# Patient Record
Sex: MALE | Race: WHITE | NOT HISPANIC OR LATINO | Employment: OTHER | URBAN - METROPOLITAN AREA
[De-identification: names, ages, dates, MRNs, and addresses within clinical notes are randomized per-mention and may not be internally consistent; named-entity substitution may affect disease eponyms.]

---

## 2017-11-01 ENCOUNTER — HOSPITAL ENCOUNTER (OUTPATIENT)
Dept: ULTRASOUND IMAGING | Facility: HOSPITAL | Age: 42
Discharge: HOME | End: 2017-11-01
Attending: INTERNAL MEDICINE

## 2017-11-01 DIAGNOSIS — N13.30 HYDRONEPHROSIS: ICD-10-CM

## 2017-11-01 DIAGNOSIS — N18.30 CKD (CHRONIC KIDNEY DISEASE), STAGE III (H): ICD-10-CM

## 2019-01-13 ENCOUNTER — SURGERY - HEALTHEAST (OUTPATIENT)
Dept: CARDIOLOGY | Facility: CLINIC | Age: 44
End: 2019-01-13

## 2019-01-14 ENCOUNTER — AMBULATORY - HEALTHEAST (OUTPATIENT)
Dept: CARDIOLOGY | Facility: CLINIC | Age: 44
End: 2019-01-14

## 2019-01-14 DIAGNOSIS — E78.2 MIXED HYPERLIPIDEMIA: ICD-10-CM

## 2019-01-21 ENCOUNTER — AMBULATORY - HEALTHEAST (OUTPATIENT)
Dept: CARDIOLOGY | Facility: CLINIC | Age: 44
End: 2019-01-21

## 2019-01-21 ENCOUNTER — COMMUNICATION - HEALTHEAST (OUTPATIENT)
Dept: CARDIOLOGY | Facility: CLINIC | Age: 44
End: 2019-01-21

## 2019-01-21 DIAGNOSIS — I21.3 STEMI (ST ELEVATION MYOCARDIAL INFARCTION) (H): ICD-10-CM

## 2019-01-21 LAB
CREAT SERPL-MCNC: 2.77 MG/DL (ref 0.7–1.3)
GFR SERPL CREATININE-BSD FRML MDRD: 25 ML/MIN/1.73M2

## 2019-01-21 ASSESSMENT — MIFFLIN-ST. JEOR: SCORE: 1968.55

## 2019-01-22 ENCOUNTER — AMBULATORY - HEALTHEAST (OUTPATIENT)
Dept: CARDIAC REHAB | Facility: HOSPITAL | Age: 44
End: 2019-01-22

## 2019-01-22 DIAGNOSIS — Z95.5 STENTED CORONARY ARTERY: ICD-10-CM

## 2019-01-22 DIAGNOSIS — I21.02 STEMI INVOLVING LEFT ANTERIOR DESCENDING CORONARY ARTERY (H): ICD-10-CM

## 2019-01-22 LAB — GLUCOSE BLDC GLUCOMTR-MCNC: 153 MG/DL

## 2019-01-24 ENCOUNTER — AMBULATORY - HEALTHEAST (OUTPATIENT)
Dept: CARDIAC REHAB | Facility: HOSPITAL | Age: 44
End: 2019-01-24

## 2019-01-24 DIAGNOSIS — Z95.5 STENTED CORONARY ARTERY: ICD-10-CM

## 2019-01-28 ENCOUNTER — AMBULATORY - HEALTHEAST (OUTPATIENT)
Dept: CARDIOLOGY | Facility: CLINIC | Age: 44
End: 2019-01-28

## 2019-01-28 DIAGNOSIS — I21.3 STEMI (ST ELEVATION MYOCARDIAL INFARCTION) (H): ICD-10-CM

## 2019-01-28 LAB
CREAT SERPL-MCNC: 2.29 MG/DL (ref 0.7–1.3)
GFR SERPL CREATININE-BSD FRML MDRD: 31 ML/MIN/1.73M2

## 2019-01-29 ENCOUNTER — AMBULATORY - HEALTHEAST (OUTPATIENT)
Dept: CARDIAC REHAB | Facility: HOSPITAL | Age: 44
End: 2019-01-29

## 2019-01-29 ENCOUNTER — OFFICE VISIT - HEALTHEAST (OUTPATIENT)
Dept: CARDIOLOGY | Facility: CLINIC | Age: 44
End: 2019-01-29

## 2019-01-29 DIAGNOSIS — I10 BENIGN ESSENTIAL HYPERTENSION: ICD-10-CM

## 2019-01-29 DIAGNOSIS — Z95.5 STENTED CORONARY ARTERY: ICD-10-CM

## 2019-01-29 DIAGNOSIS — I25.83 CORONARY ARTERY DISEASE DUE TO LIPID RICH PLAQUE: ICD-10-CM

## 2019-01-29 DIAGNOSIS — E11.59 TYPE 2 DIABETES MELLITUS WITH OTHER CIRCULATORY COMPLICATION, UNSPECIFIED WHETHER LONG TERM INSULIN USE (H): ICD-10-CM

## 2019-01-29 DIAGNOSIS — I21.02 STEMI INVOLVING LEFT ANTERIOR DESCENDING CORONARY ARTERY (H): ICD-10-CM

## 2019-01-29 DIAGNOSIS — E78.5 DYSLIPIDEMIA: ICD-10-CM

## 2019-01-29 DIAGNOSIS — I25.10 CORONARY ARTERY DISEASE DUE TO LIPID RICH PLAQUE: ICD-10-CM

## 2019-01-29 DIAGNOSIS — N18.9 CHRONIC KIDNEY DISEASE, UNSPECIFIED CKD STAGE: ICD-10-CM

## 2019-01-29 ASSESSMENT — MIFFLIN-ST. JEOR: SCORE: 1982.35

## 2019-01-31 ENCOUNTER — AMBULATORY - HEALTHEAST (OUTPATIENT)
Dept: CARDIAC REHAB | Facility: HOSPITAL | Age: 44
End: 2019-01-31

## 2019-01-31 DIAGNOSIS — Z95.5 STENTED CORONARY ARTERY: ICD-10-CM

## 2019-02-04 ENCOUNTER — AMBULATORY - HEALTHEAST (OUTPATIENT)
Dept: CARDIOLOGY | Facility: CLINIC | Age: 44
End: 2019-02-04

## 2019-02-04 DIAGNOSIS — Z00.6 EXAMINATION OF PARTICIPANT OR CONTROL IN CLINICAL RESEARCH: ICD-10-CM

## 2019-02-04 DIAGNOSIS — I21.3 STEMI (ST ELEVATION MYOCARDIAL INFARCTION) (H): ICD-10-CM

## 2019-02-04 ASSESSMENT — MIFFLIN-ST. JEOR: SCORE: 1972.76

## 2019-02-05 ENCOUNTER — AMBULATORY - HEALTHEAST (OUTPATIENT)
Dept: CARDIAC REHAB | Facility: HOSPITAL | Age: 44
End: 2019-02-05

## 2019-02-05 DIAGNOSIS — Z95.5 STENTED CORONARY ARTERY: ICD-10-CM

## 2019-02-05 DIAGNOSIS — I21.02 STEMI INVOLVING LEFT ANTERIOR DESCENDING CORONARY ARTERY (H): ICD-10-CM

## 2019-02-07 ENCOUNTER — AMBULATORY - HEALTHEAST (OUTPATIENT)
Dept: CARDIAC REHAB | Facility: HOSPITAL | Age: 44
End: 2019-02-07

## 2019-02-07 DIAGNOSIS — Z95.5 STENTED CORONARY ARTERY: ICD-10-CM

## 2019-02-07 DIAGNOSIS — I21.02 STEMI INVOLVING LEFT ANTERIOR DESCENDING CORONARY ARTERY (H): ICD-10-CM

## 2019-02-08 ENCOUNTER — AMBULATORY - HEALTHEAST (OUTPATIENT)
Dept: CARDIOLOGY | Facility: CLINIC | Age: 44
End: 2019-02-08

## 2019-02-11 ENCOUNTER — AMBULATORY - HEALTHEAST (OUTPATIENT)
Dept: CARDIOLOGY | Facility: CLINIC | Age: 44
End: 2019-02-11

## 2019-02-11 ENCOUNTER — OFFICE VISIT - HEALTHEAST (OUTPATIENT)
Dept: CARDIOLOGY | Facility: CLINIC | Age: 44
End: 2019-02-11

## 2019-02-11 DIAGNOSIS — I21.02 STEMI INVOLVING LEFT ANTERIOR DESCENDING CORONARY ARTERY (H): ICD-10-CM

## 2019-02-11 DIAGNOSIS — I10 BENIGN ESSENTIAL HYPERTENSION: ICD-10-CM

## 2019-02-11 DIAGNOSIS — E11.59 TYPE 2 DIABETES MELLITUS WITH OTHER CIRCULATORY COMPLICATION, UNSPECIFIED WHETHER LONG TERM INSULIN USE (H): ICD-10-CM

## 2019-02-11 DIAGNOSIS — E78.5 DYSLIPIDEMIA: ICD-10-CM

## 2019-02-11 DIAGNOSIS — I25.83 CORONARY ARTERY DISEASE DUE TO LIPID RICH PLAQUE: ICD-10-CM

## 2019-02-11 DIAGNOSIS — I25.10 CORONARY ARTERY DISEASE DUE TO LIPID RICH PLAQUE: ICD-10-CM

## 2019-02-11 ASSESSMENT — MIFFLIN-ST. JEOR: SCORE: 1990.91

## 2019-02-12 ENCOUNTER — AMBULATORY - HEALTHEAST (OUTPATIENT)
Dept: CARDIAC REHAB | Facility: HOSPITAL | Age: 44
End: 2019-02-12

## 2019-02-12 DIAGNOSIS — Z95.5 STENTED CORONARY ARTERY: ICD-10-CM

## 2019-02-14 ENCOUNTER — AMBULATORY - HEALTHEAST (OUTPATIENT)
Dept: CARDIAC REHAB | Facility: HOSPITAL | Age: 44
End: 2019-02-14

## 2019-02-14 DIAGNOSIS — I21.02 STEMI INVOLVING LEFT ANTERIOR DESCENDING CORONARY ARTERY (H): ICD-10-CM

## 2019-02-14 DIAGNOSIS — Z95.5 STENTED CORONARY ARTERY: ICD-10-CM

## 2019-02-15 ENCOUNTER — AMBULATORY - HEALTHEAST (OUTPATIENT)
Dept: CARDIOLOGY | Facility: CLINIC | Age: 44
End: 2019-02-15

## 2019-02-19 ENCOUNTER — AMBULATORY - HEALTHEAST (OUTPATIENT)
Dept: CARDIAC REHAB | Facility: HOSPITAL | Age: 44
End: 2019-02-19

## 2019-02-19 DIAGNOSIS — Z95.5 STENTED CORONARY ARTERY: ICD-10-CM

## 2019-02-20 ENCOUNTER — COMMUNICATION - HEALTHEAST (OUTPATIENT)
Dept: CARDIOLOGY | Facility: CLINIC | Age: 44
End: 2019-02-20

## 2019-02-21 ENCOUNTER — AMBULATORY - HEALTHEAST (OUTPATIENT)
Dept: CARDIAC REHAB | Facility: HOSPITAL | Age: 44
End: 2019-02-21

## 2019-02-21 DIAGNOSIS — Z95.5 STENTED CORONARY ARTERY: ICD-10-CM

## 2019-02-26 ENCOUNTER — AMBULATORY - HEALTHEAST (OUTPATIENT)
Dept: CARDIAC REHAB | Facility: HOSPITAL | Age: 44
End: 2019-02-26

## 2019-02-26 DIAGNOSIS — Z95.5 STENTED CORONARY ARTERY: ICD-10-CM

## 2019-02-26 DIAGNOSIS — I21.02 STEMI INVOLVING LEFT ANTERIOR DESCENDING CORONARY ARTERY (H): ICD-10-CM

## 2019-02-26 LAB
GLUCOSE BLDC GLUCOMTR-MCNC: 152 MG/DL (ref 70–139)
GLUCOSE BLDC GLUCOMTR-MCNC: 165 MG/DL (ref 70–139)

## 2019-02-28 ENCOUNTER — AMBULATORY - HEALTHEAST (OUTPATIENT)
Dept: CARDIAC REHAB | Facility: HOSPITAL | Age: 44
End: 2019-02-28

## 2019-02-28 DIAGNOSIS — Z95.5 STENTED CORONARY ARTERY: ICD-10-CM

## 2019-03-04 ENCOUNTER — OFFICE VISIT - HEALTHEAST (OUTPATIENT)
Dept: CARDIOLOGY | Facility: CLINIC | Age: 44
End: 2019-03-04

## 2019-03-04 DIAGNOSIS — I25.83 CORONARY ARTERY DISEASE DUE TO LIPID RICH PLAQUE: ICD-10-CM

## 2019-03-04 DIAGNOSIS — I25.10 CORONARY ARTERY DISEASE DUE TO LIPID RICH PLAQUE: ICD-10-CM

## 2019-03-04 LAB
CHOLEST SERPL-MCNC: 114 MG/DL
FASTING STATUS PATIENT QL REPORTED: NO
HDLC SERPL-MCNC: 29 MG/DL
LDLC SERPL CALC-MCNC: 36 MG/DL
LDLC SERPL CALC-MCNC: 46 MG/DL
TRIGL SERPL-MCNC: 243 MG/DL

## 2019-03-04 ASSESSMENT — MIFFLIN-ST. JEOR: SCORE: 1994.24

## 2019-03-05 ENCOUNTER — AMBULATORY - HEALTHEAST (OUTPATIENT)
Dept: CARDIAC REHAB | Facility: HOSPITAL | Age: 44
End: 2019-03-05

## 2019-03-05 DIAGNOSIS — Z95.5 STENTED CORONARY ARTERY: ICD-10-CM

## 2019-03-05 DIAGNOSIS — I21.02 STEMI INVOLVING LEFT ANTERIOR DESCENDING CORONARY ARTERY (H): ICD-10-CM

## 2019-03-06 ENCOUNTER — HOSPITAL ENCOUNTER (OUTPATIENT)
Dept: CARDIOLOGY | Facility: HOSPITAL | Age: 44
Discharge: HOME | End: 2019-03-06
Attending: INTERNAL MEDICINE

## 2019-03-06 ENCOUNTER — HOSPITAL ENCOUNTER (OUTPATIENT)
Dept: NUCLEAR MEDICINE | Facility: HOSPITAL | Age: 44
Discharge: HOME | End: 2019-03-06
Attending: INTERNAL MEDICINE

## 2019-03-06 DIAGNOSIS — I25.10 CORONARY ARTERY DISEASE DUE TO LIPID RICH PLAQUE: ICD-10-CM

## 2019-03-06 DIAGNOSIS — I25.83 CORONARY ARTERY DISEASE DUE TO LIPID RICH PLAQUE: ICD-10-CM

## 2019-03-06 LAB
CV STRESS CURRENT BP HE: NORMAL
CV STRESS CURRENT HR HE: 102
CV STRESS CURRENT HR HE: 104
CV STRESS CURRENT HR HE: 106
CV STRESS CURRENT HR HE: 106
CV STRESS CURRENT HR HE: 108
CV STRESS CURRENT HR HE: 110
CV STRESS CURRENT HR HE: 114
CV STRESS CURRENT HR HE: 118
CV STRESS CURRENT HR HE: 122
CV STRESS CURRENT HR HE: 127
CV STRESS CURRENT HR HE: 128
CV STRESS CURRENT HR HE: 133
CV STRESS CURRENT HR HE: 144
CV STRESS CURRENT HR HE: 146
CV STRESS CURRENT HR HE: 147
CV STRESS CURRENT HR HE: 150
CV STRESS CURRENT HR HE: 150
CV STRESS CURRENT HR HE: 154
CV STRESS CURRENT HR HE: 154
CV STRESS CURRENT HR HE: 158
CV STRESS CURRENT HR HE: 71
CV STRESS CURRENT HR HE: 75
CV STRESS CURRENT HR HE: 87
CV STRESS CURRENT HR HE: 93
CV STRESS CURRENT HR HE: 93
CV STRESS CURRENT HR HE: 96
CV STRESS CURRENT HR HE: 98
CV STRESS CURRENT HR HE: 99
CV STRESS DEVIATION TIME HE: NORMAL
CV STRESS ECHO PERCENT HR HE: NORMAL
CV STRESS EXERCISE STAGE HE: NORMAL
CV STRESS EXERCISE STAGE REACHED HE: NORMAL
CV STRESS FINAL RESTING BP HE: NORMAL
CV STRESS FINAL RESTING HR HE: 98
CV STRESS MAX HR HE: 159
CV STRESS MAX TREADMILL GRADE HE: 16
CV STRESS MAX TREADMILL SPEED HE: 4.2
CV STRESS PEAK DIA BP HE: NORMAL
CV STRESS PEAK SYS BP HE: NORMAL
CV STRESS PHASE HE: NORMAL
CV STRESS PROTOCOL HE: NORMAL
CV STRESS RESTING PT POSITION HE: NORMAL
CV STRESS RESTING PT POSITION HE: NORMAL
CV STRESS ST DEVIATION AMOUNT HE: NORMAL
CV STRESS ST DEVIATION ELEVATION HE: NORMAL
CV STRESS ST EVELATION AMOUNT HE: NORMAL
CV STRESS TEST TYPE HE: NORMAL
CV STRESS TOTAL STAGE TIME MIN 1 HE: NORMAL
NUC STRESS EJECTION FRACTION: 61 %
STRESS ECHO BASELINE BP: NORMAL
STRESS ECHO BASELINE HR: 70
STRESS ECHO CALCULATED PERCENT HR: 90 %
STRESS ECHO LAST STRESS BP: NORMAL
STRESS ECHO LAST STRESS HR: 158
STRESS ECHO POST ESTIMATED WORKLOAD: 12.1
STRESS ECHO POST EXERCISE DUR MIN: 11
STRESS ECHO POST EXERCISE DUR SEC: 24
STRESS ECHO TARGET HR: 150

## 2019-03-08 ENCOUNTER — COMMUNICATION - HEALTHEAST (OUTPATIENT)
Dept: CARDIOLOGY | Facility: CLINIC | Age: 44
End: 2019-03-08

## 2019-03-08 DIAGNOSIS — I25.10 CAD (CORONARY ARTERY DISEASE): ICD-10-CM

## 2019-03-08 DIAGNOSIS — I25.83 CORONARY ARTERY DISEASE DUE TO LIPID RICH PLAQUE: ICD-10-CM

## 2019-03-08 DIAGNOSIS — I25.10 CORONARY ARTERY DISEASE DUE TO LIPID RICH PLAQUE: ICD-10-CM

## 2019-03-08 DIAGNOSIS — R94.39 ABNORMAL CARDIOVASCULAR STRESS TEST: ICD-10-CM

## 2019-03-12 ENCOUNTER — AMBULATORY - HEALTHEAST (OUTPATIENT)
Dept: CARDIAC REHAB | Facility: HOSPITAL | Age: 44
End: 2019-03-12

## 2019-03-12 DIAGNOSIS — I21.02 STEMI INVOLVING LEFT ANTERIOR DESCENDING CORONARY ARTERY (H): ICD-10-CM

## 2019-03-12 DIAGNOSIS — Z95.5 STENTED CORONARY ARTERY: ICD-10-CM

## 2019-03-14 ENCOUNTER — AMBULATORY - HEALTHEAST (OUTPATIENT)
Dept: CARDIAC REHAB | Facility: HOSPITAL | Age: 44
End: 2019-03-14

## 2019-03-14 DIAGNOSIS — Z95.5 STENTED CORONARY ARTERY: ICD-10-CM

## 2019-03-14 DIAGNOSIS — I21.02 STEMI INVOLVING LEFT ANTERIOR DESCENDING CORONARY ARTERY (H): ICD-10-CM

## 2019-03-19 ENCOUNTER — AMBULATORY - HEALTHEAST (OUTPATIENT)
Dept: CARDIAC REHAB | Facility: HOSPITAL | Age: 44
End: 2019-03-19

## 2019-03-19 DIAGNOSIS — I21.02 STEMI INVOLVING LEFT ANTERIOR DESCENDING CORONARY ARTERY (H): ICD-10-CM

## 2019-03-19 DIAGNOSIS — Z95.5 STENTED CORONARY ARTERY: ICD-10-CM

## 2019-03-20 ENCOUNTER — AMBULATORY - HEALTHEAST (OUTPATIENT)
Dept: CARDIAC REHAB | Facility: HOSPITAL | Age: 44
End: 2019-03-20

## 2019-03-20 DIAGNOSIS — I21.02 STEMI INVOLVING LEFT ANTERIOR DESCENDING CORONARY ARTERY (H): ICD-10-CM

## 2019-03-20 DIAGNOSIS — Z95.5 STENTED CORONARY ARTERY: ICD-10-CM

## 2019-03-21 ENCOUNTER — AMBULATORY - HEALTHEAST (OUTPATIENT)
Dept: CARDIAC REHAB | Facility: HOSPITAL | Age: 44
End: 2019-03-21

## 2019-03-21 DIAGNOSIS — Z95.5 STENTED CORONARY ARTERY: ICD-10-CM

## 2019-03-21 DIAGNOSIS — I21.02 STEMI INVOLVING LEFT ANTERIOR DESCENDING CORONARY ARTERY (H): ICD-10-CM

## 2019-03-26 ENCOUNTER — AMBULATORY - HEALTHEAST (OUTPATIENT)
Dept: CARDIAC REHAB | Facility: HOSPITAL | Age: 44
End: 2019-03-26

## 2019-03-26 DIAGNOSIS — I21.02 STEMI INVOLVING LEFT ANTERIOR DESCENDING CORONARY ARTERY (H): ICD-10-CM

## 2019-03-26 DIAGNOSIS — Z95.5 STENTED CORONARY ARTERY: ICD-10-CM

## 2019-03-28 ENCOUNTER — AMBULATORY - HEALTHEAST (OUTPATIENT)
Dept: CARDIAC REHAB | Facility: HOSPITAL | Age: 44
End: 2019-03-28

## 2019-03-28 DIAGNOSIS — Z95.5 STENTED CORONARY ARTERY: ICD-10-CM

## 2019-04-01 ENCOUNTER — COMMUNICATION - HEALTHEAST (OUTPATIENT)
Dept: CARDIOLOGY | Facility: CLINIC | Age: 44
End: 2019-04-01

## 2019-04-01 ENCOUNTER — AMBULATORY - HEALTHEAST (OUTPATIENT)
Dept: CARDIOLOGY | Facility: CLINIC | Age: 44
End: 2019-04-01

## 2019-04-02 ENCOUNTER — HOSPITAL ENCOUNTER (OUTPATIENT)
Dept: RADIOLOGY | Facility: HOSPITAL | Age: 44
Discharge: HOME | End: 2019-04-02
Attending: INTERNAL MEDICINE

## 2019-04-02 ENCOUNTER — HOSPITAL ENCOUNTER (OUTPATIENT)
Dept: MRI IMAGING | Facility: HOSPITAL | Age: 44
Discharge: HOME | End: 2019-04-02
Attending: INTERNAL MEDICINE

## 2019-04-02 DIAGNOSIS — I25.83 CORONARY ARTERY DISEASE DUE TO LIPID RICH PLAQUE: ICD-10-CM

## 2019-04-02 DIAGNOSIS — I25.10 CORONARY ARTERY DISEASE DUE TO LIPID RICH PLAQUE: ICD-10-CM

## 2019-04-02 DIAGNOSIS — R94.39 ABNORMAL CARDIOVASCULAR STRESS TEST: ICD-10-CM

## 2019-04-02 LAB
ATRIAL RATE - MUSE: 73 BPM
CREAT BLD-MCNC: 2.6 MG/DL (ref 0.7–1.3)
DIASTOLIC BLOOD PRESSURE - MUSE: NORMAL MMHG
GFR SERPL CREATININE-BSD FRML MDRD: 27 ML/MIN/1.73M2
INTERPRETATION ECG - MUSE: NORMAL
P AXIS - MUSE: 46 DEGREES
PR INTERVAL - MUSE: 182 MS
QRS DURATION - MUSE: 112 MS
QT - MUSE: 402 MS
QTC - MUSE: 442 MS
R AXIS - MUSE: 56 DEGREES
SYSTOLIC BLOOD PRESSURE - MUSE: NORMAL MMHG
T AXIS - MUSE: 24 DEGREES
VENTRICULAR RATE- MUSE: 73 BPM

## 2019-04-03 ENCOUNTER — COMMUNICATION - HEALTHEAST (OUTPATIENT)
Dept: CARDIOLOGY | Facility: CLINIC | Age: 44
End: 2019-04-03

## 2019-04-03 DIAGNOSIS — I25.83 CORONARY ARTERY DISEASE DUE TO LIPID RICH PLAQUE: ICD-10-CM

## 2019-04-03 DIAGNOSIS — I21.3 STEMI (ST ELEVATION MYOCARDIAL INFARCTION) (H): ICD-10-CM

## 2019-04-03 DIAGNOSIS — I10 HTN (HYPERTENSION): ICD-10-CM

## 2019-04-03 DIAGNOSIS — I25.10 CORONARY ARTERY DISEASE DUE TO LIPID RICH PLAQUE: ICD-10-CM

## 2019-04-10 ENCOUNTER — AMBULATORY - HEALTHEAST (OUTPATIENT)
Dept: CARDIOLOGY | Facility: CLINIC | Age: 44
End: 2019-04-10

## 2019-04-10 DIAGNOSIS — I25.10 CAD (CORONARY ARTERY DISEASE): ICD-10-CM

## 2019-04-29 ENCOUNTER — COMMUNICATION - HEALTHEAST (OUTPATIENT)
Dept: CARDIOLOGY | Facility: CLINIC | Age: 44
End: 2019-04-29

## 2019-05-01 ENCOUNTER — AMBULATORY - HEALTHEAST (OUTPATIENT)
Dept: CARDIOLOGY | Facility: CLINIC | Age: 44
End: 2019-05-01

## 2019-05-01 ENCOUNTER — SURGERY - HEALTHEAST (OUTPATIENT)
Dept: CARDIOLOGY | Facility: CLINIC | Age: 44
End: 2019-05-01

## 2019-05-02 ENCOUNTER — SURGERY - HEALTHEAST (OUTPATIENT)
Dept: CARDIOLOGY | Facility: CLINIC | Age: 44
End: 2019-05-02

## 2019-05-02 ASSESSMENT — MIFFLIN-ST. JEOR: SCORE: 1963.1

## 2019-05-17 ENCOUNTER — OFFICE VISIT - HEALTHEAST (OUTPATIENT)
Dept: CARDIOLOGY | Facility: CLINIC | Age: 44
End: 2019-05-17

## 2019-05-17 DIAGNOSIS — I21.02 STEMI INVOLVING LEFT ANTERIOR DESCENDING CORONARY ARTERY (H): ICD-10-CM

## 2019-05-17 DIAGNOSIS — I25.83 CORONARY ARTERY DISEASE DUE TO LIPID RICH PLAQUE: ICD-10-CM

## 2019-05-17 DIAGNOSIS — I25.10 CORONARY ARTERY DISEASE DUE TO LIPID RICH PLAQUE: ICD-10-CM

## 2019-05-17 ASSESSMENT — MIFFLIN-ST. JEOR: SCORE: 1960.12

## 2019-06-14 ENCOUNTER — SURGERY - HEALTHEAST (OUTPATIENT)
Dept: CARDIOLOGY | Facility: CLINIC | Age: 44
End: 2019-06-14

## 2019-06-14 DIAGNOSIS — I21.02 STEMI INVOLVING LEFT ANTERIOR DESCENDING CORONARY ARTERY (H): ICD-10-CM

## 2019-06-17 ENCOUNTER — HOSPITAL ENCOUNTER (OUTPATIENT)
Dept: RADIOLOGY | Facility: HOSPITAL | Age: 44
Discharge: HOME | End: 2019-06-17

## 2019-06-17 DIAGNOSIS — I21.02 STEMI INVOLVING LEFT ANTERIOR DESCENDING CORONARY ARTERY (H): ICD-10-CM

## 2019-06-19 ENCOUNTER — HOSPITAL ENCOUNTER (OUTPATIENT)
Dept: SURGERY | Facility: CLINIC | Age: 44
Discharge: HOME | End: 2019-06-19

## 2019-06-19 ENCOUNTER — ANESTHESIA - HEALTHEAST (OUTPATIENT)
Dept: SURGERY | Facility: CLINIC | Age: 44
End: 2019-06-19

## 2019-06-19 DIAGNOSIS — I21.02 STEMI INVOLVING LEFT ANTERIOR DESCENDING CORONARY ARTERY (H): ICD-10-CM

## 2019-06-19 LAB
ABO/RH(D): NORMAL
ALBUMIN UR-MCNC: ABNORMAL MG/DL
ANION GAP SERPL CALCULATED.3IONS-SCNC: 7 MMOL/L (ref 5–18)
ANTIBODY SCREEN: NEGATIVE
APPEARANCE UR: CLEAR
ATRIAL RATE - MUSE: 74 BPM
BACTERIA #/AREA URNS HPF: ABNORMAL HPF
BILIRUB UR QL STRIP: NEGATIVE
BUN SERPL-MCNC: 37 MG/DL (ref 8–22)
CALCIUM SERPL-MCNC: 10 MG/DL (ref 8.5–10.5)
CHLORIDE BLD-SCNC: 107 MMOL/L (ref 98–107)
CO2 SERPL-SCNC: 27 MMOL/L (ref 22–31)
COLOR UR AUTO: ABNORMAL
CREAT SERPL-MCNC: 2 MG/DL (ref 0.7–1.3)
DIASTOLIC BLOOD PRESSURE - MUSE: NORMAL MMHG
ERYTHROCYTE [DISTWIDTH] IN BLOOD BY AUTOMATED COUNT: 13.3 % (ref 11–14.5)
GFR SERPL CREATININE-BSD FRML MDRD: 37 ML/MIN/1.73M2
GLUCOSE BLD-MCNC: 65 MG/DL (ref 70–125)
GLUCOSE UR STRIP-MCNC: ABNORMAL MG/DL
HCT VFR BLD AUTO: 49 % (ref 40–54)
HGB BLD-MCNC: 16.5 G/DL (ref 14–18)
HGB UR QL STRIP: ABNORMAL
INR PPP: 1.01 (ref 0.9–1.1)
INTERPRETATION ECG - MUSE: NORMAL
KETONES UR STRIP-MCNC: NEGATIVE MG/DL
LEUKOCYTE ESTERASE UR QL STRIP: NEGATIVE
MAGNESIUM SERPL-MCNC: 1.9 MG/DL (ref 1.8–2.6)
MCH RBC QN AUTO: 29.6 PG (ref 27–34)
MCHC RBC AUTO-ENTMCNC: 33.7 G/DL (ref 32–36)
MCV RBC AUTO: 88 FL (ref 80–100)
MUCOUS THREADS #/AREA URNS LPF: ABNORMAL LPF
NITRATE UR QL: NEGATIVE
P AXIS - MUSE: 44 DEGREES
PH UR STRIP: 6 [PH] (ref 4.5–8)
PLATELET # BLD AUTO: 199 THOU/UL (ref 140–440)
PMV BLD AUTO: 9.6 FL (ref 8.5–12.5)
POTASSIUM BLD-SCNC: 3.9 MMOL/L (ref 3.5–5)
PR INTERVAL - MUSE: 188 MS
PREALB SERPL-MCNC: 36 MG/DL (ref 19–38)
QRS DURATION - MUSE: 112 MS
QT - MUSE: 392 MS
QTC - MUSE: 435 MS
R AXIS - MUSE: 30 DEGREES
RBC # BLD AUTO: 5.57 MILL/UL (ref 4.4–6.2)
RBC #/AREA URNS AUTO: ABNORMAL HPF
SODIUM SERPL-SCNC: 141 MMOL/L (ref 136–145)
SP GR UR STRIP: 1.01 (ref 1–1.03)
SQUAMOUS #/AREA URNS AUTO: ABNORMAL LPF
SYSTOLIC BLOOD PRESSURE - MUSE: NORMAL MMHG
T AXIS - MUSE: 35 DEGREES
UROBILINOGEN UR STRIP-ACNC: ABNORMAL
VENTRICULAR RATE- MUSE: 74 BPM
WBC #/AREA URNS AUTO: ABNORMAL HPF
WBC: 8.5 THOU/UL (ref 4–11)

## 2019-06-19 ASSESSMENT — MIFFLIN-ST. JEOR: SCORE: 1978.86

## 2019-06-20 ENCOUNTER — SURGERY - HEALTHEAST (OUTPATIENT)
Dept: SURGERY | Facility: CLINIC | Age: 44
End: 2019-06-20

## 2019-06-20 LAB
BACTERIA SPEC CULT: NO GROWTH
BACTERIA SPEC CULT: NORMAL

## 2019-06-20 ASSESSMENT — MIFFLIN-ST. JEOR: SCORE: 1974.32

## 2019-06-21 ENCOUNTER — AMBULATORY - HEALTHEAST (OUTPATIENT)
Dept: CARDIOLOGY | Facility: CLINIC | Age: 44
End: 2019-06-21

## 2019-06-21 ASSESSMENT — MIFFLIN-ST. JEOR: SCORE: 2010.16

## 2019-06-22 ASSESSMENT — MIFFLIN-ST. JEOR: SCORE: 1999.72

## 2019-06-23 ASSESSMENT — MIFFLIN-ST. JEOR: SCORE: 1998.82

## 2019-06-24 ASSESSMENT — MIFFLIN-ST. JEOR: SCORE: 1985.78

## 2019-06-25 ASSESSMENT — MIFFLIN-ST. JEOR: SCORE: 1996.22

## 2019-06-26 ASSESSMENT — MIFFLIN-ST. JEOR: SCORE: 1939.52

## 2019-06-27 ENCOUNTER — AMBULATORY - HEALTHEAST (OUTPATIENT)
Dept: CARDIOLOGY | Facility: CLINIC | Age: 44
End: 2019-06-27

## 2019-06-27 DIAGNOSIS — Z95.1 S/P CABG (CORONARY ARTERY BYPASS GRAFT): ICD-10-CM

## 2019-07-02 ENCOUNTER — AMBULATORY - HEALTHEAST (OUTPATIENT)
Dept: CARDIAC REHAB | Facility: HOSPITAL | Age: 44
End: 2019-07-02

## 2019-07-02 DIAGNOSIS — Z95.1 S/P CABG (CORONARY ARTERY BYPASS GRAFT): ICD-10-CM

## 2019-07-02 LAB — GLUCOSE BLDC GLUCOMTR-MCNC: 151 MG/DL (ref 70–139)

## 2019-07-03 ENCOUNTER — AMBULATORY - HEALTHEAST (OUTPATIENT)
Dept: CARDIOLOGY | Facility: CLINIC | Age: 44
End: 2019-07-03

## 2019-07-05 ENCOUNTER — AMBULATORY - HEALTHEAST (OUTPATIENT)
Dept: CARDIAC REHAB | Facility: HOSPITAL | Age: 44
End: 2019-07-05

## 2019-07-05 DIAGNOSIS — Z95.1 S/P CABG (CORONARY ARTERY BYPASS GRAFT): ICD-10-CM

## 2019-07-05 DIAGNOSIS — Z95.5 STENTED CORONARY ARTERY: ICD-10-CM

## 2019-07-05 DIAGNOSIS — I21.02 STEMI INVOLVING LEFT ANTERIOR DESCENDING CORONARY ARTERY (H): ICD-10-CM

## 2019-07-08 ENCOUNTER — AMBULATORY - HEALTHEAST (OUTPATIENT)
Dept: CARDIAC REHAB | Facility: HOSPITAL | Age: 44
End: 2019-07-08

## 2019-07-08 DIAGNOSIS — Z95.1 S/P CABG (CORONARY ARTERY BYPASS GRAFT): ICD-10-CM

## 2019-07-10 ENCOUNTER — OFFICE VISIT - HEALTHEAST (OUTPATIENT)
Dept: CARDIOLOGY | Facility: CLINIC | Age: 44
End: 2019-07-10

## 2019-07-10 DIAGNOSIS — Z95.1 S/P CABG (CORONARY ARTERY BYPASS GRAFT): ICD-10-CM

## 2019-07-10 ASSESSMENT — MIFFLIN-ST. JEOR: SCORE: 1932.26

## 2019-07-12 ENCOUNTER — AMBULATORY - HEALTHEAST (OUTPATIENT)
Dept: CARDIAC REHAB | Facility: HOSPITAL | Age: 44
End: 2019-07-12

## 2019-07-12 DIAGNOSIS — Z95.1 S/P CABG (CORONARY ARTERY BYPASS GRAFT): ICD-10-CM

## 2019-07-17 ENCOUNTER — AMBULATORY - HEALTHEAST (OUTPATIENT)
Dept: CARDIOLOGY | Facility: CLINIC | Age: 44
End: 2019-07-17

## 2019-07-17 DIAGNOSIS — I25.10 CAD (CORONARY ARTERY DISEASE): ICD-10-CM

## 2019-07-17 ASSESSMENT — MIFFLIN-ST. JEOR: SCORE: 1936.8

## 2019-07-19 ENCOUNTER — AMBULATORY - HEALTHEAST (OUTPATIENT)
Dept: CARDIAC REHAB | Facility: HOSPITAL | Age: 44
End: 2019-07-19

## 2019-07-19 DIAGNOSIS — Z95.1 S/P CABG (CORONARY ARTERY BYPASS GRAFT): ICD-10-CM

## 2019-07-22 ENCOUNTER — AMBULATORY - HEALTHEAST (OUTPATIENT)
Dept: CARDIAC REHAB | Facility: HOSPITAL | Age: 44
End: 2019-07-22

## 2019-07-22 DIAGNOSIS — Z95.1 S/P CABG (CORONARY ARTERY BYPASS GRAFT): ICD-10-CM

## 2019-07-26 ENCOUNTER — AMBULATORY - HEALTHEAST (OUTPATIENT)
Dept: CARDIAC REHAB | Facility: HOSPITAL | Age: 44
End: 2019-07-26

## 2019-07-26 DIAGNOSIS — Z95.1 S/P CABG (CORONARY ARTERY BYPASS GRAFT): ICD-10-CM

## 2019-07-29 ENCOUNTER — AMBULATORY - HEALTHEAST (OUTPATIENT)
Dept: CARDIAC REHAB | Facility: HOSPITAL | Age: 44
End: 2019-07-29

## 2019-07-29 DIAGNOSIS — Z95.1 S/P CABG (CORONARY ARTERY BYPASS GRAFT): ICD-10-CM

## 2019-07-30 ENCOUNTER — COMMUNICATION - HEALTHEAST (OUTPATIENT)
Dept: CARDIOLOGY | Facility: CLINIC | Age: 44
End: 2019-07-30

## 2019-07-30 DIAGNOSIS — Z95.1 S/P CABG (CORONARY ARTERY BYPASS GRAFT): ICD-10-CM

## 2019-08-05 ENCOUNTER — AMBULATORY - HEALTHEAST (OUTPATIENT)
Dept: CARDIAC REHAB | Facility: HOSPITAL | Age: 44
End: 2019-08-05

## 2019-08-05 DIAGNOSIS — Z95.1 S/P CABG (CORONARY ARTERY BYPASS GRAFT): ICD-10-CM

## 2019-08-09 ENCOUNTER — AMBULATORY - HEALTHEAST (OUTPATIENT)
Dept: CARDIAC REHAB | Facility: HOSPITAL | Age: 44
End: 2019-08-09

## 2019-08-09 DIAGNOSIS — Z95.1 S/P CABG (CORONARY ARTERY BYPASS GRAFT): ICD-10-CM

## 2019-08-12 ENCOUNTER — AMBULATORY - HEALTHEAST (OUTPATIENT)
Dept: CARDIAC REHAB | Facility: HOSPITAL | Age: 44
End: 2019-08-12

## 2019-08-12 DIAGNOSIS — Z95.1 S/P CABG (CORONARY ARTERY BYPASS GRAFT): ICD-10-CM

## 2019-08-16 ENCOUNTER — AMBULATORY - HEALTHEAST (OUTPATIENT)
Dept: CARDIAC REHAB | Facility: HOSPITAL | Age: 44
End: 2019-08-16

## 2019-08-16 DIAGNOSIS — Z95.1 S/P CABG (CORONARY ARTERY BYPASS GRAFT): ICD-10-CM

## 2019-08-21 ENCOUNTER — OFFICE VISIT - HEALTHEAST (OUTPATIENT)
Dept: CARDIOLOGY | Facility: CLINIC | Age: 44
End: 2019-08-21

## 2019-08-21 DIAGNOSIS — Z95.1 S/P CABG (CORONARY ARTERY BYPASS GRAFT): ICD-10-CM

## 2019-08-21 DIAGNOSIS — N18.30 STAGE 3 CHRONIC KIDNEY DISEASE (H): ICD-10-CM

## 2019-08-21 DIAGNOSIS — I10 ESSENTIAL HYPERTENSION: ICD-10-CM

## 2019-08-21 DIAGNOSIS — I25.2 HISTORY OF ST ELEVATION MYOCARDIAL INFARCTION (STEMI): ICD-10-CM

## 2019-08-21 DIAGNOSIS — E78.5 DYSLIPIDEMIA: ICD-10-CM

## 2019-08-21 DIAGNOSIS — I25.10 CORONARY ARTERY DISEASE DUE TO LIPID RICH PLAQUE: ICD-10-CM

## 2019-08-21 DIAGNOSIS — I25.5 ISCHEMIC CARDIOMYOPATHY: ICD-10-CM

## 2019-08-21 DIAGNOSIS — I25.83 CORONARY ARTERY DISEASE DUE TO LIPID RICH PLAQUE: ICD-10-CM

## 2019-08-21 LAB
ANION GAP SERPL CALCULATED.3IONS-SCNC: 9 MMOL/L (ref 5–18)
BNP SERPL-MCNC: 72 PG/ML (ref 0–35)
BUN SERPL-MCNC: 32 MG/DL (ref 8–22)
CALCIUM SERPL-MCNC: 10 MG/DL (ref 8.5–10.5)
CHLORIDE BLD-SCNC: 103 MMOL/L (ref 98–107)
CO2 SERPL-SCNC: 26 MMOL/L (ref 22–31)
CREAT SERPL-MCNC: 2.18 MG/DL (ref 0.7–1.3)
GFR SERPL CREATININE-BSD FRML MDRD: 33 ML/MIN/1.73M2
GLUCOSE BLD-MCNC: 186 MG/DL (ref 70–125)
POTASSIUM BLD-SCNC: 5 MMOL/L (ref 3.5–5)
SODIUM SERPL-SCNC: 138 MMOL/L (ref 136–145)

## 2019-08-21 ASSESSMENT — MIFFLIN-ST. JEOR: SCORE: 1966.93

## 2019-08-23 ENCOUNTER — AMBULATORY - HEALTHEAST (OUTPATIENT)
Dept: CARDIAC REHAB | Facility: HOSPITAL | Age: 44
End: 2019-08-23

## 2019-08-23 DIAGNOSIS — Z95.1 S/P CABG (CORONARY ARTERY BYPASS GRAFT): ICD-10-CM

## 2019-08-26 ENCOUNTER — AMBULATORY - HEALTHEAST (OUTPATIENT)
Dept: CARDIOLOGY | Facility: CLINIC | Age: 44
End: 2019-08-26

## 2019-08-26 ENCOUNTER — AMBULATORY - HEALTHEAST (OUTPATIENT)
Dept: CARDIAC REHAB | Facility: HOSPITAL | Age: 44
End: 2019-08-26

## 2019-08-26 ENCOUNTER — COMMUNICATION - HEALTHEAST (OUTPATIENT)
Dept: CARDIOLOGY | Facility: CLINIC | Age: 44
End: 2019-08-26

## 2019-08-26 DIAGNOSIS — Z95.1 S/P CABG (CORONARY ARTERY BYPASS GRAFT): ICD-10-CM

## 2019-08-26 DIAGNOSIS — I10 ESSENTIAL HYPERTENSION: ICD-10-CM

## 2019-09-21 ENCOUNTER — COMMUNICATION - HEALTHEAST (OUTPATIENT)
Dept: CARDIOLOGY | Facility: CLINIC | Age: 44
End: 2019-09-21

## 2019-09-21 DIAGNOSIS — Z95.1 S/P CABG (CORONARY ARTERY BYPASS GRAFT): ICD-10-CM

## 2019-10-15 ENCOUNTER — COMMUNICATION - HEALTHEAST (OUTPATIENT)
Dept: CARDIOLOGY | Facility: CLINIC | Age: 44
End: 2019-10-15

## 2019-10-18 ENCOUNTER — COMMUNICATION - HEALTHEAST (OUTPATIENT)
Dept: CARDIOLOGY | Facility: CLINIC | Age: 44
End: 2019-10-18

## 2019-10-21 ENCOUNTER — COMMUNICATION - HEALTHEAST (OUTPATIENT)
Dept: CARDIOLOGY | Facility: CLINIC | Age: 44
End: 2019-10-21

## 2019-10-22 ENCOUNTER — COMMUNICATION - HEALTHEAST (OUTPATIENT)
Dept: CARDIOLOGY | Facility: CLINIC | Age: 44
End: 2019-10-22

## 2019-10-24 ENCOUNTER — COMMUNICATION - HEALTHEAST (OUTPATIENT)
Dept: CARDIOLOGY | Facility: CLINIC | Age: 44
End: 2019-10-24

## 2019-10-24 DIAGNOSIS — Z95.1 S/P CABG (CORONARY ARTERY BYPASS GRAFT): ICD-10-CM

## 2019-12-17 ENCOUNTER — COMMUNICATION - HEALTHEAST (OUTPATIENT)
Dept: CARDIOLOGY | Facility: CLINIC | Age: 44
End: 2019-12-17

## 2019-12-17 DIAGNOSIS — I10 HTN (HYPERTENSION): ICD-10-CM

## 2019-12-17 DIAGNOSIS — I10 ESSENTIAL HYPERTENSION: ICD-10-CM

## 2020-01-21 ENCOUNTER — COMMUNICATION - HEALTHEAST (OUTPATIENT)
Dept: CARDIOLOGY | Facility: CLINIC | Age: 45
End: 2020-01-21

## 2020-01-21 ENCOUNTER — AMBULATORY - HEALTHEAST (OUTPATIENT)
Dept: CARDIOLOGY | Facility: CLINIC | Age: 45
End: 2020-01-21

## 2020-01-21 DIAGNOSIS — I10 ESSENTIAL HYPERTENSION: ICD-10-CM

## 2020-01-21 DIAGNOSIS — I25.10 CAD (CORONARY ARTERY DISEASE): ICD-10-CM

## 2020-01-21 DIAGNOSIS — Z95.1 S/P CABG (CORONARY ARTERY BYPASS GRAFT): ICD-10-CM

## 2020-02-09 ENCOUNTER — COMMUNICATION - HEALTHEAST (OUTPATIENT)
Dept: CARDIOLOGY | Facility: CLINIC | Age: 45
End: 2020-02-09

## 2020-02-09 DIAGNOSIS — Z95.1 S/P CABG (CORONARY ARTERY BYPASS GRAFT): ICD-10-CM

## 2020-02-20 ENCOUNTER — COMMUNICATION - HEALTHEAST (OUTPATIENT)
Dept: CARDIOLOGY | Facility: CLINIC | Age: 45
End: 2020-02-20

## 2020-02-20 DIAGNOSIS — I21.02 STEMI INVOLVING LEFT ANTERIOR DESCENDING CORONARY ARTERY (H): ICD-10-CM

## 2020-02-21 ENCOUNTER — AMBULATORY - HEALTHEAST (OUTPATIENT)
Dept: CARDIOLOGY | Facility: CLINIC | Age: 45
End: 2020-02-21

## 2020-03-02 ENCOUNTER — OFFICE VISIT - HEALTHEAST (OUTPATIENT)
Dept: CARDIOLOGY | Facility: CLINIC | Age: 45
End: 2020-03-02

## 2020-03-02 DIAGNOSIS — Z95.1 S/P CABG (CORONARY ARTERY BYPASS GRAFT): ICD-10-CM

## 2020-03-02 DIAGNOSIS — I25.10 CORONARY ARTERY DISEASE DUE TO LIPID RICH PLAQUE: ICD-10-CM

## 2020-03-02 DIAGNOSIS — I10 ESSENTIAL HYPERTENSION: ICD-10-CM

## 2020-03-02 DIAGNOSIS — I25.83 CORONARY ARTERY DISEASE DUE TO LIPID RICH PLAQUE: ICD-10-CM

## 2020-03-02 DIAGNOSIS — E78.5 DYSLIPIDEMIA: ICD-10-CM

## 2020-03-02 DIAGNOSIS — R06.09 DOE (DYSPNEA ON EXERTION): ICD-10-CM

## 2020-03-02 DIAGNOSIS — I25.2 HISTORY OF ST ELEVATION MYOCARDIAL INFARCTION (STEMI): ICD-10-CM

## 2020-03-02 DIAGNOSIS — R07.89 BURNING IN THE CHEST: ICD-10-CM

## 2020-03-02 LAB
CHOLEST SERPL-MCNC: 175 MG/DL
FASTING STATUS PATIENT QL REPORTED: YES
HDLC SERPL-MCNC: 32 MG/DL
LDLC SERPL CALC-MCNC: ABNORMAL MG/DL
TRIGL SERPL-MCNC: 649 MG/DL

## 2020-03-02 ASSESSMENT — MIFFLIN-ST. JEOR: SCORE: 2014.47

## 2020-03-13 ENCOUNTER — HOSPITAL ENCOUNTER (OUTPATIENT)
Dept: CARDIOLOGY | Facility: HOSPITAL | Age: 45
Discharge: HOME | End: 2020-03-13
Attending: INTERNAL MEDICINE

## 2020-03-13 DIAGNOSIS — R06.09 OTHER FORMS OF DYSPNEA: ICD-10-CM

## 2020-03-13 DIAGNOSIS — I25.83 CORONARY ARTERY DISEASE DUE TO LIPID RICH PLAQUE: ICD-10-CM

## 2020-03-13 DIAGNOSIS — R07.89 BURNING IN THE CHEST: ICD-10-CM

## 2020-03-13 DIAGNOSIS — Z95.1 S/P CABG (CORONARY ARTERY BYPASS GRAFT): ICD-10-CM

## 2020-03-13 DIAGNOSIS — R06.09 DOE (DYSPNEA ON EXERTION): ICD-10-CM

## 2020-03-13 DIAGNOSIS — I25.10 CORONARY ARTERY DISEASE DUE TO LIPID RICH PLAQUE: ICD-10-CM

## 2020-03-17 ENCOUNTER — HOSPITAL ENCOUNTER (OUTPATIENT)
Dept: CARDIOLOGY | Facility: HOSPITAL | Age: 45
Discharge: HOME | End: 2020-03-17
Attending: INTERNAL MEDICINE

## 2020-03-17 LAB
CV STRESS CURRENT BP HE: NORMAL
CV STRESS CURRENT HR HE: 101
CV STRESS CURRENT HR HE: 102
CV STRESS CURRENT HR HE: 104
CV STRESS CURRENT HR HE: 104
CV STRESS CURRENT HR HE: 110
CV STRESS CURRENT HR HE: 112
CV STRESS CURRENT HR HE: 116
CV STRESS CURRENT HR HE: 116
CV STRESS CURRENT HR HE: 117
CV STRESS CURRENT HR HE: 120
CV STRESS CURRENT HR HE: 75
CV STRESS CURRENT HR HE: 79
CV STRESS CURRENT HR HE: 79
CV STRESS CURRENT HR HE: 82
CV STRESS CURRENT HR HE: 84
CV STRESS CURRENT HR HE: 86
CV STRESS CURRENT HR HE: 86
CV STRESS CURRENT HR HE: 87
CV STRESS CURRENT HR HE: 88
CV STRESS CURRENT HR HE: 90
CV STRESS CURRENT HR HE: 90
CV STRESS CURRENT HR HE: 93
CV STRESS CURRENT HR HE: 95
CV STRESS CURRENT HR HE: 97
CV STRESS CURRENT HR HE: NORMAL
CV STRESS DEVIATION TIME HE: NORMAL
CV STRESS ECHO PERCENT HR HE: NORMAL
CV STRESS EXERCISE STAGE HE: NORMAL
CV STRESS FINAL RESTING BP HE: NORMAL
CV STRESS MAX HR HE: 121
CV STRESS MAX TREADMILL GRADE HE: 16
CV STRESS MAX TREADMILL SPEED HE: 4.2
CV STRESS PEAK DIA BP HE: NORMAL
CV STRESS PEAK SYS BP HE: NORMAL
CV STRESS PHASE HE: NORMAL
CV STRESS PROTOCOL HE: NORMAL
CV STRESS RESTING PT POSITION HE: NORMAL
CV STRESS ST DEVIATION AMOUNT HE: NORMAL
CV STRESS ST DEVIATION ELEVATION HE: NORMAL
CV STRESS ST EVELATION AMOUNT HE: NORMAL
CV STRESS TEST TYPE HE: NORMAL
CV STRESS TOTAL STAGE TIME MIN 1 HE: NORMAL
ECHO EJECTION FRACTION ESTIMATED: 55 %
RATE PRESSURE PRODUCT: NORMAL
STRESS ECHO BASELINE DIASTOLIC HE: 86
STRESS ECHO BASELINE HR: 80
STRESS ECHO BASELINE SYSTOLIC BP: 150
STRESS ECHO CALCULATED PERCENT HR: 69 %
STRESS ECHO LAST STRESS DIASTOLIC BP: 80
STRESS ECHO LAST STRESS HR: 120
STRESS ECHO LAST STRESS SYSTOLIC BP: 172
STRESS ECHO POST ESTIMATED WORKLOAD: 10.5
STRESS ECHO POST EXERCISE DUR MIN: 9
STRESS ECHO POST EXERCISE DUR SEC: 0
STRESS ECHO TARGET HR: 176

## 2020-03-18 ENCOUNTER — AMBULATORY - HEALTHEAST (OUTPATIENT)
Dept: CARDIOLOGY | Facility: CLINIC | Age: 45
End: 2020-03-18

## 2020-03-18 ENCOUNTER — COMMUNICATION - HEALTHEAST (OUTPATIENT)
Dept: CARDIOLOGY | Facility: CLINIC | Age: 45
End: 2020-03-18

## 2020-03-18 DIAGNOSIS — E11.59 TYPE 2 DIABETES MELLITUS WITH OTHER CIRCULATORY COMPLICATION, UNSPECIFIED WHETHER LONG TERM INSULIN USE (H): ICD-10-CM

## 2020-03-18 DIAGNOSIS — E78.5 DYSLIPIDEMIA: ICD-10-CM

## 2020-03-18 DIAGNOSIS — I25.83 CORONARY ARTERY DISEASE DUE TO LIPID RICH PLAQUE: ICD-10-CM

## 2020-03-18 DIAGNOSIS — I25.10 CORONARY ARTERY DISEASE DUE TO LIPID RICH PLAQUE: ICD-10-CM

## 2020-03-18 DIAGNOSIS — R07.89 BURNING IN THE CHEST: ICD-10-CM

## 2020-03-18 DIAGNOSIS — N18.30 STAGE 3 CHRONIC KIDNEY DISEASE (H): ICD-10-CM

## 2020-03-18 DIAGNOSIS — Z95.1 S/P CABG (CORONARY ARTERY BYPASS GRAFT): ICD-10-CM

## 2020-03-29 ENCOUNTER — COMMUNICATION - HEALTHEAST (OUTPATIENT)
Dept: CARDIOLOGY | Facility: CLINIC | Age: 45
End: 2020-03-29

## 2020-03-29 DIAGNOSIS — I10 ESSENTIAL HYPERTENSION: ICD-10-CM

## 2020-03-29 DIAGNOSIS — Z95.1 S/P CABG (CORONARY ARTERY BYPASS GRAFT): ICD-10-CM

## 2020-03-30 ENCOUNTER — COMMUNICATION - HEALTHEAST (OUTPATIENT)
Dept: CARDIOLOGY | Facility: CLINIC | Age: 45
End: 2020-03-30

## 2020-04-21 ENCOUNTER — COMMUNICATION - HEALTHEAST (OUTPATIENT)
Dept: CARDIOLOGY | Facility: CLINIC | Age: 45
End: 2020-04-21

## 2020-04-29 ENCOUNTER — AMBULATORY - HEALTHEAST (OUTPATIENT)
Dept: CARDIOLOGY | Facility: CLINIC | Age: 45
End: 2020-04-29

## 2020-04-29 ENCOUNTER — SURGERY - HEALTHEAST (OUTPATIENT)
Dept: CARDIOLOGY | Facility: CLINIC | Age: 45
End: 2020-04-29

## 2020-04-29 DIAGNOSIS — I25.10 CORONARY ARTERY DISEASE DUE TO LIPID RICH PLAQUE: ICD-10-CM

## 2020-04-29 DIAGNOSIS — I25.83 CORONARY ARTERY DISEASE DUE TO LIPID RICH PLAQUE: ICD-10-CM

## 2020-04-29 RX ORDER — INSULIN GLARGINE AND LIXISENATIDE 100; 33 U/ML; UG/ML
40 INJECTION, SOLUTION SUBCUTANEOUS DAILY
Status: ON HOLD | COMMUNITY
Start: 2020-04-29 | End: 2021-07-27

## 2020-04-30 ENCOUNTER — SURGERY - HEALTHEAST (OUTPATIENT)
Dept: CARDIOLOGY | Facility: CLINIC | Age: 45
End: 2020-04-30

## 2020-04-30 ASSESSMENT — MIFFLIN-ST. JEOR
SCORE: 2068.9
SCORE: 2068.9

## 2020-05-04 ENCOUNTER — COMMUNICATION - HEALTHEAST (OUTPATIENT)
Dept: CARDIOLOGY | Facility: CLINIC | Age: 45
End: 2020-05-04

## 2020-05-04 ENCOUNTER — AMBULATORY - HEALTHEAST (OUTPATIENT)
Dept: CARDIOLOGY | Facility: CLINIC | Age: 45
End: 2020-05-04

## 2020-05-04 DIAGNOSIS — Z95.1 S/P CABG (CORONARY ARTERY BYPASS GRAFT): ICD-10-CM

## 2020-05-04 DIAGNOSIS — R06.09 DYSPNEA ON EXERTION: ICD-10-CM

## 2020-05-06 ENCOUNTER — COMMUNICATION - HEALTHEAST (OUTPATIENT)
Dept: CARDIOLOGY | Facility: CLINIC | Age: 45
End: 2020-05-06

## 2020-05-06 DIAGNOSIS — I25.10 CAD (CORONARY ARTERY DISEASE): ICD-10-CM

## 2020-05-06 DIAGNOSIS — N18.30 STAGE 3 CHRONIC KIDNEY DISEASE (H): ICD-10-CM

## 2020-05-06 DIAGNOSIS — R06.09 DYSPNEA ON EXERTION: ICD-10-CM

## 2020-05-07 ENCOUNTER — COMMUNICATION - HEALTHEAST (OUTPATIENT)
Dept: CARDIOLOGY | Facility: CLINIC | Age: 45
End: 2020-05-07

## 2020-05-07 ENCOUNTER — HOSPITAL ENCOUNTER (OUTPATIENT)
Dept: CT IMAGING | Facility: CLINIC | Age: 45
Discharge: HOME | End: 2020-05-07
Attending: INTERNAL MEDICINE

## 2020-05-07 ENCOUNTER — AMBULATORY - HEALTHEAST (OUTPATIENT)
Dept: LAB | Facility: CLINIC | Age: 45
End: 2020-05-07

## 2020-05-07 DIAGNOSIS — R06.09 DYSPNEA ON EXERTION: ICD-10-CM

## 2020-05-07 DIAGNOSIS — R06.09 OTHER FORMS OF DYSPNEA: ICD-10-CM

## 2020-05-07 DIAGNOSIS — N18.30 STAGE 3 CHRONIC KIDNEY DISEASE (H): ICD-10-CM

## 2020-05-07 DIAGNOSIS — I25.10 CAD (CORONARY ARTERY DISEASE): ICD-10-CM

## 2020-05-07 DIAGNOSIS — I25.83 CORONARY ARTERY DISEASE DUE TO LIPID RICH PLAQUE: ICD-10-CM

## 2020-05-07 DIAGNOSIS — I25.10 CORONARY ARTERY DISEASE DUE TO LIPID RICH PLAQUE: ICD-10-CM

## 2020-05-07 LAB
ALBUMIN SERPL-MCNC: 3.2 G/DL (ref 3.5–5)
ALP SERPL-CCNC: 92 U/L (ref 45–120)
ALT SERPL W P-5'-P-CCNC: 46 U/L (ref 0–45)
ANION GAP SERPL CALCULATED.3IONS-SCNC: 8 MMOL/L (ref 5–18)
AST SERPL W P-5'-P-CCNC: ABNORMAL U/L
BILIRUB SERPL-MCNC: 0.5 MG/DL (ref 0–1)
BNP SERPL-MCNC: 68 PG/ML (ref 0–35)
BUN SERPL-MCNC: 31 MG/DL (ref 8–22)
CALCIUM SERPL-MCNC: 9.5 MG/DL (ref 8.5–10.5)
CHLORIDE BLD-SCNC: 106 MMOL/L (ref 98–107)
CO2 SERPL-SCNC: 25 MMOL/L (ref 22–31)
CREAT SERPL-MCNC: 2.88 MG/DL (ref 0.7–1.3)
D DIMER PPP FEU-MCNC: 0.34 FEU UG/ML
GFR SERPL CREATININE-BSD FRML MDRD: 24 ML/MIN/1.73M2
GLUCOSE BLD-MCNC: 208 MG/DL (ref 70–125)
POTASSIUM BLD-SCNC: ABNORMAL MMOL/L
PROT SERPL-MCNC: 6.8 G/DL (ref 6–8)
SODIUM SERPL-SCNC: 139 MMOL/L (ref 136–145)
TROPONIN I SERPL-MCNC: <0.01 NG/ML (ref 0–0.29)

## 2020-05-08 RX ORDER — NITROGLYCERIN 0.4 MG/1
TABLET SUBLINGUAL
Qty: 25 TABLET | Refills: 1 | Status: SHIPPED | OUTPATIENT
Start: 2020-05-08 | End: 2021-08-09

## 2020-05-28 ENCOUNTER — COMMUNICATION - HEALTHEAST (OUTPATIENT)
Dept: CARDIOLOGY | Facility: CLINIC | Age: 45
End: 2020-05-28

## 2020-06-01 ENCOUNTER — COMMUNICATION - HEALTHEAST (OUTPATIENT)
Dept: CARDIOLOGY | Facility: CLINIC | Age: 45
End: 2020-06-01

## 2020-06-05 ENCOUNTER — COMMUNICATION - HEALTHEAST (OUTPATIENT)
Dept: CARDIOLOGY | Facility: CLINIC | Age: 45
End: 2020-06-05

## 2020-06-15 ENCOUNTER — COMMUNICATION - HEALTHEAST (OUTPATIENT)
Dept: CARDIOLOGY | Facility: CLINIC | Age: 45
End: 2020-06-15

## 2020-06-15 DIAGNOSIS — Z95.1 S/P CABG (CORONARY ARTERY BYPASS GRAFT): ICD-10-CM

## 2020-06-16 ENCOUNTER — COMMUNICATION - HEALTHEAST (OUTPATIENT)
Dept: CARDIOLOGY | Facility: CLINIC | Age: 45
End: 2020-06-16

## 2020-06-16 RX ORDER — LOSARTAN POTASSIUM 50 MG/1
TABLET ORAL
Qty: 90 TABLET | Refills: 2 | Status: ON HOLD | OUTPATIENT
Start: 2020-06-16 | End: 2021-07-27

## 2020-10-06 ENCOUNTER — COMMUNICATION - HEALTHEAST (OUTPATIENT)
Dept: SCHEDULING | Facility: CLINIC | Age: 45
End: 2020-10-06

## 2020-10-31 ENCOUNTER — COMMUNICATION - HEALTHEAST (OUTPATIENT)
Dept: CARDIOLOGY | Facility: CLINIC | Age: 45
End: 2020-10-31

## 2020-10-31 DIAGNOSIS — I21.02 STEMI INVOLVING LEFT ANTERIOR DESCENDING CORONARY ARTERY (H): ICD-10-CM

## 2020-11-02 RX ORDER — ATORVASTATIN CALCIUM 80 MG/1
TABLET, FILM COATED ORAL
Qty: 90 TABLET | Refills: 1 | Status: SHIPPED | OUTPATIENT
Start: 2020-11-02 | End: 2022-09-26

## 2021-05-27 NOTE — PROGRESS NOTES
ITP ASSESSMENT   Assessment Day: 90 Day    Session Number: 20  Precautions: Standard    Diagnosis: MI;Stent    Risk Stratification: High    Referring Provider: Trinidad Hansen PA-C  EXERCISE  Exercise Assessment: Reassessment  Pt is exercising 2 times each week in cardiac rehab at MET level of 3.8-6.8.                         Exercise Plan  Goals Next 30 days  ADL'S: Pt will increase walking at home to 30 minutes daily.     Leisure: Pt will starting biking at home 2x/week.      Work: Pt would like to continue to increase workloads here at cardiac rehab.       Education Goals: All goals in this section met    Education Goals Met: Patient can state cardiac s/s and appropriate emergency response.;Has system for taking medication.;Medication review.                          Goals Met  60 day ADL'S goals met: Goal met- pt is working FT without fatigue    60 day Leisure goals met: Goal met- pt is attending CR 2x/week    60 day Work goals met: Goal met- pt increased home exercise.     60 Day Progression: Pt is making progress. Set new goals for the next 30 days.       30 day ADL'S goals met: Goal met    30 day Leisure goals met: Goal met    30 day Work goals met: Goal met    30 Day Progression: Continues to make progress- goals updated and reviewed      Initial ADL's goals met: Pt is back to grocery shopping and meal prepping without fatigue.    Initial Leisure goals met: Pt is back to driving the kids around    Intial Work goals met: Pt is back to FT work and lifting objects at work without fatigue.     Initial Progression: All goals met. Pt is making progress. Set new goals for the next 30 days.       Exercise Prescription  Exercise Mode: Treadmill;Bike;Nustep;Arm Erg.    Frequency: 2x/week    Duration: 40 minutes    Intensity / THR: 20-30 beats above resting heart rate (-131)    RPE 11-14  Progression / Met level: 3.8-6.8    Resistive Training?: Yes      Current Exercise (mins/week): 280      Interventions  Home  "Exercise:  Mode: TM/Walking/Biking    Frequency: Daily    Duration: 30 min      Education Material : Educational videos;Provide written material;Individual education and counseling;Offer educational classes      Education Completed  Exercise Education Completed: Cardiac Anatomy;Signs and Symptoms;Medication review;RPE;Emergency Plan;Home Exercise;Warm up/cool down;FITT Principles;BP/HR Reponse to exercise;Benefits of Exercise;End point of exercise;Stretching;Strength training              Exercise Follow-up/Discharge  Follow up/Discharge: Encouraged pt to continue walking at home and add biking to home exercise routine.    NUTRITION  Nutrition Assessment: Reassessment      Nutrition Risk Factors:  Nutrition Risk Factors: Diabetes;Dyslipidemia;Overweight  HbA1c: 7.2  Monitors blood sugar at home: Yes  Frequency: 4x/day  Cholesterol: 114  LDL: 36  HDL: 29  Triglycerides: 243      Nutrition Plan  Interventions  Diet Consult: Completed    Other Nutrition Intervention: Diet Class;Therapist/Pt Discussion;Educational Videos;Provide with Written Material    Education Completed  Nutrition Education Completed: Low Saturated fat diet;Low sodium diet      Goals  Nutrition Goals (Next 30 days): Patient demonstrated understanding of cardiac nutrition, no goals identified for the next 30 days      Goals Met  Nutrition Goals Met: Patient follows a low sodium diet;Patient able to demonstrate carbohydrate counting;Patient states following a low saturated fat diet;Patient knows appropriate portion size      Height, Weight, and  BMI  Weight: 231 lb (104.8 kg)  Height: 5' 11\" (1.803 m)  BMI: 32.23      Nutrition Follow-up  Follow-up/Discharge: Encouraged pt to continue being aware of sodium and fats in his diet.          Other Risk Factors  Other Risk Factor Assessment: Reassessment      HTN Risk Factor: Hypertension      Pre Exercise BP: 116/72  Post Exercise BP: 108/62      Hypertension Plan  Goals  HTN Goals: Follow low sodium " diet;Take medication as prescribed;Exercises regularly      Goals Met  HTN Goals Met: Follow low sodium diet;Exercises regularly;Take medication as prescribed      HTN Interventions  HTN Interventions: Diet consult;Therapist/patient discussion;Provide written material;Offer educational videos;Offer educational classes      HTN Education Completed  HTN Education Completed: Medication review;Risk factor overview      Tobacco Risk Factor: NA    Risk Factor Follow-up   Follow-up/Discharge: Continue to offer education as needed.      PSYCHOSOCIAL  Psychosocial Assessment: Reassessment    Psychosocial Risk Factor: Stress      Psychosocial Plan  Interventions  Interventions: Offer Spiritual Care consult;Offer educational videos and classes;Provide written material;Individual education and counseling      Education Completed  Education Completed: Relaxation/Coping Techniques;S/S of depression;Effects of stress on body      Goals  Goals (Next 30 days): Patient demonstrates understanding of stress, no goals identified for the next 30 days      Goals Met  Goals Met: Identified Support system;Oriented to stress management classes      Psychosocial Follow-up  Follow-up/Discharge: Pt reports no additional stress.              Patient involved in Goal setting?: Yes      Signature: _____________________________________________________________    Date: __________________    Time: __________________See Doc Flowsheet

## 2021-05-27 NOTE — TELEPHONE ENCOUNTER
Spoke with patient agreeable to proceed. Will place order and call to schedule tomorrow. Pt verbalized understanding. SHERI,RN

## 2021-05-27 NOTE — TELEPHONE ENCOUNTER
----- Message from Dominik Armstrong RN sent at 4/2/2019 10:09 AM CDT -----  Regarding: MRI stress  Siva Koch arrived today for his MRI stress, GRF-27, below the cutoff for contrast administration.  Dr. Gar informed and he cancelled the test for today.  Siva is expecting a call from your office with plan for follow up.    Thanks,    Dominik Armstrong RN

## 2021-05-27 NOTE — PROGRESS NOTES
ITP ASSESSMENT   Assessment Day: 120 Day/DISCHARGE    Session Number: 20  Precautions: standard    Diagnosis: MI;Stent    Risk Stratification: High    Referring Provider: Trinidad Hansen PA-C  EXERCISE  Exercise Assessment: Discharge    Pt completed 20 outpatient rehab sessions and opted to discharge. No formal discharge/home program completed due to abrupt phone call to cancel.                                               Goals Met    60 day ADL'S goals met: Goal met- pt is working FT without fatigue    60 day Leisure goals met: Goal met- pt is attending CR 2x/week    60 day Work goals met: Goal met- pt increased home exercise.     60 Day Progression: Pt is making progress. Set new goals for the next 30 days.       30 day ADL'S goals met: Goal met    30 day Leisure goals met: Goal met    30 day Work goals met: Goal met    30 Day Progression: Continues to make progress- goals updated and reviewed      Initial ADL's goals met: Pt is back to grocery shopping and meal prepping without fatigue.    Initial Leisure goals met: Pt is back to driving the kids around    Intial Work goals met: Pt is back to FT work and lifting objects at work without fatigue.     Initial Progression: All goals met. Pt is making progress. Set new goals for the next 30 days.       Exercise Prescription  Exercise Mode: Treadmill;Bike;Nustep;Arm Erg.    Frequency: 2x/week    Duration: 40 minutes    Intensity / THR: 20-30 beats above resting heart rate (-131)    RPE 11-14  Progression / Met level: 3.8-6.8    Resistive Training?: Yes      Current Exercise (mins/week): 280      Interventions  Home Exercise:  Mode: TM/Walking/Biking    Frequency: Daily    Duration: 30 min      Education Material : Educational videos;Provide written material;Individual education and counseling;Offer educational classes      Education Completed  Exercise Education Completed: Cardiac Anatomy;Signs and Symptoms;Medication review;RPE;Emergency Plan;Home Exercise;Warm  "up/cool down;FITT Principles;BP/HR Reponse to exercise;Benefits of Exercise;End point of exercise;Stretching;Strength training              Exercise Follow-up/Discharge  Follow up/Discharge: Encouraged pt to continue walking at home and add biking to home exercise routine.    NUTRITION  Nutrition Assessment: Discharge      Nutrition Risk Factors:  Nutrition Risk Factors: Diabetes;Dyslipidemia;Overweight  HbA1c: 7.2  Monitors blood sugar at home: Yes  Frequency: 4x/day  Cholesterol: 114  LDL: 36  HDL: 29  Triglycerides: 243      Nutrition Plan  Interventions  Diet Consult: Completed    Other Nutrition Intervention: Diet Class;Therapist/Pt Discussion;Educational Videos;Provide with Written Material      Education Completed  Nutrition Education Completed: Low Saturated fat diet;Low sodium diet      Goals  Nutrition Goals (Next 30 days): Patient demonstrated understanding of cardiac nutrition, no goals identified for the next 30 days      Goals Met  Nutrition Goals Met: Patient follows a low sodium diet;Patient able to demonstrate carbohydrate counting;Patient states following a low saturated fat diet;Patient knows appropriate portion size      Height, Weight, and  BMI  Weight: 231 lb (104.8 kg)  Height: 5' 11\" (1.803 m)  BMI: 32.23      Nutrition Follow-up  Follow-up/Discharge: Encouraged pt to continue being aware of sodium and fats in his diet.          Other Risk Factors  Other Risk Factor Assessment: Discharge      HTN Risk Factor: Hypertension      Pre Exercise BP: 116/72  Post Exercise BP: 102/72      Hypertension Plan  Goals  HTN Goals: Follow low sodium diet;Take medication as prescribed;Exercises regularly      Goals Met  HTN Goals Met: Follow low sodium diet;Exercises regularly;Take medication as prescribed      HTN Interventions  HTN Interventions: Diet consult;Therapist/patient discussion;Provide written material;Offer educational videos;Offer educational classes      HTN Education Completed  HTN Education " Completed: Medication review;Risk factor overview      Tobacco Risk Factor: NA        Risk Factor Follow-up   Follow-up/Discharge: Continue to offer education as needed.      PSYCHOSOCIAL  Psychosocial Assessment: Discharge         Psychosocial Risk Factor: Stress      Psychosocial Plan  Interventions  Interventions: Offer Spiritual Care consult;Offer educational videos and classes;Provide written material;Individual education and counseling      Education Completed  Education Completed: Relaxation/Coping Techniques;S/S of depression;Effects of stress on body      Goals  Goals (Next 30 days): Patient demonstrates understanding of stress, no goals identified for the next 30 days      Goals Met  Goals Met: Identified Support system;Oriented to stress management classes      Psychosocial Follow-up  Follow-up/Discharge: Pt reports no additional stress.              Patient involved in Goal setting?: Yes      Signature: _____________________________________________________________    Date: __________________    Time: __________________See Doc Flowsheet

## 2021-05-27 NOTE — PROGRESS NOTES
Arrived for MRI stress test. GFR 27 today.  Dr. Gar notified, test cancelled for today. Message sent to Dr. Che's office. Patient verbalized understanding of why test cancelled.

## 2021-05-27 NOTE — TELEPHONE ENCOUNTER
----- Message -----  From: Cielo Che MD  Sent: 4/10/2019   1:09 PM  To: Travis Dobson RN    I left a voicemail for Siva. The discussion at cath conference was MRI stress vs. Bringing him back for an angiogram to flow wire the LAD and RCA and see if they are ischemic. I left some of this info in the voicemail. If he is agreeable, let's get him set up for coronary angiogram with me to flow wire the LAD and RCA. No rush.     EG

## 2021-05-27 NOTE — PATIENT INSTRUCTIONS - HE
It was nice to see you again for the AEGIS2 study.  Please call [phone] with any questions.    Next study visit:  Wednesday July 17th at 8:30am

## 2021-05-27 NOTE — TELEPHONE ENCOUNTER
Called and LM to inform patient that EMG out of clinic this week, and will return 4/8. Will review and make recommendations. CAll if questions, new issues or concerns [phone]. SHERI,RN

## 2021-05-28 NOTE — PROGRESS NOTES
Siva Ramey  [address] [facility] [address]  [phone] (home)     Procedure cardiologist:  Dr. Che  PCP:  Trinidad Hansen PA-C  H&P completed by:  Trinidad Hansen on 4/24/19, in [employer]   Admit date 05/2/19  Arrival time:  0700  Anticoagulation: None  CPAP: No  Previous PCI: yes. 1/2019  Bypass Grafts: No  Renal Issues: Yes. Renal Protocol for GFR 27  Diabetic?: Yes, on lantus and oral medications  Device?: No      Angiogram Teaching    Reason for Visit:  Telephone call to discuss pre-procedure education in preparation for: Coronary Angiogram with possible Percutaneous Coronary Intervention    Procedure Prep:  Primary Cardiologist note dated: Dr. Che 3/4/19  EKG results obtained, dated: Morning of procedure  Hemogram results obtained: Morning of procedure  Basic Metabolic Panel results obtained: Morning of procedure  Lipid Profile results obtained: 03/4/19    Patient Education  Explained indications/risks for diagnostic evaluation, including one or more of the following:  left heart catheterization, right heart catheterization, coronary angiogram, left ventriculogram, percutaneous arteritomy closure, less than 0.1% of acute myocardial infarction and CVA or death or any of the following to the degree that it could threaten life:  allergic reaction, arrhythmia, renal failure, hemorrhage, thrombosis and infection  Explained indications/risks for therapeutic interventions, including one or more of the following: PTCA, artherectomy, stent, 2% or less risk of MI, less than 1% risk of CVA, emergency heart surgery, death, less than 4 % risk of vascular injury requiring surgical repair or blood transfusion, 20-30% risk of restonosis with a bare metal stent, less than 10% risk of restonosis with a drug-eluting stent, 0.5-1% chance of stent thrombosis and may need clopidogrel (Plavix) form greater than 1 year.  These risks are in addition to baseline risks associated with a Diagnostic  Evaluation.  Patient states understanding of procedure and risks and agrees to proceed.    Pre-procedure instructions  Patient instructed to be NPO after midnight.  Patient instructed to shower the evening before or the morning of the procedure.  Patient instructed to arrange for transportation home following procedure from a responsible family member of friend. No driving for at least 24 hours.  Patient instructed to have a responsible adult with them for 24 hours post-procedure.  Post-procedure follow up process.  Conscious sedation discussed.    Pre-procedure medication instructions  Patient instructed on antiplatelet medication.  Continue medications as scheduled, with a small amount of water on the day of the procedure unless indicated.  Patient instructed to take 325 mg of Aspirin am of procedure: Yes  Other medication: Morning of procedure please HOLD all vitamins, minerals, and supplements. Please HOLD Jardiace/Empagliflozin (diabetic medication), Losartan/Cozaar (blood pressure medication), Metformin/Glucophage (diabetic medication) and Amaryl/Glimepiride (diabetic medication) morning of procedure as you will be nothing to eat or drink after midnight.   Evening before procedure, on Wednesday evening please adjust your Lantus to TAKE   your usual insulin dose (we have that you are on 40 units, please only take 20 units).    ** If you take Metformin, Glucophage, or Glucovance, you must hold it the morning of your procedure and possibly for 48 hours after procedure. You will be instructed after procedure. **    Please TAKE (4) baby aspirin or (1) Full size 325 mg aspirin morning of procedure.    *PATIENTS RECORDS AVAILABLE IN [facility] UNLESS OTHERWISE INDICATED*      Patient Active Problem List   Diagnosis     Type 2 diabetes mellitus with other circulatory complication, unspecified whether long term insulin use (H)     Dyslipidemia     STEMI involving left anterior descending coronary artery (H)     Class 2  severe obesity due to excess calories with serious comorbidity in adult, unspecified BMI (H)     Benign essential hypertension     Coronary artery disease due to lipid rich plaque     Chronic kidney disease     HTN (hypertension)     STEMI (ST elevation myocardial infarction) (H)       Current Outpatient Medications   Medication Sig Dispense Refill     aspirin 81 MG EC tablet Take 1 tablet (81 mg total) by mouth daily.  0     atorvastatin (LIPITOR) 80 MG tablet Take 1 tablet (80 mg total) by mouth at bedtime. 90 tablet 3     cholecalciferol, vitamin D3, 5,000 unit Tab Take 5,000 Units by mouth daily.       EMPAGLIFLOZIN ORAL Take 10 mg by mouth daily. Jardiace             glimepiride (AMARYL) 4 MG tablet Take 4 mg by mouth 2 (two) times a day before meals.       insulin glargine-lixisenatide 100 unit-33 mcg/mL InPn Inject 40 Units under the skin daily.              losartan (COZAAR) 100 MG tablet Take 100 mg by mouth daily.       metFORMIN (GLUCOPHAGE) 500 MG tablet Take 500 mg by mouth 2 (two) times a day with meals.              metoprolol tartrate (LOPRESSOR) 50 MG tablet Take 1 tablet (50 mg total) by mouth 2 (two) times a day. 180 tablet 3     nitroglycerin (NITROSTAT) 0.4 MG SL tablet Place 1 tablet (0.4 mg total) under the tongue every 5 (five) minutes as needed for chest pain. 25 tablet 1     ticagrelor (BRILINTA) 90 mg Tab Take 1 tablet (90 mg total) by mouth 2 (two) times a day. 180 tablet 3     No current facility-administered medications for this visit.        No Known Allergies     Plan: Education for procedure completed. Wife will be  and available should patient go home same day after procedure. Discussion with diabetes to discuss with his PCP any further recommendations in regards to his lantus and oral diabetic medications. Also discussion, to have a last meal just prior to MN with a good source of protein and carbohydrates to sustain patient. Pt verbalized understanding. Pt ready for  procedure.      BETSY Dobson

## 2021-05-28 NOTE — TELEPHONE ENCOUNTER
----- Message from Giulia Hernandez sent at 4/19/2019 10:06 AM CDT -----  Regarding: EMG Ordering  CORS, poss PCI, FFR with Dr Che - 5/2, 7am admit  H&P - will be done at [facility]    Thanks

## 2021-05-28 NOTE — TELEPHONE ENCOUNTER
Called and spoke with patient. Informed pt that instructions were sent via my chart pre-angiogram. Pt verbalized understanding. Education and discussion completed for angiogram. Reviewed OV with PCP on 4/24/19. Pt verbalized and confirmed that his Lantus insulin was increased to 40 units. Will reflect in Epic. Encouraged patient to reach out to his PCP for any further instructions regarding his diabetic medications and insulin from our protocol. Pt verbalized understanding. Orders will be placed. Documentation completed. SHERI,RN

## 2021-05-28 NOTE — PROGRESS NOTES
Cardiothoracic Surgery Consult    Date of Service: 5/17/2019    REFERRING CARDIOLOGIST: Dr. Cielo Che.    REASON FOR CONSULTATION: Siva Ramey is a 43 y.o. man with ischemic cardiomyopathy and severe multivessel coronary artery disease and a history of STEMI treated with PCI of the diagonal branch of the LAD.     HISTORY OF PRESENT ILLNESS: Siva Ramey is a 43 y.o. man with coronary artery disease risk factors including type 2 diabetes mellitus, hypertension, and dyslipidemia. He presented in January 2019 with a STEMI and underwent emergent PCI and IBAN placement in the diagonal branch of the LAD at that time. He was noted to have severe disease of the LAD and moderate disease of the right coronary artery at that time. He has done well with only rare angina since that time. He has no orthopnea or paroxysmal nocturnal dyspnea, and he denies lower extremity edema.    PAST MEDICAL HISTORY:   Past Medical History:   Diagnosis Date     Diabetes mellitus, type II (H) 12/2001     DM II (diabetes mellitus, type II), controlled (H)      Dyslipidemia 12/2001     HTN (hypertension) 2004     Hyperlipidemia      Hypertension      Obesity (BMI 30-39.9)        PAST SURGICAL HISTORY:   Past Surgical History:   Procedure Laterality Date     CV CORONARY ANGIOGRAM N/A 1/13/2019    Procedure: Coronary Angiogram;  Surgeon: Cielo Che MD;  Location: [facility];  Service: Cardiology     CV CORONARY ANGIOGRAM N/A 5/2/2019    Procedure: Coronary Angiogram;  Surgeon: Cielo Che MD;  Location: [facility];  Service: Cardiology     CV LEFT HEART CATHETERIZATION WITH LEFT VENTRICULOGRAM N/A 1/13/2019    Procedure: Left Heart Catheterization with Left Ventriculogram;  Surgeon: Cielo Che MD;  Location: [facility];  Service: Cardiology     HERNIA REPAIR         ALLERGIES:   No Known Allergies       CURRENT MEDICATIONS:  Current Outpatient Medications on File Prior to Visit    Medication Sig Dispense Refill     aspirin 81 MG EC tablet Take 1 tablet (81 mg total) by mouth daily.  0     atorvastatin (LIPITOR) 80 MG tablet Take 1 tablet (80 mg total) by mouth at bedtime. 90 tablet 3     cholecalciferol, vitamin D3, 5,000 unit Tab Take 5,000 Units by mouth daily.       EMPAGLIFLOZIN ORAL Take 10 mg by mouth daily. Jardiace             glimepiride (AMARYL) 4 MG tablet Take 4 mg by mouth 2 (two) times a day before meals.       insulin glargine-lixisenatide 100 unit-33 mcg/mL InPn Inject 40 Units under the skin daily.              losartan (COZAAR) 100 MG tablet Take 100 mg by mouth daily.       metFORMIN (GLUCOPHAGE) 500 MG tablet Take 500 mg by mouth 2 (two) times a day with meals.              metoprolol tartrate (LOPRESSOR) 50 MG tablet Take 1 tablet (50 mg total) by mouth 2 (two) times a day. 180 tablet 3     nitroglycerin (NITROSTAT) 0.4 MG SL tablet Place 1 tablet (0.4 mg total) under the tongue every 5 (five) minutes as needed for chest pain. 25 tablet 1     ticagrelor (BRILINTA) 90 mg Tab Take 1 tablet (90 mg total) by mouth 2 (two) times a day. 180 tablet 3     No current facility-administered medications on file prior to visit.        FAMILY HISTORY:   Family History   Problem Relation Age of Onset     Heart disease Father 60     CABG Father 50        triple bypass     SOCIAL HISTORY:   Social History     Socioeconomic History     Marital status:      Spouse name: Not on file     Number of children: Not on file     Years of education: Not on file     Highest education level: Not on file   Occupational History     Not on file   Social Needs     Financial resource strain: Not on file     Food insecurity:     Worry: Not on file     Inability: Not on file     Transportation needs:     Medical: Not on file     Non-medical: Not on file   Tobacco Use     Smoking status: Never Smoker     Smokeless tobacco: Never Used   Substance and Sexual Activity     Alcohol use: No     Drug use:  No     Sexual activity: Not on file   Lifestyle     Physical activity:     Days per week: Not on file     Minutes per session: Not on file     Stress: Not on file   Relationships     Social connections:     Talks on phone: Not on file     Gets together: Not on file     Attends Mandaen service: Not on file     Active member of club or organization: Not on file     Attends meetings of clubs or organizations: Not on file     Relationship status: Not on file     Intimate partner violence:     Fear of current or ex partner: Not on file     Emotionally abused: Not on file     Physically abused: Not on file     Forced sexual activity: Not on file   Other Topics Concern     Not on file   Social History Narrative     Not on file         REVIEW OF SYSTEMS:  A complete 10 point review of systems was obtained and is negative other than the above stated complaints    PHYSICAL EXAMINATION:   Vitals: There were no vitals taken for this visit.  GENERAL:  Well developed and well nourished   HEENT: Normocephalic, conjunctiva anicteric and sclera clear   NECK: negative findings: no asymmetry, masses, or scars  CARDIOVASCULAR: Regular rate and rhythm  RESPIRATIONS: no tachypnea, retractions or cyanosis  ABDOMEN: Soft, nondistendedno masses palpable and soft, non-tender   EXTREMITIES:negative; no deformity or swelling   NEUROLOGIC: intact and symmetric with no focal deficits.   PSYCHIATRIC: alert and oriented x3, pleasant     LABORATORY STUDIES:   Lab Results   Component Value Date    WBC 9.0 05/02/2019    HGB 15.7 05/02/2019    HCT 45.3 05/02/2019    MCV 86 05/02/2019     05/02/2019      Lab Results   Component Value Date    CREATININE 2.24 (H) 05/02/2019    BUN 37 (H) 05/02/2019     05/02/2019    K 4.1 05/02/2019     (H) 05/02/2019    CO2 22 05/02/2019     Lab Results   Component Value Date    HGBA1C 10.0 (H) 01/14/2019       EKG:  Normal sinus rhythm   Normal ECG   When compared with ECG of 02-APR-2019 09:42,    No significant change was found     CARDIAC CATHETERIZATION: This study was personally reviewed by me    Diabetic with anterior-lateral STEMI in January 2019 with residual RCA and LAD disease. Unable to undergo MR stress test due to renal insufficiency.    Progressive disease in the proximal RCA and stable distal RCA disease. FFR across both lesions 0.82.    Diagonal stent widley patent. Diffuse moderate-severe mid LAD disease stable with a flow limiting FFR of 0.66.    TRANSTHORACIC ECHOCARDIOGRAM: This study was personally reviewed by me    No previous study for comparison.    Left ventricle ejection fraction is mildly decreased. The estimated left ventricular ejection fraction is 45%.    There is mild hypokinesis of the apical lateral wall, apical anterior wall and apex.    Normal right ventricular size and systolic function.         IMPRESSION AND PLAN: Siva Ramey is a 43 y.o. man with ischemic cardiomyopathy and severe multivessel coronary artery disease and a history of STEMI treated with PCI of the diagonal branch of the LAD. Echocardiography demonstrates mild-moderately diminished left ventricular function with an LVEF of 45%. I recommend coronary artery bypass grafting. I discussed the technical details of the procedure with the patient, as well as the expected postoperative course and recovery. The risks include but are not limited to bleeding, infection, stroke, heart or graft failure, dysrhythmia, respiratory failure, kidney or liver injury, bowel or limb ischemia, and death. The patient's calculated STS risk for mortality is <1%, and the calculated risk of major morbidity or mortality is 5%. The patient understands these risks and wishes to undergo the operation.      Thank you very much for this referral.       Jose Ford 5/17/2019 12:58 PM

## 2021-05-29 NOTE — PRE-PROCEDURE INSTRUCTIONS
PRE-OP OPEN HEART EDUCATION    DIAGNOSIS: CABG  PROCEDURE: 6/20 CABG with Dr. Ford.  EF 45%    NSTEMI Jan 2019, with stent to diagonal  Baseline creatinine 1.9-2.2    UA + bacteria, few mucus, trace blood,  neg Leukocytes, neg nitrites- will wait for culture before treatment.    PLANNED DISCHARGE DISPOSITION: home     Lab Results   Component Value Date    WBC 8.5 06/19/2019    HGB 16.5 06/19/2019    HCT 49.0 06/19/2019    MCV 88 06/19/2019     06/19/2019     Lab Results   Component Value Date    CREATININE 2.00 (H) 06/19/2019    BUN 37 (H) 06/19/2019     06/19/2019    K 3.9 06/19/2019     06/19/2019    CO2 27 06/19/2019     EKG:Normal sinus rhythm   Cannot rule out Anterior infarct , age undetermined   Abnormal ECG   When compared with ECG of 02-MAY-2019 07:28,   No significant change was found }.  Vitals:    06/19/19 0848   BP: (!) 163/96   Pulse:    Resp:    Temp:    SpO2:          Saw pt and wife and father in [facility] pre-op and discussed the following:  Procedure purpose and course, including anesthesia induction, invasive lines and use, heart-lung bypass,  intubation, sternal closing, pacing wires-use and indication- and chest tubes- use and indications    ICU course: Wake up in ICU, possibly intubated, planned extubation within 4 hours post-op, monitoring of vital signs, titration of gtts pressors and insulin. Discussed  measurement of I&Os, olea catheter, CT. Restraints- use. Discussed VS monitor and that the nurse will be with the patient as a 1:1 immediately post-operatively.  The hospital stay, uncomplicated, is an expected 5-6 days.     Importance of pain control- use of narcotics to maintain a tolerable level of pain. Instructed pt on use of pain scale of 0-10 and instructed to not let pain become a 10 and the importance of notifying RN when pain level is increasing. Discussed that there will be an increase in sternal pain with deep breaths and activity.    Pulmonary toilet:  Discussed the importance of the use of IS, deep breathing, and strong coughing in the prevention of PNA and to aid in decreasing respiratory complications, such as atelectasis.      Activity: dangle at bedside night of surgery, up in chair POD#1 and subsequent days TID for meals. Discussed increasing activity with cardiac rehab.  Discussed purpose of activity including increased lung compliance, decreased post-op complications such as atelectasis, PNA, DVT.    Also discussed post-operative home course and instructions including: No lifting >10lb for 6 wks, no driving 3-4wks, no overhead reaching, post-op appointments including f/u w/primary, surgeon, and cardiologist, and cardiac rehab 2-3 days/week x 4-6 weeks. Questions and concerns answered.                    6/19/2019 9:45 AM

## 2021-05-29 NOTE — ANESTHESIA CARE TRANSFER NOTE
Last vitals:   Vitals:    06/20/19 1549   BP: 134/78   Pulse: 77   Resp:    Temp:    SpO2: 99%     Patient's level of consciousness is unresponsive  Spontaneous respirations: no: on vent  Maintains airway independently: no: on vent  Dentition unchanged: yes  Oropharynx: oropharynx clear of all foreign objects and endotracheal tube in place    QCDR Measures:  ASA# 20 - Surgical Safety Checklist: WHO surgical safety checklist completed prior to induction    PQRS# 430 - Adult PONV Prevention: NA - Not adult patient, not GA or 3 or more risk factors NOT present  ASA# 8 - Peds PONV Prevention: NA - Not pediatric patient, not GA or 2 or more risk factors NOT present  PQRS# 424 - Luci-op Temp Management: 4559F - At least one body temp DOCUMENTED => 35.5C or 95.9F within required timeframe  PQRS# 426 - PACU Transfer Protocol: - Transfer of care checklist used  ASA# 14 - Acute Post-op Pain: ASA14B - Patient did NOT experience pain >= 7 out of 10 to icu with all monitors on. 100% O2 via ambu/ETT. Placed on vent per icu parameters

## 2021-05-29 NOTE — ANESTHESIA POSTPROCEDURE EVALUATION
Patient: Siva Ramey  CORONARY ARTERY BYPASS, ENDOSCOPIC VEIN HARVEST, INTERNAL MAMMARY ARTERY ANESTHESIA TRANSESOPHAGEAL ECHOCARDIOGRAM  Anesthesia type: general    Patient location: PACU  Last vitals:   Vitals Value Taken Time   /78 6/20/2019  3:49 PM   Temp 37.4  C (99.3  F) 6/20/2019  8:00 PM   Pulse 94 6/20/2019  8:53 PM   Resp 18 6/20/2019  8:00 PM   SpO2 94 % 6/20/2019  8:53 PM   Vitals shown include unvalidated device data.  Post vital signs: stable  Level of consciousness: awake and responds to simple questions  Post-anesthesia pain: pain controlled  Post-anesthesia nausea and vomiting: no  Pulmonary: unassisted, return to baseline  Cardiovascular: stable and blood pressure at baseline  Hydration: adequate  Anesthetic events: no    QCDR Measures:  ASA# 11 - Luci-op Cardiac Arrest: ASA11B - Patient did NOT experience unanticipated cardiac arrest  ASA# 12 - Luci-op Mortality Rate: ASA12B - Patient did NOT die  ASA# 13 - PACU Re-Intubation Rate: ASA13X - Exclusion: organ donor or direct ICU transfer  ASA# 10 - Composite Anes Safety: ASA10A - No serious adverse event    Additional Notes:  Patient extubated and off of vasopressors.  Doing very well this evening.

## 2021-05-29 NOTE — ANESTHESIA PROCEDURE NOTES
Central line    Start time: 6/20/2019 11:10 AM  End time: 6/20/2019 11:15 AM  Patient location: OR Post-induction  Indications: central pressure monitoring and vascular access  Performing Anesthesiologist: Larry Shah MD  Pre-procedure Checklist  Completed: patient identified, site marked, risks, benefits, and alternatives discussed, timeout performed, consent obtained, hand hygiene performed, all elements of maximal sterile barriers used including cap, mask, gown, sterile gloves, and large sheet and skin prep agent completely dried prior to procedure    Procedure Details:  Lidocaine 1% local anesthesia used for skin prep  Preparation: 2% chlorhexidine  Location details: right internal jugular  Catheter type: Introducer with Chicken-No  Introducer type: Cordis  Lumens:double lumenNumber of attempts: 1  Ultrasound evaluation of access site: yes  Vessel patent by US exam  Concurrent real time visualization of needle entryTransduced for venous waveform  manometry confirmation of venous access    Post-procedure:   line sutured and Antimicrobial disks with CHG applied  Assessment: blood return through all ports  Complications: none

## 2021-05-29 NOTE — PROGRESS NOTES
RT Heart Teaching Worksheet       Date of Surgery 06/20/19          Date Taught 06/19/19  Time of Surgery 1000          Surgeon Logan    IS 3500 ml Achieved        IS 2900 ml Predicted        Height 71 in.      History : Smoker ?:No                         History: COPD ?:no                                       Asthma ? : no               MICKEY ? : no                 Items to discuss with Patient : (done or not done)     Heart Pillow/ Coughing: done  Flutter Valve: done  Questions Answered:done    Charting that needs to be done: (done or not done)    IS charted in Flowsheet done  IS achieved placed in patient binder done  Education Charted in Ed Activity done  Charges/productivity done      Comments/ Note :none

## 2021-05-29 NOTE — ANESTHESIA PREPROCEDURE EVALUATION
Anesthesia Evaluation      Patient summary reviewed   No history of anesthetic complications     Airway   Mallampati: II  Neck ROM: full   Pulmonary - normal exam                          Cardiovascular - normal exam  Exercise tolerance: > or = 4 METS  (+) hypertension, past MI, CAD, CABG/stent, angina at rest and with exertion, cardiomyopathy, hypercholesterolemia,     ECG reviewed        Neuro/Psych - negative ROS     Endo/Other    (+) diabetes mellitus type 2 using insulin, obesity,      GI/Hepatic/Renal    (+)   chronic renal disease CRI,           Dental      Comment: Multiple chipped teeth.                       Anesthesia Plan  Planned anesthetic: general endotracheal  MILDRED  Consider ketamine and methadone IV on induction.  ASA 4   Induction: intravenous   Anesthetic plan and risks discussed with: patient, spouse and parent/guardian  Anesthesia plan special considerations: CVP line, arterial catheterization, pulmonary artery catheterization, dexmedetomidine  Post-op plan: extended intubation/vent support

## 2021-05-29 NOTE — ANESTHESIA PROCEDURE NOTES
MILDRED    Patient location during procedure: OR  Start time: 6/20/2019 11:15 AM  Staffing:  Performing  Anesthesiologist: Larry Shah MD  MILDRED:  Type/Reason: Monitoring MILDRED  Technique: blind insertion  Difficulty: easy  Anesthesia Monitoring: see additional note

## 2021-05-29 NOTE — PROGRESS NOTES
"Spiritual Care Note    Spiritual Assessment:   referred to patient on his heart testing day; patient's wife and father are present for support. Patient spoke openly about the events that brought him to this place where he is now preparing for open heart surgery. Patient shares there is heart history in his family and he did have a heart attack a short time ago. Patient admits feeling nervous and his biggest worry is, \"I'm afraid of dying.\" While patient is able to validate the chances of this happening are very low it seems to be foremost in his mind. Wife shares she is doing ok today but feels tomorrow may be a different story. Patient's father shares his own story of open heart surgery and seems confident things will go well. Patient spoke briefly of his [employer] history, shares he is a believer but is currently not a member of a [employer]. Patient seems ready for surgery and appears to have the good support he needs. No concerns noted.     Care Provided: Patient denies spiritual/emotional needs at this time.  offered support, encouragement and information on  availability.     Plan of Care: Spiritual care will continue to follow as part of patient's care team.     BILL Mullen, BCC    "

## 2021-05-29 NOTE — ANESTHESIA PROCEDURE NOTES
Arterial Line  Reason for Procedure: hemodynamic monitoring  Patient location during procedure: OR pre-induction  Start time: 6/20/2019 11:00 AM  End time: 6/20/2019 11:05 AM  Staffing:  Performing  Anesthesiologist: Larry Shah MD  Sterile Precautions:  sterile barriers used during insertion: cap, mask, sterile gloves, large sheet, and hand hygiene used.  Arterial Line:   Immediately prior to procedure a time out was called to verify the correct patient, procedure, equipment, support staff and site/side marked as required  Laterality: right  Location: radial  Prepped with: ChloroPrep    Needle gauge: 20 G  Number of Attempts: 2    Flushed with: saline  1% lidocaine local anesthesia used for skin prep.   See MAR for additional medications given.  Ultrasound evaluation of access site: yes  Vessel patent by US exam    Concurrent real time visualization of needle entry

## 2021-05-30 NOTE — PROGRESS NOTES
Progress Note     Assessment/Plan  S/P CABG X 2 POD # 3  Awake and alert, out of bed to chair with no apparent distress  AVSS, normal sinus  per monitor  Denies incisional chest pain and or shortness of breath, but complains of nasal congestion  Postop respiratory insufficiency resolveding, and treating with supplemental oxygen 3 L nasal cannula, room air saturation 98%  Poor inspiratory effort, I S 1000 cc  Insulin-dependent diabetes mellitus, remains on insulin drip 2 units/h  Lantus 20 units daily  Chronic kidney disease, baseline creatinine 2-2.7  Creatinine 2.48 from 2.78 yesterday, nephrology consulted to evaluate and manage  Incisions are clean dry intact, lung sounds diminished in the bases  Chest tube drainage  40/230 cc cc, sereous  Chest tubes and pacer wires removed, patient tolerated well  Abdomen is obese soft and nontender, bowel sounds present in all 4 quadrants  Urine output marginal to adequate, no BM yet but positive bowel sounds  Weight 108.8kg from 108.9 kg today, preop weight 106 3 kg  Nephrology will manage diuresis  Metoprolol 25 mg twice a day with hold parameters  Continue pulmonary toilet and mobilize and ambulate 3-4 times daily  Otherwise doing well     Subjective  Denies incisional chest pain and or shortness of breath  Objective  Awake and alert, out of bed to chair with no apparent distress  Vital signs in last 24 hours  Temp:  [98.2  F (36.8  C)-99.2  F (37.3  C)] 98.2  F (36.8  C)  Heart Rate:  [] 107  Resp:  [18-28] 18  BP: (134-157)/(72-92) 138/80  FiO2 (%):  [50 %] 50 %  Weight:   (!) 239 lb 12.8 oz (108.8 kg)  Preop weight 106.3 kg  Intake/Output last 3 shifts  I/O last 3 completed shifts:  In: 1970 [P.O.:1970]  Out: 2280 [Urine:2050; Chest Tube:230]  Intake/Output this shift:  I/O this shift:  In: 400 [P.O.:400]  Out: -      Review of Systems   A 12 point comprehensive review of systems was negative except as noted.     Physical Exam  /80 (Patient Position:  "Sitting)   Pulse (!) 107   Temp 98.2  F (36.8  C) (Oral)   Resp 18   Ht 5' 11\" (1.803 m)   Wt (!) 239 lb 12.8 oz (108.8 kg)   SpO2 (!) 75% Comment: desats with when eating breakfast  BMI 33.45 kg/m    HRR, incisions are clean dry intact, lungs sound diminished in the left base  Abdomen obese soft and nontender  Mild bilateral lower extremity edema     Pertinent Labs   Lab Results: personally reviewed.         Lab Results   Component Value Date      06/23/2019     K 5.0 06/23/2019     K 5.0 06/23/2019      06/23/2019     CO2 24 06/23/2019     BUN 53 (H) 06/23/2019     CREATININE 2.48 (H) 06/23/2019     CALCIUM 9.3 06/23/2019            Lab Results   Component Value Date     WBC 10.9 06/21/2019     HGB 11.7 (L) 06/21/2019     HCT 35.1 (L) 06/21/2019     MCV 90 06/21/2019      (L) 06/22/2019         Pertinent Radiology   Radiology Results: Personally reviewed image/s, Personally reviewed impression/s and Left basilar atelectasis otherwise clear lungs, with distended bowel loops  EKG Results: personally reviewed.  and Sinus rhythm per monitor     Romie Nash    "

## 2021-05-30 NOTE — PATIENT INSTRUCTIONS - HE
We will see you between  14th of January and 28th in 2020.  We will call you between October 1st and 21st.    [phone]  Joe Monahan RN  Clinical Trials Nurse

## 2021-05-30 NOTE — PROGRESS NOTES
ITP ASSESSMENT   Assessment Day: Initial    Session Number: 1  Precautions: Sternal    Diagnosis: CAB    Risk Stratification: High    Referring Provider: Logan RAMOS  Exercise Assessment: Initial       6 Minute Walk Test   Pre   Pre Exercise HR: 65                    Pre Exercise BP: 102/72      Peak  Peak HR: 77                   Peak BP: 106/73    Peak feet: 1210    Peak O2 SAT: 100    Peak RPE: 12    Peak MPH: 2.29      Symptoms:  Peak Symptoms: none      5 mins. Post  5 Min Post HR: 61    5 Min Post BP: 104/80                           Exercise Plan  Goals Next 30 days  ADL'S: Pt will resume loading and unloading     Leisure: Pt will start a walking program     Work: Pt to resume coaching youth football as tolerated        Education Goals: All goals in this section met    Education Goals Met: Patient can state cardiac s/s and appropriate emergency response.;Has system for taking medication.;Medication review.      Exercise Prescription  Exercise Mode: Treadmill;Bike;Nustep    Frequency: 2 x week    Duration: 30-40 min    Intensity / THR: 20-30 beats above resting heart rate    RPE 11-14  Progression / Met level: 2.5-3.5    Resistive Training?: No      Current Exercise (mins/week): 5      Interventions  Home Exercise:  Mode: Walking program    Frequency: 5-6 x week    Duration: 5-10 min, 2-3 x day      Education Material : Educational videos;Provide written material;Individual education and counseling;Offer educational classes      Education Completed  Exercise Education Completed: Cardiac Anatomy;Signs and Symptoms;Medication review;RPE;Emergency Plan;Home Exercise;Warm up/cool down;FITT Principles;BP/HR Reponse to exercise;Benefits of Exercise;End point of exercise              Exercise Follow-up/Discharge  Follow up/Discharge: Discussed home walking progran after CABG   NUTRITION  Nutrition Assessment: Initial      Nutrition Risk Factors:  Nutrition Risk Factors:  "Diabetes;Dyslipidemia;Overweight  HbA1c: 7.8  Monitors blood sugar at home: Yes  Frequency: 2-3 x day  Cholesterol: 114  LDL: 36  HDL: 29  Triglycerides: 243      Nutrition Plan  Interventions  Other Nutrition Intervention: Diet Class;Therapist/Pt Discussion;Educational Videos;Provide with Written Material      Education Completed  Nutrition Education Completed: Risk factor overview      Goals  Nutrition Goals (Next 30 days): Patient will follow a low sodium diet;Patient will follow a low saturated fat diet;Patient knows appropriate portion size;Patient will lose weight      Goals Met  Nutrition Goals Met: Provided Rate your Plate Survey;Completed Nutritional Risk Screen;Reviewed Dietitian schedule      Height, Weight, and  BMI  Weight: 224 lb (101.6 kg)  Height: 5' 11\" (1.803 m)  BMI: 31.26      Nutrition Follow-up  Follow-up/Discharge: Enc pt to follow up with the dietitian re: heart healthy diet and weight loss         Other Risk Factors  Other Risk Factor Assessment: Initial      HTN Risk Factor: Hypertension      Pre Exercise BP: 102/72  Post Exercise BP: 104/80      Hypertension Plan  Goals  HTN Goals: Follow low sodium diet;Take medication as prescribed;Exercises regularly      Goals Met  HTN Goals Met: Take medication as prescribed      HTN Interventions  HTN Interventions: Diet consult;Therapist/patient discussion;Provide written material;Offer educational videos;Offer educational classes      HTN Education Completed  HTN Education Completed: Medication review;Risk factor overview      Tobacco Risk Factor: NA        Risk Factor Follow-up   Follow-up/Discharge: Enc educational classes for risk factor management     PSYCHOSOCIAL  Psychosocial Assessment: Initial       [facility] Q of L Summary Score: 26      PHQ-9 Total Score: 0      Psychosocial Risk Factor: Stress      Psychosocial Plan  Interventions    Interventions: Offer Spiritual Care consult;Offer educational videos and classes;Provide written " material;Individual education and counseling       Education Completed  Education Completed: Relaxation/Coping Techniques;S/S of depression;Effects of stress on body      Goals  Goals (Next 30 days): Identify stressors;Improvement in [employer] COOP score;Practicing stress management skills      Goals Met  Goals Met: Identified Support system;Oriented to stress management classes      Psychosocial Follow-up  Follow-up/Discharge: Reviewed stress as a risk factor, and importance of stress management       Patient involved in Goal setting?: Yes      Signature: _____________________________________________________________    Date: __________________    Time: __________________See Doc Flowsheet

## 2021-05-30 NOTE — PROGRESS NOTES
Cardiac Rehab  Phase II Assessment    Assessment Date: 7-2-19    Diagnosis: Multivessel CAD  Hx of STEMI  Procedure: CABG X 2  Date of Onset: 6-20-19  ICD/Pacemaker: No   Post-op Complications: RLQ pain- workup was negative  ECG History: NSR EF%:61% on 3-4-19  Past Medical History:   Patient Active Problem List   Diagnosis     Type 2 diabetes mellitus with other circulatory complication, unspecified whether long term insulin use (H)     Dyslipidemia     STEMI involving left anterior descending coronary artery (H)     Class 2 severe obesity due to excess calories with serious comorbidity in adult, unspecified BMI (H)     Benign essential hypertension     Coronary artery disease due to lipid rich plaque     Chronic kidney disease     HTN (hypertension)     STEMI (ST elevation myocardial infarction) (H)     S/P CABG (coronary artery bypass graft)     Balanitis xerotica obliterans     Other anterior urethral stricture, male, anterior     Past Medical History:   Diagnosis Date     Angina pectoris (H)     rare since PCI     Chronic kidney disease     CKD stage 3     Coronary artery disease      Diabetes mellitus, type II (H) 12/2001     Diabetic nephropathy (H)      DM II (diabetes mellitus, type II), controlled (H)      Dyslipidemia 12/2001     HTN (hypertension) 2004     Hyperlipidemia      Hypertension      Ischemic cardiomyopathy      Myocardial infarction (H)     STEMI -Diagonal branch of the LAD     Obesity (BMI 30-39.9)      Proteinuria      Vitamin D deficiency      Past Surgical History:   Procedure Laterality Date     BACK SURGERY  2009    L5 disc cut     CV CORONARY ANGIOGRAM N/A 1/13/2019    Procedure: Coronary Angiogram;  Surgeon: Cielo Che MD;  Location: [facility] Cath Lab;  Service: Cardiology     CV CORONARY ANGIOGRAM N/A 5/2/2019    Procedure: Coronary Angiogram;  Surgeon: Cielo Che MD;  Location: [facility] Cath Lab;  Service: Cardiology     CV LEFT HEART CATHETERIZATION WITH LEFT  "VENTRICULOGRAM N/A 1/13/2019    Procedure: Left Heart Catheterization with Left Ventriculogram;  Surgeon: Cielo Che MD;  Location: [facility];  Service: Cardiology     Excise varicocele       HERNIA REPAIR         Physical Assessment  Precautions/ Physical Limitations: Sternal  Oxygen: No  O2 Sats: % Lung Sounds: Diminished left base- Reviewed deep breathing Edema: none  Incisions: Healing well, no redness, no drainage  Sleeping Pattern: poor  Appetite: fair   Nutrition Risk Screen: no risk at this time    Pain  Location: none  Intensity: (0-10 scale) 0      Psychosocial/ Emotional Health  1. In the past 12 months, have you been in a relationship where you have been abused physically, emotionally, sexually or financially? No  notified: No  2. Who do you turn to for emotional support?: Wife  3. Do you have cultural or spiritual needs? No  4. Have there been any major life changes in the past 12 months? Yes - \"Heart surgery\"    Referral Information  Primary Physician: Trinidad Hansen PA-C  Cardiologist: Yane  Surgeon: Logan Chua exercise/Equipment: TM      1. Living Accommodations: Home Steps: Yes      Support people at home: Wife and kids   2. Marital Status:   3. Family is able to assist with cares      Latter day/Community involvement: none  4. Recreation/Hobbies: Coaching youth football        See Doc Flowsheet  "

## 2021-05-30 NOTE — PROGRESS NOTES
"Siva Ramey  1975  [id number]    Reason for visit: Siva Ramey is 44 y.o. year old male seen in clinic for routine follow-up after cardiac surgery. Hospital course was on path but notable for difficult placement of olea cathin OR which was eventually placed by Urology with plan to stay in for 7 days. He also complained of RLQ discomfort with inspiration and was worked up with US and CT which were negative for pathology. Olea was discontinued prior to discharge without voiding issues.     Mr. Ramey returns for follow up ambulating independently. With discussion of his questions and concerns he reports issue of insomnia and bloody noses.   He also reported his RLQ discomfort was relieved with his olea removal the day of discharge.      Procedure: CABG x 2   DOS 6/20/19  Day of discharge: 6/26/19  Discharge to Home    Readmissions: none     Follow up with Trinidad Hansen PA-C on 7/3/19  Follow up with Cardiology scheduled for 8/21/19 with Dr. Che  Cardiac Rehab start date 7/5/19    Assessment  Exam  VS /90 (Patient Site: Left Arm, Patient Position: Sitting, Cuff Size: Adult Large)   Pulse 72   Resp 16   Ht 5' 11\" (1.803 m)   Wt (!) 226 lb (102.5 kg)   BMI 31.52 kg/m      Gen: Alert, oriented, pleasant, no acute distress  CV: RRR without murmur or rub, no appreciable edema  Lungs: CTAB, non-labored breathing on room air  GI: Abdomen soft, non-tender, non-distended  Sternum is stable no movement with cough noted.  Incisions: Chest and leg incisions well healing; C/D/I without erythema, purulence, swelling.    Appetite: good  Bowels: regular  Weight: at baseline    Merlin Ramey is a 44 y.o. year old male status post CABG x 2 who returns to clinic for post-op visit.     Repots bloody noses after he sneezes or blowing his nose, this can happen up to 3 x a day. Nose bleeds are not uncontrollable nor significant spontaneous bleeding.     Discussed the risk benefit of continuing his Plavix. " "He is not overly concerned and wants to remain on the Plavix for now and try some saline nasal spray for gentle cleansing and man synephrine nasal spray as directed. I did caution him against over use.     He is complaining of insomnia. No trouble falling asleep. Has been taking Melatonin 2 hours prior to bedtime and then wakes up 1-2 hours after he falls asleep. Of note he works 3rd shift.     Reviewed with patient:  Vital signs /90 (Patient Site: Left Arm, Patient Position: Sitting, Cuff Size: Adult Large)   Pulse 72   Resp 16   Ht 5' 11\" (1.803 m)   Wt (!) 226 lb (102.5 kg)   BMI 31.52 kg/m      Medications : No changes     1. Surgically doing well. Incisions are healing well with no signs of infection. Sternum is stable.  2. Vital signs are stable.   3. Follow-up with cardiology as scheduled with Dr. Che.  4. Continue cardiac rehab until completed.  5. Continue sternal precautions 9/20/19  6. May start driving 7/20/19  7. Return to work: After September 20/19    8. Could try Dristan type decongestion nasal spray for a limited time to deal with congestion, not to be prolonged use,  as directed on package.     Also saline nasal spray for gentle cleansing.    9. For insomnia - follow up with Trinidad Hansen PA-C  If the following is not helpful  - try taking Melatonin and tylenol when you wake up not before you go to sleep.     10. Call if you would like to discuss further the plavix and are thinking of stopping.     No need for further follow-up with CV surgery unless concerns.        Feel free to call our office with questions. [phone]          "

## 2021-05-30 NOTE — PATIENT INSTRUCTIONS - HE
"Vital signs /90 (Patient Site: Left Arm, Patient Position: Sitting, Cuff Size: Adult Large)   Pulse 72   Resp 16   Ht 5' 11\" (1.803 m)   Wt (!) 226 lb (102.5 kg)   BMI 31.52 kg/m      Medications : No changes     1. Surgically doing well. Incisions are healing well with no signs of infection. Sternum is stable.  2. Vital signs are stable.   3. Follow-up with cardiology as scheduled with Dr. Che.  4. Continue cardiac rehab until completed.  5. Continue sternal precautions 9/20/19  6. May start driving 7/20/19  7. Return to work: After September 20/19    8. Could try Dristan nasal spray for a limited time to deal with congestion, not to be prolonged use as directed on package.   Also saline nasal spray for gentle cleansing.    9. For insomnia - follow up with Trinidad Hansen PA-C  If the following is not helpful  - try taking Melatonin and tylenol when you wake up not before you go to sleep.   10. Call if you would like to discuss further the plavix and are thinking of stopping.     No need for further follow-up with CV surgery unless concerns.        Feel free to call our office with questions. [phone]            "

## 2021-05-30 NOTE — PROGRESS NOTES
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                          Goals Met  Initial ADL's goals met: Tolerates loading and unloading     Initial Leisure goals met: Walks 15' daily    Intial Work goals met: Has not started coaching Yet    Initial Progression: Making progress;  WIll continue with updated goals      Exercise Prescription  Exercise Mode: Treadmill;Bike;Nustep;Arm Erg.    Frequency: 2 x week    Duration: 40'    Intensity / THR: 20-30 beats above resting heart rate    RPE 11-14  Progression / Met level: 3-5    Resistive Training?: No      Current Exercise (mins/week): 160      Interventions  Home Exercise:  Mode: Walk    Frequency: 5-7 days per week    Duration: 30'      Education Material : Educational videos;Provide written material;Individual education and counseling;Offer educational classes      Education Completed  Exercise Education Completed: Cardiac Anatomy;Signs and Symptoms;Medication review;RPE;Emergency Plan;Home Exercise;Warm up/cool down;FITT Principles;BP/HR Reponse to exercise;Benefits of Exercise;End point of exercise              Exercise Follow-up/Discharge  Follow up/Discharge: Encouraged pt to increase home walking program           NUTRITION  Nutrition Assessment: Reassessment      Nutrition Risk Factors:  Nutrition Risk Factors: Diabetes;Dyslipidemia;Overweight  HbA1c:  "7.8  Monitors blood sugar at home: Yes  Frequency: 2-3 x day  Cholesterol: 114  LDL: 36  HDL: 29  Triglycerides: 243      Nutrition Plan  Interventions  Other Nutrition Intervention: Diet Class;Therapist/Pt Discussion;Provide with Written Material;Educational Videos      Education Completed  Nutrition Education Completed: Risk factor overview      Goals  Nutrition Goals (Next 30 days): Patient will follow a low sodium diet;Patient will follow a low saturated fat diet;Patient knows appropriate portion size;Patient will lose weight      Goals Met  Nutrition Goals Met: Provided Rate your Plate Survey;Completed Nutritional Risk Screen;Reviewed Dietitian schedule      Height, Weight, and  BMI  Weight: 230 lb (104.3 kg)  Height: 5' 11\" (1.803 m)  BMI: 32.09      Nutrition Follow-up  Follow-up/Discharge: Reminded pt to complete and return diet survey       Other Risk Factors  Other Risk Factor Assessment: Reassessment      HTN Risk Factor: Hypertension      Pre Exercise BP: 140/80  Post Exercise BP: 140/78      Hypertension Plan  Goals  HTN Goals: Follow low sodium diet;Take medication as prescribed;Exercises regularly      Goals Met  HTN Goals Met: Take medication as prescribed      HTN Interventions  HTN Interventions: Diet consult;Therapist/patient discussion;Provide written material;Offer educational videos;Offer educational classes      HTN Education Completed  HTN Education Completed: Medication review;Risk factor overview      Tobacco Risk Factor: NA        Risk Factor Follow-up   Follow-up/Discharge: Will provide tisk factor education as needed         PSYCHOSOCIAL  Psychosocial Assessment: Reassessment       [facility] Q of L Summary Score: 26      PHQ-9 Total Score: 0      Psychosocial Risk Factor: Stress      Psychosocial Plan  Interventions  Interventions: Offer Spiritual Care consult;Offer educational videos and classes;Provide written material;Individual education and counseling      Education " Completed  Education Completed: Relaxation/Coping Techniques;S/S of depression;Effects of stress on body      Goals  Goals (Next 30 days): Identify stressors;Improvement in [employer] CO[employer] score;Practicing stress management skills      Goals Met  Goals Met: Identified Support system;Oriented to stress management classes      Psychosocial Follow-up  Follow-up/Discharge: Reports he has a good support system           Patient involved in Goal setting?: Yes      Signature: _____________________________________________________________    Date: __________________    Time: _______________

## 2021-05-31 NOTE — PROGRESS NOTES
Thank you for asking the [facility] team to see Siva Ramey.      Assessment/Plan:   CAD - s/p cabg in June and PCI in January. Continue clopidogrel x 1 year. Aspirin and atorvastatin indefinitely. Diet and exercise reviewed.    Hypertension - elevated today. Check BMP to see if we can titrate his losartan back to 100mg daily.    Dyslipidemia - on atorvastatin. LDL 46 in Marchy    Ischemic cardiomyopathy - euvolemic but notes some orthopnea at night but no dyspnea during the day. He denies sleep apnea. Will check BNP. Need to work on BP control as above. Repeat limited echo in 3-6 months.    Rub sound when laying on his left side - I hear no cardiac rubs today with him in multiple different positions.    F/U 6 months     Current History:   Siva Ramey is a 44 y.o. diabetic with hypertension, hyperlipidemia and CAD who suffered an anterior-lateral STEMI January 2019. He was found to have a thrombotic occlusion of a large diagonal that was treated with a Synergy IBAN. There was residual diffuse moderate-severe mid LAD disease and distal RCA disease. EF was 45% by echo. Siva underwent 2 v CABG in June 2019 (LIMA-LAD, SVG-RCA).    Siva returns for f/u today. He is regaining his strength post cabg and denies any chest pain. He will feel short of breath laying on his right side or his back but not on his left. When he is on his left side he will hear a rubbing noise in his head. Siva is participating in rehab without trouble.     Past Medical History:     Past Medical History:   Diagnosis Date     Angina pectoris (H)     rare since PCI     Chronic kidney disease     CKD stage 3     Coronary artery disease      Diabetes mellitus, type II (H) 12/2001     Diabetic nephropathy (H)      DM II (diabetes mellitus, type II), controlled (H)      Dyslipidemia 12/2001     HTN (hypertension) 2004     Hyperlipidemia      Hypertension      Ischemic cardiomyopathy      Myocardial infarction (H)     STEMI -Diagonal  branch of the LAD     Obesity (BMI 30-39.9)      Proteinuria      Vitamin D deficiency        Past Surgical History:     Past Surgical History:   Procedure Laterality Date     BACK SURGERY  2009    L5 disc cut     CV CORONARY ANGIOGRAM N/A 1/13/2019    Procedure: Coronary Angiogram;  Surgeon: Cielo Che MD;  Location: [facility];  Service: Cardiology     CV CORONARY ANGIOGRAM N/A 5/2/2019    Procedure: Coronary Angiogram;  Surgeon: Cielo Che MD;  Location: [facility];  Service: Cardiology     CV LEFT HEART CATHETERIZATION WITH LEFT VENTRICULOGRAM N/A 1/13/2019    Procedure: Left Heart Catheterization with Left Ventriculogram;  Surgeon: Cielo Che MD;  Location: [facility];  Service: Cardiology     Excise varicocele       HERNIA REPAIR         Family History:     Family History   Problem Relation Age of Onset     Heart disease Father 60     CABG Father 50        triple bypass       Social History:    reports that he has never smoked. He has never used smokeless tobacco. He reports that he drinks alcohol. He reports that he does not use drugs.    Meds:     Current Outpatient Medications   Medication Sig     acetaminophen (TYLENOL) 500 MG tablet Take 1,000 mg by mouth every 6 (six) hours as needed for pain.     aspirin 81 MG EC tablet Take 1 tablet (81 mg total) by mouth daily.     atorvastatin (LIPITOR) 80 MG tablet Take 1 tablet (80 mg total) by mouth at bedtime. (Patient taking differently: Take 80 mg by mouth daily.       )     cholecalciferol, vitamin D3, 5,000 unit Tab Take 5,000 Units by mouth daily.     clopidogrel (PLAVIX) 75 mg tablet Take 1 tablet (75 mg total) by mouth daily.     furosemide (LASIX) 20 MG tablet Take 1 tablet (20 mg total) by mouth daily.     glimepiride (AMARYL) 4 MG tablet Take 4 mg by mouth 2 (two) times a day before meals.     insulin glargine-lixisenatide 100 unit-33 mcg/mL InPn Inject 20 Units under the skin daily. (Patient taking  "differently: Inject 40 Units under the skin daily.       )     losartan (COZAAR) 50 MG tablet Take 1 tablet (50 mg total) by mouth daily.     metFORMIN (GLUCOPHAGE) 500 MG tablet Take 500 mg by mouth 2 (two) times a day with meals.            metoprolol tartrate 75 mg Tab Take 150 mg by mouth 2 (two) times a day.     nitroglycerin (NITROSTAT) 0.4 MG SL tablet Place 1 tablet (0.4 mg total) under the tongue every 5 (five) minutes as needed for chest pain.     docusate sodium (COLACE) 100 MG capsule Take 1 capsule (100 mg total) by mouth 2 (two) times a day as needed for constipation.     furosemide (LASIX) 20 MG tablet TAKE 1 TABLET BY MOUTH DAILY     HYDROcodone-acetaminophen (NORCO) 7.5-325 mg per tablet Take 0.5-1 tablets by mouth every 6 (six) hours as needed.       Allergies:   Patient has no known allergies.    Review of Systems:   Review of Systems:   General: WNL  Eyes: WNL  Ears/Nose/Throat: WNL  Lungs: WNL  Heart: WNL  Stomach: WNL  Bladder: WNL  Muscle/Joints: WNL  Skin: WNL  Nervous System: WNL  Mental Health: WNL     Blood: WNL       Objective:      Physical Exam  @LASTENCWT:3@  5' 10.98\" (1.803 m)  @BMI:3@  BP (!) 148/100 (Patient Site: Left Arm, Patient Position: Sitting, Cuff Size: Adult Regular)   Pulse 68   Resp 16   Ht 5' 10.98\" (1.803 m)   Wt (!) 233 lb 11.2 oz (106 kg)   BMI 32.61 kg/m      General Appearance:   Alert, cooperative and in no acute distress.   HEENT:  No scleral icterus; the mucous membranes were pink and moist.   Neck: JVP flat. No thyromegaly. No HJR   Chest: The spine was straight. The chest was symmetric.   Lungs:   Respirations unlabored; the lungs are clear to auscultation.   Cardiovascular:   S1 and S2 normal and without murmur. No clicks or rubs. No carotid bruits noted. Right DP, PT, and radial pulses 2+. Left DP, PT, and radial pulses 2+.   Abdomen:  No organomegaly, masses, bruits, or tenderness. Bowels sounds are present   Extremities: No cyanosis, clubbing, or " edema.   Skin: No xanthelasma.   Neurologic: Mood and affect are appropriate.         Lab Review   Lab Results   Component Value Date     06/25/2019     06/24/2019     06/23/2019    K 4.4 06/26/2019    K 4.7 06/25/2019    K 4.7 06/25/2019     06/25/2019     06/24/2019     06/23/2019    CO2 26 06/25/2019    CO2 25 06/24/2019    CO2 24 06/23/2019    BUN 47 (H) 06/25/2019    BUN 52 (H) 06/24/2019    BUN 53 (H) 06/23/2019    CREATININE 1.81 (H) 06/26/2019    CREATININE 2.01 (H) 06/25/2019    CREATININE 2.33 (H) 06/24/2019    CALCIUM 9.7 06/25/2019    CALCIUM 9.4 06/24/2019    CALCIUM 9.3 06/23/2019     Lab Results   Component Value Date    WBC 8.2 06/26/2019    WBC 7.6 06/25/2019    WBC 10.9 06/21/2019    HGB 12.4 (L) 06/26/2019    HGB 12.8 (L) 06/25/2019    HGB 11.7 (L) 06/21/2019    HCT 36.9 (L) 06/26/2019    HCT 37.7 (L) 06/25/2019    HCT 35.1 (L) 06/21/2019    MCV 88 06/26/2019    MCV 87 06/25/2019    MCV 90 06/21/2019     06/26/2019     06/25/2019     06/24/2019     Lab Results   Component Value Date    CHOL 114 03/04/2019    CHOL 170 01/14/2019    CHOL 206 (H) 09/01/2015    TRIG 243 (H) 03/04/2019    TRIG 748 (H) 01/14/2019    TRIG 809 (H) 09/01/2015    HDL 29 (L) 03/04/2019    HDL 25 (L) 01/14/2019    HDL 34 (L) 09/01/2015    LDLDIRECT 46 03/04/2019    LDLDIRECT 56 01/15/2019    LDLDIRECT 82 09/01/2015     No results found for: EVELIO Che M.D.

## 2021-05-31 NOTE — PROGRESS NOTES
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                          Goals Met  30 day ADL'S goals met: Will evaluate goals when pt resumes    30 day Leisure goals met: Goal met    30 day Work goals met: Goal met    30 Day Progression: Continues to make progress- goals updated and reviewed      Initial ADL's goals met: Tolerates loading and unloading     Initial Leisure goals met: Walks 15' daily    Intial Work goals met: Has not started coaching Yet    Initial Progression: Making progress;  WIll continue with updated goals      Exercise Prescription  Exercise Mode: Treadmill;Bike;Nustep;Arm Erg.    Frequency:  x week    Duration: 40'    Intensity / THR: 20-30 beats above resting heart rate    RPE 11-14  Progression / Met level: 3.6-5.8    Resistive Training?: No      Current Exercise (mins/week): 160      Interventions  Home Exercise:  Mode: Walk    Frequency: 5-7 days per week    Duration: 30-45'      Education Material : Educational videos;Provide written material;Individual education and counseling;Offer educational classes      Education Completed  Exercise Education Completed: Cardiac Anatomy;Signs and Symptoms;Medication review;RPE;Emergency Plan;Home Exercise;Warm up/cool down;FITT Principles;BP/HR Reponse to exercise;Benefits of Exercise;End point of exercise              Exercise  "Follow-up/Discharge  Follow up/Discharge: Will review home excersise  when pt resumes   NUTRITION  Nutrition Assessment: Reassessment      Nutrition Risk Factors:  Nutrition Risk Factors: Diabetes;Dyslipidemia;Overweight  Monitors blood sugar at home: Yes  Frequency: 2-3 x day      Nutrition Plan  Interventions  Other Nutrition Intervention: Diet Class;Therapist/Pt Discussion;Educational Videos;Provide with Written Material      Education Completed  Nutrition Education Completed: Risk factor overview      Goals  Nutrition Goals (Next 30 days): Patient will follow a low sodium diet;Patient will follow a low saturated fat diet;Patient knows appropriate portion size;Patient will lose weight      Goals Met  Nutrition Goals Met: Provided Rate your Plate Survey;Completed Nutritional Risk Screen;Reviewed Dietitian schedule      Height, Weight, and  BMI  Weight: 233 lb (105.7 kg)  Height: 5' 11\" (1.803 m)  BMI: 32.51      Nutrition Follow-up  Follow-up/Discharge: Pt met with the Dieticianin MArch;  Will check with pt if he wants a follow up         Other Risk Factors  Other Risk Factor Assessment: Reassessment      HTN Risk Factor: Hypertension      Pre Exercise BP: 138/84  Post Exercise BP: 132/78      Hypertension Plan  Goals  HTN Goals: Follow low sodium diet;Take medication as prescribed;Exercises regularly      Goals Met  HTN Goals Met: Take medication as prescribed      HTN Interventions  HTN Interventions: Diet consult;Therapist/patient discussion;Provide written material;Offer educational videos;Offer educational classes      HTN Education Completed  HTN Education Completed: Medication review;Risk factor overview      Tobacco Risk Factor: NA        Risk Factor Follow-up   Follow-up/Discharge: Continue to provide risk factor education as needed     PSYCHOSOCIAL  Psychosocial Assessment: Reassessment         Psychosocial Risk Factor: Stress      Psychosocial Plan  Interventions  Interventions: Offer Spiritual Care " consult;Offer educational videos and classes;Provide written material;Individual education and counseling      Education Completed  Education Completed: Relaxation/Coping Techniques;S/S of depression;Effects of stress on body      Goals  Goals (Next 30 days): Identify stressors;Improvement in [employer] COOP score;Practicing stress management skills      Goals Met  Goals Met: Identified Support system;Oriented to stress management classes      Psychosocial Follow-up  Follow-up/Discharge: Will review stress/ Relaxation techniques when pt resumes             Patient involved in Goal setting?: No      Signature: _____________________________________________________________    Date: __________________    Time: __________________See Doc Flowsheet

## 2021-05-31 NOTE — PATIENT INSTRUCTIONS - HE
- Check labs today, will call with recommendations for your blood pressure medications    - See me in 6 months

## 2021-06-01 NOTE — PROGRESS NOTES
ITP ASSESSMENT   Assessment Day: 90 Day    Session Number: 14  Precautions: Sternal    Diagnosis: CAB    Risk Stratification: High    Referring Provider: Trinidad Hansen PA-C   ITP: Dr. Mendiola  EXERCISE  Exercise Assessment: Discharge    Pt called requesting to be discharged as he is back to work and doing most activities. Pt currently tolerates 40-45 minutes of exercise at 5.6 mets without any complaints.                          Exercise Plan  Education Goals: All goals in this section met    Education Goals Met: Patient can state cardiac s/s and appropriate emergency response.;Has system for taking medication.;Medication review.                        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    Frequency:  x week    Duration: 40'    Intensity / THR: 20-30 beats above resting heart rate    RPE 11-14  Progression / Met level: 3.6-5.8    Resistive Training?: No    Current Exercise (mins/week): 160    Interventions  Home Exercise:  Mode: Walk    Frequency: 5-7x/week    Duration: 30-45 Minutes    Education Material : Educational videos;Provide written material;Individual education and counseling;Offer educational classes    Education Completed  Exercise Education Completed: Cardiac  "Anatomy;Signs and Symptoms;Medication review;RPE;Emergency Plan;Home Exercise;Warm up/cool down;FITT Principles;BP/HR Reponse to exercise;Benefits of Exercise;End point of exercise            Exercise Follow-up/Discharge  Follow up/Discharge: Will review home excersise  when pt resumes   NUTRITION  Nutrition Assessment: Discharge    Nutrition Risk Factors:  Nutrition Risk Factors: Diabetes;Dyslipidemia;Overweight  HbA1c: 7.8 (6/22/19)  Monitors blood sugar at home: Yes  Frequency: 2-3x/day  Cholesterol: 114 (3/4/19)  LDL: 36  HDL: 29  Triglycerides: 243    Nutrition Plan  Interventions  Other Nutrition Intervention: Diet Class;Therapist/Pt Discussion;Provide with Written Material;Educational Videos    Education Completed  Nutrition Education Completed: Risk factor overview    Goals  Nutrition Goals (Next 30 days): Patient will follow a low sodium diet;Patient will follow a low saturated fat diet;Patient knows appropriate portion size;Patient will lose weight    Goals Met  Nutrition Goals Met: Provided Rate your Plate Survey;Completed Nutritional Risk Screen;Reviewed Dietitian schedule    Height, Weight, and  BMI  Weight: 234 lb (106.1 kg)  Height: 5' 11\" (1.803 m)  BMI: 32.65    Nutrition Follow-up  Follow-up/Discharge: Pt met with the Dieticianin MArch;  Will check with pt if he wants a follow up         Other Risk Factors  Other Risk Factor Assessment: Discharge    HTN Risk Factor: Hypertension    Pre Exercise BP: 134/80  Post Exercise BP: 124/78    Hypertension Plan  Goals  HTN Goals: Follow low sodium diet;Take medication as prescribed;Exercises regularly    Goals Met  HTN Goals Met: Take medication as prescribed    HTN Interventions  HTN Interventions: Diet consult;Therapist/patient discussion;Provide written material;Offer educational videos;Offer educational classes    HTN Education Completed  HTN Education Completed: Medication review;Risk factor overview    Tobacco Risk Factor: NA    Risk Factor Follow-up   " Follow-up/Discharge: Continue to provide risk factor education as needed   PSYCHOSOCIAL  Psychosocial Assessment: Discharge    Psychosocial Risk Factor: Stress    Psychosocial Plan  Interventions  Interventions: Offer Spiritual Care consult;Offer educational videos and classes;Provide written material;Individual education and counseling    Education Completed  Education Completed: Relaxation/Coping Techniques;S/S of depression;Effects of stress on body    Goals  Goals (Next 30 days): Identify stressors;Improvement in [employer] COOP score;Practicing stress management skills    Goals Met  Goals Met: Identified Support system;Oriented to stress management classes    Psychosocial Follow-up  Follow-up/Discharge: Will review stress/ Relaxation techniques when pt resumes             Patient involved in Goal setting?: No      Signature: _____________________________________________________________    Date: __________________    Time: __________________

## 2021-06-02 ENCOUNTER — MEDICAL CORRESPONDENCE (OUTPATIENT)
Dept: HEALTH INFORMATION MANAGEMENT | Facility: CLINIC | Age: 46
End: 2021-06-02

## 2021-06-02 ENCOUNTER — TRANSFERRED RECORDS (OUTPATIENT)
Dept: HEALTH INFORMATION MANAGEMENT | Facility: CLINIC | Age: 46
End: 2021-06-02

## 2021-06-02 VITALS — BODY MASS INDEX: 31.42 KG/M2 | WEIGHT: 232 LBS

## 2021-06-02 VITALS — BODY MASS INDEX: 32.76 KG/M2 | HEIGHT: 71 IN | WEIGHT: 234 LBS

## 2021-06-02 VITALS — BODY MASS INDEX: 32.9 KG/M2 | WEIGHT: 235 LBS | HEIGHT: 71 IN

## 2021-06-02 VITALS — WEIGHT: 231 LBS | BODY MASS INDEX: 31.29 KG/M2

## 2021-06-02 VITALS — BODY MASS INDEX: 33.19 KG/M2 | WEIGHT: 238 LBS

## 2021-06-02 VITALS — WEIGHT: 234 LBS | BODY MASS INDEX: 32.65 KG/M2

## 2021-06-02 VITALS — BODY MASS INDEX: 33.46 KG/M2 | WEIGHT: 239 LBS | HEIGHT: 71 IN

## 2021-06-02 VITALS — BODY MASS INDEX: 32.79 KG/M2 | WEIGHT: 235 LBS

## 2021-06-02 VITALS — WEIGHT: 235 LBS | BODY MASS INDEX: 32.78 KG/M2

## 2021-06-02 VITALS — WEIGHT: 236 LBS | HEIGHT: 72 IN | BODY MASS INDEX: 31.97 KG/M2

## 2021-06-02 VITALS — BODY MASS INDEX: 31.15 KG/M2 | WEIGHT: 230 LBS

## 2021-06-02 VITALS — WEIGHT: 235 LBS | BODY MASS INDEX: 32.79 KG/M2

## 2021-06-02 VITALS — WEIGHT: 237.1 LBS | BODY MASS INDEX: 33.19 KG/M2 | HEIGHT: 71 IN

## 2021-06-02 VITALS — BODY MASS INDEX: 32.38 KG/M2 | WEIGHT: 232 LBS

## 2021-06-02 VITALS — WEIGHT: 233 LBS | BODY MASS INDEX: 32.52 KG/M2

## 2021-06-02 VITALS — WEIGHT: 235.2 LBS | BODY MASS INDEX: 32.8 KG/M2

## 2021-06-02 VITALS — WEIGHT: 235 LBS | BODY MASS INDEX: 31.83 KG/M2

## 2021-06-02 VITALS — BODY MASS INDEX: 33.01 KG/M2 | WEIGHT: 236.7 LBS

## 2021-06-02 VITALS — BODY MASS INDEX: 32.93 KG/M2 | WEIGHT: 236 LBS

## 2021-06-02 VITALS — WEIGHT: 233 LBS | BODY MASS INDEX: 31.56 KG/M2

## 2021-06-02 VITALS — BODY MASS INDEX: 33.07 KG/M2 | WEIGHT: 237 LBS

## 2021-06-02 VITALS — BODY MASS INDEX: 32.65 KG/M2 | WEIGHT: 234 LBS

## 2021-06-02 VITALS — WEIGHT: 237 LBS | BODY MASS INDEX: 33.05 KG/M2

## 2021-06-02 NOTE — TELEPHONE ENCOUNTER
Called and lvm again to touch base on the research study follow up. Visit window through Oct 21st. Left message last week too.  Will confirm meds and study assessments.   Joe Monahan RN  Clinical Trials Nurse

## 2021-06-02 NOTE — TELEPHONE ENCOUNTER
LVM to call regarding research study.  Also sent email to follow up.  Will obtain updated status and changes in health via medical records.  Appears no new CECILIO.  Joe Monahan RN  Clinical Trials Nurse

## 2021-06-03 VITALS — BODY MASS INDEX: 33.19 KG/M2 | WEIGHT: 238 LBS

## 2021-06-03 VITALS — WEIGHT: 232.8 LBS | BODY MASS INDEX: 32.59 KG/M2 | HEIGHT: 71 IN

## 2021-06-03 VITALS — BODY MASS INDEX: 31.86 KG/M2 | WEIGHT: 227.6 LBS | HEIGHT: 71 IN

## 2021-06-03 VITALS — WEIGHT: 234 LBS | BODY MASS INDEX: 32.65 KG/M2

## 2021-06-03 VITALS — BODY MASS INDEX: 31.24 KG/M2 | WEIGHT: 224 LBS

## 2021-06-03 VITALS — WEIGHT: 226 LBS | BODY MASS INDEX: 31.52 KG/M2

## 2021-06-03 VITALS — BODY MASS INDEX: 32.22 KG/M2 | WEIGHT: 231 LBS

## 2021-06-03 VITALS — WEIGHT: 233 LBS | BODY MASS INDEX: 32.5 KG/M2

## 2021-06-03 VITALS — BODY MASS INDEX: 32.72 KG/M2 | HEIGHT: 71 IN | WEIGHT: 233.7 LBS

## 2021-06-03 VITALS — BODY MASS INDEX: 31.94 KG/M2 | WEIGHT: 229 LBS

## 2021-06-03 VITALS — WEIGHT: 230 LBS | BODY MASS INDEX: 32.08 KG/M2

## 2021-06-03 VITALS — BODY MASS INDEX: 32.96 KG/M2 | WEIGHT: 235.4 LBS | HEIGHT: 71 IN

## 2021-06-03 VITALS — WEIGHT: 226 LBS | BODY MASS INDEX: 31.64 KG/M2 | HEIGHT: 71 IN

## 2021-06-03 VITALS — BODY MASS INDEX: 32.51 KG/M2 | WEIGHT: 232.2 LBS | HEIGHT: 71 IN

## 2021-06-03 VITALS — WEIGHT: 237 LBS | BODY MASS INDEX: 33.07 KG/M2

## 2021-06-03 VITALS — BODY MASS INDEX: 31.26 KG/M2 | WEIGHT: 224.1 LBS

## 2021-06-03 VITALS — BODY MASS INDEX: 31.78 KG/M2 | HEIGHT: 71 IN | WEIGHT: 227 LBS

## 2021-06-03 VITALS — WEIGHT: 231 LBS | BODY MASS INDEX: 32.22 KG/M2

## 2021-06-03 VITALS — BODY MASS INDEX: 32.36 KG/M2 | WEIGHT: 232 LBS

## 2021-06-03 VITALS — BODY MASS INDEX: 31.38 KG/M2 | WEIGHT: 225 LBS

## 2021-06-04 VITALS — WEIGHT: 245.5 LBS | BODY MASS INDEX: 34.26 KG/M2

## 2021-06-04 VITALS — WEIGHT: 253.5 LBS | BODY MASS INDEX: 34.34 KG/M2 | HEIGHT: 72 IN

## 2021-06-04 VITALS
WEIGHT: 245 LBS | BODY MASS INDEX: 34.3 KG/M2 | RESPIRATION RATE: 16 BRPM | HEIGHT: 71 IN | DIASTOLIC BLOOD PRESSURE: 86 MMHG | HEART RATE: 64 BPM | SYSTOLIC BLOOD PRESSURE: 124 MMHG

## 2021-06-04 NOTE — TELEPHONE ENCOUNTER
RN cannot approve Refill Request    RN can NOT refill this medication medication last prescribed by cardiology.      Last office visit: Visit date not found Last Physical: Visit date not found Last MTM visit: Visit date not found Last visit same specialty: 8/21/2019 Cielo Che MD.  Next visit within 3 mo: Visit date not found  Next physical within 3 mo: Visit date not found      Tessie Joyce, [facility] Triage/Med Refill 12/31/2019    Requested Prescriptions   Pending Prescriptions Disp Refills     metoprolol tartrate (LOPRESSOR) 50 MG tablet [Pharmacy Med Name: METOPROLOL TARTRATE 50MG TABLETS] 180 tablet 0     Sig: TAKE 3 TABLETS BY MOUTH TWICE DAILY       There is no refill protocol information for this order

## 2021-06-05 NOTE — TELEPHONE ENCOUNTER
RN cannot approve Refill Request    RN can NOT refill this medication med is not covered by policy/route to provider. Last office visit: 8/21/2019 Cielo Che MD Last Physical: Visit date not found Last MTM visit: Visit date not found Last visit same specialty: 8/21/2019 Cielo Che MD.  Next visit within 3 mo: Visit date not found  Next physical within 3 mo: Visit date not found      Betty Cooley, [facility] Triage/Med Refill 1/21/2020    Requested Prescriptions   Pending Prescriptions Disp Refills     furosemide (LASIX) 20 MG tablet [Pharmacy Med Name: FUROSEMIDE 20MG TABLETS] 90 tablet 0     Sig: TAKE 1 TABLET BY MOUTH DAILY       There is no refill protocol information for this order        amLODIPine (NORVASC) 5 MG tablet [Pharmacy Med Name: AMLODIPINE BESYLATE 5MG TABLETS] 90 tablet 0     Sig: TAKE 1 TABLET(5 MG) BY MOUTH DAILY       There is no refill protocol information for this order

## 2021-06-06 NOTE — PATIENT INSTRUCTIONS - HE
- Exercise to get your heart rate up for 30 minutes daily    - Stress test, stay on the treadmill as long as you can    - Check cholesterol    - Stay on the same medications    - See Dr. Bray in 3 months. Please schedule that appointment.    - Sleep study to look for sleep apnea    - See me in 1 year, but will call with results and recommendations

## 2021-06-06 NOTE — PROGRESS NOTES
Patient came today with blood pressure of 174/110. Spoke with Dr Mendoza and patient is to return for stress echo and take is blood pressure medication prior to test.

## 2021-06-06 NOTE — TELEPHONE ENCOUNTER
Not primary care patient- forwarding to heart care     Ayse Gilmore RN BS [facility] Triage/Med Refill 2/13/2020 7:19 AM

## 2021-06-06 NOTE — PROGRESS NOTES
Copied from cholesterol lab results:    ===View-only below this line===  ----- Message -----  From: Cielo Che MD  Sent: 3/3/2020   9:11 AM CDT  To: Travis Dobson RN    His triglycerides were too high for the LDL to be measured on this test. I suspect the triglycerides relate to his blood sugars and will improve when they are better.     Can you have him come back at some point in the next week or two for a direct LDL?    Thanks, EG    Order palced and faxed to PCP office where pt would like to complete. Requested results be faxed to [phone]. BETSY WADE

## 2021-06-06 NOTE — TELEPHONE ENCOUNTER
----- Message from Cielo Che MD sent at 3/17/2020  3:42 PM CDT -----  Hi, I tried to call Siva but I got his voicemail.    He has been having dyspnea on exertion. He saw a pulmonologist for possible diaphragmatic paralysis and the thought was that he should give it a year. His sniff test didn't look bad, however.     This stress test suggests that there are two areas of his heart muscle that do not strengthen with exercise.  I recommend that we do an angiogram with possible intervention to see if his bypass grafts look okay. I recommend that we do this in a month so as to avoid exposing him to the coronavirus here in the hospital. If he gets worse in the mean time he should let us know.    I tried to get in touch with his pulmonologist but didn't have any luck. I recommend that he use the incentive spirometer at least 3 times per day to help exercise his diaphragm.    Thanks, EG

## 2021-06-07 NOTE — TELEPHONE ENCOUNTER
----- Message from Sarah Briggs sent at 4/20/2020  3:38 PM CDT -----  Regarding: CANDACE ORDERING  [facility] POSS PCI W/CANDACE ONLY  630AM ADMIT ON 4/30  H&P-TO BE DONE IN [facility] PRIOR W/TIFFANIE OR [facility]    Thanks!  Sarah

## 2021-06-07 NOTE — TELEPHONE ENCOUNTER
===View-only below this line===  ----- Message -----  From: Cielo Che MD  Sent: 4/27/2020   7:40 AM CDT  To: Travis Dobson RN    Let's keep Siva on the schedule for this week.     EG

## 2021-06-07 NOTE — TELEPHONE ENCOUNTER
"Spoke with patient and arranged telephone teach via phone tomorrow at 0845.     Pt brings for the question and concern. Pt is still working and should he be? He raises concerns as his job he is NOT able to work from home. He is a /fixies machinery equipment. He has to come into contact with people and unable to completely follow social distancing. He is concerned not so much about his heart history but the fact \"that I have essentially one working lung right now\" and requests writer reach out to [facility] [place]. [place]. Che recommendations? He has angio on 4/30 and will be off work for a short duration post-angiogram. SHERI,Rn  "

## 2021-06-07 NOTE — TELEPHONE ENCOUNTER
To Whom it May Concern,    Siva Ron has cardiovascular and renal disease which puts him at very high risk of complications and poor outcomes should get become infected with the coronavirus. If he cannot work with appropriate precautions, I.e. at least foot spacing between people and all people wearing masks, then he should be excused from work during the current pandemic.    Thank you,     Cielo Che M.D.  [facility]

## 2021-06-07 NOTE — TELEPHONE ENCOUNTER
PC to patient. Review of current medications. Changes made as reported by patient.Education completed. Pt aware of no visitors. Will be dropped off by his wife, and bring his mobile device and  with him. Opportunity to ask questions and have them answered. Pt ready for procedure. Per protocol due to reduced GFR pt is renal protocol. Orders placed. Documentation completed. SHERI,Rn

## 2021-06-07 NOTE — PROGRESS NOTES
Siva MONAHAN Tanvir  [address] [facility] [address]  [phone] (home) [phone] (work)    Procedure cardiologist:  Dr. Che  PCP:  Trinidad Hansen PA-C  H&P completed by:  Will be updated by CNP morning of procedure.  Admit date 04/30/20  Arrival time: 0630  Anticoagulation: None  CPAP: Yes  Previous PCI: 01/13/20, 5/2/19  Bypass Grafts: Yes. 6/2019.  Renal Issues: Yes. GFR 31-35. Renal Protocol.  Diabetic?: Yes; oral medications and long acting insulin.  Device?: No.    Angiogram Teaching    Reason for Visit:  Telephone call to discuss pre-procedure education in preparation for: Coronary Angiogram with Possible Percutaneous Coronary Intervention.    Procedure Prep:  Primary Cardiologist note dated: 3/2/20  EKG results obtained, dated: Morning of procedure  Hemogram results obtained: Morning of procedure.  Basic Metabolic Panel results obtained: Morning of procedure.  Lipid Profile results obtained: 3/2/20    Pre-procedure instructions  Patient instructed to be NPO after midnight.  Patient instructed to shower the evening before or the morning of the procedure.  Patient instructed to arrange for transportation home following procedure from a responsible family member of friend. No driving for at least 24 hours.  Patient instructed to have a responsible adult with them for 24 hours post-procedure.  Post-procedure follow up process.  Conscious sedation discussed.    Pre-procedure medication instructions  Patient instructed on antiplatelet medication.  Continue medications as scheduled, with a small amount of water on the day of the procedure unless indicated.  Patient instructed to take 325 mg of Aspirin am of procedure: Yes  Other medication: Morning of procedure please HOLD all vitamins, minerals, and supplements. Please TAKE (4) baby aspirin, Metoprolol, Amlodipine and Plavix  morning of procedure. Please hold oral diabetic medications, Glimepiride, and Metformin. Hold Lasix and Losartan.  Afternoon/evening day before procedure take 1/2 usual dose of long acting insulin Soliqua= 20 units on 4/29/20.    *PATIENTS RECORDS AVAILABLE IN [facility] UNLESS OTHERWISE INDICATED*      Patient Active Problem List   Diagnosis     Type 2 diabetes mellitus with other circulatory complication, unspecified whether long term insulin use (H)     Dyslipidemia     Class 2 severe obesity due to excess calories with serious comorbidity in adult, unspecified BMI (H)     Coronary artery disease due to lipid rich plaque     Chronic kidney disease     HTN (hypertension)     S/P CABG (coronary artery bypass graft)     Balanitis xerotica obliterans     Other stricture of anterior urethra in male     History of ST elevation myocardial infarction (STEMI)     Stage 3 chronic kidney disease (H)     Burning in the chest       Current Outpatient Medications   Medication Sig Dispense Refill     amLODIPine (NORVASC) 5 MG tablet TAKE 1 TABLET(5 MG) BY MOUTH DAILY 90 tablet 1     aspirin 81 MG EC tablet Take 1 tablet (81 mg total) by mouth daily.  0     atorvastatin (LIPITOR) 80 MG tablet TAKE 1 TABLET(80 MG) BY MOUTH AT BEDTIME 90 tablet 2     cholecalciferol, vitamin D3, 5,000 unit Tab Take 5,000 Units by mouth daily.       clopidogrel (PLAVIX) 75 mg tablet Take 1 tablet (75 mg total) by mouth daily. 30 tablet 11     furosemide (LASIX) 20 MG tablet TAKE 1 TABLET BY MOUTH DAILY (Patient taking differently: Take 20 mg by mouth 2 (two) times a day at 9am and 6pm. ) 90 tablet 1     glimepiride (AMARYL) 4 MG tablet Take 4 mg by mouth 2 (two) times a day before meals.       insulin glargine-lixisenatide (SOLIQUA 100/33) 100 unit-33 mcg/mL InPn Inject 40 Units under the skin daily. Takes at 1500 daily       losartan (COZAAR) 50 MG tablet TAKE 1 TABLET BY MOUTH DAILY 90 tablet 2     metFORMIN (GLUCOPHAGE) 500 MG tablet Take 500 mg by mouth 2 (two) times a day with meals.              metoprolol tartrate (LOPRESSOR) 50 MG tablet Take 1 tablet (50 mg  total) by mouth 2 (two) times a day. Take 2 tablets in the morning and 1 in the evening 90 tablet 11     nitroglycerin (NITROSTAT) 0.4 MG SL tablet Place 1 tablet (0.4 mg total) under the tongue every 5 (five) minutes as needed for chest pain. 25 tablet 1     No current facility-administered medications for this visit.        No Known Allergies     Plan: Patient education completed. Pt aware of COVID19 restrictions and no visitors at the hospital. Pt wife will be dropping him off in the morning, and his  post-procedure. Wife will also be home with the patient post-procedure for aftercare. Pt with opportunity to ask questions and have them answered. Pt ready for procedure.       BETSY Dobson

## 2021-06-07 NOTE — TELEPHONE ENCOUNTER
CAlled and spoke with the patient. Informed of results and recommendations. Pt agrees with angio in a month or so. Order/Case request placed. Pt doesn't currently have IS any more will troubleshoot. Encouraged to return call if any new symptoms or concerns prior to upcoming angiogram. Pt verbalized understanding. CMM,RN    Called and LM for patient that he can  IS from writer at information desk if interested. CAll back if would like. Left writers' direct phone number to return call if interested. CMM,RN

## 2021-06-08 NOTE — TELEPHONE ENCOUNTER
Called Siva to see how he is doing and go over test results. He missed his clinic visit last week and a [employer] message was sent asking for communication. Th week prior to that I called both him and his wife without an answer.     I left voicemail again today.

## 2021-06-08 NOTE — TELEPHONE ENCOUNTER
===View-only below this line===  ----- Message -----  From: Cielo Che MD  Sent: 5/6/2020   3:50 PM CDT  To: Travis Dobson RN    I just talked to him on the phone. The chest pressure was happening before his recent angiogram but seems to have worsened recently. It seems primarily related to deep breathing. There was more swelling than usual of his ankles yesterday. Can we get him in for his CT tomorrow and get a blood draw at the same time to check a COMP, BNP, D-dimer, and troponin?    Thanks, EG

## 2021-06-08 NOTE — TELEPHONE ENCOUNTER
Called and left message for patient to return call. Requested a best time of day for EMG to call/discuss/follow-up. If writer doesn't answer phone to please leave a message with best time of day to call. SHERI,Rn

## 2021-06-08 NOTE — TELEPHONE ENCOUNTER
Called CT scheduling. Arranged for arrival 0945 am tomorrow at [facility] for CT scan. Labs to be drawn at outpatient lab at 0900, arrival without appt.(called lab and verified, no appointment necessary/can't be made).BETSY WADE    PC to patient. Informed of labs at outpatient lab, and CT scan at 0945 arrival. Requested to wear a mask to the hospital. Pt verbalized understanding. Will arrive for lab draws and then go to CT. Discussed with patient parking options at [facility] and also option for a volunteer to bring him to Radiology for his CT and back to minimize his chest presssure/help breathing. CMM,RN

## 2021-06-08 NOTE — TELEPHONE ENCOUNTER
"PC to patient and discussion.  Old symptoms are persisting. Reports that he has ongoing neuropathy sensations of his left arm, elbow, and hand that have been on/off for the past 2 months. No new chest pain. Post procedure site is healing. Denies fevers, chills, diarrhea or feeling as if he is acutely ill. He reports that he doesn't feel right, in terms of his breathing. He is still doing his IS 2-3 times a day, getting it up to around 2800, but for short duration, this is unchanged from pre-procedure. He says that his inability to take a real deep breath is the same as pre-procedure.    What is different is that he is having chest breathing/pressure with a yawn or minimal effort. He states that he doesn't even try to exert himself. His breathing \"the pressure is just there\". He started to work last night/yesterday and he didn't feel right with his breathing, so he left work, and returned home. Calling today as he is concerned. Pt has no other new symptoms or changes. Informed patient will forward to EMG, and return call will be made with any recommendations. Will ask if CT of chest needs to be moved to sooner or alternate recommendation from provider. Pt verbalized understanding.    Dr. Che schedule CT of chest sooner? Or new recommendation? CMM,RN  "

## 2021-06-08 NOTE — TELEPHONE ENCOUNTER
----- Message from Cielo Che MD sent at 5/28/2020 11:03 AM CDT -----  Siva didn't answer our calls or text for his virtual visit today. I sent the following message via [employer] but was hoping you could mail one to him as well.    Thanks, EG      Kevin Paniagua,     I have not had any success getting in contact with you over the past few weeks regarding your test results and our options going forward, as well as to see how you are doing. I was hoping we could do that today during your scheduled video visit but we haven't been able to connect again.     Is there a time or phone number that works better for you?  Even if you have decided to pursue care elsewhere please let us know and we will stop bothering you.      I want to talk about a referral to a specialist at [place] for a second opinion.     I have been using the number [phone].    Thank you,     Cielo Che

## 2021-06-08 NOTE — TELEPHONE ENCOUNTER
----- Message from Cielo Che MD sent at 6/16/2020  3:27 PM CDT -----  Regarding: RE: on Plavix  Yes, he can stop clopidogrel.    EG  ----- Message -----  From: Tamiko Gross RN  Sent: 6/16/2020   7:36 AM CDT  To: Cielo Che MD  Subject: on Plavix                                        I got a refill for Plavix for this pt and just want to confirm that he is off this med. It is not on his med list.

## 2021-06-16 PROBLEM — E78.5 DYSLIPIDEMIA: Status: ACTIVE | Noted: 2019-01-13

## 2021-06-16 PROBLEM — I25.83 CORONARY ARTERY DISEASE DUE TO LIPID RICH PLAQUE: Status: ACTIVE | Noted: 2019-01-14

## 2021-06-16 PROBLEM — N18.30 STAGE 3 CHRONIC KIDNEY DISEASE (H): Status: ACTIVE | Noted: 2020-03-19

## 2021-06-16 PROBLEM — R07.9 CHEST PAIN: Status: ACTIVE | Noted: 2020-10-04

## 2021-06-16 PROBLEM — E11.59 TYPE 2 DIABETES MELLITUS WITH OTHER CIRCULATORY COMPLICATION, UNSPECIFIED WHETHER LONG TERM INSULIN USE (H): Status: ACTIVE | Noted: 2019-01-13

## 2021-06-16 PROBLEM — R07.89 BURNING IN THE CHEST: Status: ACTIVE | Noted: 2020-03-19

## 2021-06-16 PROBLEM — I25.2 HISTORY OF ST ELEVATION MYOCARDIAL INFARCTION (STEMI): Status: ACTIVE | Noted: 2019-08-21

## 2021-06-16 PROBLEM — Z95.1 S/P CABG (CORONARY ARTERY BYPASS GRAFT): Status: ACTIVE | Noted: 2019-06-20

## 2021-06-16 PROBLEM — N18.9 CHRONIC KIDNEY DISEASE: Status: ACTIVE | Noted: 2019-01-29

## 2021-06-16 PROBLEM — I10 HTN (HYPERTENSION): Status: ACTIVE | Noted: 2019-04-19

## 2021-06-16 PROBLEM — I25.10 CORONARY ARTERY DISEASE DUE TO LIPID RICH PLAQUE: Status: ACTIVE | Noted: 2019-01-14

## 2021-06-16 NOTE — TELEPHONE ENCOUNTER
Telephone Encounter by Joe Monahan RN at 4/1/2019  2:18 PM     Author: Joe Monahan RN Service: -- Author Type: Registered Nurse    Filed: 4/1/2019  2:34 PM Encounter Date: 4/1/2019 Status: Signed    : Joe Monahan RN (Registered Nurse)           A Phase 3, Multicenter, Double-blind, Randomized, Placebo-controlled, Parallel-group Study to Investigate the Efficacy and Safety of [id number] in Subjects with Acute Coronary Syndrome -the AEGIS-II Study     Spoke with subject today for study telephone visit 7.       Reviewed con medications with subject.    Adverse/serious adverse events, endpoint major adverse cardiac events, and health status reviewed with subject.  Subject reported hypoglycemia, myalgias, and one event of heart burn with left arm tingling to Dr. Che.      Plan: Will see subject in clinic in 30 days (+/- 10)  for study follow-up visit 8.       Joe Monahan RN  Clinical Trials Nurse  [phone]

## 2021-06-16 NOTE — TELEPHONE ENCOUNTER
Telephone Encounter by Joe Monahan RN at 10/22/2019  4:26 PM     Author: Joe Monahan RN Service: -- Author Type: Registered Nurse    Filed: 10/22/2019  4:39 PM Encounter Date: 10/22/2019 Status: Signed    : Joe Monahan RN (Registered Nurse)           A Phase 3, Multicenter, Double-blind, Randomized, Placebo-controlled, Parallel-group Study to Investigate the Efficacy and Safety of [id number] in Subjects with Acute Coronary Syndrome -the AEGIS-II Study     Spoke with subject today for study telephone visit 10.       He had missed a few of my messages and this visit is one day out of window, however the medical record and vital status was reviewed during the visit window.    Adverse/serious adverse events, endpoint major adverse cardiac events, and health status reviewed with subject.  Subject reports no changes and verbalized understanding of the study.  Updated study information on modifications in blood pressure medications.      Plan: Will see subject in clinic in 95 days (+14)  for study end of study visit 11.  Planned for 8am Tue Jan 21st.     Joe Monahan RN  Clinical Trials Nurse  [phone]

## 2021-06-18 NOTE — LETTER
Letter by China Flores CNP at      Author: China Flores CNP Service: -- Author Type: --    Filed:  Encounter Date: 1/29/2019 Status: (Other)       January 29, 2019     Patient: Siva Ramey   YOB: 1975   Date of Visit: 1/29/2019       To Whom It May Concern:    It is my medical opinion that Siva Ramey may return to work on February 11, 2019.    If you have any questions or concerns, please don't hesitate to call.    Sincerely,        Electronically signed by China Flores CNP

## 2021-06-18 NOTE — LETTER
Letter by Elayne Jose PA-C at      Author: Elayne Jose PA-C Service: -- Author Type: --    Filed:  Encounter Date: 1/21/2019 Status: (Other)       January 21, 2019     Patient: Siva Ramey   YOB: 1975   Date of Visit: 1/21/2019       To Whom It May Concern:    It is my medical opinion that Siva Ramey should remain out of work until at least after he sees us in follow up on January 29, 2019.    If you have any questions or concerns, please don't hesitate to call.    Sincerely,        Electronically signed by Elayne Jose PA-C

## 2021-06-19 ENCOUNTER — HEALTH MAINTENANCE LETTER (OUTPATIENT)
Age: 46
End: 2021-06-19

## 2021-06-19 NOTE — LETTER
"Letter by Travis Dobson RN at      Author: Travis Dobson RN Service: -- Author Type: --    Filed:  Encounter Date: 8/26/2019 Status: Signed       Siva Ramey  44 year old male  1975     [address] [facility] [address]  [phone] (M)  [phone] (H)     September 23, 2019     Dear Mr. Ramey,     Good day sir. I have tried to reach out to you via phone, left messages without success or return phone  call. If you could please call the clinic to update any phone numbers that would be great.    Below are the results from your recent visit:    Resulted Orders   Basic Metabolic Panel   Result Value Ref Range    Sodium 138 136 - 145 mmol/L    Potassium 5.0 3.5 - 5.0 mmol/L    Chloride 103 98 - 107 mmol/L    CO2 26 22 - 31 mmol/L    Anion Gap, Calculation 9 5 - 18 mmol/L    Glucose 186 (H) 70 - 125 mg/dL    Calcium 10.0 8.5 - 10.5 mg/dL    BUN 32 (H) 8 - 22 mg/dL    Creatinine 2.18 (H) 0.70 - 1.30 mg/dL    GFR MDRD Af Amer 40 (L) >60 mL/min/1.73m2    GFR MDRD Non Af Amer 33 (L) >60 mL/min/1.73m2    Narrative    Fasting Glucose reference range is 70-99 mg/dL per  American Diabetes Association (ADA) guidelines.   BNP(B-type Natriuretic Peptide)   Result Value Ref Range    BNP 72 (H) 0 - 35 pg/mL      Dr. Che has reviewed your labs, and has the following recommendation, \" Labs are stable with  moderate renal dysfunction. I recommend starting amlodipine 5mg daily to get his blood pressure  down. Please ask him to check his BP daily at home and let us know in 2 weeks how the numbers  are looking. \"      Please call me directly if you would like to move forward with the medication and recommendations.  Lydia at [phone].    Sincerely,  Electronically signed by Travis Dobson RN         "

## 2021-06-19 NOTE — LETTER
"Letter by Travis Dobson RN at      Author: Travis Dobson RN Service: -- Author Type: --    Filed:  Encounter Date: 8/26/2019 Status: (Other)         Siva Ramey  [address] [facility] [address]     August 26, 2019     Dear Mr. Ramey,      [facility]. My name is Lydia, and I am Dr. Che's nurse in the clinic. I have left messages and we haven't connected so I am reaching out via [employer].    Below are the results from your recent visit:    Resulted Orders   Basic Metabolic Panel   Result Value Ref Range    Sodium 138 136 - 145 mmol/L    Potassium 5.0 3.5 - 5.0 mmol/L    Chloride 103 98 - 107 mmol/L    CO2 26 22 - 31 mmol/L    Anion Gap, Calculation 9 5 - 18 mmol/L    Glucose 186 (H) 70 - 125 mg/dL    Calcium 10.0 8.5 - 10.5 mg/dL    BUN 32 (H) 8 - 22 mg/dL    Creatinine 2.18 (H) 0.70 - 1.30 mg/dL    GFR MDRD Af Amer 40 (L) >60 mL/min/1.73m2    GFR MDRD Non Af Amer 33 (L) >60 mL/min/1.73m2    Narrative    Fasting Glucose reference range is 70-99 mg/dL per  American Diabetes Association (ADA) guidelines.   BNP(B-type Natriuretic Peptide)   Result Value Ref Range    BNP 72 (H) 0 - 35 pg/mL     Dr. Che has reviewed your lab. \"Labs are stable with moderate renal dysfunction. I recommend starting amlodipine 5 mg daily to get his blood pressure down. Please ask him to check his BP daily at home and let us know in 2 weeks how the numbers are looking.\"    I have sent in the prescription to your pharmacy we have on file. If you have any questions, or would like to discuss,  please don't hesitate to call me at [phone], or contact using [employer].    Sincerely,    Electronically signed by Travis Dobson RN       "

## 2021-06-20 NOTE — LETTER
Letter by Travis Dobson RN at      Author: Travis Dobson RN Service: -- Author Type: --    Filed:  Encounter Date: 4/21/2020 Status: (Other)         April 30, 2020     Patient: Siva Ramey   YOB: 1975   Date of Visit: 4/21/2020       To Whom It May Concern:    Siva Ramey has cardiovascular and renal disease which puts him at very high risk of complications and poor outcomes should he become infected with the coronavirus. If he cannot work with appropriate precautions, I.e. at least 6 foot spacing between people and all people wearing masks, then he should be excused from work during the current pandemic.    If you have any questions or concerns, please don't hesitate that [facility] at [phone].    Thank you,     Cielo Che M.D.  [facility]          Electronically signed by Travis Dobson RN/on behalf of Dr. Cielo Che, Cardiologist

## 2021-06-20 NOTE — LETTER
Letter by Cielo Che MD at      Author: Cielo Che MD Service: -- Author Type: --    Filed:  Encounter Date: 5/28/2020 Status: (Other)         May 28, 2020          Kevin Paniagua,     I have not had any success getting in contact with you over the past few weeks regarding your test results and our options going forward, as well as to see how you are doing. I was hoping we could do that today during your scheduled video visit but we haven't been able to connect again.     Is there a time or phone number that works better for you?  Even if you have decided to pursue care elsewhere please let us know and we will stop bothering you.      I want to talk about a referral to a specialist at [place] for a second opinion.     I have been using the number [phone].    Thank you,     Cielo Che MD  [facility]

## 2021-06-23 NOTE — PROGRESS NOTES
Cardiac Rehab  Phase II Assessment    Assessment Date: 1-22-19    Diagnosis: STEMI  involving LAD Date of Onset: 1-13-19  Procedure: PCI - Thrombotic lesion and IBAN to LAD  Date of Onset: 1-13-19  ICD/Pacemaker: No   Post-cath Complications: uneventful  ECG History: SR EF%:45%  Past Medical History:   Patient Active Problem List   Diagnosis     Chest pain     Type 2 diabetes mellitus with other circulatory complication, unspecified whether long term insulin use (H)     Dyslipidemia     Uncontrolled hypertension     STEMI involving left anterior descending coronary artery (H)     Hyperlipidemia LDL goal <70     Class 2 severe obesity due to excess calories with serious comorbidity in adult, unspecified BMI (H)     Acute ST elevation myocardial infarction (STEMI) involving left anterior descending (LAD) coronary artery (H)     Benign essential hypertension     Coronary artery disease due to lipid rich plaque     Past Medical History:   Diagnosis Date     Diabetes mellitus, type II (H) 12/2001     DM II (diabetes mellitus, type II), controlled (H)      Dyslipidemia 12/2001     HTN (hypertension) 2004     Hyperlipidemia      Hypertension      Obesity (BMI 30-39.9)      Past Surgical History:   Procedure Laterality Date     CV CORONARY ANGIOGRAM N/A 1/13/2019    Procedure: Coronary Angiogram;  Surgeon: Cielo Che MD;  Location: [facility] Cath Lab;  Service: Cardiology     CV LEFT HEART CATHETERIZATION WITH LEFT VENTRICULOGRAM N/A 1/13/2019    Procedure: Left Heart Catheterization with Left Ventriculogram;  Surgeon: Cielo Che MD;  Location: [facility] Cath Lab;  Service: Cardiology     HERNIA REPAIR         Physical Assessment  Precautions/ Physical Limitations: S/P MI  Oxygen: No  O2 Sats: 98% Lung Sounds: Clear Edema: none  Incisions: na  Sleeping Pattern: good   Appetite: good   Nutrition Risk Screen: no risk at this time    Pain  Location: none  Intensity: (0-10 scale) 0      Psychosocial/ Emotional  Health  1. In the past 12 months, have you been in a relationship where you have been abused physically, emotionally, sexually or financially? No  notified: NA  2. Who do you turn to for emotional support?: Wife  3. Do you have cultural or spiritual needs? No  4. Have there been any major life changes in the past 12 months? No    Referral Information  Primary Physician: Trinidad Hansen PA-C  Cardiologist: Yane  Surgeon: maranda    Home exercise/Equipment: Belongs to a gym    Patient's long-term goal(s): none    1. Living Accommodations: Home Steps: Yes      Support people at home: Family   2. Marital Status:   3. Family is able to assist with cares      Ep[employer]alian/Community involvement: none  4. Recreation/Hobbies:  Football and Baseball for the kids        See Doc Flowsheet

## 2021-06-23 NOTE — PATIENT INSTRUCTIONS - HE
It was nice to see you again for the AEGIS2 research study infusion today.  We will see you back in 1 week for your next visit.  If you have any questions or changes to your health, please contact us at [phone].  Thanks!    Joe Monahan RN

## 2021-06-23 NOTE — PATIENT INSTRUCTIONS - HE
It was nice to see you again for the AEGIS2 research study today.  We will call you in 4 weeks for your next study telephone check-in.  If you have any questions or changes to your health, please contact us at [phone].  Thanks!    Visit 8 day 90 in clinic on April 10th Wednesday at 830AM [facility].    Joe Monahan RN

## 2021-06-23 NOTE — PATIENT INSTRUCTIONS - HE
Siva Ramey,    It was a pleasure to see you today at the [facility].     My recommendations after this visit include:  - See Dr. Che in 6 weeks.      China Vicente CNP      Medication     o Take all your medications as prescribed  o Do not stop any medications without talking with a healthcare provider    Exercise      o Physical activity is important for overall health  o Set a goal of 150 minutes of exercise each week  o For example, 30 minutes of exercise 5 days each week.    o These 30 minutes can be broken into shorter periods of 15 minutes twice daily or 10 minutes three times daily  o Start any exercise program slowly and work towards the goal of 150 minutes each week  o For example, you may start with 10 minutes and plan to add a few minutes each week as you get stronger   o Examples of exercise include walking, swimming, or biking  o Remember to stretch and stay hydrated with exercise    Diet     o A heart healthy diet includes:  o A variety of fruits and vegetables  o Whole grains  o Low-fat dairy (fat-free, 1% fat, and low-fat)  o Lean meats and poultry without skin   o Fish (eat fish 2 times each week)  o Nuts  o Limit saturated fat to about 13 grams each day (based on a 2000 calorie diet)  o Limit red meat  o Limit sugars (sweets and sugary beverages)  o Limit your portion sizes  o Do not add salt to your food when cooking or at the table  o Limit alcohol intake (no more than 1 drink each day for women or 2 drinks each day for men)    Weight Loss     o Work on losing weight with diet and exercise  o You BMI (body mass index) should be between 18.5-24.9  o This is a calculation of your weight and height  o Please ask your healthcare provider for your BMI    Manage Other Chronic Health Conditions     o Control cholesterol  o Eat a diet low in saturated fat  o Exercise   o Take a statin medication as prescribed  o Manage blood pressure  o Eat a diet low in  sodium  o Exercise  o Reduce stress  o Lose weight   o Take blood pressure medications as prescribed  o Control blood sugars if diabetic  o Monitor sugars and carbohydrates in your diet  o Lose weight   o Take diabetes medications as prescribed  o Follow-up with your primary care provider to make sure your blood sugars are well controlled    Stress Reduction     o Find time each day to relax  o Reading, listening to music, yoga, meditation, exercise, spending time with friends and family, volunteering   o Get 6-8 hours of sleep each night    Smoking Cessation     o Smoking causes numerous health problems including coronary artery disease  o It is never too late to quit  o Set realistic goals for quitting  o Decrease the number of cigarettes used each week  o Use nicotine gum or patches to help you quit    Information from the American [employer] Association.  Please visit their website at www.heart.org

## 2021-06-23 NOTE — PATIENT INSTRUCTIONS - HE
It was nice to see you again for the AEGIS2 research study infusion today.  We will see you back in 1 week for your next visit.  If you have any questions or changes to your health, please contact us at [phone].  Thanks!    Joe Monahan RN

## 2021-06-23 NOTE — PROGRESS NOTES
Siva Ramey has participated in 2 sessions of Phase II Cardiac Rehab.    Progress Report:   Cardiac Rehab Treatment Progress Report 1/22/2019 1/24/2019   Weight 237 lbs 238 lbs   Pre Exercise  HR 75 77   Pre Exercise /83 128/80   Pre Blood Sugar (mg/dl) 153 119   Treadmill Peak HR - 95   Heart Rate 69 80   Post Exercise /82 124/80   Post Blood Sugar (mg/dl) na-short session 106   ECG SR SR with rare PVC's    Total Exercise Minutes 6 40         Current Status:  Currently exercising without complaints or symptoms. Pt reports night sweats and dizziness in the middle of the night. Blood sugar was at 90. Pt had milk and symptoms went after 15 minutes. No symptoms with exercise today.     If Physician recommends change in treatment plan, please place orders.        __________________________________________________      _____________  Signature                                                                                                  DateSee Doc Flowsheet

## 2021-06-23 NOTE — PROGRESS NOTES
"ITP ASSESSMENT   Assessment Day: Initial    Session Number: 1  Precautions: S/P MI    Diagnosis: MI;Stent    Risk Stratification: High    Referring Provider: Owen Boss MD  EXERCISE  Exercise Assessment: Initial       6 Minute Walk Test   Pre   Pre Exercise HR: 75                    Pre Exercise BP: 130/83      Peak  Peak HR: 88                   Peak BP: 131/83    Peak feet: 1400    Peak O2 SAT: 98    Peak RPE: 12    Peak MPH: 2.65      Symptoms:  Peak Symptoms: none      5 mins. Post  5 Min Post HR: 69    5 Min Post BP: 126/82                           Exercise Plan  Goals Next 30 days  ADL'S: Resume grocery shopping and meal prep    Leisure: Resume \"driving the kids around\"    Work: RTW- FT ( )- \"Some heavy lifting\"    Education Goals: All goals in this section met    Education Goals Met: Patient can state cardiac s/s and appropriate emergency response.;Has system for taking medication.;Medication review.      Exercise Prescription  Exercise Mode: Bike;Treadmill;Nustep;Arm Erg.    Frequency: 2 x a week    Duration: 30-40 min    Intensity / THR: 20-30 beats above resting heart rate    RPE 11-14  Progression / Met level: 3.5-4.5    Resistive Training?: Yes      Current Exercise (mins/week): 15      Interventions  Home Exercise:  Mode: Walking program    Frequency: 4-5 x week    Duration: 30-40 min      Education Material : Educational videos;Provide written material;Individual education and counseling;Offer educational classes      Education Completed  Exercise Education Completed: Cardiac Anatomy;Signs and Symptoms;Medication review;RPE;Emergency Plan;Home Exercise;Warm up/cool down;FITT Principles;BP/HR Reponse to exercise;Benefits of Exercise;End point of exercise              Exercise Follow-up/Discharge  Follow up/Discharge: Discussed home walking program   NUTRITION  Nutrition Assessment: Initial      Nutrition Risk Factors:  Nutrition Risk Factors: Diabetes;Dyslipidemia;Overweight  HbA1c: " "10.0  Monitors blood sugar at home: Yes  Frequency: 4 x day  Cholesterol: 170  LDL: 56  HDL: 25  Triglycerides: 748      Nutrition Plan  Interventions  Other Nutrition Intervention: Diet Class;Therapist/Pt Discussion;Educational Videos;Provide with Written Material    Education Completed  Nutrition Education Completed: Risk factor overview      Goals  Nutrition Goals (Next 30 days): Patient will follow a low sodium diet;Patient able to demonstrate carbohydrate counting;Patient will follow a low saturated fat diet;Patient knows appropriate portion size;Patient will lose weight      Goals Met  Nutrition Goals Met: Completed Nutritional Risk Screen;Provided Rate your Plate Survey;Reviewed Dietitian schedule      Height, Weight, and  BMI  Weight: 237 lb (107.5 kg)  Height: 5' 11\" (1.803 m)  BMI: 33.07      Nutrition Follow-up  Follow-up/Discharge: Enc pt to return diet survey and consult with the dietitian          Other Risk Factors  Other Risk Factor Assessment: Initial      HTN Risk Factor: Hypertension      Pre Exercise BP: 130/83  Post Exercise BP: 126/82      Hypertension Plan  Goals  HTN Goals: Follow low sodium diet;Take medication as prescribed;Exercises regularly      Goals Met  HTN Goals Met: Take medication as prescribed;Follow low sodium diet      HTN Interventions  HTN Interventions: Diet consult;Therapist/patient discussion;Provide written material;Offer educational videos;Offer educational classes      HTN Education Completed  HTN Education Completed: Medication review;Risk factor overview      Tobacco Risk Factor: NA          Risk Factor Follow-up   Follow-up/Discharge: Offer support and education for risk factor management     PSYCHOSOCIAL  Psychosocial Assessment: Initial       [facility] Q of L Summary Score: 17      MARYLIN-D Score: 0      Psychosocial Risk Factor: Stress      Psychosocial Plan  Interventions    Interventions: Offer Spiritual Care consult;Offer educational videos and classes;Provide " written material;Individual education and counseling      Education Completed  Education Completed: Relaxation/Coping Techniques;S/S of depression;Effects of stress on body      Goals  Goals (Next 30 days): Identify stressors;Practicing stress management skills      Goals Met  Goals Met: Identified Support system;Oriented to stress management classes      Psychosocial Follow-up  Follow-up/Discharge: Reviewed stress as a risk factor and enc stress management class       Patient involved in Goal setting?: Yes      Signature: _____________________________________________________________    Date: __________________    Time: __________________See Doc Flowsheet

## 2021-06-23 NOTE — PROGRESS NOTES
Assessment/Plan:     No changes to his medications today. He will continue to follow-up with research per protocol for A Phase 3, Multicenter, Double-blind, Randomized, Placebo-controlled, Parallel-group Study to Investigate the Efficacy and Safety of [id number] in Subjects with Acute Coronary Syndrome -the AEGIS-II Study.  His blood pressure and heart rate is mildly elevated today but has been stable in the cardiac rehab.  He has been tolerating the cardiac rehab without issues.  He denies any bleeding complications being on DAPT.  He reports his blood sugars been running in 70s and feeling little lightheaded and has been following closely with a primary care provider for diabetes medication adjustment.  He had some issues with diarrhea and constipation from recent diet change.  He was highly encouraged to participate in the nutrition class and meet with a dietitian. Patient was recommended to call PCP/primary cardiologist if blood pressure and heart rate persistently run high in the cardiac rehab.  He has been following low-sodium and heart healthy diet.  Subjective:     Siva Ramey is a 43 years old with with a significant PMH of CAD with s/p STEMI  And PCI/STENT to 1st diagonal, hypertension, dyslipidemia, obesity, chronic kidney disease, and diabetes who is seen at [facility] today for AEGIS-II Study Visit 6.  He has no health concerns today and denies fatigue, lightheadedness, shortness of breath, dyspnea on exertion, orthopnea, PND, palpitations, chest pain, abdominal fullness/bloating and lower extremity edema.      Patient Active Problem List   Diagnosis     Type 2 diabetes mellitus with other circulatory complication, unspecified whether long term insulin use (H)     Dyslipidemia     STEMI involving left anterior descending coronary artery (H)     Class 2 severe obesity due to excess calories with serious comorbidity in adult, unspecified BMI (H)     Benign essential hypertension     Coronary  artery disease due to lipid rich plaque     Chronic kidney disease       Past Medical History:   Diagnosis Date     Diabetes mellitus, type II (H) 12/2001     DM II (diabetes mellitus, type II), controlled (H)      Dyslipidemia 12/2001     HTN (hypertension) 2004     Hyperlipidemia      Hypertension      Obesity (BMI 30-39.9)        Past Surgical History:   Procedure Laterality Date     CV CORONARY ANGIOGRAM N/A 1/13/2019    Procedure: Coronary Angiogram;  Surgeon: Cielo Che MD;  Location: [facility];  Service: Cardiology     CV LEFT HEART CATHETERIZATION WITH LEFT VENTRICULOGRAM N/A 1/13/2019    Procedure: Left Heart Catheterization with Left Ventriculogram;  Surgeon: Cielo Che MD;  Location: [facility];  Service: Cardiology     HERNIA REPAIR         Family History   Problem Relation Age of Onset     Heart disease Father 60     CABG Father 50        triple bypass       Social History     Socioeconomic History     Marital status:      Spouse name: Not on file     Number of children: Not on file     Years of education: Not on file     Highest education level: Not on file   Social Needs     Financial resource strain: Not on file     Food insecurity - worry: Not on file     Food insecurity - inability: Not on file     Transportation needs - medical: Not on file     Transportation needs - non-medical: Not on file   Occupational History     Not on file   Tobacco Use     Smoking status: Never Smoker     Smokeless tobacco: Never Used   Substance and Sexual Activity     Alcohol use: No     Drug use: No     Sexual activity: Not on file   Other Topics Concern     Not on file   Social History Narrative     Not on file       Current Outpatient Medications   Medication Sig Dispense Refill     aspirin 81 MG EC tablet Take 1 tablet (81 mg total) by mouth daily.  0     atorvastatin (LIPITOR) 80 MG tablet Take 1 tablet (80 mg total) by mouth at bedtime. 90 tablet 3     cholecalciferol,  vitamin D3, 5,000 unit Tab Take 5,000 Units by mouth daily.       EMPAGLIFLOZIN ORAL Take 10 mg by mouth daily. Jardiace             glimepiride (AMARYL) 4 MG tablet Take 4 mg by mouth 2 (two) times a day before meals.       insulin glargine-lixisenatide 100 unit-33 mcg/mL InPn Inject 20 Units under the skin daily.              losartan (COZAAR) 100 MG tablet Take 100 mg by mouth daily.       metFORMIN (GLUCOPHAGE) 500 MG tablet Take 500 mg by mouth 2 (two) times a day with meals.              metoprolol tartrate (LOPRESSOR) 50 MG tablet Take 1 tablet (50 mg total) by mouth 2 (two) times a day. 180 tablet 3     ticagrelor (BRILINTA) 90 mg Tab Take 1 tablet (90 mg total) by mouth 2 (two) times a day. 180 tablet 3     No current facility-administered medications for this visit.        No Known Allergies    Objective:     Vitals:    02/11/19 0843   BP: 132/80   Pulse: 96   Resp: 16     Wt Readings from Last 3 Encounters:   02/11/19 (!) 239 lb (108.4 kg)   02/07/19 (!) 237 lb (107.5 kg)   02/05/19 (!) 232 lb (105.2 kg)       ROS:  Negative unless noted in HPI    General Appearance:   A & O X3, cooperative and in no acute distress.   HEENT:   No scleral icterus; the mucous membranes were pink and moist. Cervical lymph nodes nontender and nonpalpable.   Neck: JVP normal. No HJR. Thyroid symmetry with no mass or tenderness noted   Chest: The spine was straight. The chest was symmetric.   Lungs:   Respirations unlabored; the lungs are clear to auscultation.   Cardiovascular:   Regular rhythm. S1 and S2 without murmur, clicks or rubs. Radial, carotid and posterior tibial pulses are intact and symmetrical.  No carotid bruits noted   Abdomen:  Large but soft, nontender, nondistended, bowel sounds present   Extremities: No cyanosis, clubbing, or edema.    Skin: No bruising or wounds   Neurologic: Mood and affect are appropriate. No paresthesia noted           [facility]   [facility]

## 2021-06-24 NOTE — TELEPHONE ENCOUNTER
"Reviewed cath report, OV with CY 2/11, CR last 2 visits.  Situation: Pt calling in to report left hand N/T while driving on Saturday. Then, had another episode Saturday evening where he awoke, had the N/T in left hand and had a twinge in his chest.  Left hand N/T mildly still present. No chest twinges/pain.    Background: STEMI 1/13/19 with PCI, radial approach right wrist. Diabetic. AEGIS-II study. Since angio Metoprolol increased, and Brilinta new, otherwise no other new medications.     Assessment: Pre-STEMI symptoms included months of dizziness, and blood sugars 240-250. Pre-episode symptoms were also jaw pain and \"heartburn-like burning in his chest\".    After angio/stent placed dizziness resolved for the past month, now this week notices dizziness when he bends over and goes to stand up, and feeling \"foggy at times during CR\". Verbalized concerns that some dizziness has returned. Glucoses are improved and depicts his average for the month as 122. No symptoms while exercising except for dizziness/foggy feeling as described above. Weight is trending downward from 239# to 233# yesterday. Denies shortness of breath, chest pain or pressure.    CR date Pre  Post  2/12  110/70 81 108/78 88  2/14  130/80 76 110/80 84  2/19  124/80 89 120/80 90   Pt has CR tomorrow.    Recommendation: Discussion of proper hydration. Provided reassurance that able to tolerate CR without chest pain or increase in N/T. Reassurance that pre and post CR VS are WNL.To call back if any symptoms worsen. Discussion to proceed to ER if jaw pain, heartburn-like symptoms return, and/or coupled with shortness of breath, chest pain/pressure, and left hand N/T worsens. EMG out this week will forward for input. Has OV 3/7 with EMG. Writer will monitor CR tomorrow and discuss with patient afterwards. CMM,RN      "

## 2021-06-24 NOTE — TELEPHONE ENCOUNTER
Called and spoke with patient. Reviewed recommendations. Pt verbalized understanding and will monitor his symptoms. Writer will check on CR report tomorrow. Encouraged to call back if concerns. Message sent to schedulers to call and offer a sooner appt than 3/7 if available with EMG. SHERI,RN

## 2021-06-24 NOTE — TELEPHONE ENCOUNTER
Patient called and discussed wether to continue Cardiac rehab or not until completion of MRI stress test. Awaiting insurance verification to schedule stress MRI. Dr. Che recommendations? Please advise. Thank you BETSY WADE

## 2021-06-24 NOTE — PATIENT INSTRUCTIONS - HE
- Check cholesterol today    - Talk with your doctor about decreasing your insulin    - Keep up the good work with diet and exercise    - Stress test to check the blood flow to the rest of your heart muscle as you do have some other moderately blocked arteries     - See me in 3 months    - Consider CoEnzyme Q10 100mg twice a day to help with muscle aches related to your cholesterol medication

## 2021-06-24 NOTE — PROGRESS NOTES
Thank you for asking the [facility] team to see Siva Ramey     Assessment/Plan:   CAD - aspirin, high dose statin indefinitely. Stress test to evaluate the LAD and RCA disease. I have a high suspicion for LAD disease. We did discuss the possibility of CABG given the diffuse nature of the LAD disease.    Myalgias - co-enzyme Q10 100mg two times a day    Hypoglycemia with exercise - I asked him to call his PCP and see if his insulin shouldn't be decreased    HTN - controlled    Severely elevated triglycerides - sugars improved. Will re-check today.     F/U 3 months     Current History:   Siva Ramey is a 43 y.o. diabetic with hypertension, hyperlipidemia and CAD who suffered an anterior-lateral STEMI January 2019. He was found to have a thrombotic occlusion of a large diagonal that was treated with a Synergy IBAN. There was residual diffuse moderate-severe mid LAD disease and distal RCA disease. EF was 45% by echo. Since his discharge he has participated in rehab and cleaned up his diet. His A1c is now down to 7% and his weight is down 10 lbs. He is having trouble with hypoglycemia after exercising.    There was one episode about a week or two ago where he woke up at night with heavy heart burn and left arm tingling that lasted about 20 minutes. This has not recurred and he denies any difficulties at cardiac rehab.     Past Medical History:     Past Medical History:   Diagnosis Date     Diabetes mellitus, type II (H) 12/2001     DM II (diabetes mellitus, type II), controlled (H)      Dyslipidemia 12/2001     HTN (hypertension) 2004     Hyperlipidemia      Hypertension      Obesity (BMI 30-39.9)        Past Surgical History:     Past Surgical History:   Procedure Laterality Date     CV CORONARY ANGIOGRAM N/A 1/13/2019    Procedure: Coronary Angiogram;  Surgeon: Cielo Che MD;  Location: [facility];  Service: Cardiology     CV LEFT HEART CATHETERIZATION WITH LEFT VENTRICULOGRAM N/A  1/13/2019    Procedure: Left Heart Catheterization with Left Ventriculogram;  Surgeon: Cielo Che MD;  Location: [facility];  Service: Cardiology     HERNIA REPAIR         Family History:     Family History   Problem Relation Age of Onset     Heart disease Father 60     CABG Father 50        triple bypass       Social History:    reports that  has never smoked. he has never used smokeless tobacco. He reports that he does not drink alcohol or use drugs.    Meds:     Current Outpatient Medications   Medication Sig Note     aspirin 81 MG EC tablet Take 1 tablet (81 mg total) by mouth daily.      atorvastatin (LIPITOR) 80 MG tablet Take 1 tablet (80 mg total) by mouth at bedtime.      cholecalciferol, vitamin D3, 5,000 unit Tab Take 5,000 Units by mouth daily.      EMPAGLIFLOZIN ORAL Take 10 mg by mouth daily. Jardiace       1/13/2019: Lasat filled 11/29/18 for 30 days- however patient states continues to take and has at home     glimepiride (AMARYL) 4 MG tablet Take 4 mg by mouth 2 (two) times a day before meals.      insulin glargine-lixisenatide 100 unit-33 mcg/mL InPn Inject 20 Units under the skin daily.             losartan (COZAAR) 100 MG tablet Take 100 mg by mouth daily.      metFORMIN (GLUCOPHAGE) 500 MG tablet Take 500 mg by mouth 2 (two) times a day with meals.        1/13/2019: Changed to 1 tablet bid     metoprolol tartrate (LOPRESSOR) 50 MG tablet Take 1 tablet (50 mg total) by mouth 2 (two) times a day.      ticagrelor (BRILINTA) 90 mg Tab Take 1 tablet (90 mg total) by mouth 2 (two) times a day.      nitroglycerin (NITROSTAT) 0.4 MG SL tablet Place 1 tablet (0.4 mg total) under the tongue every 5 (five) minutes as needed for chest pain.        Allergies:   Patient has no known allergies.    Review of Systems:   Review of Systems:   General: WNL  Eyes: WNL  Ears/Nose/Throat: WNL  Lungs: WNL  Heart: WNL  Stomach: WNL  Bladder: WNL  Muscle/Joints: WNL  Skin: WNL  Nervous System: Dizziness,  "Loss of Balance  Mental Health: WNL     Blood: WNL       Objective:      Physical Exam  @LASTENCWT:3@  6' 0.05\" (1.83 m)  @BMI:3@  /84 (Patient Site: Left Arm, Patient Position: Sitting, Cuff Size: Adult Large)   Pulse 72   Resp 16   Ht 6' 0.05\" (1.83 m)   Wt (!) 236 lb (107 kg)   BMI 31.97 kg/m      General Appearance:   Alert, cooperative and in no acute distress.   HEENT:  No scleral icterus; the mucous membranes were pink and moist.   Neck: JVP flat. No thyromegaly. No HJR   Chest: The spine was straight. The chest was symmetric.   Lungs:   Respirations unlabored; the lungs are clear to auscultation.   Cardiovascular:   S1 and S2 normal and without murmur. No clicks or rubs. No carotid bruits noted. Right DP, PT, and radial pulses 2+. Left DP, PT, and radial pulses 2+.   Abdomen:  No organomegaly, masses, bruits, or tenderness. Bowels sounds are present   Extremities: No cyanosis, clubbing, or edema.   Skin: No xanthelasma.   Neurologic: Mood and affect are appropriate.         Lab Review   Lab Results   Component Value Date     (L) 01/14/2019     (L) 01/13/2019     05/08/2017    K 4.1 01/14/2019    K 4.1 01/13/2019    K 4.2 05/08/2017     01/14/2019     01/13/2019     (H) 05/08/2017    CO2 20 (L) 01/14/2019    CO2 16 (L) 01/13/2019    CO2 24 05/08/2017    BUN 29 (H) 01/14/2019    BUN 34 (H) 01/13/2019    BUN 19 05/08/2017    CREATININE 2.29 (H) 01/28/2019    CREATININE 2.77 (H) 01/21/2019    CREATININE 1.97 (H) 01/14/2019    CALCIUM 9.2 01/14/2019    CALCIUM 8.8 01/13/2019    CALCIUM 8.5 05/08/2017     Lab Results   Component Value Date    WBC 8.7 01/13/2019    WBC 8.7 05/08/2017    WBC 9.7 05/07/2017    HGB 15.1 01/14/2019    HGB 15.8 01/13/2019    HGB 14.8 05/08/2017    HCT 46.7 01/13/2019    HCT 42.0 05/08/2017    HCT 47.1 05/07/2017    MCV 85 01/13/2019    MCV 87 05/08/2017    MCV 87 05/07/2017     01/13/2019     05/08/2017     05/07/2017 "     Lab Results   Component Value Date    CHOL 170 01/14/2019    CHOL 206 (H) 09/01/2015    CHOL 507 (H) 07/06/2015    TRIG 748 (H) 01/14/2019    TRIG 809 (H) 09/01/2015    TRIG 2,934 (H) 07/06/2015    HDL 25 (L) 01/14/2019    HDL 34 (L) 09/01/2015    HDL  07/06/2015      Comment:      Invalid, Triglyceride >1100           LDLDIRECT 56 01/15/2019    LDLDIRECT 82 09/01/2015    LDLDIRECT  07/06/2015      Comment:      Invalid, Triglyceride >1100            No results found for: BNP      Cielo Che M.D.

## 2021-06-24 NOTE — TELEPHONE ENCOUNTER
----- Message -----  From: Jayy Flores MD  Sent: 2/20/2019   2:28 PM  To: Travis Dobson RN    Continue to monitor symptoms for now. No med changes. F/u with Dr. Che next available

## 2021-06-24 NOTE — TELEPHONE ENCOUNTER
----- Message from Cielo Che MD sent at 3/8/2019 12:24 PM CST -----  Hi, we discussed Siva at cath conference. The thought was either MRI stress vs. Coronary angiogram with FFR of the LAD and RCA. I talked with Siva on the phone today. Can you help get him set up for cardiac stress MRI?  Thanks, EG

## 2021-06-24 NOTE — TELEPHONE ENCOUNTER
----- Message from Fouzia Rosado sent at 2/19/2019  4:19 PM CST -----  Contact: PATIENT  General phone call:  LEFT ARM NUMBNESS.  WHEN PATIENT BENDS OVER AND THEN STANDS HE IS DIZZY.  Saturday HAD LIGHT PRESSURE IN CHEST.  NUMBNESS AND TINGLING IN ARM AND FINGERS WOKE HIM ON Sunday MORNING.  ARE THESE THINGS HE SHOULD BE CONCERNED WITH  AND CAN THEY WAIT FOR HIS APPOINTMENT WITH DR MARIA? PLEASE CALL  Caller: PATIENT  Primary cardiologist: DR MARIA  Detailed reason for call: SEE ABOVE  New or active symptoms? SEE ABOVE  Best phone number: [phone]  Best time to contact: ANY TIME  Ok to leave a detailed message? YES  Device? NO    Additional Info:

## 2021-06-24 NOTE — PROGRESS NOTES
ITP ASSESSMENT   Assessment Day: 60 Day    Session Number: 13  Precautions: Standard    Diagnosis: MI;Stent    Risk Stratification: High    Referring Provider: Adriel Boss   EXERCISE  Exercise Assessment: Reassessment  Pt tolerating 40-45 min of exercise in Cardiac Rehab without sx's. Met levels are 4-6.                         Exercise Plan  Goals Next 30 days  ADL'S: Pt will tolerate FT work without sx's or fatigue.    Leisure: Attend Cardiac Rehab 2 x week     Work: Increase home walking program to 30 min -4-5 x week      Education Goals: All goals in this section met    Education Goals Met: Patient can state cardiac s/s and appropriate emergency response.;Has system for taking medication.;Medication review.                          Goals Met  30 day ADL'S goals met: Goal met    30 day Leisure goals met: Goal met    30 day Work goals met: Goal met    30 Day Progression: Continues to make progress- goals updated and reviewed      Initial ADL's goals met: Pt is back to grocery shopping and meal prepping without fatigue.    Initial Leisure goals met: Pt is back to driving the kids around    Intial Work goals met: Pt is back to FT work and lifting objects at work without fatigue.     Initial Progression: All goals met. Pt is making progress. Set new goals for the next 30 days.       Exercise Prescription  Exercise Mode: Treadmill;Bike;Nustep;Arm Erg.    Frequency: 2 x week    Duration: 40 min    Intensity / THR: 20-30 beats above resting heart rate (or -131)    RPE 11-14  Progression / Met level: 4.5-6.5    Resistive Training?: Yes      Current Exercise (mins/week): 80      Interventions  Home Exercise:  Mode: TM    Frequency: 4-5 x week    Duration: 40-60 min      Education Material : Educational videos;Provide written material;Individual education and counseling;Offer educational classes      Education Completed  Exercise Education Completed: Cardiac Anatomy;Signs and Symptoms;Medication  "review;RPE;Emergency Plan;Home Exercise;Warm up/cool down;FITT Principles;BP/HR Reponse to exercise;Benefits of Exercise;End point of exercise;Stretching;Strength training              Exercise Follow-up/Discharge  Follow up/Discharge: Reviewed home walking program   NUTRITION  Nutrition Assessment: Reassessment      Nutrition Risk Factors:  Nutrition Risk Factors: Diabetes;Dyslipidemia;Overweight  HbA1c: 7.2  Monitors blood sugar at home: Yes  Frequency: 4 x day  Cholesterol: 114  LDL: 36  HDL: 29  Triglycerides: 243      Nutrition Plan  Interventions  Other Nutrition Intervention: Diet Class;Therapist/Pt Discussion;Educational Videos;Provide with Written Material    Education Completed  Nutrition Education Completed: Risk factor overview      Goals  Nutrition Goals (Next 30 days): Patient will follow a low sodium diet;Patient able to demonstrate carbohydrate counting;Patient will follow a low saturated fat diet;Patient knows appropriate portion size;Patient will lose weight    Goals Met  Nutrition Goals Met: Completed Nutritional Risk Screen;Provided Rate your Plate Survey;Reviewed Dietitian schedule    Height, Weight, and  BMI  Weight: 231 lb (104.8 kg)  Height: 5' 11\" (1.803 m)  BMI: 32.23    Nutrition Follow-up  Follow-up/Discharge: Encourage pt to continue with heart healthy diet and watching carbohydrate intake.          Other Risk Factors  Other Risk Factor Assessment: Reassessment      HTN Risk Factor: Hypertension      Pre Exercise BP: 112/72  Post Exercise BP: 130/70      Hypertension Plan  Goals  HTN Goals: Follow low sodium diet;Take medication as prescribed;Exercises regularly      Goals Met  HTN Goals Met: Follow low sodium diet;Exercises regularly;Take medication as prescribed      HTN Interventions  HTN Interventions: Diet consult;Therapist/patient discussion;Provide written material;Offer educational videos;Offer educational classes      HTN Education Completed  HTN Education Completed: Medication " "review;Risk factor overview      Tobacco Risk Factor: NA    Risk Factor Follow-up   Follow-up/Discharge: Continue to offer support and education for risk factor management   PSYCHOSOCIAL  Psychosocial Assessment: Reassessment      Psychosocial Risk Factor: Stress      Psychosocial Plan  Interventions  Interventions: Offer Spiritual Care consult;Offer educational videos and classes;Provide written material;Individual education and counseling      Education Completed  Education Completed: Relaxation/Coping Techniques;S/S of depression;Effects of stress on body      Goals  Goals (Next 30 days): Patient demonstrates understanding of stress, no goals identified for the next 30 days      Goals Met  Goals Met: Identified Support system;Oriented to stress management classes      Psychosocial Follow-up  Follow-up/Discharge: Pt reports no difficulty dealing with stress. Pt reports he has \"some stress\" due to upcoming Stress Test and possible CABG. Offered support to pt.             Patient involved in Goal setting?: Yes      Signature: _____________________________________________________________    Date: __________________    Time: __________________See Doc Flowsheet  "

## 2021-06-24 NOTE — TELEPHONE ENCOUNTER
Dr. Flores in absence of EMG, please review discussion with patient. Any recommendations? Sooner OV with EMG than 3/7 if possible? CMM,RN

## 2021-06-24 NOTE — TELEPHONE ENCOUNTER
----- Message -----  From: Cielo Che MD  Sent: 3/12/2019   3:08 PM  To: Travis Dobson RN    Continue rehab please. EG

## 2021-06-24 NOTE — TELEPHONE ENCOUNTER
Seen by JONE for drug-study only. Will forward to covering cardiologist for recommendations. SHERI,RN

## 2021-06-24 NOTE — PROGRESS NOTES
ITP ASSESSMENT   Assessment Day: 30 Day    Session Number: 8  Precautions: S/P MI    Diagnosis: MI;Stent    Risk Stratification: High    Referring Provider: Mark Boss MD  EXERCISE  Exercise Assessment: Reassessment  Pt is exercising 40 minutes, 2 times each week in cardiac rehab at MET level of 3.7-6.                         Exercise Plan  Goals Next 30 days  ADL'S: Continue to increase exercise to decrease medicaitons for diabetes.     Leisure: Start strength training program here and at the gym.     Work: Start overhead lifing at work.        Education Goals: All goals in this section met    Education Goals Met: Patient can state cardiac s/s and appropriate emergency response.;Has system for taking medication.;Medication review.                          Goals Met  Initial ADL's goals met: Pt is back to grocery shopping and meal prepping without fatigue.    Initial Leisure goals met: Pt is back to driving the kids around    Intial Work goals met: Pt is back to FT work and lifting objects at work without fatigue.     Initial Progression: All goals met. Pt is making progress. Set new goals for the next 30 days.       Exercise Prescription  Exercise Mode: Treadmill;Bike;Nustep;Arm Erg.    Frequency: 2x/week    Duration: 40 minutes    Intensity / THR: 20-30 beats above resting heart rate (or -131)    RPE 11-14  Progression / Met level: 3.7-6    Resistive Training?: Yes      Current Exercise (mins/week): 180      Interventions  Home Exercise:  Mode: Walking/Strength Training    Frequency: 5-6 days/week    Duration: 45-60 minutes      Education Material : Educational videos;Provide written material;Individual education and counseling;Offer educational classes      Education Completed  Exercise Education Completed: Cardiac Anatomy;Signs and Symptoms;Medication review;RPE;Emergency Plan;Home Exercise;Warm up/cool down;FITT Principles;BP/HR Reponse to exercise;Benefits of Exercise;End point of exercise             "  Exercise Follow-up/Discharge  Follow up/Discharge: Encouraged pt to continue with walking at home and start light with strength training program.            NUTRITION  Nutrition Assessment: Reassessment      Nutrition Risk Factors:  Nutrition Risk Factors: Diabetes;Dyslipidemia;Overweight  HbA1c: 10  Monitors blood sugar at home: Yes  Frequency: 4x/day  Cholesterol: 170  LDL: 56  HDL: 25  Triglycerides: 748      Nutrition Plan  Interventions  No Data Recorded  Other Nutrition Intervention: Diet Class;Therapist/Pt Discussion;Educational Videos;Provide with Written Material    Education Completed  Nutrition Education Completed: Risk factor overview      Goals  Nutrition Goals (Next 30 days): Patient will follow a low sodium diet;Patient able to demonstrate carbohydrate counting;Patient will follow a low saturated fat diet;Patient knows appropriate portion size;Patient will lose weight      Goals Met  Nutrition Goals Met: Completed Nutritional Risk Screen;Provided Rate your Plate Survey;Reviewed Dietitian schedule      Height, Weight, and  BMI  Weight: 235 lb (106.6 kg)  Height: 5' 11\" (1.803 m)  BMI: 32.79      Nutrition Follow-up  Follow-up/Discharge: Encourage pt to continue with heart healthy diet and watching carbohydrate intake.        Other Risk Factors  Other Risk Factor Assessment: Reassessment      HTN Risk Factor: Hypertension      Pre Exercise BP: 130/80  Post Exercise BP: 110/80      Hypertension Plan  Goals  HTN Goals: Follow low sodium diet;Take medication as prescribed;Exercises regularly      Goals Met  HTN Goals Met: Follow low sodium diet;Exercises regularly;Take medication as prescribed      HTN Interventions  HTN Interventions: Diet consult;Therapist/patient discussion;Provide written material;Offer educational videos;Offer educational classes      HTN Education Completed  HTN Education Completed: Medication review;Risk factor overview      Tobacco Risk Factor: NA    Risk Factor Follow-up   " Follow-up/Discharge: Continue to offer education on risk factor management.          PSYCHOSOCIAL  Psychosocial Assessment: Reassessment       [facility] AMEENA Q of L Summary Score: 17      MARYLIN-D Score: 0      Psychosocial Risk Factor: Stress      Psychosocial Plan  Interventions  Interventions: Offer Spiritual Care consult;Offer educational videos and classes;Provide written material;Individual education and counseling      Education Completed  Education Completed: Relaxation/Coping Techniques;S/S of depression;Effects of stress on body      Goals  Goals (Next 30 days): Patient demonstrates understanding of stress, no goals identified for the next 30 days      Goals Met  Goals Met: Identified Support system;Oriented to stress management classes      Psychosocial Follow-up  Follow-up/Discharge: Pt is managing stress well. Continue to offer education when needed.            Patient involved in Goal setting?: Yes      Signature: _____________________________________________________________    Date: __________________    Time: __________________See Doc Flowsheet

## 2021-06-27 NOTE — PROGRESS NOTES
"Progress Notes by Joe Monahan, RN at 1/21/2019  9:00 AM     Author: Joe Monahan, RN Service: -- Author Type: Registered Nurse    Filed: 1/21/2019  1:35 PM Encounter Date: 1/21/2019 Status: Signed    : Joe Monahan RN (Registered Nurse)           A Phase 3, Multicenter, Double-blind, Randomized, Placebo-controlled, Parallel-group Study to Investigate the Efficacy and Safety of [id number] in Subjects with Acute Coronary Syndrome -the AEGIS-II Study     Subject seen in clinic today for study Visit 3.      Medications, labs and health status reviewed with subject.  Discussed study labs and local labs with Taras Truong CNP today.  Subject is eligible to move forward with infusion today.    Vitals obtained per protocol.  Vitals:    01/21/19 0902 01/21/19 1221   BP: 104/86 129/87   Patient Site: Right Arm Left Arm   Patient Position: Sitting Sitting   Cuff Size: Adult Large Adult Large   Pulse: 72 76   Resp: 16    Weight: (!) 234 lb (106.1 kg)    Height: 5' 11\" (1.803 m)        Pre-infusion central/local labs drawn per protocol at 09:25AM.  Serum creatinine confirmed by Taras Truong prior to infusion.    PICC team nurse placed 20g IV line in right forearm per standard of care.    Study drug kit number [id number]  Prepared at [facility].  Confirmed with unblinded pharmacy staff Steffi Apple.    Study drug infusion started at 10:46AM  with rate of 150ml/hr.      Infusion ended at 12:14PM with study drug volume of 170ml and a 20mL saline flush with no   interruptions to the infusion.    Subject assessed for injection site reaction, hypersensitivity, and adverse events/CECILIO/MACE. No events or changes to report.  Bandage and coban placed over IV site but no bleeding noted.    Plan: Will see subject in clinic next week for study treatment visit 4.      Administrations This Visit     Study Drug [id number] 6 G/Placebo injection 6 g (AEGIS II)     Admin Date  01/21/2019 Action  Given Dose  6 g Route  Intravenous " Administered By  Joe Monahan, RN                Current Outpatient Medications:   ?  aspirin 81 MG EC tablet, Take 1 tablet (81 mg total) by mouth daily., Disp: , Rfl: 0  ?  atorvastatin (LIPITOR) 80 MG tablet, Take 1 tablet (80 mg total) by mouth at bedtime., Disp: 30 tablet, Rfl: 0  ?  cholecalciferol, vitamin D3, 5,000 unit Tab, Take 5,000 Units by mouth daily., Disp: , Rfl:   ?  EMPAGLIFLOZIN ORAL, Take 10 mg by mouth daily., Disp: , Rfl:   ?  insulin glargine-lixisenatide 100 unit-33 mcg/mL InPn, Inject 60 Units under the skin daily., Disp: , Rfl:   ?  losartan (COZAAR) 100 MG tablet, Take 100 mg by mouth daily., Disp: , Rfl:   ?  metoprolol tartrate (LOPRESSOR) 50 MG tablet, Take 1 tablet (50 mg total) by mouth 2 (two) times a day., Disp: 60 tablet, Rfl: 0  ?  ticagrelor (BRILINTA) 90 mg Tab, Take 1 tablet (90 mg total) by mouth 2 (two) times a day., Disp: 180 tablet, Rfl: 0  ?  glimepiride (AMARYL) 4 MG tablet, Take 4 mg by mouth 2 (two) times a day before meals., Disp: , Rfl:   ?  metFORMIN (GLUCOPHAGE) 500 MG tablet, Take 500 mg by mouth 2 (two) times a day with meals.    , Disp: , Rfl:     Current Facility-Administered Medications:   ?  sodium chloride 0.9%, 25 mL/hr, Intravenous, Continuous, Joe Monahan, RN    Joe Monahan RN  Clinical Trials Nurse  [phone]

## 2021-06-27 NOTE — PROGRESS NOTES
Progress Notes by Joe Monahan, RN at 4/10/2019  8:30 AM     Author: Joe Monahan, RN Service: -- Author Type: Registered Nurse    Filed: 4/10/2019  9:39 AM Encounter Date: 4/10/2019 Status: Signed    : Joe Monahan RN (Registered Nurse)           A Phase 3, Multicenter, Double-blind, Randomized, Placebo-controlled, Parallel-group Study to Investigate the Efficacy and Safety of [id number] in Subjects with Acute Coronary Syndrome -the AEGIS-II Study     Subject seen in clinic today for study Visit 8.       Body weight 235.0 lbs    Reviewed con medications with subject.    EQ-5D-3L questionnaire completed by subject.    Lifestyle adherence, adverse/serious adverse events, endpoint major adverse cardiac events, and health status reviewed with subject.  Subject reports  no changes and verbalized understanding of the study.  Discussed previous adverse events reports and no current concerns.  Is planned to follow up with Dr. Che regarding NM stress test.  From initial cardiac event in January, PCI treated one lesion and still 60% Mid LAD so continuing follow up.    Plan: Will see subject in clinic in 90 days (+/- 10)  for study follow-up visit 9.        Joe Monahan RN  Clinical Trials Nurse  [phone]      Current Outpatient Medications:   ?  aspirin 81 MG EC tablet, Take 1 tablet (81 mg total) by mouth daily., Disp: , Rfl: 0  ?  atorvastatin (LIPITOR) 80 MG tablet, Take 1 tablet (80 mg total) by mouth at bedtime., Disp: 90 tablet, Rfl: 3  ?  cholecalciferol, vitamin D3, 5,000 unit Tab, Take 5,000 Units by mouth daily., Disp: , Rfl:   ?  EMPAGLIFLOZIN ORAL, Take 10 mg by mouth daily. Jardiace   , Disp: , Rfl:   ?  glimepiride (AMARYL) 4 MG tablet, Take 4 mg by mouth 2 (two) times a day before meals., Disp: , Rfl:   ?  insulin glargine-lixisenatide 100 unit-33 mcg/mL InPn, Inject 20 Units under the skin daily.    , Disp: , Rfl:   ?  losartan (COZAAR) 100 MG tablet, Take 100 mg by mouth daily.,  Disp: , Rfl:   ?  metFORMIN (GLUCOPHAGE) 500 MG tablet, Take 500 mg by mouth 2 (two) times a day with meals.    , Disp: , Rfl:   ?  metoprolol tartrate (LOPRESSOR) 50 MG tablet, Take 1 tablet (50 mg total) by mouth 2 (two) times a day., Disp: 180 tablet, Rfl: 3  ?  nitroglycerin (NITROSTAT) 0.4 MG SL tablet, Place 1 tablet (0.4 mg total) under the tongue every 5 (five) minutes as needed for chest pain., Disp: 25 tablet, Rfl: 1  ?  ticagrelor (BRILINTA) 90 mg Tab, Take 1 tablet (90 mg total) by mouth 2 (two) times a day., Disp: 180 tablet, Rfl: 3

## 2021-06-27 NOTE — PROGRESS NOTES
Progress Notes by China Vicente CNP at 1/29/2019  8:30 AM     Author: China Vicente CNP Service: -- Author Type: Nurse Practitioner    Filed: 1/29/2019 11:16 AM Encounter Date: 1/29/2019 Status: Signed    : China Vicente CNP (Nurse Practitioner)                 Click to link to [facility]       [facility] HEART CARE NOTE      Assessment/Recommendations   1.  Coronary artery disease: He was hospitalized January 13 - January 15 with chest pain and jaw pain.  He was diagnosed with STEMI.  He received drug-eluting stent to first diagonal.  Dual antiplatelet therapy is being used with aspirin indefinitely and ticagrelor for 1 year.  We discussed the importance of antiplatelet therapy and talking with his cardiologist prior to stopping these medications for any reason.  Puncture site is soft with no hematoma.  He is participating in [employer] research study.    Risk factor modification and lifestyle management topics were discussed including managing comorbidities, weight loss, heart healthy diet and exercise.  He is participating in cardiac rehab.    2.  Ischemic cardiomyopathy: Echocardiogram on January 14, 2019 showed mild decrease of ejection fraction to 45%.  He has no symptoms of heart failure.    3.  Dyslipidemia: Siva Ramey is on high intensity statin therapy with atorvastatin 80 mg daily.  We discussed a diet low in saturated fat, weight loss, and exercise along with medication for better control of cholesterol.     4.  Hypertension: His blood pressure is well controlled today.     5.  Diabetes:  We discussed the importance of tightly controlled blood sugars to minimize risk of worsening coronary artery disease.  Primary care provider to manage diabetes.  He states that his blood sugars have been better controlled since hospital discharge.  He is watching his diet more closely.    6.  Chronic kidney disease: Renal function being monitored closely with research study.   Last creatinine 2.29 which is overall stable.  He sees his nephrologist every 6 months.    Siva will follow up with Dr. Che in 6 weeks.  He is physically active at his job and is requesting another 2 weeks off.  I am in agreement with this plan since he has just started cardiac rehab and has occasional lightheadedness.  Approved to return to work on February 11.     History of Present Illness    Siva Ramey [facility] for post coronary intervention follow up.  He has a history of hypertension, dyslipidemia, obesity, diabetes, and chronic kidney disease.  He was hospitalized January 13 - January 15 with chest pain and jaw pain.  He was diagnosed with STEMI.  He received drug-eluting stent to first diagonal.  Dual antiplatelet therapy is being used with aspirin indefinitely and ticagrelor for 1 year.  Echocardiogram on January 14, 2019 showed an ejection fraction of 45%.    He denies any chest pain or jaw pain since PCI.  He has participated in 1 session of cardiac rehab.  He became mildly lightheaded but this resolved quickly.  Blood pressure has been stable.  He denies fatigue, shortness of breath, dyspnea on exertion, chest pain and lower extremity edema.      Results for orders placed during the hospital encounter of 01/13/19   Cardiac Catheterization [CATH01] 01/13/2019    Narrative   Acute anterior-lateral STEMI in an obese diabetic with uncontrolled   hypertension and hyperlipidemia.    Acute thrombotic lesion in the proximal first diagonal, which feeds a   large territory of myocardium. Lesion stented with a Synergy IBAN with good   angiographic results.    Moderate-severe mid LAD disease and distal RCA disease    LV EDP normal and LV EF normal with mild anterior-lateral hypokinesis.    Estimated blood loss was <20 ml.           Physical Examination Review of Systems   Vitals:    01/29/19 0832   BP: 110/80   Pulse: 80   Resp: 20     Body mass index is 33.08 kg/m .  Wt Readings  from Last 3 Encounters:   01/29/19 (!) 237 lb 1.6 oz (107.5 kg)   01/28/19 (!) 236 lb 11.2 oz (107.4 kg)   01/24/19 (!) 238 lb (108 kg)       General Appearance:     Alert, cooperative and in no acute distress.   ENT/Mouth: membranes moist, no oral lesions or bleeding gums.      EYES:  no scleral icterus, normal conjunctivae   Neck: no carotid bruits or thyromegaly   Chest/Lungs:   lungs are clear to auscultation, no rales or wheezing, respirations unlabored   Cardiovascular:   Regular. Normal first and second heart sounds with no murmurs, rubs, or gallops; the carotid, radial and posterior tibial pulses are intact, no edema bilateral lower extremities    Abdomen:  Soft, nontender, nondistended, bowel sounds present   Extremities: no cyanosis or clubbing   Skin:  Neurologic: warm, dry.  mood and affect are appropriate, alert and oriented x3     Puncture Site:  Radial site soft with no hematoma.  Radial pulses and Pedal pulses intact and symmetrical.  CMS intact.      General: Night Sweats  Eyes: WNL  Ears/Nose/Throat: WNL  Lungs: WNL  Heart: WNL  Stomach: Diarrhea  Bladder: WNL  Muscle/Joints: WNL  Skin: WNL  Nervous System: WNL  Mental Health: WNL     Blood: WNL     Medical History  Surgical History Family History Social History   Past Medical History:   Diagnosis Date   ? Diabetes mellitus, type II (H) 12/2001   ? DM II (diabetes mellitus, type II), controlled (H)    ? Dyslipidemia 12/2001   ? HTN (hypertension) 2004   ? Hyperlipidemia    ? Hypertension    ? Obesity (BMI 30-39.9)     Past Surgical History:   Procedure Laterality Date   ? CV CORONARY ANGIOGRAM N/A 1/13/2019    Procedure: Coronary Angiogram;  Surgeon: Cielo Che MD;  Location: [facility] Cath Lab;  Service: Cardiology   ? CV LEFT HEART CATHETERIZATION WITH LEFT VENTRICULOGRAM N/A 1/13/2019    Procedure: Left Heart Catheterization with Left Ventriculogram;  Surgeon: Cielo Che MD;  Location: [facility] Cath Lab;  Service: Cardiology   ?  HERNIA REPAIR      Family History   Problem Relation Age of Onset   ? Heart disease Father 60   ? CABG Father 50        triple bypass    Social History     Socioeconomic History   ? Marital status:      Spouse name: Not on file   ? Number of children: Not on file   ? Years of education: Not on file   ? Highest education level: Not on file   Social Needs   ? Financial resource strain: Not on file   ? Food insecurity - worry: Not on file   ? Food insecurity - inability: Not on file   ? Transportation needs - medical: Not on file   ? Transportation needs - non-medical: Not on file   Occupational History   ? Not on file   Tobacco Use   ? Smoking status: Never Smoker   ? Smokeless tobacco: Never Used   Substance and Sexual Activity   ? Alcohol use: No   ? Drug use: No   ? Sexual activity: Not on file   Other Topics Concern   ? Not on file   Social History Narrative   ? Not on file          Medications  Allergies   Current Outpatient Medications   Medication Sig Dispense Refill   ? aspirin 81 MG EC tablet Take 1 tablet (81 mg total) by mouth daily.  0   ? atorvastatin (LIPITOR) 80 MG tablet Take 1 tablet (80 mg total) by mouth at bedtime. 90 tablet 3   ? cholecalciferol, vitamin D3, 5,000 unit Tab Take 5,000 Units by mouth daily.     ? EMPAGLIFLOZIN ORAL Take 10 mg by mouth daily. Jardiace           ? glimepiride (AMARYL) 4 MG tablet Take 4 mg by mouth 2 (two) times a day before meals.     ? insulin glargine-lixisenatide 100 unit-33 mcg/mL InPn Inject 40 Units under the skin daily.            ? losartan (COZAAR) 100 MG tablet Take 100 mg by mouth daily.     ? metFORMIN (GLUCOPHAGE) 500 MG tablet Take 500 mg by mouth 2 (two) times a day with meals.            ? metoprolol tartrate (LOPRESSOR) 50 MG tablet Take 1 tablet (50 mg total) by mouth 2 (two) times a day. 180 tablet 3   ? ticagrelor (BRILINTA) 90 mg Tab Take 1 tablet (90 mg total) by mouth 2 (two) times a day. 180 tablet 3     Current Facility-Administered  Medications   Medication Dose Route Frequency Provider Last Rate Last Dose   ? sodium chloride 0.9%  25 mL/hr Intravenous Continuous Joe Monahan, RN   Stopped at 01/21/19 1214   ? sodium chloride 0.9%  25 mL/hr Intravenous Continuous Joe Monahan, RN   Stopped at 01/28/19 1203      No Known Allergies      Lab Results    Chemistry CBC BNP   Lab Results   Component Value Date    CREATININE 2.29 (H) 01/28/2019    BUN 29 (H) 01/14/2019     (L) 01/14/2019    K 4.1 01/14/2019     01/14/2019    CO2 20 (L) 01/14/2019     Creatinine (mg/dL)   Date Value   01/28/2019 2.29 (H)   01/21/2019 2.77 (H)   01/14/2019 1.97 (H)   01/13/2019 2.12 (H)    Lab Results   Component Value Date    WBC 8.7 01/13/2019    HGB 15.1 01/14/2019    HCT 46.7 01/13/2019    MCV 85 01/13/2019     01/13/2019    No results found for: BNP  No results found for: BNP           China Vicente, [facility]

## 2021-06-27 NOTE — PROGRESS NOTES
"Progress Notes by Joe Monahan RN at 7/17/2019  8:30 AM     Author: Joe Monahan RN Service: -- Author Type: Registered Nurse    Filed: 7/17/2019  4:26 PM Encounter Date: 7/17/2019 Status: Signed    : Adiran Crow MD (Physician)    Related Notes: Original Note by Joe Monahan RN (Registered Nurse) filed at 7/17/2019  4:09 PM           A Phase 3, Multicenter, Double-blind, Randomized, Placebo-controlled, Parallel-group Study to Investigate the Efficacy and Safety of [id number] in Subjects with Acute Coronary Syndrome -the AEGIS-II Study     Subject seen in clinic today for study Visit 9.       Vitals:    07/17/19 0815   BP: 132/88   Patient Site: Left Arm   Patient Position: Sitting   Cuff Size: Adult Large   Pulse: 72   Resp: 16   Weight: (!) 227 lb (103 kg)   Height: 5' 11\" (1.803 m)     Lifestyle adherence, adverse/serious adverse events, endpoint major adverse cardiac events, and health status reviewed with subject.  Adverse events below. Subject reports no other changes and verbalized understanding of the study.    CABG and [place] updated as resolved.    Current Outpatient Medications:   ?  acetaminophen (TYLENOL) 500 MG tablet, Take 1,000 mg by mouth every 6 (six) hours as needed for pain., Disp:  ?  aspirin 81 MG EC tablet, Take 1 tablet (81 mg total) by mouth daily., Disp: , Rfl: 0  ?  atorvastatin (LIPITOR) 80 MG tablet, Take 1 tablet (80 mg total) by mouth at bedtime.  Disp: 90 tablet, Rfl: 3  ?  cholecalciferol, vitamin D3, 5,000 unit Tab, Take 5,000 Units by mouth daily., Disp: , Rfl:   ?  clopidogrel (PLAVIX) 75 mg tablet, Take 1 tablet (75 mg total) by mouth daily., Disp: 30 tablet, Rfl: 11  ?  furosemide (LASIX) 20 MG tablet, Take 1 tablet (20 mg total) by mouth daily., Disp: 30 tablet, Rfl: 0  ?  glimepiride (AMARYL) 4 MG tablet, Take 4 mg by mouth 2 (two) times a day before meals., Disp: , Rfl:   ?  insulin glargine-lixisenatide 100 unit-33 mcg/mL InPn, Inject 20 Units under the " skin daily., Disp: 3 mL, Rfl: 10  ?  losartan (COZAAR) 50 MG tablet, Take 1 tablet (50 mg total) by mouth daily., Disp: 30 tablet, Rfl: 2  ?  metFORMIN (GLUCOPHAGE) 500 MG tablet, Take 500 mg by mouth 2 (two) times a day with meals.    , Disp: , Rfl:   ?  metoprolol tartrate 75 mg Tab, Take 150 mg by mouth 2 (two) times a day., Disp: 120 tablet, Rfl: 2  ?  nitroglycerin (NITROSTAT) 0.4 MG SL tablet, Place 1 tablet (0.4 mg total) under the tongue every 5 (five) minutes as needed for chest pain., Disp: 25 tablet, Rfl: 1  ?  docusate sodium (COLACE) 100 MG capsule, Take 1 capsule (100 mg total) by mouth 2 (two) times a day as needed for constipation., Disp: - no longer taking per subject.  ?  HYDROcodone-acetaminophen (NORCO) 7.5-325 mg per tablet, Take 0.5-1 tablets by mouth every 6 (six) hours as needed., Disp: 15 tablet, Rfl: - no longer taking per subject.     Plan: Will contact subject in clinic in 90 days (+/- 10)  for study telephone visit 10.        Joe Monahan RN  Clinical Trials Nurse  [phone]      Dr. Crow: Please assign causality of AE's below:  Relationship: Is the cause of the adverse event related to the Investigational Product and cannot be reasonably explained by other factors?   Adverse Event: Adverse event   Intermittent epistaxis Adverse event   insomnia   Description Reported some nose bleeds recently.  Discussed plavix vs brillenta.  He is following with Iram Dumont and his PCP. 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  Noted taking some OTC melatonin.  Will continue to monitor with PCP.   Start Date: 17Jul2019 17Jul2019   End Date: ongoing ongoing   Date site aware: 17Jul2019 17Jul2019   Severity (mild/moderate/severe): mild mild   Serious?  Yes  []     No  [x]  Yes  []     No  [x]     Relationship to Investigational Medicinal Product?    Yes  []     No  [x]      Yes  []     No  [x]       Action taken with study treatment:   [] Dose not changed      [] Dose  skipped  []Drug Interrupted  []Drug Withdrawal  [x]Not Applicable      []Unknown [] Dose not changed      [] Dose skipped  []Drug Interrupted  []Drug Withdrawal  [x]Not Applicable      []Unknown         Outcome:  []Not Recovered/Not Resolved  []Recovered/Resolved  []Recovered/Resolved with Sequelae      [x]Recovering/Resolving      []Unknown      []Fatal []Not Recovered/Not Resolved  []Recovered/Resolved  []Recovered/Resolved with Sequelae      [x]Recovering/Resolving      []Unknown      []Fatal   Medication/therapies taken:  Yes  [x]              No  []    Yes  [x]                No  []       Joe Monahan RN

## 2021-06-27 NOTE — PROGRESS NOTES
"Progress Notes by Leonor Cuevas RN at 2/4/2019  9:00 AM     Author: Leonor Cuevas RN Service: -- Author Type: Registered Nurse    Filed: 2/4/2019 11:37 AM Encounter Date: 2/4/2019 Status: Signed    : Leonor Cuevas RN (Registered Nurse)           A Phase 3, Multicenter, Double-blind, Randomized, Placebo-controlled, Parallel-group Study to Investigate the Efficacy and Safety of [id number] in Subjects with Acute Coronary Syndrome -the AEGIS-II Study     Subject seen in clinic today for study Visit 5.       Medications, labs and health status reviewed with subject.  Eligible to move forward with infusion today.    Vitals:    02/04/19 0900 02/04/19 0928 02/04/19 1125   BP: 130/74 142/88 (!) 131/91   Patient Site: Left Arm Right Arm Left Arm   Patient Position: Sitting Sitting Sitting   Cuff Size: Adult Large Adult Regular Adult Regular   Pulse: 80 73 76   Resp: 16     Weight: (!) 235 lb (106.6 kg)     Height: 5' 10.98\" (1.803 m)         PICC team nurse placed 20g IV line in right forearm per standard of care.    Study drug kit number [id number]  Prepared at 0900.  Confirmed with unblinded pharmacy staff Steffi Apple.    Study drug infusion started at 09:46 with rate of 150 ml/hr.      Infusion ended at 11:05 with study drug volume of 170 and a 20mL saline flush with no interruptions to the infusion.    Post-infusion PK  central labs drawn per protocol at 11:14  PK on opposite arm from infusion. (up to 30 minutes after end of infusion)    Subject assessed for injection site reaction, hypersensitivity, and adverse events/CECILIO/MACE.   No events or changes to report.    Plan: Will see subject in clinic next week for study treatment visit 6.             Current Outpatient Medications:   ?  aspirin 81 MG EC tablet, Take 1 tablet (81 mg total) by mouth daily., Disp: , Rfl: 0  ?  atorvastatin (LIPITOR) 80 MG tablet, Take 1 tablet (80 mg total) by mouth at bedtime., Disp: 90 tablet, Rfl: 3  ?  cholecalciferol, vitamin D3, " 5,000 unit Tab, Take 5,000 Units by mouth daily., Disp: , Rfl:   ?  EMPAGLIFLOZIN ORAL, Take 10 mg by mouth daily. Jardiace   , Disp: , Rfl:   ?  glimepiride (AMARYL) 4 MG tablet, Take 4 mg by mouth 2 (two) times a day before meals., Disp: , Rfl:   ?  insulin glargine-lixisenatide 100 unit-33 mcg/mL InPn, Inject 40 Units under the skin daily.    , Disp: , Rfl:   ?  losartan (COZAAR) 100 MG tablet, Take 100 mg by mouth daily., Disp: , Rfl:   ?  metFORMIN (GLUCOPHAGE) 500 MG tablet, Take 500 mg by mouth 2 (two) times a day with meals.    , Disp: , Rfl:   ?  metoprolol tartrate (LOPRESSOR) 50 MG tablet, Take 1 tablet (50 mg total) by mouth 2 (two) times a day., Disp: 180 tablet, Rfl: 3  ?  ticagrelor (BRILINTA) 90 mg Tab, Take 1 tablet (90 mg total) by mouth 2 (two) times a day., Disp: 180 tablet, Rfl: 3    Current Facility-Administered Medications:   ?  sodium chloride 0.9%, 25 mL/hr, Intravenous, Continuous, Joe Monahan, RN, Stopped at 01/21/19 1214  ?  sodium chloride 0.9%, 25 mL/hr, Intravenous, Continuous, Joe Monahan RN, Stopped at 01/28/19 1203  ?  sodium chloride 0.9%, 25 mL/hr, Intravenous, Continuous, Adrian Crow MD  ?  Study Drug [id number] 6 G/Placebo injection 6 g (AEGIS II), 6 g, Intravenous, Once, Adrian Crow MD Mary C Idso, RN  Clinical Trials Nurse  [phone]

## 2021-06-27 NOTE — PROGRESS NOTES
Progress Notes by Taras Truong NP at 2/4/2019  9:00 AM     Author: Taras Truong NP Service: -- Author Type: Nurse Practitioner    Filed: 2/4/2019 11:37 AM Encounter Date: 2/4/2019 Status: Signed    : Taras Truong NP (Nurse Practitioner)           A Phase 3, Multicenter, Double-blind, Randomized, Placebo-controlled, Parallel-group Study to Investigate the Efficacy and Safety of [id number] in Subjects with Acute Coronary Syndrome -the AEGIS-II Study     Subject seen today in clinic for Insusion #4    For this subject receiving infusion #1 of [id number] <48 hours after IV contrast administration, a local serum creatinine value is being drawn and in this note is reviewed as follows:    Lab Results   Component Value Date    CREATININE 2.29 (H) 01/28/2019     Lab Results   Component Value Date     (H) 01/14/2019       (If value is increased between 0.3 and 0.5 mg/dL from baseline, consult protocol.  If value is increased >0.5 mg/dL hold infusion.)    Labs reviewed at 0938 on 2/4/19.  Values are at baseline and subject deemed able to proceed with infusion.    Taras Truong NP

## 2021-06-27 NOTE — PROGRESS NOTES
Progress Notes by Joe Monahan, RN at 2/11/2019  9:00 AM     Author: Joe Monahan, RN Service: -- Author Type: Registered Nurse    Filed: 2/11/2019  9:47 AM Encounter Date: 2/11/2019 Status: Signed    : Joe Monahan RN (Registered Nurse)           A Phase 3, Multicenter, Double-blind, Randomized, Placebo-controlled, Parallel-group Study to Investigate the Efficacy and Safety of [id number] in Subjects with Acute Coronary Syndrome -the AEGIS-II Study     Subject seen in clinic today for study Visit 6.       Medications, labs and health status reviewed with subject.    Directed physical exam completed by Taras Truong  today.    See provider note for vitals.    Central labs drawn per protocol at 09:11AM     EQ-5D-3L questionnaire completed by subject today.    Adverse/serious adverse events, endpoint major adverse cardiac events, and health status reviewed with subject.  Subject reports no changes and verbalized understanding of the study.    Plan: Will contact subject in 31 days (+/-10)  for study follow-up telephone Visit 7.         Current Outpatient Medications:   ?  aspirin 81 MG EC tablet, Take 1 tablet (81 mg total) by mouth daily., Disp: , Rfl: 0  ?  atorvastatin (LIPITOR) 80 MG tablet, Take 1 tablet (80 mg total) by mouth at bedtime., Disp: 90 tablet, Rfl: 3  ?  cholecalciferol, vitamin D3, 5,000 unit Tab, Take 5,000 Units by mouth daily., Disp: , Rfl:   ?  EMPAGLIFLOZIN ORAL, Take 10 mg by mouth daily. [place]   , Disp: , Rfl:   ?  glimepiride (AMARYL) 4 MG tablet, Take 4 mg by mouth 2 (two) times a day before meals., Disp: , Rfl:   ?  insulin glargine-lixisenatide 100 unit-33 mcg/mL InPn, Inject 20 Units under the skin daily.    , Disp: , Rfl:   ?  losartan (COZAAR) 100 MG tablet, Take 100 mg by mouth daily., Disp: , Rfl:   ?  metFORMIN (GLUCOPHAGE) 500 MG tablet, Take 500 mg by mouth 2 (two) times a day with meals.    , Disp: , Rfl:   ?  metoprolol tartrate (LOPRESSOR) 50 MG tablet, Take 1  tablet (50 mg total) by mouth 2 (two) times a day., Disp: 180 tablet, Rfl: 3  ?  ticagrelor (BRILINTA) 90 mg Tab, Take 1 tablet (90 mg total) by mouth 2 (two) times a day., Disp: 180 tablet, Rfl: 3    Joe Monahan RN  Clinical Trials Nurse  [phone]

## 2021-06-27 NOTE — PROGRESS NOTES
Progress Notes by Taras Truong NP at 1/28/2019  9:00 AM     Author: Taras Truong NP Service: -- Author Type: Nurse Practitioner    Filed: 1/28/2019  2:20 PM Encounter Date: 1/28/2019 Status: Signed    : Taras Truong NP (Nurse Practitioner)           A Phase 3, Multicenter, Double-blind, Randomized, Placebo-controlled, Parallel-group Study to Investigate the Efficacy and Safety of [id number] in Subjects with Acute Coronary Syndrome -the AEGIS-II Study     Subject seen today in clinic for Infusion #3    Local serum creatinine redrawn to ensure safety of # 3 infusion. As compared to local cr 1.97 baseline, today's cr is 2.29. His baseline Cr in Jan 8th 2019 was 2.51 prior to MI. Most recent Cr (Jan 21 2019) was 2.77- improved. -this result falls within range of subject's variability previously observed).    Lab Results   Component Value Date    CREATININE 2.29 (H) 01/28/2019     Lab Results   Component Value Date     (H) 01/14/2019       (If value is increased between 0.3 and 0.5 mg/dL from baseline, consult protocol.  If value is increased >0.5 mg/dL hold infusion.)    Labs reviewed at 0951 on 1/28/19  Value within acceptable  limits and subject deemed able to proceed with infusion.    Taras Truong NP

## 2021-06-27 NOTE — PROGRESS NOTES
Progress Notes by Taras Truong NP at 1/21/2019  9:00 AM     Author: Traas Truong NP Service: -- Author Type: Nurse Practitioner    Filed: 1/21/2019  1:35 PM Encounter Date: 1/21/2019 Status: Signed    : Taras Truong NP (Nurse Practitioner)           A Phase 3, Multicenter, Double-blind, Randomized, Placebo-controlled, Parallel-group Study to Investigate the Efficacy and Safety of [id number] in Subjects with Acute Coronary Syndrome -the AEGIS-II Study     Subject seen today in clinic for Insusion #2.    For this subject receiving infusion #1 of [id number] <48 hours after IV contrast administration, a local serum creatinine value is being drawn and in this note is reviewed as follows:    Lab Results   Component Value Date    CREATININE 2.77 (H) 01/21/2019     Lab Results   Component Value Date     (H) 01/14/2019       (If value is increased between 0.3 and 0.5 mg/dL from baseline, consult protocol.  If value is increased >0.5 mg/dL hold infusion.)    Labs reviewed at 1038 on 1/21/18 .  Creatinine Value within normals limits and subject deemed able to proceed with infusion (compared to his baseline creatinine level of 3.24 in August 2018 and recent cr level of 2.51 on 1/8/19).    Taras Truong NP

## 2021-06-27 NOTE — PROGRESS NOTES
Progress Notes by Joe Monahan, RN at 1/14/2019  2:14 PM     Author: Joe Monahan RN Service: -- Author Type: Registered Nurse    Filed: 1/16/2019  3:56 PM Encounter Date: 1/14/2019 Status: Signed    : Joe Monahan RN (Registered Nurse)         A Phase 3, Multicenter, Double-blind, Randomized, Placebo-controlled, Parallel-group Study to Investigate the Efficacy and Safety of [id number] in Subjects with Acute Coronary Syndrome -the AEGIS-II Study       Subject seen in clinic today for study Consent and screening visit 1    I spoke with Dr. Crow and unit cardiologist Dr. Mendiola regarding Siva and participation in the AEGIS2 - both agreed he would be a good candidate.      The informed consent discussion with Siva continued with a review of the following:    double-blind nature of study    1:1 randomization ratio of CSL-112 vs. Placebo    study duration and visit schedules    infusion compliance    risks/benefits    alternatives to participation    prohibited medications/procedures    Confidentiality    Future biomedical research    All questions have been answered to his satisfaction.  The patient agrees to comply with study expectations.   ICF version 1.0 Informed Consent version  Approved date 25Jun2018 signed at 14:01PM.   A signed copy was given to subject and one placed in the medical record.     No study procedures were done prior to signing of the consent form.     Inclusion/exclusion criteria reviewed with subject and sub-investigator.    Yuridia hawkins study sub-investigator review the case, study labs (creatinine, bilirubin, liver enzymes, gfr, ect) before conducting the protocol required physical exam.     Vitals:  Blood pressure: 151/81  Pulse: 81  Weight 247.0 lbs  Height: 5'11''     Ethnicity:    or  [] Yes   [x]  No  Not  or  [x] Yes   []  No  Unknown [] Yes   [x]  No  Not Reported [] Yes   [x]  No    Race (Check all that apply):  White [x] Yes    []  No   or  [] Yes   [x]  No   [] Yes   [x]  No  Black or  [] Yes   [x]  No   or Other  [] Yes   [x]  No  Not Reported [] Yes   [x]  No  Unknown [] Yes   [x]  No     Targeted Medical History  Known coronary artery disease [] Yes   [x]  No   Prior MI excluding Index MI [] Yes   [x]  No   Prior coronary revascularization performed [] Yes   [x]  No   History of congestive heart failure [] Yes   [x]  No   Peripheral artery disease [] Yes   [x]  No   Moderate to severe valvular heart disease [] Yes   [x]  No   Atrial fibrillation [] Yes   [x]  No   Cerebrovascular disease. If yes, type of? [] Yes   [x]  No   Hypertension [x] Yes   []  No   Gilbert's syndrome [] Yes   [x]  No   Chronic kidney disease [x] Yes   []  No   Hypercholesterolemia or use of medication for treatment of hypercholesterolemia prior to index MI [x] Yes   []  No   Diabetes mellitus [x] Yes   []  No   Smoking/Tobacco usage [] Current  [] Former  [x] Never   eCigarette Usage [] Current  [] Former  [x] Never       Past Medical History:   Diagnosis Date   ? Diabetes mellitus, type II (H) 12/2001   ? Dyslipidemia 12/2001   ? HTN (hypertension) 2004     Past Surgical History:   Procedure Laterality Date   ? CV CORONARY ANGIOGRAM N/A 1/13/2019    Procedure: Coronary Angiogram;  Surgeon: Cielo Che MD;  Location: [facility] Cath Lab;  Service: Cardiology   ? CV LEFT HEART CATHETERIZATION WITH LEFT VENTRICULOGRAM N/A 1/13/2019    Procedure: Left Heart Catheterization with Left Ventriculogram;  Surgeon: Cielo Che MD;  Location: [facility] Cath Lab;  Service: Cardiology   ? HERNIA REPAIR       Date of symptoms: 11Jan2019  Date first medical contact 13Jan2019     Is subject randomized for Investigational Product: [x] Yes   []  No  Date of randomization: 14Jan2019  Time of randomization: 14:25     Joe Monahan RN  Clinical Trials Nurse  [phone]

## 2021-06-27 NOTE — PROGRESS NOTES
Progress Notes by Joe Monahan RN at 4/1/2019  2:35 PM     Author: Joe Monahan RN Service: -- Author Type: Registered Nurse    Filed: 4/2/2019  3:48 PM Encounter Date: 4/1/2019 Status: Signed    : Adrian Crow MD (Physician)    Related Notes: Original Note by Joe Monahan RN (Registered Nurse) filed at 4/2/2019  2:50 PM           A Phase 3, Multicenter, Double-blind, Randomized, Placebo-controlled, Parallel-group Study to Investigate the Efficacy and Safety of [id number] in Subjects with Acute Coronary Syndrome -the AEGIS-II Study       Current Outpatient Medications:   ?  aspirin 81 MG EC tablet, Take 1 tablet (81 mg total) by mouth daily., Disp: , Rfl: 0  ?  atorvastatin (LIPITOR) 80 MG tablet, Take 1 tablet (80 mg total) by mouth at bedtime., Disp: 90 tablet, Rfl: 3  ?  cholecalciferol, vitamin D3, 5,000 unit Tab, Take 5,000 Units by mouth daily., Disp: , Rfl:   ?  EMPAGLIFLOZIN ORAL, Take 10 mg by mouth daily. Jardiace   , Disp: , Rfl:   ?  glimepiride (AMARYL) 4 MG tablet, Take 4 mg by mouth 2 (two) times a day before meals., Disp: , Rfl:   ?  insulin glargine-lixisenatide 100 unit-33 mcg/mL InPn, Inject 20 Units under the skin daily.    , Disp: , Rfl:   ?  losartan (COZAAR) 100 MG tablet, Take 100 mg by mouth daily., Disp: , Rfl:   ?  metFORMIN (GLUCOPHAGE) 500 MG tablet, Take 500 mg by mouth 2 (two) times a day with meals.    , Disp: , Rfl:   ?  metoprolol tartrate (LOPRESSOR) 50 MG tablet, Take 1 tablet (50 mg total) by mouth 2 (two) times a day., Disp: 180 tablet, Rfl: 3  ?  nitroglycerin (NITROSTAT) 0.4 MG SL tablet, Place 1 tablet (0.4 mg total) under the tongue every 5 (five) minutes as needed for chest pain., Disp: 25 tablet, Rfl: 1  ?  ticagrelor (BRILINTA) 90 mg Tab, Take 1 tablet (90 mg total) by mouth 2 (two) times a day., Disp: 180 tablet, Rfl: 3     Last dose of study drug infusion on 04Feb2019    Dr. Crow: Please assign causality of AE's below:    Adverse Event: Adverse  event   Hypoglycemia after activity Adverse event   Myalgia Adverse event   Heart burn with left arm tingling   Description: Subject reported feeling hypoglycemia after working out, Dr. Che deferred to PCP regarding insulin dosing. Reported general soreness to Dr. Che on 04mar2019. Recently started high dose statin post PCI Subject woke up at night with heavy heart burn and left arm tingling that lasted about 20 minutes. This has not recurred and he denies any difficulties at cardiac rehab.   Date of Awareness: 01Apr2019 01Apr2019 01Apr2019   AE Start Date: 04mar2019 04mar2019 25feb2019   AE End Date: 01april2019 01april2019 25feb2019   Severity (mild/moderate/severe): mild moderate moderate   Serious?  Yes  []     No  [x]  Yes  []     No  [x]  Yes  []     No  [x]     Relationship to Investigational Medicinal Product?    Yes  []     No  [x]      Yes  []     No  [x]      Yes  []     No  [x]     Action taken with study treatment:     [x]Not Applicable      []Unknown   [x]Not Applicable      []Unknown [x]Not Applicable      []Unknown         Outcome:  []Not Recovered/Not Resolved  [x]Recovered/Resolved  []Recovered/Resolved with Sequelae      []Recovering/Resolving      []Unknown      []Fatal []Not Recovered/Not Resolved  [x]Recovered/Resolved  []Recovered/Resolved with Sequelae      []Recovering/Resolving      []Unknown      []Fatal []Not Recovered/Not Resolved  [x]Recovered/Resolved  []Recovered/Resolved with Sequelae      []Recovering/Resolving      []Unknown      []Fatal   Medication/therapies taken:  Yes  []              No  [x]    Yes  [x]                No  []  CoQ10  Yes  [x]              No  []  Stress test done 06mar2019     Joe Monahan RN

## 2021-06-27 NOTE — PROGRESS NOTES
Progress Notes by Joe Monahan RN at 1/28/2019  9:00 AM     Author: Joe Monahan RN Service: -- Author Type: Registered Nurse    Filed: 1/28/2019  2:20 PM Encounter Date: 1/28/2019 Status: Signed    : Joe Monahan RN (Registered Nurse)           A Phase 3, Multicenter, Double-blind, Randomized, Placebo-controlled, Parallel-group Study to Investigate the Efficacy and Safety of [id number] in Subjects with Acute Coronary Syndrome -the AEGIS-II Study     Subject seen in clinic today for study Visit 4.      Medications, labs and health status reviewed with subject.  Discussed local creatinine draw today with sub-investigator Taras Truong and per instruction from study sponsor last week.   Eligible to move forward with infusion today.    Vitals obtained per protocol.  Vitals:    01/28/19 0846 01/28/19 1216   BP: 137/81 137/87   Patient Site: Left Arm Left Arm   Patient Position: Sitting Sitting   Cuff Size: Adult Regular Adult Regular   Pulse: 78 71   Weight: (!) 236 lb 11.2 oz (107.4 kg)        PICC team nurse placed 20g IV line in right forearm per standard of care.    Pre-infusion central/local labs drawn per protocol at 08:55.      Study drug kit number [id number]  Prepared at 09:50.  Confirmed with unblinded pharmacy staff Steffi Kam.    Study drug infusion started at 10:36 with rate of  150ml/hr.      Infusion ended at 12:03 with study drug volume of 170 and a 20mL saline flush with no interruptions to the infusion.    Subject assessed for injection site reaction, hypersensitivity, and adverse events/CECILIO/MACE.  IV site wrapped with bandage and coban, no longer bleeding.   No adverse events or changes to report.    Plan: Will see subject in clinic next week for study treatment visit 5.    Administrations This Visit     sodium chloride 0.9%     Admin Date  01/28/2019 Action  New Bag Dose  25 mL/hr Rate  25 mL/hr Route  Intravenous Administered By  Joe Monahan, RN          Study Drug [id number]  6 G/Placebo injection 6 g (AEGIS II)     Admin Date  01/28/2019 Action  Given Dose  6 g Route  Intravenous Administered By  Joe Monahan, RN                Current Outpatient Medications:   ?  aspirin 81 MG EC tablet, Take 1 tablet (81 mg total) by mouth daily., Disp: , Rfl: 0  ?  atorvastatin (LIPITOR) 80 MG tablet, Take 1 tablet (80 mg total) by mouth at bedtime., Disp: 30 tablet, Rfl: 0  ?  cholecalciferol, vitamin D3, 5,000 unit Tab, Take 5,000 Units by mouth daily., Disp: , Rfl:   ?  EMPAGLIFLOZIN ORAL, Take 10 mg by mouth daily., Disp: , Rfl:   ?  glimepiride (AMARYL) 4 MG tablet, Take 4 mg by mouth 2 (two) times a day before meals., Disp: , Rfl:   ?  insulin glargine-lixisenatide 100 unit-33 mcg/mL InPn, Inject 40 Units under the skin daily.    , Disp: , Rfl:   ?  losartan (COZAAR) 100 MG tablet, Take 100 mg by mouth daily., Disp: , Rfl:   ?  metFORMIN (GLUCOPHAGE) 500 MG tablet, Take 500 mg by mouth 2 (two) times a day with meals.    , Disp: , Rfl:   ?  metoprolol tartrate (LOPRESSOR) 50 MG tablet, Take 1 tablet (50 mg total) by mouth 2 (two) times a day., Disp: 60 tablet, Rfl: 0  ?  ticagrelor (BRILINTA) 90 mg Tab, Take 1 tablet (90 mg total) by mouth 2 (two) times a day., Disp: 180 tablet, Rfl: 0    Current Facility-Administered Medications:   ?  sodium chloride 0.9%, 25 mL/hr, Intravenous, Continuous, Joe Monahan, RN, Stopped at 01/21/19 1214  ?  sodium chloride 0.9%, 25 mL/hr, Intravenous, Continuous, Joe Monahan, RN, Stopped at 01/28/19 1203    Joe Monahan RN  Clinical Trials Nurse  [phone]

## 2021-06-27 NOTE — PROGRESS NOTES
Progress Notes by Joe Monahan, RN at 6/21/2019  1:26 PM     Author: Joe Monahan, RN Service: -- Author Type: Registered Nurse    Filed: 7/17/2019  4:11 PM Encounter Date: 6/21/2019 Status: Addendum    : Joe Monahan, RN (Registered Nurse)    Related Notes: Original Note by Adrian Crow MD (Physician) filed at 6/24/2019  1:57 PM           A Phase 3, Multicenter, Double-blind, Randomized, Placebo-controlled, Parallel-group Study to Investigate the Efficacy and Safety of [id number] in Subjects with Acute Coronary Syndrome -the AEGIS-II Study     Received notification of 's procedure and visited him upstairs in the ICU.  Spoke with him and he appears to be doing well given the procedures.  I reminded him to contact us if he needs anything during his stay and that I'll document the event, but nothing further needed from him.  He received the 4 study infusions of either JNP886 or albumen for the study back in January/February.  We are just following him for documentation of events until next year per study protocol.    Dr. Crow: Please assign causality of AE's below:  Relationship: Is the cause of the adverse event related to the Investigational Product and cannot be reasonably explained by other factors?   Adverse Event: Adverse event   Balanitis xerotica obliterans with Urethral strictures Serious adverse event   Multivessel Coronary Artery Disease   Description: Subject was to have pre-op catheter placed, but several difficulties encountered. Urology consulted and found Balanitis xerotica obliterans contributing to 7 urethral strictures. Treated with dilation and a catheter in place for 1 week. Subject had a STEMI on Janaury 13th and had thrombotic oclusion of a large diagonal that was treated with a IBAN.  He was found to have mid LAD and distal RCA disease at the time and to be followed by Dr. Che.  He had a follow up nuclear stress test on March 6th and an angiogram on May 2nd.    Logan performed a successful CABG yesterday and subject is recovering on 5000 today.   Start Date: 6/20/2019 6/20/2019   End Date:  26jun2019 26jun2019   Date site aware: 6/21/2019 6/21/2019   Severity (mild/moderate/severe): moderate severe   Serious?  Yes  []     No  [x]  Yes  [x]     No  []     Relationship to Investigational Medicinal Product?    Yes  []     No  [x]      Yes  []     No  [x]       Action taken with study treatment:  (Study only has 4 infusions, all of which were completed  4 months ago) [] Dose not changed      [] Dose skipped  []Drug Interrupted  []Drug Withdrawal  [x]Not Applicable      []Unknown [] Dose not changed      [] Dose skipped  []Drug Interrupted  []Drug Withdrawal  [x]Not Applicable      []Unknown         Outcome:  []Not Recovered/Not Resolved  []Recovered/Resolved  []Recovered/Resolved with Sequelae      [x]Recovering/Resolving      []Unknown      []Fatal []Not Recovered/Not Resolved  []Recovered/Resolved  []Recovered/Resolved with Sequelae      [x]Recovering/Resolving      []Unknown      []Fatal   Medication/therapies taken:  Yes  [x]              No  []  Cystoscopy, urethral dialator, catheter  Yes  [x]                No  []  Coronary artery bypass surgery - LIMA to LAD and Saphenous to PDA     Joe Monahan RN

## 2021-06-28 NOTE — PROGRESS NOTES
Progress Notes by Joe Monahan RN at 1/21/2020  8:10 AM     Author: Joe Monahan RN Service: -- Author Type: Registered Nurse    Filed: 1/21/2020  9:04 AM Encounter Date: 1/21/2020 Status: Signed    : Joe Monahan RN (Registered Nurse)           A Phase 3, Multicenter, Double-blind, Randomized, Placebo-controlled, Parallel-group Study to Investigate the Efficacy and Safety of [id number] in Subjects with Acute Coronary Syndrome -the AEGIS-II Study     Subject seen in clinic today for study Visit 11 end of study.        Body Weight 245.5lbs    Lifestyle adherence  Tobacco free/ abstinence from smoking: [x] Yes   [] No  Following cardiac diet:[x] Yes   [] No  Routine physical activity and/or cardiac rehab:[x] Yes   [] No    Adverse events (if related to IP or discontinuation) serious adverse events, endpoint major adverse cardiac events, and health status reviewed with subject.  Subject reports no changes and verbalized understanding of the study.     Subject reports his AE of recurrent bloody nose resolved by [id number] but insomnia continues.    Concomitant medications (if related to IP, discontinuation or CECILIO's) - reviewed.    Plan: Instructed subject to continue standard of care and follow-up with PCP/cardiology.       Joe Monahan RN  Clinical Trials Nurse  [phone]

## 2021-06-28 NOTE — PROGRESS NOTES
Progress Notes by Cileo Che MD at 3/2/2020  8:10 AM     Author: Cielo Che MD Service: -- Author Type: Physician    Filed: 3/2/2020  8:51 AM Encounter Date: 3/2/2020 Status: Signed    : Cielo Che MD (Physician)         Thank you, Dr. Hansen, for asking the [facility] team to see Mr. Siva Ramey to evaluate heart disease.      Assessment/Recommendations   Assessment/Plan:    Burning dyspnea with exertion - stress echo to rule out ischemia and see what his exercise tolerance is    CAD - aspirin and statin indefinitely    HTN - controlled on metoprolol, losartan and amlodipine. Avoiding nephrotoxic agents. There is some mild edema related to the amlodipine and compression socks were recommended.    Daytime sleepiness - he is trying to get a sleep evaluation scheduled    ICM - euvolemic. EF 45%. BP controlled. Sticking to a low salt diet.    Renal insufficiency - seeing a nephrologist    DM - per PCP    F/U 1 year, will call with stress test results         History of Present Illness/Subjective    Mr. Siva Ramey is a 44 y.o. male with diabetic with hypertension, hyperlipidemia and CAD who suffered an anterior-lateral STEMI January 2019. He was found to have a thrombotic occlusion of a large diagonal that was treated with a Synergy IBAN. There was residual diffuse moderate-severe mid LAD disease and distal RCA disease. EF was 45% by echo. Siva underwent 2 v CABG in June 2019 (LIMA-LAD, SVG-RCA).     Siva Ramey returns for f/u today. 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 The dyspnea feels like a burning in his chest. He denies chest pain otherwise, and there are no palpitations, dizziness, pnd/orthopnea. He has had some mild edema since starting amlodipine. He saw a pulmonologist at [facility], Dr. Ferreira, within the past few weeks. Per his note spirometry showed severe restriction. A CT chest did show some compressive  volume loss in the left lung base. A sniff test showed a mildly elevated left hemidiaphragm but normal, bilateral diaphragmatic excursion.          Physical Examination Review of Systems   Vitals:    03/02/20 0810   BP: 124/86   Pulse: 64   Resp: 16     Body mass index is 34.41 kg/m .  Wt Readings from Last 3 Encounters:   03/02/20 (!) 245 lb (111.1 kg)   01/21/20 (!) 245 lb 8 oz (111.4 kg)   08/26/19 (!) 234 lb (106.1 kg)     [unfilled]  General Appearance:   no distress, normal body habitus   ENT/Mouth: membranes moist, no oral lesions or bleeding gums.      EYES:  no scleral icterus, normal conjunctivae   Neck: no carotid bruits or thyromegaly   Chest/Lungs:   lungs are clear to auscultation outside of some crackles at the left base with inspiration, no rales or wheezing, stable sternal scar, equal chest wall expansion    Cardiovascular:   Regular. Normal first and second heart sounds with no murmurs, rubs, or gallops. The right radial, dorsalis pedis and posterior tibial pulses are intact.  The left radial, dorsalis pedis and posterior tibial pulses are intact. Jugular venous pressure flat, trace sock line edema bilaterally    Abdomen:  no organomegaly, masses, bruits, or tenderness; bowel sounds are present   Extremities: no cyanosis or clubbing   Skin: no xanthelasma, warm.    Neurologic: normal  bilateral, no tremors     Psychiatric: alert and oriented x3, calm     General: Weight Gain  Eyes: WNL  Ears/Nose/Throat: WNL  Lungs: Shortness of Breath  Heart: WNL  Stomach: WNL  Bladder: WNL  Muscle/Joints: WNL  Skin: WNL  Nervous System: WNL  Mental Health: WNL     Blood: WNL     Medical History  Surgical History Family History Social History   Past Medical History:   Diagnosis Date   ? Angina pectoris (H)     rare since PCI   ? Chronic kidney disease     CKD stage 3   ? Coronary artery disease    ? Diabetes mellitus, type II (H) 12/2001   ? Diabetic nephropathy (H)    ? DM II (diabetes mellitus, type II),  controlled (H)    ? Dyslipidemia 12/2001   ? HTN (hypertension) 2004   ? Hyperlipidemia    ? Hypertension    ? Ischemic cardiomyopathy    ? Myocardial infarction (H)     STEMI -Diagonal branch of the LAD   ? Obesity (BMI 30-39.9)    ? Proteinuria    ? Vitamin D deficiency     Past Surgical History:   Procedure Laterality Date   ? BACK SURGERY  2009    L5 disc cut   ? CV CORONARY ANGIOGRAM N/A 1/13/2019    Procedure: Coronary Angiogram;  Surgeon: Cielo Che MD;  Location: [facility] Cath Lab;  Service: Cardiology   ? CV CORONARY ANGIOGRAM N/A 5/2/2019    Procedure: Coronary Angiogram;  Surgeon: Cielo Che MD;  Location: [facility] Cath Lab;  Service: Cardiology   ? CV LEFT HEART CATHETERIZATION WITH LEFT VENTRICULOGRAM N/A 1/13/2019    Procedure: Left Heart Catheterization with Left Ventriculogram;  Surgeon: Cielo Che MD;  Location: [facility] Cath Lab;  Service: Cardiology   ? Excise varicocele     ? HERNIA REPAIR      Family History   Problem Relation Age of Onset   ? Heart disease Father 60   ? CABG Father 50        triple bypass    Social History     Socioeconomic History   ? Marital status:      Spouse name: Not on file   ? Number of children: Not on file   ? Years of education: Not on file   ? Highest education level: Not on file   Occupational History   ? Not on file   Social Needs   ? Financial resource strain: Not on file   ? Food insecurity:     Worry: Not on file     Inability: Not on file   ? Transportation needs:     Medical: Not on file     Non-medical: Not on file   Tobacco Use   ? Smoking status: Never Smoker   ? Smokeless tobacco: Never Used   Substance and Sexual Activity   ? Alcohol use: Yes     Comment: 1-2   ? Drug use: No   ? Sexual activity: Not on file   Lifestyle   ? Physical activity:     Days per week: Not on file     Minutes per session: Not on file   ? Stress: Not on file   Relationships   ? Social connections:     Talks on phone: Not on file     Gets  together: Not on file     Attends Anabaptism service: Not on file     Active member of club or organization: Not on file     Attends meetings of clubs or organizations: Not on file     Relationship status: Not on file   ? Intimate partner violence:     Fear of current or ex partner: Not on file     Emotionally abused: Not on file     Physically abused: Not on file     Forced sexual activity: Not on file   Other Topics Concern   ? Not on file   Social History Narrative   ? Not on file          Medications  Allergies   Current Outpatient Medications   Medication Sig Dispense Refill   ? amLODIPine (NORVASC) 5 MG tablet TAKE 1 TABLET(5 MG) BY MOUTH DAILY 90 tablet 0   ? aspirin 81 MG EC tablet Take 1 tablet (81 mg total) by mouth daily.  0   ? atorvastatin (LIPITOR) 80 MG tablet TAKE 1 TABLET(80 MG) BY MOUTH AT BEDTIME 90 tablet 2   ? cholecalciferol, vitamin D3, 5,000 unit Tab Take 5,000 Units by mouth daily.     ? clopidogrel (PLAVIX) 75 mg tablet Take 1 tablet (75 mg total) by mouth daily. 30 tablet 11   ? furosemide (LASIX) 20 MG tablet Take 1 tablet (20 mg total) by mouth daily. 30 tablet 0   ? furosemide (LASIX) 20 MG tablet TAKE 1 TABLET BY MOUTH DAILY 90 tablet 0   ? glimepiride (AMARYL) 4 MG tablet Take 4 mg by mouth 2 (two) times a day before meals.     ? insulin glargine-lixisenatide 100 unit-33 mcg/mL InPn Inject 20 Units under the skin daily. (Patient taking differently: Inject 40 Units under the skin daily.       ) 3 mL 10   ? losartan (COZAAR) 50 MG tablet TAKE 1 TABLET BY MOUTH DAILY 90 tablet 2   ? metFORMIN (GLUCOPHAGE) 500 MG tablet Take 500 mg by mouth 2 (two) times a day with meals.            ? nitroglycerin (NITROSTAT) 0.4 MG SL tablet Place 1 tablet (0.4 mg total) under the tongue every 5 (five) minutes as needed for chest pain. 25 tablet 1     No current facility-administered medications for this visit.     No Known Allergies      Lab Results    Chemistry/lipid CBC Cardiac Enzymes/BNP/TSH/INR    Lab Results   Component Value Date    CHOL 114 03/04/2019    HDL 29 (L) 03/04/2019    LDLCALC 36 03/04/2019    TRIG 243 (H) 03/04/2019    CREATININE 2.18 (H) 08/21/2019    BUN 32 (H) 08/21/2019    K 5.0 08/21/2019     08/21/2019     08/21/2019    CO2 26 08/21/2019    Lab Results   Component Value Date    WBC 8.2 06/26/2019    HGB 12.4 (L) 06/26/2019    HCT 36.9 (L) 06/26/2019    MCV 88 06/26/2019     06/26/2019    Lab Results   Component Value Date    TROPONINI 1.71 (HH) 01/14/2019    BNP 72 (H) 08/21/2019    TSH 2.65 01/14/2019    INR 1.24 (H) 06/21/2019

## 2021-07-14 PROBLEM — I21.02 STEMI INVOLVING LEFT ANTERIOR DESCENDING CORONARY ARTERY (H): Status: RESOLVED | Noted: 2019-01-13 | Resolved: 2019-08-21

## 2021-07-14 PROBLEM — I21.3 STEMI (ST ELEVATION MYOCARDIAL INFARCTION) (H): Status: RESOLVED | Noted: 2019-04-19 | Resolved: 2019-08-21

## 2021-07-19 ENCOUNTER — HOSPITAL ENCOUNTER (INPATIENT)
Facility: HOSPITAL | Age: 46
LOS: 5 days | Discharge: HOME | DRG: 287 | End: 2021-07-27
Attending: EMERGENCY MEDICINE | Admitting: INTERNAL MEDICINE
Payer: COMMERCIAL

## 2021-07-19 ENCOUNTER — HOSPITAL ENCOUNTER (EMERGENCY)
Dept: RADIOLOGY | Facility: HOSPITAL | Age: 46
DRG: 287 | End: 2021-07-19
Attending: EMERGENCY MEDICINE
Payer: COMMERCIAL

## 2021-07-19 DIAGNOSIS — Z95.1 S/P CABG (CORONARY ARTERY BYPASS GRAFT): Primary | ICD-10-CM

## 2021-07-19 DIAGNOSIS — R07.9 CHEST PAIN WITH HIGH RISK FOR CARDIAC ETIOLOGY: ICD-10-CM

## 2021-07-19 DIAGNOSIS — I25.83 CORONARY ARTERY DISEASE DUE TO LIPID RICH PLAQUE: ICD-10-CM

## 2021-07-19 DIAGNOSIS — N18.30 STAGE 3 CHRONIC KIDNEY DISEASE, UNSPECIFIED WHETHER STAGE 3A OR 3B CKD (H): ICD-10-CM

## 2021-07-19 DIAGNOSIS — I16.0 HYPERTENSIVE URGENCY: ICD-10-CM

## 2021-07-19 DIAGNOSIS — I25.10 CORONARY ARTERY DISEASE DUE TO LIPID RICH PLAQUE: ICD-10-CM

## 2021-07-19 DIAGNOSIS — E11.59 TYPE 2 DIABETES MELLITUS WITH OTHER CIRCULATORY COMPLICATION, UNSPECIFIED WHETHER LONG TERM INSULIN USE (H): ICD-10-CM

## 2021-07-19 LAB
ANION GAP SERPL CALCULATED.3IONS-SCNC: 10 MMOL/L (ref 5–18)
BASOPHILS # BLD AUTO: 0 10E3/UL (ref 0–0.2)
BASOPHILS NFR BLD AUTO: 1 %
BNP SERPL-MCNC: 25 PG/ML (ref 0–36)
BUN SERPL-MCNC: 48 MG/DL (ref 8–22)
CALCIUM SERPL-MCNC: 8.6 MG/DL (ref 8.5–10.5)
CHLORIDE BLD-SCNC: 109 MMOL/L (ref 98–107)
CO2 SERPL-SCNC: 22 MMOL/L (ref 22–31)
CREAT SERPL-MCNC: 4.34 MG/DL (ref 0.7–1.3)
EOSINOPHIL # BLD AUTO: 0.3 10E3/UL (ref 0–0.7)
EOSINOPHIL NFR BLD AUTO: 5 %
ERYTHROCYTE [DISTWIDTH] IN BLOOD BY AUTOMATED COUNT: 12.2 % (ref 10–15)
GFR SERPL CREATININE-BSD FRML MDRD: 15 ML/MIN/1.73M2
GLUCOSE BLD-MCNC: 125 MG/DL (ref 70–125)
GLUCOSE BLDC GLUCOMTR-MCNC: 162 MG/DL (ref 70–125)
GLUCOSE BLDC GLUCOMTR-MCNC: 63 MG/DL (ref 70–125)
GLUCOSE BLDC GLUCOMTR-MCNC: 88 MG/DL (ref 70–125)
HCT VFR BLD AUTO: 37.3 % (ref 40–53)
HGB BLD-MCNC: 12.9 G/DL (ref 13.3–17.7)
HOLD SPECIMEN: NORMAL
HOLD SPECIMEN: NORMAL
IMM GRANULOCYTES # BLD: 0 10E3/UL
IMM GRANULOCYTES NFR BLD: 0 %
LYMPHOCYTES # BLD AUTO: 1.9 10E3/UL (ref 0.8–5.3)
LYMPHOCYTES NFR BLD AUTO: 29 %
MCH RBC QN AUTO: 31.1 PG (ref 26.5–33)
MCHC RBC AUTO-ENTMCNC: 34.6 G/DL (ref 31.5–36.5)
MCV RBC AUTO: 90 FL (ref 78–100)
MONOCYTES # BLD AUTO: 0.5 10E3/UL (ref 0–1.3)
MONOCYTES NFR BLD AUTO: 7 %
NEUTROPHILS # BLD AUTO: 4 10E3/UL (ref 1.6–8.3)
NEUTROPHILS NFR BLD AUTO: 58 %
NRBC # BLD AUTO: 0 10E3/UL
NRBC BLD AUTO-RTO: 0 /100
PLATELET # BLD AUTO: 146 10E3/UL (ref 150–450)
POTASSIUM BLD-SCNC: 4.5 MMOL/L (ref 3.5–5)
RBC # BLD AUTO: 4.15 10E6/UL (ref 4.4–5.9)
SARS-COV-2 RNA RESP QL NAA+PROBE: NEGATIVE
SODIUM SERPL-SCNC: 141 MMOL/L (ref 136–145)
TROPONIN I SERPL-MCNC: 0.01 NG/ML (ref 0–0.29)
TROPONIN I SERPL-MCNC: 0.01 NG/ML (ref 0–0.29)
WBC # BLD AUTO: 6.7 10E3/UL (ref 4–11)

## 2021-07-19 PROCEDURE — 36415 COLL VENOUS BLD VENIPUNCTURE: CPT | Performed by: STUDENT IN AN ORGANIZED HEALTH CARE EDUCATION/TRAINING PROGRAM

## 2021-07-19 PROCEDURE — 250N000013 HC RX MED GY IP 250 OP 250 PS 637: Performed by: INTERNAL MEDICINE

## 2021-07-19 PROCEDURE — 250N000012 HC RX MED GY IP 250 OP 636 PS 637: Performed by: INTERNAL MEDICINE

## 2021-07-19 PROCEDURE — 80048 BASIC METABOLIC PNL TOTAL CA: CPT | Performed by: EMERGENCY MEDICINE

## 2021-07-19 PROCEDURE — G0378 HOSPITAL OBSERVATION PER HR: HCPCS

## 2021-07-19 PROCEDURE — 83880 ASSAY OF NATRIURETIC PEPTIDE: CPT | Performed by: EMERGENCY MEDICINE

## 2021-07-19 PROCEDURE — 99220 PR INITIAL OBSERVATION CARE,LEVEL III: CPT | Performed by: INTERNAL MEDICINE

## 2021-07-19 PROCEDURE — 87635 SARS-COV-2 COVID-19 AMP PRB: CPT | Performed by: EMERGENCY MEDICINE

## 2021-07-19 PROCEDURE — 250N000011 HC RX IP 250 OP 636: Performed by: INTERNAL MEDICINE

## 2021-07-19 PROCEDURE — 250N000012 HC RX MED GY IP 250 OP 636 PS 637

## 2021-07-19 PROCEDURE — 84484 ASSAY OF TROPONIN QUANT: CPT | Performed by: EMERGENCY MEDICINE

## 2021-07-19 PROCEDURE — 85025 COMPLETE CBC W/AUTO DIFF WBC: CPT | Performed by: EMERGENCY MEDICINE

## 2021-07-19 PROCEDURE — 99285 EMERGENCY DEPT VISIT HI MDM: CPT | Mod: 25

## 2021-07-19 PROCEDURE — 93005 ELECTROCARDIOGRAM TRACING: CPT | Performed by: EMERGENCY MEDICINE

## 2021-07-19 PROCEDURE — 71045 X-RAY EXAM CHEST 1 VIEW: CPT

## 2021-07-19 PROCEDURE — 36592 COLLECT BLOOD FROM PICC: CPT | Performed by: STUDENT IN AN ORGANIZED HEALTH CARE EDUCATION/TRAINING PROGRAM

## 2021-07-19 PROCEDURE — 96372 THER/PROPH/DIAG INJ SC/IM: CPT | Performed by: INTERNAL MEDICINE

## 2021-07-19 PROCEDURE — 250N000013 HC RX MED GY IP 250 OP 250 PS 637: Performed by: EMERGENCY MEDICINE

## 2021-07-19 PROCEDURE — C9803 HOPD COVID-19 SPEC COLLECT: HCPCS

## 2021-07-19 PROCEDURE — 36415 COLL VENOUS BLD VENIPUNCTURE: CPT | Performed by: INTERNAL MEDICINE

## 2021-07-19 PROCEDURE — 84484 ASSAY OF TROPONIN QUANT: CPT | Performed by: INTERNAL MEDICINE

## 2021-07-19 RX ORDER — CARVEDILOL 12.5 MG/1
25 TABLET ORAL 2 TIMES DAILY
Status: DISCONTINUED | OUTPATIENT
Start: 2021-07-19 | End: 2021-07-24

## 2021-07-19 RX ORDER — NITROGLYCERIN 0.4 MG/1
0.4 TABLET SUBLINGUAL EVERY 5 MIN PRN
Status: DISCONTINUED | OUTPATIENT
Start: 2021-07-19 | End: 2021-07-19

## 2021-07-19 RX ORDER — PROCHLORPERAZINE 25 MG
25 SUPPOSITORY, RECTAL RECTAL EVERY 12 HOURS PRN
Status: DISCONTINUED | OUTPATIENT
Start: 2021-07-19 | End: 2021-07-27 | Stop reason: HOSPADM

## 2021-07-19 RX ORDER — ACETAMINOPHEN 650 MG/1
650 SUPPOSITORY RECTAL EVERY 6 HOURS PRN
Status: DISCONTINUED | OUTPATIENT
Start: 2021-07-19 | End: 2021-07-27 | Stop reason: HOSPADM

## 2021-07-19 RX ORDER — CLONIDINE HYDROCHLORIDE 0.1 MG/1
0.1 TABLET ORAL 3 TIMES DAILY PRN
Status: DISCONTINUED | OUTPATIENT
Start: 2021-07-19 | End: 2021-07-27 | Stop reason: HOSPADM

## 2021-07-19 RX ORDER — ALBUTEROL SULFATE 90 UG/1
2 AEROSOL, METERED RESPIRATORY (INHALATION) EVERY 4 HOURS PRN
Status: DISCONTINUED | OUTPATIENT
Start: 2021-07-19 | End: 2021-07-27 | Stop reason: HOSPADM

## 2021-07-19 RX ORDER — DEXTROSE MONOHYDRATE 25 G/50ML
25-50 INJECTION, SOLUTION INTRAVENOUS
Status: DISCONTINUED | OUTPATIENT
Start: 2021-07-19 | End: 2021-07-25

## 2021-07-19 RX ORDER — DOXAZOSIN 1 MG/1
2 TABLET ORAL AT BEDTIME
Status: DISCONTINUED | OUTPATIENT
Start: 2021-07-19 | End: 2021-07-27 | Stop reason: HOSPADM

## 2021-07-19 RX ORDER — ACETAMINOPHEN 325 MG/1
650 TABLET ORAL EVERY 6 HOURS PRN
Status: DISCONTINUED | OUTPATIENT
Start: 2021-07-19 | End: 2021-07-27 | Stop reason: HOSPADM

## 2021-07-19 RX ORDER — ASPIRIN 81 MG/1
324 TABLET, CHEWABLE ORAL ONCE
Status: COMPLETED | OUTPATIENT
Start: 2021-07-19 | End: 2021-07-19

## 2021-07-19 RX ORDER — ATORVASTATIN CALCIUM 40 MG/1
80 TABLET, FILM COATED ORAL DAILY
Status: DISCONTINUED | OUTPATIENT
Start: 2021-07-20 | End: 2021-07-27 | Stop reason: HOSPADM

## 2021-07-19 RX ORDER — LOSARTAN POTASSIUM 50 MG/1
50 TABLET ORAL DAILY
Status: DISCONTINUED | OUTPATIENT
Start: 2021-07-20 | End: 2021-07-20

## 2021-07-19 RX ORDER — NITROGLYCERIN 0.4 MG/1
0.4 TABLET SUBLINGUAL EVERY 5 MIN PRN
Status: DISCONTINUED | OUTPATIENT
Start: 2021-07-19 | End: 2021-07-27 | Stop reason: HOSPADM

## 2021-07-19 RX ORDER — ASPIRIN 81 MG/1
81 TABLET ORAL DAILY
Status: DISCONTINUED | OUTPATIENT
Start: 2021-07-20 | End: 2021-07-27 | Stop reason: HOSPADM

## 2021-07-19 RX ORDER — POLYETHYLENE GLYCOL 3350 17 G/17G
17 POWDER, FOR SOLUTION ORAL DAILY PRN
Status: DISCONTINUED | OUTPATIENT
Start: 2021-07-19 | End: 2021-07-27 | Stop reason: HOSPADM

## 2021-07-19 RX ORDER — HYDRALAZINE HYDROCHLORIDE 20 MG/ML
10 INJECTION INTRAMUSCULAR; INTRAVENOUS EVERY 4 HOURS PRN
Status: DISCONTINUED | OUTPATIENT
Start: 2021-07-19 | End: 2021-07-27 | Stop reason: HOSPADM

## 2021-07-19 RX ORDER — LIDOCAINE 40 MG/G
CREAM TOPICAL
Status: DISCONTINUED | OUTPATIENT
Start: 2021-07-19 | End: 2021-07-27 | Stop reason: HOSPADM

## 2021-07-19 RX ORDER — ONDANSETRON 2 MG/ML
4 INJECTION INTRAMUSCULAR; INTRAVENOUS EVERY 6 HOURS PRN
Status: DISCONTINUED | OUTPATIENT
Start: 2021-07-19 | End: 2021-07-27 | Stop reason: HOSPADM

## 2021-07-19 RX ORDER — NICOTINE POLACRILEX 4 MG
15-30 LOZENGE BUCCAL
Status: DISCONTINUED | OUTPATIENT
Start: 2021-07-19 | End: 2021-07-25

## 2021-07-19 RX ORDER — HEPARIN SODIUM 5000 [USP'U]/.5ML
5000 INJECTION, SOLUTION INTRAVENOUS; SUBCUTANEOUS EVERY 12 HOURS
Status: DISCONTINUED | OUTPATIENT
Start: 2021-07-19 | End: 2021-07-27 | Stop reason: HOSPADM

## 2021-07-19 RX ORDER — ONDANSETRON 4 MG/1
4 TABLET, ORALLY DISINTEGRATING ORAL EVERY 6 HOURS PRN
Status: DISCONTINUED | OUTPATIENT
Start: 2021-07-19 | End: 2021-07-27 | Stop reason: HOSPADM

## 2021-07-19 RX ORDER — GLIMEPIRIDE 1 MG/1
4 TABLET ORAL
Status: DISCONTINUED | OUTPATIENT
Start: 2021-07-19 | End: 2021-07-20

## 2021-07-19 RX ORDER — PROCHLORPERAZINE MALEATE 10 MG
10 TABLET ORAL EVERY 6 HOURS PRN
Status: DISCONTINUED | OUTPATIENT
Start: 2021-07-19 | End: 2021-07-27 | Stop reason: HOSPADM

## 2021-07-19 RX ORDER — ALBUTEROL SULFATE 90 UG/1
2 AEROSOL, METERED RESPIRATORY (INHALATION) EVERY 4 HOURS PRN
COMMUNITY
Start: 2021-03-18 | End: 2024-08-05

## 2021-07-19 RX ADMIN — CARVEDILOL 25 MG: 12.5 TABLET, FILM COATED ORAL at 21:39

## 2021-07-19 RX ADMIN — GLIMEPIRIDE 4 MG: 1 TABLET ORAL at 19:13

## 2021-07-19 RX ADMIN — CLONIDINE HYDROCHLORIDE 0.1 MG: 0.1 TABLET ORAL at 21:39

## 2021-07-19 RX ADMIN — ASPIRIN 324 MG: 81 TABLET, CHEWABLE ORAL at 12:09

## 2021-07-19 RX ADMIN — DOXAZOSIN 2 MG: 1 TABLET ORAL at 21:39

## 2021-07-19 RX ADMIN — NITROGLYCERIN 0.4 MG: 0.4 TABLET SUBLINGUAL at 12:22

## 2021-07-19 RX ADMIN — NITROGLYCERIN 0.4 MG: 0.4 TABLET SUBLINGUAL at 12:10

## 2021-07-19 RX ADMIN — INSULIN GLARGINE 40 UNITS: 100 INJECTION, SOLUTION SUBCUTANEOUS at 21:40

## 2021-07-19 RX ADMIN — HEPARIN SODIUM 5000 UNITS: 10000 INJECTION, SOLUTION INTRAVENOUS; SUBCUTANEOUS at 21:40

## 2021-07-19 ASSESSMENT — ENCOUNTER SYMPTOMS
FATIGUE: 1
ARTHRALGIAS: 0
DIAPHORESIS: 1
DIFFICULTY URINATING: 0
FEVER: 0
NECK STIFFNESS: 0
COLOR CHANGE: 0
ABDOMINAL PAIN: 0
HEADACHES: 0
SHORTNESS OF BREATH: 1
CONFUSION: 0
EYE REDNESS: 0

## 2021-07-19 NOTE — ED TRIAGE NOTES
Patient presents to ED for evaluation of intermittent chest pain for past 1.5 weeks. Patient MD told to come to ED today. Pain 5/10. Patient also notes in renal failure, not on dialysis yet but plans to be.

## 2021-07-19 NOTE — H&P
[facility]    History and Physical - Hospitalist Service       Date of Admission:  7/19/2021    Assessment & Plan      Siva Ramey is a 46 year old male admitted on 7/19/2021.     Chest pain, likely due to hypertensive urgency.  SBP above 200 on admission.  No signs of volume overload, given his CKD 4-5.  Normal BNP.  Normal troponin and no new ischemic changes on the EKG.  Optimize BP, SBP goal 140-160 tonight, then to the goal 130/80 given DM, CAD.  Serial troponin.  As needed NTG.  On ASA.  Echo in a.m.  Cardiology consulted, given patient's complex cardiac history.    CKD stage IV-5.  AV fistula placed in left forearm, with good bruit and thrill.  Normal CO2, potassium.  No volume overload.  Possibly early uremia-reports increased fatigue recently.  Normal TSH.  Holding PTA low-dose Lasix for now/normal BNP.  Patient follows with Dr. Rios.  Nephrology consulted.    Hypertensive urgency.  In patient with CAD, IDDM, CKD 5.  No pulmonary edema on CXR.  Continue PTA Coreg, losartan.  As needed IV hydralazine to keep -160.     IDDM.  A1c 8.2 on 7/15/2021.  On combination of Lantus and Lixisenatide once a day 40 units injection, prandial 10 unit NovoLog. Soliqua is not formulary here, patient does not have his own supply.  Will use 40 units of Lantus, first dose now, as he missed his a.m. dose, prandial and SSI NovoLog.  Diabetes educator consulted.    HLD, on high-dose Lipitor 80 mg, continue.     Diet: Consistent Carb 75 grams CHO per Meal Diet    DVT Prophylaxis: Heparin SQ  Burt Catheter: Not present  Central Lines: None  Code Status:  Full    Risk Factors Present on Admission              # Platelet Defect: home medication list includes an antiplatelet medication      Disposition Plan   Expected discharge: in 1-2 days, recommended to prior living arrangement once adequate pain management/ tolerating PO medications and renal function improved.     The patient's care was  discussed with the Bedside Nurse and Patient.    Stephany Tabares MD  [facility]  Securely message with the [employer] Web Console (learn more here)  Text page via HQ plus Paging/Directory  ______________________________________________________________________    Chief Complaint   Chest pain    History is obtained from the patient, electronic health record and emergency department physician    History of Present Illness   Siva Ramey is a 46 year old male with PMH of CAD, s/p 2V CABG in 2019, last angiogram in April 2020, CKD 5, IDDM, HTN, HLD, presented to ED for evaluation of exertional dyspnea and exertional chest pain.  Over the last 1 week he had intermittent gradually worsened exertional central chest pain, located on the right side of the chest, with associated dyspnea and leg edema.  Regular work duties at work because of the symptoms.  Patient did not try NTG to elevate symptoms.  He states that pain is somewhat similar, but not as intensive as his prior MI chest pain.  He was not able to finish his work shift/working from 11 PM to 7 AM/due to dyspnea and chest pain.  Patient did not take his morning insulin, but took his antihypertensive medications.  BP on arrival elevated, 212/103, gradually decreased to 150s over 70s.  When seen him in ED, he was chest pain-free./P1 dose of NTG in ED.  Troponin negative.  Initial in ED EKG without acute ischemic changes.  Patient elicits 10 pound weight gain in the last 6 months.  Will BNP.  No venous congestion on CXR.  He denies recent febrile illness, abdominal pain, nausea, vomiting, diaphoresis, dysuria, hematuria, arthralgia, myalgia, skin rash.    Review of Systems    The 10 point Review of Systems is negative other than noted in the HPI or here.     Past Medical History    I have reviewed this patient's medical history and updated it with pertinent information if needed.   Past Medical History:   Diagnosis Date     Angina pectoris (H)      rare since PCI     Chronic kidney disease     CKD stage 3     Coronary artery disease      Diabetes mellitus, type II (H) 12/2001     Diabetic nephropathy (H)      DM II (diabetes mellitus, type II), controlled (H)      MARQUEZ (dyspnea on exertion)      Dyslipidemia 12/2001     HTN (hypertension) 2004     Hyperlipidemia      Hypertension      Ischemic cardiomyopathy      Myocardial infarction (H)     STEMI -Diagonal branch of the LAD     Obesity (BMI 30-39.9)      Proteinuria      Vitamin D deficiency      Past Surgical History   I have reviewed this patient's surgical history and updated it with pertinent information if needed.  Past Surgical History:   Procedure Laterality Date     BACK SURGERY  2009    L5 disc cut     BYPASS GRAFT ARTERY CORONARY  06/20/2019    2 vessels     CV CORONARY ANGIOGRAM N/A 1/13/2019    Procedure: Coronary Angiogram;  Surgeon: Cielo Che MD;  Location: [facility];  Service: Cardiology     CV CORONARY ANGIOGRAM N/A 5/2/2019    Procedure: Coronary Angiogram;  Surgeon: Cielo Che MD;  Location: [facility];  Service: Cardiology     CV CORONARY ANGIOGRAM N/A 4/30/2020    Procedure: Coronary Angiogram;  Surgeon: Cielo Che MD;  Location: [facility];  Service: Cardiology     CV LEFT HEART CATHETERIZATION WITH LEFT VENTRICULOGRAM N/A 1/13/2019    Procedure: Left Heart Catheterization with Left Ventriculogram;  Surgeon: Cielo Che MD;  Location: [facility];  Service: Cardiology     CV LEFT HEART CATHETERIZATION WITHOUT LEFT VENTRICULOGRAM Left 4/30/2020    Procedure: Left Heart Catheterization Without Left Ventriculogram;  Surgeon: Cielo Che MD;  Location: [facility];  Service: Cardiology     CV RIGHT HEART CATHETERIZATION N/A 4/30/2020    Procedure: Right Heart Catheterization;  Surgeon: Cielo Che MD;  Location: [facility];  Service: Cardiology     HERNIA REPAIR       OTHER SURGICAL HISTORY       Excise varicocele     Social History   I have reviewed this patient's social history and updated it with pertinent information if needed.  Social History     Tobacco Use     Smoking status: Never Smoker     Smokeless tobacco: Never Used   Substance Use Topics     Alcohol use: Yes     Comment: Alcoholic Drinks/day: 1-2     Drug use: No       Family History   I have reviewed this patient's family history and updated it with pertinent information if needed.  Family History   Problem Relation Age of Onset     Heart Disease Father 60.00     CABG Father 50.00        triple bypass     Prior to Admission Medications   Prior to Admission Medications   Prescriptions Last Dose Informant Patient Reported? Taking?   albuterol (PROAIR HFA/PROVENTIL HFA/VENTOLIN HFA) 108 (90 Base) MCG/ACT inhaler Past Month at Unknown time  Yes Yes   Sig: Inhale 2 puffs into the lungs every 4 hours as needed   aspirin 81 MG EC tablet 7/19/2021 at am  No Yes   Sig: [ASPIRIN 81 MG EC TABLET] Take 1 tablet (81 mg total) by mouth daily.   atorvastatin (LIPITOR) 80 MG tablet 7/19/2021 at am  No Yes   Sig: [ATORVASTATIN (LIPITOR) 80 MG TABLET] TAKE 1 TABLET(80 MG) BY MOUTH AT BEDTIME   Patient taking differently: Take 80 mg by mouth At Bedtime    carvediloL (COREG) 25 MG tablet 7/19/2021 at am  Yes Yes   Sig: [CARVEDILOL (COREG) 25 MG TABLET] Take 25 mg by mouth 2 (two) times a day.   cholecalciferol, vitamin D3, 5,000 unit Tab 7/19/2021 at am  Yes Yes   Sig: [CHOLECALCIFEROL, VITAMIN D3, 5,000 UNIT TAB] Take 5,000 Units by mouth daily.   doxazosin (CARDURA) 4 MG tablet 7/18/2021 at pm  Yes Yes   Sig: [DOXAZOSIN (CARDURA) 4 MG TABLET] Take 2 mg by mouth at bedtime.   furosemide (LASIX) 20 MG tablet 7/19/2021 at am  Yes Yes   Sig: [FUROSEMIDE (LASIX) 20 MG TABLET] Take 20 mg by mouth 2 (two) times a day.   glimepiride (AMARYL) 4 MG tablet 7/19/2021 at am  Yes Yes   Sig: [GLIMEPIRIDE (AMARYL) 4 MG TABLET] Take 4 mg by mouth 2 (two) times a day before  meals.   insulin aspart U-100 (NOVOLOG) 100 unit/mL (3 mL) injection pen 7/18/2021 at pm  Yes Yes   Sig: [INSULIN ASPART U-100 (NOVOLOG) 100 UNIT/ML (3 ML) INJECTION PEN] Inject 3 Units under the skin 3 (three) times a day before meals. Plus sliding scale   insulin glargine-lixisenatide (SOLIQUA 100/33) 100 unit-33 mcg/mL InPn 7/18/2021 at am  Yes Yes   Sig: [INSULIN GLARGINE-LIXISENATIDE (SOLIQUA 100/33) 100 UNIT-33 MCG/ML INPN] Inject 40 Units under the skin daily.    losartan (COZAAR) 50 MG tablet 7/19/2021 at am  No Yes   Sig: [LOSARTAN (COZAAR) 50 MG TABLET] TAKE 1 TABLET BY MOUTH DAILY   Patient taking differently: Take 50 mg by mouth daily    nitroglycerin (NITROSTAT) 0.4 MG SL tablet   No Yes   Sig: [NITROGLYCERIN (NITROSTAT) 0.4 MG SL TABLET] PLACE 1 TABLET UNDER THE TONGUE EVERY 5 MINUTES AS NEEDED FOR CHEST PAIN   Patient taking differently: Place 0.4 mg under the tongue every 5 minutes as needed for chest pain       Facility-Administered Medications: None     Allergies   No Known Allergies    Physical Exam   Vital Signs: Temp: 98.6  F (37  C) Temp src: Oral BP: (!) 159/102 Pulse: 75   Resp: 18 SpO2: 98 % O2 Device: None (Room air)    Weight: 253 lbs 6.4 oz    General: Alert and oriented x 3. Not in obvious distress.  HEENT: NC, AT. Neck- supple, No JVP elevation, lymphadenopathy or thyromegaly. Trachea-central.  Chest: No trauma scar.  Clear to auscultation bilaterally.  Heart: S1S2 regular. No M/R/G.  Abdomen: Soft. NT, ND. No organomegaly. Bowel sounds- active.  Back: No spine tenderness. No CVA tenderness.  Extremities: 1 Plus pitting leg edema.  Peripheral pulses 2+ bilaterally.  Neuro: Cranial nerves 1-12 grossly normal. No focal neurological deficit    Data   Data reviewed today: I reviewed all medications, new labs and imaging results over the last 24 hours. I personally reviewed    Recent Labs   Lab 07/19/21  1740 07/19/21  1715 07/19/21  1155 07/19/21  1154   WBC  --   --  6.7  --    HGB  --    --  12.9*  --    MCV  --   --  90  --    PLT  --   --  146*  --    NA  --   --   --  141   POTASSIUM  --   --   --  4.5   CHLORIDE  --   --   --  109*   CO2  --   --   --  22   BUN  --   --   --  48*   CR  --   --   --  4.34*   ANIONGAP  --   --   --  10   BETHANY  --   --   --  8.6   GLC 88 63*  --  125     Recent Results (from the past 24 hour(s))   XR Chest Port 1 View    Narrative    EXAM: XR CHEST PORT 1 VIEW  LOCATION: [facility]   DATE/TIME: 7/19/2021 12:05 PM    INDICATION: Chest pain  COMPARISON: PA and lateral views of the chest 10/04/2020      Impression    IMPRESSION:     Shallow lung inflation. Minimal paradiaphragmatic atelectasis along the left hemidiaphragm, unchanged from 10/04/2020. No new lung opacities. Normal vascularity.    Symmetric lung inflation. Mediastinal borders are well-defined. Sternal wires are intact and aligned.    No pleural fluid or pneumothorax.

## 2021-07-19 NOTE — PHARMACY-ADMISSION MEDICATION HISTORY
Pharmacy Note - Admission Medication History    Pertinent Provider Information:      ______________________________________________________________________    Prior To Admission (PTA) med list completed and updated in EMR.       Prior to Admission Medications   Prescriptions Last Dose Informant Patient Reported? Taking?   albuterol (PROAIR HFA/PROVENTIL HFA/VENTOLIN HFA) 108 (90 Base) MCG/ACT inhaler Past Month at Unknown time  Yes Yes   Sig: Inhale 2 puffs into the lungs every 4 hours as needed   aspirin 81 MG EC tablet 7/19/2021 at am  No Yes   Sig: [ASPIRIN 81 MG EC TABLET] Take 1 tablet (81 mg total) by mouth daily.   atorvastatin (LIPITOR) 80 MG tablet 7/19/2021 at am  No Yes   Sig: [ATORVASTATIN (LIPITOR) 80 MG TABLET] TAKE 1 TABLET(80 MG) BY MOUTH AT BEDTIME   Patient taking differently: Take 80 mg by mouth At Bedtime    carvediloL (COREG) 25 MG tablet 7/19/2021 at am  Yes Yes   Sig: [CARVEDILOL (COREG) 25 MG TABLET] Take 25 mg by mouth 2 (two) times a day.   cholecalciferol, vitamin D3, 5,000 unit Tab 7/19/2021 at am  Yes Yes   Sig: [CHOLECALCIFEROL, VITAMIN D3, 5,000 UNIT TAB] Take 5,000 Units by mouth daily.   doxazosin (CARDURA) 4 MG tablet 7/18/2021 at pm  Yes Yes   Sig: [DOXAZOSIN (CARDURA) 4 MG TABLET] Take 2 mg by mouth at bedtime.   furosemide (LASIX) 20 MG tablet 7/19/2021 at am  Yes Yes   Sig: [FUROSEMIDE (LASIX) 20 MG TABLET] Take 20 mg by mouth 2 (two) times a day.   glimepiride (AMARYL) 4 MG tablet 7/19/2021 at am  Yes Yes   Sig: [GLIMEPIRIDE (AMARYL) 4 MG TABLET] Take 4 mg by mouth 2 (two) times a day before meals.   insulin aspart U-100 (NOVOLOG) 100 unit/mL (3 mL) injection pen 7/18/2021 at pm  Yes Yes   Sig: [INSULIN ASPART U-100 (NOVOLOG) 100 UNIT/ML (3 ML) INJECTION PEN] Inject 3 Units under the skin 3 (three) times a day before meals. Plus sliding scale   insulin glargine-lixisenatide (SOLIQUA 100/33) 100 unit-33 mcg/mL InPn 7/18/2021 at am  Yes Yes   Sig: [INSULIN GLARGINE-LIXISENATIDE  (SOLIQUA 100/33) 100 UNIT-33 MCG/ML INPN] Inject 40 Units under the skin daily.    losartan (COZAAR) 50 MG tablet 7/19/2021 at am  No Yes   Sig: [LOSARTAN (COZAAR) 50 MG TABLET] TAKE 1 TABLET BY MOUTH DAILY   Patient taking differently: Take 50 mg by mouth daily    nitroglycerin (NITROSTAT) 0.4 MG SL tablet   No Yes   Sig: [NITROGLYCERIN (NITROSTAT) 0.4 MG SL TABLET] PLACE 1 TABLET UNDER THE TONGUE EVERY 5 MINUTES AS NEEDED FOR CHEST PAIN   Patient taking differently: Place 0.4 mg under the tongue every 5 minutes as needed for chest pain       Facility-Administered Medications: None       Information source(s): Patient and CareEverywhere/SureScripts  Method of interview communication: in-person    Summary of Changes to PTA Med List  New: Albuterol Inhaler  Discontinued:   Changed:     Patient was asked about OTC/herbal products specifically.  PTA med list reflects this.    In the past week, patient estimated taking medication this percent of the time:  greater than 90%.    Allergies were reviewed, assessed, and updated with the patient.      Patient does not anticipate needing any multi-use medications during admission.    The information provided in this note is only as accurate as the sources available at the time of the update(s).    Thank you for the opportunity to participate in the care of this patient.    SANDRA HALL [facility]  7/19/2021 1:32 PM

## 2021-07-19 NOTE — PLAN OF CARE
"PRIMARY DIAGNOSIS: \"GENERIC\" NURSING  OUTPATIENT/OBSERVATION GOALS TO BE MET BEFORE DISCHARGE:  ADLs back to baseline: Yes    Activity and level of assistance: Ambulating independently.    Pain status: Pain free.    Return to near baseline physical activity: Yes     Discharge Planner Nurse   Safe discharge environment identified: Yes  Barriers to discharge: Yes       Entered by: Julianna Humphries 07/19/2021 6:43 PM     New admit. Came in to ER with ongoing chest pain, currently denies chest pain or shortness of breath.   BP elevated 159/102  Glucose 63, after juice was 88, ordering dinner.  Cardiology and nephrology consults pending.   Tele:NSR  Recent fistula placed in left arm for new dialysis needs. Reports ongoing numbness in left hand since surgery-states MD is aware.  Mild edema to LE.    Please review provider order for any additional goals.   Nurse to notify provider when observation goals have been met and patient is ready for discharge.  "

## 2021-07-19 NOTE — ED PROVIDER NOTES
EMERGENCY DEPARTMENT SIGN OUT NOTE        ED COURSE AND MEDICAL DECISION MAKING  Patient was signed out to me by Dr. Mindy Moore at 3:15 PM      In brief, Siva Ramey is a 46 year old male who initially presented with worsening chest pain.     At time of sign out, disposition was pending admission with plans that patient becomes a boarder at 5 pm since no beds currently available, had been discussed with hospitalist prior to signout.    4:02 PM patient taken upstairs as a bed became available after nurse to nurse signout.     FINAL IMPRESSION    1. Chest pain with high risk for cardiac etiology         Joe Auguste MD  07/19/21 [id number]

## 2021-07-19 NOTE — ED PROVIDER NOTES
EMERGENCY DEPARTMENT ENCOUNTER     NAME: Siva Ramey   AGE: 46 year old male   YOB: 1975   MRN: [id number]   EVALUATION DATE & TIME: 7/19/2021 11:21 AM   PCP: Trinidad Hansen     Chief Complaint   Patient presents with     Chest Pain   :    FINAL IMPRESSION       1. Chest pain with high risk for cardiac etiology           ED COURSE & MEDICAL DECISION MAKING      Pertinent Labs & Imaging studies reviewed. (See chart for details)   46 year old male  presents to the Emergency Department for evaluation of increasing episodes of chest pain, substernal.  Patient has a significant cardiac history including coronary artery disease, two-vessel CABG, last angiogram in April 2020.  Unfortunately he also has renal failure and just had access obtained to start dialysis but has not yet. Initial Vitals Reviewed. Initial exam notable for obese male who was hypertensive but fortunately asymptomatic by the time I saw him.  We did give nitroglycerin which resolved his pain, and clearly this is concerning for ACS.  His initial EKG does not show significant abnormalities and troponin is negative, but given his history, risk factors, and episodic pain improving with nitroglycerin I think he needs to be readmitted.  There will be somewhat of a conundrum as something like a CT will be harder as well an angiogram due to his renal function, so I think it definitely needs to be worked out as an inpatient how to do the duration of his cardiac work-up from here.  Case discussed with hospitalist who accepted the patient for admission.        11:20 AM Medical student met with the patient to obtain history and physical exam.  11:45 AM Case discussed with me.  12:51 PM Paging admitting physician.   1:13 PM I discussed case with Dr. Tabares, hospitalist. Patient accepted for admission.    At the conclusion of the encounter I discussed the results of all of the tests and the disposition. The questions were answered. The patient or  family acknowledged understanding and was agreeable with the care plan.         MEDICATIONS GIVEN IN THE EMERGENCY:   Medications   nitroGLYcerin (NITROSTAT) sublingual tablet 0.4 mg (0.4 mg Sublingual Given 7/19/21 1222)   aspirin (ASA) chewable tablet 324 mg (324 mg Oral Given 7/19/21 1209)      NEW PRESCRIPTIONS STARTED AT TODAY'S ER VISIT   New Prescriptions    No medications on file     ================================================================   HISTORY OF PRESENT ILLNESS       Patient information was obtained from: Patient   Use of Intrepreter: N/A  Siva Ramey is a 46 year old male with history of STEMI, s/p coronary angiogram, s/p CABG in 2019, diabetes type II, CKD, hypertension, dyspnea on exertion, who presents with CP.     Patient presents with 1 week of intermittent, gradually worsening centralized chest pain. He has a history of intermittent chest pain at baseline secondary to previous STEMI s/p CABG in 2019 but notes that his current pain is occurring more frequently. He endorses pain at a current 3-4/10 in severity with associated fatigue, exertional shortness of breath, diaphoresis, and leg swelling. Additionally, he has increased discomfort with deep breaths. He denies any palliating factors. Patient reports a history of hypertension with notably high blood pressure readings at home recently. He denies any other concerns at this time. She notes to be in renal failure but is not on dialysis yet.    ================================================================    REVIEW OF SYSTEMS       Review of Systems   Constitutional: Positive for diaphoresis and fatigue. Negative for fever.   HENT: Negative for congestion.    Eyes: Negative for redness.   Respiratory: Positive for shortness of breath.    Cardiovascular: Positive for chest pain and leg swelling.   Gastrointestinal: Negative for abdominal pain.   Genitourinary: Negative for difficulty urinating.   Musculoskeletal: Negative for  arthralgias and neck stiffness.   Skin: Negative for color change.   Neurological: Negative for headaches.   Psychiatric/Behavioral: Negative for confusion.   All other systems reviewed and are negative.        PAST HISTORY     PAST MEDICAL HISTORY:   Past Medical History:   Diagnosis Date     Angina pectoris (H)     rare since PCI     Chronic kidney disease     CKD stage 3     Coronary artery disease      Diabetes mellitus, type II (H) 12/2001     Diabetic nephropathy (H)      DM II (diabetes mellitus, type II), controlled (H)      MARQUEZ (dyspnea on exertion)      Dyslipidemia 12/2001     HTN (hypertension) 2004     Hyperlipidemia      Hypertension      Ischemic cardiomyopathy      Myocardial infarction (H)     STEMI -Diagonal branch of the LAD     Obesity (BMI 30-39.9)      Proteinuria      Vitamin D deficiency       PAST SURGICAL HISTORY:   Past Surgical History:   Procedure Laterality Date     BACK SURGERY  2009    L5 disc cut     BYPASS GRAFT ARTERY CORONARY  06/20/2019    2 vessels     CV CORONARY ANGIOGRAM N/A 1/13/2019    Procedure: Coronary Angiogram;  Surgeon: Cielo Che MD;  Location: [facility] Cath Lab;  Service: Cardiology     CV CORONARY ANGIOGRAM N/A 5/2/2019    Procedure: Coronary Angiogram;  Surgeon: Cielo Che MD;  Location: [facility] Cath Lab;  Service: Cardiology     CV CORONARY ANGIOGRAM N/A 4/30/2020    Procedure: Coronary Angiogram;  Surgeon: Cielo Che MD;  Location: [facility] Cath Lab;  Service: Cardiology     CV LEFT HEART CATHETERIZATION WITH LEFT VENTRICULOGRAM N/A 1/13/2019    Procedure: Left Heart Catheterization with Left Ventriculogram;  Surgeon: Cielo Che MD;  Location: [facility] Cath Lab;  Service: Cardiology     CV LEFT HEART CATHETERIZATION WITHOUT LEFT VENTRICULOGRAM Left 4/30/2020    Procedure: Left Heart Catheterization Without Left Ventriculogram;  Surgeon: Cielo Che MD;  Location: [facility] Cath Lab;  Service: Cardiology     CV RIGHT  HEART CATHETERIZATION N/A 4/30/2020    Procedure: Right Heart Catheterization;  Surgeon: Cielo Che MD;  Location: [facility] Cath Lab;  Service: Cardiology     HERNIA REPAIR       OTHER SURGICAL HISTORY      Excise varicocele      CURRENT MEDICATIONS:   aspirin 81 MG EC tablet  atorvastatin (LIPITOR) 80 MG tablet  carvediloL (COREG) 25 MG tablet  cholecalciferol, vitamin D3, 5,000 unit Tab  doxazosin (CARDURA) 4 MG tablet  furosemide (LASIX) 20 MG tablet  glimepiride (AMARYL) 4 MG tablet  insulin aspart U-100 (NOVOLOG) 100 unit/mL (3 mL) injection pen  insulin glargine-lixisenatide (SOLIQUA 100/33) 100 unit-33 mcg/mL InPn  losartan (COZAAR) 50 MG tablet  nitroglycerin (NITROSTAT) 0.4 MG SL tablet      ALLERGIES:   No Known Allergies   FAMILY HISTORY:   Family History   Problem Relation Age of Onset     Heart Disease Father 60.00     CABG Father 50.00        triple bypass      SOCIAL HISTORY:   Social History     Socioeconomic History     Marital status:      Spouse name: Not on file     Number of children: Not on file     Years of education: Not on file     Highest education level: Not on file   Occupational History     Not on file   Tobacco Use     Smoking status: Never Smoker     Smokeless tobacco: Never Used   Substance and Sexual Activity     Alcohol use: Yes     Comment: Alcoholic Drinks/day: 1-2     Drug use: No     Sexual activity: Yes     Partners: Female   Other Topics Concern     Not on file   Social History Narrative     Not on file     Social Determinants of Health     Financial Resource Strain:      Difficulty of Paying Living Expenses:    Food Insecurity:      Worried About Running Out of Food in the Last Year:      Ran Out of Food in the Last Year:    Transportation Needs:      Lack of Transportation (Medical):      Lack of Transportation (Non-Medical):    Physical Activity:      Days of Exercise per Week:      Minutes of Exercise per Session:    Stress:      Feeling of Stress :     Social Connections:      Frequency of Communication with Friends and Family:      Frequency of Social Gatherings with Friends and Family:      Attends Rastafari Services:      Active Member of Clubs or Organizations:      Attends Club or Organization Meetings:      Marital Status:    Intimate Partner Violence:      Fear of Current or Ex-Partner:      Emotionally Abused:      Physically Abused:      Sexually Abused:         VITALS  Patient Vitals for the past 24 hrs:   BP Temp Temp src Pulse Resp SpO2 Weight   07/19/21 1220 (!) 155/86 -- -- 76 20 96 % --   07/19/21 1210 (!) 156/78 -- -- 78 22 97 % --   07/19/21 1200 (!) 186/98 -- -- 79 24 96 % --   07/19/21 1134 (!) 208/102 98.2  F (36.8  C) Oral 79 18 98 % --   07/19/21 1125 (!) (P) 212/103 -- -- (P) 84 (P) 16 (P) 97 % (P) 110.7 kg (244 lb)        ================================================================    PHYSICAL EXAM     VITAL SIGNS: BP (!) 155/86   Pulse 76   Temp 98.2  F (36.8  C) (Oral)   Resp 20   Wt (P) 110.7 kg (244 lb)   SpO2 96%   BMI (P) 34.03 kg/m     Constitutional:  Awake, no acute distress, obese  HENT:  Atraumatic, oropharynx without exudate or erythema, membranes moist  Lymph:  No adenopathy  Eyes: EOM intact, PERRL, no injection  Neck: Supple  Respiratory:  Clear to auscultation bilaterally, no wheezes or crackles   Cardiovascular:  Regular rate and rhythm, single S1 and S2   GI:  Soft, nontender, nondistended, no rebound or guarding   Musculoskeletal:  Moves all extremities, no lower extremity edema, no deformities    Skin:  Warm, dry  Neurologic:  Alert and oriented x3, no focal deficits noted       ================================================================  LAB       All pertinent labs reviewed and interpreted.   Labs Ordered and Resulted from Time of ED Arrival Up to the Time of Departure from the ED   BASIC METABOLIC PANEL - Abnormal; Notable for the following components:       Result Value    Chloride 109 (*)     Urea  Nitrogen 48 (*)     Creatinine 4.34 (*)     GFR Estimate 15 (*)     All other components within normal limits   CBC WITH PLATELETS AND DIFFERENTIAL - Abnormal; Notable for the following components:    RBC Count 4.15 (*)     Hemoglobin 12.9 (*)     Hematocrit 37.3 (*)     Platelet Count 146 (*)     All other components within normal limits   TROPONIN I - Normal   B-TYPE NATRIURETIC PEPTIDE ([facility] ONLY) - Normal   EXTRA BLUE TOP TUBE   EXTRA RED TOP TUBE   SARS-COV2 (COVID-19) VIRUS RT-PCR   PERIPHERAL IV CATHETER   CARDIAC CONTINUOUS MONITORING   CALL   COVID-19 VIRUS (CORONAVIRUS) BY PCR    Narrative:     The following orders were created for panel order Asymptomatic COVID-19 Virus (Coronavirus) by PCR Nasopharyngeal.  Procedure                               Abnormality         Status                     ---------                               -----------         ------                     SARS-COV2 (COVID-19) Vir...[[id number]]                      In process                   Please view results for these tests on the individual orders.   CBC WITH PLATELETS & DIFFERENTIAL    Narrative:     The following orders were created for panel order CBC with platelets differential.  Procedure                               Abnormality         Status                     ---------                               -----------         ------                     CBC with platelets and d...[[id number]]  Abnormal            Final result                 Please view results for these tests on the individual orders.   EXTRA TUBE    Narrative:     The following orders were created for panel order Lancing Draw.  Procedure                               Abnormality         Status                     ---------                               -----------         ------                     Extra Blue Top Tube[[id number]]                              In process                 Extra Red Top Tube[[id number]]                               In process                    Please view results for these tests on the individual orders.        ===============================================================  RADIOLOGY       Reviewed all pertinent imaging. Please see official radiology report.   XR Chest Port 1 View   Final Result   IMPRESSION:       Shallow lung inflation. Minimal paradiaphragmatic atelectasis along the left hemidiaphragm, unchanged from 10/04/2020. No new lung opacities. Normal vascularity.      Symmetric lung inflation. Mediastinal borders are well-defined. Sternal wires are intact and aligned.      No pleural fluid or pneumothorax.            ================================================================  EKG     EKG reviewed interpreted by me shows sinus rhythm with rate of 80, normal axis,  with no acute ST or T wave changes since October 2020    I have independently reviewed and interpreted the EKG(s) documented above.     ================================================================  PROCEDURES         I, Frances Almanzar, am serving as a scribe to document services personally performed by Dr. Vu based on my observation and the provider's statements to me. I, Mindy Vu MD attest that Frances Almanzar is acting in a scribe capacity, has observed my performance of the services and has documented them in accordance with my direction.     Mindy Vu M.D.   Emergency Medicine   [facility] EMERGENCY DEPARTMENT  [address] [place] [id number]  [phone]  Dept: [phone]        Mindy Vu MD  07/19/21 1320

## 2021-07-20 ENCOUNTER — APPOINTMENT (OUTPATIENT)
Dept: CARDIOLOGY | Facility: HOSPITAL | Age: 46
DRG: 287 | End: 2021-07-20
Attending: INTERNAL MEDICINE
Payer: COMMERCIAL

## 2021-07-20 LAB
ALBUMIN SERPL-MCNC: 3 G/DL (ref 3.5–5)
ALP SERPL-CCNC: 63 U/L (ref 45–120)
ALT SERPL W P-5'-P-CCNC: 34 U/L (ref 0–45)
ANION GAP SERPL CALCULATED.3IONS-SCNC: 10 MMOL/L (ref 5–18)
AST SERPL W P-5'-P-CCNC: 25 U/L (ref 0–40)
ATRIAL RATE - MUSE: 80 BPM
BASOPHILS # BLD AUTO: 0 10E3/UL (ref 0–0.2)
BASOPHILS NFR BLD AUTO: 1 %
BILIRUB SERPL-MCNC: 0.5 MG/DL (ref 0–1)
BUN SERPL-MCNC: 46 MG/DL (ref 8–22)
CALCIUM SERPL-MCNC: 8.5 MG/DL (ref 8.5–10.5)
CHLORIDE BLD-SCNC: 110 MMOL/L (ref 98–107)
CO2 SERPL-SCNC: 21 MMOL/L (ref 22–31)
CREAT SERPL-MCNC: 4.09 MG/DL (ref 0.7–1.3)
DIASTOLIC BLOOD PRESSURE - MUSE: 113 MMHG
EOSINOPHIL # BLD AUTO: 0.2 10E3/UL (ref 0–0.7)
EOSINOPHIL NFR BLD AUTO: 4 %
ERYTHROCYTE [DISTWIDTH] IN BLOOD BY AUTOMATED COUNT: 12.2 % (ref 10–15)
GFR SERPL CREATININE-BSD FRML MDRD: 16 ML/MIN/1.73M2
GLUCOSE BLD-MCNC: 61 MG/DL (ref 70–125)
GLUCOSE BLDC GLUCOMTR-MCNC: 114 MG/DL (ref 70–125)
GLUCOSE BLDC GLUCOMTR-MCNC: 119 MG/DL (ref 70–125)
GLUCOSE BLDC GLUCOMTR-MCNC: 146 MG/DL (ref 70–125)
GLUCOSE BLDC GLUCOMTR-MCNC: 154 MG/DL (ref 70–125)
GLUCOSE BLDC GLUCOMTR-MCNC: 191 MG/DL (ref 70–125)
GLUCOSE BLDC GLUCOMTR-MCNC: 65 MG/DL (ref 70–125)
HCT VFR BLD AUTO: 37.1 % (ref 40–53)
HGB BLD-MCNC: 12.9 G/DL (ref 13.3–17.7)
IMM GRANULOCYTES # BLD: 0 10E3/UL
IMM GRANULOCYTES NFR BLD: 0 %
INTERPRETATION ECG - MUSE: NORMAL
LVEF ECHO: NORMAL
LYMPHOCYTES # BLD AUTO: 2.5 10E3/UL (ref 0.8–5.3)
LYMPHOCYTES NFR BLD AUTO: 36 %
MCH RBC QN AUTO: 31 PG (ref 26.5–33)
MCHC RBC AUTO-ENTMCNC: 34.8 G/DL (ref 31.5–36.5)
MCV RBC AUTO: 89 FL (ref 78–100)
MONOCYTES # BLD AUTO: 0.4 10E3/UL (ref 0–1.3)
MONOCYTES NFR BLD AUTO: 6 %
NEUTROPHILS # BLD AUTO: 3.6 10E3/UL (ref 1.6–8.3)
NEUTROPHILS NFR BLD AUTO: 53 %
NRBC # BLD AUTO: 0 10E3/UL
NRBC BLD AUTO-RTO: 0 /100
P AXIS - MUSE: 59 DEGREES
PLATELET # BLD AUTO: 149 10E3/UL (ref 150–450)
POTASSIUM BLD-SCNC: 3.8 MMOL/L (ref 3.5–5)
PR INTERVAL - MUSE: 186 MS
PROT SERPL-MCNC: 5.4 G/DL (ref 6–8)
PTH-INTACT SERPL-MCNC: 190 PG/ML (ref 10–86)
QRS DURATION - MUSE: 108 MS
QT - MUSE: 392 MS
QTC - MUSE: 452 MS
R AXIS - MUSE: 85 DEGREES
RBC # BLD AUTO: 4.16 10E6/UL (ref 4.4–5.9)
SODIUM SERPL-SCNC: 141 MMOL/L (ref 136–145)
SYSTOLIC BLOOD PRESSURE - MUSE: 208 MMHG
T AXIS - MUSE: 84 DEGREES
TROPONIN I SERPL-MCNC: <0.01 NG/ML (ref 0–0.29)
VENTRICULAR RATE- MUSE: 80 BPM
WBC # BLD AUTO: 6.8 10E3/UL (ref 4–11)

## 2021-07-20 PROCEDURE — 36415 COLL VENOUS BLD VENIPUNCTURE: CPT | Performed by: INTERNAL MEDICINE

## 2021-07-20 PROCEDURE — 999N000208 ECHOCARDIOGRAM COMPLETE

## 2021-07-20 PROCEDURE — G0378 HOSPITAL OBSERVATION PER HR: HCPCS

## 2021-07-20 PROCEDURE — 82306 VITAMIN D 25 HYDROXY: CPT | Performed by: INTERNAL MEDICINE

## 2021-07-20 PROCEDURE — 85025 COMPLETE CBC W/AUTO DIFF WBC: CPT | Performed by: INTERNAL MEDICINE

## 2021-07-20 PROCEDURE — 250N000013 HC RX MED GY IP 250 OP 250 PS 637: Performed by: INTERNAL MEDICINE

## 2021-07-20 PROCEDURE — 83970 ASSAY OF PARATHORMONE: CPT | Performed by: INTERNAL MEDICINE

## 2021-07-20 PROCEDURE — 99233 SBSQ HOSP IP/OBS HIGH 50: CPT | Performed by: INTERNAL MEDICINE

## 2021-07-20 PROCEDURE — 99223 1ST HOSP IP/OBS HIGH 75: CPT | Performed by: INTERNAL MEDICINE

## 2021-07-20 PROCEDURE — 255N000002 HC RX 255 OP 636: Performed by: INTERNAL MEDICINE

## 2021-07-20 PROCEDURE — 96372 THER/PROPH/DIAG INJ SC/IM: CPT | Performed by: INTERNAL MEDICINE

## 2021-07-20 PROCEDURE — 93306 TTE W/DOPPLER COMPLETE: CPT | Mod: 26 | Performed by: INTERNAL MEDICINE

## 2021-07-20 PROCEDURE — 250N000012 HC RX MED GY IP 250 OP 636 PS 637

## 2021-07-20 PROCEDURE — 82040 ASSAY OF SERUM ALBUMIN: CPT | Performed by: INTERNAL MEDICINE

## 2021-07-20 PROCEDURE — 250N000011 HC RX IP 250 OP 636: Performed by: INTERNAL MEDICINE

## 2021-07-20 PROCEDURE — 84484 ASSAY OF TROPONIN QUANT: CPT | Performed by: INTERNAL MEDICINE

## 2021-07-20 PROCEDURE — 250N000012 HC RX MED GY IP 250 OP 636 PS 637: Performed by: INTERNAL MEDICINE

## 2021-07-20 RX ORDER — LOSARTAN POTASSIUM 50 MG/1
100 TABLET ORAL DAILY
Status: DISCONTINUED | OUTPATIENT
Start: 2021-07-21 | End: 2021-07-25

## 2021-07-20 RX ORDER — ISOSORBIDE MONONITRATE 30 MG/1
30 TABLET, EXTENDED RELEASE ORAL DAILY
Status: DISCONTINUED | OUTPATIENT
Start: 2021-07-20 | End: 2021-07-23

## 2021-07-20 RX ADMIN — ISOSORBIDE MONONITRATE 30 MG: 30 TABLET, EXTENDED RELEASE ORAL at 08:47

## 2021-07-20 RX ADMIN — INSULIN ASPART 1 UNITS: 100 INJECTION, SOLUTION INTRAVENOUS; SUBCUTANEOUS at 17:40

## 2021-07-20 RX ADMIN — CARVEDILOL 25 MG: 12.5 TABLET, FILM COATED ORAL at 20:30

## 2021-07-20 RX ADMIN — HEPARIN SODIUM 5000 UNITS: 10000 INJECTION, SOLUTION INTRAVENOUS; SUBCUTANEOUS at 20:30

## 2021-07-20 RX ADMIN — HEPARIN SODIUM 5000 UNITS: 10000 INJECTION, SOLUTION INTRAVENOUS; SUBCUTANEOUS at 08:49

## 2021-07-20 RX ADMIN — ATORVASTATIN CALCIUM 80 MG: 40 TABLET, FILM COATED ORAL at 08:47

## 2021-07-20 RX ADMIN — INSULIN GLARGINE 40 UNITS: 100 INJECTION, SOLUTION SUBCUTANEOUS at 20:31

## 2021-07-20 RX ADMIN — DOXAZOSIN 2 MG: 1 TABLET ORAL at 20:30

## 2021-07-20 RX ADMIN — LOSARTAN POTASSIUM 50 MG: 50 TABLET, FILM COATED ORAL at 08:47

## 2021-07-20 RX ADMIN — INSULIN ASPART 1 UNITS: 100 INJECTION, SOLUTION INTRAVENOUS; SUBCUTANEOUS at 12:17

## 2021-07-20 RX ADMIN — INSULIN ASPART 3 UNITS: 100 INJECTION, SOLUTION INTRAVENOUS; SUBCUTANEOUS at 08:48

## 2021-07-20 RX ADMIN — CARVEDILOL 25 MG: 12.5 TABLET, FILM COATED ORAL at 08:47

## 2021-07-20 RX ADMIN — PERFLUTREN 2 ML: 6.52 INJECTION, SUSPENSION INTRAVENOUS at 14:45

## 2021-07-20 RX ADMIN — Medication 125 MCG: at 08:50

## 2021-07-20 RX ADMIN — GLIMEPIRIDE 4 MG: 1 TABLET ORAL at 08:48

## 2021-07-20 RX ADMIN — INSULIN ASPART 3 UNITS: 100 INJECTION, SOLUTION INTRAVENOUS; SUBCUTANEOUS at 17:40

## 2021-07-20 RX ADMIN — INSULIN ASPART 3 UNITS: 100 INJECTION, SOLUTION INTRAVENOUS; SUBCUTANEOUS at 12:17

## 2021-07-20 RX ADMIN — ASPIRIN 81 MG: 81 TABLET, COATED ORAL at 08:47

## 2021-07-20 NOTE — PLAN OF CARE
Pt. Denies pain, VSS  Night BP not high enough for PRN hydralazine  PRN clonidine given at bedtime  Up independently in room  Grouping cares to help promote rest  Continue to monitor

## 2021-07-20 NOTE — PLAN OF CARE
Problem: Adult Inpatient Plan of Care  Goal: Optimal Comfort and Wellbeing  Outcome: Improving     Problem: Hypertension Acute  Goal: Blood Pressure Within Desired Range  Outcome: Improving   Patient denied  chest pain. Reports SOB with minimal activity.    Improved blood pressures.     07/20/21 0803 07/20/21 1158   Vital Signs   Temp 98.1  F (36.7  C) 98.3  F (36.8  C)   Temp src Oral Oral   Resp 18 18   Pulse 72 73   Pulse Rate Source Monitor Monitor   BP (!) 159/88 118/66   BP Location Right arm Right arm

## 2021-07-20 NOTE — PROGRESS NOTES
[facility]    Medicine Progress Note - Hospitalist Service       Date of Admission:  7/19/2021    Assessment & Plan            Siva Ramey is a 46 year old male admitted on 7/19/2021.     Chest pain, likely due to hypertensive urgency.  SBP above 200 on admission.  No signs of volume overload, given his CKD 4-5.  Normal BNP.  Normal troponin and no new ischemic changes on the EKG.  Optimize BP, SBP goal 140-160 tonight, then to the goal 130/80 given DM, CAD.  Serial troponin.  As needed NTG.  On ASA.  Echo 7/20-EF 45 to 50%.  Cardiology recommended increasing dose of losartan, started to exacerbate nitrate, coronary angiogram    CKD stage IV-5.  AV fistula placed in left forearm, with good bruit and thrill.  Normal CO2, potassium.  No volume overload.  Possibly early uremia-reports increased fatigue recently.  Normal TSH.  Holding PTA low-dose Lasix for now/normal BNP.  Patient follows with Dr. Rios.  Patient was seen by Dr. Pollock, recommendations reviewed.  Likely will need initiation of hemodialysis, if decision made to pursue coronary angiogram.    Hypertensive urgency.  In patient with CAD, IDDM, CKD 5.  No pulmonary edema on CXR.  Continue PTA Coreg, losartan.  As needed IV hydralazine to keep -160.     IDDM.  A1c 8.2 on 7/15/2021.  On combination of Lantus and Lixisenatide once a day 40 units injection, prandial 10 unit NovoLog. Soliqua is not formulary here, patient does not have his own supply.  Will use 40 units of Lantus, first dose now, as he missed his a.m. dose, prandial and SSI NovoLog.  Diabetes educator consulted.  Recommendations reviewed.  Amaryl discontinued.    HLD, on high-dose Lipitor 80 mg, continue.       Diet: Consistent Carb 75 grams CHO per Meal Diet    DVT Prophylaxis: Heparin SQ  Burt Catheter: Not present  Central Lines: None  Code Status: Full Code      Disposition Plan   Expected discharge: 07/21/2021 recommended to prior living arrangement once  Cardiology/nephrology plans implemented-angiogram, initiation of dialysis..     The patient's care was discussed with the Bedside Nurse and Patient.    Stephany Tabares MD  Hospitalist Service  [facility]  Securely message with the Vocera Web Console (learn more here)  Text page via [facility] Paging/Directory      Risk Factors Present on Admission              # Platelet Defect: home medication list includes an antiplatelet medication      ______________________________________________________________________    Interval History   Uneventful night.  BP normalized.  No recurrence of chest pain.  No dyspnea at rest.  No abdominal pain, nausea, dysuria, fever, chills, skin rash.  Seen by cardiology, losartan dose increased, added isosorbide mononitrate, recommended angiography.    Data reviewed today: I reviewed all medications, new labs and imaging results over the last 24 hours. I personally reviewed.    Physical Exam   Vital Signs: Temp: 98.6  F (37  C) Temp src: Oral BP: 139/73 Pulse: 75   Resp: 22 SpO2: 94 % O2 Device: None (Room air)    Weight: 252 lbs 9.6 oz  General: Alert and oriented x 3. Not in obvious distress.  HEENT: NC, AT. Neck- supple, No JVP elevation, lymphadenopathy or thyromegaly. Trachea-central.  Chest: No trauma scar.  Clear to auscultation bilaterally.  Heart: S1S2 regular. No M/R/G.  Abdomen: Soft. NT, ND. No organomegaly. Bowel sounds- active.  Back: No spine tenderness. No CVA tenderness.  Extremities: 1 Plus pitting leg edema.  Peripheral pulses 2+ bilaterally.  Neuro: Cranial nerves 1-12 grossly normal. No focal neurological deficit.    Data   Recent Labs   Lab 07/20/21  1156 07/20/21  0803 07/20/21  0630 07/20/21  0431 07/19/21  1155 07/19/21  1154   WBC  --   --   --  6.8 6.7  --    HGB  --   --   --  12.9* 12.9*  --    MCV  --   --   --  89 90  --    PLT  --   --   --  149* 146*  --    NA  --   --   --  141  --  141   POTASSIUM  --   --   --  3.8  --  4.5   CHLORIDE   --   --   --  110*  --  109*   CO2  --   --   --  21*  --  22   BUN  --   --   --  46*  --  48*   CR  --   --   --  4.09*  --  4.34*   ANIONGAP  --   --   --  10  --  10   BETHANY  --   --   --  8.5  --  8.6   * 114 119 61*  --  125   ALBUMIN  --   --   --  3.0*  --   --    PROTTOTAL  --   --   --  5.4*  --   --    BILITOTAL  --   --   --  0.5  --   --    ALKPHOS  --   --   --  63  --   --    ALT  --   --   --  34  --   --    AST  --   --   --  25  --   --      Recent Results (from the past 24 hour(s))   Echocardiogram Complete   Result Value    LVEF  45-50% (mildly reduced)    Narrative    [id number]  [id number]  [id number]  [id number]^ERIN^ZOHREH^[place] [address]     Name: ISABEL ARBOLEDA  MRN: [id number]  : 1975  Study Date: 2021 01:29 PM  Age: 46 yrs  Gender: Male  Patient Location: [facility]  Reason For Study: Chest Pain  Ordering Physician: ZOHREH KHAN  Performed By: TACO     BSA: 2.3 m2  Height: 71 in  Weight: 252 lb  ______________________________________________________________________________  ______________________________________________________________________________  Interpretation Summary     The left ventricle is normal in size.  Left ventricular function is decreased. The ejection fraction is 45-50%  (mildly reduced).  Normal right ventricle size and systolic function.  Mild sclerodegenerative valve disease is present without hemodynamically  significant stenosis or regurgitation.     Compared to the prior study dated 2020, there are changes as noted.  ______________________________________________________________________________  I      WMSI = 2.00     % Normal = 0     X - Cannot   0 -                      (2) - Mildly 2 -          Segments  Size  Interpret    Hyperkinetic 1 - Normal  Hypokinetic  Hypokinetic  1-2     small                                                     7 -          3-5      moderate  3 - Akinetic 4 -           5 -         6 - Akinetic Dyskinetic   6-14    large               Dyskinetic   Aneurysmal  w/scar       w/scar       15-16   diffuse     Left Ventricle  The left ventricle is normal in size. Left ventricular function is decreased.  The ejection fraction is 45-50% (mildly reduced). There is normal left  ventricular wall thickness. Left ventricular diastolic function is normal.  There is mild global hypokinesia of the left ventricle.     Right Ventricle  Normal right ventricle size and systolic function.     Atria  Normal left atrial size. Right atrial size is normal. There is no color  Doppler evidence of an atrial shunt.     Mitral Valve  The mitral valve leaflets are mildly thickened. There is physiologic mitral  regurgitation. There is no mitral valve stenosis.     Tricuspid Valve  Tricuspid valve leaflets appear normal. There is no evidence of tricuspid  stenosis or clinically significant tricuspid regurgitation. Right ventricular  systolic pressure could not be approximated due to inadequate tricuspid  regurgitation.     Aortic Valve  The aortic valve is trileaflet with aortic valve sclerosis.     Pulmonic Valve  The pulmonic valve is not well seen, but is grossly normal. This degree of  valvular regurgitation is within normal limits. There is trace pulmonic  valvular regurgitation.     Vessels  The aorta root is normal. The ascending aorta is Mildly dilated. IVC diameter  <2.1 cm collapsing >50% with sniff suggests a normal RA pressure of 3 mmHg.     Pericardium  There is no pericardial effusion.     ______________________________________________________________________________  MMode/2D Measurements & Calculations  RVDd: 3.8 cm  IVSd: 1.3 cm  LVIDd: 5.2 cm  LVIDs: 3.9 cm  LVPWd: 1.2 cm  FS: 25.3 %  LV mass(C)d: 253.3 grams  LV mass(C)dI: 108.9 grams/m2     Ao root diam: 3.5 cm  LA dimension: 4.7 cm  asc Aorta Diam: 4.0 cm  LA/Ao: 1.3  LVOT diam: 2.4 cm  LVOT area: 4.4 cm2  LA Volume Indexed (AL/bp): 29.2  ml/m2     Time Measurements  MM HR: 72.0 BPM     Doppler Measurements & Calculations  MV E max holley: 45.6 cm/sec  MV A max holley: 61.7 cm/sec  MV E/A: 0.74     MV dec time: 0.19 sec  LV V1 max P.1 mmHg  LV V1 max: 72.0 cm/sec  LV V1 VTI: 15.5 cm  SV(LVOT): 68.1 ml  SI(LVOT): 29.3 ml/m2  PA acc time: 0.10 sec  E/E' av.3  Lateral E/e': 4.3  Medial E/e': 6.4     ______________________________________________________________________________  Report approved by: Brianda Vance 2021 03:01 PM           Medications       aspirin  81 mg Oral Daily     atorvastatin  80 mg Oral Daily     carvedilol  25 mg Oral BID     doxazosin  2 mg Oral At Bedtime     heparin ANTICOAGULANT  5,000 Units Subcutaneous Q12H     insulin aspart  1-7 Units Subcutaneous TID AC     insulin aspart  1-5 Units Subcutaneous At Bedtime     insulin aspart  3 Units Subcutaneous TID AC     insulin glargine  40 Units Subcutaneous At Bedtime     isosorbide mononitrate  30 mg Oral Daily     [START ON 2021] losartan  100 mg Oral Daily     sodium chloride (PF)  3 mL Intracatheter Q8H     vitamin D3  125 mcg Oral Daily

## 2021-07-20 NOTE — CONSULTS
RENAL CONSULTATION:     Date of Consultation:  7/20/2021    Requesting Physician: Dr. Tabares  Reason for Consult:  Manage CKD    Assessment/ Recommendations:  1. 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  Patient with progressively declining kidney function over the past 2 years with creatinine 1.81 mg/dL in June 2019 worsening to 4.34 mg/dL on 7/19/2021 admission.  He follows with Dr. Rios in clinic with dialysis fistula placed in June 2021 and left forearm that is not yet mature.  He has noted increasing fatigue over the past several weeks with poorly controlled hypertension chronically.  Denies anorexia, dysgeusia, orthopnea or other uremic symptoms at this time so no emergent need for dialysis.   -Likely would need dialysis with coronary angiogram if decided to proceed and he understands this.   -If no cardiac cause for increased dyspnea on exertion noted, I did discuss the option of dialysis initiation to see if that would help but no guarantee that that would affect his symptoms as he appears relatively euvolemic on exam.   -Agree with increasing losartan to 100 mg daily for better blood pressure control.   -Has been referred to [facility] transplant clinic for evaluation just recently.    2.  Hypertensive urgency: Poorly controlled chronically likely cause for advanced CKD.  Blood pressure appears markedly improved at present during inpatient stay following addition of isosorbide.   -  agree with increasing losartan to 100 mg daily    3.  Coronary artery disease with previous CABG x2 in June 2019.  Echocardiogram pending by cardiology with ongoing evaluation pending.  We did discuss high risk for requiring dialysis with possible angiogram although he is on the brink of dialysis as it is.     4. CKD-MBD: History of hyperparathyroidism but no labs since April 2020 so we will update his vitamin D and PTH levels.  Phosphorus was reasonably controlled in April at 5.2 will be rechecked.    5.  Status post left forearm AVF  placement at [facility] June 2021 (5 weeks ago). Still immature and would need tunneled CVC if dialysis required at present.       This document is created with the help of Dragon dictation system. All grammatical errors are unintentional.     Vikash Pollock MD  [facility]  [phone] (off)         History of present illness: Siva is a 46-year-old gentleman with a longstanding history of hypertension, coronary artery disease with CABG x2 in 2019, type 2 diabetes mellitus with diabetic nephropathy, hyperlipidemia and ischemic cardiomyopathy who has advancing chronic kidney disease and followed in our clinic by Dr. Aime Rios.  His creatinine has progressively worsened from 1.7 to around 4 mg/dL since 2019 for which Dr. Rios referred him for left forearm arteriovenous fistula placement in early June 2021 that is currently maturing.  He presented to the emergency department yesterday with complaint of increasing fatigue as well as chest pain and dyspnea on exertion.  He notes that the dyspnea on exertion and fatigue have been present for 2 to 4 weeks.  His chest pain is right-sided and typically occurs with exertion.  He denies nausea or vomiting.  Food still tastes good and his intake is maintained.  He denies significant lower extremity edema nor orthopnea.    Past Medical History:   Diagnosis Date     Angina pectoris (H)     rare since PCI     Chronic kidney disease     CKD stage 3     Coronary artery disease      Diabetes mellitus, type II (H) 12/2001     Diabetic nephropathy (H)      DM II (diabetes mellitus, type II), controlled (H)      MARQUEZ (dyspnea on exertion)      Dyslipidemia 12/2001     HTN (hypertension) 2004     Hyperlipidemia      Hypertension      Ischemic cardiomyopathy      Myocardial infarction (H)     STEMI -Diagonal branch of the LAD     Obesity (BMI 30-39.9)      Proteinuria      Vitamin D deficiency          Current Facility-Administered  Medications:      acetaminophen (TYLENOL) tablet 650 mg, 650 mg, Oral, Q6H PRN **OR** acetaminophen (TYLENOL) Suppository 650 mg, 650 mg, Rectal, Q6H PRN, Stephany Tabares MD     albuterol (PROAIR HFA/PROVENTIL HFA/VENTOLIN HFA) 108 (90 Base) MCG/ACT inhaler 2 puff, 2 puff, Inhalation, Q4H PRN, Stephany Tabares MD     aspirin EC tablet 81 mg, 81 mg, Oral, Daily, Stephany Tabares MD, 81 mg at 07/20/21 0847     atorvastatin (LIPITOR) tablet 80 mg, 80 mg, Oral, Daily, Stephany Tabares MD, 80 mg at 07/20/21 0847     carvedilol (COREG) tablet 25 mg, 25 mg, Oral, BID, Stephany Tabares MD, 25 mg at 07/20/21 0847     cloNIDine (CATAPRES) tablet 0.1 mg, 0.1 mg, Oral, TID PRN, Stephany Tabares MD, 0.1 mg at 07/19/21 2139     glucose gel 15-30 g, 15-30 g, Oral, Q15 Min PRN **OR** dextrose 50 % injection 25-50 mL, 25-50 mL, Intravenous, Q15 Min PRN **OR** glucagon injection 1 mg, 1 mg, Subcutaneous, Q15 Min PRN, Stephany Tabares MD     doxazosin (CARDURA) tablet 2 mg, 2 mg, Oral, At Bedtime, Setphany Tabares MD, 2 mg at 07/19/21 2139     glimepiride (AMARYL) tablet 4 mg, 4 mg, Oral, BID AC, Stephany Tabares MD, 4 mg at 07/20/21 0848     heparin ANTICOAGULANT injection 5,000 Units, 5,000 Units, Subcutaneous, Q12H, Stephany Tabares MD, 5,000 Units at 07/20/21 0849     hydrALAZINE (APRESOLINE) injection 10 mg, 10 mg, Intravenous, Q4H PRN, Stephany Tabares MD     insulin aspart (NovoLOG) injection (RAPID ACTING), 1-7 Units, Subcutaneous, TID LEE, Stephany Tabares MD, 1 Units at 07/20/21 1217     insulin aspart (NovoLOG) injection (RAPID ACTING), 1-5 Units, Subcutaneous, At Bedtime, Stephany Tabares MD     insulin aspart (NovoLOG) injection (RAPID ACTING), 3 Units, Subcutaneous, TID LEE, Stephany Tabares MD, 3 Units at 07/20/21 1217     insulin glargine (LANTUS PEN) injection 40 Units, 40 Units, Subcutaneous, At Bedtime, Stephany Tabares MD, 40 Units at 07/19/21 2140     isosorbide  mononitrate (IMDUR) 24 hr tablet 30 mg, 30 mg, Oral, Daily, Joe Santo MD, 30 mg at 07/20/21 0847     lidocaine (LMX4) cream, , Topical, Q1H PRN, Stephany Tabares MD     lidocaine 1 % 0.1-1 mL, 0.1-1 mL, Other, Q1H PRN, Stephany Tabares MD     losartan (COZAAR) tablet 50 mg, 50 mg, Oral, Daily, Stephany Tabares MD, 50 mg at 07/20/21 0847     melatonin tablet 1 mg, 1 mg, Oral, At Bedtime PRN, Stephany Tabares MD     nitroGLYcerin (NITROSTAT) sublingual tablet 0.4 mg, 0.4 mg, Sublingual, Q5 Min PRN, Mindy Vu MD, 0.4 mg at 07/19/21 1222     ondansetron (ZOFRAN-ODT) ODT tab 4 mg, 4 mg, Oral, Q6H PRN **OR** ondansetron (ZOFRAN) injection 4 mg, 4 mg, Intravenous, Q6H PRN, Stephany Tabares MD     polyethylene glycol (MIRALAX) Packet 17 g, 17 g, Oral, Daily PRN, Stephany Tabares MD     prochlorperazine (COMPAZINE) injection 10 mg, 10 mg, Intravenous, Q6H PRN **OR** prochlorperazine (COMPAZINE) tablet 10 mg, 10 mg, Oral, Q6H PRN **OR** prochlorperazine (COMPAZINE) suppository 25 mg, 25 mg, Rectal, Q12H PRN, Stephany Tabares MD     sodium chloride (PF) 0.9% PF flush 3 mL, 3 mL, Intracatheter, Q8H, Stephany Tabares MD, 3 mL at 07/20/21 1216     sodium chloride (PF) 0.9% PF flush 3 mL, 3 mL, Intracatheter, q1 min prn, Stephany Tabares MD     Vitamin D3 (CHOLECALCIFEROL) tablet 125 mcg, 125 mcg, Oral, Daily, Stephany Tabares MD, 125 mcg at 07/20/21 0850    No Known Allergies    Social History     Socioeconomic History     Marital status:      Spouse name: Not on file     Number of children: Not on file     Years of education: Not on file     Highest education level: Not on file   Occupational History     Not on file   Tobacco Use     Smoking status: Never Smoker     Smokeless tobacco: Never Used   Substance and Sexual Activity     Alcohol use: Yes     Comment: Alcoholic Drinks/day: 1-2     Drug use: No     Sexual activity: Yes     Partners: Female   Other Topics Concern      Not on file   Social History Narrative     Not on file     Social Determinants of Health     Financial Resource Strain:      Difficulty of Paying Living Expenses:    Food Insecurity:      Worried About Running Out of Food in the Last Year:      Ran Out of Food in the Last Year:    Transportation Needs:      Lack of Transportation (Medical):      Lack of Transportation (Non-Medical):    Physical Activity:      Days of Exercise per Week:      Minutes of Exercise per Session:    Stress:      Feeling of Stress :    Social Connections:      Frequency of Communication with Friends and Family:      Frequency of Social Gatherings with Friends and Family:      Attends [employer] Services:      Active Member of Clubs or Organizations:      Attends Club or Organization Meetings:      Marital Status:    Intimate Partner Violence:      Fear of Current or Ex-Partner:      Emotionally Abused:      Physically Abused:      Sexually Abused:        Family History   Problem Relation Age of Onset     Heart Disease Father 60.00     CABG Father 50.00        triple bypass       Review of Systems: Denies dizziness, headache, sudden change in vision or hearing.  Chest pain and shortness of breath as noted above.  Appetite is good without nausea or vomiting.  Denies dysuria or hematuria. The remainder of 10 point review of systems is negative except as noted in HPI above.     /66 (BP Location: Right arm)   Pulse 73   Temp 98.3  F (36.8  C) (Oral)   Resp 18   Wt 114.6 kg (252 lb 9.6 oz)   SpO2 94%   BMI 35.23 kg/m      Intake/Output Summary (Last 24 hours) at 7/20/2021 1325  Last data filed at 7/20/2021 0900  Gross per 24 hour   Intake 1320 ml   Output --   Net 1320 ml     Physical Exam:   GENERAL: calm and comfortable, alert  EYES: No scleral icterus, conjunctiva clear  ENT: Hearing normal, Oral mucosa moist  RESP: Clear to auscultation bilaterally with no respiratory distress, normal effort.  CV: RRR, 2/6 systolic murmur at base.   Trace leg edema.    GI: Active BS, Soft, NT/ND, no masses or HSM  : No CVA tenderness   Musculoskeletal: Normal muscle bulk/ tone; No gross joint abnormalities  SKIN: No rash, warm/ dry  PSYCH:  Appropriate mood and affect  Left forearm aVF with palpable thrill and bruit yet immature.  Lymph: No cervical adenopathy    LABS:  Sodium (mmol/L)   Date Value   07/20/2021 141   07/19/2021 141   10/05/2020 141   10/04/2020 141     Potassium (mmol/L)   Date Value   07/20/2021 3.8   07/19/2021 4.5   10/05/2020 4.0   10/04/2020 4.1     Chloride (mmol/L)   Date Value   07/20/2021 110 (H)   07/19/2021 109 (H)   10/05/2020 110 (H)   10/04/2020 105     Carbon Dioxide (CO2) (mmol/L)   Date Value   07/20/2021 21 (L)   07/19/2021 22   10/05/2020 21 (L)   10/04/2020 23     Urea Nitrogen (mg/dL)   Date Value   07/20/2021 46 (H)   07/19/2021 48 (H)   10/05/2020 34 (H)   10/04/2020 38 (H)     Creatinine (mg/dL)   Date Value   07/20/2021 4.09 (H)   07/19/2021 4.34 (H)   10/05/2020 3.02 (H)   10/04/2020 3.44 (H)     Phosphorus (mg/dL)   Date Value   06/24/2019 3.1     Hemoglobin A1C (%)   Date Value   06/22/2019 7.8 (H)   01/14/2019 10.0 (H)   05/07/2017 8.6 (H)     Hemoglobin (g/dL)   Date Value   07/20/2021 12.9 (L)   07/19/2021 12.9 (L)   10/05/2020 13.6 (L)   10/04/2020 14.7              All lab data was reviewed at 1:25 PM

## 2021-07-20 NOTE — CONSULTS
HEART CARE CONSULTATON NOTE            Reason for consult: 46-year-old male with a history of coronary artery disease with two-vessel bypass surgery in 2019 with most recent coronary angiogram April 2020, chronic kidney disease asked to see secondary to her exertional dyspnea and exertional chest discomfort.    Primary cardiologist Dr. Che     Assessment:   1.  Chest discomfort.  Patient admitted with symptoms of chest discomfort with hypertensive urgency.  Systolic blood pressure greater than 200 on admission.  No acute EKG changes.  Echocardiogram has been ordered and is pending.  Troponins x2 are negative.  Coronary angiogram from April 2020 reported no appreciable left main or circumflex disease.  LAD demonstrated severe mid vessel disease and patent LIMA to the distal LAD.  Stent in the large first diagonal branch was patent.  The right coronary artery was stated to be grossly unchanged with moderate to severe proximal and distal RCA.  The vein graft to the right coronary artery is occluded.  It appears that the plan was for medical management at that time although having difficulty finding the subsequent recommendations.    2.  Hypertensive urgency.  Patient on Coreg and losartan and IV hydralazine administered in a as needed basis.  Await further input from renal.  AV fistula has been placed in the left forearm.  Current medications include aspirin, carvedilol 25 mg twice daily, Cardura 2 mg at bedtime, losartan 50 mg daily, blood pressures improved but remain moderately elevated overnight.    3.  Risk modification.  Currently on 80 mg of atorvastatin.  The most recent direct LDL in the chart is March 2019 with the LDL was 46, March 2020 HDL 32, cholesterol 175 with significant elevation in triglycerides of 649.  Would recommend repeating lipids.     Plan:   1.  Check lipids and monitor on telemetry.  2.  Await echocardiogram.  3.  Await additional input and discussion with nephrology.  Question  stress testing versus repeat coronary angiography.  4.  Add isosorbide mononitrate.  5.  Consider increasing losartan to 100 mg daily.  Await additional discussion with nephrology.  6.  I asked interventional colleague to review the angiogram from 2020.    Note from Dr. Pollock  reviewed.  Echocardiogram results reviewed.  Outlined below with mild reduction in left ventricular function.  The prior echocardiogram from 2020 reports an ejection fraction of 53% suggesting some mild interval reduction in ejection fraction.  As noted patient would be high risk for coronary angiography but is trending towards need for dialysis and has a fistula and will plan further discussion and consideration of stress testing.  I am awaiting further review of the coronary angiogram by my interventional colleagues as well..  Name: ISABEL ARBOLEDA  MRN: [id number]  : 1975  Study Date: 2021 01:29 PM  Age: 46 yrs  Gender: Male  Patient Location: [facility]  Reason For Study: Chest Pain  Ordering Physician: ZOHREH KHAN  Performed By: TACO     BSA: 2.3 m2  Height: 71 in  Weight: 252 lb  ______________________________________________________________________________  ______________________________________________________________________________  Interpretation Summary     The left ventricle is normal in size.  Left ventricular function is decreased. The ejection fraction is 45-50%  (mildly reduced).  Normal right ventricle size and systolic function.  Mild sclerodegenerative valve disease is present without hemodynamically  significant stenosis or regurgitation.     Compared to the prior study dated 2020, there are changes as noted.   History:      HPI: 46-year-old male with a history of diabetes, hypertension, coronary artery disease with anterior lateral ST myocardial infarction 2019 with a thrombotic occlusion of a large diagonal branch that was treated with a Synergy stent.  He had residual diffuse  moderate to severe LAD and distal RCA disease with an EF at that time of 45%.  He underwent LIMA to the LAD and saphenous vein graft to the right coronary artery in 6/2019.  He did undergo CT scan to look at his pericardium in May 2020 at the recommendation of     He reports that over the last 1 week he has had increasing symptoms of exertional shortness of breath and chest discomfort.  He reported to the admitting physician right-sided chest discomfort associated with dyspnea and leg edema.  He reports that the symptom is similar but not as intense as his myocardial infarction pain.  He was not able to finish his work shift because of the symptoms.  He reports primarily because of increased shortness of breath and fatigue.  He tells me that he has not experienced nonexertional chest discomfort but the symptom can last a few hours post exertion.  Blood pressure upon arrival was 212/103.  Patient recently had access obtained to start dialysis which has not yet been initiated.  He was asymptomatic in the ER but he was given 1 sublingual nitroglycerin which reportedly resolved his discomfort.    Patient follows at [facility] and was seen July 15 for not feeling well.  He has had increasing shortness of breath, fatigue.  It was noted that he had not followed up with cardiology for some time.    Cardiac risk factors are negative for tobacco, negative for alcohol, positive for diabetes, positive for hypertension, positive for renal insufficiency.  He reports that his father had his first myocardial infarction in his 60s.    Past Medical History:   Diagnosis Date     Angina pectoris (H)     rare since PCI     Chronic kidney disease     CKD stage 3     Coronary artery disease      Diabetes mellitus, type II (H) 12/2001     Diabetic nephropathy (H)      DM II (diabetes mellitus, type II), controlled (H)      MARQUEZ (dyspnea on exertion)      Dyslipidemia 12/2001     HTN (hypertension) 2004      Hyperlipidemia      Hypertension      Ischemic cardiomyopathy      Myocardial infarction (H)     STEMI -Diagonal branch of the LAD     Obesity (BMI 30-39.9)      Proteinuria      Vitamin D deficiency       Past Surgical History:   Procedure Laterality Date     BACK SURGERY  2009    L5 disc cut     BYPASS GRAFT ARTERY CORONARY  06/20/2019    2 vessels     CV CORONARY ANGIOGRAM N/A 1/13/2019    Procedure: Coronary Angiogram;  Surgeon: Cielo Che MD;  Location: [facility];  Service: Cardiology     CV CORONARY ANGIOGRAM N/A 5/2/2019    Procedure: Coronary Angiogram;  Surgeon: Cielo Che MD;  Location: [facility];  Service: Cardiology     CV CORONARY ANGIOGRAM N/A 4/30/2020    Procedure: Coronary Angiogram;  Surgeon: Cielo Che MD;  Location: [facility];  Service: Cardiology     CV LEFT HEART CATHETERIZATION WITH LEFT VENTRICULOGRAM N/A 1/13/2019    Procedure: Left Heart Catheterization with Left Ventriculogram;  Surgeon: Cielo Che MD;  Location: [facility];  Service: Cardiology     CV LEFT HEART CATHETERIZATION WITHOUT LEFT VENTRICULOGRAM Left 4/30/2020    Procedure: Left Heart Catheterization Without Left Ventriculogram;  Surgeon: Cielo Che MD;  Location: [facility];  Service: Cardiology     CV RIGHT HEART CATHETERIZATION N/A 4/30/2020    Procedure: Right Heart Catheterization;  Surgeon: Cielo Che MD;  Location: [facility];  Service: Cardiology     HERNIA REPAIR       OTHER SURGICAL HISTORY      Excise varicocele      Social History     Socioeconomic History     Marital status:      Spouse name: Not on file     Number of children: Not on file     Years of education: Not on file     Highest education level: Not on file   Occupational History     Not on file   Tobacco Use     Smoking status: Never Smoker     Smokeless tobacco: Never Used   Substance and Sexual Activity     Alcohol use: Yes     Comment: Alcoholic  Drinks/day: 1-2     Drug use: No     Sexual activity: Yes     Partners: Female   Other Topics Concern     Not on file   Social History Narrative     Not on file     Social Determinants of Health     Financial Resource Strain:      Difficulty of Paying Living Expenses:    Food Insecurity:      Worried About Running Out of Food in the Last Year:      Ran Out of Food in the Last Year:    Transportation Needs:      Lack of Transportation (Medical):      Lack of Transportation (Non-Medical):    Physical Activity:      Days of Exercise per Week:      Minutes of Exercise per Session:    Stress:      Feeling of Stress :    Social Connections:      Frequency of Communication with Friends and Family:      Frequency of Social Gatherings with Friends and Family:      Attends [employer] Services:      Active Member of Clubs or Organizations:      Attends Club or Organization Meetings:      Marital Status:    Intimate Partner Violence:      Fear of Current or Ex-Partner:      Emotionally Abused:      Physically Abused:      Sexually Abused:      Family History   Problem Relation Age of Onset     Heart Disease Father 60.00     CABG Father 50.00        triple bypass     No Known Allergies  No current outpatient medications on file.         Review of Systems  All pertinent positives and negatives reviewed in History.  All other 10 point review of systems reviewed and negative.      Objective:    Physical Exam  VITALS: BP (!) 141/83 (BP Location: Right arm)   Pulse 69   Temp 98.5  F (36.9  C) (Oral)   Resp 16   Wt 114.6 kg (252 lb 9.6 oz)   SpO2 95%   BMI 35.23 kg/m    BMI: Body mass index is 35.23 kg/m .  Wt Readings from Last 3 Encounters:   07/20/21 114.6 kg (252 lb 9.6 oz)   04/30/20 115 kg (253 lb 8 oz)   03/02/20 111.1 kg (245 lb)       Intake/Output Summary (Last 24 hours) at 7/20/2021 0716  Last data filed at 7/20/2021 0609  Gross per 24 hour   Intake 960 ml   Output --   Net 960 ml     General Appearance:  Alert,  cooperative, no distress, appears stated age   HEENT:  Normocephalic, without obvious abnormality   Neck: Supple, symmetrical, trachea midline, no adenopathy, thyroid: not enlarged, symmetric, no carotid bruit or JVD   Back:   Symmetric, no curvature, ROM normal, no CVA tenderness   Lungs:   Clear to auscultation bilaterally, respirations unlabored   Chest Wall:  No tenderness or deformity   Heart:  Regular rate and rhythm, S1, S2 normal,no murmur, rub S4   Abdomen:   Soft, non-tender, bowel sounds active all four quadrants,  no masses, no organomegaly   Extremities: Extremities normal, atraumatic, no cyanosis or edema, good distal pulses in the   Skin: Skin color, texture, turgor normal, no rashes or lesions   Neurologic: Alert and oriented X 3, Moves all 4 extremities     Cardiographics  ECG: July 19 1131 normal sinus rhythm, nonspecific T wave changes.  No significant change when compared to October 2020.  Echocardiogram:    Echocardiogram Complete: Result Notes     Historical Provider   6/30/2021  6:08 PM CDT       Notes recorded by Sarah Peres RN on 5/4/2020 at 10:55 AM CDT  Patient notified of results and recommendations per Dr. Che. Patient verbalizes understanding and agrees with plan. Order placed for CT and Creatinine. Message sent to patient with number to central scheduling per his request.No further questions or concerns at this time. -ejb     -------     Notes recorded by Cielo Che MD on 5/1/2020 at 2:12 PM CDT  I spoke with Dr. Ford yesterday afternoon. Our next step is to get a non-contrast CT of the chest/heart to look for pericardial calcification or thickening. This is not emergent and can be done in the next few weeks. Can he get a creat check when he comes in for the CT?     Thanks, EG          Results   Echocardiogram Complete (Order [id number])  External Result Report    External Result Report   Contains abnormal data Echocardiogram Complete  Order:  [id number]  Status:  Final result   Visible to patient:  Yes (MyChart) Next appt:  08/05/2021 at 04:10 PM in Cardiology (Cielo Che MD)   1 Result Note  Details    Reading Physician Reading Date Result Priority   Naseem Merrill MD  [phone] 4/30/2020 Routine   Provider, Historical 4/30/2020       Narrative & Impression    Limited visualization due to body habitus and positioning.    Left ventricle ejection fraction is normal. The calculated left ventricular ejection fraction is 53%.    Normal right ventricular systolic function.    No hemodynamically significant valvular heart abnormalities.             Imaging  Chest x-ray:   Date Exam Time Exam Date Exam Time Accession # Performing Department Results    7/19/21 12:05 PM 7/19/21 12:16 PM [id number] [facility]    PACS Images     Show images for XR Chest Port 1 View   Study Result    Narrative & Impression   EXAM: XR CHEST PORT 1 VIEW  LOCATION: [facility]   DATE/TIME: 7/19/2021 12:05 PM     INDICATION: Chest pain  COMPARISON: PA and lateral views of the chest 10/04/2020                                                                      IMPRESSION:      Shallow lung inflation. Minimal paradiaphragmatic atelectasis along the left hemidiaphragm, unchanged from 10/04/2020. No new lung opacities. Normal vascularity.     Symmetric lung inflation. Mediastinal borders are well-defined. Sternal wires are intact and aligned.     No pleural fluid or pneumothorax.     EXAM: CT CHEST WO CONTRAST  LOCATION: [facility]  DATE/TIME: 5/7/2020 9:42 AM     INDICATION: Indication Not Listed - See Reason for Exam chest pressure, dyspnea. Chest CT to look for pericardial thickening/calcification. Coronary disease. Previous surgery.  COMPARISON: Chest x-ray 06/23/2019. Chest fluoroscopy 02/25/2020. CTA chest 05/07/2017. Lung bases from CT abdomen 06/26/2019.  TECHNIQUE: CT chest  without IV contrast. Multiplanar reformats were obtained. Dose reduction techniques were used.  CONTRAST: None.     FINDINGS:   LUNGS AND PLEURA: There are some chronic appearing mild to moderate atelectasis in the left lower lobe and lingula with slightly elevated left hemidiaphragm as seen previously. Lungs otherwise clear.     MEDIASTINUM/AXILLAE: Postop change. Normal appearing pericardium with no pericardial thickening or calcifications.     UPPER ABDOMEN: No significant finding.     MUSCULOSKELETAL: Unremarkable.     IMPRESSION:  1.  Mild-to-moderate chronic appearing atelectasis in the inferior lingula and left lower lobe. Significantly improved since 06/26/2019 CT. Similar to that seen on chest fluoroscopy study 02/25/2020.     2.  CT chest otherwise negative.     3.  Normal pericardium with no pericardial thickening or calcifications.   Indications    Dyslipidemia [E78.5 (ICD-10-CM)]   Coronary artery disease due to lipid rich plaque [I25.10, I25.83 (ICD-10-CM)]   Coronary atherosclerosis due to lipid rich plaque (CODE) [I25.83 (ICD-10-CM)]   S/P CABG (coronary artery bypass graft) [Z95.1 (ICD-10-CM)]   Stage 3 chronic kidney disease [N18.3 (ICD-10-CM)]   Burning in the chest [R07.89 (ICD-10-CM)]       Conclusion      Dyspnea and chest burning on exertion in a diabetic with renal insufficiency and abnormal stress test. CABG in June 2019 with LIMA to LAD and SVG to RCA. FFR of RCA prior to CABG was 0.82.    No appreciable left main or circumflex disease    LAD is unchanged with severe mid vessel disease and patent LIMA to the distal LAD. The stent in the large first diagonal is patent.    The RCA is grossly unchanged with moderate-severe proximal and distal RCA disease. The vein graft to the right coronary is occluded.    Resting filling pressures are /8 mmHg, LV EDP 21 mmHg, PCWP 15 mmHg, PA 34/18 mmHg, mean PA 24 mmHg, RV 37/3 mmHg, RV EDP 11 mmHg, mean RA 9 mmHg    With deep breath in the LV  pressures dropped to 141/13 mmHg and the RV pressures increased to 42/12 raising the question of constriction.    Less than 60 cc Visipaque contrast used    Estimated blood loss was <20 ml.             Lab Results    Chemistry/lipid CBC Cardiac Enzymes/BNP/TSH/INR   Recent Labs   Lab Test 03/02/20  0853 03/04/19  1211   CHOL 175 114   HDL 32* 29*   LDL  --  46  36   TRIG 649* 243*     Recent Labs   Lab Test 03/04/19  1211 01/15/19  0509   LDL 46  36 56     Recent Labs   Lab Test 07/20/21  0630 07/20/21  0431   NA  --  141   POTASSIUM  --  3.8   CHLORIDE  --  110*   CO2  --  21*    61*   BUN  --  46*   CR  --  4.09*   GFRESTIMATED  --  16*   BETHANY  --  8.5     Recent Labs   Lab Test 07/20/21  0431 07/19/21  1154 10/05/20  0542   CR 4.09* 4.34* 3.02*     Recent Labs   Lab Test 06/22/19  0554 01/14/19  0544 05/07/17  1945   A1C 7.8* 10.0* 8.6*          Recent Labs   Lab Test 07/20/21  0431   WBC 6.8   HGB 12.9*   HCT 37.1*   MCV 89   *     Recent Labs   Lab Test 07/20/21  0431 07/19/21  1155 10/05/20  0542   HGB 12.9* 12.9* 13.6*    Recent Labs   Lab Test 07/20/21  0023 07/19/21  1820 07/19/21  1154   TROPONINI <0.01 0.01 0.01     Recent Labs   Lab Test 07/19/21  1155 10/04/20  2140 05/07/20  0926   BNP 25 29 68*     Recent Labs   Lab Test 01/14/19  0544   TSH 2.65     Recent Labs   Lab Test 10/04/20  2140 06/21/19  0345 06/20/19  1558   INR 1.03 1.24* 1.30*              HEART CARE CONSULTATON NOTE

## 2021-07-20 NOTE — UTILIZATION REVIEW
Concurrent stay review; Secondary Review Determination     Under the authority of the Utilization Management Committee, the utilization review process indicated a secondary review on Siva Ramey.  The review outcome is based on review of the medical records, discussions with staff, and applying clinical experience noted on the date of the review.        (x) Observation Status Appropriate - Concurrent stay review    RATIONALE FOR DETERMINATION   46 yir old male with CAD s/p CABG x 2 vessel and CKD4 who presented with exertional chest pain and dyspnea.  At this time no need for IV heparin or nitroglycerin.  Cardiology and nephrology following and deciding angiogram or stress test.  On verge of needing to start HD but angiogram would likely push this sooner.  At this time stable while ongoing w/u planned.  If decision is angiogram and will need to start HD after or if decision is that HD is needed to help with dyspnea on exertion, would consider change to inpatient.    Patient is clinically improving and there is no clear indication to change patient's status to inpatient. The severity of illness, intensity of service provided, expected LOS and risk for adverse outcome make the care appropriate for observation.    The information on this document is developed by the utilization review team in order for the business office to ensure compliance.  This only denotes the appropriateness of proper admission status and does not reflect the quality of care rendered.         The definitions of Inpatient Status and Observation Status used in making the determination above are those provided in the CMS Coverage Manual, Chapter 1 and Chapter 6, section 70.4.      Sincerely,   Shea Taylor MD  Utilization Review  Physician Advisor  [facility]

## 2021-07-20 NOTE — PLAN OF CARE
"PRIMARY DIAGNOSIS: \"GENERIC\" NURSING  OUTPATIENT/OBSERVATION GOALS TO BE MET BEFORE DISCHARGE:  ADLs back to baseline: Yes    Activity and level of assistance: Ambulating independently.    Pain status: Pain free.    Return to near baseline physical activity: Yes     Discharge Planner Nurse   Safe discharge environment identified: No. Cardiology order Echo and suggesting possible Angiogram  Barriers to discharge: Echo and possible Angiogram       Entered by: Ludmila William 07/20/2021 3:00 PM     Please review provider order for any additional goals.   Nurse to notify provider when observation goals have been met and patient is ready for discharge.  "

## 2021-07-20 NOTE — PLAN OF CARE
Nutrition Note    Received nutrition consult.  RD cannot consult on observation patients or outpatients in beds d/t licensure issues and because of CMS conditions of participation.  RD will monitor for any status changes and will complete consult if patient changes to inpatient status.  Thank you.

## 2021-07-20 NOTE — CONSULTS
DIABETES CARE  Situation:  Consulted by Provider for Diabetes Education  46 year old male with type 2 Diabetes. Patient was admitted for Chest pain.      Background:  Related Co-morbidities include: CAD, CKD  PCP: Trinidad Hansen PA-C  Social: Has wife    Diabetes History:   Worked with endocrinology in the past.        Meds for BG Management PTA:  Amaryl 4 mg twice daily  Novolog 3 units at meals  40 units of Soliqua (Lantus/GLP) in am    Current Inpatient Meds for BG Management:  40 units of lantus daily  Novolog 3 units at meals  Amaryl 4 mg twice daily  Novolog correction scale 1 unit for 50 over 140.    Labs:  Hemoglobin A1C: 8.2              GFR: 16     Blood Glucose POC:   Results for ISABEL ARBOLEDA (MRN [id number]) as of 7/20/2021 12:25   Ref. Range 7/19/2021 21:18 7/20/2021 06:08 7/20/2021 06:30 7/20/2021 08:03 7/20/2021 11:56   GLUCOSE BY METER POCT Latest Ref Range: 70 - 125 mg/dL 162 (H) 65 (L) 119 114 154 (H)     Diet Order:  75 grams CHO      Weight: 114.6    BMI: 35.23    DM EDUCATION/COUNSELING:  Barriers to Learning and/or DM Self-Management: None  Previous DM Education: Yes  Current Education and/or visit with Patient and/or caregiver  Educated/reviewed diabetes basics: pathophysiology, hyperglycemia, long term complications, treatment.  Educated/reviewed hypoglycemia: sx, causes, treatment  Explained normal/goals of blood sugar control,A1C.  Uses Freestyle Dina  Specific medications were explained, including how they each acted on blood sugar, their timing and differences in dosing. Discussed Amaryl use and it may not be needed since using insulin especially with kidney concerns and some low.     Assessment:  Patient doing well with understanding and A1c is acceptable with present health concerns.      Recommendations:  1. Consider discontinue Amaryl since on meal insulin and kidney concerns..   2. May need to adjust dosages of insulin based on dina readings.   3. Will need follow up once  dialysis starts for further insulin adjustments.     Thank you,   Elida Crocker RN, CDE  Diabetes Educator

## 2021-07-21 ENCOUNTER — REFERRAL (OUTPATIENT)
Dept: TRANSPLANT | Facility: CLINIC | Age: 46
End: 2021-07-21

## 2021-07-21 DIAGNOSIS — E11.9 DIABETES MELLITUS, TYPE 2 (H): ICD-10-CM

## 2021-07-21 DIAGNOSIS — Z76.82 ORGAN TRANSPLANT CANDIDATE: ICD-10-CM

## 2021-07-21 DIAGNOSIS — Z01.818 ENCOUNTER FOR PRE-TRANSPLANT EVALUATION FOR KIDNEY AND PANCREAS TRANSPLANT: ICD-10-CM

## 2021-07-21 DIAGNOSIS — I10 ESSENTIAL HYPERTENSION: ICD-10-CM

## 2021-07-21 DIAGNOSIS — N18.5 CHRONIC KIDNEY DISEASE, STAGE V (H): ICD-10-CM

## 2021-07-21 DIAGNOSIS — J98.4 DISEASE OF LUNG: ICD-10-CM

## 2021-07-21 DIAGNOSIS — I25.10 CARDIOVASCULAR DISEASE: ICD-10-CM

## 2021-07-21 DIAGNOSIS — N18.9 CHRONIC KIDNEY DISEASE: Primary | ICD-10-CM

## 2021-07-21 DIAGNOSIS — E78.5 HYPERLIPIDEMIA: ICD-10-CM

## 2021-07-21 LAB
ALBUMIN SERPL-MCNC: 2.9 G/DL (ref 3.5–5)
ANION GAP SERPL CALCULATED.3IONS-SCNC: 7 MMOL/L (ref 5–18)
BUN SERPL-MCNC: 40 MG/DL (ref 8–22)
CALCIUM SERPL-MCNC: 8.8 MG/DL (ref 8.5–10.5)
CHLORIDE BLD-SCNC: 112 MMOL/L (ref 98–107)
CHOLEST SERPL-MCNC: 167 MG/DL
CO2 SERPL-SCNC: 21 MMOL/L (ref 22–31)
CREAT SERPL-MCNC: 3.93 MG/DL (ref 0.7–1.3)
DEPRECATED CALCIDIOL+CALCIFEROL SERPL-MC: 17 UG/L (ref 30–80)
FASTING STATUS PATIENT QL REPORTED: ABNORMAL
GFR SERPL CREATININE-BSD FRML MDRD: 17 ML/MIN/1.73M2
GLUCOSE BLD-MCNC: 65 MG/DL (ref 70–125)
GLUCOSE BLDC GLUCOMTR-MCNC: 103 MG/DL (ref 70–125)
GLUCOSE BLDC GLUCOMTR-MCNC: 122 MG/DL (ref 70–125)
GLUCOSE BLDC GLUCOMTR-MCNC: 152 MG/DL (ref 70–125)
GLUCOSE BLDC GLUCOMTR-MCNC: 176 MG/DL (ref 70–125)
GLUCOSE BLDC GLUCOMTR-MCNC: 47 MG/DL (ref 70–125)
GLUCOSE BLDC GLUCOMTR-MCNC: 78 MG/DL (ref 70–125)
GLUCOSE BLDC GLUCOMTR-MCNC: 94 MG/DL (ref 70–125)
HDLC SERPL-MCNC: 24 MG/DL
LDLC SERPL CALC-MCNC: 59 MG/DL
LDLC SERPL CALC-MCNC: ABNORMAL MG/DL
PHOSPHATE SERPL-MCNC: 4.2 MG/DL (ref 2.5–4.5)
POTASSIUM BLD-SCNC: 3.8 MMOL/L (ref 3.5–5)
SODIUM SERPL-SCNC: 140 MMOL/L (ref 136–145)
TRIGL SERPL-MCNC: 415 MG/DL

## 2021-07-21 PROCEDURE — G0378 HOSPITAL OBSERVATION PER HR: HCPCS

## 2021-07-21 PROCEDURE — 250N000011 HC RX IP 250 OP 636: Performed by: INTERNAL MEDICINE

## 2021-07-21 PROCEDURE — 99233 SBSQ HOSP IP/OBS HIGH 50: CPT | Performed by: INTERNAL MEDICINE

## 2021-07-21 PROCEDURE — 96372 THER/PROPH/DIAG INJ SC/IM: CPT | Performed by: INTERNAL MEDICINE

## 2021-07-21 PROCEDURE — 250N000012 HC RX MED GY IP 250 OP 636 PS 637: Performed by: HOSPITALIST

## 2021-07-21 PROCEDURE — 83721 ASSAY OF BLOOD LIPOPROTEIN: CPT | Performed by: INTERNAL MEDICINE

## 2021-07-21 PROCEDURE — 80061 LIPID PANEL: CPT | Performed by: INTERNAL MEDICINE

## 2021-07-21 PROCEDURE — 96372 THER/PROPH/DIAG INJ SC/IM: CPT | Performed by: HOSPITALIST

## 2021-07-21 PROCEDURE — 99207 PR CDG-CODE CATEGORY CHANGED: CPT | Performed by: INTERNAL MEDICINE

## 2021-07-21 PROCEDURE — 250N000013 HC RX MED GY IP 250 OP 250 PS 637: Performed by: INTERNAL MEDICINE

## 2021-07-21 PROCEDURE — 250N000012 HC RX MED GY IP 250 OP 636 PS 637

## 2021-07-21 PROCEDURE — 36415 COLL VENOUS BLD VENIPUNCTURE: CPT | Performed by: INTERNAL MEDICINE

## 2021-07-21 PROCEDURE — 80069 RENAL FUNCTION PANEL: CPT | Performed by: INTERNAL MEDICINE

## 2021-07-21 RX ADMIN — ISOSORBIDE MONONITRATE 30 MG: 30 TABLET, EXTENDED RELEASE ORAL at 08:31

## 2021-07-21 RX ADMIN — DOXAZOSIN 2 MG: 1 TABLET ORAL at 21:26

## 2021-07-21 RX ADMIN — HEPARIN SODIUM 5000 UNITS: 10000 INJECTION, SOLUTION INTRAVENOUS; SUBCUTANEOUS at 08:31

## 2021-07-21 RX ADMIN — Medication 125 MCG: at 08:32

## 2021-07-21 RX ADMIN — INSULIN ASPART 3 UNITS: 100 INJECTION, SOLUTION INTRAVENOUS; SUBCUTANEOUS at 11:42

## 2021-07-21 RX ADMIN — INSULIN ASPART 3 UNITS: 100 INJECTION, SOLUTION INTRAVENOUS; SUBCUTANEOUS at 08:31

## 2021-07-21 RX ADMIN — INSULIN ASPART 1 UNITS: 100 INJECTION, SOLUTION INTRAVENOUS; SUBCUTANEOUS at 11:42

## 2021-07-21 RX ADMIN — HEPARIN SODIUM 5000 UNITS: 10000 INJECTION, SOLUTION INTRAVENOUS; SUBCUTANEOUS at 21:26

## 2021-07-21 RX ADMIN — INSULIN ASPART 3 UNITS: 100 INJECTION, SOLUTION INTRAVENOUS; SUBCUTANEOUS at 17:11

## 2021-07-21 RX ADMIN — CARVEDILOL 25 MG: 12.5 TABLET, FILM COATED ORAL at 08:31

## 2021-07-21 RX ADMIN — ASPIRIN 81 MG: 81 TABLET, COATED ORAL at 08:31

## 2021-07-21 RX ADMIN — LOSARTAN POTASSIUM 100 MG: 50 TABLET, FILM COATED ORAL at 08:31

## 2021-07-21 RX ADMIN — ATORVASTATIN CALCIUM 80 MG: 40 TABLET, FILM COATED ORAL at 08:31

## 2021-07-21 RX ADMIN — CARVEDILOL 25 MG: 12.5 TABLET, FILM COATED ORAL at 21:25

## 2021-07-21 RX ADMIN — INSULIN GLARGINE 25 UNITS: 100 INJECTION, SOLUTION SUBCUTANEOUS at 21:29

## 2021-07-21 RX ADMIN — INSULIN ASPART 1 UNITS: 100 INJECTION, SOLUTION INTRAVENOUS; SUBCUTANEOUS at 17:12

## 2021-07-21 NOTE — PROGRESS NOTES
Cardiology Progress Note    Assessment:  1.  Chest discomfort.  Patient admitted with symptoms of chest discomfort with hypertensive urgency.  Systolic blood pressure greater than 200 on admission.  No acute EKG changes.  Troponins x2 are negative.  Coronary angiogram from April 2020 reported no appreciable left main or circumflex disease.  LAD demonstrated severe mid vessel disease and patent LIMA to the distal LAD.  Stent in the large first diagonal branch was patent.  The right coronary artery was stated to be grossly unchanged with moderate to severe proximal and distal RCA.  The vein graft to the right coronary artery is occluded.  I did review with my interventional colleague yesterday that the right coronary artery could potentially be stented.  As noted by nephrology this would likely bring on the need for more urgent dialysis.  Cardiac enzymes have been negative.  No acute EKG changes.    2.  Hypertensive urgency changes made in blood pressure regimen yesterday with improved blood pressure overnight.  Currently receiving carvedilol 25 mg p.o. twice daily, doxazosin 2 mg at bedtime isosorbide mononitrate that was initiated this admission, losartan increased to 100 mg daily this admission.    3.  Mild LV dysfunction.  Echocardiogram reports ejection fraction of 45 to 50%.  This compares to the report from April 30, 2020 at which time ejection fraction was said to be 53%.  Question related to hypertension, coronary artery disease, he is on losartan and carvedilol.    4.  Dyslipidemia.  Direct LDL is pending.  Triglycerides remain elevated at 414.  He is on 80 mg of atorvastatin.  May benefit from addition attempts at lowering triglycerides but strongly encourage dietary means as well.  Principal Problem:    Hypertensive urgency  Active Problems:    Type 2 diabetes mellitus with other circulatory complication, unspecified whether long term insulin use (H)    Class 2 severe obesity due to excess calories with  serious comorbidity in adult, unspecified BMI (H)    S/P CABG (coronary artery bypass graft)    Chest pain with high risk for cardiac etiology      Plan:  1.  We will discuss further with nephrology and interventional colleague.  Patient is potentially interested in proceeding to coronary angiography where that this will likely sultan more urgent need for dialysis.  Could consider stress testing although not certain that this would change the decisions.  2.  Increase activity and ambulate.  Cardiac rehabilitation  3.  Continue with the change in current combination of medications.    Addendum.  I called patient around 6 PM to discuss the issues further.  He did tell me about mild chest discomfort while ambulating in the hallway.  We discussed the issues in detail again including continued adjustment of medication and observing symptoms versus seeding to coronary angiography being aware that this will likely bring on dialysis earlier than planned.  He is well aware of the potential need for dialysis sooner than that had been planned and is interested in pursuing coronary angiography.  We will make him n.p.o. and discussed again in the morning.    Subjective:   Siva Ramey is seen in follow-up.  He reports that he is feeling better.  We talked about the current findings and improvement in blood pressure and the potential for repeat coronary angiography and the risk that this would induce more mediated to requirement for dialysis.    Objective:   Vital signs in last 24 hours:  VITALS: /66 (BP Location: Right arm)   Pulse 73   Temp 97.9  F (36.6  C) (Oral)   Resp 20   Wt 114.6 kg (252 lb 9.6 oz)   SpO2 94%   BMI 35.23 kg/m    BMI: Body mass index is 35.23 kg/m .  Wt Readings from Last 3 Encounters:   07/20/21 114.6 kg (252 lb 9.6 oz)   04/30/20 115 kg (253 lb 8 oz)   03/02/20 111.1 kg (245 lb)       Intake/Output Summary (Last 24 hours) at 7/21/2021 0558  Last data filed at 7/20/2021 2001  Gross per 24  hour   Intake 1660 ml   Output 0 ml   Net 1660 ml       Physical Exam: Resting comfortably, no acute distress   Neck: Supple, jugular venous pressure difficult to assess but not obviously increased.   Lungs: clear to auscultation   COR: RRR, No murmur, S4   Abd: nondistended, BS present   Extrem: No edema, good distal pulses  Neuro: Alert and oriented.    Cardiographics:    ECG: 1131 normal sinus rhythm, nonspecific T wave changes.  No significant change when compared to 2020    Order: [id number]  Status:  Final result   Visible to patient:  Yes (MyChart) Next appt:  2021 at 04:10 PM in Cardiology (Cielo Che MD)   1 Result Note  Details           Reading Physician Reading Date Result Priority   Naseem Merrill MD  [phone] 2020 Routine   Provider, Historical 2020          Narrative & Impression    Limited visualization due to body habitus and positioning.    Left ventricle ejection fraction is normal. The calculated left ventricular ejection fraction is 53%.    Normal right ventricular systolic function.    No hemodynamically significant valvular heart abnormalities.           Reading Physician Reading Date Result Priority   Nick Flores MD  [phone] 2021       Narrative & Impression  [id number]  [id number]  [id number]  [id number]^ERIN^Z[place]RE[place] [address]     Name: ISABEL ARBOLEDA  MRN: [id number]  : 1975  Study Date: 2021 01:29 PM  Age: 46 yrs  Gender: Male  Patient Location: [facility]  Reason For Study: Chest Pain  Ordering Physician: ZOHREH KHAN  Performed By: TACO     BSA: 2.3 m2  Height: 71 in  Weight: 252 lb  ______________________________________________________________________________  ______________________________________________________________________________  Interpretation Summary     The left ventricle is normal in size.  Left ventricular function is decreased. The ejection fraction is  45-50%  (mildly reduced).  Normal right ventricle size and systolic function.  Mild sclerodegenerative valve disease is present without hemodynamically  significant stenosis or regurgitation.     Compared to the prior study dated 4/30/2020, there are changes as noted.  ______________________________________________________________________________    Telemetry: Sinus rhythm, no significant ectopy.    Imaging:   EXAM: XR CHEST PORT 1 VIEW  LOCATION: [facility]   DATE/TIME: 7/19/2021 12:05 PM     INDICATION: Chest pain  COMPARISON: PA and lateral views of the chest 10/04/2020                                                                      IMPRESSION:      Shallow lung inflation. Minimal paradiaphragmatic atelectasis along the left hemidiaphragm, unchanged from 10/04/2020. No new lung opacities. Normal vascularity.     Symmetric lung inflation. Mediastinal borders are well-defined. Sternal wires are intact and aligned.     No pleural fluid or pneumothora    Siva MONAHAN Tanvir  Coronary Angiogram  Order# [id number]  Reading physician: Cielo Che MD Ordering physician: Cielo hCe MD Study date: 04/30/2020   Patient Information    Name MRN Description   Siva Ramey [id number] 44 year old male   Indications    Dyslipidemia [E78.5 (ICD-10-CM)]   Coronary artery disease due to lipid rich plaque [I25.10, I25.83 (ICD-10-CM)]   Coronary atherosclerosis due to lipid rich plaque (CODE) [I25.83 (ICD-10-CM)]   S/P CABG (coronary artery bypass graft) [Z95.1 (ICD-10-CM)]   Stage 3 chronic kidney disease [N18.3 (ICD-10-CM)]   Burning in the chest [R07.89 (ICD-10-CM)]       Conclusion      Dyspnea and chest burning on exertion in a diabetic with renal insufficiency and abnormal stress test. CABG in June 2019 with LIMA to LAD and SVG to RCA. FFR of RCA prior to CABG was 0.82.    No appreciable left main or circumflex disease    LAD is unchanged with severe mid vessel disease and patent LIMA to  the distal LAD. The stent in the large first diagonal is patent.    The RCA is grossly unchanged with moderate-severe proximal and distal RCA disease. The vein graft to the right coronary is occluded.    Resting filling pressures are /8 mmHg, LV EDP 21 mmHg, PCWP 15 mmHg, PA 34/18 mmHg, mean PA 24 mmHg, RV 37/3 mmHg, RV EDP 11 mmHg, mean RA 9 mmHg    With deep breath in the LV pressures dropped to 141/13 mmHg and the RV pressures increased to 42/12 raising the question of constriction.    Less than 60 cc Visipaque contrast used    Estimated blood loss was <20 ml          Lab Results    Chemistry/lipid CBC Cardiac Enzymes/BNP/TSH/INR   Recent Labs   Lab Test 03/02/20  0853 03/04/19  1211   CHOL 175 114   HDL 32* 29*   LDL  --  46  36   TRIG 649* 243*     Recent Labs   Lab Test 03/04/19  1211 01/15/19  0509   LDL 46  36 56     Recent Labs   Lab Test 07/21/21  0515 07/21/21  0440   NA  --  140   POTASSIUM  --  3.8   CHLORIDE  --  112*   CO2  --  21*    65*   BUN  --  40*   CR  --  3.93*   GFRESTIMATED  --  17*   BETHANY  --  8.8     Recent Labs   Lab Test 07/21/21  0440 07/20/21  0431 07/19/21  1154   CR 3.93* 4.09* 4.34*     Recent Labs   Lab Test 06/22/19  0554 01/14/19  0544 05/07/17  1945   A1C 7.8* 10.0* 8.6*          Recent Labs   Lab Test 07/20/21  0431   WBC 6.8   HGB 12.9*   HCT 37.1*   MCV 89   *     Recent Labs   Lab Test 07/20/21  0431 07/19/21  1155 10/05/20  0542   HGB 12.9* 12.9* 13.6*    Recent Labs   Lab Test 07/20/21  0023 07/19/21  1820 07/19/21  1154   TROPONINI <0.01 0.01 0.01     Recent Labs   Lab Test 07/19/21  1155 10/04/20  2140 05/07/20  0926   BNP 25 29 68*     Recent Labs   Lab Test 01/14/19  0544   TSH 2.65     Recent Labs   Lab Test 10/04/20  2140 06/21/19  0345 06/20/19  1558   INR 1.03 1.24* 1.30*

## 2021-07-21 NOTE — LETTER
8/3/21    Siva Ramey  [address] [address]    Dear Siva,    Thank you for your interest in the Transplant Center at [facility]. We look forward to being a part of your care team and assisting you through the transplant process.    As we discussed, your transplant coordinator is Kim Fay (Pancreas, Kidney).  You may call your coordinator at any time with questions or concerns.  Your first scheduled call will be on 7/27/21 between 1-3.  If this needs to change, call [phone].    Please complete the following.    1. Fill out and return the enclosed forms    Authorization for Care Everywhere Release of Information    2. Sign up for:    You[employer], access to your electronic medical record (see enclosed pamphlet)    [email or web address], a transplant education website    You can use these tools to learn more about your transplant, communicate with your care team, and track your medical details      Sincerely,      Solid Organ Transplant  [facility]    cc: Referring Physician Dialysis Unit PCP Care Team

## 2021-07-21 NOTE — PLAN OF CARE
PRIMARY DIAGNOSIS: CHEST PAIN  OUTPATIENT/OBSERVATION GOALS TO BE MET BEFORE DISCHARGE:  1. Ruled out acute coronary syndrome (negative or stable Troponin):  Yes  2. Pain Status: Pain free.  3. Appropriate provocative testing performed: Yes  - Stress Test Procedure: Pending  - Interpretation of cardiac rhythm per telemetry tech: NSR    4. Cleared by Consultants (if applicable):No  5. Return to near baseline physical activity: Yes  Discharge Planner Nurse   Safe discharge environment identified:  Plans for possible angiogram  Barriers to discharge:Possible   Angiogram/stress test       Entered by: Ludmila William 07/20/2021 7:34 PM     Please review provider order for any additional goals.   Nurse to notify provider when observation goals have been met and patient is ready for discharge.

## 2021-07-21 NOTE — PLAN OF CARE
PRIMARY DIAGNOSIS: CHEST PAIN  OUTPATIENT/OBSERVATION GOALS TO BE MET BEFORE DISCHARGE:  1. Ruled out acute coronary syndrome (negative or stable Troponin):  Yes  2. Pain Status: Pain free.  3. Appropriate provocative testing performed: Yes  - Stress Test Procedure: Regular  - Interpretation of cardiac rhythm per telemetry tech: NSR    4. Cleared by Consultants (if applicable):No  5. Return to near baseline physical activity: Yes  Discharge Planner Nurse   Safe discharge environment identified: Yes  Barriers to discharge: Yes       Entered by: Summer Marcus 07/21/2021 10:52 AM     Please review provider order for any additional goals.   Nurse to notify provider when observation goals have been met and patient is ready for discharge.

## 2021-07-21 NOTE — PROGRESS NOTES
[facility] ([facility]) progress note  CC: F/U   S: Since last visit, doing alright. No sob. No pain. No leg swelling. Discussed renal function and angio. Risk for HD. Patient is limited at work currently with sob.     A/P:   Principal Problem:    Hypertensive urgency  Active Problems:    Type 2 diabetes mellitus with other circulatory complication, unspecified whether long term insulin use (H)    Class 2 severe obesity due to excess calories with serious comorbidity in adult, unspecified BMI (H)    S/P CABG (coronary artery bypass graft)    Chest pain with high risk for cardiac etiology    1. 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  Patient with progressively declining kidney function over the past 2 years with creatinine 1.81 mg/dL in June 2019 worsening to 4.34 mg/dL on 7/19/2021 admission.  He follows with Dr. Rios in clinic with dialysis fistula placed in June 2021 and left forearm that is not yet mature.  He has noted increasing fatigue over the past several weeks with poorly controlled hypertension chronically.  Denies anorexia, dysgeusia, orthopnea or other uremic symptoms at this time so no emergent need for dialysis.              -Risk for need dialysis with coronary angiogram if decided to proceed and he understands this.              -If no cardiac cause for increased dyspnea on exertion noted, I did discuss the option of dialysis initiation to see if that would help but no guarantee that that would affect his symptoms as he appears relatively euvolemic on exam.              -losartan to 100 mg daily for better blood pressure control.              -Has been referred to [facility] transplant clinic for evaluation just recently.   -Would not hold on angio if indicated, patient symptoms limiting, as he is very close to needing dialysis.      2.  Hypertensive urgency: Poorly controlled chronically likely cause for advanced CKD.  Blood pressure appears markedly improved at present during inpatient stay following addition  of isosorbide.              -  agree with increasing losartan to 100 mg daily     3.  Coronary artery disease with previous CABG x2 in June 2019.  Echocardiogram pending by cardiology with ongoing evaluation pending.  We did discuss high risk for requiring dialysis with possible angiogram although he is on the brink of dialysis as it is.     4. CKD-MBD: History of hyperparathyroidism but no labs since April 2020 so we will update his vitamin D and PTH levels.  Phosphorus was reasonably controlled in April at 5.2 will be rechecked.     5.  Status post left forearm AVF placement at [facility] June 2021 (5 weeks ago). Still immature and would need tunneled CVC if dialysis required at present.        Zhang Rosario,   [facility]  [phone] (off)    No interval changes to past medical history, social history or family history to report.    BP (!) 140/67 (BP Location: Right arm)   Pulse 76   Temp 98.3  F (36.8  C) (Oral)   Resp 18   Wt 114.4 kg (252 lb 3.2 oz)   SpO2 95%   BMI 35.17 kg/m      I/O last 3 completed shifts:  In: 1180 [P.O.:1180]  Out: 0     Physical Exam:   GENERAL: calm and comfortable, alert  EYES: No scleral icterus, conjunctiva clear  ENT: Hearing normal, Oral mucosa moist  RESP: Clear to auscultation bilaterally with no respiratory distress, normal effort.  CV:   Trace leg edema.    GI: Active BS, Soft, NT/ND, n  Musculoskeletal: Normal muscle bulk/ tone; No gross joint abnormalities  SKIN: No rash, warm/ dry  PSYCH:  Appropriate mood and affect  Left forearm aVF  Lymph: No cervical adenopathy         Lab Results   Component Value Date     07/21/2021     07/20/2021     07/19/2021    Lab Results   Component Value Date    CHLORIDE 112 07/21/2021    CHLORIDE 110 07/20/2021    CHLORIDE 109 07/19/2021    Lab Results   Component Value Date    BUN 40 07/21/2021    BUN 46 07/20/2021    BUN 48 07/19/2021      Lab Results   Component  Value Date    POTASSIUM 3.8 07/21/2021    POTASSIUM 3.8 07/20/2021    POTASSIUM 4.5 07/19/2021    Lab Results   Component Value Date    CO2 21 07/21/2021    CO2 21 07/20/2021    CO2 22 07/19/2021    Lab Results   Component Value Date    CR 3.93 07/21/2021    CR 4.09 07/20/2021    CR 4.34 07/19/2021        No results found for: NTBNPI, NTBNP  Lab Results   Component Value Date    WBC 6.8 07/20/2021    WBC 6.7 07/19/2021    WBC 7.1 10/05/2020    HGB 12.9 (L) 07/20/2021    HGB 12.9 (L) 07/19/2021    HGB 13.6 (L) 10/05/2020    HCT 37.1 (L) 07/20/2021    HCT 37.3 (L) 07/19/2021    HCT 40.2 10/05/2020    MCV 89 07/20/2021    MCV 90 07/19/2021    MCV 89 10/05/2020     (L) 07/20/2021     (L) 07/19/2021     10/05/2020     Lab Results   Component Value Date    CR 3.93 (H) 07/21/2021    CR 4.09 (H) 07/20/2021    CR 4.34 (H) 07/19/2021     Lab Results   Component Value Date    DD 0.34 05/07/2020     Lab Results   Component Value Date     07/21/2021     07/20/2021     07/19/2021    POTASSIUM 3.8 07/21/2021    POTASSIUM 3.8 07/20/2021    POTASSIUM 4.5 07/19/2021    CHLORIDE 112 (H) 07/21/2021    CHLORIDE 110 (H) 07/20/2021    CHLORIDE 109 (H) 07/19/2021    CO2 21 (L) 07/21/2021    CO2 21 (L) 07/20/2021    CO2 22 07/19/2021    GLC 94 07/21/2021     07/21/2021    GLC 78 07/21/2021     No results found for: SED  Lab Results   Component Value Date    GLC 94 07/21/2021     07/21/2021    GLC 78 07/21/2021     Lab Results   Component Value Date    HGB 12.9 (L) 07/20/2021    HGB 12.9 (L) 07/19/2021    HGB 13.6 (L) 10/05/2020     Lab Results   Component Value Date    AST 25 07/20/2021    AST 23 10/05/2020    AST 26 10/04/2020    ALT 34 07/20/2021    ALT 29 10/05/2020    ALT 34 10/04/2020    ALKPHOS 63 07/20/2021    ALKPHOS 66 10/05/2020    ALKPHOS 79 10/04/2020    BILITOTAL 0.5 07/20/2021    BILITOTAL 0.4 10/05/2020    BILITOTAL 0.3 10/04/2020         Drug and lactation database from  the [place] National Library of Medicine:  http://toxnet.nlm.nih.gov/cgi-bin/sis/htmlgen?LACT        Labs personally reviewed today during this evaluation at 9:12 AM

## 2021-07-21 NOTE — LETTER
Siva Ramey  [address] [address]          July 23, 2021         MEDICAL RECORDS REQUEST  [facility] Kidney Transplant team is requesting records from Referring Providers Office for patients referred to the Kidney Transplant Program                    Facility:[facility],     Images Needed to Process Intake of Patient:    CXR Images (most recent)    CT Abdominal and /or Pelvis Imaging (all within last 24 months or most recent)    Heart Cath Images (most recent)  Records Needed to Process Intake of Patient:       Cardiac Catheterization Results (most recent on file)    Any Cystic Fibrosis Genotyping available    Chest CT Report (all within last 24 months or most recent)    Chest X-Ray Report (all within last 24 months or most recent)    Culture Results (last 2 years on file)    ECHO Results (most recent on file)    Hospital Discharge Summaries (last 2 years on file)    Lab Results CMP, Renal Panel, and CBC (most recent on file)    History and Physical (original on file)    Kidney Biopsy Reports - Any/At any time    Other Biopsy Reports - Any/At any time  1.       2728 Form  2.      CT Abdomen and/or Pelvis - Last one (Report and Push Films)  3.       Labs - CMP, Renal Panel, and CBC (Last ones/most current)  4.    Referring Provider, PCP, and/or Nephrologist Notes (Last 1-3 Visit Notes)  5.   Other Specialty Notes such as GI, Cardiology, etc. (Last 1-3 Visits Notes)  6.    Endocrine Visit Notes (Last 1-3 Visit Notes), if diabetic  7.  Dialysis Notes/Information (if on HD/PD) - Need Center and schedule  8. Hospital D/C Summary Reports (last 1-2) including operative report/notes if a re-transplant

## 2021-07-21 NOTE — SIGNIFICANT EVENT
[facility] cross cover  Asked to review insulin dosing as patient now NPO for procedure tomorrow.   Will decrease to 25u of glargine as has been low last two nights and will be NPO    Norberto Diehl MD  [facility] Medicine Service

## 2021-07-21 NOTE — Clinical Note
Please see smartset orders, pt confirmed STD of 9/27/21, pt is a pre-K/P, not on dialysis, please ensure surgeon is K/P surgeon.  Thanks, Kim

## 2021-07-21 NOTE — PROGRESS NOTES
[facility]  Hospitalist Progress Note    Admit Date:  7/19/2021  Date of Service (when I saw the patient): 07/21/2021   Provider:  Elayne Hummel, DO    Assessment & Plan   Siva Ramey is a 46 year old male who was admitted on 7/19/2021 with c/o CP.  He has a PMH of progressive CKD, HTN and DM.    Problem List:    1.  Chest pain   h/o CAD with h/o CABG 2019 with LIMA to LAD and SVG to RCA  Symptoms were resolved in the ED with nitroglycerin  Serial troponin were negative  Cardiology consult appreciated - pt states that plan is for a cardiac stress test in the am (unable to do today as pt had caffeine)   ECHO - 7/20/21 with reduced EF 45-50%    Last cardiac cath 4/2020 reviewed - Unchanged LAD with severe mid vessel disease  Stent in first diagonal patent  Vein graft to RCA occluded, patent LIMA graft to distal LAD  Cardiology reviewed last cath - felt that RCA could be amenable to stenting.    Continue on statin, coreg, losartan, asa, IMDUR    2.  Hypertensive urgency  Losartan dose increased to 100mg po daily (from 50mg po daily) with improvement in his BP    3.  Hyperlipidemia  Hypertriglyceridemia  TG's elevated at >400 so LDL cannot be calculated  Continues on 80mg po atorvastatin daily    4.  Mild LV dysfunction  ECHO, as above, with EF 45-50%  Last ECHO 4/30/20EF was 53%  Continues on ARB and b-blocker  Cardiology recommendations appreciated    5.  CKD - stage 4  Likely d/t underlying diabetic and hypertensive nephropathy  Nephrology co-management appreciated  Increased losartan 7/20/21 with improved BP control  Has recently been referred to [facility] transplant clinic  May need dialysis if angiogram pursued.  Diuretics as per nephrology (currently home lasix on hold)    6.  DM, insulin-dependent  On lantus 40 units at bedtime PTA  Home amaryl on hold, for now  Continue diabetic diet and sliding scale insulin prn    Diet: Consistent Carb 75 grams CHO per Meal Diet    DVT  Prophylaxis: Heparin SQ  Burt Catheter: Not present  Code Status: Full Code      Disposition Plan   Expected discharge: 07/21/2021 recommended to prior living arrangement once cardiology evaluation complete.  Entered: Elayne Hummel DO 07/21/2021, 7:26 AM     The patient's care was discussed with the Patient.    Interval History   Pt still feeling very fatigued with activity, but has been walking in the halls.  No further chest pain or chest tightness.  No HA,F/C,N/V (appetite good).  Planning on cardiac stress test 7/22/21.    -Data reviewed today: I reviewed all new labs and imaging results over the last 24 hours. I personally reviewed no images or EKG's today.    Physical Exam   Temp: 98.3  F (36.8  C) Temp src: Oral BP: (!) 140/67 Pulse: 76   Resp: 18 SpO2: 95 % O2 Device: None (Room air)    Vitals:    07/19/21 1612 07/20/21 0559 07/21/21 0631   Weight: 114.9 kg (253 lb 6.4 oz) 114.6 kg (252 lb 9.6 oz) 114.4 kg (252 lb 3.2 oz)     Vital Signs with Ranges  Temp:  [97.9  F (36.6  C)-98.8  F (37.1  C)] 98.3  F (36.8  C)  Pulse:  [72-78] 76  Resp:  [18-22] 18  BP: (118-159)/(60-88) 140/67  SpO2:  [94 %-96 %] 95 %  I/O last 3 completed shifts:  In: 1180 [P.O.:1180]  Out: 0     GEN:  Alert, oriented x 3, comfortable, no overt distress at rest, easily conversant.  HEENT:  Normocephalic/atraumatic, no scleral icterus, no nasal discharge, mouth and membranes moist  NECK:  No clear thyromegaly or clear JVD  CV:  Somewhat distant but regular rate and rhythm, no murmur to ausc.  S1 + S2 noted, no S3 or S4.  LUNGS:  Clear to auscultation ant/lat bilaterally.  diminished air exchange at the right lung base, ? Min crackles on the left lung base--no clear rhonchi or wheeze. No costal retractions bilaterally.  Symmetric chest rise on inhalation noted.  ABD:  Active bowel sounds, soft, non-tender/non-distended.  No rebound/guarding/rigidity.  No masses palpated.  No obvious HSM to exam.  EXT:  Trace pitting edema  bilaterally, no cyanosis bilaterally. No joint synovitis noted.  No calf-tenderness or asymmetry noted.  SKIN:  Dry to touch, no rashes or jaundice noted.  PSYCH:  Mood appropriate, Not tearful or depressed.  Maintains direct eye contact.  NEURO:  No tremors at rest, speech is clear  And appropriate. Ambulating in the halls without gait abnormality.    Data   Labs:  Recent Labs   Lab 21  1644 21  1125 21  0730 21  0440 21  0431 21  1154   NA  --   --   --  140 141 141   POTASSIUM  --   --   --  3.8 3.8 4.5   CHLORIDE  --   --   --  112* 110* 109*   CO2  --   --   --  *    ANIONGAP  --   --   --  7 10 10   * 152* 94 65* 61* 125   BUN  --   --   --  40* 46* 48*   CR  --   --   --  3.93* 4.09* 4.34*   GFRESTIMATED  --   --   --  17* 16* 15*   BETHANY  --   --   --  8.8 8.5 8.6     Recent Labs   Lab 21  0431 21  1155   WBC 6.8 6.7   HGB 12.9* 12.9*   HCT 37.1* 37.3*   MCV 89 90   * 146*     Recent Labs   Lab 21  0440 21  0431 21  1154   BUN 40* 46* 48*   CR 3.93* 4.09* 4.34*     No results for input(s): TROPONIN, TROPI, TROPR in the last 168 hours.    Invalid input(s): TROP, TROPONINIES  No results for input(s): COLOR, APPEARANCE, URINEGLC, URINEBILI, URINEKETONE, SG, UBLD, URINEPH, PROTEIN, UROBILINOGEN, NITRITE, LEUKEST, RBCU, WBCU in the last 168 hours.   Recent Imaging:   Recent Results (from the past 24 hour(s))   Echocardiogram Complete   Result Value    LVEF  45-50% (mildly reduced)    Narrative    [id number]  [id number]  [id number]  [id number]^ERIN^ZOHREH^O     [address] [address]     Name: ISABEL ARBOLEDA  MRN: [id number]  : 1975  Study Date: 2021 01:29 PM  Age: 46 yrs  Gender: Male  Patient Location: [facility]  Reason For Study: Chest Pain  Ordering Physician: ZOHREH KHAN  Performed By: TACO     BSA: 2.3 m2  Height: 71 in  Weight: 252  lb  ______________________________________________________________________________  ______________________________________________________________________________  Interpretation Summary     The left ventricle is normal in size.  Left ventricular function is decreased. The ejection fraction is 45-50%  (mildly reduced).  Normal right ventricle size and systolic function.  Mild sclerodegenerative valve disease is present without hemodynamically  significant stenosis or regurgitation.     Compared to the prior study dated 4/30/2020, there are changes as noted.  ______________________________________________________________________________  I      WMSI = 2.00     % Normal = 0     X - Cannot   0 -                      (2) - Mildly 2 -          Segments  Size  Interpret    Hyperkinetic 1 - Normal  Hypokinetic  Hypokinetic  1-2     small                                                     7 -          3-5      moderate  3 - Akinetic 4 -          5 -         6 - Akinetic Dyskinetic   6-14    large               Dyskinetic   Aneurysmal  w/scar       w/scar       15-16   diffuse     Left Ventricle  The left ventricle is normal in size. Left ventricular function is decreased.  The ejection fraction is 45-50% (mildly reduced). There is normal left  ventricular wall thickness. Left ventricular diastolic function is normal.  There is mild global hypokinesia of the left ventricle.     Right Ventricle  Normal right ventricle size and systolic function.     Atria  Normal left atrial size. Right atrial size is normal. There is no color  Doppler evidence of an atrial shunt.     Mitral Valve  The mitral valve leaflets are mildly thickened. There is physiologic mitral  regurgitation. There is no mitral valve stenosis.     Tricuspid Valve  Tricuspid valve leaflets appear normal. There is no evidence of tricuspid  stenosis or clinically significant tricuspid regurgitation. Right ventricular  systolic pressure could not be approximated due  to inadequate tricuspid  regurgitation.     Aortic Valve  The aortic valve is trileaflet with aortic valve sclerosis.     Pulmonic Valve  The pulmonic valve is not well seen, but is grossly normal. This degree of  valvular regurgitation is within normal limits. There is trace pulmonic  valvular regurgitation.     Vessels  The aorta root is normal. The ascending aorta is Mildly dilated. IVC diameter  <2.1 cm collapsing >50% with sniff suggests a normal RA pressure of 3 mmHg.     Pericardium  There is no pericardial effusion.     ______________________________________________________________________________  MMode/2D Measurements & Calculations  RVDd: 3.8 cm  IVSd: 1.3 cm  LVIDd: 5.2 cm  LVIDs: 3.9 cm  LVPWd: 1.2 cm  FS: 25.3 %  LV mass(C)d: 253.3 grams  LV mass(C)dI: 108.9 grams/m2     Ao root diam: 3.5 cm  LA dimension: 4.7 cm  asc Aorta Diam: 4.0 cm  LA/Ao: 1.3  LVOT diam: 2.4 cm  LVOT area: 4.4 cm2  LA Volume Indexed (AL/bp): 29.2 ml/m2     Time Measurements  MM HR: 72.0 BPM     Doppler Measurements & Calculations  MV E max holley: 45.6 cm/sec  MV A max holley: 61.7 cm/sec  MV E/A: 0.74     MV dec time: 0.19 sec  LV V1 max P.1 mmHg  LV V1 max: 72.0 cm/sec  LV V1 VTI: 15.5 cm  SV(LVOT): 68.1 ml  SI(LVOT): 29.3 ml/m2  PA acc time: 0.10 sec  E/E' av.3  Lateral E/e': 4.3  Medial E/e': 6.4     ______________________________________________________________________________  Report approved by: Brianda Vance 2021 03:01 PM             Medications       aspirin  81 mg Oral Daily     atorvastatin  80 mg Oral Daily     carvedilol  25 mg Oral BID     doxazosin  2 mg Oral At Bedtime     heparin ANTICOAGULANT  5,000 Units Subcutaneous Q12H     insulin aspart  1-7 Units Subcutaneous TID AC     insulin aspart  1-5 Units Subcutaneous At Bedtime     insulin aspart  3 Units Subcutaneous TID AC     insulin glargine  40 Units Subcutaneous At Bedtime     isosorbide mononitrate  30 mg Oral Daily     losartan  100 mg Oral  Daily     sodium chloride (PF)  3 mL Intracatheter Q8H     vitamin D3  125 mcg Oral Daily

## 2021-07-21 NOTE — PLAN OF CARE
"PRIMARY DIAGNOSIS: \"GENERIC\" NURSING  OUTPATIENT/OBSERVATION GOALS TO BE MET BEFORE DISCHARGE:  ADLs back to baseline: Yes    Activity and level of assistance: Ambulating independently.    Pain status: Improved with use of alternative comfort measures i.e.: distraction using television to distract from headache    Return to near baseline physical activity: No     Discharge Planner Nurse   Safe discharge environment identified: Yes  Barriers to discharge: Yes       Entered by: Giulia Grewal 07/21/2021 1:30 AM   Patient reported headache, declined pain medication.  Diabetic educator to see.  Decision regarding need for dialysis, stress test, angio pending.  Please review provider order for any additional goals.   Nurse to notify provider when observation goals have been met and patient is ready for discharge.  "

## 2021-07-21 NOTE — PLAN OF CARE
"PRIMARY DIAGNOSIS: \"GENERIC\" NURSING  OUTPATIENT/OBSERVATION GOALS TO BE MET BEFORE DISCHARGE:  ADLs back to baseline: Yes    Activity and level of assistance: Ambulating independently.    Pain status: patient continues to have headache, decline tylenol    Return to near baseline physical activity:  Yes     Discharge Planner Nurse   Safe discharge environment identified: Yes  Barriers to discharge: Yes Patient had hypoglycemic episode where blood sugar was 47, then when rechecked 78, and on the third check 103.  Patient ate 30 CHO to help correct blood sugar.  Symptom of hypoglycemia was sweating.       Entered by: Giulia Grewal 07/21/2021 6:20 AM     Please review provider order for any additional goals.   Nurse to notify provider when observation goals have been met and patient is ready for discharge.  "

## 2021-07-22 LAB
ANION GAP SERPL CALCULATED.3IONS-SCNC: 9 MMOL/L (ref 5–18)
APTT PPP: 103 SECONDS (ref 22–38)
APTT PPP: 43 SECONDS (ref 22–38)
ATRIAL RATE - MUSE: 70 BPM
BASOPHILS # BLD AUTO: 0 10E3/UL (ref 0–0.2)
BASOPHILS NFR BLD AUTO: 1 %
BUN SERPL-MCNC: 39 MG/DL (ref 8–22)
CALCIUM SERPL-MCNC: 8.5 MG/DL (ref 8.5–10.5)
CHLORIDE BLD-SCNC: 111 MMOL/L (ref 98–107)
CO2 SERPL-SCNC: 21 MMOL/L (ref 22–31)
CREAT SERPL-MCNC: 4.02 MG/DL (ref 0.7–1.3)
DIASTOLIC BLOOD PRESSURE - MUSE: NORMAL MMHG
EOSINOPHIL # BLD AUTO: 0.2 10E3/UL (ref 0–0.7)
EOSINOPHIL NFR BLD AUTO: 4 %
ERYTHROCYTE [DISTWIDTH] IN BLOOD BY AUTOMATED COUNT: 12.7 % (ref 10–15)
GFR SERPL CREATININE-BSD FRML MDRD: 17 ML/MIN/1.73M2
GLUCOSE BLD-MCNC: 65 MG/DL (ref 70–125)
GLUCOSE BLDC GLUCOMTR-MCNC: 114 MG/DL (ref 70–125)
GLUCOSE BLDC GLUCOMTR-MCNC: 70 MG/DL (ref 70–125)
GLUCOSE BLDC GLUCOMTR-MCNC: 70 MG/DL (ref 70–125)
GLUCOSE BLDC GLUCOMTR-MCNC: 76 MG/DL (ref 70–125)
GLUCOSE BLDC GLUCOMTR-MCNC: 76 MG/DL (ref 70–125)
GLUCOSE BLDC GLUCOMTR-MCNC: 90 MG/DL (ref 70–125)
GLUCOSE BLDC GLUCOMTR-MCNC: 94 MG/DL (ref 70–125)
HCT VFR BLD AUTO: 36.3 % (ref 40–53)
HGB BLD-MCNC: 11.7 G/DL (ref 13.3–17.7)
HOLD SPECIMEN: NORMAL
IMM GRANULOCYTES # BLD: 0 10E3/UL
IMM GRANULOCYTES NFR BLD: 0 %
INTERPRETATION ECG - MUSE: NORMAL
LYMPHOCYTES # BLD AUTO: 2 10E3/UL (ref 0.8–5.3)
LYMPHOCYTES NFR BLD AUTO: 32 %
MCH RBC QN AUTO: 30.9 PG (ref 26.5–33)
MCHC RBC AUTO-ENTMCNC: 32.2 G/DL (ref 31.5–36.5)
MCV RBC AUTO: 96 FL (ref 78–100)
MONOCYTES # BLD AUTO: 0.3 10E3/UL (ref 0–1.3)
MONOCYTES NFR BLD AUTO: 6 %
NEUTROPHILS # BLD AUTO: 3.6 10E3/UL (ref 1.6–8.3)
NEUTROPHILS NFR BLD AUTO: 57 %
NRBC # BLD AUTO: 0 10E3/UL
NRBC BLD AUTO-RTO: 0 /100
P AXIS - MUSE: 47 DEGREES
PLATELET # BLD AUTO: 135 10E3/UL (ref 150–450)
POTASSIUM BLD-SCNC: 3.8 MMOL/L (ref 3.5–5)
PR INTERVAL - MUSE: 188 MS
QRS DURATION - MUSE: 108 MS
QT - MUSE: 416 MS
QTC - MUSE: 449 MS
R AXIS - MUSE: 63 DEGREES
RBC # BLD AUTO: 3.79 10E6/UL (ref 4.4–5.9)
SODIUM SERPL-SCNC: 141 MMOL/L (ref 136–145)
SYSTOLIC BLOOD PRESSURE - MUSE: NORMAL MMHG
T AXIS - MUSE: 104 DEGREES
VENTRICULAR RATE- MUSE: 70 BPM
WBC # BLD AUTO: 6.1 10E3/UL (ref 4–11)

## 2021-07-22 PROCEDURE — 4A023N7 MEASUREMENT OF CARDIAC SAMPLING AND PRESSURE, LEFT HEART, PERCUTANEOUS APPROACH: ICD-10-PCS | Performed by: INTERNAL MEDICINE

## 2021-07-22 PROCEDURE — 93459 L HRT ART/GRFT ANGIO: CPT | Performed by: INTERNAL MEDICINE

## 2021-07-22 PROCEDURE — 99152 MOD SED SAME PHYS/QHP 5/>YRS: CPT | Performed by: INTERNAL MEDICINE

## 2021-07-22 PROCEDURE — 255N000002 HC RX 255 OP 636: Performed by: INTERNAL MEDICINE

## 2021-07-22 PROCEDURE — C1769 GUIDE WIRE: HCPCS | Performed by: INTERNAL MEDICINE

## 2021-07-22 PROCEDURE — 93571 IV DOP VEL&/PRESS C FLO 1ST: CPT | Mod: RC | Performed by: INTERNAL MEDICINE

## 2021-07-22 PROCEDURE — 272N000001 HC OR GENERAL SUPPLY STERILE: Performed by: INTERNAL MEDICINE

## 2021-07-22 PROCEDURE — C1887 CATHETER, GUIDING: HCPCS | Performed by: INTERNAL MEDICINE

## 2021-07-22 PROCEDURE — 250N000011 HC RX IP 250 OP 636: Performed by: INTERNAL MEDICINE

## 2021-07-22 PROCEDURE — 250N000013 HC RX MED GY IP 250 OP 250 PS 637: Performed by: NURSE PRACTITIONER

## 2021-07-22 PROCEDURE — 258N000003 HC RX IP 258 OP 636: Performed by: INTERNAL MEDICINE

## 2021-07-22 PROCEDURE — 99232 SBSQ HOSP IP/OBS MODERATE 35: CPT | Performed by: STUDENT IN AN ORGANIZED HEALTH CARE EDUCATION/TRAINING PROGRAM

## 2021-07-22 PROCEDURE — 258N000003 HC RX IP 258 OP 636: Performed by: STUDENT IN AN ORGANIZED HEALTH CARE EDUCATION/TRAINING PROGRAM

## 2021-07-22 PROCEDURE — 93571 IV DOP VEL&/PRESS C FLO 1ST: CPT | Mod: 26 | Performed by: INTERNAL MEDICINE

## 2021-07-22 PROCEDURE — G0378 HOSPITAL OBSERVATION PER HR: HCPCS

## 2021-07-22 PROCEDURE — 250N000013 HC RX MED GY IP 250 OP 250 PS 637: Performed by: INTERNAL MEDICINE

## 2021-07-22 PROCEDURE — C1894 INTRO/SHEATH, NON-LASER: HCPCS | Performed by: INTERNAL MEDICINE

## 2021-07-22 PROCEDURE — 99223 1ST HOSP IP/OBS HIGH 75: CPT | Performed by: INTERNAL MEDICINE

## 2021-07-22 PROCEDURE — 93458 L HRT ARTERY/VENTRICLE ANGIO: CPT | Performed by: INTERNAL MEDICINE

## 2021-07-22 PROCEDURE — 93010 ELECTROCARDIOGRAM REPORT: CPT | Performed by: INTERNAL MEDICINE

## 2021-07-22 PROCEDURE — B2111ZZ FLUOROSCOPY OF MULTIPLE CORONARY ARTERIES USING LOW OSMOLAR CONTRAST: ICD-10-PCS | Performed by: INTERNAL MEDICINE

## 2021-07-22 PROCEDURE — 93458 L HRT ARTERY/VENTRICLE ANGIO: CPT | Mod: 26 | Performed by: INTERNAL MEDICINE

## 2021-07-22 PROCEDURE — 80048 BASIC METABOLIC PNL TOTAL CA: CPT | Performed by: INTERNAL MEDICINE

## 2021-07-22 PROCEDURE — 99153 MOD SED SAME PHYS/QHP EA: CPT | Performed by: INTERNAL MEDICINE

## 2021-07-22 PROCEDURE — 85730 THROMBOPLASTIN TIME PARTIAL: CPT | Performed by: INTERNAL MEDICINE

## 2021-07-22 PROCEDURE — 85004 AUTOMATED DIFF WBC COUNT: CPT | Performed by: STUDENT IN AN ORGANIZED HEALTH CARE EDUCATION/TRAINING PROGRAM

## 2021-07-22 PROCEDURE — 36415 COLL VENOUS BLD VENIPUNCTURE: CPT | Performed by: INTERNAL MEDICINE

## 2021-07-22 PROCEDURE — 96372 THER/PROPH/DIAG INJ SC/IM: CPT | Performed by: INTERNAL MEDICINE

## 2021-07-22 PROCEDURE — 200N000001 HC R&B ICU

## 2021-07-22 PROCEDURE — 4A033BC MEASUREMENT OF ARTERIAL PRESSURE, CORONARY, PERCUTANEOUS APPROACH: ICD-10-PCS | Performed by: INTERNAL MEDICINE

## 2021-07-22 PROCEDURE — 250N000009 HC RX 250: Performed by: INTERNAL MEDICINE

## 2021-07-22 RX ORDER — ASPIRIN 81 MG/1
243 TABLET, CHEWABLE ORAL ONCE
Status: COMPLETED | OUTPATIENT
Start: 2021-07-22 | End: 2021-07-22

## 2021-07-22 RX ORDER — IODIXANOL 320 MG/ML
INJECTION, SOLUTION INTRAVASCULAR
Status: DISCONTINUED | OUTPATIENT
Start: 2021-07-22 | End: 2021-07-22 | Stop reason: HOSPADM

## 2021-07-22 RX ORDER — OXYCODONE HYDROCHLORIDE 5 MG/1
5 TABLET ORAL EVERY 4 HOURS PRN
Status: DISCONTINUED | OUTPATIENT
Start: 2021-07-22 | End: 2021-07-27 | Stop reason: HOSPADM

## 2021-07-22 RX ORDER — HYDRALAZINE HYDROCHLORIDE 20 MG/ML
10 INJECTION INTRAMUSCULAR; INTRAVENOUS
Status: COMPLETED | OUTPATIENT
Start: 2021-07-22 | End: 2021-07-22

## 2021-07-22 RX ORDER — NALOXONE HYDROCHLORIDE 0.4 MG/ML
0.4 INJECTION, SOLUTION INTRAMUSCULAR; INTRAVENOUS; SUBCUTANEOUS
Status: ACTIVE | OUTPATIENT
Start: 2021-07-22 | End: 2021-07-23

## 2021-07-22 RX ORDER — SODIUM CHLORIDE 9 MG/ML
INJECTION, SOLUTION INTRAVENOUS CONTINUOUS
Status: DISCONTINUED | OUTPATIENT
Start: 2021-07-22 | End: 2021-07-22 | Stop reason: HOSPADM

## 2021-07-22 RX ORDER — LIDOCAINE 40 MG/G
CREAM TOPICAL
Status: DISCONTINUED | OUTPATIENT
Start: 2021-07-22 | End: 2021-07-22 | Stop reason: HOSPADM

## 2021-07-22 RX ORDER — FLUMAZENIL 0.1 MG/ML
0.2 INJECTION, SOLUTION INTRAVENOUS
Status: ACTIVE | OUTPATIENT
Start: 2021-07-22 | End: 2021-07-23

## 2021-07-22 RX ORDER — FENTANYL CITRATE 50 UG/ML
25 INJECTION, SOLUTION INTRAMUSCULAR; INTRAVENOUS
Status: DISCONTINUED | OUTPATIENT
Start: 2021-07-22 | End: 2021-07-27 | Stop reason: HOSPADM

## 2021-07-22 RX ORDER — HEPARIN SODIUM 1000 [USP'U]/ML
INJECTION, SOLUTION INTRAVENOUS; SUBCUTANEOUS
Status: DISCONTINUED | OUTPATIENT
Start: 2021-07-22 | End: 2021-07-22 | Stop reason: HOSPADM

## 2021-07-22 RX ORDER — DIAZEPAM 5 MG
5 TABLET ORAL
Status: DISCONTINUED | OUTPATIENT
Start: 2021-07-22 | End: 2021-07-22

## 2021-07-22 RX ORDER — DEXTROSE MONOHYDRATE 25 G/50ML
25-50 INJECTION, SOLUTION INTRAVENOUS
Status: DISCONTINUED | OUTPATIENT
Start: 2021-07-22 | End: 2021-07-27 | Stop reason: HOSPADM

## 2021-07-22 RX ORDER — OXYCODONE HYDROCHLORIDE 5 MG/1
10 TABLET ORAL EVERY 4 HOURS PRN
Status: DISCONTINUED | OUTPATIENT
Start: 2021-07-22 | End: 2021-07-27 | Stop reason: HOSPADM

## 2021-07-22 RX ORDER — SODIUM CHLORIDE 9 MG/ML
INJECTION, SOLUTION INTRAVENOUS CONTINUOUS
Status: DISCONTINUED | OUTPATIENT
Start: 2021-07-22 | End: 2021-07-23

## 2021-07-22 RX ORDER — NALOXONE HYDROCHLORIDE 0.4 MG/ML
0.2 INJECTION, SOLUTION INTRAMUSCULAR; INTRAVENOUS; SUBCUTANEOUS
Status: ACTIVE | OUTPATIENT
Start: 2021-07-22 | End: 2021-07-23

## 2021-07-22 RX ORDER — ASPIRIN 325 MG
325 TABLET ORAL ONCE
Status: COMPLETED | OUTPATIENT
Start: 2021-07-22 | End: 2021-07-22

## 2021-07-22 RX ORDER — SODIUM CHLORIDE 9 MG/ML
75 INJECTION, SOLUTION INTRAVENOUS CONTINUOUS
Status: ACTIVE | OUTPATIENT
Start: 2021-07-22 | End: 2021-07-22

## 2021-07-22 RX ORDER — ATROPINE SULFATE 0.1 MG/ML
0.5 INJECTION INTRAVENOUS
Status: ACTIVE | OUTPATIENT
Start: 2021-07-22 | End: 2021-07-23

## 2021-07-22 RX ORDER — NICOTINE POLACRILEX 4 MG
15-30 LOZENGE BUCCAL
Status: DISCONTINUED | OUTPATIENT
Start: 2021-07-22 | End: 2021-07-27 | Stop reason: HOSPADM

## 2021-07-22 RX ORDER — FENTANYL CITRATE 50 UG/ML
INJECTION, SOLUTION INTRAMUSCULAR; INTRAVENOUS
Status: DISCONTINUED | OUTPATIENT
Start: 2021-07-22 | End: 2021-07-22 | Stop reason: HOSPADM

## 2021-07-22 RX ORDER — ACETAMINOPHEN 325 MG/1
650 TABLET ORAL EVERY 4 HOURS PRN
Status: DISCONTINUED | OUTPATIENT
Start: 2021-07-22 | End: 2021-07-25

## 2021-07-22 RX ORDER — DIAZEPAM 5 MG
10 TABLET ORAL
Status: COMPLETED | OUTPATIENT
Start: 2021-07-22 | End: 2021-07-22

## 2021-07-22 RX ADMIN — HYDRALAZINE HYDROCHLORIDE 10 MG: 20 INJECTION INTRAMUSCULAR; INTRAVENOUS at 18:21

## 2021-07-22 RX ADMIN — ACETAMINOPHEN 650 MG: 325 TABLET ORAL at 00:01

## 2021-07-22 RX ADMIN — ACETAMINOPHEN 650 MG: 325 TABLET ORAL at 12:30

## 2021-07-22 RX ADMIN — DEXTROSE AND SODIUM CHLORIDE: 5; 900 INJECTION, SOLUTION INTRAVENOUS at 06:25

## 2021-07-22 RX ADMIN — ASPIRIN 243 MG: 81 TABLET, CHEWABLE ORAL at 12:30

## 2021-07-22 RX ADMIN — ISOSORBIDE MONONITRATE 30 MG: 30 TABLET, EXTENDED RELEASE ORAL at 08:37

## 2021-07-22 RX ADMIN — HYDRALAZINE HYDROCHLORIDE 10 MG: 20 INJECTION INTRAMUSCULAR; INTRAVENOUS at 23:21

## 2021-07-22 RX ADMIN — HYDRALAZINE HYDROCHLORIDE 10 MG: 20 INJECTION INTRAMUSCULAR; INTRAVENOUS at 21:15

## 2021-07-22 RX ADMIN — SODIUM CHLORIDE: 9 INJECTION, SOLUTION INTRAVENOUS at 22:52

## 2021-07-22 RX ADMIN — CARVEDILOL 25 MG: 12.5 TABLET, FILM COATED ORAL at 22:17

## 2021-07-22 RX ADMIN — ACETAMINOPHEN 650 MG: 325 TABLET ORAL at 22:18

## 2021-07-22 RX ADMIN — SODIUM CHLORIDE: 9 INJECTION, SOLUTION INTRAVENOUS at 14:04

## 2021-07-22 RX ADMIN — ATORVASTATIN CALCIUM 80 MG: 40 TABLET, FILM COATED ORAL at 08:37

## 2021-07-22 RX ADMIN — SODIUM CHLORIDE 10 ML/HR: 9 INJECTION, SOLUTION INTRAVENOUS at 16:29

## 2021-07-22 RX ADMIN — DOXAZOSIN 2 MG: 1 TABLET ORAL at 22:55

## 2021-07-22 RX ADMIN — LOSARTAN POTASSIUM 100 MG: 50 TABLET, FILM COATED ORAL at 08:37

## 2021-07-22 RX ADMIN — Medication 125 MCG: at 08:38

## 2021-07-22 RX ADMIN — ASPIRIN 81 MG: 81 TABLET, COATED ORAL at 08:37

## 2021-07-22 RX ADMIN — CARVEDILOL 25 MG: 12.5 TABLET, FILM COATED ORAL at 08:37

## 2021-07-22 RX ADMIN — CLONIDINE HYDROCHLORIDE 0.1 MG: 0.1 TABLET ORAL at 22:56

## 2021-07-22 RX ADMIN — HEPARIN SODIUM 5000 UNITS: 10000 INJECTION, SOLUTION INTRAVENOUS; SUBCUTANEOUS at 08:37

## 2021-07-22 RX ADMIN — DIAZEPAM 10 MG: 5 TABLET ORAL at 14:02

## 2021-07-22 ASSESSMENT — MIFFLIN-ST. JEOR: SCORE: 2046.56

## 2021-07-22 NOTE — PROGRESS NOTES
Progress Note        Assessment/Plan  60-year-old man past medical history of CAD s/p CABG 2019, CKD, hypertension and type II DM admitted for chest pain.    Chest pain  CAD s/p CABG 2019 with LIMA to LAD and SVG to RCA  Serial troponins were negative, no new change in EKG  Echo LVEF 45-50%, 7/2021  Last cardiac cath 4/2020-unchanged LAD with severe mid vessel disease and occluded vein graft to RCA.  Cardiology reviewed last cath , feel RCA could be amenable to stenting  Continue heparin drip, aspirin , carvedilol and statin  Patient to undergo angiogram today    Hypertensive urgency  Blood pressure improving  Continue losartan dose increased 100 mg/day this admission, carvedilol and Imdur    Hyperlipidemia  Continue atorvastatin daily    CKD 4  Likely due to underlying diabetic and hypertensive nephropathy  Nephrology on board  May end up needing dialysis after angiogram  We will continue holding home Lasix    Type II DM  Decrease Lantus to 25 units(40 units nightly PTA)  Continue sliding scale insulin          DVT PPX -on heparin gtt    Barriers to discharge- Angiogram    Anticipated Discharge date  - TBD        Subjective  Denies any chest pain      Objective  Vital signs in last 24 hours  Temp:  [97.9  F (36.6  C)-98.9  F (37.2  C)] 98  F (36.7  C)  Pulse:  [67-78] 69  Resp:  [18-20] 18  BP: (129-160)/(70-86) 129/70  SpO2:  [93 %-96 %] 95 %    Input and Output in 24 hrs     Intake/Output Summary (Last 24 hours) at 7/22/2021 1256  Last data filed at 7/22/2021 0837  Gross per 24 hour   Intake 60 ml   Output 1100 ml   Net -1040 ml       Physical Exam:  GEN: Alert and oriented. Not in acute distress  HEENT: Atraumatic   CHEST: Clear to auscultation bilaterally  HEART: S1S2 regular. No murmurs, rubs or gallops  ABDOMEN: Soft. Non-tender, non-distended.   Extremities: No pedal oedema  CNS: No focal neurological deficit. No involuntary movements        Pertinent Labs   Lab Results: Personally Reviewed    Recent Results  (from the past 24 hour(s))   Glucose by meter    Collection Time: 07/21/21  4:44 PM   Result Value Ref Range    GLUCOSE BY METER POCT 176 (H) 70 - 125 mg/dL   Glucose by meter    Collection Time: 07/21/21  9:18 PM   Result Value Ref Range    GLUCOSE BY METER POCT 122 70 - 125 mg/dL   Glucose by meter    Collection Time: 07/22/21  4:05 AM   Result Value Ref Range    GLUCOSE BY METER POCT 70 70 - 125 mg/dL   Basic metabolic panel    Collection Time: 07/22/21  4:57 AM   Result Value Ref Range    Sodium 141 136 - 145 mmol/L    Potassium 3.8 3.5 - 5.0 mmol/L    Chloride 111 (H) 98 - 107 mmol/L    Carbon Dioxide (CO2) 21 (L) 22 - 31 mmol/L    Anion Gap 9 5 - 18 mmol/L    Urea Nitrogen 39 (H) 8 - 22 mg/dL    Creatinine 4.02 (H) 0.70 - 1.30 mg/dL    Calcium 8.5 8.5 - 10.5 mg/dL    Glucose 65 (L) 70 - 125 mg/dL    GFR Estimate 17 (L) >60 mL/min/1.73m2   Glucose by meter    Collection Time: 07/22/21  5:10 AM   Result Value Ref Range    GLUCOSE BY METER POCT 70 70 - 125 mg/dL   Extra Purple Top Tube    Collection Time: 07/22/21  5:18 AM   Result Value Ref Range    Hold Specimen JIC    Glucose by meter    Collection Time: 07/22/21  6:04 AM   Result Value Ref Range    GLUCOSE BY METER POCT 76 70 - 125 mg/dL   Glucose by meter    Collection Time: 07/22/21  8:30 AM   Result Value Ref Range    GLUCOSE BY METER POCT 90 70 - 125 mg/dL   Glucose by meter    Collection Time: 07/22/21 11:47 AM   Result Value Ref Range    GLUCOSE BY METER POCT 94 70 - 125 mg/dL       Pertinent Radiology   Radiology Results reviewed      EKG Results reviewed       Advanced Care Planning      Joe Marsh MD   [facility]

## 2021-07-22 NOTE — PROGRESS NOTES
[facility] ([facility]) progress note  CC: F/U   S: Doing alright this am. No sob. No pain. Does note chest pain when up and walking. Patient wants to proceed with angio. Discussed again risk for needing dialysis and if needed would need tunnel cath. NPO midnight again.     A/P:   Principal Problem:    Hypertensive urgency  Active Problems:    Type 2 diabetes mellitus with other circulatory complication, unspecified whether long term insulin use (H)    Class 2 severe obesity due to excess calories with serious comorbidity in adult, unspecified BMI (H)    Coronary artery disease due to lipid rich plaque    S/P CABG (coronary artery bypass graft)    Chest pain with high risk for cardiac etiology    1. 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  Patient with progressively declining kidney function over the past 2 years with creatinine 1.81 mg/dL in June 2019 worsening to 4.34 mg/dL on 7/19/2021 admission.  He follows with Dr. Rios in clinic with dialysis fistula placed in June 2021 and left forearm that is not yet mature.  He has noted increasing fatigue over the past several weeks with poorly controlled hypertension chronically.  Denies anorexia, dysgeusia, orthopnea or other uremic symptoms at this time so no emergent need for dialysis.              -Risk for need dialysis with coronary angiogram if decided to proceed and he understands this.              -losartan to 100 mg daily for better blood pressure control.              -Has been referred to [facility] transplant clinic for evaluation just recently.   -Plan for angio today. IVF ordered. Would continue 8 hours after procedure.      2.  Hypertensive urgency: improved. Poorly controlled chronically likely cause for advanced CKD.  Blood pressure appears markedly improved at present during inpatient stay following addition of isosorbide. losartan to 100 mg daily     3.  Coronary artery disease with previous CABG x2 in June 2019.  Echocardiogram pending by cardiology with ongoing  evaluation pending.  We did discuss high risk for requiring dialysis with possible angiogram although he is on the brink of dialysis as it is.     4. CKD-MBD: History of hyperparathyroidism but no labs since April 2020 so we will update his vitamin D and PTH levels.  Phosphorus was reasonably controlled in April at 5.2 will be rechecked.     5.  Status post left forearm AVF placement at [facility] June 2021 (5 weeks ago). Still immature and would need tunneled CVC if dialysis required at present.      Zhang Rosario DO  [facility]  [phone] (off)    No interval changes to past medical history, social history or family history to report.    /70 (BP Location: Right arm)   Pulse 69   Temp 98  F (36.7  C) (Oral)   Resp 18   Wt 113.1 kg (249 lb 6.4 oz)   SpO2 95%   BMI 34.78 kg/m      I/O last 3 completed shifts:  In: 480 [P.O.:480]  Out: 1100 [Urine:1100]    Physical Exam:   GENERAL: calm and comfortable, alert  EYES: No scleral icterus, conjunctiva clear  ENT: Hearing normal, Oral mucosa moist  RESP:  no respiratory distress, normal effort.  CV:   Trace leg edema.    GI, NT/ND,  Musculoskeletal: Normal muscle bulk/ tone; No gross joint abnormalities  SKIN: No rash, warm/ dry  PSYCH:  Appropriate mood and affect  Left forearm aVF  Lymph: No cervical adenopathy         Lab Results   Component Value Date     07/21/2021     07/20/2021     07/19/2021    Lab Results   Component Value Date    CHLORIDE 112 07/21/2021    CHLORIDE 110 07/20/2021    CHLORIDE 109 07/19/2021    Lab Results   Component Value Date    BUN 40 07/21/2021    BUN 46 07/20/2021    BUN 48 07/19/2021      Lab Results   Component Value Date    POTASSIUM 3.8 07/21/2021    POTASSIUM 3.8 07/20/2021    POTASSIUM 4.5 07/19/2021    Lab Results   Component Value Date    CO2 21 07/21/2021    CO2 21 07/20/2021    CO2 22 07/19/2021    Lab Results   Component Value Date    CR 3.93  07/21/2021    CR 4.09 07/20/2021    CR 4.34 07/19/2021        No results found for: NTBNPI, NTBNP  Lab Results   Component Value Date    WBC 6.8 07/20/2021    WBC 6.7 07/19/2021    WBC 7.1 10/05/2020    HGB 12.9 (L) 07/20/2021    HGB 12.9 (L) 07/19/2021    HGB 13.6 (L) 10/05/2020    HCT 37.1 (L) 07/20/2021    HCT 37.3 (L) 07/19/2021    HCT 40.2 10/05/2020    MCV 89 07/20/2021    MCV 90 07/19/2021    MCV 89 10/05/2020     (L) 07/20/2021     (L) 07/19/2021     10/05/2020     Lab Results   Component Value Date    CR 4.02 (H) 07/22/2021    CR 3.93 (H) 07/21/2021    CR 4.09 (H) 07/20/2021     Lab Results   Component Value Date    DD 0.34 05/07/2020     Lab Results   Component Value Date     07/22/2021     07/21/2021     07/20/2021    POTASSIUM 3.8 07/22/2021    POTASSIUM 3.8 07/21/2021    POTASSIUM 3.8 07/20/2021    CHLORIDE 111 (H) 07/22/2021    CHLORIDE 112 (H) 07/21/2021    CHLORIDE 110 (H) 07/20/2021    CO2 21 (L) 07/22/2021    CO2 21 (L) 07/21/2021    CO2 21 (L) 07/20/2021    GLC 94 07/22/2021    GLC 90 07/22/2021    GLC 76 07/22/2021     No results found for: SED  Lab Results   Component Value Date    GLC 94 07/22/2021    GLC 90 07/22/2021    GLC 76 07/22/2021     Lab Results   Component Value Date    HGB 12.9 (L) 07/20/2021    HGB 12.9 (L) 07/19/2021    HGB 13.6 (L) 10/05/2020     Lab Results   Component Value Date    AST 25 07/20/2021    AST 23 10/05/2020    AST 26 10/04/2020    ALT 34 07/20/2021    ALT 29 10/05/2020    ALT 34 10/04/2020    ALKPHOS 63 07/20/2021    ALKPHOS 66 10/05/2020    ALKPHOS 79 10/04/2020    BILITOTAL 0.5 07/20/2021    BILITOTAL 0.4 10/05/2020    BILITOTAL 0.3 10/04/2020         Drug and lactation database from the [place] [employer]:  http://toxnet.nlm.nih.gov/cgi-bin/sis/htmlgen?LACT        Labs personally reviewed today during this evaluation at 9:12 AM

## 2021-07-22 NOTE — PLAN OF CARE
PRIMARY DIAGNOSIS: CHEST PAIN  OUTPATIENT/OBSERVATION GOALS TO BE MET BEFORE DISCHARGE:  1. Ruled out acute coronary syndrome (negative or stable Troponin):  Yes  2. Pain Status: Pain free.  3. Appropriate provocative testing performed: Yes  - Stress Test Procedure: Regular  - Interpretation of cardiac rhythm per telemetry tech: NSR    4. Cleared by Consultants (if applicable):No  5. Return to near baseline physical activity: Yes  Discharge Planner Nurse   Safe discharge environment identified: Yes  Barriers to discharge: Yes       Entered by: Summer Marcus 07/22/2021 2:24 PM   Patient to go to cath lab today for Angiogram  Please review provider order for any additional goals.   Nurse to notify provider when observation goals have been met and patient is ready for discharge.

## 2021-07-22 NOTE — PROGRESS NOTES
Care Management Follow Up    Length of Stay (days): 0    Expected Discharge Date: 07/23/2021     Concerns to be Addressed:    Cardiology Clearance-angio today 7/22. Monitor Kidney function.    Patient plan of care discussed at interdisciplinary rounds: Yes    Anticipated Discharge Disposition:  Home     Anticipated Discharge Services:  None Anticipated.   Anticipated Discharge DME:  None Anticipated.     Patient/family educated on Medicare website which has current facility and service quality ratings:  Not at this time  Education Provided on the Discharge Plan:  Pending  Patient/Family in Agreement with the Plan:  Pending    Referrals Placed by CM/SW:  None At This time  Private pay costs discussed: Not applicable    Additional Information:  Chart Reviewed. Plan for angio today 7/22. Will potentially need dialysis post angio due to kidney function. From home with spouse and three sons; independent at baseline. Per previous note patient declined ACP documents. No needs anticipated. Spouse to transport. Care manager to follow.       Alcira Portillo RN

## 2021-07-22 NOTE — PLAN OF CARE
PRIMARY DIAGNOSIS: CHEST PAIN  OUTPATIENT/OBSERVATION GOALS TO BE MET BEFORE DISCHARGE:  1. Ruled out acute coronary syndrome (negative or stable Troponin):  Yes  2. Pain Status: Pain free.  3. Appropriate provocative testing performed: Yes  - Stress Test Procedure: Patient to have coronary Angiogram 7/22/21  - Interpretation of cardiac rhythm per telemetry tech: NSR    4. Cleared by Consultants (if applicable):No  5. Return to near baseline physical activity: Yes  Discharge Planner Nurse   Safe discharge environment identified: Coronary Angiogram 7/22/21  Barriers to discharge:    Coronary Angiogram 7/22/21       Entered by: Ludmila William 07/21/2021 8:23 PM     Please review provider order for any additional goals.   Nurse to notify provider when observation goals have been met and patient is ready for discharge.

## 2021-07-22 NOTE — PROGRESS NOTES
Cardiology Progress Note    Assessment:  1.  Chest discomfort.  Patient admitted with symptoms of chest discomfort with hypertensive urgency.  Systolic blood pressure greater than 200 on admission.  No acute EKG changes.  Troponins x2 are negative.  Coronary angiogram from April 2020 reported no appreciable left main or circumflex disease.  LAD demonstrated severe mid vessel disease and patent LIMA to the distal LAD.  Stent in the large first diagonal branch was patent.  The right coronary artery was stated to be grossly unchanged with moderate to severe proximal and distal RCA.  The vein graft to the right coronary artery is occluded.  I did review with my interventional colleague yesterday that the right coronary artery could potentially be stented.  As noted by nephrology this would likely bring on the need for more urgent dialysis.  Patient is of the opinion he wishes to proceed to coronary angiography and very much understands the concerns related to worsening of his kidney disease.  He does have a fistula in place for tentative plans for dialysis in the future.    2.  Mild LV dysfunction.  Echocardiogram reports ejection fraction of 45 to 50%  3.  Hypertensive urgency.  Blood pressure is improved.  Current medications include carvedilol 25 mg p.o. twice daily, doxazosin, isosorbide mononitrate, losartan recently increased to 100 mg daily.  4.  Dyslipidemia.  Direct LDL 59 with triglycerides elevated at 415.  Julia on atorvastatin.  May need to consider additional treatment for hypertriglyceridemia but would recommend ongoing aggressive treatment of his diabetes.  No recent hemoglobin A1c.  Will ask primary care to consider checking hemoglobin A1c.  HDL is low.  Principal Problem:    Hypertensive urgency  Active Problems:    Type 2 diabetes mellitus with other circulatory complication, unspecified whether long term insulin use (H)    Class 2 severe obesity due to excess calories with serious comorbidity in  adult, unspecified BMI (H)    S/P CABG (coronary artery bypass graft)    Chest pain with high risk for cardiac etiology      Plan:  1.  I have placed a call to my interventional colleague as well as to renal to discuss the issues above.  2.  Continue with current cardiovascular medications.    Subjective:   Siva Ramey is seen in follow-up.  Patient indicates that he did have some chest tightness while walking in the hallway that evening.  This was short in duration.  He is thought about coronary angiography and understands the concern as it relates to contributing to more immediate need for dialysis.  He is of the opinion that he wishes to proceed to coronary angiography and understands the issues and risks in detail.    Objective:   Vital signs in last 24 hours:  VITALS: BP (!) 160/77 (BP Location: Right arm)   Pulse 67   Temp 97.9  F (36.6  C) (Oral)   Resp 18   Wt 113.1 kg (249 lb 6.4 oz)   SpO2 96%   BMI 34.78 kg/m    BMI: Body mass index is 34.78 kg/m .  Wt Readings from Last 3 Encounters:   07/22/21 113.1 kg (249 lb 6.4 oz)   04/30/20 115 kg (253 lb 8 oz)   03/02/20 111.1 kg (245 lb)       Intake/Output Summary (Last 24 hours) at 7/22/2021 0554  Last data filed at 7/21/2021 2100  Gross per 24 hour   Intake 480 ml   Output 600 ml   Net -120 ml       Physical Exam: Comfortable, resting in bed.   Neck: No JVD   Lungs: clear to auscultation   COR: RRR, No murmur, S4   Abd: nondistended, BS present   Extrem: No edema, good distal pulses    Cardiographics:    ECG: July 19 1131 normal sinus rhythm, nonspecific T wave changes.  No significant change when compared to October 2020   ______________________________________________________________________________  Interpretation Summary     The left ventricle is normal in size.  Left ventricular function is decreased. The ejection fraction is 45-50%  (mildly reduced).  Normal right ventricle size and systolic function.  Mild sclerodegenerative valve disease is  present without hemodynamically  significant stenosis or regurgitation.     Compared to the prior study dated 4/30/2020, there are changes as noted.  ______________________________________________________________________________    Telemetry: Sinus rhythm without significant ectopy.    Imaging:   EXAM: XR CHEST PORT 1 VIEW  LOCATION: [facility]   DATE/TIME: 7/19/2021 12:05 PM     INDICATION: Chest pain  COMPARISON: PA and lateral views of the chest 10/04/2020                                                                      IMPRESSION:      Shallow lung inflation. Minimal paradiaphragmatic atelectasis along the left hemidiaphragm, unchanged from 10/04/2020. No new lung opacities. Normal vascularity.     Symmetric lung inflation. Mediastinal borders are well-defined. Sternal wires are intact and aligned.     No pleural fluid or pneumothora     Dyslipidemia [E78.5 (ICD-10-CM)]   Coronary artery disease due to lipid rich plaque [I25.10, I25.83 (ICD-10-CM)]   Coronary atherosclerosis due to lipid rich plaque (CODE) [I25.83 (ICD-10-CM)]   S/P CABG (coronary artery bypass graft) [Z95.1 (ICD-10-CM)]   Stage 3 chronic kidney disease [N18.3 (ICD-10-CM)]   Burning in the chest [R07.89 (ICD-10-CM)]         Conclusion       Dyspnea and chest burning on exertion in a diabetic with renal insufficiency and abnormal stress test. CABG in June 2019 with LIMA to LAD and SVG to RCA. FFR of RCA prior to CABG was 0.82.    No appreciable left main or circumflex disease    LAD is unchanged with severe mid vessel disease and patent LIMA to the distal LAD. The stent in the large first diagonal is patent.    The RCA is grossly unchanged with moderate-severe proximal and distal RCA disease. The vein graft to the right coronary is occluded.    Resting filling pressures are /8 mmHg, LV EDP 21 mmHg, PCWP 15 mmHg, PA 34/18 mmHg, mean PA 24 mmHg, RV 37/3 mmHg, RV EDP 11 mmHg, mean RA 9 mmHg    With deep breath in  the LV pressures dropped to 141/13 mmHg and the RV pressures increased to 42/12 raising the question of constriction.    Less than 60 cc Visipaque contrast used    Estimated blood loss was <20 ml           Lab Results    Chemistry/lipid CBC Cardiac Enzymes/BNP/TSH/INR   Recent Labs   Lab Test 07/21/21  0440   CHOL 167   HDL 24*   LDL 59   TRIG 415*     Recent Labs   Lab Test 07/21/21  0440 03/04/19  1211   LDL 59 46  36     Recent Labs   Lab Test 07/22/21  0510 07/22/21  0457   NA  --  141   POTASSIUM  --  3.8   CHLORIDE  --  111*   CO2  --  21*   GLC 70 65*   BUN  --  39*   CR  --  4.02*   GFRESTIMATED  --  17*   BETHANY  --  8.5     Recent Labs   Lab Test 07/22/21  0457 07/21/21  0440 07/20/21  0431   CR 4.02* 3.93* 4.09*     Recent Labs   Lab Test 06/22/19  0554 01/14/19  0544 05/07/17  1945   A1C 7.8* 10.0* 8.6*          Recent Labs   Lab Test 07/20/21  0431   WBC 6.8   HGB 12.9*   HCT 37.1*   MCV 89   *     Recent Labs   Lab Test 07/20/21  0431 07/19/21  1155 10/05/20  0542   HGB 12.9* 12.9* 13.6*    Recent Labs   Lab Test 07/20/21  0023 07/19/21  1820 07/19/21  1154   TROPONINI <0.01 0.01 0.01     Recent Labs   Lab Test 07/19/21  1155 10/04/20  2140 05/07/20  0926   BNP 25 29 68*     Recent Labs   Lab Test 01/14/19  0544   TSH 2.65     Recent Labs   Lab Test 10/04/20  2140 06/21/19  0345 06/20/19  1558   INR 1.03 1.24* 1.30*

## 2021-07-22 NOTE — PLAN OF CARE
Problem: Chest Pain  Goal: Resolution of Chest Pain Symptoms  Outcome: Improving   Patient has denied chest pain this shift. Tylenol given for mild HA 5/10, pain resolved prior to leaving unit for CSC.

## 2021-07-22 NOTE — PROGRESS NOTES
Cardiology follow-up.  Results reviewed from Dr. Crow.  Report indicates that there was not significant progression of coronary artery disease in fact the right coronary artery is now described as mild when previously felt to be moderate but no flow limitation.  No coronary intervention was pursued.  He was aware of the risk of contrast.  Will monitor renal function closely.  Will inform Dr. Stockton as well.  He received a 75 cc of contrast.  Siva Ramey  Cardiac Catheterization  Order# [id number]  Reading physician: Adrian Crow MD Ordering physician: Joe Santo MD Study date: 7/22/21   Patient Information    Name MRN Description   Siva Ramey [id number] 46 year old male   Physicians    Panel Physicians Referring Physician Case Authorizing Physician   Adrian Crow MD (Primary) Joe Santo MD Garr, Michael D, MD   Procedures    CV CORONARY ANGIOGRAM   Fractional Flow Ratio Wire   Left Heart Cath   Indications    Chest pain with high risk for cardiac etiology [R07.9 (ICD-10-CM)]   S/P CABG (coronary artery bypass graft) [Z95.1 (ICD-10-CM)]   Coronary artery disease due to lipid rich plaque [I25.10, I25.83 (ICD-10-CM)]   Pre Procedure Diagnosis    cad       Conclusion    46-year-old male with established coronary artery disease, having had prior bypass surgery with a known patent LIMA to the LAD, presenting with exertional shortness of breath.  He also has end-stage renal disease, recent AV fistula placed, but not initiated on hemodialysis thus far.  With his progressive dyspnea on exertion, or angiography was requested to rule out ischemia as a potential etiology.  Patient understood the risks of needing dialysis sooner and wanted to proceed with angiography.     Coronary angiography showed:     Left main with mild disease  LAD with mild proximal disease, having a large diagonal system with a mid LAD being occluded, and distal LAD filling via patent LIMA  Left circumflex with mild to moderate  disease  RCA with 30 to 40% stenosis seen in the proximal and distal portion.  The RCA was found to have more significant disease on his prior study, and was therefore assessed with fractional flow reserve.     After appropriate anticoagulation, FFR on the RCA with intravenous adenosine was performed.     This showed an FFR of 0.93 across the distal RCA lesion at peak hyperemia, and 0.96 across the proximal lesion at peak hyperemia, confirming nonobstructive disease     EDP 13mmHg       Recommendations    Recommendations Ongoing risk factor modification  Evaluate for non ischemic causes of shortness of breath

## 2021-07-22 NOTE — PLAN OF CARE
Problem: Adult Inpatient Plan of Care  Goal: Patient-Specific Goal (Individualized) No hypoglycemia  Outcome: No Change     Patient denied feeling of low blood sugar.  AM labs drawn, level was 65, staff used glucometer and result was 76.  Patient checked with his own glucometer and result was 60.  Patient in NPO prior to angiogram.  House officer updated regarding situation.  D5 to be ordered.  Currently patient has no symptoms of low blood sugar.    Vital signs stable.  Will continue to monitor.

## 2021-07-22 NOTE — UTILIZATION REVIEW
Admission Status; Secondary Review Determination   Under the authority of the Utilization Management Committee, the utilization review process indicated a secondary review on Siva Ramey. The review outcome is based on review of the medical records, discussions with staff, and applying clinical experience noted on the date of the review.   (x) Inpatient Status Appropriate - This patient's medical care is consistent with medical management for inpatient care and reasonable inpatient medical practice.     RATIONALE FOR DETERMINATION   46 yr old male with CAD s/p CABG x 2 vessel and CKD4 who presented with exertional chest pain and dyspnea.  Initially no need for heparin.  Cardiology and nephrology following and deciding angiogram or stress test.  On verge of needing to start HD but angiogram would likely push this sooner.  Did develop angina again during stay which prompted decision for angiogram.  Will need HD after anticipated by nephrology.  Ongoing stay now crossing 3rd night.    At the time of admission with the information available to the attending physician more than 2 nights Hospital complex care was anticipated, based on patient risk of adverse outcome if treated as outpatient and complex care required. Inpatient admission is appropriate based on the Medicare guidelines.   The information on this document is developed by the utilization review team in order for the business office to ensure compliance. This only denotes the appropriateness of proper admission status and does not reflect the quality of care rendered.   The definitions of Inpatient Status and Observation Status used in making the determination above are those provided in the CMS Coverage Manual, Chapter 1 and Chapter 6, section 70.4.   Sincerely,   Shea Taylor MD  Utilization Review  Physician Advisor  [facility]

## 2021-07-22 NOTE — PROGRESS NOTES
Cardiology.  Multiple discussions with the patient including involving his wife and interventional colleague and renal colleagues.  Patient is aware of the high risk of needing dialysis which she has already been prepped for and that he has a fistula that was placed within the past month or so.  He is aware that the symptoms that he is experiencing may not be entirely related to his coronary artery disease and may be a combination of his worsening renal insufficiency, suboptimal blood pressure control.  We discussed medical management and observing how he has done.  In the hospital he has been feeling better though did experience some chest discomfort with exertion.  He did reconfirm with me that he has predictable exertional chest discomfort and shortness of breath with a low level of exercise.  Cardiac enzymes have been negative.  He does have significant disease in the right coronary artery with prior right vein graft being occluded.  No acute EKG changes.  We did talk about stress testing which may likely not provide any additional information.  After extensive and multiple discussions he wishes to proceed angiography.  He is aware of the risks, alternatives including the high risk of requiring dialysis.Discussed with the patient the risks and benefits of left heat catheterization with coronary angiogram including possible PCI. The risks include but are not limited to death, myocardial infarction, stroke, kidney dysfunction, vessel trauma, hemorrhage, need for emergency corrective surgery, allergy, and dysrhythmia.

## 2021-07-22 NOTE — PRE-PROCEDURE
GENERAL PRE-PROCEDURE:   Procedure:  Coronary angiogram with possible PCI  Date/Time:  7/22/2021 1:44 PM    Written consent obtained?: Yes    Risks and benefits: Risks, benefits and alternatives were discussed    Consent given by:  Patient  Patient states understanding of procedure being performed: Yes    Patient's understanding of procedure matches consent: Yes    Procedure consent matches procedure scheduled: Yes    Expected level of sedation:  Moderate  Appropriately NPO:  Yes  ASA Class:  Class 3- Severe systemic disease, definite functional limitations (chest pain, CAD; s/p CABG, Hypertensive urgency, Class II obesity; BMI 34.78kg/m2, DM Type II, CKD Stage III)  Mallampati  :  Grade 2- soft palate, base of uvula, tonsillar pillars, and portion of posterior pharyngeal wall visible  Lungs:  Lungs clear with good breath sounds bilaterally  Heart:  Normal heart sounds and rate  History & Physical reviewed:  History and physical reviewed and no updates needed  Statement of review:  I have reviewed the lab findings, diagnostic data, medications, and the plan for sedation

## 2021-07-22 NOTE — DISCHARGE INSTRUCTIONS
Interventional Cardiology  Coronary Angiogram/Angioplasty/Stent/Atherectomy Discharge Instructions -   Femoral Approach    The instructions below are to help you understand how to take care of yourself. There is also information about when to call the doctor or emergency services    **Do not stop your aspirin or platelet inhibitor unless directed by your Cardiologist.  These medications help to prevent platelets in your blood from sticking together and forming a clot.  Examples of these medications are:  Ticagrelor (Brilinta), Clopidigrel (Plavix), Prasugrel (Effient)          For the first 72 hours after your procedure:    No driving for 24 hours    Do not lift more than 15 pounds.  If you usually lift 50 pounds or more daily, please contact your cardiologist    Avoid any hard work or tiring activities, this includes, yard work, jogging, biking, sexual activity    During the day get up and walk around every 2 hours    You may return to work after 72 hours if you are feeling well and your job does not involve heavy lifting          Groin Site / Wound Care / Bleeding      You may take off the dressing on your groin the day after your procedure    Keep the area dry and clean    It is ok to shower with regular soap.  Pat dry, do not rub    You do not need to use a bandage    No tub/pool or hot tub for 1 week    If your groin starts to bleed or begins to swell suddenly after leaving the hospital, lie flat and apply firm pressure above the site for 15 minutes.  If bleeding continues, call 9-1-1    Bruising around the groin area is normal.  It may take 2-3 weeks for this to go away.  It is normal for the bruised area to turn green and/or yellow as it is healing.  A small lump may also be present and may last 2-3 months.    Your leg may be sore or stiff for a few days.  You may take Tylenol or a pain medicine recommended by your doctor    If you have a fever over 100.4, that lasts more than one day - call your  cardiologist.      ? If you are on metformin, ActoPlus Met , Glucophage , Glucovance , Avandamet , Fortamet , Glumetza , Janumet , Riomet , PrandiMet, OR Metaglip , resume this medication only after your kidney function test is done and you are told to do so by your primary care provider.    ? No driving for 24 hours      Your Procedural Physician was: Dr. Adrian Crow  the phone number is: [phone]      [facility]:  [phone]  If you are calling after hours, please listen to the entire voicemail, a live  will answer at the end of the message

## 2021-07-23 VITALS — BODY MASS INDEX: 35.07 KG/M2 | WEIGHT: 250.5 LBS | HEIGHT: 71 IN

## 2021-07-23 LAB
ANION GAP SERPL CALCULATED.3IONS-SCNC: 6 MMOL/L (ref 5–18)
BUN SERPL-MCNC: 39 MG/DL (ref 8–22)
CALCIUM SERPL-MCNC: 8.4 MG/DL (ref 8.5–10.5)
CHLORIDE BLD-SCNC: 112 MMOL/L (ref 98–107)
CO2 SERPL-SCNC: 20 MMOL/L (ref 22–31)
CREAT SERPL-MCNC: 4 MG/DL (ref 0.7–1.3)
GFR SERPL CREATININE-BSD FRML MDRD: 17 ML/MIN/1.73M2
GLUCOSE BLD-MCNC: 144 MG/DL (ref 70–125)
GLUCOSE BLDC GLUCOMTR-MCNC: 137 MG/DL (ref 70–125)
GLUCOSE BLDC GLUCOMTR-MCNC: 152 MG/DL (ref 70–125)
GLUCOSE BLDC GLUCOMTR-MCNC: 162 MG/DL (ref 70–125)
GLUCOSE BLDC GLUCOMTR-MCNC: 165 MG/DL (ref 70–125)
GLUCOSE BLDC GLUCOMTR-MCNC: 193 MG/DL (ref 70–125)
GLUCOSE BLDC GLUCOMTR-MCNC: 212 MG/DL (ref 70–125)
POTASSIUM BLD-SCNC: 5 MMOL/L (ref 3.5–5)
SODIUM SERPL-SCNC: 138 MMOL/L (ref 136–145)

## 2021-07-23 PROCEDURE — 250N000011 HC RX IP 250 OP 636: Performed by: INTERNAL MEDICINE

## 2021-07-23 PROCEDURE — 250N000013 HC RX MED GY IP 250 OP 250 PS 637: Performed by: INTERNAL MEDICINE

## 2021-07-23 PROCEDURE — 80048 BASIC METABOLIC PNL TOTAL CA: CPT | Performed by: STUDENT IN AN ORGANIZED HEALTH CARE EDUCATION/TRAINING PROGRAM

## 2021-07-23 PROCEDURE — 250N000012 HC RX MED GY IP 250 OP 636 PS 637: Performed by: HOSPITALIST

## 2021-07-23 PROCEDURE — 99223 1ST HOSP IP/OBS HIGH 75: CPT | Performed by: INTERNAL MEDICINE

## 2021-07-23 PROCEDURE — 36415 COLL VENOUS BLD VENIPUNCTURE: CPT | Performed by: STUDENT IN AN ORGANIZED HEALTH CARE EDUCATION/TRAINING PROGRAM

## 2021-07-23 PROCEDURE — 250N000012 HC RX MED GY IP 250 OP 636 PS 637

## 2021-07-23 PROCEDURE — 210N000001 HC R&B IMCU HEART CARE

## 2021-07-23 PROCEDURE — 99232 SBSQ HOSP IP/OBS MODERATE 35: CPT | Performed by: STUDENT IN AN ORGANIZED HEALTH CARE EDUCATION/TRAINING PROGRAM

## 2021-07-23 RX ORDER — HYDRALAZINE HYDROCHLORIDE 25 MG/1
25 TABLET, FILM COATED ORAL 3 TIMES DAILY
Status: DISCONTINUED | OUTPATIENT
Start: 2021-07-23 | End: 2021-07-25

## 2021-07-23 RX ORDER — AMLODIPINE BESYLATE 5 MG/1
5 TABLET ORAL DAILY
Status: DISCONTINUED | OUTPATIENT
Start: 2021-07-23 | End: 2021-07-26

## 2021-07-23 RX ADMIN — HEPARIN SODIUM 5000 UNITS: 10000 INJECTION, SOLUTION INTRAVENOUS; SUBCUTANEOUS at 01:58

## 2021-07-23 RX ADMIN — INSULIN ASPART 3 UNITS: 100 INJECTION, SOLUTION INTRAVENOUS; SUBCUTANEOUS at 08:55

## 2021-07-23 RX ADMIN — ATORVASTATIN CALCIUM 80 MG: 40 TABLET, FILM COATED ORAL at 08:54

## 2021-07-23 RX ADMIN — HEPARIN SODIUM 5000 UNITS: 10000 INJECTION, SOLUTION INTRAVENOUS; SUBCUTANEOUS at 08:50

## 2021-07-23 RX ADMIN — AMLODIPINE BESYLATE 5 MG: 5 TABLET ORAL at 10:42

## 2021-07-23 RX ADMIN — INSULIN ASPART 3 UNITS: 100 INJECTION, SOLUTION INTRAVENOUS; SUBCUTANEOUS at 12:16

## 2021-07-23 RX ADMIN — CARVEDILOL 25 MG: 12.5 TABLET, FILM COATED ORAL at 21:44

## 2021-07-23 RX ADMIN — ONDANSETRON 4 MG: 2 INJECTION INTRAMUSCULAR; INTRAVENOUS at 00:24

## 2021-07-23 RX ADMIN — DOXAZOSIN 2 MG: 1 TABLET ORAL at 21:44

## 2021-07-23 RX ADMIN — INSULIN ASPART 2 UNITS: 100 INJECTION, SOLUTION INTRAVENOUS; SUBCUTANEOUS at 12:17

## 2021-07-23 RX ADMIN — ACETAMINOPHEN 650 MG: 325 TABLET ORAL at 07:01

## 2021-07-23 RX ADMIN — INSULIN ASPART 1 UNITS: 100 INJECTION, SOLUTION INTRAVENOUS; SUBCUTANEOUS at 17:16

## 2021-07-23 RX ADMIN — INSULIN GLARGINE 25 UNITS: 100 INJECTION, SOLUTION SUBCUTANEOUS at 22:36

## 2021-07-23 RX ADMIN — ASPIRIN 81 MG: 81 TABLET, COATED ORAL at 08:51

## 2021-07-23 RX ADMIN — HYDRALAZINE HYDROCHLORIDE 25 MG: 25 TABLET, FILM COATED ORAL at 08:55

## 2021-07-23 RX ADMIN — HYDRALAZINE HYDROCHLORIDE 25 MG: 25 TABLET, FILM COATED ORAL at 21:44

## 2021-07-23 RX ADMIN — HYDRALAZINE HYDROCHLORIDE 10 MG: 20 INJECTION INTRAMUSCULAR; INTRAVENOUS at 00:28

## 2021-07-23 RX ADMIN — OXYCODONE HYDROCHLORIDE 10 MG: 5 TABLET ORAL at 00:22

## 2021-07-23 RX ADMIN — HYDRALAZINE HYDROCHLORIDE 25 MG: 25 TABLET, FILM COATED ORAL at 13:28

## 2021-07-23 RX ADMIN — INSULIN ASPART 3 UNITS: 100 INJECTION, SOLUTION INTRAVENOUS; SUBCUTANEOUS at 17:17

## 2021-07-23 RX ADMIN — FENTANYL CITRATE 25 MCG: 50 INJECTION, SOLUTION INTRAMUSCULAR; INTRAVENOUS at 00:26

## 2021-07-23 RX ADMIN — HEPARIN SODIUM 5000 UNITS: 10000 INJECTION, SOLUTION INTRAVENOUS; SUBCUTANEOUS at 21:44

## 2021-07-23 RX ADMIN — INSULIN GLARGINE 25 UNITS: 100 INJECTION, SOLUTION SUBCUTANEOUS at 01:53

## 2021-07-23 RX ADMIN — ACETAMINOPHEN 650 MG: 325 TABLET ORAL at 18:46

## 2021-07-23 RX ADMIN — Medication 125 MCG: at 10:42

## 2021-07-23 RX ADMIN — CARVEDILOL 25 MG: 12.5 TABLET, FILM COATED ORAL at 08:51

## 2021-07-23 ASSESSMENT — MIFFLIN-ST. JEOR
SCORE: 2038.39
SCORE: 2038.39

## 2021-07-23 NOTE — PROGRESS NOTES
Cardiology Progress Note    Assessment:  1.Coronary artery disease.Prior Cabg as outlined.Admitted with increasing chest discomfort, shortness of breath and mild decrease in ejection fraction.  After extensive discussion patient elected to pursue coronary angiography.  Results outlined below with patent LIMA to the LAD, known RCA graft occlusion with findings suggest less prominent stenosis of the right coronary artery when compared to prior angiogram April 2020.without subsequent coronary intervention required.  Symptoms likely related to suboptimal blood pressure control and progressive renal dysfunction.  Troponins negative this admission.    2 Mild left ventricular dysfunction.  Question in part related to suboptimal blood pressure control.  Patient's blood pressure significantly elevated on admission with adjustment in antihypertensive medication since admission.  Pressures remain variable and high at times.  Losartan placed on hold post Tom angiogram and further discussion with renal.    3.  Renal insufficiency.  Creatinine stable this morning at 4.0 with potassium  borderline high at 5.0.  Patient was aware of the risks of proceeding to coronary angiography and will need to monitor renal function closely.  Net positive input of 1.3 L.  The total of 900 and and 1.15 L out.  4.  Hypertensive urgency.  Blood pressure improved although was running elevated post coronary angiography.  Will hold losartan pending discussion with renal recently.  Patient currently on carvedilol 25 mg p.o. twice daily, clonidine as needed, doxazosin, hydralazine 25 mg 3 times daily, isosorbide mononitrate 30 mg daily but does describe a headache.    5.  Risk modification.  80 mg of atorvastatin.  Direct LDL 59 however triglycerides are higher than ideal at 415 but lower than previous.  Ongoing diabetic management and weight loss suggested.  Consideration could be given to Vascepa or fenofibrate.    Principal Problem:     "Hypertensive urgency  Active Problems:    Type 2 diabetes mellitus with other circulatory complication, unspecified whether long term insulin use (H)    Class 2 severe obesity due to excess calories with serious comorbidity in adult, unspecified BMI (H)    Coronary artery disease due to lipid rich plaque    S/P CABG (coronary artery bypass graft)    Chest pain with high risk for cardiac etiology      Plan:  1.  Continue with medical management for coronary artery disease and mild LV dysfunction.  2.  Losartan placed on temporary hold pending additional discussion with renal.  3.  IV fluids now discontinued.  4.  Isosorbide mononitrate discontinued.  Patient described headache although he was not certain whether this was from the isosorbide mononitrate.  Will hold for now but may wish to consider reinitiation.  5.  Could consider increasing carvedilol further hydralazine further.  Will await further discussion with Dr. Rosario  6.  Increase activity and observe response.    Subjective:   Siva Ramey is seen in follow-up.  He is resting comfortably in bed.  Denies chest discomfort.  We discussed the findings of coronary angiography from yesterday in detail and the symptoms of exertional chest pain and shortness of breath potentially related to elevated evaded blood pressure the setting of known coronary artery disease.    Objective:   Vital signs in last 24 hours:  VITALS: /68 (BP Location: Right arm)   Pulse 78   Temp 98  F (36.7  C) (Oral)   Resp 20   Ht 1.803 m (5' 11\")   Wt 114.4 kg (252 lb 4.8 oz)   SpO2 94%   BMI 35.19 kg/m    BMI: Body mass index is 35.19 kg/m .  Wt Readings from Last 3 Encounters:   07/22/21 114.4 kg (252 lb 4.8 oz)   04/30/20 115 kg (253 lb 8 oz)   03/02/20 111.1 kg (245 lb)       Intake/Output Summary (Last 24 hours) at 7/23/2021 0554  Last data filed at 7/23/2021 0400  Gross per 24 hour   Intake 685 ml   Output 1250 ml   Net -565 ml       Physical Exam: Resting comfortably in " bed in no acute distress.   Neck: Supple, jugular venous pressure difficult to assess secondary to body habitus   Lungs: clear to auscultation   COR: RRR,S4, no significant murmur   Abd: nondistended, BS present, nontender.   Extrem: No edema, right groin site appears intact without significant bruising or bleeding.    Cardiographics:    ECG: July 19 1131 normal sinus rhythm, nonspecific T wave changes.  No significant change when compared to October 2020    Study Date: 07/20/2021 01:29 PM  Age: 46 yrs  Gender: Male  Patient Location: [facility]  Reason For Study: Chest Pain  Ordering Physician: ZOHREH KHAN  Performed By: TACO     BSA: 2.3 m2  Height: 71 in  Weight: 252 lb  ______________________________________________________________________________  ______________________________________________________________________________  Interpretation Summary     The left ventricle is normal in size.  Left ventricular function is decreased. The ejection fraction is 45-50%  (mildly reduced).  Normal right ventricle size and systolic function.  Mild sclerodegenerative valve disease is present without hemodynamically  significant stenosis or regurgitation.     Compared to the prior study dated 4/30/2020, there are changes as noted.    Procedures    CV CORONARY ANGIOGRAM   Fractional Flow Ratio Wire   Left Heart Cath   Indications    Chest pain with high risk for cardiac etiology [R07.9 (ICD-10-CM)]   S/P CABG (coronary artery bypass graft) [Z95.1 (ICD-10-CM)]   Coronary artery disease due to lipid rich plaque [I25.10, I25.83 (ICD-10-CM)]   Pre Procedure Diagnosis    cad       Conclusion    46-year-old male with established coronary artery disease, having had prior bypass surgery with a known patent LIMA to the LAD, presenting with exertional shortness of breath.  He also has end-stage renal disease, recent AV fistula placed, but not initiated on hemodialysis thus far.  With his progressive dyspnea on exertion, or angiography  was requested to rule out ischemia as a potential etiology.  Patient understood the risks of needing dialysis sooner and wanted to proceed with angiography.     Coronary angiography showed:     Left main with mild disease  LAD with mild proximal disease, having a large diagonal system with a mid LAD being occluded, and distal LAD filling via patent LIMA  Left circumflex with mild to moderate disease  RCA with 30 to 40% stenosis seen in the proximal and distal portion.  The RCA was found to have more significant disease on his prior study, and was therefore assessed with fractional flow reserve.     After appropriate anticoagulation, FFR on the RCA with intravenous adenosine was performed.     This showed an FFR of 0.93 across the distal RCA lesion at peak hyperemia, and 0.96 across the proximal lesion at peak hyperemia, confirming nonobstructive disease       ______________________________________________________________________________     ______________________________________________________________________________  Telemetry: Normal sinus rhythm.    Imaging:   Order  XR Chest Port 1 View [[id number]] (Order [id number])  Exam Information    Exam Date Exam Time Exam Date Exam Time Accession # Performing Department Results    7/19/21 12:05 PM 7/19/21 12:16 PM [id number] [facility]    PACS Images     Show images for XR Chest Port 1 View   Study Result    Narrative & Impression   EXAM: XR CHEST PORT 1 VIEW  LOCATION: [facility]   DATE/TIME: 7/19/2021 12:05 PM     INDICATION: Chest pain  COMPARISON: PA and lateral views of the chest 10/04/2020                                                                      IMPRESSION:      Shallow lung inflation. Minimal paradiaphragmatic atelectasis along the left hemidiaphragm, unchanged from 10/04/2020. No new lung opacities. Normal vascularity.     Symmetric lung inflation. Mediastinal borders are  well-defined. Sternal wires are intact and aligned.     No pleural fluid or pneumothorax.          Lab Results    Chemistry/lipid CBC Cardiac Enzymes/BNP/TSH/INR   Recent Labs   Lab Test 07/21/21  0440   CHOL 167   HDL 24*   LDL 59   TRIG 415*     Recent Labs   Lab Test 07/21/21  0440 03/04/19  1211   LDL 59 46  36     Recent Labs   Lab Test 07/23/21  0152 07/22/21  0457   NA  --  141   POTASSIUM  --  3.8   CHLORIDE  --  111*   CO2  --  21*   * 65*   BUN  --  39*   CR  --  4.02*   GFRESTIMATED  --  17*   BETHANY  --  8.5     Recent Labs   Lab Test 07/22/21  0457 07/21/21  0440 07/20/21  0431   CR 4.02* 3.93* 4.09*     Recent Labs   Lab Test 06/22/19  0554 01/14/19  0544 05/07/17  1945   A1C 7.8* 10.0* 8.6*          Recent Labs   Lab Test 07/22/21  1753   WBC 6.1   HGB 11.7*   HCT 36.3*   MCV 96   *     Recent Labs   Lab Test 07/22/21  1753 07/20/21  0431 07/19/21  1155   HGB 11.7* 12.9* 12.9*    Recent Labs   Lab Test 07/20/21  0023 07/19/21  1820 07/19/21  1154   TROPONINI <0.01 0.01 0.01     Recent Labs   Lab Test 07/19/21  1155 10/04/20  2140 05/07/20  0926   BNP 25 29 68*     Recent Labs   Lab Test 01/14/19  0544   TSH 2.65     Recent Labs   Lab Test 10/04/20  2140 06/21/19  0345 06/20/19  1558   INR 1.03 1.24* 1.30*

## 2021-07-23 NOTE — PROGRESS NOTES
Progress Note        Assessment/Plan  60-year-old man past medical history of CAD s/p CABG 2019, CKD, hypertension and type II DM admitted for chest pain.    Chest pain  CAD s/p CABG 2019 with LIMA to LAD and SVG to RCA  Serial troponins were negative, no new change in EKG  Echo LVEF 45-50%, 7/2021  Last cardiac cath 4/2020-unchanged LAD with severe mid vessel disease and occluded vein graft to RCA.  Cardiology reviewed last cath , feel RCA could be amenable to stenting  S/p Angiogram 7/22, findings of known RCA graft stenosis found to be mild as compared to previously known moderate stenosis with no flow limitation at this time and  No PCI was pursued.  Cardiology on board and following.Continue aspirin , carvedilol and statin      Hypertensive urgency  Blood pressure improving  Losartan held for now given kidney function  Started on Hydralazine, c/w  carvedilol, doxazosin and Imdur    Hyperlipidemia  Continue atorvastatin     CKD 4  Likely due to underlying diabetic and hypertensive nephropathy  Nephrology on board- appreciate recs  Serum creatinine stable and at baseline this morning.  Continue holding home Lasix    Type II DM  Decrease Lantus to 25 units(40 units nightly PTA)  Continue sliding scale  and Mealtime time Novolog        DVT- SCD for now    Barriers to discharge-blood pressure control, cards and nephrology recs    Anticipated Discharge date  - TBD        Subjective  Rt shoulder pain after procedure,  mainly positional and on moment . Declined any pain medications at this time. No other complaints this morning       Objective  Vital signs in last 24 hours  Temp:  [98  F (36.7  C)-98.5  F (36.9  C)] 98.2  F (36.8  C)  Pulse:  [62-90] 87  Resp:  [11-34] 20  BP: (101-184)/(61-99) 134/69  SpO2:  [90 %-99 %] 95 %    Input and Output in 24 hrs     Intake/Output Summary (Last 24 hours) at 7/22/2021 1256  Last data filed at 7/22/2021 0837  Gross per 24 hour   Intake 60 ml   Output 1100 ml   Net -1040 ml        Physical Exam:  GEN: Alert and oriented. Not in acute distress  HEENT: Atraumatic   CHEST: Clear to auscultation bilaterally  HEART: S1S2 regular. No murmurs, rubs or gallops  ABDOMEN: Soft. Non-tender, non-distended.   Extremities: No pedal oedema  CNS: No focal neurological deficit. No involuntary movements        Pertinent Labs   Lab Results: Personally Reviewed    Recent Results (from the past 24 hour(s))   ECG 12-LEAD WITH MUSE (LHE)    Collection Time: 07/22/21  1:11 PM   Result Value Ref Range    Systolic Blood Pressure  mmHg    Diastolic Blood Pressure  mmHg    Ventricular Rate 70 BPM    Atrial Rate 70 BPM    CA Interval 188 ms    QRS Duration 108 ms     ms    QTc 449 ms    P Axis 47 degrees    R AXIS 63 degrees    T Axis 104 degrees    Interpretation ECG       Sinus rhythm  Nonspecific T wave abnormality  Abnormal ECG  When compared with ECG of 19-JUL-2021 11:31,  No significant change was found  Confirmed by KLARISSA FRANCISCO MD LOC:JN ([id number]) on 7/22/2021 3:14:40 PM     Glucose by meter    Collection Time: 07/22/21  5:00 PM   Result Value Ref Range    GLUCOSE BY METER POCT 76 70 - 125 mg/dL   CBC with platelets and differential    Collection Time: 07/22/21  5:53 PM   Result Value Ref Range    WBC Count 6.1 4.0 - 11.0 10e3/uL    RBC Count 3.79 (L) 4.40 - 5.90 10e6/uL    Hemoglobin 11.7 (L) 13.3 - 17.7 g/dL    Hematocrit 36.3 (L) 40.0 - 53.0 %    MCV 96 78 - 100 fL    MCH 30.9 26.5 - 33.0 pg    MCHC 32.2 31.5 - 36.5 g/dL    RDW 12.7 10.0 - 15.0 %    Platelet Count 135 (L) 150 - 450 10e3/uL    % Neutrophils 57 %    % Lymphocytes 32 %    % Monocytes 6 %    % Eosinophils 4 %    % Basophils 1 %    % Immature Granulocytes 0 %    NRBCs per 100 WBC 0 <1 /100    Absolute Neutrophils 3.6 1.6 - 8.3 10e3/uL    Absolute Lymphocytes 2.0 0.8 - 5.3 10e3/uL    Absolute Monocytes 0.3 0.0 - 1.3 10e3/uL    Absolute Eosinophils 0.2 0.0 - 0.7 10e3/uL    Absolute Basophils 0.0 0.0 - 0.2 10e3/uL    Absolute Immature  Granulocytes 0.0 <=0.0 10e3/uL    Absolute NRBCs 0.0 10e3/uL   Partial thromboplastin time    Collection Time: 07/22/21  6:42 PM   Result Value Ref Range    aPTT 103 (H) 22 - 38 Seconds   Partial thromboplastin time    Collection Time: 07/22/21  9:34 PM   Result Value Ref Range    aPTT 43 (H) 22 - 38 Seconds   Glucose by meter    Collection Time: 07/22/21  9:47 PM   Result Value Ref Range    GLUCOSE BY METER POCT 114 70 - 125 mg/dL   Glucose by meter    Collection Time: 07/23/21  1:52 AM   Result Value Ref Range    GLUCOSE BY METER POCT 152 (H) 70 - 125 mg/dL   Basic metabolic panel    Collection Time: 07/23/21  5:08 AM   Result Value Ref Range    Sodium 138 136 - 145 mmol/L    Potassium 5.0 3.5 - 5.0 mmol/L    Chloride 112 (H) 98 - 107 mmol/L    Carbon Dioxide (CO2) 20 (L) 22 - 31 mmol/L    Anion Gap 6 5 - 18 mmol/L    Urea Nitrogen 39 (H) 8 - 22 mg/dL    Creatinine 4.00 (H) 0.70 - 1.30 mg/dL    Calcium 8.4 (L) 8.5 - 10.5 mg/dL    Glucose 144 (H) 70 - 125 mg/dL    GFR Estimate 17 (L) >60 mL/min/1.73m2   Glucose by meter    Collection Time: 07/23/21  7:32 AM   Result Value Ref Range    GLUCOSE BY METER POCT 137 (H) 70 - 125 mg/dL       Pertinent Radiology   Radiology Results reviewed      EKG Results reviewed       Advanced Care Planning      Joe Marsh MD   [facility]

## 2021-07-23 NOTE — PLAN OF CARE
Problem: Adult Inpatient Plan of Care  Goal: Plan of Care Review  Outcome: Improving   PTT within ordered limits for sheath removal. Hydralazine administered PRN for elevated blood pressure. IV and oral analgesics administered PRN, per Pt request for pain. Sheath removed without complication at 00:45. Pt to remain on bedrest until 04:45. NaCl 0.9 % 75 mL/hr continuous. Vital signs stable. Room available on Unit P3. Report called to receiving RN. Pt transferred off of ICU [place]. Belongings sent with Pt. Bakari Vitale RN

## 2021-07-23 NOTE — PROGRESS NOTES
[facility] ([facility]) progress note  CC: F/U   S: Doing ok. NO sob. NO pain. No other acute issues. Has not been up and walking yet.     A/P:   Principal Problem:    Hypertensive urgency  Active Problems:    Type 2 diabetes mellitus with other circulatory complication, unspecified whether long term insulin use (H)    Class 2 severe obesity due to excess calories with serious comorbidity in adult, unspecified BMI (H)    Coronary artery disease due to lipid rich plaque    S/P CABG (coronary artery bypass graft)    Chest pain with high risk for cardiac etiology    1. 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  Patient with progressively declining kidney function over the past 2 years with creatinine 1.81 mg/dL in June 2019 worsening to 4.34 mg/dL on 7/19/2021 admission.  He follows with Dr. Rios in clinic with dialysis fistula placed in June 2021 and left forearm that is not yet mature.  He has noted increasing fatigue over the past several weeks with poorly controlled hypertension chronically.  Denies anorexia, dysgeusia, orthopnea or other uremic symptoms at this time so no emergent need for dialysis.              -Risk for need dialysis with coronary angiogram if decided to proceed and he understands this.              -holding losartan - today.               -Has been referred to [facility] transplant clinic for evaluation just recently.   -BMP in am. Monitor for KINSEY.      2.  Hypertensive urgency: improved. Poorly controlled chronically likely cause for advanced CKD.  Blood pressure appears markedly improved at present during inpatient stay following addition of isosorbide. losartan to 100 mg daily - now holding. Start amlodipine 5 mg daily and hydralazine 25 mg TID.      3.  Coronary artery disease with previous CABG x2 in June 2019.  Echocardiogram pending by cardiology with ongoing evaluation pending.  We did discuss high risk for requiring dialysis with possible angiogram although he is on the brink of dialysis as it is.    "  4. CKD-MBD: History of hyperparathyroidism but no labs since April 2020 so we will update his vitamin D and PTH levels.  Phosphorus was reasonably controlled in April at 5.2 will be rechecked.     5.  Status post left forearm AVF placement at [facility] June 2021 (5 weeks ago). Still immature and would need tunneled CVC if dialysis required at present.      Zhang Rosario,   [facility]  [phone] (off)    No interval changes to past medical history, social history or family history to report.    BP (!) 175/87 (BP Location: Right arm)   Pulse 83   Temp 98.2  F (36.8  C) (Oral)   Resp 18   Ht 1.803 m (5' 11\")   Wt 113.6 kg (250 lb 8 oz)   SpO2 96%   BMI 34.94 kg/m      I/O last 3 completed shifts:  In: 900 [P.O.:300; I.V.:600]  Out: 1150 [Urine:1150]    Physical Exam:   GENERAL: calm and comfortable, alert  EYES: No scleral icterus, conjunctiva clear  ENT: Hearing normal, Oral mucosa moist  RESP:  no respiratory distress, normal effort.  CV:   Trace leg edema.    GI, NT/ND,  Musculoskeletal: Normal muscle bulk/ tone; No gross joint abnormalities  SKIN: No rash, warm/ dry  PSYCH:  Appropriate mood and affect  Left forearm aVF       Lab Results   Component Value Date     07/21/2021     07/20/2021     07/19/2021    Lab Results   Component Value Date    CHLORIDE 112 07/21/2021    CHLORIDE 110 07/20/2021    CHLORIDE 109 07/19/2021    Lab Results   Component Value Date    BUN 40 07/21/2021    BUN 46 07/20/2021    BUN 48 07/19/2021      Lab Results   Component Value Date    POTASSIUM 3.8 07/21/2021    POTASSIUM 3.8 07/20/2021    POTASSIUM 4.5 07/19/2021    Lab Results   Component Value Date    CO2 21 07/21/2021    CO2 21 07/20/2021    CO2 22 07/19/2021    Lab Results   Component Value Date    CR 3.93 07/21/2021    CR 4.09 07/20/2021    CR 4.34 07/19/2021        No results found for: NTBNPI, NTBNP  Lab Results   Component Value Date    WBC 6.1 " 07/22/2021    WBC 6.8 07/20/2021    WBC 6.7 07/19/2021    HGB 11.7 (L) 07/22/2021    HGB 12.9 (L) 07/20/2021    HGB 12.9 (L) 07/19/2021    HCT 36.3 (L) 07/22/2021    HCT 37.1 (L) 07/20/2021    HCT 37.3 (L) 07/19/2021    MCV 96 07/22/2021    MCV 89 07/20/2021    MCV 90 07/19/2021     (L) 07/22/2021     (L) 07/20/2021     (L) 07/19/2021     Lab Results   Component Value Date    CR 4.00 (H) 07/23/2021    CR 4.02 (H) 07/22/2021    CR 3.93 (H) 07/21/2021     Lab Results   Component Value Date    DD 0.34 05/07/2020     Lab Results   Component Value Date     07/23/2021     07/22/2021     07/21/2021    POTASSIUM 5.0 07/23/2021    POTASSIUM 3.8 07/22/2021    POTASSIUM 3.8 07/21/2021    CHLORIDE 112 (H) 07/23/2021    CHLORIDE 111 (H) 07/22/2021    CHLORIDE 112 (H) 07/21/2021    CO2 20 (L) 07/23/2021    CO2 21 (L) 07/22/2021    CO2 21 (L) 07/21/2021     (H) 07/23/2021     (H) 07/23/2021     (H) 07/23/2021     No results found for: SED  Lab Results   Component Value Date     (H) 07/23/2021     (H) 07/23/2021     (H) 07/23/2021     Lab Results   Component Value Date    HGB 11.7 (L) 07/22/2021    HGB 12.9 (L) 07/20/2021    HGB 12.9 (L) 07/19/2021     Lab Results   Component Value Date    AST 25 07/20/2021    AST 23 10/05/2020    AST 26 10/04/2020    ALT 34 07/20/2021    ALT 29 10/05/2020    ALT 34 10/04/2020    ALKPHOS 63 07/20/2021    ALKPHOS 66 10/05/2020    ALKPHOS 79 10/04/2020    BILITOTAL 0.5 07/20/2021    BILITOTAL 0.4 10/05/2020    BILITOTAL 0.3 10/04/2020         Drug and lactation database from the [place] [employer]:  http://toxnet.nlm.nih.gov/cgi-bin/sis/htmlgen?LACT        Labs personally reviewed today during this evaluation at 9:12 AM

## 2021-07-23 NOTE — PLAN OF CARE
Problem: Hypertension Acute  Goal: Blood Pressure Within Desired Range  Outcome: Improving     Problem: Adult Inpatient Plan of Care  Goal: Optimal Comfort and Wellbeing  Outcome: Improving   Pt reluctant to take any opiates for pain management at the moment because his sister overdosed and passed away while on opiates. Hypertension managed with scheduled hydralazine and amlodipine this AM. Pt BP now within normal limits. Heart rhythm normal sinus. Pt BG prior to meals was 137 mg/dL and 212 mg/dL. No other concerns noted.  Chacorta Valenzuela RN  7/23/2021  2:58 PM

## 2021-07-23 NOTE — PROGRESS NOTES
Called in to check on patient, IV fluids were stopped at 2029 per chart record. I requested that it be reinitiated at 75cc/hr and monitor for fluid overload and bladder scan requested, prn clonidine order for elevated blood pressure and to call if persistently elevated

## 2021-07-23 NOTE — PLAN OF CARE
Problem: Hypertension Acute  Goal: Blood Pressure Within Desired Range  Outcome: No Change     Problem: Chest Pain  Goal: Resolution of Chest Pain Symptoms  Outcome: Improving   Patient was kept comfortable during post-procedure stay.Blood pressure has been on 150s- 160s systolic. Denies pain. Femoral groin site remains dry & free from signs of bleeding. Dr. Santo was able to speak with patient during recovery. Aware of long bedrest due to presence of femoral sheath. Wife updated that patient will go up to ICU due to this. Verbalized no concerns. Patient in stable condition. Will be transferred to ICU shortly.

## 2021-07-23 NOTE — PROGRESS NOTES
Care Management Follow Up    Length of Stay (days): 1    Expected Discharge Date: 07/24/2021     Concerns to be Addressed:     Cardiology Clearance-blood pressure control. Nephrology Clearance-monitor labs.   Patient plan of care discussed at interdisciplinary rounds: Yes    Anticipated Discharge Disposition:  Home     Anticipated Discharge Services:  None Anticipated  Anticipated Discharge DME:  None Anticipated    Patient/family educated on Medicare website which has current facility and service quality ratings:  Not needed at this time  Education Provided on the Discharge Plan:  yes  Patient/Family in Agreement with the Plan:  yes    Referrals Placed by CM/SW:    Private pay costs discussed: Not applicable    Additional Information:  Chart Reviewed. Had angio on 7/22. Nephrology following; potential need for dialysis due to angio. No needs anticipated at discharge. From home with spouse and three sons; independent at baseline. Previously declined Advanced Care Planning documents. Spouse to transport at discharge. Care manager to follow.       Alcira Portillo RN

## 2021-07-23 NOTE — PLAN OF CARE
Problem: Hypertension Acute  Goal: Blood Pressure Within Desired Range  Outcome: Improving     Patient had BP of 150/76 when transferred to  at 0400.  Later BP was checked as he had elevated BP at midnight, but result was 133/68.   HR NSR    CMS of right leg intact.  Dressing in place on groin.  No hematoma or bruising seen at angio site.  Bedrest complete at 0445, but patient staying in bed as he is tired after receiving pain medication earlier.    Patient denied pain and denied numbness in right leg.  Will continue to monitor.

## 2021-07-23 NOTE — CONSULTS
NUTRITION EDUCATION      REASON FOR ASSESSMENT:  Consult for consistent carbohydrate nutrition education. Weight management and healthy eating.     NUTRITION HISTORY:  Information obtained from Pt     Pt stated that he has received education before and is familiar with carbohydrate choices.     INTERVENTIONS:    Nutrition Prescription:    Implementation:      *  Nutrition Education (Content):   A)  Provided handout: Food groups and serving sizes for diabetes, Carbohydrate counting and weight loss tips.    B)  Spoke to pt about carbohydrate choices, and provided some examples on how to count choices for meals. Recommend 4-5 choices per meal. Also spoke We also went over foods that contain carbohydrates and foods that don't. Also spoke with pt about healthy eating for weight managementss - less sugars, saturated fat and sodium and more vegetables, healthy fats and fiber. Pt verbalized understanding.

## 2021-07-24 LAB
ALBUMIN UR-MCNC: 600 MG/DL
ANION GAP SERPL CALCULATED.3IONS-SCNC: 7 MMOL/L (ref 5–18)
APPEARANCE UR: CLEAR
BACTERIA #/AREA URNS HPF: ABNORMAL /HPF
BASOPHILS # BLD AUTO: 0 10E3/UL (ref 0–0.2)
BASOPHILS NFR BLD AUTO: 1 %
BILIRUB UR QL STRIP: NEGATIVE
BUN SERPL-MCNC: 48 MG/DL (ref 8–22)
CALCIUM SERPL-MCNC: 8.4 MG/DL (ref 8.5–10.5)
CHLORIDE BLD-SCNC: 110 MMOL/L (ref 98–107)
CO2 SERPL-SCNC: 21 MMOL/L (ref 22–31)
COLOR UR AUTO: ABNORMAL
CREAT SERPL-MCNC: 4.82 MG/DL (ref 0.7–1.3)
EOSINOPHIL # BLD AUTO: 0.2 10E3/UL (ref 0–0.7)
EOSINOPHIL NFR BLD AUTO: 4 %
ERYTHROCYTE [DISTWIDTH] IN BLOOD BY AUTOMATED COUNT: 12.3 % (ref 10–15)
GFR SERPL CREATININE-BSD FRML MDRD: 13 ML/MIN/1.73M2
GLUCOSE BLD-MCNC: 95 MG/DL (ref 70–125)
GLUCOSE BLDC GLUCOMTR-MCNC: 106 MG/DL (ref 70–125)
GLUCOSE BLDC GLUCOMTR-MCNC: 111 MG/DL (ref 70–125)
GLUCOSE BLDC GLUCOMTR-MCNC: 125 MG/DL (ref 70–125)
GLUCOSE BLDC GLUCOMTR-MCNC: 134 MG/DL (ref 70–125)
GLUCOSE BLDC GLUCOMTR-MCNC: 211 MG/DL (ref 70–125)
GLUCOSE UR STRIP-MCNC: 200 MG/DL
GRANULAR CAST: 1 /LPF
HCT VFR BLD AUTO: 35.5 % (ref 40–53)
HGB BLD-MCNC: 11.9 G/DL (ref 13.3–17.7)
HGB UR QL STRIP: ABNORMAL
HOLD SPECIMEN: NORMAL
HOLD SPECIMEN: NORMAL
HYALINE CASTS: 1 /LPF
IMM GRANULOCYTES # BLD: 0 10E3/UL
IMM GRANULOCYTES NFR BLD: 0 %
KETONES UR STRIP-MCNC: NEGATIVE MG/DL
LEUKOCYTE ESTERASE UR QL STRIP: NEGATIVE
LYMPHOCYTES # BLD AUTO: 1.9 10E3/UL (ref 0.8–5.3)
LYMPHOCYTES NFR BLD AUTO: 34 %
MCH RBC QN AUTO: 31.3 PG (ref 26.5–33)
MCHC RBC AUTO-ENTMCNC: 33.5 G/DL (ref 31.5–36.5)
MCV RBC AUTO: 93 FL (ref 78–100)
MONOCYTES # BLD AUTO: 0.5 10E3/UL (ref 0–1.3)
MONOCYTES NFR BLD AUTO: 9 %
MUCOUS THREADS #/AREA URNS LPF: PRESENT /LPF
NEUTROPHILS # BLD AUTO: 3 10E3/UL (ref 1.6–8.3)
NEUTROPHILS NFR BLD AUTO: 52 %
NITRATE UR QL: NEGATIVE
NRBC # BLD AUTO: 0 10E3/UL
NRBC BLD AUTO-RTO: 0 /100
PH UR STRIP: 6 [PH] (ref 5–7)
PLATELET # BLD AUTO: 143 10E3/UL (ref 150–450)
POTASSIUM BLD-SCNC: 4.6 MMOL/L (ref 3.5–5)
RBC # BLD AUTO: 3.8 10E6/UL (ref 4.4–5.9)
RBC URINE: 1 /HPF
SODIUM SERPL-SCNC: 138 MMOL/L (ref 136–145)
SP GR UR STRIP: 1.02 (ref 1–1.03)
SQUAMOUS EPITHELIAL: <1 /HPF
UROBILINOGEN UR STRIP-MCNC: <2 MG/DL
WBC # BLD AUTO: 5.7 10E3/UL (ref 4–11)
WBC URINE: 10 /HPF

## 2021-07-24 PROCEDURE — 250N000012 HC RX MED GY IP 250 OP 636 PS 637: Performed by: HOSPITALIST

## 2021-07-24 PROCEDURE — 99232 SBSQ HOSP IP/OBS MODERATE 35: CPT | Performed by: INTERNAL MEDICINE

## 2021-07-24 PROCEDURE — 99232 SBSQ HOSP IP/OBS MODERATE 35: CPT | Performed by: STUDENT IN AN ORGANIZED HEALTH CARE EDUCATION/TRAINING PROGRAM

## 2021-07-24 PROCEDURE — 85025 COMPLETE CBC W/AUTO DIFF WBC: CPT | Performed by: STUDENT IN AN ORGANIZED HEALTH CARE EDUCATION/TRAINING PROGRAM

## 2021-07-24 PROCEDURE — 36415 COLL VENOUS BLD VENIPUNCTURE: CPT | Performed by: STUDENT IN AN ORGANIZED HEALTH CARE EDUCATION/TRAINING PROGRAM

## 2021-07-24 PROCEDURE — 80048 BASIC METABOLIC PNL TOTAL CA: CPT | Performed by: STUDENT IN AN ORGANIZED HEALTH CARE EDUCATION/TRAINING PROGRAM

## 2021-07-24 PROCEDURE — 250N000012 HC RX MED GY IP 250 OP 636 PS 637: Performed by: INTERNAL MEDICINE

## 2021-07-24 PROCEDURE — 210N000001 HC R&B IMCU HEART CARE

## 2021-07-24 PROCEDURE — 81001 URINALYSIS AUTO W/SCOPE: CPT | Performed by: STUDENT IN AN ORGANIZED HEALTH CARE EDUCATION/TRAINING PROGRAM

## 2021-07-24 PROCEDURE — 250N000011 HC RX IP 250 OP 636: Performed by: INTERNAL MEDICINE

## 2021-07-24 PROCEDURE — 250N000013 HC RX MED GY IP 250 OP 250 PS 637: Performed by: INTERNAL MEDICINE

## 2021-07-24 PROCEDURE — 250N000012 HC RX MED GY IP 250 OP 636 PS 637

## 2021-07-24 RX ORDER — CARVEDILOL 12.5 MG/1
37.5 TABLET ORAL 2 TIMES DAILY
Status: DISCONTINUED | OUTPATIENT
Start: 2021-07-24 | End: 2021-07-27 | Stop reason: HOSPADM

## 2021-07-24 RX ORDER — CARVEDILOL 12.5 MG/1
12.5 TABLET ORAL ONCE
Status: COMPLETED | OUTPATIENT
Start: 2021-07-24 | End: 2021-07-24

## 2021-07-24 RX ADMIN — AMLODIPINE BESYLATE 5 MG: 5 TABLET ORAL at 08:39

## 2021-07-24 RX ADMIN — ASPIRIN 81 MG: 81 TABLET, COATED ORAL at 08:39

## 2021-07-24 RX ADMIN — CARVEDILOL 25 MG: 12.5 TABLET, FILM COATED ORAL at 08:39

## 2021-07-24 RX ADMIN — HYDRALAZINE HYDROCHLORIDE 25 MG: 25 TABLET, FILM COATED ORAL at 22:15

## 2021-07-24 RX ADMIN — INSULIN ASPART 3 UNITS: 100 INJECTION, SOLUTION INTRAVENOUS; SUBCUTANEOUS at 13:20

## 2021-07-24 RX ADMIN — DOXAZOSIN 2 MG: 1 TABLET ORAL at 22:14

## 2021-07-24 RX ADMIN — Medication 125 MCG: at 08:42

## 2021-07-24 RX ADMIN — INSULIN GLARGINE 25 UNITS: 100 INJECTION, SOLUTION SUBCUTANEOUS at 22:16

## 2021-07-24 RX ADMIN — ACETAMINOPHEN 650 MG: 325 TABLET ORAL at 08:37

## 2021-07-24 RX ADMIN — CARVEDILOL 37.5 MG: 12.5 TABLET, FILM COATED ORAL at 22:14

## 2021-07-24 RX ADMIN — CARVEDILOL 12.5 MG: 12.5 TABLET, FILM COATED ORAL at 11:49

## 2021-07-24 RX ADMIN — HYDRALAZINE HYDROCHLORIDE 25 MG: 25 TABLET, FILM COATED ORAL at 14:51

## 2021-07-24 RX ADMIN — HYDRALAZINE HYDROCHLORIDE 25 MG: 25 TABLET, FILM COATED ORAL at 08:44

## 2021-07-24 RX ADMIN — ATORVASTATIN CALCIUM 80 MG: 40 TABLET, FILM COATED ORAL at 08:39

## 2021-07-24 RX ADMIN — INSULIN ASPART 3 UNITS: 100 INJECTION, SOLUTION INTRAVENOUS; SUBCUTANEOUS at 08:45

## 2021-07-24 RX ADMIN — HEPARIN SODIUM 5000 UNITS: 10000 INJECTION, SOLUTION INTRAVENOUS; SUBCUTANEOUS at 22:15

## 2021-07-24 RX ADMIN — HEPARIN SODIUM 5000 UNITS: 10000 INJECTION, SOLUTION INTRAVENOUS; SUBCUTANEOUS at 08:48

## 2021-07-24 RX ADMIN — INSULIN ASPART 3 UNITS: 100 INJECTION, SOLUTION INTRAVENOUS; SUBCUTANEOUS at 18:13

## 2021-07-24 RX ADMIN — INSULIN ASPART 1 UNITS: 100 INJECTION, SOLUTION INTRAVENOUS; SUBCUTANEOUS at 22:16

## 2021-07-24 RX ADMIN — POLYETHYLENE GLYCOL 3350 17 G: 17 POWDER, FOR SOLUTION ORAL at 08:47

## 2021-07-24 ASSESSMENT — MIFFLIN-ST. JEOR: SCORE: 2058.35

## 2021-07-24 NOTE — PLAN OF CARE
Problem: Chest Pain  Goal: Resolution of Chest Pain Symptoms  Outcome: Improving     Problem: Hypertension Acute  Goal: Blood Pressure Within Desired Range  Outcome: Improving   Patient denied chest pain, rr=777/63. Rhythm=NSR, lungs clear with 02 sat's 91% on room air. Will cont to monitor.

## 2021-07-24 NOTE — PROGRESS NOTES
[facility]  Cardiology      Progress Note: Daniel Santoyo MD    Primary Care: Trinidad Manuel    Assessment:         Coronary artery disease: Previous CABG, status post coronary angiography and intervention at that time.  Medical therapy recommended for blood pressure control and progressive LV dysfunction.  Blood pressure still not optimized at this time.  Medications held as outlined by Osmany Santo and Abraham and will increase carvedilol today.    Chronic kidney disease with further bump in creatinine today following his coronary angiogram.    Principal Problem:    Hypertensive urgency  Active Problems:    Type 2 diabetes mellitus with other circulatory complication, unspecified whether long term insulin use (H)    Class 2 severe obesity due to excess calories with serious comorbidity in adult, unspecified BMI (H)    Coronary artery disease due to lipid rich plaque    S/P CABG (coronary artery bypass graft)    Chest pain with high risk for cardiac etiology     LOS: 2 days       Recommendations:    Carvedilol dose increased to 37.5 mg twice daily.    Continue ambulation and if blood pressure and renal function stable okay for discharge from cardiac perspective tomorrow if nephrology agrees.    Subjective:  Siva Ramey (46 year old male) is new to me, chart is reviewed and the patient is examined.  The patient reports that he is feeling better today.  He reports no chest pain or pressure.  No reported problems on his current medication regimen.    No current outpatient medications on file.       Objective:   Vital signs in last 24 hours:  Temp:  [98.2  F (36.8  C)-100.2  F (37.9  C)] 99  F (37.2  C)  Pulse:  [79-95] 86  Resp:  [17-20] 20  BP: (101-157)/(61-80) 157/70  SpO2:  [90 %-95 %] 95 %  Weight:   [unfilled]   Weight change: 1.179 kg (2 lb 9.6 oz)      Physical Exam:  General: The patient is alert oriented to person place and situation.  The patient is in no acute distress at the time of my  evaluation.  Eyes: Pupils are equal, round, and reactive to light.  Conjunctiva and sclera are clear.  ENT: Oral mucosa is moist and without redness. No evident nasal discharge.  Pulmonary: Lungs are clear bilaterally with no rales, rhonchi, or wheezes.    Cardiovascular exam: Rhythm is regular. S1 and S2 are normal. No apparent marilee. No significant murmur is present. JVP is normal. Lower extremities demonstrate no significant edema. Distal pulses are intact bilaterally.  Abdomen is soft, nontender, with no organomegaly. Bowel sounds are present.  Musculoskeletal: Spine is straight. Extremities without deformity.  Neuro: Gait is not observed as the patient is at bedrest.     Skin is warm, dry, and otherwise intact.        Cardiographics:   Cardiac telemetry, personally reviewed.  No sustained atrial or ventricular tachyarrhythmia.    Radiology:      Lab Results:   Lab Results   Component Value Date     07/24/2021    CO2 21 07/24/2021    BUN 48 07/24/2021     Lab Results   Component Value Date    WBC 6.1 07/22/2021    HGB 11.7 07/22/2021    HCT 36.3 07/22/2021    MCV 96 07/22/2021     07/22/2021     Lab Results   Component Value Date    INR 1.03 10/04/2020       Outside record review:

## 2021-07-24 NOTE — PLAN OF CARE
Problem: Adult Inpatient Plan of Care  Goal: Plan of Care Review  Outcome: Improving  Flowsheets (Taken 7/23/2021 1454 by Chacorta Valenzuela RN)  Plan of Care Reviewed With: patient  Goal: Absence of Hospital-Acquired Illness or Injury  Intervention: Identify and Manage Fall Risk  Recent Flowsheet Documentation  Taken 7/24/2021 0800 by Lalitha Ye RN  Safety Promotion/Fall Prevention: patient and family education  Intervention: Prevent Skin Injury  Recent Flowsheet Documentation  Taken 7/24/2021 0800 by Lalitha Ye RN  Body Position: position changed independently   Pt's BP ranging from 120s-140s. Carvedilol increased per order. Pt has had good urine output. C/o headache off/on during the shift with partial relief with APAP and a shower.

## 2021-07-24 NOTE — PROGRESS NOTES
Progress Note        Assessment/Plan  60-year-old man past medical history of CAD s/p CABG 2019, CKD, hypertension and type II DM admitted for chest pain.    Chest pain  CAD s/p CABG 2019 with LIMA to LAD and SVG to RCA  Serial troponins were negative, no new change in EKG  Echo LVEF 45-50%, 7/2021  Last cardiac cath 4/2020-unchanged LAD with severe mid vessel disease and occluded vein graft to RCA.  Cardiology reviewed last cath , feel RCA could be amenable to stenting  S/p Angiogram 7/22, findings of known RCA graft stenosis found to be mild as compared to previously known moderate stenosis with no flow limitation at this time and  No PCI was pursued.  Cardiology on board and following.Continue aspirin , carvedilol and statin      Hypertensive urgency  Blood pressure improving  Losartan held for now given kidney function  Started on Hydralazine, increased carvedilol dose to 37.5 mg bid C/w, doxazosin and Imdur. Follow BP     Hyperlipidemia  Continue atorvastatin     CKD 4  Likely due to underlying diabetic and hypertensive nephropathy  Nephrology on board- appreciate recs  Serum creatinine slightly up to 4.8 this am from 4.0  Continue holding home Lasix     Type II DM  Decrease Lantus to 25 units(40 units nightly PTA)  Continue sliding scale  and  mealtime time Novolog          DVT- SCD for now    Dispo- 7/25 after BP control, Cards and Nephrology recs    Anticipated Discharge date  - TBD        Subjective  No complaints this am, had low grade fever last night, otherwise feels ok.       Objective  Vital signs in last 24 hours  Temp:  [98.2  F (36.8  C)-100.2  F (37.9  C)] 98.2  F (36.8  C)  Pulse:  [78-95] 78  Resp:  [17-20] 18  BP: (123-157)/(62-80) 123/62  SpO2:  [90 %-95 %] 92 %    Input and Output in 24 hrs     Intake/Output Summary (Last 24 hours) at 7/22/2021 1256  Last data filed at 7/22/2021 0837  Gross per 24 hour   Intake 60 ml   Output 1100 ml   Net -1040 ml       Physical Exam:  GEN: Alert and  oriented. Not in acute distress  HEENT: Atraumatic   CHEST: Clear to auscultation bilaterally  HEART: S1S2 regular. No murmurs, rubs or gallops  ABDOMEN: Soft. Non-tender, non-distended.   Extremities: No pedal oedema  CNS: No focal neurological deficit. No involuntary movements        Pertinent Labs   Lab Results: Personally Reviewed    Recent Results (from the past 24 hour(s))   Glucose by meter    Collection Time: 07/23/21  5:10 PM   Result Value Ref Range    GLUCOSE BY METER POCT 162 (H) 70 - 125 mg/dL   Glucose by meter    Collection Time: 07/23/21  9:31 PM   Result Value Ref Range    GLUCOSE BY METER POCT 165 (H) 70 - 125 mg/dL   Basic metabolic panel    Collection Time: 07/24/21  4:32 AM   Result Value Ref Range    Sodium 138 136 - 145 mmol/L    Potassium 4.6 3.5 - 5.0 mmol/L    Chloride 110 (H) 98 - 107 mmol/L    Carbon Dioxide (CO2) 21 (L) 22 - 31 mmol/L    Anion Gap 7 5 - 18 mmol/L    Urea Nitrogen 48 (H) 8 - 22 mg/dL    Creatinine 4.82 (H) 0.70 - 1.30 mg/dL    Calcium 8.4 (L) 8.5 - 10.5 mg/dL    Glucose 95 70 - 125 mg/dL    GFR Estimate 13 (L) >60 mL/min/1.73m2   Extra Blue Top Tube    Collection Time: 07/24/21  4:32 AM   Result Value Ref Range    Hold Specimen JIC    Extra Purple Top Tube    Collection Time: 07/24/21  4:32 AM   Result Value Ref Range    Hold Specimen JIC    CBC with platelets and differential    Collection Time: 07/24/21  4:32 AM   Result Value Ref Range    WBC Count 5.7 4.0 - 11.0 10e3/uL    RBC Count 3.80 (L) 4.40 - 5.90 10e6/uL    Hemoglobin 11.9 (L) 13.3 - 17.7 g/dL    Hematocrit 35.5 (L) 40.0 - 53.0 %    MCV 93 78 - 100 fL    MCH 31.3 26.5 - 33.0 pg    MCHC 33.5 31.5 - 36.5 g/dL    RDW 12.3 10.0 - 15.0 %    Platelet Count 143 (L) 150 - 450 10e3/uL    % Neutrophils 52 %    % Lymphocytes 34 %    % Monocytes 9 %    % Eosinophils 4 %    % Basophils 1 %    % Immature Granulocytes 0 %    NRBCs per 100 WBC 0 <1 /100    Absolute Neutrophils 3.0 1.6 - 8.3 10e3/uL    Absolute Lymphocytes 1.9  0.8 - 5.3 10e3/uL    Absolute Monocytes 0.5 0.0 - 1.3 10e3/uL    Absolute Eosinophils 0.2 0.0 - 0.7 10e3/uL    Absolute Basophils 0.0 0.0 - 0.2 10e3/uL    Absolute Immature Granulocytes 0.0 <=0.0 10e3/uL    Absolute NRBCs 0.0 10e3/uL   Glucose by meter    Collection Time: 07/24/21  8:06 AM   Result Value Ref Range    GLUCOSE BY METER POCT 106 70 - 125 mg/dL   Glucose by meter    Collection Time: 07/24/21  8:31 AM   Result Value Ref Range    GLUCOSE BY METER POCT 111 70 - 125 mg/dL   UA with Microscopic    Collection Time: 07/24/21 11:51 AM   Result Value Ref Range    Color Urine Light Yellow Colorless, Straw, Light Yellow, Yellow    Appearance Urine Clear Clear    Glucose Urine 200  (A) Negative mg/dL    Bilirubin Urine Negative Negative    Ketones Urine Negative Negative mg/dL    Specific Gravity Urine 1.018 1.001 - 1.030    Blood Urine 0.03 mg/dL (A) Negative    pH Urine 6.0 5.0 - 7.0    Protein Albumin Urine 600  (A) Negative mg/dL    Urobilinogen Urine <2.0 <2.0 mg/dL    Nitrite Urine Negative Negative    Leukocyte Esterase Urine Negative Negative    Bacteria Urine Few (A) None Seen /HPF    Mucus Urine Present (A) None Seen /LPF    RBC Urine 1 <=2 /HPF    WBC Urine 10 (H) <=5 /HPF    Squamous Epithelials Urine <1 <=1 /HPF    Hyaline Casts Urine 1 <=2 /LPF    Granular Casts Urine 1 (H) None Seen /LPF   Glucose by meter    Collection Time: 07/24/21 12:40 PM   Result Value Ref Range    GLUCOSE BY METER POCT 134 (H) 70 - 125 mg/dL       Pertinent Radiology   Radiology Results reviewed      EKG Results reviewed       Advanced Care Planning      Joe Marsh MD   [facility]

## 2021-07-24 NOTE — PLAN OF CARE
Problem: Adult Inpatient Plan of Care  Goal: Plan of Care Review  Outcome: Improving  Goal: Patient-Specific Goal (Individualized)  Outcome: Improving  Goal: Absence of Hospital-Acquired Illness or Injury  Outcome: Improving  Intervention: Identify and Manage Fall Risk  Recent Flowsheet Documentation  Taken 7/23/2021 2152 by Guillermo Newell RN  Safety Promotion/Fall Prevention: patient and family education  Taken 7/23/2021 1713 by Guillermo Newell RN  Safety Promotion/Fall Prevention: patient and family education  Intervention: Prevent Skin Injury  Recent Flowsheet Documentation  Taken 7/23/2021 2152 by Guillermo Newell RN  Body Position:   log-rolled   position changed independently  Taken 7/23/2021 1713 by Guillermo Newell RN  Body Position:   log-rolled   position changed independently  Goal: Readiness for Transition of Care  Outcome: Improving     Problem: Hypertension Acute  Goal: Blood Pressure Within Desired Range  Outcome: Improving     Problem: Chest Pain  Goal: Resolution of Chest Pain Symptoms  Outcome: Improving        Alert. Oriented.    Prn Acetaminophen 650 mg po given for headache with little improvement. Temp later today was 100.2, rechecked was 99.9. Ice pack given for headache, per patient it was helping. Patient able to sleep. Will continue to monitor.    BP improving, latest 130/79.    Post coronary angiogram teachings given to patient. Verbalized understanding. Right groin /site, no complications.    Independent. Falls education given. Demonstrated understanding.

## 2021-07-25 LAB
ALBUMIN SERPL-MCNC: 2.7 G/DL (ref 3.5–5)
ALBUMIN SERPL-MCNC: 2.9 G/DL (ref 3.5–5)
ANION GAP SERPL CALCULATED.3IONS-SCNC: 7 MMOL/L (ref 5–18)
ANION GAP SERPL CALCULATED.3IONS-SCNC: 8 MMOL/L (ref 5–18)
BUN SERPL-MCNC: 51 MG/DL (ref 8–22)
BUN SERPL-MCNC: 51 MG/DL (ref 8–22)
CALCIUM SERPL-MCNC: 8.8 MG/DL (ref 8.5–10.5)
CALCIUM SERPL-MCNC: 9.2 MG/DL (ref 8.5–10.5)
CHLORIDE BLD-SCNC: 108 MMOL/L (ref 98–107)
CHLORIDE BLD-SCNC: 111 MMOL/L (ref 98–107)
CO2 SERPL-SCNC: 19 MMOL/L (ref 22–31)
CO2 SERPL-SCNC: 20 MMOL/L (ref 22–31)
CREAT SERPL-MCNC: 4.63 MG/DL (ref 0.7–1.3)
CREAT SERPL-MCNC: 4.77 MG/DL (ref 0.7–1.3)
GFR SERPL CREATININE-BSD FRML MDRD: 14 ML/MIN/1.73M2
GFR SERPL CREATININE-BSD FRML MDRD: 14 ML/MIN/1.73M2
GLUCOSE BLD-MCNC: 117 MG/DL (ref 70–125)
GLUCOSE BLD-MCNC: 207 MG/DL (ref 70–125)
GLUCOSE BLDC GLUCOMTR-MCNC: 125 MG/DL (ref 70–125)
GLUCOSE BLDC GLUCOMTR-MCNC: 148 MG/DL (ref 70–125)
GLUCOSE BLDC GLUCOMTR-MCNC: 203 MG/DL (ref 70–125)
GLUCOSE BLDC GLUCOMTR-MCNC: 218 MG/DL (ref 70–125)
HOLD SPECIMEN: NORMAL
PHOSPHATE SERPL-MCNC: 4.2 MG/DL (ref 2.5–4.5)
PHOSPHATE SERPL-MCNC: 4.3 MG/DL (ref 2.5–4.5)
POTASSIUM BLD-SCNC: 4.4 MMOL/L (ref 3.5–5)
POTASSIUM BLD-SCNC: 5 MMOL/L (ref 3.5–5)
SODIUM SERPL-SCNC: 135 MMOL/L (ref 136–145)
SODIUM SERPL-SCNC: 138 MMOL/L (ref 136–145)

## 2021-07-25 PROCEDURE — 99231 SBSQ HOSP IP/OBS SF/LOW 25: CPT | Performed by: STUDENT IN AN ORGANIZED HEALTH CARE EDUCATION/TRAINING PROGRAM

## 2021-07-25 PROCEDURE — 99232 SBSQ HOSP IP/OBS MODERATE 35: CPT | Performed by: INTERNAL MEDICINE

## 2021-07-25 PROCEDURE — 36415 COLL VENOUS BLD VENIPUNCTURE: CPT | Performed by: INTERNAL MEDICINE

## 2021-07-25 PROCEDURE — 210N000001 HC R&B IMCU HEART CARE

## 2021-07-25 PROCEDURE — 80069 RENAL FUNCTION PANEL: CPT | Performed by: INTERNAL MEDICINE

## 2021-07-25 PROCEDURE — 250N000012 HC RX MED GY IP 250 OP 636 PS 637

## 2021-07-25 PROCEDURE — 250N000013 HC RX MED GY IP 250 OP 250 PS 637: Performed by: INTERNAL MEDICINE

## 2021-07-25 PROCEDURE — 250N000011 HC RX IP 250 OP 636: Performed by: INTERNAL MEDICINE

## 2021-07-25 PROCEDURE — 250N000012 HC RX MED GY IP 250 OP 636 PS 637: Performed by: HOSPITALIST

## 2021-07-25 PROCEDURE — 84100 ASSAY OF PHOSPHORUS: CPT | Performed by: INTERNAL MEDICINE

## 2021-07-25 RX ORDER — HYDRALAZINE HYDROCHLORIDE 25 MG/1
50 TABLET, FILM COATED ORAL 3 TIMES DAILY
Status: DISCONTINUED | OUTPATIENT
Start: 2021-07-25 | End: 2021-07-27

## 2021-07-25 RX ADMIN — INSULIN GLARGINE 25 UNITS: 100 INJECTION, SOLUTION SUBCUTANEOUS at 21:28

## 2021-07-25 RX ADMIN — INSULIN ASPART 1 UNITS: 100 INJECTION, SOLUTION INTRAVENOUS; SUBCUTANEOUS at 18:13

## 2021-07-25 RX ADMIN — POLYETHYLENE GLYCOL 3350 17 G: 17 POWDER, FOR SOLUTION ORAL at 10:22

## 2021-07-25 RX ADMIN — INSULIN ASPART 3 UNITS: 100 INJECTION, SOLUTION INTRAVENOUS; SUBCUTANEOUS at 18:12

## 2021-07-25 RX ADMIN — ATORVASTATIN CALCIUM 80 MG: 40 TABLET, FILM COATED ORAL at 09:59

## 2021-07-25 RX ADMIN — HYDRALAZINE HYDROCHLORIDE 10 MG: 20 INJECTION INTRAMUSCULAR; INTRAVENOUS at 00:09

## 2021-07-25 RX ADMIN — DOXAZOSIN 2 MG: 1 TABLET ORAL at 21:28

## 2021-07-25 RX ADMIN — ACETAMINOPHEN 650 MG: 325 TABLET ORAL at 01:17

## 2021-07-25 RX ADMIN — HYDRALAZINE HYDROCHLORIDE 50 MG: 25 TABLET, FILM COATED ORAL at 14:54

## 2021-07-25 RX ADMIN — INSULIN ASPART 1 UNITS: 100 INJECTION, SOLUTION INTRAVENOUS; SUBCUTANEOUS at 21:29

## 2021-07-25 RX ADMIN — CARVEDILOL 37.5 MG: 12.5 TABLET, FILM COATED ORAL at 21:27

## 2021-07-25 RX ADMIN — HEPARIN SODIUM 5000 UNITS: 10000 INJECTION, SOLUTION INTRAVENOUS; SUBCUTANEOUS at 10:00

## 2021-07-25 RX ADMIN — Medication 125 MCG: at 10:04

## 2021-07-25 RX ADMIN — HYDRALAZINE HYDROCHLORIDE 50 MG: 25 TABLET, FILM COATED ORAL at 21:28

## 2021-07-25 RX ADMIN — CARVEDILOL 37.5 MG: 12.5 TABLET, FILM COATED ORAL at 09:59

## 2021-07-25 RX ADMIN — HYDRALAZINE HYDROCHLORIDE 50 MG: 25 TABLET, FILM COATED ORAL at 09:58

## 2021-07-25 RX ADMIN — ASPIRIN 81 MG: 81 TABLET, COATED ORAL at 09:58

## 2021-07-25 RX ADMIN — INSULIN ASPART 3 UNITS: 100 INJECTION, SOLUTION INTRAVENOUS; SUBCUTANEOUS at 03:40

## 2021-07-25 RX ADMIN — HEPARIN SODIUM 5000 UNITS: 10000 INJECTION, SOLUTION INTRAVENOUS; SUBCUTANEOUS at 21:28

## 2021-07-25 RX ADMIN — AMLODIPINE BESYLATE 5 MG: 5 TABLET ORAL at 09:59

## 2021-07-25 RX ADMIN — INSULIN ASPART 2 UNITS: 100 INJECTION, SOLUTION INTRAVENOUS; SUBCUTANEOUS at 12:30

## 2021-07-25 RX ADMIN — INSULIN ASPART 3 UNITS: 100 INJECTION, SOLUTION INTRAVENOUS; SUBCUTANEOUS at 12:30

## 2021-07-25 ASSESSMENT — MIFFLIN-ST. JEOR: SCORE: 2058.35

## 2021-07-25 NOTE — PROGRESS NOTES
Progress Note        Assessment/Plan  60-year-old man past medical history of CAD s/p CABG 2019, CKD, hypertension and type II DM admitted for chest pain.    Chest pain  CAD s/p CABG 2019 with LIMA to LAD and SVG to RCA  Serial troponins were negative, no new change in EKG  Echo LVEF 45-50%, 7/2021  Last cardiac cath 4/2020-unchanged LAD with severe mid vessel disease and occluded vein graft to RCA.  Cardiology reviewed last cath , feel RCA could be amenable to stenting  S/p Angiogram 7/22, findings of known RCA graft stenosis found to be mild as compared to previously known moderate stenosis with no flow limitation at this time and  No PCI was pursued.  Cardiology on board and following.Continue aspirin , carvedilol and statin      Hypertensive urgency  Blood pressure improving  Losartan held for now given kidney function  Started on Hydralazine, increased this am to 50 mg tid , increased carvedilol dose to 37.5 mg bid C/w, doxazosin and Imdur. Follow BP     Hyperlipidemia  Continue atorvastatin     CKD 4  Likely due to underlying diabetic and hypertensive nephropathy  Nephrology on board- appreciate recs  Serum creatinine  4.7 this am from base line 4.0  Continue holding home Lasix     Type II DM  Decrease Lantus to 25 units(40 units nightly PTA)  Continue sliding scale  and  mealtime time Novolog    Constipation   MiraLAX and milk of magnesia    DVT- SCD for now    Dispo-  Per Cards and Nephrology recs    Anticipated Discharge date  - TBD        Subjective  BP still slightly elevated, cards adjusting meds.         Objective  Vital signs in last 24 hours  Temp:  [98.4  F (36.9  C)-98.9  F (37.2  C)] 98.7  F (37.1  C)  Pulse:  [76-79] 77  Resp:  [18-19] 19  BP: (140-172)/(72-86) 161/86  SpO2:  [92 %-96 %] 96 %    Input and Output in 24 hrs     Intake/Output Summary (Last 24 hours) at 7/22/2021 1256  Last data filed at 7/22/2021 0837  Gross per 24 hour   Intake 60 ml   Output 1100 ml   Net -1040 ml       Physical  Exam:  GEN: Alert and oriented. Not in acute distress  HEENT: Atraumatic   CHEST: Clear to auscultation bilaterally  HEART: S1S2 regular. No murmurs, rubs or gallops  ABDOMEN: Soft. Non-tender, non-distended.   Extremities: No pedal oedema  CNS: No focal neurological deficit. No involuntary movements        Pertinent Labs   Lab Results: Personally Reviewed    Recent Results (from the past 24 hour(s))   Glucose by meter    Collection Time: 07/24/21  5:18 PM   Result Value Ref Range    GLUCOSE BY METER POCT 125 70 - 125 mg/dL   Glucose by meter    Collection Time: 07/24/21  9:15 PM   Result Value Ref Range    GLUCOSE BY METER POCT 211 (H) 70 - 125 mg/dL   Renal panel    Collection Time: 07/25/21  6:02 AM   Result Value Ref Range    Sodium 138 136 - 145 mmol/L    Potassium 4.4 3.5 - 5.0 mmol/L    Chloride 111 (H) 98 - 107 mmol/L    Carbon Dioxide (CO2) 20 (L) 22 - 31 mmol/L    Anion Gap 7 5 - 18 mmol/L    Urea Nitrogen 51 (H) 8 - 22 mg/dL    Creatinine 4.77 (H) 0.70 - 1.30 mg/dL    Calcium 8.8 8.5 - 10.5 mg/dL    Glucose 117 70 - 125 mg/dL    Albumin 2.7 (L) 3.5 - 5.0 g/dL    Phosphorus 4.3 2.5 - 4.5 mg/dL    GFR Estimate 14 (L) >60 mL/min/1.73m2   Extra Purple Top Tube    Collection Time: 07/25/21  6:02 AM   Result Value Ref Range    Hold Specimen JIC    Glucose by meter    Collection Time: 07/25/21  8:05 AM   Result Value Ref Range    GLUCOSE BY METER POCT 125 70 - 125 mg/dL   Glucose by meter    Collection Time: 07/25/21 12:27 PM   Result Value Ref Range    GLUCOSE BY METER POCT 218 (H) 70 - 125 mg/dL       Pertinent Radiology   Radiology Results reviewed      EKG Results reviewed       Advanced Care Planning      Joe Marsh MD   [facility]

## 2021-07-25 NOTE — PLAN OF CARE
Problem: Hypertension Acute  Goal: Blood Pressure Within Desired Range  Outcome: No Change   BP= 163/79 this am. BP meds given as prescribed. Jp=270/88. Creatinine=4.77.  Pt c/o constipation, Miralax & prune ju given. BM X 3.   Continue to monitor BP.

## 2021-07-25 NOTE — PROGRESS NOTES
[facility]  Cardiology      Progress Note: Daniel Santoyo MD    Primary Care: Trinidad Maneul    Assessment:         ASCVD status post prior CABG: The patient had no evidence of infarct and underwent coronary angiography on July 22.  Results of that study demonstrated no significant new native vessel obstructive disease and patent LIMA to the LAD.  Medical management was recommended particularly in regards to blood pressure control.    CKD-4: As outlined by Dr. Rios.  The patient's creatinine appears to have plateaued.  Patient's weight remains stable and is reported urine output continues to be good.    Hypertensive urgency: The patient is on multiple antihypertensive medications with newly added amlodipine and recently increased carvedilol.  Despite these changes the patient continues to have elevated blood pressures.    Principal Problem:    Hypertensive urgency  Active Problems:    Type 2 diabetes mellitus with other circulatory complication, unspecified whether long term insulin use (H)    Class 2 severe obesity due to excess calories with serious comorbidity in adult, unspecified BMI (H)    Coronary artery disease due to lipid rich plaque    S/P CABG (coronary artery bypass graft)    Chest pain with high risk for cardiac etiology     LOS: 3 days       Recommendations:    Increase hydralazine dose to 50 mg 3 times daily.    Patient will continue to ambulate and report any symptoms.    Appreciate any additional recommendations that Dr. Rios and the nephrology service may have regarding management of his blood pressure.    Subjective:  Siva Ramey (46 year old male) reports that he slept well and awakened with a headache.  The was able to ambulate yesterday and felt mildly short of breath on 1 occasion, but experienced no chest pressure or pain.  Patient reports no palpitations lightheadedness, presyncope.    No current outpatient medications on file.       Objective:   Vital signs in  last 24 hours:  Temp:  [98.2  F (36.8  C)-98.9  F (37.2  C)] 98.4  F (36.9  C)  Pulse:  [76-79] 79  Resp:  [18-19] 19  BP: (123-172)/(62-85) 163/79  SpO2:  [92 %-94 %] 94 %  Weight:   [unfilled]   Weight change: 0 kg (0 lb)      Physical Exam:  General: The patient is alert oriented to person place and situation.  The patient is in no acute distress at the time of my evaluation.  Eyes: Pupils are equal, round, and reactive to light.  Conjunctiva and sclera are clear.  ENT: Oral mucosa is moist and without redness. No evident nasal discharge.  Pulmonary: Lungs are clear bilaterally with no rales, rhonchi, or wheezes.    Cardiovascular exam: Rhythm is regular. S1 and S2 are normal. No apparent marilee. No significant murmur is present. JVP is mildly elevated.  Lower extremities demonstrate no significant edema. Distal pulses are intact bilaterally.  Abdomen is soft, nontender, with no organomegaly. Bowel sounds are present.  Musculoskeletal: Spine is straight. Extremities without deformity.  Neuro: Gait is not observed as the patient is at bedrest.     Skin is warm, dry, and otherwise intact.        Cardiographics:   Cardiac telemetry, personally reviewed demonstrates normal sinus rhythm.  No significant atrial or ventricular arrhythmia.    Radiology:      Lab Results:   Lab Results   Component Value Date     07/25/2021    CO2 20 07/25/2021    BUN 51 07/25/2021     Lab Results   Component Value Date    WBC 5.7 07/24/2021    HGB 11.9 07/24/2021    HCT 35.5 07/24/2021    MCV 93 07/24/2021     07/24/2021     Lab Results   Component Value Date    INR 1.03 10/04/2020       Outside record review:

## 2021-07-25 NOTE — PROGRESS NOTES
[facility] ([facility]) progress note  CC: F/U   S: No acute events overnight.  He is up walking walking the halls okay. No fever, cp, sob, edema. Creatinine stable.     A/P:   Principal Problem:    Hypertensive urgency  Active Problems:    Type 2 diabetes mellitus with other circulatory complication, unspecified whether long term insulin use (H)    Class 2 severe obesity due to excess calories with serious comorbidity in adult, unspecified BMI (H)    Coronary artery disease due to lipid rich plaque    S/P CABG (coronary artery bypass graft)    Chest pain with high risk for cardiac etiology    1. CKD-5: Likely due to DN/HTN.  Patient with progressively declining kidney function over the past 2 years with creatinine 1.81 mg/dL in June 2019 worsening to 4.34 mg/dL on 7/19/2021 admission.  He follows with Dr. Rios in clinic with dialysis fistula placed in June 2021 and left forearm that is not yet mature.  He has noted increasing fatigue over the past several weeks with poorly controlled hypertension chronically.  Denies anorexia, dysgeusia, orthopnea or other uremic symptoms at this time so no emergent need for dialysis.              -holding losartan               -Has been referred to [facility] transplant clinic for evaluation just recently.   -No acute indication for HD   -Trend daily labs     2.  Hypertensive urgency: improved. Poorly controlled chronically likely cause for advanced CKD.     -Blood pressure above goal.    -Hydralazine increased by Dr. Santoyo today, good move. Trend.     3.  Coronary artery disease with previous CABG x2 in June 2019.  Echocardiogram pending by cardiology with ongoing evaluation pending.  We did discuss high risk for requiring dialysis with possible angiogram although he is on the brink of dialysis as it is.     4.  Status post left forearm AVF placement at [facility] June 2021 (5 weeks ago). Still immature and would need tunneled CVC if dialysis required at  "present.        Aime Rios MD      No interval changes to past medical history, social history or family history to report.    BP (!) 161/86 (BP Location: Right arm)   Pulse 77   Temp 98.7  F (37.1  C) (Oral)   Resp 19   Ht 1.803 m (5' 11\")   Wt 115.6 kg (254 lb 14.4 oz)   SpO2 96%   BMI 35.55 kg/m      I/O last 3 completed shifts:  In: 268 [P.O.:268]  Out: 2000 [Urine:2000]    Physical Exam:   GENERAL: NAD  EYES: No scleral icterus, conjunctiva clear  ENT: MMM  RESP:  no respiratory distress  CV: no leg edema.    GI: soft, NT  Musculoskeletal: Normal muscle mass   SKIN: No rash  PSYCH:  Appropriate mood and affect  Left forearm aVF with thrill       Lab Results   Component Value Date     07/21/2021     07/20/2021     07/19/2021    Lab Results   Component Value Date    CHLORIDE 112 07/21/2021    CHLORIDE 110 07/20/2021    CHLORIDE 109 07/19/2021    Lab Results   Component Value Date    BUN 40 07/21/2021    BUN 46 07/20/2021    BUN 48 07/19/2021      Lab Results   Component Value Date    POTASSIUM 3.8 07/21/2021    POTASSIUM 3.8 07/20/2021    POTASSIUM 4.5 07/19/2021    Lab Results   Component Value Date    CO2 21 07/21/2021    CO2 21 07/20/2021    CO2 22 07/19/2021    Lab Results   Component Value Date    CR 3.93 07/21/2021    CR 4.09 07/20/2021    CR 4.34 07/19/2021        No results found for: NTBNPI, NTBNP  Lab Results   Component Value Date    WBC 5.7 07/24/2021    WBC 6.1 07/22/2021    WBC 6.8 07/20/2021    HGB 11.9 (L) 07/24/2021    HGB 11.7 (L) 07/22/2021    HGB 12.9 (L) 07/20/2021    HCT 35.5 (L) 07/24/2021    HCT 36.3 (L) 07/22/2021    HCT 37.1 (L) 07/20/2021    MCV 93 07/24/2021    MCV 96 07/22/2021    MCV 89 07/20/2021     (L) 07/24/2021     (L) 07/22/2021     (L) 07/20/2021     Lab Results   Component Value Date    CR 4.77 (H) 07/25/2021    CR 4.82 (H) 07/24/2021    CR 4.00 (H) 07/23/2021     Lab Results   Component Value Date    DD 0.34 05/07/2020     Lab " Results   Component Value Date     07/25/2021     07/24/2021     07/23/2021    POTASSIUM 4.4 07/25/2021    POTASSIUM 4.6 07/24/2021    POTASSIUM 5.0 07/23/2021    CHLORIDE 111 (H) 07/25/2021    CHLORIDE 110 (H) 07/24/2021    CHLORIDE 112 (H) 07/23/2021    CO2 20 (L) 07/25/2021    CO2 21 (L) 07/24/2021    CO2 20 (L) 07/23/2021     (H) 07/25/2021     07/25/2021     07/25/2021     No results found for: SED  Lab Results   Component Value Date     (H) 07/25/2021     07/25/2021     07/25/2021     Lab Results   Component Value Date    HGB 11.9 (L) 07/24/2021    HGB 11.7 (L) 07/22/2021    HGB 12.9 (L) 07/20/2021     Lab Results   Component Value Date    AST 25 07/20/2021    AST 23 10/05/2020    AST 26 10/04/2020    ALT 34 07/20/2021    ALT 29 10/05/2020    ALT 34 10/04/2020    ALKPHOS 63 07/20/2021    ALKPHOS 66 10/05/2020    ALKPHOS 79 10/04/2020    BILITOTAL 0.5 07/20/2021    BILITOTAL 0.4 10/05/2020    BILITOTAL 0.3 10/04/2020         Drug and lactation database from the [place] National Library of Medicine:  http://toxnet.nlm.nih.gov/cgi-bin/sis/htmlgen?LACT        Labs personally reviewed today during this evaluation at 9:12 AM

## 2021-07-25 NOTE — PLAN OF CARE
Problem: Hypertension Acute  Goal: Blood Pressure Within Desired Range  Outcome: No Change   Patient's hs=094/79,170/85, hydralazine 10 mg's iv given at 0009 recheck at 3562=155/78. Patient awaiting med change for bp. Tylenol 650 mg's given at 0117 for 4/10 headache and asleep at 0217. Rhythm=NSR, lungs clear with 02 sat's 94% on room air. Lavender essential oil given, patient OK with it. Will cont to monitor.

## 2021-07-25 NOTE — PLAN OF CARE
Problem: Adult Inpatient Plan of Care  Goal: Patient-Specific Goal (Individualized)  Outcome: Improving  Goal: Absence of Hospital-Acquired Illness or Injury  Outcome: Improving  Intervention: Identify and Manage Fall Risk  Recent Flowsheet Documentation  Taken 7/24/2021 2212 by Guillermo Newell RN  Safety Promotion/Fall Prevention:    nonskid shoes/slippers when out of bed    patient and family education  Taken 7/24/2021 1809 by Guillermo Newell RN  Safety Promotion/Fall Prevention:    nonskid shoes/slippers when out of bed    patient and family education  Intervention: Prevent Skin Injury  Recent Flowsheet Documentation  Taken 7/24/2021 2212 by Guillermo Newell RN  Body Position: position changed independently  Taken 7/24/2021 1809 by Guillermo Newell RN  Body Position: position changed independently  Goal: Readiness for Transition of Care  Outcome: Improving     Problem: Hypertension Acute  Goal: Blood Pressure Within Desired Range  Outcome: Improving     Problem: Chest Pain  Goal: Resolution of Chest Pain Symptoms  Outcome: Improving      Alert. Oriented.    Denied pain.    Per patient he is feeling better today. Walked in the hallway.    's / 70's - 80's. Carvedilol increased by MD.    Independent. Falls education. Demonstrated understanding.

## 2021-07-26 LAB
ANION GAP SERPL CALCULATED.3IONS-SCNC: 6 MMOL/L (ref 5–18)
BUN SERPL-MCNC: 50 MG/DL (ref 8–22)
CALCIUM SERPL-MCNC: 9 MG/DL (ref 8.5–10.5)
CHLORIDE BLD-SCNC: 111 MMOL/L (ref 98–107)
CO2 SERPL-SCNC: 19 MMOL/L (ref 22–31)
CREAT SERPL-MCNC: 4.55 MG/DL (ref 0.7–1.3)
GFR SERPL CREATININE-BSD FRML MDRD: 14 ML/MIN/1.73M2
GLUCOSE BLD-MCNC: 106 MG/DL (ref 70–125)
GLUCOSE BLDC GLUCOMTR-MCNC: 103 MG/DL (ref 70–125)
GLUCOSE BLDC GLUCOMTR-MCNC: 152 MG/DL (ref 70–125)
GLUCOSE BLDC GLUCOMTR-MCNC: 182 MG/DL (ref 70–125)
GLUCOSE BLDC GLUCOMTR-MCNC: 187 MG/DL (ref 70–125)
GLUCOSE BLDC GLUCOMTR-MCNC: 202 MG/DL (ref 70–125)
HOLD SPECIMEN: NORMAL
POTASSIUM BLD-SCNC: 4.3 MMOL/L (ref 3.5–5)
SARS-COV-2 RNA RESP QL NAA+PROBE: NEGATIVE
SODIUM SERPL-SCNC: 136 MMOL/L (ref 136–145)

## 2021-07-26 PROCEDURE — 250N000012 HC RX MED GY IP 250 OP 636 PS 637

## 2021-07-26 PROCEDURE — 210N000001 HC R&B IMCU HEART CARE

## 2021-07-26 PROCEDURE — 250N000013 HC RX MED GY IP 250 OP 250 PS 637: Performed by: INTERNAL MEDICINE

## 2021-07-26 PROCEDURE — 80048 BASIC METABOLIC PNL TOTAL CA: CPT | Performed by: STUDENT IN AN ORGANIZED HEALTH CARE EDUCATION/TRAINING PROGRAM

## 2021-07-26 PROCEDURE — 87635 SARS-COV-2 COVID-19 AMP PRB: CPT | Performed by: INTERNAL MEDICINE

## 2021-07-26 PROCEDURE — 250N000012 HC RX MED GY IP 250 OP 636 PS 637: Performed by: HOSPITALIST

## 2021-07-26 PROCEDURE — 36415 COLL VENOUS BLD VENIPUNCTURE: CPT | Performed by: STUDENT IN AN ORGANIZED HEALTH CARE EDUCATION/TRAINING PROGRAM

## 2021-07-26 PROCEDURE — 99233 SBSQ HOSP IP/OBS HIGH 50: CPT | Performed by: INTERNAL MEDICINE

## 2021-07-26 PROCEDURE — 250N000011 HC RX IP 250 OP 636: Performed by: INTERNAL MEDICINE

## 2021-07-26 RX ORDER — SODIUM BICARBONATE 650 MG/1
650 TABLET ORAL 2 TIMES DAILY
Status: DISCONTINUED | OUTPATIENT
Start: 2021-07-26 | End: 2021-07-27 | Stop reason: HOSPADM

## 2021-07-26 RX ORDER — AMLODIPINE BESYLATE 5 MG/1
5 TABLET ORAL ONCE
Status: COMPLETED | OUTPATIENT
Start: 2021-07-26 | End: 2021-07-26

## 2021-07-26 RX ORDER — FUROSEMIDE 20 MG
40 TABLET ORAL DAILY
Status: DISCONTINUED | OUTPATIENT
Start: 2021-07-26 | End: 2021-07-27 | Stop reason: HOSPADM

## 2021-07-26 RX ORDER — AMLODIPINE BESYLATE 5 MG/1
10 TABLET ORAL DAILY
Status: DISCONTINUED | OUTPATIENT
Start: 2021-07-27 | End: 2021-07-27 | Stop reason: HOSPADM

## 2021-07-26 RX ADMIN — INSULIN ASPART 1 UNITS: 100 INJECTION, SOLUTION INTRAVENOUS; SUBCUTANEOUS at 12:38

## 2021-07-26 RX ADMIN — HYDRALAZINE HYDROCHLORIDE 50 MG: 25 TABLET, FILM COATED ORAL at 20:54

## 2021-07-26 RX ADMIN — INSULIN GLARGINE 25 UNITS: 100 INJECTION, SOLUTION SUBCUTANEOUS at 21:02

## 2021-07-26 RX ADMIN — HEPARIN SODIUM 5000 UNITS: 10000 INJECTION, SOLUTION INTRAVENOUS; SUBCUTANEOUS at 09:04

## 2021-07-26 RX ADMIN — Medication 125 MCG: at 09:08

## 2021-07-26 RX ADMIN — ATORVASTATIN CALCIUM 80 MG: 40 TABLET, FILM COATED ORAL at 09:03

## 2021-07-26 RX ADMIN — HYDRALAZINE HYDROCHLORIDE 50 MG: 25 TABLET, FILM COATED ORAL at 13:07

## 2021-07-26 RX ADMIN — ASPIRIN 81 MG: 81 TABLET, COATED ORAL at 09:03

## 2021-07-26 RX ADMIN — INSULIN ASPART 2 UNITS: 100 INJECTION, SOLUTION INTRAVENOUS; SUBCUTANEOUS at 18:06

## 2021-07-26 RX ADMIN — DOXAZOSIN 2 MG: 1 TABLET ORAL at 20:53

## 2021-07-26 RX ADMIN — SODIUM BICARBONATE 650 MG: 648 TABLET ORAL at 10:35

## 2021-07-26 RX ADMIN — INSULIN ASPART 3 UNITS: 100 INJECTION, SOLUTION INTRAVENOUS; SUBCUTANEOUS at 12:37

## 2021-07-26 RX ADMIN — INSULIN ASPART 3 UNITS: 100 INJECTION, SOLUTION INTRAVENOUS; SUBCUTANEOUS at 09:04

## 2021-07-26 RX ADMIN — AMLODIPINE BESYLATE 5 MG: 5 TABLET ORAL at 10:35

## 2021-07-26 RX ADMIN — HEPARIN SODIUM 5000 UNITS: 10000 INJECTION, SOLUTION INTRAVENOUS; SUBCUTANEOUS at 21:02

## 2021-07-26 RX ADMIN — SODIUM BICARBONATE 650 MG: 648 TABLET ORAL at 20:53

## 2021-07-26 RX ADMIN — CARVEDILOL 37.5 MG: 12.5 TABLET, FILM COATED ORAL at 20:54

## 2021-07-26 RX ADMIN — AMLODIPINE BESYLATE 5 MG: 5 TABLET ORAL at 09:04

## 2021-07-26 RX ADMIN — HYDRALAZINE HYDROCHLORIDE 50 MG: 25 TABLET, FILM COATED ORAL at 09:02

## 2021-07-26 RX ADMIN — CARVEDILOL 37.5 MG: 12.5 TABLET, FILM COATED ORAL at 09:03

## 2021-07-26 RX ADMIN — FUROSEMIDE 40 MG: 20 TABLET ORAL at 09:15

## 2021-07-26 ASSESSMENT — MIFFLIN-ST. JEOR: SCORE: 2052

## 2021-07-26 NOTE — PLAN OF CARE
Problem: Adult Inpatient Plan of Care  Goal: Plan of Care Review  Outcome: Improving  Flowsheets (Taken 7/26/2021 0239)  Plan of Care Reviewed With: patient  Progress: improving     Problem: Adult Inpatient Plan of Care  Goal: Patient-Specific Goal (Individualized)  Outcome: Improving     Problem: Hypertension Acute  Goal: Blood Pressure Within Desired Range  Outcome: Improving   Patient denies at this time. Patient's hg=779/79, bp med adjusted. Rhythm= NSR with 1st degree AVB. Lungs clear, 02 sat's 93% on room air. Plan is to discharge to home soon.

## 2021-07-26 NOTE — PROGRESS NOTES
"              [facility] ([facility]) progress note  CC: F/U   S: no new complaints, cont with some nausea but feels it is tolerable. No sob.  bp remains high.  He is hoping to delay dialysis until Sept if possible.  A/P:     1. CKD-5: Likely due to DN/HTN.  Patient with progressively declining kidney function over the past 2 years with creatinine 1.81 mg/dL in June 2019 worsening to 4.34 mg/dL on 7/19/2021 admission.  He follows with Dr. Rios in clinic with dialysis fistula placed in June 2021 and left forearm that is not yet mature.  He has noted increasing fatigue over the past several weeks with poorly controlled hypertension chronically.  has some mild nause, no other uremic symptoms,  no emergent need for dialysis.              -holding losartan               -Has been referred to [facility] transplant clinic for evaluation just recently.   -No acute indication for HD and attempting to delay until avf mature   -Trend daily labs     2.  Hypertensive urgency:  Poorly controlled chronically likely cause for advanced CKD.     -Blood pressure remains above goal.    -resume lasix 40 mg/d   -if still high, would next increase amlodipine     3.  Coronary artery disease with previous CABG x2 in June 2019.  Echocardiogram pending by cardiology with ongoing evaluation pending.  the high risk for requiring dialysis with angiogram has been discussed     4.  Status post left forearm AVF placement at [facility] June 2021 (5 weeks ago). Still immature and would need tunneled CVC if dialysis required now    5. Metabolic acidosis -due to advanced CKD  - will start sodium bicarb 650 mg bid    Sophie Gomez MD  [facility]  [phone]    No interval changes to past medical history, social history or family history to report.    BP (!) 166/88 (BP Location: Right arm)   Pulse 72   Temp 98.1  F (36.7  C) (Oral)   Resp 20   Ht 1.803 m (5' 11\")   Wt 115 kg (253 lb 8 oz)   SpO2 95%   BMI 35.36 kg/m  "     I/O last 3 completed shifts:  In: 240 [P.O.:240]  Out: 2275 [Urine:2275]    Physical Exam:   GENERAL: NAD  EYES: No scleral icterus, conjunctiva clear  ENT: MMM  RESP:  no respiratory distress  CV: no leg edema.    GI: soft, NT  Musculoskeletal: Normal muscle mass   SKIN: No rash  PSYCH:  Appropriate mood and affect  Left forearm aVF with thrill       Lab Results   Component Value Date     07/21/2021     07/20/2021     07/19/2021    Lab Results   Component Value Date    CHLORIDE 112 07/21/2021    CHLORIDE 110 07/20/2021    CHLORIDE 109 07/19/2021    Lab Results   Component Value Date    BUN 40 07/21/2021    BUN 46 07/20/2021    BUN 48 07/19/2021      Lab Results   Component Value Date    POTASSIUM 3.8 07/21/2021    POTASSIUM 3.8 07/20/2021    POTASSIUM 4.5 07/19/2021    Lab Results   Component Value Date    CO2 21 07/21/2021    CO2 21 07/20/2021    CO2 22 07/19/2021    Lab Results   Component Value Date    CR 3.93 07/21/2021    CR 4.09 07/20/2021    CR 4.34 07/19/2021        No results found for: NTBNPI, NTBNP  Lab Results   Component Value Date    WBC 5.7 07/24/2021    WBC 6.1 07/22/2021    WBC 6.8 07/20/2021    HGB 11.9 (L) 07/24/2021    HGB 11.7 (L) 07/22/2021    HGB 12.9 (L) 07/20/2021    HCT 35.5 (L) 07/24/2021    HCT 36.3 (L) 07/22/2021    HCT 37.1 (L) 07/20/2021    MCV 93 07/24/2021    MCV 96 07/22/2021    MCV 89 07/20/2021     (L) 07/24/2021     (L) 07/22/2021     (L) 07/20/2021     Lab Results   Component Value Date    CR 4.55 (H) 07/26/2021    CR 4.63 (H) 07/25/2021    CR 4.77 (H) 07/25/2021     Lab Results   Component Value Date    DD 0.34 05/07/2020     Lab Results   Component Value Date     07/26/2021     (L) 07/25/2021     07/25/2021    POTASSIUM 4.3 07/26/2021    POTASSIUM 5.0 07/25/2021    POTASSIUM 4.4 07/25/2021    CHLORIDE 111 (H) 07/26/2021    CHLORIDE 108 (H) 07/25/2021    CHLORIDE 111 (H) 07/25/2021    CO2 19 (L) 07/26/2021    CO2 19  (L) 07/25/2021    CO2 20 (L) 07/25/2021     07/26/2021     07/26/2021     (H) 07/25/2021     No results found for: SED  Lab Results   Component Value Date     07/26/2021     07/26/2021     (H) 07/25/2021     Lab Results   Component Value Date    HGB 11.9 (L) 07/24/2021    HGB 11.7 (L) 07/22/2021    HGB 12.9 (L) 07/20/2021     Lab Results   Component Value Date    AST 25 07/20/2021    AST 23 10/05/2020    AST 26 10/04/2020    ALT 34 07/20/2021    ALT 29 10/05/2020    ALT 34 10/04/2020    ALKPHOS 63 07/20/2021    ALKPHOS 66 10/05/2020    ALKPHOS 79 10/04/2020    BILITOTAL 0.5 07/20/2021    BILITOTAL 0.4 10/05/2020    BILITOTAL 0.3 10/04/2020         Drug and lactation database from the [place] National Library of Medicine:  http://toxnet.nlm.nih.gov/cgi-bin/sis/htmlgen?LACT        Labs personally reviewed today during this evaluation at 9:12 AM

## 2021-07-26 NOTE — PLAN OF CARE
Problem: Adult Inpatient Plan of Care  Goal: Absence of Hospital-Acquired Illness or Injury  Outcome: Improving  Intervention: Identify and Manage Fall Risk  Recent Flowsheet Documentation  Taken 7/26/2021 1523 by Elena Vincent RN  Safety Promotion/Fall Prevention: mobility aid in reach   Pt denies any pain or discomfort. Up independent in room. BP stable. Will continue to monitor.

## 2021-07-26 NOTE — PROGRESS NOTES
"  HEART CARE CONSULTATON NOTE        Assessment/Recommendations   Assessment:  1. Hypertension: still poorly controlled and will increase norvasc to 5 mg daily  2. Coronary artery disease: CABG in June 2019 with LIMA to LAD and SVG to RCA.  Angiogram on 7/22 demonstrated patent LIMA, circumflex nonobstructive, SVG to RCA occluded (old) and RCA found to have more significant disease on prior study and nonobstructive by FFR.    3. Stage V CKD   4. Diabetes mellitus type II    Plan:  1. Increase norvasc to 10 mg daily       History of Present Illness/Subjective    No shortness of breath or chest pain    Echocardiogram  Interpretation Summary     The left ventricle is normal in size.  Left ventricular function is decreased. The ejection fraction is 45-50%  (mildly reduced).  Normal right ventricle size and systolic function.  Mild sclerodegenerative valve disease is present without hemodynamically  significant stenosis or regurgitation.     Compared to the prior study dated 4/30/2020, there are changes as noted.      Coronary Angiogram 7/22/21  Coronary angiography showed:     Left main with mild disease  LAD with mild proximal disease, having a large diagonal system with a mid LAD being occluded, and distal LAD filling via patent LIMA  Left circumflex with mild to moderate disease  RCA with 30 to 40% stenosis seen in the proximal and distal portion.  The RCA was found to have more significant disease on his prior study, and was therefore assessed with fractional flow reserve.     After appropriate anticoagulation, FFR on the RCA with intravenous adenosine was performed.     This showed an FFR of 0.93 across the distal RCA lesion at peak hyperemia, and 0.96 across the proximal lesion at peak hyperemia, confirming nonobstructive disease     EDP 13mmHg     Physical Examination  Review of Systems   VITALS: /58 (BP Location: Right arm)   Pulse 72   Temp 98.1  F (36.7  C) (Oral)   Resp 20   Ht 1.803 m (5' 11\")   " Wt 115 kg (253 lb 8 oz)   SpO2 95%   BMI 35.36 kg/m    BMI: Body mass index is 35.36 kg/m .  Wt Readings from Last 3 Encounters:   07/26/21 115 kg (253 lb 8 oz)   07/23/21 113.6 kg (250 lb 8 oz)   04/30/20 115 kg (253 lb 8 oz)       Intake/Output Summary (Last 24 hours) at 7/26/2021 1114  Last data filed at 7/26/2021 0900  Gross per 24 hour   Intake 720 ml   Output 2275 ml   Net -1555 ml     General Appearance:   no distress, normal body habitus   ENT/Mouth: membranes moist, no oral lesions or bleeding gums.      EYES:  no scleral icterus, normal conjunctivae   Neck: no carotid bruits or thyromegaly   Chest/Lungs:   lungs are clear to auscultation, no rales or wheezing   Cardiovascular:   Regular. Normal first and second heart sounds with no murmurs, rubs, or gallops;  Jugular venous pressure normal, no edema bilaterally    Abdomen:  no organomegaly, masses, bruits, or tenderness; bowel sounds are present   Extremities: no cyanosis or clubbing   Skin: no xanthelasma, warm.    Neurologic: normal  bilateral, no tremors     Psychiatric: alert and oriented x3, calm     Review Of Systems  Skin: negative  Eyes: negative  Ears/Nose/Throat: negative  Respiratory: No shortness of breath, dyspnea on exertion, cough, or hemoptysis  Cardiovascular: negative  Gastrointestinal: negative  Genitourinary: negative  Musculoskeletal: negative  Neurologic: negative  Psychiatric: negative  Hematologic/Lymphatic/Immunologic: negative  Endocrine: negative          Lab Results    Chemistry/lipid CBC Cardiac Enzymes/BNP/TSH/INR   Recent Labs   Lab Test 07/21/21  0440   CHOL 167   HDL 24*   LDL 59   TRIG 415*     Recent Labs   Lab Test 07/21/21  0440 03/04/19  1211   LDL 59 46  36     Recent Labs   Lab Test 07/26/21  0754 07/26/21  0438   NA  --  136   POTASSIUM  --  4.3   CHLORIDE  --  111*   CO2  --  19*    106   BUN  --  50*   CR  --  4.55*   GFRESTIMATED  --  14*   BETHANY  --  9.0     Recent Labs   Lab Test 07/26/21 0435  07/25/21  1415 07/25/21  0602   CR 4.55* 4.63* 4.77*     Recent Labs   Lab Test 06/22/19  0554 01/14/19  0544 05/07/17  1945   A1C 7.8* 10.0* 8.6*          Recent Labs   Lab Test 07/24/21  0432   WBC 5.7   HGB 11.9*   HCT 35.5*   MCV 93   *     Recent Labs   Lab Test 07/24/21  0432 07/22/21  1753 07/20/21  0431   HGB 11.9* 11.7* 12.9*    Recent Labs   Lab Test 07/20/21  0023 07/19/21  1820 07/19/21  1154   TROPONINI <0.01 0.01 0.01     Recent Labs   Lab Test 07/19/21  1155 10/04/20  2140 05/07/20  0926   BNP 25 29 68*     Recent Labs   Lab Test 01/14/19  0544   TSH 2.65     Recent Labs   Lab Test 10/04/20  2140 06/21/19  0345 06/20/19  1558   INR 1.03 1.24* 1.30*        Medical History  Surgical History Family History Social History   Past Medical History:   Diagnosis Date     Angina pectoris (H)     rare since PCI     Chronic kidney disease     CKD stage 3     Coronary artery disease      Diabetes mellitus, type II (H) 12/2001    insulin     Diabetic nephropathy (H)      DM II (diabetes mellitus, type II), controlled (H)      MARQUEZ (dyspnea on exertion)      Dyslipidemia 12/2001     History of blood transfusion 2004     HTN (hypertension) 2004     Hyperlipidemia      Hypertension     takes medication     Ischemic cardiomyopathy      Myocardial infarction (H)     STEMI -Diagonal branch of the LAD     Obesity (BMI 30-39.9)      Proteinuria      Vitamin D deficiency      Past Surgical History:   Procedure Laterality Date     BACK SURGERY  2009    L5 disc cut     BYPASS GRAFT ARTERY CORONARY  06/20/2019    2 vessels     CV CORONARY ANGIOGRAM N/A 1/13/2019    Procedure: Coronary Angiogram;  Surgeon: Cielo Che MD;  Location: [facility] Cath Lab;  Service: Cardiology     CV CORONARY ANGIOGRAM N/A 5/2/2019    Procedure: Coronary Angiogram;  Surgeon: Cielo Che MD;  Location: [facility] Cath Lab;  Service: Cardiology     CV CORONARY ANGIOGRAM N/A 4/30/2020    Procedure: Coronary Angiogram;  Surgeon:  Cielo Che MD;  Location: [facility] Cath Lab;  Service: Cardiology     CV CORONARY ANGIOGRAM N/A 7/22/2021    Procedure: CV CORONARY ANGIOGRAM;  Surgeon: Adrian Crow MD;  Location: [facility] LAB CV     CV FRACTIONAL FLOW RATIO WIRE N/A 7/22/2021    Procedure: Fractional Flow Ratio Wire;  Surgeon: Adrian Crow MD;  Location: [facility] CATH LAB CV     CV LEFT HEART CATH N/A 7/22/2021    Procedure: Left Heart Cath;  Surgeon: Adrian Crow MD;  Location: [facility] LAB CV     CV LEFT HEART CATHETERIZATION WITH LEFT VENTRICULOGRAM N/A 1/13/2019    Procedure: Left Heart Catheterization with Left Ventriculogram;  Surgeon: Cielo Che MD;  Location: [facility] Cath Lab;  Service: Cardiology     CV LEFT HEART CATHETERIZATION WITHOUT LEFT VENTRICULOGRAM Left 4/30/2020    Procedure: Left Heart Catheterization Without Left Ventriculogram;  Surgeon: Cielo Che MD;  Location: [facility] Cath Lab;  Service: Cardiology     CV RIGHT HEART CATHETERIZATION N/A 4/30/2020    Procedure: Right Heart Catheterization;  Surgeon: Cielo Che MD;  Location: [facility] Cath Lab;  Service: Cardiology     HERNIA REPAIR       OTHER SURGICAL HISTORY      Excise varicocele     STENT, CORONARY, DALE  2019     Family History   Problem Relation Age of Onset     Heart Disease Father 60.00     CABG Father 50.00        triple bypass        Social History     Socioeconomic History     Marital status:      Spouse name: Not on file     Number of children: Not on file     Years of education: Not on file     Highest education level: Not on file   Occupational History     Not on file   Tobacco Use     Smoking status: Never Smoker     Smokeless tobacco: Never Used   Substance and Sexual Activity     Alcohol use: Yes     Comment: Alcoholic Drinks/day: 1-2     Drug use: No     Sexual activity: Yes     Partners: Female   Other Topics Concern     Parent/sibling w/ CABG, MI or angioplasty before 65F 55M? Not Asked    Social History Narrative     Not on file     Social Determinants of Health     Financial Resource Strain:      Difficulty of Paying Living Expenses:    Food Insecurity:      Worried About Running Out of Food in the Last Year:      Ran Out of Food in the Last Year:    Transportation Needs:      Lack of Transportation (Medical):      Lack of Transportation (Non-Medical):    Physical Activity:      Days of Exercise per Week:      Minutes of Exercise per Session:    Stress:      Feeling of Stress :    Social Connections:      Frequency of Communication with Friends and Family:      Frequency of Social Gatherings with Friends and Family:      Attends [employer] Services:      Active Member of Clubs or Organizations:      Attends Club or Organization Meetings:      Marital Status:    Intimate Partner Violence:      Fear of Current or Ex-Partner:      Emotionally Abused:      Physically Abused:      Sexually Abused:          Medications  Allergies   No current outpatient medications on file.      No Known Allergies      Archana Addison MD

## 2021-07-26 NOTE — PLAN OF CARE
Problem: Adult Inpatient Plan of Care  Goal: Plan of Care Review  Outcome: Improving  Goal: Absence of Hospital-Acquired Illness or Injury  Outcome: Improving  Intervention: Identify and Manage Fall Risk  Recent Flowsheet Documentation  Taken 7/25/2021 2126 by Guillermo Newell RN  Safety Promotion/Fall Prevention:    nonskid shoes/slippers when out of bed    patient and family education    safety round/check completed  Taken 7/25/2021 1700 by Guillermo Newell RN  Safety Promotion/Fall Prevention:    nonskid shoes/slippers when out of bed    patient and family education    safety round/check completed  Intervention: Prevent Skin Injury  Recent Flowsheet Documentation  Taken 7/25/2021 2126 by Guillermo Newell RN  Body Position: position changed independently  Taken 7/25/2021 1700 by Guillermo Newell RN  Body Position: position changed independently  Goal: Optimal Comfort and Wellbeing  Outcome: Improving  Goal: Readiness for Transition of Care  Outcome: Improving     Problem: Hypertension Acute  Goal: Blood Pressure Within Desired Range  Outcome: Improving     Problem: Chest Pain  Goal: Resolution of Chest Pain Symptoms  Outcome: Improving         Alert. Oriented.    Denied.    Last /73. MD increased Hydralazine today. Will continue to monitor.    Independent. Falls education, demonstrated understanding.

## 2021-07-26 NOTE — PLAN OF CARE
Patient denies chest pain or discomfort and presents as calm and comfortable. Patient educated on changes to medication. Will continue to monitor for pain. Patients blood pressure at 0800 was 166/88 with no hypertension symptoms.

## 2021-07-26 NOTE — PROGRESS NOTES
"[facility] Internal Medicine Progress Note      ASSESSMENT:    Principal Problem:    Hypertensive urgency  Active Problems:    Type 2 diabetes mellitus with other circulatory complication, unspecified whether long term insulin use (H)    Class 2 severe obesity due to excess calories with serious comorbidity in adult, unspecified BMI (H)    Coronary artery disease due to lipid rich plaque    S/P CABG (coronary artery bypass graft)    Chest pain with high risk for cardiac etiology      PLAN:   46-year-old male with history of CAD, CKD5, hypertension, DM 2 presents with chest pain      Chest pain: Patient with known CAD, prior CABG in 2019.  There was no evidence of ACS on this hospitalization however TTE showed newly reduced EF of 45 to 50%   Patient underwent coronary angiogram 7/22/2021 with findings of known RCA graft stenosis with no flow limitation.  No PCI was pursued  --Continue aspirin, statin, Coreg        Hypertensive urgency:  --Continue Lasix 40 mg daily  --Increase amlodipine to 10 mg daily  --Continue current dose hydralazine, doxazosin, Coreg      CKD 5:  --Appreciate nephrology following  --Start bicarb therapy  --Continue current dose Lasix  --Trend GFR  --Continue to hold losartan  --Plan outpatient follow-up with [facility] transplant clinic  --Plan outpatient follow-up with [facility] regarding CKD management      DM2: Continue current Lantus, increase mealtime insulin to 5 units 3 times daily,                DVT PPX: SCD        Darion Sifuentes D.O.              -------------------------------------------------------------------------------------------------------------  SUBJECTIVE: NAD. Denies any nausea, vomiting, abdominal pain, chest pain, SOB, new swelling, fevers, chills, confusion or headache.     Exam:  /69 (BP Location: Right arm)   Pulse 76   Temp 98.2  F (36.8  C) (Oral)   Resp 18   Ht 1.803 m (5' 11\")   Wt 115 kg (253 lb 8 oz)   SpO2 94%   BMI 35.36 kg/m    General: NAD  RESPIRATORY: " Breathing nonlabored  CARDIOVASCULAR: No le edema bilat.   NEUROLOGIC: Motor and sensory intact, speech clear  PSYCHIATRIC: Oriented X 3, without confusion or delirium, behavior appropriate, affect normal    Diagnostics Reviewed:      Recent Results (from the past 24 hour(s))   Glucose by meter    Collection Time: 07/25/21  5:27 PM   Result Value Ref Range    GLUCOSE BY METER POCT 148 (H) 70 - 125 mg/dL   Glucose by meter    Collection Time: 07/25/21  8:52 PM   Result Value Ref Range    GLUCOSE BY METER POCT 203 (H) 70 - 125 mg/dL   Basic metabolic panel    Collection Time: 07/26/21  4:38 AM   Result Value Ref Range    Sodium 136 136 - 145 mmol/L    Potassium 4.3 3.5 - 5.0 mmol/L    Chloride 111 (H) 98 - 107 mmol/L    Carbon Dioxide (CO2) 19 (L) 22 - 31 mmol/L    Anion Gap 6 5 - 18 mmol/L    Urea Nitrogen 50 (H) 8 - 22 mg/dL    Creatinine 4.55 (H) 0.70 - 1.30 mg/dL    Calcium 9.0 8.5 - 10.5 mg/dL    Glucose 106 70 - 125 mg/dL    GFR Estimate 14 (L) >60 mL/min/1.73m2   Extra Purple Top Tube    Collection Time: 07/26/21  4:38 AM   Result Value Ref Range    Hold Specimen JIC    Glucose by meter    Collection Time: 07/26/21  7:54 AM   Result Value Ref Range    GLUCOSE BY METER POCT 103 70 - 125 mg/dL   Glucose by meter    Collection Time: 07/26/21 11:59 AM   Result Value Ref Range    GLUCOSE BY METER POCT 152 (H) 70 - 125 mg/dL

## 2021-07-27 VITALS
BODY MASS INDEX: 35.17 KG/M2 | RESPIRATION RATE: 18 BRPM | HEART RATE: 77 BPM | TEMPERATURE: 98.3 F | HEIGHT: 71 IN | OXYGEN SATURATION: 95 % | SYSTOLIC BLOOD PRESSURE: 118 MMHG | DIASTOLIC BLOOD PRESSURE: 70 MMHG | WEIGHT: 251.2 LBS

## 2021-07-27 LAB
ANION GAP SERPL CALCULATED.3IONS-SCNC: 9 MMOL/L (ref 5–18)
BUN SERPL-MCNC: 50 MG/DL (ref 8–22)
CALCIUM SERPL-MCNC: 8.8 MG/DL (ref 8.5–10.5)
CHLORIDE BLD-SCNC: 109 MMOL/L (ref 98–107)
CO2 SERPL-SCNC: 20 MMOL/L (ref 22–31)
CREAT SERPL-MCNC: 4.95 MG/DL (ref 0.7–1.3)
GFR SERPL CREATININE-BSD FRML MDRD: 13 ML/MIN/1.73M2
GLUCOSE BLD-MCNC: 93 MG/DL (ref 70–125)
GLUCOSE BLDC GLUCOMTR-MCNC: 141 MG/DL (ref 70–125)
GLUCOSE BLDC GLUCOMTR-MCNC: 95 MG/DL (ref 70–125)
POTASSIUM BLD-SCNC: 4.4 MMOL/L (ref 3.5–5)
SODIUM SERPL-SCNC: 138 MMOL/L (ref 136–145)

## 2021-07-27 PROCEDURE — 80048 BASIC METABOLIC PNL TOTAL CA: CPT | Performed by: INTERNAL MEDICINE

## 2021-07-27 PROCEDURE — 250N000013 HC RX MED GY IP 250 OP 250 PS 637: Performed by: INTERNAL MEDICINE

## 2021-07-27 PROCEDURE — 99239 HOSP IP/OBS DSCHRG MGMT >30: CPT | Performed by: INTERNAL MEDICINE

## 2021-07-27 PROCEDURE — 36415 COLL VENOUS BLD VENIPUNCTURE: CPT | Performed by: INTERNAL MEDICINE

## 2021-07-27 PROCEDURE — 250N000011 HC RX IP 250 OP 636: Performed by: INTERNAL MEDICINE

## 2021-07-27 RX ORDER — SODIUM BICARBONATE 650 MG/1
650 TABLET ORAL 2 TIMES DAILY
Qty: 60 TABLET | Refills: 0 | Status: SHIPPED | OUTPATIENT
Start: 2021-07-27 | End: 2021-10-13

## 2021-07-27 RX ORDER — HYDRALAZINE HYDROCHLORIDE 25 MG/1
75 TABLET, FILM COATED ORAL 3 TIMES DAILY
Qty: 270 TABLET | Refills: 0 | Status: SHIPPED | OUTPATIENT
Start: 2021-07-27 | End: 2023-06-27

## 2021-07-27 RX ORDER — HYDRALAZINE HYDROCHLORIDE 25 MG/1
75 TABLET, FILM COATED ORAL 3 TIMES DAILY
Status: DISCONTINUED | OUTPATIENT
Start: 2021-07-27 | End: 2021-07-27 | Stop reason: HOSPADM

## 2021-07-27 RX ORDER — FUROSEMIDE 40 MG
40 TABLET ORAL DAILY
Qty: 30 TABLET | Refills: 0 | Status: SHIPPED | OUTPATIENT
Start: 2021-07-28 | End: 2021-09-01

## 2021-07-27 RX ORDER — CARVEDILOL 12.5 MG/1
37.5 TABLET ORAL 2 TIMES DAILY
Qty: 180 TABLET | Refills: 0 | Status: SHIPPED | OUTPATIENT
Start: 2021-07-27 | End: 2021-09-01

## 2021-07-27 RX ORDER — AMLODIPINE BESYLATE 10 MG/1
10 TABLET ORAL DAILY
Qty: 30 TABLET | Refills: 0 | Status: SHIPPED | OUTPATIENT
Start: 2021-07-28 | End: 2021-09-01

## 2021-07-27 RX ORDER — INSULIN GLARGINE AND LIXISENATIDE 100; 33 U/ML; UG/ML
25 INJECTION, SOLUTION SUBCUTANEOUS DAILY
Qty: 7.5 ML | Refills: 1 | Status: SHIPPED | OUTPATIENT
Start: 2021-07-27 | End: 2021-08-26

## 2021-07-27 RX ORDER — CLONIDINE HYDROCHLORIDE 0.1 MG/1
0.1 TABLET ORAL 3 TIMES DAILY PRN
Qty: 30 TABLET | Refills: 0 | Status: ON HOLD | OUTPATIENT
Start: 2021-07-27 | End: 2024-07-30

## 2021-07-27 RX ADMIN — SODIUM BICARBONATE 650 MG: 648 TABLET ORAL at 08:12

## 2021-07-27 RX ADMIN — INSULIN ASPART 1 UNITS: 100 INJECTION, SOLUTION INTRAVENOUS; SUBCUTANEOUS at 12:27

## 2021-07-27 RX ADMIN — AMLODIPINE BESYLATE 10 MG: 5 TABLET ORAL at 08:12

## 2021-07-27 RX ADMIN — ASPIRIN 81 MG: 81 TABLET, COATED ORAL at 08:12

## 2021-07-27 RX ADMIN — CARVEDILOL 37.5 MG: 12.5 TABLET, FILM COATED ORAL at 08:12

## 2021-07-27 RX ADMIN — Medication 125 MCG: at 08:13

## 2021-07-27 RX ADMIN — HEPARIN SODIUM 5000 UNITS: 10000 INJECTION, SOLUTION INTRAVENOUS; SUBCUTANEOUS at 08:12

## 2021-07-27 RX ADMIN — FUROSEMIDE 40 MG: 20 TABLET ORAL at 08:12

## 2021-07-27 RX ADMIN — ATORVASTATIN CALCIUM 80 MG: 40 TABLET, FILM COATED ORAL at 08:12

## 2021-07-27 RX ADMIN — HYDRALAZINE HYDROCHLORIDE 50 MG: 25 TABLET, FILM COATED ORAL at 08:12

## 2021-07-27 ASSESSMENT — MIFFLIN-ST. JEOR: SCORE: 2041.57

## 2021-07-27 NOTE — PLAN OF CARE
"  Problem: Adult Inpatient Plan of Care  Goal: Optimal Comfort and Wellbeing  Outcome: Improving     Problem: Hypertension Acute  Goal: Blood Pressure Within Desired Range  Outcome: Improving     Problem: Chest Pain  Goal: Resolution of Chest Pain Symptoms  Outcome: Improving     Patient is A&Ox 4. He has c/o numbness and tingling in his finger tips. Pupils are 3, brisk, and round bilaterally. He is IND for all cares and transfers. He is continent of bowel and bladder. LBM 7/25/21. VSS.  this shift. Patient denied all c/o pain this shift. He remains in NSR throughout the shift. LS are clear bilaterally. Pt. denies all shortness of breath, chest pain, and dizziness. BS active in A4Q. He has c/o chronic nausea \"but does not take anything for it.\" Skin intact except for left fistula and + 1 pitting edema in BLE. Positive pedal/radial pulses bilaterally. RFA PIV was flushed, capped, and saline locked. Up and walking Q 2 H while awake. Patient is lying in bed with call light in reach. Slept well this shift. Staff will continue to monitor.    "

## 2021-07-27 NOTE — DISCHARGE SUMMARY
[facility] MEDICINE  DISCHARGE SUMMARY      Primary Care Physician: Trinidad Hansen              Admission Date: 7/19/2021    Discharge Provider: Darion Sifuentes DO Discharge Date: 7/27/2021    Diet: see discharge orders below Code Status: Prior    Activity: as tolerated       Condition at Discharge: Stable      REASON FOR ADMISSION (See Admission Note for Details)      Hypertensive urgency, chest pain    PRINCIPAL DISCHARGE DIAGNOSIS    Principal Problem:    Hypertensive urgency  Active Problems:    Type 2 diabetes mellitus with other circulatory complication, unspecified whether long term insulin use (H)    Class 2 severe obesity due to excess calories with serious comorbidity in adult, unspecified BMI (H)    Coronary artery disease due to lipid rich plaque    S/P CABG (coronary artery bypass graft)    Chest pain with high risk for cardiac etiology        SIGNIFICANT FINDINGS (Imaging, labs):      See below    PENDING LABS      None    PROCEDURES ( this hospitalization only)       None    RECOMMENDATION FOR F/U VISIT      See below    DISPOSITION ( home, home care, TCU...)      Home    SUMMARY OF HOSPITAL COURSE:       46-year-old male with history of CAD, CKD5, hypertension, DM 2 presents with chest pain        Chest pain: Patient with known CAD, prior CABG in 2019.  There was no evidence of ACS on this hospitalization however TTE showed newly reduced EF of 45 to 50%   Patient underwent coronary angiogram 7/22/2021 with findings of known RCA graft stenosis with no flow limitation.  No PCI was pursued  --Continue aspirin, statin, Coreg           Hypertensive urgency:  --Increase Lasix to 40 mg daily  --Increase amlodipine to 10 mg daily  --Increase hydralazine to 75 mg 3 times daily   --Increase Coreg to 37.5 mg twice daily  -- continue home dose doxazosin   --Plan close outpatient cardiology and nephrology follow-up        CKD 5:  --Start bicarb  therapy  --Continue to hold losartan  --Plan outpatient follow-up with [facility] transplant clinic  --Plan outpatient follow-up with [facility] regarding CKD management        DM2: Given low glucoses will reduce home dose Soliqua to 25 units daily, change mealtime coverage to aspart 5 units 3 times daily AC and hold home dose Amaryl given renal insufficiency.        Discharge Medications with Med changes:         Review of your medicines      START taking      Dose / Directions   amLODIPine 10 MG tablet  Commonly known as: NORVASC  Used for: Hypertensive urgency      Dose: 10 mg  Start taking on: July 28, 2021  Take 1 tablet (10 mg) by mouth daily  Quantity: 30 tablet  Refills: 0     cloNIDine 0.1 MG tablet  Commonly known as: CATAPRES  Used for: Hypertensive urgency      Dose: 0.1 mg  Take 1 tablet (0.1 mg) by mouth 3 times daily as needed (SBP > 160)  Quantity: 30 tablet  Refills: 0     hydrALAZINE 25 MG tablet  Commonly known as: APRESOLINE  Used for: Hypertensive urgency      Dose: 75 mg  Take 3 tablets (75 mg) by mouth 3 times daily  Quantity: 270 tablet  Refills: 0     sodium bicarbonate 650 MG tablet  Used for: Stage 3 chronic kidney disease, unspecified whether stage 3a or 3b CKD      Dose: 650 mg  Take 1 tablet (650 mg) by mouth 2 times daily  Quantity: 60 tablet  Refills: 0        CONTINUE these medicines which may have CHANGED, or have new prescriptions. If we are uncertain of the size of tablets/capsules you have at home, strength may be listed as something that might have changed.      Dose / Directions   atorvastatin 80 MG tablet  Commonly known as: LIPITOR  This may have changed: See the new instructions.  Used for: STEMI involving left anterior descending coronary artery (H)      [ATORVASTATIN (LIPITOR) 80 MG TABLET] TAKE 1 TABLET(80 MG) BY MOUTH AT BEDTIME  Quantity: 90 tablet  Refills: 1     carvedilol 12.5 MG tablet  Commonly known as: COREG  This may have changed:     medication strength    how much to  take  Used for: Hypertensive urgency      Dose: 37.5 mg  Take 3 tablets (37.5 mg) by mouth 2 times daily  Quantity: 180 tablet  Refills: 0     furosemide 40 MG tablet  Commonly known as: LASIX  This may have changed:     medication strength    how much to take    when to take this  Used for: Hypertensive urgency      Dose: 40 mg  Start taking on: July 28, 2021  Take 1 tablet (40 mg) by mouth daily  Quantity: 30 tablet  Refills: 0     insulin aspart 100 UNIT/ML pen  Commonly known as: NovoLOG PEN  This may have changed: how much to take  Used for: Type 2 diabetes mellitus with other circulatory complication, unspecified whether long term insulin use (H)      Dose: 5 Units  Inject 5 Units Subcutaneous 3 times daily (before meals)  Quantity: 4.5 mL  Refills: 1     nitroGLYcerin 0.4 MG sublingual tablet  Commonly known as: NITROSTAT  This may have changed: See the new instructions.  Used for: Coronary artery disease due to lipid rich plaque      [NITROGLYCERIN (NITROSTAT) 0.4 MG SL TABLET] PLACE 1 TABLET UNDER THE TONGUE EVERY 5 MINUTES AS NEEDED FOR CHEST PAIN  Quantity: 25 tablet  Refills: 1     Soliqua pen  This may have changed: how much to take  Used for: Type 2 diabetes mellitus with other circulatory complication, unspecified whether long term insulin use (H)  Generic drug: insulin glargine-lixisenatide      Dose: 25 Units  Inject 25 Units Subcutaneous daily  Quantity: 7.5 mL  Refills: 1        CONTINUE these medicines which have NOT CHANGED      Dose / Directions   albuterol 108 (90 Base) MCG/ACT inhaler  Commonly known as: PROAIR HFA/PROVENTIL HFA/VENTOLIN HFA      Dose: 2 puff  Inhale 2 puffs into the lungs every 4 hours as needed  Refills: 0     aspirin 81 MG EC tablet  Commonly known as: ASA  Used for: STEMI involving left anterior descending coronary artery (H)      Dose: 81 mg  [ASPIRIN 81 MG EC TABLET] Take 1 tablet (81 mg total) by mouth daily.  Refills: 0     doxazosin 4 MG tablet  Commonly known as:  CARDURA      Dose: 2 mg  [DOXAZOSIN (CARDURA) 4 MG TABLET] Take 2 mg by mouth at bedtime.  Refills: 0     vitamin D3 125 MCG (5000 UT) tablet  Commonly known as: CHOLECALCIFEROL      Dose: 5,000 Units  [CHOLECALCIFEROL, VITAMIN D3, 5,000 UNIT TAB] Take 5,000 Units by mouth daily.  Refills: 0        STOP taking    glimepiride 4 MG tablet  Commonly known as: AMARYL        losartan 50 MG tablet  Commonly known as: COZAAR              Where to get your medicines      These medications were sent to [id number] - [place], [place] - [address] AT [facility] [address]  [address], [place] [address]    Phone: [phone]     amLODIPine 10 MG tablet    carvedilol 12.5 MG tablet    cloNIDine 0.1 MG tablet    furosemide 40 MG tablet    hydrALAZINE 25 MG tablet    insulin aspart 100 UNIT/ML pen    sodium bicarbonate 650 MG tablet    Soliqua pen                      Discharge Procedure Orders   Activity   Order Comments: Your activity upon discharge: activity as tolerated     Order Specific Question Answer Comments   Is discharge order? Yes      Monitor and record   Order Comments: Weigh yourself every morning     Discharge Follow Up - with Primary Care clinic within 3-5 days (RN to schedule prior to d/c for HE/[facility] primary care     Add follow up information to the AVS prior to printing     When to contact your care team   Order Comments: Contact your care team If symptoms get worse, If increased shortness of breath, If waking up at night with difficulty breathing, If unable to lie down for sleep due to symptoms, If weight gain of 2 pounds a day for 2 days in a row OR 5 pounds in 1 week., Increased swelling in your ankles or legs, and Dizziness or lightheadedness     Follow-up and recommended labs and tests    Order Comments: Follow-up with nephrology as directed     Reason for your hospital stay   Order Comments: Hypertension, cardiac and renal disease     Diet   Order Comments:  "Follow this diet upon discharge: Orders Placed This Encounter      Consistent Carbohydrate Renal Dialysis Diet   Consistent Carb 75 grams CHO per Meal Diet     Order Specific Question Answer Comments   Is discharge order? Yes          Subjective       NAD. Denies any nausea, vomiting, abdominal pain, chest pain, SOB, new swelling, fevers, chills, confusion or headache.     Examination      Vital Signs in last 24 hours:   Vital signs:  Temp: 98.3  F (36.8  C) Temp src: Oral BP: 118/70 Pulse: 77   Resp: 18 SpO2: 95 % O2 Device: None (Room air) Oxygen Delivery: 2 LPM Height: 180.3 cm (5' 11\") Weight: 113.9 kg (251 lb 3.2 oz)  Estimated body mass index is 35.04 kg/m  as calculated from the following:    Height as of 7/23/21: 1.803 m (5' 11\").    Weight as of this encounter: 113.9 kg (251 lb 3.2 oz).            General: NAD  RESPIRATORY: Respirations nonlabored  CARDIOVASCULAR: No le edema bilat.  NEUROLOGIC: No focal arm or leg weakness, speech is clear      Please see EMR for more detailed significant labs, imaging, consultant notes etc.  Total time spent on discharge: >30 minutes    Darion Sifuentes D.O.        CC:Trinidad Hansen        "

## 2021-07-27 NOTE — PROGRESS NOTES
[facility] ([facility]) progress note  CC: F/U   S: no new complaints, cont with some nausea but feels it is tolerable and unchanged. No sob.  HTN better today.  He is hoping to delay dialysis until Sept if possible.  A/P:     1. CKD-5: Likely due to DN/HTN.  Patient with progressively declining kidney function over the past 2 years with creatinine 1.81 mg/dL in June 2019 worsening to 4.34 mg/dL on 7/19/2021 admission.  He follows with Dr. Rios in clinic with dialysis fistula placed in June 2021 and left forearm that is not yet mature.  He has noted increasing fatigue over the past several weeks with poorly controlled hypertension chronically.  has some mild nausea, no other uremic symptoms,  no emergent need for dialysis.              -holding losartan               -Has been referred to [facility] transplant clinic for evaluation just recently.   -No acute indication for HD and attempting to delay until avf mature   -Trend daily labs   -ok for discharge from renal perspective, he has f/u scheduled with Dr Rios   -I reviewed uremic symptoms, dialysis indications with him today     2.  Hypertensive urgency:  Poorly controlled chronically, likely cause for advanced CKD.     -Blood pressure improved    -would discharge on current regimen, would NOT resume losartan with very marginal renal function       3.  Coronary artery disease with previous CABG x2 in June 2019.  Echocardiogram pending by cardiology with ongoing evaluation pending.  the high risk for requiring dialysis with angiogram has been discussed     4.  Status post left forearm AVF placement at [facility] June 2021 (5 weeks ago). Still immature and would need tunneled CVC if dialysis required now    5. Metabolic acidosis -due to advanced CKD  - cont sodium bicarb 650 mg bid    Sophie Gomez MD  [facility] [place]  [phone]    No interval changes to past medical history, social history or family history to report.    BP  "125/70 (BP Location: Right arm)   Pulse 79   Temp 98.7  F (37.1  C) (Oral)   Resp 18   Ht 1.803 m (5' 11\")   Wt 113.9 kg (251 lb 3.2 oz)   SpO2 96%   BMI 35.04 kg/m      I/O last 3 completed shifts:  In: 960 [P.O.:960]  Out: 1550 [Urine:1550]    Physical Exam:   GENERAL: NAD  EYES: No scleral icterus, conjunctiva clear  ENT: MMM  RESP:  no respiratory distress  CV: trace leg edema.    GI: soft, NT  Musculoskeletal: Normal muscle mass   SKIN: No rash  PSYCH:  Appropriate mood and affect  Left forearm aVF with thrill       Lab Results   Component Value Date     07/21/2021     07/20/2021     07/19/2021    Lab Results   Component Value Date    CHLORIDE 112 07/21/2021    CHLORIDE 110 07/20/2021    CHLORIDE 109 07/19/2021    Lab Results   Component Value Date    BUN 40 07/21/2021    BUN 46 07/20/2021    BUN 48 07/19/2021      Lab Results   Component Value Date    POTASSIUM 3.8 07/21/2021    POTASSIUM 3.8 07/20/2021    POTASSIUM 4.5 07/19/2021    Lab Results   Component Value Date    CO2 21 07/21/2021    CO2 21 07/20/2021    CO2 22 07/19/2021    Lab Results   Component Value Date    CR 3.93 07/21/2021    CR 4.09 07/20/2021    CR 4.34 07/19/2021        No results found for: NTBNPI, NTBNP  Lab Results   Component Value Date    WBC 5.7 07/24/2021    WBC 6.1 07/22/2021    WBC 6.8 07/20/2021    HGB 11.9 (L) 07/24/2021    HGB 11.7 (L) 07/22/2021    HGB 12.9 (L) 07/20/2021    HCT 35.5 (L) 07/24/2021    HCT 36.3 (L) 07/22/2021    HCT 37.1 (L) 07/20/2021    MCV 93 07/24/2021    MCV 96 07/22/2021    MCV 89 07/20/2021     (L) 07/24/2021     (L) 07/22/2021     (L) 07/20/2021     Lab Results   Component Value Date    CR 4.95 (H) 07/27/2021    CR 4.55 (H) 07/26/2021    CR 4.63 (H) 07/25/2021     Lab Results   Component Value Date    DD 0.34 05/07/2020     Lab Results   Component Value Date     07/27/2021     07/26/2021     (L) 07/25/2021    POTASSIUM 4.4 07/27/2021    " POTASSIUM 4.3 07/26/2021    POTASSIUM 5.0 07/25/2021    CHLORIDE 109 (H) 07/27/2021    CHLORIDE 111 (H) 07/26/2021    CHLORIDE 108 (H) 07/25/2021    CO2 20 (L) 07/27/2021    CO2 19 (L) 07/26/2021    CO2 19 (L) 07/25/2021    GLC 95 07/27/2021    GLC 93 07/27/2021     (H) 07/26/2021     No results found for: SED  Lab Results   Component Value Date    GLC 95 07/27/2021    GLC 93 07/27/2021     (H) 07/26/2021     Lab Results   Component Value Date    HGB 11.9 (L) 07/24/2021    HGB 11.7 (L) 07/22/2021    HGB 12.9 (L) 07/20/2021     Lab Results   Component Value Date    AST 25 07/20/2021    AST 23 10/05/2020    AST 26 10/04/2020    ALT 34 07/20/2021    ALT 29 10/05/2020    ALT 34 10/04/2020    ALKPHOS 63 07/20/2021    ALKPHOS 66 10/05/2020    ALKPHOS 79 10/04/2020    BILITOTAL 0.5 07/20/2021    BILITOTAL 0.4 10/05/2020    BILITOTAL 0.3 10/04/2020         Drug and lactation database from the [place] [employer]:  http://toxnet.nlm.nih.gov/cgi-bin/sis/htmlgen?LACT        Labs personally reviewed today during this evaluation at 9:12 AM

## 2021-07-27 NOTE — PLAN OF CARE
Problem: Adult Inpatient Plan of Care  Goal: Readiness for Transition of Care  Outcome: Adequate for Discharge     Patient discharged at 1300. Wife came and picked him up. All discharge paperwork discussed with patient and all questions answered. He will  his ordered medications from the pharmacy.

## 2021-08-03 NOTE — TELEPHONE ENCOUNTER
Called pt for referral, he was in the hospital during scheduled call. Left VM for him to return my call.

## 2021-08-05 ENCOUNTER — OFFICE VISIT (OUTPATIENT)
Dept: CARDIOLOGY | Facility: CLINIC | Age: 46
End: 2021-08-05
Payer: COMMERCIAL

## 2021-08-05 VITALS
SYSTOLIC BLOOD PRESSURE: 160 MMHG | BODY MASS INDEX: 36.12 KG/M2 | HEART RATE: 92 BPM | RESPIRATION RATE: 20 BRPM | DIASTOLIC BLOOD PRESSURE: 84 MMHG | OXYGEN SATURATION: 95 % | WEIGHT: 259 LBS

## 2021-08-05 DIAGNOSIS — I25.10 CORONARY ARTERY DISEASE DUE TO LIPID RICH PLAQUE: ICD-10-CM

## 2021-08-05 DIAGNOSIS — Z95.1 S/P CABG (CORONARY ARTERY BYPASS GRAFT): ICD-10-CM

## 2021-08-05 DIAGNOSIS — E78.5 DYSLIPIDEMIA: Primary | ICD-10-CM

## 2021-08-05 DIAGNOSIS — I15.0 RENOVASCULAR HYPERTENSION: ICD-10-CM

## 2021-08-05 DIAGNOSIS — I25.2 HISTORY OF ST ELEVATION MYOCARDIAL INFARCTION (STEMI): ICD-10-CM

## 2021-08-05 DIAGNOSIS — N18.30 STAGE 3 CHRONIC KIDNEY DISEASE, UNSPECIFIED WHETHER STAGE 3A OR 3B CKD (H): ICD-10-CM

## 2021-08-05 DIAGNOSIS — I25.83 CORONARY ARTERY DISEASE DUE TO LIPID RICH PLAQUE: ICD-10-CM

## 2021-08-05 PROBLEM — R07.9 CHEST PAIN WITH HIGH RISK FOR CARDIAC ETIOLOGY: Status: RESOLVED | Noted: 2021-07-19 | Resolved: 2021-08-05

## 2021-08-05 PROBLEM — R07.89 BURNING IN THE CHEST: Status: RESOLVED | Noted: 2020-03-19 | Resolved: 2021-08-05

## 2021-08-05 PROBLEM — N18.9 CHRONIC KIDNEY DISEASE: Status: RESOLVED | Noted: 2019-01-29 | Resolved: 2021-08-05

## 2021-08-05 PROBLEM — I16.0 HYPERTENSIVE URGENCY: Status: RESOLVED | Noted: 2021-07-19 | Resolved: 2021-08-05

## 2021-08-05 PROCEDURE — 99214 OFFICE O/P EST MOD 30 MIN: CPT | Performed by: INTERNAL MEDICINE

## 2021-08-05 NOTE — LETTER
8/5/2021    Trinidad Hansen PA-C, SIS  [facility] [address] [place] [address]    RE: Siva Ramey       Dear Colleague,    I had the pleasure of seeing Siva Ramey in the [facility].      HEART CARE ENCOUNTER CONSULTATON NOTE      [facility]  [phone]      Assessment/Recommendations   Assessment/Plan:  Dyspnea on exertion, swelling, increased BP and weight gain - he is fluid up on exam but not uncomfortable at rest. Will increase his furosemide to 80mg BID for three days and will get him in to see our CHF NP early next week.     Hypertension - I have reminded him to use the clonidine prn for SBP > 150 mmHg    CAD - stable, asymptomatic    F/U NP next week and me in 2 months       History of Present Illness/Subjective    HPI: Siva Ramey is a 46 year old male diabetic with hypertension, hyperlipidemia and CAD who suffered an anterior-lateral STEMI January 2019. He was found to have a thrombotic occlusion of a large diagonal that was treated with a Synergy IBAN. There was residual diffuse moderate-severe mid LAD disease and distal RCA disease. EF was 45% by echo. Siva underwent 2 v CABG in June 2019 (LIMA-LAD, SVG-RCA). Siva was admitted 7/2021 with chest pain and hypertensive urgency. Echo EF was stable at 45-50% and coronary angiogram was also stable with FFR - LAD disease. Losartan was stopped due to progressive renal insufficiency. Siva follows with a pulmonologist at [facility], Dr. Ferreira; spirometry showed severe restriction. A CT chest did show some compressive volume loss in the left lung base. A sniff test showed a mildly elevated left hemidiaphragm but normal, bilateral diaphragmatic excursion.     Siva comes for hospital discharge f/u. He has gained 9 lbs since his discharge and notes swollen legs. His BP is higher today as well but he has not used any prn clonidine. There is no chest pain but  he does note dyspnea with minimal exertion and some orthopnea.          Physical Examination  Review of Systems   Vitals: BP (!) 160/84 (BP Location: Right arm, Patient Position: Sitting, Cuff Size: Adult Regular)   Pulse 92   Resp 20   Wt 117.5 kg (259 lb)   SpO2 95%   BMI 36.12 kg/m    BMI= Body mass index is 36.12 kg/m .  Wt Readings from Last 3 Encounters:   08/05/21 117.5 kg (259 lb)   07/27/21 113.9 kg (251 lb 3.2 oz)   07/23/21 113.6 kg (250 lb 8 oz)       General Appearance:   no distress, normal body habitus   ENT/Mouth: membranes moist, no oral lesions or bleeding gums.      EYES:  no scleral icterus, normal conjunctivae   Neck: no carotid bruits or thyromegaly   Chest/Lungs:   lungs are clear to auscultation, no rales or wheezing, stable sternal scar, equal chest wall expansion    Cardiovascular:   Regular. Normal first and second heart sounds with no murmurs, rubs, or gallops; the carotid, radial and posterior tibial pulses are intact, Jugular venous pressure elevated, bilateral edema bilaterally to knees   Abdomen:  no organomegaly, masses, bruits, or tenderness; bowel sounds are present   Extremities: no cyanosis or clubbing   Skin: no xanthelasma, warm.    Neurologic: normal  bilateral, no tremors     Psychiatric: alert and oriented x3, calm        Please refer above for cardiac ROS details.        Medical History  Surgical History Family History Social History   Past Medical History:   Diagnosis Date     Angina pectoris (H)     rare since PCI     Chronic kidney disease     CKD stage 3     Coronary artery disease      Diabetes mellitus, type II (H) 12/2001    insulin     Diabetic nephropathy (H)      DM II (diabetes mellitus, type II), controlled (H)      MARQUEZ (dyspnea on exertion)      Dyslipidemia 12/2001     History of blood transfusion 2004     HTN (hypertension) 2004     Hyperlipidemia      Hypertension     takes medication     Ischemic cardiomyopathy      Myocardial infarction (H)      STEMI -Diagonal branch of the LAD     Obesity (BMI 30-39.9)      Proteinuria      Vitamin D deficiency      Past Surgical History:   Procedure Laterality Date     BACK SURGERY  2009    L5 disc cut     BYPASS GRAFT ARTERY CORONARY  06/20/2019    2 vessels     CV CORONARY ANGIOGRAM N/A 1/13/2019    Procedure: Coronary Angiogram;  Surgeon: Cielo Che MD;  Location: [facility];  Service: Cardiology     CV CORONARY ANGIOGRAM N/A 5/2/2019    Procedure: Coronary Angiogram;  Surgeon: Cielo Che MD;  Location: [facility];  Service: Cardiology     CV CORONARY ANGIOGRAM N/A 4/30/2020    Procedure: Coronary Angiogram;  Surgeon: Cielo Che MD;  Location: [facility];  Service: Cardiology     CV CORONARY ANGIOGRAM N/A 7/22/2021    Procedure: CV CORONARY ANGIOGRAM;  Surgeon: Adrian Crow MD;  Location: [facility] CATH LAB CV     CV FRACTIONAL FLOW RATIO WIRE N/A 7/22/2021    Procedure: Fractional Flow Ratio Wire;  Surgeon: Adrian Crow MD;  Location: [facility] LAB CV     CV LEFT HEART CATH N/A 7/22/2021    Procedure: Left Heart Cath;  Surgeon: Adrian Crow MD;  Location: [facility] CATH LAB CV     CV LEFT HEART CATHETERIZATION WITH LEFT VENTRICULOGRAM N/A 1/13/2019    Procedure: Left Heart Catheterization with Left Ventriculogram;  Surgeon: Cielo Che MD;  Location: [facility];  Service: Cardiology     CV LEFT HEART CATHETERIZATION WITHOUT LEFT VENTRICULOGRAM Left 4/30/2020    Procedure: Left Heart Catheterization Without Left Ventriculogram;  Surgeon: Cielo Che MD;  Location: [facility];  Service: Cardiology     CV RIGHT HEART CATHETERIZATION N/A 4/30/2020    Procedure: Right Heart Catheterization;  Surgeon: Cielo Che MD;  Location: [facility];  Service: Cardiology     HERNIA REPAIR       OTHER SURGICAL HISTORY      Excise varicocele     STENT, CORONARY, DALE  2019     Family History   Problem Relation Age of Onset      Heart Disease Father 60.00     CABG Father 50.00        triple bypass        Social History     Socioeconomic History     Marital status:      Spouse name: Not on file     Number of children: Not on file     Years of education: Not on file     Highest education level: Not on file   Occupational History     Not on file   Tobacco Use     Smoking status: Never Smoker     Smokeless tobacco: Never Used   Substance and Sexual Activity     Alcohol use: Yes     Comment: Alcoholic Drinks/day: 1-2     Drug use: No     Sexual activity: Yes     Partners: Female   Other Topics Concern     Parent/sibling w/ CABG, MI or angioplasty before 65F 55M? Not Asked   Social History Narrative     Not on file     Social Determinants of Health     Financial Resource Strain:      Difficulty of Paying Living Expenses:    Food Insecurity:      Worried About Running Out of Food in the Last Year:      Ran Out of Food in the Last Year:    Transportation Needs:      Lack of Transportation (Medical):      Lack of Transportation (Non-Medical):    Physical Activity:      Days of Exercise per Week:      Minutes of Exercise per Session:    Stress:      Feeling of Stress :    Social Connections:      Frequency of Communication with Friends and Family:      Frequency of Social Gatherings with Friends and Family:      Attends Taoist Services:      Active Member of Clubs or Organizations:      Attends Club or Organization Meetings:      Marital Status:    Intimate Partner Violence:      Fear of Current or Ex-Partner:      Emotionally Abused:      Physically Abused:      Sexually Abused:            Medications  Allergies   Current Outpatient Medications   Medication Sig Dispense Refill     albuterol (PROAIR HFA/PROVENTIL HFA/VENTOLIN HFA) 108 (90 Base) MCG/ACT inhaler Inhale 2 puffs into the lungs every 4 hours as needed       amLODIPine (NORVASC) 10 MG tablet Take 1 tablet (10 mg) by mouth daily 30 tablet 0     aspirin 81 MG EC tablet [ASPIRIN 81  MG EC TABLET] Take 1 tablet (81 mg total) by mouth daily.  0     atorvastatin (LIPITOR) 80 MG tablet [ATORVASTATIN (LIPITOR) 80 MG TABLET] TAKE 1 TABLET(80 MG) BY MOUTH AT BEDTIME (Patient taking differently: Take 80 mg by mouth At Bedtime ) 90 tablet 1     carvedilol (COREG) 12.5 MG tablet Take 3 tablets (37.5 mg) by mouth 2 times daily 180 tablet 0     cholecalciferol, vitamin D3, 5,000 unit Tab [CHOLECALCIFEROL, VITAMIN D3, 5,000 UNIT TAB] Take 5,000 Units by mouth daily.       cloNIDine (CATAPRES) 0.1 MG tablet Take 1 tablet (0.1 mg) by mouth 3 times daily as needed (SBP > 160) 30 tablet 0     doxazosin (CARDURA) 4 MG tablet [DOXAZOSIN (CARDURA) 4 MG TABLET] Take 2 mg by mouth at bedtime.       furosemide (LASIX) 40 MG tablet Take 1 tablet (40 mg) by mouth daily 30 tablet 0     hydrALAZINE (APRESOLINE) 25 MG tablet Take 3 tablets (75 mg) by mouth 3 times daily 270 tablet 0     insulin aspart (NOVOLOG PEN) 100 UNIT/ML pen Inject 5 Units Subcutaneous 3 times daily (before meals) 4.5 mL 1     insulin glargine-lixisenatide (SOLIQUA) pen Inject 25 Units Subcutaneous daily 7.5 mL 1     nitroglycerin (NITROSTAT) 0.4 MG SL tablet [NITROGLYCERIN (NITROSTAT) 0.4 MG SL TABLET] PLACE 1 TABLET UNDER THE TONGUE EVERY 5 MINUTES AS NEEDED FOR CHEST PAIN (Patient taking differently: Place 0.4 mg under the tongue every 5 minutes as needed for chest pain ) 25 tablet 1     sodium bicarbonate 650 MG tablet Take 1 tablet (650 mg) by mouth 2 times daily 60 tablet 0       Allergies   Allergen Reactions     Venlafaxine      lethargic          Lab Results    Chemistry/lipid CBC Cardiac Enzymes/BNP/TSH/INR   Recent Labs   Lab Test 07/21/21  0440   CHOL 167   HDL 24*   LDL 59   TRIG 415*     Recent Labs   Lab Test 07/21/21  0440 03/04/19  1211   LDL 59 46  36     Recent Labs   Lab Test 07/27/21  1211 07/27/21  0440   NA  --  138   POTASSIUM  --  4.4   CHLORIDE  --  109*   CO2  --  20*   * 93   BUN  --  50*   CR  --  4.95*    GFRESTIMATED  --  13*   BETHANY  --  8.8     Recent Labs   Lab Test 07/27/21  0440 07/26/21  0438 07/25/21  1415   CR 4.95* 4.55* 4.63*     Recent Labs   Lab Test 06/22/19  0554 01/14/19  0544 05/07/17  1945   A1C 7.8* 10.0* 8.6*          Recent Labs   Lab Test 07/24/21  0432   WBC 5.7   HGB 11.9*   HCT 35.5*   MCV 93   *     Recent Labs   Lab Test 07/24/21  0432 07/22/21  1753 07/20/21  0431   HGB 11.9* 11.7* 12.9*    Recent Labs   Lab Test 07/20/21  0023 07/19/21  1820 07/19/21  1154   TROPONINI <0.01 0.01 0.01     Recent Labs   Lab Test 07/19/21  1155 10/04/20  2140 05/07/20  0926   BNP 25 29 68*     Recent Labs   Lab Test 01/14/19  0544   TSH 2.65     Recent Labs   Lab Test 10/04/20  2140 06/21/19  0345 06/20/19  1558   INR 1.03 1.24* 1.30*        Cielo Che MD                                        Thank you for allowing me to participate in the care of your patient.      Sincerely,     Cielo Che MD     [facility]  cc:   Trinidad Hansen PA-C  [facility]  [address] [place] [address]

## 2021-08-05 NOTE — PROGRESS NOTES
HEART CARE ENCOUNTER CONSULTATON NOTE      [facility]  [phone]      Assessment/Recommendations   Assessment/Plan:  Dyspnea on exertion, swelling, increased BP and weight gain - he is fluid up on exam but not uncomfortable at rest. Will increase his furosemide to 80mg BID for three days and will get him in to see our CHF NP early next week.     Hypertension - I have reminded him to use the clonidine prn for SBP > 150 mmHg    CAD - stable, asymptomatic    F/U NP next week and me in 2 months       History of Present Illness/Subjective    HPI: Siva Ramey is a 46 year old male diabetic with hypertension, hyperlipidemia and CAD who suffered an anterior-lateral STEMI January 2019. He was found to have a thrombotic occlusion of a large diagonal that was treated with a Synergy IBAN. There was residual diffuse moderate-severe mid LAD disease and distal RCA disease. EF was 45% by echo. Siva underwent 2 v CABG in June 2019 (LIMA-LAD, SVG-RCA). Siva was admitted 7/2021 with chest pain and hypertensive urgency. Echo EF was stable at 45-50% and coronary angiogram was also stable with FFR - LAD disease. Losartan was stopped due to progressive renal insufficiency. Siva follows with a pulmonologist at [facility], Dr. Ferreira; spirometry showed severe restriction. A CT chest did show some compressive volume loss in the left lung base. A sniff test showed a mildly elevated left hemidiaphragm but normal, bilateral diaphragmatic excursion.     Siva comes for hospital discharge f/u. He has gained 9 lbs since his discharge and notes swollen legs. His BP is higher today as well but he has not used any prn clonidine. There is no chest pain but he does note dyspnea with minimal exertion and some orthopnea.          Physical Examination  Review of Systems   Vitals: BP (!) 160/84 (BP Location: Right arm, Patient Position: Sitting, Cuff Size: Adult Regular)   Pulse 92   Resp 20   Wt 117.5 kg (259 lb)   SpO2  95%   BMI 36.12 kg/m    BMI= Body mass index is 36.12 kg/m .  Wt Readings from Last 3 Encounters:   08/05/21 117.5 kg (259 lb)   07/27/21 113.9 kg (251 lb 3.2 oz)   07/23/21 113.6 kg (250 lb 8 oz)       General Appearance:   no distress, normal body habitus   ENT/Mouth: membranes moist, no oral lesions or bleeding gums.      EYES:  no scleral icterus, normal conjunctivae   Neck: no carotid bruits or thyromegaly   Chest/Lungs:   lungs are clear to auscultation, no rales or wheezing, stable sternal scar, equal chest wall expansion    Cardiovascular:   Regular. Normal first and second heart sounds with no murmurs, rubs, or gallops; the carotid, radial and posterior tibial pulses are intact, Jugular venous pressure elevated, bilateral edema bilaterally to knees   Abdomen:  no organomegaly, masses, bruits, or tenderness; bowel sounds are present   Extremities: no cyanosis or clubbing   Skin: no xanthelasma, warm.    Neurologic: normal  bilateral, no tremors     Psychiatric: alert and oriented x3, calm        Please refer above for cardiac ROS details.        Medical History  Surgical History Family History Social History   Past Medical History:   Diagnosis Date     Angina pectoris (H)     rare since PCI     Chronic kidney disease     CKD stage 3     Coronary artery disease      Diabetes mellitus, type II (H) 12/2001    insulin     Diabetic nephropathy (H)      DM II (diabetes mellitus, type II), controlled (H)      MARQUEZ (dyspnea on exertion)      Dyslipidemia 12/2001     History of blood transfusion 2004     HTN (hypertension) 2004     Hyperlipidemia      Hypertension     takes medication     Ischemic cardiomyopathy      Myocardial infarction (H)     STEMI -Diagonal branch of the LAD     Obesity (BMI 30-39.9)      Proteinuria      Vitamin D deficiency      Past Surgical History:   Procedure Laterality Date     BACK SURGERY  2009    L5 disc cut     BYPASS GRAFT ARTERY CORONARY  06/20/2019    2 vessels     CV CORONARY  ANGIOGRAM N/A 1/13/2019    Procedure: Coronary Angiogram;  Surgeon: Cielo Che MD;  Location: [facility] Lab;  Service: Cardiology     CV CORONARY ANGIOGRAM N/A 5/2/2019    Procedure: Coronary Angiogram;  Surgeon: Cielo Che MD;  Location: [facility] Lab;  Service: Cardiology     CV CORONARY ANGIOGRAM N/A 4/30/2020    Procedure: Coronary Angiogram;  Surgeon: Cielo Che MD;  Location: [facility] Lab;  Service: Cardiology     CV CORONARY ANGIOGRAM N/A 7/22/2021    Procedure: CV CORONARY ANGIOGRAM;  Surgeon: Adrian Crow MD;  Location: [facility] CV     CV FRACTIONAL FLOW RATIO WIRE N/A 7/22/2021    Procedure: Fractional Flow Ratio Wire;  Surgeon: Adrian Crow MD;  Location: [facility] CV     CV LEFT HEART CATH N/A 7/22/2021    Procedure: Left Heart Cath;  Surgeon: Adrian Crow MD;  Location: [facility] CV     CV LEFT HEART CATHETERIZATION WITH LEFT VENTRICULOGRAM N/A 1/13/2019    Procedure: Left Heart Catheterization with Left Ventriculogram;  Surgeon: Cielo Che MD;  Location: [facility] Lab;  Service: Cardiology     CV LEFT HEART CATHETERIZATION WITHOUT LEFT VENTRICULOGRAM Left 4/30/2020    Procedure: Left Heart Catheterization Without Left Ventriculogram;  Surgeon: Cielo Che MD;  Location: [facility];  Service: Cardiology     CV RIGHT HEART CATHETERIZATION N/A 4/30/2020    Procedure: Right Heart Catheterization;  Surgeon: Cielo Che MD;  Location: [facility];  Service: Cardiology     HERNIA REPAIR       OTHER SURGICAL HISTORY      Excise varicocele     STENT, CORONARY, DALE  2019     Family History   Problem Relation Age of Onset     Heart Disease Father 60.00     CABG Father 50.00        triple bypass        Social History     Socioeconomic History     Marital status:      Spouse name: Not on file     Number of children: Not on file     Years of education: Not on file     Highest education level:  Not on file   Occupational History     Not on file   Tobacco Use     Smoking status: Never Smoker     Smokeless tobacco: Never Used   Substance and Sexual Activity     Alcohol use: Yes     Comment: Alcoholic Drinks/day: 1-2     Drug use: No     Sexual activity: Yes     Partners: Female   Other Topics Concern     Parent/sibling w/ CABG, MI or angioplasty before 65F 55M? Not Asked   Social History Narrative     Not on file     Social Determinants of Health     Financial Resource Strain:      Difficulty of Paying Living Expenses:    Food Insecurity:      Worried About Running Out of Food in the Last Year:      Ran Out of Food in the Last Year:    Transportation Needs:      Lack of Transportation (Medical):      Lack of Transportation (Non-Medical):    Physical Activity:      Days of Exercise per Week:      Minutes of Exercise per Session:    Stress:      Feeling of Stress :    Social Connections:      Frequency of Communication with Friends and Family:      Frequency of Social Gatherings with Friends and Family:      Attends Taoist Services:      Active Member of Clubs or Organizations:      Attends Club or Organization Meetings:      Marital Status:    Intimate Partner Violence:      Fear of Current or Ex-Partner:      Emotionally Abused:      Physically Abused:      Sexually Abused:            Medications  Allergies   Current Outpatient Medications   Medication Sig Dispense Refill     albuterol (PROAIR HFA/PROVENTIL HFA/VENTOLIN HFA) 108 (90 Base) MCG/ACT inhaler Inhale 2 puffs into the lungs every 4 hours as needed       amLODIPine (NORVASC) 10 MG tablet Take 1 tablet (10 mg) by mouth daily 30 tablet 0     aspirin 81 MG EC tablet [ASPIRIN 81 MG EC TABLET] Take 1 tablet (81 mg total) by mouth daily.  0     atorvastatin (LIPITOR) 80 MG tablet [ATORVASTATIN (LIPITOR) 80 MG TABLET] TAKE 1 TABLET(80 MG) BY MOUTH AT BEDTIME (Patient taking differently: Take 80 mg by mouth At Bedtime ) 90 tablet 1     carvedilol (COREG)  12.5 MG tablet Take 3 tablets (37.5 mg) by mouth 2 times daily 180 tablet 0     cholecalciferol, vitamin D3, 5,000 unit Tab [CHOLECALCIFEROL, VITAMIN D3, 5,000 UNIT TAB] Take 5,000 Units by mouth daily.       cloNIDine (CATAPRES) 0.1 MG tablet Take 1 tablet (0.1 mg) by mouth 3 times daily as needed (SBP > 160) 30 tablet 0     doxazosin (CARDURA) 4 MG tablet [DOXAZOSIN (CARDURA) 4 MG TABLET] Take 2 mg by mouth at bedtime.       furosemide (LASIX) 40 MG tablet Take 1 tablet (40 mg) by mouth daily 30 tablet 0     hydrALAZINE (APRESOLINE) 25 MG tablet Take 3 tablets (75 mg) by mouth 3 times daily 270 tablet 0     insulin aspart (NOVOLOG PEN) 100 UNIT/ML pen Inject 5 Units Subcutaneous 3 times daily (before meals) 4.5 mL 1     insulin glargine-lixisenatide (SOLIQUA) pen Inject 25 Units Subcutaneous daily 7.5 mL 1     nitroglycerin (NITROSTAT) 0.4 MG SL tablet [NITROGLYCERIN (NITROSTAT) 0.4 MG SL TABLET] PLACE 1 TABLET UNDER THE TONGUE EVERY 5 MINUTES AS NEEDED FOR CHEST PAIN (Patient taking differently: Place 0.4 mg under the tongue every 5 minutes as needed for chest pain ) 25 tablet 1     sodium bicarbonate 650 MG tablet Take 1 tablet (650 mg) by mouth 2 times daily 60 tablet 0       Allergies   Allergen Reactions     Venlafaxine      lethargic          Lab Results    Chemistry/lipid CBC Cardiac Enzymes/BNP/TSH/INR   Recent Labs   Lab Test 07/21/21  0440   CHOL 167   HDL 24*   LDL 59   TRIG 415*     Recent Labs   Lab Test 07/21/21  0440 03/04/19  1211   LDL 59 46  36     Recent Labs   Lab Test 07/27/21  1211 07/27/21  0440   NA  --  138   POTASSIUM  --  4.4   CHLORIDE  --  109*   CO2  --  20*   * 93   BUN  --  50*   CR  --  4.95*   GFRESTIMATED  --  13*   BETHANY  --  8.8     Recent Labs   Lab Test 07/27/21  0440 07/26/21  0438 07/25/21  1415   CR 4.95* 4.55* 4.63*     Recent Labs   Lab Test 06/22/19  0554 01/14/19  0544 05/07/17  1945   A1C 7.8* 10.0* 8.6*          Recent Labs   Lab Test 07/24/21  0432   WBC 5.7    HGB 11.9*   HCT 35.5*   MCV 93   *     Recent Labs   Lab Test 07/24/21  0432 07/22/21  1753 07/20/21  0431   HGB 11.9* 11.7* 12.9*    Recent Labs   Lab Test 07/20/21  0023 07/19/21  1820 07/19/21  1154   TROPONINI <0.01 0.01 0.01     Recent Labs   Lab Test 07/19/21  1155 10/04/20  2140 05/07/20  0926   BNP 25 29 68*     Recent Labs   Lab Test 01/14/19  0544   TSH 2.65     Recent Labs   Lab Test 10/04/20  2140 06/21/19  0345 06/20/19  1558   INR 1.03 1.24* 1.30*        Cielo Che MD

## 2021-08-05 NOTE — PATIENT INSTRUCTIONS
It was a pleasure seeing you at [facility] today.        Here are my suggestions for your care:    1. Increase furosemide to 40mg twice a day for three days    2. We will work on getting you set up to see one of our heart failure NP early next week    3. Use the as needed clonidine whenever your BP is > 150/d      Let's meet again in 2 months.    You can always call my nurse Lydia Dobson RN who is a nurse helping me in the care of my patients. She can be reached at [phone] if you have any questions.    For scheduling, please call my  Julianna Buchanan at [phone].    Thank you again for trusting me with your care. Please feel free to call my office at any time if you have any question or if I can assist you in any way.    Cielo Che MD  [facility]

## 2021-08-09 ENCOUNTER — APPOINTMENT (OUTPATIENT)
Dept: CARDIOLOGY | Facility: CLINIC | Age: 46
End: 2021-08-09
Payer: COMMERCIAL

## 2021-08-09 ENCOUNTER — OFFICE VISIT (OUTPATIENT)
Dept: CARDIOLOGY | Facility: CLINIC | Age: 46
End: 2021-08-09
Payer: COMMERCIAL

## 2021-08-09 VITALS
HEIGHT: 71 IN | WEIGHT: 259 LBS | RESPIRATION RATE: 16 BRPM | BODY MASS INDEX: 36.26 KG/M2 | DIASTOLIC BLOOD PRESSURE: 80 MMHG | HEART RATE: 84 BPM | SYSTOLIC BLOOD PRESSURE: 128 MMHG

## 2021-08-09 DIAGNOSIS — N18.5 STAGE 5 CHRONIC KIDNEY DISEASE NOT ON CHRONIC DIALYSIS (H): ICD-10-CM

## 2021-08-09 DIAGNOSIS — I15.0 RENOVASCULAR HYPERTENSION: ICD-10-CM

## 2021-08-09 DIAGNOSIS — I25.83 CORONARY ARTERY DISEASE DUE TO LIPID RICH PLAQUE: ICD-10-CM

## 2021-08-09 DIAGNOSIS — I25.10 CORONARY ARTERY DISEASE DUE TO LIPID RICH PLAQUE: ICD-10-CM

## 2021-08-09 PROBLEM — R07.9 CHEST PAIN: Status: RESOLVED | Noted: 2020-10-04 | Resolved: 2021-08-09

## 2021-08-09 LAB
ANION GAP SERPL CALCULATED.3IONS-SCNC: 10 MMOL/L (ref 5–18)
BUN SERPL-MCNC: 51 MG/DL (ref 8–22)
CALCIUM SERPL-MCNC: 9 MG/DL (ref 8.5–10.5)
CHLORIDE BLD-SCNC: 103 MMOL/L (ref 98–107)
CO2 SERPL-SCNC: 26 MMOL/L (ref 22–31)
CREAT SERPL-MCNC: 4.7 MG/DL (ref 0.7–1.3)
GFR SERPL CREATININE-BSD FRML MDRD: 14 ML/MIN/1.73M2
GLUCOSE BLD-MCNC: 125 MG/DL (ref 70–125)
POTASSIUM BLD-SCNC: 4.6 MMOL/L (ref 3.5–5)
SODIUM SERPL-SCNC: 139 MMOL/L (ref 136–145)

## 2021-08-09 PROCEDURE — 36415 COLL VENOUS BLD VENIPUNCTURE: CPT | Performed by: NURSE PRACTITIONER

## 2021-08-09 PROCEDURE — 99214 OFFICE O/P EST MOD 30 MIN: CPT | Performed by: NURSE PRACTITIONER

## 2021-08-09 PROCEDURE — 80048 BASIC METABOLIC PNL TOTAL CA: CPT | Performed by: NURSE PRACTITIONER

## 2021-08-09 RX ORDER — NITROGLYCERIN 0.4 MG/1
0.4 TABLET SUBLINGUAL EVERY 5 MIN PRN
Qty: 25 TABLET | Refills: 1 | Status: SHIPPED | OUTPATIENT
Start: 2021-08-09 | End: 2022-08-19

## 2021-08-09 ASSESSMENT — MIFFLIN-ST. JEOR: SCORE: 2076.95

## 2021-08-09 NOTE — PROGRESS NOTES
Assessment/Recommendations   Assessment:    1.  Fluid retention: Echocardiogram in July showed ejection fraction of 45 to 50%.  He continues to have dyspnea on exertion which is chronic.  He feels that he is at baseline.  He feels that his weight is still elevated.  His edema has improved significantly since evaluated by Dr. Che last week.  He only has trace ankle edema today.    2.  Hypertension: Blood pressure 128/80.  He uses clonidine as needed and use twice over the weekend for blood pressures in the 160s systolic.    3.  Chronic kidney disease: He had a fistula placed in June 2021 and was waiting for it to mature.  He has an appointment this week to assess.  His nephrologist is Dr. Rios.  He has follow-up in September.  He will start dialysis if symptoms worsen.    4.  Transplant candidate: He has an appointment for evaluation for kidney/pancreas transplant on September 27.    5.  Coronary artery disease: History of STEMI in January 2019.  Coronary artery bypass surgery x2 in June 2019.  He denies any chest pain.  He is requesting a refill of nitro and we reviewed use.    Plan:  1.  Continue current medications  2.  BMP pending; if renal function has worsened, will need to discuss with Dr. Rios.  3.  Low-sodium diet    Siva Ramey will follow up with Dr. Che on October 13.       History of Present Illness/Subjective    Mr. Siva Ramey is a 46 year old male seen at [facility] today for follow-up.  He has a history of hypertension, dyslipidemia, coronary artery disease, STEMI in 2019, coronary bypass surgery x2 in June 2019, diabetes, and chronic kidney disease.  He was hospitalized in July 2021 with hypertensive urgency.  Echocardiogram on July 19, 2021 showed ejection fraction of 45 to 50%.    He saw Dr. Che last week with weight gain, edema, and dyspnea on exertion.  His Lasix was increased to 40 mg twice daily for 3 days.  His weight is stable at  "259 pounds which is elevated for him.  He no longer has edema.  He has chronic dyspnea on exertion and denies any worsening in the past few months.  He denies chest pain.      He is following a low-sodium diet.          Physical Examination Review of Systems   Vitals: /80 (BP Location: Left arm, Patient Position: Sitting, Cuff Size: Adult Large)   Pulse 84   Resp 16   Ht 1.803 m (5' 11\")   Wt 117.5 kg (259 lb)   BMI 36.12 kg/m    BMI= Body mass index is 36.12 kg/m .  Wt Readings from Last 3 Encounters:   08/09/21 117.5 kg (259 lb)   08/05/21 117.5 kg (259 lb)   07/27/21 113.9 kg (251 lb 3.2 oz)       General Appearance:     Alert, cooperative and in no acute distress.   ENT/Mouth: membranes moist, no oral lesions or bleeding gums.      EYES:  no scleral icterus, normal conjunctivae   Chest/Lungs:   lungs are clear to auscultation, no rales or wheezing, respirations unlabored   Cardiovascular:   Regular. Normal first and second heart sounds, trace ankle edema bilaterally   Abdomen:  Soft, nontender, nondistended, bowel sounds present   Extremities: no cyanosis or clubbing   Skin: warm, dry.    Neurologic: mood and affect are appropriate, alert and oriented x3         Please refer above for cardiac ROS details.      Medical History  Surgical History Family History Social History   Past Medical History:   Diagnosis Date     Angina pectoris (H)     rare since PCI     Chronic kidney disease     CKD stage 3     Coronary artery disease      Diabetes mellitus, type II (H) 12/2001    insulin     Diabetic nephropathy (H)      DM II (diabetes mellitus, type II), controlled (H)      MARQUEZ (dyspnea on exertion)      Dyslipidemia 12/2001     History of blood transfusion 2004     HTN (hypertension) 2004     Hyperlipidemia      Hypertension     takes medication     Ischemic cardiomyopathy      Myocardial infarction (H)     STEMI -Diagonal branch of the LAD     Obesity (BMI 30-39.9)      Proteinuria      Vitamin D " deficiency      Past Surgical History:   Procedure Laterality Date     BACK SURGERY  2009    L5 disc cut     BYPASS GRAFT ARTERY CORONARY  06/20/2019    2 vessels     CV CORONARY ANGIOGRAM N/A 1/13/2019    Procedure: Coronary Angiogram;  Surgeon: Cielo Che MD;  Location: [facility];  Service: Cardiology     CV CORONARY ANGIOGRAM N/A 5/2/2019    Procedure: Coronary Angiogram;  Surgeon: Cielo Che MD;  Location: [facility];  Service: Cardiology     CV CORONARY ANGIOGRAM N/A 4/30/2020    Procedure: Coronary Angiogram;  Surgeon: Cielo Che MD;  Location: [facility];  Service: Cardiology     CV CORONARY ANGIOGRAM N/A 7/22/2021    Procedure: CV CORONARY ANGIOGRAM;  Surgeon: Adrian Crow MD;  Location: [facility] LAB CV     CV FRACTIONAL FLOW RATIO WIRE N/A 7/22/2021    Procedure: Fractional Flow Ratio Wire;  Surgeon: Adrian Crow MD;  Location: [facility] LAB CV     CV LEFT HEART CATH N/A 7/22/2021    Procedure: Left Heart Cath;  Surgeon: Adrian Crow MD;  Location: [facility] LAB CV     CV LEFT HEART CATHETERIZATION WITH LEFT VENTRICULOGRAM N/A 1/13/2019    Procedure: Left Heart Catheterization with Left Ventriculogram;  Surgeon: Cielo Che MD;  Location: [facility];  Service: Cardiology     CV LEFT HEART CATHETERIZATION WITHOUT LEFT VENTRICULOGRAM Left 4/30/2020    Procedure: Left Heart Catheterization Without Left Ventriculogram;  Surgeon: Cielo Che MD;  Location: [facility];  Service: Cardiology     CV RIGHT HEART CATHETERIZATION N/A 4/30/2020    Procedure: Right Heart Catheterization;  Surgeon: Cielo Che MD;  Location: [facility];  Service: Cardiology     HERNIA REPAIR       OTHER SURGICAL HISTORY      Excise varicocele     STENT, CORONARY, DALE  2019     Family History   Problem Relation Age of Onset     Heart Disease Father 60.00     CABG Father 50.00        triple bypass    Social History      Socioeconomic History     Marital status:      Spouse name: Not on file     Number of children: Not on file     Years of education: Not on file     Highest education level: Not on file   Occupational History     Not on file   Tobacco Use     Smoking status: Never Smoker     Smokeless tobacco: Never Used   Substance and Sexual Activity     Alcohol use: Yes     Comment: Alcoholic Drinks/day: 1-2     Drug use: No     Sexual activity: Yes     Partners: Female   Other Topics Concern     Parent/sibling w/ CABG, MI or angioplasty before 65F 55M? Not Asked   Social History Narrative     Not on file     Social Determinants of Health     Financial Resource Strain:      Difficulty of Paying Living Expenses:    Food Insecurity:      Worried About Running Out of Food in the Last Year:      Ran Out of Food in the Last Year:    Transportation Needs:      Lack of Transportation (Medical):      Lack of Transportation (Non-Medical):    Physical Activity:      Days of Exercise per Week:      Minutes of Exercise per Session:    Stress:      Feeling of Stress :    Social Connections:      Frequency of Communication with Friends and Family:      Frequency of Social Gatherings with Friends and Family:      Attends Mu-ism Services:      Active Member of Clubs or Organizations:      Attends Club or Organization Meetings:      Marital Status:    Intimate Partner Violence:      Fear of Current or Ex-Partner:      Emotionally Abused:      Physically Abused:      Sexually Abused:           Medications  Allergies   Current Outpatient Medications   Medication Sig Dispense Refill     albuterol (PROAIR HFA/PROVENTIL HFA/VENTOLIN HFA) 108 (90 Base) MCG/ACT inhaler Inhale 2 puffs into the lungs every 4 hours as needed       amLODIPine (NORVASC) 10 MG tablet Take 1 tablet (10 mg) by mouth daily 30 tablet 0     aspirin 81 MG EC tablet [ASPIRIN 81 MG EC TABLET] Take 1 tablet (81 mg total) by mouth daily.  0     atorvastatin (LIPITOR) 80 MG  tablet [ATORVASTATIN (LIPITOR) 80 MG TABLET] TAKE 1 TABLET(80 MG) BY MOUTH AT BEDTIME (Patient taking differently: Take 80 mg by mouth At Bedtime ) 90 tablet 1     carvedilol (COREG) 12.5 MG tablet Take 3 tablets (37.5 mg) by mouth 2 times daily 180 tablet 0     cholecalciferol, vitamin D3, 5,000 unit Tab [CHOLECALCIFEROL, VITAMIN D3, 5,000 UNIT TAB] Take 5,000 Units by mouth daily.       cloNIDine (CATAPRES) 0.1 MG tablet Take 1 tablet (0.1 mg) by mouth 3 times daily as needed (SBP > 160) 30 tablet 0     doxazosin (CARDURA) 4 MG tablet [DOXAZOSIN (CARDURA) 4 MG TABLET] Take 2 mg by mouth at bedtime.       furosemide (LASIX) 40 MG tablet Take 1 tablet (40 mg) by mouth daily 30 tablet 0     hydrALAZINE (APRESOLINE) 25 MG tablet Take 3 tablets (75 mg) by mouth 3 times daily 270 tablet 0     insulin aspart (NOVOLOG PEN) 100 UNIT/ML pen Inject 5 Units Subcutaneous 3 times daily (before meals) 4.5 mL 1     insulin glargine-lixisenatide (SOLIQUA) pen Inject 25 Units Subcutaneous daily 7.5 mL 1     nitroGLYcerin (NITROSTAT) 0.4 MG sublingual tablet Place 1 tablet (0.4 mg) under the tongue every 5 minutes as needed for chest pain For chest pain place 1 tablet under the tongue every 5 minutes for 3 doses. If symptoms persist 5 minutes after 1st dose call 911. 25 tablet 1     sodium bicarbonate 650 MG tablet Take 1 tablet (650 mg) by mouth 2 times daily 60 tablet 0    Allergies   Allergen Reactions     Venlafaxine      lethargic         Lab Results    Chemistry/lipid CBC Cardiac Enzymes/BNP/TSH/INR   Recent Labs   Lab Test 07/21/21  0440   CHOL 167   HDL 24*   LDL 59   TRIG 415*     Recent Labs   Lab Test 07/21/21  0440 03/04/19  1211   LDL 59 46  36     Recent Labs   Lab Test 07/27/21  1211 07/27/21  0440   NA  --  138   POTASSIUM  --  4.4   CHLORIDE  --  109*   CO2  --  20*   * 93   BUN  --  50*   CR  --  4.95*   GFRESTIMATED  --  13*   BETHANY  --  8.8     Recent Labs   Lab Test 07/27/21  0440 07/26/21  0432  07/25/21  1415   CR 4.95* 4.55* 4.63*     Recent Labs   Lab Test 06/22/19  0554 01/14/19  0544 05/07/17  1945   A1C 7.8* 10.0* 8.6*    Recent Labs   Lab Test 07/24/21  0432   WBC 5.7   HGB 11.9*   HCT 35.5*   MCV 93   *     Recent Labs   Lab Test 07/24/21  0432 07/22/21  1753 07/20/21  0431   HGB 11.9* 11.7* 12.9*    Recent Labs   Lab Test 07/20/21  0023 07/19/21  1820 07/19/21  1154   TROPONINI <0.01 0.01 0.01     Recent Labs   Lab Test 07/19/21  1155 10/04/20  2140 05/07/20  0926   BNP 25 29 68*     Recent Labs   Lab Test 01/14/19  0544   TSH 2.65     Recent Labs   Lab Test 10/04/20  2140 06/21/19  0345 06/20/19  1558   INR 1.03 1.24* 1.30*

## 2021-08-09 NOTE — LETTER
8/9/2021    Trinidad Hansen PA-C, SIS  [facility] [place] [address]  [facility] [address]    RE: Siva Ramey       Dear Colleague,    I had the pleasure of seeing Siva Ramey in the [facility].          Assessment/Recommendations   Assessment:    1.  Fluid retention: Echocardiogram in July showed ejection fraction of 45 to 50%.  He continues to have dyspnea on exertion which is chronic.  He feels that he is at baseline.  He feels that his weight is still elevated.  His edema has improved significantly since evaluated by Dr. Che last week.  He only has trace ankle edema today.    2.  Hypertension: Blood pressure 128/80.  He uses clonidine as needed and use twice over the weekend for blood pressures in the 160s systolic.    3.  Chronic kidney disease: He had a fistula placed in June 2021 and was waiting for it to mature.  He has an appointment this week to assess.  His nephrologist is Dr. Rios.  He has follow-up in September.  He will start dialysis if symptoms worsen.    4.  Transplant candidate: He has an appointment for evaluation for kidney/pancreas transplant on September 27.    5.  Coronary artery disease: History of STEMI in January 2019.  Coronary artery bypass surgery x2 in June 2019.  He denies any chest pain.  He is requesting a refill of nitro and we reviewed use.    Plan:  1.  Continue current medications  2.  BMP pending; if renal function has worsened, will need to discuss with Dr. Rios.  3.  Low-sodium diet    Siva Ramey will follow up with Dr. Che on October 13.       History of Present Illness/Subjective    Mr. Siva Ramey is a 46 year old male seen at [facility] today for follow-up.  He has a history of hypertension, dyslipidemia, coronary artery disease, STEMI in 2019, coronary bypass surgery x2 in June 2019, diabetes, and chronic kidney disease.  He was hospitalized in July  "2021 with hypertensive urgency.  Echocardiogram on July 19, 2021 showed ejection fraction of 45 to 50%.    He saw Dr. Che last week with weight gain, edema, and dyspnea on exertion.  His Lasix was increased to 40 mg twice daily for 3 days.  His weight is stable at 259 pounds which is elevated for him.  He no longer has edema.  He has chronic dyspnea on exertion and denies any worsening in the past few months.  He denies chest pain.      He is following a low-sodium diet.          Physical Examination Review of Systems   Vitals: /80 (BP Location: Left arm, Patient Position: Sitting, Cuff Size: Adult Large)   Pulse 84   Resp 16   Ht 1.803 m (5' 11\")   Wt 117.5 kg (259 lb)   BMI 36.12 kg/m    BMI= Body mass index is 36.12 kg/m .  Wt Readings from Last 3 Encounters:   08/09/21 117.5 kg (259 lb)   08/05/21 117.5 kg (259 lb)   07/27/21 113.9 kg (251 lb 3.2 oz)       General Appearance:     Alert, cooperative and in no acute distress.   ENT/Mouth: membranes moist, no oral lesions or bleeding gums.      EYES:  no scleral icterus, normal conjunctivae   Chest/Lungs:   lungs are clear to auscultation, no rales or wheezing, respirations unlabored   Cardiovascular:   Regular. Normal first and second heart sounds, trace ankle edema bilaterally   Abdomen:  Soft, nontender, nondistended, bowel sounds present   Extremities: no cyanosis or clubbing   Skin: warm, dry.    Neurologic: mood and affect are appropriate, alert and oriented x3         Please refer above for cardiac ROS details.      Medical History  Surgical History Family History Social History   Past Medical History:   Diagnosis Date     Angina pectoris (H)     rare since PCI     Chronic kidney disease     CKD stage 3     Coronary artery disease      Diabetes mellitus, type II (H) 12/2001    insulin     Diabetic nephropathy (H)      DM II (diabetes mellitus, type II), controlled (H)      MARQUEZ (dyspnea on exertion)      Dyslipidemia 12/2001     History of " blood transfusion 2004     HTN (hypertension) 2004     Hyperlipidemia      Hypertension     takes medication     Ischemic cardiomyopathy      Myocardial infarction (H)     STEMI -Diagonal branch of the LAD     Obesity (BMI 30-39.9)      Proteinuria      Vitamin D deficiency      Past Surgical History:   Procedure Laterality Date     BACK SURGERY  2009    L5 disc cut     BYPASS GRAFT ARTERY CORONARY  06/20/2019    2 vessels     CV CORONARY ANGIOGRAM N/A 1/13/2019    Procedure: Coronary Angiogram;  Surgeon: Cielo Che MD;  Location: [facility];  Service: Cardiology     CV CORONARY ANGIOGRAM N/A 5/2/2019    Procedure: Coronary Angiogram;  Surgeon: Cielo Che MD;  Location: [facility];  Service: Cardiology     CV CORONARY ANGIOGRAM N/A 4/30/2020    Procedure: Coronary Angiogram;  Surgeon: Cielo Che MD;  Location: [facility];  Service: Cardiology     CV CORONARY ANGIOGRAM N/A 7/22/2021    Procedure: CV CORONARY ANGIOGRAM;  Surgeon: Adrian Crow MD;  Location: [facility] C[facility] LAB CV     CV FRACTIONAL FLOW RATIO WIRE N/A 7/22/2021    Procedure: Fractional Flow Ratio Wire;  Surgeon: Adrian Crow MD;  Location: [facility] LAB CV     CV LEFT HEART CATH N/A 7/22/2021    Procedure: Left Heart Cath;  Surgeon: Adrian Crow MD;  Location: [facility] C[facility] LAB CV     CV LEFT HEART CATHETERIZATION WITH LEFT VENTRICULOGRAM N/A 1/13/2019    Procedure: Left Heart Catheterization with Left Ventriculogram;  Surgeon: Cielo Che MD;  Location: [facility];  Service: Cardiology     CV LEFT HEART CATHETERIZATION WITHOUT LEFT VENTRICULOGRAM Left 4/30/2020    Procedure: Left Heart Catheterization Without Left Ventriculogram;  Surgeon: Cielo Che MD;  Location: [facility];  Service: Cardiology     CV RIGHT HEART CATHETERIZATION N/A 4/30/2020    Procedure: Right Heart Catheterization;  Surgeon: Cielo Che MD;  Location: [facility];  Service:  Cardiology     HERNIA REPAIR       OTHER SURGICAL HISTORY      Excise varicocele     STENT, CORONARY, DALE  2019     Family History   Problem Relation Age of Onset     Heart Disease Father 60.00     CABG Father 50.00        triple bypass    Social History     Socioeconomic History     Marital status:      Spouse name: Not on file     Number of children: Not on file     Years of education: Not on file     Highest education level: Not on file   Occupational History     Not on file   Tobacco Use     Smoking status: Never Smoker     Smokeless tobacco: Never Used   Substance and Sexual Activity     Alcohol use: Yes     Comment: Alcoholic Drinks/day: 1-2     Drug use: No     Sexual activity: Yes     Partners: Female   Other Topics Concern     Parent/sibling w/ CABG, MI or angioplasty before 65F 55M? Not Asked   Social History Narrative     Not on file     Social Determinants of Health     Financial Resource Strain:      Difficulty of Paying Living Expenses:    Food Insecurity:      Worried About Running Out of Food in the Last Year:      Ran Out of Food in the Last Year:    Transportation Needs:      Lack of Transportation (Medical):      Lack of Transportation (Non-Medical):    Physical Activity:      Days of Exercise per Week:      Minutes of Exercise per Session:    Stress:      Feeling of Stress :    Social Connections:      Frequency of Communication with Friends and Family:      Frequency of Social Gatherings with Friends and Family:      Attends Evangelical Services:      Active Member of Clubs or Organizations:      Attends Club or Organization Meetings:      Marital Status:    Intimate Partner Violence:      Fear of Current or Ex-Partner:      Emotionally Abused:      Physically Abused:      Sexually Abused:           Medications  Allergies   Current Outpatient Medications   Medication Sig Dispense Refill     albuterol (PROAIR HFA/PROVENTIL HFA/VENTOLIN HFA) 108 (90 Base) MCG/ACT inhaler Inhale 2 puffs into  the lungs every 4 hours as needed       amLODIPine (NORVASC) 10 MG tablet Take 1 tablet (10 mg) by mouth daily 30 tablet 0     aspirin 81 MG EC tablet [ASPIRIN 81 MG EC TABLET] Take 1 tablet (81 mg total) by mouth daily.  0     atorvastatin (LIPITOR) 80 MG tablet [ATORVASTATIN (LIPITOR) 80 MG TABLET] TAKE 1 TABLET(80 MG) BY MOUTH AT BEDTIME (Patient taking differently: Take 80 mg by mouth At Bedtime ) 90 tablet 1     carvedilol (COREG) 12.5 MG tablet Take 3 tablets (37.5 mg) by mouth 2 times daily 180 tablet 0     cholecalciferol, vitamin D3, 5,000 unit Tab [CHOLECALCIFEROL, VITAMIN D3, 5,000 UNIT TAB] Take 5,000 Units by mouth daily.       cloNIDine (CATAPRES) 0.1 MG tablet Take 1 tablet (0.1 mg) by mouth 3 times daily as needed (SBP > 160) 30 tablet 0     doxazosin (CARDURA) 4 MG tablet [DOXAZOSIN (CARDURA) 4 MG TABLET] Take 2 mg by mouth at bedtime.       furosemide (LASIX) 40 MG tablet Take 1 tablet (40 mg) by mouth daily 30 tablet 0     hydrALAZINE (APRESOLINE) 25 MG tablet Take 3 tablets (75 mg) by mouth 3 times daily 270 tablet 0     insulin aspart (NOVOLOG PEN) 100 UNIT/ML pen Inject 5 Units Subcutaneous 3 times daily (before meals) 4.5 mL 1     insulin glargine-lixisenatide (SOLIQUA) pen Inject 25 Units Subcutaneous daily 7.5 mL 1     nitroGLYcerin (NITROSTAT) 0.4 MG sublingual tablet Place 1 tablet (0.4 mg) under the tongue every 5 minutes as needed for chest pain For chest pain place 1 tablet under the tongue every 5 minutes for 3 doses. If symptoms persist 5 minutes after 1st dose call 911. 25 tablet 1     sodium bicarbonate 650 MG tablet Take 1 tablet (650 mg) by mouth 2 times daily 60 tablet 0    Allergies   Allergen Reactions     Venlafaxine      lethargic         Lab Results    Chemistry/lipid CBC Cardiac Enzymes/BNP/TSH/INR   Recent Labs   Lab Test 07/21/21  0440   CHOL 167   HDL 24*   LDL 59   TRIG 415*     Recent Labs   Lab Test 07/21/21  0440 03/04/19  1211   LDL 59 46  36     Recent Labs   Lab  Test 07/27/21  1211 07/27/21  0440   NA  --  138   POTASSIUM  --  4.4   CHLORIDE  --  109*   CO2  --  20*   * 93   BUN  --  50*   CR  --  4.95*   GFRESTIMATED  --  13*   BETHANY  --  8.8     Recent Labs   Lab Test 07/27/21  0440 07/26/21  0438 07/25/21  1415   CR 4.95* 4.55* 4.63*     Recent Labs   Lab Test 06/22/19  0554 01/14/19  0544 05/07/17  1945   A1C 7.8* 10.0* 8.6*    Recent Labs   Lab Test 07/24/21  0432   WBC 5.7   HGB 11.9*   HCT 35.5*   MCV 93   *     Recent Labs   Lab Test 07/24/21  0432 07/22/21  1753 07/20/21  0431   HGB 11.9* 11.7* 12.9*    Recent Labs   Lab Test 07/20/21  0023 07/19/21  1820 07/19/21  1154   TROPONINI <0.01 0.01 0.01     Recent Labs   Lab Test 07/19/21  1155 10/04/20  2140 05/07/20  0926   BNP 25 29 68*     Recent Labs   Lab Test 01/14/19  0544   TSH 2.65     Recent Labs   Lab Test 10/04/20  2140 06/21/19  0345 06/20/19  1558   INR 1.03 1.24* 1.30*                                                 Thank you for allowing me to participate in the care of your patient.      Sincerely,     China Flores NP     [facility]  cc:   No referring provider defined for this encounter.

## 2021-08-09 NOTE — TELEPHONE ENCOUNTER
Contacted patient and introduced myself as their Transplant Coordinator, also introduced the role of the Transplant Coordinator in the transplant process.  Explained the purpose of this call including reviewing next steps and answering questions.    Confirmed Referring Provider, Dialysis Center, and Primary Care Physician. Notified patient of the importance of continued communication with referring providers and primary care physicians.    Reviewed components of transplant evaluation process including necessary appointments, tests, and procedures.    Answered questions for patient regarding evaluation, provided my name and contact information and requested they call with any additional questions.    Determined that patient would like additional information regarding transplant by:     Drop Down choices: Mail, Email, MyChart, Phone Call   Encourage MyChart   Notified patients that they will hear from a Transplant  to schedule evaluation.       Reviewed pt's chart for pre-kidney/pancreas transplant evaluation planning. Pt lives in [place], [place]. Pt has CKD d/t diabetic nephropathy,he follows w/ Dr. Aime Rios, recently had a fistula placed and will be starting dialysis soon. Pt is a type 2 diabetic since age 20, he is on 25 units long acting insulin and 5-8 units sliding scale insulin w/ meals.  He reports he checks his BG 4x/day, and ranges about 120-150. Other hx includes HTN, dyslipidemia.  Heart hx includes CAD s/p 2 vessel CABG in 2019, and recent hospitalization d/t chest pain resulting in an angiogram on 7/22/21 w/ findings of known RCA graft stenosis w/ no flow limitation-no PCI.  He is not on anticoagulation.  Lung status- pt reports since his CABG he has been told one of his lungs is only at 55% capacity and it may heal on its own.  BMI 35, discussed that he may need to lose some weight to qualify for K/P. Dental: not UTD, encouraged him to make an appointment. Pt is not a smoker, and denies  any alcohol, or drug abuse. Pt is independent w/ ADLs.  Pt lives w/ wife and sons.    I also introduced [email or web address] and asked pt to create an account and view pre-kidney transplant videos for review with me following evaluation. Confirmed STD 9/27/21. Informed pt they will hear from scheduling to arrange the evaluation. Smartset orders entered, chart routed to scheduling pool.

## 2021-08-09 NOTE — PATIENT INSTRUCTIONS
Siva Ramey,    It was a pleasure to see you today at the [facility].     My recommendations after this visit include:  - Your labs will be on MyChart.    - Limit salt in your diet.   - If you feel that you are retaining more fluid, please let Dr. Rios know and decision about dialysis can be made.   - If you have any questions or concerns, please call [phone] to talk with my nurse.    China Vicente, CNP

## 2021-09-01 DIAGNOSIS — I16.0 HYPERTENSIVE URGENCY: ICD-10-CM

## 2021-09-01 RX ORDER — AMLODIPINE BESYLATE 10 MG/1
10 TABLET ORAL DAILY
Qty: 90 TABLET | Refills: 1 | Status: SHIPPED | OUTPATIENT
Start: 2021-09-01 | End: 2022-02-22

## 2021-09-01 RX ORDER — CARVEDILOL 12.5 MG/1
37.5 TABLET ORAL 2 TIMES DAILY
Qty: 540 TABLET | Refills: 1 | Status: SHIPPED | OUTPATIENT
Start: 2021-09-01 | End: 2022-02-22

## 2021-09-01 RX ORDER — FUROSEMIDE 40 MG
40 TABLET ORAL DAILY
Qty: 90 TABLET | Refills: 1 | Status: SHIPPED | OUTPATIENT
Start: 2021-09-01 | End: 2022-02-22

## 2021-09-23 ENCOUNTER — TELEPHONE (OUTPATIENT)
Dept: TRANSPLANT | Facility: CLINIC | Age: 46
End: 2021-09-23

## 2021-09-23 NOTE — TELEPHONE ENCOUNTER
Spoke with patient's wife. Confirmed upcoming [facility] appointments at [facility] on 9/27/21 starting at 0730. Instructed patient may eat breakfast, take regularly scheduled medications and be accompanied by 1 adult visitor. Patient states no Covid-19 symptoms and/or exposure within 14 days and will call to reschedule if anything changes before appointments.  Given contact information for any further questions/concerns/need to reschedule.

## 2021-09-27 ENCOUNTER — OFFICE VISIT (OUTPATIENT)
Dept: TRANSPLANT | Facility: CLINIC | Age: 46
End: 2021-09-27
Attending: PHYSICIAN ASSISTANT
Payer: COMMERCIAL

## 2021-09-27 ENCOUNTER — HOSPITAL ENCOUNTER (OUTPATIENT)
Dept: CARDIOLOGY | Facility: CLINIC | Age: 46
End: 2021-09-27
Attending: PHYSICIAN ASSISTANT
Payer: COMMERCIAL

## 2021-09-27 ENCOUNTER — HOSPITAL ENCOUNTER (OUTPATIENT)
Dept: GENERAL RADIOLOGY | Facility: CLINIC | Age: 46
End: 2021-09-27
Attending: PHYSICIAN ASSISTANT
Payer: COMMERCIAL

## 2021-09-27 ENCOUNTER — DOCUMENTATION ONLY (OUTPATIENT)
Dept: TRANSPLANT | Facility: CLINIC | Age: 46
End: 2021-09-27

## 2021-09-27 ENCOUNTER — ALLIED HEALTH/NURSE VISIT (OUTPATIENT)
Dept: TRANSPLANT | Facility: CLINIC | Age: 46
End: 2021-09-27
Attending: NURSE PRACTITIONER
Payer: COMMERCIAL

## 2021-09-27 VITALS
BODY MASS INDEX: 34.27 KG/M2 | HEART RATE: 85 BPM | SYSTOLIC BLOOD PRESSURE: 116 MMHG | WEIGHT: 253 LBS | OXYGEN SATURATION: 95 % | HEIGHT: 72 IN | DIASTOLIC BLOOD PRESSURE: 75 MMHG

## 2021-09-27 DIAGNOSIS — I25.10 CARDIOVASCULAR DISEASE: ICD-10-CM

## 2021-09-27 DIAGNOSIS — Z01.818 ENCOUNTER FOR PRE-TRANSPLANT EVALUATION FOR KIDNEY AND PANCREAS TRANSPLANT: ICD-10-CM

## 2021-09-27 DIAGNOSIS — Z76.82 ORGAN TRANSPLANT CANDIDATE: ICD-10-CM

## 2021-09-27 DIAGNOSIS — J98.4 DISEASE OF LUNG: ICD-10-CM

## 2021-09-27 DIAGNOSIS — E11.9 DIABETES MELLITUS, TYPE 2 (H): ICD-10-CM

## 2021-09-27 DIAGNOSIS — E87.20 METABOLIC ACIDOSIS: ICD-10-CM

## 2021-09-27 DIAGNOSIS — N18.6 END STAGE KIDNEY DISEASE (H): Primary | ICD-10-CM

## 2021-09-27 DIAGNOSIS — E78.2 MIXED HYPERLIPIDEMIA: ICD-10-CM

## 2021-09-27 DIAGNOSIS — J98.9: ICD-10-CM

## 2021-09-27 DIAGNOSIS — N18.9 CHRONIC KIDNEY DISEASE: ICD-10-CM

## 2021-09-27 DIAGNOSIS — E55.9 VITAMIN D DEFICIENCY: ICD-10-CM

## 2021-09-27 DIAGNOSIS — E78.5 HYPERLIPIDEMIA: ICD-10-CM

## 2021-09-27 DIAGNOSIS — E11.21 TYPE 2 DIABETES MELLITUS WITH DIABETIC NEPHROPATHY, WITH LONG-TERM CURRENT USE OF INSULIN (H): ICD-10-CM

## 2021-09-27 DIAGNOSIS — N18.6 STAGE 5 CHRONIC KIDNEY DISEASE ON CHRONIC DIALYSIS (H): ICD-10-CM

## 2021-09-27 DIAGNOSIS — Z99.2 STAGE 5 CHRONIC KIDNEY DISEASE ON CHRONIC DIALYSIS (H): ICD-10-CM

## 2021-09-27 DIAGNOSIS — I10 ESSENTIAL HYPERTENSION: ICD-10-CM

## 2021-09-27 DIAGNOSIS — N18.6 TYPE 2 DIABETES MELLITUS WITH CHRONIC KIDNEY DISEASE ON CHRONIC DIALYSIS, WITH LONG-TERM CURRENT USE OF INSULIN (H): ICD-10-CM

## 2021-09-27 DIAGNOSIS — Z79.4 TYPE 2 DIABETES MELLITUS WITH DIABETIC NEPHROPATHY, WITH LONG-TERM CURRENT USE OF INSULIN (H): ICD-10-CM

## 2021-09-27 DIAGNOSIS — N18.5 CHRONIC KIDNEY DISEASE, STAGE V (H): ICD-10-CM

## 2021-09-27 DIAGNOSIS — H35.00 RETINOPATHY: ICD-10-CM

## 2021-09-27 DIAGNOSIS — Z99.2 TYPE 2 DIABETES MELLITUS WITH CHRONIC KIDNEY DISEASE ON CHRONIC DIALYSIS, WITH LONG-TERM CURRENT USE OF INSULIN (H): ICD-10-CM

## 2021-09-27 DIAGNOSIS — J98.6 ACQUIRED ELEVATED DIAPHRAGM: ICD-10-CM

## 2021-09-27 DIAGNOSIS — E11.22 TYPE 2 DIABETES MELLITUS WITH CHRONIC KIDNEY DISEASE ON CHRONIC DIALYSIS, WITH LONG-TERM CURRENT USE OF INSULIN (H): ICD-10-CM

## 2021-09-27 DIAGNOSIS — Z79.4 TYPE 2 DIABETES MELLITUS WITH CHRONIC KIDNEY DISEASE ON CHRONIC DIALYSIS, WITH LONG-TERM CURRENT USE OF INSULIN (H): ICD-10-CM

## 2021-09-27 PROBLEM — N18.30 STAGE 3 CHRONIC KIDNEY DISEASE (H): Status: RESOLVED | Noted: 2020-03-19 | Resolved: 2021-09-27

## 2021-09-27 LAB
A1 AG RBC QL: POSITIVE
ABO/RH(D): NORMAL
ALBUMIN SERPL-MCNC: 3.4 G/DL (ref 3.4–5)
ALBUMIN UR-MCNC: >499 MG/DL
ALP SERPL-CCNC: 74 U/L (ref 40–150)
ALT SERPL W P-5'-P-CCNC: 38 U/L (ref 0–70)
ANION GAP SERPL CALCULATED.3IONS-SCNC: 8 MMOL/L (ref 3–14)
ANTIBODY SCREEN: NEGATIVE
APPEARANCE UR: ABNORMAL
APTT PPP: 30 SECONDS (ref 22–38)
AST SERPL W P-5'-P-CCNC: 14 U/L (ref 0–45)
BACTERIA #/AREA URNS HPF: ABNORMAL /HPF
BASOPHILS # BLD AUTO: 0 10E3/UL (ref 0–0.2)
BASOPHILS NFR BLD AUTO: 1 %
BILIRUB SERPL-MCNC: 0.8 MG/DL (ref 0.2–1.3)
BILIRUB UR QL STRIP: NEGATIVE
BUN SERPL-MCNC: 46 MG/DL (ref 7–30)
CALCIUM SERPL-MCNC: 9.2 MG/DL (ref 8.5–10.1)
CHLORIDE BLD-SCNC: 101 MMOL/L (ref 94–109)
CMV IGG SERPL IA-ACNC: >10 U/ML
CMV IGG SERPL IA-ACNC: ABNORMAL
CO2 SERPL-SCNC: 28 MMOL/L (ref 20–32)
COLOR UR AUTO: YELLOW
CREAT SERPL-MCNC: 5.22 MG/DL (ref 0.66–1.25)
EBV VCA IGG SER IA-ACNC: 45.9 U/ML
EBV VCA IGG SER IA-ACNC: POSITIVE
EOSINOPHIL # BLD AUTO: 0.3 10E3/UL (ref 0–0.7)
EOSINOPHIL NFR BLD AUTO: 4 %
ERYTHROCYTE [DISTWIDTH] IN BLOOD BY AUTOMATED COUNT: 12.3 % (ref 10–15)
FACTOR 2 INTERPRETATION: NORMAL
FACTOR V INTERPRETATION: NORMAL
GFR SERPL CREATININE-BSD FRML MDRD: 12 ML/MIN/1.73M2
GLUCOSE BLD-MCNC: 222 MG/DL (ref 70–99)
GLUCOSE UR STRIP-MCNC: 150 MG/DL
HBA1C MFR BLD: 8.1 % (ref 0–5.6)
HBV CORE AB SERPL QL IA: NONREACTIVE
HBV SURFACE AB SERPL IA-ACNC: 0 M[IU]/ML
HBV SURFACE AG SERPL QL IA: NONREACTIVE
HCT VFR BLD AUTO: 36.9 % (ref 40–53)
HCV AB SERPL QL IA: NONREACTIVE
HGB BLD-MCNC: 12.6 G/DL (ref 13.3–17.7)
HGB UR QL STRIP: NEGATIVE
HIV 1+2 AB+HIV1 P24 AG SERPL QL IA: NONREACTIVE
HYALINE CASTS: 3 /LPF
IMM GRANULOCYTES # BLD: 0 10E3/UL
IMM GRANULOCYTES NFR BLD: 0 %
INR PPP: 1.13 (ref 0.85–1.15)
KETONES UR STRIP-MCNC: NEGATIVE MG/DL
LAB DIRECTOR COMMENTS: NORMAL
LAB DIRECTOR DISCLAIMER: NORMAL
LAB DIRECTOR INTERPRETATION: NORMAL
LAB DIRECTOR METHODOLOGY: NORMAL
LAB DIRECTOR RESULTS: NORMAL
LEUKOCYTE ESTERASE UR QL STRIP: ABNORMAL
LVEF ECHO: NORMAL
LYMPHOCYTES # BLD AUTO: 1.6 10E3/UL (ref 0.8–5.3)
LYMPHOCYTES NFR BLD AUTO: 23 %
MCH RBC QN AUTO: 30.6 PG (ref 26.5–33)
MCHC RBC AUTO-ENTMCNC: 34.1 G/DL (ref 31.5–36.5)
MCV RBC AUTO: 90 FL (ref 78–100)
MONOCYTES # BLD AUTO: 0.4 10E3/UL (ref 0–1.3)
MONOCYTES NFR BLD AUTO: 6 %
MUCOUS THREADS #/AREA URNS LPF: PRESENT /LPF
NEUTROPHILS # BLD AUTO: 4.7 10E3/UL (ref 1.6–8.3)
NEUTROPHILS NFR BLD AUTO: 66 %
NITRATE UR QL: NEGATIVE
NRBC # BLD AUTO: 0 10E3/UL
NRBC BLD AUTO-RTO: 0 /100
PH UR STRIP: 6 [PH] (ref 5–7)
PLATELET # BLD AUTO: 148 10E3/UL (ref 150–450)
POTASSIUM BLD-SCNC: 4.4 MMOL/L (ref 3.4–5.3)
PROT SERPL-MCNC: 6.5 G/DL (ref 6.8–8.8)
RBC # BLD AUTO: 4.12 10E6/UL (ref 4.4–5.9)
RBC URINE: 2 /HPF
SODIUM SERPL-SCNC: 137 MMOL/L (ref 133–144)
SP GR UR STRIP: 1.01 (ref 1–1.03)
SPECIMEN DESCRIPTION: NORMAL
SPECIMEN EXPIRATION DATE: NORMAL
SPECIMEN EXPIRATION DATE: NORMAL
SQUAMOUS EPITHELIAL: 1 /HPF
T PALLIDUM AB SER QL: NONREACTIVE
TRANSITIONAL EPI: <1 /HPF
UROBILINOGEN UR STRIP-MCNC: NORMAL MG/DL
VZV IGG SER QL IA: 160.5 INDEX
VZV IGG SER QL IA: ABNORMAL
WBC # BLD AUTO: 7.1 10E3/UL (ref 4–11)
WBC URINE: 4 /HPF

## 2021-09-27 PROCEDURE — 85730 THROMBOPLASTIN TIME PARTIAL: CPT

## 2021-09-27 PROCEDURE — 84681 ASSAY OF C-PEPTIDE: CPT

## 2021-09-27 PROCEDURE — 86886 COOMBS TEST INDIRECT TITER: CPT

## 2021-09-27 PROCEDURE — 86901 BLOOD TYPING SEROLOGIC RH(D): CPT

## 2021-09-27 PROCEDURE — 85613 RUSSELL VIPER VENOM DILUTED: CPT

## 2021-09-27 PROCEDURE — 86644 CMV ANTIBODY: CPT

## 2021-09-27 PROCEDURE — 86780 TREPONEMA PALLIDUM: CPT

## 2021-09-27 PROCEDURE — 86787 VARICELLA-ZOSTER ANTIBODY: CPT

## 2021-09-27 PROCEDURE — 86833 HLA CLASS II HIGH DEFIN QUAL: CPT

## 2021-09-27 PROCEDURE — 81378 HLA I & II TYPING HR: CPT

## 2021-09-27 PROCEDURE — 99205 OFFICE O/P NEW HI 60 MIN: CPT | Performed by: SURGERY

## 2021-09-27 PROCEDURE — 83036 HEMOGLOBIN GLYCOSYLATED A1C: CPT

## 2021-09-27 PROCEDURE — 86147 CARDIOLIPIN ANTIBODY EA IG: CPT

## 2021-09-27 PROCEDURE — 99245 OFF/OP CONSLTJ NEW/EST HI 55: CPT

## 2021-09-27 PROCEDURE — 85670 THROMBIN TIME PLASMA: CPT

## 2021-09-27 PROCEDURE — 86832 HLA CLASS I HIGH DEFIN QUAL: CPT

## 2021-09-27 PROCEDURE — 86803 HEPATITIS C AB TEST: CPT

## 2021-09-27 PROCEDURE — 36415 COLL VENOUS BLD VENIPUNCTURE: CPT

## 2021-09-27 PROCEDURE — 81241 F5 GENE: CPT

## 2021-09-27 PROCEDURE — 86481 TB AG RESPONSE T-CELL SUSP: CPT

## 2021-09-27 PROCEDURE — 71046 X-RAY EXAM CHEST 2 VIEWS: CPT

## 2021-09-27 PROCEDURE — 85610 PROTHROMBIN TIME: CPT

## 2021-09-27 PROCEDURE — 93306 TTE W/DOPPLER COMPLETE: CPT

## 2021-09-27 PROCEDURE — 87340 HEPATITIS B SURFACE AG IA: CPT

## 2021-09-27 PROCEDURE — 85025 COMPLETE CBC W/AUTO DIFF WBC: CPT

## 2021-09-27 PROCEDURE — 81240 F2 GENE: CPT

## 2021-09-27 PROCEDURE — 86905 BLOOD TYPING RBC ANTIGENS: CPT

## 2021-09-27 PROCEDURE — 86706 HEP B SURFACE ANTIBODY: CPT

## 2021-09-27 PROCEDURE — 81001 URINALYSIS AUTO W/SCOPE: CPT

## 2021-09-27 PROCEDURE — 71046 X-RAY EXAM CHEST 2 VIEWS: CPT | Mod: 26 | Performed by: RADIOLOGY

## 2021-09-27 PROCEDURE — 85390 FIBRINOLYSINS SCREEN I&R: CPT | Mod: 26 | Performed by: PATHOLOGY

## 2021-09-27 PROCEDURE — 86704 HEP B CORE ANTIBODY TOTAL: CPT

## 2021-09-27 PROCEDURE — G0452 MOLECULAR PATHOLOGY INTERPR: HCPCS | Mod: 26 | Performed by: STUDENT IN AN ORGANIZED HEALTH CARE EDUCATION/TRAINING PROGRAM

## 2021-09-27 PROCEDURE — 93306 TTE W/DOPPLER COMPLETE: CPT | Mod: 26 | Performed by: INTERNAL MEDICINE

## 2021-09-27 PROCEDURE — 86665 EPSTEIN-BARR CAPSID VCA: CPT

## 2021-09-27 PROCEDURE — 80053 COMPREHEN METABOLIC PANEL: CPT

## 2021-09-27 ASSESSMENT — MIFFLIN-ST. JEOR: SCORE: 2060.11

## 2021-09-27 NOTE — LETTER
9/27/2021         RE: Siva Ramey  [address] [address]        Dear Colleague,    Thank you for referring your patient, Siva Ramey, to the [facility]. Please see a copy of my visit note below.    TRANSPLANT NEPHROLOGY RECIPIENT EVALUATION NOTE    Assessment and Plan:  # Kidney/Pancreas Transplant Evaluation: Patient is a good candidate overall. Benefits of a living donor transplant were discussed.    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 ABO-A, no donors.     # DM Type 2: borderline control using 40-50 units of insulin daily. Given evidence of end-organ damage, he may benefit from a pancreas transplant.     # Cardiac Risk: will need cardiac clearance with history of CAD s/p PCI with IBAN placement to first diagonal in 1/2019, s/p two-vessel CABG in 6/2019. Not on antiplatelet agent now except asa. Recent angiogram on 7/22/2021  showed improved known RCA graft stenosis of 30-40%  (FFR 0.93), no PCI was done. Reduced EF of 45-50% at that time.     # PAD Screening: will need noncontrast CT abdomen/pelvis and iliac ultrasound.    # BMI 35 with central obesity: will refer to surgery.      # COVID-19 (5/2021): for which he did not require hospitalization.  Has completed his vaccination and is asymptomatic.     # Left diaphragmatic elevation: since time of CABG. Need PFTs.      # Health Maintenance: Dental: Not up to date.     Discussed the risks and benefits of a transplant, including the risk of surgery and immunosuppression medications.  Patient's overall evaluation will be discussed in the Transplant Program's regular meeting with a final recommendation on the patients suitability for transplant to be made at that time.    Pending completion of the full evaluation, patient presently appears to be enough of an acceptable kidney transplant recipient candidate to have  any potential kidney donors start the evaluation process.  Patient was seen in conjunction with Dr. Fernando Sorensen as part of a shared visit.    PHYSICIAN ATTESTATION AND EVALUATION  Patient was seen by myself, Dr. Fernando Sorensen, in conjunction with JUANCARLOS Butcher CNP as part of a shared visit.    I personally reviewed the patient's past medical and surgical history, vital signs, medications and pertinent laboratory results and radiology findings.  Present and past medical history, along with significant physical exam findings were all reviewed with KAT.    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  Patient reports feeling okay overall with some medical complaints.    Key management decisions made by me and discussed with KAT include the following, with full Assessment and Plan noted above by KAT:  # Kidney/Pancreas Transplant Evaluation: Patient is a good candidate overall.     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    # DM Type 2: Borderline control on insulin.  Patient has end-organ damage and may benefit from a pancreas transplant.    # Cardiac Risk: Patient has known cardiac disease with CAD, s/p PCI and more recent CABG.  Patient will require Cardiology evaluation prior to transplant.    # PAD Screening: Will obtain updated imaging for Transplant Surgery to review.    # Obesity: Patient is right at BMI guidelines with central obesity and would recommend weight loss to decrease risk of surgical wound complications and for overall health.  Will defer to Transplant Surgery for specific weight loss goal.    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  Will obtain PFTs.    Fernando Sorensen MD    Evaluation:  Siva Ramey was seen in consultation at the request  of Dr. Jarvis Michaud for evaluation as a potential kidney/pancreas transplant recipient.    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    History of Present Illness:         Kidney Disease Hx:   Siva Ramey is a 46-year-old gentleman with history of ESKD secondary to presumed diabetic nephropathy. CKD and nephrotic proteinuria since 2017 at which time he had negative serologies including SPEP and DEBORAH.  His kidney disease continued to progress by the following serum creatinine levels of  1.7-1.9 (2017), 3.24 (2018), 4.1 (4/2021) then ultimately started hemodialysis on 8/2/2021 after a coronary angiogram was done 7/22/2021 that showed improved stenosis of RCA graft compared to the year prior (h/o 2 vessel CABG in 2019). Dialysis is reportedly going well, he is ABO-A with no potential donors.        Primary Nephrologist: Dr. Rios        H/o Kidney Stones: No       H/o Recurrent/Frequent UTI: No         Diabetic Hx: Type 2        Diagnosis Date: 2000        Medication History: initially treated with oral medications, then insulin was added about 4 years ago, oral medication discontinued a few months ago. Currently using Novolog 5-7 TID with meals, and 25 units of Soliqua HS.         Diabetic Control: Borderline control (HbA1c 7-9%)   Last HbA1c: 7.8% (ranging 7-10%)       Hypoglycemic Unawareness: Yes, using a sensor, does not feel low until the 40s, blood sugars typically ranging  the high 100s.        End-Organ Damage due to DM: Retinopathy, Nephropathy and Peripheral neuropathy          Cardiac/Vascular Disease Risk Factors:        Cardiac Risk Factors: Diabetes, Hypertension and CKD       Known CAD: Status post PCI with IBAN placement to first diagonal in 1/2019, s/p two-vessel CABG in 6/2019,  with an angiogram on 7/22/2021 with improved RCA graft stenosis of 30-40%  (FFR 0.93), no PCI was done.         Known PAD/Caludication Symptoms: No       Known Heart Failure: HFrEF 45-50% in 7/2021       Arrhythmia: no        Pulmonary Hypertension: No       Valvular Disease: No       Other: None         Viral Serology Status       CMV IgG Antibody: Unknown       EBV IgG Antibody: Unknown         Volume Status/Weight:        Volume status: Mildly hypervolemic       Weight:  BMI above guidelines       BMI: There is no height or weight on file to calculate BMI.         Functional Capacity/Frailty:        Previously doing maintenance work, now on disability. Recently started to feel more energized and did some yard work and changed his car oil over the weekend. He thinks he could walk 1/2 of a mile.       Fatigue/Decreased Energy: [] No [x] Yes But overall improving    Chest Pain or SOB with Exertion: [x] No [] Yes    Significant Weight Change: [x] No [] Yes    Nausea, Vomiting or Diarrhea: [] No [x] Yes Nausea improving since starting dialysis, diarrhea once weekly x 6 months   Fever, Sweats or Chills:  [x] No [] Yes    Leg Swelling [x] No [] Yes        History of Cancer: None    Other Significant Medical Issues:   - BMI 35  - COVID-19 (5/2021): for which she did not require hospitalization.  Has completed his vaccinations.  - Left lung injury: with left diaphragmatic elevation since CABG. Likely had damage to left phrenic nerve, per outside pulmonary assessment in 2/2020.   - Anxiety: managing ok with prn zanax.        # COVID Vaccination Up To Date: Yes    Potential Living Kidney Donors: No    Review of Systems:  A comprehensive review of systems was obtained and negative, except as noted in the HPI or PMH.    Past Medical History:   Medical record was reviewed and PMH was discussed with patient and noted below.  Past Medical History:   Diagnosis Date     Angina pectoris (H)     rare since PCI     Chronic kidney disease     CKD stage 3     Coronary artery disease      Diabetes mellitus, type II (H) 12/2001    insulin      Diabetic nephropathy (H)      DM II (diabetes mellitus, type II), controlled (H)      MARQUEZ (dyspnea on exertion)      Dyslipidemia 12/2001     History of blood transfusion 2004     HTN (hypertension) 2004     Hyperlipidemia      Hypertension     takes medication     Ischemic cardiomyopathy      Myocardial infarction (H)     STEMI -Diagonal branch of the LAD     Obesity (BMI 30-39.9)      Proteinuria      Vitamin D deficiency        Past Social History:   Past Surgical History:   Procedure Laterality Date     BACK SURGERY  2009    L5 disc cut     BYPASS GRAFT ARTERY CORONARY  06/20/2019    2 vessels     CV CORONARY ANGIOGRAM N/A 1/13/2019    Procedure: Coronary Angiogram;  Surgeon: Cielo Che MD;  Location: [facility];  Service: Cardiology     CV CORONARY ANGIOGRAM N/A 5/2/2019    Procedure: Coronary Angiogram;  Surgeon: Cielo Che MD;  Location: [facility];  Service: Cardiology     CV CORONARY ANGIOGRAM N/A 4/30/2020    Procedure: Coronary Angiogram;  Surgeon: Cielo Che MD;  Location: [facility];  Service: Cardiology     CV CORONARY ANGIOGRAM N/A 7/22/2021    Procedure: CV CORONARY ANGIOGRAM;  Surgeon: Adrian Crow MD;  Location: [facility] CATH LAB CV     CV FRACTIONAL FLOW RATIO WIRE N/A 7/22/2021    Procedure: Fractional Flow Ratio Wire;  Surgeon: Adrian Crow MD;  Location: [facility] CATH LAB CV     CV LEFT HEART CATH N/A 7/22/2021    Procedure: Left Heart Cath;  Surgeon: Adrian Crow MD;  Location: [facility] LAB CV     CV LEFT HEART CATHETERIZATION WITH LEFT VENTRICULOGRAM N/A 1/13/2019    Procedure: Left Heart Catheterization with Left Ventriculogram;  Surgeon: Cielo Che MD;  Location: [facility];  Service: Cardiology     CV LEFT HEART CATHETERIZATION WITHOUT LEFT VENTRICULOGRAM Left 4/30/2020    Procedure: Left Heart Catheterization Without Left Ventriculogram;  Surgeon: Cielo Che MD;  Location: [facility];   Service: Cardiology     CV RIGHT HEART CATHETERIZATION N/A 4/30/2020    Procedure: Right Heart Catheterization;  Surgeon: Cielo Che MD;  Location: [facility];  Service: Cardiology     HERNIA REPAIR       OTHER SURGICAL HISTORY      Excise varicocele     STENT, CORONARY, DALE  2019     Personal history of bleeding or anesthesia problems: No    Family History:  Family History   Problem Relation Age of Onset     Heart Disease Father 60.00     CABG Father 50.00        triple bypass       Personal History:   Social History     Socioeconomic History     Marital status:      Spouse name: Not on file     Number of children: Not on file     Years of education: Not on file     Highest education level: Not on file   Occupational History     Not on file   Tobacco Use     Smoking status: Never Smoker     Smokeless tobacco: Never Used   Substance and Sexual Activity     Alcohol use: Yes     Comment: Alcoholic Drinks/day: 1-2     Drug use: No     Sexual activity: Yes     Partners: Female   Other Topics Concern     Parent/sibling w/ CABG, MI or angioplasty before 65F 55M? Not Asked   Social History Narrative     Not on file     Social Determinants of Health     Financial Resource Strain:      Difficulty of Paying Living Expenses:    Food Insecurity:      Worried About Running Out of Food in the Last Year:      Ran Out of Food in the Last Year:    Transportation Needs:      Lack of Transportation (Medical):      Lack of Transportation (Non-Medical):    Physical Activity:      Days of Exercise per Week:      Minutes of Exercise per Session:    Stress:      Feeling of Stress :    Social Connections:      Frequency of Communication with Friends and Family:      Frequency of Social Gatherings with Friends and Family:      Attends [employer]ae[employer] Services:      Active Member of Clubs or Organizations:      Attends Club or Organization Meetings:      Marital Status:    Intimate Partner Violence:      Fear of Current or  Ex-Partner:      Emotionally Abused:      Physically Abused:      Sexually Abused:        Allergies:  Allergies   Allergen Reactions     Venlafaxine      lethargic       Medications:  Current Outpatient Medications   Medication Sig     albuterol (PROAIR HFA/PROVENTIL HFA/VENTOLIN HFA) 108 (90 Base) MCG/ACT inhaler Inhale 2 puffs into the lungs every 4 hours as needed     amLODIPine (NORVASC) 10 MG tablet Take 1 tablet (10 mg) by mouth daily     aspirin 81 MG EC tablet [ASPIRIN 81 MG EC TABLET] Take 1 tablet (81 mg total) by mouth daily.     atorvastatin (LIPITOR) 80 MG tablet [ATORVASTATIN (LIPITOR) 80 MG TABLET] TAKE 1 TABLET(80 MG) BY MOUTH AT BEDTIME (Patient taking differently: Take 80 mg by mouth At Bedtime )     carvedilol (COREG) 12.5 MG tablet Take 3 tablets (37.5 mg) by mouth 2 times daily     cholecalciferol, vitamin D3, 5,000 unit Tab [CHOLECALCIFEROL, VITAMIN D3, 5,000 UNIT TAB] Take 5,000 Units by mouth daily.     cloNIDine (CATAPRES) 0.1 MG tablet Take 1 tablet (0.1 mg) by mouth 3 times daily as needed (SBP > 160)     doxazosin (CARDURA) 4 MG tablet [DOXAZOSIN (CARDURA) 4 MG TABLET] Take 2 mg by mouth at bedtime.     furosemide (LASIX) 40 MG tablet Take 1 tablet (40 mg) by mouth daily     hydrALAZINE (APRESOLINE) 25 MG tablet Take 3 tablets (75 mg) by mouth 3 times daily     insulin aspart (NOVOLOG PEN) 100 UNIT/ML pen Inject 5 Units Subcutaneous 3 times daily (before meals)     nitroGLYcerin (NITROSTAT) 0.4 MG sublingual tablet Place 1 tablet (0.4 mg) under the tongue every 5 minutes as needed for chest pain For chest pain place 1 tablet under the tongue every 5 minutes for 3 doses. If symptoms persist 5 minutes after 1st dose call 911.     sodium bicarbonate 650 MG tablet Take 1 tablet (650 mg) by mouth 2 times daily     No current facility-administered medications for this visit.       Vitals:  There were no vitals taken for this visit.    Exam:  GENERAL APPEARANCE: alert and no distress  HENT:  mouth without ulcers or lesions  LYMPHATICS: no cervical or supraclavicular nodes  RESP: lungs clear to auscultation - no rales, rhonchi or wheezes  CV: regular rhythm, normal rate, no rub, no murmur  FEMORAL PULSES:+2 bilaterally.   EDEMA: no LE edema bilaterally  ABDOMEN: soft, nondistended, nontender, bowel sounds normal  MS: extremities normal - no gross deformities noted, no evidence of inflammation in joints, no muscle tenderness  SKIN: no rash    Results:   No results found for this or any previous visit (from the past 336 hour(s)).          Again, thank you for allowing me to participate in the care of your patient.        Sincerely,      Fernando Sorensen MD

## 2021-09-27 NOTE — LETTER
9/27/2021         RE: Siva Ramey  [address] [facility] [address]        Dear Colleague,    Thank you for referring your patient, Siva Ramey, to the [facility]. Please see a copy of my visit note below.    Transplant Surgery Consult Note    Medical record number: [id number]  YOB: 1975,   Consult requested by Dr. Aime Rios for evaluation of kidney and pancreas transplant candidacy.    Assessment and Recommendations: Mr. Ramey is a good candidate for transplantation and has a good understanding of the risks and benefits of this approach to management of renal failure and diabetes. The following issues should be addressed prior to transplant:       47 yo male with type 2 DM for the last 21 years  Insulin dependent for 5 years with about 40-50 units/d  A1C in the 7s  Some peripheral neuropathy and retinopathy  ESRD on HD since Aug '21  Left wrist fistula  MI Jan 2019  Stent placement in 2019  Then underwent bypass surgery later in the year. No antiplatelet agents now except aspirin  No prev abd surgery  No live donors   Could benefit from a  KP tx  Will need to lose a couple of inches of waistline. 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 Patient verbalized good understanding and had several pertinent questions which were answered satisfactorily.     Immunosuppressive regimen, management and long term risks discussed in detail.         Mr. Ramey has End stage renal failure due to type 2 DM whose condition is not expected to resolve, is expected to progress, and is expected to continue to develop related comorbid conditions.  Cardiology consult for cardiac risk stratification to be ordered: Yes  CT abdomen and pelvis without contrast to be ordered for assessment of vascular  targets: Yes  Transplant listing labs ordered to include HLA, ABOx2, Cr, etc.  Dietician consult ordered: Yes  Social work consult ordered: Yes  Imaging reports reviewed:  yes  Radiology images reviewed:no  Recipient suitable to move forward with work up of living donors:  Yes      The majority of our visit was spent in counselling, discussing the medical and surgical risks of living or  donor kidney and pancreas transplantation, either in a simultaneous or sequential fashion. I contrasted approximate wait time for SPK vs living vs  donor kidneys from normal (0-85%) or higher (%) kidney donor profile index (KDPI) donors and their associated outcomes. I would not recommend this individual to consider kidneys from high KDPI donors. The reason for this decision is best summarized as:SPK candidate.  Access to transplant will be impacted by living donor availability and overall candidacy for SPK, as well as the influence of blood type and degree of sensitization. We discussed advantages of preemptive transplant as well as living donor kidney transplant, and graft and patient survival outcomes associated with these options. Potential surgical complications of kidney and pancreas transplantation include bleeding, clotting, infection, wound complications, anastomotic failure and other issues such as cardiac complications, pneumonia, deep venous thrombosis, pulmonary embolism, post transplant diabetes and death. We discussed the need for protocol biopsy of the kidney and the possible need for a ureteral stent (and subsequent removal). We discussed benefits and risks associated with different approaches to exocrine drainage of pancreatic secretions. We also discussed differences in the average length of stay, recovery process, and posttransplant lab and monitoring protocol. We discussed the risk of graft rejection and recurrent diabetic nephropathy in the setting of poor glycemic control. I emphasized  the need for strict immunosuppression adherence and the potential for complications of immunosuppression such as skin cancer or lymphoma, as well as a very low but not zero risk of donor-derived disease transmission risks (infection, cancer). Mr. Ramey asked good questions and the patient's candidacy will be reviewed at our Multidisciplinary Selection Committee. Thank you for the opportunity to participate in Mr. Ramey's care.    Total time: 60 minutes  Counselling time: 40 minutes    .  ---------------------------------------------------------------------------------------------------    HPI: Mr. Ramey has End stage renal failure due to diabetes mellitus type 2. The patient has had diabetes for 20 years. Management is by Lantus per diabetic educator  Novolog per diabetic educator. The patient usually checks his blood sugar 3 times/day. Complications of diabetes include:    Retinopathy:  Yes   Neuropathy: Yes   Gastroparesis:  No    The patient is on dialysis.    Has potential kidney donors:  No.  Interested in participation in paired exchange if a donor is willing: Doesn't know     The patient has the following pertinent history:       No    Yes  Dialysis:    []      [] via:       Blood Transfusion                  []      []  Number of units:   Most recently:  Pregnancy:    []      [] Number:       Previous Transplant:  []      [] Details:    Cancer    []      [] Comment:   Kidney stones   []      [] Comment:      Recurrent infections  []      []  Type:                  Bladder dysfunction  []      [] Cause:    Claudication   []      [] Distance:    Previous Amputation  []      [] Cause:     Chronic anticoagulation  []      [] Indication:  Voodoo  []      []     Past Medical History:   Diagnosis Date     Angina pectoris (H)     rare since PCI     Chronic kidney disease     CKD stage 3     Coronary artery disease      Diabetes mellitus, type II (H) 12/2001    insulin     Diabetic nephropathy (H)      DM  II (diabetes mellitus, type II), controlled (H)      MARQUEZ (dyspnea on exertion)      Dyslipidemia 12/2001     History of blood transfusion 2004     HTN (hypertension) 2004     Hyperlipidemia      Hypertension     takes medication     Ischemic cardiomyopathy      Myocardial infarction (H)     STEMI -Diagonal branch of the LAD     Obesity (BMI 30-39.9)      Proteinuria      Vitamin D deficiency      Past Surgical History:   Procedure Laterality Date     BACK SURGERY  2009    L5 disc cut     BYPASS GRAFT ARTERY CORONARY  06/20/2019    2 vessels     CV CORONARY ANGIOGRAM N/A 1/13/2019    Procedure: Coronary Angiogram;  Surgeon: Cielo Che MD;  Location: [facility] Cath Lab;  Service: Cardiology     CV CORONARY ANGIOGRAM N/A 5/2/2019    Procedure: Coronary Angiogram;  Surgeon: Cielo Che MD;  Location: [facility] Cath Lab;  Service: Cardiology     CV CORONARY ANGIOGRAM N/A 4/30/2020    Procedure: Coronary Angiogram;  Surgeon: Cielo Che MD;  Location: [facility] Cath Lab;  Service: Cardiology     CV CORONARY ANGIOGRAM N/A 7/22/2021    Procedure: CV CORONARY ANGIOGRAM;  Surgeon: Adrian Crow MD;  Location: [facility] LAB CV     CV FRACTIONAL FLOW RATIO WIRE N/A 7/22/2021    Procedure: Fractional Flow Ratio Wire;  Surgeon: Adrian Crow MD;  Location: [facility] CATH LAB CV     CV LEFT HEART CATH N/A 7/22/2021    Procedure: Left Heart Cath;  Surgeon: Adrian Crow MD;  Location: [facility] CV     CV LEFT HEART CATHETERIZATION WITH LEFT VENTRICULOGRAM N/A 1/13/2019    Procedure: Left Heart Catheterization with Left Ventriculogram;  Surgeon: Cielo Che MD;  Location: [facility] Cath Lab;  Service: Cardiology     CV LEFT HEART CATHETERIZATION WITHOUT LEFT VENTRICULOGRAM Left 4/30/2020    Procedure: Left Heart Catheterization Without Left Ventriculogram;  Surgeon: Cielo Che MD;  Location: [facility] Cath Lab;  Service: Cardiology     CV RIGHT HEART CATHETERIZATION N/A  4/30/2020    Procedure: Right Heart Catheterization;  Surgeon: Cielo Che MD;  Location: [facility] Cath Lab;  Service: Cardiology     HERNIA REPAIR       OTHER SURGICAL HISTORY      Excise varicocele     STENT, CORONARY, DALE  2019     Family History   Problem Relation Age of Onset     Heart Disease Father 60.00     CABG Father 50.00        triple bypass     Social History     Socioeconomic History     Marital status:      Spouse name: Not on file     Number of children: Not on file     Years of education: Not on file     Highest education level: Not on file   Occupational History     Not on file   Tobacco Use     Smoking status: Never Smoker     Smokeless tobacco: Never Used   Substance and Sexual Activity     Alcohol use: Yes     Comment: Alcoholic Drinks/day: 1-2     Drug use: No     Sexual activity: Yes     Partners: Female   Other Topics Concern     Parent/sibling w/ CABG, MI or angioplasty before 65F 55M? Not Asked   Social History Narrative     Not on file     Social Determinants of Health     Financial Resource Strain:      Difficulty of Paying Living Expenses:    Food Insecurity:      Worried About Running Out of Food in the Last Year:      Ran Out of Food in the Last Year:    Transportation Needs:      Lack of Transportation (Medical):      Lack of Transportation (Non-Medical):    Physical Activity:      Days of Exercise per Week:      Minutes of Exercise per Session:    Stress:      Feeling of Stress :    Social Connections:      Frequency of Communication with Friends and Family:      Frequency of Social Gatherings with Friends and Family:      Attends Taoism Services:      Active Member of Clubs or Organizations:      Attends Club or Organization Meetings:      Marital Status:    Intimate Partner Violence:      Fear of Current or Ex-Partner:      Emotionally Abused:      Physically Abused:      Sexually Abused:        ROS:   CONSTITUTIONAL:  No fevers or chills  EYES: negative for  icterus  ENT:  negative for hearing loss, tinnitus and sore throat  RESPIRATORY:  negative for cough, sputum, dyspnea  CARDIOVASCULAR:  negative for chest pain Fatigue  GASTROINTESTINAL:  negative for nausea, vomiting, diarrhea or constipation  GENITOURINARY:  negative for incontinence, dysuria, bladder emptying problems  HEME:  No easy bruising  INTEGUMENT:  negative for rash and pruritus  NEURO:  Negative for headache, seizure disorder    Allergies:   Allergies   Allergen Reactions     Venlafaxine      lethargic       Medications:  Prescription Medications as of 9/27/2021       Rx Number Disp Refills Start End Last Dispensed Date Next Fill Date Owning Pharmacy    albuterol (PROAIR HFA/PROVENTIL HFA/VENTOLIN HFA) 108 (90 Base) MCG/ACT inhaler    3/18/2021        Sig: Inhale 2 puffs into the lungs every 4 hours as needed    Class: Historical    Route: Inhalation    amLODIPine (NORVASC) 10 MG tablet  90 tablet 1 9/1/2021    [id number] [place], [place] - [address] AT [facility] [facility]    Sig: Take 1 tablet (10 mg) by mouth daily    Class: E-Prescribe    Route: Oral    aspirin 81 MG EC tablet   0 1/15/2019        Sig: [ASPIRIN 81 MG EC TABLET] Take 1 tablet (81 mg total) by mouth daily.    Class: OTC    Route: Oral    atorvastatin (LIPITOR) 80 MG tablet  90 tablet 1 11/2/2020        Sig: [ATORVASTATIN (LIPITOR) 80 MG TABLET] TAKE 1 TABLET(80 MG) BY MOUTH AT BEDTIME    carvedilol (COREG) 12.5 MG tablet  540 tablet 1 9/1/2021    [facility] #[id number] - [place], [place] - [address] AT [address]    Sig: Take 3 tablets (37.5 mg) by mouth 2 times daily    Class: E-Prescribe    Route: Oral    cholecalciferol, vitamin D3, 5,000 unit Tab    1/13/2019        Sig: [CHOLECALCIFEROL, VITAMIN D3, 5,000 UNIT TAB] Take 5,000 Units by mouth daily.    Class: Historical    Route: Oral    cloNIDine (CATAPRES) 0.1 MG tablet  30 tablet 0 7/27/2021    [id number] - [place], [place] - [address] AT [facility] [address]    Sig: Take 1 tablet (0.1 mg) by mouth 3 times daily as needed (SBP > 160)    Class: E-Prescribe    Route: Oral    doxazosin (CARDURA) 4 MG tablet    10/4/2020        Sig: [DOXAZOSIN (CARDURA) 4 MG TABLET] Take 2 mg by mouth at bedtime.    Class: Historical    Route: Oral    furosemide (LASIX) 40 MG tablet  90 tablet 1 9/1/2021    [facility] #[id number] - [facility] [address] AT [facility] [address]    Sig: Take 1 tablet (40 mg) by mouth daily    Class: E-Prescribe    Route: Oral    hydrALAZINE (APRESOLINE) 25 MG tablet  270 tablet 0 7/27/2021 8/26/2021   [facility] #[id number] - [place], [place] - [address] AT [facility] [address]    Sig: Take 3 tablets (75 mg) by mouth 3 times daily    Class: E-Prescribe    Route: Oral    insulin aspart (NOVOLOG PEN) 100 UNIT/ML pen  4.5 mL 1 7/27/2021 8/26/2021   [facility] #[id number] [facility] [address] AT [facility] [address]    Sig: Inject 5 Units Subcutaneous 3 times daily (before meals)    Class: E-Prescribe    Route: Subcutaneous    nitroGLYcerin (NITROSTAT) 0.4 MG sublingual tablet  25 tablet 1 8/9/2021    [facility] #[id number] [facility] [address] AT [facility] [address]    Sig: Place 1 tablet (0.4 mg) under the tongue every 5 minutes as needed for chest pain For chest pain place 1 tablet under the tongue every 5 minutes for 3 doses. If symptoms persist 5 minutes after 1st dose call 911.    Class: E-Prescribe    Route: Sublingual    sodium bicarbonate 650 MG tablet  60 tablet 0 7/27/2021    [id number] [place], [place] - [address] AT [facility] [address]    Sig: Take 1 tablet (650 mg) by mouth 2 times daily    Class: E-Prescribe    Route: Oral          Exam:   Pulse:  [85] 85  BP: (116)/(75) 116/75  SpO2:  [95 %] 95 %  Appearance: in no apparent distress.    Skin: normal  Head and Neck: Normal, no rashes or jaundice  Respiratory: easy respirations, no audible wheezing.  Cardiovascular: RRR  Abdomen: rounded, No surgical scars       Diagnostics:   No results found for this or any previous visit (from the past 672 hour(s)).  No results found for: CPRA      Again, thank you for allowing me to participate in the care of your patient.        Sincerely,        Jarvis Michaud MD

## 2021-09-27 NOTE — PROGRESS NOTES
Psychosocial Assessment  Patient Name/ Age: Siva Ramey 46 year old   Medical Record #: [id number]  Duration of Interview:     35  min  Process:   Face-to-Face Interview                (counseling < 50%)   Present at Appointment: Siva and his wife Damari        :MANUEL Ruvalcaba, [facility] Date:  September 27, 2021        Type of transplant: Kidney/Pancreas    Donor type:   Siva indicated he does not know of any potential kidney donors at this time.   Cadaver   Prior Transplants:    No Status of Transplant:       Current Living Situation    Location:   [address] [place], [place] [id number]  With Whom: Damari and their sons Elder (16), Jose Guadalupe (14) and Errol (13)       Family/ Social Support:    Siva's parents, two half brothers and one half sister live in [place].   Available, helpful   Committed relationship:  Siva and Damari have been  for 18 years.   Stable/supportive   Other supports:   Friends Available, helpful       Activities/ Functional Ability    Current level:  Siva wears corrective lenses. Ambulatory, visually impaired and independent with ADL's     Transportation Drives self       Vocational/Employment/Financial     Employment  [employer]   Medical leave of absence - Short Term Disability through employer.   Job Description  Maintenance      Income  Damari is employed full time.   Disabilty insurance   Insurance  [employer] through Damari's employer.    At this time, patient can afford medication costs:  Yes  Private Insurance       Medical Status    Current mode of treatment for ESRD Dialysis   Complications - Diabetes controlled with insulin. Neuropathy       Behavioral    Tobacco Use No Chemical Dependency No       Psychiatric Impairment No  Siva indicated he is on PRN anxiety medications.    Reading ability Good  Education Level: High School Recent Legal History No        Coping Style/Strategies: Siva indicated when under stress he will take a shower.     Ability to  Adhere to Complex Medical Regime: Yes     Adherence History:  Siva indicated he will usually follow his physician's recommendations.        Education  _X_ Medicare  _X_ Rehabilitation  _X_ Donor issues  _X_ Community resources  _X_ Post discharge housing  _X_ Financial resources  _X_ Medical insurance options  _X_ Psych adjustment  _X_ Family adjustment  _X_ Health Care Directive - Provided Education and Declined Completing at this time.  Siva indicated he is in agreement with his wife making his medical decisions for him if he is unable. Psychosocial Risks of Transplant Reviewed and Discussed:  _X_ Increased stress related to emotional,            family, social, employment or financial           situation  _X_ Affect on work and/or disability benefits  _X_ Affect on future life insurance  _X_ Transplant outcome expectations may           not be met  _X_ Mental Health Risks: anxiety,           depression, PTSD, guilt, grief and           chronic fatigue     Notable Items:   None noted.       Final Evaluation/Assessment   Patient seemed to process information well. Appeared well informed, motivated and able to follow post transplant requirements. Behavior was appropriate during interview. Has adequate income and insurance coverage. Adequate social support. No major contraindications noted for transplant.  At this time patient appears to understand the risks and benefits of transplant.      Recommendation  Acceptable    Selection Criteria Met:  Plan for support Yes   Chemical Dependence Yes   Smoking Yes   Mental Health Yes   Adequate Finances Yes    Signature: MANUEL Ruvalcaba, [facility]   Title: Clinical

## 2021-09-27 NOTE — PROGRESS NOTES
Transplant Surgery Consult Note    Medical record number: [id number]  YOB: 1975,   Consult requested by Dr. Aime Rios for evaluation of kidney and pancreas transplant candidacy.    Assessment and Recommendations: Mr. Ramey is a good candidate for transplantation and has a good understanding of the risks and benefits of this approach to management of renal failure and diabetes. The following issues should be addressed prior to transplant:       45 yo male with type 2 DM for the last 21 years  Insulin dependent for 5 years with about 40-50 units/d  A1C in the 7s  Some peripheral neuropathy and retinopathy  ESRD on HD since Aug '21  Left wrist fistula  MI Jan 2019  Stent placement in 2019  Then underwent bypass surgery later in the year. No antiplatelet agents now except aspirin  No prev abd surgery  No live donors   Could benefit from a  KP tx  Will need to lose a couple of inches of waistline. 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 Patient verbalized good understanding and had several pertinent questions which were answered satisfactorily.     Immunosuppressive regimen, management and long term risks discussed in detail.         Mr. Ramey has End stage renal failure due to type 2 DM whose condition is not expected to resolve, is expected to progress, and is expected to continue to develop related comorbid conditions.  Cardiology consult for cardiac risk stratification to be ordered: Yes  CT abdomen and pelvis without contrast to be ordered for assessment of vascular targets: Yes  Transplant listing labs ordered to include HLA, ABOx2, Cr, etc.  Dietician consult ordered: Yes  Social work consult ordered: Yes  Imaging reports reviewed:  yes  Radiology images reviewed:no  Recipient suitable to move forward with work up of  living donors:  Yes      The majority of our visit was spent in counselling, discussing the medical and surgical risks of living or  donor kidney and pancreas transplantation, either in a simultaneous or sequential fashion. I contrasted approximate wait time for SPK vs living vs  donor kidneys from normal (0-85%) or higher (%) kidney donor profile index (KDPI) donors and their associated outcomes. I would not recommend this individual to consider kidneys from high KDPI donors. The reason for this decision is best summarized as:SPK candidate.  Access to transplant will be impacted by living donor availability and overall candidacy for SPK, as well as the influence of blood type and degree of sensitization. We discussed advantages of preemptive transplant as well as living donor kidney transplant, and graft and patient survival outcomes associated with these options. Potential surgical complications of kidney and pancreas transplantation include bleeding, clotting, infection, wound complications, anastomotic failure and other issues such as cardiac complications, pneumonia, deep venous thrombosis, pulmonary embolism, post transplant diabetes and death. We discussed the need for protocol biopsy of the kidney and the possible need for a ureteral stent (and subsequent removal). We discussed benefits and risks associated with different approaches to exocrine drainage of pancreatic secretions. We also discussed differences in the average length of stay, recovery process, and posttransplant lab and monitoring protocol. We discussed the risk of graft rejection and recurrent diabetic nephropathy in the setting of poor glycemic control. I emphasized the need for strict immunosuppression adherence and the potential for complications of immunosuppression such as skin cancer or lymphoma, as well as a very low but not zero risk of donor-derived disease transmission risks (infection, cancer). Mr. Ramey asked  good questions and the patient's candidacy will be reviewed at our Multidisciplinary Selection Committee. Thank you for the opportunity to participate in Mr. Ramey's care.    Total time: 60 minutes  Counselling time: 40 minutes    .  ---------------------------------------------------------------------------------------------------    HPI: Mr. Ramey has End stage renal failure due to diabetes mellitus type 2. The patient has had diabetes for 20 years. Management is by Lantus per diabetic educator  Novolog per diabetic educator. The patient usually checks his blood sugar 3 times/day. Complications of diabetes include:    Retinopathy:  Yes   Neuropathy: Yes   Gastroparesis:  No    The patient is on dialysis.    Has potential kidney donors:  No.  Interested in participation in paired exchange if a donor is willing: Doesn't know     The patient has the following pertinent history:       No    Yes  Dialysis:    []      [] via:       Blood Transfusion                  []      []  Number of units:   Most recently:  Pregnancy:    []      [] Number:       Previous Transplant:  []      [] Details:    Cancer    []      [] Comment:   Kidney stones   []      [] Comment:      Recurrent infections  []      []  Type:                  Bladder dysfunction  []      [] Cause:    Claudication   []      [] Distance:    Previous Amputation  []      [] Cause:     Chronic anticoagulation  []      [] Indication:  Jew  []      []     Past Medical History:   Diagnosis Date     Angina pectoris (H)     rare since PCI     Chronic kidney disease     CKD stage 3     Coronary artery disease      Diabetes mellitus, type II (H) 12/2001    insulin     Diabetic nephropathy (H)      DM II (diabetes mellitus, type II), controlled (H)      MARQUEZ (dyspnea on exertion)      Dyslipidemia 12/2001     History of blood transfusion 2004     HTN (hypertension) 2004     Hyperlipidemia      Hypertension     takes medication     Ischemic cardiomyopathy       Myocardial infarction (H)     STEMI -Diagonal branch of the LAD     Obesity (BMI 30-39.9)      Proteinuria      Vitamin D deficiency      Past Surgical History:   Procedure Laterality Date     BACK SURGERY  2009    L5 disc cut     BYPASS GRAFT ARTERY CORONARY  06/20/2019    2 vessels     CV CORONARY ANGIOGRAM N/A 1/13/2019    Procedure: Coronary Angiogram;  Surgeon: Cielo Che MD;  Location: [facility] Lab;  Service: Cardiology     CV CORONARY ANGIOGRAM N/A 5/2/2019    Procedure: Coronary Angiogram;  Surgeon: Cielo Che MD;  Location: [facility];  Service: Cardiology     CV CORONARY ANGIOGRAM N/A 4/30/2020    Procedure: Coronary Angiogram;  Surgeon: Cielo Che MD;  Location: [facility];  Service: Cardiology     CV CORONARY ANGIOGRAM N/A 7/22/2021    Procedure: CV CORONARY ANGIOGRAM;  Surgeon: Adrian Crow MD;  Location: [facility] LAB CV     CV FRACTIONAL FLOW RATIO WIRE N/A 7/22/2021    Procedure: Fractional Flow Ratio Wire;  Surgeon: Adrian Crow MD;  Location: [facility] LAB CV     CV LEFT HEART CATH N/A 7/22/2021    Procedure: Left Heart Cath;  Surgeon: Adrian Crow MD;  Location: [facility] LAB CV     CV LEFT HEART CATHETERIZATION WITH LEFT VENTRICULOGRAM N/A 1/13/2019    Procedure: Left Heart Catheterization with Left Ventriculogram;  Surgeon: Cielo Che MD;  Location: [facility];  Service: Cardiology     CV LEFT HEART CATHETERIZATION WITHOUT LEFT VENTRICULOGRAM Left 4/30/2020    Procedure: Left Heart Catheterization Without Left Ventriculogram;  Surgeon: Cielo Che MD;  Location: [facility] Lab;  Service: Cardiology     CV RIGHT HEART CATHETERIZATION N/A 4/30/2020    Procedure: Right Heart Catheterization;  Surgeon: Cielo Che MD;  Location: [facility] Lab;  Service: Cardiology     HERNIA REPAIR       OTHER SURGICAL HISTORY      Excise varicocele     STENT, CORONARY, DALE  2019     Family History    Problem Relation Age of Onset     Heart Disease Father 60.00     CABG Father 50.00        triple bypass     Social History     Socioeconomic History     Marital status:      Spouse name: Not on file     Number of children: Not on file     Years of education: Not on file     Highest education level: Not on file   Occupational History     Not on file   Tobacco Use     Smoking status: Never Smoker     Smokeless tobacco: Never Used   Substance and Sexual Activity     Alcohol use: Yes     Comment: Alcoholic Drinks/day: 1-2     Drug use: No     Sexual activity: Yes     Partners: Female   Other Topics Concern     Parent/sibling w/ CABG, MI or angioplasty before 65F 55M? Not Asked   Social History Narrative     Not on file     Social Determinants of Health     Financial Resource Strain:      Difficulty of Paying Living Expenses:    Food Insecurity:      Worried About Running Out of Food in the Last Year:      Ran Out of Food in the Last Year:    Transportation Needs:      Lack of Transportation (Medical):      Lack of Transportation (Non-Medical):    Physical Activity:      Days of Exercise per Week:      Minutes of Exercise per Session:    Stress:      Feeling of Stress :    Social Connections:      Frequency of Communication with Friends and Family:      Frequency of Social Gatherings with Friends and Family:      Attends Mu-ism Services:      Active Member of Clubs or Organizations:      Attends Club or Organization Meetings:      Marital Status:    Intimate Partner Violence:      Fear of Current or Ex-Partner:      Emotionally Abused:      Physically Abused:      Sexually Abused:        ROS:   CONSTITUTIONAL:  No fevers or chills  EYES: negative for icterus  ENT:  negative for hearing loss, tinnitus and sore throat  RESPIRATORY:  negative for cough, sputum, dyspnea  CARDIOVASCULAR:  negative for chest pain Fatigue  GASTROINTESTINAL:  negative for nausea, vomiting, diarrhea or constipation  GENITOURINARY:   negative for incontinence, dysuria, bladder emptying problems  HEME:  No easy bruising  INTEGUMENT:  negative for rash and pruritus  NEURO:  Negative for headache, seizure disorder    Allergies:   Allergies   Allergen Reactions     Venlafaxine      lethargic       Medications:  Prescription Medications as of 9/27/2021       Rx Number Disp Refills Start End Last Dispensed Date Next Fill Date Owning Pharmacy    albuterol (PROAIR HFA/PROVENTIL HFA/VENTOLIN HFA) 108 (90 Base) MCG/ACT inhaler    3/18/2021        Sig: Inhale 2 puffs into the lungs every 4 hours as needed    Class: Historical    Route: Inhalation    amLODIPine (NORVASC) 10 MG tablet  90 tablet 1 9/1/2021    [id number] [address] [place] [address] AT [facility]    Sig: Take 1 tablet (10 mg) by mouth daily    Class: E-Prescribe    Route: Oral    aspirin 81 MG EC tablet   0 1/15/2019        Sig: [ASPIRIN 81 MG EC TABLET] Take 1 tablet (81 mg total) by mouth daily.    Class: OTC    Route: Oral    atorvastatin (LIPITOR) 80 MG tablet  90 tablet 1 11/2/2020        Sig: [ATORVASTATIN (LIPITOR) 80 MG TABLET] TAKE 1 TABLET(80 MG) BY MOUTH AT BEDTIME    carvedilol (COREG) 12.5 MG tablet  540 tablet 1 9/1/2021    [facility][id number] [place] [id number] [facility] AT [facility]    Sig: Take 3 tablets (37.5 mg) by mouth 2 times daily    Class: E-Prescribe    Route: Oral    cholecalciferol, vitamin D3, 5,000 unit Tab    1/13/2019        Sig: [CHOLECALCIFEROL, VITAMIN D3, 5,000 UNIT TAB] Take 5,000 Units by mouth daily.    Class: Historical    Route: Oral    cloNIDine (CATAPRES) 0.1 MG tablet  30 tablet 0 7/27/2021    [id number] [address] [place] [address] AT [facility]    Sig: Take 1 tablet (0.1 mg) by mouth 3 times daily as needed (SBP > 160)    Class: E-Prescribe    Route: Oral    doxazosin (CARDURA) 4 MG tablet    10/4/2020        Sig: [DOXAZOSIN  (CARDURA) 4 MG TABLET] Take 2 mg by mouth at bedtime.    Class: Historical    Route: Oral    furosemide (LASIX) 40 MG tablet  90 tablet 1 9/1/2021    [facility] #[id number] - [address] AT [facility]    Sig: Take 1 tablet (40 mg) by mouth daily    Class: E-Prescribe    Route: Oral    hydrALAZINE (APRESOLINE) 25 MG tablet  270 tablet 0 7/27/2021 8/26/2021   [facility] #[id number] [address] AT [facility]    Sig: Take 3 tablets (75 mg) by mouth 3 times daily    Class: E-Prescribe    Route: Oral    insulin aspart (NOVOLOG PEN) 100 UNIT/ML pen  4.5 mL 1 7/27/2021 8/26/2021   [facility] #[id number] [address] AT [facility]    Sig: Inject 5 Units Subcutaneous 3 times daily (before meals)    Class: E-Prescribe    Route: Subcutaneous    nitroGLYcerin (NITROSTAT) 0.4 MG sublingual tablet  25 tablet 1 8/9/2021    [facility] #[id number] [address] AT [facility]    Sig: Place 1 tablet (0.4 mg) under the tongue every 5 minutes as needed for chest pain For chest pain place 1 tablet under the tongue every 5 minutes for 3 doses. If symptoms persist 5 minutes after 1st dose call 911.    Class: E-Prescribe    Route: Sublingual    sodium bicarbonate 650 MG tablet  60 tablet 0 7/27/2021    [facility] #[id number] [address] AT [facility]    Sig: Take 1 tablet (650 mg) by mouth 2 times daily    Class: E-Prescribe    Route: Oral          Exam:   Pulse:  [85] 85  BP: (116)/(75) 116/75  SpO2:  [95 %] 95 %  Appearance: in no apparent distress.   Skin: normal  Head and Neck: Normal, no rashes or jaundice  Respiratory: easy respirations, no audible wheezing.  Cardiovascular: RRR  Abdomen: rounded, No surgical scars       Diagnostics:   No results found for this or any previous visit (from the past 672  hour(s)).  No results found for: CPRA

## 2021-09-27 NOTE — PROGRESS NOTES
TRANSPLANT NEPHROLOGY RECIPIENT EVALUATION NOTE    Assessment and Plan:  # Kidney/Pancreas Transplant Evaluation: Patient is a good candidate overall. Benefits of a living donor transplant were discussed.    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 ABO-A, no donors.     # DM Type 2: borderline control using 40-50 units of insulin daily. Given evidence of end-organ damage, he may benefit from a pancreas transplant.     # Cardiac Risk: will need cardiac clearance with history of CAD s/p PCI with IBAN placement to first diagonal in 1/2019, s/p two-vessel CABG in 6/2019. Not on antiplatelet agent now except asa. Recent angiogram on 7/22/2021  showed improved known RCA graft stenosis of 30-40%  (FFR 0.93), no PCI was done. Reduced EF of 45-50% at that time.     # PAD Screening: will need noncontrast CT abdomen/pelvis and iliac ultrasound.    # BMI 35 with central obesity: will refer to surgery.      # COVID-19 (5/2021): for which he did not require hospitalization.  Has completed his vaccination and is asymptomatic.     # Left diaphragmatic elevation: since time of CABG. Need PFTs.      # Health Maintenance: Dental: Not up to date.     Discussed the risks and benefits of a transplant, including the risk of surgery and immunosuppression medications.  Patient's overall evaluation will be discussed in the Transplant Program's regular meeting with a final recommendation on the patients suitability for transplant to be made at that time.    Pending completion of the full evaluation, patient presently appears to be enough of an acceptable kidney transplant recipient candidate to have any potential kidney donors start the evaluation process.  Patient was seen in conjunction with Dr. Fernando Sorensen as part of a shared visit.    PHYSICIAN ATTESTATION AND EVALUATION  Patient was seen by myself, Dr. Fernando Sorensen, in conjunction with Jarod  JUANCARLOS Christy CNP as part of a shared visit.    I personally reviewed the patient's past medical and surgical history, vital signs, medications and pertinent laboratory results and radiology findings.  Present and past medical history, along with significant physical exam findings were all reviewed with KAT.    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  Patient reports feeling okay overall with some medical complaints.    Key management decisions made by me and discussed with KAT include the following, with full Assessment and Plan noted above by KAT:  # Kidney/Pancreas Transplant Evaluation: Patient is a good candidate overall.     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    # DM Type 2: Borderline control on insulin.  Patient has end-organ damage and may benefit from a pancreas transplant.    # Cardiac Risk: Patient has known cardiac disease with CAD, s/p PCI and more recent CABG.  Patient will require Cardiology evaluation prior to transplant.    # PAD Screening: Will obtain updated imaging for Transplant Surgery to review.    # Obesity: Patient is right at BMI guidelines with central obesity and would recommend weight loss to decrease risk of surgical wound complications and for overall health.  Will defer to Transplant Surgery for specific weight loss goal.    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  Will obtain PFTs.    Fernando Sorensen MD    Evaluation:  Siva Ramey was seen in consultation at the request of Dr. Jarvis Michaud for evaluation as a potential kidney/pancreas transplant recipient.    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    History of Present Illness:         Kidney Disease Hx:   Siva Ramey is a 46-year-old gentleman with history of ESKD secondary to presumed diabetic nephropathy. CKD and nephrotic proteinuria since 2017 at which time he had negative serologies including SPEP and DEBORAH.  His kidney disease continued to progress by the following serum creatinine levels of  1.7-1.9 (2017), 3.24 (2018), 4.1 (4/2021) then ultimately started hemodialysis on 8/2/2021 after a coronary angiogram was done 7/22/2021 that showed improved stenosis of RCA graft compared to the year prior (h/o 2 vessel CABG in 2019). Dialysis is reportedly going well, he is ABO-A with no potential donors.        Primary Nephrologist: Dr. Rios        H/o Kidney Stones: No       H/o Recurrent/Frequent UTI: No         Diabetic Hx: Type 2        Diagnosis Date: 2000        Medication History: initially treated with oral medications, then insulin was added about 4 years ago, oral medication discontinued a few months ago. Currently using Novolog 5-7 TID with meals, and 25 units of Soliqua HS.         Diabetic Control: Borderline control (HbA1c 7-9%)   Last HbA1c: 7.8% (ranging 7-10%)       Hypoglycemic Unawareness: Yes, using a sensor, does not feel low until the 40s, blood sugars typically ranging  the high 100s.        End-Organ Damage due to DM: Retinopathy, Nephropathy and Peripheral neuropathy          Cardiac/Vascular Disease Risk Factors:        Cardiac Risk Factors: Diabetes, Hypertension and CKD       Known CAD: Status post PCI with IBAN placement to first diagonal in 1/2019, s/p two-vessel CABG in 6/2019,  with an angiogram on 7/22/2021 with improved RCA graft stenosis of 30-40%  (FFR 0.93), no PCI was done.        Known PAD/Caludication Symptoms: No       Known Heart Failure: HFrEF 45-50% in 7/2021       Arrhythmia: no        Pulmonary Hypertension: No       Valvular Disease: No       Other: None         Viral Serology Status       CMV IgG  Antibody: Unknown       EBV IgG Antibody: Unknown         Volume Status/Weight:        Volume status: Mildly hypervolemic       Weight:  BMI above guidelines       BMI: There is no height or weight on file to calculate BMI.         Functional Capacity/Frailty:        Previously doing maintenance work, now on disability. Recently started to feel more energized and did some yard work and changed his car oil over the weekend. He thinks he could walk 1/2 of a mile.       Fatigue/Decreased Energy: [] No [x] Yes But overall improving    Chest Pain or SOB with Exertion: [x] No [] Yes    Significant Weight Change: [x] No [] Yes    Nausea, Vomiting or Diarrhea: [] No [x] Yes Nausea improving since starting dialysis, diarrhea once weekly x 6 months   Fever, Sweats or Chills:  [x] No [] Yes    Leg Swelling [x] No [] Yes        History of Cancer: None    Other Significant Medical Issues:   - BMI 35  - COVID-19 (5/2021): for which she did not require hospitalization.  Has completed his vaccinations.  - Left lung injury: with left diaphragmatic elevation since CABG. Likely had damage to left phrenic nerve, per outside pulmonary assessment in 2/2020.   - Anxiety: managing ok with prn zanax.        # COVID Vaccination Up To Date: Yes    Potential Living Kidney Donors: No    Review of Systems:  A comprehensive review of systems was obtained and negative, except as noted in the HPI or PMH.    Past Medical History:   Medical record was reviewed and PMH was discussed with patient and noted below.  Past Medical History:   Diagnosis Date     Angina pectoris (H)     rare since PCI     Chronic kidney disease     CKD stage 3     Coronary artery disease      Diabetes mellitus, type II (H) 12/2001    insulin     Diabetic nephropathy (H)      DM II (diabetes mellitus, type II), controlled (H)      MARQUEZ (dyspnea on exertion)      Dyslipidemia 12/2001     History of blood transfusion 2004     HTN (hypertension) 2004     Hyperlipidemia       Hypertension     takes medication     Ischemic cardiomyopathy      Myocardial infarction (H)     STEMI -Diagonal branch of the LAD     Obesity (BMI 30-39.9)      Proteinuria      Vitamin D deficiency        Past Social History:   Past Surgical History:   Procedure Laterality Date     BACK SURGERY  2009    L5 disc cut     BYPASS GRAFT ARTERY CORONARY  06/20/2019    2 vessels     CV CORONARY ANGIOGRAM N/A 1/13/2019    Procedure: Coronary Angiogram;  Surgeon: Cielo Che MD;  Location: [facility] Lab;  Service: Cardiology     CV CORONARY ANGIOGRAM N/A 5/2/2019    Procedure: Coronary Angiogram;  Surgeon: Cielo Che MD;  Location: [facility];  Service: Cardiology     CV CORONARY ANGIOGRAM N/A 4/30/2020    Procedure: Coronary Angiogram;  Surgeon: Cielo Che MD;  Location: [facility];  Service: Cardiology     CV CORONARY ANGIOGRAM N/A 7/22/2021    Procedure: CV CORONARY ANGIOGRAM;  Surgeon: Adrian Crow MD;  Location: [facility] LAB CV     CV FRACTIONAL FLOW RATIO WIRE N/A 7/22/2021    Procedure: Fractional Flow Ratio Wire;  Surgeon: Adrian Crow MD;  Location: [facility] LAB CV     CV LEFT HEART CATH N/A 7/22/2021    Procedure: Left Heart Cath;  Surgeon: Adrian Crow MD;  Location: [facility] CATH LAB CV     CV LEFT HEART CATHETERIZATION WITH LEFT VENTRICULOGRAM N/A 1/13/2019    Procedure: Left Heart Catheterization with Left Ventriculogram;  Surgeon: Cielo Che MD;  Location: [facility];  Service: Cardiology     CV LEFT HEART CATHETERIZATION WITHOUT LEFT VENTRICULOGRAM Left 4/30/2020    Procedure: Left Heart Catheterization Without Left Ventriculogram;  Surgeon: Cielo Che MD;  Location: [facility];  Service: Cardiology     CV RIGHT HEART CATHETERIZATION N/A 4/30/2020    Procedure: Right Heart Catheterization;  Surgeon: Cielo Che MD;  Location: [facility] Lab;  Service: Cardiology     HERNIA REPAIR       OTHER SURGICAL  HISTORY      Excise varicocele     STENT, CORONARY, DALE  2019     Personal history of bleeding or anesthesia problems: No    Family History:  Family History   Problem Relation Age of Onset     Heart Disease Father 60.00     CABG Father 50.00        triple bypass       Personal History:   Social History     Socioeconomic History     Marital status:      Spouse name: Not on file     Number of children: Not on file     Years of education: Not on file     Highest education level: Not on file   Occupational History     Not on file   Tobacco Use     Smoking status: Never Smoker     Smokeless tobacco: Never Used   Substance and Sexual Activity     Alcohol use: Yes     Comment: Alcoholic Drinks/day: 1-2     Drug use: No     Sexual activity: Yes     Partners: Female   Other Topics Concern     Parent/sibling w/ CABG, MI or angioplasty before 65F 55M? Not Asked   Social History Narrative     Not on file     Social Determinants of Health     Financial Resource Strain:      Difficulty of Paying Living Expenses:    Food Insecurity:      Worried About Running Out of Food in the Last Year:      Ran Out of Food in the Last Year:    Transportation Needs:      Lack of Transportation (Medical):      Lack of Transportation (Non-Medical):    Physical Activity:      Days of Exercise per Week:      Minutes of Exercise per Session:    Stress:      Feeling of Stress :    Social Connections:      Frequency of Communication with Friends and Family:      Frequency of Social Gatherings with Friends and Family:      Attends Sabianism Services:      Active Member of Clubs or Organizations:      Attends Club or Organization Meetings:      Marital Status:    Intimate Partner Violence:      Fear of Current or Ex-Partner:      Emotionally Abused:      Physically Abused:      Sexually Abused:        Allergies:  Allergies   Allergen Reactions     Venlafaxine      lethargic       Medications:  Current Outpatient Medications   Medication Sig      albuterol (PROAIR HFA/PROVENTIL HFA/VENTOLIN HFA) 108 (90 Base) MCG/ACT inhaler Inhale 2 puffs into the lungs every 4 hours as needed     amLODIPine (NORVASC) 10 MG tablet Take 1 tablet (10 mg) by mouth daily     aspirin 81 MG EC tablet [ASPIRIN 81 MG EC TABLET] Take 1 tablet (81 mg total) by mouth daily.     atorvastatin (LIPITOR) 80 MG tablet [ATORVASTATIN (LIPITOR) 80 MG TABLET] TAKE 1 TABLET(80 MG) BY MOUTH AT BEDTIME (Patient taking differently: Take 80 mg by mouth At Bedtime )     carvedilol (COREG) 12.5 MG tablet Take 3 tablets (37.5 mg) by mouth 2 times daily     cholecalciferol, vitamin D3, 5,000 unit Tab [CHOLECALCIFEROL, VITAMIN D3, 5,000 UNIT TAB] Take 5,000 Units by mouth daily.     cloNIDine (CATAPRES) 0.1 MG tablet Take 1 tablet (0.1 mg) by mouth 3 times daily as needed (SBP > 160)     doxazosin (CARDURA) 4 MG tablet [DOXAZOSIN (CARDURA) 4 MG TABLET] Take 2 mg by mouth at bedtime.     furosemide (LASIX) 40 MG tablet Take 1 tablet (40 mg) by mouth daily     hydrALAZINE (APRESOLINE) 25 MG tablet Take 3 tablets (75 mg) by mouth 3 times daily     insulin aspart (NOVOLOG PEN) 100 UNIT/ML pen Inject 5 Units Subcutaneous 3 times daily (before meals)     nitroGLYcerin (NITROSTAT) 0.4 MG sublingual tablet Place 1 tablet (0.4 mg) under the tongue every 5 minutes as needed for chest pain For chest pain place 1 tablet under the tongue every 5 minutes for 3 doses. If symptoms persist 5 minutes after 1st dose call 911.     sodium bicarbonate 650 MG tablet Take 1 tablet (650 mg) by mouth 2 times daily     No current facility-administered medications for this visit.       Vitals:  There were no vitals taken for this visit.    Exam:  GENERAL APPEARANCE: alert and no distress  HENT: mouth without ulcers or lesions  LYMPHATICS: no cervical or supraclavicular nodes  RESP: lungs clear to auscultation - no rales, rhonchi or wheezes  CV: regular rhythm, normal rate, no rub, no murmur  FEMORAL PULSES:+2 bilaterally.   EDEMA:  no LE edema bilaterally  ABDOMEN: soft, nondistended, nontender, bowel sounds normal  MS: extremities normal - no gross deformities noted, no evidence of inflammation in joints, no muscle tenderness  SKIN: no rash    Results:   No results found for this or any previous visit (from the past 336 hour(s)).

## 2021-09-27 NOTE — PROGRESS NOTES
Kidney, Pancreas Transplant Referral - 7/21/2021   Patient completed AM PKE appointments Nephrology, Surgery and Social Work. Nutrition appointment needs to be rescheduled due to unavailability of provider. Patient and Transplant Coordinator informed.  Time and location of PM appointments reviewed with patient.  Patient instructed next contact from Transplant Coordinator will be following Selection Committee Meeting.  Patient stated understanding.  KDPI form signed by patient and faxed to HIM.      Summary    Team s concerns/comments:   1) Cardiac risk assessment  2) PAD assessment  3) BMI/Obesity  4) Diaphragmatic palsy  5) Health maintenance    Candidacy category: Yellow    Action/Plan:   1) EKG, Echo today, Cardiology consult  2) A/P CT without contrast  3) Our of criteria for pancreas. Weight loss recommendations per surgery  4) PFTs  5) Dental due      Expected Selection Meeting Discussion: 10/6/21

## 2021-09-28 LAB
C PEPTIDE SERPL-MCNC: 4.8 NG/ML (ref 0.9–6.9)
DRVVT SCREEN RATIO: 0.9
GAMMA INTERFERON BACKGROUND BLD IA-ACNC: 0.11 IU/ML
LA PPP-IMP: NEGATIVE
LUPUS INTERPRETATION: NORMAL
M TB IFN-G BLD-IMP: NEGATIVE
M TB IFN-G CD4+ BCKGRND COR BLD-ACNC: 9.89 IU/ML
MITOGEN IGNF BCKGRD COR BLD-ACNC: -0.02 IU/ML
MITOGEN IGNF BCKGRD COR BLD-ACNC: 0 IU/ML
PTT RATIO: 1.03
QUANTIFERON MITOGEN: 10 IU/ML
QUANTIFERON NIL TUBE: 0.11 IU/ML
QUANTIFERON TB1 TUBE: 0.09 IU/ML
QUANTIFERON TB2 TUBE: 0.11
THROMBIN TIME: 16 SECONDS (ref 13–19)
XXX BLOOD GROUP AB TITR SERPL: NORMAL {TITER}

## 2021-09-29 ENCOUNTER — LAB (OUTPATIENT)
Dept: LAB | Facility: CLINIC | Age: 46
End: 2021-09-29
Payer: COMMERCIAL

## 2021-09-29 DIAGNOSIS — N18.6 END STAGE KIDNEY DISEASE (H): ICD-10-CM

## 2021-09-29 DIAGNOSIS — E11.22 TYPE 2 DIABETES MELLITUS WITH CHRONIC KIDNEY DISEASE ON CHRONIC DIALYSIS, WITH LONG-TERM CURRENT USE OF INSULIN (H): ICD-10-CM

## 2021-09-29 DIAGNOSIS — Z99.2 TYPE 2 DIABETES MELLITUS WITH CHRONIC KIDNEY DISEASE ON CHRONIC DIALYSIS, WITH LONG-TERM CURRENT USE OF INSULIN (H): ICD-10-CM

## 2021-09-29 DIAGNOSIS — Z01.818 ENCOUNTER FOR PRE-TRANSPLANT EVALUATION FOR KIDNEY AND PANCREAS TRANSPLANT: ICD-10-CM

## 2021-09-29 DIAGNOSIS — N18.6 TYPE 2 DIABETES MELLITUS WITH CHRONIC KIDNEY DISEASE ON CHRONIC DIALYSIS, WITH LONG-TERM CURRENT USE OF INSULIN (H): ICD-10-CM

## 2021-09-29 DIAGNOSIS — Z76.82 ORGAN TRANSPLANT CANDIDATE: ICD-10-CM

## 2021-09-29 DIAGNOSIS — Z79.4 TYPE 2 DIABETES MELLITUS WITH CHRONIC KIDNEY DISEASE ON CHRONIC DIALYSIS, WITH LONG-TERM CURRENT USE OF INSULIN (H): ICD-10-CM

## 2021-09-29 LAB
ABO/RH(D): NORMAL
SPECIMEN EXPIRATION DATE: NORMAL

## 2021-09-29 PROCEDURE — 36415 COLL VENOUS BLD VENIPUNCTURE: CPT | Performed by: PATHOLOGY

## 2021-09-29 PROCEDURE — 86901 BLOOD TYPING SEROLOGIC RH(D): CPT | Mod: 90 | Performed by: PATHOLOGY

## 2021-09-29 PROCEDURE — 86900 BLOOD TYPING SEROLOGIC ABO: CPT | Mod: 90 | Performed by: PATHOLOGY

## 2021-09-30 LAB
CARDIOLIPIN IGG SER IA-ACNC: <2 GPL-U/ML
CARDIOLIPIN IGG SER IA-ACNC: NEGATIVE
CARDIOLIPIN IGM SER IA-ACNC: 8.7 MPL-U/ML
CARDIOLIPIN IGM SER IA-ACNC: NEGATIVE

## 2021-10-01 LAB
A*LOCUS SEROLOGIC EQUIVALENT: 1
A*LOCUS: NORMAL
ABTEST METHOD: NORMAL
B*: NORMAL
B*LOCUS SEROLOGIC EQUIVALENT: 8
B*LOCUS: NORMAL
B*SEROLOGIC EQUIVALENT: 62
BW-1: NORMAL
C*: NORMAL
C*LOCUS SEROLOGIC EQUIVALENT: 10
C*LOCUS: NORMAL
C*SEROLOGIC EQUIVALENT: 7
DPA1*: NORMAL
DPA1*LOCUS: NORMAL
DPB1*: NORMAL
DPB1*LOCUS: NORMAL
DQA1*: NORMAL
DQA1*LOCUS: NORMAL
DQB1*: NORMAL
DQB1*LOCUS SEROLOGIC EQUIVALENT: 2
DQB1*LOCUS: NORMAL
DQB1*SEROLOGIC EQUIVALENT: 8
DRB1*: NORMAL
DRB1*LOCUS SEROLOGIC EQUIVALENT: 17
DRB1*LOCUS: NORMAL
DRB1*SEROLOGIC EQUIVALENT: 4
DRB3*LOCUS SEROLOGIC EQUIVALENT: 52
DRB3*LOCUS: NORMAL
DRB4*: NORMAL
DRB4*SEROLOGIC EQUIVALENT: 53
DRSSO TEST METHOD: NORMAL
PROTOCOL CUTOFF: NORMAL
SA 1 CELL: NORMAL
SA 1 TEST METHOD: NORMAL
SA 2 CELL: NORMAL
SA 2 TEST METHOD: NORMAL
SA1 HI RISK ABY: NORMAL
SA1 MOD RISK ABY: NORMAL
SA2 HI RISK ABY: NORMAL
SA2 MOD RISK ABY: NORMAL
UNACCEPTABLE ANTIGENS: NORMAL
UNOS CPRA: 11
ZZZSA 1  COMMENTS: NORMAL
ZZZSA 2 COMMENTS: NORMAL

## 2021-10-06 ENCOUNTER — COMMITTEE REVIEW (OUTPATIENT)
Dept: TRANSPLANT | Facility: CLINIC | Age: 46
End: 2021-10-06

## 2021-10-06 NOTE — COMMITTEE REVIEW
Abdominal Committee Review Note     Evaluation Date: 9/27/2021  Committee Review Date: 10/6/2021    Organ being evaluated for: Kidney/Pancreas    Transplant Phase: Evaluation  Transplant Status: Active    Transplant Coordinator: Kim Fay  Transplant Surgeon: Dr. Jarvis Michaud    Referring Physician: Aime Rios    Primary Diagnosis: Diabetes Type 2  Secondary Diagnosis: ESRD    Committee Review Members:  Nephrology Isidoro Claros MD, Jarod Christy, APRN CNP, Dewayne Hernandez MD, Jann West MD   Nurse Kim Fay RN, Alisa Tobin, BETSY   Nutrition Cindy Romero, LINDA   Pharmacist Kyle Valdez, [facility]    - Clinical Deepa Elysia Basilio, [facility] [place]   Transplant Nayla Pendleton, BETSY, Yunior Christianson MD, Sobia Underwood, ARIE, Natalie Nina, BETSY, Anna Marie Machuca RN   Transplant Surgery Harrison Whitehead MD       Transplant Eligibility: Insulin-dependent diabetes mellitus, Irreversible chronic kidney disease treated w/dialysis or expected need for dialysis    Committee Review Decision: Approved    Relative Contraindications: BMI    Absolute Contraindications: None    Committee Chair Harrison Whitehead MD verbally attested to the committee's decision.    Committee Discussion Details: Reviewed pt's medical status and evaluation results to date.  Pt is determined to be an approved candidate for kidney transplant, will need to lose weight for approval to proceed w/ SPK. Dr. West recommends the following items be completed as part of the pt's evaluation: Ab/Pelv CT, iliac US, PFTs, cardiac risk assessment, weight loss for SPK, Dietician consult, and health maintenance UTD.  Will not list the pt as he is on dialysis. Pt will be called and summary letter generated.

## 2021-10-07 ENCOUNTER — TELEPHONE (OUTPATIENT)
Dept: TRANSPLANT | Facility: CLINIC | Age: 46
End: 2021-10-07

## 2021-10-07 DIAGNOSIS — I10 ESSENTIAL HYPERTENSION: ICD-10-CM

## 2021-10-07 DIAGNOSIS — N18.6 END STAGE RENAL DISEASE (H): ICD-10-CM

## 2021-10-07 DIAGNOSIS — E78.5 HYPERLIPIDEMIA: ICD-10-CM

## 2021-10-07 DIAGNOSIS — Z76.82 ORGAN TRANSPLANT CANDIDATE: ICD-10-CM

## 2021-10-07 DIAGNOSIS — E11.9 DIABETES MELLITUS, TYPE 2 (H): ICD-10-CM

## 2021-10-07 DIAGNOSIS — I25.10 CARDIOVASCULAR DISEASE: ICD-10-CM

## 2021-10-07 DIAGNOSIS — Z01.818 ENCOUNTER FOR PRE-TRANSPLANT EVALUATION FOR KIDNEY AND PANCREAS TRANSPLANT: ICD-10-CM

## 2021-10-07 NOTE — TELEPHONE ENCOUNTER
Called pt to review outcome of selection committee.  I explained that he is approved for kidney alone but will need to lose some weight to qualify for SPK.  We reviewed his next steps- cardiac clearance, imaging, dietician, PFTs, and dental UTD.  He will let me know when he sees the dentist.  He had no further questions at this time.  Orders entered and routed to scheduling.

## 2021-10-07 NOTE — LETTER
10/07/21        Siva Ramey  [address] [address]        Dear Siva,    It was a pleasure to see you recently for consideration of kidney and pancreas transplantation. Your pre-transplant evaluation results were reviewed at our Multidisciplinary Selection Committee on 10/6/21. The Committee has approved you as a candidate for kidney transplant, if you meet the BMI requirements, you may also be approved for simultaneous kidney/pancreas transplant.  The Committee is requesting the following items are completed before your case will be reviewed for active listing status:    1. Cardiac risk assessment - our schedulers will call you to arrange this appointment    2. Imaging: Abdominal/Pelvic CT and Iliac ultrasound - our schedulers will call you to arrange these tests    3. Nutrition consult w/ Registered Dietician - our schedulers will call you to arrange this appointment    4. Pulmonary Function Test (PFTs) - our schedulers will call you to arrange this test    5. Please make an appointment to see your dentist and have all recommended work completed, please call me when this is done      For any questions, please contact the Transplant Office at [phone], or you may reach me directly at [phone].      Sincerely,  Kim Fay, RN, Pre-Kidney/Pancreas Transplant Coordinator    Solid Organ Transplant  [facility], [facility]    CC: Dr. Trinidad Hansen, Dr. China Flores, Dr. Aime Rios

## 2021-10-07 NOTE — Clinical Note
Please see orders, pt needs the following scheduled: CT Ab/pelv, Iliac US, cards consult, Nutrition consult, and PFTs.  Thanks, Kim

## 2021-10-13 ENCOUNTER — OFFICE VISIT (OUTPATIENT)
Dept: CARDIOLOGY | Facility: CLINIC | Age: 46
End: 2021-10-13
Payer: COMMERCIAL

## 2021-10-13 ENCOUNTER — TELEPHONE (OUTPATIENT)
Dept: TRANSPLANT | Facility: CLINIC | Age: 46
End: 2021-10-13

## 2021-10-13 VITALS
SYSTOLIC BLOOD PRESSURE: 120 MMHG | HEART RATE: 92 BPM | BODY MASS INDEX: 33.05 KG/M2 | RESPIRATION RATE: 16 BRPM | WEIGHT: 244 LBS | DIASTOLIC BLOOD PRESSURE: 80 MMHG | HEIGHT: 72 IN

## 2021-10-13 DIAGNOSIS — R00.2 PALPITATIONS: ICD-10-CM

## 2021-10-13 DIAGNOSIS — I25.10 CORONARY ARTERY DISEASE DUE TO LIPID RICH PLAQUE: Primary | ICD-10-CM

## 2021-10-13 DIAGNOSIS — I25.83 CORONARY ARTERY DISEASE DUE TO LIPID RICH PLAQUE: Primary | ICD-10-CM

## 2021-10-13 PROCEDURE — 99214 OFFICE O/P EST MOD 30 MIN: CPT | Performed by: INTERNAL MEDICINE

## 2021-10-13 RX ORDER — INSULIN GLARGINE AND LIXISENATIDE 100; 33 U/ML; UG/ML
25 INJECTION, SOLUTION SUBCUTANEOUS DAILY
COMMUNITY
Start: 2021-08-26 | End: 2024-02-14

## 2021-10-13 ASSESSMENT — MIFFLIN-ST. JEOR: SCORE: 2024.78

## 2021-10-13 NOTE — LETTER
10/13/2021    Trinidad Hansen PA-C, SIS  [facility] [place] [address] [place] [address]    RE: Siva Ramey       Dear Colleague,    I had the pleasure of seeing Siva Ramey in the [facility].      HEART CARE ENCOUNTER CONSULTATON NOTE      [facility]  [phone]      Assessment/Recommendations   Assessment/Plan:  CAD - stable, no angina on aspirin and statin    Hypertension - controlled with multiple agents and dialysis    Hyperlipidemia - at goal on statin therapy    Irregular heart rhythm - I suspect PVCs based on what he describes. We will get a 1 week GUERRERO to see what is going on.    F/U 6 months       History of Present Illness/Subjective    HPI: Siva Ramey is a 46 year old male diabetic with ESRD on HD, hypertension, hyperlipidemia and CAD who suffered an anterior-lateral STEMI January 2019. He was found to have a thrombotic occlusion of a large diagonal that was treated with a Synergy IBAN. There was residual diffuse moderate-severe mid LAD disease and distal RCA disease. EF was 45% by echo. Siva underwent 2 v CABG in June 2019 (LIMA-LAD, SVG-RCA). Siva was admitted 7/2021 with chest pain and hypertensive urgency. Echo EF was stable at 45-50% and coronary angiogram was also stable with FFR - LAD disease. Losartan was stopped due to progressive renal insufficiency. Siva follows with a pulmonologist at [facility], Dr. Ferreira; spirometry showed severe restriction. A CT chest did show some compressive volume loss in the left lung base. A sniff test showed a mildly elevated left hemidiaphragm but normal, bilateral diaphragmatic excursion. Siva started dialysis August 2021 for progressive renal failure, hypertension, and fluid overload.     Siva returns for follow up. He is being worked up for a kidney-pancrease transplant. Dialysis is going well and he notes no chest pain or dizziness with or after dialysis.  His blood pressures are typically 110-130s. He has been told that he has some irregular heart rhythms at dialysis and occasionally he will awake at night with heavier heart beats but no heart racing. He notes the sensation that his heart is skipping a beat. There is no pnd/orthopnea and his leg edema is improving.        Physical Examination  Review of Systems   Vitals: /80 (BP Location: Left arm, Patient Position: Sitting, Cuff Size: Adult Large)   Pulse 92   Resp 16   Ht 1.829 m (6')   Wt 110.7 kg (244 lb)   BMI 33.09 kg/m    BMI= Body mass index is 33.09 kg/m .  Wt Readings from Last 3 Encounters:   10/13/21 110.7 kg (244 lb)   09/27/21 114.8 kg (253 lb)   08/09/21 117.5 kg (259 lb)       General Appearance:   no distress, normal body habitus   ENT/Mouth: membranes moist, no oral lesions or bleeding gums.      EYES:  no scleral icterus, normal conjunctivae   Neck: no carotid bruits or thyromegaly   Chest/Lungs:   lungs are clear to auscultation, no rales or wheezing, stable sternal scar, equal chest wall expansion    Cardiovascular:   Regular. Normal first and second heart sounds with no murmur. No rubs or gallops; the right carotid, radial and posterior tibial pulses are intact and the left carotid, radial and posterior tibial pulses are intact.  Jugular venous pressure is flat, trace edema bilaterally    Abdomen:  no organomegaly, masses, bruits, or tenderness; bowel sounds are present   Extremities: no cyanosis or clubbing   Skin: no xanthelasma, warm.    Neurologic: normal  bilateral, no tremors     Psychiatric: alert and oriented x3, calm        Please refer above for cardiac ROS details.        Medical History  Surgical History Family History Social History   Past Medical History:   Diagnosis Date     Acquired elevated diaphragm      Anemia in chronic kidney disease      Angina pectoris (H)      Chronic kidney disease      Coronary artery disease      Diabetes mellitus, type II (H) 12/2001      Diabetic nephropathy (H)      MARQUEZ (dyspnea on exertion)      Dyslipidemia 12/2001     End stage renal disease (H)      History of blood transfusion 2004     Hypertension     takes medication     Ischemic cardiomyopathy      Metabolic acidosis      Myocardial infarction (H)     STEMI -Diagonal branch of the LAD     Obesity (BMI 30-39.9)      Peripheral neuropathy      Proteinuria      Retinopathy      Vitamin D deficiency      Past Surgical History:   Procedure Laterality Date     BACK SURGERY  2009    L5 disc cut     BYPASS GRAFT ARTERY CORONARY  06/20/2019    2 vessels     CV CORONARY ANGIOGRAM N/A 1/13/2019    Procedure: Coronary Angiogram;  Surgeon: Cielo Che MD;  Location: [facility];  Service: Cardiology     CV CORONARY ANGIOGRAM N/A 5/2/2019    Procedure: Coronary Angiogram;  Surgeon: Cielo Che MD;  Location: [facility];  Service: Cardiology     CV CORONARY ANGIOGRAM N/A 4/30/2020    Procedure: Coronary Angiogram;  Surgeon: Cielo Che MD;  Location: [facility];  Service: Cardiology     CV CORONARY ANGIOGRAM N/A 7/22/2021    Procedure: CV CORONARY ANGIOGRAM;  Surgeon: Adrian Crow MD;  Location: [facility] CATH LAB CV     CV FRACTIONAL FLOW RATIO WIRE N/A 7/22/2021    Procedure: Fractional Flow Ratio Wire;  Surgeon: Adrian Crow MD;  Location: [facility] CATH LAB CV     CV LEFT HEART CATH N/A 7/22/2021    Procedure: Left Heart Cath;  Surgeon: Adrian Crow MD;  Location: [facility] LAB CV     CV LEFT HEART CATHETERIZATION WITH LEFT VENTRICULOGRAM N/A 1/13/2019    Procedure: Left Heart Catheterization with Left Ventriculogram;  Surgeon: Cielo Che MD;  Location: [facility] Lab;  Service: Cardiology     CV LEFT HEART CATHETERIZATION WITHOUT LEFT VENTRICULOGRAM Left 4/30/2020    Procedure: Left Heart Catheterization Without Left Ventriculogram;  Surgeon: Cielo Che MD;  Location: [facility] Lab;  Service: Cardiology     CV  RIGHT HEART CATHETERIZATION N/A 4/30/2020    Procedure: Right Heart Catheterization;  Surgeon: Cielo Che MD;  Location: [facility] Cath Lab;  Service: Cardiology     HERNIA REPAIR       OTHER SURGICAL HISTORY      Excise varicocele     STENT, CORONARY, DALE  2019     Family History   Problem Relation Age of Onset     Diabetes Type 2  Mother      Heart Disease Father 60     CABG Father 50        triple bypass     Diabetes Type 2  Father      Depression Sister      Substance Abuse Sister      Ovarian Cancer Maternal Grandmother      Brain Cancer Maternal Grandmother      Pancreatic Cancer Maternal Aunt      Prostate Cancer Maternal Uncle         Social History     Socioeconomic History     Marital status:      Spouse name: Not on file     Number of children: Not on file     Years of education: Not on file     Highest education level: Not on file   Occupational History     Not on file   Tobacco Use     Smoking status: Never Smoker     Smokeless tobacco: Never Used   Substance and Sexual Activity     Alcohol use: Yes     Comment: Alcoholic Drinks/day: 1-2     Drug use: No     Sexual activity: Yes     Partners: Female   Other Topics Concern     Parent/sibling w/ CABG, MI or angioplasty before 65F 55M? Not Asked   Social History Narrative     Not on file     Social Determinants of Health     Financial Resource Strain:      Difficulty of Paying Living Expenses:    Food Insecurity:      Worried About Running Out of Food in the Last Year:      Ran Out of Food in the Last Year:    Transportation Needs:      Lack of Transportation (Medical):      Lack of Transportation (Non-Medical):    Physical Activity:      Days of Exercise per Week:      Minutes of Exercise per Session:    Stress:      Feeling of Stress :    Social Connections:      Frequency of Communication with Friends and Family:      Frequency of Social Gatherings with Friends and Family:      Attends Cheondoism Services:      Active Member of Clubs or  Organizations:      Attends Club or Organization Meetings:      Marital Status:    Intimate Partner Violence:      Fear of Current or Ex-Partner:      Emotionally Abused:      Physically Abused:      Sexually Abused:            Medications  Allergies   Current Outpatient Medications   Medication Sig Dispense Refill     albuterol (PROAIR HFA/PROVENTIL HFA/VENTOLIN HFA) 108 (90 Base) MCG/ACT inhaler Inhale 2 puffs into the lungs every 4 hours as needed       amLODIPine (NORVASC) 10 MG tablet Take 1 tablet (10 mg) by mouth daily 90 tablet 1     aspirin 81 MG EC tablet [ASPIRIN 81 MG EC TABLET] Take 1 tablet (81 mg total) by mouth daily.  0     atorvastatin (LIPITOR) 80 MG tablet [ATORVASTATIN (LIPITOR) 80 MG TABLET] TAKE 1 TABLET(80 MG) BY MOUTH AT BEDTIME (Patient taking differently: Take 80 mg by mouth At Bedtime ) 90 tablet 1     carvedilol (COREG) 12.5 MG tablet Take 3 tablets (37.5 mg) by mouth 2 times daily 540 tablet 1     cholecalciferol, vitamin D3, 5,000 unit Tab [CHOLECALCIFEROL, VITAMIN D3, 5,000 UNIT TAB] Take 5,000 Units by mouth daily.       cloNIDine (CATAPRES) 0.1 MG tablet Take 1 tablet (0.1 mg) by mouth 3 times daily as needed (SBP > 160) 30 tablet 0     doxazosin (CARDURA) 4 MG tablet [DOXAZOSIN (CARDURA) 4 MG TABLET] Take 2 mg by mouth at bedtime.       furosemide (LASIX) 40 MG tablet Take 1 tablet (40 mg) by mouth daily 90 tablet 1     insulin aspart (NOVOLOG PEN) 100 UNIT/ML pen Inject 5 Units Subcutaneous 3 times daily (before meals) 4.5 mL 1     insulin glargine-lixisenatide (SOLIQUA) pen Inject 25 Units Subcutaneous daily       nitroGLYcerin (NITROSTAT) 0.4 MG sublingual tablet Place 1 tablet (0.4 mg) under the tongue every 5 minutes as needed for chest pain For chest pain place 1 tablet under the tongue every 5 minutes for 3 doses. If symptoms persist 5 minutes after 1st dose call 911. 25 tablet 1     hydrALAZINE (APRESOLINE) 25 MG tablet Take 3 tablets (75 mg) by mouth 3 times daily 270  tablet 0       Allergies   Allergen Reactions     Venlafaxine      lethargic          Lab Results    Chemistry/lipid CBC Cardiac Enzymes/BNP/TSH/INR   Recent Labs   Lab Test 07/21/21  0440   CHOL 167   HDL 24*   LDL 59   TRIG 415*     Recent Labs   Lab Test 07/21/21  0440 03/04/19  1211   LDL 59 46  36     Recent Labs   Lab Test 09/27/21  1218      POTASSIUM 4.4   CHLORIDE 101   CO2 28   *   BUN 46*   CR 5.22*   GFRESTIMATED 12*   BETHANY 9.2     Recent Labs   Lab Test 09/27/21  1218 08/09/21  1840 07/27/21  0440   CR 5.22* 4.70* 4.95*     Recent Labs   Lab Test 09/27/21  1218 06/22/19  0554 01/14/19  0544   A1C 8.1* 7.8* 10.0*          Recent Labs   Lab Test 09/27/21  1218   WBC 7.1   HGB 12.6*   HCT 36.9*   MCV 90   *     Recent Labs   Lab Test 09/27/21  1218 07/24/21  0432 07/22/21  1753   HGB 12.6* 11.9* 11.7*    Recent Labs   Lab Test 07/20/21  0023 07/19/21  1820 07/19/21  1154   TROPONINI <0.01 0.01 0.01     Recent Labs   Lab Test 07/19/21  1155 10/04/20  2140 05/07/20  0926   BNP 25 29 68*     Recent Labs   Lab Test 01/14/19  0544   TSH 2.65     Recent Labs   Lab Test 09/27/21  1217 10/04/20  2140 06/21/19  0345   INR 1.13 1.03 1.24*        Cielo Che MD                                        Thank you for allowing me to participate in the care of your patient.      Sincerely,     Cielo Che MD     [facility]  cc:   No referring provider defined for this encounter.

## 2021-10-13 NOTE — PROGRESS NOTES
HEART CARE ENCOUNTER CONSULTATON NOTE      [facility]  [phone]      Assessment/Recommendations   Assessment/Plan:  CAD - stable, no angina on aspirin and statin    Hypertension - controlled with multiple agents and dialysis    Hyperlipidemia - at goal on statin therapy    Irregular heart rhythm - I suspect PVCs based on what he describes. We will get a 1 week GUERRERO to see what is going on.    F/U 6 months       History of Present Illness/Subjective    HPI: Siva Ramey is a 46 year old male diabetic with ESRD on HD, hypertension, hyperlipidemia and CAD who suffered an anterior-lateral STEMI January 2019. He was found to have a thrombotic occlusion of a large diagonal that was treated with a Synergy IBAN. There was residual diffuse moderate-severe mid LAD disease and distal RCA disease. EF was 45% by echo. Siva underwent 2 v CABG in June 2019 (LIMA-LAD, SVG-RCA). Siva was admitted 7/2021 with chest pain and hypertensive urgency. Echo EF was stable at 45-50% and coronary angiogram was also stable with FFR - LAD disease. Losartan was stopped due to progressive renal insufficiency. Siva follows with a pulmonologist at [facility], Dr. Ferreira; spirometry showed severe restriction. A CT chest did show some compressive volume loss in the left lung base. A sniff test showed a mildly elevated left hemidiaphragm but normal, bilateral diaphragmatic excursion. Siva started dialysis August 2021 for progressive renal failure, hypertension, and fluid overload.     Siva returns for follow up. He is being worked up for a kidney-pancrease transplant. Dialysis is going well and he notes no chest pain or dizziness with or after dialysis. His blood pressures are typically 110-130s. He has been told that he has some irregular heart rhythms at dialysis and occasionally he will awake at night with heavier heart beats but no heart racing. He notes the sensation that his heart is skipping a beat. There is no  pnd/orthopnea and his leg edema is improving.        Physical Examination  Review of Systems   Vitals: /80 (BP Location: Left arm, Patient Position: Sitting, Cuff Size: Adult Large)   Pulse 92   Resp 16   Ht 1.829 m (6')   Wt 110.7 kg (244 lb)   BMI 33.09 kg/m    BMI= Body mass index is 33.09 kg/m .  Wt Readings from Last 3 Encounters:   10/13/21 110.7 kg (244 lb)   09/27/21 114.8 kg (253 lb)   08/09/21 117.5 kg (259 lb)       General Appearance:   no distress, normal body habitus   ENT/Mouth: membranes moist, no oral lesions or bleeding gums.      EYES:  no scleral icterus, normal conjunctivae   Neck: no carotid bruits or thyromegaly   Chest/Lungs:   lungs are clear to auscultation, no rales or wheezing, stable sternal scar, equal chest wall expansion    Cardiovascular:   Regular. Normal first and second heart sounds with no murmur. No rubs or gallops; the right carotid, radial and posterior tibial pulses are intact and the left carotid, radial and posterior tibial pulses are intact.  Jugular venous pressure is flat, trace edema bilaterally    Abdomen:  no organomegaly, masses, bruits, or tenderness; bowel sounds are present   Extremities: no cyanosis or clubbing   Skin: no xanthelasma, warm.    Neurologic: normal  bilateral, no tremors     Psychiatric: alert and oriented x3, calm        Please refer above for cardiac ROS details.        Medical History  Surgical History Family History Social History   Past Medical History:   Diagnosis Date     Acquired elevated diaphragm      Anemia in chronic kidney disease      Angina pectoris (H)      Chronic kidney disease      Coronary artery disease      Diabetes mellitus, type II (H) 12/2001     Diabetic nephropathy (H)      MARQUEZ (dyspnea on exertion)      Dyslipidemia 12/2001     End stage renal disease (H)      History of blood transfusion 2004     Hypertension     takes medication     Ischemic cardiomyopathy      Metabolic acidosis      Myocardial  infarction (H)     STEMI -Diagonal branch of the LAD     Obesity (BMI 30-39.9)      Peripheral neuropathy      Proteinuria      Retinopathy      Vitamin D deficiency      Past Surgical History:   Procedure Laterality Date     BACK SURGERY  2009    L5 disc cut     BYPASS GRAFT ARTERY CORONARY  06/20/2019    2 vessels     CV CORONARY ANGIOGRAM N/A 1/13/2019    Procedure: Coronary Angiogram;  Surgeon: Cielo Che MD;  Location: [facility] Cath Lab;  Service: Cardiology     CV CORONARY ANGIOGRAM N/A 5/2/2019    Procedure: Coronary Angiogram;  Surgeon: Cielo Che MD;  Location: [facility] Cath Lab;  Service: Cardiology     CV CORONARY ANGIOGRAM N/A 4/30/2020    Procedure: Coronary Angiogram;  Surgeon: Cielo Che MD;  Location: [facility] Cath Lab;  Service: Cardiology     CV CORONARY ANGIOGRAM N/A 7/22/2021    Procedure: CV CORONARY ANGIOGRAM;  Surgeon: Adrian Crow MD;  Location: [facility] LAB CV     CV FRACTIONAL FLOW RATIO WIRE N/A 7/22/2021    Procedure: Fractional Flow Ratio Wire;  Surgeon: Adrian Crow MD;  Location: [facility] CATH LAB CV     CV LEFT HEART CATH N/A 7/22/2021    Procedure: Left Heart Cath;  Surgeon: Adrian Crow MD;  Location: [facility] CATH LAB CV     CV LEFT HEART CATHETERIZATION WITH LEFT VENTRICULOGRAM N/A 1/13/2019    Procedure: Left Heart Catheterization with Left Ventriculogram;  Surgeon: Cielo Che MD;  Location: [facility] Cath Lab;  Service: Cardiology     CV LEFT HEART CATHETERIZATION WITHOUT LEFT VENTRICULOGRAM Left 4/30/2020    Procedure: Left Heart Catheterization Without Left Ventriculogram;  Surgeon: Cielo Che MD;  Location: [facility] Cath Lab;  Service: Cardiology     CV RIGHT HEART CATHETERIZATION N/A 4/30/2020    Procedure: Right Heart Catheterization;  Surgeon: Cielo Che MD;  Location: [facility] Cath Lab;  Service: Cardiology     HERNIA REPAIR       OTHER SURGICAL HISTORY      Excise varicocele     STENT, CORONARY, DALE   2019     Family History   Problem Relation Age of Onset     Diabetes Type 2  Mother      Heart Disease Father 60     CABG Father 50        triple bypass     Diabetes Type 2  Father      Depression Sister      Substance Abuse Sister      Ovarian Cancer Maternal Grandmother      Brain Cancer Maternal Grandmother      Pancreatic Cancer Maternal Aunt      Prostate Cancer Maternal Uncle         Social History     Socioeconomic History     Marital status:      Spouse name: Not on file     Number of children: Not on file     Years of education: Not on file     Highest education level: Not on file   Occupational History     Not on file   Tobacco Use     Smoking status: Never Smoker     Smokeless tobacco: Never Used   Substance and Sexual Activity     Alcohol use: Yes     Comment: Alcoholic Drinks/day: 1-2     Drug use: No     Sexual activity: Yes     Partners: Female   Other Topics Concern     Parent/sibling w/ CABG, MI or angioplasty before 65F 55M? Not Asked   Social History Narrative     Not on file     Social Determinants of Health     Financial Resource Strain:      Difficulty of Paying Living Expenses:    Food Insecurity:      Worried About Running Out of Food in the Last Year:      Ran Out of Food in the Last Year:    Transportation Needs:      Lack of Transportation (Medical):      Lack of Transportation (Non-Medical):    Physical Activity:      Days of Exercise per Week:      Minutes of Exercise per Session:    Stress:      Feeling of Stress :    Social Connections:      Frequency of Communication with Friends and Family:      Frequency of Social Gatherings with Friends and Family:      Attends Anglican Services:      Active Member of Clubs or Organizations:      Attends Club or Organization Meetings:      Marital Status:    Intimate Partner Violence:      Fear of Current or Ex-Partner:      Emotionally Abused:      Physically Abused:      Sexually Abused:            Medications  Allergies   Current  Outpatient Medications   Medication Sig Dispense Refill     albuterol (PROAIR HFA/PROVENTIL HFA/VENTOLIN HFA) 108 (90 Base) MCG/ACT inhaler Inhale 2 puffs into the lungs every 4 hours as needed       amLODIPine (NORVASC) 10 MG tablet Take 1 tablet (10 mg) by mouth daily 90 tablet 1     aspirin 81 MG EC tablet [ASPIRIN 81 MG EC TABLET] Take 1 tablet (81 mg total) by mouth daily.  0     atorvastatin (LIPITOR) 80 MG tablet [ATORVASTATIN (LIPITOR) 80 MG TABLET] TAKE 1 TABLET(80 MG) BY MOUTH AT BEDTIME (Patient taking differently: Take 80 mg by mouth At Bedtime ) 90 tablet 1     carvedilol (COREG) 12.5 MG tablet Take 3 tablets (37.5 mg) by mouth 2 times daily 540 tablet 1     cholecalciferol, vitamin D3, 5,000 unit Tab [CHOLECALCIFEROL, VITAMIN D3, 5,000 UNIT TAB] Take 5,000 Units by mouth daily.       cloNIDine (CATAPRES) 0.1 MG tablet Take 1 tablet (0.1 mg) by mouth 3 times daily as needed (SBP > 160) 30 tablet 0     doxazosin (CARDURA) 4 MG tablet [DOXAZOSIN (CARDURA) 4 MG TABLET] Take 2 mg by mouth at bedtime.       furosemide (LASIX) 40 MG tablet Take 1 tablet (40 mg) by mouth daily 90 tablet 1     insulin aspart (NOVOLOG PEN) 100 UNIT/ML pen Inject 5 Units Subcutaneous 3 times daily (before meals) 4.5 mL 1     insulin glargine-lixisenatide (SOLIQUA) pen Inject 25 Units Subcutaneous daily       nitroGLYcerin (NITROSTAT) 0.4 MG sublingual tablet Place 1 tablet (0.4 mg) under the tongue every 5 minutes as needed for chest pain For chest pain place 1 tablet under the tongue every 5 minutes for 3 doses. If symptoms persist 5 minutes after 1st dose call 911. 25 tablet 1     hydrALAZINE (APRESOLINE) 25 MG tablet Take 3 tablets (75 mg) by mouth 3 times daily 270 tablet 0       Allergies   Allergen Reactions     Venlafaxine      lethargic          Lab Results    Chemistry/lipid CBC Cardiac Enzymes/BNP/TSH/INR   Recent Labs   Lab Test 07/21/21  0440   CHOL 167   HDL 24*   LDL 59   TRIG 415*     Recent Labs   Lab Test  07/21/21  0440 03/04/19  1211   LDL 59 46  36     Recent Labs   Lab Test 09/27/21  1218      POTASSIUM 4.4   CHLORIDE 101   CO2 28   *   BUN 46*   CR 5.22*   GFRESTIMATED 12*   BETHANY 9.2     Recent Labs   Lab Test 09/27/21  1218 08/09/21  1840 07/27/21  0440   CR 5.22* 4.70* 4.95*     Recent Labs   Lab Test 09/27/21  1218 06/22/19  0554 01/14/19  0544   A1C 8.1* 7.8* 10.0*          Recent Labs   Lab Test 09/27/21  1218   WBC 7.1   HGB 12.6*   HCT 36.9*   MCV 90   *     Recent Labs   Lab Test 09/27/21  1218 07/24/21  0432 07/22/21  1753   HGB 12.6* 11.9* 11.7*    Recent Labs   Lab Test 07/20/21  0023 07/19/21  1820 07/19/21  1154   TROPONINI <0.01 0.01 0.01     Recent Labs   Lab Test 07/19/21  1155 10/04/20  2140 05/07/20  0926   BNP 25 29 68*     Recent Labs   Lab Test 01/14/19  0544   TSH 2.65     Recent Labs   Lab Test 09/27/21  1217 10/04/20  2140 06/21/19  0345   INR 1.13 1.03 1.24*        Cielo Che MD

## 2021-10-13 NOTE — PATIENT INSTRUCTIONS
It was a pleasure seeing you at [facility] today.        Here are my suggestions for your care:    1. Exercise for 30 minutes at least 5 days per week. Go to the gym on your non-dialysis days.     2. Stick on the same medications    3. 1 week monitor      Let's meet again in 6 months.    You can always call my nurse Lydia Dobson RN who is a nurse helping me in the care of my patients. She can be reached at [phone] if you have any questions.    For scheduling, please call my  Julianna Buchanan at [phone].    Thank you again for trusting me with your care. Please feel free to call my office at any time if you have any question or if I can assist you in any way.    Cielo Che MD  [facility]

## 2021-11-01 ENCOUNTER — HOSPITAL ENCOUNTER (OUTPATIENT)
Dept: CARDIOLOGY | Facility: HOSPITAL | Age: 46
Discharge: HOME | End: 2021-11-01
Attending: INTERNAL MEDICINE | Admitting: INTERNAL MEDICINE
Payer: COMMERCIAL

## 2021-11-01 DIAGNOSIS — R00.2 PALPITATIONS: ICD-10-CM

## 2021-11-01 PROCEDURE — 93272 ECG/REVIEW INTERPRET ONLY: CPT | Performed by: INTERNAL MEDICINE

## 2021-11-01 PROCEDURE — 93270 REMOTE 30 DAY ECG REV/REPORT: CPT

## 2021-11-09 ENCOUNTER — TRANSFERRED RECORDS (OUTPATIENT)
Dept: HEALTH INFORMATION MANAGEMENT | Facility: CLINIC | Age: 46
End: 2021-11-09

## 2021-11-09 NOTE — TELEPHONE ENCOUNTER
RECORDS RECEIVED FROM:   DATE RECEIVED:   NOTES STATUS DETAILS   OFFICE NOTE from referring provider    Internal SOT   OFFICE NOTE from other cardiologist    Internal Dr. Che 10-13-21 [facility]   DISCHARGE SUMMARY from hospital    Internal 7-19-21 [facility]   DISCHARGE REPORT from the ER   Internal 10-4-20 [facility]   OPERATIVE REPORT    Internal 6-20-19 CABG [facility]   MEDICATION LIST   Internal    LABS     BMP   Internal 8-9-21   CBC   Internal 7-15-21   CMP   Internal 9-27-21   Lipids   Internal 7-21-21   TSH   N/A    DIAGNOSTIC PROCEDURES     EKG   Internal 9-27-21   Monitor Reports   In process Placed 11-1   IMAGING (DISC & REPORT)      Echo   Internal 9-27-21   Stress Tests   Internal 3-17-20   Cath   Internal 7-22-21   MRI/MRA   N/A    CT/CTA   Internal 5-7-20 CT CHEST

## 2021-11-15 PROBLEM — D50.9 IRON DEFICIENCY ANEMIA, UNSPECIFIED: Status: ACTIVE | Noted: 2021-08-26

## 2021-11-15 PROBLEM — N47.6 BALANOPOSTHITIS: Status: ACTIVE | Noted: 2021-03-29

## 2021-11-15 PROBLEM — I25.790: Status: ACTIVE | Noted: 2019-01-14

## 2021-11-15 PROBLEM — I21.3 STEMI (ST ELEVATION MYOCARDIAL INFARCTION) (H): Status: ACTIVE | Noted: 2019-01-13

## 2021-11-15 PROBLEM — G56.12: Status: ACTIVE | Noted: 2018-08-14

## 2021-11-15 RX ORDER — LOSARTAN POTASSIUM 50 MG/1
TABLET ORAL
COMMUNITY
Start: 2021-10-15 | End: 2021-11-22

## 2021-11-15 RX ORDER — GLIMEPIRIDE 4 MG/1
TABLET ORAL
COMMUNITY
Start: 2021-10-15 | End: 2023-06-27

## 2021-11-17 ENCOUNTER — ANCILLARY PROCEDURE (OUTPATIENT)
Dept: CT IMAGING | Facility: CLINIC | Age: 46
End: 2021-11-17
Attending: NURSE PRACTITIONER
Payer: COMMERCIAL

## 2021-11-17 ENCOUNTER — ANCILLARY PROCEDURE (OUTPATIENT)
Dept: ULTRASOUND IMAGING | Facility: CLINIC | Age: 46
End: 2021-11-17
Attending: NURSE PRACTITIONER
Payer: COMMERCIAL

## 2021-11-17 DIAGNOSIS — E11.22 TYPE 2 DIABETES MELLITUS WITH CHRONIC KIDNEY DISEASE ON CHRONIC DIALYSIS, WITH LONG-TERM CURRENT USE OF INSULIN (H): ICD-10-CM

## 2021-11-17 DIAGNOSIS — Z76.82 ORGAN TRANSPLANT CANDIDATE: ICD-10-CM

## 2021-11-17 DIAGNOSIS — N18.6 END STAGE KIDNEY DISEASE (H): ICD-10-CM

## 2021-11-17 DIAGNOSIS — J98.9: ICD-10-CM

## 2021-11-17 DIAGNOSIS — Z79.4 TYPE 2 DIABETES MELLITUS WITH CHRONIC KIDNEY DISEASE ON CHRONIC DIALYSIS, WITH LONG-TERM CURRENT USE OF INSULIN (H): ICD-10-CM

## 2021-11-17 DIAGNOSIS — Z99.2 TYPE 2 DIABETES MELLITUS WITH CHRONIC KIDNEY DISEASE ON CHRONIC DIALYSIS, WITH LONG-TERM CURRENT USE OF INSULIN (H): ICD-10-CM

## 2021-11-17 DIAGNOSIS — N18.6 TYPE 2 DIABETES MELLITUS WITH CHRONIC KIDNEY DISEASE ON CHRONIC DIALYSIS, WITH LONG-TERM CURRENT USE OF INSULIN (H): ICD-10-CM

## 2021-11-17 PROCEDURE — 74176 CT ABD & PELVIS W/O CONTRAST: CPT | Performed by: RADIOLOGY

## 2021-11-17 PROCEDURE — 94060 EVALUATION OF WHEEZING: CPT | Performed by: INTERNAL MEDICINE

## 2021-11-17 PROCEDURE — 93978 VASCULAR STUDY: CPT | Mod: GC | Performed by: RADIOLOGY

## 2021-11-17 PROCEDURE — 94726 PLETHYSMOGRAPHY LUNG VOLUMES: CPT | Performed by: INTERNAL MEDICINE

## 2021-11-17 PROCEDURE — 94729 DIFFUSING CAPACITY: CPT | Performed by: INTERNAL MEDICINE

## 2021-11-18 DIAGNOSIS — Z76.82 ORGAN TRANSPLANT CANDIDATE: ICD-10-CM

## 2021-11-18 DIAGNOSIS — R94.2 ABNORMAL PFT: Primary | ICD-10-CM

## 2021-11-18 LAB
DLCOUNC-%PRED-PRE: 70 %
DLCOUNC-PRE: 22.36 ML/MIN/MMHG
DLCOUNC-PRED: 31.92 ML/MIN/MMHG
ERV-%PRED-PRE: 11 %
ERV-PRE: 0.14 L
ERV-PRED: 1.24 L
EXPTIME-PRE: 6.22 SEC
FEF2575-%PRED-POST: 76 %
FEF2575-%PRED-PRE: 114 %
FEF2575-POST: 3.04 L/SEC
FEF2575-PRE: 4.58 L/SEC
FEF2575-PRED: 3.99 L/SEC
FEFMAX-%PRED-PRE: 88 %
FEFMAX-PRE: 9.2 L/SEC
FEFMAX-PRED: 10.45 L/SEC
FEV1-%PRED-PRE: 67 %
FEV1-PRE: 2.91 L
FEV1FEV6-PRE: 90 %
FEV1FEV6-PRED: 81 %
FEV1FVC-PRE: 90 %
FEV1FVC-PRED: 79 %
FEV1SVC-PRE: 87 %
FEV1SVC-PRED: 76 %
FIFMAX-PRE: 6.44 L/SEC
FRCPLETH-%PRED-PRE: 53 %
FRCPLETH-PRE: 1.93 L
FRCPLETH-PRED: 3.6 L
FVC-%PRED-PRE: 59 %
FVC-PRE: 3.22 L
FVC-PRED: 5.46 L
IC-%PRED-PRE: 72 %
IC-PRE: 3.19 L
IC-PRED: 4.43 L
RVPLETH-%PRED-PRE: 82 %
RVPLETH-PRE: 1.79 L
RVPLETH-PRED: 2.18 L
TLCPLETH-%PRED-PRE: 67 %
TLCPLETH-PRE: 5.12 L
TLCPLETH-PRED: 7.53 L
VA-%PRED-PRE: 63 %
VA-PRE: 4.56 L
VC-%PRED-PRE: 58 %
VC-PRE: 3.33 L
VC-PRED: 5.66 L

## 2021-11-18 NOTE — PROGRESS NOTES
History:    Virtual visit. New patient visit      Siva Ramey is a 46 year old pleasant man with diabetes type II complicated by end-stage renal disease.  He has been on hemodialysis since Aug 2021.  he is currently being evaluated for kidney and pancreas transplantation.  His medical history is also notable for hypertension, hyperlipidemia and CAD. He suffered an anterior-lateral STEMI in January 2019. He was found to have a thrombotic occlusion of a large diagonal that was treated with a Synergy IBAN. There was residual diffuse moderate-severe mid LAD disease and distal RCA disease. He underwent 2 v CABG in June 2019 (LIMA-LAD, SVG-RCA).     He underwent a coronary angiogram in July 2021 for further evaluation of fatigue, volume overload and chest pain.  The LIMA to distal LAD was patent.  His LAD has severe mid vessel disease. The stent in the large first diagonal was patent. The vein graft to the right coronary is occluded. The distal RCA obstruction in non obstructive based on FFR evaluation. He sees a cardiologist at [facility].      He notes reduced exercise tolerance and fatigue since being on dialysis.  He gains about 2 to 3 kg between dialysis.  He also notes that his fistula has not completely matured and fluid removal is not at full capacity.  His blood pressures are typically 110-130s. He underwent an event monitor for evaluation of irregular heart rhythms which did not reveal any clinically significant arrhythmias.     Patient today denies chest pain,  orthopnea, paroxysmal nocturnal dyspnea, palpitations or syncope.  He is compliant with his medications.    Current Outpatient Medications   Medication Sig Dispense Refill     albuterol (PROAIR HFA/PROVENTIL HFA/VENTOLIN HFA) 108 (90 Base) MCG/ACT inhaler Inhale 2 puffs into the lungs every 4 hours as needed       amLODIPine (NORVASC) 10 MG tablet Take 1 tablet (10 mg) by mouth daily 90 tablet 1     aspirin 81 MG EC tablet [ASPIRIN 81 MG EC TABLET]  Take 1 tablet (81 mg total) by mouth daily.  0     atorvastatin (LIPITOR) 80 MG tablet [ATORVASTATIN (LIPITOR) 80 MG TABLET] TAKE 1 TABLET(80 MG) BY MOUTH AT BEDTIME (Patient taking differently: Take 80 mg by mouth At Bedtime ) 90 tablet 1     carvedilol (COREG) 12.5 MG tablet Take 3 tablets (37.5 mg) by mouth 2 times daily 540 tablet 1     cholecalciferol, vitamin D3, 5,000 unit Tab [CHOLECALCIFEROL, VITAMIN D3, 5,000 UNIT TAB] Take 5,000 Units by mouth daily.       cloNIDine (CATAPRES) 0.1 MG tablet Take 1 tablet (0.1 mg) by mouth 3 times daily as needed (SBP > 160) 30 tablet 0     doxazosin (CARDURA) 4 MG tablet [DOXAZOSIN (CARDURA) 4 MG TABLET] Take 2 mg by mouth at bedtime.       furosemide (LASIX) 40 MG tablet Take 1 tablet (40 mg) by mouth daily 90 tablet 1     hydrALAZINE (APRESOLINE) 25 MG tablet Take 3 tablets (75 mg) by mouth 3 times daily 270 tablet 0     insulin aspart (NOVOLOG PEN) 100 UNIT/ML pen Inject 5 Units Subcutaneous 3 times daily (before meals) 4.5 mL 1     insulin glargine-lixisenatide (SOLIQUA) pen Inject 25 Units Subcutaneous daily       nitroGLYcerin (NITROSTAT) 0.4 MG sublingual tablet Place 1 tablet (0.4 mg) under the tongue every 5 minutes as needed for chest pain For chest pain place 1 tablet under the tongue every 5 minutes for 3 doses. If symptoms persist 5 minutes after 1st dose call 911. 25 tablet 1     glimepiride (AMARYL) 4 MG tablet        glucose (BD GLUCOSE) 4 g chewable tablet glucose 4 gram chewable tablet   CHEW AND SWALLOW 4 TIMES DAILY AS NEEDED         Allergies - reviewed     Allergies   Allergen Reactions     Venlafaxine      lethargic       Past history -reviewed  Active Ambulatory Problems     Diagnosis Date Noted     Type 2 diabetes mellitus with other circulatory complication, unspecified whether long term insulin use (H) 01/13/2019     Dyslipidemia 01/13/2019     Class 2 severe obesity due to excess calories with serious comorbidity in adult, unspecified BMI (H)       Atherosclerosis of other coronary artery bypass graft(s) with unstable angina pectoris (H) 01/14/2019     Anemia in chronic kidney disease 01/29/2019     HTN (hypertension) 04/19/2019     S/P CABG (coronary artery bypass graft) 06/20/2019     Balanoposthitis 03/29/2021     Other stricture of anterior urethra in male      History of ST elevation myocardial infarction (STEMI) 08/21/2019     Obesity (BMI 30.0-34.9)      MARQUEZ (dyspnea on exertion)      STEMI (ST elevation myocardial infarction) (H) 01/13/2019     Vitamin D deficiency      Metabolic acidosis      Retinopathy      Peripheral neuropathy      Acquired elevated diaphragm      Median nerve neuropathy, left 08/14/2018     Iron deficiency anemia, unspecified 08/26/2021     End stage renal disease (H) 08/26/2021     Resolved Ambulatory Problems     Diagnosis Date Noted     Stage 3 chronic kidney disease (H) 03/19/2020     Burning in the chest 03/19/2020     Abnormal stress echo      Abnormal cardiovascular stress test      Chest pain 10/04/2020     Benign essential hypertension      STEMI (ST elevation myocardial infarction) (H) 04/19/2019     Chest pain with high risk for cardiac etiology 07/19/2021     Hypertensive urgency 07/19/2021     Past Medical History:   Diagnosis Date     Angina pectoris (H)      Chronic kidney disease      Coronary artery disease      Diabetes mellitus, type II (H) 12/2001     Diabetic nephropathy (H)      History of blood transfusion 2004     Hypertension      Ischemic cardiomyopathy      Myocardial infarction (H)      Obesity (BMI 30-39.9)      Proteinuria         Social history - reviewed  Social History     Socioeconomic History     Marital status:      Spouse name: Not on file     Number of children: Not on file     Years of education: Not on file     Highest education level: Not on file   Occupational History     Not on file   Tobacco Use     Smoking status: Never Smoker     Smokeless tobacco: Never Used   Substance  and Sexual Activity     Alcohol use: Yes     Comment: Alcoholic Drinks/day: 1-2     Drug use: No     Sexual activity: Yes     Partners: Female   Other Topics Concern     Parent/sibling w/ CABG, MI or angioplasty before 65F 55M? Not Asked   Social History Narrative     Not on file     Social Determinants of Health     Financial Resource Strain: Not on file   Food Insecurity: Not on file   Transportation Needs: Not on file   Physical Activity: Not on file   Stress: Not on file   Social Connections: Not on file   Intimate Partner Violence: Not on file   Housing Stability: Not on file       Family history -reviewed  Family History   Problem Relation Age of Onset     Diabetes Type 2  Mother      Heart Disease Father 60     CABG Father 50        triple bypass     Diabetes Type 2  Father      Depression Sister      Substance Abuse Sister      Ovarian Cancer Maternal Grandmother      Brain Cancer Maternal Grandmother      Pancreatic Cancer Maternal Aunt      Prostate Cancer Maternal Uncle        ROS: non contributory on the 10-point review of system    Exam:   In general, the patient is in no apparent distress.    Blood pressure 136/80 mmHg  Breathing is unlabored.   HEENT: NC/AT.  PERRLA.  EOMI.  Sclerae white, not injected.    Neck: No jugular venous distension.    Extremities: + edema per patient   Neurologic: Alert and oriented to person/place/time, normal speech and affect  Skin: No rash.    Data:    All relevant labs were personally reviewed and discussed with the patient.   Chemistry panel:   Recent Labs   Lab Test 09/27/21  1218 08/09/21  1840 07/20/21  0608 07/20/21  0431 06/25/19  0817 06/25/19  0516 06/24/19  0805 06/24/19  0444    139   < > 141   < >  --   --  138   POTASSIUM 4.4 4.6   < > 3.8   < > 4.7  --  4.8   CHLORIDE 101 103   < > 110*   < >  --   --  103   CO2 28 26   < > 21*   < >  --   --  25   ANIONGAP 8 10   < > 10   < >  --   --  10   * 125   < > 61*   < >  --    < > 112   BUN 46* 51*    < > 46*   < >  --   --  52*   CR 5.22* 4.70*   < > 4.09*   < >  --   --  2.33*   BETHANY 9.2 9.0   < > 8.5   < >  --   --  9.4   MAG  --   --   --   --   --  2.1  --  2.6   GFRESTIMATED 12* 14*   < > 16*   < >  --   --  31*   AST 14  --   --  25   < >  --   --   --    ALT 38  --   --  34   < >  --   --   --     < > = values in this interval not displayed.       CBC:   Recent Labs   Lab Test 09/27/21  1218 07/24/21  0432   WBC 7.1 5.7   RBC 4.12* 3.80*   HGB 12.6* 11.9*   HCT 36.9* 35.5*   MCV 90 93   MCH 30.6 31.3   MCHC 34.1 33.5   RDW 12.3 12.3   * 143*       Lipid Panel:  Recent Labs   Lab Test 07/21/21  0440 03/02/20  0853 03/04/19  1211   CHOL 167 175 114   HDL 24* 32* 29*   LDL 59  --  46  36   TRIG 415* 649* 243*       Thyroid:   TSH   Date Value Ref Range Status   01/14/2019 2.65 0.30 - 5.00 uIU/mL Final     No results found for: T4  Hemoglobin A1C   Date Value Ref Range Status   09/27/2021 8.1 (H) 0.0 - 5.6 % Final     Comment:     Normal <5.7%   Prediabetes 5.7-6.4%    Diabetes 6.5% or higher     Note: Adopted from ADA consensus guidelines.     INR   Date Value Ref Range Status   09/27/2021 1.13 0.85 - 1.15 Final     Comment:     Effective 7/11/2021, the reference range for this assay has changed.       Patient's all available and relevant cardiac investigations were personally reviewed and discussed with the patient today.    Echo: Sept 2021     Global and regional left ventricular function is normal with an EF of 55-60%.  Right ventricular function, chamber size, wall motion, and thickness are  normal.  The inferior vena cava is normal.  No pericardial effusion is present.    ECG 9/27/21 - sinus with QTc 483 ms    Event monitor Nov 2021  Sinus rhythm without tachyarrhythmia or bradyarrhythmia.  Occasional supraventricular and ventricular ectopy.  3 symptom triggers, each of which correlated to sinus rhythm with isolated PVCs.    Coronary angiogram: July 2021  Left main with mild disease  LAD with  mild proximal disease, having a large diagonal system with a mid LAD being occluded, and distal LAD filling via patent LIMA  Left circumflex with mild to moderate disease  RCA with 30 to 40% stenosis seen in the proximal and distal portion.  The RCA was found to have more significant disease on his prior study, and was therefore assessed with fractional flow reserve.     FFR on the RCA was 0.93 across the distal RCA lesion at peak hyperemia, and 0.96 across the proximal lesion at peak hyperemia, confirming nonobstructive disease     EDP 13mmHg    Assessment and Plan:  46 year old male with    Severe native coronary artery disease  Post coronary bypass surgery June 2019, LIMA to LAD and SVG to RCA  Stable angina  Preserved biventricular function  Dyslipidemia -hypertriglyceridemia  Essential hypertension  End-stage renal disease on hemodialysis on Tuesday Thursday Saturday      Siva Ramey has multiple medical issues including severe coronary artery disease for which he underwent bypass surgery in June 2019.  In July 2021, the LIMA to LAD was patent and the vein graft to the right coronary artery was occluded.  The native right coronary artery was nonobstructive on FFR.  Drug-eluting stent placed previously for STEMI in the diagonal artery was patent.  He is today without  symptoms concerning for unstable angina or heart failure.  He is undergoing dialysis 3 times a week.  Patient appears euvolemic on exam today.  Patient notes that his blood pressure is well controlled.    I have personally reviewed all relevant cardiac investigations and discussed my interpretation with the patient. To summarize, ECG is notable for sinus rhythm.  Recent event monitor did not reveal any significant arrhythmias.  He has normal biventricular function with no significant valvular disease.  Review of laboratory data shows hypertriglyceridemia, LDL less than 70 mg per DL, GFR of 12 and a hemoglobin of 12 g.    Medications were  reviewed and the patient is on appropriate medical therapy. So I have not recommend any changes to the cardiac medical regimen at this point.  Patient does not need any further cardiac work-up at this point and may proceed with the remainder of his transplant work-up.  He will continue to follow-up with his cardiologist at [facility] on a regular basis.  He was advised to return for follow-up in 2 years in the event remain active on the transplant list.      Recommendations:     1. Continue current medications.  2. Brisk walk 30 minutes daily and diet low in carbohydrates and saturated fat  3. Follow up in 2 years      Video Visit Details:    Type of service:  Video Visit    Start: 11/22/2021 08:09 am  Stop: 11/22/2021 08:23 am    Originating Location (pt. Location): Home    Distant Location (provider location):  [facility]     Platform used for Video Visit: [employer]     In addition to visit time documented above, I spent an additional 15  minutes on data review and documentation.      Destiny Tenorio MD, MS  Professor of Medicine  Cardiovascular division

## 2021-11-22 ENCOUNTER — VIRTUAL VISIT (OUTPATIENT)
Dept: CARDIOLOGY | Facility: CLINIC | Age: 46
End: 2021-11-22
Attending: NURSE PRACTITIONER
Payer: COMMERCIAL

## 2021-11-22 ENCOUNTER — PRE VISIT (OUTPATIENT)
Dept: CARDIOLOGY | Facility: CLINIC | Age: 46
End: 2021-11-22

## 2021-11-22 DIAGNOSIS — Z76.82 ORGAN TRANSPLANT CANDIDATE: ICD-10-CM

## 2021-11-22 DIAGNOSIS — I25.728 CORONARY ARTERY DISEASE OF AUTOLOGOUS BYPASS GRAFT WITH STABLE ANGINA PECTORIS (H): Primary | ICD-10-CM

## 2021-11-22 DIAGNOSIS — I10 ESSENTIAL HYPERTENSION: ICD-10-CM

## 2021-11-22 DIAGNOSIS — N18.6 END STAGE RENAL DISEASE (H): ICD-10-CM

## 2021-11-22 DIAGNOSIS — Z01.818 ENCOUNTER FOR PRE-TRANSPLANT EVALUATION FOR KIDNEY AND PANCREAS TRANSPLANT: ICD-10-CM

## 2021-11-22 DIAGNOSIS — E78.2 MIXED HYPERLIPIDEMIA: ICD-10-CM

## 2021-11-22 PROCEDURE — 99214 OFFICE O/P EST MOD 30 MIN: CPT | Mod: 95 | Performed by: INTERNAL MEDICINE

## 2021-11-22 NOTE — PATIENT INSTRUCTIONS
Cardiology Providers you saw during your visit:  Dr. Tenorio    Medication changes: None    Follow up: with Dr. Tenorio in 2 years or sooner if needed.     Recommendations: Brisk walk 30 minutes daily and diet low in carbohydrates and saturated fat      Follow the American Heart Association Diet and Lifestyle recommendations:  Limit saturated fat, trans fat, sodium, red meat, sweets and sugar-sweetened beverages. If you choose to eat red meat, compare labels and select the leanest cuts available.  Aim for at least 150 minutes of moderate physical activity or 75 minutes of vigorous physical activity - or an equal combination of both - each week.      If you have any questions, call  Regan Layne RN, at [phone].  Press Option #1 for the [facility], and then press Option #4  We are encouraging the use of [employer] to communicate with your HealthCare Provider      After hours, weekends or holidays: On Call Cardiologist- [phone] option #4 and ask to speak to the on-call Cardiologist.

## 2021-11-22 NOTE — PROGRESS NOTES
"Siva Ramey is a 46 year old who is being evaluated via a billable video visit.      How would you like to obtain your AVS? MyChart  If the video visit is dropped, the invitation should be resent by: Send to e-mail at: [email or web address]  Will anyone else be joining your video visit? No      Vitals - Patient Reported  Weight (Patient Reported): 110.2 kg (242 lb 15.2 oz) (yesterday)  Height (Patient Reported): 180.3 cm (5' 11\")  BMI (Based on Pt Reported Ht/Wt): 33.88  Pain Score: No Pain (0) (No SOB)      Vitals were taken and medications reconciled.    Gino Landers, EMT  7:48 AM    "

## 2021-11-22 NOTE — LETTER
11/22/2021      RE: Siva Ramey  [address] [facility] [address]       Dear Colleague,    Thank you for the opportunity to participate in the care of your patient, Siva Ramey, at the [facility] [place] at [facility]. Please see a copy of my visit note below.    History:    Virtual visit. New patient visit      Siva Ramey is a 46 year old pleasant man with diabetes type II complicated by end-stage renal disease.  He has been on hemodialysis since Aug 2021.  he is currently being evaluated for kidney and pancreas transplantation.  His medical history is also notable for hypertension, hyperlipidemia and CAD. He suffered an anterior-lateral STEMI in January 2019. He was found to have a thrombotic occlusion of a large diagonal that was treated with a Synergy IBAN. There was residual diffuse moderate-severe mid LAD disease and distal RCA disease. He underwent 2 v CABG in June 2019 (LIMA-LAD, SVG-RCA).     He underwent a coronary angiogram in July 2021 for further evaluation of fatigue, volume overload and chest pain.  The LIMA to distal LAD was patent.  His LAD has severe mid vessel disease. The stent in the large first diagonal was patent. The vein graft to the right coronary is occluded. The distal RCA obstruction in non obstructive based on FFR evaluation. He sees a cardiologist at [facility].      He notes reduced exercise tolerance and fatigue since being on dialysis.  He gains about 2 to 3 kg between dialysis.  He also notes that his fistula has not completely matured and fluid removal is not at full capacity.  His blood pressures are typically 110-130s. He underwent an event monitor for evaluation of irregular heart rhythms which did not reveal any clinically significant arrhythmias.     Patient today denies chest pain,  orthopnea, paroxysmal nocturnal dyspnea, palpitations or syncope.  He is compliant with his  medications.    Current Outpatient Medications   Medication Sig Dispense Refill     albuterol (PROAIR HFA/PROVENTIL HFA/VENTOLIN HFA) 108 (90 Base) MCG/ACT inhaler Inhale 2 puffs into the lungs every 4 hours as needed       amLODIPine (NORVASC) 10 MG tablet Take 1 tablet (10 mg) by mouth daily 90 tablet 1     aspirin 81 MG EC tablet [ASPIRIN 81 MG EC TABLET] Take 1 tablet (81 mg total) by mouth daily.  0     atorvastatin (LIPITOR) 80 MG tablet [ATORVASTATIN (LIPITOR) 80 MG TABLET] TAKE 1 TABLET(80 MG) BY MOUTH AT BEDTIME (Patient taking differently: Take 80 mg by mouth At Bedtime ) 90 tablet 1     carvedilol (COREG) 12.5 MG tablet Take 3 tablets (37.5 mg) by mouth 2 times daily 540 tablet 1     cholecalciferol, vitamin D3, 5,000 unit Tab [CHOLECALCIFEROL, VITAMIN D3, 5,000 UNIT TAB] Take 5,000 Units by mouth daily.       cloNIDine (CATAPRES) 0.1 MG tablet Take 1 tablet (0.1 mg) by mouth 3 times daily as needed (SBP > 160) 30 tablet 0     doxazosin (CARDURA) 4 MG tablet [DOXAZOSIN (CARDURA) 4 MG TABLET] Take 2 mg by mouth at bedtime.       furosemide (LASIX) 40 MG tablet Take 1 tablet (40 mg) by mouth daily 90 tablet 1     hydrALAZINE (APRESOLINE) 25 MG tablet Take 3 tablets (75 mg) by mouth 3 times daily 270 tablet 0     insulin aspart (NOVOLOG PEN) 100 UNIT/ML pen Inject 5 Units Subcutaneous 3 times daily (before meals) 4.5 mL 1     insulin glargine-lixisenatide (SOLIQUA) pen Inject 25 Units Subcutaneous daily       nitroGLYcerin (NITROSTAT) 0.4 MG sublingual tablet Place 1 tablet (0.4 mg) under the tongue every 5 minutes as needed for chest pain For chest pain place 1 tablet under the tongue every 5 minutes for 3 doses. If symptoms persist 5 minutes after 1st dose call 911. 25 tablet 1     glimepiride (AMARYL) 4 MG tablet        glucose (BD GLUCOSE) 4 g chewable tablet glucose 4 gram chewable tablet   CHEW AND SWALLOW 4 TIMES DAILY AS NEEDED         Allergies - reviewed     Allergies   Allergen Reactions      Venlafaxine      lethargic       Past history -reviewed  Active Ambulatory Problems     Diagnosis Date Noted     Type 2 diabetes mellitus with other circulatory complication, unspecified whether long term insulin use (H) 01/13/2019     Dyslipidemia 01/13/2019     Class 2 severe obesity due to excess calories with serious comorbidity in adult, unspecified BMI (H)      Atherosclerosis of other coronary artery bypass graft(s) with unstable angina pectoris (H) 01/14/2019     Anemia in chronic kidney disease 01/29/2019     HTN (hypertension) 04/19/2019     S/P CABG (coronary artery bypass graft) 06/20/2019     Balanoposthitis 03/29/2021     Other stricture of anterior urethra in male      History of ST elevation myocardial infarction (STEMI) 08/21/2019     Obesity (BMI 30.0-34.9)      MARQUEZ (dyspnea on exertion)      STEMI (ST elevation myocardial infarction) (H) 01/13/2019     Vitamin D deficiency      Metabolic acidosis      Retinopathy      Peripheral neuropathy      Acquired elevated diaphragm      Median nerve neuropathy, left 08/14/2018     Iron deficiency anemia, unspecified 08/26/2021     End stage renal disease (H) 08/26/2021     Resolved Ambulatory Problems     Diagnosis Date Noted     Stage 3 chronic kidney disease (H) 03/19/2020     Burning in the chest 03/19/2020     Abnormal stress echo      Abnormal cardiovascular stress test      Chest pain 10/04/2020     Benign essential hypertension      STEMI (ST elevation myocardial infarction) (H) 04/19/2019     Chest pain with high risk for cardiac etiology 07/19/2021     Hypertensive urgency 07/19/2021     Past Medical History:   Diagnosis Date     Angina pectoris (H)      Chronic kidney disease      Coronary artery disease      Diabetes mellitus, type II (H) 12/2001     Diabetic nephropathy (H)      History of blood transfusion 2004     Hypertension      Ischemic cardiomyopathy      Myocardial infarction (H)      Obesity (BMI 30-39.9)      Proteinuria         Social  history - reviewed  Social History     Socioeconomic History     Marital status:      Spouse name: Not on file     Number of children: Not on file     Years of education: Not on file     Highest education level: Not on file   Occupational History     Not on file   Tobacco Use     Smoking status: Never Smoker     Smokeless tobacco: Never Used   Substance and Sexual Activity     Alcohol use: Yes     Comment: Alcoholic Drinks/day: 1-2     Drug use: No     Sexual activity: Yes     Partners: Female   Other Topics Concern     Parent/sibling w/ CABG, MI or angioplasty before 65F 55M? Not Asked   Social History Narrative     Not on file     Social Determinants of Health     Financial Resource Strain: Not on file   Food Insecurity: Not on file   Transportation Needs: Not on file   Physical Activity: Not on file   Stress: Not on file   Social Connections: Not on file   Intimate Partner Violence: Not on file   Housing Stability: Not on file       Family history -reviewed  Family History   Problem Relation Age of Onset     Diabetes Type 2  Mother      Heart Disease Father 60     CABG Father 50        triple bypass     Diabetes Type 2  Father      Depression Sister      Substance Abuse Sister      Ovarian Cancer Maternal Grandmother      Brain Cancer Maternal Grandmother      Pancreatic Cancer Maternal Aunt      Prostate Cancer Maternal Uncle        ROS: non contributory on the 10-point review of system    Exam:   In general, the patient is in no apparent distress.    Blood pressure 136/80 mmHg  Breathing is unlabored.   HEENT: NC/AT.  PERRLA.  EOMI.  Sclerae white, not injected.    Neck: No jugular venous distension.    Extremities: + edema per patient   Neurologic: Alert and oriented to person/place/time, normal speech and affect  Skin: No rash.    Data:    All relevant labs were personally reviewed and discussed with the patient.   Chemistry panel:   Recent Labs   Lab Test 09/27/21  1218 08/09/21  1840 07/20/21  0608  07/20/21  0431 06/25/19  0817 06/25/19  0516 06/24/19  0805 06/24/19  0444    139   < > 141   < >  --   --  138   POTASSIUM 4.4 4.6   < > 3.8   < > 4.7  --  4.8   CHLORIDE 101 103   < > 110*   < >  --   --  103   CO2 28 26   < > 21*   < >  --   --  25   ANIONGAP 8 10   < > 10   < >  --   --  10   * 125   < > 61*   < >  --    < > 112   BUN 46* 51*   < > 46*   < >  --   --  52*   CR 5.22* 4.70*   < > 4.09*   < >  --   --  2.33*   BETHANY 9.2 9.0   < > 8.5   < >  --   --  9.4   MAG  --   --   --   --   --  2.1  --  2.6   GFRESTIMATED 12* 14*   < > 16*   < >  --   --  31*   AST 14  --   --  25   < >  --   --   --    ALT 38  --   --  34   < >  --   --   --     < > = values in this interval not displayed.       CBC:   Recent Labs   Lab Test 09/27/21  1218 07/24/21  0432   WBC 7.1 5.7   RBC 4.12* 3.80*   HGB 12.6* 11.9*   HCT 36.9* 35.5*   MCV 90 93   MCH 30.6 31.3   MCHC 34.1 33.5   RDW 12.3 12.3   * 143*       Lipid Panel:  Recent Labs   Lab Test 07/21/21  0440 03/02/20  0853 03/04/19  1211   CHOL 167 175 114   HDL 24* 32* 29*   LDL 59  --  46  36   TRIG 415* 649* 243*       Thyroid:   TSH   Date Value Ref Range Status   01/14/2019 2.65 0.30 - 5.00 uIU/mL Final     No results found for: T4  Hemoglobin A1C   Date Value Ref Range Status   09/27/2021 8.1 (H) 0.0 - 5.6 % Final     Comment:     Normal <5.7%   Prediabetes 5.7-6.4%    Diabetes 6.5% or higher     Note: Adopted from ADA consensus guidelines.     INR   Date Value Ref Range Status   09/27/2021 1.13 0.85 - 1.15 Final     Comment:     Effective 7/11/2021, the reference range for this assay has changed.       Patient's all available and relevant cardiac investigations were personally reviewed and discussed with the patient today.    Echo: Sept 2021     Global and regional left ventricular function is normal with an EF of 55-60%.  Right ventricular function, chamber size, wall motion, and thickness are  normal.  The inferior vena cava is normal.  No  pericardial effusion is present.    ECG 9/27/21 - sinus with QTc 483 ms    Event monitor Nov 2021  Sinus rhythm without tachyarrhythmia or bradyarrhythmia.  Occasional supraventricular and ventricular ectopy.  3 symptom triggers, each of which correlated to sinus rhythm with isolated PVCs.    Coronary angiogram: July 2021  Left main with mild disease  LAD with mild proximal disease, having a large diagonal system with a mid LAD being occluded, and distal LAD filling via patent LIMA  Left circumflex with mild to moderate disease  RCA with 30 to 40% stenosis seen in the proximal and distal portion.  The RCA was found to have more significant disease on his prior study, and was therefore assessed with fractional flow reserve.     FFR on the RCA was 0.93 across the distal RCA lesion at peak hyperemia, and 0.96 across the proximal lesion at peak hyperemia, confirming nonobstructive disease     EDP 13mmHg    Assessment and Plan:  46 year old male with    Severe native coronary artery disease  Post coronary bypass surgery June 2019, LIMA to LAD and SVG to RCA  Stable angina  Preserved biventricular function  Dyslipidemia -hypertriglyceridemia  Essential hypertension  End-stage renal disease on hemodialysis on Tuesday Thursday Saturday      Siva Ramey has multiple medical issues including severe coronary artery disease for which he underwent bypass surgery in June 2019.  In July 2021, the LIMA to LAD was patent and the vein graft to the right coronary artery was occluded.  The native right coronary artery was nonobstructive on FFR.  Drug-eluting stent placed previously for STEMI in the diagonal artery was patent.  He is today without  symptoms concerning for unstable angina or heart failure.  He is undergoing dialysis 3 times a week.  Patient appears euvolemic on exam today.  Patient notes that his blood pressure is well controlled.    I have personally reviewed all relevant cardiac investigations and discussed my  "interpretation with the patient. To summarize, ECG is notable for sinus rhythm.  Recent event monitor did not reveal any significant arrhythmias.  He has normal biventricular function with no significant valvular disease.  Review of laboratory data shows hypertriglyceridemia, LDL less than 70 mg per DL, GFR of 12 and a hemoglobin of 12 g.    Medications were reviewed and the patient is on appropriate medical therapy. So I have not recommend any changes to the cardiac medical regimen at this point.  Patient does not need any further cardiac work-up at this point and may proceed with the remainder of his transplant work-up.  He will continue to follow-up with his cardiologist at [facility] on a regular basis.  He was advised to return for follow-up in 2 years in the event remain active on the transplant list.      Recommendations:     1. Continue current medications.  2. Brisk walk 30 minutes daily and diet low in carbohydrates and saturated fat  3. Follow up in 2 years      Video Visit Details:    Type of service:  Video Visit    Start: 11/22/2021 08:09 am  Stop: 11/22/2021 08:23 am    Originating Location (pt. Location): Home    Distant Location (provider location):  "[employer] [facility]     Platform used for Video Visit: [employer]     In addition to visit time documented above, I spent an additional 15  minutes on data review and documentation.      Destiny Tenorio MD, MS  Professor of Medicine  Cardiovascular division      Siva Ramey is a 46 year old who is being evaluated via a billable video visit.      How would you like to obtain your AVS? MyChart  If the video visit is dropped, the invitation should be resent by: Send to e-mail at: [email or web address]  Will anyone else be joining your video visit? No      Vitals - Patient Reported  Weight (Patient Reported): 110.2 kg (242 lb 15.2 oz) (yesterday)  Height (Patient Reported): 180.3 cm (5' 11\")  BMI (Based on Pt Reported Ht/Wt): 33.88  Pain Score: No Pain (0) (No " SOB)      Vitals were taken and medications reconciled.    Gino Landers, EMT  7:48 AM        Please do not hesitate to contact me if you have any questions/concerns.     Sincerely,     Destiny Tenorio MD

## 2021-11-22 NOTE — NURSING NOTE
No medication changes today.     Follow up with Dr. Tenorio in about 2 years.     Regan Layne, RN   Cardiology Nurse Coordinator

## 2021-11-23 ENCOUNTER — TEAM CONFERENCE (OUTPATIENT)
Dept: TRANSPLANT | Facility: CLINIC | Age: 46
End: 2021-11-23

## 2021-11-23 NOTE — PROGRESS NOTES
Pt evaluated via billable telephone visit d/t COVID-19 restrictions. Time spent: 15 min    [facility] Solid Organ Transplant  Outpatient MNT: Kidney Pancreas Transplant Evaluation    Current BMI: 33.1 (HT 72 in,  lbs/111 kg)- data from 10/13   BMI is within recommendation of <35 for KP transplant  Ideal BMI for pancreas transplant 30-32 or 221-235 lbs / per surgeon discretion     Time Spent: 15 minutes  Visit Type: Initial   Referring Physician: Jaswant   Pt accompanied by: self     History of previous txp: none   Dialysis: yes    Dialysis Info: HD 8/2021 TTS 6a-11a  Protein supplement: no     Nutrition Assessment  Frequency of BG checks: 3-4x/day   Last A1c: 8.1 (9/27)    - Appetite: good/baseline   - Food allergies/intolerances: no  - Meal prep & grocery shopping: pt and wife do   - Previous RD education: yes at HD   - Issues chewing or swallowing: no   - N/V/D/C: nausea (long-term)   - Food access concerns: no     Vitamins, Supplements, Pertinent Meds: vit D   Herbal Medicines/Supplements: none     Edema: pedal edema (occasional)    Weight hx: was 230-->260/270 water weight getting back to dry weight    Diet Recall  Breakfast Protein bar or boiled egg    Lunch Turkey (leftover from Thanksgiving) + salad, mandarin oranges    Dinner Grilled chicken w/ salad; protein most nights   Snacks Limited    Beverages Iced tea (unsweetened), coffee, zevia (black cherry)   Alcohol None    Dining out 2x/month      Physical Activity  No routine activity, but some infrequent walking      Labs  9/27 K 4.4, 11/9 K 4.8   11/9 Phos 6.3 (up from 4.3 in Oct)    No nutrition diagnosis identified at this time.     Nutrition Intervention  Nutrition education provided:  Discussed sodium intake (low sodium foods and drinks, seasoning food without salt and tips for low sodium diet).  Reviewed K/Phos labs. Pt reports drinking diet cherry 7up, which dialysis RD reports increasing his phos level. Getting good protein at meal  times. Weight loss per surgeon discretion.     Reviewed post txp diet guidelines in brief (will review in further detail post txp):  (1) Review of proper food safety measures d/t immunosuppressant therapy post-op and increased risk for food-borne illness    (2) Avoid the following post txp d/t risk for rejection, unknown effects on the organs, and/or potential interactions with immunosuppressants:  - Herbal, Chinese, holistic, chiropractic, natural, alternative medicines and supplements  - Detoxes and cleanses  - Weight loss pills  - Protein powders or other products with extracts or herbs (ie green tea extract)    (3) Med regimen and possible side effects    Patient Understanding: Pt verbalized understanding of education provided.  Expected Engagement: Good  Follow-Up Plans: PRN     Nutrition Goals  No nutrition goals identified at this time     Cindy Romero, RD, LD, CCTD

## 2021-11-23 NOTE — TELEPHONE ENCOUNTER
Image Review Meeting    ATTENDEES: Dr. Michaud    IMAGES REVIEWED: CT Ab/Pelv 11/17/21 and Iliac US 11/17/21    DECISION: Vessels suitable for transplant     INCIDENTALS: Yes: mild splenomegaly, no follow up needed per Dr. Michaud

## 2021-11-29 ENCOUNTER — VIRTUAL VISIT (OUTPATIENT)
Dept: TRANSPLANT | Facility: CLINIC | Age: 46
End: 2021-11-29
Attending: NURSE PRACTITIONER
Payer: COMMERCIAL

## 2021-11-29 DIAGNOSIS — I10 ESSENTIAL HYPERTENSION: ICD-10-CM

## 2021-11-29 DIAGNOSIS — Z01.818 ENCOUNTER FOR PRE-TRANSPLANT EVALUATION FOR KIDNEY AND PANCREAS TRANSPLANT: ICD-10-CM

## 2021-11-29 DIAGNOSIS — Z76.82 ORGAN TRANSPLANT CANDIDATE: ICD-10-CM

## 2021-11-29 DIAGNOSIS — N18.6 END STAGE RENAL DISEASE (H): ICD-10-CM

## 2021-11-29 DIAGNOSIS — E78.5 HYPERLIPIDEMIA: ICD-10-CM

## 2021-11-29 DIAGNOSIS — I25.10 CARDIOVASCULAR DISEASE: ICD-10-CM

## 2021-11-29 DIAGNOSIS — E11.9 DIABETES MELLITUS, TYPE 2 (H): ICD-10-CM

## 2021-11-29 NOTE — LETTER
11/29/2021     RE: Siva Ramey  [address] [address]    Dear Colleague,    Thank you for referring your patient, Siva Ramey, to the [facility]. Please see a copy of my visit note below.    Pt evaluated via billable telephone visit d/t COVID-19 restrictions. Time spent: 15 min    [facility] Solid Organ Transplant  Outpatient MNT: Kidney Pancreas Transplant Evaluation    Current BMI: 33.1 (HT 72 in,  lbs/111 kg)- data from 10/13   BMI is within recommendation of <35 for KP transplant  Ideal BMI for pancreas transplant 30-32 or 221-235 lbs / per surgeon discretion     Time Spent: 15 minutes  Visit Type: Initial   Referring Physician: Jaswant   Pt accompanied by: self     History of previous txp: none   Dialysis: yes    Dialysis Info: HD 8/2021 TTS 6a-11a  Protein supplement: no     Nutrition Assessment  Frequency of BG checks: 3-4x/day   Last A1c: 8.1 (9/27)    - Appetite: good/baseline   - Food allergies/intolerances: no  - Meal prep & grocery shopping: pt and wife do   - Previous RD education: yes at HD   - Issues chewing or swallowing: no   - N/V/D/C: nausea (long-term)   - Food access concerns: no     Vitamins, Supplements, Pertinent Meds: vit D   Herbal Medicines/Supplements: none     Edema: pedal edema (occasional)    Weight hx: was 230-->260/270 water weight getting back to dry weight    Diet Recall  Breakfast Protein bar or boiled egg    Lunch Turkey (leftover from Thanksgiving) + salad, mandarin oranges    Dinner Grilled chicken w/ salad; protein most nights   Snacks Limited    Beverages Iced tea (unsweetened), coffee, zevia (black cherry)   Alcohol None    Dining out 2x/month      Physical Activity  No routine activity, but some infrequent walking      Labs  9/27 K 4.4, 11/9 K 4.8   11/9 Phos 6.3 (up from 4.3 in Oct)    No nutrition diagnosis identified at this time.     Nutrition Intervention  Nutrition education provided:  Discussed  sodium intake (low sodium foods and drinks, seasoning food without salt and tips for low sodium diet).  Reviewed K/Phos labs. Pt reports drinking diet cherry 7up, which dialysis RD reports increasing his phos level. Getting good protein at meal times. Weight loss per surgeon discretion.     Reviewed post txp diet guidelines in brief (will review in further detail post txp):  (1) Review of proper food safety measures d/t immunosuppressant therapy post-op and increased risk for food-borne illness    (2) Avoid the following post txp d/t risk for rejection, unknown effects on the organs, and/or potential interactions with immunosuppressants:  - Herbal, Chinese, holistic, chiropractic, natural, alternative medicines and supplements  - Detoxes and cleanses  - Weight loss pills  - Protein powders or other products with extracts or herbs (ie green tea extract)    (3) Med regimen and possible side effects    Patient Understanding: Pt verbalized understanding of education provided.  Expected Engagement: Good  Follow-Up Plans: PRN     Nutrition Goals  No nutrition goals identified at this time     Cindy Romero, RD, LD, CCTD

## 2021-12-27 NOTE — TELEPHONE ENCOUNTER
RECORDS RECEIVED FROM: internal /ce    DATE RECEIVED: 1.5.22   NOTES STATUS DETAILS   OFFICE NOTE from referring provider internal  Jarod Christy APRN CNP   OFFICE NOTE from other specialist na    DISCHARGE SUMMARY from hospital na    DISCHARGE REPORT from the ER na    MEDICATION LIST internal     IMAGING  (NEED IMAGES AND REPORTS)     CT SCAN ce /internal  Internal- 11/17/21, 5/7/20,    CHEST XRAY (CXR) ce /internal  [facility]- 7/15/21,   Internal- 9.27.21, 7/19/21, 10.4.20,  [facility]- 2.25.20,     TESTS     PULMONARY FUNCTION TESTING (PFT) internal /ce  [facility]- 2.19.20  Internal- 11/17/21        Action 12.27.21 sv    Action Taken Image request sent to   -[facility]-   CXR- 7/15/21- received image     [facility]-   CXR- 2.25.20- received image

## 2022-01-05 ENCOUNTER — PRE VISIT (OUTPATIENT)
Dept: PULMONOLOGY | Facility: CLINIC | Age: 47
End: 2022-01-05

## 2022-01-06 ENCOUNTER — TELEPHONE (OUTPATIENT)
Dept: TRANSPLANT | Facility: CLINIC | Age: 47
End: 2022-01-06

## 2022-01-06 DIAGNOSIS — Z76.82 AWAITING ORGAN TRANSPLANT: Primary | ICD-10-CM

## 2022-01-06 NOTE — TELEPHONE ENCOUNTER
Called pt to touch base on eval status.  Left VM inquiring about dental status and that I noticed he did not attend his pulm appt on 1/5/22.  Provided phone number for him to call scheduling to re-schedule and my direct line if he has questions.

## 2022-01-06 NOTE — LETTER
PHYSICIAN ORDER   ALA/PRA BLOOD    DATE & TIME ISSUED: 2022 5:09 PM  PATIENT NAME: Siva Ramey   : 1975     [facility] MR# [if applicable]: [id number]     DIAGNOSIS/ICD-10 CODE: Awaiting Organ transplant [Z76.82}  EXPIRES: (1 YEAR AFTER DATE ISSUED)  Every 3 months: Please draw upon receipt of  kit, then every 3 months    1. Please draw 20ml of blood in red top (plain) tube for Antileukocyte Antibody (ALA or PRA).  2. Label tubes with the patient s name, complete lab slip.    3. Mailers, lab slips with instructions are sent to patient separately.  4. Call the Outreach Lab at [phone] to reorder mailers.  5. Mail blood to (this address is also on the mailers):    IMMUNOLOGY LABORATORY   [facility]    [address] [address]      .

## 2022-01-06 NOTE — LETTER
Siva Ramey  [address] [address]                January 7, 2022    Dear Dental Provider,     You may or may not know that the above patient in your care is in evaluation for an organ transplant. In order to be a candidate for transplant, our center requests that the patient provide a letter of clearance from their dentist stating that they are free from disease or infections. At your earliest convenience, please fax this information to [phone]. Your help is greatly appreciated.    Sincerely,   Kim Fay RN, Pre-Kidney/Pancreas Transplant Coordinator  [phone]

## 2022-01-07 NOTE — TELEPHONE ENCOUNTER
Pt returned my call, he would like scheduling to reach out to reschedule his pulmonary appt (will send message).  He stated he saw his dentist recently, goes to [facility] in [place] - I will request a letter of clearance from them.  He states he is currently 236 llbs (will request current weight records from dialysis).  Discussed that once he sees pulm we will review his case for active listing status.  He had no further questions.

## 2022-01-18 ENCOUNTER — TRANSFERRED RECORDS (OUTPATIENT)
Dept: HEALTH INFORMATION MANAGEMENT | Facility: CLINIC | Age: 47
End: 2022-01-18

## 2022-01-19 ENCOUNTER — TELEPHONE (OUTPATIENT)
Dept: PULMONOLOGY | Facility: CLINIC | Age: 47
End: 2022-01-19

## 2022-01-19 DIAGNOSIS — R94.2 ABNORMAL PFT: Primary | ICD-10-CM

## 2022-01-19 NOTE — TELEPHONE ENCOUNTER
Pt was unable to make it to his new appt with Dr. Haddad on 1/5 due to illness. Last minute opening with Dr. Harris for 2/9 is available after pt requested to reschedule. This was discussed with pt and appt scheduled. Last chest imaging on file was from September. As appt is in Feb, imaging needs to be completed within three months of seeing provider; CXR was also scheduled and details confirmed with pt.

## 2022-01-19 NOTE — TELEPHONE ENCOUNTER
RECORDS RECEIVED FROM: internal /ce     DATE RECEIVED: 2.9.22    NOTES STATUS DETAILS   OFFICE NOTE from referring provider internal  Jarod Christy APRN CNP   OFFICE NOTE from other specialist na     DISCHARGE SUMMARY from hospital na     DISCHARGE REPORT from the ER na     MEDICATION LIST internal      IMAGING  (NEED IMAGES AND REPORTS)       CT SCAN ce /internal  Internal- 11/17/21, 5/7/20,    CHEST XRAY (CXR) ce /internal  [facility]- 7/15/21,   Internal- 9.27.21, 7/19/21, 10.4.20,  [facility]- 2.25.20,     TESTS       PULMONARY FUNCTION TESTING (PFT) internal /ce  [facility]- 2.19.20  Internal- 11/17/21

## 2022-01-25 ENCOUNTER — LAB (OUTPATIENT)
Dept: LAB | Facility: CLINIC | Age: 47
End: 2022-01-25
Payer: COMMERCIAL

## 2022-01-25 DIAGNOSIS — Z76.82 AWAITING ORGAN TRANSPLANT: ICD-10-CM

## 2022-01-25 PROCEDURE — 86833 HLA CLASS II HIGH DEFIN QUAL: CPT

## 2022-01-25 PROCEDURE — 86832 HLA CLASS I HIGH DEFIN QUAL: CPT

## 2022-01-28 LAB
PROTOCOL CUTOFF: NORMAL
SA 1 CELL: NORMAL
SA 1 TEST METHOD: NORMAL
SA 2 CELL: NORMAL
SA 2 TEST METHOD: NORMAL
SA1 HI RISK ABY: NORMAL
SA1 MOD RISK ABY: NORMAL
SA2 HI RISK ABY: NORMAL
SA2 MOD RISK ABY: NORMAL
UNACCEPTABLE ANTIGENS: NORMAL
UNOS CPRA: 11
ZZZSA 1  COMMENTS: NORMAL
ZZZSA 2 COMMENTS: NORMAL

## 2022-02-09 ENCOUNTER — ANCILLARY PROCEDURE (OUTPATIENT)
Dept: GENERAL RADIOLOGY | Facility: CLINIC | Age: 47
End: 2022-02-09
Attending: STUDENT IN AN ORGANIZED HEALTH CARE EDUCATION/TRAINING PROGRAM
Payer: COMMERCIAL

## 2022-02-09 ENCOUNTER — OFFICE VISIT (OUTPATIENT)
Dept: PULMONOLOGY | Facility: CLINIC | Age: 47
End: 2022-02-09
Attending: STUDENT IN AN ORGANIZED HEALTH CARE EDUCATION/TRAINING PROGRAM
Payer: COMMERCIAL

## 2022-02-09 ENCOUNTER — PRE VISIT (OUTPATIENT)
Dept: PULMONOLOGY | Facility: CLINIC | Age: 47
End: 2022-02-09

## 2022-02-09 VITALS
OXYGEN SATURATION: 96 % | WEIGHT: 245 LBS | HEIGHT: 71 IN | BODY MASS INDEX: 34.3 KG/M2 | HEART RATE: 92 BPM | SYSTOLIC BLOOD PRESSURE: 134 MMHG | DIASTOLIC BLOOD PRESSURE: 84 MMHG

## 2022-02-09 DIAGNOSIS — R94.2 ABNORMAL PFT: ICD-10-CM

## 2022-02-09 DIAGNOSIS — Z76.82 ORGAN TRANSPLANT CANDIDATE: ICD-10-CM

## 2022-02-09 PROCEDURE — 71046 X-RAY EXAM CHEST 2 VIEWS: CPT | Mod: GC | Performed by: RADIOLOGY

## 2022-02-09 PROCEDURE — G0463 HOSPITAL OUTPT CLINIC VISIT: HCPCS | Mod: 25

## 2022-02-09 PROCEDURE — 99204 OFFICE O/P NEW MOD 45 MIN: CPT | Mod: GC | Performed by: STUDENT IN AN ORGANIZED HEALTH CARE EDUCATION/TRAINING PROGRAM

## 2022-02-09 ASSESSMENT — MIFFLIN-ST. JEOR: SCORE: 2013.44
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
## 2022-02-25 ENCOUNTER — TELEPHONE (OUTPATIENT)
Dept: TRANSPLANT | Facility: CLINIC | Age: 47
End: 2022-02-25

## 2022-02-25 NOTE — TELEPHONE ENCOUNTER
Pt called and stated he is supposed to have some pulmonary follow up but he is not sure what and was unable to schedule d/t lack of orders.  I will reach out the pulmonary provider to confirm.    We discussed that he should continue to work on weight loss. He states he is currently 105.9 kg (233 lbs).     He is getting scheduled w/ a dentist and will let me know when that is complete.    He had no further questions. I will follow up with him when I hear back from pulmonary.

## 2022-03-03 DIAGNOSIS — R06.02 SOB (SHORTNESS OF BREATH): Primary | ICD-10-CM

## 2022-04-15 ENCOUNTER — HOSPITAL ENCOUNTER (OUTPATIENT)
Dept: CT IMAGING | Facility: HOSPITAL | Age: 47
Discharge: HOME | End: 2022-04-15
Attending: STUDENT IN AN ORGANIZED HEALTH CARE EDUCATION/TRAINING PROGRAM
Payer: COMMERCIAL

## 2022-04-15 DIAGNOSIS — R94.2 ABNORMAL PFT: ICD-10-CM

## 2022-04-15 DIAGNOSIS — Z76.82 ORGAN TRANSPLANT CANDIDATE: ICD-10-CM

## 2022-04-15 PROCEDURE — 70490 CT SOFT TISSUE NECK W/O DYE: CPT

## 2022-04-15 PROCEDURE — 71250 CT THORAX DX C-: CPT

## 2022-04-25 ENCOUNTER — HOSPITAL ENCOUNTER (OUTPATIENT)
Dept: CARDIAC REHAB | Facility: HOSPITAL | Age: 47
Discharge: HOME | End: 2022-04-25
Attending: STUDENT IN AN ORGANIZED HEALTH CARE EDUCATION/TRAINING PROGRAM
Payer: COMMERCIAL

## 2022-04-25 DIAGNOSIS — R06.02 SOB (SHORTNESS OF BREATH): ICD-10-CM

## 2022-04-25 PROCEDURE — G0238 OTH RESP PROC, INDIV: HCPCS

## 2022-04-25 PROCEDURE — G0237 THERAPEUTIC PROCD STRG ENDUR: HCPCS

## 2022-04-28 ENCOUNTER — HOSPITAL ENCOUNTER (OUTPATIENT)
Dept: CARDIAC REHAB | Facility: HOSPITAL | Age: 47
Discharge: HOME | End: 2022-04-28
Attending: STUDENT IN AN ORGANIZED HEALTH CARE EDUCATION/TRAINING PROGRAM
Payer: COMMERCIAL

## 2022-04-28 PROCEDURE — G0238 OTH RESP PROC, INDIV: HCPCS

## 2022-04-28 PROCEDURE — G0237 THERAPEUTIC PROCD STRG ENDUR: HCPCS

## 2022-05-03 ENCOUNTER — HOSPITAL ENCOUNTER (OUTPATIENT)
Dept: CARDIAC REHAB | Facility: HOSPITAL | Age: 47
Discharge: HOME | End: 2022-05-03
Attending: STUDENT IN AN ORGANIZED HEALTH CARE EDUCATION/TRAINING PROGRAM
Payer: COMMERCIAL

## 2022-05-03 PROCEDURE — G0239 OTH RESP PROC, GROUP: HCPCS

## 2022-05-05 ENCOUNTER — HOSPITAL ENCOUNTER (OUTPATIENT)
Dept: CARDIAC REHAB | Facility: HOSPITAL | Age: 47
Discharge: HOME | End: 2022-05-05
Attending: STUDENT IN AN ORGANIZED HEALTH CARE EDUCATION/TRAINING PROGRAM
Payer: COMMERCIAL

## 2022-05-05 PROCEDURE — G0239 OTH RESP PROC, GROUP: HCPCS

## 2022-05-12 ENCOUNTER — HOSPITAL ENCOUNTER (OUTPATIENT)
Dept: CARDIAC REHAB | Facility: HOSPITAL | Age: 47
Discharge: HOME | End: 2022-05-12
Attending: STUDENT IN AN ORGANIZED HEALTH CARE EDUCATION/TRAINING PROGRAM
Payer: COMMERCIAL

## 2022-05-12 PROCEDURE — G0239 OTH RESP PROC, GROUP: HCPCS

## 2022-05-17 ENCOUNTER — HOSPITAL ENCOUNTER (OUTPATIENT)
Dept: CARDIAC REHAB | Facility: HOSPITAL | Age: 47
Discharge: HOME | End: 2022-05-17
Attending: STUDENT IN AN ORGANIZED HEALTH CARE EDUCATION/TRAINING PROGRAM
Payer: COMMERCIAL

## 2022-05-17 PROCEDURE — G0239 OTH RESP PROC, GROUP: HCPCS

## 2022-05-21 ENCOUNTER — HEALTH MAINTENANCE LETTER (OUTPATIENT)
Age: 47
End: 2022-05-21

## 2022-05-26 ENCOUNTER — HOSPITAL ENCOUNTER (OUTPATIENT)
Dept: CARDIAC REHAB | Facility: HOSPITAL | Age: 47
Discharge: HOME | End: 2022-05-26
Attending: STUDENT IN AN ORGANIZED HEALTH CARE EDUCATION/TRAINING PROGRAM
Payer: COMMERCIAL

## 2022-05-26 PROCEDURE — G0239 OTH RESP PROC, GROUP: HCPCS

## 2022-05-31 ENCOUNTER — HOSPITAL ENCOUNTER (OUTPATIENT)
Dept: CARDIAC REHAB | Facility: HOSPITAL | Age: 47
Discharge: HOME | End: 2022-05-31
Attending: STUDENT IN AN ORGANIZED HEALTH CARE EDUCATION/TRAINING PROGRAM
Payer: COMMERCIAL

## 2022-05-31 PROCEDURE — G0239 OTH RESP PROC, GROUP: HCPCS

## 2022-06-02 ENCOUNTER — MEDICAL CORRESPONDENCE (OUTPATIENT)
Dept: CARDIAC REHAB | Facility: HOSPITAL | Age: 47
End: 2022-06-02

## 2022-06-20 ENCOUNTER — TELEPHONE (OUTPATIENT)
Dept: TRANSPLANT | Facility: CLINIC | Age: 47
End: 2022-06-20
Payer: COMMERCIAL

## 2022-06-20 ENCOUNTER — TELEPHONE (OUTPATIENT)
Dept: PULMONOLOGY | Facility: CLINIC | Age: 47
End: 2022-06-20
Payer: COMMERCIAL

## 2022-06-20 DIAGNOSIS — R06.09 DOE (DYSPNEA ON EXERTION): Primary | ICD-10-CM

## 2022-06-20 RX ORDER — FLUTICASONE PROPIONATE AND SALMETEROL 250; 50 UG/1; UG/1
1 POWDER RESPIRATORY (INHALATION) EVERY 12 HOURS
Qty: 14 EACH | Refills: 3 | Status: SHIPPED | OUTPATIENT
Start: 2022-06-20 | End: 2024-02-14

## 2022-06-20 NOTE — TELEPHONE ENCOUNTER
Called pt to touch base on evaluation status.  He has not yet scheduled his dental procedure but will let me know when he does, we will have him follow up with a surgeon to ensure his weight is appropriate for listing.  He agreed to call me when he makes that appointment.    Received following message from pulmonary team:    Archaan Harris MD Kiel, Sarah Choi, MD; Kim Fay RN  Hi,     I would be in favor of an inhaler as it may better optimize him in the pre-transplant period. I can go ahead and order it.     Darrell Cardona             Previous Messages       ----- Message -----   From: Alisa Torre MD   Sent: 6/20/2022  11:32 AM CDT   To: Archana Harris MD, *   Subject: RE: Pulmonary Clearance for Kidney/Pancreas *     Hi Kim,     I do not think he needs to be seen again prior to proceeding with transplant. 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 We could do a SNIFF test to confirm the presence of a paralyzed hemidiaphragm if you need an answer for his restrictive PFTs, but I don't think it's necessary.  Darrell- he has some air trapping on CT I would consider putting him on an inhaler regimen but will defer to you as to what you'd like to do.     ThanksAlisa   ----- Message -----   From: Kim Fay RN   Sent: 6/20/2022  11:06 AM CDT   To: Archana Harris MD, *   Subject: Pulmonary Clearance for Kidney/Pancreas Katz*     Hi Dr. Torre and Dr. Harris,     I am Siva's pre-Kidney/Pancreas Transplant Coordinator.  He saw you both on 2/9/22 for pulmonary clearance to proceed w/ transplant.  Looks like he was doing pulmonary rehab- I am wondering if he needs a follow up visit with either or you or any additional testing before he can be cleared from a pulmonary perspective?     Kim Cardona

## 2022-06-20 NOTE — TELEPHONE ENCOUNTER
Spoke with Dr Harris and she requested that patient be notified that she ordered Advair inhaler for him to [facility]. Left message with patient that inhaler ordered to pharmacy.

## 2022-07-11 ENCOUNTER — LAB (OUTPATIENT)
Dept: LAB | Facility: CLINIC | Age: 47
End: 2022-07-11
Payer: COMMERCIAL

## 2022-07-11 DIAGNOSIS — Z76.82 AWAITING ORGAN TRANSPLANT: ICD-10-CM

## 2022-07-11 PROCEDURE — 86833 HLA CLASS II HIGH DEFIN QUAL: CPT

## 2022-07-11 PROCEDURE — 86832 HLA CLASS I HIGH DEFIN QUAL: CPT

## 2022-07-16 ENCOUNTER — HEALTH MAINTENANCE LETTER (OUTPATIENT)
Age: 47
End: 2022-07-16

## 2022-08-18 DIAGNOSIS — I25.10 CORONARY ARTERY DISEASE DUE TO LIPID RICH PLAQUE: ICD-10-CM

## 2022-08-18 DIAGNOSIS — I25.83 CORONARY ARTERY DISEASE DUE TO LIPID RICH PLAQUE: ICD-10-CM

## 2022-08-19 RX ORDER — NITROGLYCERIN 0.4 MG/1
TABLET SUBLINGUAL
Qty: 25 TABLET | Refills: 1 | Status: SHIPPED | OUTPATIENT
Start: 2022-08-19 | End: 2023-11-16

## 2022-09-17 ENCOUNTER — HEALTH MAINTENANCE LETTER (OUTPATIENT)
Age: 47
End: 2022-09-17

## 2022-10-10 ENCOUNTER — TELEPHONE (OUTPATIENT)
Dept: TRANSPLANT | Facility: CLINIC | Age: 47
End: 2022-10-10

## 2022-11-04 ENCOUNTER — TELEPHONE (OUTPATIENT)
Dept: TRANSPLANT | Facility: CLINIC | Age: 47
End: 2022-11-04

## 2022-11-04 DIAGNOSIS — I25.10 CARDIOVASCULAR DISEASE: ICD-10-CM

## 2022-11-04 DIAGNOSIS — J98.4 DISEASE OF LUNG: ICD-10-CM

## 2022-11-04 DIAGNOSIS — E11.9 DIABETES MELLITUS, TYPE 2 (H): ICD-10-CM

## 2022-11-04 DIAGNOSIS — Z76.82 ORGAN TRANSPLANT CANDIDATE: ICD-10-CM

## 2022-11-04 DIAGNOSIS — N18.6 END STAGE RENAL DISEASE (H): ICD-10-CM

## 2022-11-04 DIAGNOSIS — Z01.818 ENCOUNTER FOR PRE-TRANSPLANT EVALUATION FOR KIDNEY AND PANCREAS TRANSPLANT: ICD-10-CM

## 2022-12-01 NOTE — TELEPHONE ENCOUNTER
Pt returned my call and left .  He stated he will be wrapping up his dental work in January.  Attempted to call back, received error message. Sent [employer] message inquiring if pt has met weight loss requirements and if he would like to be set up for a follow up visit w/ a transplant surgeon.

## 2023-01-09 ENCOUNTER — OFFICE VISIT (OUTPATIENT)
Dept: TRANSPLANT | Facility: CLINIC | Age: 48
End: 2023-01-09
Attending: NURSE PRACTITIONER
Payer: COMMERCIAL

## 2023-01-09 ENCOUNTER — LAB (OUTPATIENT)
Dept: LAB | Facility: CLINIC | Age: 48
End: 2023-01-09
Payer: COMMERCIAL

## 2023-01-09 VITALS
HEART RATE: 82 BPM | OXYGEN SATURATION: 96 % | TEMPERATURE: 98.7 F | RESPIRATION RATE: 18 BRPM | DIASTOLIC BLOOD PRESSURE: 73 MMHG | SYSTOLIC BLOOD PRESSURE: 112 MMHG | BODY MASS INDEX: 31.83 KG/M2 | WEIGHT: 228.2 LBS

## 2023-01-09 DIAGNOSIS — I25.10 CARDIOVASCULAR DISEASE: ICD-10-CM

## 2023-01-09 DIAGNOSIS — Z76.82 ORGAN TRANSPLANT CANDIDATE: ICD-10-CM

## 2023-01-09 DIAGNOSIS — N18.6 END STAGE RENAL DISEASE (H): ICD-10-CM

## 2023-01-09 DIAGNOSIS — E11.22 TYPE 2 DIABETES MELLITUS WITH CHRONIC KIDNEY DISEASE ON CHRONIC DIALYSIS, WITH LONG-TERM CURRENT USE OF INSULIN (H): ICD-10-CM

## 2023-01-09 DIAGNOSIS — J98.4 DISEASE OF LUNG: ICD-10-CM

## 2023-01-09 DIAGNOSIS — Z79.4 TYPE 2 DIABETES MELLITUS WITH CHRONIC KIDNEY DISEASE ON CHRONIC DIALYSIS, WITH LONG-TERM CURRENT USE OF INSULIN (H): ICD-10-CM

## 2023-01-09 DIAGNOSIS — E11.9 DIABETES MELLITUS, TYPE 2 (H): ICD-10-CM

## 2023-01-09 DIAGNOSIS — Z01.818 ENCOUNTER FOR PRE-TRANSPLANT EVALUATION FOR KIDNEY AND PANCREAS TRANSPLANT: ICD-10-CM

## 2023-01-09 DIAGNOSIS — Z99.2 TYPE 2 DIABETES MELLITUS WITH CHRONIC KIDNEY DISEASE ON CHRONIC DIALYSIS, WITH LONG-TERM CURRENT USE OF INSULIN (H): ICD-10-CM

## 2023-01-09 DIAGNOSIS — Z76.82 AWAITING ORGAN TRANSPLANT: ICD-10-CM

## 2023-01-09 DIAGNOSIS — N18.6 TYPE 2 DIABETES MELLITUS WITH CHRONIC KIDNEY DISEASE ON CHRONIC DIALYSIS, WITH LONG-TERM CURRENT USE OF INSULIN (H): ICD-10-CM

## 2023-01-09 PROCEDURE — 99215 OFFICE O/P EST HI 40 MIN: CPT | Performed by: SURGERY

## 2023-01-09 PROCEDURE — 86833 HLA CLASS II HIGH DEFIN QUAL: CPT | Performed by: PATHOLOGY

## 2023-01-09 PROCEDURE — 86832 HLA CLASS I HIGH DEFIN QUAL: CPT | Performed by: PATHOLOGY

## 2023-01-09 PROCEDURE — 86828 HLA CLASS I&II ANTIBODY QUAL: CPT | Performed by: PATHOLOGY

## 2023-01-09 PROCEDURE — G0463 HOSPITAL OUTPT CLINIC VISIT: HCPCS | Performed by: SURGERY

## 2023-01-09 PROCEDURE — 36415 COLL VENOUS BLD VENIPUNCTURE: CPT | Performed by: PATHOLOGY

## 2023-01-09 ASSESSMENT — PAIN SCALES - GENERAL: PAINLEVEL: MILD PAIN (3)

## 2023-01-09 NOTE — NURSING NOTE
"Chief Complaint   Patient presents with     RECHECK     Pre Kidney/Pancreas TX      Vital signs:  Temp: 98.7  F (37.1  C) Temp src: Oral BP: 112/73 Pulse: 82   Resp: 18 SpO2: 96 %       Weight: 103.5 kg (228 lb 3.2 oz)  Estimated body mass index is 31.83 kg/m  as calculated from the following:    Height as of 2/9/22: 1.803 m (5' 11\").    Weight as of this encounter: 103.5 kg (228 lb 3.2 oz).      Gabriela Rodriguez, [facility]  1/9/2023 3:22 PM      "

## 2023-01-09 NOTE — LETTER
1/9/2023         RE: Siva Ramey  [address]  [place]  [place] [address]        Dear Colleague,    Thank you for referring your patient, Siva Ramey, to the [facility]. Please see a copy of my visit note below.    Transplant Surgery Consult Note    Medical record number: [id number]  YOB: 1975,   Consult requested by Dr. Aime Rios for evaluation of kidney and pancreas transplant candidacy.    Assessment and Recommendations: Mr. Ramey is a good candidate for transplantation and has a good understanding of the risks and benefits of this approach to management of renal failure and diabetes. The following issues should be addressed prior to transplant:       47 yo male with type 2 DM for the last 21 years  Insulin dependent for 5 years with about 40-50 units/d  A1C in the 7s  Some peripheral neuropathy and retinopathy  ESRD on HD since Aug '21  Left wrist fistula  MI Jan 2019  Stent placement in 2019  Then underwent bypass surgery later in the year. No antiplatelet agents now except aspirin  No prev abd surgery  No live donors   Could benefit from a  KP tx  PFT appropriate, no shortness of breath with exertion and is fairly active  BMI/abd profile assessed. Has done well with weight loss and is appropriate surgically for SPK at this time. Advised not to gain weight as this may lead to placing on hold for SPK. He understands this point.   With weight loss he remains on insulin but is on a lower dose than previously. He remains interested in pancreas transplant     Blood type A    Risks of the surgical procedure including but not limited to the rare risk of mortality discussed in detail. Patient verbalized good understanding and had several pertinent questions which were answered satisfactorily.     Immunosuppressive regimen, management and long term risks discussed in detail.         Mr. Ramey has End stage renal failure due to type 2 DM whose condition is not expected to  resolve, is expected to progress, and is expected to continue to develop related comorbid conditions.  Cardiology consult for cardiac risk stratification to be ordered: Yes  CT abdomen and pelvis without contrast to be ordered for assessment of vascular targets: Yes  Transplant listing labs ordered to include HLA, ABOx2, Cr, etc.  Dietician consult ordered: Yes  Social work consult ordered: Yes  Imaging reports reviewed:  yes  Radiology images reviewed:no  Recipient suitable to move forward with work up of living donors:  Yes      The majority of our visit was spent in counselling, discussing the medical and surgical risks of living or  donor kidney and pancreas transplantation, either in a simultaneous or sequential fashion. I contrasted approximate wait time for SPK vs living vs  donor kidneys from normal (0-85%) or higher (%) kidney donor profile index (KDPI) donors and their associated outcomes. I would not recommend this individual to consider kidneys from high KDPI donors. The reason for this decision is best summarized as:SPK candidate.  Access to transplant will be impacted by living donor availability and overall candidacy for SPK, as well as the influence of blood type and degree of sensitization. We discussed advantages of preemptive transplant as well as living donor kidney transplant, and graft and patient survival outcomes associated with these options. Potential surgical complications of kidney and pancreas transplantation include bleeding, clotting, infection, wound complications, anastomotic failure and other issues such as cardiac complications, pneumonia, deep venous thrombosis, pulmonary embolism, post transplant diabetes and death. We discussed the need for protocol biopsy of the kidney and the possible need for a ureteral stent (and subsequent removal). We discussed benefits and risks associated with different approaches to exocrine drainage of pancreatic secretions. We  also discussed differences in the average length of stay, recovery process, and posttransplant lab and monitoring protocol. We discussed the risk of graft rejection and recurrent diabetic nephropathy in the setting of poor glycemic control. I emphasized the need for strict immunosuppression adherence and the potential for complications of immunosuppression such as skin cancer or lymphoma, as well as a very low but not zero risk of donor-derived disease transmission risks (infection, cancer). Mr. Ramey asked good questions and the patient's candidacy will be reviewed at our Multidisciplinary Selection Committee. Thank you for the opportunity to participate in Mr. Ramey's care.    Total time: 30 minutes  Counselling time: 25 minutes    Harrison Whitehead MD.  ---------------------------------------------------------------------------------------------------    HPI: Mr. Ramey has End stage renal failure due to diabetes mellitus type 2. The patient has had diabetes for 20 years. Management is by Lantus per diabetic educator  Novolog per diabetic educator. The patient usually checks his blood sugar 3 times/day. Complications of diabetes include:    Retinopathy:  Yes   Neuropathy: Yes   Gastroparesis:  No    The patient is on dialysis.    Has potential kidney donors:  No.  Interested in participation in paired exchange if a donor is willing: Doesn't know     The patient has the following pertinent history:       No    Yes  Dialysis:    []      [] via:       Blood Transfusion                  []      []  Number of units:   Most recently:  Pregnancy:    []      [] Number:       Previous Transplant:  []      [] Details:    Cancer    []      [] Comment:   Kidney stones   []      [] Comment:      Recurrent infections  []      []  Type:                  Bladder dysfunction  []      [] Cause:    Claudication   []      [] Distance:    Previous Amputation  []      [] Cause:     Chronic anticoagulation  []       [] Indication:  Rastafari  []      []     Past Medical History:   Diagnosis Date     Acquired elevated diaphragm      Anemia in chronic kidney disease      Angina pectoris (H)      Chronic kidney disease      Coronary artery disease      Diabetes mellitus, type II (H) 12/2001     Diabetic nephropathy (H)      MARQUEZ (dyspnea on exertion)      Dyslipidemia 12/2001     End stage renal disease (H)      History of blood transfusion 2004     Hypertension     takes medication     Ischemic cardiomyopathy      Metabolic acidosis      Myocardial infarction (H)     STEMI -Diagonal branch of the LAD     Obesity (BMI 30-39.9)      Peripheral neuropathy      Proteinuria      Retinopathy      Vitamin D deficiency      Past Surgical History:   Procedure Laterality Date     BACK SURGERY  2009    L5 disc cut     BYPASS GRAFT ARTERY CORONARY  06/20/2019    2 vessels     CV CORONARY ANGIOGRAM N/A 1/13/2019    Procedure: Coronary Angiogram;  Surgeon: Cielo Che MD;  Location: [facility] Cath Lab;  Service: Cardiology     CV CORONARY ANGIOGRAM N/A 5/2/2019    Procedure: Coronary Angiogram;  Surgeon: Cielo Che MD;  Location: [facility];  Service: Cardiology     CV CORONARY ANGIOGRAM N/A 4/30/2020    Procedure: Coronary Angiogram;  Surgeon: Cielo Che MD;  Location: [facility] Lab;  Service: Cardiology     CV CORONARY ANGIOGRAM N/A 7/22/2021    Procedure: CV CORONARY ANGIOGRAM;  Surgeon: Adrian Crow MD;  Location: [facility] LAB CV     CV FRACTIONAL FLOW RATIO WIRE N/A 7/22/2021    Procedure: Fractional Flow Ratio Wire;  Surgeon: Adrian Crow MD;  Location: [facility] LAB CV     CV LEFT HEART CATH N/A 7/22/2021    Procedure: Left Heart Cath;  Surgeon: Adrian Crow MD;  Location: [facility] LAB CV     CV LEFT HEART CATHETERIZATION WITH LEFT VENTRICULOGRAM N/A 1/13/2019    Procedure: Left Heart Catheterization with Left Ventriculogram;  Surgeon: Cielo Che MD;  Location:  [facility] Cath Lab;  Service: Cardiology     CV LEFT HEART CATHETERIZATION WITHOUT LEFT VENTRICULOGRAM Left 4/30/2020    Procedure: Left Heart Catheterization Without Left Ventriculogram;  Surgeon: Cielo Che MD;  Location: [facility] Cath Lab;  Service: Cardiology     CV RIGHT HEART CATHETERIZATION N/A 4/30/2020    Procedure: Right Heart Catheterization;  Surgeon: Cielo Che MD;  Location: [facility] Cath Lab;  Service: Cardiology     HERNIA REPAIR       OTHER SURGICAL HISTORY      Excise varicocele     STENT, CORONARY, DALE  2019     Family History   Problem Relation Age of Onset     Diabetes Type 2  Mother      Heart Disease Father 60     CABG Father 50        triple bypass     Diabetes Type 2  Father      Depression Sister      Substance Abuse Sister      Ovarian Cancer Maternal Grandmother      Brain Cancer Maternal Grandmother      Pancreatic Cancer Maternal Aunt      Prostate Cancer Maternal Uncle      Social History     Socioeconomic History     Marital status:      Spouse name: Not on file     Number of children: Not on file     Years of education: Not on file     Highest education level: Not on file   Occupational History     Not on file   Tobacco Use     Smoking status: Never     Smokeless tobacco: Never   Substance and Sexual Activity     Alcohol use: Yes     Comment: Alcoholic Drinks: 1-2 per year     Drug use: No     Sexual activity: Yes     Partners: Female   Other Topics Concern     Parent/sibling w/ CABG, MI or angioplasty before 65F 55M? Not Asked   Social History Narrative     Not on file     Social Determinants of Health     Financial Resource Strain: Not on file   Food Insecurity: Not on file   Transportation Needs: Not on file   Physical Activity: Not on file   Stress: Not on file   Social Connections: Not on file   Intimate Partner Violence: Not on file   Housing Stability: Not on file       ROS:   CONSTITUTIONAL:  No fevers or chills  EYES: negative for icterus  ENT:   negative for hearing loss, tinnitus and sore throat  RESPIRATORY:  negative for cough, sputum, dyspnea  CARDIOVASCULAR:  negative for chest pain Fatigue  GASTROINTESTINAL:  negative for nausea, vomiting, diarrhea or constipation  GENITOURINARY:  negative for incontinence, dysuria, bladder emptying problems  HEME:  No easy bruising  INTEGUMENT:  negative for rash and pruritus  NEURO:  Negative for headache, seizure disorder    Allergies:   Allergies   Allergen Reactions     Amoxicillin Hives     & generalized pain     Venlafaxine      lethargic       Medications:  Prescription Medications as of 3/22/2023       Rx Number Disp Refills Start End Last Dispensed Date Next Fill Date Owning Pharmacy    albuterol (PROAIR HFA/PROVENTIL HFA/VENTOLIN HFA) 108 (90 Base) MCG/ACT inhaler    3/18/2021        Sig: Inhale 2 puffs into the lungs every 4 hours as needed    Class: Historical    Route: Inhalation    amLODIPine (NORVASC) 10 MG tablet  90 tablet 0 12/21/2022    [facility] #[id number] [address] AT [facility]    Sig: TAKE 1 TABLET(10 MG) BY MOUTH DAILY    Class: E-Prescribe    aspirin 81 MG EC tablet   0 1/15/2019        Sig: [ASPIRIN 81 MG EC TABLET] Take 1 tablet (81 mg total) by mouth daily.    Class: OTC    Route: Oral    atorvastatin (LIPITOR) 80 MG tablet  90 tablet 3 9/26/2022    [facility] #[id number] [address] AT [facility]    Sig: TAKE 1 TABLET(80 MG) BY MOUTH AT BEDTIME    Class: E-Prescribe    carvedilol (COREG) 12.5 MG tablet  540 tablet 0 12/21/2022    [facility] #[id number] [address] AT [facility]    Sig: TAKE 3 TABLETS(37.5 MG) BY MOUTH TWICE DAILY    Class: E-Prescribe    cholecalciferol, vitamin D3, 5,000 unit Tab    1/13/2019        Sig: [CHOLECALCIFEROL, VITAMIN D3, 5,000 UNIT TAB] Take 5,000 Units by mouth daily.    Class: Historical    Route: Oral     clindamycin (CLEOCIN) 300 MG capsule  30 capsule 0 3/20/2023 3/30/2023       Sig: Take 1 capsule (300 mg) by mouth 3 times daily for 10 days    Class: Local Print    Route: Oral    cloNIDine (CATAPRES) 0.1 MG tablet  30 tablet 0 7/27/2021    [id number] - [facility], [place] - [address] AT [facility]    Sig: Take 1 tablet (0.1 mg) by mouth 3 times daily as needed (SBP > 160)    Class: E-Prescribe    Route: Oral    doxazosin (CARDURA) 4 MG tablet    10/4/2020        Sig: [DOXAZOSIN (CARDURA) 4 MG TABLET] Take 2 mg by mouth at bedtime.    Class: Historical    Route: Oral    fluticasone-salmeterol (ADVAIR) 250-50 MCG/ACT inhaler  14 each 3 6/20/2022    [facility][id number] [facility] [address] AT [facility]    Sig: Inhale 1 puff into the lungs every 12 hours    Class: E-Prescribe    Route: Inhalation    furosemide (LASIX) 40 MG tablet  90 tablet 0 12/21/2022    [facility][id number] - [facility] [address] AT [facility]    Sig: TAKE 1 TABLET(40 MG) BY MOUTH DAILY    Class: E-Prescribe    Notes to Pharmacy: Needs follow-up with Dr Che for refills.    glimepiride (AMARYL) 4 MG tablet    10/15/2021        Class: Historical    glucose (BD GLUCOSE) 4 g chewable tablet    12/31/2020        Sig: glucose 4 gram chewable tablet   CHEW AND SWALLOW 4 TIMES DAILY AS NEEDED    Class: Historical    hydrALAZINE (APRESOLINE) 25 MG tablet  270 tablet 0 7/27/2021 1/9/2023   [facility] #[id number] [facility] [address] AT [facility]    Sig: Take 3 tablets (75 mg) by mouth 3 times daily    Class: E-Prescribe    Route: Oral    insulin aspart (NOVOLOG PEN) 100 UNIT/ML pen  4.5 mL 1 7/27/2021 1/9/2023   [facility] #[id number] - [place] [id number] AT [facility] [address]    Sig: Inject 5 Units Subcutaneous 3 times daily (before meals)    Class: E-Prescribe     Route: Subcutaneous    insulin glargine-lixisenatide (SOLIQUA) pen    8/26/2021    [facility] #[id number] - [place], [place] - [address] AT [facility] &  [address]    Sig: Inject 25 Units Subcutaneous daily    Class: Historical    Route: Subcutaneous    nitroGLYcerin (NITROSTAT) 0.4 MG sublingual tablet  25 tablet 1 8/19/2022    [id number] - [place], [place] - [address] AT [facility] &  [address]    Sig: PLACE ONE TABLET UNDER TONGUE AS NEEDED FOR CHEST PAIN AS DIRECTED FOR 3 DOSES. IF SYMPTOMS PERSIST 5 MINUTES AFTER 1ST DOSE CALL 911    Class: E-Prescribe          Exam:      Appearance: in no apparent distress.   Skin: normal  Head and Neck: Normal, no rashes or jaundice  Respiratory: easy respirations, no audible wheezing.  Cardiovascular: RRR  Abdomen: rounded, No surgical scars. No overhanging pannus when lying flat- has wide pelvis and good lateral spread.       Diagnostics:   No results found for this or any previous visit (from the past 672 hour(s)).  UNOS CPRA   Date Value Ref Range Status   01/09/2023 11  Final       Again, thank you for allowing me to participate in the care of your patient.        Sincerely,        Harrison Whitehead MD

## 2023-01-10 ENCOUNTER — TELEPHONE (OUTPATIENT)
Dept: TRANSPLANT | Facility: CLINIC | Age: 48
End: 2023-01-10

## 2023-01-10 NOTE — TELEPHONE ENCOUNTER
Patient Call: General  Route to N    Reason for call: Hung Paez [facility] called in regards of updated on patient. They do not have any current dental infections. Patient does have active pardontal disease. They are scheduled for deep cleaning to address that in May. Call back number is [phone].       Call back needed? Yes    Return Call Needed  Same as documented in contacts section  When to return call?: Same day: Route High Priority

## 2023-01-10 NOTE — TELEPHONE ENCOUNTER
Called pt follow up on eval status.  He is scheduled for a dental cleaning in March.  He states he was was last seen in November 2022 for a cleaning at [facility] in [place]. He saw Dr. Whitehead yesterday and his weight was ok'd for SPK.      Called [facility] [phone] and left  requesting call back to confirm dental clearance.

## 2023-01-20 ENCOUNTER — COMMITTEE REVIEW (OUTPATIENT)
Dept: TRANSPLANT | Facility: CLINIC | Age: 48
End: 2023-01-20
Payer: COMMERCIAL

## 2023-01-20 NOTE — COMMITTEE REVIEW
Abdominal Committee Review Note     Evaluation Date: 9/27/2021  Committee Review Date: 1/20/2023    Organ being evaluated for: Kidney/Pancreas    Transplant Phase: Evaluation  Transplant Status: Approved    Transplant Coordinator: Kim Fay  Transplant Surgeon: Dr. Harrison Whitehead     Referring Physician: Aime Rios    Primary Diagnosis: ESKD  Secondary Diagnosis: DM2    Committee Review Members:  Nephrology Isidoro Claros MD   Transplant Nayla Pendleton, RN, Kim Fay, RN, Harrison Whitehead MD, Cornelius Cain, BETSY, Natalie Nina, BETSY, Alisa Tobin, RN       Transplant Eligibility: Insulin-dependent diabetes mellitus, Irreversible chronic kidney disease treated w/dialysis or expected need for dialysis    Committee Review Decision: Approved    Relative Contraindications: None    Absolute Contraindications: None    Committee Chair Harrison Whitehead MD verbally attested to the committee's decision.    Committee Discussion Details: Reviewed pt's medical status and evaluation results to date with multidisciplinary committee.  Reviewed pt's cardiac hx:  CAD, s/p PCI 1/2019, 2 vessel CABG 6/2019, and Coronary angiogram 7/2021.   Pt saw Dr. Tenorio 11/2021 who did not recommend further testing prior to transplant, he will need to be seen again by cardiology in 11/23 if he is not transplanted.  The pt also has hx of Left diaphragmatic elevation since the time of CABG, pt had abnormal PFTs 11/17/21 and was referred to Pulmonary.  He underwent a chest CT and pulmonary rehab.  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 Pt's imaging (CT Ab/Pelv 11/17/21 and Iliac US 11/17/21) were reviewed by Jaswant and deemed suitable to proceed.  The pt's BMI at time of PKE was 35- he has lost weight and had an updated visit w/ Dr. Whitehead on 1/9/23 clearing him to proceed at his current BMI of 31.8.  The pt needed dental clearance- he has no active infections  and upcoming deep clean in May that should not delay listing. The Committee approved the pt for active listing status on the SPK and K alone wait list.    Committee determined that patient is a Good candidate for Kidney Pancreas    Listing plan: List Active     A2B Candidate: No    Patient will be called and summary letter will be sent.

## 2023-01-22 LAB
PROTOCOL CUTOFF: NORMAL
SA 1 CELL: NORMAL
SA 1 TEST METHOD: NORMAL
SA 2 CELL: NORMAL
SA 2 TEST METHOD: NORMAL
SA1 HI RISK ABY: NORMAL
SA1 MOD RISK ABY: NORMAL
SA2 HI RISK ABY: NORMAL
SA2 MOD RISK ABY: NORMAL
SCR 1 TEST METHOD: NORMAL
SCR1 CELL: NORMAL
SCR1 RESULT: NORMAL
SCR2 CELL: NORMAL
SCR2 RESULT: NORMAL
SCR2 TEST METHOD: NORMAL
UNACCEPTABLE ANTIGENS: NORMAL
UNOS CPRA: 11
ZZZSA 1  COMMENTS: NORMAL
ZZZSA 2 COMMENTS: NORMAL
ZZZSCR1 COMMENTS: NORMAL
ZZZSCR2 COMMENTS: NORMAL

## 2023-01-23 ENCOUNTER — HEALTH MAINTENANCE LETTER (OUTPATIENT)
Age: 48
End: 2023-01-23

## 2023-01-25 ENCOUNTER — TELEPHONE (OUTPATIENT)
Dept: TRANSPLANT | Facility: CLINIC | Age: 48
End: 2023-01-25
Payer: COMMERCIAL

## 2023-01-25 NOTE — TELEPHONE ENCOUNTER
Called pt to let him know we reviewed his case at the Selection Committee on 1/20/23 and he is approved to proceed w/ active listing status.  I explained that we will still want him to continue with his scheduled dental deep cleanings but that because he does not have any active infections this will not hold up his listing. I let him know we are submitting PA to his insurance company and once we get approval we will get him listed active status.  I will call to review next steps at that time. He had no further questions and was in good agreement w/ the plan.

## 2023-01-25 NOTE — LETTER
01/25/23        Siva Ramey  [address] [address]        Dear Siva,    It is a pleasure to work with you toward the goal of kidney and pancreas transplantation. Your pre-transplant evaluation results were reviewed at our Multidisciplinary Selection Committee on 1/20/23. The committee has approved you to proceed with active listing on the kidney and pancreas transplant list.  We are submitting Prior Authorization to your insurance company, once we receive approval I will call you to review next steps.    For any questions, please contact the Transplant Office at [phone] or you may reach me directly at [phone].      Sincerely,  Kim Fay RN, Pre-Kidney/Pancreas Transplant Coordinator  Solid Organ Transplant  [facility], [facility]    CC: Dr. Trinidad Hansen, Dr. Aime Rios, [facility]

## 2023-01-26 ENCOUNTER — TELEPHONE (OUTPATIENT)
Dept: TRANSPLANT | Facility: CLINIC | Age: 48
End: 2023-01-26
Payer: COMMERCIAL

## 2023-01-26 NOTE — TELEPHONE ENCOUNTER
Called pt and informed him that he will be listed ACTIVE status on the SPK, and K wait list as of today (1/26/23).  Verified pt's contact information, confirmed and updated phone contact priorities.  Explained to pt they will need to keep their phone on, charged, and answer calls from 24/7 (even if the number is not familiar).  Explained the pre-transplant process from [facility] to receiving an organ offer.  Informed the pt their new coordinator will be Nayla Pendleton RN and provided her contact information.  Informed the pt to notify the [facility] coordinator if they plan to travel internationally, are hospitalized, undergo surgery, receive a blood transfusion, or are having any symptoms of an active infection.  Explained that the pt will need to provide PRA samples every 3 months, next sample due 4/9/23 - dialysis to draw.  Pt had no further questions for me and expressed good understanding of the plan. Listing letter sent. Hand off to [facility].

## 2023-01-26 NOTE — LETTER
2023    Siva Ramey  [address] [facility] [address]      Dear Mr. Ramey,    This letter is sent to confirm that you have completed your transplant work-up and you are a candidate in the kidney and pancreas transplant program at the [facility].  You were placed on the kidney and pancreas active waitlist on 23.      When you are active on the waitlist and an organ becomes available, a coordinator will need to speak to you immediately.  You could be contacted at any time during the day or night as an organ could become available at any time.  Please make certain our office always has your current telephone numbers and address.      Items we will need from you:      We have received approval from your insurance company for the transplant procedure.  It is critical that you notify us if there is any change in your insurance.  It is also important that you familiarize yourself with the details of your specific insurance policy.  Our patient  is available to assist you if you should have any questions regarding your coverage.      An ALA or PRA blood sample will need to be sent here every 3 months to match you with  donors or any potential living donors.  Your dialysis center has received special mailing boxes (called mailers) from the Outreach Department to mail your samples to our lab.  Your next sample is due on 23. Additional mailers can be obtained by calling the Transplant Office and asking to speak to a kidney and pancreas .      During this waiting period, we may request additional periodic laboratory tests with your primary physician.  It will be your responsibility to remind your physician to forward your results to the Transplant Office.      We need to be kept informed of any changes in your medical condition such as:    o changes in your medications,   o significant changes  in your health  o significant infections (such as pneumonia or abscesses)  o blood transfusions  o any condition which requires hospitalization  o any surgery      Remember to complete any routine cancer screening tests required before your transplant.  This includes colonoscopy; prostate screening for men, and mammogram and gynecologic testing for women, as well as dental work.  Your primary care clinic can assist you with arranging for these exams.  Remind your caregivers to forward copies of the records and final reports.      We want you to know that our program has physician and surgeon coverage 24 hours a day, 365 days a year. In addition, our transplant surgeons and physicians will not be on call for two or more transplant programs more than 30 miles apart unless the circumstances have been reviewed and approved by the United Network for Organ Sharing (UNOS) Membership and Professional Standards Committee (MPSC). Finally, our primary physician and primary surgeons are not designated as the primary surgeon or primary physician at more than 1 transplant hospital. If this coverage changes or there are substantial program changes, you will be notified in writing by letter.     Attached is a letter from UNOS that describes the services and information offered to patients by UNOS and the Organ Procurement and Transplantation Network (OPTN).    We appreciate having had the opportunity to participate in your care.  Per our normal office work flow, your new wait list coordinator will be Nayla Pendleton RN, with the assistance of Damari Magallon LPN.  Nayla can be reached at [phone].  If you have questions, please feel free to call the Transplant Office at [phone] or [phone].      Sincerely,  Kim Fay RN, Pre-Kidney/Pancreas Transplant Coordinator  Solid Organ Transplant  [facility], [facility]      Enclosures:  ALA/PRA Physician Order, Telephone Contact List, Travel Resources, UNOS Letter, Waitlist Information Update and While You Are Waiting  cc: Care Team                          The Organ Procurement and Transplantation Network  Toll-free patient services line:     Your resource for organ transplant information    If you have a question regarding your own medical care, you always should call your transplant hospital first. However, for general organ transplant-related information, you can call the Organ Procurement and Transplantation Network (OPTN) toll-free patient services line at [phone]- [phone]. Anyone, including potential transplant candidates, candidates, recipients, family members, friends, living donors, and donor family members, can call this number to:          Talk about organ donation, living donation, the transplant process, the donation process, and transplant policies.    Get a free patient information kit with helpful booklets, waiting list and transplant information, and a list of all transplant hospitals.    Ask questions about the OPTN website (https://optn.transplant.[facility].gov/), the United Network for Organ Sharing s (UNOS) website (https://unos.org/), or the UNOS website for living donors and transplant recipients. (https://www.transplantliving.org/).    Learn how the OPTN can help you.    Talk about any concerns that you may have with a transplant hospital.    The nation s transplant system, the OPTN, is managed under federal contract by the United Network for Organ Sharing (UNOS), which is a non-profit charitable organization. The OPTN helps create and define organ sharing policies that make the best use of donated organs. This process continuously evaluating new advances and discoveries so policies can be adapted to best serve patients waiting for transplants. To do so, the OPTN works closely with transplant professionals, transplant patients, transplant candidates, donor families, living  donors, and the public. All transplant programs and organ procurement organizations throughout the country are OPTN members and are obligated to follow the policies the OPTN creates for allocating organs.    The OPTN also is responsible for:      Providing educational material for patients, the public, and professionals.    Raising awareness of the need for donated organs and tissue.    Coordinating organ procurement, matching, and placement.    Collecting information about every organ transplant and donation that occurs in the [place].    Remember, you should contact your transplant hospital directly if you have questions or concerns about your own medical care including medical records, work-up progress, and test results.    We are not your transplant hospital, and our staff will not be able to answer questions about your case, so please keep your transplant hospital s phone number handy.    However, while you research your transplant needs and learn as much as you can about transplantation and donation, we welcome your call to our toll-free patient services line at [phone].          Updated 4/1/2019

## 2023-01-26 NOTE — LETTER
PHYSICIAN ORDER   ALA/PRA BLOOD    DATE & TIME ISSUED: 2023 4:41 PM  PATIENT NAME: Siva Ramey   : 1975     [facility] MR# [if applicable]: [id number]     DIAGNOSIS/ICD-10 CODE: Awaiting Organ transplant [Z76.82}  EXPIRES: (1 YEAR AFTER DATE ISSUED)  Every 3 months: Next due: 23    1. Please draw 20ml of blood in red top (plain) tube for Antileukocyte Antibody (ALA or PRA).  2. Label tubes with the patient s name, complete lab slip.    3. Mailers, lab slips with instructions are sent to patient separately.  4. Call the Outreach Lab at [phone] to reorder mailers.  5. Mail blood to (this address is also on the mailers):    IMMUNOLOGY LABORATORY   [facility]    [phone] [place] [address]      .

## 2023-01-27 ENCOUNTER — DOCUMENTATION ONLY (OUTPATIENT)
Dept: TRANSPLANT | Facility: CLINIC | Age: 48
End: 2023-01-27

## 2023-01-30 ENCOUNTER — TELEPHONE (OUTPATIENT)
Dept: TRANSPLANT | Facility: CLINIC | Age: 48
End: 2023-01-30
Payer: COMMERCIAL

## 2023-01-30 DIAGNOSIS — E11.9 DIABETES MELLITUS, TYPE 2 (H): ICD-10-CM

## 2023-01-30 DIAGNOSIS — Z76.82 ORGAN TRANSPLANT CANDIDATE: Primary | ICD-10-CM

## 2023-01-30 DIAGNOSIS — N18.6 ESRD (END STAGE RENAL DISEASE) (H): ICD-10-CM

## 2023-01-30 NOTE — TELEPHONE ENCOUNTER
Introduced waitlist process and provided my contact information for questions.  Explained since varicella titer result from 2021 was equivocal, will need to be repeated. Patient reports he can come to the [facility] lab, prefers appointment after 12 PM, so will place order and have lab appointment scheduled for patient.  Patient verbalizes agreement with this plan.

## 2023-02-02 ENCOUNTER — LAB (OUTPATIENT)
Dept: LAB | Facility: CLINIC | Age: 48
End: 2023-02-02
Payer: COMMERCIAL

## 2023-02-02 DIAGNOSIS — Z76.82 ORGAN TRANSPLANT CANDIDATE: ICD-10-CM

## 2023-02-02 DIAGNOSIS — E11.9 DIABETES MELLITUS, TYPE 2 (H): ICD-10-CM

## 2023-02-02 DIAGNOSIS — N18.6 ESRD (END STAGE RENAL DISEASE) (H): ICD-10-CM

## 2023-02-02 PROCEDURE — 86787 VARICELLA-ZOSTER ANTIBODY: CPT | Mod: 90 | Performed by: PATHOLOGY

## 2023-02-02 PROCEDURE — 36415 COLL VENOUS BLD VENIPUNCTURE: CPT | Performed by: PATHOLOGY

## 2023-02-02 PROCEDURE — 99000 SPECIMEN HANDLING OFFICE-LAB: CPT | Performed by: PATHOLOGY

## 2023-02-03 LAB
VZV IGG SER QL IA: >4000 INDEX
VZV IGG SER QL IA: POSITIVE

## 2023-03-20 ENCOUNTER — HOSPITAL ENCOUNTER (EMERGENCY)
Facility: HOSPITAL | Age: 48
Discharge: HOME | End: 2023-03-20
Attending: EMERGENCY MEDICINE | Admitting: EMERGENCY MEDICINE
Payer: COMMERCIAL

## 2023-03-20 VITALS
WEIGHT: 224.87 LBS | HEART RATE: 76 BPM | SYSTOLIC BLOOD PRESSURE: 144 MMHG | BODY MASS INDEX: 31.48 KG/M2 | RESPIRATION RATE: 16 BRPM | TEMPERATURE: 98.3 F | OXYGEN SATURATION: 99 % | DIASTOLIC BLOOD PRESSURE: 82 MMHG | HEIGHT: 71 IN

## 2023-03-20 DIAGNOSIS — K04.7 DENTAL INFECTION: ICD-10-CM

## 2023-03-20 PROBLEM — R06.83 SNORING: Status: ACTIVE | Noted: 2022-07-28

## 2023-03-20 PROBLEM — H35.00 RETINOPATHY: Status: ACTIVE | Noted: 2022-01-27

## 2023-03-20 PROBLEM — G62.9 PERIPHERAL NEUROPATHY: Status: ACTIVE | Noted: 2022-01-27

## 2023-03-20 PROBLEM — D68.9 COAGULATION DEFECT, UNSPECIFIED (H): Status: ACTIVE | Noted: 2021-11-16

## 2023-03-20 PROBLEM — N18.6 END STAGE RENAL DISEASE (H): Status: ACTIVE | Noted: 2021-08-26

## 2023-03-20 PROBLEM — E83.39 OTHER DISORDERS OF PHOSPHORUS METABOLISM: Status: ACTIVE | Noted: 2022-02-17

## 2023-03-20 PROBLEM — J98.6 ACQUIRED ELEVATED DIAPHRAGM: Status: ACTIVE | Noted: 2022-01-27

## 2023-03-20 PROBLEM — R09.89 OTHER SPECIFIED SYMPTOMS AND SIGNS INVOLVING THE CIRCULATORY AND RESPIRATORY SYSTEMS: Status: ACTIVE | Noted: 2022-06-06

## 2023-03-20 PROBLEM — T78.40XA ALLERGY, UNSPECIFIED, INITIAL ENCOUNTER: Status: ACTIVE | Noted: 2021-11-10

## 2023-03-20 PROBLEM — R53.83 FATIGUE, UNSPECIFIED TYPE: Status: ACTIVE | Noted: 2022-07-28

## 2023-03-20 PROBLEM — T78.2XXA ANAPHYLACTIC SHOCK, UNSPECIFIED, INITIAL ENCOUNTER: Status: ACTIVE | Noted: 2021-11-10

## 2023-03-20 PROBLEM — G47.33 OBSTRUCTIVE SLEEP APNEA TREATED WITH CONTINUOUS POSITIVE AIRWAY PRESSURE (CPAP): Status: ACTIVE | Noted: 2022-08-10

## 2023-03-20 PROBLEM — G47.8 NON-RESTORATIVE SLEEP: Status: ACTIVE | Noted: 2022-07-28

## 2023-03-20 PROCEDURE — 250N000013 HC RX MED GY IP 250 OP 250 PS 637: Performed by: EMERGENCY MEDICINE

## 2023-03-20 PROCEDURE — 99283 EMERGENCY DEPT VISIT LOW MDM: CPT

## 2023-03-20 RX ORDER — CLINDAMYCIN HCL 300 MG
300 CAPSULE ORAL 3 TIMES DAILY
Qty: 30 CAPSULE | Refills: 0 | Status: SHIPPED | OUTPATIENT
Start: 2023-03-20 | End: 2023-03-30

## 2023-03-20 RX ORDER — CLINDAMYCIN HCL 150 MG
300 CAPSULE ORAL ONCE
Status: COMPLETED | OUTPATIENT
Start: 2023-03-20 | End: 2023-03-20

## 2023-03-20 RX ADMIN — CLINDAMYCIN HYDROCHLORIDE 300 MG: 150 CAPSULE ORAL at 01:17

## 2023-03-20 ASSESSMENT — ACTIVITIES OF DAILY LIVING (ADL): ADLS_ACUITY_SCORE: 33

## 2023-03-20 NOTE — ED PROVIDER NOTES
EMERGENCY DEPARTMENT ENCOUNTER      NAME: Siva Ramey  AGE: 47 year old male  YOB: 1975  MRN: [id number]  EVALUATION DATE & TIME: 3/20/2023 12:59 AM    PCP: Trinidad Hansen    ED PROVIDER: Danyelle Medrano MD    Chief Complaint   Patient presents with     Dental Pain         FINAL IMPRESSION:  1. Dental infection          ED COURSE & MEDICAL DECISION MAKING:    Pertinent Labs & Imaging studies reviewed. (See chart for details)  47 year old male with history of HTN, HLD, ESRD on HD M/W/F, CAD with previous STEMI and ischemic cardiomyopathy, DM who presents to the Emergency Department for evaluation of dental pain after recent dental infection and extraction.  Patient has been off cephalosporins for just over 24 hours and has been having increasing pain at dental extraction site.  On examination the extraction site is unremarkable, well-healed.  There is no drainable or palpable abscess.  Certainly no evidence of facial cellulitis, Ludewig's angina.  He does not have any significant tenderness on examination of the bony structures to suspect osteomyelitis of the mandible.  I think with his functional immunosuppression from diabetes and CKD it would be reasonable to continue/replace him on antibiotics.  He has had 2 courses of cephalosporin, so we will start him on clindamycin.  First dose given here given the hour of the day and the fact that there is no pharmacies open.  Prescription for same for home.  Encouraged him to follow-up with his dentist.  Warning signs return to ED discussed.      ED Course as of 03/20/23 0243   Mon Mar 20, 2023   0102 I met with the patient, obtained history, performed an initial exam, and discussed options and plan for diagnostics and treatment here in the ED.       Medical Decision Making    History:    Supplemental history from: Documented in chart, if applicable    External Record(s) reviewed: Outpatient Record: Outpatient records from [facility] nephrology    Work  Up:    Chart documentation includes differential considered and any EKGs or imaging independently interpreted by provider, where specified.    In additional to work up documented, I considered the following work up: Documented in chart, if applicable.    External consultation:    Discussion of management with another provider: Documented in chart, if applicable    Complicating factors:    Care impacted by chronic illness: Chronic Kidney Disease, Diabetes, Hyperlipidemia and Hypertension    Care affected by social determinants of health: Access to Medical Care    Disposition considerations: Discharge. I prescribed additional prescription strength medication(s) as charted. N/A.        At the conclusion of the encounter I discussed the results of all of the tests and the disposition. The questions were answered. The patient or family acknowledged understanding and was agreeable with the care plan.      MEDICATIONS GIVEN IN THE EMERGENCY:  Medications   clindamycin (CLEOCIN) capsule 300 mg (300 mg Oral $Given 3/20/23 0117)       NEW PRESCRIPTIONS STARTED AT TODAY'S ER VISIT  Discharge Medication List as of 3/20/2023  1:18 AM      START taking these medications    Details   clindamycin (CLEOCIN) 300 MG capsule Take 1 capsule (300 mg) by mouth 3 times daily for 10 days, Disp-30 capsule, R-0, Local Print                =================================================================    HPI    Patient information was obtained from: patient     Use of Intrepreter: N/A       Siva Ramey is a 47 year old male with pertinent medical history of hyperlipidemia, CKD, type 2 diabetes, hypertension, and ESRD on dialysis who presents for evaluation of dental pain.     On 2/24, the patient had a tooth extracted from the right side. Since then, he has had right-sided dental pain due to an infection in the area and has been prescribed 2 courses of Cephalexin. Pain was controlled with antibiotics, Tylenol, and Aleve. However, he took  his last dose of Cephalexin on 3/18. Due to increased pain, patient decided to present to the ED. In the ED, he endorses drainage from the extraction drainage and says it causes an adverse taste in his mouth. Patient is on dialysis MWF. He has not missed any dialysis appointments. He denies any other complaints at this time.    REVIEW OF SYSTEMS  Constitutional:  Denies fever, chills, weight loss or weakness  HENT:  Endorses dental pain, drainage from extraction site. Denies sore throat, ear pain, congestion  GI:  Denies abdominal pain, nausea, vomiting, diarrhea  : Denies dysuria, denies hematuria    PAST MEDICAL HISTORY:  Past Medical History:   Diagnosis Date     Acquired elevated diaphragm      Anemia in chronic kidney disease      Angina pectoris (H)      Chronic kidney disease      Coronary artery disease      Diabetes mellitus, type II (H) 12/2001     Diabetic nephropathy (H)      MARQUEZ (dyspnea on exertion)      Dyslipidemia 12/2001     End stage renal disease (H)      History of blood transfusion 2004     Hypertension     takes medication     Ischemic cardiomyopathy      Metabolic acidosis      Myocardial infarction (H)     STEMI -Diagonal branch of the LAD     Obesity (BMI 30-39.9)      Peripheral neuropathy      Proteinuria      Retinopathy      Vitamin D deficiency        PAST SURGICAL HISTORY:  Past Surgical History:   Procedure Laterality Date     BACK SURGERY  2009    L5 disc cut     BYPASS GRAFT ARTERY CORONARY  06/20/2019    2 vessels     CV CORONARY ANGIOGRAM N/A 1/13/2019    Procedure: Coronary Angiogram;  Surgeon: Cielo Che MD;  Location: [facility] Cath Lab;  Service: Cardiology     CV CORONARY ANGIOGRAM N/A 5/2/2019    Procedure: Coronary Angiogram;  Surgeon: Cielo Che MD;  Location: [facility] Cath Lab;  Service: Cardiology     CV CORONARY ANGIOGRAM N/A 4/30/2020    Procedure: Coronary Angiogram;  Surgeon: Cielo Che MD;  Location: [facility] Cath Lab;  Service:  Cardiology     CV CORONARY ANGIOGRAM N/A 7/22/2021    Procedure: CV CORONARY ANGIOGRAM;  Surgeon: Adrian Crow MD;  Location: [facility] CV     CV FRACTIONAL FLOW RATIO WIRE N/A 7/22/2021    Procedure: Fractional Flow Ratio Wire;  Surgeon: Adrian Crow MD;  Location: [facility] LAB CV     CV LEFT HEART CATH N/A 7/22/2021    Procedure: Left Heart Cath;  Surgeon: Adrian Crow MD;  Location: [facility] LAB CV     CV LEFT HEART CATHETERIZATION WITH LEFT VENTRICULOGRAM N/A 1/13/2019    Procedure: Left Heart Catheterization with Left Ventriculogram;  Surgeon: Cielo Che MD;  Location: [facility] Cath Lab;  Service: Cardiology     CV LEFT HEART CATHETERIZATION WITHOUT LEFT VENTRICULOGRAM Left 4/30/2020    Procedure: Left Heart Catheterization Without Left Ventriculogram;  Surgeon: Cielo Che MD;  Location: [facility] Cath Lab;  Service: Cardiology     CV RIGHT HEART CATHETERIZATION N/A 4/30/2020    Procedure: Right Heart Catheterization;  Surgeon: Cielo Che MD;  Location: [facility] Cath Lab;  Service: Cardiology     HERNIA REPAIR       OTHER SURGICAL HISTORY      Excise varicocele     STENT, CORONARY, DALE  2019       CURRENT MEDICATIONS:    Prior to Admission Medications   Prescriptions Last Dose Informant Patient Reported? Taking?   albuterol (PROAIR HFA/PROVENTIL HFA/VENTOLIN HFA) 108 (90 Base) MCG/ACT inhaler   Yes No   Sig: Inhale 2 puffs into the lungs every 4 hours as needed   amLODIPine (NORVASC) 10 MG tablet   No No   Sig: TAKE 1 TABLET(10 MG) BY MOUTH DAILY   aspirin 81 MG EC tablet   No No   Sig: [ASPIRIN 81 MG EC TABLET] Take 1 tablet (81 mg total) by mouth daily.   atorvastatin (LIPITOR) 80 MG tablet   No No   Sig: TAKE 1 TABLET(80 MG) BY MOUTH AT BEDTIME   carvedilol (COREG) 12.5 MG tablet   No No   Sig: TAKE 3 TABLETS(37.5 MG) BY MOUTH TWICE DAILY   cholecalciferol, vitamin D3, 5,000 unit Tab   Yes No   Sig: [CHOLECALCIFEROL, VITAMIN D3, 5,000 UNIT TAB] Take  5,000 Units by mouth daily.   cloNIDine (CATAPRES) 0.1 MG tablet   No No   Sig: Take 1 tablet (0.1 mg) by mouth 3 times daily as needed (SBP > 160)   doxazosin (CARDURA) 4 MG tablet   Yes No   Sig: [DOXAZOSIN (CARDURA) 4 MG TABLET] Take 2 mg by mouth at bedtime.   fluticasone-salmeterol (ADVAIR) 250-50 MCG/ACT inhaler   No No   Sig: Inhale 1 puff into the lungs every 12 hours   furosemide (LASIX) 40 MG tablet   No No   Sig: TAKE 1 TABLET(40 MG) BY MOUTH DAILY   glimepiride (AMARYL) 4 MG tablet   Yes No   glucose (BD GLUCOSE) 4 g chewable tablet   Yes No   Sig: glucose 4 gram chewable tablet   CHEW AND SWALLOW 4 TIMES DAILY AS NEEDED   hydrALAZINE (APRESOLINE) 25 MG tablet   No No   Sig: Take 3 tablets (75 mg) by mouth 3 times daily   Patient taking differently: Take 25 mg by mouth 2 times daily   insulin aspart (NOVOLOG PEN) 100 UNIT/ML pen   No No   Sig: Inject 5 Units Subcutaneous 3 times daily (before meals)   insulin glargine-lixisenatide (SOLIQUA) pen   Yes No   Sig: Inject 25 Units Subcutaneous daily   nitroGLYcerin (NITROSTAT) 0.4 MG sublingual tablet   No No   Sig: PLACE ONE TABLET UNDER TONGUE AS NEEDED FOR CHEST PAIN AS DIRECTED FOR 3 DOSES. IF SYMPTOMS PERSIST 5 MINUTES AFTER 1ST DOSE CALL 911      Facility-Administered Medications: None       ALLERGIES:  Allergies   Allergen Reactions     Amoxicillin Hives     & generalized pain     Venlafaxine      lethargic       FAMILY HISTORY:  Family History   Problem Relation Age of Onset     Diabetes Type 2  Mother      Heart Disease Father 60     CABG Father 50        triple bypass     Diabetes Type 2  Father      Depression Sister      Substance Abuse Sister      Ovarian Cancer Maternal Grandmother      Brain Cancer Maternal Grandmother      Pancreatic Cancer Maternal Aunt      Prostate Cancer Maternal Uncle        SOCIAL HISTORY:  Social History     Tobacco Use     Smoking status: Never     Smokeless tobacco: Never   Substance Use Topics     Alcohol use: Yes     " Comment: Alcoholic Drinks: 1-2 per year     Drug use: No        VITALS:  Patient Vitals for the past 24 hrs:   BP Temp Temp src Pulse Resp SpO2 Height Weight   03/20/23 0057 -- -- -- -- -- -- 1.803 m (5' 11\") 102 kg (224 lb 13.9 oz)   03/20/23 0056 (!) 144/82 98.3  F (36.8  C) Oral 76 16 99 % -- --       PHYSICAL EXAM    General Appearance: Well-appearing, well-nourished, no acute distress   Head:  Normocephalic  Eyes:  conjunctiva/corneas clear  ENT:  Numerous teeth extracted. Right maxillary posterior most molar extracted. No palpable abscess. Pain throughout right maxilla. No facial swelling or erythema. Floor of mouth soft. Lips, mucosa, and tongue normal; membranes are moist without pallor  Neck:  Supple, no adenopathy  Cardio:  Regular rate and rhythm  Pulm:  No respiratory distres  Abdomen:  Soft, obese  Extremities: Normal gait  Skin:  Skin warm, dry, no rashes  Neuro:  Alert and oriented ×3    The creation of this record is based on the scribe s observations of the work being performed by Danyelle Medrano MD and the provider s statements to them. It was created on her behalf by Jacquelyn Medellin, a trained medical scribe. This document has been checked and approved by the attending provider.    Danyelle Medrano MD  Emergency Medicine  [facility] EMERGENCY DEPARTMENT  [address] [place] [id number] [phone]  Dept: [phone]     Danyelle Medrano MD  03/20/23 0348    "

## 2023-03-20 NOTE — ED TRIAGE NOTES
"Pt arrives to triage ambulatory endorsing 5 weeks ago had dental work and \"been fighting off an infection\" finished course of antibiotics yesterday (cephalexin 500mg) and since then has not been able to control the pain despite use of prescribed but/acetaminophen/caffeine/codiene last taken approx 2 hours ago. Pain in middle of mouth lower jaw into the right side of mandible 9/10, endorsing \"feel puffy\", no known fevers      Dialysis tomorrow morning, transplant list \"cant have infection\"      "

## 2023-03-20 NOTE — DISCHARGE INSTRUCTIONS
Take clindamycin as prescribed.  Call your dentist Monday morning to schedule outpatient follow-up.

## 2023-03-23 NOTE — PROGRESS NOTES
Transplant Surgery Consult Note    Medical record number: [id number]  YOB: 1975,   Consult requested by Dr. Aime Rios for evaluation of kidney and pancreas transplant candidacy.    Assessment and Recommendations: Mr. Ramey is a good candidate for transplantation and has a good understanding of the risks and benefits of this approach to management of renal failure and diabetes. The following issues should be addressed prior to transplant:       45 yo male with type 2 DM for the last 21 years  Insulin dependent for 5 years with about 40-50 units/d  A1C in the 7s  Some peripheral neuropathy and retinopathy  ESRD on HD since Aug '21  Left wrist fistula  MI Jan 2019  Stent placement in 2019  Then underwent bypass surgery later in the year. No antiplatelet agents now except aspirin  No prev abd surgery  No live donors   Could benefit from a  KP tx  PFT appropriate, no shortness of breath with exertion and is fairly active  BMI/abd profile assessed. Has done well with weight loss and is appropriate surgically for SPK at this time. Advised not to gain weight as this may lead to placing on hold for SPK. He understands this point.   With weight loss he remains on insulin but is on a lower dose than previously. He remains interested in pancreas transplant     Blood type A    Risks of the surgical procedure including but not limited to the rare risk of mortality discussed in detail. Patient verbalized good understanding and had several pertinent questions which were answered satisfactorily.     Immunosuppressive regimen, management and long term risks discussed in detail.         Mr. Ramey has End stage renal failure due to type 2 DM whose condition is not expected to resolve, is expected to progress, and is expected to continue to develop related comorbid conditions.  Cardiology consult for cardiac risk stratification to be ordered: Yes  CT abdomen and pelvis without contrast to be ordered for assessment of  vascular targets: Yes  Transplant listing labs ordered to include HLA, ABOx2, Cr, etc.  Dietician consult ordered: Yes  Social work consult ordered: Yes  Imaging reports reviewed:  yes  Radiology images reviewed:no  Recipient suitable to move forward with work up of living donors:  Yes      The majority of our visit was spent in counselling, discussing the medical and surgical risks of living or  donor kidney and pancreas transplantation, either in a simultaneous or sequential fashion. I contrasted approximate wait time for SPK vs living vs  donor kidneys from normal (0-85%) or higher (%) kidney donor profile index (KDPI) donors and their associated outcomes. I would not recommend this individual to consider kidneys from high KDPI donors. The reason for this decision is best summarized as:SPK candidate.  Access to transplant will be impacted by living donor availability and overall candidacy for SPK, as well as the influence of blood type and degree of sensitization. We discussed advantages of preemptive transplant as well as living donor kidney transplant, and graft and patient survival outcomes associated with these options. Potential surgical complications of kidney and pancreas transplantation include bleeding, clotting, infection, wound complications, anastomotic failure and other issues such as cardiac complications, pneumonia, deep venous thrombosis, pulmonary embolism, post transplant diabetes and death. We discussed the need for protocol biopsy of the kidney and the possible need for a ureteral stent (and subsequent removal). We discussed benefits and risks associated with different approaches to exocrine drainage of pancreatic secretions. We also discussed differences in the average length of stay, recovery process, and posttransplant lab and monitoring protocol. We discussed the risk of graft rejection and recurrent diabetic nephropathy in the setting of poor glycemic control. I  emphasized the need for strict immunosuppression adherence and the potential for complications of immunosuppression such as skin cancer or lymphoma, as well as a very low but not zero risk of donor-derived disease transmission risks (infection, cancer). Mr. Ramey asked good questions and the patient's candidacy will be reviewed at our Multidisciplinary Selection Committee. Thank you for the opportunity to participate in Mr. Ramey's care.    Total time: 30 minutes  Counselling time: 25 minutes    Harrison Whiteehad MD.  ---------------------------------------------------------------------------------------------------    HPI: Mr. Ramey has End stage renal failure due to diabetes mellitus type 2. The patient has had diabetes for 20 years. Management is by Lantus per diabetic educator  Novolog per diabetic educator. The patient usually checks his blood sugar 3 times/day. Complications of diabetes include:    Retinopathy:  Yes   Neuropathy: Yes   Gastroparesis:  No    The patient is on dialysis.    Has potential kidney donors:  No.  Interested in participation in paired exchange if a donor is willing: Doesn't know     The patient has the following pertinent history:       No    Yes  Dialysis:    []      [] via:       Blood Transfusion                  []      []  Number of units:   Most recently:  Pregnancy:    []      [] Number:       Previous Transplant:  []      [] Details:    Cancer    []      [] Comment:   Kidney stones   []      [] Comment:      Recurrent infections  []      []  Type:                  Bladder dysfunction  []      [] Cause:    Claudication   []      [] Distance:    Previous Amputation  []      [] Cause:     Chronic anticoagulation  []      [] Indication:  Gnosticist  []      []     Past Medical History:   Diagnosis Date     Acquired elevated diaphragm      Anemia in chronic kidney disease      Angina pectoris (H)      Chronic kidney disease      Coronary artery disease      Diabetes  mellitus, type II (H) 12/2001     Diabetic nephropathy (H)      MARQUEZ (dyspnea on exertion)      Dyslipidemia 12/2001     End stage renal disease (H)      History of blood transfusion 2004     Hypertension     takes medication     Ischemic cardiomyopathy      Metabolic acidosis      Myocardial infarction (H)     STEMI -Diagonal branch of the LAD     Obesity (BMI 30-39.9)      Peripheral neuropathy      Proteinuria      Retinopathy      Vitamin D deficiency      Past Surgical History:   Procedure Laterality Date     BACK SURGERY  2009    L5 disc cut     BYPASS GRAFT ARTERY CORONARY  06/20/2019    2 vessels     CV CORONARY ANGIOGRAM N/A 1/13/2019    Procedure: Coronary Angiogram;  Surgeon: Cielo Che MD;  Location: [facility] Cath Lab;  Service: Cardiology     CV CORONARY ANGIOGRAM N/A 5/2/2019    Procedure: Coronary Angiogram;  Surgeon: Cielo Che MD;  Location: [facility] Cath Lab;  Service: Cardiology     CV CORONARY ANGIOGRAM N/A 4/30/2020    Procedure: Coronary Angiogram;  Surgeon: Cielo Che MD;  Location: [facility] Cath Lab;  Service: Cardiology     CV CORONARY ANGIOGRAM N/A 7/22/2021    Procedure: CV CORONARY ANGIOGRAM;  Surgeon: Adrian Crow MD;  Location: [facility] CATH LAB CV     CV FRACTIONAL FLOW RATIO WIRE N/A 7/22/2021    Procedure: Fractional Flow Ratio Wire;  Surgeon: Adrian Crow MD;  Location: [facility] CATH LAB CV     CV LEFT HEART CATH N/A 7/22/2021    Procedure: Left Heart Cath;  Surgeon: Adrian Crow MD;  Location: [facility] LAB CV     CV LEFT HEART CATHETERIZATION WITH LEFT VENTRICULOGRAM N/A 1/13/2019    Procedure: Left Heart Catheterization with Left Ventriculogram;  Surgeon: Cielo Che MD;  Location: [facility] Cath Lab;  Service: Cardiology     CV LEFT HEART CATHETERIZATION WITHOUT LEFT VENTRICULOGRAM Left 4/30/2020    Procedure: Left Heart Catheterization Without Left Ventriculogram;  Surgeon: Cielo Che MD;  Location: [facility] Cath Lab;   Service: Cardiology     CV RIGHT HEART CATHETERIZATION N/A 4/30/2020    Procedure: Right Heart Catheterization;  Surgeon: Cielo Che MD;  Location: [facility] Cath Lab;  Service: Cardiology     HERNIA REPAIR       OTHER SURGICAL HISTORY      Excise varicocele     STENT, CORONARY, DALE  2019     Family History   Problem Relation Age of Onset     Diabetes Type 2  Mother      Heart Disease Father 60     CABG Father 50        triple bypass     Diabetes Type 2  Father      Depression Sister      Substance Abuse Sister      Ovarian Cancer Maternal Grandmother      Brain Cancer Maternal Grandmother      Pancreatic Cancer Maternal Aunt      Prostate Cancer Maternal Uncle      Social History     Socioeconomic History     Marital status:      Spouse name: Not on file     Number of children: Not on file     Years of education: Not on file     Highest education level: Not on file   Occupational History     Not on file   Tobacco Use     Smoking status: Never     Smokeless tobacco: Never   Substance and Sexual Activity     Alcohol use: Yes     Comment: Alcoholic Drinks: 1-2 per year     Drug use: No     Sexual activity: Yes     Partners: Female   Other Topics Concern     Parent/sibling w/ CABG, MI or angioplasty before 65F 55M? Not Asked   Social History Narrative     Not on file     Social Determinants of Health     Financial Resource Strain: Not on file   Food Insecurity: Not on file   Transportation Needs: Not on file   Physical Activity: Not on file   Stress: Not on file   Social Connections: Not on file   Intimate Partner Violence: Not on file   Housing Stability: Not on file       ROS:   CONSTITUTIONAL:  No fevers or chills  EYES: negative for icterus  ENT:  negative for hearing loss, tinnitus and sore throat  RESPIRATORY:  negative for cough, sputum, dyspnea  CARDIOVASCULAR:  negative for chest pain Fatigue  GASTROINTESTINAL:  negative for nausea, vomiting, diarrhea or constipation  GENITOURINARY:  negative  for incontinence, dysuria, bladder emptying problems  HEME:  No easy bruising  INTEGUMENT:  negative for rash and pruritus  NEURO:  Negative for headache, seizure disorder    Allergies:   Allergies   Allergen Reactions     Amoxicillin Hives     & generalized pain     Venlafaxine      lethargic       Medications:  Prescription Medications as of 3/22/2023       Rx Number Disp Refills Start End Last Dispensed Date Next Fill Date Owning Pharmacy    albuterol (PROAIR HFA/PROVENTIL HFA/VENTOLIN HFA) 108 (90 Base) MCG/ACT inhaler    3/18/2021        Sig: Inhale 2 puffs into the lungs every 4 hours as needed    Class: Historical    Route: Inhalation    amLODIPine (NORVASC) 10 MG tablet  90 tablet 0 12/21/2022    [facility] #[id number] [address] AT [facility]    Sig: TAKE 1 TABLET(10 MG) BY MOUTH DAILY    Class: E-Prescribe    aspirin 81 MG EC tablet   0 1/15/2019        Sig: [ASPIRIN 81 MG EC TABLET] Take 1 tablet (81 mg total) by mouth daily.    Class: OTC    Route: Oral    atorvastatin (LIPITOR) 80 MG tablet  90 tablet 3 9/26/2022    [facility] #[id number] [address] AT [facility]    Sig: TAKE 1 TABLET(80 MG) BY MOUTH AT BEDTIME    Class: E-Prescribe    carvedilol (COREG) 12.5 MG tablet  540 tablet 0 12/21/2022    [facility] ST[facility] #[id number] [place] [id number] [facility] AT [facility]    Sig: TAKE 3 TABLETS(37.5 MG) BY MOUTH TWICE DAILY    Class: E-Prescribe    cholecalciferol, vitamin D3, 5,000 unit Tab    1/13/2019        Sig: [CHOLECALCIFEROL, VITAMIN D3, 5,000 UNIT TAB] Take 5,000 Units by mouth daily.    Class: Historical    Route: Oral    clindamycin (CLEOCIN) 300 MG capsule  30 capsule 0 3/20/2023 3/30/2023       Sig: Take 1 capsule (300 mg) by mouth 3 times daily for 10 days    Class: Local Print    Route: Oral    cloNIDine (CATAPRES) 0.1 MG tablet  30 tablet 0 7/27/2021    [id number] [address] AT [facility]    Sig: Take 1 tablet (0.1 mg) by mouth 3 times daily as needed (SBP > 160)    Class: E-Prescribe    Route: Oral    doxazosin (CARDURA) 4 MG tablet    10/4/2020        Sig: [DOXAZOSIN (CARDURA) 4 MG TABLET] Take 2 mg by mouth at bedtime.    Class: Historical    Route: Oral    fluticasone-salmeterol (ADVAIR) 250-50 MCG/ACT inhaler  14 each 3 6/20/2022    [facility] #[id number] [facility] [address] AT [facility]    Sig: Inhale 1 puff into the lungs every 12 hours    Class: E-Prescribe    Route: Inhalation    furosemide (LASIX) 40 MG tablet  90 tablet 0 12/21/2022    [facility] #[id number] [facility] [address] [facility] AT [facility]    Sig: TAKE 1 TABLET(40 MG) BY MOUTH DAILY    Class: E-Prescribe    Notes to Pharmacy: Needs follow-up with Dr Che for refills.    glimepiride (AMARYL) 4 MG tablet    10/15/2021        Class: Historical    glucose (BD GLUCOSE) 4 g chewable tablet    12/31/2020        Sig: glucose 4 gram chewable tablet   CHEW AND SWALLOW 4 TIMES DAILY AS NEEDED    Class: Historical    hydrALAZINE (APRESOLINE) 25 MG tablet  270 tablet 0 7/27/2021 1/9/2023   [id number] [address] [facility] AT [facility]    Sig: Take 3 tablets (75 mg) by mouth 3 times daily    Class: E-Prescribe    Route: Oral    insulin aspart (NOVOLOG PEN) 100 UNIT/ML pen  4.5 mL 1 7/27/2021 1/9/2023   [facility] #[id number] [facility] [address] [facility] AT [facility]    Sig: Inject 5 Units Subcutaneous 3 times daily (before meals)    Class: E-Prescribe    Route: Subcutaneous    insulin glargine-lixisenatide (SOLIQUA) pen    8/26/2021    [facility] #[id number] - [place] [address] [address] AT [facility] [address]    Sig: Inject 25 Units Subcutaneous daily    Class: Historical    Route:  Subcutaneous    nitroGLYcerin (NITROSTAT) 0.4 MG sublingual tablet  25 tablet 1 8/19/2022    [id number] - [place], [place] - [address] [facility] [address]    Sig: PLACE ONE TABLET UNDER TONGUE AS NEEDED FOR CHEST PAIN AS DIRECTED FOR 3 DOSES. IF SYMPTOMS PERSIST 5 MINUTES AFTER 1ST DOSE CALL 911    Class: E-Prescribe          Exam:      Appearance: in no apparent distress.   Skin: normal  Head and Neck: Normal, no rashes or jaundice  Respiratory: easy respirations, no audible wheezing.  Cardiovascular: RRR  Abdomen: rounded, No surgical scars. No overhanging pannus when lying flat- has wide pelvis and good lateral spread.       Diagnostics:   No results found for this or any previous visit (from the past 672 hour(s)).  UNOS CPRA   Date Value Ref Range Status   01/09/2023 11  Final

## 2023-03-29 DIAGNOSIS — I25.10 CORONARY ARTERY DISEASE: Primary | ICD-10-CM

## 2023-03-29 DIAGNOSIS — I16.0 HYPERTENSIVE URGENCY: ICD-10-CM

## 2023-03-30 RX ORDER — AMLODIPINE BESYLATE 10 MG/1
TABLET ORAL
Qty: 30 TABLET | Refills: 0 | Status: SHIPPED | OUTPATIENT
Start: 2023-03-30 | End: 2023-07-13

## 2023-04-02 ENCOUNTER — APPOINTMENT (OUTPATIENT)
Dept: RADIOLOGY | Facility: HOSPITAL | Age: 48
End: 2023-04-02
Attending: EMERGENCY MEDICINE
Payer: COMMERCIAL

## 2023-04-02 ENCOUNTER — HOSPITAL ENCOUNTER (EMERGENCY)
Facility: HOSPITAL | Age: 48
Discharge: HOME | End: 2023-04-02
Attending: EMERGENCY MEDICINE | Admitting: EMERGENCY MEDICINE
Payer: COMMERCIAL

## 2023-04-02 VITALS
SYSTOLIC BLOOD PRESSURE: 168 MMHG | OXYGEN SATURATION: 97 % | HEART RATE: 87 BPM | DIASTOLIC BLOOD PRESSURE: 77 MMHG | HEIGHT: 71 IN | TEMPERATURE: 97.8 F | RESPIRATION RATE: 18 BRPM | BODY MASS INDEX: 30.38 KG/M2 | WEIGHT: 217 LBS

## 2023-04-02 DIAGNOSIS — K21.9 GASTROESOPHAGEAL REFLUX DISEASE WITHOUT ESOPHAGITIS: ICD-10-CM

## 2023-04-02 DIAGNOSIS — R00.2 PALPITATIONS: ICD-10-CM

## 2023-04-02 LAB
ANION GAP SERPL CALCULATED.3IONS-SCNC: 18 MMOL/L (ref 7–15)
BUN SERPL-MCNC: 34.1 MG/DL (ref 6–20)
CALCIUM SERPL-MCNC: 11.2 MG/DL (ref 8.6–10)
CHLORIDE SERPL-SCNC: 91 MMOL/L (ref 98–107)
CREAT SERPL-MCNC: 5.96 MG/DL (ref 0.67–1.17)
DEPRECATED HCO3 PLAS-SCNC: 28 MMOL/L (ref 22–29)
ERYTHROCYTE [DISTWIDTH] IN BLOOD BY AUTOMATED COUNT: 12.7 % (ref 10–15)
GFR SERPL CREATININE-BSD FRML MDRD: 11 ML/MIN/1.73M2
GLUCOSE SERPL-MCNC: 124 MG/DL (ref 70–99)
HCT VFR BLD AUTO: 26 % (ref 40–53)
HGB BLD-MCNC: 8.8 G/DL (ref 13.3–17.7)
MAGNESIUM SERPL-MCNC: 2.4 MG/DL (ref 1.7–2.3)
MCH RBC QN AUTO: 30.6 PG (ref 26.5–33)
MCHC RBC AUTO-ENTMCNC: 33.8 G/DL (ref 31.5–36.5)
MCV RBC AUTO: 90 FL (ref 78–100)
NT-PROBNP SERPL-MCNC: 8125 PG/ML (ref 0–450)
PLATELET # BLD AUTO: 267 10E3/UL (ref 150–450)
POTASSIUM SERPL-SCNC: 3.7 MMOL/L (ref 3.4–5.3)
RBC # BLD AUTO: 2.88 10E6/UL (ref 4.4–5.9)
SODIUM SERPL-SCNC: 137 MMOL/L (ref 136–145)
TROPONIN T SERPL HS-MCNC: 43 NG/L
WBC # BLD AUTO: 9.3 10E3/UL (ref 4–11)

## 2023-04-02 PROCEDURE — 71045 X-RAY EXAM CHEST 1 VIEW: CPT

## 2023-04-02 PROCEDURE — 99285 EMERGENCY DEPT VISIT HI MDM: CPT | Mod: 25

## 2023-04-02 PROCEDURE — 36415 COLL VENOUS BLD VENIPUNCTURE: CPT | Performed by: EMERGENCY MEDICINE

## 2023-04-02 PROCEDURE — 85027 COMPLETE CBC AUTOMATED: CPT | Performed by: EMERGENCY MEDICINE

## 2023-04-02 PROCEDURE — 83735 ASSAY OF MAGNESIUM: CPT | Performed by: EMERGENCY MEDICINE

## 2023-04-02 PROCEDURE — 83880 ASSAY OF NATRIURETIC PEPTIDE: CPT | Performed by: EMERGENCY MEDICINE

## 2023-04-02 PROCEDURE — 80048 BASIC METABOLIC PNL TOTAL CA: CPT | Performed by: EMERGENCY MEDICINE

## 2023-04-02 PROCEDURE — 84484 ASSAY OF TROPONIN QUANT: CPT | Performed by: EMERGENCY MEDICINE

## 2023-04-02 PROCEDURE — 93005 ELECTROCARDIOGRAM TRACING: CPT | Performed by: EMERGENCY MEDICINE

## 2023-04-02 RX ORDER — FAMOTIDINE 20 MG/1
20 TABLET, FILM COATED ORAL 2 TIMES DAILY
Qty: 40 TABLET | Refills: 0 | Status: SHIPPED | OUTPATIENT
Start: 2023-04-02 | End: 2023-06-27

## 2023-04-03 ENCOUNTER — ORGAN (OUTPATIENT)
Dept: TRANSPLANT | Facility: CLINIC | Age: 48
End: 2023-04-03
Payer: COMMERCIAL

## 2023-04-03 ENCOUNTER — TELEPHONE (OUTPATIENT)
Dept: TRANSPLANT | Facility: CLINIC | Age: 48
End: 2023-04-03
Payer: COMMERCIAL

## 2023-04-03 DIAGNOSIS — Z76.82 AWAITING ORGAN TRANSPLANT: Primary | ICD-10-CM

## 2023-04-03 LAB
ATRIAL RATE - MUSE: 89 BPM
DIASTOLIC BLOOD PRESSURE - MUSE: NORMAL MMHG
INTERPRETATION ECG - MUSE: NORMAL
P AXIS - MUSE: 61 DEGREES
PR INTERVAL - MUSE: 180 MS
QRS DURATION - MUSE: 122 MS
QT - MUSE: 372 MS
QTC - MUSE: 452 MS
R AXIS - MUSE: 56 DEGREES
SYSTOLIC BLOOD PRESSURE - MUSE: NORMAL MMHG
T AXIS - MUSE: 41 DEGREES
VENTRICULAR RATE- MUSE: 89 BPM

## 2023-04-03 NOTE — TELEPHONE ENCOUNTER
Patient reports had tooth pain in February, learned he had an abscessed tooth and had the tooth extracted, but reports still has swollen face, swollen glands, met with an ear, nose throat specialist, who noted he had pus in his glands, has ordered a CT scan that is scheduled this Thursday and has a return ENT appointment on 04/14/2023.  Patient also reports he continues on 14 days of additional antibiotics.  Reviewed may need to place him on hold on the transplant waitlists, but will confirm with transplant provider and then contact patient.  Patient verbalizes agreement with this plan.

## 2023-04-03 NOTE — ED NOTES
Writer gave pt his discharge papers and Rx. Per pt he wants to talk to the Dr and he still have some questions. Dr. Arreola was informed and pt is aware.

## 2023-04-03 NOTE — ED PROVIDER NOTES
"EMERGENCY DEPARTMENT ENCOUNTER      NAME: Siva Ramey  AGE: 47 year old male  YOB: 1975  MRN: [id number]  EVALUATION DATE & TIME: No admission date for patient encounter.    PCP: Trinidad Hansen    ED PROVIDER: Norberto Arreola M.D.      Chief Complaint   Patient presents with     Palpitations         FINAL IMPRESSION:  Palpitations  GERD    ED COURSE & MEDICAL DECISION MAKING:    Pertinent Labs & Imaging studies reviewed. (See chart for details)  47 year old male presents to the Emergency Department for evaluation of feelings of \"my heart is stopping\".  He relates he feels like he has a \"gas bubble in my chest\".  Symptoms ongoing intermittently throughout the day.  Patient denies any chest pain or shortness of breath.  Patient with a history of coronary artery disease and previous bypass grafting per review of records.  No recent changes in antibiotics.  Patient has recently started tapering and discontinuing his tramadol.  Patient seen in triage area due to ED overcrowding.  He is an adult male looking older than stated age.  Heart and lungs unremarkable.  Abdomen soft and nontender.  Neurologically patient alert and appropriate.  Patient with extensive coronary artery disease with prior bypass grafting.  Patient also with long history of renal insufficiency presently being evaluated for potential transplant.  Possibility of simple electrolyte imbalance.  The possibility of pulmonary edema.  EKG will also be obtained over concerns of potential cardiac contribution.        7:30 PM I met with the patient for the initial interview and physical examination. Discussed plan for treatment and workup in the ED.    8:17 PM.  Patient with marked anemia.  Hemoglobin 8.8.  Most recent hemoglobin 9.1 on 3/27/2023  8:45 PM.  BNP markedly elevated 8121.  Likely combination of his cardiac disease and his renal insufficiency.  Creatinine is 5.96.  Most recent creatinine 5.22.  Patient with mild hypercalcemia at " 11.2.  Most recent calcium of 3/13/2023 low normal 8.5.  Will question patient regarding potential potassium supplementation could be contributory to his palpitations.  8:54 PM I updated patient with lab and imaging results. Discussed plan.  Patient reports taking Tums today for potential heartburn.  This would explain his hypercalcemia.  Patient cautioned to avoid further Tums.  Given findings patient is appropriate for continued outpatient management.  Will initiate Pepcid for likely GERD symptomatology.  NP markedly elevated however patient without dyspnea.  Oxygenation is excellent.  Blood pressure is normal.  Elevation likely related to his renal failure.    Medical Decision Making    History:    Supplemental history from: Documented in chart, if applicable and Family Member/Significant Other    External Record(s) reviewed: Documented in chart, if applicable.    Work Up:    Chart documentation includes differential considered and any EKGs or imaging independently interpreted by provider, where specified.    In additional to work up documented, I considered the following work up: Documented in chart, if applicable.    External consultation:    Discussion of management with another provider: Documented in chart, if applicable    Complicating factors:    Care impacted by chronic illness: Heart Disease    Care affected by social determinants of health: N/A    Disposition considerations: Discharge. I prescribed additional prescription strength medication(s) as charted. I considered admission, but discharged patient after significant clinical improvement.      At the conclusion of the encounter I discussed the results of all of the tests and the disposition. The questions were answered and return precautions provided. The patient or family acknowledged understanding and was agreeable with the care plan.       PPE: Provider wore gloves, N95 mask, eye protection.     MEDICATIONS GIVEN IN THE EMERGENCY:  Medications -  "No data to display    NEW PRESCRIPTIONS STARTED AT TODAY'S ER VISIT  New Prescriptions    No medications on file          =================================================================    HPI    Patient information was obtained from: Patient     Use of Intrepreter: N/A         Siva Ramey is a 47 year old male with a pertient medical history of STEMI, s/p CABG, ESRD, DM type II, HTN,  who presents to the ED for evaluation of palpitations.     Per chart review, patient was seen for pre-kidney and pancreas transplant visit at [place] [facility] on 1/9. Patient has been on HD since 8/2021. Patient had MI in 2019, had stent placement and bypass surgery later that year. He is not currently on any antiplatelet agents aside from aspirin. Patient was deemed a good candidate for transplantation and was briefed on the risked and benefits of this approach.     Patient reports he has had intermittent palpitations where it feels like his heart \"stops\" and feels \"like it's in slow motion\" over the past 2 days. He also reports having a sensation like an \"air bubble stuck in my chest\" earlier today, but this has self resolved. He denies any explicit chest pain. He notes that he is currently on his 4th antibiotic for a dental infection, but denies any other medication changes. He reports some constipation with this antibiotic use. He adds that he is currently on the waiting list for a kidney transplant. He denies any diarrhea, or any other complaints.      REVIEW OF SYSTEMS   Constitutional:  Denies fever, chills  Respiratory:  Denies productive cough or increased work of breathing  Cardiovascular: Positive for palpitations. Denies chest pain  GI:  Denies abdominal pain, nausea, vomiting, or change in bowel or bladder habits   Musculoskeletal:  Denies any new muscle/joint swelling  Skin:  Denies rash   Neurologic:  Denies focal weakness  All systems negative except as marked.     PAST MEDICAL HISTORY:  Past Medical " History:   Diagnosis Date     Acquired elevated diaphragm      Anemia in chronic kidney disease      Angina pectoris (H)      Chronic kidney disease      Coronary artery disease      Diabetes mellitus, type II (H) 12/2001     Diabetic nephropathy (H)      MARQUEZ (dyspnea on exertion)      Dyslipidemia 12/2001     End stage renal disease (H)      History of blood transfusion 2004     Hypertension     takes medication     Ischemic cardiomyopathy      Metabolic acidosis      Myocardial infarction (H)     STEMI -Diagonal branch of the LAD     Obesity (BMI 30-39.9)      Peripheral neuropathy      Proteinuria      Retinopathy      Vitamin D deficiency        PAST SURGICAL HISTORY:  Past Surgical History:   Procedure Laterality Date     BACK SURGERY  2009    L5 disc cut     BYPASS GRAFT ARTERY CORONARY  06/20/2019    2 vessels     CV CORONARY ANGIOGRAM N/A 1/13/2019    Procedure: Coronary Angiogram;  Surgeon: Cielo Che MD;  Location: [facility] Cath Lab;  Service: Cardiology     CV CORONARY ANGIOGRAM N/A 5/2/2019    Procedure: Coronary Angiogram;  Surgeon: Cielo Che MD;  Location: [facility] Cath Lab;  Service: Cardiology     CV CORONARY ANGIOGRAM N/A 4/30/2020    Procedure: Coronary Angiogram;  Surgeon: Cielo Che MD;  Location: [facility] Cath Lab;  Service: Cardiology     CV CORONARY ANGIOGRAM N/A 7/22/2021    Procedure: CV CORONARY ANGIOGRAM;  Surgeon: Adrian Crow MD;  Location: [facility] CATH LAB CV     CV FRACTIONAL FLOW RATIO WIRE N/A 7/22/2021    Procedure: Fractional Flow Ratio Wire;  Surgeon: Adrian Crow MD;  Location: [facility] CATH LAB CV     CV LEFT HEART CATH N/A 7/22/2021    Procedure: Left Heart Cath;  Surgeon: Adrian Crow MD;  Location: [facility] CATH LAB CV     CV LEFT HEART CATHETERIZATION WITH LEFT VENTRICULOGRAM N/A 1/13/2019    Procedure: Left Heart Catheterization with Left Ventriculogram;  Surgeon: Cielo Che MD;  Location: [facility] Cath Lab;  Service:  Cardiology     CV LEFT HEART CATHETERIZATION WITHOUT LEFT VENTRICULOGRAM Left 4/30/2020    Procedure: Left Heart Catheterization Without Left Ventriculogram;  Surgeon: Cielo Che MD;  Location: [facility] Cath Lab;  Service: Cardiology     CV RIGHT HEART CATHETERIZATION N/A 4/30/2020    Procedure: Right Heart Catheterization;  Surgeon: Cielo Che MD;  Location: [facility] Cath Lab;  Service: Cardiology     HERNIA REPAIR       OTHER SURGICAL HISTORY      Excise varicocele     STENT, CORONARY, DALE  2019         CURRENT MEDICATIONS:    No current facility-administered medications for this encounter.    Current Outpatient Medications:      albuterol (PROAIR HFA/PROVENTIL HFA/VENTOLIN HFA) 108 (90 Base) MCG/ACT inhaler, Inhale 2 puffs into the lungs every 4 hours as needed, Disp: , Rfl:      amLODIPine (NORVASC) 10 MG tablet, TAKE 1 TABLET(10 MG) BY MOUTH DAILY, Disp: 30 tablet, Rfl: 0     aspirin 81 MG EC tablet, [ASPIRIN 81 MG EC TABLET] Take 1 tablet (81 mg total) by mouth daily., Disp: , Rfl: 0     atorvastatin (LIPITOR) 80 MG tablet, TAKE 1 TABLET(80 MG) BY MOUTH AT BEDTIME, Disp: 90 tablet, Rfl: 3     carvedilol (COREG) 12.5 MG tablet, TAKE 3 TABLETS(37.5 MG) BY MOUTH TWICE DAILY, Disp: 540 tablet, Rfl: 0     cholecalciferol, vitamin D3, 5,000 unit Tab, [CHOLECALCIFEROL, VITAMIN D3, 5,000 UNIT TAB] Take 5,000 Units by mouth daily., Disp: , Rfl:      cloNIDine (CATAPRES) 0.1 MG tablet, Take 1 tablet (0.1 mg) by mouth 3 times daily as needed (SBP > 160), Disp: 30 tablet, Rfl: 0     doxazosin (CARDURA) 4 MG tablet, [DOXAZOSIN (CARDURA) 4 MG TABLET] Take 2 mg by mouth at bedtime., Disp: , Rfl:      fluticasone-salmeterol (ADVAIR) 250-50 MCG/ACT inhaler, Inhale 1 puff into the lungs every 12 hours, Disp: 14 each, Rfl: 3     furosemide (LASIX) 40 MG tablet, TAKE 1 TABLET(40 MG) BY MOUTH DAILY, Disp: 90 tablet, Rfl: 0     glimepiride (AMARYL) 4 MG tablet, , Disp: , Rfl:      glucose (BD GLUCOSE) 4 g chewable  "tablet, glucose 4 gram chewable tablet  CHEW AND SWALLOW 4 TIMES DAILY AS NEEDED, Disp: , Rfl:      hydrALAZINE (APRESOLINE) 25 MG tablet, Take 3 tablets (75 mg) by mouth 3 times daily (Patient taking differently: Take 25 mg by mouth 2 times daily), Disp: 270 tablet, Rfl: 0     insulin aspart (NOVOLOG PEN) 100 UNIT/ML pen, Inject 5 Units Subcutaneous 3 times daily (before meals), Disp: 4.5 mL, Rfl: 1     insulin glargine-lixisenatide (SOLIQUA) pen, Inject 25 Units Subcutaneous daily, Disp: , Rfl:      nitroGLYcerin (NITROSTAT) 0.4 MG sublingual tablet, PLACE ONE TABLET UNDER TONGUE AS NEEDED FOR CHEST PAIN AS DIRECTED FOR 3 DOSES. IF SYMPTOMS PERSIST 5 MINUTES AFTER 1ST DOSE CALL 911, Disp: 25 tablet, Rfl: 1    ALLERGIES:  Allergies   Allergen Reactions     Amoxicillin Hives     & generalized pain     Venlafaxine      lethargic       FAMILY HISTORY:  Family History   Problem Relation Age of Onset     Diabetes Type 2  Mother      Heart Disease Father 60     CABG Father 50        triple bypass     Diabetes Type 2  Father      Depression Sister      Substance Abuse Sister      Ovarian Cancer Maternal Grandmother      Brain Cancer Maternal Grandmother      Pancreatic Cancer Maternal Aunt      Prostate Cancer Maternal Uncle        SOCIAL HISTORY:   Social History     Socioeconomic History     Marital status:      Spouse name: None     Number of children: None     Years of education: None     Highest education level: None   Tobacco Use     Smoking status: Never     Smokeless tobacco: Never   Substance and Sexual Activity     Alcohol use: Yes     Comment: Alcoholic Drinks: 1-2 per year     Drug use: No     Sexual activity: Yes     Partners: Female       VITALS:  Patient Vitals for the past 24 hrs:   BP Temp Temp src Pulse Resp SpO2 Height Weight   04/02/23 1923 128/72 98.7  F (37.1  C) Oral 89 18 96 % 1.803 m (5' 11\") 98.4 kg (217 lb)        PHYSICAL EXAM    Constitutional:  Awake, alert, in mild apparent " distress  HENT:  Normocephalic, Atraumatic. Bilateral external ears normal. Oropharynx moist. Nose normal. Neck- Normal range of motion with no guarding,  Supple, No stridor.   Eyes:  PERRL, EOMI with no signs of entrapment, Conjunctiva normal, No discharge.   Respiratory:  Normal breath sounds, No respiratory distress, No wheezing.    Cardiovascular:  Normal heart rate, Normal rhythm, No appreciable rubs or gallops.   GI:  Soft, No tenderness, No distension, No palpable masses  Musculoskeletal:  No edema. Good range of motion in all major joints. No tenderness to palpation or major deformities noted.  Integument:  Warm, Dry, No erythema, No rash.   Neurologic:  Alert & oriented, Normal motor function, Normal sensory function, No focal deficits noted.   Psychiatric:  Affect normal, Judgment normal, Mood normal.     LAB:  All pertinent labs reviewed and interpreted.  Results for orders placed or performed during the hospital encounter of 04/02/23   XR Chest Port 1 View     Status: None    Narrative    EXAM: XR CHEST PORT 1 VIEW  LOCATION: [facility]  DATE/TIME: 4/2/2023 7:50 PM    INDICATION: Palpitations  COMPARISON: None.      Impression    IMPRESSION: Sternotomy with mediastinal clips. Chest otherwise negative.   Basic metabolic panel     Status: Abnormal   Result Value Ref Range    Sodium 137 136 - 145 mmol/L    Potassium 3.7 3.4 - 5.3 mmol/L    Chloride 91 (L) 98 - 107 mmol/L    Carbon Dioxide (CO2) 28 22 - 29 mmol/L    Anion Gap 18 (H) 7 - 15 mmol/L    Urea Nitrogen 34.1 (H) 6.0 - 20.0 mg/dL    Creatinine 5.96 (H) 0.67 - 1.17 mg/dL    Calcium 11.2 (H) 8.6 - 10.0 mg/dL    Glucose 124 (H) 70 - 99 mg/dL    GFR Estimate 11 (L) >60 mL/min/1.73m2   Magnesium     Status: Abnormal   Result Value Ref Range    Magnesium 2.4 (H) 1.7 - 2.3 mg/dL   Troponin T, High Sensitivity     Status: Abnormal   Result Value Ref Range    Troponin T, High Sensitivity 43 (H) <=22 ng/L   Nt probnp inpatient (BNP)      Status: Abnormal   Result Value Ref Range    N terminal Pro BNP Inpatient 8,125 (H) 0 - 450 pg/mL   CBC (+ platelets, no diff)     Status: Abnormal   Result Value Ref Range    WBC Count 9.3 4.0 - 11.0 10e3/uL    RBC Count 2.88 (L) 4.40 - 5.90 10e6/uL    Hemoglobin 8.8 (L) 13.3 - 17.7 g/dL    Hematocrit 26.0 (L) 40.0 - 53.0 %    MCV 90 78 - 100 fL    MCH 30.6 26.5 - 33.0 pg    MCHC 33.8 31.5 - 36.5 g/dL    RDW 12.7 10.0 - 15.0 %    Platelet Count 267 150 - 450 10e3/uL       RADIOLOGY:  Reviewed all pertinent imaging. Please see official radiology report.  XR Chest Port 1 View   Final Result   IMPRESSION: Sternotomy with mediastinal clips. Chest otherwise negative.          EKG:    Normal sinus rhythm.  Rate of 89.  Normal QRS.  Normal ST segments.  Minimal intraventricular conduction delay.  Unchanged compared to September 27, 2021  I have independently reviewed and interpreted the EKG(s) documented above.           I, Ezequiel Hardwick, am serving as a scribe to document services personally performed by Norberto Arreola MD, based on my observation and the provider's statements to me. I, Norberto Arreola MD attest that Ezequiel Hardwick is acting in a scribe capacity, has observed my performance of the services and has documented them in accordance with my direction.    Norberto Arreola M.D.  Emergency Medicine  [facility] EMERGENCY DEPARTMENT     Norberto Arreola MD  04/02/23 2101

## 2023-04-03 NOTE — ED TRIAGE NOTES
"Patient has been having intermittent palpitations for 2 days.  He says he feels his heart beat--not racing--but feels like his heart is \"slow motion\". Has had some dental issues the past month. Pt is a chronic dialysis patient.     "

## 2023-04-04 ENCOUNTER — TELEPHONE (OUTPATIENT)
Dept: TRANSPLANT | Facility: CLINIC | Age: 48
End: 2023-04-04
Payer: COMMERCIAL

## 2023-04-04 NOTE — TELEPHONE ENCOUNTER
Patient due for Pap.    Reminder done today via telephone call.   Updated patient that I have reviewed that patient is still being treated for tooth/mouth infection with one of our transplant providers; that the provider recommends patient's status be changed to inactive on the transplant waitlists, which means patient will continue to accumulate wait time, but not be eligible for organ offer calls.  Noted I will send a letter to the patient and copy his providers and dialysis unit with this status change.  Patient reports he understands, agrees with this plan, and also reports he may be admitted to [facility] for additional treatment/oral surgery.  Encouraged patient to contact me with updates.     4/4/23 addendum: Verified listing in Epic and UNOS as Inactive on the kidney alone and kidney pancreas waitlist. -Komal MERRITT RN

## 2023-04-04 NOTE — TELEPHONE ENCOUNTER
Please call BETSY Hood at [facility]   Wanted to discuss patients case     Sana's# [phone]

## 2023-04-04 NOTE — TELEPHONE ENCOUNTER
Organ Offer Encounter Information    Organ Offer Information  Organ offer date & time: 4/3/2023 11:46 PM  Coordinator/Fellow/Attending name: Gabriela Carias RN   Organ(s):  Organ UNOS ID Match Run ID Comment Organ Laterality   Kidney [id number]    Pancreas [id number]        Recent infections?:  (Comment: Tooth Pulled-- 42 days-- still trying to find solution to it.)     Recent pregnancy?: No (Comment: NA)   Organ offer decision status Patient/Other: Declined Offer  UNOS decline reason: 801 - Candidate ill, unavailable, refused, or temporarily unsuitable  Additional Comments: 4/3/2023 11:47 PM  KP: Primary Import  MD: Ventura/Yosi  OPO Contact: Jorge   VXM Results: Compatible- no DSA per Dr. Louise  XM Plan (FXM must be done with serum no older than 10 days from transplant): Pending  Plan (Admission, NPO, Donor OR): Called patient regarding KP offer. Patient has current active infection-- on his third course of antibiotics. Called Dr. Dleuca-- plan to pass on offer for patient- needs to be re-evaluated for future offers. Patient understood.    Gabriela Carias  Transplant Coordinator    Attestation I have discussed all of the above with the Patient/Legal Guardian/Caregiver regarding this organ offer.: Yes  Coordinator/Fellow/Attending name: Gabriela Carias RN

## 2023-04-04 NOTE — LETTER
April 4, 2023    Siva Ramey  [address]  [place] [address]    Dear Mr. Ramey,    This letter is sent to notify you that your listing status was changed from Active to Inactive on the kidney and pancreas transplant waitlist at the [facility].  Your status was changed because you are currently being treated for a tooth/mouth infection.    During this waiting period, we may still request periodic laboratory tests with your primary physician.  It will be your responsibility to remind your physician to forward your results to the Transplant Office.    We still need to be kept informed of any changes such as:    changes in your insurance coverage  change in your phone number, address or emergency contact  significant changes in your health  significant infections (such as pneumonia or abscesses)  blood transfusions  any condition which requires hospitalization or surgery    We want you to know that our program has physician and surgeon coverage 24 hours a day, 365 days a year. In addition, our transplant surgeons and physicians will not be on call for two or more transplant programs more than 30 miles apart unless the circumstances have been reviewed and approved by the [employer] for Organ Sharing (UNOS) Membership and Professional Standards Committee (MPSC). Finally, our primary physician and primary surgeons are not designated as the primary surgeon or primary physician at more than 1 transplant hospital. If this coverage changes or there are substantial program changes, you will be notified in writing by letter.     Attached is a letter from UNOS that describes the services and information offered to patients by UNOS and the Organ Procurement and Transplantation Network (OPTN).    If you have any questions regarding this letter, please contact me at [phone] or the Transplant Office at [phone].    Sincerely,    Nayla Pendleton RN, BSN  Transplant Coordinator On Behalf of Kidney and  Pancreas Transplant Program    Enclosures: OS Letter    CC: Care Team                        The Organ Procurement and Transplantation Network  Toll-free patient services line:     Your resource for organ transplant information    If you have a question regarding your own medical care, you always should call your transplant hospital first. However, for general organ transplant-related information, you can call the Organ Procurement and Transplantation Network (OPTN) toll-free patient services line at [phone]. Anyone, including potential transplant candidates, candidates, recipients, family members, friends, living donors, and donor family members, can call this number to:      Talk about organ donation, living donation, the transplant process, the donation process, and transplant policies.  Get a free patient information kit with helpful booklets, waiting list and transplant information, and a list of all transplant hospitals.  Ask questions about the OPTN website (https://optn.transplant.hrsa.gov/), the [employer] Network for Organ Sharing s (UNOS) website (https://unos.org/), or the UNOS website for living donors and transplant recipients. (https://www.transplantliving.org/).  Learn how the OPTN can help you.  Talk about any concerns that you may have with a transplant hospital.    The nation s transplant system, the OPTN, is managed under federal contract by the [employer] Organ Sharing (UNOS), which is a non-profit charitable organization. The OPTN helps create and define organ sharing policies that make the best use of donated organs. This process continuously evaluating new advances and discoveries so policies can be adapted to best serve patients waiting for transplants. To do so, the OPTN works closely with transplant professionals, transplant patients, transplant candidates, donor families, living donors, and the public. All transplant programs and organ procurement organizations throughout  the country are OPTN members and are obligated to follow the policies the OPTN creates for allocating organs.    The OPTN also is responsible for:    Providing educational material for patients, the public, and professionals.  Raising awareness of the need for donated organs and tissue.  Coordinating organ procurement, matching, and placement.  Collecting information about every organ transplant and donation that occurs in the [place].    Remember, you should contact your transplant hospital directly if you have questions or concerns about your own medical care including medical records, work-up progress, and test results.    We are not your transplant hospital, and our staff will not be able to answer questions about your case, so please keep your transplant hospital s phone number handy.    However, while you research your transplant needs and learn as much as you can about transplantation and donation, we welcome your call to our toll-free patient services line at [phone].          Updated 4/1/2019

## 2023-04-05 ENCOUNTER — COMMITTEE REVIEW (OUTPATIENT)
Dept: TRANSPLANT | Facility: CLINIC | Age: 48
End: 2023-04-05
Payer: COMMERCIAL

## 2023-04-05 NOTE — COMMITTEE REVIEW
Abdominal Patient Discussion Note Transplant Coordinator: Nayla Pendleton  Transplant Surgeon:       Referring Physician: Aime Rios    Committee Review Members:  Nephrology Isidoro Claros MD, Cady Perrin PA-C, Jarod Christy, APRN CNP   Nutrition Cindy Romero, RD   Pharmacist Zoraida Cabello, [facility]   Pharmacy Kyle Valdez, [facility]    - Clinical Deepa Elysia Basilio, [facility], Komal Rios, [facility]   Transplant SNEHAL BARAHONA, RN, Nayla Pendleton, BETSY, Kim Fay, RN, Komal Yeager, BETSY, Frances Cox, BETSY, Natalie Nina, BETSY, Alisa Tobin, RN   Transplant Surgery Harrison Whitehead MD       Additional Discussion Notes and Findings:  Patient remains inactive status on the kidney and kidney/pancreas lists as is being treated for a tooth/oral infection.

## 2023-04-24 ENCOUNTER — TELEPHONE (OUTPATIENT)
Dept: TRANSPLANT | Facility: CLINIC | Age: 48
End: 2023-04-24
Payer: COMMERCIAL

## 2023-05-16 ENCOUNTER — TRANSFERRED RECORDS (OUTPATIENT)
Dept: HEALTH INFORMATION MANAGEMENT | Facility: CLINIC | Age: 48
End: 2023-05-16

## 2023-06-08 ENCOUNTER — TELEPHONE (OUTPATIENT)
Dept: TRANSPLANT | Facility: CLINIC | Age: 48
End: 2023-06-08
Payer: MEDICARE

## 2023-06-08 NOTE — TELEPHONE ENCOUNTER
Left voice message requesting return call from patient to confirm whether patient wishes to be contacted on MyChart or called to schedule return appointments with transplant nephrology and social work.  Also note will need documentation/note from patient's specialist that patient's mouth has completely healed and need confirm the date patient finished antibiotics.

## 2023-06-27 ENCOUNTER — OFFICE VISIT (OUTPATIENT)
Dept: CARDIOLOGY | Facility: CLINIC | Age: 48
End: 2023-06-27
Attending: INTERNAL MEDICINE
Payer: COMMERCIAL

## 2023-06-27 VITALS
WEIGHT: 232 LBS | SYSTOLIC BLOOD PRESSURE: 118 MMHG | HEART RATE: 83 BPM | BODY MASS INDEX: 32.36 KG/M2 | DIASTOLIC BLOOD PRESSURE: 70 MMHG | RESPIRATION RATE: 18 BRPM

## 2023-06-27 DIAGNOSIS — I25.10 CORONARY ARTERY DISEASE DUE TO LIPID RICH PLAQUE: ICD-10-CM

## 2023-06-27 DIAGNOSIS — N18.6 ESRD (END STAGE RENAL DISEASE) (H): ICD-10-CM

## 2023-06-27 DIAGNOSIS — I25.83 CORONARY ARTERY DISEASE DUE TO LIPID RICH PLAQUE: ICD-10-CM

## 2023-06-27 DIAGNOSIS — I16.0 HYPERTENSIVE URGENCY: ICD-10-CM

## 2023-06-27 DIAGNOSIS — E11.9 DIABETES MELLITUS, TYPE 2 (H): ICD-10-CM

## 2023-06-27 DIAGNOSIS — Z76.82 ORGAN TRANSPLANT CANDIDATE: ICD-10-CM

## 2023-06-27 LAB
CHOLEST SERPL-MCNC: 193 MG/DL
HDLC SERPL-MCNC: 23 MG/DL
LDLC SERPL CALC-MCNC: ABNORMAL MG/DL
NONHDLC SERPL-MCNC: 170 MG/DL
TRIGL SERPL-MCNC: 801 MG/DL

## 2023-06-27 PROCEDURE — 99215 OFFICE O/P EST HI 40 MIN: CPT | Performed by: INTERNAL MEDICINE

## 2023-06-27 PROCEDURE — 36415 COLL VENOUS BLD VENIPUNCTURE: CPT | Performed by: INTERNAL MEDICINE

## 2023-06-27 PROCEDURE — 83721 ASSAY OF BLOOD LIPOPROTEIN: CPT | Performed by: INTERNAL MEDICINE

## 2023-06-27 PROCEDURE — 80061 LIPID PANEL: CPT | Performed by: INTERNAL MEDICINE

## 2023-06-27 RX ORDER — PEN NEEDLE, DIABETIC 32GX 5/32"
NEEDLE, DISPOSABLE MISCELLANEOUS
COMMUNITY
Start: 2023-01-24 | End: 2024-02-14

## 2023-06-27 RX ORDER — HYDRALAZINE HYDROCHLORIDE 25 MG/1
25 TABLET, FILM COATED ORAL 2 TIMES DAILY
Qty: 180 TABLET | Refills: 3 | COMMUNITY
Start: 2023-06-27 | End: 2023-11-16

## 2023-06-27 RX ORDER — CARVEDILOL 12.5 MG/1
37.5 TABLET ORAL 2 TIMES DAILY WITH MEALS
Qty: 540 TABLET | Refills: 3 | Status: SHIPPED | OUTPATIENT
Start: 2023-06-27 | End: 2024-03-20

## 2023-06-27 NOTE — PROGRESS NOTES
HEART CARE ENCOUNTER CONSULTATON NOTE      [facility]  [phone]      Assessment/Recommendations   Assessment/Plan:  CAD - stable, no angina on aspirin and statin long term     Hypertension - controlled with multiple agents and dialysis but he is having orthostasis and low BP at dialysis. We will do a two week trial off of his pm doxazosin and he will let us know how his arrival dialysis Bps are running with that change in 2 weeks     Hyperlipidemia - at goal on statin therapy, lipid check today     PVCs - occasional, worsened temporarily with recent sepsis/osteomyelitis         F/U 6 months       History of Present Illness/Subjective    HPI: Siva Ramey is a 47 year old male diabetic with ESRD on HD, hypertension, hyperlipidemia, PVCs, and CAD who suffered an anterior-lateral STEMI January 2019. He was found to have a thrombotic occlusion of a large diagonal that was treated with a Synergy IBAN. There was residual diffuse moderate-severe mid LAD disease and distal RCA disease. EF was 45% by echo. Siva underwent 2 v CABG in June 2019 (LIMA-LAD, SVG-RCA). Siva was admitted 7/2021 with chest pain and hypertensive urgency. Echo EF was stable at 45-50% and coronary angiogram was also stable with FFR - LAD disease. Losartan was stopped due to progressive renal insufficiency. Siva follows with a pulmonologist at [facility], Dr. Ferreira; spirometry showed severe restriction. A CT chest did show some compressive volume loss in the left lung base. A sniff test showed a mildly elevated left hemidiaphragm but normal, bilateral diaphragmatic excursion. Siva started dialysis August 2021 for progressive renal failure, hypertension, and fluid overload. Echo 9/2021 EF was 55-60%.      Siva returns for annual follow up today. He has been staying active and going to dialysis regularly. There is orthostasis and he has had a couple of orthostatic syncope events in the past year, primarily the  afternoon/evening after dialysis.  He does need to hold his AM medications the days of dialysis due to hypotension at dialysis. The morning of dialysis he will have some fullness in his chest that resolves after the fluid comes off. There is no dyspnea, pnd/orthopnea, edema. He has mild PVC related palpitations that were worse during a recent right jaw osteomyelitis, but they have settled back down.        Physical Examination  Review of Systems   Vitals: /70 (BP Location: Left arm, Patient Position: Sitting, Cuff Size: Adult Large)   Pulse 83   Resp 18   Wt 105.2 kg (232 lb)   BMI 32.36 kg/m    BMI= Body mass index is 32.36 kg/m .  Wt Readings from Last 3 Encounters:   06/27/23 105.2 kg (232 lb)   04/02/23 98.4 kg (217 lb)   03/20/23 102 kg (224 lb 13.9 oz)       General Appearance:   no distress, normal body habitus   ENT/Mouth: membranes moist, no oral lesions or bleeding gums.      EYES:  no scleral icterus, normal conjunctivae   Neck: no carotid bruits or thyromegaly   Chest/Lungs:   lungs are clear to auscultation, no rales or wheezing, stable sternal scar, equal chest wall expansion    Cardiovascular:   Regular. Normal first and second heart sounds with no murmur. No rubs or gallops; the right carotid, radial and posterior tibial pulses are intact and the left carotid, radial and posterior tibial pulses are intact.  Jugular venous pressure is flat, no edema bilaterally    Abdomen:  no organomegaly, masses, bruits, or tenderness; bowel sounds are present   Extremities: no cyanosis or clubbing   Skin: no xanthelasma, warm.    Neurologic: normal  bilateral, no tremors     Psychiatric: alert and oriented x3, calm        Please refer above for cardiac ROS details.        Medical History  Surgical History Family History Social History   Past Medical History:   Diagnosis Date     Acquired elevated diaphragm      Anemia in chronic kidney disease      Angina pectoris (H)      Chronic kidney disease       Coronary artery disease      Diabetes mellitus, type II (H) 12/2001     Diabetic nephropathy (H)      MARQUEZ (dyspnea on exertion)      Dyslipidemia 12/2001     End stage renal disease (H)      History of blood transfusion 2004     Hypertension     takes medication     Ischemic cardiomyopathy      Metabolic acidosis      Myocardial infarction (H)     STEMI -Diagonal branch of the LAD     Obesity (BMI 30-39.9)      Peripheral neuropathy      Proteinuria      Retinopathy      Vitamin D deficiency      Past Surgical History:   Procedure Laterality Date     BACK SURGERY  2009    L5 disc cut     BYPASS GRAFT ARTERY CORONARY  06/20/2019    2 vessels     CV CORONARY ANGIOGRAM N/A 1/13/2019    Procedure: Coronary Angiogram;  Surgeon: Cielo Che MD;  Location: [facility] Cath Lab;  Service: Cardiology     CV CORONARY ANGIOGRAM N/A 5/2/2019    Procedure: Coronary Angiogram;  Surgeon: Cielo Che MD;  Location: [facility] Cath Lab;  Service: Cardiology     CV CORONARY ANGIOGRAM N/A 4/30/2020    Procedure: Coronary Angiogram;  Surgeon: Cielo Che MD;  Location: [facility] Cath Lab;  Service: Cardiology     CV CORONARY ANGIOGRAM N/A 7/22/2021    Procedure: CV CORONARY ANGIOGRAM;  Surgeon: Adrian Crow MD;  Location: [facility] CATH LAB CV     CV FRACTIONAL FLOW RATIO WIRE N/A 7/22/2021    Procedure: Fractional Flow Ratio Wire;  Surgeon: Adrian Crow MD;  Location: [facility] CATH LAB CV     CV LEFT HEART CATH N/A 7/22/2021    Procedure: Left Heart Cath;  Surgeon: Adrian Crow MD;  Location: [facility] CATH LAB CV     CV LEFT HEART CATHETERIZATION WITH LEFT VENTRICULOGRAM N/A 1/13/2019    Procedure: Left Heart Catheterization with Left Ventriculogram;  Surgeon: Cielo Che MD;  Location: [facility] Cath Lab;  Service: Cardiology     CV LEFT HEART CATHETERIZATION WITHOUT LEFT VENTRICULOGRAM Left 4/30/2020    Procedure: Left Heart Catheterization Without Left Ventriculogram;  Surgeon: Cielo Che  MD;  Location: [facility] Cath Lab;  Service: Cardiology     CV RIGHT HEART CATHETERIZATION N/A 4/30/2020    Procedure: Right Heart Catheterization;  Surgeon: Cielo Che MD;  Location: [facility] Cath Lab;  Service: Cardiology     HERNIA REPAIR       OTHER SURGICAL HISTORY      Excise varicocele     STENT, CORONARY, DALE  2019     Family History   Problem Relation Age of Onset     Diabetes Type 2  Mother      Heart Disease Father 60     CABG Father 50        triple bypass     Diabetes Type 2  Father      Depression Sister      Substance Abuse Sister      Ovarian Cancer Maternal Grandmother      Brain Cancer Maternal Grandmother      Pancreatic Cancer Maternal Aunt      Prostate Cancer Maternal Uncle         Social History     Socioeconomic History     Marital status:      Spouse name: Not on file     Number of children: Not on file     Years of education: Not on file     Highest education level: Not on file   Occupational History     Not on file   Tobacco Use     Smoking status: Never     Smokeless tobacco: Never   Substance and Sexual Activity     Alcohol use: Yes     Comment: Alcoholic Drinks: 1-2 per year     Drug use: No     Sexual activity: Yes     Partners: Female   Other Topics Concern     Parent/sibling w/ CABG, MI or angioplasty before 65F 55M? Not Asked   Social History Narrative     Not on file     Social Determinants of Health     Financial Resource Strain: Not on file   Food Insecurity: Not on file   Transportation Needs: Not on file   Physical Activity: Not on file   Stress: Not on file   Social Connections: Not on file   Intimate Partner Violence: Not on file   Housing Stability: Not on file           Medications  Allergies   Current Outpatient Medications   Medication Sig Dispense Refill     albuterol (PROAIR HFA/PROVENTIL HFA/VENTOLIN HFA) 108 (90 Base) MCG/ACT inhaler Inhale 2 puffs into the lungs every 4 hours as needed       amLODIPine (NORVASC) 10 MG tablet TAKE 1 TABLET(10 MG) BY  MOUTH DAILY 30 tablet 0     aspirin 81 MG EC tablet [ASPIRIN 81 MG EC TABLET] Take 1 tablet (81 mg total) by mouth daily.  0     atorvastatin (LIPITOR) 80 MG tablet TAKE 1 TABLET(80 MG) BY MOUTH AT BEDTIME 90 tablet 3     BD PEN NEEDLE JAMIN 2ND GEN 32G X 4 MM miscellaneous        carvedilol (COREG) 12.5 MG tablet TAKE 3 TABLETS(37.5 MG) BY MOUTH TWICE DAILY 540 tablet 0     cholecalciferol, vitamin D3, 5,000 unit Tab [CHOLECALCIFEROL, VITAMIN D3, 5,000 UNIT TAB] Take 5,000 Units by mouth daily.       cloNIDine (CATAPRES) 0.1 MG tablet Take 1 tablet (0.1 mg) by mouth 3 times daily as needed (SBP > 160) 30 tablet 0     doxazosin (CARDURA) 4 MG tablet [DOXAZOSIN (CARDURA) 4 MG TABLET] Take 2 mg by mouth at bedtime.       fluticasone-salmeterol (ADVAIR) 250-50 MCG/ACT inhaler Inhale 1 puff into the lungs every 12 hours 14 each 3     furosemide (LASIX) 40 MG tablet TAKE 1 TABLET(40 MG) BY MOUTH DAILY 90 tablet 0     glucose (BD GLUCOSE) 4 g chewable tablet glucose 4 gram chewable tablet   CHEW AND SWALLOW 4 TIMES DAILY AS NEEDED       insulin aspart (NOVOLOG PEN) 100 UNIT/ML pen Inject 5 Units Subcutaneous 3 times daily (before meals) 4.5 mL 1     insulin glargine-lixisenatide (SOLIQUA) pen Inject 25 Units Subcutaneous daily       nitroGLYcerin (NITROSTAT) 0.4 MG sublingual tablet PLACE ONE TABLET UNDER TONGUE AS NEEDED FOR CHEST PAIN AS DIRECTED FOR 3 DOSES. IF SYMPTOMS PERSIST 5 MINUTES AFTER 1ST DOSE CALL 911 25 tablet 1     famotidine (PEPCID) 20 MG tablet Take 1 tablet (20 mg) by mouth 2 times daily (Patient not taking: Reported on 6/27/2023) 40 tablet 0     glimepiride (AMARYL) 4 MG tablet  (Patient not taking: Reported on 6/27/2023)       hydrALAZINE (APRESOLINE) 25 MG tablet Take 3 tablets (75 mg) by mouth 3 times daily (Patient taking differently: Take 25 mg by mouth 2 times daily) 270 tablet 0       Allergies   Allergen Reactions     Amoxicillin Hives     & generalized pain     Venlafaxine      lethargic           Lab Results    Chemistry/lipid CBC Cardiac Enzymes/BNP/TSH/INR   Recent Labs   Lab Test 07/21/21  0440   CHOL 167   HDL 24*   LDL 59   TRIG 415*     Recent Labs   Lab Test 07/21/21  0440 03/04/19  1211   LDL 59 46  36     Recent Labs   Lab Test 04/02/23 2002      POTASSIUM 3.7   CHLORIDE 91*   CO2 28   *   BUN 34.1*   CR 5.96*   GFRESTIMATED 11*   BETHANY 11.2*     Recent Labs   Lab Test 04/02/23 2002 09/27/21  1218 08/09/21  1840   CR 5.96* 5.22* 4.70*     Recent Labs   Lab Test 09/27/21  1218 06/22/19  0554 01/14/19  0544   A1C 8.1* 7.8* 10.0*          Recent Labs   Lab Test 04/02/23 2002   WBC 9.3   HGB 8.8*   HCT 26.0*   MCV 90        Recent Labs   Lab Test 04/02/23 2002 09/27/21  1218 07/24/21  0432   HGB 8.8* 12.6* 11.9*    Recent Labs   Lab Test 07/20/21  0023 07/19/21  1820 07/19/21  1154   TROPONINI <0.01 0.01 0.01     Recent Labs   Lab Test 04/02/23 2002 07/19/21  1155 10/04/20  2140 05/07/20  0926   BNP  --  25 29 68*   NTBNPI 8,125*  --   --   --      Recent Labs   Lab Test 01/14/19  0544   TSH 2.65     Recent Labs   Lab Test 09/27/21  1217 10/04/20  2140 06/21/19  0345   INR 1.13 1.03 1.24*        Today's clinic visit entailed:  [facility]  40 minutes spent by me on the date of the encounter doing chart review, review of outside records, review of test results, interpretation of tests, patient visit and documentation   Provider  Link to MDM Help Grid     The level of medical decision making during this visit was of high complexity.        Cielo Che MD

## 2023-06-27 NOTE — LETTER
6/27/2023    Trinidad Hansen PA-C, SIS  [facility] [place] [address] [place] [address]    RE: Siva Ramey       Dear Colleague,     I had the pleasure of seeing Siva Ramey in the [facility].    HEART CARE ENCOUNTER CONSULTATON NOTE      [facility]  [phone]      Assessment/Recommendations   Assessment/Plan:  CAD - stable, no angina on aspirin and statin long term     Hypertension - controlled with multiple agents and dialysis but he is having orthostasis and low BP at dialysis. We will do a two week trial off of his pm doxazosin and he will let us know how his arrival dialysis Bps are running with that change in 2 weeks     Hyperlipidemia - at goal on statin therapy, lipid check today     PVCs - occasional, worsened temporarily with recent sepsis/osteomyelitis         F/U 6 months       History of Present Illness/Subjective    HPI: Siva Ramey is a 47 year old male diabetic with ESRD on HD, hypertension, hyperlipidemia, PVCs, and CAD who suffered an anterior-lateral STEMI January 2019. He was found to have a thrombotic occlusion of a large diagonal that was treated with a Synergy IBAN. There was residual diffuse moderate-severe mid LAD disease and distal RCA disease. EF was 45% by echo. Siva underwent 2 v CABG in June 2019 (LIMA-LAD, SVG-RCA). Siva was admitted 7/2021 with chest pain and hypertensive urgency. Echo EF was stable at 45-50% and coronary angiogram was also stable with FFR - LAD disease. Losartan was stopped due to progressive renal insufficiency. Siva follows with a pulmonologist at [facility], Dr. Ferreira; spirometry showed severe restriction. A CT chest did show some compressive volume loss in the left lung base. A sniff test showed a mildly elevated left hemidiaphragm but normal, bilateral diaphragmatic excursion. Siva started dialysis August 2021 for progressive renal failure, hypertension, and fluid overload. Echo 9/2021 EF was  55-60%.      Siva returns for annual follow up today. He has been staying active and going to dialysis regularly. There is orthostasis and he has had a couple of orthostatic syncope events in the past year, primarily the afternoon/evening after dialysis.  He does need to hold his AM medications the days of dialysis due to hypotension at dialysis. The morning of dialysis he will have some fullness in his chest that resolves after the fluid comes off. There is no dyspnea, pnd/orthopnea, edema. He has mild PVC related palpitations that were worse during a recent right jaw osteomyelitis, but they have settled back down.        Physical Examination  Review of Systems   Vitals: /70 (BP Location: Left arm, Patient Position: Sitting, Cuff Size: Adult Large)   Pulse 83   Resp 18   Wt 105.2 kg (232 lb)   BMI 32.36 kg/m    BMI= Body mass index is 32.36 kg/m .  Wt Readings from Last 3 Encounters:   06/27/23 105.2 kg (232 lb)   04/02/23 98.4 kg (217 lb)   03/20/23 102 kg (224 lb 13.9 oz)       General Appearance:   no distress, normal body habitus   ENT/Mouth: membranes moist, no oral lesions or bleeding gums.      EYES:  no scleral icterus, normal conjunctivae   Neck: no carotid bruits or thyromegaly   Chest/Lungs:   lungs are clear to auscultation, no rales or wheezing, stable sternal scar, equal chest wall expansion    Cardiovascular:   Regular. Normal first and second heart sounds with no murmur. No rubs or gallops; the right carotid, radial and posterior tibial pulses are intact and the left carotid, radial and posterior tibial pulses are intact.  Jugular venous pressure is flat, no edema bilaterally    Abdomen:  no organomegaly, masses, bruits, or tenderness; bowel sounds are present   Extremities: no cyanosis or clubbing   Skin: no xanthelasma, warm.    Neurologic: normal  bilateral, no tremors     Psychiatric: alert and oriented x3, calm        Please refer above for cardiac ROS details.        Medical  History  Surgical History Family History Social History   Past Medical History:   Diagnosis Date    Acquired elevated diaphragm     Anemia in chronic kidney disease     Angina pectoris (H)     Chronic kidney disease     Coronary artery disease     Diabetes mellitus, type II (H) 12/2001    Diabetic nephropathy (H)     MARQUEZ (dyspnea on exertion)     Dyslipidemia 12/2001    End stage renal disease (H)     History of blood transfusion 2004    Hypertension     takes medication    Ischemic cardiomyopathy     Metabolic acidosis     Myocardial infarction (H)     STEMI -Diagonal branch of the LAD    Obesity (BMI 30-39.9)     Peripheral neuropathy     Proteinuria     Retinopathy     Vitamin D deficiency      Past Surgical History:   Procedure Laterality Date    BACK SURGERY  2009    L5 disc cut    BYPASS GRAFT ARTERY CORONARY  06/20/2019    2 vessels    CV CORONARY ANGIOGRAM N/A 1/13/2019    Procedure: Coronary Angiogram;  Surgeon: Cielo Che MD;  Location: [facility] Cath Lab;  Service: Cardiology    CV CORONARY ANGIOGRAM N/A 5/2/2019    Procedure: Coronary Angiogram;  Surgeon: Cielo Che MD;  Location: [facility] Cath Lab;  Service: Cardiology    CV CORONARY ANGIOGRAM N/A 4/30/2020    Procedure: Coronary Angiogram;  Surgeon: Cielo Che MD;  Location: [facility] Cath Lab;  Service: Cardiology    CV CORONARY ANGIOGRAM N/A 7/22/2021    Procedure: CV CORONARY ANGIOGRAM;  Surgeon: Adrian Crow MD;  Location: [facility] CATH LAB CV    CV FRACTIONAL FLOW RATIO WIRE N/A 7/22/2021    Procedure: Fractional Flow Ratio Wire;  Surgeon: Adrian Crow MD;  Location: [facility] CATH LAB CV    CV LEFT HEART CATH N/A 7/22/2021    Procedure: Left Heart Cath;  Surgeon: Adrian Crow MD;  Location: [facility] CATH LAB CV    CV LEFT HEART CATHETERIZATION WITH LEFT VENTRICULOGRAM N/A 1/13/2019    Procedure: Left Heart Catheterization with Left Ventriculogram;  Surgeon: Cielo Che MD;  Location: [facility] Cath Lab;   Service: Cardiology    CV LEFT HEART CATHETERIZATION WITHOUT LEFT VENTRICULOGRAM Left 4/30/2020    Procedure: Left Heart Catheterization Without Left Ventriculogram;  Surgeon: Cielo Che MD;  Location: [facility] Cath Lab;  Service: Cardiology    CV RIGHT HEART CATHETERIZATION N/A 4/30/2020    Procedure: Right Heart Catheterization;  Surgeon: Cielo Che MD;  Location: [facility] Cath Lab;  Service: Cardiology    HERNIA REPAIR      OTHER SURGICAL HISTORY      Excise varicocele    STENT, CORONARY, DALE  2019     Family History   Problem Relation Age of Onset    Diabetes Type 2  Mother     Heart Disease Father 60    CABG Father 50        triple bypass    Diabetes Type 2  Father     Depression Sister     Substance Abuse Sister     Ovarian Cancer Maternal Grandmother     Brain Cancer Maternal Grandmother     Pancreatic Cancer Maternal Aunt     Prostate Cancer Maternal Uncle         Social History     Socioeconomic History    Marital status:      Spouse name: Not on file    Number of children: Not on file    Years of education: Not on file    Highest education level: Not on file   Occupational History    Not on file   Tobacco Use    Smoking status: Never    Smokeless tobacco: Never   Substance and Sexual Activity    Alcohol use: Yes     Comment: Alcoholic Drinks: 1-2 per year    Drug use: No    Sexual activity: Yes     Partners: Female   Other Topics Concern    Parent/sibling w/ CABG, MI or angioplasty before 65F 55M? Not Asked   Social History Narrative    Not on file     Social Determinants of Health     Financial Resource Strain: Not on file   Food Insecurity: Not on file   Transportation Needs: Not on file   Physical Activity: Not on file   Stress: Not on file   Social Connections: Not on file   Intimate Partner Violence: Not on file   Housing Stability: Not on file           Medications  Allergies   Current Outpatient Medications   Medication Sig Dispense Refill    albuterol (PROAIR HFA/PROVENTIL  HFA/VENTOLIN HFA) 108 (90 Base) MCG/ACT inhaler Inhale 2 puffs into the lungs every 4 hours as needed      amLODIPine (NORVASC) 10 MG tablet TAKE 1 TABLET(10 MG) BY MOUTH DAILY 30 tablet 0    aspirin 81 MG EC tablet [ASPIRIN 81 MG EC TABLET] Take 1 tablet (81 mg total) by mouth daily.  0    atorvastatin (LIPITOR) 80 MG tablet TAKE 1 TABLET(80 MG) BY MOUTH AT BEDTIME 90 tablet 3    BD PEN NEEDLE JAMIN 2ND GEN 32G X 4 MM miscellaneous       carvedilol (COREG) 12.5 MG tablet TAKE 3 TABLETS(37.5 MG) BY MOUTH TWICE DAILY 540 tablet 0    cholecalciferol, vitamin D3, 5,000 unit Tab [CHOLECALCIFEROL, VITAMIN D3, 5,000 UNIT TAB] Take 5,000 Units by mouth daily.      cloNIDine (CATAPRES) 0.1 MG tablet Take 1 tablet (0.1 mg) by mouth 3 times daily as needed (SBP > 160) 30 tablet 0    doxazosin (CARDURA) 4 MG tablet [DOXAZOSIN (CARDURA) 4 MG TABLET] Take 2 mg by mouth at bedtime.      fluticasone-salmeterol (ADVAIR) 250-50 MCG/ACT inhaler Inhale 1 puff into the lungs every 12 hours 14 each 3    furosemide (LASIX) 40 MG tablet TAKE 1 TABLET(40 MG) BY MOUTH DAILY 90 tablet 0    glucose (BD GLUCOSE) 4 g chewable tablet glucose 4 gram chewable tablet   CHEW AND SWALLOW 4 TIMES DAILY AS NEEDED      insulin aspart (NOVOLOG PEN) 100 UNIT/ML pen Inject 5 Units Subcutaneous 3 times daily (before meals) 4.5 mL 1    insulin glargine-lixisenatide (SOLIQUA) pen Inject 25 Units Subcutaneous daily      nitroGLYcerin (NITROSTAT) 0.4 MG sublingual tablet PLACE ONE TABLET UNDER TONGUE AS NEEDED FOR CHEST PAIN AS DIRECTED FOR 3 DOSES. IF SYMPTOMS PERSIST 5 MINUTES AFTER 1ST DOSE CALL 911 25 tablet 1    famotidine (PEPCID) 20 MG tablet Take 1 tablet (20 mg) by mouth 2 times daily (Patient not taking: Reported on 6/27/2023) 40 tablet 0    glimepiride (AMARYL) 4 MG tablet  (Patient not taking: Reported on 6/27/2023)      hydrALAZINE (APRESOLINE) 25 MG tablet Take 3 tablets (75 mg) by mouth 3 times daily (Patient taking differently: Take 25 mg by  mouth 2 times daily) 270 tablet 0       Allergies   Allergen Reactions    Amoxicillin Hives     & generalized pain    Venlafaxine      lethargic          Lab Results    Chemistry/lipid CBC Cardiac Enzymes/BNP/TSH/INR   Recent Labs   Lab Test 07/21/21  0440   CHOL 167   HDL 24*   LDL 59   TRIG 415*     Recent Labs   Lab Test 07/21/21  0440 03/04/19  1211   LDL 59 46  36     Recent Labs   Lab Test 04/02/23 2002      POTASSIUM 3.7   CHLORIDE 91*   CO2 28   *   BUN 34.1*   CR 5.96*   GFRESTIMATED 11*   BETHANY 11.2*     Recent Labs   Lab Test 04/02/23 2002 09/27/21  1218 08/09/21  1840   CR 5.96* 5.22* 4.70*     Recent Labs   Lab Test 09/27/21  1218 06/22/19  0554 01/14/19  0544   A1C 8.1* 7.8* 10.0*          Recent Labs   Lab Test 04/02/23 2002   WBC 9.3   HGB 8.8*   HCT 26.0*   MCV 90        Recent Labs   Lab Test 04/02/23 2002 09/27/21  1218 07/24/21  0432   HGB 8.8* 12.6* 11.9*    Recent Labs   Lab Test 07/20/21  0023 07/19/21  1820 07/19/21  1154   TROPONINI <0.01 0.01 0.01     Recent Labs   Lab Test 04/02/23 2002 07/19/21  1155 10/04/20  2140 05/07/20  0926   BNP  --  25 29 68*   NTBNPI 8,125*  --   --   --      Recent Labs   Lab Test 01/14/19  0544   TSH 2.65     Recent Labs   Lab Test 09/27/21  1217 10/04/20  2140 06/21/19  0345   INR 1.13 1.03 1.24*        Today's clinic visit entailed:  [facility] information from epic reviewed  40 minutes spent by me on the date of the encounter doing chart review, review of outside records, review of test results, interpretation of tests, patient visit and documentation   Provider  Link to [facility] Help Grid     The level of medical decision making during this visit was of high complexity.        Cielo Che MD            Thank you for allowing me to participate in the care of your patient.      Sincerely,     Cielo Che MD     [facility]    cc:    Cielo Che MD  [address]  [place] [address]

## 2023-06-27 NOTE — PATIENT INSTRUCTIONS
It was a pleasure seeing you at [facility] today.        Here are my suggestions for your care:    1. Hold the doxazosin for two weeks and then let us know what your blood pressures are running when you arrive at dialysis      Let's meet again in 6 months.    You can always call my nurse Lydia Dobson RN who is a nurse helping me in the care of my patients. She can be reached at [phone] if you have any questions.    For scheduling, please call my  Julianna Buchanan at [phone].    Thank you again for trusting me with your care. Please feel free to call my office at any time if you have any question or if I can assist you in any way.    Cielo Che MD  [facility]

## 2023-06-28 LAB — LDLC SERPL DIRECT ASSAY-MCNC: 34 MG/DL

## 2023-07-05 DIAGNOSIS — E78.5 DYSLIPIDEMIA: ICD-10-CM

## 2023-07-05 DIAGNOSIS — E78.1 HYPERTRIGLYCERIDEMIA: Primary | ICD-10-CM

## 2023-07-11 ENCOUNTER — OFFICE VISIT (OUTPATIENT)
Dept: TRANSPLANT | Facility: CLINIC | Age: 48
End: 2023-07-11
Attending: NURSE PRACTITIONER
Payer: COMMERCIAL

## 2023-07-11 ENCOUNTER — LAB (OUTPATIENT)
Dept: LAB | Facility: CLINIC | Age: 48
End: 2023-07-11
Attending: NURSE PRACTITIONER
Payer: COMMERCIAL

## 2023-07-11 VITALS — HEIGHT: 71 IN | WEIGHT: 222 LBS | BODY MASS INDEX: 31.08 KG/M2

## 2023-07-11 DIAGNOSIS — Z76.82 ORGAN TRANSPLANT CANDIDATE: ICD-10-CM

## 2023-07-11 DIAGNOSIS — N18.6 ESRD (END STAGE RENAL DISEASE) (H): ICD-10-CM

## 2023-07-11 DIAGNOSIS — E11.9 DIABETES MELLITUS, TYPE 2 (H): ICD-10-CM

## 2023-07-11 DIAGNOSIS — E11.21 TYPE 2 DIABETES MELLITUS WITH DIABETIC NEPHROPATHY, WITH LONG-TERM CURRENT USE OF INSULIN (H): ICD-10-CM

## 2023-07-11 DIAGNOSIS — Z79.4 TYPE 2 DIABETES MELLITUS WITH DIABETIC NEPHROPATHY, WITH LONG-TERM CURRENT USE OF INSULIN (H): ICD-10-CM

## 2023-07-11 PROCEDURE — 86828 HLA CLASS I&II ANTIBODY QUAL: CPT | Mod: XU | Performed by: NURSE PRACTITIONER

## 2023-07-11 PROCEDURE — 36415 COLL VENOUS BLD VENIPUNCTURE: CPT | Performed by: PATHOLOGY

## 2023-07-11 PROCEDURE — 99204 OFFICE O/P NEW MOD 45 MIN: CPT | Performed by: NURSE PRACTITIONER

## 2023-07-11 PROCEDURE — 99207 PR NO CHARGE COORDINATED CARE PS: CPT

## 2023-07-11 PROCEDURE — 99000 SPECIMEN HANDLING OFFICE-LAB: CPT | Performed by: PATHOLOGY

## 2023-07-11 PROCEDURE — 86833 HLA CLASS II HIGH DEFIN QUAL: CPT | Performed by: NURSE PRACTITIONER

## 2023-07-11 PROCEDURE — 86832 HLA CLASS I HIGH DEFIN QUAL: CPT | Performed by: NURSE PRACTITIONER

## 2023-07-11 NOTE — PROGRESS NOTES
Patient Name: Siva Ramey  : 1975  Age: 48 year old  MRN: [id number]  Date of Initial Social Work Evaluation: 2021     Patient on transplant wait list.  Saw today to update psychosocial assessment.      Presenting Information   Living Situation: Emmy resides in [place], [place] with his spouse Jolene and three sons (Elder-18, Jose Guadalupe-16 and Errol-14).   If not local, plans for short term stay:  NA   Previous Functional Status: Independent ADL's   Cultural/Language/Spiritual Considerations: none identified     Support System  Primary Support Person Wife Jolene   Other support:  Mother   Plan for support in immediate post-transplant period:    Plan for Emmy's mother to fly in from [place] to assist his wife with post transplant cares. His sons will also assist with various Tasks. Emmy highlights having a strong, vast support system.     Health Care Directive  Decision Maker: Self   Alternate Decision Maker: Wife-NOK  Health Care Directive: Provided education    Mental Health/Coping:   History of Mental Health: Emmy reports that he experiences anxiety related to his ESRD. He has been on Xanax in the past which caused him concerns with sleeping during the day. He is currently not on a medication or seeing a therapist but is aware this continues to be an option for him. He reports that symptoms are well managed at this time.   History of Chemical Health: No concerns. Emmy reports that he has only consumed 1-2 beers in  on special occassions. He does not have any history of abuse.   Current status: Emmy reports that he is doing well. He finds that his support system has been very helpful in assisting him with managing any emotions.   Coping: Appropriate   Services Needed/Recommended: NA     Financial   Income: SSDI. Emmy was previously working. He got on SSDI in  when working became unmanageable for him.  Impact of transplant on income: Impact one year post transplant    Insurance and medication coverage: Medicare and [employer] choice   Financial concerns: NA   Resources needed: NA     Assessment and recommendations and plan:  Reviewed transplant education (Medicare, rehabilitation, donor issues, community/financial resources, and psych/family adjustment) as well as psychosocial risks of transplant. Provided patient with a copy of post-transplant informational sheet that includes information on potential costs of medications, Medicare ESRD, post-transplant lodging, etc. Patient seemed to process information well. Appeared well informed, motivated, and able to follow post transplant requirements. Behavior was appropriate during interview. Has adequate income and insurance coverage. Adequate social support. No major contraindications noted for transplant. At this time, patient appears to understand the risks and benefits of transplant.     Komal Rios   [facility]  Outpatient Kidney/Pancreas/[facility] Transplant Program   [address] [place] [address] [place] [id number]  [email or web address]  [email or web address]  Office: [phone] I Fax: [phone]

## 2023-07-11 NOTE — LETTER
7/11/2023         RE: Siva Ramey  [address]  [place]  [place] [address]        Dear Colleague,    Thank you for referring your patient, Siva Ramey, to the [facility]PL[facility]. Please see a copy of my visit note below.    TRANSPLANT NEPHROLOGY RECIPIENT EVALUATION NOTE      Assessment and Plan:  # Kidney/Pancreas Transplant Evaluation: Patient is inactive on wait list due to tooth/oral infection which has now cleared. He is  a good candidate overall.      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 ABO-A, no donors.      # DM Type 2: borderline control using 40-50 units of insulin daily.   Sliding scale 7-10 Units with meals.   Given evidence of end-organ damage, he may benefit from a pancreas transplant.      # Cardiac Risk: cardio eval 11/2021. Sees cardio routinely.   History of CAD s/p PCI with IBAN placement to first diagonal in 1/2019, s/p two-vessel CABG in 6/2019. Not on antiplatelet agent now except asa. Angiogram on 7/22/2021  showed improved known RCA graft stenosis of 30-40%  (FFR 0.93), no PCI was done. Reduced EF of 45-50% at that time.      # PAD Screening:  CT abdomen/pelvis and iliac ultrasound done 2021.        # COVID-19 (5/2021): No hospitalization.  Has completed his vaccination and is asymptomatic.      # Left diaphragmatic elevation: PFTs done. On inhaler.     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 Advised to send clearance notes to coordinator.     # Health Maintenance: Dental: Up to date    Discussed the risks and benefits of a transplant, including the risk of surgery and immunosuppression medications.  Patient's overall evaluation will be discussed in the Transplant Program's regular meeting with a final recommendation on the patients suitability for transplant to be made at that time.      Reason  for Visit:  Siva Ramey is a 46 year old male with ESKD from diabetes mellitus type 2, who presents for kidney/pancreas transplant wait list evaluation.    History of Present Illness:    Was listed inactive due to oral/tooth infection which has now cleared.     Tooth extraction for abscess 2/24/23.  Debridement 4/6/23, gram stains GPC likely the strep, plan an extended course of antibiotics. Was on IV ceftriaxone Has been off antibiotics since 5/19   Has been cleared by ID, ENT and oral surgeon.                 Kidney Disease Hx:        Siva Ramey is a 46-year-old gentleman with history of ESKD secondary to presumed diabetic nephropathy. CKD and nephrotic proteinuria since 2017 at which time he had negative serologies including SPEP and DEBORAH.  His kidney disease continued to progress by the following serum creatinine levels of  1.7-1.9 (2017), 3.24 (2018), 4.1 (4/2021) then ultimately started hemodialysis on 8/2/2021 after a coronary angiogram was done 7/22/2021 that showed improved stenosis of RCA graft compared to the year prior (h/o 2 vessel CABG in 2019). Dialysis is reportedly going well, he is ABO-A with no potential donors.          Kidney Disease Dx: Diabetic nephropathy       Biopsy Proven: No         On Dialysis: Yes, Date initiated: 8/21       Primary Nephrologist: Gabriel        H/o Kidney Stones: No       H/o Recurrent/Frequent UTI: No         Diabetic Hx: Type 2        Diagnosis Date: 2000       Medication History: initially treated with oral medications, then insulin was added about 4 years ago, oral medication discontinued a few months ago.        Diabetic Control: Borderline control (HbA1c 7-9%)   Last HbA1c: 9.0%       Hypoglycemic Unawareness: No       End-Organ Damage due to DM: Nephropathy          Cardiac/Vascular Disease Risk Factors:        Cardiac Risk Factors: Diabetes, Hypertension, CKD and Peripheral Arterial Disease       Known CAD: Yes; anterior-lateral STEMI January 2019. He was  found to have a thrombotic occlusion of a large diagonal that was treated with a Synergy IBAN. There was residual diffuse moderate-severe mid LAD disease and distal RCA disease. EF was 45% by echo. Siva underwent 2 v CABG in June 2019 (LIMA-LAD, SVG-RCA). Siva was admitted 7/2021 with chest pain and hypertensive urgency. Echo EF was stable at 45-50% and coronary angiogram was also stable with FFR - LAD disease       Known PAD/Caludication Symptoms: No       Known Heart Failure: No       Arrhythmia: No       Pulmonary Hypertension: No       Valvular Disease: No       Other: None         Volume Status/Weight:        Volume status: Euvolemic       Weight:  Acceptable BMI       BMI: Body mass index is 30.96 kg/m .         Functional Capacity/Frailty:        Walks for exerice 3 days a week.      Fatigue/Decreased Energy: [] No [x] Yes More drained on dialysis days    Chest Pain or SOB with Exertion: [x] No [] Yes    Significant Weight Change: [x] No [] Yes    Nausea, Vomiting or Diarrhea: [x] No [] Yes    Fever, Sweats or Chills:  [x] No [] Yes    Leg Swelling [x] No [] Yes        History of Cancer: None    Other Significant Medical Issues:tooth abscess/jaw osteo - now resolved. See HPI.     COVID Vaccination Up To Date: Yes    Potential Living Kidney Donors: No    Review of Systems:  A comprehensive review of systems was obtained and negative, except as noted in the HPI or PMH.    Past Medical History:   Medical record was reviewed and PMH was discussed with patient and noted below.  Past Medical History:   Diagnosis Date    Acquired elevated diaphragm     Anemia in chronic kidney disease     Angina pectoris (H)     Chronic kidney disease     Coronary artery disease     Diabetes mellitus, type II (H) 12/2001    Diabetic nephropathy (H)     MARQUEZ (dyspnea on exertion)     Dyslipidemia 12/2001    End stage renal disease (H)     History of blood transfusion 2004    Hypertension     takes medication    Ischemic  cardiomyopathy     Metabolic acidosis     Myocardial infarction (H)     STEMI -Diagonal branch of the LAD    Obesity (BMI 30-39.9)     Peripheral neuropathy     Proteinuria     Retinopathy     Vitamin D deficiency        Past Social History:   Past Surgical History:   Procedure Laterality Date    BACK SURGERY  2009    L5 disc cut    BYPASS GRAFT ARTERY CORONARY  06/20/2019    2 vessels    CV CORONARY ANGIOGRAM N/A 1/13/2019    Procedure: Coronary Angiogram;  Surgeon: Cielo Che MD;  Location: [facility] Cath Lab;  Service: Cardiology    CV CORONARY ANGIOGRAM N/A 5/2/2019    Procedure: Coronary Angiogram;  Surgeon: Cielo Che MD;  Location: [facility];  Service: Cardiology    CV CORONARY ANGIOGRAM N/A 4/30/2020    Procedure: Coronary Angiogram;  Surgeon: Cielo Che MD;  Location: [facility] Lab;  Service: Cardiology    CV CORONARY ANGIOGRAM N/A 7/22/2021    Procedure: CV CORONARY ANGIOGRAM;  Surgeon: Adrian Crow MD;  Location: [facility] LAB CV    CV FRACTIONAL FLOW RATIO WIRE N/A 7/22/2021    Procedure: Fractional Flow Ratio Wire;  Surgeon: Adrian Crow MD;  Location: [facility] LAB CV    CV LEFT HEART CATH N/A 7/22/2021    Procedure: Left Heart Cath;  Surgeon: Adrian Crow MD;  Location: [facility] CATH LAB CV    CV LEFT HEART CATHETERIZATION WITH LEFT VENTRICULOGRAM N/A 1/13/2019    Procedure: Left Heart Catheterization with Left Ventriculogram;  Surgeon: Cielo Che MD;  Location: [facility];  Service: Cardiology    CV LEFT HEART CATHETERIZATION WITHOUT LEFT VENTRICULOGRAM Left 4/30/2020    Procedure: Left Heart Catheterization Without Left Ventriculogram;  Surgeon: Cielo Che MD;  Location: [facility];  Service: Cardiology    CV RIGHT HEART CATHETERIZATION N/A 4/30/2020    Procedure: Right Heart Catheterization;  Surgeon: Cielo Che MD;  Location: [facility] Lab;  Service: Cardiology    HERNIA REPAIR      OTHER  SURGICAL HISTORY      Excise varicocele    STENT, CORONARY, DALE  2019     Personal history of bleeding or anesthesia problems: No    Family History:  Family History   Problem Relation Age of Onset    Diabetes Type 2  Mother     Heart Disease Father 60    CABG Father 50        triple bypass    Diabetes Type 2  Father     Depression Sister     Substance Abuse Sister     Ovarian Cancer Maternal Grandmother     Brain Cancer Maternal Grandmother     Pancreatic Cancer Maternal Aunt     Prostate Cancer Maternal Uncle        Personal History:   Social History     Socioeconomic History    Marital status:      Spouse name: Not on file    Number of children: Not on file    Years of education: Not on file    Highest education level: Not on file   Occupational History    Not on file   Tobacco Use    Smoking status: Never    Smokeless tobacco: Never   Substance and Sexual Activity    Alcohol use: Yes     Comment: Alcoholic Drinks: 1-2 per year    Drug use: No    Sexual activity: Yes     Partners: Female   Other Topics Concern    Parent/sibling w/ CABG, MI or angioplasty before 65F 55M? Not Asked   Social History Narrative    Not on file     Social Determinants of Health     Financial Resource Strain: Not on file   Food Insecurity: Not on file   Transportation Needs: Not on file   Physical Activity: Not on file   Stress: Not on file   Social Connections: Not on file   Intimate Partner Violence: Not on file   Housing Stability: Not on file       Allergies:  Allergies   Allergen Reactions    Amoxicillin Hives     & generalized pain    Venlafaxine      lethargic       Medications:  Current Outpatient Medications   Medication Sig    albuterol (PROAIR HFA/PROVENTIL HFA/VENTOLIN HFA) 108 (90 Base) MCG/ACT inhaler Inhale 2 puffs into the lungs every 4 hours as needed    amLODIPine (NORVASC) 10 MG tablet TAKE 1 TABLET(10 MG) BY MOUTH DAILY    aspirin 81 MG EC tablet [ASPIRIN 81 MG EC TABLET] Take 1 tablet (81 mg total) by mouth  daily.    atorvastatin (LIPITOR) 80 MG tablet TAKE 1 TABLET(80 MG) BY MOUTH AT BEDTIME    BD PEN NEEDLE JAMIN 2ND GEN 32G X 4 MM miscellaneous     carvedilol (COREG) 12.5 MG tablet Take 3 tablets (37.5 mg) by mouth 2 times daily (with meals)    cholecalciferol, vitamin D3, 5,000 unit Tab [CHOLECALCIFEROL, VITAMIN D3, 5,000 UNIT TAB] Take 5,000 Units by mouth daily.    cloNIDine (CATAPRES) 0.1 MG tablet Take 1 tablet (0.1 mg) by mouth 3 times daily as needed (SBP > 160)    doxazosin (CARDURA) 4 MG tablet [DOXAZOSIN (CARDURA) 4 MG TABLET] Take 2 mg by mouth at bedtime.    fluticasone-salmeterol (ADVAIR) 250-50 MCG/ACT inhaler Inhale 1 puff into the lungs every 12 hours    furosemide (LASIX) 40 MG tablet TAKE 1 TABLET(40 MG) BY MOUTH DAILY    glucose (BD GLUCOSE) 4 g chewable tablet glucose 4 gram chewable tablet   CHEW AND SWALLOW 4 TIMES DAILY AS NEEDED    hydrALAZINE (APRESOLINE) 25 MG tablet Take 1 tablet (25 mg) by mouth 2 times daily    insulin aspart (NOVOLOG PEN) 100 UNIT/ML pen Inject 5 Units Subcutaneous 3 times daily (before meals)    insulin glargine-lixisenatide (SOLIQUA) pen Inject 25 Units Subcutaneous daily    nitroGLYcerin (NITROSTAT) 0.4 MG sublingual tablet PLACE ONE TABLET UNDER TONGUE AS NEEDED FOR CHEST PAIN AS DIRECTED FOR 3 DOSES. IF SYMPTOMS PERSIST 5 MINUTES AFTER 1ST DOSE CALL 911     No current facility-administered medications for this visit.       Vitals:  There were no vitals taken for this visit.    Exam:  GENERAL APPEARANCE: alert and no distress  HENT: mouth without ulcers or lesions. Oral cavity wnl. No facial swelling noted.   LYMPHATICS: no cervical or supraclavicular nodes  RESP: lungs clear to auscultation - no rales, rhonchi or wheezes  CV: regular rhythm, normal rate, no rub, no murmur  EDEMA: no LE edema bilaterally  ABDOMEN: soft, nondistended, nontender, bowel sounds normal  MS: extremities normal - no gross deformities noted, no evidence of inflammation in joints, no muscle  tenderness  SKIN: no rash    Results:   Recent Results (from the past 336 hour(s))   Lipid panel reflex to direct LDL Non-fasting    Collection Time: 06/27/23  4:58 PM   Result Value Ref Range    Cholesterol 193 <200 mg/dL    Triglycerides 801 (H) <150 mg/dL    Direct Measure HDL 23 (L) >=40 mg/dL    LDL Cholesterol Calculated      Non HDL Cholesterol 170 (H) <130 mg/dL   LDL cholesterol direct    Collection Time: 06/27/23  4:58 PM   Result Value Ref Range    LDL Cholesterol Direct 34 <100 mg/dL         Sincerely,        JUANCARLOS Silva CNP

## 2023-07-11 NOTE — PROGRESS NOTES
TRANSPLANT NEPHROLOGY RECIPIENT EVALUATION NOTE      Assessment and Plan:  # Kidney/Pancreas Transplant Evaluation: Patient is inactive on wait list due to tooth/oral infection which has now cleared. He is  a good candidate overall.      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 ABO-A, no donors.      # DM Type 2: borderline control using 40-50 units of insulin daily.   Sliding scale 7-10 Units with meals.   Given evidence of end-organ damage, he may benefit from a pancreas transplant.      # Cardiac Risk: cardio eval 11/2021. Sees cardio routinely.   History of CAD s/p PCI with IBAN placement to first diagonal in 1/2019, s/p two-vessel CABG in 6/2019. Not on antiplatelet agent now except asa. Angiogram on 7/22/2021  showed improved known RCA graft stenosis of 30-40%  (FFR 0.93), no PCI was done. Reduced EF of 45-50% at that time.      # PAD Screening:  CT abdomen/pelvis and iliac ultrasound done 2021.        # COVID-19 (5/2021): No hospitalization.  Has completed his vaccination and is asymptomatic.      # Left diaphragmatic elevation: PFTs done. On inhaler.     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 Advised to send clearance notes to coordinator.     # Health Maintenance: Dental: Up to date    Discussed the risks and benefits of a transplant, including the risk of surgery and immunosuppression medications.  Patient's overall evaluation will be discussed in the Transplant Program's regular meeting with a final recommendation on the patients suitability for transplant to be made at that time.      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    History of Present Illness:    Was listed inactive due to oral/tooth infection which has now  cleared.     Tooth extraction for abscess 2/24/23.  Debridement 4/6/23, gram stains GPC likely the strep, plan an extended course of antibiotics. Was on IV ceftriaxone Has been off antibiotics since 5/19   Has been cleared by ID, ENT and oral surgeon.                 Kidney Disease Hx:        Siva Ramey is a 46-year-old gentleman with history of ESKD secondary to presumed diabetic nephropathy. CKD and nephrotic proteinuria since 2017 at which time he had negative serologies including SPEP and DEBORAH.  His kidney disease continued to progress by the following serum creatinine levels of  1.7-1.9 (2017), 3.24 (2018), 4.1 (4/2021) then ultimately started hemodialysis on 8/2/2021 after a coronary angiogram was done 7/22/2021 that showed improved stenosis of RCA graft compared to the year prior (h/o 2 vessel CABG in 2019). Dialysis is reportedly going well, he is ABO-A with no potential donors.          Kidney Disease Dx: Diabetic nephropathy       Biopsy Proven: No         On Dialysis: Yes, Date initiated: 8/21       Primary Nephrologist: Gabriel        H/o Kidney Stones: No       H/o Recurrent/Frequent UTI: No         Diabetic Hx: Type 2        Diagnosis Date: 2000       Medication History: initially treated with oral medications, then insulin was added about 4 years ago, oral medication discontinued a few months ago.        Diabetic Control: Borderline control (HbA1c 7-9%)   Last HbA1c: 9.0%       Hypoglycemic Unawareness: No       End-Organ Damage due to DM: Nephropathy          Cardiac/Vascular Disease Risk Factors:        Cardiac Risk Factors: Diabetes, Hypertension, CKD and Peripheral Arterial Disease       Known CAD: Yes; anterior-lateral STEMI January 2019. He was found to have a thrombotic occlusion of a large diagonal that was treated with a Synergy IBAN. There was residual diffuse moderate-severe mid LAD disease and distal RCA disease. EF was 45% by echo. Siva underwent 2 v CABG in June 2019 (LIMA-LAD,  SVG-RCA). Siva was admitted 7/2021 with chest pain and hypertensive urgency. Echo EF was stable at 45-50% and coronary angiogram was also stable with FFR - LAD disease       Known PAD/Caludication Symptoms: No       Known Heart Failure: No       Arrhythmia: No       Pulmonary Hypertension: No       Valvular Disease: No       Other: None         Volume Status/Weight:        Volume status: Euvolemic       Weight:  Acceptable BMI       BMI: Body mass index is 30.96 kg/m .         Functional Capacity/Frailty:        Walks for exerice 3 days a week.      Fatigue/Decreased Energy: [] No [x] Yes More drained on dialysis days    Chest Pain or SOB with Exertion: [x] No [] Yes    Significant Weight Change: [x] No [] Yes    Nausea, Vomiting or Diarrhea: [x] No [] Yes    Fever, Sweats or Chills:  [x] No [] Yes    Leg Swelling [x] No [] Yes        History of Cancer: None    Other Significant Medical Issues:tooth abscess/jaw osteo - now resolved. See HPI.     COVID Vaccination Up To Date: Yes    Potential Living Kidney Donors: No    Review of Systems:  A comprehensive review of systems was obtained and negative, except as noted in the HPI or PMH.    Past Medical History:   Medical record was reviewed and PMH was discussed with patient and noted below.  Past Medical History:   Diagnosis Date     Acquired elevated diaphragm      Anemia in chronic kidney disease      Angina pectoris (H)      Chronic kidney disease      Coronary artery disease      Diabetes mellitus, type II (H) 12/2001     Diabetic nephropathy (H)      MARQUEZ (dyspnea on exertion)      Dyslipidemia 12/2001     End stage renal disease (H)      History of blood transfusion 2004     Hypertension     takes medication     Ischemic cardiomyopathy      Metabolic acidosis      Myocardial infarction (H)     STEMI -Diagonal branch of the LAD     Obesity (BMI 30-39.9)      Peripheral neuropathy      Proteinuria      Retinopathy      Vitamin D deficiency        Past Social  History:   Past Surgical History:   Procedure Laterality Date     BACK SURGERY  2009    L5 disc cut     BYPASS GRAFT ARTERY CORONARY  06/20/2019    2 vessels     CV CORONARY ANGIOGRAM N/A 1/13/2019    Procedure: Coronary Angiogram;  Surgeon: Cielo Che MD;  Location: [facility];  Service: Cardiology     CV CORONARY ANGIOGRAM N/A 5/2/2019    Procedure: Coronary Angiogram;  Surgeon: Cielo Che MD;  Location: [facility];  Service: Cardiology     CV CORONARY ANGIOGRAM N/A 4/30/2020    Procedure: Coronary Angiogram;  Surgeon: Cieol Che MD;  Location: [facility];  Service: Cardiology     CV CORONARY ANGIOGRAM N/A 7/22/2021    Procedure: CV CORONARY ANGIOGRAM;  Surgeon: Adrian Crow MD;  Location: [facility] CV     CV FRACTIONAL FLOW RATIO WIRE N/A 7/22/2021    Procedure: Fractional Flow Ratio Wire;  Surgeon: Adrian Crow MD;  Location: [facility] CV     CV LEFT HEART CATH N/A 7/22/2021    Procedure: Left Heart Cath;  Surgeon: Adrian Crow MD;  Location: [facility] CV     CV LEFT HEART CATHETERIZATION WITH LEFT VENTRICULOGRAM N/A 1/13/2019    Procedure: Left Heart Catheterization with Left Ventriculogram;  Surgeon: Cielo Che MD;  Location: [facility];  Service: Cardiology     CV LEFT HEART CATHETERIZATION WITHOUT LEFT VENTRICULOGRAM Left 4/30/2020    Procedure: Left Heart Catheterization Without Left Ventriculogram;  Surgeon: Cielo Che MD;  Location: [facility];  Service: Cardiology     CV RIGHT HEART CATHETERIZATION N/A 4/30/2020    Procedure: Right Heart Catheterization;  Surgeon: Cielo Che MD;  Location: [facility];  Service: Cardiology     HERNIA REPAIR       OTHER SURGICAL HISTORY      Excise varicocele     STENT, CORONARY, DALE  2019     Personal history of bleeding or anesthesia problems: No    Family History:  Family History   Problem Relation Age of Onset     Diabetes Type 2  Mother       Heart Disease Father 60     CABG Father 50        triple bypass     Diabetes Type 2  Father      Depression Sister      Substance Abuse Sister      Ovarian Cancer Maternal Grandmother      Brain Cancer Maternal Grandmother      Pancreatic Cancer Maternal Aunt      Prostate Cancer Maternal Uncle        Personal History:   Social History     Socioeconomic History     Marital status:      Spouse name: Not on file     Number of children: Not on file     Years of education: Not on file     Highest education level: Not on file   Occupational History     Not on file   Tobacco Use     Smoking status: Never     Smokeless tobacco: Never   Substance and Sexual Activity     Alcohol use: Yes     Comment: Alcoholic Drinks: 1-2 per year     Drug use: No     Sexual activity: Yes     Partners: Female   Other Topics Concern     Parent/sibling w/ CABG, MI or angioplasty before 65F 55M? Not Asked   Social History Narrative     Not on file     Social Determinants of Health     Financial Resource Strain: Not on file   Food Insecurity: Not on file   Transportation Needs: Not on file   Physical Activity: Not on file   Stress: Not on file   Social Connections: Not on file   Intimate Partner Violence: Not on file   Housing Stability: Not on file       Allergies:  Allergies   Allergen Reactions     Amoxicillin Hives     & generalized pain     Venlafaxine      lethargic       Medications:  Current Outpatient Medications   Medication Sig     albuterol (PROAIR HFA/PROVENTIL HFA/VENTOLIN HFA) 108 (90 Base) MCG/ACT inhaler Inhale 2 puffs into the lungs every 4 hours as needed     amLODIPine (NORVASC) 10 MG tablet TAKE 1 TABLET(10 MG) BY MOUTH DAILY     aspirin 81 MG EC tablet [ASPIRIN 81 MG EC TABLET] Take 1 tablet (81 mg total) by mouth daily.     atorvastatin (LIPITOR) 80 MG tablet TAKE 1 TABLET(80 MG) BY MOUTH AT BEDTIME     BD PEN NEEDLE JAMIN 2ND GEN 32G X 4 MM miscellaneous      carvedilol (COREG) 12.5 MG tablet Take 3 tablets  (37.5 mg) by mouth 2 times daily (with meals)     cholecalciferol, vitamin D3, 5,000 unit Tab [CHOLECALCIFEROL, VITAMIN D3, 5,000 UNIT TAB] Take 5,000 Units by mouth daily.     cloNIDine (CATAPRES) 0.1 MG tablet Take 1 tablet (0.1 mg) by mouth 3 times daily as needed (SBP > 160)     doxazosin (CARDURA) 4 MG tablet [DOXAZOSIN (CARDURA) 4 MG TABLET] Take 2 mg by mouth at bedtime.     fluticasone-salmeterol (ADVAIR) 250-50 MCG/ACT inhaler Inhale 1 puff into the lungs every 12 hours     furosemide (LASIX) 40 MG tablet TAKE 1 TABLET(40 MG) BY MOUTH DAILY     glucose (BD GLUCOSE) 4 g chewable tablet glucose 4 gram chewable tablet   CHEW AND SWALLOW 4 TIMES DAILY AS NEEDED     hydrALAZINE (APRESOLINE) 25 MG tablet Take 1 tablet (25 mg) by mouth 2 times daily     insulin aspart (NOVOLOG PEN) 100 UNIT/ML pen Inject 5 Units Subcutaneous 3 times daily (before meals)     insulin glargine-lixisenatide (SOLIQUA) pen Inject 25 Units Subcutaneous daily     nitroGLYcerin (NITROSTAT) 0.4 MG sublingual tablet PLACE ONE TABLET UNDER TONGUE AS NEEDED FOR CHEST PAIN AS DIRECTED FOR 3 DOSES. IF SYMPTOMS PERSIST 5 MINUTES AFTER 1ST DOSE CALL 911     No current facility-administered medications for this visit.       Vitals:  There were no vitals taken for this visit.    Exam:  GENERAL APPEARANCE: alert and no distress  HENT: mouth without ulcers or lesions. Oral cavity wnl. No facial swelling noted.   LYMPHATICS: no cervical or supraclavicular nodes  RESP: lungs clear to auscultation - no rales, rhonchi or wheezes  CV: regular rhythm, normal rate, no rub, no murmur  EDEMA: no LE edema bilaterally  ABDOMEN: soft, nondistended, nontender, bowel sounds normal  MS: extremities normal - no gross deformities noted, no evidence of inflammation in joints, no muscle tenderness  SKIN: no rash    Results:   Recent Results (from the past 336 hour(s))   Lipid panel reflex to direct LDL Non-fasting    Collection Time: 06/27/23  4:58 PM   Result Value Ref  Range    Cholesterol 193 <200 mg/dL    Triglycerides 801 (H) <150 mg/dL    Direct Measure HDL 23 (L) >=40 mg/dL    LDL Cholesterol Calculated      Non HDL Cholesterol 170 (H) <130 mg/dL   LDL cholesterol direct    Collection Time: 06/27/23  4:58 PM   Result Value Ref Range    LDL Cholesterol Direct 34 <100 mg/dL

## 2023-07-13 ENCOUNTER — MYC REFILL (OUTPATIENT)
Dept: CARDIOLOGY | Facility: CLINIC | Age: 48
End: 2023-07-13
Payer: MEDICARE

## 2023-07-13 DIAGNOSIS — I16.0 HYPERTENSIVE URGENCY: ICD-10-CM

## 2023-07-13 RX ORDER — AMLODIPINE BESYLATE 10 MG/1
10 TABLET ORAL DAILY
Qty: 90 TABLET | Refills: 3 | Status: SHIPPED | OUTPATIENT
Start: 2023-07-13 | End: 2024-03-20

## 2023-07-29 ENCOUNTER — HEALTH MAINTENANCE LETTER (OUTPATIENT)
Age: 48
End: 2023-07-29

## 2023-08-02 ENCOUNTER — TELEPHONE (OUTPATIENT)
Dept: TRANSPLANT | Facility: CLINIC | Age: 48
End: 2023-08-02
Payer: MEDICARE

## 2023-08-02 NOTE — TELEPHONE ENCOUNTER
Left voice message requesting note documenting patient has completed treatment and mouth has completely healed.  Provided transplant office fax number and my contact information.

## 2023-08-02 NOTE — TELEPHONE ENCOUNTER
Confirmed patient did receive an organ offer late yesterday, but declined related to being on 3rd course of antibiotics post tooth extraction of abscessed tooth.  Noted I'm reviewing with a transplant provider to confirm whether patient should be placed on hold (inactive status on the waitlists).  Venus reports patient was upset that he wasn't able to move forward with the transplant.  Noted will contact patient after I have received a response from transplant provider regarding patient being placed on hold on the waitlists.    Consent: The risks of atrophy were reviewed with the patient.

## 2023-08-04 ENCOUNTER — CARE COORDINATION (OUTPATIENT)
Dept: SURGERY | Facility: CLINIC | Age: 48
End: 2023-08-04
Payer: MEDICARE

## 2023-08-04 NOTE — PROGRESS NOTES
Patient seen at [facility] for incision/drainage of right mandibular abscess on 4/6/2023. Infection resolved per last clinical encounter (4/13/2023) and per Infectious Disease. No contraindications to proceeding with transplant from infection standpoint. Letter uploaded to media. Note for documentation purposes only.    Ezio Sanches DDS

## 2023-08-09 ENCOUNTER — TELEPHONE (OUTPATIENT)
Dept: TRANSPLANT | Facility: CLINIC | Age: 48
End: 2023-08-09
Payer: MEDICARE

## 2023-08-09 NOTE — TELEPHONE ENCOUNTER
Left ANOTHER message requesting return call or brief note from provider that patient's mouth has completely healed.  Also provided the transplant office fax number.

## 2023-08-14 ENCOUNTER — TELEPHONE (OUTPATIENT)
Dept: TRANSPLANT | Facility: CLINIC | Age: 48
End: 2023-08-14
Payer: MEDICARE

## 2023-08-14 NOTE — TELEPHONE ENCOUNTER
Dr. Willard he completed a letter and 'dropped' into [facility] before going on vacation last week.  Verified the letter is scanned to patient's chart and thanked Dr. Ceja for his help.

## 2023-08-16 ENCOUNTER — COMMITTEE REVIEW (OUTPATIENT)
Dept: TRANSPLANT | Facility: CLINIC | Age: 48
End: 2023-08-16
Payer: MEDICARE

## 2023-08-16 NOTE — COMMITTEE REVIEW
Abdominal Patient Discussion Note Transplant Coordinator: Nayla Pendleton  Transplant Surgeon:       Referring Physician: Aime Rios    Committee Review Members:  Nephrology Cady Perrin PA-C, Landry Shell MD   Nutrition Cindy Romero RD    - Clinical Deepa Basilio, [facility], Komal Rios, [facility]   Transplant SNEHAL BARAHONA, RN, Nayla Pendleton, RN, Aleah Mccall, RN, Kim Fay, RN, Komal Yeager, RN, Sobia Underwood, ARIE, Sobia Rea, BETSY, Frances Cox, BETSY, Natalie Nina RN   Transplant Surgery Shalom Santiago MD       Additional Discussion Notes and Findings: Reviewed all current medical information including patient's history of tooth abscess after extraction and mastoid infection; 04/04/2023 hemoglobin A1C 9.0 (previously 8.1 in 2021).  Committee recommends patient have an updated echocardiogram  and if no changes, then patient may have his status changed to ACTIVE on the kidney and kidney/pancreas lists.

## 2023-08-17 ENCOUNTER — TELEPHONE (OUTPATIENT)
Dept: TRANSPLANT | Facility: CLINIC | Age: 48
End: 2023-08-17
Payer: MEDICARE

## 2023-08-17 DIAGNOSIS — I10 HYPERTENSION: ICD-10-CM

## 2023-08-17 DIAGNOSIS — Z76.82 ORGAN TRANSPLANT CANDIDATE: ICD-10-CM

## 2023-08-17 DIAGNOSIS — N18.6 ESRD (END STAGE RENAL DISEASE) (H): ICD-10-CM

## 2023-08-17 DIAGNOSIS — E11.9 DIABETES MELLITUS, TYPE 2 (H): ICD-10-CM

## 2023-08-17 DIAGNOSIS — Z86.79 HISTORY OF CARDIOMYOPATHY: Primary | ICD-10-CM

## 2023-08-17 DIAGNOSIS — E78.5 DYSLIPIDEMIA: ICD-10-CM

## 2023-08-17 NOTE — TELEPHONE ENCOUNTER
Updated patient the transplant committee reviewed all current medical information and does recommend patient have an echocardiogram (ultrasound of the heart) before being considered for Active status on the transplant waitlists.  Explained I will place the scheduling order for this test. Patient verbalizes agreement with this plan.

## 2023-08-28 ENCOUNTER — ANCILLARY PROCEDURE (OUTPATIENT)
Dept: CARDIOLOGY | Facility: CLINIC | Age: 48
End: 2023-08-28
Attending: NURSE PRACTITIONER
Payer: COMMERCIAL

## 2023-08-28 ENCOUNTER — LAB (OUTPATIENT)
Dept: CARDIOLOGY | Facility: CLINIC | Age: 48
End: 2023-08-28
Payer: COMMERCIAL

## 2023-08-28 DIAGNOSIS — I10 HYPERTENSION: ICD-10-CM

## 2023-08-28 DIAGNOSIS — N18.6 ESRD (END STAGE RENAL DISEASE) (H): ICD-10-CM

## 2023-08-28 DIAGNOSIS — E78.5 DYSLIPIDEMIA: ICD-10-CM

## 2023-08-28 DIAGNOSIS — Z76.82 ORGAN TRANSPLANT CANDIDATE: ICD-10-CM

## 2023-08-28 DIAGNOSIS — E78.1 HYPERTRIGLYCERIDEMIA: ICD-10-CM

## 2023-08-28 DIAGNOSIS — E11.9 DIABETES MELLITUS, TYPE 2 (H): ICD-10-CM

## 2023-08-28 DIAGNOSIS — Z86.79 HISTORY OF CARDIOMYOPATHY: ICD-10-CM

## 2023-08-28 LAB
CHOLEST SERPL-MCNC: 309 MG/DL
HDLC SERPL-MCNC: 23 MG/DL
LDLC SERPL CALC-MCNC: ABNORMAL MG/DL
LVEF ECHO: NORMAL
NONHDLC SERPL-MCNC: 286 MG/DL
TRIGL SERPL-MCNC: 1779 MG/DL

## 2023-08-28 PROCEDURE — 36415 COLL VENOUS BLD VENIPUNCTURE: CPT

## 2023-08-28 PROCEDURE — 93306 TTE W/DOPPLER COMPLETE: CPT | Performed by: INTERNAL MEDICINE

## 2023-08-28 PROCEDURE — 80061 LIPID PANEL: CPT

## 2023-08-30 ENCOUNTER — COMMITTEE REVIEW (OUTPATIENT)
Dept: TRANSPLANT | Facility: CLINIC | Age: 48
End: 2023-08-30
Payer: COMMERCIAL

## 2023-08-30 ENCOUNTER — TELEPHONE (OUTPATIENT)
Dept: TRANSPLANT | Facility: CLINIC | Age: 48
End: 2023-08-30
Payer: COMMERCIAL

## 2023-08-30 NOTE — TELEPHONE ENCOUNTER
"Updated patient that before he may be considered for Active status on the kidney and kidney/pancreas lists, the transplant multi-disciplinary committee noted patient's triglyceride result needs to be less than 1,000 in value.  Encouraged patient to contact his [facility] cardiologist regarding recent result of 1,779 for additional recommendations. Patient reports his cardiologist is leaving and \"I need to find a new one,\" but verbalizes he agrees with contacting the cardiology clinic.    "

## 2023-08-30 NOTE — COMMITTEE REVIEW
Abdominal Patient Discussion Note Transplant Coordinator: Nayla Pendleton  Transplant Surgeon:       Referring Physician: Aime Rios    Committee Review Members:  Nephrology Isidoro Claros MD, Landry Shell MD   Nutrition Cindy Romero, LINDA   Pharmacist Trinidad Segal, [facility]    - Clinical Deepa Basilio, [facility], Komal Rios, [facility]   Transplant SNEHAL BARAHONA, RN, Nayla Pendleton, RN, Kim Fay, RN, Komal Yeager, RN, Sobia Rea, RN, Cornelius Cain, BETSY, Frances Cox RN   Transplant Surgery Shalom Santiago MD       Additional Discussion Notes and Findings: Committee reviewed 08/28/2023 echo report and triglyceride result=1, 779. Committee recommends patient not be considered for active status on the kidney and kidney/pancreas waitlists until triglyceride level is under 1,000 as current level will add risk of pancreatitis.

## 2023-09-08 ENCOUNTER — TELEPHONE (OUTPATIENT)
Dept: CARDIOLOGY | Facility: CLINIC | Age: 48
End: 2023-09-08
Payer: COMMERCIAL

## 2023-09-08 DIAGNOSIS — E78.5 DYSLIPIDEMIA: ICD-10-CM

## 2023-09-08 DIAGNOSIS — E78.1 HYPERTRIGLYCERIDEMIA: Primary | ICD-10-CM

## 2023-09-08 NOTE — TELEPHONE ENCOUNTER
[facility] Call Center    Phone Message    May a detailed message be left on voicemail: yes     Reason for Call: Appointment Intake    Referring Provider Name: Cielo Che MD   Diagnosis and/or Symptoms: Hypertriglyceridemia [E78.1], no appointments with Dr Cage in 1-2 weeks. Please assist patient.     Action Taken: Message routed to:  [facility] ([facility] Cardio    Travel Screening: Not Applicable

## 2023-09-12 ENCOUNTER — TELEPHONE (OUTPATIENT)
Dept: CARDIOLOGY | Facility: CLINIC | Age: 48
End: 2023-09-12
Payer: COMMERCIAL

## 2023-09-12 DIAGNOSIS — E78.1 HYPERTRIGLYCERIDEMIA: Primary | ICD-10-CM

## 2023-09-12 RX ORDER — ICOSAPENT ETHYL 1 G/1
2 CAPSULE ORAL 2 TIMES DAILY WITH MEALS
Qty: 120 CAPSULE | Refills: 1 | Status: SHIPPED | OUTPATIENT
Start: 2023-09-12 | End: 2023-11-16

## 2023-09-12 NOTE — TELEPHONE ENCOUNTER
----- Message from Chester Bundy DO sent at 9/12/2023  8:37 AM CDT -----  Can no use Fibrates given ESRD.  Would would recommend low fat diet, avoid EtOH and siple sugars and start Vascepa 2 g BID with meals.  Repeat fasting lipids (at least 8 hours) in 1 months.        ----- Message -----  From: Travis Dobson RN  Sent: 9/8/2023   1:48 PM CDT  To: DO Dr. Gregor Liao in absence of EMG please review. Recommendation to start Fenofibrate. If agreeable please give order, dosage and frequency, or should pt wait to receive recommendations from Dr. Cage? SHERI,Rn

## 2023-09-12 NOTE — TELEPHONE ENCOUNTER
PC with pt, updated and informed of PA good through 3/11/2024. Co-pay $0. Pharmacy should call when ready. Review of directions for medication. Need Lipid profile one month after taking regularly. Lab order placed. Staff message sent to schedulers to call and arrange lab appt for in October, after 10/13. No further questions at this time. Reviewed instructions of fasting for lab appt. CMM,Rn

## 2023-09-12 NOTE — TELEPHONE ENCOUNTER
Prior Authorization Not Needed per Insurance PA Already on File Until 03/11/2024 Called Pharmacy and they now have a paid claim for 0.00 Co-Pay    Medication: VASCEPA 1 G PO CAPS  Insurance Company: [employer] Non-Specialty PA's - Phone [phone] Fax [phone]  Expected CoPay:      Pharmacy Filling the Rx: [id number] - [place] [id number] [address] AT [facility] [address]  Pharmacy Notified: Yes  Patient Notified: Yes

## 2023-09-12 NOTE — TELEPHONE ENCOUNTER
Mindy Vuong5 minutes ago (4:18 PM)       Pharmacy has a paid claim on this medication as of 09/12/2023.  Central Prior Authorization Team  Phone: [phone]

## 2023-09-12 NOTE — TELEPHONE ENCOUNTER
Prior Auth team please review. Vascepa Prior Auth needed? Rx sent to pharmacy on file. Thank you CMM,Rn

## 2023-10-09 ENCOUNTER — LAB (OUTPATIENT)
Dept: LAB | Facility: CLINIC | Age: 48
End: 2023-10-09
Payer: MEDICARE

## 2023-10-09 DIAGNOSIS — E11.9 DIABETES MELLITUS TYPE 2, UNCOMPLICATED (H): ICD-10-CM

## 2023-10-09 DIAGNOSIS — N18.6 ESRD (END STAGE RENAL DISEASE) (H): ICD-10-CM

## 2023-10-09 DIAGNOSIS — Z76.82 ORGAN TRANSPLANT CANDIDATE: ICD-10-CM

## 2023-10-09 PROCEDURE — 86832 HLA CLASS I HIGH DEFIN QUAL: CPT

## 2023-10-09 PROCEDURE — 86833 HLA CLASS II HIGH DEFIN QUAL: CPT

## 2023-10-09 PROCEDURE — 86828 HLA CLASS I&II ANTIBODY QUAL: CPT

## 2023-10-11 DIAGNOSIS — Z76.82 ORGAN TRANSPLANT CANDIDATE: ICD-10-CM

## 2023-10-11 DIAGNOSIS — N18.6 ESRD (END STAGE RENAL DISEASE) (H): Primary | ICD-10-CM

## 2023-10-11 DIAGNOSIS — E11.9 DIABETES MELLITUS TYPE 2, UNCOMPLICATED (H): ICD-10-CM

## 2023-10-11 NOTE — TELEPHONE ENCOUNTER
RECORDS RECEIVED FROM:    DATE RECEIVED:    NOTES STATUS DETAILS   OFFICE NOTE from referring provider  Internal Dr. Che 9-8-23   OFFICE NOTE from other cardiologists  Internal Dr. Che 6-27-23   RECORDS from hospital/ED Internal 4-2-23   MEDICATION LIST Internal    GENERAL CARDIO RECORDS   (ALL APPOINTMENT TYPES)     EKG (STRIPS & REPORTS) Internal 4-2-23   MONITORS (STRIPS & REPORTS) Internal 11-1-21   ECHOS (IMAGES AND REPORTS) Internal 8-28-23   cMRI (IMAGES AND REPORTS) N/A    CT/CTA (IMAGES AND REPORTS) Internal 4-15-22 CT Chest   NEW LIPID MANAGMENT     LIPID LABS (LAST 5 YEARS) Internal    STRESS TESTS (IMAGES AND REPORTS) N/A

## 2023-10-17 ENCOUNTER — LAB (OUTPATIENT)
Dept: CARDIOLOGY | Facility: CLINIC | Age: 48
End: 2023-10-17
Payer: COMMERCIAL

## 2023-10-17 DIAGNOSIS — E78.5 DYSLIPIDEMIA: ICD-10-CM

## 2023-10-17 DIAGNOSIS — E78.1 HYPERTRIGLYCERIDEMIA: ICD-10-CM

## 2023-10-17 LAB
CHOLEST SERPL-MCNC: 153 MG/DL
HBA1C MFR BLD: 9.2 %
HDLC SERPL-MCNC: 20 MG/DL
LDLC SERPL CALC-MCNC: ABNORMAL MG/DL
NONHDLC SERPL-MCNC: 133 MG/DL
TRIGL SERPL-MCNC: 682 MG/DL
TSH SERPL DL<=0.005 MIU/L-ACNC: 1.78 UIU/ML (ref 0.3–4.2)

## 2023-10-17 PROCEDURE — 36415 COLL VENOUS BLD VENIPUNCTURE: CPT

## 2023-10-17 PROCEDURE — 83036 HEMOGLOBIN GLYCOSYLATED A1C: CPT

## 2023-10-17 PROCEDURE — 80061 LIPID PANEL: CPT

## 2023-10-17 PROCEDURE — 84443 ASSAY THYROID STIM HORMONE: CPT

## 2023-10-18 ENCOUNTER — COMMITTEE REVIEW (OUTPATIENT)
Dept: TRANSPLANT | Facility: CLINIC | Age: 48
End: 2023-10-18
Payer: COMMERCIAL

## 2023-10-18 NOTE — COMMITTEE REVIEW
Abdominal Patient Discussion Note Transplant Coordinator: Nayla Pendleton  Transplant Surgeon:       Referring Physician: Aime Rios    Committee Review Members:  Nephrology Cady Perrin PA-C, Grisel Coombs MD, Jarod Christy, APR CNP, Landry Shell MD, Fernando Sorensen MD   Nurse Sarah Brennan, APRN CNP, Shelia Magallon, APRN CNP   Nutrition Cindy Romero, RD   Pharmacist Trinidad Segal, [facility]    - Clinical Deepa Elysia Basilio, [facility], Komal Rios, [facility]   Transplant SNEHAL BARAHONA, RN, Nayla Pendleton, RN, Kim Fay, RN, Komal Yeager, RN, Sobia Underwood, ARIE, Cornelius Cain, BETSY, Frances Cox, BETSY, Natalie Nina, RN   Transplant Surgery Harrison Whitehead MD       Additional Discussion Notes and Findings: Committee reviewed 10/17/2023 lipid results and hemoglobin A1C results.  Before being considered for Active status on the transplant waitlists, Committee recommends patient complete scheduled cardiology appointments and also schedule an appointment with endocrinology as related to hemoglobin A1C increasing and patient doesn't appear to have been re-assessed by endocrinology since 2021.

## 2023-10-19 ENCOUNTER — TELEPHONE (OUTPATIENT)
Dept: TRANSPLANT | Facility: CLINIC | Age: 48
End: 2023-10-19
Payer: COMMERCIAL

## 2023-10-19 NOTE — TELEPHONE ENCOUNTER
"Updated patient that transplant committee recommends patient complete upcoming cardiology appointments before being considered for active status on the transplant waitlists.  Explained the committee also recommends patient establish care with an endocrinologist as recent hemoglobin A1C increased from previous (now 9.2).  Patient reports he is getting a new pump tomorrow and \"that should help.\"  He states, \"I get done with one thing and you guys want me to do another.\"  Reviewed want to ensure that when patient does undergo the transplant operation that everything is optimized and stable as possible, so patient had a good outcome and better quality of life after the transplant.  Noted will plan to review patient's chart with Committee after he completes the November 2023 cardiology appointments..  Patient verbalizes agreement with this plan.   "

## 2023-11-15 ENCOUNTER — DOCUMENTATION ONLY (OUTPATIENT)
Dept: CARDIOLOGY | Facility: CLINIC | Age: 48
End: 2023-11-15

## 2023-11-15 NOTE — PROGRESS NOTES
Hello,   In order to offer our patients the best possible experience, the lab schedule is reviewed to ensure patients have lab orders in Epic prior to arriving at the lab.    Please have one of your team members place a lab order in Epic for this patient prior to the lab appointment date.     Thank you for your attention,   [facility] [phone]

## 2023-11-16 ENCOUNTER — OFFICE VISIT (OUTPATIENT)
Dept: CARDIOLOGY | Facility: CLINIC | Age: 48
End: 2023-11-16
Attending: INTERNAL MEDICINE
Payer: COMMERCIAL

## 2023-11-16 ENCOUNTER — LAB (OUTPATIENT)
Dept: LAB | Facility: CLINIC | Age: 48
End: 2023-11-16
Payer: COMMERCIAL

## 2023-11-16 ENCOUNTER — PRE VISIT (OUTPATIENT)
Dept: CARDIOLOGY | Facility: CLINIC | Age: 48
End: 2023-11-16

## 2023-11-16 VITALS
BODY MASS INDEX: 32.38 KG/M2 | SYSTOLIC BLOOD PRESSURE: 129 MMHG | HEART RATE: 79 BPM | DIASTOLIC BLOOD PRESSURE: 85 MMHG | HEIGHT: 72 IN | OXYGEN SATURATION: 95 % | WEIGHT: 239.1 LBS

## 2023-11-16 DIAGNOSIS — I16.0 HYPERTENSIVE URGENCY: ICD-10-CM

## 2023-11-16 DIAGNOSIS — I25.83 CORONARY ARTERY DISEASE DUE TO LIPID RICH PLAQUE: ICD-10-CM

## 2023-11-16 DIAGNOSIS — E78.1 HYPERTRIGLYCERIDEMIA: Primary | ICD-10-CM

## 2023-11-16 DIAGNOSIS — I25.10 CARDIOVASCULAR DISEASE: Primary | ICD-10-CM

## 2023-11-16 DIAGNOSIS — I25.10 CORONARY ARTERY DISEASE DUE TO LIPID RICH PLAQUE: ICD-10-CM

## 2023-11-16 DIAGNOSIS — Z76.82 ORGAN TRANSPLANT CANDIDATE: ICD-10-CM

## 2023-11-16 DIAGNOSIS — Z01.818 ENCOUNTER FOR PRE-TRANSPLANT EVALUATION FOR KIDNEY AND PANCREAS TRANSPLANT: ICD-10-CM

## 2023-11-16 DIAGNOSIS — Z86.79 HISTORY OF CARDIOMYOPATHY: ICD-10-CM

## 2023-11-16 DIAGNOSIS — Z01.810 PRE-OPERATIVE CARDIOVASCULAR EXAMINATION: ICD-10-CM

## 2023-11-16 DIAGNOSIS — I21.02 STEMI INVOLVING LEFT ANTERIOR DESCENDING CORONARY ARTERY (H): ICD-10-CM

## 2023-11-16 LAB
ALBUMIN SERPL BCG-MCNC: 4.7 G/DL (ref 3.5–5.2)
ALP SERPL-CCNC: 64 U/L (ref 40–150)
ALT SERPL W P-5'-P-CCNC: 31 U/L (ref 0–70)
ANION GAP SERPL CALCULATED.3IONS-SCNC: 14 MMOL/L (ref 7–15)
AST SERPL W P-5'-P-CCNC: 27 U/L (ref 0–45)
BILIRUB SERPL-MCNC: 0.6 MG/DL
BUN SERPL-MCNC: 39.5 MG/DL (ref 6–20)
CALCIUM SERPL-MCNC: 10.6 MG/DL (ref 8.6–10)
CHLORIDE SERPL-SCNC: 95 MMOL/L (ref 98–107)
CREAT SERPL-MCNC: 7.32 MG/DL (ref 0.67–1.17)
DEPRECATED HCO3 PLAS-SCNC: 30 MMOL/L (ref 22–29)
EGFRCR SERPLBLD CKD-EPI 2021: 9 ML/MIN/1.73M2
GLUCOSE SERPL-MCNC: 92 MG/DL (ref 70–99)
POTASSIUM SERPL-SCNC: 4.5 MMOL/L (ref 3.4–5.3)
PROT SERPL-MCNC: 7.6 G/DL (ref 6.4–8.3)
SODIUM SERPL-SCNC: 139 MMOL/L (ref 135–145)

## 2023-11-16 PROCEDURE — 80053 COMPREHEN METABOLIC PANEL: CPT | Performed by: PATHOLOGY

## 2023-11-16 PROCEDURE — 36415 COLL VENOUS BLD VENIPUNCTURE: CPT | Performed by: PATHOLOGY

## 2023-11-16 PROCEDURE — 99213 OFFICE O/P EST LOW 20 MIN: CPT | Performed by: INTERNAL MEDICINE

## 2023-11-16 PROCEDURE — G0463 HOSPITAL OUTPT CLINIC VISIT: HCPCS | Performed by: INTERNAL MEDICINE

## 2023-11-16 PROCEDURE — 99214 OFFICE O/P EST MOD 30 MIN: CPT | Performed by: INTERNAL MEDICINE

## 2023-11-16 RX ORDER — ATORVASTATIN CALCIUM 80 MG/1
80 TABLET, FILM COATED ORAL AT BEDTIME
Qty: 90 TABLET | Refills: 3 | Status: ON HOLD | OUTPATIENT
Start: 2023-11-16 | End: 2024-07-30

## 2023-11-16 RX ORDER — HYDRALAZINE HYDROCHLORIDE 25 MG/1
25 TABLET, FILM COATED ORAL 2 TIMES DAILY
Qty: 180 TABLET | Refills: 3 | Status: ON HOLD | OUTPATIENT
Start: 2023-11-16 | End: 2024-07-30

## 2023-11-16 RX ORDER — ALPRAZOLAM 0.25 MG
0.25 TABLET ORAL 3 TIMES DAILY PRN
COMMUNITY
Start: 2023-09-01 | End: 2024-08-14

## 2023-11-16 RX ORDER — NITROGLYCERIN 0.4 MG/1
TABLET SUBLINGUAL
Qty: 25 TABLET | Refills: 3 | Status: SHIPPED | OUTPATIENT
Start: 2023-11-16 | End: 2024-08-24

## 2023-11-16 RX ORDER — TRAMADOL HYDROCHLORIDE 50 MG/1
100 TABLET ORAL
COMMUNITY
Start: 2023-03-23 | End: 2024-02-14

## 2023-11-16 RX ORDER — ACETAMINOPHEN 500 MG
500 TABLET ORAL EVERY 4 HOURS PRN
COMMUNITY

## 2023-11-16 RX ORDER — ICOSAPENT ETHYL 1 G/1
2 CAPSULE ORAL 2 TIMES DAILY WITH MEALS
Qty: 360 CAPSULE | Refills: 3 | Status: SHIPPED | OUTPATIENT
Start: 2023-11-16 | End: 2024-02-14

## 2023-11-16 ASSESSMENT — PAIN SCALES - GENERAL: PAINLEVEL: NO PAIN (0)

## 2023-11-16 NOTE — PATIENT INSTRUCTIONS
Labs to be completed between Dec 15 and 20 :  Hemoglobin A1c  Fasting lipids    Complete a diagnostic test called a Lexiscan   As soon as results are compiled and reviewed, you will be notified.    Lexiscan (nuclear stress test)     Why do I need this test?  Your doctor has ordered a nuclear stress test to check how well blood is flowing through your heart. You will either exercise or take a medicine that mimics exercise; we will watch your heart. Please allow 2 to 4 hours for this test.      What happens during this test?      1. We will place an IV (small needle) in the vein of your arm or hand.  2. We will inject a liquid through the IV. The liquid contains radioactivity. This helps your heart show up better on the pictures we will take.  3. About 30 to 60 minutes later, you will lie down on a table. A special camera will rotate around your chest taking pictures of your heart. This lasts less than 30 minutes.  4. To prepare for the stress test, we will check your blood pressure. We will also attach small pads to your chest. The pads are hooked to an EKG (electrocardiogram) machine. The machine shows how your heart is working during the test.  5. You will begin the stress test.    If you can exercise, you will walk on a treadmill for 5 to 15 minutes. You will start at a slow speed. We will slowly increase the speed and level of incline.    If you cannot exercise, we will give you medicine to mimic the effects of exercise. The medicine goes through the IV slowly.  6. During the stress test, we will again inject liquid through your IV. (See Step 2.)  7. After the stress test, you will wait 30 to 60 minutes. We will then take more pictures of your heart.    Preparation :    NO FOOD 3 hours prior, Water is ok and encouraged  NO alcohol, smoking, caffeine, or decaf products 12 hours prior    TAKE ALL medications as prescribed, hold the following medications if they pertain to you:  If you take Aggrenox or dipyridamole  (Persantine, Permole), stop taking it 48 hours before your test.  If you take Viagra, Cialis or Levitra, stop taking it 48 hours before your test.  If you take theophylline or aminophylline, stop taking it 12 hours before your test.  For patients with diabetes:If you take insulin, call your diabetes care team. Ask if you should take a 1/2 dose the morning of your test  If you take diabetes medicine by mouth, don't take it on the morning of your test. Bring it with you to take after the test. (If you have questions, call your diabetes care team.)  Do not take nitrates on the day of your test. Do not wear a Nitro-Patch.

## 2023-11-16 NOTE — LETTER
11/16/2023      RE: Siva Ramey  [address]  [place] [facility] [address]       Dear Colleague,    Thank you for the opportunity to participate in the care of your patient, Siva Ramey, at the [facility] [place] [facility]. Please see a copy of my visit note below.    HPI:     I had the privilege to evaluate and examine Mr Siva Ramey, who is a 48 yr old male patient with CKD, diabetic nephropathy, CAD, DM type II, dyslipidemia, S/P MI, dyspnea, ischemic cardiomyopathy for pre-op clearance for kidney Tx.    Patient denies chest pain, shortness of breath, palpitations and  intermittent claudication    PAST MEDICAL HISTORY:  Past Medical History:   Diagnosis Date    Acquired elevated diaphragm     Anemia in chronic kidney disease     Angina pectoris (H24)     Chronic kidney disease     Coronary artery disease     Diabetes mellitus, type II (H) 12/2001    Diabetic nephropathy (H)     MARQUEZ (dyspnea on exertion)     Dyslipidemia 12/2001    End stage renal disease (H)     History of blood transfusion 2004    Hypertension     takes medication    Ischemic cardiomyopathy     Metabolic acidosis     Myocardial infarction (H)     STEMI -Diagonal branch of the LAD    Obesity (BMI 30-39.9)     Peripheral neuropathy     Proteinuria     Retinopathy     Vitamin D deficiency        CURRENT MEDICATIONS:  Current Outpatient Medications   Medication Sig Dispense Refill    acetaminophen (TYLENOL) 500 MG tablet Take 500 mg by mouth      albuterol (PROAIR HFA/PROVENTIL HFA/VENTOLIN HFA) 108 (90 Base) MCG/ACT inhaler Inhale 2 puffs into the lungs every 4 hours as needed      ALPRAZolam (XANAX) 0.25 MG tablet Take 0.25 mg by mouth      amLODIPine (NORVASC) 10 MG tablet Take 1 tablet (10 mg) by mouth daily 90 tablet 3    atorvastatin (LIPITOR) 80 MG tablet TAKE 1 TABLET(80 MG) BY MOUTH AT BEDTIME 90 tablet 3    carvedilol (COREG) 12.5 MG tablet Take 3 tablets (37.5 mg)  by mouth 2 times daily (with meals) 540 tablet 3    cholecalciferol, vitamin D3, 5,000 unit Tab [CHOLECALCIFEROL, VITAMIN D3, 5,000 UNIT TAB] Take 5,000 Units by mouth daily.      cloNIDine (CATAPRES) 0.1 MG tablet Take 1 tablet (0.1 mg) by mouth 3 times daily as needed (SBP > 160) 30 tablet 0    fluticasone-salmeterol (ADVAIR) 250-50 MCG/ACT inhaler Inhale 1 puff into the lungs every 12 hours 14 each 3    glucose (BD GLUCOSE) 4 g chewable tablet glucose 4 gram chewable tablet   CHEW AND SWALLOW 4 TIMES DAILY AS NEEDED      hydrALAZINE (APRESOLINE) 25 MG tablet Take 1 tablet (25 mg) by mouth 2 times daily 180 tablet 3    icosapent ethyl (VASCEPA) 1 g CAPS capsule Take 2 g by mouth 2 times daily (with meals) 120 capsule 1    nitroGLYcerin (NITROSTAT) 0.4 MG sublingual tablet PLACE ONE TABLET UNDER TONGUE AS NEEDED FOR CHEST PAIN AS DIRECTED FOR 3 DOSES. IF SYMPTOMS PERSIST 5 MINUTES AFTER 1ST DOSE CALL 911 25 tablet 1    traMADol (ULTRAM) 50 MG tablet Take 100 mg by mouth      aspirin 81 MG EC tablet [ASPIRIN 81 MG EC TABLET] Take 1 tablet (81 mg total) by mouth daily.  0    BD PEN NEEDLE JAMIN 2ND GEN 32G X 4 MM miscellaneous  (Patient not taking: Reported on 11/16/2023)      furosemide (LASIX) 40 MG tablet TAKE 1 TABLET(40 MG) BY MOUTH DAILY (Patient not taking: Reported on 11/16/2023) 90 tablet 0    insulin aspart (NOVOLOG PEN) 100 UNIT/ML pen Inject 5 Units Subcutaneous 3 times daily (before meals) 4.5 mL 1    insulin glargine-lixisenatide (SOLIQUA) pen Inject 25 Units Subcutaneous daily (Patient not taking: Reported on 11/16/2023)         PAST SURGICAL HISTORY:  Past Surgical History:   Procedure Laterality Date    BACK SURGERY  2009    L5 disc cut    BYPASS GRAFT ARTERY CORONARY  06/20/2019    2 vessels    CV CORONARY ANGIOGRAM N/A 1/13/2019    Procedure: Coronary Angiogram;  Surgeon: Cielo Che MD;  Location: [facility] Lab;  Service: Cardiology    CV CORONARY ANGIOGRAM N/A 5/2/2019    Procedure:  Coronary Angiogram;  Surgeon: Cielo Che MD;  Location: [facility] Lab;  Service: Cardiology    CV CORONARY ANGIOGRAM N/A 4/30/2020    Procedure: Coronary Angiogram;  Surgeon: Cielo Che MD;  Location: [facility] Cath Lab;  Service: Cardiology    CV CORONARY ANGIOGRAM N/A 7/22/2021    Procedure: CV CORONARY ANGIOGRAM;  Surgeon: Adrian Crow MD;  Location: [facility] LAB CV    CV FRACTIONAL FLOW RATIO WIRE N/A 7/22/2021    Procedure: Fractional Flow Ratio Wire;  Surgeon: Adrian Crow MD;  Location: [facility] LAB CV    CV LEFT HEART CATH N/A 7/22/2021    Procedure: Left Heart Cath;  Surgeon: Adrian Crow MD;  Location: [facility] CV    CV LEFT HEART CATHETERIZATION WITH LEFT VENTRICULOGRAM N/A 1/13/2019    Procedure: Left Heart Catheterization with Left Ventriculogram;  Surgeon: Cielo Che MD;  Location: [facility] Cath Lab;  Service: Cardiology    CV LEFT HEART CATHETERIZATION WITHOUT LEFT VENTRICULOGRAM Left 4/30/2020    Procedure: Left Heart Catheterization Without Left Ventriculogram;  Surgeon: Cielo Che MD;  Location: [facility] Cath Lab;  Service: Cardiology    CV RIGHT HEART CATHETERIZATION N/A 4/30/2020    Procedure: Right Heart Catheterization;  Surgeon: Cielo Che MD;  Location: [facility] Cath Lab;  Service: Cardiology    HERNIA REPAIR      OTHER SURGICAL HISTORY      Excise varicocele    STENT, CORONARY, DALE  2019       ALLERGIES     Allergies   Allergen Reactions    Amoxicillin Hives     & generalized pain    Venlafaxine      lethargic       FAMILY HISTORY:  Family History   Problem Relation Age of Onset    Diabetes Type 2  Mother     Heart Disease Father 60    CABG Father 50        triple bypass    Diabetes Type 2  Father     Depression Sister     Substance Abuse Sister     Ovarian Cancer Maternal Grandmother     Brain Cancer Maternal Grandmother     Pancreatic Cancer Maternal Aunt     Prostate Cancer Maternal Uncle        SOCIAL  "HISTORY:  Social History     Socioeconomic History    Marital status:      Spouse name: None    Number of children: None    Years of education: None    Highest education level: None   Tobacco Use    Smoking status: Never    Smokeless tobacco: Never   Substance and Sexual Activity    Alcohol use: Yes     Comment: Alcoholic Drinks: 1-2 per year    Drug use: No    Sexual activity: Yes     Partners: Female       ROS:   Constitutional: No fever, chills, or sweats. No weight gain/loss   ENT: No visual disturbance, ear ache, epistaxis, sore throat  Allergies/Immunologic: Negative.   Respiratory: No cough, hemoptysia  Cardiovascular: As per HPI  GI: No nausea, vomiting, hematemesis, melena, or hematochezia  : No urinary frequency, dysuria, or hematuria  Integument: Negative  Psychiatric: Negative  Neuro: Negative  Endocrinology: Negative   Musculoskeletal: Negative    EXAM:  /85 (BP Location: Right arm, Patient Position: Sitting, Cuff Size: Adult Large)   Pulse 79   Ht 1.825 m (5' 11.85\")   Wt 108.5 kg (239 lb 1.6 oz)   SpO2 95%   BMI 32.56 kg/m    In general, the patient is a pleasant male in no apparent distress.    HEENT: NC/AT.  PERRLA.  EOMI.  Sclerae white, not injected.  Nares clear.  Pharynx without erythema or exudate.  Dentition intact.    Neck: No adenopathy.  No thyromegaly. Carotids +4/4 bilaterally without bruits.  No jugular venous distension.   Heart: RRR. Normal S1, S2 splits physiologically. No murmur, rub, click, or gallop. The PMI is in the 5th ICS in the midclavicular line. There is no heave.    Lungs: CTA.  No ronchi, wheezes, rales.  No dullness to percussion.   Abdomen: Soft, nontender, nondistended. No organomegaly.  No bruits.   Extremities: No clubbing, cyanosis, or edema.  The pulses are +4/4 at the radial, brachial, femoral, popliteal, DP, and PT sites bilaterally.  No bruits are noted.  Neurologic: Alert and oriented to person/place/time, normal speech, gait and " affect  Skin: No petechiae, purpura or rash.    Labs:  LIPID RESULTS:  Lab Results   Component Value Date    CHOL 153 10/17/2023    HDL 20 (L) 10/17/2023    LDL  10/17/2023      Comment:      Cannot estimate LDL when triglyceride exceeds 400 mg/dL    LDL 59 07/21/2021    TRIG 682 (H) 10/17/2023    NHDL 133 (H) 10/17/2023       LIVER ENZYME RESULTS:  Lab Results   Component Value Date    AST 27 11/16/2023    ALT 31 11/16/2023       CBC RESULTS:  Lab Results   Component Value Date    WBC 9.3 04/02/2023    RBC 2.88 (L) 04/02/2023    HGB 8.8 (L) 04/02/2023    HCT 26.0 (L) 04/02/2023    MCV 90 04/02/2023    MCH 30.6 04/02/2023    MCHC 33.8 04/02/2023    RDW 12.7 04/02/2023     04/02/2023       BMP RESULTS:  Lab Results   Component Value Date     11/16/2023    POTASSIUM 4.5 11/16/2023    POTASSIUM 4.4 09/27/2021    CHLORIDE 95 (L) 11/16/2023    CHLORIDE 101 09/27/2021    CO2 30 (H) 11/16/2023    CO2 28 09/27/2021    ANIONGAP 14 11/16/2023    ANIONGAP 8 09/27/2021    GLC 92 11/16/2023     (H) 09/27/2021    BUN 39.5 (H) 11/16/2023    BUN 46 (H) 09/27/2021    CR 7.32 (H) 11/16/2023    GFRESTIMATED 9 (L) 11/16/2023    GFRESTIMATED 23 (L) 10/05/2020    GFRESTBLACK 27 (L) 10/05/2020    BETHANY 10.6 (H) 11/16/2023        A1C RESULTS:  Lab Results   Component Value Date    A1C 9.2 (H) 10/17/2023       INR RESULTS:  Lab Results   Component Value Date    INR 1.13 09/27/2021    INR 1.03 10/04/2020       Procedures:    EKG: sin ryhtm, slow R-progression in precordial leads    NM Lexiscan    Cardiology reading      The nuclear stress test is abnormal.    There is a small area of a moderate degree of nontransmural infarction in the inferior segment(s) of the left ventricle. This appears to be in the right coronary artery distribution. No ischemia identified.    LVEDv 196ml. LVESv 96ml. LV EF 51%.    RX reading  Heart size is within normal limits. Normal caliber of the visualized  thoracic aorta and main pulmonary  artery. Heavy coronary artery  atherosclerotic calcifications. No pericardial effusion. The  visualized mediastinal and hilar lymph nodes are not enlarged.     No pleural effusion or pneumothorax at this level. The visualized  lungs are clear.     Visualized upper abdomen is unremarkable. Median sternotomy wires. No  acute osseous lesions of the visualized thorax. Bilateral  gynecomastia.                                                                      IMPRESSION:  1. No acute abnormality on the CT images of the lower chest and upper  abdomen.    Echocardiogram     Left ventricular wall thickness is normal. Left ventricular size is normal.  The visual ejection fraction is 50-55%. No regional wall motion abnormalities  are seen.  Right ventricular function, chamber size, wall motion, and thickness are  normal.  Trileaflet aortic sclerosis without stenosis.  The inferior vena cava was normal in size with preserved respiratory  variability. No pericardial effusion is present.     No significant changes noted other than LVEF appears visually minimally lower.  ______________________________________________________________________________  Left Ventricle  Left ventricular wall thickness is normal. Left ventricular size is normal.  The visual ejection fraction is 50-55%. Grade I or early diastolic  dysfunction. No regional wall motion abnormalities are seen.     Right Ventricle  Right ventricular function, chamber size, wall motion, and thickness are  normal.     Atria  The right atria appears normal. Mild left atrial enlargement is present.     Mitral Valve  The mitral valve is normal. Trace mitral insufficiency is present.     Aortic Valve  Trileaflet aortic sclerosis without stenosis. No aortic regurgitation is  present.     Tricuspid Valve  The tricuspid valve is normal. Trace tricuspid insufficiency is present. The  peak velocity of the tricuspid regurgitant jet is not obtainable.     Pulmonic Valve  The pulmonic  valve is normal.     Vessels  The aorta root is normal. The inferior vena cava was normal in size with  preserved respiratory variability.     Pericardium  No pericardial effusion is present.     Compared to Previous Study  No significant changes noted.  ______________________________________________________________________________  MMode/2D Measurements & Calculations  IVSd: 1.3 cm     LVIDd: 5.0 cm  LVIDs: 2.9 cm  LVPWd: 1.3 cm  FS: 42.4 %  LV mass(C)d: 258.2 grams  LV mass(C)dI: 117.1 grams/m2  Ao root diam: 3.5 cm  asc Aorta Diam: 3.4 cm  LVOT diam: 2.6 cm  LVOT area: 5.1 cm2  Ao root diam Index (cm/m2): 1.6  asc Aorta Diam Index (cm/m2): 1.5  LA Volume (BP): 65.6 ml  LA Volume Index (BP): 29.8 ml/m2     RWT: 0.52  TAPSE: 1.2 cm     Doppler Measurements & Calculations  MV E max jay: 60.8 cm/sec  MV A max jay: 89.5 cm/sec  MV E/A: 0.68  MV dec slope: 265.8 cm/sec2  MV dec time: 0.23 sec  PA acc time: 0.09 sec  E/E' av.3  Lateral E/e': 5.4  Medial E/e': 9.2  RV S Jay: 8.3 cm/sec     _________________________________  Assessment and Plan:     We discussed the results with patient.      There is a small area of a moderate degree of nontransmural infarction in the inferior segment(s) of the left ventricle. This appears to be in the right coronary artery distribution. No ischemia identified.    Taking into consideration of EKG, NM lexiscan and echocardiogram  Patient can be cleared for kidney transplant.    Deangelo Cage MD, PhD  Professor of Medicine  Division of Cardiology       CC  Patient Care Team:  Trinidad Hansen PA-C as PCP - General (Physician Assistant)  Aime Rios MD as MD (Internal Medicine)

## 2023-11-16 NOTE — PROGRESS NOTES
HPI:     I had the privilege to evaluate and examine Mr Siva Ramey, who is a 48 yr old male patient with CKD, diabetic nephropathy, CAD, DM type II, dyslipidemia, S/P MI, dyspnea, ischemic cardiomyopathy for pre-op clearance for kidney Tx.    Patient denies chest pain, shortness of breath, palpitations and  intermittent claudication    PAST MEDICAL HISTORY:  Past Medical History:   Diagnosis Date    Acquired elevated diaphragm     Anemia in chronic kidney disease     Angina pectoris (H24)     Chronic kidney disease     Coronary artery disease     Diabetes mellitus, type II (H) 12/2001    Diabetic nephropathy (H)     MARQUEZ (dyspnea on exertion)     Dyslipidemia 12/2001    End stage renal disease (H)     History of blood transfusion 2004    Hypertension     takes medication    Ischemic cardiomyopathy     Metabolic acidosis     Myocardial infarction (H)     STEMI -Diagonal branch of the LAD    Obesity (BMI 30-39.9)     Peripheral neuropathy     Proteinuria     Retinopathy     Vitamin D deficiency        CURRENT MEDICATIONS:  Current Outpatient Medications   Medication Sig Dispense Refill    acetaminophen (TYLENOL) 500 MG tablet Take 500 mg by mouth      albuterol (PROAIR HFA/PROVENTIL HFA/VENTOLIN HFA) 108 (90 Base) MCG/ACT inhaler Inhale 2 puffs into the lungs every 4 hours as needed      ALPRAZolam (XANAX) 0.25 MG tablet Take 0.25 mg by mouth      amLODIPine (NORVASC) 10 MG tablet Take 1 tablet (10 mg) by mouth daily 90 tablet 3    atorvastatin (LIPITOR) 80 MG tablet TAKE 1 TABLET(80 MG) BY MOUTH AT BEDTIME 90 tablet 3    carvedilol (COREG) 12.5 MG tablet Take 3 tablets (37.5 mg) by mouth 2 times daily (with meals) 540 tablet 3    cholecalciferol, vitamin D3, 5,000 unit Tab [CHOLECALCIFEROL, VITAMIN D3, 5,000 UNIT TAB] Take 5,000 Units by mouth daily.      cloNIDine (CATAPRES) 0.1 MG tablet Take 1 tablet (0.1 mg) by mouth 3 times daily as needed (SBP > 160) 30 tablet 0    fluticasone-salmeterol (ADVAIR) 250-50  MCG/ACT inhaler Inhale 1 puff into the lungs every 12 hours 14 each 3    glucose (BD GLUCOSE) 4 g chewable tablet glucose 4 gram chewable tablet   CHEW AND SWALLOW 4 TIMES DAILY AS NEEDED      hydrALAZINE (APRESOLINE) 25 MG tablet Take 1 tablet (25 mg) by mouth 2 times daily 180 tablet 3    icosapent ethyl (VASCEPA) 1 g CAPS capsule Take 2 g by mouth 2 times daily (with meals) 120 capsule 1    nitroGLYcerin (NITROSTAT) 0.4 MG sublingual tablet PLACE ONE TABLET UNDER TONGUE AS NEEDED FOR CHEST PAIN AS DIRECTED FOR 3 DOSES. IF SYMPTOMS PERSIST 5 MINUTES AFTER 1ST DOSE CALL 911 25 tablet 1    traMADol (ULTRAM) 50 MG tablet Take 100 mg by mouth      aspirin 81 MG EC tablet [ASPIRIN 81 MG EC TABLET] Take 1 tablet (81 mg total) by mouth daily.  0    BD PEN NEEDLE JAMIN 2ND GEN 32G X 4 MM miscellaneous  (Patient not taking: Reported on 11/16/2023)      furosemide (LASIX) 40 MG tablet TAKE 1 TABLET(40 MG) BY MOUTH DAILY (Patient not taking: Reported on 11/16/2023) 90 tablet 0    insulin aspart (NOVOLOG PEN) 100 UNIT/ML pen Inject 5 Units Subcutaneous 3 times daily (before meals) 4.5 mL 1    insulin glargine-lixisenatide (SOLIQUA) pen Inject 25 Units Subcutaneous daily (Patient not taking: Reported on 11/16/2023)         PAST SURGICAL HISTORY:  Past Surgical History:   Procedure Laterality Date    BACK SURGERY  2009    L5 disc cut    BYPASS GRAFT ARTERY CORONARY  06/20/2019    2 vessels    CV CORONARY ANGIOGRAM N/A 1/13/2019    Procedure: Coronary Angiogram;  Surgeon: Cielo Che MD;  Location: [facility] Cath Lab;  Service: Cardiology    CV CORONARY ANGIOGRAM N/A 5/2/2019    Procedure: Coronary Angiogram;  Surgeon: Cielo Che MD;  Location: [facility] Cath Lab;  Service: Cardiology    CV CORONARY ANGIOGRAM N/A 4/30/2020    Procedure: Coronary Angiogram;  Surgeon: Cielo Che MD;  Location: [facility] Cath Lab;  Service: Cardiology    CV CORONARY ANGIOGRAM N/A 7/22/2021    Procedure: CV CORONARY ANGIOGRAM;   Surgeon: Adrian Crow MD;  Location: [facility] CV    CV FRACTIONAL FLOW RATIO WIRE N/A 7/22/2021    Procedure: Fractional Flow Ratio Wire;  Surgeon: Adrian Crow MD;  Location: [facility] CATH LAB CV    CV LEFT HEART CATH N/A 7/22/2021    Procedure: Left Heart Cath;  Surgeon: Adrian Crow MD;  Location: [facility] CATH LAB CV    CV LEFT HEART CATHETERIZATION WITH LEFT VENTRICULOGRAM N/A 1/13/2019    Procedure: Left Heart Catheterization with Left Ventriculogram;  Surgeon: Cielo Che MD;  Location: [facility] Cath Lab;  Service: Cardiology    CV LEFT HEART CATHETERIZATION WITHOUT LEFT VENTRICULOGRAM Left 4/30/2020    Procedure: Left Heart Catheterization Without Left Ventriculogram;  Surgeon: Cielo Che MD;  Location: [facility] Cath Lab;  Service: Cardiology    CV RIGHT HEART CATHETERIZATION N/A 4/30/2020    Procedure: Right Heart Catheterization;  Surgeon: Cielo Che MD;  Location: [facility] Cath Lab;  Service: Cardiology    HERNIA REPAIR      OTHER SURGICAL HISTORY      Excise varicocele    STENT, CORONARY, DALE  2019       ALLERGIES     Allergies   Allergen Reactions    Amoxicillin Hives     & generalized pain    Venlafaxine      lethargic       FAMILY HISTORY:  Family History   Problem Relation Age of Onset    Diabetes Type 2  Mother     Heart Disease Father 60    CABG Father 50        triple bypass    Diabetes Type 2  Father     Depression Sister     Substance Abuse Sister     Ovarian Cancer Maternal Grandmother     Brain Cancer Maternal Grandmother     Pancreatic Cancer Maternal Aunt     Prostate Cancer Maternal Uncle        SOCIAL HISTORY:  Social History     Socioeconomic History    Marital status:      Spouse name: None    Number of children: None    Years of education: None    Highest education level: None   Tobacco Use    Smoking status: Never    Smokeless tobacco: Never   Substance and Sexual Activity    Alcohol use: Yes     Comment: Alcoholic Drinks: 1-2 per  "year    Drug use: No    Sexual activity: Yes     Partners: Female       ROS:   Constitutional: No fever, chills, or sweats. No weight gain/loss   ENT: No visual disturbance, ear ache, epistaxis, sore throat  Allergies/Immunologic: Negative.   Respiratory: No cough, hemoptysia  Cardiovascular: As per HPI  GI: No nausea, vomiting, hematemesis, melena, or hematochezia  : No urinary frequency, dysuria, or hematuria  Integument: Negative  Psychiatric: Negative  Neuro: Negative  Endocrinology: Negative   Musculoskeletal: Negative    EXAM:  /85 (BP Location: Right arm, Patient Position: Sitting, Cuff Size: Adult Large)   Pulse 79   Ht 1.825 m (5' 11.85\")   Wt 108.5 kg (239 lb 1.6 oz)   SpO2 95%   BMI 32.56 kg/m    In general, the patient is a pleasant male in no apparent distress.    HEENT: NC/AT.  PERRLA.  EOMI.  Sclerae white, not injected.  Nares clear.  Pharynx without erythema or exudate.  Dentition intact.    Neck: No adenopathy.  No thyromegaly. Carotids +4/4 bilaterally without bruits.  No jugular venous distension.   Heart: RRR. Normal S1, S2 splits physiologically. No murmur, rub, click, or gallop. The PMI is in the 5th ICS in the midclavicular line. There is no heave.    Lungs: CTA.  No ronchi, wheezes, rales.  No dullness to percussion.   Abdomen: Soft, nontender, nondistended. No organomegaly.  No bruits.   Extremities: No clubbing, cyanosis, or edema.  The pulses are +4/4 at the radial, brachial, femoral, popliteal, DP, and PT sites bilaterally.  No bruits are noted.  Neurologic: Alert and oriented to person/place/time, normal speech, gait and affect  Skin: No petechiae, purpura or rash.    Labs:  LIPID RESULTS:  Lab Results   Component Value Date    CHOL 153 10/17/2023    HDL 20 (L) 10/17/2023    LDL  10/17/2023      Comment:      Cannot estimate LDL when triglyceride exceeds 400 mg/dL    LDL 59 07/21/2021    TRIG 682 (H) 10/17/2023    NHDL 133 (H) 10/17/2023       LIVER ENZYME RESULTS:  Lab " Results   Component Value Date    AST 27 11/16/2023    ALT 31 11/16/2023       CBC RESULTS:  Lab Results   Component Value Date    WBC 9.3 04/02/2023    RBC 2.88 (L) 04/02/2023    HGB 8.8 (L) 04/02/2023    HCT 26.0 (L) 04/02/2023    MCV 90 04/02/2023    MCH 30.6 04/02/2023    MCHC 33.8 04/02/2023    RDW 12.7 04/02/2023     04/02/2023       BMP RESULTS:  Lab Results   Component Value Date     11/16/2023    POTASSIUM 4.5 11/16/2023    POTASSIUM 4.4 09/27/2021    CHLORIDE 95 (L) 11/16/2023    CHLORIDE 101 09/27/2021    CO2 30 (H) 11/16/2023    CO2 28 09/27/2021    ANIONGAP 14 11/16/2023    ANIONGAP 8 09/27/2021    GLC 92 11/16/2023     (H) 09/27/2021    BUN 39.5 (H) 11/16/2023    BUN 46 (H) 09/27/2021    CR 7.32 (H) 11/16/2023    GFRESTIMATED 9 (L) 11/16/2023    GFRESTIMATED 23 (L) 10/05/2020    GFRESTBLACK 27 (L) 10/05/2020    BETHANY 10.6 (H) 11/16/2023        A1C RESULTS:  Lab Results   Component Value Date    A1C 9.2 (H) 10/17/2023       INR RESULTS:  Lab Results   Component Value Date    INR 1.13 09/27/2021    INR 1.03 10/04/2020       Procedures:    EKG: sin ryhtm, slow R-progression in precordial leads    NM Lexiscan    Cardiology reading      The nuclear stress test is abnormal.    There is a small area of a moderate degree of nontransmural infarction in the inferior segment(s) of the left ventricle. This appears to be in the right coronary artery distribution. No ischemia identified.    LVEDv 196ml. LVESv 96ml. LV EF 51%.    RX reading  Heart size is within normal limits. Normal caliber of the visualized  thoracic aorta and main pulmonary artery. Heavy coronary artery  atherosclerotic calcifications. No pericardial effusion. The  visualized mediastinal and hilar lymph nodes are not enlarged.     No pleural effusion or pneumothorax at this level. The visualized  lungs are clear.     Visualized upper abdomen is unremarkable. Median sternotomy wires. No  acute osseous lesions of the visualized  thorax. Bilateral  gynecomastia.                                                                      IMPRESSION:  1. No acute abnormality on the CT images of the lower chest and upper  abdomen.    Echocardiogram     Left ventricular wall thickness is normal. Left ventricular size is normal.  The visual ejection fraction is 50-55%. No regional wall motion abnormalities  are seen.  Right ventricular function, chamber size, wall motion, and thickness are  normal.  Trileaflet aortic sclerosis without stenosis.  The inferior vena cava was normal in size with preserved respiratory  variability. No pericardial effusion is present.     No significant changes noted other than LVEF appears visually minimally lower.  ______________________________________________________________________________  Left Ventricle  Left ventricular wall thickness is normal. Left ventricular size is normal.  The visual ejection fraction is 50-55%. Grade I or early diastolic  dysfunction. No regional wall motion abnormalities are seen.     Right Ventricle  Right ventricular function, chamber size, wall motion, and thickness are  normal.     Atria  The right atria appears normal. Mild left atrial enlargement is present.     Mitral Valve  The mitral valve is normal. Trace mitral insufficiency is present.     Aortic Valve  Trileaflet aortic sclerosis without stenosis. No aortic regurgitation is  present.     Tricuspid Valve  The tricuspid valve is normal. Trace tricuspid insufficiency is present. The  peak velocity of the tricuspid regurgitant jet is not obtainable.     Pulmonic Valve  The pulmonic valve is normal.     Vessels  The aorta root is normal. The inferior vena cava was normal in size with  preserved respiratory variability.     Pericardium  No pericardial effusion is present.     Compared to Previous Study  No significant changes noted.  ______________________________________________________________________________  MMode/2D Measurements &  Calculations  IVSd: 1.3 cm     LVIDd: 5.0 cm  LVIDs: 2.9 cm  LVPWd: 1.3 cm  FS: 42.4 %  LV mass(C)d: 258.2 grams  LV mass(C)dI: 117.1 grams/m2  Ao root diam: 3.5 cm  asc Aorta Diam: 3.4 cm  LVOT diam: 2.6 cm  LVOT area: 5.1 cm2  Ao root diam Index (cm/m2): 1.6  asc Aorta Diam Index (cm/m2): 1.5  LA Volume (BP): 65.6 ml  LA Volume Index (BP): 29.8 ml/m2     RWT: 0.52  TAPSE: 1.2 cm     Doppler Measurements & Calculations  MV E max jay: 60.8 cm/sec  MV A max jay: 89.5 cm/sec  MV E/A: 0.68  MV dec slope: 265.8 cm/sec2  MV dec time: 0.23 sec  PA acc time: 0.09 sec  E/E' av.3  Lateral E/e': 5.4  Medial E/e': 9.2  RV S Jay: 8.3 cm/sec     _________________________________  Assessment and Plan:     We discussed the results with patient.      There is a small area of a moderate degree of nontransmural infarction in the inferior segment(s) of the left ventricle. This appears to be in the right coronary artery distribution. No ischemia identified.    Taking into consideration of EKG, NM lexiscan and echocardiogram  Patient can be cleared for kidney transplant.    Deangelo Cage MD, PhD  Professor of Medicine  Division of Cardiology             CC  Patient Care Team:  Trinidad Hansen PA-C as PCP - General (Physician Assistant)  Aime Rios MD as MD (Internal Medicine)  Isai Haddad MD as MD (Critical Care)  Jarod Christy APRN CNP as Referring Physician (Nephrology)  Nayla Pendleton as Transplant Coordinator (Transplant)  Damari Magallon as LPN (Transplant)  Harrison Whitehead MD as Assigned Surgical Provider  Cielo Che MD as Assigned Heart and Vascular Provider  SELF, REFERRED

## 2023-11-16 NOTE — NURSING NOTE
Chief Complaint   Patient presents with    New Patient     New lipid management        Vitals were taken, medications reconciled.    Giulia Schofield CMA  12:19 PM

## 2023-11-21 ENCOUNTER — HOSPITAL ENCOUNTER (OUTPATIENT)
Dept: CARDIOLOGY | Facility: CLINIC | Age: 48
Discharge: HOME | End: 2023-11-21
Attending: INTERNAL MEDICINE
Payer: COMMERCIAL

## 2023-11-21 ENCOUNTER — HOSPITAL ENCOUNTER (OUTPATIENT)
Dept: NUCLEAR MEDICINE | Facility: CLINIC | Age: 48
Setting detail: NUCLEAR MEDICINE
Discharge: HOME | End: 2023-11-21
Attending: INTERNAL MEDICINE
Payer: COMMERCIAL

## 2023-11-21 VITALS — HEART RATE: 75 BPM | DIASTOLIC BLOOD PRESSURE: 99 MMHG | SYSTOLIC BLOOD PRESSURE: 150 MMHG

## 2023-11-21 DIAGNOSIS — Z01.818 ENCOUNTER FOR PRE-TRANSPLANT EVALUATION FOR KIDNEY AND PANCREAS TRANSPLANT: ICD-10-CM

## 2023-11-21 DIAGNOSIS — I25.83 CORONARY ARTERY DISEASE DUE TO LIPID RICH PLAQUE: ICD-10-CM

## 2023-11-21 DIAGNOSIS — E78.1 HYPERTRIGLYCERIDEMIA: ICD-10-CM

## 2023-11-21 DIAGNOSIS — Z86.79 HISTORY OF CARDIOMYOPATHY: ICD-10-CM

## 2023-11-21 DIAGNOSIS — I25.10 CORONARY ARTERY DISEASE DUE TO LIPID RICH PLAQUE: ICD-10-CM

## 2023-11-21 DIAGNOSIS — Z01.810 PRE-OPERATIVE CARDIOVASCULAR EXAMINATION: ICD-10-CM

## 2023-11-21 DIAGNOSIS — Z76.82 ORGAN TRANSPLANT CANDIDATE: ICD-10-CM

## 2023-11-21 LAB
CV STRESS MAX HR HE: 90
RATE PRESSURE PRODUCT: NORMAL
STRESS ECHO BASELINE DIASTOLIC HE: 99
STRESS ECHO BASELINE HR: 77 BPM
STRESS ECHO BASELINE SYSTOLIC BP: 150
STRESS ECHO CALCULATED PERCENT HR: 52 %
STRESS ECHO LAST STRESS DIASTOLIC BP: 89
STRESS ECHO LAST STRESS SYSTOLIC BP: 147
STRESS ECHO TARGET HR: 172

## 2023-11-21 PROCEDURE — 78452 HT MUSCLE IMAGE SPECT MULT: CPT | Mod: 26 | Performed by: INTERNAL MEDICINE

## 2023-11-21 PROCEDURE — 93017 CV STRESS TEST TRACING ONLY: CPT

## 2023-11-21 PROCEDURE — 93018 CV STRESS TEST I&R ONLY: CPT | Performed by: INTERNAL MEDICINE

## 2023-11-21 PROCEDURE — G1010 CDSM STANSON: HCPCS | Performed by: INTERNAL MEDICINE

## 2023-11-21 PROCEDURE — 93016 CV STRESS TEST SUPVJ ONLY: CPT | Performed by: INTERNAL MEDICINE

## 2023-11-21 PROCEDURE — 343N000001 HC RX 343: Performed by: INTERNAL MEDICINE

## 2023-11-21 PROCEDURE — A9502 TC99M TETROFOSMIN: HCPCS | Performed by: INTERNAL MEDICINE

## 2023-11-21 PROCEDURE — G1010 CDSM STANSON: HCPCS

## 2023-11-21 PROCEDURE — 250N000011 HC RX IP 250 OP 636: Performed by: INTERNAL MEDICINE

## 2023-11-21 RX ORDER — AMINOPHYLLINE 25 MG/ML
50-100 INJECTION, SOLUTION INTRAVENOUS
Status: DISCONTINUED | OUTPATIENT
Start: 2023-11-21 | End: 2023-11-22 | Stop reason: HOSPADM

## 2023-11-21 RX ORDER — REGADENOSON 0.08 MG/ML
0.4 INJECTION, SOLUTION INTRAVENOUS ONCE
Status: COMPLETED | OUTPATIENT
Start: 2023-11-21 | End: 2023-11-21

## 2023-11-21 RX ORDER — ACYCLOVIR 200 MG/1
0-1 CAPSULE ORAL
Status: DISCONTINUED | OUTPATIENT
Start: 2023-11-21 | End: 2023-11-22 | Stop reason: HOSPADM

## 2023-11-21 RX ORDER — ALBUTEROL SULFATE 90 UG/1
2 AEROSOL, METERED RESPIRATORY (INHALATION) EVERY 5 MIN PRN
Status: DISCONTINUED | OUTPATIENT
Start: 2023-11-21 | End: 2023-11-22 | Stop reason: HOSPADM

## 2023-11-21 RX ORDER — CAFFEINE CITRATE 20 MG/ML
60 SOLUTION INTRAVENOUS
Status: DISCONTINUED | OUTPATIENT
Start: 2023-11-21 | End: 2023-11-22 | Stop reason: HOSPADM

## 2023-11-21 RX ADMIN — TETROFOSMIN 41 MILLICURIE: 1.38 INJECTION, POWDER, LYOPHILIZED, FOR SOLUTION INTRAVENOUS at 08:56

## 2023-11-21 RX ADMIN — REGADENOSON 0.4 MG: 0.08 INJECTION, SOLUTION INTRAVENOUS at 08:54

## 2023-11-21 RX ADMIN — TETROFOSMIN 10.2 MILLICURIE: 1.38 INJECTION, POWDER, LYOPHILIZED, FOR SOLUTION INTRAVENOUS at 07:58

## 2023-11-21 NOTE — PROGRESS NOTES
Pt here for Lexiscan nuclear stress test. Medication and side effects reviewed with patient. Lung sounds clear to auscultation bilaterally. Denied caffeine use. Patient tolerated Lexiscan dose without any adverse reactions. VSS. Monitored post injection and then taken to the [facility] waiting room and instructed to wait there for nuclear medicine tech for follow up imaging.

## 2023-11-28 DIAGNOSIS — Z76.82 ORGAN TRANSPLANT CANDIDATE: ICD-10-CM

## 2023-11-28 DIAGNOSIS — N18.6 ESRD (END STAGE RENAL DISEASE) (H): ICD-10-CM

## 2023-11-28 DIAGNOSIS — E11.9 DIABETES MELLITUS TYPE 2, UNCOMPLICATED (H): Primary | ICD-10-CM

## 2023-12-07 ENCOUNTER — TELEPHONE (OUTPATIENT)
Dept: TRANSPLANT | Facility: CLINIC | Age: 48
End: 2023-12-07
Payer: COMMERCIAL

## 2023-12-07 NOTE — TELEPHONE ENCOUNTER
Called pt- cardiology has cleared him and pt has made endocrine appt. PCP has put pt on insulin pump and his blood sugars have been very well controlled since. A1C recheck is 12/17/23, will follow up after and if A1c has come down then will bring to committee for active status consideration. Pt verbalized understanding of information and has no further questions. Encouraged to reach out if questions arise.

## 2023-12-19 ENCOUNTER — LAB (OUTPATIENT)
Dept: LAB | Facility: CLINIC | Age: 48
End: 2023-12-19
Payer: COMMERCIAL

## 2023-12-19 DIAGNOSIS — Z01.818 ENCOUNTER FOR PRE-TRANSPLANT EVALUATION FOR KIDNEY AND PANCREAS TRANSPLANT: ICD-10-CM

## 2023-12-19 DIAGNOSIS — E78.1 HYPERTRIGLYCERIDEMIA: ICD-10-CM

## 2023-12-19 DIAGNOSIS — Z76.82 ORGAN TRANSPLANT CANDIDATE: ICD-10-CM

## 2023-12-19 DIAGNOSIS — I25.83 CORONARY ARTERY DISEASE DUE TO LIPID RICH PLAQUE: ICD-10-CM

## 2023-12-19 DIAGNOSIS — Z01.810 PRE-OPERATIVE CARDIOVASCULAR EXAMINATION: ICD-10-CM

## 2023-12-19 DIAGNOSIS — I25.10 CORONARY ARTERY DISEASE DUE TO LIPID RICH PLAQUE: ICD-10-CM

## 2023-12-19 DIAGNOSIS — Z86.79 HISTORY OF CARDIOMYOPATHY: ICD-10-CM

## 2023-12-19 LAB
APO A-I SERPL-MCNC: <6 MG/DL
CHOLEST SERPL-MCNC: 144 MG/DL
FASTING STATUS PATIENT QL REPORTED: YES
HBA1C MFR BLD: 7.4 %
HDLC SERPL-MCNC: 21 MG/DL
LDLC SERPL CALC-MCNC: ABNORMAL MG/DL
LDLC SERPL DIRECT ASSAY-MCNC: 42 MG/DL
NONHDLC SERPL-MCNC: 123 MG/DL
TRIGL SERPL-MCNC: 520 MG/DL

## 2023-12-19 PROCEDURE — 83695 ASSAY OF LIPOPROTEIN(A): CPT | Performed by: INTERNAL MEDICINE

## 2023-12-19 PROCEDURE — 83036 HEMOGLOBIN GLYCOSYLATED A1C: CPT | Performed by: INTERNAL MEDICINE

## 2023-12-19 PROCEDURE — 83721 ASSAY OF BLOOD LIPOPROTEIN: CPT | Performed by: INTERNAL MEDICINE

## 2023-12-19 PROCEDURE — 80061 LIPID PANEL: CPT | Performed by: PATHOLOGY

## 2023-12-19 PROCEDURE — 99000 SPECIMEN HANDLING OFFICE-LAB: CPT | Performed by: PATHOLOGY

## 2023-12-19 PROCEDURE — 36415 COLL VENOUS BLD VENIPUNCTURE: CPT | Performed by: PATHOLOGY

## 2023-12-20 ENCOUNTER — TELEPHONE (OUTPATIENT)
Dept: CARDIOLOGY | Facility: CLINIC | Age: 48
End: 2023-12-20
Payer: COMMERCIAL

## 2023-12-20 ENCOUNTER — COMMITTEE REVIEW (OUTPATIENT)
Dept: TRANSPLANT | Facility: CLINIC | Age: 48
End: 2023-12-20
Payer: COMMERCIAL

## 2023-12-20 ENCOUNTER — TELEPHONE (OUTPATIENT)
Dept: TRANSPLANT | Facility: CLINIC | Age: 48
End: 2023-12-20
Payer: COMMERCIAL

## 2023-12-20 DIAGNOSIS — R94.39 ABNORMAL STRESS TEST: Primary | ICD-10-CM

## 2023-12-20 NOTE — TELEPHONE ENCOUNTER
Due to abnormal stress test and known disease in RCA territory patient will undergo coronary angiogram as part of pre-transplant evaluation. Orders placed. JUANCARLOS Guardado CNP on 12/20/2023 at 1:03 PM

## 2023-12-20 NOTE — TELEPHONE ENCOUNTER
Called pt regarding committee and due to abnormal stress test and known disease in RCA territory patient will require angiogram as part of pre-transplant evaluation. Angiogram was ordered by cards NP and they will get a hold of pt to set up. Will follow up once angiogram is completed. Pt verbalized understanding of information and has no further questions. Encouraged to reach out if questions arise.

## 2023-12-20 NOTE — COMMITTEE REVIEW
Abdominal Committee Review Note     Evaluation Date: 9/27/2021  Committee Review Date: 12/20/2023    Organ being evaluated for: Kidney/Pancreas    Transplant Phase: Waitlist  Transplant Status: Inactive    Transplant Coordinator: Nayla Pendleton  Transplant Surgeon:       Referring Physician: Aime Rios    Primary Diagnosis: Diabetes Mellitus - Type II  Secondary Diagnosis:     Committee Review Members:  Cardiology Sarah Brennan, APRN CNP   Nephrology Isidoro Claros MD, Grisel Coombs MD, Jarod Christy, APRN CNP   Nurse Galdino Sim, BETSY, WAYLON CANO, BETSY   Nutrition Cindy Romero, RD   Pharmacist Trinidad Segal, [facility]    - Clinical Deepa Elysia Basilio, [facility], Komal Rios, [facility]   Transplant SNEHAL BARAHONA, RN, Jenny Bell, BETSY, Aleah Mccall, RN, Kim Fay, RN, Komal Yeager, RN, Sobia Underwood, NP, Sobia Rea, RN, Frances Cox, RN, Natalie Nina, BETSY, Jarvis Michaud MD   Transplant Surgery Jarvis Michaud MD       Transplant Eligibility: Insulin-dependent diabetes mellitus, Irreversible chronic kidney disease treated w/dialysis or expected need for dialysis    Committee Review Decision: Remain Inactive    Committee Chair Jarvis Michaud MD verbally attested to the committee's decision.    Committee Discussion Details: Reviewed cardiac history including previous angiogram and stress testing. Due to abnormal stress test and known disease in RCA territory patient will undergo coronary angiogram as part of pre-transplant evaluation. Will need to be re-presented.

## 2024-01-25 ENCOUNTER — MYC MEDICAL ADVICE (OUTPATIENT)
Dept: CARDIOLOGY | Facility: CLINIC | Age: 49
End: 2024-01-25
Payer: COMMERCIAL

## 2024-01-29 ENCOUNTER — TELEPHONE (OUTPATIENT)
Dept: CARDIOLOGY | Facility: CLINIC | Age: 49
End: 2024-01-29
Payer: COMMERCIAL

## 2024-01-29 NOTE — TELEPHONE ENCOUNTER
Unable to reach patient; LM to review pre-procedure instructions. Provided direct number for callback. Louise Duffy RN on 1/29/2024 at 1:16 PM    Reviewed pre-procedure instructions with patient; he states this is his 4th one so he knows what to expect. Advised patient can keep his diabetes sensor on during the procedure. Patient verbalized understanding and did not have any further questions at this time. Louise Duffy RN on 1/30/2024 at 2:02 PM    Pre-procedure instructions - Coronary Angiogram  Patient Education    Your arrival time is 9 AM.  Location is [address] Waiting Room  Please plan on being at the hospital all day.  At any time, emergencies and/or urgent cases may come up which could delay the start of your procedure.    Pre-procedure instructions - Coronary Angiogram  Shower in the evening before or the morning of the procedure  No solid food for 8 hours prior and nothing to drink 2 hours prior to arrival time  You can take your morning medications (except for diabetic and blood thinners) with sips of water.  Take 325 mg of Asprin 24 hours prior to the procedure and the morning of procedure.   You will need to arrange a ride to drop you off and pick you up, as you will be unable to drive home.  Prior to discharge you may be required to lay flat for approximately 2-4 hours in the recovery unit to ensure proper clotting of the artery. You will need a responsible adult to stay with you for 24 hours post-procedure.              Diabetic Medication Instructions  Hold oral diabetic medication in morning of your procedure and for 48 hours after IV contrast is given  Typical instructions for insulin diabetic medication holding are below. However, please reach out to your Primary Care Provider or Endocrinologist for specific instructions  DO NOT take any oral diabetic medication, short-acting diabetes  medications/insulin, humalog or regular insulin the morning of your test  Take   dose of long-acting insulin (Lantus, Levemir) the day of your test  Remember to bring your glucometer and insulin with you to take after your test if needed  DO NOT take injectable GLP-1 agonists semaglutide (Ozempic, Wegovy), dulaglutide (Trulicity), exenatide ER (Bydureon), tirzepatide (Mounjaro), or oral semaglutide (Rybelsus) for 7 days prior your procedure  Hold once daily injectable GLP-1 agonists exenatide (Byetta), liraglutide (Saxenda, Victoza), lixisenatide (Soligua) the day before and day of your procedure    You will need to follow up with one of our cardiology APPs 1-2 weeks after your procedure. If you need help scheduling or rescheduling your appointment, please call [phone].

## 2024-01-29 NOTE — TELEPHONE ENCOUNTER
----- Message from Sana Arias sent at 12/21/2023  1:51 PM CST -----  Cath Lab Case Request/Order    Location: [address] Waiting Room    Procedure: Coronary Angiogram    Procedure Date: 2-1-24    Patient Arrival Time: 9:00 AM    Procedure Time: 2nd case to follow    Ordering Provider: Sarah Brennan    Performing Cardiologist: Dr. Delroy Carpio    Inpatient Bed Needed: No    Post-  Procedure KAT appointment scheduled (1 - 2 weeks): NO     Date:      Provider:     Communicated Patient Instructions:     NPO, nothing to eat 8 hours and drink 2 hours before arrival time: No     , need to arrange a ride home - unable to drive post- procedure: No     Adult at home, need a responsible adult to stay with patient 24 hours post- procedure: NO    Appointment was scheduled: Over the phone    Patient expressed understanding of above instructions and denied further questions at this time.    Sana Arias

## 2024-01-31 DIAGNOSIS — R94.39 ABNORMAL STRESS TEST: Primary | ICD-10-CM

## 2024-01-31 RX ORDER — ASPIRIN 81 MG/1
243 TABLET, CHEWABLE ORAL ONCE
Status: CANCELLED | OUTPATIENT
Start: 2024-01-31

## 2024-01-31 RX ORDER — LIDOCAINE 40 MG/G
CREAM TOPICAL
Status: CANCELLED | OUTPATIENT
Start: 2024-01-31

## 2024-01-31 RX ORDER — ASPIRIN 325 MG
325 TABLET ORAL ONCE
Status: CANCELLED | OUTPATIENT
Start: 2024-01-31 | End: 2024-01-31

## 2024-01-31 RX ORDER — POTASSIUM CHLORIDE 1500 MG/1
40 TABLET, EXTENDED RELEASE ORAL
Status: CANCELLED | OUTPATIENT
Start: 2024-01-31

## 2024-01-31 RX ORDER — SODIUM CHLORIDE 9 MG/ML
INJECTION, SOLUTION INTRAVENOUS CONTINUOUS
Status: CANCELLED | OUTPATIENT
Start: 2024-01-31

## 2024-01-31 RX ORDER — POTASSIUM CHLORIDE 1500 MG/1
20 TABLET, EXTENDED RELEASE ORAL
Status: CANCELLED | OUTPATIENT
Start: 2024-01-31

## 2024-02-01 ENCOUNTER — APPOINTMENT (OUTPATIENT)
Dept: LAB | Facility: CLINIC | Age: 49
End: 2024-02-01
Attending: INTERNAL MEDICINE
Payer: MEDICARE

## 2024-02-01 ENCOUNTER — HOSPITAL ENCOUNTER (OUTPATIENT)
Facility: CLINIC | Age: 49
Discharge: HOME | End: 2024-02-01
Attending: INTERNAL MEDICINE | Admitting: INTERNAL MEDICINE
Payer: MEDICARE

## 2024-02-01 ENCOUNTER — APPOINTMENT (OUTPATIENT)
Dept: MEDSURG UNIT | Facility: CLINIC | Age: 49
End: 2024-02-01
Attending: INTERNAL MEDICINE
Payer: MEDICARE

## 2024-02-01 VITALS
HEIGHT: 71 IN | RESPIRATION RATE: 16 BRPM | BODY MASS INDEX: 33.4 KG/M2 | TEMPERATURE: 97.6 F | SYSTOLIC BLOOD PRESSURE: 108 MMHG | HEART RATE: 79 BPM | OXYGEN SATURATION: 98 % | DIASTOLIC BLOOD PRESSURE: 67 MMHG | WEIGHT: 238.6 LBS

## 2024-02-01 DIAGNOSIS — R94.39 ABNORMAL STRESS TEST: ICD-10-CM

## 2024-02-01 DIAGNOSIS — Z95.5 S/P RIGHT CORONARY ARTERY (RCA) STENT PLACEMENT: ICD-10-CM

## 2024-02-01 DIAGNOSIS — Z98.890 STATUS POST CORONARY ANGIOGRAM: Primary | ICD-10-CM

## 2024-02-01 LAB
ACT BLD: 213 SECONDS (ref 74–150)
ANION GAP SERPL CALCULATED.3IONS-SCNC: 14 MMOL/L (ref 7–15)
APTT PPP: 30 SECONDS (ref 22–38)
BUN SERPL-MCNC: 36.4 MG/DL (ref 6–20)
CALCIUM SERPL-MCNC: 10.8 MG/DL (ref 8.6–10)
CHLORIDE SERPL-SCNC: 99 MMOL/L (ref 98–107)
CHOLEST SERPL-MCNC: 179 MG/DL
CREAT SERPL-MCNC: 7.11 MG/DL (ref 0.67–1.17)
DEPRECATED HCO3 PLAS-SCNC: 22 MMOL/L (ref 22–29)
EGFRCR SERPLBLD CKD-EPI 2021: 9 ML/MIN/1.73M2
ERYTHROCYTE [DISTWIDTH] IN BLOOD BY AUTOMATED COUNT: 13.2 % (ref 10–15)
GLUCOSE BLDC GLUCOMTR-MCNC: 134 MG/DL (ref 70–99)
GLUCOSE SERPL-MCNC: 172 MG/DL (ref 70–99)
HCT VFR BLD AUTO: 37.6 % (ref 40–53)
HDLC SERPL-MCNC: 18 MG/DL
HGB BLD-MCNC: 12.6 G/DL (ref 13.3–17.7)
INR PPP: 1.16 (ref 0.85–1.15)
LDLC SERPL CALC-MCNC: ABNORMAL MG/DL
MCH RBC QN AUTO: 31.3 PG (ref 26.5–33)
MCHC RBC AUTO-ENTMCNC: 33.5 G/DL (ref 31.5–36.5)
MCV RBC AUTO: 93 FL (ref 78–100)
NONHDLC SERPL-MCNC: 161 MG/DL
PLATELET # BLD AUTO: 173 10E3/UL (ref 150–450)
POTASSIUM SERPL-SCNC: 5.3 MMOL/L (ref 3.4–5.3)
RBC # BLD AUTO: 4.03 10E6/UL (ref 4.4–5.9)
SODIUM SERPL-SCNC: 135 MMOL/L (ref 135–145)
TRIGL SERPL-MCNC: 693 MG/DL
WBC # BLD AUTO: 7.6 10E3/UL (ref 4–11)

## 2024-02-01 PROCEDURE — 250N000013 HC RX MED GY IP 250 OP 250 PS 637: Performed by: INTERNAL MEDICINE

## 2024-02-01 PROCEDURE — 80061 LIPID PANEL: CPT

## 2024-02-01 PROCEDURE — 258N000003 HC RX IP 258 OP 636

## 2024-02-01 PROCEDURE — 93454 CORONARY ARTERY ANGIO S&I: CPT | Mod: 26 | Performed by: INTERNAL MEDICINE

## 2024-02-01 PROCEDURE — 99153 MOD SED SAME PHYS/QHP EA: CPT | Performed by: INTERNAL MEDICINE

## 2024-02-01 PROCEDURE — 92928 PRQ TCAT PLMT NTRAC ST 1 LES: CPT | Mod: RC | Performed by: INTERNAL MEDICINE

## 2024-02-01 PROCEDURE — C9600 PERC DRUG-EL COR STENT SING: HCPCS | Performed by: INTERNAL MEDICINE

## 2024-02-01 PROCEDURE — 80048 BASIC METABOLIC PNL TOTAL CA: CPT

## 2024-02-01 PROCEDURE — 93454 CORONARY ARTERY ANGIO S&I: CPT | Mod: XU | Performed by: INTERNAL MEDICINE

## 2024-02-01 PROCEDURE — C1769 GUIDE WIRE: HCPCS | Performed by: INTERNAL MEDICINE

## 2024-02-01 PROCEDURE — 999N000134 HC STATISTIC PP CARE STAGE 3

## 2024-02-01 PROCEDURE — C1894 INTRO/SHEATH, NON-LASER: HCPCS | Performed by: INTERNAL MEDICINE

## 2024-02-01 PROCEDURE — 250N000011 HC RX IP 250 OP 636: Performed by: INTERNAL MEDICINE

## 2024-02-01 PROCEDURE — 999N000054 HC STATISTIC EKG NON-CHARGEABLE

## 2024-02-01 PROCEDURE — C1874 STENT, COATED/COV W/DEL SYS: HCPCS | Performed by: INTERNAL MEDICINE

## 2024-02-01 PROCEDURE — 93010 ELECTROCARDIOGRAM REPORT: CPT | Performed by: INTERNAL MEDICINE

## 2024-02-01 PROCEDURE — 999N000142 HC STATISTIC PROCEDURE PREP ONLY

## 2024-02-01 PROCEDURE — 93005 ELECTROCARDIOGRAM TRACING: CPT

## 2024-02-01 PROCEDURE — 93799 UNLISTED CV SVC/PROCEDURE: CPT | Performed by: INTERNAL MEDICINE

## 2024-02-01 PROCEDURE — 99152 MOD SED SAME PHYS/QHP 5/>YRS: CPT | Performed by: INTERNAL MEDICINE

## 2024-02-01 PROCEDURE — 36415 COLL VENOUS BLD VENIPUNCTURE: CPT

## 2024-02-01 PROCEDURE — C1760 CLOSURE DEV, VASC: HCPCS | Performed by: INTERNAL MEDICINE

## 2024-02-01 PROCEDURE — 250N000009 HC RX 250: Performed by: INTERNAL MEDICINE

## 2024-02-01 PROCEDURE — 85610 PROTHROMBIN TIME: CPT

## 2024-02-01 PROCEDURE — 99152 MOD SED SAME PHYS/QHP 5/>YRS: CPT | Mod: GC | Performed by: INTERNAL MEDICINE

## 2024-02-01 PROCEDURE — 85347 COAGULATION TIME ACTIVATED: CPT

## 2024-02-01 PROCEDURE — 272N000001 HC OR GENERAL SUPPLY STERILE: Performed by: INTERNAL MEDICINE

## 2024-02-01 PROCEDURE — C1887 CATHETER, GUIDING: HCPCS | Performed by: INTERNAL MEDICINE

## 2024-02-01 PROCEDURE — 85027 COMPLETE CBC AUTOMATED: CPT

## 2024-02-01 PROCEDURE — 85730 THROMBOPLASTIN TIME PARTIAL: CPT

## 2024-02-01 PROCEDURE — 93571 IV DOP VEL&/PRESS C FLO 1ST: CPT | Mod: 26 | Performed by: INTERNAL MEDICINE

## 2024-02-01 PROCEDURE — 82962 GLUCOSE BLOOD TEST: CPT

## 2024-02-01 DEVICE — CLOSURE ANGIOSEAL 6FR 610130: Type: IMPLANTABLE DEVICE | Status: FUNCTIONAL

## 2024-02-01 DEVICE — STENT CORONARY DES SYNERGY XD MR US 3.50X12MM H7493941812350: Type: IMPLANTABLE DEVICE | Status: FUNCTIONAL

## 2024-02-01 RX ORDER — ICOSAPENT ETHYL 1 G/1
2 CAPSULE ORAL 2 TIMES DAILY WITH MEALS
Status: ON HOLD | COMMUNITY
End: 2024-07-30

## 2024-02-01 RX ORDER — ASPIRIN 81 MG/1
81 TABLET, CHEWABLE ORAL DAILY
Qty: 30 TABLET | Refills: 3 | Status: SHIPPED | OUTPATIENT
Start: 2024-02-01

## 2024-02-01 RX ORDER — FENTANYL CITRATE 50 UG/ML
INJECTION, SOLUTION INTRAMUSCULAR; INTRAVENOUS
Status: DISCONTINUED | OUTPATIENT
Start: 2024-02-01 | End: 2024-02-01 | Stop reason: HOSPADM

## 2024-02-01 RX ORDER — IOPAMIDOL 755 MG/ML
INJECTION, SOLUTION INTRAVASCULAR
Status: DISCONTINUED | OUTPATIENT
Start: 2024-02-01 | End: 2024-02-01 | Stop reason: HOSPADM

## 2024-02-01 RX ORDER — HEPARIN SODIUM 1000 [USP'U]/ML
INJECTION, SOLUTION INTRAVENOUS; SUBCUTANEOUS
Status: DISCONTINUED | OUTPATIENT
Start: 2024-02-01 | End: 2024-02-01 | Stop reason: HOSPADM

## 2024-02-01 RX ORDER — ASPIRIN 81 MG/1
243 TABLET, CHEWABLE ORAL ONCE
Status: COMPLETED | OUTPATIENT
Start: 2024-02-01 | End: 2024-02-01

## 2024-02-01 RX ORDER — ASPIRIN 325 MG
325 TABLET ORAL ONCE
Status: COMPLETED | OUTPATIENT
Start: 2024-02-01 | End: 2024-02-01

## 2024-02-01 RX ORDER — POTASSIUM CHLORIDE 750 MG/1
20 TABLET, EXTENDED RELEASE ORAL
Status: DISCONTINUED | OUTPATIENT
Start: 2024-02-01 | End: 2024-02-01 | Stop reason: HOSPADM

## 2024-02-01 RX ORDER — NITROGLYCERIN 0.4 MG/1
0.4 TABLET SUBLINGUAL EVERY 5 MIN PRN
Status: DISCONTINUED | OUTPATIENT
Start: 2024-02-01 | End: 2024-02-01 | Stop reason: HOSPADM

## 2024-02-01 RX ORDER — HYDRALAZINE HYDROCHLORIDE 20 MG/ML
INJECTION INTRAMUSCULAR; INTRAVENOUS
Status: DISCONTINUED | OUTPATIENT
Start: 2024-02-01 | End: 2024-02-01 | Stop reason: HOSPADM

## 2024-02-01 RX ORDER — SODIUM CHLORIDE 9 MG/ML
INJECTION, SOLUTION INTRAVENOUS CONTINUOUS
Status: DISCONTINUED | OUTPATIENT
Start: 2024-02-01 | End: 2024-02-01 | Stop reason: HOSPADM

## 2024-02-01 RX ORDER — ASPIRIN 81 MG/1
81 TABLET ORAL DAILY
Status: DISCONTINUED | OUTPATIENT
Start: 2024-02-02 | End: 2024-02-01 | Stop reason: HOSPADM

## 2024-02-01 RX ORDER — HYDRALAZINE HYDROCHLORIDE 20 MG/ML
10 INJECTION INTRAMUSCULAR; INTRAVENOUS EVERY 4 HOURS PRN
Status: DISCONTINUED | OUTPATIENT
Start: 2024-02-01 | End: 2024-02-01 | Stop reason: HOSPADM

## 2024-02-01 RX ORDER — POTASSIUM CHLORIDE 750 MG/1
40 TABLET, EXTENDED RELEASE ORAL
Status: DISCONTINUED | OUTPATIENT
Start: 2024-02-01 | End: 2024-02-01 | Stop reason: HOSPADM

## 2024-02-01 RX ORDER — ONDANSETRON 2 MG/ML
4 INJECTION INTRAMUSCULAR; INTRAVENOUS EVERY 6 HOURS PRN
Status: DISCONTINUED | OUTPATIENT
Start: 2024-02-01 | End: 2024-02-01 | Stop reason: HOSPADM

## 2024-02-01 RX ORDER — LIDOCAINE 40 MG/G
CREAM TOPICAL
Status: DISCONTINUED | OUTPATIENT
Start: 2024-02-01 | End: 2024-02-01 | Stop reason: HOSPADM

## 2024-02-01 RX ORDER — ONDANSETRON 4 MG/1
4 TABLET, ORALLY DISINTEGRATING ORAL EVERY 6 HOURS PRN
Status: DISCONTINUED | OUTPATIENT
Start: 2024-02-01 | End: 2024-02-01 | Stop reason: HOSPADM

## 2024-02-01 RX ADMIN — SODIUM CHLORIDE: 9 INJECTION, SOLUTION INTRAVENOUS at 09:29

## 2024-02-01 ASSESSMENT — ENCOUNTER SYMPTOMS
EYES NEGATIVE: 1
ALLERGIC/IMMUNOLOGIC NEGATIVE: 1
ENDOCRINE NEGATIVE: 1
HEMATOLOGIC/LYMPHATIC NEGATIVE: 1
MUSCULOSKELETAL NEGATIVE: 1
RESPIRATORY NEGATIVE: 1
GASTROINTESTINAL NEGATIVE: 1
PSYCHIATRIC NEGATIVE: 1
CONSTITUTIONAL NEGATIVE: 1
CARDIOVASCULAR NEGATIVE: 1
NEUROLOGICAL NEGATIVE: 1

## 2024-02-01 ASSESSMENT — ACTIVITIES OF DAILY LIVING (ADL)
ADLS_ACUITY_SCORE: 35

## 2024-02-01 NOTE — PROGRESS NOTES
Bedrest completed at 1430. He ambulated and tolerated a regular diet. Patient states that because of dialysis he does not make much urine so he is not going to be able to void. He is alert and oriented x 4 and able to make needs known. Right groin site has remained stable and unchanged throughout recovery including post ambulation: soft and flat to palpation with no bleeding or hematoma. PIV removed. Patient and wife verbalized understanding of all discharge instructions, no questions asked. He was assisted to discharge pharmacy accompanied by this RN and wife to get prescriptions and then to main entrance to get car from [facility] for discharge home with wife.

## 2024-02-01 NOTE — Clinical Note
Contrast risk dose (3.7 * GFR)    33 mL for 100%, 25 mL for 75%. Patient with L AVF, HD MWF. Plans for HD tomorrow.

## 2024-02-01 NOTE — PRE-PROCEDURE
GENERAL PRE-PROCEDURE:   Procedure:  Coronary angiogram with possible intervention  Date/Time:  2/1/2024 9:53 AM    Written consent obtained?: Yes    Risks and benefits: Risks, benefits and alternatives were discussed    DC Plan: Appropriate discharge home plan in place for patients who are going home after procedure   Consent given by:  Patient  Patient states understanding of procedure being performed: Yes    Patient's understanding of procedure matches consent: Yes    Procedure consent matches procedure scheduled: Yes    Expected level of sedation:  Moderate  Appropriately NPO:  Yes  ASA Class:  3  Mallampati  :  Grade 2- soft palate, base of uvula, tonsillar pillars, and portion of posterior pharyngeal wall visible  Lungs:  Lungs clear with good breath sounds bilaterally  Heart:  Normal heart sounds and rate  History & Physical reviewed:  History and physical reviewed and no updates needed  Statement of review:  I have reviewed the lab findings, diagnostic data, medications, and the plan for sedation

## 2024-02-01 NOTE — H&P
Siva Ramey is an 48 year old male with a history of CKD, diabetic nephropathy, CAD, DM type II, dyslipidemia, S/P MI, dyspnea, ischemic cardiomyopathy who is here for an angiogram as part of his pre-op clearance for kidney Tx.     L upper arm AV fistula, undergoing iHD     Past Medical History:   Diagnosis Date    Acquired elevated diaphragm     Anemia in chronic kidney disease     Angina pectoris (H24)     Chronic kidney disease     Coronary artery disease     Diabetes mellitus, type II (H) 12/2001    Diabetic nephropathy (H)     MARQUEZ (dyspnea on exertion)     Dyslipidemia 12/2001    End stage renal disease (H)     History of blood transfusion 2004    Hypertension     takes medication    Ischemic cardiomyopathy     Metabolic acidosis     Myocardial infarction (H)     STEMI -Diagonal branch of the LAD    Obesity (BMI 30-39.9)     Peripheral neuropathy     Proteinuria     Retinopathy     Vitamin D deficiency      Past Surgical History:   Procedure Laterality Date    BACK SURGERY  2009    L5 disc cut    BYPASS GRAFT ARTERY CORONARY  06/20/2019    2 vessels    CV CORONARY ANGIOGRAM N/A 1/13/2019    Procedure: Coronary Angiogram;  Surgeon: Cielo Che MD;  Location: [facility] Cath Lab;  Service: Cardiology    CV CORONARY ANGIOGRAM N/A 5/2/2019    Procedure: Coronary Angiogram;  Surgeon: Cielo Che MD;  Location: [facility] Cath Lab;  Service: Cardiology    CV CORONARY ANGIOGRAM N/A 4/30/2020    Procedure: Coronary Angiogram;  Surgeon: Cielo Che MD;  Location: [facility] Cath Lab;  Service: Cardiology    CV CORONARY ANGIOGRAM N/A 7/22/2021    Procedure: CV CORONARY ANGIOGRAM;  Surgeon: Adrian Crow MD;  Location: [facility] CATH LAB CV    CV FRACTIONAL FLOW RATIO WIRE N/A 7/22/2021    Procedure: Fractional Flow Ratio Wire;  Surgeon: Adrian Crow MD;  Location: [facility] CATH LAB CV    CV LEFT HEART CATH N/A 7/22/2021    Procedure: Left Heart Cath;  Surgeon: Adrian Crow MD;  Location:   [facility] CATH LAB CV    CV LEFT HEART CATHETERIZATION WITH LEFT VENTRICULOGRAM N/A 1/13/2019    Procedure: Left Heart Catheterization with Left Ventriculogram;  Surgeon: Cielo Che MD;  Location: [facility] Cath Lab;  Service: Cardiology    CV LEFT HEART CATHETERIZATION WITHOUT LEFT VENTRICULOGRAM Left 4/30/2020    Procedure: Left Heart Catheterization Without Left Ventriculogram;  Surgeon: Cielo Che MD;  Location: [facility] Cath Lab;  Service: Cardiology    CV RIGHT HEART CATHETERIZATION N/A 4/30/2020    Procedure: Right Heart Catheterization;  Surgeon: Cielo Che MD;  Location: [facility] Cath Lab;  Service: Cardiology    HERNIA REPAIR      OTHER SURGICAL HISTORY      Excise varicocele    STENT, CORONARY, DALE  2019     Current Outpatient Medications   Medication Instructions    acetaminophen (TYLENOL) 500 mg, Oral    albuterol (PROAIR HFA/PROVENTIL HFA/VENTOLIN HFA) 108 (90 Base) MCG/ACT inhaler 2 puffs, Inhalation, EVERY 4 HOURS PRN    ALPRAZolam (XANAX) 0.25 mg, Oral    amLODIPine (NORVASC) 10 mg, Oral, DAILY    aspirin (ASA) 81 mg, Oral, DAILY, Starting tomorrow.    atorvastatin (LIPITOR) 80 mg, Oral, AT BEDTIME    BD PEN NEEDLE JAMIN 2ND GEN 32G X 4 MM miscellaneous No dose, route, or frequency recorded.    carvedilol (COREG) 37.5 mg, Oral, 2 TIMES DAILY WITH MEALS    cloNIDine (CATAPRES) 0.1 mg, Oral, 3 TIMES DAILY PRN    fluticasone-salmeterol (ADVAIR) 250-50 MCG/ACT inhaler 1 puff, Inhalation, EVERY 12 HOURS    furosemide (LASIX) 40 MG tablet TAKE 1 TABLET(40 MG) BY MOUTH DAILY    glucose (BD GLUCOSE) 4 g chewable tablet glucose 4 gram chewable tablet   CHEW AND SWALLOW 4 TIMES DAILY AS NEEDED    hydrALAZINE (APRESOLINE) 25 mg, Oral, 2 TIMES DAILY    icosapent ethyl (VASCEPA) 1 g CAPS capsule Oral, 2 TIMES DAILY WITH MEALS    icosapent ethyl (VASCEPA) 2 g, Oral, 2 TIMES DAILY WITH MEALS    insulin aspart (NOVOLOG PEN) 5 Units, Subcutaneous, 3 TIMES DAILY BEFORE MEALS    insulin  "glargine-lixisenatide (SOLIQUA) pen 25 Units, Subcutaneous, DAILY    nitroGLYcerin (NITROSTAT) 0.4 MG sublingual tablet PLACE ONE TABLET UNDER TONGUE AS NEEDED FOR CHEST PAIN AS DIRECTED FOR 3 DOSES. IF SYMPTOMS PERSIST 5 MINUTES AFTER 1ST DOSE CALL 911    ticagrelor (BRILINTA) 90 mg, Oral, 2 TIMES DAILY    traMADol (ULTRAM) 100 mg, Oral    vitamin D3 (CHOLECALCIFEROL) 5,000 Units, DAILY     Allergies:   Allergies   Allergen Reactions    Amoxicillin Hives     & generalized pain    Venlafaxine      lethargic       Active Problems:    * No active hospital problems. *    Blood pressure 116/71, pulse 80, temperature 97.6  F (36.4  C), temperature source Oral, resp. rate 18, height 1.803 m (5' 11\"), weight 108.2 kg (238 lb 9.6 oz), SpO2 96%.    Review of Systems   Constitutional: Negative.    HENT: Negative.     Eyes: Negative.    Respiratory: Negative.     Cardiovascular: Negative.    Gastrointestinal: Negative.    Endocrine: Negative.         Insulin pump in place, pt placed basal rate on hold until post procedure   Genitourinary: Negative.    Musculoskeletal: Negative.    Skin: Negative.    Allergic/Immunologic: Negative.    Neurological: Negative.    Hematological: Negative.    Psychiatric/Behavioral: Negative.         Physical Exam  Vitals and nursing note reviewed. Exam conducted with a chaperone present.   Constitutional:       Appearance: Normal appearance. He is normal weight.   HENT:      Head: Normocephalic and atraumatic.      Nose: Nose normal.      Mouth/Throat:      Mouth: Mucous membranes are moist.      Pharynx: Oropharynx is clear.   Eyes:      Pupils: Pupils are equal, round, and reactive to light.   Cardiovascular:      Rate and Rhythm: Normal rate and regular rhythm.      Pulses: Normal pulses.   Pulmonary:      Effort: Pulmonary effort is normal.   Abdominal:      Palpations: Abdomen is soft.   Genitourinary:     Comments: Deferred   Musculoskeletal:         General: Normal range of motion.      " Cervical back: Normal range of motion and neck supple.   Skin:     General: Skin is warm and dry.      Capillary Refill: Capillary refill takes less than 2 seconds.   Neurological:      Mental Status: He is alert and oriented to person, place, and time.   Psychiatric:         Mood and Affect: Mood normal.         Behavior: Behavior normal.         Thought Content: Thought content normal.         Judgment: Judgment normal.         Assessment:  All labs and chart reviewed. Will proceed with scheduled procedure    Plan:  Coronary angiogram with possible intervention    Yaquelin Chrissy BAUER [facility]  Cardiology ICU  Pager [phone]    2/1/2024

## 2024-02-01 NOTE — PROGRESS NOTES
D/I/A: Pt roomed on 3C in bay [address].  Arrived via litter and accompanied by CCL RN. On/Off: On monitor.  VSSA.  Rhythm upon arrival SR on monitor.  Denies pain or sob.  Reviewed activity restrictions and when to notify RN, ie-changes to breathing or increased chest pressure or chest pain.  CCL access:  Angioseal to right groin. Off bedrest at 1430. Site C/D/I, negative for a hematoma.   P: Continue to monitor status.  Discharge to home once meeting criteria.

## 2024-02-01 NOTE — DISCHARGE INSTRUCTIONS
Going Home after Coronary Angioplasty or Stent Placement  For 24 hours:        Have an adult stay with you for 24 hours.        Relax and take it easy.        Drink plenty of fluids.        You may eat your normal diet, unless your doctor tells you otherwise.        Do NOT make any important or legal decisions.        Do NOT drive or operate machines at home or at work.        Do NOT drink alcohol.   Do NOT smoke.     Medicines:        If you have begun Plavix (clopidogrel), Effient (prasugrel), or Brilinta (ticagrelor), do not stop taking it until you talk to your heart doctor (cardiologist).        If you are on metformin (Glucophage), do not restart it until you have blood tests (within 2 to 3 days after discharge). When your doctor tells you it is safe, you may restart the metformin.        If you have stopped any other medicines, check with your nurse or provider about when to restart them.    Care of groin site:        Remove the Band-Aid after 24 hours. If there is minor oozing, apply another Band-aid and remove it after 12 hours.         Do NOT take a bath, or use a hot tub or pool for at least 3 days. You may shower tomorrow afternoon.        It is normal to have a small bruise or lump at the site.        Do not scrub the site.        Do not use lotion or powder near the puncture site for 3 days.        For the first 2 days: Do not stoop or squat. When you cough, sneeze or move your bowels, hold your hand over the puncture site and press gently.        Do not lift more than 10 pounds for at least 3 to 5 days.        For 2 days, do NOT have sex or do any heavy exercise.     If you start bleeding from the site in your groin:  Lie down flat and press firmly on the site.  Call your physician immediately, or, come to the emergency room.    Call 911 right away if you have bleeding that is heavy or does not stop.     Call your doctor if:        You have a large or growing hard lump around the site.        The site  is red, swollen, hot or tender.        Blood or fluid is draining from the site.        You have chills or a fever greater than 101 F (38 C).        Your leg turns bluish, feels numb or cool.        You have hives, a rash or unusual itching.     [facility] [place]:   [phone] (7 days a week)      Cardiology Fellow on call (24 hours per day) at [facility], [place]:   [phone] (ask for Cardiology Fellow on call)

## 2024-02-01 NOTE — PROGRESS NOTES
Pt arrived for CORS. VSS on RA, denies pain. Pt's continuous insulin pump stopped per Yaquelin Lowe NP. . Groin area prepped. Pedal pulses marked (+3). PIV infusing NS @ 10, pt on dialysis. Labs resulted, Creat high -NP aware. Pt took 324 mg of ASA this AM. Left arm fistula. Consent signed. H&P current. Wife at the bedside.

## 2024-02-02 ENCOUNTER — TELEPHONE (OUTPATIENT)
Dept: CARDIOLOGY | Facility: CLINIC | Age: 49
End: 2024-02-02
Payer: COMMERCIAL

## 2024-02-02 DIAGNOSIS — Z01.810 PRE-OPERATIVE CARDIOVASCULAR EXAMINATION: ICD-10-CM

## 2024-02-02 DIAGNOSIS — Z76.82 ORGAN TRANSPLANT CANDIDATE: ICD-10-CM

## 2024-02-02 DIAGNOSIS — N18.6 ESRD (END STAGE RENAL DISEASE) (H): Primary | ICD-10-CM

## 2024-02-02 LAB
ATRIAL RATE - MUSE: 79 BPM
DIASTOLIC BLOOD PRESSURE - MUSE: NORMAL MMHG
INTERPRETATION ECG - MUSE: NORMAL
P AXIS - MUSE: 51 DEGREES
PR INTERVAL - MUSE: 198 MS
QRS DURATION - MUSE: 116 MS
QT - MUSE: 416 MS
QTC - MUSE: 477 MS
R AXIS - MUSE: 85 DEGREES
SYSTOLIC BLOOD PRESSURE - MUSE: NORMAL MMHG
T AXIS - MUSE: 75 DEGREES
VENTRICULAR RATE- MUSE: 79 BPM

## 2024-02-02 NOTE — TELEPHONE ENCOUNTER
LVM to see how pt is doing s/p angiogram. Will send Interlude message as well.     IBAN to his RCA.   Right femoral artery used for access.   ASA dose reduced to 81 mg and Brilinta is new at discharge.       Post Procedure Instructions:  For 24 hours:    Have an adult stay with you for 24 hours.    Relax and take it easy.    Drink plenty of fluids.    You may eat your normal diet, unless your doctor tells you otherwise.    Do NOT make any important or legal decisions.    Do NOT drive or operate machines at home or at work.    Do NOT drink alcohol.    Do NOT smoke.   What does the site look like?  Review: femoral site  It is normal to have soreness and or bruising at the puncture site and mild tingling in your hand for up to 3 days. The site should be flat and dry.  Groin   No heavy lifting (10 pounds) for 5-7 days.               Flat and dry, no bruising     Care of wrist or arm site:    It is normal to have soreness at the puncture site and mild tingling in your hand for up to 3 days.    Remove the Band-Aid after 24 hours. If there is minor oozing, apply another Band-aid and remove it after 12 hours.    Do NOT take a bath, or use a hot tub or pool for the next 48 hours. You may shower.    It is normal to have a small bruise. There should not be a lump at the site.    Do not scrub the site.    Do not use lotion or powder near the puncture site for 3 days.     Activity Restrictions    For 2 days, do not use your hand or arm to support your weight (such as rising from a chair) or bend your wrist  (such as lifting a garage door).    For 2 days, do not lift more than 5 pounds or exercise your arm (tennis, golf or bowling).        If you start bleeding from the site in your arm:  Sit down and press firmly on the site with your fingers for 10 minutes. Call your doctor as soon as you can.  If the bleeding stops, sit still and keep your wrist straight for 2 hours.  Do NOT take a bath, or use a hot tub or pool for at least 3  days. You may shower.  reviewed     Call your doctor if:  You have a large or growing hard lump around the site.  The site is red, swollen, hot or tender.  Blood or fluid is draining from the site.  You have chills or a fever greater than 101 F (38 C).  Your leg or arm feels numb or cool.  You have hives, a rash or unusual itching.  reviewed     Are you having any pain? none     How is your activity tolerance?    For 2 days, do NOT have sex or do any heavy exercise.  Do you have someone at home to assist you with your daily activities?  home     Review Medications: Dual antiplatelet therapy  If you have started taking Brilinta do not stop taking it until you talk to your heart doctor (cardiologist). Dual antiplatelet therapy  If you are on metformin (Glucophage), do not restart it until you have blood tests (within 2 to 3 days after discharge). When your doctor tells you it is safe, you may restart the metformin.  If you have stopped any other medicines, check with your nurse or provider about when to restart them.  Review Nitroglycerin instructions, take one tablet under the tongue for chest pain, if there is no relief take another one 5 minutes apart. If you still have no relief from the chest pain, call 911.    Have you scheduled with Cardiac Rehab? Not yet     FOLLOW UP  Should follow-up with Sarah within 2 weeks (message sent to coordinator)         CONTACT INFORMATION  Please feel free to call us with any other questions or symptoms that are concerning for you at [phone] if it is after 4:30 in the afternoon, or a weekend please call [phone] and ask for the on call specialist.  We want to do everything we can to help prevent you needing to return to the ED, so please do not hesitate to call us.

## 2024-02-05 ENCOUNTER — TELEPHONE (OUTPATIENT)
Dept: CARDIOLOGY | Facility: CLINIC | Age: 49
End: 2024-02-05
Payer: COMMERCIAL

## 2024-02-05 LAB
ATRIAL RATE - MUSE: 75 BPM
DIASTOLIC BLOOD PRESSURE - MUSE: NORMAL MMHG
INTERPRETATION ECG - MUSE: NORMAL
P AXIS - MUSE: 31 DEGREES
PR INTERVAL - MUSE: 202 MS
QRS DURATION - MUSE: 116 MS
QT - MUSE: 410 MS
QTC - MUSE: 457 MS
R AXIS - MUSE: 56 DEGREES
SYSTOLIC BLOOD PRESSURE - MUSE: NORMAL MMHG
T AXIS - MUSE: 90 DEGREES
VENTRICULAR RATE- MUSE: 75 BPM

## 2024-02-07 DIAGNOSIS — Z95.5 S/P CORONARY ARTERY STENT PLACEMENT: Primary | ICD-10-CM

## 2024-02-12 NOTE — PROGRESS NOTES
[facility] Cardiology - [facility]   Cardiology Clinic Note      HPI:   Mr. Siva Ramey is a pleasant 48 year old male with medical history pertinent for ESRD, HLD, HTN, DM2, and ischemic cardiomyopathy. He presents to cardiology clinic for angiogram follow up.    He was most recently seen in clinic by Dr. Cage 11/2023 for pre-kidney and pancreas transplant evaluation. He underwent Lexiscan stress test, which showed small area of moderate non-transmural infarction in inferior segment of LV, in RCA distribution. He underwent coronary angiogram 2/1/24 which showed 1v CAD of pRCA, hemodynamically significant by IFR (0.85), and moderate stenosis of pLAD. He received IBAN x1 to pRCA.     Today in clinic, he denies chest pain, palpitations, dizziness, syncope, or lower extremity edema. He notes mild fatigue after he takes his AM medications that resolves after several hours. He also notes occasional shortness of breath at rest that resolves within a minute..  No concerns with bleeding, bruising, or infection at femoral site.      PAST MEDICAL HISTORY:  Past Medical History:   Diagnosis Date    Acquired elevated diaphragm     Anemia in chronic kidney disease     Angina pectoris (H24)     Chronic kidney disease     Coronary artery disease     Diabetes mellitus, type II (H) 12/2001    Diabetic nephropathy (H)     MARQUEZ (dyspnea on exertion)     Dyslipidemia 12/2001    End stage renal disease (H)     History of blood transfusion 2004    Hypertension     takes medication    Ischemic cardiomyopathy     Metabolic acidosis     Myocardial infarction (H)     STEMI -Diagonal branch of the LAD    Obesity (BMI 30-39.9)     Peripheral neuropathy     Proteinuria     Retinopathy     Vitamin D deficiency        FAMILY HISTORY:  Family History   Problem Relation Age of Onset    Diabetes Type 2  Mother     Heart Disease Father 60    CABG Father 50        triple bypass    Diabetes Type 2  Father     Depression Sister     Substance Abuse Sister      Ovarian Cancer Maternal Grandmother     Brain Cancer Maternal Grandmother     Pancreatic Cancer Maternal Aunt     Prostate Cancer Maternal Uncle        SOCIAL HISTORY:  Social History     Socioeconomic History    Marital status:    Tobacco Use    Smoking status: Never    Smokeless tobacco: Never   Substance and Sexual Activity    Alcohol use: Yes     Comment: Alcoholic Drinks: 1-2 per year    Drug use: No    Sexual activity: Yes     Partners: Female       CURRENT MEDICATIONS:  acetaminophen (TYLENOL) 500 MG tablet, Take 500 mg by mouth  albuterol (PROAIR HFA/PROVENTIL HFA/VENTOLIN HFA) 108 (90 Base) MCG/ACT inhaler, Inhale 2 puffs into the lungs every 4 hours as needed  ALPRAZolam (XANAX) 0.25 MG tablet, Take 0.25 mg by mouth as needed  amLODIPine (NORVASC) 10 MG tablet, Take 1 tablet (10 mg) by mouth daily  aspirin (ASA) 81 MG chewable tablet, Take 1 tablet (81 mg) by mouth daily Starting tomorrow.  atorvastatin (LIPITOR) 80 MG tablet, Take 1 tablet (80 mg) by mouth at bedtime  carvedilol (COREG) 12.5 MG tablet, Take 3 tablets (37.5 mg) by mouth 2 times daily (with meals)  cholecalciferol, vitamin D3, 5,000 unit Tab, [CHOLECALCIFEROL, VITAMIN D3, 5,000 UNIT TAB] Take 5,000 Units by mouth daily.  cloNIDine (CATAPRES) 0.1 MG tablet, Take 1 tablet (0.1 mg) by mouth 3 times daily as needed (SBP > 160)  glucose (BD GLUCOSE) 4 g chewable tablet, glucose 4 gram chewable tablet   CHEW AND SWALLOW 4 TIMES DAILY AS NEEDED  hydrALAZINE (APRESOLINE) 25 MG tablet, Take 1 tablet (25 mg) by mouth 2 times daily  icosapent ethyl (VASCEPA) 1 g CAPS capsule, Take by mouth 2 times daily (with meals)  insulin aspart (NOVOLOG FLEXPEN) 100 UNIT/ML pen,   insulin aspart (NOVOLOG VIAL) 100 UNITS/ML vial, Inject Subcutaneous 3 times daily (with meals) To be used with the insulin pump  nitroGLYcerin (NITROSTAT) 0.4 MG sublingual tablet, PLACE ONE TABLET UNDER TONGUE AS NEEDED FOR CHEST PAIN AS DIRECTED FOR 3 DOSES. IF SYMPTOMS PERSIST  "5 MINUTES AFTER 1ST DOSE CALL 911  ticagrelor (BRILINTA) 90 MG tablet, Take 1 tablet (90 mg) by mouth 2 times daily  VELPHORO 500 MG CHEW chewable tablet, Take 1,000 mg by mouth 3 times daily (with meals)  BD PEN NEEDLE JAMIN 2ND GEN 32G X 4 MM miscellaneous,   fluticasone-salmeterol (ADVAIR) 250-50 MCG/ACT inhaler, Inhale 1 puff into the lungs every 12 hours  furosemide (LASIX) 40 MG tablet, TAKE 1 TABLET(40 MG) BY MOUTH DAILY (Patient not taking: Reported on 11/16/2023)  icosapent ethyl (VASCEPA) 1 g CAPS capsule, Take 2 g by mouth 2 times daily (with meals)  insulin aspart (NOVOLOG PEN) 100 UNIT/ML pen, Inject 5 Units Subcutaneous 3 times daily (before meals)  insulin glargine-lixisenatide (SOLIQUA) pen, Inject 25 Units Subcutaneous daily (Patient not taking: Reported on 2/14/2024)  traMADol (ULTRAM) 50 MG tablet, Take 100 mg by mouth    No current facility-administered medications on file prior to visit.      ROS:   Refer to HPI    EXAM:  BP (!) 155/77 (BP Location: Right arm, Patient Position: Sitting, Cuff Size: Adult Regular)   Pulse 79   Ht 1.828 m (5' 11.97\")   Wt 113.9 kg (251 lb 1.6 oz)   SpO2 99%   BMI 34.08 kg/m    GENERAL: Appears comfortable, in no acute distress.   HEENT: Eye symmetrical, no discharge or icterus bilaterally. Mucous membranes moist and without lesions.  CV: RRR, +S1S2, no murmur, rub, or gallop. JVD below midclavicular line upright  RESPIRATORY: Respirations regular, even, and unlabored. Lungs CTA throughout.   GI: Soft and non distended with normoactive bowel sounds present in all quadrants. No tenderness, rebound, guarding.   EXTREMITIES: no peripheral edema. 2+ bilateral pedal pulses.  L forearm fistula access site.  NEUROLOGIC: Alert and oriented x 3. No focal deficits.   MUSCULOSKELETAL: No joint swelling or tenderness.   SKIN: No jaundice. No rashes or lesions.     Labs, reviewed with patient in clinic today:  CBC RESULTS:  Lab Results   Component Value Date    WBC 7.6 " "2024    RBC 4.03 (L) 2024    HGB 12.6 (L) 2024    HCT 37.6 (L) 2024    MCV 93 2024    MCH 31.3 2024    MCHC 33.5 2024    RDW 13.2 2024     2024       CMP RESULTS:  Lab Results   Component Value Date     2024    POTASSIUM 5.3 2024    POTASSIUM 4.4 2021    CHLORIDE 99 2024    CHLORIDE 101 2021    CO2 22 2024    CO2 28 2021    ANIONGAP 14 2024    ANIONGAP 8 2021     (H) 2024     (H) 2024     (H) 2021    BUN 36.4 (H) 2024    BUN 46 (H) 2021    CR 7.11 (H) 2024    GFRESTIMATED 9 (L) 2024    GFRESTIMATED 23 (L) 10/05/2020    GFRESTBLACK 27 (L) 10/05/2020    BETHANY 10.8 (H) 2024    BILITOTAL 0.6 2023    ALBUMIN 4.7 2023    ALBUMIN 3.4 2021    ALKPHOS 64 2023    ALT 31 2023    AST 27 2023        INR RESULTS:  Lab Results   Component Value Date    INR 1.16 (H) 2024       Lab Results   Component Value Date    MAG 2.4 (H) 2023     Lab Results   Component Value Date    NTBNPI 8,125 (H) 2023     No results found for: \"NTBNP\"    LIPIDS:  Recent Labs   Lab Test 24  0853 23  0842 23  1134 23  1658   CHOL 179 144   < > 193   HDL 18* 21*   < > 23*   LDL  --  42  --  34   TRIG 693* 520*   < > 801*    < > = values in this interval not displayed.       EKG 24      Echocardiogram:  Recent Results (from the past 4320 hour(s))   Echocardiogram Complete   Result Value    LVEF  50-55%    Narrative    [id number]  [id number]  [id number]  [id number]^NAN^ROSALIA^AMEE     [facility]  Diagnostic and Treatment-3rd Floor  [address] [place] [address]     Name: ISABEL ARBOLEDA  MRN: [id number]  : 1975  Study Date: 2023 05:36 PM  Age: 48 yrs  Gender: Male  Patient Location: [facility]  Reason For Study: History of " cardiomyopathy, Hypertension, Dyslipidemia,  Diabetes  Ordering Physician: ROSALIA MONTANA  Referring Physician: ROSALIA MONTNAA  Performed By: Renetta Smith     BSA: 2.2 m2  Height: 71 in  Weight: 222 lb  HR: 85  BP: 138/87 mmHg  ______________________________________________________________________________  Procedure  Echocardiogram with two-dimensional, color and spectral Doppler performed.  ______________________________________________________________________________  Interpretation Summary  Left ventricular wall thickness is normal. Left ventricular size is normal.  The visual ejection fraction is 50-55%. No regional wall motion abnormalities  are seen.  Right ventricular function, chamber size, wall motion, and thickness are  normal.  Trileaflet aortic sclerosis without stenosis.  The inferior vena cava was normal in size with preserved respiratory  variability. No pericardial effusion is present.     No significant changes noted other than LVEF appears visually minimally lower.  ______________________________________________________________________________  Left Ventricle  Left ventricular wall thickness is normal. Left ventricular size is normal.  The visual ejection fraction is 50-55%. Grade I or early diastolic  dysfunction. No regional wall motion abnormalities are seen.     Right Ventricle  Right ventricular function, chamber size, wall motion, and thickness are  normal.     Atria  The right atria appears normal. Mild left atrial enlargement is present.     Mitral Valve  The mitral valve is normal. Trace mitral insufficiency is present.     Aortic Valve  Trileaflet aortic sclerosis without stenosis. No aortic regurgitation is  present.     Tricuspid Valve  The tricuspid valve is normal. Trace tricuspid insufficiency is present. The  peak velocity of the tricuspid regurgitant jet is not obtainable.     Pulmonic Valve  The pulmonic valve is normal.     Vessels  The aorta root is normal. The  inferior vena cava was normal in size with  preserved respiratory variability.     Pericardium  No pericardial effusion is present.     Compared to Previous Study  No significant changes noted.  ______________________________________________________________________________  MMode/2D Measurements & Calculations  IVSd: 1.3 cm     LVIDd: 5.0 cm  LVIDs: 2.9 cm  LVPWd: 1.3 cm  FS: 42.4 %  LV mass(C)d: 258.2 grams  LV mass(C)dI: 117.1 grams/m2  Ao root diam: 3.5 cm  asc Aorta Diam: 3.4 cm  LVOT diam: 2.6 cm  LVOT area: 5.1 cm2  Ao root diam Index (cm/m2): 1.6  asc Aorta Diam Index (cm/m2): 1.5  LA Volume (BP): 65.6 ml  LA Volume Index (BP): 29.8 ml/m2     RWT: 0.52  TAPSE: 1.2 cm     Doppler Measurements & Calculations  MV E max jay: 60.8 cm/sec  MV A max jay: 89.5 cm/sec  MV E/A: 0.68  MV dec slope: 265.8 cm/sec2  MV dec time: 0.23 sec  PA acc time: 0.09 sec  E/E' av.3  Lateral E/e': 5.4  Medial E/e': 9.2  RV S Jay: 8.3 cm/sec     ______________________________________________________________________________  Report approved by: Brianda VELA 2023 06:12 PM             Coronary Angiogram 24:    Physicians    Panel Physicians Referring Physician Case Authorizing Physician   Delroy Carpio MD (Primary)  Sarah Brennan, JUANCARLOS Rivas, MD Jenna Johnson David, MD       Procedures    Panel 1    Primary Surgeon: Delroy Carpio MD   Procedure: Coronary Angiogram    Percutaneous Coronary Intervention        Indications    Abnormal stress test [R94.39 (ICD-10-CM)]     Comments/Patient Narrative    Patient is a 48-year-old male with a past medical history of chronic kidney disease, diabetic nephropathy, CAD, type 2 diabetes, hyperlipidemia, ischemic cardiomyopathy who was referred today in anticipation of upcoming renal transplant.  As part of his preoperative clearance he is referred for coronary angiography.         Conclusion         Prox RCA lesion is 70% stenosed.    Ost LAD to Prox LAD  lesion is 40% stenosed.     1. Coronary angiography notable for single-vessel obstructive CAD including a proximal RCA lesion that was hemodynamically significant by IFR (0.85)  2.  Successful PCI to the proximal RCA including deployment of a 3.5 x 12 mm Synergy XD drug-eluting stent         Plan     Follow bedrest per protocol   Continued medical management and lifestyle modifications for cardiovascular risk factor optimizations.   Arterial sheath removed from femoral artery with closure device.   Femoral angiogram identifies arterial sheath placement suitable for closure device.   Discharge today per protocol        -Aspirin 81 mg indefinitely  -Ticagrelor 90 mg twice daily for at least 6 months  -Aggressive secondary prevention     Coronary Findings    Diagnostic  Dominance: Right  Left Main   The vessel was visualized by selective angiography.      Left Anterior Descending   The vessel was visualized by selective angiography.   Ost LAD to Prox LAD lesion is 40% stenosed.      Left Circumflex   The vessel was visualized by selective angiography.   Ost Cx to Prox Cx lesion is 30% stenosed.      Right Coronary Artery   The vessel was visualized by selective angiography.   Prox RCA lesion is 70% stenosed. The lesion is type B1 - medium risk. Pressure wire/iFR used. Pre adenosine administration IFR: 0.85.   Dist RCA lesion is 30% stenosed. The lesion is type B1 - medium risk.         Intervention     Prox RCA lesion   Stent   Lesion length: 8 mm. The pre-interventional distal flow is normal (AUSTYN 3). A stent was successfully placed. The post-interventional distal flow is normal (AUSTYN 3). The intervention was successful. No complications occurred at this lesion. After diagnostic angiographic images were obtained and IFR assessment was obtained to the proximal RCA lesion, decision was made to proceed to PCI. The RCA was successfully engaged using a 6 Amharic JR4 guide catheter. Therapeutic dose heparin was administered  and confirmed with ACT. The lesion was previously crossed using a Jones FloWire. The lesion itself was directly stented using a 3.5 x 12 mm Synergy XD drug-eluting stent. Afterwards, an excellent angiographic result was observed including 0% residual stenosis, visually well opposed stent, AUSTYN-3 flow and no sign of any immediate postprocedural complications including bleeding, perforation or edge dissection.   There is a 0% residual stenosis post intervention.           FFR/IFR/Peak/Mean Gradient      Event Details User   12:14 PM 2/1/24 *DPR/IFR Measurement Complete DPR/IFR measurement completed..85 KN       Assessment and Plan:   Mr. Ramey is a 48 year old male with a PMH of ESRD, HLD, HTN, DM2, and ischemic cardiomyopathy.    # Coronary artery disease s/p PCI w/ IBAN x1 to RCA (2/1/2024)  # Ischemic cardiomyopathy  Underwent Lexiscan stress test for pre-transplant eval, which showed small area of moderate non-transmural infarction in inferior segment of LV, in RCA distribution. Most recent LVEF 50-55%. He underwent coronary angiogram 2/1/24 which showed 1v CAD of pRCA, hemodynamically significant by IFR (0.85), and moderate stenosis of pLAD. Now s/p PCI to pRCA w/ 3.5 x 12 mm Synergy XD IBAN.  - DAPT: aspirin 81mg daily + ticagrelor 90mg BID for 3 months, then ASA 81 monotherapy lifelong   - continue statin    # Shortness of breath  # Fatigue  Fatigue after taking AM meds, resolves within several hours. Experiences occasional SOB at rest that self-resolves within a minute. D/w patient that this is a common side effect of ticagrelor and offered switch to clopidogrel, he states that symptoms are mild and he does not want to switch medications at this time.   Considered s/e of BB, though patient has been on carvedilol for many years     # HTN  Normotensive at iHD three times weekly, did not take BP meds today in anticipation of iHD this afternoon, mildly hypertensive in clinic   - amlodipine 10mg dialy  -  carvedilol 37.5mg BID  - clonidipine 0.1 TID PRN   - hydralazine 25mg BID    # Dyslipidemia   Most recent lipid panel with triglycerides 693, LDL unable to be calculated. HDL 18.  - atorvastatin 80mg daily   - Vascepa 2g PO BID    # ESRD  On iHD MWF    # DM2  Most recent A1c   Lab Results   Component Value Date    A1C 7.4 12/19/2023    A1C 9.2 10/17/2023    A1C 8.1 09/27/2021    A1C 7.8 06/22/2019    A1C 10.0 01/14/2019   Currently undergoing workup for pancreas-renal transplant     Follow up:  RTC 1 year  Chart review time today: 10 minutes  Visit time today: 19 minutes  Total time spent today: 29 minutes        JUANCARLOS OCAMPO CNP  General Cardiology   02/14/24

## 2024-02-14 ENCOUNTER — OFFICE VISIT (OUTPATIENT)
Dept: CARDIOLOGY | Facility: CLINIC | Age: 49
End: 2024-02-14
Attending: CASE MANAGER/CARE COORDINATOR
Payer: MEDICARE

## 2024-02-14 VITALS
HEIGHT: 72 IN | HEART RATE: 79 BPM | SYSTOLIC BLOOD PRESSURE: 155 MMHG | DIASTOLIC BLOOD PRESSURE: 77 MMHG | BODY MASS INDEX: 34.01 KG/M2 | OXYGEN SATURATION: 99 % | WEIGHT: 251.1 LBS

## 2024-02-14 DIAGNOSIS — N18.6 ESRD (END STAGE RENAL DISEASE) ON DIALYSIS (H): ICD-10-CM

## 2024-02-14 DIAGNOSIS — Z95.5 S/P RIGHT CORONARY ARTERY (RCA) STENT PLACEMENT: ICD-10-CM

## 2024-02-14 DIAGNOSIS — I15.1 HYPERTENSION SECONDARY TO OTHER RENAL DISORDERS: ICD-10-CM

## 2024-02-14 DIAGNOSIS — I25.10 CORONARY ARTERY DISEASE INVOLVING NATIVE CORONARY ARTERY OF NATIVE HEART WITHOUT ANGINA PECTORIS: Primary | ICD-10-CM

## 2024-02-14 DIAGNOSIS — Z99.2 ESRD (END STAGE RENAL DISEASE) ON DIALYSIS (H): ICD-10-CM

## 2024-02-14 PROCEDURE — 99214 OFFICE O/P EST MOD 30 MIN: CPT | Performed by: CASE MANAGER/CARE COORDINATOR

## 2024-02-14 PROCEDURE — 93010 ELECTROCARDIOGRAM REPORT: CPT | Performed by: INTERNAL MEDICINE

## 2024-02-14 PROCEDURE — 93005 ELECTROCARDIOGRAM TRACING: CPT

## 2024-02-14 PROCEDURE — G0463 HOSPITAL OUTPT CLINIC VISIT: HCPCS | Performed by: CASE MANAGER/CARE COORDINATOR

## 2024-02-14 RX ORDER — INSULIN ASPART 100 [IU]/ML
INJECTION, SOLUTION INTRAVENOUS; SUBCUTANEOUS
Status: ON HOLD | COMMUNITY
End: 2024-07-30

## 2024-02-14 RX ORDER — SUCROFERRIC OXYHYDROXIDE 500 MG/1
1000 TABLET, CHEWABLE ORAL
Status: ON HOLD | COMMUNITY
Start: 2024-01-09 | End: 2024-07-30

## 2024-02-14 RX ORDER — INSULIN ASPART 100 [IU]/ML
INJECTION, SOLUTION INTRAVENOUS; SUBCUTANEOUS
Status: ON HOLD | COMMUNITY
End: 2024-07-21

## 2024-02-14 ASSESSMENT — PAIN SCALES - GENERAL: PAINLEVEL: NO PAIN (0)

## 2024-02-14 NOTE — PATIENT INSTRUCTIONS
You were seen today in the Cardiovascular Clinic at the [facility] by:       JUANCARLOS VÁZQUEZ CNP    Your visit summary and instructions are as follows:    Aspirin + Brilanta for 3 months (thru May 1st), then ok to stop Brilanta       Return to cardiology clinic in 1 year     Thank you for your visit today!     Please MyChart message me or call my nurse if you have any questions or concerns.      During Business Hours:  [phone], option # 1 (University) then option # 4 (medical questions) and ask to speak with my nurse.     After hours, weekends or holidays:   [phone], Option #4  Ask to speak to the On-Call Cardiologist. Inform them you are a cardiology patient at the [facility].

## 2024-02-14 NOTE — LETTER
2/14/2024      RE: Siva Ramey  [address]  [facility] [address]       Dear Colleague,    Thank you for the opportunity to participate in the care of your patient, Siva Ramey, at the [facility] [place] at [facility]. Please see a copy of my visit note below.      [facility] Cardiology - [facility]   Cardiology Clinic Note      HPI:   Mr. Siva Ramey is a pleasant 48 year old male with medical history pertinent for ESRD, HLD, HTN, DM2, and ischemic cardiomyopathy. He presents to cardiology clinic for angiogram follow up.    He was most recently seen in clinic by Dr. Cage 11/2023 for pre-kidney and pancreas transplant evaluation. He underwent Lexiscan stress test, which showed small area of moderate non-transmural infarction in inferior segment of LV, in RCA distribution. He underwent coronary angiogram 2/1/24 which showed 1v CAD of pRCA, hemodynamically significant by IFR (0.85), and moderate stenosis of pLAD. He received IBAN x1 to pRCA.     Today in clinic, he denies chest pain, palpitations, dizziness, syncope, or lower extremity edema. He notes mild fatigue after he takes his AM medications that resolves after several hours. He also notes occasional shortness of breath at rest that resolves within a minute..  No concerns with bleeding, bruising, or infection at femoral site.      PAST MEDICAL HISTORY:  Past Medical History:   Diagnosis Date    Acquired elevated diaphragm     Anemia in chronic kidney disease     Angina pectoris (H24)     Chronic kidney disease     Coronary artery disease     Diabetes mellitus, type II (H) 12/2001    Diabetic nephropathy (H)     MARQUEZ (dyspnea on exertion)     Dyslipidemia 12/2001    End stage renal disease (H)     History of blood transfusion 2004    Hypertension     takes medication    Ischemic cardiomyopathy     Metabolic acidosis     Myocardial infarction (H)     STEMI -Diagonal branch of the LAD    Obesity  (BMI 30-39.9)     Peripheral neuropathy     Proteinuria     Retinopathy     Vitamin D deficiency        FAMILY HISTORY:  Family History   Problem Relation Age of Onset    Diabetes Type 2  Mother     Heart Disease Father 60    CABG Father 50        triple bypass    Diabetes Type 2  Father     Depression Sister     Substance Abuse Sister     Ovarian Cancer Maternal Grandmother     Brain Cancer Maternal Grandmother     Pancreatic Cancer Maternal Aunt     Prostate Cancer Maternal Uncle        SOCIAL HISTORY:  Social History     Socioeconomic History    Marital status:    Tobacco Use    Smoking status: Never    Smokeless tobacco: Never   Substance and Sexual Activity    Alcohol use: Yes     Comment: Alcoholic Drinks: 1-2 per year    Drug use: No    Sexual activity: Yes     Partners: Female       CURRENT MEDICATIONS:  acetaminophen (TYLENOL) 500 MG tablet, Take 500 mg by mouth  albuterol (PROAIR HFA/PROVENTIL HFA/VENTOLIN HFA) 108 (90 Base) MCG/ACT inhaler, Inhale 2 puffs into the lungs every 4 hours as needed  ALPRAZolam (XANAX) 0.25 MG tablet, Take 0.25 mg by mouth as needed  amLODIPine (NORVASC) 10 MG tablet, Take 1 tablet (10 mg) by mouth daily  aspirin (ASA) 81 MG chewable tablet, Take 1 tablet (81 mg) by mouth daily Starting tomorrow.  atorvastatin (LIPITOR) 80 MG tablet, Take 1 tablet (80 mg) by mouth at bedtime  carvedilol (COREG) 12.5 MG tablet, Take 3 tablets (37.5 mg) by mouth 2 times daily (with meals)  cholecalciferol, vitamin D3, 5,000 unit Tab, [CHOLECALCIFEROL, VITAMIN D3, 5,000 UNIT TAB] Take 5,000 Units by mouth daily.  cloNIDine (CATAPRES) 0.1 MG tablet, Take 1 tablet (0.1 mg) by mouth 3 times daily as needed (SBP > 160)  glucose (BD GLUCOSE) 4 g chewable tablet, glucose 4 gram chewable tablet   CHEW AND SWALLOW 4 TIMES DAILY AS NEEDED  hydrALAZINE (APRESOLINE) 25 MG tablet, Take 1 tablet (25 mg) by mouth 2 times daily  icosapent ethyl (VASCEPA) 1 g CAPS capsule, Take by mouth 2 times daily (with  "meals)  insulin aspart (NOVOLOG FLEXPEN) 100 UNIT/ML pen,   insulin aspart (NOVOLOG VIAL) 100 UNITS/ML vial, Inject Subcutaneous 3 times daily (with meals) To be used with the insulin pump  nitroGLYcerin (NITROSTAT) 0.4 MG sublingual tablet, PLACE ONE TABLET UNDER TONGUE AS NEEDED FOR CHEST PAIN AS DIRECTED FOR 3 DOSES. IF SYMPTOMS PERSIST 5 MINUTES AFTER 1ST DOSE CALL 911  ticagrelor (BRILINTA) 90 MG tablet, Take 1 tablet (90 mg) by mouth 2 times daily  VELPHORO 500 MG CHEW chewable tablet, Take 1,000 mg by mouth 3 times daily (with meals)  BD PEN NEEDLE JAMIN 2ND GEN 32G X 4 MM miscellaneous,   fluticasone-salmeterol (ADVAIR) 250-50 MCG/ACT inhaler, Inhale 1 puff into the lungs every 12 hours  furosemide (LASIX) 40 MG tablet, TAKE 1 TABLET(40 MG) BY MOUTH DAILY (Patient not taking: Reported on 11/16/2023)  icosapent ethyl (VASCEPA) 1 g CAPS capsule, Take 2 g by mouth 2 times daily (with meals)  insulin aspart (NOVOLOG PEN) 100 UNIT/ML pen, Inject 5 Units Subcutaneous 3 times daily (before meals)  insulin glargine-lixisenatide (SOLIQUA) pen, Inject 25 Units Subcutaneous daily (Patient not taking: Reported on 2/14/2024)  traMADol (ULTRAM) 50 MG tablet, Take 100 mg by mouth    No current facility-administered medications on file prior to visit.      ROS:   Refer to HPI    EXAM:  BP (!) 155/77 (BP Location: Right arm, Patient Position: Sitting, Cuff Size: Adult Regular)   Pulse 79   Ht 1.828 m (5' 11.97\")   Wt 113.9 kg (251 lb 1.6 oz)   SpO2 99%   BMI 34.08 kg/m    GENERAL: Appears comfortable, in no acute distress.   HEENT: Eye symmetrical, no discharge or icterus bilaterally. Mucous membranes moist and without lesions.  CV: RRR, +S1S2, no murmur, rub, or gallop. JVD below midclavicular line upright  RESPIRATORY: Respirations regular, even, and unlabored. Lungs CTA throughout.   GI: Soft and non distended with normoactive bowel sounds present in all quadrants. No tenderness, rebound, guarding.   EXTREMITIES: no " "peripheral edema. 2+ bilateral pedal pulses.  L forearm fistula access site.  NEUROLOGIC: Alert and oriented x 3. No focal deficits.   MUSCULOSKELETAL: No joint swelling or tenderness.   SKIN: No jaundice. No rashes or lesions.     Labs, reviewed with patient in clinic today:  CBC RESULTS:  Lab Results   Component Value Date    WBC 7.6 02/01/2024    RBC 4.03 (L) 02/01/2024    HGB 12.6 (L) 02/01/2024    HCT 37.6 (L) 02/01/2024    MCV 93 02/01/2024    MCH 31.3 02/01/2024    MCHC 33.5 02/01/2024    RDW 13.2 02/01/2024     02/01/2024       CMP RESULTS:  Lab Results   Component Value Date     02/01/2024    POTASSIUM 5.3 02/01/2024    POTASSIUM 4.4 09/27/2021    CHLORIDE 99 02/01/2024    CHLORIDE 101 09/27/2021    CO2 22 02/01/2024    CO2 28 09/27/2021    ANIONGAP 14 02/01/2024    ANIONGAP 8 09/27/2021     (H) 02/01/2024     (H) 02/01/2024     (H) 09/27/2021    BUN 36.4 (H) 02/01/2024    BUN 46 (H) 09/27/2021    CR 7.11 (H) 02/01/2024    GFRESTIMATED 9 (L) 02/01/2024    GFRESTIMATED 23 (L) 10/05/2020    GFRESTBLACK 27 (L) 10/05/2020    BETHANY 10.8 (H) 02/01/2024    BILITOTAL 0.6 11/16/2023    ALBUMIN 4.7 11/16/2023    ALBUMIN 3.4 09/27/2021    ALKPHOS 64 11/16/2023    ALT 31 11/16/2023    AST 27 11/16/2023        INR RESULTS:  Lab Results   Component Value Date    INR 1.16 (H) 02/01/2024       Lab Results   Component Value Date    MAG 2.4 (H) 04/02/2023     Lab Results   Component Value Date    NTBNPI 8,125 (H) 04/02/2023     No results found for: \"NTBNP\"    LIPIDS:  Recent Labs   Lab Test 02/01/24  0853 12/19/23  0842 08/28/23  1134 06/27/23  1658   CHOL 179 144   < > 193   HDL 18* 21*   < > 23*   LDL  --  42  --  34   TRIG 693* 520*   < > 801*    < > = values in this interval not displayed.       EKG 02/14/24      Echocardiogram:  Recent Results (from the past 4320 hour(s))   Echocardiogram Complete   Result Value    LVEF  50-55%    Narrative    " [id number]  [id number]  [id number]  [id number]^NAN^ROSALIA^AMEE     [facility]  Diagnostic and Treatment-3rd Floor  [address] [place] [address]     Name: ISABEL ARBOLEDA  MRN: [id number]  : 1975  Study Date: 2023 05:36 PM  Age: 48 yrs  Gender: Male  Patient Location: [facility]  Reason For Study: History of cardiomyopathy, Hypertension, Dyslipidemia,  Diabetes  Ordering Physician: ROSALIA MONTANA  Referring Physician: ROSALIA MONTANA  Performed By: Renetta Smith     BSA: 2.2 m2  Height: 71 in  Weight: 222 lb  HR: 85  BP: 138/87 mmHg  ______________________________________________________________________________  Procedure  Echocardiogram with two-dimensional, color and spectral Doppler performed.  ______________________________________________________________________________  Interpretation Summary  Left ventricular wall thickness is normal. Left ventricular size is normal.  The visual ejection fraction is 50-55%. No regional wall motion abnormalities  are seen.  Right ventricular function, chamber size, wall motion, and thickness are  normal.  Trileaflet aortic sclerosis without stenosis.  The inferior vena cava was normal in size with preserved respiratory  variability. No pericardial effusion is present.     No significant changes noted other than LVEF appears visually minimally lower.  ______________________________________________________________________________  Left Ventricle  Left ventricular wall thickness is normal. Left ventricular size is normal.  The visual ejection fraction is 50-55%. Grade I or early diastolic  dysfunction. No regional wall motion abnormalities are seen.     Right Ventricle  Right ventricular function, chamber size, wall motion, and thickness are  normal.     Atria  The right atria appears normal. Mild left atrial enlargement is present.     Mitral Valve  The mitral valve is normal. Trace mitral insufficiency is  present.     Aortic Valve  Trileaflet aortic sclerosis without stenosis. No aortic regurgitation is  present.     Tricuspid Valve  The tricuspid valve is normal. Trace tricuspid insufficiency is present. The  peak velocity of the tricuspid regurgitant jet is not obtainable.     Pulmonic Valve  The pulmonic valve is normal.     Vessels  The aorta root is normal. The inferior vena cava was normal in size with  preserved respiratory variability.     Pericardium  No pericardial effusion is present.     Compared to Previous Study  No significant changes noted.  ______________________________________________________________________________  MMode/2D Measurements & Calculations  IVSd: 1.3 cm     LVIDd: 5.0 cm  LVIDs: 2.9 cm  LVPWd: 1.3 cm  FS: 42.4 %  LV mass(C)d: 258.2 grams  LV mass(C)dI: 117.1 grams/m2  Ao root diam: 3.5 cm  asc Aorta Diam: 3.4 cm  LVOT diam: 2.6 cm  LVOT area: 5.1 cm2  Ao root diam Index (cm/m2): 1.6  asc Aorta Diam Index (cm/m2): 1.5  LA Volume (BP): 65.6 ml  LA Volume Index (BP): 29.8 ml/m2     RWT: 0.52  TAPSE: 1.2 cm     Doppler Measurements & Calculations  MV E max jay: 60.8 cm/sec  MV A max jay: 89.5 cm/sec  MV E/A: 0.68  MV dec slope: 265.8 cm/sec2  MV dec time: 0.23 sec  PA acc time: 0.09 sec  E/E' av.3  Lateral E/e': 5.4  Medial E/e': 9.2  RV S Jay: 8.3 cm/sec     ______________________________________________________________________________  Report approved by: Brianda VELA 2023 06:12 PM             Coronary Angiogram 24:    Physicians    Panel Physicians Referring Physician Case Authorizing Physician   Delroy Carpio MD (Primary)  Sarah Brennan, APRN CNP   Karl Rivas MD Sosa, David, MD       Procedures    Panel 1    Primary Surgeon: Delroy Carpio MD   Procedure: Coronary Angiogram    Percutaneous Coronary Intervention        Indications    Abnormal stress test [R94.39 (ICD-10-CM)]     Comments/Patient Narrative    Patient is a 48-year-old male with  a past medical history of chronic kidney disease, diabetic nephropathy, CAD, type 2 diabetes, hyperlipidemia, ischemic cardiomyopathy who was referred today in anticipation of upcoming renal transplant.  As part of his preoperative clearance he is referred for coronary angiography.         Conclusion         Prox RCA lesion is 70% stenosed.    Ost LAD to Prox LAD lesion is 40% stenosed.     1. Coronary angiography notable for single-vessel obstructive CAD including a proximal RCA lesion that was hemodynamically significant by IFR (0.85)  2.  Successful PCI to the proximal RCA including deployment of a 3.5 x 12 mm Synergy XD drug-eluting stent         Plan     Follow bedrest per protocol   Continued medical management and lifestyle modifications for cardiovascular risk factor optimizations.   Arterial sheath removed from femoral artery with closure device.   Femoral angiogram identifies arterial sheath placement suitable for closure device.   Discharge today per protocol        -Aspirin 81 mg indefinitely  -Ticagrelor 90 mg twice daily for at least 6 months  -Aggressive secondary prevention     Coronary Findings    Diagnostic  Dominance: Right  Left Main   The vessel was visualized by selective angiography.      Left Anterior Descending   The vessel was visualized by selective angiography.   Ost LAD to Prox LAD lesion is 40% stenosed.      Left Circumflex   The vessel was visualized by selective angiography.   Ost Cx to Prox Cx lesion is 30% stenosed.      Right Coronary Artery   The vessel was visualized by selective angiography.   Prox RCA lesion is 70% stenosed. The lesion is type B1 - medium risk. Pressure wire/iFR used. Pre adenosine administration IFR: 0.85.   Dist RCA lesion is 30% stenosed. The lesion is type B1 - medium risk.         Intervention     Prox RCA lesion   Stent   Lesion length: 8 mm. The pre-interventional distal flow is normal (AUSTYN 3). A stent was successfully placed. The post-interventional  distal flow is normal (AUSTYN 3). The intervention was successful. No complications occurred at this lesion. After diagnostic angiographic images were obtained and IFR assessment was obtained to the proximal RCA lesion, decision was made to proceed to PCI. The RCA was successfully engaged using a 6 Romanian JR4 guide catheter. Therapeutic dose heparin was administered and confirmed with ACT. The lesion was previously crossed using a Jones FloWire. The lesion itself was directly stented using a 3.5 x 12 mm Synergy XD drug-eluting stent. Afterwards, an excellent angiographic result was observed including 0% residual stenosis, visually well opposed stent, AUSTYN-3 flow and no sign of any immediate postprocedural complications including bleeding, perforation or edge dissection.   There is a 0% residual stenosis post intervention.           FFR/IFR/Peak/Mean Gradient      Event Details User   12:14 PM 2/1/24 *DPR/IFR Measurement Complete DPR/IFR measurement completed..85 KN       Assessment and Plan:   Mr. Ramey is a 48 year old male with a PMH of ESRD, HLD, HTN, DM2, and ischemic cardiomyopathy.    # Coronary artery disease s/p PCI w/ IBAN x1 to RCA (2/1/2024)  # Ischemic cardiomyopathy  Underwent Lexiscan stress test for pre-transplant eval, which showed small area of moderate non-transmural infarction in inferior segment of LV, in RCA distribution. Most recent LVEF 50-55%. He underwent coronary angiogram 2/1/24 which showed 1v CAD of pRCA, hemodynamically significant by IFR (0.85), and moderate stenosis of pLAD. Now s/p PCI to pRCA w/ 3.5 x 12 mm Synergy XD IBAN.  - DAPT: aspirin 81mg daily + ticagrelor 90mg BID for 3 months, then ASA 81 monotherapy lifelong   - continue statin    # Shortness of breath  # Fatigue  Fatigue after taking AM meds, resolves within several hours. Experiences occasional SOB at rest that self-resolves within a minute. D/w patient that this is a common side effect of ticagrelor and offered switch  to clopidogrel, he states that symptoms are mild and he does not want to switch medications at this time.   Considered s/e of BB, though patient has been on carvedilol for many years     # HTN  Normotensive at iHD three times weekly, did not take BP meds today in anticipation of iHD this afternoon, mildly hypertensive in clinic   - amlodipine 10mg dialy  - carvedilol 37.5mg BID  - clonidipine 0.1 TID PRN   - hydralazine 25mg BID    # Dyslipidemia   Most recent lipid panel with triglycerides 693, LDL unable to be calculated. HDL 18.  - atorvastatin 80mg daily   - Vascepa 2g PO BID    # ESRD  On iHD MWF    # DM2  Most recent A1c   Lab Results   Component Value Date    A1C 7.4 12/19/2023    A1C 9.2 10/17/2023    A1C 8.1 09/27/2021    A1C 7.8 06/22/2019    A1C 10.0 01/14/2019   Currently undergoing workup for pancreas-renal transplant     Follow up:  RTC 1 year  Chart review time today: 10 minutes  Visit time today: 19 minutes  Total time spent today: 29 minutes        JUANCARLOS OCAMPO CNP  General Cardiology   02/14/24

## 2024-02-14 NOTE — NURSING NOTE
Chief Complaint   Patient presents with    Follow Up     Return cardiology        Vitals were taken, medications reconciled and EKG performed.    Giulia Schofield CMA  7:29 AM

## 2024-02-15 ENCOUNTER — HOSPITAL ENCOUNTER (OUTPATIENT)
Dept: CARDIAC REHAB | Facility: HOSPITAL | Age: 49
Discharge: HOME | End: 2024-02-15
Payer: COMMERCIAL

## 2024-02-15 DIAGNOSIS — Z95.5 S/P CORONARY ARTERY STENT PLACEMENT: ICD-10-CM

## 2024-02-15 LAB
ATRIAL RATE - MUSE: 76 BPM
DIASTOLIC BLOOD PRESSURE - MUSE: NORMAL MMHG
INTERPRETATION ECG - MUSE: NORMAL
P AXIS - MUSE: 56 DEGREES
PR INTERVAL - MUSE: 192 MS
QRS DURATION - MUSE: 130 MS
QT - MUSE: 420 MS
QTC - MUSE: 472 MS
R AXIS - MUSE: 78 DEGREES
SYSTOLIC BLOOD PRESSURE - MUSE: NORMAL MMHG
T AXIS - MUSE: 91 DEGREES
VENTRICULAR RATE- MUSE: 76 BPM

## 2024-02-15 PROCEDURE — 93797 PHYS/QHP OP CAR RHAB WO ECG: CPT

## 2024-02-15 PROCEDURE — 93798 PHYS/QHP OP CAR RHAB W/ECG: CPT

## 2024-02-20 ENCOUNTER — HOSPITAL ENCOUNTER (OUTPATIENT)
Dept: CARDIAC REHAB | Facility: HOSPITAL | Age: 49
Discharge: HOME | End: 2024-02-20
Attending: INTERNAL MEDICINE
Payer: COMMERCIAL

## 2024-02-20 PROCEDURE — 93798 PHYS/QHP OP CAR RHAB W/ECG: CPT

## 2024-02-27 ENCOUNTER — HOSPITAL ENCOUNTER (OUTPATIENT)
Dept: CARDIAC REHAB | Facility: HOSPITAL | Age: 49
Discharge: HOME | End: 2024-02-27
Attending: INTERNAL MEDICINE
Payer: COMMERCIAL

## 2024-02-27 PROCEDURE — 93798 PHYS/QHP OP CAR RHAB W/ECG: CPT

## 2024-02-29 ENCOUNTER — HOSPITAL ENCOUNTER (OUTPATIENT)
Dept: CARDIAC REHAB | Facility: HOSPITAL | Age: 49
Discharge: HOME | End: 2024-02-29
Attending: INTERNAL MEDICINE
Payer: COMMERCIAL

## 2024-02-29 PROCEDURE — 93798 PHYS/QHP OP CAR RHAB W/ECG: CPT

## 2024-03-05 ENCOUNTER — HOSPITAL ENCOUNTER (OUTPATIENT)
Dept: CARDIAC REHAB | Facility: HOSPITAL | Age: 49
Discharge: HOME | End: 2024-03-05
Attending: INTERNAL MEDICINE
Payer: COMMERCIAL

## 2024-03-05 PROCEDURE — 93798 PHYS/QHP OP CAR RHAB W/ECG: CPT

## 2024-03-07 ENCOUNTER — HOSPITAL ENCOUNTER (OUTPATIENT)
Dept: CARDIAC REHAB | Facility: HOSPITAL | Age: 49
Discharge: HOME | End: 2024-03-07
Attending: INTERNAL MEDICINE
Payer: COMMERCIAL

## 2024-03-07 PROCEDURE — 93798 PHYS/QHP OP CAR RHAB W/ECG: CPT

## 2024-03-10 ENCOUNTER — MYC MEDICAL ADVICE (OUTPATIENT)
Dept: CARDIOLOGY | Facility: CLINIC | Age: 49
End: 2024-03-10
Payer: COMMERCIAL

## 2024-03-11 DIAGNOSIS — Z95.5 S/P RIGHT CORONARY ARTERY (RCA) STENT PLACEMENT: ICD-10-CM

## 2024-03-12 ENCOUNTER — HOSPITAL ENCOUNTER (OUTPATIENT)
Dept: CARDIAC REHAB | Facility: HOSPITAL | Age: 49
Discharge: HOME | End: 2024-03-12
Attending: INTERNAL MEDICINE
Payer: COMMERCIAL

## 2024-03-12 PROCEDURE — 93798 PHYS/QHP OP CAR RHAB W/ECG: CPT

## 2024-03-19 ENCOUNTER — HOSPITAL ENCOUNTER (OUTPATIENT)
Dept: CARDIAC REHAB | Facility: HOSPITAL | Age: 49
Discharge: HOME | End: 2024-03-19
Attending: INTERNAL MEDICINE
Payer: COMMERCIAL

## 2024-03-19 PROCEDURE — 93798 PHYS/QHP OP CAR RHAB W/ECG: CPT

## 2024-03-20 ENCOUNTER — MYC REFILL (OUTPATIENT)
Dept: CARDIOLOGY | Facility: CLINIC | Age: 49
End: 2024-03-20
Payer: COMMERCIAL

## 2024-03-20 DIAGNOSIS — I16.0 HYPERTENSIVE URGENCY: ICD-10-CM

## 2024-03-21 ENCOUNTER — HOSPITAL ENCOUNTER (OUTPATIENT)
Dept: CARDIAC REHAB | Facility: HOSPITAL | Age: 49
Discharge: HOME | End: 2024-03-21
Attending: INTERNAL MEDICINE
Payer: COMMERCIAL

## 2024-03-21 PROCEDURE — 93798 PHYS/QHP OP CAR RHAB W/ECG: CPT

## 2024-03-27 RX ORDER — CARVEDILOL 12.5 MG/1
37.5 TABLET ORAL 2 TIMES DAILY WITH MEALS
Qty: 540 TABLET | Refills: 3 | Status: ON HOLD | OUTPATIENT
Start: 2024-03-27 | End: 2024-07-30

## 2024-03-27 RX ORDER — AMLODIPINE BESYLATE 10 MG/1
10 TABLET ORAL DAILY
Qty: 90 TABLET | Refills: 3 | Status: ON HOLD | OUTPATIENT
Start: 2024-03-27 | End: 2024-07-30

## 2024-05-02 ENCOUNTER — DOCUMENTATION ONLY (OUTPATIENT)
Dept: TRANSPLANT | Facility: CLINIC | Age: 49
End: 2024-05-02
Payer: COMMERCIAL

## 2024-05-03 ENCOUNTER — TELEPHONE (OUTPATIENT)
Dept: TRANSPLANT | Facility: CLINIC | Age: 49
End: 2024-05-03
Payer: COMMERCIAL

## 2024-05-03 NOTE — TELEPHONE ENCOUNTER
Called pt for update- pt will be off of his DAPT next week and is seeing endocrine as well. Will bring to committee after the 5/9 appt to get active on transplant list. Pt verbalized understanding of information and has no further questions. Encouraged to reach out if questions arise.

## 2024-05-06 ENCOUNTER — LAB (OUTPATIENT)
Dept: LAB | Facility: CLINIC | Age: 49
End: 2024-05-06
Payer: COMMERCIAL

## 2024-05-06 DIAGNOSIS — E11.9 DIABETES MELLITUS TYPE 2, UNCOMPLICATED (H): ICD-10-CM

## 2024-05-06 DIAGNOSIS — Z76.82 ORGAN TRANSPLANT CANDIDATE: ICD-10-CM

## 2024-05-06 DIAGNOSIS — N18.6 ESRD (END STAGE RENAL DISEASE) (H): ICD-10-CM

## 2024-05-06 PROCEDURE — 86833 HLA CLASS II HIGH DEFIN QUAL: CPT

## 2024-05-06 PROCEDURE — 86832 HLA CLASS I HIGH DEFIN QUAL: CPT

## 2024-05-17 LAB
PROTOCOL CUTOFF: NORMAL
SA 1  COMMENTS: NORMAL
SA 1 CELL: NORMAL
SA 1 TEST METHOD: NORMAL
SA 2 CELL: NORMAL
SA 2 COMMENTS: NORMAL
SA 2 TEST METHOD: NORMAL
SA1 HI RISK ABY: NORMAL
SA1 MOD RISK ABY: NORMAL
SA2 HI RISK ABY: NORMAL
SA2 MOD RISK ABY: NORMAL
UNACCEPTABLE ANTIGENS: NORMAL
UNOS CPRA: 11

## 2024-05-22 ENCOUNTER — COMMITTEE REVIEW (OUTPATIENT)
Dept: TRANSPLANT | Facility: CLINIC | Age: 49
End: 2024-05-22
Payer: COMMERCIAL

## 2024-05-22 ENCOUNTER — TELEPHONE (OUTPATIENT)
Dept: TRANSPLANT | Facility: CLINIC | Age: 49
End: 2024-05-22
Payer: COMMERCIAL

## 2024-05-22 NOTE — COMMITTEE REVIEW
Kidney/Pancreas Committee Review Note     Evaluation Date: 9/27/2021  Committee Review Date: 5/22/2024    Organ being evaluated for: Kidney/Pancreas    Transplant Phase: Waitlist  Transplant Status: Inactive    Transplant Coordinator: Sobia Rea  Transplant Surgeon:        Referring Physician: Aime Rios    Primary Diagnosis: Diabetes Mellitus - Type II  Secondary Diagnosis:     Committee Review Members:  Nephrology Isidoro Claros MD, Jarod Christy, APRN CNP   Nutrition Cindy Romero, RD   Transplant SNEHAL BARAHONA, RN, Kim Fay, BETSY, Cornelius Cain, BETSY, Frances Cox, BETSY, Natalie Nina RN   Transplant Surgery Shalom Santiago MD       Transplant Eligibility: Insulin-dependent diabetes mellitus, Irreversible chronic kidney disease treated w/dialysis or expected need for dialysis    Committee Review Decision: Make Active    Relative Contraindications:     Absolute Contraindications:      Committee Chair Shalom Santiago MD verbally attested to the committee's decision.    Committee Discussion Details: Reviewed the following :    -DMII-  using 40-50u insulin daily- now has an insulin pump- last A1c was 6.4  from 4/19/24. Pt is scheduled for endocrine visit 10/2024       -Cards- hx of CAD s/p PCI with IBAN placement to first diagonal in 1/2019 s/p 2 vessel CABG in 2019. No longer on antiplatelets, on aspirin. Angiogram 7/2021 showed improved known RCA  graft stenosis of 30-40% no intervention at that time. Pt just had updated cardiac assessment with Dr Cage- stress test was reviewed and there is a small area of a moderate degree of nontransmural infarction in the inferior segments of the left ventricle- appears to be in the right coronary artery distribution and no ischemia was identified. He reviewed EKG and ECHO and cleared pt for transplant - transplant asked to repeat angiogram where he had a stent placed- he is now off Brilinta after 3 month treatment and cards cleared him . 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

## 2024-05-22 NOTE — TELEPHONE ENCOUNTER
Called pt regarding committee this afternoon, pt was approved for active status and will require prior authorization for insurance. Will follow up with pt once prior auth is acquired. Pt verbalized understanding of information and has no further questions. Encouraged to reach out if questions arise.

## 2024-06-03 ENCOUNTER — TELEPHONE (OUTPATIENT)
Dept: TRANSPLANT | Facility: CLINIC | Age: 49
End: 2024-06-03
Payer: COMMERCIAL

## 2024-06-03 NOTE — TELEPHONE ENCOUNTER
Called patient to inform them of status change to ACTIVE on the Kidney-Pancreas list today 6/3/24. Status change letter and PRA orders to be mailed. Patient is in agreement with listing ACTIVE status.      Discussed:   - Pt is eligible for  donor offers and pt can receive calls 24 hours a day, 7 days a week, and on call staff need to be able to reach pt or one of emergency contacts within 30 minutes or they might skip over to next recipient on list.   -Please make sure your phone is charged at all times and your voicemail is not full   -Your transplant on call coordinator will give you directions about when and where you will need to come to the hospital for transplant  -The transplant coordinator will call you to discuss your current health status, the offer, and plans to move forward.  Some organ offer calls will require you to come to the hospital immediately; some may not be as time sensitive.  It may be possible for you to come to the hospital and, for various reasons, the transplant could be cancelled.  This is often called a  dry run .  - Reviewed the right to accept or decline offer, without penalty  - Expected length of surgical procedure is about 3-4 hours, expected inpatient length of stay post transplant is 3-4 days, and potential to stay locally post transplant. Offered resources for lodging in the area  - Verified contact information as documented in Epic. Confirmed emergency contact information  -Instructed patient about importance of having PRAs drawn every 3 months via: ([facility]). Encouraged them to set reminder in their preferred calendar to stay on top of their quarterly labs  -Reminded pt to stay up on health maintenance and to call with any updates on their health status, insurance or contact information.   -Reminded pt to inform RNCC as soon as possible if they test positive for COVID.  -Donor website was provided     -Last PRA was 24     -Provided name and contact  information for additional questions or concerns.  Pt expressed excellent understanding of all and was in good agreement with the plan.

## 2024-06-04 ENCOUNTER — DOCUMENTATION ONLY (OUTPATIENT)
Dept: TRANSPLANT | Facility: CLINIC | Age: 49
End: 2024-06-04
Payer: COMMERCIAL

## 2024-06-16 DIAGNOSIS — Z76.82 AWAITING ORGAN TRANSPLANT: Primary | ICD-10-CM

## 2024-06-18 ENCOUNTER — CARE COORDINATION (OUTPATIENT)
Dept: CARDIOLOGY | Facility: CLINIC | Age: 49
End: 2024-06-18
Payer: COMMERCIAL

## 2024-06-18 DIAGNOSIS — Z76.82 ORGAN TRANSPLANT CANDIDATE: ICD-10-CM

## 2024-06-18 DIAGNOSIS — N18.6 ESRD (END STAGE RENAL DISEASE) (H): Primary | ICD-10-CM

## 2024-06-18 DIAGNOSIS — I10 HYPERTENSION: ICD-10-CM

## 2024-06-18 NOTE — PROGRESS NOTES
Called to verify that pt is off of his brilinta and has been since May. Med list shows that pt has completed course but will remove off to prevent further questions.

## 2024-06-18 NOTE — PROGRESS NOTES
Called pt  to let him know that surgery is wanting updated imaging. Placed orders and schedulers will call to reach out. Pt verbalized understanding of information and has no further questions. Encouraged to reach out if questions arise.

## 2024-06-28 ENCOUNTER — ANCILLARY PROCEDURE (OUTPATIENT)
Dept: CT IMAGING | Facility: CLINIC | Age: 49
End: 2024-06-28
Attending: NURSE PRACTITIONER
Payer: COMMERCIAL

## 2024-06-28 ENCOUNTER — ANCILLARY PROCEDURE (OUTPATIENT)
Dept: ULTRASOUND IMAGING | Facility: CLINIC | Age: 49
End: 2024-06-28
Attending: NURSE PRACTITIONER
Payer: COMMERCIAL

## 2024-06-28 DIAGNOSIS — I10 HYPERTENSION: ICD-10-CM

## 2024-06-28 DIAGNOSIS — N18.6 ESRD (END STAGE RENAL DISEASE) (H): ICD-10-CM

## 2024-06-28 DIAGNOSIS — Z76.82 ORGAN TRANSPLANT CANDIDATE: ICD-10-CM

## 2024-06-28 PROCEDURE — G1010 CDSM STANSON: HCPCS | Mod: GC | Performed by: STUDENT IN AN ORGANIZED HEALTH CARE EDUCATION/TRAINING PROGRAM

## 2024-06-28 PROCEDURE — 74176 CT ABD & PELVIS W/O CONTRAST: CPT | Mod: MG | Performed by: STUDENT IN AN ORGANIZED HEALTH CARE EDUCATION/TRAINING PROGRAM

## 2024-06-28 PROCEDURE — 93978 VASCULAR STUDY: CPT | Mod: GC | Performed by: RADIOLOGY

## 2024-07-07 DIAGNOSIS — Z76.82 AWAITING ORGAN TRANSPLANT: Primary | ICD-10-CM

## 2024-07-08 ENCOUNTER — LAB (OUTPATIENT)
Dept: LAB | Facility: CLINIC | Age: 49
End: 2024-07-08
Payer: MEDICARE

## 2024-07-08 DIAGNOSIS — Z76.82 AWAITING ORGAN TRANSPLANT: ICD-10-CM

## 2024-07-08 DIAGNOSIS — N18.6 ESRD (END STAGE RENAL DISEASE) (H): ICD-10-CM

## 2024-07-08 DIAGNOSIS — E11.9 DIABETES MELLITUS TYPE 2, UNCOMPLICATED (H): ICD-10-CM

## 2024-07-08 DIAGNOSIS — Z76.82 ORGAN TRANSPLANT CANDIDATE: ICD-10-CM

## 2024-07-08 DIAGNOSIS — N18.6 ESRD (END STAGE RENAL DISEASE) (H): Primary | ICD-10-CM

## 2024-07-08 PROCEDURE — 86832 HLA CLASS I HIGH DEFIN QUAL: CPT

## 2024-07-08 PROCEDURE — 86833 HLA CLASS II HIGH DEFIN QUAL: CPT

## 2024-07-13 ENCOUNTER — HEALTH MAINTENANCE LETTER (OUTPATIENT)
Age: 49
End: 2024-07-13

## 2024-07-19 ENCOUNTER — ORGAN (OUTPATIENT)
Dept: TRANSPLANT | Facility: CLINIC | Age: 49
End: 2024-07-19

## 2024-07-19 ENCOUNTER — ANESTHESIA (OUTPATIENT)
Dept: SURGERY | Facility: CLINIC | Age: 49
DRG: 008 | End: 2024-07-19
Payer: MEDICARE

## 2024-07-19 ENCOUNTER — ANESTHESIA EVENT (OUTPATIENT)
Dept: SURGERY | Facility: CLINIC | Age: 49
DRG: 008 | End: 2024-07-19
Payer: MEDICARE

## 2024-07-19 ENCOUNTER — APPOINTMENT (OUTPATIENT)
Dept: GENERAL RADIOLOGY | Facility: CLINIC | Age: 49
DRG: 008 | End: 2024-07-19
Attending: PHYSICIAN ASSISTANT
Payer: MEDICARE

## 2024-07-19 ENCOUNTER — HOSPITAL ENCOUNTER (INPATIENT)
Facility: CLINIC | Age: 49
LOS: 11 days | Discharge: HOME | DRG: 008 | End: 2024-07-30
Attending: SURGERY | Admitting: SURGERY
Payer: MEDICARE

## 2024-07-19 DIAGNOSIS — J96.21 ACUTE AND CHRONIC RESPIRATORY FAILURE WITH HYPOXIA (H): ICD-10-CM

## 2024-07-19 DIAGNOSIS — I21.11 ST ELEVATION MYOCARDIAL INFARCTION INVOLVING RIGHT CORONARY ARTERY (H): Primary | ICD-10-CM

## 2024-07-19 DIAGNOSIS — Z76.82 AWAITING ORGAN TRANSPLANT: Primary | ICD-10-CM

## 2024-07-19 DIAGNOSIS — Z94.0 KIDNEY REPLACED BY TRANSPLANT: ICD-10-CM

## 2024-07-19 DIAGNOSIS — Z94.83 PANCREAS REPLACED BY TRANSPLANT (H): ICD-10-CM

## 2024-07-19 DIAGNOSIS — Z95.1 S/P CABG (CORONARY ARTERY BYPASS GRAFT): ICD-10-CM

## 2024-07-19 DIAGNOSIS — I46.9 CARDIAC ARREST WITH VENTRICULAR FIBRILLATION (H): ICD-10-CM

## 2024-07-19 DIAGNOSIS — I50.21 ACUTE SYSTOLIC HEART FAILURE (H): ICD-10-CM

## 2024-07-19 DIAGNOSIS — I49.01 CARDIAC ARREST WITH VENTRICULAR FIBRILLATION (H): ICD-10-CM

## 2024-07-19 DIAGNOSIS — I21.3 ST ELEVATION MI (STEMI) (H): ICD-10-CM

## 2024-07-19 PROBLEM — E11.9 DIABETES MELLITUS, TYPE 2 (H): Status: ACTIVE | Noted: 2024-07-19

## 2024-07-19 LAB
ABO/RH(D): NORMAL
ALBUMIN SERPL BCG-MCNC: 5.5 G/DL (ref 3.5–5.2)
ALP SERPL-CCNC: 104 U/L (ref 40–150)
ALT SERPL W P-5'-P-CCNC: ABNORMAL U/L
AMYLASE SERPL-CCNC: 88 U/L (ref 28–100)
ANION GAP SERPL CALCULATED.3IONS-SCNC: 16 MMOL/L (ref 7–15)
ANTIBODY SCREEN: NEGATIVE
APTT PPP: 31 SECONDS (ref 22–38)
AST SERPL W P-5'-P-CCNC: ABNORMAL U/L
BASE EXCESS BLDA CALC-SCNC: 1.7 MMOL/L (ref -3–3)
BASE EXCESS BLDA CALC-SCNC: 2.8 MMOL/L (ref -3–3)
BASE EXCESS BLDA CALC-SCNC: 5.1 MMOL/L (ref -3–3)
BILIRUB SERPL-MCNC: 0.5 MG/DL
BLD PROD TYP BPU: NORMAL
BLD PROD TYP BPU: NORMAL
BLOOD COMPONENT TYPE: NORMAL
BLOOD COMPONENT TYPE: NORMAL
BUN SERPL-MCNC: 24.2 MG/DL (ref 6–20)
CA-I BLD-MCNC: 4.4 MG/DL (ref 4.4–5.2)
CA-I BLD-MCNC: 4.5 MG/DL (ref 4.4–5.2)
CA-I BLD-MCNC: 4.5 MG/DL (ref 4.4–5.2)
CALCIUM SERPL-MCNC: 10.5 MG/DL (ref 8.8–10.4)
CHLORIDE SERPL-SCNC: 91 MMOL/L (ref 98–107)
CODING SYSTEM: NORMAL
CODING SYSTEM: NORMAL
CREAT SERPL-MCNC: 5.22 MG/DL (ref 0.67–1.17)
CROSSMATCH: NORMAL
CROSSMATCH: NORMAL
EGFRCR SERPLBLD CKD-EPI 2021: 13 ML/MIN/1.73M2
ERYTHROCYTE [DISTWIDTH] IN BLOOD BY AUTOMATED COUNT: 12.7 % (ref 10–15)
GLUCOSE BLD-MCNC: 206 MG/DL (ref 70–99)
GLUCOSE BLD-MCNC: 220 MG/DL (ref 70–99)
GLUCOSE BLD-MCNC: 254 MG/DL (ref 70–99)
GLUCOSE SERPL-MCNC: 159 MG/DL (ref 70–99)
HBA1C MFR BLD: 6.5 %
HBV CORE AB SERPL QL IA: NONREACTIVE
HBV SURFACE AB SERPL IA-ACNC: >1000 M[IU]/ML
HBV SURFACE AB SERPL IA-ACNC: REACTIVE M[IU]/ML
HBV SURFACE AG SERPL QL IA: NONREACTIVE
HCO3 BLDA-SCNC: 27 MMOL/L (ref 21–28)
HCO3 BLDA-SCNC: 28 MMOL/L (ref 21–28)
HCO3 BLDA-SCNC: 30 MMOL/L (ref 21–28)
HCO3 SERPL-SCNC: 30 MMOL/L (ref 22–29)
HCT VFR BLD AUTO: 40.7 % (ref 40–53)
HCV AB SERPL QL IA: NONREACTIVE
HGB BLD-MCNC: 11.8 G/DL (ref 13.3–17.7)
HGB BLD-MCNC: 12.2 G/DL (ref 13.3–17.7)
HGB BLD-MCNC: 12.3 G/DL (ref 13.3–17.7)
HGB BLD-MCNC: 15 G/DL (ref 13.3–17.7)
HIV 1+2 AB+HIV1 P24 AG SERPL QL IA: NONREACTIVE
INR PPP: 0.96 (ref 0.85–1.15)
LACTATE BLD-SCNC: 1 MMOL/L (ref 0.7–2)
LACTATE BLD-SCNC: 1.9 MMOL/L (ref 0.7–2)
LACTATE BLD-SCNC: 3.4 MMOL/L (ref 0.7–2)
LIPASE SERPL-CCNC: 67 U/L (ref 13–60)
MCH RBC QN AUTO: 34.3 PG (ref 26.5–33)
MCHC RBC AUTO-ENTMCNC: 36.9 G/DL (ref 31.5–36.5)
MCV RBC AUTO: 93 FL (ref 78–100)
O2/TOTAL GAS SETTING VFR VENT: 44 %
O2/TOTAL GAS SETTING VFR VENT: 46 %
O2/TOTAL GAS SETTING VFR VENT: 62 %
OXYHGB MFR BLDA: 91 % (ref 92–100)
OXYHGB MFR BLDA: 95 % (ref 92–100)
OXYHGB MFR BLDA: 95 % (ref 92–100)
PCO2 BLDA: 44 MM HG (ref 35–45)
PH BLDA: 7.4 [PH] (ref 7.35–7.45)
PH BLDA: 7.41 [PH] (ref 7.35–7.45)
PH BLDA: 7.44 [PH] (ref 7.35–7.45)
PLATELET # BLD AUTO: 235 10E3/UL (ref 150–450)
PO2 BLDA: 106 MM HG (ref 80–105)
PO2 BLDA: 69 MM HG (ref 80–105)
PO2 BLDA: 71 MM HG (ref 80–105)
POTASSIUM BLD-SCNC: 4.2 MMOL/L (ref 3.4–5.3)
POTASSIUM BLD-SCNC: 4.5 MMOL/L (ref 3.4–5.3)
POTASSIUM BLD-SCNC: 5 MMOL/L (ref 3.4–5.3)
POTASSIUM SERPL-SCNC: 3.8 MMOL/L (ref 3.4–5.3)
PROT SERPL-MCNC: 8.8 G/DL (ref 6.4–8.3)
RBC # BLD AUTO: 4.37 10E6/UL (ref 4.4–5.9)
SAO2 % BLDA: 92 % (ref 96–97)
SAO2 % BLDA: 93 % (ref 96–97)
SAO2 % BLDA: 96 % (ref 96–97)
SARS-COV-2 RNA RESP QL NAA+PROBE: NEGATIVE
SODIUM BLD-SCNC: 135 MMOL/L (ref 135–145)
SODIUM BLD-SCNC: 137 MMOL/L (ref 135–145)
SODIUM BLD-SCNC: 137 MMOL/L (ref 135–145)
SODIUM SERPL-SCNC: 137 MMOL/L (ref 135–145)
SPECIMEN EXPIRATION DATE: NORMAL
UNIT ABO/RH: NORMAL
UNIT ABO/RH: NORMAL
UNIT NUMBER: NORMAL
UNIT NUMBER: NORMAL
UNIT STATUS: NORMAL
UNIT STATUS: NORMAL
UNIT TYPE ISBT: 6200
UNIT TYPE ISBT: 6200
WBC # BLD AUTO: 8.4 10E3/UL (ref 4–11)

## 2024-07-19 PROCEDURE — 250N000013 HC RX MED GY IP 250 OP 250 PS 637: Performed by: PHYSICIAN ASSISTANT

## 2024-07-19 PROCEDURE — 83690 ASSAY OF LIPASE: CPT | Performed by: PHYSICIAN ASSISTANT

## 2024-07-19 PROCEDURE — 999N000141 HC STATISTIC PRE-PROCEDURE NURSING ASSESSMENT: Performed by: SURGERY

## 2024-07-19 PROCEDURE — 86665 EPSTEIN-BARR CAPSID VCA: CPT | Performed by: PHYSICIAN ASSISTANT

## 2024-07-19 PROCEDURE — 86803 HEPATITIS C AB TEST: CPT | Performed by: PHYSICIAN ASSISTANT

## 2024-07-19 PROCEDURE — 0DTJ0ZZ RESECTION OF APPENDIX, OPEN APPROACH: ICD-10-PCS | Performed by: SURGERY

## 2024-07-19 PROCEDURE — 93005 ELECTROCARDIOGRAM TRACING: CPT

## 2024-07-19 PROCEDURE — 120N000011 HC R&B TRANSPLANT UMMC

## 2024-07-19 PROCEDURE — 710N000010 HC RECOVERY PHASE 1, LEVEL 2, PER MIN: Performed by: SURGERY

## 2024-07-19 PROCEDURE — 258N000003 HC RX IP 258 OP 636: Performed by: STUDENT IN AN ORGANIZED HEALTH CARE EDUCATION/TRAINING PROGRAM

## 2024-07-19 PROCEDURE — 82330 ASSAY OF CALCIUM: CPT

## 2024-07-19 PROCEDURE — 85610 PROTHROMBIN TIME: CPT | Performed by: PHYSICIAN ASSISTANT

## 2024-07-19 PROCEDURE — 71046 X-RAY EXAM CHEST 2 VIEWS: CPT | Mod: 26 | Performed by: RADIOLOGY

## 2024-07-19 PROCEDURE — 86704 HEP B CORE ANTIBODY TOTAL: CPT | Performed by: PHYSICIAN ASSISTANT

## 2024-07-19 PROCEDURE — 85027 COMPLETE CBC AUTOMATED: CPT | Performed by: PHYSICIAN ASSISTANT

## 2024-07-19 PROCEDURE — 272N000001 HC OR GENERAL SUPPLY STERILE: Performed by: SURGERY

## 2024-07-19 PROCEDURE — 86900 BLOOD TYPING SEROLOGIC ABO: CPT | Performed by: PHYSICIAN ASSISTANT

## 2024-07-19 PROCEDURE — 250N000009 HC RX 250: Performed by: SURGERY

## 2024-07-19 PROCEDURE — 258N000003 HC RX IP 258 OP 636: Performed by: PHYSICIAN ASSISTANT

## 2024-07-19 PROCEDURE — 86923 COMPATIBILITY TEST ELECTRIC: CPT

## 2024-07-19 PROCEDURE — 370N000017 HC ANESTHESIA TECHNICAL FEE, PER MIN: Performed by: SURGERY

## 2024-07-19 PROCEDURE — 82040 ASSAY OF SERUM ALBUMIN: CPT | Performed by: PHYSICIAN ASSISTANT

## 2024-07-19 PROCEDURE — 86706 HEP B SURFACE ANTIBODY: CPT | Performed by: PHYSICIAN ASSISTANT

## 2024-07-19 PROCEDURE — 86645 CMV ANTIBODY IGM: CPT | Performed by: PHYSICIAN ASSISTANT

## 2024-07-19 PROCEDURE — 86790 VIRUS ANTIBODY NOS: CPT | Performed by: PHYSICIAN ASSISTANT

## 2024-07-19 PROCEDURE — 82805 BLOOD GASES W/O2 SATURATION: CPT

## 2024-07-19 PROCEDURE — 82810 BLOOD GASES O2 SAT ONLY: CPT

## 2024-07-19 PROCEDURE — 120N000002 HC R&B MED SURG/OB UMMC

## 2024-07-19 PROCEDURE — 87516 HEPATITIS B DNA AMP PROBE: CPT | Performed by: PHYSICIAN ASSISTANT

## 2024-07-19 PROCEDURE — 87389 HIV-1 AG W/HIV-1&-2 AB AG IA: CPT | Performed by: PHYSICIAN ASSISTANT

## 2024-07-19 PROCEDURE — 85730 THROMBOPLASTIN TIME PARTIAL: CPT | Performed by: PHYSICIAN ASSISTANT

## 2024-07-19 PROCEDURE — 250N000011 HC RX IP 250 OP 636: Performed by: STUDENT IN AN ORGANIZED HEALTH CARE EDUCATION/TRAINING PROGRAM

## 2024-07-19 PROCEDURE — 36415 COLL VENOUS BLD VENIPUNCTURE: CPT | Performed by: PHYSICIAN ASSISTANT

## 2024-07-19 PROCEDURE — 87340 HEPATITIS B SURFACE AG IA: CPT | Performed by: PHYSICIAN ASSISTANT

## 2024-07-19 PROCEDURE — C2617 STENT, NON-COR, TEM W/O DEL: HCPCS | Performed by: SURGERY

## 2024-07-19 PROCEDURE — 86832 HLA CLASS I HIGH DEFIN QUAL: CPT | Performed by: PHYSICIAN ASSISTANT

## 2024-07-19 PROCEDURE — 48554 TRANSPL ALLOGRAFT PANCREAS: CPT | Performed by: STUDENT IN AN ORGANIZED HEALTH CARE EDUCATION/TRAINING PROGRAM

## 2024-07-19 PROCEDURE — 0FYG0Z0 TRANSPLANTATION OF PANCREAS, ALLOGENEIC, OPEN APPROACH: ICD-10-PCS | Performed by: SURGERY

## 2024-07-19 PROCEDURE — 250N000009 HC RX 250: Performed by: STUDENT IN AN ORGANIZED HEALTH CARE EDUCATION/TRAINING PROGRAM

## 2024-07-19 PROCEDURE — 250N000025 HC SEVOFLURANE, PER MIN: Performed by: SURGERY

## 2024-07-19 PROCEDURE — 86825 HLA X-MATH NON-CYTOTOXIC: CPT | Performed by: PHYSICIAN ASSISTANT

## 2024-07-19 PROCEDURE — 360N000078 HC SURGERY LEVEL 5, PER MIN: Performed by: SURGERY

## 2024-07-19 PROCEDURE — 82150 ASSAY OF AMYLASE: CPT | Performed by: PHYSICIAN ASSISTANT

## 2024-07-19 PROCEDURE — 71046 X-RAY EXAM CHEST 2 VIEWS: CPT

## 2024-07-19 PROCEDURE — 86833 HLA CLASS II HIGH DEFIN QUAL: CPT | Performed by: PHYSICIAN ASSISTANT

## 2024-07-19 PROCEDURE — 86644 CMV ANTIBODY: CPT | Performed by: PHYSICIAN ASSISTANT

## 2024-07-19 PROCEDURE — 99207 PR PREOP VISIT IN GLOBAL PKG: CPT | Performed by: SURGERY

## 2024-07-19 PROCEDURE — 0TY10Z0 TRANSPLANTATION OF LEFT KIDNEY, ALLOGENEIC, OPEN APPROACH: ICD-10-PCS | Performed by: SURGERY

## 2024-07-19 PROCEDURE — 999N000128 HC STATISTIC PERIPHERAL IV START W/O US GUIDANCE

## 2024-07-19 PROCEDURE — 87635 SARS-COV-2 COVID-19 AMP PRB: CPT | Performed by: PHYSICIAN ASSISTANT

## 2024-07-19 PROCEDURE — 83036 HEMOGLOBIN GLYCOSYLATED A1C: CPT | Performed by: PHYSICIAN ASSISTANT

## 2024-07-19 PROCEDURE — 250N000011 HC RX IP 250 OP 636: Performed by: PHYSICIAN ASSISTANT

## 2024-07-19 RX ORDER — LIDOCAINE HYDROCHLORIDE 20 MG/ML
INJECTION, SOLUTION INFILTRATION; PERINEURAL PRN
Status: DISCONTINUED | OUTPATIENT
Start: 2024-07-19 | End: 2024-07-19

## 2024-07-19 RX ORDER — PROPOFOL 10 MG/ML
INJECTION, EMULSION INTRAVENOUS PRN
Status: DISCONTINUED | OUTPATIENT
Start: 2024-07-19 | End: 2024-07-19

## 2024-07-19 RX ORDER — SODIUM CHLORIDE, SODIUM GLUCONATE, SODIUM ACETATE, POTASSIUM CHLORIDE AND MAGNESIUM CHLORIDE 526; 502; 368; 37; 30 MG/100ML; MG/100ML; MG/100ML; MG/100ML; MG/100ML
INJECTION, SOLUTION INTRAVENOUS CONTINUOUS PRN
Status: DISCONTINUED | OUTPATIENT
Start: 2024-07-19 | End: 2024-07-20

## 2024-07-19 RX ORDER — ACETAMINOPHEN 650 MG/1
650 SUPPOSITORY RECTAL ONCE
Status: COMPLETED | OUTPATIENT
Start: 2024-07-19 | End: 2024-07-19

## 2024-07-19 RX ORDER — LIDOCAINE HYDROCHLORIDE 20 MG/ML
INJECTION, SOLUTION INFILTRATION; PERINEURAL PRN
Status: DISCONTINUED | OUTPATIENT
Start: 2024-07-19 | End: 2024-07-20

## 2024-07-19 RX ORDER — CIPROFLOXACIN 2 MG/ML
400 INJECTION, SOLUTION INTRAVENOUS ONCE
Status: COMPLETED | OUTPATIENT
Start: 2024-07-19 | End: 2024-07-19

## 2024-07-19 RX ORDER — FENTANYL CITRATE 50 UG/ML
INJECTION, SOLUTION INTRAMUSCULAR; INTRAVENOUS PRN
Status: DISCONTINUED | OUTPATIENT
Start: 2024-07-19 | End: 2024-07-20

## 2024-07-19 RX ORDER — OCTREOTIDE ACETATE 100 UG/ML
100 INJECTION, SOLUTION INTRAVENOUS; SUBCUTANEOUS ONCE
Status: COMPLETED | OUTPATIENT
Start: 2024-07-19 | End: 2024-07-19

## 2024-07-19 RX ORDER — PROPOFOL 10 MG/ML
INJECTION, EMULSION INTRAVENOUS PRN
Status: DISCONTINUED | OUTPATIENT
Start: 2024-07-19 | End: 2024-07-20

## 2024-07-19 RX ORDER — CALCIUM CHLORIDE 100 MG/ML
INJECTION INTRAVENOUS; INTRAVENTRICULAR PRN
Status: DISCONTINUED | OUTPATIENT
Start: 2024-07-19 | End: 2024-07-20

## 2024-07-19 RX ORDER — HEPARIN SODIUM 1000 [USP'U]/ML
INJECTION, SOLUTION INTRAVENOUS; SUBCUTANEOUS PRN
Status: DISCONTINUED | OUTPATIENT
Start: 2024-07-19 | End: 2024-07-20

## 2024-07-19 RX ORDER — ACETAMINOPHEN 325 MG/1
975 TABLET ORAL ONCE
Status: COMPLETED | OUTPATIENT
Start: 2024-07-19 | End: 2024-07-19

## 2024-07-19 RX ORDER — METRONIDAZOLE 500 MG/100ML
500 INJECTION, SOLUTION INTRAVENOUS ONCE
Status: COMPLETED | OUTPATIENT
Start: 2024-07-19 | End: 2024-07-19

## 2024-07-19 RX ORDER — DEXTROSE MONOHYDRATE 25 G/50ML
25-50 INJECTION, SOLUTION INTRAVENOUS
Status: CANCELLED | OUTPATIENT
Start: 2024-07-19

## 2024-07-19 RX ORDER — ZOLPIDEM TARTRATE 10 MG/1
10 TABLET ORAL
Status: ON HOLD | COMMUNITY
End: 2024-07-19

## 2024-07-19 RX ORDER — NICOTINE POLACRILEX 4 MG
15-30 LOZENGE BUCCAL
Status: CANCELLED | OUTPATIENT
Start: 2024-07-19

## 2024-07-19 RX ORDER — ZOLPIDEM TARTRATE 5 MG/1
5 TABLET ORAL
Status: ON HOLD | COMMUNITY
End: 2024-07-30

## 2024-07-19 RX ORDER — LIDOCAINE 40 MG/G
CREAM TOPICAL
Status: DISCONTINUED | OUTPATIENT
Start: 2024-07-19 | End: 2024-07-20

## 2024-07-19 RX ADMIN — Medication 20 MG: at 20:50

## 2024-07-19 RX ADMIN — LIDOCAINE HYDROCHLORIDE 100 MG: 20 INJECTION, SOLUTION INFILTRATION; PERINEURAL at 19:26

## 2024-07-19 RX ADMIN — PHENYLEPHRINE HYDROCHLORIDE 50 MCG: 10 INJECTION INTRAVENOUS at 20:52

## 2024-07-19 RX ADMIN — PHENYLEPHRINE HYDROCHLORIDE 100 MCG: 10 INJECTION INTRAVENOUS at 19:47

## 2024-07-19 RX ADMIN — METRONIDAZOLE 500 MG: 500 INJECTION, SOLUTION INTRAVENOUS at 20:26

## 2024-07-19 RX ADMIN — CALCIUM CHLORIDE INJECTION 500 MG: 100 INJECTION, SOLUTION INTRAVENOUS at 23:56

## 2024-07-19 RX ADMIN — PHENYLEPHRINE HYDROCHLORIDE 100 MCG: 10 INJECTION INTRAVENOUS at 20:10

## 2024-07-19 RX ADMIN — PHENYLEPHRINE HYDROCHLORIDE 50 MCG: 10 INJECTION INTRAVENOUS at 20:45

## 2024-07-19 RX ADMIN — Medication 10 MG: at 23:44

## 2024-07-19 RX ADMIN — CIPROFLOXACIN 400 MG: 400 INJECTION, SOLUTION INTRAVENOUS at 20:26

## 2024-07-19 RX ADMIN — SODIUM CHLORIDE 500 MG: 9 INJECTION, SOLUTION INTRAVENOUS at 20:23

## 2024-07-19 RX ADMIN — PROPOFOL 120 MG: 10 INJECTION, EMULSION INTRAVENOUS at 19:26

## 2024-07-19 RX ADMIN — HEPARIN SODIUM 2000 UNITS: 1000 INJECTION INTRAVENOUS; SUBCUTANEOUS at 23:46

## 2024-07-19 RX ADMIN — SODIUM CHLORIDE, SODIUM GLUCONATE, SODIUM ACETATE, POTASSIUM CHLORIDE AND MAGNESIUM CHLORIDE: 526; 502; 368; 37; 30 INJECTION, SOLUTION INTRAVENOUS at 20:28

## 2024-07-19 RX ADMIN — Medication 10 MG: at 22:56

## 2024-07-19 RX ADMIN — ACETAMINOPHEN 975 MG: 325 TABLET, FILM COATED ORAL at 18:28

## 2024-07-19 RX ADMIN — SODIUM CHLORIDE, SODIUM GLUCONATE, SODIUM ACETATE, POTASSIUM CHLORIDE AND MAGNESIUM CHLORIDE: 526; 502; 368; 37; 30 INJECTION, SOLUTION INTRAVENOUS at 19:12

## 2024-07-19 RX ADMIN — PHENYLEPHRINE HYDROCHLORIDE 100 MCG: 10 INJECTION INTRAVENOUS at 20:16

## 2024-07-19 RX ADMIN — Medication 100 MG: at 19:27

## 2024-07-19 RX ADMIN — MYCOPHENOLATE MOFETIL 1000 MG: 500 INJECTION, POWDER, LYOPHILIZED, FOR SOLUTION INTRAVENOUS at 21:21

## 2024-07-19 RX ADMIN — PHENYLEPHRINE HYDROCHLORIDE 100 MCG: 10 INJECTION INTRAVENOUS at 19:40

## 2024-07-19 RX ADMIN — MICAFUNGIN SODIUM 100 MG: 50 INJECTION, POWDER, LYOPHILIZED, FOR SOLUTION INTRAVENOUS at 20:29

## 2024-07-19 RX ADMIN — FENTANYL CITRATE 100 MCG: 50 INJECTION INTRAMUSCULAR; INTRAVENOUS at 19:20

## 2024-07-19 RX ADMIN — INSULIN HUMAN 2 UNITS/HR: 1 INJECTION, SOLUTION INTRAVENOUS at 21:39

## 2024-07-19 RX ADMIN — FENTANYL CITRATE 100 MCG: 50 INJECTION INTRAMUSCULAR; INTRAVENOUS at 20:59

## 2024-07-19 RX ADMIN — ANTI-THYMOCYTE GLOBULIN (RABBIT) 200 MG: 5 INJECTION, POWDER, LYOPHILIZED, FOR SOLUTION INTRAVENOUS at 20:37

## 2024-07-19 RX ADMIN — PHENYLEPHRINE HYDROCHLORIDE 100 MCG: 10 INJECTION INTRAVENOUS at 19:59

## 2024-07-19 RX ADMIN — OCTREOTIDE ACETATE 100 MCG: 100 INJECTION, SOLUTION INTRAVENOUS; SUBCUTANEOUS at 21:06

## 2024-07-19 ASSESSMENT — ENCOUNTER SYMPTOMS
DYSRHYTHMIAS: 0
SEIZURES: 0
ORTHOPNEA: 0

## 2024-07-19 ASSESSMENT — ACTIVITIES OF DAILY LIVING (ADL)
ADLS_ACUITY_SCORE: 20
ADLS_ACUITY_SCORE: 20
ADLS_ACUITY_SCORE: 37
ADLS_ACUITY_SCORE: 20
ADLS_ACUITY_SCORE: 37
ADLS_ACUITY_SCORE: 20
ADLS_ACUITY_SCORE: 20

## 2024-07-19 NOTE — Clinical Note
The first balloon was inserted into the right coronary artery and middle right coronary artery.Max pressure = 12 dragan. Total duration = 6 seconds.

## 2024-07-19 NOTE — H&P
[facility] History and Physical    Siva Ramey MRN# [id number]   Age: 49 year old YOB: 1975     Date of Admission:  7/19/2024    Primary care provider: Trinidad Hansen          Assessment and Plan:   Assessment:   49 year old male with PMH significant for DMII (on insulin pump) and resulting ESKD (on HD every MWF since 8/2021). 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      Plan:   -Admit to outpatient, 7A, pancreas transplant surgery with Dr. Whitehead  -NPO  -Labs, EKG and CXR  -Consent to be completed by surgeons     Attestation: I saw and examined the patient with the transplant team. I independently reviewed all pertinent laboratory and imaging information and made management decisions including immunosuppression adjustment. I agree with the findings and plan as documented in this note.  Harrison Whitehead MD            Chief Complaint:   Admit for possible SPK.     History is obtained from the patient    49 year old male with PMH significant for DMII (on insulin pump) and resulting ESKD (on HD every MWF since 8/2021). 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    Patient denies SOB, CP, edema, abdominal pain, pharyngitis, headache, diarrhea, constipation. No recent blood transfusions. Does have clear ear drainage from right ear.     Last HD on 7/19/24. Patient currently makes urine.          Past Medical History:     Past Medical History:   Diagnosis Date    Acquired elevated diaphragm     Anemia in chronic kidney disease     Angina pectoris (H24)     Chronic kidney disease     Coronary artery disease     Diabetes  mellitus, type II (H) 12/2001    Diabetic nephropathy (H)     MARQUEZ (dyspnea on exertion)     Dyslipidemia 12/2001    End stage renal disease (H)     History of blood transfusion 2004    Hypertension     takes medication    Ischemic cardiomyopathy     Metabolic acidosis     Myocardial infarction (H)     STEMI -Diagonal branch of the LAD    Obesity (BMI 30-39.9)     Peripheral neuropathy     Proteinuria     Retinopathy     Vitamin D deficiency             Past Surgical History:     Past Surgical History:   Procedure Laterality Date    BACK SURGERY  2009    L5 disc cut    BYPASS GRAFT ARTERY CORONARY  06/20/2019    2 vessels    CV CORONARY ANGIOGRAM N/A 01/13/2019    Procedure: Coronary Angiogram;  Surgeon: Cielo Che MD;  Location: [facility] Cath Lab;  Service: Cardiology    CV CORONARY ANGIOGRAM N/A 05/02/2019    Procedure: Coronary Angiogram;  Surgeon: Cielo Che MD;  Location: [facility] Cath Lab;  Service: Cardiology    CV CORONARY ANGIOGRAM N/A 04/30/2020    Procedure: Coronary Angiogram;  Surgeon: Cielo Che MD;  Location: [facility] Cath Lab;  Service: Cardiology    CV CORONARY ANGIOGRAM N/A 07/22/2021    Procedure: CV CORONARY ANGIOGRAM;  Surgeon: Adrian Crow MD;  Location: [facility] CATH LAB CV    CV CORONARY ANGIOGRAM N/A 02/01/2024    Procedure: Coronary Angiogram;  Surgeon: Delroy Carpio MD;  Location: [facility] CARDIAC CATH LAB    CV FRACTIONAL FLOW RATIO WIRE N/A 07/22/2021    Procedure: Fractional Flow Ratio Wire;  Surgeon: Adrian Crow MD;  Location: [facility] CATH LAB CV    CV LEFT HEART CATH N/A 07/22/2021    Procedure: Left Heart Cath;  Surgeon: Adrian Crow MD;  Location: [facility] CATH LAB CV    CV LEFT HEART CATHETERIZATION WITH LEFT VENTRICULOGRAM N/A 01/13/2019    Procedure: Left Heart Catheterization with Left Ventriculogram;  Surgeon: Cielo Che MD;  Location: [facility] Cath Lab;  Service: Cardiology    CV LEFT HEART CATHETERIZATION WITHOUT LEFT  VENTRICULOGRAM Left 04/30/2020    Procedure: Left Heart Catheterization Without Left Ventriculogram;  Surgeon: Cielo Che MD;  Location: [facility] Cath Lab;  Service: Cardiology    CV PCI N/A 02/01/2024    Procedure: Percutaneous Coronary Intervention;  Surgeon: Delroy Carpio MD;  Location: [facility] CARDIAC CATH LAB    CV RIGHT HEART CATHETERIZATION N/A 04/30/2020    Procedure: Right Heart Catheterization;  Surgeon: Cielo Che MD;  Location: [facility] Cath Lab;  Service: Cardiology    HERNIA REPAIR      OTHER SURGICAL HISTORY      Excise varicocele    STENT, CORONARY, DALE  2019    VASCULAR SURGERY              Social History:     Social History     Tobacco Use    Smoking status: Never     Passive exposure: Past    Smokeless tobacco: Never   Substance Use Topics    Alcohol use: Yes     Comment: Alcoholic Drinks: 1-2 per year            Family History:     Family History   Problem Relation Age of Onset    Diabetes Type 2  Mother     Heart Disease Father 60    CABG Father 50        triple bypass    Diabetes Type 2  Father     Depression Sister     Substance Abuse Sister     Ovarian Cancer Maternal Grandmother     Brain Cancer Maternal Grandmother     Pancreatic Cancer Maternal Aunt     Prostate Cancer Maternal Uncle      Family history reviewed and updated in [facility]         Immunizations:   Immunization status is unknown         Allergies:     Allergies   Allergen Reactions    Amoxicillin Hives     & generalized pain    Venlafaxine      lethargic            Medications:   See med rec           Review of Systems:   The Review of Systems is negative other than noted in the HPI         Physical Exam:   Vitals were reviewed                     Constitutional:   awake, alert, cooperative, no apparent distress, and appears stated age     Eyes:   Lids and lashes normal, pupils equal, round and reactive to light, extra ocular muscles intact, sclera clear, conjunctiva normal     ENT:   Normocephalic, without  obvious abnormality, atraumatic, sinuses nontender on palpation, external ears without lesions, oral pharynx with moist mucous membranes, tonsils without erythema or exudates, gums normal and good dentition. Right ear with visualized TM and no exudates.      Neck:   Supple, symmetrical, trachea midline, no adenopathy, thyroid symmetric, not enlarged and no tenderness, skin normal     Hematologic / Lymphatic:   no cervical lymphadenopathy     Back:   Symmetric, no curvature, spinous processes are non-tender on palpation, paraspinous muscles are non-tender on palpation, no costal vertebral tenderness     Lungs:   No increased work of breathing, good air exchange, clear to auscultation bilaterally, no crackles or wheezing.      Cardiovascular:   Normal apical impulse, regular rate and rhythm, normal S1 and S2, no S3 or S4, and no murmur noted. Chest scar noted.      Abdomen:   No scars, normal bowel sounds, soft, non-distended, non-tender, no masses palpated, no hepatosplenomegally     Genitounirinary:   Voiding     Musculoskeletal:   There is no redness, warmth, or swelling of the joints.  Full range of motion noted.  Motor strength is 5 out of 5 all extremities bilaterally.  Tone is normal.     Neurologic:   Awake, alert, oriented to name, place and time. Gait is normal.            Data:   Labs, CXR and EKG pending.     Libra Valentino PA-C

## 2024-07-19 NOTE — Clinical Note
The first balloon was inserted into the right coronary artery and middle right coronary artery.Max pressure = 12 dragan. Total duration = 6 seconds.     Max pressure = 12 dragan. Total duration = 6 seconds.    Balloon reinflated a second time: Max pressure = 12 dragan. Total duration = 6 seconds.

## 2024-07-19 NOTE — Clinical Note
The first balloon was inserted into the right coronary artery and middle right coronary artery.Max pressure = 12 dragan. Total duration = 6 seconds.     Max pressure = 12 dragan. Total duration = 6 seconds.    Balloon reinflated a second time: Max pressure = 12 dragan. Total duration = 6 seconds.  Balloon reinflated a third time: Max pressure = 12 dragan. Total duration = 6 seconds.  Balloon reinflated a fourth time: Max pressure = 12 dragan. Total du ration = 6 seconds.

## 2024-07-19 NOTE — Clinical Note
The first balloon was inserted into the right coronary artery and middle right coronary artery.Max pressure = 12 dragan. Total duration = 6 seconds.     Max pressure = 12 dragan. Total duration = 6 seconds.    Balloon reinflated a second time: Max pressure = 12 dragan. Total duration = 6 seconds.  Balloon reinflated a third time: Max pressure = 12 dragan. Total duration = 6 seconds.  Balloon reinflated a fourth time: Max pressure = 12 dragan. Total du ration = 6 seconds.  Balloon reinflated a fourth time: Max pressure = 12 dragan. Total duration = 6 seconds.

## 2024-07-19 NOTE — ANESTHESIA PREPROCEDURE EVALUATION
Anesthesia Pre-Procedure Evaluation    Patient: Siva Ramey   MRN: [id number] : 1975        Procedure : Procedure(s):  Transplant pancreas, kidney  donor, combined          Past Medical History:   Diagnosis Date    Acquired elevated diaphragm     Anemia in chronic kidney disease     Angina pectoris (H24)     Chronic kidney disease     Coronary artery disease     Diabetes mellitus, type II (H) 2001    Diabetic nephropathy (H)     MARQUEZ (dyspnea on exertion)     Dyslipidemia 2001    End stage renal disease (H)     History of blood transfusion     Hypertension     takes medication    Ischemic cardiomyopathy     Metabolic acidosis     Myocardial infarction (H)     STEMI -Diagonal branch of the LAD    Obesity (BMI 30-39.9)     Peripheral neuropathy     Proteinuria     Retinopathy     Vitamin D deficiency       Past Surgical History:   Procedure Laterality Date    BACK SURGERY      L5 disc cut    BYPASS GRAFT ARTERY CORONARY  2019    2 vessels    CV CORONARY ANGIOGRAM N/A 2019    Procedure: Coronary Angiogram;  Surgeon: Cielo Che MD;  Location: [facility] Cath Lab;  Service: Cardiology    CV CORONARY ANGIOGRAM N/A 2019    Procedure: Coronary Angiogram;  Surgeon: Cielo Che MD;  Location: [facility] Cath Lab;  Service: Cardiology    CV CORONARY ANGIOGRAM N/A 2020    Procedure: Coronary Angiogram;  Surgeon: Cielo Che MD;  Location: [facility] Cath Lab;  Service: Cardiology    CV CORONARY ANGIOGRAM N/A 2021    Procedure: CV CORONARY ANGIOGRAM;  Surgeon: Adrian Crow MD;  Location: [facility] CATH LAB CV    CV CORONARY ANGIOGRAM N/A 2024    Procedure: Coronary Angiogram;  Surgeon: Delroy Carpio MD;  Location:  HEART CARDIAC CATH LAB    CV FRACTIONAL FLOW RATIO WIRE N/A 2021    Procedure: Fractional Flow Ratio Wire;  Surgeon: Adrian Crow MD;  Location: [facility] CATH LAB CV    CV LEFT HEART CATH N/A 2021     Procedure: Left Heart Cath;  Surgeon: Adrian Crow MD;  Location: [facility] CATH LAB CV    CV LEFT HEART CATHETERIZATION WITH LEFT VENTRICULOGRAM N/A 01/13/2019    Procedure: Left Heart Catheterization with Left Ventriculogram;  Surgeon: Cielo Che MD;  Location: [facility] Cath Lab;  Service: Cardiology    CV LEFT HEART CATHETERIZATION WITHOUT LEFT VENTRICULOGRAM Left 04/30/2020    Procedure: Left Heart Catheterization Without Left Ventriculogram;  Surgeon: Cielo Che MD;  Location: [facility] Cath Lab;  Service: Cardiology    CV PCI N/A 02/01/2024    Procedure: Percutaneous Coronary Intervention;  Surgeon: Delroy Carpio MD;  Location:  HEART CARDIAC CATH LAB    CV RIGHT HEART CATHETERIZATION N/A 04/30/2020    Procedure: Right Heart Catheterization;  Surgeon: Cielo Che MD;  Location: [facility] Cath Lab;  Service: Cardiology    EYE SURGERY      GENITOURINARY SURGERY      HERNIA REPAIR      OTHER SURGICAL HISTORY      Excise varicocele    STENT, CORONARY, DALE  2019    VASCULAR SURGERY        Allergies   Allergen Reactions    Amoxicillin Hives     & generalized pain    Venlafaxine      lethargic      Social History     Tobacco Use    Smoking status: Never     Passive exposure: Past    Smokeless tobacco: Never   Substance Use Topics    Alcohol use: Never      Wt Readings from Last 1 Encounters:   02/14/24 113.9 kg (251 lb 1.6 oz)        Anesthesia Evaluation   Pt has had prior anesthetic. Type: General.    No history of anesthetic complications       ROS/MED HX  ENT/Pulmonary:     (+) sleep apnea, uses CPAP,                                      Neurologic:    (-) no seizures, no CVA and migraines   Cardiovascular:     (+)  hypertension- -  CAD -  CABG (2v in 2019)- stent-2019. 1 Drug Eluting Stent.                                 (-) MARQUEZ, orthopnea/PND, arrhythmias and irregular heartbeat/palpitations   METS/Exercise Tolerance:     Hematologic:       Musculoskeletal:       GI/Hepatic:   "   (+) GERD,                   Renal/Genitourinary:     (+) renal disease, type: ESRD, Pt requires dialysis, type: Hemodialysis,          Endo:     (+)  type II DM,   Using insulin, - using insulin pump.   Diabetic complications: nephropathy.      Obesity,       Psychiatric/Substance Use:       Infectious Disease:       Malignancy:       Other:            Physical Exam    Airway        Mallampati: III   TM distance: > 3 FB   Neck ROM: full   Mouth opening: > 3 cm    Respiratory Devices and Support         Dental       (+) Modest Abnormalities - crowns, retainers, 1 or 2 missing teeth      Cardiovascular          Rhythm and rate: regular and normal     Pulmonary           breath sounds clear to auscultation           OUTSIDE LABS:  CBC:   Lab Results   Component Value Date    WBC 7.6 02/01/2024    WBC 9.3 04/02/2023    HGB 12.6 (L) 02/01/2024    HGB 8.8 (L) 04/02/2023    HCT 37.6 (L) 02/01/2024    HCT 26.0 (L) 04/02/2023     02/01/2024     04/02/2023     BMP:   Lab Results   Component Value Date     02/01/2024     11/16/2023    POTASSIUM 5.3 02/01/2024    POTASSIUM 4.5 11/16/2023    CHLORIDE 99 02/01/2024    CHLORIDE 95 (L) 11/16/2023    CO2 22 02/01/2024    CO2 30 (H) 11/16/2023    BUN 36.4 (H) 02/01/2024    BUN 39.5 (H) 11/16/2023    CR 7.11 (H) 02/01/2024    CR 7.32 (H) 11/16/2023     (H) 02/01/2024     (H) 02/01/2024     COAGS:   Lab Results   Component Value Date    PTT 30 02/01/2024    INR 1.16 (H) 02/01/2024    FIBR 304 06/20/2019     POC: No results found for: \"BGM\", \"HCG\", \"HCGS\"  HEPATIC:   Lab Results   Component Value Date    ALBUMIN 4.7 11/16/2023    PROTTOTAL 7.6 11/16/2023    ALT 31 11/16/2023    AST 27 11/16/2023    ALKPHOS 64 11/16/2023    BILITOTAL 0.6 11/16/2023     OTHER:   Lab Results   Component Value Date    PH 7.36 (L) 04/30/2020    A1C 7.4 (H) 12/19/2023    BETHANY 10.8 (H) 02/01/2024    PHOS 4.2 07/25/2021    MAG 2.4 (H) 04/02/2023    TSH 1.78 10/17/2023 "    CRP 9.8 (H) 06/26/2019       Anesthesia Plan    ASA Status:  3, emergent    NPO Status:  NPO Appropriate    Anesthesia Type: General.     - Airway: ETT   Induction: Intravenous.   Maintenance: Balanced.   Techniques and Equipment:     - AVOID: No BP/IV in LUE     - Lines/Monitors: 2nd IV, Arterial Line, Central Line     Consents    Anesthesia Plan(s) and associated risks, benefits, and realistic alternatives discussed. Questions answered and patient/representative(s) expressed understanding.     - Discussed: Risks, Benefits and Alternatives for BOTH SEDATION and the PROCEDURE were discussed     - Discussed with:  Patient      - Extended Intubation/Ventilatory Support Discussed: No.      - Patient is DNR/DNI Status: No     Use of blood products discussed: Yes.     - Discussed with: Patient.     - Consented: consented to blood products     Postoperative Care    Pain management: Multi-modal analgesia.   PONV prophylaxis: Ondansetron (or other 5HT-3), Dexamethasone or Solumedrol     Comments:               Rosalia Arellano MD    I have reviewed the pertinent notes and labs in the chart from the past 30 days and (re)examined the patient.  Any updates or changes from those notes are reflected in this note.             # Drug Induced Platelet Defect: home medication list includes an antiplatelet medication

## 2024-07-19 NOTE — Clinical Note
Stent deployed in the proximal right coronary artery. Max pressure = 12 dragan. Total duration = 6 seconds.

## 2024-07-19 NOTE — Clinical Note
dry, intact, no bleeding and no hematoma. 6fr, arterial, venous sheaths capped, lefti n place in R femoral.

## 2024-07-19 NOTE — Clinical Note
The first balloon was inserted into the right coronary artery and proximal right coronary artery.Max pressure = 6 dragan. Total duration = 4 seconds.     Max pressure = 6 dragan. Total duration = 4 seconds.   Shockwave .  Balloon reinflated a second time: Max pressure = 6 dragan. Total duration = 4 seconds. Shock wave

## 2024-07-19 NOTE — Clinical Note
The first balloon was inserted into the right coronary artery and proximal right coronary artery.Max pressure = 12 dragan. Total duration = 6 seconds.     Max pressure = 12 dragan. Total duration = 6 seconds.    Balloon reinflated a second time: Max pressure = 12 dragan. Total duration = 6 seconds.  Balloon reinflated a third time: Max pressure = 12 dragan. Total duration = 6 seconds.  Balloon reinflated a fourth time: Max pressure = 12 dragan. Total  duration = 6 seconds.

## 2024-07-19 NOTE — Clinical Note
Max pressure = 12 dragan. Total duration = 4 seconds.     Max pressure = 12 dragan. Total duration = 6 seconds.    Balloon reinflated a second time: Max pressure = 12 dragan. Total duration = 6 seconds.  Balloon reinflated a third time: Max pressure = 14 dragan. Total duration = 6 seconds.  Balloon reinflated a fourth time: Max pressure = 12 dragan. Total duration = 5 seconds.  Balloon reinflated a fourth time: Max pressure = 12 dragan. Total duration =  4 seconds. 98.3

## 2024-07-19 NOTE — Clinical Note
Stent deployed in the middle right coronary artery. Max pressure = 12 dragan. Total duration = 6 seconds.

## 2024-07-19 NOTE — Clinical Note
The first balloon was inserted into the right coronary artery and middle right coronary artery.Max pressure = 12 dragan. Total duration = 8 seconds.     Max pressure = 12 dragan. Total duration = 6 seconds.    Balloon reinflated a second time: Max pressure = 12 dragan. Total duration = 6 seconds.  Balloon reinflated a third time: Max pressure = 12 dragan. Total duration = 8 seconds.  Balloon reinflated a fourth time: Max pressure = 12 dragan. Total du ration = 6 seconds.  Balloon reinflated a fourth time: Max pressure = 12 dragan. Total duration = 6 seconds.

## 2024-07-19 NOTE — Clinical Note
The first balloon was inserted into the right coronary artery and middle right coronary artery.Max pressure = 6 dragan. Total duration = 10 seconds.     Max pressure = 6 dragan. Total duration = 6 seconds.   Shock wave .  Balloon reinflated a second time: Max pressure = 6 dragan. Total duration = 6 seconds. Shockwave Balloon reinflated a third time: Max pressure = 6 dragan. Total duration = 6 seconds.  Balloon reinflated a fourth time: Max pressure  = 6 dragan. Total duration = 4 seconds. Shock wave Balloon reinflated a fourth time: Max pressure = 6 dragan. Total duration = 4 seconds.

## 2024-07-19 NOTE — PROGRESS NOTES
"BP (!) 137/94 (BP Location: Right arm, Patient Position: Supine)   Pulse 83   Temp 98.1  F (36.7  C) (Oral)   Resp 16   Ht 1.803 m (5' 11\")   Wt 102.4 kg (225 lb 12 oz)   SpO2 98%   BMI 31.49 kg/m      Pt arrived with family to 7A @1645, left for pre-op@1815. VSS on RA. Aox4. Denies pain/nausea. Insulin pump stopped on arrival and sent home with family. Labs, EKG, COVID swab, CXR completed, CHG wipes done.     "

## 2024-07-19 NOTE — Clinical Note
Potential access sites were evaluated for patency using ultrasound.   The right femoral artery was selected. Access was obtained under with Sonosite and Fluoroscopic guidance using a standard 18 guage needle with direct visualization of needle entry.    hard copy, drawn during this pregnancy

## 2024-07-19 NOTE — TELEPHONE ENCOUNTER
"TRANSPLANT OR REPORT    Organ: K/P  Laterality (if known): TBD  Organ Location: Import    UNOS ID: [id number]  Donor OR Time: 1100CST  Expected/Actual Cross Clamp Time: 1530cst  Expected Organ Arrival Time: 2000cst    Surgeon: Dr. Whitehead  Time in OR: 1800  Time in 3C (N/A for LETI): 1700    Recipient Details  Admission ETA: 1630  Unit: 7A  Isolation: None  Latex Allergy: No  : N/A  Diagnosis: ESRD/DM    Liver Transplants  Bypass/Perfusion: N/A  Cellsaver: N/A  Hemodialysis: N/A  ~ \"RENAL STAFF TEACHING SERVICE MEDICINE\" : Remind LETI Fellow to discuss with nephrology on call.  ~ CRRT Resource Nurse: N/A  (Telephone Number for CRRT [phone]. When prompted, the caller should say  CRRT Resource 1\")    Kidney/Panc Transplants  XM Status (Need to wait for XM?): No    Liver or KP/PA Recipients - Vessel Banking:  Donor has positive serologies for HIV/HCV/HBV: No  Donor has risk criteria for HIV/HCV/HBV: No      Transplant Coordinator Contact Info: Mary Anne [phone] until 0700 on 7/20      Vessel Bank Information  Transplant hospitals must not store a donor s extra vessels if the donor has tested positive for any of the following:   - HIV by antibody, antigen, or nucleic acid test (VINOD)   - Hepatitis B surface antigen (HBsAg)   - Hepatitis B (HBV) by VINOD   - Hepatitis C (HCV) by antibody or VINOD     Extra vessels from donors that do not test positive for HIV, HBV, or HCV as above may be stored    "

## 2024-07-20 ENCOUNTER — APPOINTMENT (OUTPATIENT)
Dept: ULTRASOUND IMAGING | Facility: CLINIC | Age: 49
DRG: 008 | End: 2024-07-20
Attending: SURGERY
Payer: MEDICARE

## 2024-07-20 ENCOUNTER — APPOINTMENT (OUTPATIENT)
Dept: GENERAL RADIOLOGY | Facility: CLINIC | Age: 49
DRG: 008 | End: 2024-07-20
Attending: SURGERY
Payer: MEDICARE

## 2024-07-20 ENCOUNTER — DOCUMENTATION ONLY (OUTPATIENT)
Dept: TRANSPLANT | Facility: CLINIC | Age: 49
End: 2024-07-20
Payer: COMMERCIAL

## 2024-07-20 ENCOUNTER — APPOINTMENT (OUTPATIENT)
Dept: GENERAL RADIOLOGY | Facility: CLINIC | Age: 49
DRG: 008 | End: 2024-07-20
Attending: STUDENT IN AN ORGANIZED HEALTH CARE EDUCATION/TRAINING PROGRAM
Payer: MEDICARE

## 2024-07-20 ENCOUNTER — APPOINTMENT (OUTPATIENT)
Dept: CARDIOLOGY | Facility: CLINIC | Age: 49
DRG: 008 | End: 2024-07-20
Attending: STUDENT IN AN ORGANIZED HEALTH CARE EDUCATION/TRAINING PROGRAM
Payer: MEDICARE

## 2024-07-20 PROBLEM — Z94.0 KIDNEY REPLACED BY TRANSPLANT: Status: ACTIVE | Noted: 2024-07-20

## 2024-07-20 PROBLEM — Z94.83 PANCREAS REPLACED BY TRANSPLANT (H): Status: ACTIVE | Noted: 2024-07-20

## 2024-07-20 LAB
ACT BLD: 123 SECONDS (ref 74–150)
ACT BLD: 173 SECONDS (ref 74–150)
ACT BLD: 262 SECONDS (ref 74–150)
ACT BLD: 262 SECONDS (ref 74–150)
ACT BLD: 274 SECONDS (ref 74–150)
ACT BLD: 287 SECONDS (ref 74–150)
ALBUMIN MFR UR ELPH: 310 MG/DL
ALBUMIN SERPL BCG-MCNC: 3.1 G/DL (ref 3.5–5.2)
ALBUMIN SERPL BCG-MCNC: 3.2 G/DL (ref 3.5–5.2)
ALBUMIN UR-MCNC: 300 MG/DL
ALLEN'S TEST: ABNORMAL
ALP SERPL-CCNC: 59 U/L (ref 40–150)
ALP SERPL-CCNC: 62 U/L (ref 40–150)
ALP SERPL-CCNC: 65 U/L (ref 40–150)
ALT SERPL W P-5'-P-CCNC: 43 U/L (ref 0–70)
ALT SERPL W P-5'-P-CCNC: 56 U/L (ref 0–70)
ALT SERPL W P-5'-P-CCNC: 63 U/L (ref 0–70)
ALT SERPL W P-5'-P-CCNC: 75 U/L (ref 0–70)
ALT SERPL W P-5'-P-CCNC: 81 U/L (ref 0–70)
AMYLASE SERPL-CCNC: 171 U/L (ref 28–100)
ANION GAP SERPL CALCULATED.3IONS-SCNC: 10 MMOL/L (ref 7–15)
ANION GAP SERPL CALCULATED.3IONS-SCNC: 11 MMOL/L (ref 7–15)
ANION GAP SERPL CALCULATED.3IONS-SCNC: 11 MMOL/L (ref 7–15)
ANION GAP SERPL CALCULATED.3IONS-SCNC: 12 MMOL/L (ref 7–15)
ANION GAP SERPL CALCULATED.3IONS-SCNC: 13 MMOL/L (ref 7–15)
ANION GAP SERPL CALCULATED.3IONS-SCNC: 14 MMOL/L (ref 7–15)
APPEARANCE UR: ABNORMAL
APTT PPP: 27 SECONDS (ref 22–38)
APTT PPP: 28 SECONDS (ref 22–38)
AST SERPL W P-5'-P-CCNC: 182 U/L (ref 0–45)
AST SERPL W P-5'-P-CCNC: 271 U/L (ref 0–45)
AST SERPL W P-5'-P-CCNC: 364 U/L (ref 0–45)
AST SERPL W P-5'-P-CCNC: 411 U/L (ref 0–45)
AST SERPL W P-5'-P-CCNC: 65 U/L (ref 0–45)
BASE EXCESS BLDA CALC-SCNC: -0.2 MMOL/L (ref -3–3)
BASE EXCESS BLDA CALC-SCNC: -0.3 MMOL/L (ref -3–3)
BASE EXCESS BLDA CALC-SCNC: -0.4 MMOL/L (ref -3–3)
BASE EXCESS BLDA CALC-SCNC: -1.3 MMOL/L (ref -3–3)
BASE EXCESS BLDA CALC-SCNC: -1.7 MMOL/L (ref -3–3)
BASE EXCESS BLDA CALC-SCNC: -1.7 MMOL/L (ref -3–3)
BASE EXCESS BLDA CALC-SCNC: -2.3 MMOL/L (ref -3–3)
BASE EXCESS BLDA CALC-SCNC: -6.5 MMOL/L (ref -3–3)
BASE EXCESS BLDA CALC-SCNC: 0.1 MMOL/L (ref -3–3)
BASE EXCESS BLDA CALC-SCNC: 1.6 MMOL/L (ref -3–3)
BASE EXCESS BLDA CALC-SCNC: 1.8 MMOL/L (ref -3–3)
BASE EXCESS BLDV CALC-SCNC: -0.4 MMOL/L (ref -3–3)
BASE EXCESS BLDV CALC-SCNC: 0.4 MMOL/L (ref -3–3)
BASE EXCESS BLDV CALC-SCNC: 1.9 MMOL/L (ref -3–3)
BASE EXCESS BLDV CALC-SCNC: 2.2 MMOL/L (ref -3–3)
BASOPHILS # BLD AUTO: 0 10E3/UL (ref 0–0.2)
BASOPHILS NFR BLD AUTO: 0 %
BI-PLANE LVEF ECHO: NORMAL
BILIRUB SERPL-MCNC: 0.5 MG/DL
BILIRUB SERPL-MCNC: 0.6 MG/DL
BILIRUB SERPL-MCNC: 0.6 MG/DL
BILIRUB UR QL STRIP: NEGATIVE
BUN SERPL-MCNC: 29.2 MG/DL (ref 6–20)
BUN SERPL-MCNC: 31.1 MG/DL (ref 6–20)
BUN SERPL-MCNC: 32.3 MG/DL (ref 6–20)
BUN SERPL-MCNC: 32.5 MG/DL (ref 6–20)
BUN SERPL-MCNC: 33.1 MG/DL (ref 6–20)
BUN SERPL-MCNC: 33.4 MG/DL (ref 6–20)
CA-I BLD-MCNC: 4.5 MG/DL (ref 4.4–5.2)
CA-I BLD-MCNC: 4.7 MG/DL (ref 4.4–5.2)
CA-I BLD-MCNC: 4.8 MG/DL (ref 4.4–5.2)
CA-I BLD-MCNC: 4.9 MG/DL (ref 4.4–5.2)
CA-I BLD-MCNC: 5 MG/DL (ref 4.4–5.2)
CA-I BLD-MCNC: 5.1 MG/DL (ref 4.4–5.2)
CA-I BLD-MCNC: 5.2 MG/DL (ref 4.4–5.2)
CA-I BLD-MCNC: 5.3 MG/DL (ref 4.4–5.2)
CALCIUM SERPL-MCNC: 8.7 MG/DL (ref 8.8–10.4)
CALCIUM SERPL-MCNC: 8.7 MG/DL (ref 8.8–10.4)
CALCIUM SERPL-MCNC: 8.9 MG/DL (ref 8.8–10.4)
CALCIUM SERPL-MCNC: 9.2 MG/DL (ref 8.8–10.4)
CALCIUM SERPL-MCNC: 9.3 MG/DL (ref 8.8–10.4)
CALCIUM SERPL-MCNC: 9.5 MG/DL (ref 8.8–10.4)
CHLORIDE SERPL-SCNC: 100 MMOL/L (ref 98–107)
CHLORIDE SERPL-SCNC: 103 MMOL/L (ref 98–107)
CHLORIDE SERPL-SCNC: 98 MMOL/L (ref 98–107)
CHLORIDE SERPL-SCNC: 98 MMOL/L (ref 98–107)
CHLORIDE SERPL-SCNC: 99 MMOL/L (ref 98–107)
CHLORIDE SERPL-SCNC: 99 MMOL/L (ref 98–107)
COHGB MFR BLD: 93.6 % (ref 96–97)
COHGB MFR BLD: 94 % (ref 96–97)
COHGB MFR BLD: 95 % (ref 96–97)
COHGB MFR BLD: 95.4 % (ref 96–97)
COLOR UR AUTO: ABNORMAL
CREAT SERPL-MCNC: 5.29 MG/DL (ref 0.67–1.17)
CREAT SERPL-MCNC: 5.52 MG/DL (ref 0.67–1.17)
CREAT SERPL-MCNC: 5.58 MG/DL (ref 0.67–1.17)
CREAT SERPL-MCNC: 5.67 MG/DL (ref 0.67–1.17)
CREAT SERPL-MCNC: 5.87 MG/DL (ref 0.67–1.17)
CREAT SERPL-MCNC: 5.95 MG/DL (ref 0.67–1.17)
CREAT UR-MCNC: 36.9 MG/DL
EGFRCR SERPLBLD CKD-EPI 2021: 11 ML/MIN/1.73M2
EGFRCR SERPLBLD CKD-EPI 2021: 11 ML/MIN/1.73M2
EGFRCR SERPLBLD CKD-EPI 2021: 12 ML/MIN/1.73M2
EOSINOPHIL # BLD AUTO: 0 10E3/UL (ref 0–0.7)
EOSINOPHIL NFR BLD AUTO: 0 %
ERYTHROCYTE [DISTWIDTH] IN BLOOD BY AUTOMATED COUNT: 13 % (ref 10–15)
ERYTHROCYTE [DISTWIDTH] IN BLOOD BY AUTOMATED COUNT: 13.1 % (ref 10–15)
ERYTHROCYTE [DISTWIDTH] IN BLOOD BY AUTOMATED COUNT: 13.2 % (ref 10–15)
FIBRINOGEN PPP-MCNC: 283 MG/DL (ref 170–490)
GLUCOSE BLD-MCNC: 114 MG/DL (ref 70–99)
GLUCOSE BLD-MCNC: 129 MG/DL (ref 70–99)
GLUCOSE BLD-MCNC: 140 MG/DL (ref 70–99)
GLUCOSE BLD-MCNC: 151 MG/DL (ref 70–99)
GLUCOSE BLD-MCNC: 157 MG/DL (ref 70–99)
GLUCOSE BLD-MCNC: 170 MG/DL (ref 70–99)
GLUCOSE BLD-MCNC: 201 MG/DL (ref 70–99)
GLUCOSE BLDC GLUCOMTR-MCNC: 103 MG/DL (ref 70–99)
GLUCOSE BLDC GLUCOMTR-MCNC: 110 MG/DL (ref 70–99)
GLUCOSE BLDC GLUCOMTR-MCNC: 111 MG/DL (ref 70–99)
GLUCOSE BLDC GLUCOMTR-MCNC: 124 MG/DL (ref 70–99)
GLUCOSE BLDC GLUCOMTR-MCNC: 125 MG/DL (ref 70–99)
GLUCOSE BLDC GLUCOMTR-MCNC: 131 MG/DL (ref 70–99)
GLUCOSE BLDC GLUCOMTR-MCNC: 134 MG/DL (ref 70–99)
GLUCOSE BLDC GLUCOMTR-MCNC: 145 MG/DL (ref 70–99)
GLUCOSE BLDC GLUCOMTR-MCNC: 153 MG/DL (ref 70–99)
GLUCOSE BLDC GLUCOMTR-MCNC: 155 MG/DL (ref 70–99)
GLUCOSE BLDC GLUCOMTR-MCNC: 162 MG/DL (ref 70–99)
GLUCOSE BLDC GLUCOMTR-MCNC: 162 MG/DL (ref 70–99)
GLUCOSE BLDC GLUCOMTR-MCNC: 173 MG/DL (ref 70–99)
GLUCOSE BLDC GLUCOMTR-MCNC: 202 MG/DL (ref 70–99)
GLUCOSE BLDC GLUCOMTR-MCNC: 75 MG/DL (ref 70–99)
GLUCOSE BLDC GLUCOMTR-MCNC: 84 MG/DL (ref 70–99)
GLUCOSE BLDC GLUCOMTR-MCNC: 89 MG/DL (ref 70–99)
GLUCOSE BLDC GLUCOMTR-MCNC: 95 MG/DL (ref 70–99)
GLUCOSE SERPL-MCNC: 108 MG/DL (ref 70–99)
GLUCOSE SERPL-MCNC: 126 MG/DL (ref 70–99)
GLUCOSE SERPL-MCNC: 132 MG/DL (ref 70–99)
GLUCOSE SERPL-MCNC: 134 MG/DL (ref 70–99)
GLUCOSE SERPL-MCNC: 186 MG/DL (ref 70–99)
GLUCOSE SERPL-MCNC: 87 MG/DL (ref 70–99)
GLUCOSE UR STRIP-MCNC: 50 MG/DL
HBA1C MFR BLD: 6.4 %
HCO3 BLD-SCNC: 25 MMOL/L (ref 21–28)
HCO3 BLD-SCNC: 25 MMOL/L (ref 21–28)
HCO3 BLD-SCNC: 26 MMOL/L (ref 21–28)
HCO3 BLD-SCNC: 27 MMOL/L (ref 21–28)
HCO3 BLDA-SCNC: 21 MMOL/L (ref 21–28)
HCO3 BLDA-SCNC: 23 MMOL/L (ref 21–28)
HCO3 BLDA-SCNC: 23 MMOL/L (ref 21–28)
HCO3 BLDA-SCNC: 25 MMOL/L (ref 21–28)
HCO3 BLDA-SCNC: 26 MMOL/L (ref 21–28)
HCO3 BLDV-SCNC: 26 MMOL/L (ref 21–28)
HCO3 BLDV-SCNC: 27 MMOL/L (ref 21–28)
HCO3 BLDV-SCNC: 28 MMOL/L (ref 21–28)
HCO3 BLDV-SCNC: 28 MMOL/L (ref 21–28)
HCO3 SERPL-SCNC: 23 MMOL/L (ref 22–29)
HCO3 SERPL-SCNC: 24 MMOL/L (ref 22–29)
HCO3 SERPL-SCNC: 25 MMOL/L (ref 22–29)
HCO3 SERPL-SCNC: 25 MMOL/L (ref 22–29)
HCT VFR BLD AUTO: 26.7 % (ref 40–53)
HCT VFR BLD AUTO: 27 % (ref 40–53)
HCT VFR BLD AUTO: 27 % (ref 40–53)
HCT VFR BLD AUTO: 27.4 % (ref 40–53)
HCT VFR BLD AUTO: 27.6 % (ref 40–53)
HCT VFR BLD AUTO: 27.9 % (ref 40–53)
HGB BLD-MCNC: 10.1 G/DL (ref 13.3–17.7)
HGB BLD-MCNC: 10.6 G/DL (ref 13.3–17.7)
HGB BLD-MCNC: 10.9 G/DL (ref 13.3–17.7)
HGB BLD-MCNC: 9.2 G/DL (ref 13.3–17.7)
HGB BLD-MCNC: 9.2 G/DL (ref 13.3–17.7)
HGB BLD-MCNC: 9.4 G/DL (ref 13.3–17.7)
HGB BLD-MCNC: 9.5 G/DL (ref 13.3–17.7)
HGB BLD-MCNC: 9.6 G/DL (ref 13.3–17.7)
HGB BLD-MCNC: 9.7 G/DL (ref 13.3–17.7)
HGB BLD-MCNC: 9.8 G/DL (ref 13.3–17.7)
HGB UR QL STRIP: ABNORMAL
IMM GRANULOCYTES # BLD: 0 10E3/UL
IMM GRANULOCYTES NFR BLD: 0 %
INR PPP: 1.39 (ref 0.85–1.15)
INR PPP: 1.44 (ref 0.85–1.15)
KETONES UR STRIP-MCNC: NEGATIVE MG/DL
LACTATE BLD-SCNC: 4 MMOL/L (ref 0.7–2)
LACTATE BLD-SCNC: 5.1 MMOL/L (ref 0.7–2)
LACTATE BLD-SCNC: 5.6 MMOL/L (ref 0.7–2)
LACTATE BLD-SCNC: 5.6 MMOL/L (ref 0.7–2)
LACTATE BLD-SCNC: 5.7 MMOL/L (ref 0.7–2)
LACTATE BLD-SCNC: 5.7 MMOL/L (ref 0.7–2)
LACTATE BLD-SCNC: 8.9 MMOL/L (ref 0.7–2)
LACTATE SERPL-SCNC: 1.1 MMOL/L (ref 0.7–2)
LACTATE SERPL-SCNC: 1.5 MMOL/L (ref 0.7–2)
LACTATE SERPL-SCNC: 2.7 MMOL/L (ref 0.7–2)
LACTATE SERPL-SCNC: 4.3 MMOL/L (ref 0.7–2)
LEUKOCYTE ESTERASE UR QL STRIP: ABNORMAL
LIPASE SERPL-CCNC: 211 U/L (ref 13–60)
LVEF ECHO: NORMAL
LYMPHOCYTES # BLD AUTO: 0 10E3/UL (ref 0.8–5.3)
LYMPHOCYTES NFR BLD AUTO: 0 %
MAGNESIUM SERPL-MCNC: 2.2 MG/DL (ref 1.7–2.3)
MAGNESIUM SERPL-MCNC: 2.6 MG/DL (ref 1.7–2.3)
MAGNESIUM SERPL-MCNC: 2.6 MG/DL (ref 1.7–2.3)
MAGNESIUM SERPL-MCNC: 2.7 MG/DL (ref 1.7–2.3)
MAGNESIUM SERPL-MCNC: 2.7 MG/DL (ref 1.7–2.3)
MAGNESIUM SERPL-MCNC: 2.8 MG/DL (ref 1.7–2.3)
MCH RBC QN AUTO: 32.9 PG (ref 26.5–33)
MCH RBC QN AUTO: 33 PG (ref 26.5–33)
MCH RBC QN AUTO: 33.1 PG (ref 26.5–33)
MCH RBC QN AUTO: 33.1 PG (ref 26.5–33)
MCH RBC QN AUTO: 33.3 PG (ref 26.5–33)
MCH RBC QN AUTO: 33.6 PG (ref 26.5–33)
MCHC RBC AUTO-ENTMCNC: 34.1 G/DL (ref 31.5–36.5)
MCHC RBC AUTO-ENTMCNC: 34.1 G/DL (ref 31.5–36.5)
MCHC RBC AUTO-ENTMCNC: 34.7 G/DL (ref 31.5–36.5)
MCHC RBC AUTO-ENTMCNC: 34.8 G/DL (ref 31.5–36.5)
MCHC RBC AUTO-ENTMCNC: 35.1 G/DL (ref 31.5–36.5)
MCHC RBC AUTO-ENTMCNC: 35.6 G/DL (ref 31.5–36.5)
MCV RBC AUTO: 94 FL (ref 78–100)
MCV RBC AUTO: 94 FL (ref 78–100)
MCV RBC AUTO: 95 FL (ref 78–100)
MCV RBC AUTO: 96 FL (ref 78–100)
MCV RBC AUTO: 97 FL (ref 78–100)
MCV RBC AUTO: 97 FL (ref 78–100)
MONOCYTES # BLD AUTO: 0.1 10E3/UL (ref 0–1.3)
MONOCYTES NFR BLD AUTO: 2 %
MRSA DNA SPEC QL NAA+PROBE: NEGATIVE
MUCOUS THREADS #/AREA URNS LPF: PRESENT /LPF
NEUTROPHILS # BLD AUTO: 8 10E3/UL (ref 1.6–8.3)
NEUTROPHILS NFR BLD AUTO: 98 %
NITRATE UR QL: NEGATIVE
NRBC # BLD AUTO: 0 10E3/UL
NRBC BLD AUTO-RTO: 0 /100
O2/TOTAL GAS SETTING VFR VENT: 45 %
O2/TOTAL GAS SETTING VFR VENT: 45 %
O2/TOTAL GAS SETTING VFR VENT: 50 %
O2/TOTAL GAS SETTING VFR VENT: 50 %
O2/TOTAL GAS SETTING VFR VENT: 53 %
O2/TOTAL GAS SETTING VFR VENT: 64 %
O2/TOTAL GAS SETTING VFR VENT: 70 %
O2/TOTAL GAS SETTING VFR VENT: 70 %
O2/TOTAL GAS SETTING VFR VENT: 80 %
O2/TOTAL GAS SETTING VFR VENT: 82 %
O2/TOTAL GAS SETTING VFR VENT: 89 %
OXYHGB MFR BLDA: 92 % (ref 92–100)
OXYHGB MFR BLDA: 92 % (ref 92–100)
OXYHGB MFR BLDA: 94 % (ref 92–100)
OXYHGB MFR BLDA: 95 % (ref 92–100)
OXYHGB MFR BLDA: 95 % (ref 92–100)
OXYHGB MFR BLDA: 96 % (ref 92–100)
OXYHGB MFR BLDA: 97 % (ref 92–100)
OXYHGB MFR BLDA: 97 % (ref 92–100)
OXYHGB MFR BLDV: 80 % (ref 70–75)
OXYHGB MFR BLDV: 82 % (ref 70–75)
OXYHGB MFR BLDV: 83 % (ref 70–75)
OXYHGB MFR BLDV: 84 % (ref 70–75)
PCO2 BLD: 45 MM HG (ref 35–45)
PCO2 BLD: 46 MM HG (ref 35–45)
PCO2 BLD: 47 MM HG (ref 35–45)
PCO2 BLD: 47 MM HG (ref 35–45)
PCO2 BLDA: 39 MM HG (ref 35–45)
PCO2 BLDA: 40 MM HG (ref 35–45)
PCO2 BLDA: 40 MM HG (ref 35–45)
PCO2 BLDA: 41 MM HG (ref 35–45)
PCO2 BLDA: 41 MM HG (ref 35–45)
PCO2 BLDA: 46 MM HG (ref 35–45)
PCO2 BLDA: 47 MM HG (ref 35–45)
PCO2 BLDV: 48 MM HG (ref 40–50)
PCO2 BLDV: 49 MM HG (ref 40–50)
PEEP: 5 CM H2O
PH BLD: 7.32 [PH] (ref 7.35–7.45)
PH BLD: 7.35 [PH] (ref 7.35–7.45)
PH BLD: 7.36 [PH] (ref 7.35–7.45)
PH BLD: 7.38 [PH] (ref 7.35–7.45)
PH BLDA: 7.25 [PH] (ref 7.35–7.45)
PH BLDA: 7.34 [PH] (ref 7.35–7.45)
PH BLDA: 7.36 [PH] (ref 7.35–7.45)
PH BLDA: 7.38 [PH] (ref 7.35–7.45)
PH BLDA: 7.39 [PH] (ref 7.35–7.45)
PH BLDA: 7.39 [PH] (ref 7.35–7.45)
PH BLDA: 7.43 [PH] (ref 7.35–7.45)
PH BLDV: 7.34 [PH] (ref 7.32–7.43)
PH BLDV: 7.34 [PH] (ref 7.32–7.43)
PH BLDV: 7.36 [PH] (ref 7.32–7.43)
PH BLDV: 7.37 [PH] (ref 7.32–7.43)
PH UR STRIP: 6.5 [PH] (ref 5–7)
PHOSPHATE SERPL-MCNC: 2.2 MG/DL (ref 2.5–4.5)
PHOSPHATE SERPL-MCNC: 3.4 MG/DL (ref 2.5–4.5)
PHOSPHATE SERPL-MCNC: 3.4 MG/DL (ref 2.5–4.5)
PHOSPHATE SERPL-MCNC: 3.6 MG/DL (ref 2.5–4.5)
PHOSPHATE SERPL-MCNC: 3.7 MG/DL (ref 2.5–4.5)
PHOSPHATE SERPL-MCNC: 4 MG/DL (ref 2.5–4.5)
PLATELET # BLD AUTO: 108 10E3/UL (ref 150–450)
PLATELET # BLD AUTO: 118 10E3/UL (ref 150–450)
PLATELET # BLD AUTO: 121 10E3/UL (ref 150–450)
PLATELET # BLD AUTO: 123 10E3/UL (ref 150–450)
PLATELET # BLD AUTO: 137 10E3/UL (ref 150–450)
PLATELET # BLD AUTO: 146 10E3/UL (ref 150–450)
PO2 BLD: 77 MM HG (ref 80–105)
PO2 BLD: 81 MM HG (ref 80–105)
PO2 BLD: 88 MM HG (ref 80–105)
PO2 BLD: 90 MM HG (ref 80–105)
PO2 BLDA: 128 MM HG (ref 80–105)
PO2 BLDA: 164 MM HG (ref 80–105)
PO2 BLDA: 164 MM HG (ref 80–105)
PO2 BLDA: 78 MM HG (ref 80–105)
PO2 BLDA: 79 MM HG (ref 80–105)
PO2 BLDA: 93 MM HG (ref 80–105)
PO2 BLDA: 98 MM HG (ref 80–105)
PO2 BLDV: 49 MM HG (ref 25–47)
PO2 BLDV: 50 MM HG (ref 25–47)
PO2 BLDV: 53 MM HG (ref 25–47)
PO2 BLDV: 53 MM HG (ref 25–47)
POTASSIUM BLD-SCNC: 4.1 MMOL/L (ref 3.4–5.3)
POTASSIUM BLD-SCNC: 4.2 MMOL/L (ref 3.4–5.3)
POTASSIUM BLD-SCNC: 4.2 MMOL/L (ref 3.4–5.3)
POTASSIUM BLD-SCNC: 4.3 MMOL/L (ref 3.4–5.3)
POTASSIUM BLD-SCNC: 4.4 MMOL/L (ref 3.4–5.3)
POTASSIUM BLD-SCNC: 4.6 MMOL/L (ref 3.4–5.3)
POTASSIUM BLD-SCNC: 6 MMOL/L (ref 3.4–5.3)
POTASSIUM SERPL-SCNC: 5.1 MMOL/L (ref 3.4–5.3)
POTASSIUM SERPL-SCNC: 5.1 MMOL/L (ref 3.4–5.3)
POTASSIUM SERPL-SCNC: 5.2 MMOL/L (ref 3.4–5.3)
POTASSIUM SERPL-SCNC: 5.3 MMOL/L (ref 3.4–5.3)
POTASSIUM SERPL-SCNC: 5.6 MMOL/L (ref 3.4–5.3)
POTASSIUM SERPL-SCNC: 5.8 MMOL/L (ref 3.4–5.3)
POTASSIUM SERPL-SCNC: 6.2 MMOL/L (ref 3.4–5.3)
PROT SERPL-MCNC: 4.7 G/DL (ref 6.4–8.3)
PROT SERPL-MCNC: 4.8 G/DL (ref 6.4–8.3)
PROT SERPL-MCNC: 4.9 G/DL (ref 6.4–8.3)
PROT SERPL-MCNC: 5 G/DL (ref 6.4–8.3)
PROT SERPL-MCNC: 5 G/DL (ref 6.4–8.3)
PROT/CREAT 24H UR: 8.4 MG/MG CR (ref 0–0.2)
RBC # BLD AUTO: 2.79 10E6/UL (ref 4.4–5.9)
RBC # BLD AUTO: 2.83 10E6/UL (ref 4.4–5.9)
RBC # BLD AUTO: 2.84 10E6/UL (ref 4.4–5.9)
RBC # BLD AUTO: 2.85 10E6/UL (ref 4.4–5.9)
RBC # BLD AUTO: 2.87 10E6/UL (ref 4.4–5.9)
RBC # BLD AUTO: 2.95 10E6/UL (ref 4.4–5.9)
RBC URINE: >182 /HPF
SA TARGET DNA: NEGATIVE
SAO2 % BLDA: 93 % (ref 92–100)
SAO2 % BLDA: 93 % (ref 92–100)
SAO2 % BLDA: 94 % (ref 92–100)
SAO2 % BLDA: 94 % (ref 96–97)
SAO2 % BLDA: 95 % (ref 92–100)
SAO2 % BLDA: 95 % (ref 96–97)
SAO2 % BLDA: 95 % (ref 96–97)
SAO2 % BLDA: 96 % (ref 96–97)
SAO2 % BLDA: 97 % (ref 96–97)
SAO2 % BLDA: 97 % (ref 96–97)
SAO2 % BLDA: 98 % (ref 96–97)
SAO2 % BLDV: 81.2 % (ref 70–75)
SAO2 % BLDV: 83.1 % (ref 70–75)
SAO2 % BLDV: 84.4 % (ref 70–75)
SAO2 % BLDV: 85.3 % (ref 70–75)
SODIUM BLD-SCNC: 133 MMOL/L (ref 135–145)
SODIUM BLD-SCNC: 134 MMOL/L (ref 135–145)
SODIUM BLD-SCNC: 136 MMOL/L (ref 135–145)
SODIUM BLD-SCNC: 137 MMOL/L (ref 135–145)
SODIUM SERPL-SCNC: 134 MMOL/L (ref 135–145)
SODIUM SERPL-SCNC: 134 MMOL/L (ref 135–145)
SODIUM SERPL-SCNC: 135 MMOL/L (ref 135–145)
SODIUM SERPL-SCNC: 136 MMOL/L (ref 135–145)
SODIUM SERPL-SCNC: 136 MMOL/L (ref 135–145)
SODIUM SERPL-SCNC: 138 MMOL/L (ref 135–145)
SP GR UR STRIP: 1.01 (ref 1–1.03)
SQUAMOUS EPITHELIAL: <1 /HPF
TROPONIN T SERPL HS-MCNC: 136 NG/L
TROPONIN T SERPL HS-MCNC: 199 NG/L
TROPONIN T SERPL HS-MCNC: 6875 NG/L
TROPONIN T SERPL HS-MCNC: 9992 NG/L
UROBILINOGEN UR STRIP-MCNC: NORMAL MG/DL
WBC # BLD AUTO: 10.8 10E3/UL (ref 4–11)
WBC # BLD AUTO: 11.5 10E3/UL (ref 4–11)
WBC # BLD AUTO: 11.8 10E3/UL (ref 4–11)
WBC # BLD AUTO: 12.3 10E3/UL (ref 4–11)
WBC # BLD AUTO: 12.8 10E3/UL (ref 4–11)
WBC # BLD AUTO: 8.2 10E3/UL (ref 4–11)
WBC CLUMPS #/AREA URNS HPF: PRESENT /HPF
WBC URINE: 105 /HPF

## 2024-07-20 PROCEDURE — 84100 ASSAY OF PHOSPHORUS: CPT | Performed by: SURGERY

## 2024-07-20 PROCEDURE — 02C03ZZ EXTIRPATION OF MATTER FROM CORONARY ARTERY, ONE ARTERY, PERCUTANEOUS APPROACH: ICD-10-PCS | Performed by: INTERNAL MEDICINE

## 2024-07-20 PROCEDURE — 250N000009 HC RX 250: Performed by: NURSE ANESTHETIST, CERTIFIED REGISTERED

## 2024-07-20 PROCEDURE — 84100 ASSAY OF PHOSPHORUS: CPT | Performed by: STUDENT IN AN ORGANIZED HEALTH CARE EDUCATION/TRAINING PROGRAM

## 2024-07-20 PROCEDURE — 84484 ASSAY OF TROPONIN QUANT: CPT | Performed by: STUDENT IN AN ORGANIZED HEALTH CARE EDUCATION/TRAINING PROGRAM

## 2024-07-20 PROCEDURE — C1725 CATH, TRANSLUMIN NON-LASER: HCPCS | Performed by: INTERNAL MEDICINE

## 2024-07-20 PROCEDURE — C9606 PERC D-E COR REVASC W AMI S: HCPCS | Mod: RC | Performed by: INTERNAL MEDICINE

## 2024-07-20 PROCEDURE — 92972 PERQ TRLUML CORONRY LITHOTRP: CPT | Mod: GC | Performed by: INTERNAL MEDICINE

## 2024-07-20 PROCEDURE — 812N000002 HC ACQUISITION KIDNEY CADAVER OF SPK

## 2024-07-20 PROCEDURE — 71045 X-RAY EXAM CHEST 1 VIEW: CPT

## 2024-07-20 PROCEDURE — 84132 ASSAY OF SERUM POTASSIUM: CPT | Performed by: STUDENT IN AN ORGANIZED HEALTH CARE EDUCATION/TRAINING PROGRAM

## 2024-07-20 PROCEDURE — 84156 ASSAY OF PROTEIN URINE: CPT | Performed by: PHYSICIAN ASSISTANT

## 2024-07-20 PROCEDURE — 250N000011 HC RX IP 250 OP 636: Performed by: INTERNAL MEDICINE

## 2024-07-20 PROCEDURE — C1887 CATHETER, GUIDING: HCPCS | Performed by: INTERNAL MEDICINE

## 2024-07-20 PROCEDURE — 258N000003 HC RX IP 258 OP 636: Performed by: STUDENT IN AN ORGANIZED HEALTH CARE EDUCATION/TRAINING PROGRAM

## 2024-07-20 PROCEDURE — 250N000013 HC RX MED GY IP 250 OP 250 PS 637: Performed by: INTERNAL MEDICINE

## 2024-07-20 PROCEDURE — 94002 VENT MGMT INPAT INIT DAY: CPT

## 2024-07-20 PROCEDURE — 99207 US RENAL TRANSPLANT WITH DOPPLER: CPT | Mod: 26 | Performed by: RADIOLOGY

## 2024-07-20 PROCEDURE — 93975 VASCULAR STUDY: CPT | Mod: 26 | Performed by: RADIOLOGY

## 2024-07-20 PROCEDURE — 92978 ENDOLUMINL IVUS OCT C 1ST: CPT | Mod: 26 | Performed by: INTERNAL MEDICINE

## 2024-07-20 PROCEDURE — 99207 PR NO BILLABLE SERVICE THIS VISIT: CPT | Mod: GC | Performed by: INTERNAL MEDICINE

## 2024-07-20 PROCEDURE — 85610 PROTHROMBIN TIME: CPT | Performed by: SURGERY

## 2024-07-20 PROCEDURE — C1894 INTRO/SHEATH, NON-LASER: HCPCS | Performed by: INTERNAL MEDICINE

## 2024-07-20 PROCEDURE — 83735 ASSAY OF MAGNESIUM: CPT | Performed by: STUDENT IN AN ORGANIZED HEALTH CARE EDUCATION/TRAINING PROGRAM

## 2024-07-20 PROCEDURE — 93306 TTE W/DOPPLER COMPLETE: CPT

## 2024-07-20 PROCEDURE — 93005 ELECTROCARDIOGRAM TRACING: CPT

## 2024-07-20 PROCEDURE — 250N000011 HC RX IP 250 OP 636: Performed by: STUDENT IN AN ORGANIZED HEALTH CARE EDUCATION/TRAINING PROGRAM

## 2024-07-20 PROCEDURE — 85730 THROMBOPLASTIN TIME PARTIAL: CPT | Performed by: STUDENT IN AN ORGANIZED HEALTH CARE EDUCATION/TRAINING PROGRAM

## 2024-07-20 PROCEDURE — 93010 ELECTROCARDIOGRAM REPORT: CPT | Performed by: INTERNAL MEDICINE

## 2024-07-20 PROCEDURE — 93455 CORONARY ART/GRFT ANGIO S&I: CPT | Mod: 26 | Performed by: INTERNAL MEDICINE

## 2024-07-20 PROCEDURE — C1757 CATH, THROMBECTOMY/EMBOLECT: HCPCS | Performed by: INTERNAL MEDICINE

## 2024-07-20 PROCEDURE — 999N000157 HC STATISTIC RCP TIME EA 10 MIN

## 2024-07-20 PROCEDURE — C1769 GUIDE WIRE: HCPCS | Performed by: INTERNAL MEDICINE

## 2024-07-20 PROCEDURE — 999N000185 HC STATISTIC TRANSPORT TIME EA 15 MIN

## 2024-07-20 PROCEDURE — 027034Z DILATION OF CORONARY ARTERY, ONE ARTERY WITH DRUG-ELUTING INTRALUMINAL DEVICE, PERCUTANEOUS APPROACH: ICD-10-PCS | Performed by: INTERNAL MEDICINE

## 2024-07-20 PROCEDURE — 92972 PERQ TRLUML CORONRY LITHOTRP: CPT | Performed by: INTERNAL MEDICINE

## 2024-07-20 PROCEDURE — 258N000001 HC RX 258: Performed by: SURGERY

## 2024-07-20 PROCEDURE — B2111ZZ FLUOROSCOPY OF MULTIPLE CORONARY ARTERIES USING LOW OSMOLAR CONTRAST: ICD-10-PCS | Performed by: INTERNAL MEDICINE

## 2024-07-20 PROCEDURE — 87641 MR-STAPH DNA AMP PROBE: CPT | Performed by: STUDENT IN AN ORGANIZED HEALTH CARE EDUCATION/TRAINING PROGRAM

## 2024-07-20 PROCEDURE — 82330 ASSAY OF CALCIUM: CPT | Performed by: STUDENT IN AN ORGANIZED HEALTH CARE EDUCATION/TRAINING PROGRAM

## 2024-07-20 PROCEDURE — 82150 ASSAY OF AMYLASE: CPT | Performed by: SURGERY

## 2024-07-20 PROCEDURE — 76776 US EXAM K TRANSPL W/DOPPLER: CPT

## 2024-07-20 PROCEDURE — 50360 RNL ALTRNSPLJ W/O RCP NFRCT: CPT | Mod: LT | Performed by: SURGERY

## 2024-07-20 PROCEDURE — 258N000001 HC RX 258: Performed by: STUDENT IN AN ORGANIZED HEALTH CARE EDUCATION/TRAINING PROGRAM

## 2024-07-20 PROCEDURE — 83605 ASSAY OF LACTIC ACID: CPT | Performed by: STUDENT IN AN ORGANIZED HEALTH CARE EDUCATION/TRAINING PROGRAM

## 2024-07-20 PROCEDURE — 250N000011 HC RX IP 250 OP 636: Performed by: SURGERY

## 2024-07-20 PROCEDURE — 99223 1ST HOSP IP/OBS HIGH 75: CPT | Mod: AI | Performed by: INTERNAL MEDICINE

## 2024-07-20 PROCEDURE — 99152 MOD SED SAME PHYS/QHP 5/>YRS: CPT | Mod: GC | Performed by: INTERNAL MEDICINE

## 2024-07-20 PROCEDURE — 99153 MOD SED SAME PHYS/QHP EA: CPT | Performed by: INTERNAL MEDICINE

## 2024-07-20 PROCEDURE — 5A09357 ASSISTANCE WITH RESPIRATORY VENTILATION, LESS THAN 24 CONSECUTIVE HOURS, CONTINUOUS POSITIVE AIRWAY PRESSURE: ICD-10-PCS | Performed by: SURGERY

## 2024-07-20 PROCEDURE — 82805 BLOOD GASES W/O2 SATURATION: CPT | Performed by: STUDENT IN AN ORGANIZED HEALTH CARE EDUCATION/TRAINING PROGRAM

## 2024-07-20 PROCEDURE — 84295 ASSAY OF SERUM SODIUM: CPT

## 2024-07-20 PROCEDURE — B240ZZ3 ULTRASONOGRAPHY OF SINGLE CORONARY ARTERY, INTRAVASCULAR: ICD-10-PCS | Performed by: INTERNAL MEDICINE

## 2024-07-20 PROCEDURE — 92941 PRQ TRLML REVSC TOT OCCL AMI: CPT | Mod: RC | Performed by: INTERNAL MEDICINE

## 2024-07-20 PROCEDURE — 87086 URINE CULTURE/COLONY COUNT: CPT | Performed by: PHYSICIAN ASSISTANT

## 2024-07-20 PROCEDURE — 82805 BLOOD GASES W/O2 SATURATION: CPT

## 2024-07-20 PROCEDURE — 84295 ASSAY OF SERUM SODIUM: CPT | Performed by: SURGERY

## 2024-07-20 PROCEDURE — 85384 FIBRINOGEN ACTIVITY: CPT | Performed by: STUDENT IN AN ORGANIZED HEALTH CARE EDUCATION/TRAINING PROGRAM

## 2024-07-20 PROCEDURE — 88302 TISSUE EXAM BY PATHOLOGIST: CPT | Mod: 26 | Performed by: PATHOLOGY

## 2024-07-20 PROCEDURE — 81001 URINALYSIS AUTO W/SCOPE: CPT | Performed by: PHYSICIAN ASSISTANT

## 2024-07-20 PROCEDURE — 812N000013 HC ACQUISITION PANCREAS CADAVER OF SPK

## 2024-07-20 PROCEDURE — 84295 ASSAY OF SERUM SODIUM: CPT | Performed by: STUDENT IN AN ORGANIZED HEALTH CARE EDUCATION/TRAINING PROGRAM

## 2024-07-20 PROCEDURE — 120N000002 HC R&B MED SURG/OB UMMC

## 2024-07-20 PROCEDURE — C1761 HC OR CATH, TRANS INTRA LITHO/CORONARY: HCPCS | Performed by: INTERNAL MEDICINE

## 2024-07-20 PROCEDURE — 258N000002 HC RX IP 258 OP 250: Performed by: SURGERY

## 2024-07-20 PROCEDURE — 85027 COMPLETE CBC AUTOMATED: CPT | Performed by: STUDENT IN AN ORGANIZED HEALTH CARE EDUCATION/TRAINING PROGRAM

## 2024-07-20 PROCEDURE — 85025 COMPLETE CBC W/AUTO DIFF WBC: CPT | Performed by: SURGERY

## 2024-07-20 PROCEDURE — 48554 TRANSPL ALLOGRAFT PANCREAS: CPT | Mod: GC | Performed by: SURGERY

## 2024-07-20 PROCEDURE — 027035Z DILATION OF CORONARY ARTERY, ONE ARTERY WITH TWO DRUG-ELUTING INTRALUMINAL DEVICES, PERCUTANEOUS APPROACH: ICD-10-PCS | Performed by: INTERNAL MEDICINE

## 2024-07-20 PROCEDURE — 92978 ENDOLUMINL IVUS OCT C 1ST: CPT | Performed by: INTERNAL MEDICINE

## 2024-07-20 PROCEDURE — 258N000003 HC RX IP 258 OP 636: Performed by: PHYSICIAN ASSISTANT

## 2024-07-20 PROCEDURE — 71045 X-RAY EXAM CHEST 1 VIEW: CPT | Mod: 26 | Performed by: RADIOLOGY

## 2024-07-20 PROCEDURE — 250N000009 HC RX 250: Performed by: STUDENT IN AN ORGANIZED HEALTH CARE EDUCATION/TRAINING PROGRAM

## 2024-07-20 PROCEDURE — 99291 CRITICAL CARE FIRST HOUR: CPT | Mod: 25 | Performed by: STUDENT IN AN ORGANIZED HEALTH CARE EDUCATION/TRAINING PROGRAM

## 2024-07-20 PROCEDURE — 85347 COAGULATION TIME ACTIVATED: CPT

## 2024-07-20 PROCEDURE — 250N000012 HC RX MED GY IP 250 OP 636 PS 637: Performed by: SURGERY

## 2024-07-20 PROCEDURE — 250N000011 HC RX IP 250 OP 636: Performed by: PHYSICIAN ASSISTANT

## 2024-07-20 PROCEDURE — 250N000013 HC RX MED GY IP 250 OP 250 PS 637: Performed by: SURGERY

## 2024-07-20 PROCEDURE — 83036 HEMOGLOBIN GLYCOSYLATED A1C: CPT | Performed by: STUDENT IN AN ORGANIZED HEALTH CARE EDUCATION/TRAINING PROGRAM

## 2024-07-20 PROCEDURE — 83690 ASSAY OF LIPASE: CPT | Performed by: SURGERY

## 2024-07-20 PROCEDURE — 250N000013 HC RX MED GY IP 250 OP 250 PS 637: Performed by: STUDENT IN AN ORGANIZED HEALTH CARE EDUCATION/TRAINING PROGRAM

## 2024-07-20 PROCEDURE — C1874 STENT, COATED/COV W/DEL SYS: HCPCS | Performed by: INTERNAL MEDICINE

## 2024-07-20 PROCEDURE — 82810 BLOOD GASES O2 SAT ONLY: CPT

## 2024-07-20 PROCEDURE — 99291 CRITICAL CARE FIRST HOUR: CPT | Performed by: STUDENT IN AN ORGANIZED HEALTH CARE EDUCATION/TRAINING PROGRAM

## 2024-07-20 PROCEDURE — 258N000003 HC RX IP 258 OP 636: Performed by: SURGERY

## 2024-07-20 PROCEDURE — 250N000012 HC RX MED GY IP 250 OP 636 PS 637: Performed by: STUDENT IN AN ORGANIZED HEALTH CARE EDUCATION/TRAINING PROGRAM

## 2024-07-20 PROCEDURE — 99152 MOD SED SAME PHYS/QHP 5/>YRS: CPT | Performed by: INTERNAL MEDICINE

## 2024-07-20 PROCEDURE — 85610 PROTHROMBIN TIME: CPT | Performed by: STUDENT IN AN ORGANIZED HEALTH CARE EDUCATION/TRAINING PROGRAM

## 2024-07-20 PROCEDURE — 250N000009 HC RX 250: Performed by: SURGERY

## 2024-07-20 PROCEDURE — 93454 CORONARY ARTERY ANGIO S&I: CPT | Performed by: INTERNAL MEDICINE

## 2024-07-20 PROCEDURE — 74018 RADEX ABDOMEN 1 VIEW: CPT | Mod: 26 | Performed by: RADIOLOGY

## 2024-07-20 PROCEDURE — 999N000065 XR CHEST PORT 1 VIEW

## 2024-07-20 PROCEDURE — 93975 VASCULAR STUDY: CPT

## 2024-07-20 PROCEDURE — 84484 ASSAY OF TROPONIN QUANT: CPT

## 2024-07-20 PROCEDURE — 85730 THROMBOPLASTIN TIME PARTIAL: CPT | Performed by: SURGERY

## 2024-07-20 PROCEDURE — 74018 RADEX ABDOMEN 1 VIEW: CPT

## 2024-07-20 PROCEDURE — 88302 TISSUE EXAM BY PATHOLOGIST: CPT | Mod: TC | Performed by: SURGERY

## 2024-07-20 PROCEDURE — 250N000011 HC RX IP 250 OP 636: Performed by: NURSE ANESTHETIST, CERTIFIED REGISTERED

## 2024-07-20 PROCEDURE — 999N000128 HC STATISTIC PERIPHERAL IV START W/O US GUIDANCE

## 2024-07-20 PROCEDURE — 250N000009 HC RX 250: Performed by: INTERNAL MEDICINE

## 2024-07-20 PROCEDURE — C1753 CATH, INTRAVAS ULTRASOUND: HCPCS | Performed by: INTERNAL MEDICINE

## 2024-07-20 PROCEDURE — 272N000001 HC OR GENERAL SUPPLY STERILE: Performed by: INTERNAL MEDICINE

## 2024-07-20 PROCEDURE — 36556 INSERT NON-TUNNEL CV CATH: CPT | Mod: XU | Performed by: INTERNAL MEDICINE

## 2024-07-20 PROCEDURE — 87640 STAPH A DNA AMP PROBE: CPT | Performed by: STUDENT IN AN ORGANIZED HEALTH CARE EDUCATION/TRAINING PROGRAM

## 2024-07-20 PROCEDURE — 93306 TTE W/DOPPLER COMPLETE: CPT | Mod: 26 | Performed by: INTERNAL MEDICINE

## 2024-07-20 PROCEDURE — 83735 ASSAY OF MAGNESIUM: CPT | Performed by: SURGERY

## 2024-07-20 DEVICE — STENT CORONARY SYNERGY XD MR US 3.5X48MM H7493941848350: Type: IMPLANTABLE DEVICE | Status: FUNCTIONAL

## 2024-07-20 DEVICE — STENT CORONARY DES SYNERGY XD MR US 4.00X24MM H7493941824400: Type: IMPLANTABLE DEVICE | Status: FUNCTIONAL

## 2024-07-20 DEVICE — SOF-FLEX DOUBLE PIGTAIL URETERAL STENT SET
Type: IMPLANTABLE DEVICE | Site: ABDOMEN | Status: NON-FUNCTIONAL
Brand: SOF-FLEX
Removed: 2024-10-10

## 2024-07-20 RX ORDER — SODIUM CHLORIDE, SODIUM LACTATE, POTASSIUM CHLORIDE, CALCIUM CHLORIDE 600; 310; 30; 20 MG/100ML; MG/100ML; MG/100ML; MG/100ML
INJECTION, SOLUTION INTRAVENOUS CONTINUOUS PRN
Status: DISCONTINUED | OUTPATIENT
Start: 2024-07-20 | End: 2024-07-20

## 2024-07-20 RX ORDER — NOREPINEPHRINE BITARTRATE 0.02 MG/ML
INJECTION, SOLUTION INTRAVENOUS CONTINUOUS PRN
Status: DISCONTINUED | OUTPATIENT
Start: 2024-07-20 | End: 2024-07-20

## 2024-07-20 RX ORDER — ASPIRIN 81 MG/1
81 TABLET, CHEWABLE ORAL DAILY
Status: DISCONTINUED | OUTPATIENT
Start: 2024-07-21 | End: 2024-07-30 | Stop reason: HOSPADM

## 2024-07-20 RX ORDER — NALOXONE HYDROCHLORIDE 0.4 MG/ML
0.4 INJECTION, SOLUTION INTRAMUSCULAR; INTRAVENOUS; SUBCUTANEOUS
Status: DISCONTINUED | OUTPATIENT
Start: 2024-07-20 | End: 2024-07-30 | Stop reason: HOSPADM

## 2024-07-20 RX ORDER — NICOTINE POLACRILEX 4 MG
15-30 LOZENGE BUCCAL
Status: DISCONTINUED | OUTPATIENT
Start: 2024-07-20 | End: 2024-07-20

## 2024-07-20 RX ORDER — ALBUTEROL SULFATE 90 UG/1
2 AEROSOL, METERED RESPIRATORY (INHALATION) EVERY 4 HOURS PRN
Status: DISCONTINUED | OUTPATIENT
Start: 2024-07-20 | End: 2024-07-30 | Stop reason: HOSPADM

## 2024-07-20 RX ORDER — VASOPRESSIN IN 0.9 % NACL 2 UNIT/2ML
SYRINGE (ML) INTRAVENOUS PRN
Status: DISCONTINUED | OUTPATIENT
Start: 2024-07-20 | End: 2024-07-20

## 2024-07-20 RX ORDER — ACETAMINOPHEN 325 MG/1
975 TABLET ORAL
Status: DISCONTINUED | OUTPATIENT
Start: 2024-07-20 | End: 2024-07-20 | Stop reason: HOSPADM

## 2024-07-20 RX ORDER — FUROSEMIDE 10 MG/ML
INJECTION INTRAMUSCULAR; INTRAVENOUS PRN
Status: DISCONTINUED | OUTPATIENT
Start: 2024-07-20 | End: 2024-07-20

## 2024-07-20 RX ORDER — NICOTINE POLACRILEX 4 MG
15-30 LOZENGE BUCCAL
Status: DISCONTINUED | OUTPATIENT
Start: 2024-07-20 | End: 2024-07-25

## 2024-07-20 RX ORDER — LABETALOL HYDROCHLORIDE 5 MG/ML
10 INJECTION, SOLUTION INTRAVENOUS
Status: DISCONTINUED | OUTPATIENT
Start: 2024-07-20 | End: 2024-07-20 | Stop reason: HOSPADM

## 2024-07-20 RX ORDER — FAMOTIDINE 20 MG/1
20 TABLET, FILM COATED ORAL 2 TIMES DAILY
Status: DISCONTINUED | OUTPATIENT
Start: 2024-07-20 | End: 2024-07-20

## 2024-07-20 RX ORDER — POLYETHYLENE GLYCOL 3350 17 G/17G
17 POWDER, FOR SOLUTION ORAL DAILY
Status: DISCONTINUED | OUTPATIENT
Start: 2024-07-21 | End: 2024-07-22

## 2024-07-20 RX ORDER — MANNITOL 20 G/100ML
INJECTION, SOLUTION INTRAVENOUS PRN
Status: DISCONTINUED | OUTPATIENT
Start: 2024-07-20 | End: 2024-07-20

## 2024-07-20 RX ORDER — METHOCARBAMOL 750 MG/1
750 TABLET, FILM COATED ORAL EVERY 6 HOURS PRN
Status: DISCONTINUED | OUTPATIENT
Start: 2024-07-20 | End: 2024-07-30 | Stop reason: HOSPADM

## 2024-07-20 RX ORDER — NALOXONE HYDROCHLORIDE 0.4 MG/ML
0.2 INJECTION, SOLUTION INTRAMUSCULAR; INTRAVENOUS; SUBCUTANEOUS
Status: DISCONTINUED | OUTPATIENT
Start: 2024-07-20 | End: 2024-07-30 | Stop reason: HOSPADM

## 2024-07-20 RX ORDER — SODIUM CHLORIDE 9 MG/ML
INJECTION, SOLUTION INTRAVENOUS CONTINUOUS PRN
Status: DISCONTINUED | OUTPATIENT
Start: 2024-07-20 | End: 2024-07-20

## 2024-07-20 RX ORDER — BUMETANIDE 0.25 MG/ML
INJECTION INTRAMUSCULAR; INTRAVENOUS PRN
Status: DISCONTINUED | OUTPATIENT
Start: 2024-07-20 | End: 2024-07-20

## 2024-07-20 RX ORDER — HEPARIN SODIUM 1000 [USP'U]/ML
INJECTION, SOLUTION INTRAVENOUS; SUBCUTANEOUS
Status: DISCONTINUED | OUTPATIENT
Start: 2024-07-20 | End: 2024-07-20 | Stop reason: HOSPADM

## 2024-07-20 RX ORDER — CLOTRIMAZOLE 10 MG/1
10 LOZENGE ORAL 4 TIMES DAILY
Status: DISCONTINUED | OUTPATIENT
Start: 2024-07-27 | End: 2024-07-20

## 2024-07-20 RX ORDER — BISACODYL 10 MG
10 SUPPOSITORY, RECTAL RECTAL DAILY PRN
Status: DISCONTINUED | OUTPATIENT
Start: 2024-07-23 | End: 2024-07-30 | Stop reason: HOSPADM

## 2024-07-20 RX ORDER — HALOPERIDOL 5 MG/ML
1 INJECTION INTRAMUSCULAR
Status: DISCONTINUED | OUTPATIENT
Start: 2024-07-20 | End: 2024-07-20 | Stop reason: HOSPADM

## 2024-07-20 RX ORDER — EPINEPHRINE 0.1 MG/ML
INJECTION INTRAVENOUS PRN
Status: DISCONTINUED | OUTPATIENT
Start: 2024-07-20 | End: 2024-07-20

## 2024-07-20 RX ORDER — ASPIRIN 81 MG/1
324 TABLET, CHEWABLE ORAL ONCE
Status: COMPLETED | OUTPATIENT
Start: 2024-07-20 | End: 2024-07-20

## 2024-07-20 RX ORDER — CHLORHEXIDINE GLUCONATE ORAL RINSE 1.2 MG/ML
15 SOLUTION DENTAL EVERY 12 HOURS
Status: DISCONTINUED | OUTPATIENT
Start: 2024-07-20 | End: 2024-07-22

## 2024-07-20 RX ORDER — PROPOFOL 10 MG/ML
5-75 INJECTION, EMULSION INTRAVENOUS CONTINUOUS
Status: DISCONTINUED | OUTPATIENT
Start: 2024-07-20 | End: 2024-07-20 | Stop reason: HOSPADM

## 2024-07-20 RX ORDER — CALCIUM GLUCONATE 20 MG/ML
1 INJECTION, SOLUTION INTRAVENOUS ONCE
Status: COMPLETED | OUTPATIENT
Start: 2024-07-20 | End: 2024-07-20

## 2024-07-20 RX ORDER — FENTANYL CITRATE 50 UG/ML
50 INJECTION, SOLUTION INTRAMUSCULAR; INTRAVENOUS EVERY 5 MIN PRN
Status: DISCONTINUED | OUTPATIENT
Start: 2024-07-20 | End: 2024-07-20 | Stop reason: HOSPADM

## 2024-07-20 RX ORDER — ASPIRIN 325 MG
325 TABLET, DELAYED RELEASE (ENTERIC COATED) ORAL DAILY
Status: DISCONTINUED | OUTPATIENT
Start: 2024-07-20 | End: 2024-07-20

## 2024-07-20 RX ORDER — HYDROMORPHONE HYDROCHLORIDE 1 MG/ML
0.2 INJECTION, SOLUTION INTRAMUSCULAR; INTRAVENOUS; SUBCUTANEOUS EVERY 5 MIN PRN
Status: DISCONTINUED | OUTPATIENT
Start: 2024-07-20 | End: 2024-07-20 | Stop reason: HOSPADM

## 2024-07-20 RX ORDER — IOPAMIDOL 755 MG/ML
INJECTION, SOLUTION INTRAVASCULAR
Status: DISCONTINUED | OUTPATIENT
Start: 2024-07-20 | End: 2024-07-20 | Stop reason: HOSPADM

## 2024-07-20 RX ORDER — LIDOCAINE HYDROCHLORIDE 20 MG/ML
INJECTION, SOLUTION INFILTRATION; PERINEURAL PRN
Status: DISCONTINUED | OUTPATIENT
Start: 2024-07-20 | End: 2024-07-20

## 2024-07-20 RX ORDER — NOREPINEPHRINE BITARTRATE 0.06 MG/ML
.01-.6 INJECTION, SOLUTION INTRAVENOUS CONTINUOUS
Status: DISCONTINUED | OUTPATIENT
Start: 2024-07-20 | End: 2024-07-20 | Stop reason: HOSPADM

## 2024-07-20 RX ORDER — OCTREOTIDE ACETATE 100 UG/ML
100 INJECTION, SOLUTION INTRAVENOUS; SUBCUTANEOUS EVERY 12 HOURS
Status: COMPLETED | OUTPATIENT
Start: 2024-07-20 | End: 2024-07-24

## 2024-07-20 RX ORDER — LIDOCAINE 40 MG/G
CREAM TOPICAL
Status: DISCONTINUED | OUTPATIENT
Start: 2024-07-20 | End: 2024-07-20 | Stop reason: HOSPADM

## 2024-07-20 RX ORDER — DEXTROSE MONOHYDRATE 25 G/50ML
25-50 INJECTION, SOLUTION INTRAVENOUS
Status: DISCONTINUED | OUTPATIENT
Start: 2024-07-20 | End: 2024-07-20

## 2024-07-20 RX ORDER — ONDANSETRON 4 MG/1
4 TABLET, ORALLY DISINTEGRATING ORAL EVERY 30 MIN PRN
Status: DISCONTINUED | OUTPATIENT
Start: 2024-07-20 | End: 2024-07-20 | Stop reason: HOSPADM

## 2024-07-20 RX ORDER — ONDANSETRON 2 MG/ML
4 INJECTION INTRAMUSCULAR; INTRAVENOUS EVERY 6 HOURS PRN
Status: DISCONTINUED | OUTPATIENT
Start: 2024-07-20 | End: 2024-07-30 | Stop reason: HOSPADM

## 2024-07-20 RX ORDER — PAPAVERINE HYDROCHLORIDE 30 MG/ML
INJECTION INTRAMUSCULAR; INTRAVENOUS PRN
Status: DISCONTINUED | OUTPATIENT
Start: 2024-07-20 | End: 2024-07-20

## 2024-07-20 RX ORDER — HYDROMORPHONE HYDROCHLORIDE 1 MG/ML
0.4 INJECTION, SOLUTION INTRAMUSCULAR; INTRAVENOUS; SUBCUTANEOUS
Status: DISCONTINUED | OUTPATIENT
Start: 2024-07-20 | End: 2024-07-27

## 2024-07-20 RX ORDER — NALOXONE HYDROCHLORIDE 0.4 MG/ML
0.1 INJECTION, SOLUTION INTRAMUSCULAR; INTRAVENOUS; SUBCUTANEOUS
Status: DISCONTINUED | OUTPATIENT
Start: 2024-07-20 | End: 2024-07-20 | Stop reason: HOSPADM

## 2024-07-20 RX ORDER — DIMENHYDRINATE 50 MG/ML
25 INJECTION, SOLUTION INTRAMUSCULAR; INTRAVENOUS
Status: DISCONTINUED | OUTPATIENT
Start: 2024-07-20 | End: 2024-07-20 | Stop reason: HOSPADM

## 2024-07-20 RX ORDER — NOREPINEPHRINE BITARTRATE 0.06 MG/ML
.01-.6 INJECTION, SOLUTION INTRAVENOUS CONTINUOUS
Status: DISCONTINUED | OUTPATIENT
Start: 2024-07-20 | End: 2024-07-22

## 2024-07-20 RX ORDER — ACETAMINOPHEN 325 MG/1
975 TABLET ORAL EVERY 8 HOURS
Status: COMPLETED | OUTPATIENT
Start: 2024-07-20 | End: 2024-07-23

## 2024-07-20 RX ORDER — DEXTROSE MONOHYDRATE 100 MG/ML
INJECTION, SOLUTION INTRAVENOUS CONTINUOUS PRN
Status: DISCONTINUED | OUTPATIENT
Start: 2024-07-20 | End: 2024-07-25

## 2024-07-20 RX ORDER — SODIUM CHLORIDE, SODIUM GLUCONATE, SODIUM ACETATE, POTASSIUM CHLORIDE AND MAGNESIUM CHLORIDE 526; 502; 368; 37; 30 MG/100ML; MG/100ML; MG/100ML; MG/100ML; MG/100ML
800 INJECTION, SOLUTION INTRAVENOUS ONCE
Status: DISCONTINUED | OUTPATIENT
Start: 2024-07-20 | End: 2024-07-20

## 2024-07-20 RX ORDER — MAGNESIUM OXIDE 400 MG/1
400 TABLET ORAL EVERY 24 HOURS
Status: DISCONTINUED | OUTPATIENT
Start: 2024-07-22 | End: 2024-07-27

## 2024-07-20 RX ORDER — HEPARIN SODIUM 10000 [USP'U]/100ML
200 INJECTION, SOLUTION INTRAVENOUS CONTINUOUS
Status: DISCONTINUED | OUTPATIENT
Start: 2024-07-20 | End: 2024-07-21

## 2024-07-20 RX ORDER — SULFAMETHOXAZOLE AND TRIMETHOPRIM 200; 40 MG/5ML; MG/5ML
5 SUSPENSION ORAL DAILY
Status: DISCONTINUED | OUTPATIENT
Start: 2024-07-20 | End: 2024-07-20

## 2024-07-20 RX ORDER — DEXTROSE MONOHYDRATE 25 G/50ML
25 INJECTION, SOLUTION INTRAVENOUS ONCE
Status: COMPLETED | OUTPATIENT
Start: 2024-07-20 | End: 2024-07-20

## 2024-07-20 RX ORDER — MEROPENEM 500 MG/1
500 INJECTION, POWDER, FOR SOLUTION INTRAVENOUS EVERY 12 HOURS
Status: DISCONTINUED | OUTPATIENT
Start: 2024-07-20 | End: 2024-07-21

## 2024-07-20 RX ORDER — ACETAMINOPHEN 325 MG/1
650 TABLET ORAL EVERY 4 HOURS PRN
Status: DISCONTINUED | OUTPATIENT
Start: 2024-07-23 | End: 2024-07-30 | Stop reason: HOSPADM

## 2024-07-20 RX ORDER — FENTANYL CITRATE 50 UG/ML
25 INJECTION, SOLUTION INTRAMUSCULAR; INTRAVENOUS EVERY 5 MIN PRN
Status: DISCONTINUED | OUTPATIENT
Start: 2024-07-20 | End: 2024-07-20 | Stop reason: HOSPADM

## 2024-07-20 RX ORDER — PROCHLORPERAZINE MALEATE 10 MG
10 TABLET ORAL EVERY 6 HOURS PRN
Status: DISCONTINUED | OUTPATIENT
Start: 2024-07-20 | End: 2024-07-30 | Stop reason: HOSPADM

## 2024-07-20 RX ORDER — HEPARIN SODIUM 10000 [USP'U]/100ML
0-5000 INJECTION, SOLUTION INTRAVENOUS CONTINUOUS
Status: DISCONTINUED | OUTPATIENT
Start: 2024-07-20 | End: 2024-07-20

## 2024-07-20 RX ORDER — MAGNESIUM SULFATE HEPTAHYDRATE 40 MG/ML
INJECTION, SOLUTION INTRAVENOUS PRN
Status: DISCONTINUED | OUTPATIENT
Start: 2024-07-20 | End: 2024-07-20

## 2024-07-20 RX ORDER — ONDANSETRON 2 MG/ML
4 INJECTION INTRAMUSCULAR; INTRAVENOUS EVERY 30 MIN PRN
Status: DISCONTINUED | OUTPATIENT
Start: 2024-07-20 | End: 2024-07-20 | Stop reason: HOSPADM

## 2024-07-20 RX ORDER — HYDROMORPHONE HYDROCHLORIDE 1 MG/ML
0.4 INJECTION, SOLUTION INTRAMUSCULAR; INTRAVENOUS; SUBCUTANEOUS EVERY 5 MIN PRN
Status: DISCONTINUED | OUTPATIENT
Start: 2024-07-20 | End: 2024-07-20 | Stop reason: HOSPADM

## 2024-07-20 RX ORDER — ONDANSETRON 4 MG/1
4 TABLET, ORALLY DISINTEGRATING ORAL EVERY 6 HOURS PRN
Status: DISCONTINUED | OUTPATIENT
Start: 2024-07-20 | End: 2024-07-30 | Stop reason: HOSPADM

## 2024-07-20 RX ORDER — VALGANCICLOVIR 450 MG/1
450 TABLET, FILM COATED ORAL
Status: DISCONTINUED | OUTPATIENT
Start: 2024-07-22 | End: 2024-07-22

## 2024-07-20 RX ORDER — DEXTROSE MONOHYDRATE 25 G/50ML
25-50 INJECTION, SOLUTION INTRAVENOUS
Status: DISCONTINUED | OUTPATIENT
Start: 2024-07-20 | End: 2024-07-25

## 2024-07-20 RX ORDER — NITROGLYCERIN 0.4 MG/1
0.4 TABLET SUBLINGUAL EVERY 5 MIN PRN
Status: DISCONTINUED | OUTPATIENT
Start: 2024-07-20 | End: 2024-07-30 | Stop reason: HOSPADM

## 2024-07-20 RX ORDER — AMOXICILLIN 250 MG
1 CAPSULE ORAL 2 TIMES DAILY
Status: DISCONTINUED | OUTPATIENT
Start: 2024-07-20 | End: 2024-07-22

## 2024-07-20 RX ORDER — SODIUM CHLORIDE, SODIUM GLUCONATE, SODIUM ACETATE, POTASSIUM CHLORIDE AND MAGNESIUM CHLORIDE 526; 502; 368; 37; 30 MG/100ML; MG/100ML; MG/100ML; MG/100ML; MG/100ML
INJECTION, SOLUTION INTRAVENOUS CONTINUOUS PRN
Status: DISCONTINUED | OUTPATIENT
Start: 2024-07-19 | End: 2024-07-20

## 2024-07-20 RX ORDER — CEFTRIAXONE 2 G/1
2 INJECTION, POWDER, FOR SOLUTION INTRAMUSCULAR; INTRAVENOUS EVERY 24 HOURS
Status: DISCONTINUED | OUTPATIENT
Start: 2024-07-20 | End: 2024-07-20

## 2024-07-20 RX ORDER — ONDANSETRON 2 MG/ML
INJECTION INTRAMUSCULAR; INTRAVENOUS PRN
Status: DISCONTINUED | OUTPATIENT
Start: 2024-07-20 | End: 2024-07-20

## 2024-07-20 RX ORDER — HYDROMORPHONE HYDROCHLORIDE 1 MG/ML
0.2 INJECTION, SOLUTION INTRAMUSCULAR; INTRAVENOUS; SUBCUTANEOUS
Status: DISCONTINUED | OUTPATIENT
Start: 2024-07-20 | End: 2024-07-27

## 2024-07-20 RX ORDER — PROPOFOL 10 MG/ML
5-75 INJECTION, EMULSION INTRAVENOUS CONTINUOUS
Status: DISCONTINUED | OUTPATIENT
Start: 2024-07-20 | End: 2024-07-22

## 2024-07-20 RX ADMIN — Medication 50 MCG/HR: at 19:43

## 2024-07-20 RX ADMIN — SODIUM CHLORIDE: 0.9 INJECTION, SOLUTION INTRAVENOUS at 01:05

## 2024-07-20 RX ADMIN — ACETAMINOPHEN 975 MG: 325 TABLET, FILM COATED ORAL at 12:36

## 2024-07-20 RX ADMIN — CALCIUM CHLORIDE INJECTION 500 MG: 100 INJECTION, SOLUTION INTRAVENOUS at 03:21

## 2024-07-20 RX ADMIN — Medication 0.5 UNITS: at 03:10

## 2024-07-20 RX ADMIN — SENNOSIDES AND DOCUSATE SODIUM 1 TABLET: 50; 8.6 TABLET ORAL at 20:10

## 2024-07-20 RX ADMIN — Medication 0.5 UNITS: at 02:13

## 2024-07-20 RX ADMIN — AMIODARONE HYDROCHLORIDE 150 MG: 1.5 INJECTION, SOLUTION INTRAVENOUS at 08:29

## 2024-07-20 RX ADMIN — SODIUM CHLORIDE: 9 INJECTION, SOLUTION INTRAVENOUS at 06:00

## 2024-07-20 RX ADMIN — FUROSEMIDE 10 MG/HR: 10 INJECTION, SOLUTION INTRAMUSCULAR; INTRAVENOUS at 22:43

## 2024-07-20 RX ADMIN — SODIUM CHLORIDE: 9 INJECTION, SOLUTION INTRAVENOUS at 20:00

## 2024-07-20 RX ADMIN — EPINEPHRINE 1 MG: 0.1 INJECTION INTRACARDIAC; INTRAVENOUS at 06:27

## 2024-07-20 RX ADMIN — SODIUM CHLORIDE 10 UNITS: 9 INJECTION, SOLUTION INTRAVENOUS at 13:01

## 2024-07-20 RX ADMIN — ASPIRIN 81 MG CHEWABLE TABLET 324 MG: 81 TABLET CHEWABLE at 08:30

## 2024-07-20 RX ADMIN — CALCIUM CHLORIDE INJECTION 500 MG: 100 INJECTION, SOLUTION INTRAVENOUS at 01:33

## 2024-07-20 RX ADMIN — MEROPENEM 500 MG: 500 INJECTION, POWDER, FOR SOLUTION INTRAVENOUS at 22:53

## 2024-07-20 RX ADMIN — PROPOFOL 40 MCG/KG/MIN: 10 INJECTION, EMULSION INTRAVENOUS at 14:31

## 2024-07-20 RX ADMIN — CALCIUM CHLORIDE INJECTION 500 MG: 100 INJECTION, SOLUTION INTRAVENOUS at 01:55

## 2024-07-20 RX ADMIN — TICAGRELOR 90 MG: 90 TABLET ORAL at 20:10

## 2024-07-20 RX ADMIN — SODIUM CHLORIDE: 9 INJECTION, SOLUTION INTRAVENOUS at 22:02

## 2024-07-20 RX ADMIN — FENTANYL CITRATE 50 MCG: 50 INJECTION INTRAMUSCULAR; INTRAVENOUS at 07:50

## 2024-07-20 RX ADMIN — MANNITOL 12.5 G: 20 INJECTION, SOLUTION INTRAVENOUS at 00:59

## 2024-07-20 RX ADMIN — OCTREOTIDE ACETATE 100 MCG: 100 INJECTION, SOLUTION INTRAVENOUS; SUBCUTANEOUS at 20:38

## 2024-07-20 RX ADMIN — NOREPINEPHRINE BITARTRATE 0.08 MCG/KG/MIN: 0.06 INJECTION, SOLUTION INTRAVENOUS at 08:32

## 2024-07-20 RX ADMIN — FENTANYL CITRATE 50 MCG: 50 INJECTION INTRAMUSCULAR; INTRAVENOUS at 07:30

## 2024-07-20 RX ADMIN — PHENYLEPHRINE HYDROCHLORIDE 100 MCG: 10 INJECTION INTRAVENOUS at 01:30

## 2024-07-20 RX ADMIN — SODIUM CHLORIDE, SODIUM LACTATE, POTASSIUM CHLORIDE, CALCIUM CHLORIDE AND DEXTROSE MONOHYDRATE: 5; 600; 310; 30; 20 INJECTION, SOLUTION INTRAVENOUS at 06:00

## 2024-07-20 RX ADMIN — HYDROMORPHONE HYDROCHLORIDE 1 MG: 1 INJECTION, SOLUTION INTRAMUSCULAR; INTRAVENOUS; SUBCUTANEOUS at 04:38

## 2024-07-20 RX ADMIN — HEPARIN SODIUM 200 UNITS/HR: 10000 INJECTION, SOLUTION INTRAVENOUS at 14:28

## 2024-07-20 RX ADMIN — FUROSEMIDE 100 MG: 10 INJECTION, SOLUTION INTRAVENOUS at 04:07

## 2024-07-20 RX ADMIN — PHENYLEPHRINE HYDROCHLORIDE 50 MCG: 10 INJECTION INTRAVENOUS at 00:27

## 2024-07-20 RX ADMIN — CALCIUM GLUCONATE 1 G: 20 INJECTION, SOLUTION INTRAVENOUS at 12:48

## 2024-07-20 RX ADMIN — Medication 1 UNITS: at 04:31

## 2024-07-20 RX ADMIN — Medication 10 MG: at 00:19

## 2024-07-20 RX ADMIN — VASOPRESSIN 1 UNITS/HR: 20 INJECTION, SOLUTION INTRAMUSCULAR; SUBCUTANEOUS at 03:32

## 2024-07-20 RX ADMIN — Medication 50 MCG/HR: at 08:42

## 2024-07-20 RX ADMIN — SODIUM CHLORIDE, SODIUM LACTATE, POTASSIUM CHLORIDE, CALCIUM CHLORIDE AND DEXTROSE MONOHYDRATE: 5; 600; 310; 30; 20 INJECTION, SOLUTION INTRAVENOUS at 11:19

## 2024-07-20 RX ADMIN — SODIUM CHLORIDE, POTASSIUM CHLORIDE, SODIUM LACTATE AND CALCIUM CHLORIDE: 600; 310; 30; 20 INJECTION, SOLUTION INTRAVENOUS at 01:05

## 2024-07-20 RX ADMIN — NOREPINEPHRINE BITARTRATE 6.4 MCG: 1 INJECTION, SOLUTION, CONCENTRATE INTRAVENOUS at 01:57

## 2024-07-20 RX ADMIN — OCTREOTIDE ACETATE 100 MCG: 100 INJECTION, SOLUTION INTRAVENOUS; SUBCUTANEOUS at 14:39

## 2024-07-20 RX ADMIN — Medication 1 UNITS: at 02:25

## 2024-07-20 RX ADMIN — MEROPENEM 500 MG: 500 INJECTION, POWDER, FOR SOLUTION INTRAVENOUS at 13:28

## 2024-07-20 RX ADMIN — MYCOPHENOLATE MOFETIL 1000 MG: 500 INJECTION, POWDER, LYOPHILIZED, FOR SOLUTION INTRAVENOUS at 17:24

## 2024-07-20 RX ADMIN — Medication 10 MG: at 00:51

## 2024-07-20 RX ADMIN — MAGNESIUM SULFATE HEPTAHYDRATE 2 G: 40 INJECTION, SOLUTION INTRAVENOUS at 01:50

## 2024-07-20 RX ADMIN — Medication 0.5 UNITS: at 02:58

## 2024-07-20 RX ADMIN — SODIUM CHLORIDE: 9 INJECTION, SOLUTION INTRAVENOUS at 18:25

## 2024-07-20 RX ADMIN — PHENYLEPHRINE HYDROCHLORIDE 100 MCG: 10 INJECTION INTRAVENOUS at 01:48

## 2024-07-20 RX ADMIN — TACROLIMUS 3 MG: 5 CAPSULE ORAL at 17:31

## 2024-07-20 RX ADMIN — SODIUM CHLORIDE: 9 INJECTION, SOLUTION INTRAVENOUS at 11:17

## 2024-07-20 RX ADMIN — SULFAMETHOXAZOLE AND TRIMETHOPRIM 40 MG: 200; 40 SUSPENSION ORAL at 14:25

## 2024-07-20 RX ADMIN — PHENYLEPHRINE HYDROCHLORIDE 50 MCG: 10 INJECTION INTRAVENOUS at 00:21

## 2024-07-20 RX ADMIN — Medication 40 MG: at 12:51

## 2024-07-20 RX ADMIN — Medication 200 MG: at 05:15

## 2024-07-20 RX ADMIN — ONDANSETRON 4 MG: 2 INJECTION INTRAMUSCULAR; INTRAVENOUS at 05:14

## 2024-07-20 RX ADMIN — CHLORHEXIDINE GLUCONATE 0.12% ORAL RINSE 15 ML: 1.2 LIQUID ORAL at 12:36

## 2024-07-20 RX ADMIN — Medication 10 MG: at 02:18

## 2024-07-20 RX ADMIN — PROPOFOL 35 MCG/KG/MIN: 10 INJECTION, EMULSION INTRAVENOUS at 18:25

## 2024-07-20 RX ADMIN — PROPOFOL 30 MCG/KG/MIN: 10 INJECTION, EMULSION INTRAVENOUS at 22:53

## 2024-07-20 RX ADMIN — MANNITOL 25 G: 20 INJECTION, SOLUTION INTRAVENOUS at 03:26

## 2024-07-20 RX ADMIN — TACROLIMUS 3 MG: 5 CAPSULE ORAL at 13:58

## 2024-07-20 RX ADMIN — PHENYLEPHRINE HYDROCHLORIDE 100 MCG: 10 INJECTION INTRAVENOUS at 01:53

## 2024-07-20 RX ADMIN — HEPARIN SODIUM 1200 UNITS/HR: 10000 INJECTION, SOLUTION INTRAVENOUS at 08:31

## 2024-07-20 RX ADMIN — FUROSEMIDE 100 MG: 10 INJECTION, SOLUTION INTRAVENOUS at 03:33

## 2024-07-20 RX ADMIN — LIDOCAINE HYDROCHLORIDE 100 MG: 20 INJECTION, SOLUTION INFILTRATION; PERINEURAL at 06:40

## 2024-07-20 RX ADMIN — BUMETANIDE 5 MG: 0.25 INJECTION INTRAMUSCULAR; INTRAVENOUS at 03:30

## 2024-07-20 RX ADMIN — Medication 0.5 UNITS: at 03:46

## 2024-07-20 RX ADMIN — MICAFUNGIN SODIUM 100 MG: 50 INJECTION, POWDER, LYOPHILIZED, FOR SOLUTION INTRAVENOUS at 20:15

## 2024-07-20 RX ADMIN — FUROSEMIDE 40 MG: 10 INJECTION, SOLUTION INTRAVENOUS at 00:59

## 2024-07-20 RX ADMIN — SODIUM CHLORIDE, SODIUM LACTATE, POTASSIUM CHLORIDE, CALCIUM CHLORIDE AND DEXTROSE MONOHYDRATE: 5; 600; 310; 30; 20 INJECTION, SOLUTION INTRAVENOUS at 21:59

## 2024-07-20 RX ADMIN — ACETAMINOPHEN 975 MG: 325 TABLET, FILM COATED ORAL at 20:10

## 2024-07-20 RX ADMIN — DEXTROSE MONOHYDRATE 50 G: 25 INJECTION, SOLUTION INTRAVENOUS at 12:58

## 2024-07-20 RX ADMIN — EPINEPHRINE 1 MG: 0.1 INJECTION INTRACARDIAC; INTRAVENOUS at 06:30

## 2024-07-20 RX ADMIN — FENTANYL CITRATE 50 MCG: 50 INJECTION INTRAMUSCULAR; INTRAVENOUS at 08:00

## 2024-07-20 RX ADMIN — Medication 0.5 UNITS: at 02:07

## 2024-07-20 RX ADMIN — Medication 0.03 MCG/KG/MIN: at 02:31

## 2024-07-20 RX ADMIN — INSULIN HUMAN 1 UNITS/HR: 1 INJECTION, SOLUTION INTRAVENOUS at 11:14

## 2024-07-20 RX ADMIN — CHLORHEXIDINE GLUCONATE 0.12% ORAL RINSE 15 ML: 1.2 LIQUID ORAL at 20:11

## 2024-07-20 RX ADMIN — MAGNESIUM SULFATE HEPTAHYDRATE 2 G: 40 INJECTION, SOLUTION INTRAVENOUS at 06:41

## 2024-07-20 RX ADMIN — SODIUM CHLORIDE: 4.5 INJECTION, SOLUTION INTRAVENOUS at 11:58

## 2024-07-20 ASSESSMENT — ACTIVITIES OF DAILY LIVING (ADL)
ADLS_ACUITY_SCORE: 24
ADLS_ACUITY_SCORE: 20
ADLS_ACUITY_SCORE: 20
ADLS_ACUITY_SCORE: 24
ADLS_ACUITY_SCORE: 20
ADLS_ACUITY_SCORE: 25
ADLS_ACUITY_SCORE: 20
ADLS_ACUITY_SCORE: 25
ADLS_ACUITY_SCORE: 30
ADLS_ACUITY_SCORE: 25
ADLS_ACUITY_SCORE: 25
ADLS_ACUITY_SCORE: 20
ADLS_ACUITY_SCORE: 24
ADLS_ACUITY_SCORE: 20
ADLS_ACUITY_SCORE: 20
ADLS_ACUITY_SCORE: 24
ADLS_ACUITY_SCORE: 20
ADLS_ACUITY_SCORE: 20
ADLS_ACUITY_SCORE: 30

## 2024-07-20 NOTE — ANESTHESIA PROCEDURE NOTES
Airway       Patient location during procedure: OR       Procedure Start/Stop Times: 7/19/2024 7:34 PM  Staff -        Anesthesiologist:  Rosalia Arellano MD       Performed By: anesthesiologist  Consent for Airway        Urgency: elective  Indications and Patient Condition       Indications for airway management: marlin-procedural       Induction type:intravenous       Mask difficulty assessment: 2 - vent by mask + OA or adjuvant +/- NMBA    Final Airway Details       Final airway type: endotracheal airway       Successful airway: ETT - single  Endotracheal Airway Details        ETT size (mm): 8.0       Cuffed: yes       Cuff volume (mL): 7       Successful intubation technique: video laryngoscopy       Grade View of Cords: 1       Adjucts: bougie       Position: Right       Measured from: lips       Secured at (cm): 23       Bite block used: None    Post intubation assessment        Placement verified by: capnometry, equal breath sounds and chest rise        Number of attempts at approach: 1       Secured with: pink tape       Ease of procedure: easy       Dentition: Intact and Unchanged    Medication(s) Administered   Medication Administration Time: 7/19/2024 7:34 PM    Additional Comments       Bougie and video laryngoscopy used for education with MS4. Uneventful and atraumatic intubation.

## 2024-07-20 NOTE — ANESTHESIA PROCEDURE NOTES
Airway         Procedure Start/Stop Times: 7/20/2024 6:35 AM  Staff -        CRNA: Giulia Landry APRN CRNA       Performed By: CRNA  Consent for Airway        Urgency: emergent  Indications and Patient Condition       Indications for airway management: cardiovascular arrest         Mask difficulty assessment: 1 - vent by mask    Final Airway Details       Final airway type: endotracheal airway       Successful airway: ETT - single  Endotracheal Airway Details        ETT size (mm): 7.5       Cuffed: yes       Successful intubation technique: video laryngoscopy       VL Blade Size: MAC 3       Grade View of Cords: 2       Adjucts: stylet       Position: Left       Measured from: lips       Secured at (cm): 25       Bite block used: None    Post intubation assessment        Placement verified by: capnometry, equal breath sounds, chest rise and x-ray        Number of attempts at approach: 1       Number of other approaches attempted: 0       Secured with: tape       Ease of procedure: easy       Dentition: Intact and Unchanged    Medication(s) Administered   Medication Administration Time: 7/20/2024 6:35 AM

## 2024-07-20 NOTE — CONSULTS
[facility]  Transplant Nephrology Consult Note  Date of Admission:  7/19/2024  Today's Date: 07/20/2024  Requesting physician: Harrison Whitehead*    Reason for Consult:  SPK immunosuppression    Recommendations:   - high risk induction per SPK protocol  - continue lasix drip   - repeat K pending:  - if K downtrending to <6 will continue medical treatment of hyperK (shifting, insulin, lasix,..)  - if repeat K>=6, recommend starting CRRT  - hold bactrim, add G6PD to labs    Assessment & Plan   # DDKT (SPK): Trend up suspect ATN post arrest. UOP improving on lasix drip.    - Baseline Creatinine: ~ TBD   - Proteinuria: Not checked post transplant   - DSA Hx:  Final cross match pending    - Last cPRA: 11%   - BK Viremia: Not checked post transplant   - Kidney Tx Biopsy Hx: No biopsy history.        # Pancreas Tx (SPK):    - Pancreatic Exocrine Drainage: Enteric drained     - Blood glucose: Elevated blood glucose      On insulin: Yes   - HbA1c:          Latest HbA1c: 4%   - Pancreatic enzymes: Trend up   - DSA Hx:  final crossmatch pending   - Pancreas Tx Biopsy Hx: No biopsy history    # Immunosuppression: Tacrolimus immediate release (goal 8-10) and Mycophenolate mofetil (dose 1000 mg every 12 hours)   - Induction with Recent Transplant:  High Intensity Protocol   - Continue with intensive monitoring of immunosuppression for efficacy and toxicity.   - Historical Changes in Immunosuppression: None   - Changes: Not at this time    # VT/VF Cardiac arrest:  # CAD s/p CABG 2019 LIMA-LAD, SVG-RCA, IBAN 1/2019, IBAN to RCA 2/2024     - s/p inferior STEMI 2/2 ostial proximal RCA in stent thrombosis   - s/p 2 IBAN to RCA 7/20/2024   - Echo: LVEF=37%.Moderate diffuse hypokinesis with akinesis of all inferior segments.  Mild right ventricular dilation is present.Global  right ventricular function is moderately reduced.    # Infection Prevention:      - PJP: Sulfa/TMP (Bactrim) hold  given hhyperK, add G6PD  - CMV IgG Ab High Risk Discordance (D+/R-): No  - EBV IgG Ab High Risk Discordance (D+/R-): No    # Blood Pressure:  borderline soft BP ;  Goal BP: MAP > 65   - Changes: Not at this time off pressors    # Anemia in Chronic Renal Disease: Hgb: Trend down post SPK     KERRI: No   - Iron studies: Not checked recently. Monitor Hb trends    # Mineral Bone Disorder:    - Secondary renal hyperparathyroidism; PTH level: Unknown at this time, but checked with dialysis        On treatment: None  - Vitamin D; level: Unknown at this time, but checked with dialysis        On supplement: No  - Calcium; level: Normal        On supplement: No  - Phosphorus; level: Normal        On supplement: No    # Electrolytes:   - Potassium; level: High        On supplement: No  - Magnesium; level: High normal        On supplement: Yes  - Bicarbonate; level: Normal        On supplement: No  - Sodium; level: Low normal    # Other Significant PMH:   - Left diaphragmatic elevation; 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      Landry Shell MD  Transplant Nephrology  Contact information via Vocera Web Console or via [facility] Paging/Directory      History of Present Illness  Mr. Ramey is 50 yo male with hx of ESKD likely 2/2 type 2 DM on HD since Aug 2021 via LUE AVF, CAD s/p CABG 2019 LIMA-LAD, SVG-RCA, IBAN 1/2019, IBAN to RCA 2/2024 on brilinta x 3 months now s/p SPK 7/20 postop course complicated by VF/VT cardiac arrest  s/p CPR ~7 min , epi and 2 shocks prior to ROSC found to have inferior STEMI and angiogram showing in stent thrombosis of RCA s/p IBAN x2.   At the time of evaluation in the ICU, he was off pressors with soft BP, intubated  FiO2-70% weaned  earlier today, sedated, UOP improving after starting lasix drip, on insulin drip at 1 unit(s)/h. Labs show hyperK to 6.2 and uptrending Cr, lipase.     Review of Systems   Review of systems not obtained due to patient factors - intubation and sedation    MEDICATIONS:  Current Facility-Administered Medications   Medication Dose Route Frequency Provider Last Rate Last Admin    acetaminophen (TYLENOL) tablet 975 mg  975 mg Oral Q8H Harrison Whitehead MD   975 mg at 07/20/24 1236    [START ON 7/21/2024] aspirin (ASA) chewable tablet 81 mg  81 mg Oral or Feeding Tube Daily Bing Winslow MD        calcium carbonate-vitamin D (OSCAL) 500-5 MG-MCG per tablet 1 tablet  1 tablet Oral BID w/meals Harrison Whitehead MD        cefTRIAXone (ROCEPHIN) 2 g vial to attach to  ml bag for ADULTS or NS 50 ml bag for PEDS  2 g Intravenous Q24H Bing Winslow MD        chlorhexidine (PERIDEX) 0.12 % solution 15 mL  15 mL Mouth/Throat Q12H Farzad Carreno PA-C   15 mL at 07/20/24 1236    dextrose 10% BOLUS 300 mL  300 mL Intravenous Once Bing Winslow MD        [START ON 7/22/2024] magnesium oxide (MAG-OX) tablet 400 mg  400 mg Oral Q24H Harrison Whitehead MD        meropenem (MERREM) 500 mg vial to attach to  mL bag for ADULTS or 25 mL bag for PEDS  500 mg Intravenous Q12H Harrison Whitehead MD   500 mg at 07/20/24 1328    micafungin (MYCAMINE) 100 mg in sodium chloride 0.9 % 100 mL intermittent infusion  100 mg Intravenous Q24H Harrison Whitehead MD        mycophenolate mofetil (CELLCEPT) 1,000 mg in D5W intermittent infusion  1,000 mg Intravenous BID IS Julienne Ibrahim PA-C        octreotide (sandoSTATIN) injection 100 mcg  100 mcg Subcutaneous Q12H Harrison Whitehead MD        pantoprazole (PROTONIX) 2 mg/mL suspension 40 mg  40 mg Per NG tube Daily Harrison Whitehead  MD Terri   40 mg at 07/20/24 1251    [START ON 7/21/2024] polyethylene glycol (MIRALAX) Packet 17 g  17 g Oral Daily Harrison Whitehead MD        senna-docusate (SENOKOT-S/PERICOLACE) 8.6-50 MG per tablet 1 tablet  1 tablet Oral BID Harrison Whitehead MD        sodium chloride (PF) 0.9% PF flush 3 mL  3 mL Intravenous Q8H Harrison Whitehead MD        sulfamethoxazole-trimethoprim (BACTRIM/SEPTRA) suspension 40 mg  5 mL Per NG tube Daily Harrison Whitehead MD        tacrolimus (GENERIC) suspension 3 mg  3 mg Oral or NG Tube BID IS Harrison Whitehead MD   3 mg at 07/20/24 1358    ticagrelor (BRILINTA) tablet 90 mg  90 mg Oral or Feeding Tube BID Bing Winslow MD        [START ON 7/22/2024] valGANciclovir (VALCYTE) tablet 450 mg  450 mg Oral Once per day on Monday Thursday Harrison Whitehead MD         Current Facility-Administered Medications   Medication Dose Route Frequency Provider Last Rate Last Admin    dextrose 10% infusion   Intravenous Continuous PRN Farzad Carreno PA-C        dextrose 5% in lactated ringers 1,000 mL infusion   Intravenous Continuous Harrison Whitehead  mL/hr at 07/20/24 1400 Rate Verify at 07/20/24 1400    EPINEPHrine (ADRENALIN) 5 mg in sodium chloride 0.9 % 250 mL infusion CENTRAL  0.01-0.3 mcg/kg/min Intravenous Continuous Farzad Carreno PA-C   Held at 07/20/24 1152    fentaNYL (SUBLIMAZE) infusion   mcg/hr Intravenous Continuous Farzad Carreno PA-C 1 mL/hr at 07/20/24 1300 50 mcg/hr at 07/20/24 1300    furosemide (LASIX) 100 mg in sodium chloride 0.9 % 100 mL infusion  10 mg/hr Intravenous Continuous Harrison Whitehead MD 10 mL/hr at 07/20/24 1230 10 mg/hr at 07/20/24 1230    heparin (porcine) 100 unit/mL in 0.45% Sodium Chloride ANTICOAGULANT infusion  200 Units/hr Intravenous Continuous Harrison Whitehead  "MD Terri   Held at 24 1153    insulin regular (MYXREDLIN) 1 unit/mL infusion  0-24 Units/hr Intravenous Continuous Farzad Carreno PA-C 1.5 mL/hr at 24 1400 1.5 Units/hr at 24 1400    propofol (DIPRIVAN) infusion  5-75 mcg/kg/min Intravenous Continuous Farzad Carreno PA-C 24.6 mL/hr at 24 1300 40 mcg/kg/min at 24 1300    And    Medication Instruction   Does not apply Continuous PRN Farzad Carreno PA-C        norepinephrine (LEVOPHED) 16 mg in  mL infusion MAX CONC CENTRAL LINE  0.01-0.6 mcg/kg/min Intravenous Continuous Farzad Carreno PA-C   Stopped at 24 1315    sodium chloride 0.45 % 1,000 mL infusion   Intravenous Continuous Harrison Whitehead MD 12.5 mL/hr at 24 1400 Restarted at 24 1400    sodium chloride 0.9 % 1,000 mL infusion   Intravenous Continuous Harrison Whitehead  mL/hr at 24 1400 Restarted at 24 1400       Physical Exam   Temp  Av.4  F (36.3  C)  Min: 96.1  F (35.6  C)  Max: 98.6  F (37  C)  Arterial Line BP  Min: 36/21  Max: 276/115  Arterial Line MAP (mmHg)  Av.3 mmHg  Min: 29 mmHg  Max: 232 mmHg      Pulse  Av.9  Min: 20  Max: 152 Resp  Avg: 15.5  Min: 3  Max: 38  FiO2 (%)  Av %  Min: 70 %  Max: 80 %  SpO2  Av.7 %  Min: 72 %  Max: 100 %    CVP (mmHg): 278 mmHgBP 94/60   Pulse 79   Temp (!) 96.1  F (35.6  C)   Resp 14   Ht 1.803 m (5' 11\")   Wt 102.4 kg (225 lb 12 oz)   SpO2 95%   BMI 31.49 kg/m     Date 24 07 - 24 0659   Shift 4747-2695 3484-7376 8738-9516 24 Hour Total   INTAKE   I.V. 1839.32   1839.32   NG/   128   Shift Total(mL/kg) 1967.32(19.21)   .32(19.21)   OUTPUT   Urine 1325   1325   Emesis/NG output 100   100   Drains 875   875   Shift Total(mL/kg) 2300(22.46)   2300(22.46)   Weight (kg) 102.4 102.4 102.4 102.4      Admit Weight: 102.4 kg (225 lb 12 oz)     GENERAL APPEARANCE: " intubated sedated  HENT: ET tube  RESP: mechanical breath sounds  CV: regular rhythm, normal rate, no rub, no murmur  EDEMA: + LE edema bilaterally  ABDOMEN: soft, nondistended, nontender, surgical incision  MS: extremities normal - no gross deformities noted  SKIN: no rash  Access LUE AVF +thrill/bruit    Data   All labs reviewed by me.  CMP  Recent Labs   Lab 07/20/24  1340 07/20/24  1256 07/20/24  1204 07/20/24  1138 07/20/24  1111 07/20/24  0821 07/20/24  0647 07/20/24  0641 07/20/24  0602 07/20/24  0600 07/19/24  2136 07/19/24  1656   NA  --   --   --  134*  --  136  --  136  --  135   < > 137   POTASSIUM  --   --   --  6.2*  --  5.8*  --  6.0*  --  5.2   < > 3.8   CHLORIDE  --   --   --  98  --  99  --   --   --  98  --  91*   CO2  --   --   --  24  --  23  --   --   --  24  --  30*   ANIONGAP  --   --   --  12  --  14  --   --   --  13  --  16*   * 145* 125* 132*   < > 134*   < > 201*   < > 126*   < > 159*   BUN  --   --   --  32.3*  --  31.1*  --   --   --  29.2*  --  24.2*   CR  --   --   --  5.67*  --  5.95*  --   --   --  5.87*  --  5.22*   GFRESTIMATED  --   --   --  12*  --  11*  --   --   --  11*  --  13*   BETHANY  --   --   --  9.2  --  9.3  --   --   --  9.5  --  10.5*   MAG  --   --   --  2.7*  --  2.8*  --   --   --  2.2  --   --    PHOS  --   --   --  3.6  --  3.7  --   --   --  2.2*  --   --    PROTTOTAL  --   --   --  5.0*  --  4.7*  --   --   --   --   --  8.8*   ALBUMIN  --   --   --  3.2*  --  3.1*  --   --   --   --   --  5.5*   BILITOTAL  --   --   --  0.6  --  0.6  --   --   --   --   --  0.5   ALKPHOS  --   --   --  62  --  59  --   --   --   --   --  104   AST  --   --   --  182*  --  65*  --   --   --   --   --   --    ALT  --   --   --  56  --  43  --   --   --   --   --   --     < > = values in this interval not displayed.     CBC  Recent Labs   Lab 07/20/24  1138 07/20/24  0821 07/20/24  0641 07/20/24  0600 07/19/24  2136 07/19/24  1656   HGB 9.7* 9.5* 10.6* 9.4*   < > 15.0   WBC  12.8* 10.8  --  8.2  --  8.4   RBC 2.95* 2.87*  --  2.84*  --  4.37*   HCT 27.6* 27.9*  --  27.0*  --  40.7   MCV 94 97  --  95  --  93   MCH 32.9 33.1*  --  33.1*  --  34.3*   MCHC 35.1 34.1  --  34.8  --  36.9*   RDW 13.1 13.2  --  13.0  --  12.7   * 118*  --  108*  --  235    < > = values in this interval not displayed.     INR  Recent Labs   Lab 07/20/24  0821 07/20/24  0600 07/19/24  1656   INR 1.39* 1.44* 0.96   PTT 28 27 31     ABG  Recent Labs   Lab 07/20/24  1138 07/20/24  0822 07/20/24  0641 07/20/24  0409   PH 7.35 7.32* 7.25* 7.36   PCO2 47* 47* 47* 41   PO2 88 81 164* 98   HCO3 26 25 21 23   O2PER 80  80 80 50.0 64.0      Urine Studies  Recent Labs   Lab Test 07/20/24  0609 09/27/21  1220 07/24/21  1151 06/25/19  1439 06/19/19  0822   COLOR Orange* Yellow Light Yellow Straw Straw   APPEARANCE Slightly Cloudy* Slightly Cloudy* Clear Clear Clear   URINEGLC 50* 150* 200* 200 mg/dL* >1000 mg/dL*   URINEBILI Negative Negative Negative Negative Negative   URINEKETONE Negative Negative Negative Negative Negative   SG 1.013 1.013 1.018 1.010 1.012   UBLD Large* Negative 0.03 mg/dL* Moderate* Trace*   URINEPH 6.5 6.0 6.0 6.5 6.0   PROTEIN 300* >499* 600* 200 mg/dL* 300 mg/dL*   UROBILINOGEN  --   --   --  <2.0 E.U./dL <2.0 E.U./dL   NITRITE Negative Negative Negative Negative Negative   LEUKEST Trace* Small* Negative Negative Negative   RBCU >182* 2 1 25-50* 0-2   WBCU 105* 4 10* 0-5 0-5     No lab results found.  PTH  Recent Labs   Lab Test 07/20/21  1845   PTHI 190*     Iron Studies  No lab results found.    IMAGING:  All imaging studies reviewed by me.    Past Medical History    I have reviewed this patient's medical history and updated it with pertinent information if needed.   Past Medical History:   Diagnosis Date    Acquired elevated diaphragm     Anemia in chronic kidney disease     Angina pectoris (H24)     Chronic kidney disease     Coronary artery disease     Diabetes mellitus, type II (H)  12/2001    Diabetic nephropathy (H)     MARQUEZ (dyspnea on exertion)     Dyslipidemia 12/2001    End stage renal disease (H)     History of blood transfusion 2004    Hypertension     takes medication    Ischemic cardiomyopathy     Metabolic acidosis     Myocardial infarction (H)     STEMI -Diagonal branch of the LAD    Obesity (BMI 30-39.9)     Peripheral neuropathy     Proteinuria     Retinopathy     Vitamin D deficiency        Past Surgical History   I have reviewed this patient's surgical history and updated it with pertinent information if needed.  Past Surgical History:   Procedure Laterality Date    BACK SURGERY  2009    L5 disc cut    BYPASS GRAFT ARTERY CORONARY  06/20/2019    2 vessels    CV CORONARY ANGIOGRAM N/A 01/13/2019    Procedure: Coronary Angiogram;  Surgeon: Cielo Che MD;  Location: [facility] Cath Lab;  Service: Cardiology    CV CORONARY ANGIOGRAM N/A 05/02/2019    Procedure: Coronary Angiogram;  Surgeon: Cielo Che MD;  Location: [facility] Cath Lab;  Service: Cardiology    CV CORONARY ANGIOGRAM N/A 04/30/2020    Procedure: Coronary Angiogram;  Surgeon: Cielo Che MD;  Location: [facility] Cath Lab;  Service: Cardiology    CV CORONARY ANGIOGRAM N/A 07/22/2021    Procedure: CV CORONARY ANGIOGRAM;  Surgeon: Adrian Crow MD;  Location: [facility] LAB CV    CV CORONARY ANGIOGRAM N/A 02/01/2024    Procedure: Coronary Angiogram;  Surgeon: Delroy Carpio MD;  Location: [facility] CARDIAC CATH LAB    CV FRACTIONAL FLOW RATIO WIRE N/A 07/22/2021    Procedure: Fractional Flow Ratio Wire;  Surgeon: Adrian Crow MD;  Location: [facility] CATH LAB CV    CV LEFT HEART CATH N/A 07/22/2021    Procedure: Left Heart Cath;  Surgeon: Adrian Crow MD;  Location: [facility] CATH LAB CV    CV LEFT HEART CATHETERIZATION WITH LEFT VENTRICULOGRAM N/A 01/13/2019    Procedure: Left Heart Catheterization with Left Ventriculogram;  Surgeon: Cielo Che MD;  Location: [facility]  Lab;  Service: Cardiology    CV LEFT HEART CATHETERIZATION WITHOUT LEFT VENTRICULOGRAM Left 04/30/2020    Procedure: Left Heart Catheterization Without Left Ventriculogram;  Surgeon: Cielo Che MD;  Location: [facility] Cath Lab;  Service: Cardiology    CV PCI N/A 02/01/2024    Procedure: Percutaneous Coronary Intervention;  Surgeon: Delroy Carpio MD;  Location: [facility] CARDIAC CATH LAB    CV RIGHT HEART CATHETERIZATION N/A 04/30/2020    Procedure: Right Heart Catheterization;  Surgeon: Cielo Che MD;  Location: [facility] Cath Lab;  Service: Cardiology    EYE SURGERY      GENITOURINARY SURGERY      HERNIA REPAIR      OTHER SURGICAL HISTORY      Excise varicocele    STENT, CORONARY, DALE  2019    VASCULAR SURGERY         Family History   I have reviewed this patient's family history and updated it with pertinent information if needed.   Family History   Problem Relation Age of Onset    Diabetes Type 2  Mother     Heart Disease Father 60    CABG Father 50        triple bypass    Diabetes Type 2  Father     Depression Sister     Substance Abuse Sister     Ovarian Cancer Maternal Grandmother     Brain Cancer Maternal Grandmother     Pancreatic Cancer Maternal Aunt     Prostate Cancer Maternal Uncle        Social History   I have reviewed this patient's social history and updated it with pertinent information if needed. Siva Ramey  reports that he has never smoked. He has been exposed to tobacco smoke. He has never used smokeless tobacco. He reports that he does not drink alcohol and does not use drugs.    Prior to Admission Medications   Medications Prior to Admission   Medication Sig Dispense Refill Last Dose    acetaminophen (TYLENOL) 500 MG tablet Take 500 mg by mouth   7/19/2024 at n    albuterol (PROAIR HFA/PROVENTIL HFA/VENTOLIN HFA) 108 (90 Base) MCG/ACT inhaler Inhale 2 puffs into the lungs every 4 hours as needed   More than a month    ALPRAZolam (XANAX) 0.25 MG tablet Take 0.25 mg by  mouth as needed   More than a month    amLODIPine (NORVASC) 10 MG tablet Take 1 tablet (10 mg) by mouth daily 90 tablet 3 7/18/2024 at 0800    aspirin (ASA) 81 MG chewable tablet Take 1 tablet (81 mg) by mouth daily Starting tomorrow. 30 tablet 3 7/18/2024 at 0800    atorvastatin (LIPITOR) 80 MG tablet Take 1 tablet (80 mg) by mouth at bedtime 90 tablet 3 7/18/2024 at 2200    carvedilol (COREG) 12.5 MG tablet Take 3 tablets (37.5 mg) by mouth 2 times daily (with meals) 540 tablet 3 7/18/2024 at 2200    cholecalciferol, vitamin D3, 5,000 unit Tab [CHOLECALCIFEROL, VITAMIN D3, 5,000 UNIT TAB] Take 5,000 Units by mouth daily.   7/18/2024 at 2200    cloNIDine (CATAPRES) 0.1 MG tablet Take 1 tablet (0.1 mg) by mouth 3 times daily as needed (SBP > 160) 30 tablet 0 Unknown    glucose (BD GLUCOSE) 4 g chewable tablet glucose 4 gram chewable tablet   CHEW AND SWALLOW 4 TIMES DAILY AS NEEDED   More than a month    hydrALAZINE (APRESOLINE) 25 MG tablet Take 1 tablet (25 mg) by mouth 2 times daily 180 tablet 3 7/18/2024 at 0800    icosapent ethyl (VASCEPA) 1 g CAPS capsule Take by mouth 2 times daily (with meals)   7/18/2024 at 2200    insulin aspart (NOVOLOG FLEXPEN) 100 UNIT/ML pen    7/19/2024 at 1630    nitroGLYcerin (NITROSTAT) 0.4 MG sublingual tablet PLACE ONE TABLET UNDER TONGUE AS NEEDED FOR CHEST PAIN AS DIRECTED FOR 3 DOSES. IF SYMPTOMS PERSIST 5 MINUTES AFTER 1ST DOSE CALL 911 25 tablet 3 Unknown    VELPHORO 500 MG CHEW chewable tablet Take 1,000 mg by mouth 3 times daily (with meals)   7/18/2024 at 1200    zolpidem (AMBIEN) 5 MG tablet Take 5 mg by mouth nightly as needed for sleep   7/18/2024 at 2200    insulin aspart (NOVOLOG PEN) 100 UNIT/ML pen Inject 5 Units Subcutaneous 3 times daily (before meals) 4.5 mL 1     insulin aspart (NOVOLOG VIAL) 100 UNITS/ML vial Inject Subcutaneous 3 times daily (with meals) To be used with the insulin pump

## 2024-07-20 NOTE — OP NOTE
Transplant Surgery  Operative Note     Procedure Date:  Case start 7/19, complete 07/20/24    Preoperative Diagnosis:  End Stage renal failure due to diabetes mellitus type 2    Postoperative Diagnosis:  Same    Procedure:  1. 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 A J-J stent was placed.   2. Kidney allograft preparation on Back Table   3. Open appendectomy   4. Whole Pancreas Donation after Brain Death Pancreas Transplant   5. Pancreas allograft preparation on Back Table   6. Open repair of umbilical hernia, primary with closure    Surgeon:  Surgeons and Role:     * Harrison Whitehead MD - Primary     * Abraham Ch MD - Resident - Assisting. Dr. Ch was a secondary assistant for the procedure and participated in the dissection and anastomoses under my supervision.      * Reed Painter MD - Fellow - Assisting. Dr. Painter was the primary assistant for the procedure and participated in all aspects of the case under my supervision. His presence was necessary due to lack of suitable level surgical .     Fellow/Assistant:  As above    Anesthesia:  General    Specimen:  Appendix (permanent)    Drains:  García-Enciso drain in right iliac fossa    Urine Output:  140 mls    Estimated Blood Loss:  250    Fluids Administered:         Intraoperative Events: None    Complications: None.    Findings: Pancreas: placed head down in right iliac fossa. Internal iliac vein branches taken. Small pancreas and vessels (pediatric) but good flow and good reperfusion. Notable mesenteric hematoma at head of pancreas, but not expansile following initial bleed on observation. Enteric drainage managed with short yuki limb at ~150cm distal to ligament of treitz due to foreshortened mesentery and tightness.     Kidney: graft left kidney, single artery/vein/ureter. Placed in left iliac fossa end to side to external iliac vessels. Good  reperfusion. URETERAL STENT PLACED.     Appendix excised in standard open fashion. Umbilical hernia included in midline opening and primarily closed on wound closure.       None.    Indication: The patient has Type 2 diabetes with End Stage kidney failure. The patient received an organ offer for a Donation after Brain Death kidney and Donation after Brain Death pancreas. After discussing the risks and benefits of proceeding, the patient agreed to proceed with surgery and provided informed consent.    Final ABO/Crossmatch Verification: After the donor organ arrived to the operating room and prior to anastomosis, I participated in the transplant pre-verification upon organ receipt timeout by visually verifying the donor ID, organ and laterality, donor blood type, recipient unique identifier, recipient blood type, and that the donor and recipient are blood type compatible. The crossmatch was done prospectively; and the T cell crossmatch result was negative and B cell Flow crossmatch result was negative. The patient received Thymoglobulin, Cellcept, and Solumedrol on induction.    Pancreas Donor Organ Information:   Donor UNOS ID: [id number]  Donor ABO: A  Donor Arterial Clamp on: 7/19/2024  3:27 PM  Vessels with organ: Yes    Ischemic time:  Total:  575  Cold: 575  Warm: 32     Pancreas Back Table Details:   Procedure:  Bench preparation of the pancreas allograft for transplantation with arterial reconstruction    Preoperative Diagnosis:  End stage renal failure due to diabetes mellitus type 2    Postoperative Diagnosis:  Same    Surgeon:  Surgeons and Role:     * Harrison Rivas MD - Primary     * Abraham Ch MD - Resident - Assisting     * Reed Painter MD - Fellow - Assisting    Faculty Co-Surgeon:  HARRISON RIVAS    Fellow/Assistant:  As above    Anesthesia:  None    Graft Injury:  No    Donor Arrival to Recipient Room:  7/19/2024  7:38 PM    Portal Venous Extension:  No  "   Donor Iliac Vessel Quality:  Normal     Findings: as above    Pancreas Back Table Preparation: The donor pancreas was received and inspected. It had been previously flushed with UW. We removed the spleen by doubly ligating vessels between the tail of the pancreas and the spleen. The peripancreatic fat was ligated with 3-0 silk ties and removed. The proximal duodenum staple line was inverted and oversewn using running 4-0 Prolene suture. The bile duct and GDA were re-secured. The previously stapled root of the mesentery was oversewn using 4-0 Prolene forward and back. The third and fourth portions of the duodenum were freed away from the pancreas by ligating and dividing the intervening tissue with a series of 3-0 and 4-0 silk ties. Ganglionectomy was performed with 3-0 and 4-0 silk ties.    65 Arterial \"Y graft\" construction was required: the donor iliac artery was dilated, leak checked, and tailored to create an extension \"Y' graft by anastomosing the internal iliac artery to the splenic artery and the external iliac artery to the superior mesenteric artery in an end-to end fashion using running 7-0 Prolene suture.  . The pancreas was transferred to a new bowl with fresh iced cold preservation solution. Faculty was present for key portions of the procedure.    Operative Procedure:   Arterial Anastomosis Start:  7/20/2024 12:30 AM    Recipient Arterial Unclamp:  7/20/2024  1:02 AM    Extra Vessels Used:   Yes- artery    Extra Vessels Banked:  Yes- vein     Kidney Donor Organ Information:    Donor UNOS ID: [id number]  Donor ABO: A  Donor Arterial Clamp on: 7/19/2024  3:27 PM  Vessels with organ: Yes    Ischemic time:  Total:  738  Cold: 738  Warm: 43     Kidney Back Table Details:    Preoperative Procedure:  Bench preparation of the kidney allograft for transplantation without vascular reconstruction    Diagnosis:  End stage renal failure due to diabetes mellitus type 2    Postoperative Diagnosis:  Same    Surgeon:  " Surgeons and Role:     * Harrison Whitehead MD - Primary     * Abraham Ch MD - Resident - Assisting     * Reed Painter MD - Fellow - Assisting    Faculty Co-Surgeon:  HARRISON WHITEHEAD    Fellow/Assistant:  As above    Anesthesia:  None    Donor Arrival to Recipient Room:  7/19/2024  7:38 PM      Graft Injury: No  Graft Biopsy: no      Organ Received On: Ice  Pump Resistance: n/a   Pump Flow: n/a    Ureteral Anatomy: Single  Arterial Anatomy: Single  Venous Anatomy: Single      Any Reconstruction:  No    Artery:   normal    Vein:   normal     Findings: Normal. As above    Kidney Back Table Preparation: The donor kidney was received and inspected. It had been previously flushed with UW. The graft was prepared on the back table by removing perinephric fat and ligating venous tributaries and lymphatics. The ureter was also cleaned of excess tissue. If required, reconstruction was performed as detailed above. The kidney was stored in iced cold preservation solution until ready for transplantation. Faculty was present for the critical portions of the procedure.    Operative Procedure:   Arterial Anastomosis Start:  7/20/2024  3:02 AM    Recipient Arterial Unclamp:  7/20/2024  3:45 AM    Extra Vessels Used:   no    Extra Vessels Banked:  Yes       The patient was brought to the operating room, placed in a supine position, and a time out was performed. Sequential compression devices were placed on both lower extremities and general endotracheal anesthesia was induced. The patient was given IV antibiotics, Thymoglobulin, Cellcept, and Solumedrol, and a Burt catheter. A central line and arterial lines were placed by Anesthesia service. The abdomen was then shaved, prepped, and draped in the usual sterile fashion. We performed a lower midline incision, divided the linea alba and opened the peritoneum sharply under direct vision. Retractors were placed.    Pancreas Transplant: We  mobilized the right colon and the ureter medially and proceeded to circumferentially dissect the right iliac vessels. The internal iliac vein was ligated and divided. We obtained vascular control, performed a venotomy, and performed an anastomosis between the donor portal vein and the recipient's right Common Iliac. We then obtained proximal and distal control of the right common iliac artery . Arteriotomy and irrigation ensued, and the donor Y graft was anastomosed to the common iliac artery  in an end to side fashion. After both anastomoses were completed, we opened the clamps. The pancreas consistency and reperfusion quality was Pink throughout, the graft duodenum was Pink throughout. There was no pancreatitis. Overall the graft was rated [place] grade A. The exocrine secretions of the pancreas were drained via Enteric w/yuki-en-y employing  a side to side hand sewn 2-layered. The pancreas placement was Intra-Peritoneal. Faculty was present for key portions of the procedure. We used a yuki en Y due to the shortened mesentery and intra-abdominal fat content and tension. The jejunojejunotomy was fashioned using a side to side stapled anastomosis. The mesenteric defect was closed    Kidney Transplant: The patient was heparinized. We applied atraumatic vascular clamps and the donor kidney was brought to the operative field. We made a venotomy and the renal vein was anastomosed to the recipient left External Iliac vein in an end-to-side fashion. An arteriotomy was made and the donor renal artery was anastomosed to the recipient left external iliac artery in an end to side fashion. The patient was simultaneously loaded with IV mannitol, Lasix and volume. The renal artery was protected and the clamps were removed. After several cardiac cycles, we opened the renal artery and the kidney had Good reperfusion and was firm and pink .    The transplant ureter was managed by creating a Liche (anterior multistitch)  anastomosis with absorbable suture. A stent was placed across the anastomosis. The kidney made Yes urine prior to implantation.    Hemostasis was obtained, the anastomoses inspected, and the kidney placed in the iliac fossa. After placement, the vessel lay was inspected and found to be acceptable. The kidney position was Intra-peritoneal. The field was irrigated with antibiotic solution. A drain was placed. The retractor was removed and the abdominal wall fascia reapproximated. Subcutaneous tissues were irrigated and hemostasis obtained. The skin was reapproximated with staples and a dry dressing was applied. Faculty was present for key portions of the procedure.    The order of the organ reperfusion was: pancreas then kidney.    The appendix was excised using standard open technique.. The umbilical hernia noted on opening was reduced and included in the midline wound opening. This was primarily repaired during the fascial closure     All needle, sponge and instrument counts were correct x 2. The patient was awakened, extubated, and transferred to the PACU for post-op monitoring.

## 2024-07-20 NOTE — PROGRESS NOTES
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     Patient extubated in PACU this morning post-op and stable throughout case. While getting renal ultrasound in PACU, patient cardiac arrested (V-fib) for approximately 5 mins. Received 2 rounds of CPR, epi x 2, 2g IV mag, 100mg IV lidocaine push during code before achieving ROSC. Lactate ~9. Trop 136. Patient now presenting to SICU for further work-up, evaluation.     Arrived post-op to CVICU @ 0810. Cardiology emergently engaged for inferior STEMI. Cath lab subsequently activated at 0815. Loaded with aspirin 325mg. Heparin gtt is approved from surgical standpoint. Given repeat EKG in CVICU showing persistent STEMI, will load with heparin as well. Amio bolus and infusion due to Bigeminy and ongoing frequent PVCs. Per interventional cardiology, cath showing rethrombosis of RCA stent s/p revascularization.     Plan of care discussed with both CICU attending and transplant surgeon, Dr Whitehead, who are all in agreement for CICU to take over ICU Cares in place of the SICU team.     Sign out given to CICU staff/fellow/KAT team.     PLAN:    Neurological:  # Acute pain   # Sedation   - Monitor neurological status. Delirium preventions and precautions.    - Pain: Fent gtt     - Sedation plan: Propofol, precedex   - Must ensure stability of mental exam post-arrest   - Will consider baseline head CT if warrented   - Upon arrival to CVICU, FSC x 4, AGUILAR upon holding sedation.     Pulmonary:   # Post operative ventilatory support   # Acute hypoxic respiratory support   - VENT: Vent Mode: (S) CPAP/PS  (Continuous positive airway pressure with Pressure Support)  FiO2 (%): 80 %  PEEP (cm H2O): 5 cmH2O  Pressure Support (cm H2O): 5 cmH2O  Resp: 16    - Ventilatory bundle.  - Continue full vent support.   - PST when meets criteria.    - Supplemental oxygen to keep saturation above 92 %.  - CXR stable post-op     Cardiovascular:    # CAD s/p PCI with IBAN 1/2019   # 2 vessel CABG in 2019 (currently only on ASA)    # Single vessel CAD s/p PCI to the proximal RCA   # HTN   # PAD   # Cardiac arrest (V fib) 7/20/24   # Acute STEMI    # Shock-distributive and cardiogenic   # Cardiogenic shock   # Rethrombosis of RCA stent s/p revascularization 7/20     While getting renal ultrasound in PACU, patient cardiac arrested (V-fib) for approximately 5-7 mins. Received 2 rounds of CPR, epi x 2, 2g IV mag, 100mg IV lidocaine push during code before achieving ROSC. Patient now presenting to SICU for further work-up, evaluation.   - EKG in PACU showing acute STEMI   - STAT cardiology consult placed   - Cath lab activated for inferior STEMI   - Trop 136 --> 199; trend q2h    - Monitor hemodynamic status.    - Echo after cath lab   - Heparin bolus and infusion (low intensity)    - Initial  in CVICU   - Tele monitor with frequent PVCs, ongoing bigeminy   - Cariology to continue following    - Appreciate Dr Fields and Dr Robertson prompt response   - Will discuss need for plavix post-cath    - Transplant surgeon confirmed ok for any anticoag/antiplatelet agents needed     Gastroenterology/Nutrition:  # NPO status   # S/p pancreas transplant 7/20   - NG tube to LIS   - Will consult RD for [place]  today   - D5 infusion per protocol   - Amylase, lipase daily   - KUB pending for NG tube     Renal/Fluids/Electrolytes:   # ESRD s/p kidney transplant 7/20   # Acute kidney injury  # Electrolyte monitoring   # Volume status   # lactic acidosis   - Will continue to monitor intake and output.  - Continue olea cares   - Trend lactate q4h; Down to 4.3 in ICU   - Electrolytes q4h   - Laxis drip post-op per protocol  - NS and 1/2 NS infusions ordered for goal urine output per hour per protocol     Endocrine:   # Stress hyperglycemia   # Diabetes mellitus with insulin pump   - Goal to keep BG< 180 for optimal wound healing   - Insulin drip must remain on   - Dextrose infusion per protocol     ID:  # Immunosuppression   # Stress Leukocytosis   # Post-operative antimicrobials   - Trend CBC, fever curve   - Following intra-op cultures   - Defer immunosuppression, post-op antimicrobials per transplant     Positive cultures:  - N/a    Heme:     # Acute blood loss anemia    # Anticoagulation needs   - Trend CBC q4h   - Transplant OK with full anticoagulation post-op   - Transfuse if hgb <7.0 or signs/symptoms of hypoperfusion. Monitor and trend.     Musculoskeletal/Skin:  # Weakness and deconditioning of critical illness  # incisional monitoring    - Physical and occupational therapy consult   - Incisional cares   - Drain monitoring     General Cares/Prophylaxis:    DVT Prophylaxis: Pneumatic Compression Devices, heparin gtt   GI Prophylaxis: PPI  Restraints: Restraints for medical healing needed: YES    Lines/ tubes/ drains:  - ETT  - Olea  - LIJ CVC   - Art line   - NG tube   - Drain to bulb suction     Disposition:  -  SICU --> CICU     Patient seen, findings and plan discussed with surgical ICU staff, Dr. Ortega, surgery attending Dr Whitehead, CICU staff.     Time spent on this encounter  Billing:  I spent 60 minutes at bedside and on the inpatient unit today managing the critical care of Siva TANIYA Ramey in relation  to the issues listed in this note. Critical care time was spent outside of procedures.       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     REVIEW OF SYSTEMS: 10 point ROS neg other than the symptoms noted above in the HPI.    PAST MEDICAL HISTORY:   Past Medical History:   Diagnosis Date    Acquired elevated diaphragm     Anemia in chronic kidney disease     Angina pectoris (H24)     Chronic kidney disease     Coronary artery disease     Diabetes mellitus, type II (H) 12/2001    Diabetic nephropathy (H)     MARQUEZ (dyspnea on exertion)     Dyslipidemia 12/2001    End stage renal disease (H)     History of blood transfusion 2004    Hypertension     takes medication    Ischemic cardiomyopathy     Metabolic acidosis     Myocardial infarction (H)     STEMI -Diagonal branch of the LAD    Obesity (BMI 30-39.9)     Peripheral neuropathy     Proteinuria     Retinopathy     Vitamin D deficiency        SURGICAL HISTORY:   Past Surgical History:   Procedure Laterality Date    BACK SURGERY  2009    L5 disc cut    BYPASS GRAFT ARTERY CORONARY  06/20/2019    2 vessels    CV CORONARY ANGIOGRAM N/A 01/13/2019    Procedure: Coronary Angiogram;  Surgeon: Cielo Che MD;  Location: [facility] Cath Lab;  Service: Cardiology    CV CORONARY ANGIOGRAM N/A 05/02/2019    Procedure: Coronary Angiogram;  Surgeon: Cielo Che MD;  Location: [facility] Cath Lab;  Service: Cardiology    CV CORONARY  ANGIOGRAM N/A 04/30/2020    Procedure: Coronary Angiogram;  Surgeon: Cielo Che MD;  Location: [facility] Cath Lab;  Service: Cardiology    CV CORONARY ANGIOGRAM N/A 07/22/2021    Procedure: CV CORONARY ANGIOGRAM;  Surgeon: Adrian Crow MD;  Location: [facility] CATH LAB CV    CV CORONARY ANGIOGRAM N/A 02/01/2024    Procedure: Coronary Angiogram;  Surgeon: Delroy Carpio MD;  Location:  HEART CARDIAC CATH LAB    CV FRACTIONAL FLOW RATIO WIRE N/A 07/22/2021    Procedure: Fractional Flow Ratio Wire;  Surgeon: Adrian Crow MD;  Location: [facility] CATH LAB CV    CV LEFT HEART CATH N/A 07/22/2021    Procedure: Left Heart Cath;  Surgeon: Adrian Crow MD;  Location: [facility] CATH LAB CV    CV LEFT HEART CATHETERIZATION WITH LEFT VENTRICULOGRAM N/A 01/13/2019    Procedure: Left Heart Catheterization with Left Ventriculogram;  Surgeon: Cielo Che MD;  Location: [facility] Cath Lab;  Service: Cardiology    CV LEFT HEART CATHETERIZATION WITHOUT LEFT VENTRICULOGRAM Left 04/30/2020    Procedure: Left Heart Catheterization Without Left Ventriculogram;  Surgeon: Cielo Che MD;  Location: [facility] Cath Lab;  Service: Cardiology    CV PCI N/A 02/01/2024    Procedure: Percutaneous Coronary Intervention;  Surgeon: Delroy Carpio MD;  Location: [facility] CARDIAC CATH LAB    CV RIGHT HEART CATHETERIZATION N/A 04/30/2020    Procedure: Right Heart Catheterization;  Surgeon: Cielo Che MD;  Location: [facility] Cath Lab;  Service: Cardiology    EYE SURGERY      GENITOURINARY SURGERY      HERNIA REPAIR      OTHER SURGICAL HISTORY      Excise varicocele    STENT, CORONARY, DALE  2019    VASCULAR SURGERY         SOCIAL HISTORY:   Social History     Socioeconomic History    Marital status:      Spouse name: None    Number of children: None    Years of education: None    Highest education level: None   Tobacco Use    Smoking status: Never     Passive exposure: Past    Smokeless tobacco:  Never   Substance and Sexual Activity    Alcohol use: Never    Drug use: No    Sexual activity: Yes     Partners: Female   Other Topics Concern    Parent/sibling w/ CABG, MI or angioplasty before 65F 55M? Yes     Social Determinants of Health      [facility]    Financial Resource Strain    Received from [facility], [facility]    Social Connections     FAMILY HISTORY: No bleeding/clotting disorders nor problems with anesthesia.     ALLERGIES:   Allergies   Allergen Reactions    Amoxicillin Hives     & generalized pain    Venlafaxine      lethargic       MEDICATIONS:  Current Facility-Administered Medications   Medication Dose Route Frequency Provider Last Rate Last Admin    [START ON 7/23/2024] acetaminophen (TYLENOL) tablet 650 mg  650 mg Oral Q4H PRN Harrison Whitehead MD        acetaminophen (TYLENOL) tablet 975 mg  975 mg Oral Q8H Harrison Whitehead MD        albuterol (PROVENTIL HFA/VENTOLIN HFA) inhaler  2 puff Inhalation Q4H PRN Harrison Whitehead MD        amiodarone (NEXTERONE) 1.8 mg/mL in sodium chloride 0.9% in non-PVC container 500 mL ADULT STANDARD infusion  1 mg/min Intravenous Continuous Farzad Carreno PA-C        amiodarone (NEXTERONE) 1.8 mg/mL in sodium chloride 0.9% in non-PVC container 500 mL ADULT STANDARD infusion  0.5 mg/min Intravenous Continuous Farzad Carreno PA-C        [START ON 7/23/2024] bisacodyl (DULCOLAX) suppository 10 mg  10 mg Rectal Daily PRN Harrison Whitehead MD        calcium carbonate-vitamin D (OSCAL) 500-5 MG-MCG per tablet 1 tablet  1 tablet Oral BID w/meals Harrison Whitehead MD        chlorhexidine (PERIDEX) 0.12 % solution 15 mL  15 mL Mouth/Throat Q12H Farzad Carreno PA-C        [START ON 7/27/2024]  clotrimazole (MYCELEX) lozenge 10 mg  10 mg Buccal 4x Daily Harrison Whitehead MD        dextrose 10% infusion   Intravenous Continuous PRN Farzad Carreno PA-C        dextrose 5% in lactated ringers 1,000 mL infusion   Intravenous Continuous Harrison Whitehead  mL/hr at 07/20/24 0600 New Bag at 07/20/24 0600    glucose gel 15-30 g  15-30 g Oral Q15 Min PRN Julienne Ibrahim PA-C        Or    dextrose 50 % injection 25-50 mL  25-50 mL Intravenous Q15 Min PRN Julienne Ibrahim PA-C        Or    glucagon injection 1 mg  1 mg Subcutaneous Q15 Min PRN Julienne Ibrahim PA-C        glucose gel 15-30 g  15-30 g Oral Q15 Min PRN Farzad Carreno PA-C        Or    dextrose 50 % injection 25-50 mL  25-50 mL Intravenous Q15 Min PRN Farzad Carreno PA-C        Or    glucagon injection 1 mg  1 mg Subcutaneous Q15 Min PRN Farzad Carreno PA-C        EPINEPHrine (ADRENALIN) 5 mg in sodium chloride 0.9 % 250 mL infusion CENTRAL  0.01-0.3 mcg/kg/min Intravenous Continuous Farzad Carreno PA-C        famotidine (PEPCID) tablet 20 mg  20 mg Oral BID Harrison Whitehead MD        Or    famotidine (PEPCID) infusion 20 mg  20 mg Intravenous BID Harrison Whitehead MD        fentaNYL (SUBLIMAZE) 50 mcg/mL bolus from pump  50 mcg Intravenous Q1H PRN Farzad Carreno PA-C        fentaNYL (SUBLIMAZE) infusion   mcg/hr Intravenous Continuous Farzad Carreno PA-C 1 mL/hr at 07/20/24 0900 50 mcg/hr at 07/20/24 0900    furosemide (LASIX) 100 mg in sodium chloride 0.9 % 100 mL infusion  10 mg/hr Intravenous Continuous Harrison Whitehead MD        heparin (porcine) 100 unit/mL in 0.45% Sodium Chloride ANTICOAGULANT infusion  200 Units/hr Intravenous Continuous Harrison Whitehead MD        heparin (porcine) injection    Once PRN Marcelo Dominic Grigoryevich, MD    2,000 Units at 07/20/24 1011    heparin 25,000 units in 0.45% NaCl 250 mL ANTICOAGULANT infusion  0-5,000 Units/hr Intravenous Continuous Farzad Carreno PA-C   Stopped at 07/20/24 0925    HYDROmorphone (PF) (DILAUDID) injection 0.2 mg  0.2 mg Intravenous Q2H PRN Harrison Whitehead MD        Or    HYDROmorphone (PF) (DILAUDID) injection 0.4 mg  0.4 mg Intravenous Q2H PRN Harrison Whitehead MD        insulin regular (MYXREDLIN) 1 unit/mL infusion  0-24 Units/hr Intravenous Continuous Farzad Carreno PA-C        iopamidol (ISOVUE-370) solution    Once PRN Dominic Robertson MD   91 mL at 07/20/24 1034    lidocaine 1 %    Once PRN Dominic Robertson MD   10 mL at 07/20/24 0907    [START ON 7/22/2024] magnesium hydroxide (MILK OF MAGNESIA) suspension 30 mL  30 mL Oral Daily PRN Harrison Whitehead MD        [START ON 7/22/2024] magnesium oxide (MAG-OX) tablet 400 mg  400 mg Oral Q24H Harrison Whitehead MD        propofol (DIPRIVAN) infusion  5-75 mcg/kg/min Intravenous Continuous Farzad Carreno PA-C 18.4 mL/hr at 07/20/24 0938 30 mcg/kg/min at 07/20/24 0938    And    propofol (DIPRIVAN) bolus from bag or syringe pump  10 mg Intravenous Q15 Min PRN Farzad Carreno PA-C        And    Medication Instruction   Does not apply Continuous PRN Farzad Carreno PA-C        meropenem (MERREM) 500 mg vial to attach to  mL bag for ADULTS or 25 mL bag for PEDS  500 mg Intravenous Q6H Harrison Whitehead MD        methocarbamol (ROBAXIN) tablet 750 mg  750 mg Oral Q6H PRN Harrison Whitehead MD        [START ON 7/21/2024] micafungin (MYCAMINE) 100 mg in sodium chloride 0.9 % 100 mL intermittent infusion  100 mg Intravenous Q24H Harrison Whitehead MD        naloxone (NARCAN) injection 0.2 mg  0.2 mg Intravenous Q2 Min PRN Harrison Whitehead  MD Terri        Or    naloxone (NARCAN) injection 0.4 mg  0.4 mg Intravenous Q2 Min PRN Harrison Whitehead MD        Or    naloxone (NARCAN) injection 0.2 mg  0.2 mg Intramuscular Q2 Min PRN Harrison Whitehead MD        Or    naloxone (NARCAN) injection 0.4 mg  0.4 mg Intramuscular Q2 Min PRHarrison Gomez MD        nitroGLYcerin (NITROSTAT) sublingual tablet 0.4 mg  0.4 mg Sublingual Q5 Min PRN Harrison Whitehead MD        norepinephrine (LEVOPHED) 16 mg in  mL infusion MAX CONC CENTRAL LINE  0.01-0.6 mcg/kg/min Intravenous Continuous Farzad Carreno PA-C 2.9 mL/hr at 07/20/24 1015 0.03 mcg/kg/min at 07/20/24 1015    octreotide (sandoSTATIN) injection 100 mcg  100 mcg Subcutaneous Q12H Harrison Whitehead MD        ondansetron (ZOFRAN ODT) ODT tab 4 mg  4 mg Oral Q6H PRN Harrison Whitehead MD        Or    ondansetron (ZOFRAN) injection 4 mg  4 mg Intravenous Q6H PRHarrison Gomez MD        pantoprazole (PROTONIX) 2 mg/mL suspension 40 mg  40 mg Per NG tube Daily Harrison Whitehead MD        [START ON 7/21/2024] polyethylene glycol (MIRALAX) Packet 17 g  17 g Oral Daily Harrison Whitehead MD        prochlorperazine (COMPAZINE) injection 10 mg  10 mg Intravenous Q6H PRN Harrison Whitehead MD        Or    prochlorperazine (COMPAZINE) tablet 10 mg  10 mg Oral Q6H PRN Harrison Whitehead MD        senna-docusate (SENOKOT-S/PERICOLACE) 8.6-50 MG per tablet 1 tablet  1 tablet Oral BID Harrison Whitehead MD        sodium chloride (PF) 0.9% PF flush 3 mL  3 mL Intravenous Q1H PRN Harrison Whitehead MD        sodium chloride (PF) 0.9% PF flush 3 mL  3 mL Intravenous Q8H Harrison Whitehead MD        sodium chloride (PF) 0.9% PF flush 3 mL  3 mL Intravenous Q1H PRN Harrison Whitehead  MD Terri        sodium chloride 0.45 % 1,000 mL infusion   Intravenous Continuous Harrison Whitehead MD        sodium chloride 0.9 % 1,000 mL infusion   Intravenous Continuous Harrison Whitehead MD   New Bag at 07/20/24 0600    sulfamethoxazole-trimethoprim (BACTRIM/SEPTRA) suspension 80 mg  10 mL Per NG tube Daily Harrison Whitehead MD        tacrolimus (GENERIC) suspension 3 mg  3 mg Oral or NG Tube BID IS Harrison Whitehead MD        ticagrelor (BRILINTA) tablet    Once PRN Dominic Robertson MD   180 mg at 07/20/24 0923    [START ON 7/22/2024] valGANciclovir (VALCYTE) tablet 450 mg  450 mg Oral Once per day on Monday Thursday Harrison Whitehead MD           PHYSICAL EXAMINATION:  Temp:  [96.6  F (35.9  C)-98.6  F (37  C)] 97  F (36.1  C)  Pulse:  [] 94  Resp:  [3-38] 16  BP: ()/() 94/60  MAP:  [29 mmHg-232 mmHg] 72 mmHg  Arterial Line BP: ()/(4-115) 103/59  FiO2 (%):  [80 %] 80 %  SpO2:  [72 %-100 %] 99 %    General: Intubated, sedated  HEENT: Mucous membranes pink, moist, atraumatic. NG in place   Neuro: Alert off sedation. Opening eyes spontaneously. FSC x 4. AGUILAR x 4. PERRL.    Pulm/Resp: Coarse breath sounds bilaterally without rhonchi, crackles or wheeze. Full vent. Moderate secretions upon suctioning.   CV: RRR with frequent PVCs, no murmurs or extra heart sounds audible upon auscultation.   Abdomen: Soft, mildly distended. AMILCAR drain serosang. Incision dry.   : + olea catheter in place, urine yellow and clear  Incisions/Skin: as above   MSK/Extremities: no peripheral edema, peripheral pulses intact, extremities well perfused.    LABS: Reviewed.   Arterial Blood Gases   Recent Labs   Lab 07/20/24  0822 07/20/24  0641 07/20/24  0409 07/20/24  0313   PH 7.32* 7.25* 7.36 7.38   PCO2 47* 47* 41 39   PO2 81 164* 98 78*   HCO3 25 21 23 23     Complete Blood Count   Recent Labs   Lab  07/20/24  0821 07/20/24  0641 07/20/24 0600 07/20/24 0409 07/19/24 2136 07/19/24  1656   WBC 10.8  --  8.2  --   --  8.4   HGB 9.5* 10.6* 9.4* 9.5*   < > 15.0   *  --  108*  --   --  235    < > = values in this interval not displayed.     Basic Metabolic Panel  Recent Labs   Lab 07/20/24 0821 07/20/24  0819 07/20/24  0659 07/20/24  0647 07/20/24  0641 07/20/24  0602 07/20/24 0600 07/20/24 0409 07/19/24 2136 07/19/24  1656     --   --   --  136  --  135 134*   < > 137   POTASSIUM 5.8*  --   --   --  6.0*  --  5.2 4.6   < > 3.8   CHLORIDE 99  --   --   --   --   --  98  --   --  91*   CO2 23  --   --   --   --   --  24  --   --  30*   BUN 31.1*  --   --   --   --   --  29.2*  --   --  24.2*   CR 5.95*  --   --   --   --   --  5.87*  --   --  5.22*   * 134* 162* 155* 201*   < > 126* 114*   < > 159*    < > = values in this interval not displayed.     Liver Function Tests  Recent Labs   Lab 07/20/24 0821 07/20/24 0600 07/19/24  1656   AST 65*  --   --    ALT 43  --   --    ALKPHOS 59  --  104   BILITOTAL 0.6  --  0.5   ALBUMIN 3.1*  --  5.5*   INR 1.39* 1.44* 0.96     Pancreatic Enzymes  Recent Labs   Lab 07/20/24 0600 07/19/24  1656   LIPASE 211* 67*   AMYLASE 171* 88     Coagulation Profile  Recent Labs   Lab 07/20/24 0821 07/20/24 0600 07/19/24  1656   INR 1.39* 1.44* 0.96   PTT 28 27 31       IMAGING:  Recent Results (from the past 24 hour(s))   XR Chest 2 Views    Narrative    XR CHEST 2 VIEWS 7/19/2024 5:31 PM      HISTORY: pre-transplant screening    COMPARISON: 4/2/2023.     FINDINGS: Frontal and lateral views of the chest. Midline trachea.  Normal heart size. Prior sternotomy. Radiograph is overexposed in the  central portion of the lungs. No focal lobar consolidation. No pleural  effusion or pneumothorax.      Impression    IMPRESSION: Clear lungs.    I have personally reviewed the examination and initial interpretation  and I agree with the findings.    ÁLVARO MAYA MD          SYSTEM ID:  [id number]   XR Chest Port 1 View    Narrative    EXAM:  XR CHEST 1 PORT VIEW    INDICATION: Post-op kidney transplant    COMPARISON:  Chest x-ray 7/19/2024    FINDINGS:  Single AP view of the chest. Sternotomy wires. Enteric tube terminates  over the stomach. Left IJ approach central venous catheter terminates  over the brachiocephalic confluence.    Cardiomediastinal silhouette within normal limits. Low lung volumes.  Bibasilar opacities. No pneumothorax.  No pleural effusion.       Impression    IMPRESSION:  Low lung volumes with bibasilar atelectasis.    I have personally reviewed the examination and initial interpretation  and I agree with the findings.    PATRICK SYKES MD         SYSTEM ID:  [id number]   XR Chest Port 1 View    Narrative    EXAM:  XR CHEST PORT 1 VIEW    INDICATION: post VF arrest and intubation    COMPARISON:  Chest x-ray 7/20/2024    FINDINGS:  Single AP view of the chest. Sternotomy wires. Endotracheal tube  terminates over the mid thoracic trachea. Left IJ approach central  venous catheter terminates over the brachiocephalic confluence. Paddle  over the right chest. Enteric tube terminates over the stomach.    Cardiomediastinal silhouette within normal limits. Low lung volumes.  Perihilar opacities. Bibasilar opacities. No pneumothorax.  No pleural  effusion.       Impression    IMPRESSION:  1.  ET tube terminates over the mid-low thoracic trachea.  2.  Mild pulmonary edema.  3.  Low lung volumes with bibasilar atelectasis.    I have personally reviewed the examination and initial interpretation  and I agree with the findings.    PATRICK SYKES MD         SYSTEM ID:  [id number]

## 2024-07-20 NOTE — PROGRESS NOTES
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     Patient extubated in PACU this morning post-op and stable throughout case. While getting renal ultrasound in PACU, patient cardiac arrested (V-fib) for approximately 5 mins. Received 2 rounds of CPR, epi x 2, 2g IV mag, 100mg IV lidocaine push during code before achieving ROSC. Lactate ~9. Trop 136. Code blue was managed by the PACU team. Post ROSC EKG revealed inferior STEMI. Arrived post-op to CVICU @ 0810. Cardiology consult team emergently engaged for inferior STEMI. Cath lab subsequently activated at 0815. Loaded with aspirin 325mg. Heparin gtt is approved from surgical standpoint.. Amio bolus and infusion due to Bigeminy and ongoing frequent PVCs. Plan of care discussed with both CICU attending and transplant surgeon, Dr Whitehead, who are all in agreement for CICU to take over ICU Cares in place of the SICU team. Coronary angiogram showed 100% prox RCA in stent thrombosis, now s/p 2 IBAN in ostial to prox RCA.     PLAN:    Neurological:  # Acute pain   # Sedation   # At risk for anoxic brain injury  - Monitor neurological status. Delirium preventions and precautions.   - Pain: Fent gtt     - Sedation plan: Propofol, precedex   - Must ensure stability of mental exam post-arrest   - Will  consider baseline head CT if warrented   - Upon arrival to CVICU, FSC x 4, AGUILAR upon holding sedation.   - Following commands post brief CA    Cardiovascular:    # VT Cardiac arrest  # Inferior STEMI s/p 2 IBAN to ostial-proximal RCA 2/2 in stent thrombosis  # CAD s/p PCI with IBAN 1/2019   # CABG in 2019 (currently only on ASA)  - LIMA-LAD, SVG-RCA   # hx CAD s/p PCI to the proximal RCA (02/2024)  # HTN   # PAD   # Cardiac arrest (V fib) 7/20/24   While getting renal ultrasound in PACU, patient cardiac arrested (V-fib) for approximately 5-7 mins. Received 2 rounds of CPR, epi x 2, 2g IV mag, 100mg IV lidocaine push during code before achieving ROSC. Patient now presenting to SICU for further work-up, evaluation. EKG in PACU showing acute STEMI, STAT cardiology consult placed, Cath lab activated for inferior STEMI.  Workup:  - Trop 136 --> 199; trend q2h    - Echo after cath lab pending  - Heparin bolus and infusion (low intensity), now off   - Initial  in CVICU   - Tele monitor with frequent PVCs, ongoing bigeminy   - ASA/ Brillinta  - Transplant surgeon confirmed ok for any anticoag/antiplatelet agents needed     Pulmonary:   # Post operative ventilatory support   # Acute hypoxic respiratory support post brief Cardiac arrest  - VENT: Vent Mode: (S) CPAP/PS  (Continuous positive airway pressure with Pressure Support)  FiO2 (%): 80 %  PEEP (cm H2O): 5 cmH2O  Pressure Support (cm H2O): 5 cmH2O  Resp: 16    - Ventilatory bundle.  - Continue full vent support.   - PST when meets criteria.    - Supplemental oxygen to keep saturation above 92 %.  - CXR stable post-op     Gastroenterology/Nutrition:  # NPO status   # S/p pancreas transplant 7/20   - NG tube to LIS   - Will consult RD for NJ today   - D5 infusion per protocol   - Amylase, lipase daily   - KUB pending for NG tube     Renal/Fluids/Electrolytes:   # ESRD s/p kidney transplant 7/20   # Acute kidney injury  # Electrolyte monitoring   # Volume status   #  lactic acidosis   - Will continue to monitor intake and output.  - Continue olea cares   - Trend lactate q4h; Down to 4.3 in ICU   - Electrolytes q4h   - Laxis drip post-op per protocol  - NS and 1/2 NS infusions ordered for goal urine output per hour per protocol     Endocrine:   # Stress hyperglycemia   # Diabetes mellitus with insulin pump   - Goal to keep BG< 180 for optimal wound healing   - Insulin drip must remain on   - Dextrose infusion per protocol     ID:  # Immunosuppression   # Stress Leukocytosis   # Post-operative antimicrobials   - Trend CBC, fever curve   - Following intra-op cultures   - Defer immunosuppression, post-op antimicrobials per transplant     Positive cultures:  - N/a    Heme:     # Acute blood loss anemia    # Anticoagulation needs   - Trend CBC q4h   - Transplant OK with full anticoagulation post-op   - Transfuse if hgb <7.0 or signs/symptoms of hypoperfusion. Monitor and trend.     Musculoskeletal/Skin:  # Weakness and deconditioning of critical illness  # incisional monitoring    - Physical and occupational therapy consult   - Incisional cares   - Drain monitoring     General Cares/Prophylaxis:    DVT Prophylaxis: Pneumatic Compression Devices, heparin gtt   GI Prophylaxis: PPI  Restraints: Restraints for medical healing needed: YES    Lines/ tubes/ drains:  - ETT  - Olea  - LIJ CVC   - Art line   - NG tube   - Drain to bulb suction     Disposition:  -  SICU --> CICU     Patient seen, findings and plan discussed with surgical ICU staff, Dr. Ortega, surgery attending Dr Whitehead, CICU staff.     Time spent on this encounter  Billing:  I spent 60 minutes at bedside and on the inpatient unit today managing the critical care of Siva Ramey in relation to the issues listed in this note. Critical care time was spent outside of procedures.       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     REVIEW OF SYSTEMS: 10 point ROS neg other than the symptoms noted above in the HPI.    PAST MEDICAL HISTORY:   Past Medical History:   Diagnosis Date    Acquired elevated diaphragm     Anemia in chronic kidney disease     Angina pectoris (H24)     Chronic kidney disease     Coronary artery disease     Diabetes mellitus, type II (H) 12/2001    Diabetic nephropathy (H)     MARQUEZ (dyspnea on exertion)     Dyslipidemia 12/2001    End stage renal disease (H)     History of blood transfusion 2004    Hypertension     takes medication    Ischemic cardiomyopathy     Metabolic acidosis     Myocardial infarction (H)     STEMI -Diagonal branch of the LAD    Obesity (BMI 30-39.9)     Peripheral neuropathy     Proteinuria     Retinopathy     Vitamin D deficiency        SURGICAL HISTORY:   Past Surgical History:   Procedure Laterality Date    BACK SURGERY  2009    L5 disc cut    BYPASS GRAFT ARTERY CORONARY  06/20/2019    2 vessels    CV CORONARY ANGIOGRAM N/A 01/13/2019    Procedure: Coronary Angiogram;  Surgeon: Cielo Che MD;  Location: [facility] Cath Lab;  Service: Cardiology    CV CORONARY ANGIOGRAM N/A 05/02/2019    Procedure: Coronary Angiogram;  Surgeon: Cielo Che MD;  Location: [facility] Cath Lab;  Service: Cardiology    CV CORONARY ANGIOGRAM N/A 04/30/2020    Procedure: Coronary Angiogram;  Surgeon: Cielo Che MD;  Location: [facility] Cath Lab;  Service: Cardiology    CV CORONARY ANGIOGRAM N/A 07/22/2021    Procedure: CV CORONARY ANGIOGRAM;   Surgeon: Adrian Crow MD;  Location: [facility] CV    CV CORONARY ANGIOGRAM N/A 02/01/2024    Procedure: Coronary Angiogram;  Surgeon: Delroy Carpio MD;  Location:  HEART CARDIAC CATH LAB    CV FRACTIONAL FLOW RATIO WIRE N/A 07/22/2021    Procedure: Fractional Flow Ratio Wire;  Surgeon: Adrian Crow MD;  Location: [facility] CATH LAB CV    CV LEFT HEART CATH N/A 07/22/2021    Procedure: Left Heart Cath;  Surgeon: Adrian Crow MD;  Location: [facility] CATH LAB CV    CV LEFT HEART CATHETERIZATION WITH LEFT VENTRICULOGRAM N/A 01/13/2019    Procedure: Left Heart Catheterization with Left Ventriculogram;  Surgeon: Cielo Che MD;  Location: [facility] Cath Lab;  Service: Cardiology    CV LEFT HEART CATHETERIZATION WITHOUT LEFT VENTRICULOGRAM Left 04/30/2020    Procedure: Left Heart Catheterization Without Left Ventriculogram;  Surgeon: Cielo Che MD;  Location: [facility] Cath Lab;  Service: Cardiology    CV PCI N/A 02/01/2024    Procedure: Percutaneous Coronary Intervention;  Surgeon: Delroy Carpio MD;  Location: [facility] CARDIAC CATH LAB    CV RIGHT HEART CATHETERIZATION N/A 04/30/2020    Procedure: Right Heart Catheterization;  Surgeon: Cielo Che MD;  Location: [facility] Cath Lab;  Service: Cardiology    EYE SURGERY      GENITOURINARY SURGERY      HERNIA REPAIR      OTHER SURGICAL HISTORY      Excise varicocele    STENT, CORONARY, DALE  2019    VASCULAR SURGERY         SOCIAL HISTORY:   Social History     Socioeconomic History    Marital status:      Spouse name: None    Number of children: None    Years of education: None    Highest education level: None   Tobacco Use    Smoking status: Never     Passive exposure: Past    Smokeless tobacco: Never   Substance and Sexual Activity    Alcohol use: Never    Drug use: No    Sexual activity: Yes     Partners: Female   Other Topics Concern    Parent/sibling w/ CABG, MI or angioplasty before 65F 55M? Yes     Social  Determinants of Health      Received from [facility]    Financial Resource Strain    Received from [facility]    Social Connections     FAMILY HISTORY: No bleeding/clotting disorders nor problems with anesthesia.     ALLERGIES:   Allergies   Allergen Reactions    Amoxicillin Hives     & generalized pain    Venlafaxine      lethargic       MEDICATIONS:  Current Facility-Administered Medications   Medication Dose Route Frequency Provider Last Rate Last Admin    [START ON 7/23/2024] acetaminophen (TYLENOL) tablet 650 mg  650 mg Oral Q4H PRN Harrison Whitehead MD        acetaminophen (TYLENOL) tablet 975 mg  975 mg Oral Q8H Harrison Whitehead MD        albuterol (PROVENTIL HFA/VENTOLIN HFA) inhaler  2 puff Inhalation Q4H PRN Harrison Whitehead MD        amiodarone (NEXTERONE) 1.8 mg/mL in sodium chloride 0.9% in non-PVC container 500 mL ADULT STANDARD infusion  1 mg/min Intravenous Continuous Farzad Carreno PA-C        amiodarone (NEXTERONE) 1.8 mg/mL in sodium chloride 0.9% in non-PVC container 500 mL ADULT STANDARD infusion  0.5 mg/min Intravenous Continuous Farzad Carreno PA-C        [START ON 7/23/2024] bisacodyl (DULCOLAX) suppository 10 mg  10 mg Rectal Daily PRN Harrison Whitehead MD        calcium carbonate-vitamin D (OSCAL) 500-5 MG-MCG per tablet 1 tablet  1 tablet Oral BID w/meals Harrison Whitehead MD        chlorhexidine (PERIDEX) 0.12 % solution 15 mL  15 mL Mouth/Throat Q12H Farzad Carreno PA-C        dextrose 10% infusion   Intravenous Continuous PRN Farzad Carreno PA-C        dextrose 5% in lactated ringers 1,000 mL infusion   Intravenous Continuous Harrison Whitehead  mL/hr at 07/20/24 1119 New Bag at  07/20/24 1119    glucose gel 15-30 g  15-30 g Oral Q15 Min PRN Julienne Ibrahim PA-C        Or    dextrose 50 % injection 25-50 mL  25-50 mL Intravenous Q15 Min PRN Julienne Ibrahim PA-C        Or    glucagon injection 1 mg  1 mg Subcutaneous Q15 Min PRN Julienne Ibrahim PA-C        EPINEPHrine (ADRENALIN) 5 mg in sodium chloride 0.9 % 250 mL infusion CENTRAL  0.01-0.3 mcg/kg/min Intravenous Continuous Farzad Carreno PA-C        fentaNYL (SUBLIMAZE) 50 mcg/mL bolus from pump  50 mcg Intravenous Q1H PRN Farzad Carreno PA-C        fentaNYL (SUBLIMAZE) infusion   mcg/hr Intravenous Continuous Farzad Carreno PA-C 1 mL/hr at 07/20/24 0900 50 mcg/hr at 07/20/24 0900    furosemide (LASIX) 100 mg in sodium chloride 0.9 % 100 mL infusion  10 mg/hr Intravenous Continuous Harrison Whitehead MD        heparin (porcine) 100 unit/mL in 0.45% Sodium Chloride ANTICOAGULANT infusion  200 Units/hr Intravenous Continuous Harrison Whitehead MD        heparin 25,000 units in 0.45% NaCl 250 mL ANTICOAGULANT infusion  0-5,000 Units/hr Intravenous Continuous Farzad Carreno PA-C   Stopped at 07/20/24 0925    HYDROmorphone (PF) (DILAUDID) injection 0.2 mg  0.2 mg Intravenous Q2H PRN Harrison Whitehead MD        Or    HYDROmorphone (PF) (DILAUDID) injection 0.4 mg  0.4 mg Intravenous Q2H PRN Harrison Whitehead MD        insulin regular (MYXREDLIN) 1 unit/mL infusion  0-24 Units/hr Intravenous Continuous Farzad Carreno PA-C 1 mL/hr at 07/20/24 1114 1 Units/hr at 07/20/24 1114    [START ON 7/22/2024] magnesium hydroxide (MILK OF MAGNESIA) suspension 30 mL  30 mL Oral Daily PRN Harrison Whitehead MD        [START ON 7/22/2024] magnesium oxide (MAG-OX) tablet 400 mg  400 mg Oral Q24H Harrison Whitehead MD        propofol (DIPRIVAN) infusion  5-75 mcg/kg/min Intravenous  Continuous Farzad Carreno PA-C 18.4 mL/hr at 07/20/24 0938 30 mcg/kg/min at 07/20/24 0938    And    propofol (DIPRIVAN) bolus from bag or syringe pump  10 mg Intravenous Q15 Min PRN Farzad Carreno PA-C        And    Medication Instruction   Does not apply Continuous PRN Farzad Carreno PA-C        meropenem (MERREM) 500 mg vial to attach to  mL bag for ADULTS or 25 mL bag for PEDS  500 mg Intravenous Q12H Harrison Whitehead MD        methocarbamol (ROBAXIN) tablet 750 mg  750 mg Oral Q6H PRN Harrison Whitehead MD        micafungin (MYCAMINE) 100 mg in sodium chloride 0.9 % 100 mL intermittent infusion  100 mg Intravenous Q24H Harrison Whitehead MD        mycophenolate mofetil (CELLCEPT) 1,000 mg in D5W intermittent infusion  1,000 mg Intravenous BID IS Julienne Ibrahim PA-C        naloxone (NARCAN) injection 0.2 mg  0.2 mg Intravenous Q2 Min PRN Harrison Whitehead MD        Or    naloxone (NARCAN) injection 0.4 mg  0.4 mg Intravenous Q2 Min PRN Harrison Whitehead MD        Or    naloxone (NARCAN) injection 0.2 mg  0.2 mg Intramuscular Q2 Min PRN Harrison Whitehead MD        Or    naloxone (NARCAN) injection 0.4 mg  0.4 mg Intramuscular Q2 Min PRN Harrison Whitehead MD        nitroGLYcerin (NITROSTAT) sublingual tablet 0.4 mg  0.4 mg Sublingual Q5 Min PRN Harrison Whitehead MD        norepinephrine (LEVOPHED) 16 mg in  mL infusion MAX CONC CENTRAL LINE  0.01-0.6 mcg/kg/min Intravenous Continuous Farzad Carreno PA-C 2.9 mL/hr at 07/20/24 1015 0.03 mcg/kg/min at 07/20/24 1015    octreotide (sandoSTATIN) injection 100 mcg  100 mcg Subcutaneous Q12H Harrison Whitehead MD        ondansetron (ZOFRAN ODT) ODT tab 4 mg  4 mg Oral Q6H PRN Harrison Whitehead MD        Or    ondansetron (ZOFRAN) injection 4 mg  4 mg  Intravenous Q6H PRN Harrison Whitehead MD        pantoprazole (PROTONIX) 2 mg/mL suspension 40 mg  40 mg Per NG tube Daily Harrison Whitehead MD        [START ON 7/21/2024] polyethylene glycol (MIRALAX) Packet 17 g  17 g Oral Daily Harrison Whitehead MD        prochlorperazine (COMPAZINE) injection 10 mg  10 mg Intravenous Q6H PRN Harrison Whitehead MD        Or    prochlorperazine (COMPAZINE) tablet 10 mg  10 mg Oral Q6H PRN Harrison Whitehead MD        senna-docusate (SENOKOT-S/PERICOLACE) 8.6-50 MG per tablet 1 tablet  1 tablet Oral BID Harrison Whitehead MD        sodium chloride (PF) 0.9% PF flush 3 mL  3 mL Intravenous Q1H PRN Harrison Whitehead MD        sodium chloride (PF) 0.9% PF flush 3 mL  3 mL Intravenous Q8H Harrison Whitehead MD        sodium chloride (PF) 0.9% PF flush 3 mL  3 mL Intravenous Q1H PRN Harrison Whitehead MD        sodium chloride 0.45 % 1,000 mL infusion   Intravenous Continuous Harrison Whitehead MD        sodium chloride 0.9 % 1,000 mL infusion   Intravenous Continuous Harrison Whitehead  mL/hr at 07/20/24 1117 New Bag at 07/20/24 1117    sulfamethoxazole-trimethoprim (BACTRIM/SEPTRA) suspension 80 mg  10 mL Per NG tube Daily Harrison Whitehead MD        tacrolimus (GENERIC) suspension 3 mg  3 mg Oral or NG Tube BID IS Harrison Whitehead MD        [START ON 7/22/2024] valGANciclovir (VALCYTE) tablet 450 mg  450 mg Oral Once per day on Monday Thursday Harrison Whitehead MD           PHYSICAL EXAMINATION:  Temp:  [96.6  F (35.9  C)-98.6  F (37  C)] 97  F (36.1  C)  Pulse:  [] 94  Resp:  [3-38] 16  BP: ()/() 94/60  MAP:  [29 mmHg-232 mmHg] 72 mmHg  Arterial Line BP: ()/(4-115) 103/59  FiO2 (%):  [80 %] 80 %  SpO2:  [72 %-100 %] 99 %    General:  Intubated, sedated  HEENT: Mucous membranes pink, moist, atraumatic. NG in place   Neuro: Alert off sedation. Opening eyes spontaneously. FSC x 4. AGUILAR x 4. PERRL.    Pulm/Resp: Coarse breath sounds bilaterally without rhonchi, crackles or wheeze. Full vent. Moderate secretions upon suctioning.   CV: RRR with frequent PVCs, no murmurs or extra heart sounds audible upon auscultation.   Abdomen: Soft, mildly distended. AMILCAR drain serosang. Incision dry.   : + olea catheter in place, urine yellow and clear  Incisions/Skin: as above   MSK/Extremities: no peripheral edema, peripheral pulses intact, extremities well perfused.    LABS: Reviewed.   Arterial Blood Gases   Recent Labs   Lab 07/20/24  0822 07/20/24  0641 07/20/24  0409 07/20/24  0313   PH 7.32* 7.25* 7.36 7.38   PCO2 47* 47* 41 39   PO2 81 164* 98 78*   HCO3 25 21 23 23     Complete Blood Count   Recent Labs   Lab 07/20/24  0821 07/20/24  0641 07/20/24  0600 07/20/24  0409 07/19/24 2136 07/19/24  1656   WBC 10.8  --  8.2  --   --  8.4   HGB 9.5* 10.6* 9.4* 9.5*   < > 15.0   *  --  108*  --   --  235    < > = values in this interval not displayed.     Basic Metabolic Panel  Recent Labs   Lab 07/20/24  1111 07/20/24  0821 07/20/24  0819 07/20/24  0659 07/20/24  0647 07/20/24  0641 07/20/24  0602 07/20/24  0600 07/20/24  0409 07/19/24 2136 07/19/24  1656   NA  --  136  --   --   --  136  --  135 134*   < > 137   POTASSIUM  --  5.8*  --   --   --  6.0*  --  5.2 4.6   < > 3.8   CHLORIDE  --  99  --   --   --   --   --  98  --   --  91*   CO2  --  23  --   --   --   --   --  24  --   --  30*   BUN  --  31.1*  --   --   --   --   --  29.2*  --   --  24.2*   CR  --  5.95*  --   --   --   --   --  5.87*  --   --  5.22*   * 134* 134* 162*   < > 201*   < > 126* 114*   < > 159*    < > = values in this interval not displayed.     Liver Function Tests  Recent Labs   Lab 07/20/24  0821 07/20/24  0600 07/19/24  1656   AST 65*  --   --    ALT 43  --   --     ALKPHOS 59  --  104   BILITOTAL 0.6  --  0.5   ALBUMIN 3.1*  --  5.5*   INR 1.39* 1.44* 0.96     Pancreatic Enzymes  Recent Labs   Lab 07/20/24  0600 07/19/24  1656   LIPASE 211* 67*   AMYLASE 171* 88     Coagulation Profile  Recent Labs   Lab 07/20/24  0821 07/20/24  0600 07/19/24  1656   INR 1.39* 1.44* 0.96   PTT 28 27 31       IMAGING:  Recent Results (from the past 24 hour(s))   XR Chest 2 Views    Narrative    XR CHEST 2 VIEWS 7/19/2024 5:31 PM      HISTORY: pre-transplant screening    COMPARISON: 4/2/2023.     FINDINGS: Frontal and lateral views of the chest. Midline trachea.  Normal heart size. Prior sternotomy. Radiograph is overexposed in the  central portion of the lungs. No focal lobar consolidation. No pleural  effusion or pneumothorax.      Impression    IMPRESSION: Clear lungs.    I have personally reviewed the examination and initial interpretation  and I agree with the findings.    ÁLVARO MAYA MD         SYSTEM ID:  [id number]   XR Chest Port 1 View    Narrative    EXAM:  XR CHEST 1 PORT VIEW    INDICATION: Post-op kidney transplant    COMPARISON:  Chest x-ray 7/19/2024    FINDINGS:  Single AP view of the chest. Sternotomy wires. Enteric tube terminates  over the stomach. Left IJ approach central venous catheter terminates  over the brachiocephalic confluence.    Cardiomediastinal silhouette within normal limits. Low lung volumes.  Bibasilar opacities. No pneumothorax.  No pleural effusion.       Impression    IMPRESSION:  Low lung volumes with bibasilar atelectasis.    I have personally reviewed the examination and initial interpretation  and I agree with the findings.    PATRICK SYKES MD         SYSTEM ID:  [id number]   XR Chest Port 1 View    Narrative    EXAM:  XR CHEST PORT 1 VIEW    INDICATION: post VF arrest and intubation    COMPARISON:  Chest x-ray 7/20/2024    FINDINGS:  Single AP view of the chest. Sternotomy wires. Endotracheal tube  terminates over the mid thoracic trachea.  Left IJ approach central  venous catheter terminates over the brachiocephalic confluence. Paddle  over the right chest. Enteric tube terminates over the stomach.    Cardiomediastinal silhouette within normal limits. Low lung volumes.  Perihilar opacities. Bibasilar opacities. No pneumothorax.  No pleural  effusion.       Impression    IMPRESSION:  1.  ET tube terminates over the mid-low thoracic trachea.  2.  Mild pulmonary edema.  3.  Low lung volumes with bibasilar atelectasis.    I have personally reviewed the examination and initial interpretation  and I agree with the findings.    PATRICK SYKES MD         SYSTEM ID:  [id number]

## 2024-07-20 NOTE — ANESTHESIA PROCEDURE NOTES
Central Line/PA Catheter Placement    Pre-Procedure   Staff -        Anesthesiologist:  Roaslia Arellano MD       Resident/Fellow: Aleksandra Manuel MD       Performed By: resident       Location: OR       Pre-Anesthestic Checklist: patient identified, IV checked, site marked, risks and benefits discussed, informed consent, monitors and equipment checked, pre-op evaluation and at physician/surgeon's request  Timeout:       Correct Patient: Yes        Correct Procedure: Yes        Correct Site: Yes        Correct Position: Yes        Correct Laterality: Yes   Line Placement:   This line was placed Post Induction    Procedure   Procedure: central line and elective       Laterality: left       Insertion Site: internal jugular.       Patient Position: Trendelenburg  Sterile Prep        All elements of maximal sterile barrier technique followed       Patient Prep/Sterile Barriers: draped, hand hygiene, gloves , hat , mask , draped, gown, sterile gel and probe cover       Skin prep: Chloraprep  Insertion/Injection        Technique: ultrasound guided and Seldinger Technique        1. Ultrasound was used to evaluate the access site.       2. Vein evaluated via ultrasound for patency/adequacy.       3. Using real-time ultrasound the needle/catheter was observed entering the artery/vein.       Type: CVC       Catheter Size: 7 Fr       Catheter Length: 20       Number of Lumens: triple lumen  Narrative         Secured by: suture       Tegaderm and Biopatch dressing used.       Complications: None apparent,        blood aspirated from all lumens,        All lumens flushed: Yes       Verification method: Ultrasound and Placement to be verified post-op       Tip termination: right atrium

## 2024-07-20 NOTE — ANESTHESIA CARE TRANSFER NOTE
Patient: Siva Ramey    Procedure: Procedure(s):  Transplant pancreas, kidney  donor, with ureteral stent placement and appendectomy.       Diagnosis: End stage renal disease (H) [N18.6]  Diagnosis Additional Information: No value filed.    Anesthesia Type:   No value filed.     Note:    Oropharynx: oropharynx clear of all foreign objects  Level of Consciousness: drowsy  Oxygen Supplementation: face mask  Level of Supplemental Oxygen (L/min / FiO2): 10  Independent Airway: airway patency satisfactory and stable        Patient transferred to: PACU    Handoff Report: Identifed the Patient, Identified the Reponsible Provider, Reviewed the pertinent medical history, Discussed the surgical course, Reviewed Intra-OP anesthesia mangement and issues during anesthesia, Set expectations for post-procedure period and Allowed opportunity for questions and acknowledgement of understanding    Vitals:  Vitals Value Taken Time   BP     Temp     Pulse 99 24 0540   Resp 11 24 0540   SpO2 92 % 24 0540   Vitals shown include unfiled device data.    Electronically Signed By: JUANCARLOS [place] CRNA  2024  5:40 AM

## 2024-07-20 NOTE — ANESTHESIA PROCEDURE NOTES
Arterial Line Procedure Note    Pre-Procedure   Staff -        Anesthesiologist:  Rosalia Arellano MD       Resident/Fellow: Aleksandra Manuel MD       Performed By: resident       Location: OR       Pre-Anesthestic Checklist: patient identified, IV checked, risks and benefits discussed, informed consent, monitors and equipment checked, pre-op evaluation and at physician/surgeon's request  Timeout:       Correct Patient: Yes        Correct Procedure: Yes        Correct Site: Yes        Correct Position: Yes   Line Placement:   This line was placed Post Induction  Procedure   Procedure: arterial line       Laterality: right       Insertion Site: radial.  Sterile Prep        Standard elements of sterile barrier followed       Skin prep: Chloraprep  Insertion/Injection        Technique: ultrasound guided        1. Ultrasound was used to evaluate the access site.       2. Artery evaluated via ultrasound for patency/adequacy.       3. Using real-time ultrasound the needle/catheter was observed entering the artery/vein.       Catheter Type/Size: 20 G, 12 cm (Micropunture kit)  Narrative         Secured by: anchor securement device       Tegaderm dressing used.       Complications: None apparent,        Arterial waveform: Yes        IBP within 10% of NIBP: Yes

## 2024-07-20 NOTE — CONSULTS
Cardiology New Consult Note    Date of Service: 07/20/24    ASSESSMENT:   Siva Ramey is a 49 year old male who presented on 7/19 for a planned pancreas/kidney transplant. He has a pmhx of T2DM, ESRD on HD, CAD s/p PCI (2019, RCA 2/2024), CABG (2019, LIMA-Lad, SVG-RCA) PAD, COVID19, HTN, obesity. Cardiology stat consulted for VF arrest in the OR following his transplant on AM of 7/20/24.     Presentation, history, and EKG findings concerning for ACS. While currently stable recommend coronary angiogram to evaluate coronaries, alfredo RCA stent. I discussed this with surgery who were agreeable despite recent surgery. I discussed the case with the cath lab attending Dr. Robertson who agreed with course of action. I notified consult attending Dr. West as well.     RECOMMENDATIONS:  - NPO for emergent coronary angiogram and possible PCI. Consent in chart  - Heparin gtt  - Amio bolus and gtt  - ASA load  - BB when tolerated  - TTE after cath  - Rest per surgery    Cardiology will follow.     Discussed with attending, Dr. West    Thank you for consulting the cardiovascular services at the [facility]. Please do not hesitate to call us with any questions.     Caleb Fields, PGY-6  Cardiovascular Disease Fellow    ----------------------------------------------------------------------------  REASON FOR CONSULT: VF Arrest, inferior STEMI    History of Present Illness   Siva Ramey is a 49 year old male who presented on 7/19 for a planned pancreas/kidney transplant. He has a pmhx of T2DM, ESRD on HD, CAD s/p PCI (2019, RCA 2/2024), CABG (2019, LIMA-Lad, SVG-RCA) PAD, COVID19, HTN, obesity. Cardiology stat consulted for VF arrest in the OR following his transplant on AM of 7/20/24.     The patient present in normal state of health on 7/19 by report. Underwent successful pancreas/kidney transplant on AM of 7/20. Following the surgery, patient had a VF arrest. Reportedly, CPR for 5-7  mins, epi, and at least 2 shocks prior to ROSC. Code documentation is not available currently, but this may have happened some time between 0600 and 0700. EKG suggested inferior STEMI. Stat consult was ordered at 0751, after which cardiology promptly addressed. Of note, the patient has known CAD and received x1 IBAN to his prox RCA in 2/2024. He took 3 months of DAPT thereafter and is not currently on DAPT.     The patient had already reached 4A ICU when I evaluated the patient.Intubated and sedated. History was limited at that time. I called his wife who consented for a coronary angiogram and PCI and the wife expressed understanding for risks of bleeding, stroke, damage to arteries, coronaries, heart, and death from this procedure. I spoke with the surgical team who were ok with heparinization and DAPT if necessary.     PAST MEDICAL HISTORY:  Past Medical History:   Diagnosis Date    Acquired elevated diaphragm     Anemia in chronic kidney disease     Angina pectoris (H24)     Chronic kidney disease     Coronary artery disease     Diabetes mellitus, type II (H) 12/2001    Diabetic nephropathy (H)     MARQUEZ (dyspnea on exertion)     Dyslipidemia 12/2001    End stage renal disease (H)     History of blood transfusion 2004    Hypertension     takes medication    Ischemic cardiomyopathy     Metabolic acidosis     Myocardial infarction (H)     STEMI -Diagonal branch of the LAD    Obesity (BMI 30-39.9)     Peripheral neuropathy     Proteinuria     Retinopathy     Vitamin D deficiency        CURRENT MEDICATIONS:  No current outpatient medications on file.       PAST SURGICAL HISTORY:  Past Surgical History:   Procedure Laterality Date    BACK SURGERY  2009    L5 disc cut    BYPASS GRAFT ARTERY CORONARY  06/20/2019    2 vessels    CV CORONARY ANGIOGRAM N/A 01/13/2019    Procedure: Coronary Angiogram;  Surgeon: Cielo Che MD;  Location: [facility] Cath Lab;  Service: Cardiology    CV CORONARY ANGIOGRAM N/A 05/02/2019     Procedure: Coronary Angiogram;  Surgeon: Cielo Che MD;  Location: [facility] Cath Lab;  Service: Cardiology    CV CORONARY ANGIOGRAM N/A 04/30/2020    Procedure: Coronary Angiogram;  Surgeon: Cielo Che MD;  Location: [facility] Cath Lab;  Service: Cardiology    CV CORONARY ANGIOGRAM N/A 07/22/2021    Procedure: CV CORONARY ANGIOGRAM;  Surgeon: Adrian Crow MD;  Location: [facility] CATH LAB CV    CV CORONARY ANGIOGRAM N/A 02/01/2024    Procedure: Coronary Angiogram;  Surgeon: Delroy Carpio MD;  Location: [facility] CARDIAC CATH LAB    CV FRACTIONAL FLOW RATIO WIRE N/A 07/22/2021    Procedure: Fractional Flow Ratio Wire;  Surgeon: Adrian Crow MD;  Location: [facility] CV    CV LEFT HEART CATH N/A 07/22/2021    Procedure: Left Heart Cath;  Surgeon: Adrian Crow MD;  Location: [facility] CV    CV LEFT HEART CATHETERIZATION WITH LEFT VENTRICULOGRAM N/A 01/13/2019    Procedure: Left Heart Catheterization with Left Ventriculogram;  Surgeon: Cielo Che MD;  Location: [facility] Cath Lab;  Service: Cardiology    CV LEFT HEART CATHETERIZATION WITHOUT LEFT VENTRICULOGRAM Left 04/30/2020    Procedure: Left Heart Catheterization Without Left Ventriculogram;  Surgeon: Cielo Che MD;  Location: [facility] Cath Lab;  Service: Cardiology    CV PCI N/A 02/01/2024    Procedure: Percutaneous Coronary Intervention;  Surgeon: Delroy Carpio MD;  Location: [facility] CARDIAC CATH LAB    CV RIGHT HEART CATHETERIZATION N/A 04/30/2020    Procedure: Right Heart Catheterization;  Surgeon: Cielo Che MD;  Location: [facility] Cath Lab;  Service: Cardiology    EYE SURGERY      GENITOURINARY SURGERY      HERNIA REPAIR      OTHER SURGICAL HISTORY      Excise varicocele    STENT, CORONARY, DALE  2019    VASCULAR SURGERY         ALLERGIES  Allergies   Allergen Reactions    Amoxicillin Hives     & generalized pain    Venlafaxine      lethargic       FAMILY HISTORY:  Family History    Problem Relation Age of Onset    Diabetes Type 2  Mother     Heart Disease Father 60    CABG Father 50        triple bypass    Diabetes Type 2  Father     Depression Sister     Substance Abuse Sister     Ovarian Cancer Maternal Grandmother     Brain Cancer Maternal Grandmother     Pancreatic Cancer Maternal Aunt     Prostate Cancer Maternal Uncle        SOCIAL HISTORY:  Social History     Socioeconomic History    Marital status:      Spouse name: None    Number of children: None    Years of education: None    Highest education level: None   Tobacco Use    Smoking status: Never     Passive exposure: Past    Smokeless tobacco: Never   Substance and Sexual Activity    Alcohol use: Never    Drug use: No    Sexual activity: Yes     Partners: Female   Other Topics Concern    Parent/sibling w/ CABG, MI or angioplasty before 65F 55M? Yes     Social Determinants of Health      Received from [employer] [employer]    Financial Resource Strain    Received from [employer], [employer]    Social Connections       Review of Systems:   10-point ROS reviewed, & found negative w/ exceptions noted in the HPI.    Physical Exam   Temp: 97  F (36.1  C) Temp src: Axillary BP: 94/60 Pulse: 94   Resp: 21 SpO2: 99 % O2 Device: Mechanical Ventilator Oxygen Delivery: 8 LPM  Vital Signs with Ranges  Temp:  [96.6  F (35.9  C)-98.6  F (37  C)] 97  F (36.1  C)  Pulse:  [] 94  Resp:  [3-24] 21  BP: ()/() 94/60  MAP:  [29 mmHg-232 mmHg] 72 mmHg  Arterial Line BP: ()/(21-80) 103/59  FiO2 (%):  [80 %] 80 %  SpO2:  [72 %-100 %] 99 %  225 lbs 12.02 oz    GEN: Intubated, sedated  CV: RRR, bigeminy on tele  CHEST: Mechanical breath sounds  EXT: No BLE swelling  NEURO: Sedated    Data   Recent Labs   Lab 07/20/24  0821 07/20/24  0819 07/20/24  0659 07/20/24  0647 07/20/24  0641  07/20/24  0602 07/20/24  0600 07/20/24  0409 07/19/24  2136 07/19/24  1656   WBC 10.8  --   --   --   --   --  8.2  --   --  8.4   HGB 9.5*  --   --   --  10.6*  --  9.4* 9.5*   < > 15.0   MCV 97  --   --   --   --   --  95  --   --  93   *  --   --   --   --   --  108*  --   --  235   INR  --   --   --   --   --   --  1.44*  --   --  0.96   NA  --   --   --   --  136  --  135 134*   < > 137   POTASSIUM  --   --   --   --  6.0*  --  5.2 4.6   < > 3.8   CHLORIDE  --   --   --   --   --   --  98  --   --  91*   CO2  --   --   --   --   --   --  24  --   --  30*   BUN  --   --   --   --   --   --  29.2*  --   --  24.2*   CR  --   --   --   --   --   --  5.87*  --   --  5.22*   ANIONGAP  --   --   --   --   --   --  13  --   --  16*   BETHANY  --   --   --   --   --   --  9.5  --   --  10.5*   GLC  --  134* 162* 155* 201*   < > 126* 114*   < > 159*   ALBUMIN  --   --   --   --   --   --   --   --   --  5.5*   PROTTOTAL  --   --   --   --   --   --   --   --   --  8.8*   BILITOTAL  --   --   --   --   --   --   --   --   --  0.5   ALKPHOS  --   --   --   --   --   --   --   --   --  104   LIPASE  --   --   --   --   --   --  211*  --   --  67*    < > = values in this interval not displayed.       Recent Results (from the past 24 hour(s))   XR Chest 2 Views    Narrative    XR CHEST 2 VIEWS 7/19/2024 5:31 PM      HISTORY: pre-transplant screening    COMPARISON: 4/2/2023.     FINDINGS: Frontal and lateral views of the chest. Midline trachea.  Normal heart size. Prior sternotomy. Radiograph is overexposed in the  central portion of the lungs. No focal lobar consolidation. No pleural  effusion or pneumothorax.      Impression    IMPRESSION: Clear lungs.    I have personally reviewed the examination and initial interpretation  and I agree with the findings.    ÁLVARO MAYA MD         SYSTEM ID:  [id number]   XR Chest Port 1 View    Narrative    EXAM:  XR CHEST 1 PORT VIEW    INDICATION: Post-op kidney  transplant    COMPARISON:  Chest x-ray 7/19/2024    FINDINGS:  Single AP view of the chest. Sternotomy wires. Enteric tube terminates  over the stomach. Left IJ approach central venous catheter terminates  over the brachiocephalic confluence.    Cardiomediastinal silhouette within normal limits. Low lung volumes.  Bibasilar opacities. No pneumothorax.  No pleural effusion.       Impression    IMPRESSION:  Low lung volumes with bibasilar atelectasis.    I have personally reviewed the examination and initial interpretation  and I agree with the findings.    PATRICK SYKES MD         SYSTEM ID:  [id number]   XR Chest Port 1 View    Narrative    EXAM:  XR CHEST PORT 1 VIEW    INDICATION: post VF arrest and intubation    COMPARISON:  Chest x-ray 7/20/2024    FINDINGS:  Single AP view of the chest. Sternotomy wires. Endotracheal tube  terminates over the mid thoracic trachea. Left IJ approach central  venous catheter terminates over the brachiocephalic confluence. Paddle  over the right chest. Enteric tube terminates over the stomach.    Cardiomediastinal silhouette within normal limits. Low lung volumes.  Perihilar opacities. Bibasilar opacities. No pneumothorax.  No pleural  effusion.       Impression    IMPRESSION:  1.  ET tube terminates over the mid-low thoracic trachea.  2.  Mild pulmonary edema.  3.  Low lung volumes with bibasilar atelectasis.    I have personally reviewed the examination and initial interpretation  and I agree with the findings.    PATRICK SYKES MD         SYSTEM ID:  [id number]     EKG 7/20/24:      Sinus with sinus arrhythmia and inferior MIKAL    TTE 8/2023:  Left ventricular wall thickness is normal. Left ventricular size is normal.  The visual ejection fraction is 50-55%. No regional wall motion abnormalities  are seen.  Right ventricular function, chamber size, wall motion, and thickness are  normal.  Trileaflet aortic sclerosis without stenosis.  The inferior vena cava was normal in  size with preserved respiratory  variability. No pericardial effusion is present.     No significant changes noted other than LVEF appears visually minimally lower.    Coronary angiogram 2/1/24:  Coronary Findings    Diagnostic  Dominance: Right  Left Main   The vessel was visualized by selective angiography.      Left Anterior Descending   The vessel was visualized by selective angiography.   Ost LAD to Prox LAD lesion is 40% stenosed.      Left Circumflex   The vessel was visualized by selective angiography.   Ost Cx to Prox Cx lesion is 30% stenosed.      Right Coronary Artery   The vessel was visualized by selective angiography.   Prox RCA lesion is 70% stenosed. The lesion is type B1 - medium risk. Pressure wire/iFR used. Pre adenosine administration IFR: 0.85.   Dist RCA lesion is 30% stenosed. The lesion is type B1 - medium risk.         Intervention     Prox RCA lesion   Stent   Lesion length: 8 mm. The pre-interventional distal flow is normal (AUSTYN 3). A stent was successfully placed. The post-interventional distal flow is normal (AUSTYN 3). The intervention was successful. No complications occurred at this lesion. After diagnostic angiographic images were obtained and IFR assessment was obtained to the proximal RCA lesion, decision was made to proceed to PCI. The RCA was successfully engaged using a 6 Nepali JR4 guide catheter. Therapeutic dose heparin was administered and confirmed with ACT. The lesion was previously crossed using a Sarata FloWire. The lesion itself was directly stented using a 3.5 x 12 mm Synergy XD drug-eluting stent. Afterwards, an excellent angiographic result was observed including 0% residual stenosis, visually well opposed stent, AUSTYN-3 flow and no sign of any immediate postprocedural complications including bleeding, perforation or edge dissection.   There is a 0% residual stenosis post intervention.

## 2024-07-20 NOTE — ANESTHESIA POSTPROCEDURE EVALUATION
Patient: Siva Ramey    Procedure: Procedure(s):  Transplant pancreas, kidney  donor, with ureteral stent placement and appendectomy.       Anesthesia Type:  No value filed.    Note:  Disposition: ICU            ICU Sign Out: Anesthesiologist/ICU physician sign out WAS performed   Postop Pain Control: Uneventful            Sign Out: Well controlled pain   PONV: No   Neuro/Psych: Uneventful            Sign Out: Acceptable/Baseline neuro status   Airway/Respiratory:             Events: Unplanned INtubation            Sign Out: AIRWAY IN SITU/Resp. Support               Airway in situ/Resp. Support: ETT   CV/Hemodynamics:             Events: Cardiac arrest            Sign Out: Detailed CV status               Blood Pressure: Pressors (supported on norepinephrine gtt)               Rate/Rhythm: Normal HR (concern for ischemia on ECG)   Other NRE: NONE   DID A NON-ROUTINE EVENT OCCUR? YES    Event details/Postop Comments:  Patient had a VF arrest in the PACU during ultrasound of transplanted pancreas and kidney. Patient received two rounds of CPR, two doses of epinephrine, and two defibrillations. ROSC was achieved. During the code event, patient was re-intubated without any concerns noted.   ECG and troponin were gathered due to cardiac history and VF arrest. ECG is concerning for ischemia, so cardiology was contacted. Cardiology team member present in PACU and plans to discuss with attending  physician about possible intervention.  Patient did awaken after ROSC. He opened his eyes to voice, tracked with his eyes, and moved all extremities.           Last vitals:  Vitals Value Taken Time   BP 94/60 24 0730   Temp 36.1  C (97  F) 24 0750   Pulse 91 24 0750   Resp 17 24 0750   SpO2 97 % 24 0750   Vitals shown include unfiled device data.    Electronically Signed By: Rosalia Arellano MD  2024

## 2024-07-20 NOTE — PLAN OF CARE
ICU End of Shift Summary. See flowsheets for vital signs and detailed assessment.    Changes this shift:   Neuro: RASS -2 to -3. Following commands. Prop @35, Fent @ 50. Bilat wrist restraints in place.  Pulm: Fio2 weaned to 45%. PEEP increased to 7. Clear lung sounds bilaterally.   CV: Remains on Levo @ 0.04. MAP goal 65. Trending Cvps Q1h (goal 4-12).   : Burt inplace for strict I&O 2/2 kidney transplant perfusion. 0.9 NS and 0.45NS titrated to achieve I=O with urine. D5LR decreased to 75cc/hr 2/2 increasing CVPs.   GI: NG to LIS. No BM.   Skin: Midline incision. RLQ AMILCAR drain w/ serosanguenous output.   Drips: Insulin gtt to remain on. Heparin gtt at straight rate of 200. Lasix gtt.   Labs: Shifted potassium of 6.8, latest recheck was 5.1. Lactic downtrending. Tropes remain elevated.       Plan: wean Sedation as able. Titrate NS to achieve I=O w/ urine out put. Wean levo as able. Trend CVPS      Goal Outcome Evaluation:1      Plan of Care Reviewed With: patient, spouse    Overall Patient Progress: improvingOverall Patient Progress: improving    Outcome Evaluation: Lactic improving. potassium improving.

## 2024-07-20 NOTE — TELEPHONE ENCOUNTER
Organ Offer Encounter Information    Organ Offer Information  Organ offer date & time: 7/19/2024  3:10 PM  Coordinator/Fellow/Attending name: Mary Anne Rea RN   Organ(s):  Organ UNOS ID Match Run ID Comment Organ Laterality   Pancreas [id number] CORS    Kidney [id number] CORS         Recent infections?: Yes (Comment: ear drainage, clear liquid, no pain since yesterday) Recent IV antibiotics?: Neg     New medications?: No Recent pregnancy?: No     Angicoagulation medications?: Yes (Comment: 81mg) Recent vaccinations?: No     Recent blood transfusions?: No Recent hospitalizations?: No   Has your insurance changed in the last 6-12 months?: Neg    Patient last dialyzed: 7/19/2024  3:00 PM  Dialysis type: Hemo  Discussed organ offer with: Patient  Patient/Caregiver name: Siva  Discussed risk category with Patient/Other: N/A  Patient/Other asked to speak to a surgeon?: No  Discussed program-specific outcomes: Did not have questions regarding SRTR  Right to decline organ offer without penalty, Patient/Other: Aware of option to decline without penalty  Organ offer decision status Patient/Other: Accepted Offer  Organ disposition: Transplanted  Additional Comments: 7/19/2024 3:12 PM  KP/Panc: Primary KP, import  MD: Dr. Whitehead  OPO Contact: Hazel [phone]  VXM Results: Compatible, no DSA  XM Plan (FXM must be done with serum no older than 10 days from transplant): Will admit pt for pre-op and do FXM retro   Plan (Admission, NPO, Donor OR): [facility] taking [facility] called patient with offer, made him NPO immediately for admission. Transportation already set up as we are accepting the liver from this donor already.   - - -   COVID Screening  In the past month, have you:  Or anyone close to you had a positive COVID test or suspected to have COVID:    Had any COVID symptoms (Fever, Cough, Short of Breath, Loss of Taste/Smell, Rash):     Admissions: 1530 - Yvette, ETA  1630  Unit: 1515 - Ximena charge nurse  Update Provider Entering Orders (XM Plan & COVID Testing):  1525 - Libra BEAN, aware of admit  Immunology: 1535 - Renita  Book OR: 1542 - Karli, booked for 1800  Vessel Storage Confirmation (PA/NIDIA/LETI): OK to bank  Blood Bank: 1550 - [place]  TransNet/ABO Verification: 1735 printed & verified w Karli in the OR  Add Organ: Added LEFT kidney & Pancreas @ 1652      Attestation I have discussed all of the above with the Patient/Legal Guardian/Caregiver regarding this organ offer.: Yes  Coordinator/Fellow/Attending name: Mary Anne Rea, RN

## 2024-07-20 NOTE — PROGRESS NOTES
Patient removed from OS waitlist after  donor pancreas and kidney transplant. OS ID [id number].    Donor Has Risk Criteria for Transmission of HIV/HCV/HBV: No  Recipient Notified of Risk Criteria: N/A

## 2024-07-21 ENCOUNTER — APPOINTMENT (OUTPATIENT)
Dept: ULTRASOUND IMAGING | Facility: CLINIC | Age: 49
DRG: 008 | End: 2024-07-21
Attending: SURGERY
Payer: MEDICARE

## 2024-07-21 ENCOUNTER — APPOINTMENT (OUTPATIENT)
Dept: GENERAL RADIOLOGY | Facility: CLINIC | Age: 49
DRG: 008 | End: 2024-07-21
Attending: NURSE PRACTITIONER
Payer: MEDICARE

## 2024-07-21 LAB
ACT BLD: 111 SECONDS (ref 74–150)
ALBUMIN SERPL BCG-MCNC: 3 G/DL (ref 3.5–5.2)
ALBUMIN SERPL BCG-MCNC: 3.1 G/DL (ref 3.5–5.2)
ALBUMIN SERPL BCG-MCNC: 3.2 G/DL (ref 3.5–5.2)
ALBUMIN SERPL BCG-MCNC: 3.7 G/DL (ref 3.5–5.2)
ALLEN'S TEST: ABNORMAL
ALP SERPL-CCNC: 60 U/L (ref 40–150)
ALP SERPL-CCNC: 60 U/L (ref 40–150)
ALP SERPL-CCNC: 62 U/L (ref 40–150)
ALP SERPL-CCNC: 64 U/L (ref 40–150)
ALP SERPL-CCNC: 64 U/L (ref 40–150)
ALP SERPL-CCNC: 82 U/L (ref 40–150)
ALT SERPL W P-5'-P-CCNC: 80 U/L (ref 0–70)
ALT SERPL W P-5'-P-CCNC: 82 U/L (ref 0–70)
ALT SERPL W P-5'-P-CCNC: 83 U/L (ref 0–70)
ALT SERPL W P-5'-P-CCNC: 84 U/L (ref 0–70)
ALT SERPL W P-5'-P-CCNC: 84 U/L (ref 0–70)
ALT SERPL W P-5'-P-CCNC: 90 U/L (ref 0–70)
AMYLASE SERPL-CCNC: 115 U/L (ref 28–100)
ANION GAP SERPL CALCULATED.3IONS-SCNC: 10 MMOL/L (ref 7–15)
ANION GAP SERPL CALCULATED.3IONS-SCNC: 10 MMOL/L (ref 7–15)
ANION GAP SERPL CALCULATED.3IONS-SCNC: 11 MMOL/L (ref 7–15)
ANION GAP SERPL CALCULATED.3IONS-SCNC: 13 MMOL/L (ref 7–15)
ANION GAP SERPL CALCULATED.3IONS-SCNC: 9 MMOL/L (ref 7–15)
ANION GAP SERPL CALCULATED.3IONS-SCNC: 9 MMOL/L (ref 7–15)
AST SERPL W P-5'-P-CCNC: 300 U/L (ref 0–45)
AST SERPL W P-5'-P-CCNC: 301 U/L (ref 0–45)
AST SERPL W P-5'-P-CCNC: 332 U/L (ref 0–45)
AST SERPL W P-5'-P-CCNC: 391 U/L (ref 0–45)
AST SERPL W P-5'-P-CCNC: 403 U/L (ref 0–45)
AST SERPL W P-5'-P-CCNC: 409 U/L (ref 0–45)
BACTERIA UR CULT: NORMAL
BASE EXCESS BLDA CALC-SCNC: 1 MMOL/L (ref -3–3)
BASE EXCESS BLDA CALC-SCNC: 1 MMOL/L (ref -3–3)
BASE EXCESS BLDA CALC-SCNC: 1.2 MMOL/L (ref -3–3)
BASE EXCESS BLDA CALC-SCNC: 1.3 MMOL/L (ref -3–3)
BASE EXCESS BLDA CALC-SCNC: 1.3 MMOL/L (ref -3–3)
BASE EXCESS BLDA CALC-SCNC: 1.6 MMOL/L (ref -3–3)
BASE EXCESS BLDA CALC-SCNC: 1.7 MMOL/L (ref -3–3)
BASE EXCESS BLDA CALC-SCNC: 1.8 MMOL/L (ref -3–3)
BASE EXCESS BLDV CALC-SCNC: 1 MMOL/L (ref -3–3)
BASE EXCESS BLDV CALC-SCNC: 1.5 MMOL/L (ref -3–3)
BASE EXCESS BLDV CALC-SCNC: 1.6 MMOL/L (ref -3–3)
BASE EXCESS BLDV CALC-SCNC: 1.6 MMOL/L (ref -3–3)
BASOPHILS # BLD AUTO: 0 10E3/UL (ref 0–0.2)
BASOPHILS NFR BLD AUTO: 0 %
BILIRUB SERPL-MCNC: 0.3 MG/DL
BILIRUB SERPL-MCNC: 0.4 MG/DL
BILIRUB SERPL-MCNC: 0.5 MG/DL
BUN SERPL-MCNC: 34.2 MG/DL (ref 6–20)
BUN SERPL-MCNC: 34.8 MG/DL (ref 6–20)
BUN SERPL-MCNC: 35.5 MG/DL (ref 6–20)
BUN SERPL-MCNC: 36.9 MG/DL (ref 6–20)
BUN SERPL-MCNC: 37.4 MG/DL (ref 6–20)
BUN SERPL-MCNC: 38.4 MG/DL (ref 6–20)
CA-I BLD-MCNC: 4.7 MG/DL (ref 4.4–5.2)
CA-I BLD-MCNC: 4.9 MG/DL (ref 4.4–5.2)
CA-I BLD-MCNC: 4.9 MG/DL (ref 4.4–5.2)
CA-I BLD-MCNC: 5 MG/DL (ref 4.4–5.2)
CA-I BLD-MCNC: 5.1 MG/DL (ref 4.4–5.2)
CA-I BLD-MCNC: 5.1 MG/DL (ref 4.4–5.2)
CALCIUM SERPL-MCNC: 8.7 MG/DL (ref 8.8–10.4)
CALCIUM SERPL-MCNC: 8.8 MG/DL (ref 8.8–10.4)
CALCIUM SERPL-MCNC: 8.8 MG/DL (ref 8.8–10.4)
CALCIUM SERPL-MCNC: 9.3 MG/DL (ref 8.8–10.4)
CALCIUM SERPL-MCNC: 9.4 MG/DL (ref 8.8–10.4)
CALCIUM SERPL-MCNC: 9.8 MG/DL (ref 8.8–10.4)
CHLORIDE SERPL-SCNC: 103 MMOL/L (ref 98–107)
CHLORIDE SERPL-SCNC: 104 MMOL/L (ref 98–107)
CHLORIDE SERPL-SCNC: 105 MMOL/L (ref 98–107)
CHLORIDE SERPL-SCNC: 105 MMOL/L (ref 98–107)
COHGB MFR BLD: 93.9 % (ref 96–97)
COHGB MFR BLD: 94.9 % (ref 96–97)
COHGB MFR BLD: 95.4 % (ref 96–97)
COHGB MFR BLD: 95.9 % (ref 96–97)
COHGB MFR BLD: 96.8 % (ref 96–97)
COHGB MFR BLD: 97.1 % (ref 96–97)
COHGB MFR BLD: 97.3 % (ref 96–97)
COHGB MFR BLD: 97.6 % (ref 96–97)
CREAT SERPL-MCNC: 4.2 MG/DL (ref 0.67–1.17)
CREAT SERPL-MCNC: 4.54 MG/DL (ref 0.67–1.17)
CREAT SERPL-MCNC: 4.68 MG/DL (ref 0.67–1.17)
CREAT SERPL-MCNC: 4.69 MG/DL (ref 0.67–1.17)
CREAT SERPL-MCNC: 4.88 MG/DL (ref 0.67–1.17)
CREAT SERPL-MCNC: 5.11 MG/DL (ref 0.67–1.17)
EGFRCR SERPLBLD CKD-EPI 2021: 13 ML/MIN/1.73M2
EGFRCR SERPLBLD CKD-EPI 2021: 14 ML/MIN/1.73M2
EGFRCR SERPLBLD CKD-EPI 2021: 15 ML/MIN/1.73M2
EGFRCR SERPLBLD CKD-EPI 2021: 16 ML/MIN/1.73M2
EOSINOPHIL # BLD AUTO: 0 10E3/UL (ref 0–0.7)
EOSINOPHIL NFR BLD AUTO: 0 %
ERYTHROCYTE [DISTWIDTH] IN BLOOD BY AUTOMATED COUNT: 13 % (ref 10–15)
ERYTHROCYTE [DISTWIDTH] IN BLOOD BY AUTOMATED COUNT: 13.2 % (ref 10–15)
GLUCOSE BLDC GLUCOMTR-MCNC: 108 MG/DL (ref 70–99)
GLUCOSE BLDC GLUCOMTR-MCNC: 109 MG/DL (ref 70–99)
GLUCOSE BLDC GLUCOMTR-MCNC: 111 MG/DL (ref 70–99)
GLUCOSE BLDC GLUCOMTR-MCNC: 114 MG/DL (ref 70–99)
GLUCOSE BLDC GLUCOMTR-MCNC: 118 MG/DL (ref 70–99)
GLUCOSE BLDC GLUCOMTR-MCNC: 119 MG/DL (ref 70–99)
GLUCOSE BLDC GLUCOMTR-MCNC: 119 MG/DL (ref 70–99)
GLUCOSE BLDC GLUCOMTR-MCNC: 120 MG/DL (ref 70–99)
GLUCOSE BLDC GLUCOMTR-MCNC: 123 MG/DL (ref 70–99)
GLUCOSE BLDC GLUCOMTR-MCNC: 124 MG/DL (ref 70–99)
GLUCOSE BLDC GLUCOMTR-MCNC: 129 MG/DL (ref 70–99)
GLUCOSE BLDC GLUCOMTR-MCNC: 131 MG/DL (ref 70–99)
GLUCOSE BLDC GLUCOMTR-MCNC: 136 MG/DL (ref 70–99)
GLUCOSE BLDC GLUCOMTR-MCNC: 137 MG/DL (ref 70–99)
GLUCOSE BLDC GLUCOMTR-MCNC: 139 MG/DL (ref 70–99)
GLUCOSE BLDC GLUCOMTR-MCNC: 141 MG/DL (ref 70–99)
GLUCOSE BLDC GLUCOMTR-MCNC: 153 MG/DL (ref 70–99)
GLUCOSE BLDC GLUCOMTR-MCNC: 94 MG/DL (ref 70–99)
GLUCOSE BLDC GLUCOMTR-MCNC: 99 MG/DL (ref 70–99)
GLUCOSE SERPL-MCNC: 112 MG/DL (ref 70–99)
GLUCOSE SERPL-MCNC: 127 MG/DL (ref 70–99)
GLUCOSE SERPL-MCNC: 132 MG/DL (ref 70–99)
GLUCOSE SERPL-MCNC: 136 MG/DL (ref 70–99)
GLUCOSE SERPL-MCNC: 138 MG/DL (ref 70–99)
GLUCOSE SERPL-MCNC: 155 MG/DL (ref 70–99)
HCO3 BLD-SCNC: 26 MMOL/L (ref 21–28)
HCO3 BLD-SCNC: 27 MMOL/L (ref 21–28)
HCO3 BLDV-SCNC: 27 MMOL/L (ref 21–28)
HCO3 SERPL-SCNC: 23 MMOL/L (ref 22–29)
HCO3 SERPL-SCNC: 24 MMOL/L (ref 22–29)
HCO3 SERPL-SCNC: 25 MMOL/L (ref 22–29)
HCT VFR BLD AUTO: 25.9 % (ref 40–53)
HCT VFR BLD AUTO: 26.1 % (ref 40–53)
HCT VFR BLD AUTO: 26.2 % (ref 40–53)
HCT VFR BLD AUTO: 27.2 % (ref 40–53)
HCT VFR BLD AUTO: 29.5 % (ref 40–53)
HGB BLD-MCNC: 10 G/DL (ref 13.3–17.7)
HGB BLD-MCNC: 8.8 G/DL (ref 13.3–17.7)
HGB BLD-MCNC: 9 G/DL (ref 13.3–17.7)
HGB BLD-MCNC: 9 G/DL (ref 13.3–17.7)
HGB BLD-MCNC: 9.1 G/DL (ref 13.3–17.7)
IMM GRANULOCYTES # BLD: 0 10E3/UL
IMM GRANULOCYTES NFR BLD: 0 %
LACTATE SERPL-SCNC: 1.2 MMOL/L (ref 0.7–2)
LACTATE SERPL-SCNC: 1.2 MMOL/L (ref 0.7–2)
LACTATE SERPL-SCNC: 1.3 MMOL/L (ref 0.7–2)
LACTATE SERPL-SCNC: 1.5 MMOL/L (ref 0.7–2)
LIPASE SERPL-CCNC: 109 U/L (ref 13–60)
LYMPHOCYTES # BLD AUTO: 0.1 10E3/UL (ref 0.8–5.3)
LYMPHOCYTES NFR BLD AUTO: 0 %
MAGNESIUM SERPL-MCNC: 2.1 MG/DL (ref 1.7–2.3)
MAGNESIUM SERPL-MCNC: 2.4 MG/DL (ref 1.7–2.3)
MAGNESIUM SERPL-MCNC: 2.5 MG/DL (ref 1.7–2.3)
MCH RBC QN AUTO: 32.8 PG (ref 26.5–33)
MCH RBC QN AUTO: 32.9 PG (ref 26.5–33)
MCH RBC QN AUTO: 33 PG (ref 26.5–33)
MCH RBC QN AUTO: 33.1 PG (ref 26.5–33)
MCH RBC QN AUTO: 33.6 PG (ref 26.5–33)
MCHC RBC AUTO-ENTMCNC: 33.5 G/DL (ref 31.5–36.5)
MCHC RBC AUTO-ENTMCNC: 33.7 G/DL (ref 31.5–36.5)
MCHC RBC AUTO-ENTMCNC: 33.9 G/DL (ref 31.5–36.5)
MCHC RBC AUTO-ENTMCNC: 34.4 G/DL (ref 31.5–36.5)
MCHC RBC AUTO-ENTMCNC: 34.7 G/DL (ref 31.5–36.5)
MCV RBC AUTO: 96 FL (ref 78–100)
MCV RBC AUTO: 97 FL (ref 78–100)
MCV RBC AUTO: 97 FL (ref 78–100)
MCV RBC AUTO: 98 FL (ref 78–100)
MONOCYTES # BLD AUTO: 0.3 10E3/UL (ref 0–1.3)
MONOCYTES NFR BLD AUTO: 3 %
NEUTROPHILS # BLD AUTO: 11.3 10E3/UL (ref 1.6–8.3)
NEUTROPHILS NFR BLD AUTO: 97 %
NRBC # BLD AUTO: 0 10E3/UL
NRBC BLD AUTO-RTO: 0 /100
O2/TOTAL GAS SETTING VFR VENT: 30 %
O2/TOTAL GAS SETTING VFR VENT: 30 %
O2/TOTAL GAS SETTING VFR VENT: 35 %
O2/TOTAL GAS SETTING VFR VENT: 36 %
O2/TOTAL GAS SETTING VFR VENT: 40 %
O2/TOTAL GAS SETTING VFR VENT: 40 %
O2/TOTAL GAS SETTING VFR VENT: 45 %
OXYHGB MFR BLDV: 84 % (ref 70–75)
OXYHGB MFR BLDV: 85 % (ref 70–75)
OXYHGB MFR BLDV: 85 % (ref 70–75)
OXYHGB MFR BLDV: 93 % (ref 70–75)
PCO2 BLD: 43 MM HG (ref 35–45)
PCO2 BLD: 43 MM HG (ref 35–45)
PCO2 BLD: 44 MM HG (ref 35–45)
PCO2 BLD: 45 MM HG (ref 35–45)
PCO2 BLD: 46 MM HG (ref 35–45)
PCO2 BLDV: 43 MM HG (ref 40–50)
PCO2 BLDV: 47 MM HG (ref 40–50)
PCO2 BLDV: 48 MM HG (ref 40–50)
PCO2 BLDV: 49 MM HG (ref 40–50)
PEEP: 0 CM H2O
PH BLD: 7.38 [PH] (ref 7.35–7.45)
PH BLD: 7.39 [PH] (ref 7.35–7.45)
PH BLD: 7.4 [PH] (ref 7.35–7.45)
PH BLDV: 7.35 [PH] (ref 7.32–7.43)
PH BLDV: 7.37 [PH] (ref 7.32–7.43)
PH BLDV: 7.37 [PH] (ref 7.32–7.43)
PH BLDV: 7.4 [PH] (ref 7.32–7.43)
PHOSPHATE SERPL-MCNC: 4.6 MG/DL (ref 2.5–4.5)
PHOSPHATE SERPL-MCNC: 4.9 MG/DL (ref 2.5–4.5)
PHOSPHATE SERPL-MCNC: 5 MG/DL (ref 2.5–4.5)
PHOSPHATE SERPL-MCNC: 5 MG/DL (ref 2.5–4.5)
PHOSPHATE SERPL-MCNC: 5.3 MG/DL (ref 2.5–4.5)
PHOSPHATE SERPL-MCNC: 5.5 MG/DL (ref 2.5–4.5)
PLATELET # BLD AUTO: 106 10E3/UL (ref 150–450)
PLATELET # BLD AUTO: 107 10E3/UL (ref 150–450)
PLATELET # BLD AUTO: 107 10E3/UL (ref 150–450)
PLATELET # BLD AUTO: 112 10E3/UL (ref 150–450)
PLATELET # BLD AUTO: 116 10E3/UL (ref 150–450)
PLATELET # BLD AUTO: 125 10E3/UL (ref 150–450)
PLATELET # BLD AUTO: 91 10E3/UL (ref 150–450)
PO2 BLD: 101 MM HG (ref 80–105)
PO2 BLD: 107 MM HG (ref 80–105)
PO2 BLD: 122 MM HG (ref 80–105)
PO2 BLD: 125 MM HG (ref 80–105)
PO2 BLD: 156 MM HG (ref 80–105)
PO2 BLD: 77 MM HG (ref 80–105)
PO2 BLD: 85 MM HG (ref 80–105)
PO2 BLD: 90 MM HG (ref 80–105)
PO2 BLDV: 54 MM HG (ref 25–47)
PO2 BLDV: 83 MM HG (ref 25–47)
POTASSIUM SERPL-SCNC: 4.8 MMOL/L (ref 3.4–5.3)
POTASSIUM SERPL-SCNC: 4.8 MMOL/L (ref 3.4–5.3)
POTASSIUM SERPL-SCNC: 4.9 MMOL/L (ref 3.4–5.3)
POTASSIUM SERPL-SCNC: 4.9 MMOL/L (ref 3.4–5.3)
POTASSIUM SERPL-SCNC: 5 MMOL/L (ref 3.4–5.3)
POTASSIUM SERPL-SCNC: 5 MMOL/L (ref 3.4–5.3)
PROT SERPL-MCNC: 4.8 G/DL (ref 6.4–8.3)
PROT SERPL-MCNC: 4.9 G/DL (ref 6.4–8.3)
PROT SERPL-MCNC: 4.9 G/DL (ref 6.4–8.3)
PROT SERPL-MCNC: 5 G/DL (ref 6.4–8.3)
PROT SERPL-MCNC: 5.1 G/DL (ref 6.4–8.3)
PROT SERPL-MCNC: 5.8 G/DL (ref 6.4–8.3)
RBC # BLD AUTO: 2.66 10E6/UL (ref 4.4–5.9)
RBC # BLD AUTO: 2.66 10E6/UL (ref 4.4–5.9)
RBC # BLD AUTO: 2.67 10E6/UL (ref 4.4–5.9)
RBC # BLD AUTO: 2.68 10E6/UL (ref 4.4–5.9)
RBC # BLD AUTO: 2.72 10E6/UL (ref 4.4–5.9)
RBC # BLD AUTO: 2.77 10E6/UL (ref 4.4–5.9)
RBC # BLD AUTO: 3.05 10E6/UL (ref 4.4–5.9)
SAO2 % BLDA: 92 % (ref 92–100)
SAO2 % BLDA: 94 % (ref 92–100)
SAO2 % BLDA: 94 % (ref 92–100)
SAO2 % BLDA: 95 % (ref 92–100)
SAO2 % BLDA: 95 % (ref 92–100)
SAO2 % BLDA: 96 % (ref 92–100)
SAO2 % BLDV: 85.9 % (ref 70–75)
SAO2 % BLDV: 86.1 % (ref 70–75)
SAO2 % BLDV: 86.2 % (ref 70–75)
SAO2 % BLDV: 94.7 % (ref 70–75)
SODIUM SERPL-SCNC: 138 MMOL/L (ref 135–145)
SODIUM SERPL-SCNC: 140 MMOL/L (ref 135–145)
TROPONIN T SERPL HS-MCNC: 4886 NG/L
TROPONIN T SERPL HS-MCNC: 5164 NG/L
TROPONIN T SERPL HS-MCNC: 7452 NG/L
TROPONIN T SERPL HS-MCNC: 7981 NG/L
UFH PPP CHRO-ACNC: <0.1 IU/ML
WBC # BLD AUTO: 10.4 10E3/UL (ref 4–11)
WBC # BLD AUTO: 10.8 10E3/UL (ref 4–11)
WBC # BLD AUTO: 11.7 10E3/UL (ref 4–11)
WBC # BLD AUTO: 11.7 10E3/UL (ref 4–11)
WBC # BLD AUTO: 12.1 10E3/UL (ref 4–11)
WBC # BLD AUTO: 12.3 10E3/UL (ref 4–11)
WBC # BLD AUTO: 5.5 10E3/UL (ref 4–11)

## 2024-07-21 PROCEDURE — 83735 ASSAY OF MAGNESIUM: CPT | Performed by: STUDENT IN AN ORGANIZED HEALTH CARE EDUCATION/TRAINING PROGRAM

## 2024-07-21 PROCEDURE — 93975 VASCULAR STUDY: CPT | Mod: 26 | Performed by: STUDENT IN AN ORGANIZED HEALTH CARE EDUCATION/TRAINING PROGRAM

## 2024-07-21 PROCEDURE — 999N000157 HC STATISTIC RCP TIME EA 10 MIN

## 2024-07-21 PROCEDURE — 250N000011 HC RX IP 250 OP 636: Performed by: INTERNAL MEDICINE

## 2024-07-21 PROCEDURE — 85027 COMPLETE CBC AUTOMATED: CPT | Performed by: STUDENT IN AN ORGANIZED HEALTH CARE EDUCATION/TRAINING PROGRAM

## 2024-07-21 PROCEDURE — 82805 BLOOD GASES W/O2 SATURATION: CPT | Performed by: STUDENT IN AN ORGANIZED HEALTH CARE EDUCATION/TRAINING PROGRAM

## 2024-07-21 PROCEDURE — 84100 ASSAY OF PHOSPHORUS: CPT | Performed by: STUDENT IN AN ORGANIZED HEALTH CARE EDUCATION/TRAINING PROGRAM

## 2024-07-21 PROCEDURE — 250N000011 HC RX IP 250 OP 636: Performed by: STUDENT IN AN ORGANIZED HEALTH CARE EDUCATION/TRAINING PROGRAM

## 2024-07-21 PROCEDURE — 85347 COAGULATION TIME ACTIVATED: CPT

## 2024-07-21 PROCEDURE — 82040 ASSAY OF SERUM ALBUMIN: CPT | Performed by: STUDENT IN AN ORGANIZED HEALTH CARE EDUCATION/TRAINING PROGRAM

## 2024-07-21 PROCEDURE — 250N000012 HC RX MED GY IP 250 OP 636 PS 637: Performed by: SURGERY

## 2024-07-21 PROCEDURE — 94003 VENT MGMT INPAT SUBQ DAY: CPT

## 2024-07-21 PROCEDURE — 85520 HEPARIN ASSAY: CPT | Performed by: SURGERY

## 2024-07-21 PROCEDURE — 250N000013 HC RX MED GY IP 250 OP 250 PS 637: Performed by: STUDENT IN AN ORGANIZED HEALTH CARE EDUCATION/TRAINING PROGRAM

## 2024-07-21 PROCEDURE — 83605 ASSAY OF LACTIC ACID: CPT | Performed by: STUDENT IN AN ORGANIZED HEALTH CARE EDUCATION/TRAINING PROGRAM

## 2024-07-21 PROCEDURE — 99207 US RENAL TRANSPLANT WITH DOPPLER: CPT | Mod: 26 | Performed by: STUDENT IN AN ORGANIZED HEALTH CARE EDUCATION/TRAINING PROGRAM

## 2024-07-21 PROCEDURE — 71045 X-RAY EXAM CHEST 1 VIEW: CPT | Mod: 26 | Performed by: STUDENT IN AN ORGANIZED HEALTH CARE EDUCATION/TRAINING PROGRAM

## 2024-07-21 PROCEDURE — 85027 COMPLETE CBC AUTOMATED: CPT | Performed by: SURGERY

## 2024-07-21 PROCEDURE — 120N000002 HC R&B MED SURG/OB UMMC

## 2024-07-21 PROCEDURE — 84484 ASSAY OF TROPONIN QUANT: CPT | Performed by: STUDENT IN AN ORGANIZED HEALTH CARE EDUCATION/TRAINING PROGRAM

## 2024-07-21 PROCEDURE — 250N000011 HC RX IP 250 OP 636: Performed by: PHYSICIAN ASSISTANT

## 2024-07-21 PROCEDURE — 999N000065 XR CHEST PORT 1 VIEW

## 2024-07-21 PROCEDURE — 250N000011 HC RX IP 250 OP 636: Mod: JB | Performed by: SURGERY

## 2024-07-21 PROCEDURE — 85025 COMPLETE CBC W/AUTO DIFF WBC: CPT | Performed by: STUDENT IN AN ORGANIZED HEALTH CARE EDUCATION/TRAINING PROGRAM

## 2024-07-21 PROCEDURE — 82330 ASSAY OF CALCIUM: CPT | Performed by: STUDENT IN AN ORGANIZED HEALTH CARE EDUCATION/TRAINING PROGRAM

## 2024-07-21 PROCEDURE — 84155 ASSAY OF PROTEIN SERUM: CPT | Performed by: STUDENT IN AN ORGANIZED HEALTH CARE EDUCATION/TRAINING PROGRAM

## 2024-07-21 PROCEDURE — 250N000013 HC RX MED GY IP 250 OP 250 PS 637: Performed by: SURGERY

## 2024-07-21 PROCEDURE — 80053 COMPREHEN METABOLIC PANEL: CPT | Performed by: STUDENT IN AN ORGANIZED HEALTH CARE EDUCATION/TRAINING PROGRAM

## 2024-07-21 PROCEDURE — 250N000013 HC RX MED GY IP 250 OP 250 PS 637: Performed by: PHYSICIAN ASSISTANT

## 2024-07-21 PROCEDURE — 76776 US EXAM K TRANSPL W/DOPPLER: CPT

## 2024-07-21 PROCEDURE — 99291 CRITICAL CARE FIRST HOUR: CPT | Mod: 24 | Performed by: STUDENT IN AN ORGANIZED HEALTH CARE EDUCATION/TRAINING PROGRAM

## 2024-07-21 PROCEDURE — 258N000003 HC RX IP 258 OP 636: Performed by: SURGERY

## 2024-07-21 PROCEDURE — 250N000009 HC RX 250: Performed by: NURSE PRACTITIONER

## 2024-07-21 PROCEDURE — 82150 ASSAY OF AMYLASE: CPT | Performed by: SURGERY

## 2024-07-21 PROCEDURE — 250N000013 HC RX MED GY IP 250 OP 250 PS 637: Performed by: INTERNAL MEDICINE

## 2024-07-21 PROCEDURE — 83690 ASSAY OF LIPASE: CPT | Performed by: SURGERY

## 2024-07-21 PROCEDURE — 94799 UNLISTED PULMONARY SVC/PX: CPT

## 2024-07-21 PROCEDURE — 93975 VASCULAR STUDY: CPT

## 2024-07-21 PROCEDURE — 258N000003 HC RX IP 258 OP 636: Performed by: PHYSICIAN ASSISTANT

## 2024-07-21 PROCEDURE — 99233 SBSQ HOSP IP/OBS HIGH 50: CPT | Performed by: INTERNAL MEDICINE

## 2024-07-21 RX ORDER — ACETAMINOPHEN 325 MG/1
650 TABLET ORAL ONCE
Status: COMPLETED | OUTPATIENT
Start: 2024-07-21 | End: 2024-07-21

## 2024-07-21 RX ORDER — DIPHENHYDRAMINE HYDROCHLORIDE 50 MG/ML
INJECTION INTRAMUSCULAR; INTRAVENOUS
Status: COMPLETED
Start: 2024-07-21 | End: 2024-07-21

## 2024-07-21 RX ORDER — MEROPENEM 500 MG/1
500 INJECTION, POWDER, FOR SOLUTION INTRAVENOUS EVERY 8 HOURS
Status: COMPLETED | OUTPATIENT
Start: 2024-07-21 | End: 2024-07-23

## 2024-07-21 RX ORDER — HYDRALAZINE HYDROCHLORIDE 20 MG/ML
20 INJECTION INTRAMUSCULAR; INTRAVENOUS ONCE
Status: COMPLETED | OUTPATIENT
Start: 2024-07-21 | End: 2024-07-21

## 2024-07-21 RX ORDER — DEXMEDETOMIDINE HYDROCHLORIDE 4 UG/ML
.1-1.2 INJECTION, SOLUTION INTRAVENOUS CONTINUOUS
Status: DISCONTINUED | OUTPATIENT
Start: 2024-07-21 | End: 2024-07-22

## 2024-07-21 RX ORDER — HYDRALAZINE HYDROCHLORIDE 20 MG/ML
INJECTION INTRAMUSCULAR; INTRAVENOUS
Status: COMPLETED
Start: 2024-07-21 | End: 2024-07-21

## 2024-07-21 RX ORDER — DIPHENHYDRAMINE HCL 12.5MG/5ML
25-50 LIQUID (ML) ORAL ONCE
Status: COMPLETED | OUTPATIENT
Start: 2024-07-21 | End: 2024-07-21

## 2024-07-21 RX ORDER — HEPARIN SODIUM 5000 [USP'U]/.5ML
5000 INJECTION, SOLUTION INTRAVENOUS; SUBCUTANEOUS EVERY 8 HOURS
Status: DISCONTINUED | OUTPATIENT
Start: 2024-07-21 | End: 2024-07-30 | Stop reason: HOSPADM

## 2024-07-21 RX ORDER — HYDRALAZINE HYDROCHLORIDE 20 MG/ML
20 INJECTION INTRAMUSCULAR; INTRAVENOUS EVERY 4 HOURS PRN
Status: DISCONTINUED | OUTPATIENT
Start: 2024-07-21 | End: 2024-07-21

## 2024-07-21 RX ORDER — ATROPINE SULFATE 0.1 MG/ML
0.5 INJECTION INTRAVENOUS
Status: DISPENSED | OUTPATIENT
Start: 2024-07-21 | End: 2024-07-21

## 2024-07-21 RX ORDER — DIPHENHYDRAMINE HCL 25 MG
25-50 CAPSULE ORAL ONCE
Status: COMPLETED | OUTPATIENT
Start: 2024-07-21 | End: 2024-07-21

## 2024-07-21 RX ADMIN — PROPOFOL 35 MCG/KG/MIN: 10 INJECTION, EMULSION INTRAVENOUS at 06:20

## 2024-07-21 RX ADMIN — TICAGRELOR 90 MG: 90 TABLET ORAL at 08:30

## 2024-07-21 RX ADMIN — ACETAMINOPHEN 650 MG: 325 TABLET, FILM COATED ORAL at 16:01

## 2024-07-21 RX ADMIN — MEROPENEM 500 MG: 500 INJECTION, POWDER, FOR SOLUTION INTRAVENOUS at 19:42

## 2024-07-21 RX ADMIN — TACROLIMUS 3 MG: 5 CAPSULE ORAL at 17:12

## 2024-07-21 RX ADMIN — POLYETHYLENE GLYCOL 3350 17 G: 17 POWDER, FOR SOLUTION ORAL at 08:31

## 2024-07-21 RX ADMIN — TACROLIMUS 3 MG: 5 CAPSULE ORAL at 08:32

## 2024-07-21 RX ADMIN — Medication 40 MG: at 08:32

## 2024-07-21 RX ADMIN — MICAFUNGIN SODIUM 100 MG: 50 INJECTION, POWDER, LYOPHILIZED, FOR SOLUTION INTRAVENOUS at 21:20

## 2024-07-21 RX ADMIN — SENNOSIDES AND DOCUSATE SODIUM 1 TABLET: 50; 8.6 TABLET ORAL at 19:42

## 2024-07-21 RX ADMIN — Medication 10 MG: at 22:54

## 2024-07-21 RX ADMIN — SENNOSIDES AND DOCUSATE SODIUM 1 TABLET: 50; 8.6 TABLET ORAL at 08:31

## 2024-07-21 RX ADMIN — PROPOFOL 45 MCG/KG/MIN: 10 INJECTION, EMULSION INTRAVENOUS at 10:22

## 2024-07-21 RX ADMIN — SODIUM CHLORIDE, SODIUM LACTATE, POTASSIUM CHLORIDE, CALCIUM CHLORIDE AND DEXTROSE MONOHYDRATE: 5; 600; 310; 30; 20 INJECTION, SOLUTION INTRAVENOUS at 10:32

## 2024-07-21 RX ADMIN — OCTREOTIDE ACETATE 100 MCG: 100 INJECTION, SOLUTION INTRAVENOUS; SUBCUTANEOUS at 19:43

## 2024-07-21 RX ADMIN — HEPARIN SODIUM 5000 UNITS: 5000 INJECTION, SOLUTION INTRAVENOUS; SUBCUTANEOUS at 19:42

## 2024-07-21 RX ADMIN — CHLORHEXIDINE GLUCONATE 0.12% ORAL RINSE 15 ML: 1.2 LIQUID ORAL at 08:30

## 2024-07-21 RX ADMIN — DEXMEDETOMIDINE HYDROCHLORIDE 0.5 MCG/KG/HR: 400 INJECTION INTRAVENOUS at 17:00

## 2024-07-21 RX ADMIN — DIPHENHYDRAMINE HYDROCHLORIDE 50 MG: 25 SOLUTION ORAL at 16:01

## 2024-07-21 RX ADMIN — PROPOFOL 35 MCG/KG/MIN: 10 INJECTION, EMULSION INTRAVENOUS at 02:17

## 2024-07-21 RX ADMIN — ANTI-THYMOCYTE GLOBULIN (RABBIT) 200 MG: 5 INJECTION, POWDER, LYOPHILIZED, FOR SOLUTION INTRAVENOUS at 16:46

## 2024-07-21 RX ADMIN — Medication 1 TABLET: at 08:31

## 2024-07-21 RX ADMIN — MYCOPHENOLATE MOFETIL 1000 MG: 500 INJECTION, POWDER, LYOPHILIZED, FOR SOLUTION INTRAVENOUS at 17:52

## 2024-07-21 RX ADMIN — OCTREOTIDE ACETATE 100 MCG: 100 INJECTION, SOLUTION INTRAVENOUS; SUBCUTANEOUS at 10:11

## 2024-07-21 RX ADMIN — HYDRALAZINE HYDROCHLORIDE 20 MG: 20 INJECTION INTRAMUSCULAR; INTRAVENOUS at 20:31

## 2024-07-21 RX ADMIN — FUROSEMIDE 10 MG/HR: 10 INJECTION, SOLUTION INTRAMUSCULAR; INTRAVENOUS at 22:19

## 2024-07-21 RX ADMIN — HYDRALAZINE HYDROCHLORIDE 20 MG: 20 INJECTION INTRAMUSCULAR; INTRAVENOUS at 22:05

## 2024-07-21 RX ADMIN — MEROPENEM 500 MG: 500 INJECTION, POWDER, FOR SOLUTION INTRAVENOUS at 10:30

## 2024-07-21 RX ADMIN — NICARDIPINE HYDROCHLORIDE 2.5 MG/HR: 0.2 INJECTION, SOLUTION INTRAVENOUS at 23:28

## 2024-07-21 RX ADMIN — Medication 1 TABLET: at 17:00

## 2024-07-21 RX ADMIN — TICAGRELOR 90 MG: 90 TABLET ORAL at 19:42

## 2024-07-21 RX ADMIN — PROPOFOL 25 MCG/KG/MIN: 10 INJECTION, EMULSION INTRAVENOUS at 17:11

## 2024-07-21 RX ADMIN — ACETAMINOPHEN 975 MG: 325 TABLET, FILM COATED ORAL at 03:55

## 2024-07-21 RX ADMIN — ASPIRIN 81 MG CHEWABLE TABLET 81 MG: 81 TABLET CHEWABLE at 08:30

## 2024-07-21 RX ADMIN — PROPOFOL 45 MCG/KG/MIN: 10 INJECTION, EMULSION INTRAVENOUS at 13:50

## 2024-07-21 RX ADMIN — SODIUM CHLORIDE 250 MG: 9 INJECTION, SOLUTION INTRAVENOUS at 16:01

## 2024-07-21 RX ADMIN — HYDROMORPHONE HYDROCHLORIDE 0.2 MG: 1 INJECTION, SOLUTION INTRAMUSCULAR; INTRAVENOUS; SUBCUTANEOUS at 22:54

## 2024-07-21 RX ADMIN — ACETAMINOPHEN 975 MG: 325 TABLET, FILM COATED ORAL at 19:42

## 2024-07-21 RX ADMIN — FUROSEMIDE 10 MG/HR: 10 INJECTION, SOLUTION INTRAMUSCULAR; INTRAVENOUS at 10:40

## 2024-07-21 RX ADMIN — MYCOPHENOLATE MOFETIL 1000 MG: 500 INJECTION, POWDER, LYOPHILIZED, FOR SOLUTION INTRAVENOUS at 08:31

## 2024-07-21 RX ADMIN — SODIUM CHLORIDE: 9 INJECTION, SOLUTION INTRAVENOUS at 02:17

## 2024-07-21 ASSESSMENT — ACTIVITIES OF DAILY LIVING (ADL)
ADLS_ACUITY_SCORE: 25
ADLS_ACUITY_SCORE: 30
ADLS_ACUITY_SCORE: 25
ADLS_ACUITY_SCORE: 25
ADLS_ACUITY_SCORE: 30
ADLS_ACUITY_SCORE: 25
ADLS_ACUITY_SCORE: 30
ADLS_ACUITY_SCORE: 25
ADLS_ACUITY_SCORE: 30
ADLS_ACUITY_SCORE: 25
ADLS_ACUITY_SCORE: 25
ADLS_ACUITY_SCORE: 30

## 2024-07-21 NOTE — PROGRESS NOTES
[facility]  Transplant Nephrology Consult Note  Date of Admission:  7/19/2024  Today's Date: 07/21/2024  Requesting physician: Harrison Whitehead*    Reason for Consult:  SPK immunosuppression    Recommendations:   - reduce IVF rate, if further uptrend in serum sodium, switch to 1/2 NS   - high risk induction per SPK protocol  - titrate lasix drip per CVP, I=o  - no acute RRT indications   - ok to continue renally dosed bactrim with resolving hyperK and downtrending cr    Assessment & Plan   # DDKT (SPK): Trend up suspect ATN post arrest. UOP improving on lasix drip.    - Baseline Creatinine: ~ TBD   - Proteinuria: Not checked post transplant   - DSA Hx:  Final cross match pending    - Last cPRA: 11%   - BK Viremia: Not checked post transplant   - Kidney Tx Biopsy Hx: No biopsy history.    # Pancreas Tx (SPK):    - Pancreatic Exocrine Drainage: Enteric drained     - Blood glucose: Elevated blood glucose      On insulin: Yes minimal dose   - HbA1c:          Latest HbA1c: 4%   - Pancreatic enzymes: Trend up   - DSA Hx:  final crossmatch pending   - Pancreas Tx Biopsy Hx: No biopsy history    # Immunosuppression: Tacrolimus immediate release (goal 8-10) and Mycophenolate mofetil (dose 1000 mg every 12 hours)   - Induction with Recent Transplant:  High Intensity Protocol   - Continue with intensive monitoring of immunosuppression for efficacy and toxicity.   - Historical Changes in Immunosuppression: None   - Changes: Not at this time    # VT/VF Cardiac arrest:  # CAD s/p CABG 2019 LIMA-LAD, SVG-RCA, IBAN 1/2019, IBAN to RCA 2/2024     - s/p inferior STEMI 2/2 ostial proximal RCA in stent thrombosis   - s/p 2 IBAN to RCA 7/20/2024   - Echo: LVEF=37%.Moderate diffuse hypokinesis with akinesis of all inferior segments.  Mild right ventricular dilation is present.Global  right ventricular function is moderately reduced.    # Infection Prevention:      - PJP: Sulfa/TMP  (Bactrim) hold given hhyperK, add G6PD  - CMV IgG Ab High Risk Discordance (D+/R-): No  - EBV IgG Ab High Risk Discordance (D+/R-): No    # Blood Pressure:  borderline soft BP ;  Goal BP: MAP > 65   - Changes: Not at this time off pressors    # Anemia in Chronic Renal Disease: Hgb: Trend down post SPK     KERRI: No   - Iron studies: Not checked recently. Monitor Hb trends    # Mineral Bone Disorder:    - Secondary renal hyperparathyroidism; PTH level: Unknown at this time, but checked with dialysis        On treatment: None  - Vitamin D; level: Unknown at this time, but checked with dialysis        On supplement: No  - Calcium; level: Normal        On supplement: No  - Phosphorus; level: Normal        On supplement: No    # Electrolytes:   - Potassium; level: High        On supplement: No  - Magnesium; level: High normal        On supplement: Yes  - Bicarbonate; level: Normal        On supplement: No  - Sodium; level: Low normal    # Other Significant PMH:   - Left diaphragmatic elevation; 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      Landry Shell MD  Transplant Nephrology  Contact information via Vocera Web Console or via [facility] Paging/Directory      Interval History    Mr. Mahmood creatinine is 4.68 (07/21 1351); Trend down  Off pressors  Remains intubated, FiO2 weaned down to 40%, plan for extubation today  Excellent UOP on lasix drip  On/off low dose insulin.    Review of Systems   Review of systems not obtained due to patient factors - intubation and sedation    MEDICATIONS:  Current Facility-Administered Medications   Medication Dose Route Frequency Provider Last Rate Last Admin    acetaminophen (TYLENOL) tablet 975 mg  975 mg  Oral Q8H Harrison Whitehead MD   975 mg at 07/21/24 0355    aspirin (ASA) chewable tablet 81 mg  81 mg Oral or Feeding Tube Daily Bing Winslow MD   81 mg at 07/21/24 0830    calcium carbonate-vitamin D (OSCAL) 500-5 MG-MCG per tablet 1 tablet  1 tablet Oral BID w/meals Harrison Whitehead MD   1 tablet at 07/21/24 0831    chlorhexidine (PERIDEX) 0.12 % solution 15 mL  15 mL Mouth/Throat Q12H Farzad Carreno PA-C   15 mL at 07/21/24 0830    dextrose 10% BOLUS 300 mL  300 mL Intravenous Once Bing Winslow MD        [START ON 7/22/2024] magnesium oxide (MAG-OX) tablet 400 mg  400 mg Oral Q24H Harrison Whitehead MD        meropenem (MERREM) 500 mg vial to attach to  mL bag for ADULTS or 25 mL bag for PEDS  500 mg Intravenous Q12H Harrison Whitehead MD   500 mg at 07/20/24 2253    micafungin (MYCAMINE) 100 mg in sodium chloride 0.9 % 100 mL intermittent infusion  100 mg Intravenous Q24H Harrison Whitehead  mL/hr at 07/20/24 2015 100 mg at 07/20/24 2015    mycophenolate mofetil (CELLCEPT) 1,000 mg in D5W intermittent infusion  1,000 mg Intravenous BID IS Julienne Ibrahim PA-C 137.5 mL/hr at 07/21/24 0831 1,000 mg at 07/21/24 0831    octreotide (sandoSTATIN) injection 100 mcg  100 mcg Subcutaneous Q12H Harrison Whitehead MD   100 mcg at 07/20/24 2038    pantoprazole (PROTONIX) 2 mg/mL suspension 40 mg  40 mg Per NG tube Daily Harrison Whitehead MD   40 mg at 07/21/24 0832    polyethylene glycol (MIRALAX) Packet 17 g  17 g Oral Daily Harrison Whitehead MD   17 g at 07/21/24 0831    senna-docusate (SENOKOT-S/PERICOLACE) 8.6-50 MG per tablet 1 tablet  1 tablet Oral BID Harrison Whitehead MD   1 tablet at 07/21/24 0831    sodium chloride (PF) 0.9% PF flush 3 mL  3 mL Intravenous Q8H Harrison Whitehead MD        tacrolimus (GENERIC)  suspension 3 mg  3 mg Oral or NG Tube BID IS Harrison Whitehead MD   3 mg at 07/21/24 0832    ticagrelor (BRILINTA) tablet 90 mg  90 mg Oral or Feeding Tube BID Bing Winslow MD   90 mg at 07/21/24 0830    [START ON 7/22/2024] valGANciclovir (VALCYTE) tablet 450 mg  450 mg Oral Once per day on Monday Thursday Harrison Whitehead MD         Current Facility-Administered Medications   Medication Dose Route Frequency Provider Last Rate Last Admin    dexmedeTOMIDine (PRECEDEX) 4 mcg/mL in sodium chloride 0.9 % 100 mL infusion  0.1-1.2 mcg/kg/hr Intravenous Continuous Ezequiel Welch NP        dextrose 10% infusion   Intravenous Continuous PRN Farzad Carreno PA-C        dextrose 5% in lactated ringers 1,000 mL infusion   Intravenous Continuous Bing Winslow MD 75 mL/hr at 07/21/24 0700 Rate Verify at 07/21/24 0700    EPINEPHrine (ADRENALIN) 5 mg in sodium chloride 0.9 % 250 mL infusion CENTRAL  0.01-0.3 mcg/kg/min Intravenous Continuous Farzad Carreno PA-C   Held at 07/20/24 1152    fentaNYL (SUBLIMAZE) infusion   mcg/hr Intravenous Continuous Farzad Carreno PA-C 1.5 mL/hr at 07/21/24 0700 75 mcg/hr at 07/21/24 0700    furosemide (LASIX) 100 mg in sodium chloride 0.9 % 100 mL infusion  10 mg/hr Intravenous Continuous Harrison Whitehead MD   Stopped at 07/21/24 0800    heparin (porcine) 100 unit/mL in 0.45% Sodium Chloride ANTICOAGULANT infusion  200 Units/hr Intravenous Continuous Harrison Whitehead MD 2 mL/hr at 07/21/24 0700 200 Units/hr at 07/21/24 0700    insulin regular (MYXREDLIN) 1 unit/mL infusion  0-24 Units/hr Intravenous Continuous Farzad Carreno PA-C 0.5 mL/hr at 07/21/24 0700 0.5 Units/hr at 07/21/24 0700    propofol (DIPRIVAN) infusion  5-75 mcg/kg/min Intravenous Continuous Farzad Carreno PA-C 21.5 mL/hr at 07/21/24 0700 35 mcg/kg/min at 07/21/24 0700    And     "Medication Instruction   Does not apply Continuous PRN Farzad Carreno PA-C        norepinephrine (LEVOPHED) 16 mg in  mL infusion MAX CONC CENTRAL LINE  0.01-0.6 mcg/kg/min Intravenous Continuous Farzad Carreno PA-C 1.9 mL/hr at 24 0700 0.02 mcg/kg/min at 24 0700    sodium chloride 0.45 % 1,000 mL infusion   Intravenous Continuous Harrison Whitehead  mL/hr at 24 0800 Rate Change at 24 0800    sodium chloride 0.9 % 1,000 mL infusion   Intravenous Continuous Harrison Whitehead  mL/hr at 24 0800 Rate Change at 24 0800       Physical Exam   Temp  Av.4  F (36.3  C)  Min: 96.1  F (35.6  C)  Max: 98.6  F (37  C)  Arterial Line BP  Min:   Max: 276/115  Arterial Line MAP (mmHg)  Av.3 mmHg  Min: 29 mmHg  Max: 232 mmHg      Pulse  Av.9  Min: 20  Max: 152 Resp  Avg: 15.5  Min: 3  Max: 38  FiO2 (%)  Av %  Min: 70 %  Max: 80 %  SpO2  Av.7 %  Min: 72 %  Max: 100 %    CVP (mmHg): 278 mmHgBP 94/60   Pulse 82   Temp 96.8  F (36  C)   Resp 16   Ht 1.803 m (5' 11\")   Wt 113.2 kg (249 lb 9 oz)   SpO2 99%   BMI 34.81 kg/m     Date 24 07 - 24 0659   Shift 7720-6373 9553-6278 1484-3136 24 Hour Total   INTAKE   I.V. 1839.32   1839.32   NG/   128   Shift Total(mL/kg) 1967.32(19.21)   1967.32(19.21)   OUTPUT   Urine 1325   1325   Emesis/NG output 100   100   Drains 875   875   Shift Total(mL/kg) 2300(22.46)   2300(22.46)   Weight (kg) 102.4 102.4 102.4 102.4      Admit Weight: 102.4 kg (225 lb 12 oz)     GENERAL APPEARANCE: intubated sedated  HENT: ET tube  RESP: mechanical breath sounds  CV: regular rhythm, normal rate, no rub, no murmur  EDEMA: + LE edema bilaterally  ABDOMEN: soft, nondistended, nontender, surgical incision  MS: extremities normal - no gross deformities noted  SKIN: no rash  Access LUE AVF +thrill/bruit    Data   All labs reviewed by me.  CMP  Recent Labs   Lab " 07/21/24  0636 07/21/24  0612 07/21/24  0550 07/21/24  0500 07/21/24  0255 07/21/24  0206 07/20/24  2148 07/20/24  2101 07/20/24  1900 07/20/24  1816   NA  --  140  --   --   --  138  --  138  --  136   POTASSIUM  --  4.9  --   --   --  5.0  --  5.3  --  5.1   CHLORIDE  --  105  --   --   --  103  --  103  --  100   CO2  --  25  --   --   --  25  --  25  --  25   ANIONGAP  --  10  --   --   --  10  --  10  --  11   * 127* 119* 118*   < > 112*   < > 87   < > 108*   BUN  --  34.8*  --   --   --  34.2*  --  33.4*  --  33.1*   CR  --  4.88*  --   --   --  5.11*  --  5.29*  --  5.52*   GFRESTIMATED  --  14*  --   --   --  13*  --  12*  --  12*   BETHANY  --  8.8  --   --   --  8.7*  --  8.7*  --  8.7*   MAG  --  2.4*  --   --   --  2.5*  --  2.6*  --  2.6*   PHOS  --  4.9*  --   --   --  4.6*  --  4.0  --  3.4   PROTTOTAL  --  4.9*  --   --   --  4.8*  --  5.0*  --  4.9*   ALBUMIN  --  3.1*  --   --   --  3.0*  --  3.1*  --  3.1*   BILITOTAL  --  0.4  --   --   --  0.4  --  0.5  --  0.5   ALKPHOS  --  60  --   --   --  60  --  65  --  62   AST  --  403*  --   --   --  409*  --  411*  --  364*   ALT  --  84*  --   --   --  80*  --  81*  --  75*    < > = values in this interval not displayed.     CBC  Recent Labs   Lab 07/21/24  0612 07/21/24  0206 07/20/24  2101 07/20/24  1816   HGB 8.8*  8.8* 8.8* 9.5* 9.5*   WBC 11.7*  11.7* 12.1* 12.3* 11.8*   RBC 2.66*  2.66* 2.67* 2.85* 2.83*   HCT 26.1*  26.1* 26.1* 27.4* 26.7*   MCV 98  98 98 96 94   MCH 33.1*  33.1* 33.0 33.3* 33.6*   MCHC 33.7  33.7 33.7 34.7 35.6   RDW 13.2  13.2 13.2 13.2 13.2   *  107* 112* 137* 146*     INR  Recent Labs   Lab 07/20/24  0821 07/20/24  0600 07/19/24  1656   INR 1.39* 1.44* 0.96   PTT 28 27 31     ABG  Recent Labs   Lab 07/21/24  0415 07/21/24  0000 07/20/24  2022 07/20/24  1714 07/20/24  1422   PH 7.38 7.39 7.38  --  7.36   PCO2 45 45 46*  --  45   PO2 101 90 90  --  77*   HCO3 27 27 27  --  25   O2PER 45  45 45  45 45  45  50 70  70      Urine Studies  Recent Labs   Lab Test 07/20/24  0609 09/27/21  1220 07/24/21  1151 06/25/19  1439 06/19/19  0822   COLOR Orange* Yellow Light Yellow Straw Straw   APPEARANCE Slightly Cloudy* Slightly Cloudy* Clear Clear Clear   URINEGLC 50* 150* 200* 200 mg/dL* >1000 mg/dL*   URINEBILI Negative Negative Negative Negative Negative   URINEKETONE Negative Negative Negative Negative Negative   SG 1.013 1.013 1.018 1.010 1.012   UBLD Large* Negative 0.03 mg/dL* Moderate* Trace*   URINEPH 6.5 6.0 6.0 6.5 6.0   PROTEIN 300* >499* 600* 200 mg/dL* 300 mg/dL*   UROBILINOGEN  --   --   --  <2.0 E.U./dL <2.0 E.U./dL   NITRITE Negative Negative Negative Negative Negative   LEUKEST Trace* Small* Negative Negative Negative   RBCU >182* 2 1 25-50* 0-2   WBCU 105* 4 10* 0-5 0-5     No lab results found.  PTH  Recent Labs   Lab Test 07/20/21  1845   PTHI 190*     Iron Studies  No lab results found.    IMAGING:  All imaging studies reviewed by me.    Past Medical History    I have reviewed this patient's medical history and updated it with pertinent information if needed.   Past Medical History:   Diagnosis Date    Acquired elevated diaphragm     Anemia in chronic kidney disease     Angina pectoris (H24)     Chronic kidney disease     Coronary artery disease     Diabetes mellitus, type II (H) 12/2001    Diabetic nephropathy (H)     MARUQEZ (dyspnea on exertion)     Dyslipidemia 12/2001    End stage renal disease (H)     History of blood transfusion 2004    Hypertension     takes medication    Ischemic cardiomyopathy     Metabolic acidosis     Myocardial infarction (H)     STEMI -Diagonal branch of the LAD    Obesity (BMI 30-39.9)     Peripheral neuropathy     Proteinuria     Retinopathy     Vitamin D deficiency        Past Surgical History   I have reviewed this patient's surgical history and updated it with pertinent information if needed.  Past Surgical History:   Procedure Laterality Date    BACK SURGERY  2009    L5  disc cut    BYPASS GRAFT ARTERY CORONARY  06/20/2019    2 vessels    CV CORONARY ANGIOGRAM N/A 01/13/2019    Procedure: Coronary Angiogram;  Surgeon: Cielo Che MD;  Location: [facility] Cath Lab;  Service: Cardiology    CV CORONARY ANGIOGRAM N/A 05/02/2019    Procedure: Coronary Angiogram;  Surgeon: Cielo Che MD;  Location: [facility] Cath Lab;  Service: Cardiology    CV CORONARY ANGIOGRAM N/A 04/30/2020    Procedure: Coronary Angiogram;  Surgeon: Cielo Che MD;  Location: [facility] Cath Lab;  Service: Cardiology    CV CORONARY ANGIOGRAM N/A 07/22/2021    Procedure: CV CORONARY ANGIOGRAM;  Surgeon: Adrian Crow MD;  Location: [facility] CATH LAB CV    CV CORONARY ANGIOGRAM N/A 02/01/2024    Procedure: Coronary Angiogram;  Surgeon: Delroy Carpio MD;  [facility] CARDIAC C[facility]    CV FRACTIONAL FLOW RATIO WIRE N/A 07/22/2021    Procedure: Fractional Flow Ratio Wire;  Surgeon: Adrian Crow MD;  Location: [facility] [place] LAB CV    CV LEFT HEART CATH N/A 07/22/2021    Procedure: Left Heart Cath;  Surgeon: Adrian Crow MD;  Location: [facility] [place] LAB CV    CV LEFT HEART CATHETERIZATION WITH LEFT VENTRICULOGRAM N/A 01/13/2019    Procedure: Left Heart Catheterization with Left Ventriculogram;  Surgeon: Cielo Che MD;  Location: [facility] Cath Lab;  Service: Cardiology    CV LEFT HEART CATHETERIZATION WITHOUT LEFT VENTRICULOGRAM Left 04/30/2020    Procedure: Left Heart Catheterization Without Left Ventriculogram;  Surgeon: Cielo Che MD;  Location: [facility] Cath Lab;  Service: Cardiology    CV PCI N/A 02/01/2024    Procedure: Percutaneous Coronary Intervention;  Surgeon: Delroy Carpio MD;  Location: [facility] CARDIAC CATH LAB    CV RIGHT HEART CATHETERIZATION N/A 04/30/2020    Procedure: Right Heart Catheterization;  Surgeon: Cielo Che MD;  Location: [facility] Cath Lab;  Service: Cardiology    EYE SURGERY      GENITOURINARY SURGERY      HERNIA  REPAIR      OTHER SURGICAL HISTORY      Excise varicocele    STENT, CORONARY, DALE  2019    VASCULAR SURGERY         Family History   I have reviewed this patient's family history and updated it with pertinent information if needed.   Family History   Problem Relation Age of Onset    Diabetes Type 2  Mother     Heart Disease Father 60    CABG Father 50        triple bypass    Diabetes Type 2  Father     Depression Sister     Substance Abuse Sister     Ovarian Cancer Maternal Grandmother     Brain Cancer Maternal Grandmother     Pancreatic Cancer Maternal Aunt     Prostate Cancer Maternal Uncle        Social History   I have reviewed this patient's social history and updated it with pertinent information if needed. Siva Ramey  reports that he has never smoked. He has been exposed to tobacco smoke. He has never used smokeless tobacco. He reports that he does not drink alcohol and does not use drugs.    Prior to Admission Medications   Medications Prior to Admission   Medication Sig Dispense Refill Last Dose    acetaminophen (TYLENOL) 500 MG tablet Take 500 mg by mouth   7/19/2024 at n    albuterol (PROAIR HFA/PROVENTIL HFA/VENTOLIN HFA) 108 (90 Base) MCG/ACT inhaler Inhale 2 puffs into the lungs every 4 hours as needed   More than a month    ALPRAZolam (XANAX) 0.25 MG tablet Take 0.25 mg by mouth as needed   More than a month    amLODIPine (NORVASC) 10 MG tablet Take 1 tablet (10 mg) by mouth daily 90 tablet 3 7/18/2024 at 0800    aspirin (ASA) 81 MG chewable tablet Take 1 tablet (81 mg) by mouth daily Starting tomorrow. 30 tablet 3 7/18/2024 at 0800    atorvastatin (LIPITOR) 80 MG tablet Take 1 tablet (80 mg) by mouth at bedtime 90 tablet 3 7/18/2024 at 2200    carvedilol (COREG) 12.5 MG tablet Take 3 tablets (37.5 mg) by mouth 2 times daily (with meals) 540 tablet 3 7/18/2024 at 2200    cholecalciferol, vitamin D3, 5,000 unit Tab [CHOLECALCIFEROL, VITAMIN D3, 5,000 UNIT TAB] Take 5,000 Units by mouth daily.    7/18/2024 at 2200    cloNIDine (CATAPRES) 0.1 MG tablet Take 1 tablet (0.1 mg) by mouth 3 times daily as needed (SBP > 160) 30 tablet 0 Unknown    glucose (BD GLUCOSE) 4 g chewable tablet glucose 4 gram chewable tablet   CHEW AND SWALLOW 4 TIMES DAILY AS NEEDED   More than a month    hydrALAZINE (APRESOLINE) 25 MG tablet Take 1 tablet (25 mg) by mouth 2 times daily 180 tablet 3 7/18/2024 at 0800    icosapent ethyl (VASCEPA) 1 g CAPS capsule Take by mouth 2 times daily (with meals)   7/18/2024 at 2200    insulin aspart (NOVOLOG FLEXPEN) 100 UNIT/ML pen    7/19/2024 at 1630    nitroGLYcerin (NITROSTAT) 0.4 MG sublingual tablet PLACE ONE TABLET UNDER TONGUE AS NEEDED FOR CHEST PAIN AS DIRECTED FOR 3 DOSES. IF SYMPTOMS PERSIST 5 MINUTES AFTER 1ST DOSE CALL 911 25 tablet 3 Unknown    VELPHORO 500 MG CHEW chewable tablet Take 1,000 mg by mouth 3 times daily (with meals)   7/18/2024 at 1200    zolpidem (AMBIEN) 5 MG tablet Take 5 mg by mouth nightly as needed for sleep   7/18/2024 at 2200    insulin aspart (NOVOLOG PEN) 100 UNIT/ML pen Inject 5 Units Subcutaneous 3 times daily (before meals) 4.5 mL 1     insulin aspart (NOVOLOG VIAL) 100 UNITS/ML vial Inject Subcutaneous 3 times daily (with meals) To be used with the insulin pump

## 2024-07-21 NOTE — PHARMACY-TRANSPLANT NOTE
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      Opportunistic pathogen prophylaxis includes:  +PJP: trimethoprim/sulfamethoxazole indefinitely  + Thrush: clotrimazole indefinitely  + CMV D-/R+: Valganciclovir for three months duration    PAT prophylaxis  + aspirin 81mg daily (also for cardiac history) lifelong    Stent thrombus prophylaxis  + Ticagrelor 90mg bid  + Restart Atorvastatin 80mg daily prior to discharge    Cardiac History  + Restart BB when able    Post-op antibiotic/antifungal surgical prophylaxis includes: piperacillin-tazobactam for 3 days post-procedure and micafungin IV for 6 days post-procedure.    Patient is not enrolled in medication study.    Pharmacy will monitor for medication interactions and immunosuppression levels in conjunction with the team.  Medication therapy needs for discharge planning will continue to be addressed throughout the current admission via multidisciplinary rounds and order review.   Pharmacy will make recommendations as appropriate.      Jose Pedraza PharmD, BCTXP, BCPS  Inpatient clinical pharmacist

## 2024-07-21 NOTE — PHARMACY-ADMISSION MEDICATION HISTORY
Pharmacy Intern Admission Medication History    Admission medication history is complete. The information provided in this note is only as accurate as the sources available at the time of the update.    Information Source(s): Family member and CareEverywhere/SureScripts via phone    Pertinent Information:  Spoke with spouse, relied on home prescription bottles.  The patient manages own medications well and provided a list with med history info upon admission. Much of the info was already complete and accurate in EHR.  Clonidine 0.1 mg tablet: hasn't taken for a long time but still has on hand  Insulin aspart: uses vials with insulin pump    Changes made to PTA medication list:  Added: None  Deleted:  Insulin aspart pen: duplicate  Changed:  Icosapent ethyl 1 g capsule: changed directions from 1 gram BID to 2 grams BID, per home prescription bottle    Allergies reviewed with patient and updates made in EHR: yes    Medication History Completed By: Zhagn Wing 7/21/2024 3:41 PM    PTA Med List   Medication Sig Last Dose    acetaminophen (TYLENOL) 500 MG tablet Take 500 mg by mouth 7/19/2024    albuterol (PROAIR HFA/PROVENTIL HFA/VENTOLIN HFA) 108 (90 Base) MCG/ACT inhaler Inhale 2 puffs into the lungs every 4 hours as needed More than a month    ALPRAZolam (XANAX) 0.25 MG tablet Take 0.25 mg by mouth as needed More than a month    amLODIPine (NORVASC) 10 MG tablet Take 1 tablet (10 mg) by mouth daily 7/18/2024 at 0800    aspirin (ASA) 81 MG chewable tablet Take 1 tablet (81 mg) by mouth daily Starting tomorrow. 7/18/2024 at 0800    atorvastatin (LIPITOR) 80 MG tablet Take 1 tablet (80 mg) by mouth at bedtime 7/18/2024 at 2200    carvedilol (COREG) 12.5 MG tablet Take 3 tablets (37.5 mg) by mouth 2 times daily (with meals) 7/18/2024 at 2200    cholecalciferol, vitamin D3, 5,000 unit Tab [CHOLECALCIFEROL, VITAMIN D3, 5,000 UNIT TAB] Take 5,000 Units by mouth daily. 7/18/2024 at 2200    hydrALAZINE (APRESOLINE) 25  MG tablet Take 1 tablet (25 mg) by mouth 2 times daily 7/18/2024 at 0800    icosapent ethyl (VASCEPA) 1 g CAPS capsule Take 2 g by mouth 2 times daily (with meals) 7/18/2024 at 2200    insulin aspart (NOVOLOG VIAL) 100 UNITS/ML vial Inject Subcutaneous 3 times daily (with meals) To be used with the insulin pump     nitroGLYcerin (NITROSTAT) 0.4 MG sublingual tablet PLACE ONE TABLET UNDER TONGUE AS NEEDED FOR CHEST PAIN AS DIRECTED FOR 3 DOSES. IF SYMPTOMS PERSIST 5 MINUTES AFTER 1ST DOSE CALL 911 Unknown    VELPHORO 500 MG CHEW chewable tablet Take 1,000 mg by mouth 3 times daily (with meals) 7/18/2024 at 1200    zolpidem (AMBIEN) 5 MG tablet Take 5 mg by mouth nightly as needed for sleep 7/18/2024 at 2200

## 2024-07-21 NOTE — PROGRESS NOTES
CLINICAL NUTRITION SERVICES - ASSESSMENT NOTE     Nutrition Prescription    RECOMMENDATIONS FOR MDs/PROVIDERS TO ORDER:  Diet adv v nutrition support within 5-7 days.    Malnutrition Status:    Unable to determine due to incomplete NFPE     Future/Additional Recommendations:  -- if TPN is warranted this admission recommended the following:  Dosing wt: 84 kg  Volume 1440 mL  Dextrose 155 grams   grams  250 mL Lipids daily (infuse @ 21 mL/hr x ~12 hrs/day or until full 250 mL volume infused). If patient remains on propofol no fat with TPN order    Dextrose titration:  Monitor lytes and if within acceptable parameters (Mg++ > or = 1.5 mg/dL, K+ is > or = 3 mmol/L, and PO4 > or = 1.9 mg/dL) increase dextrose by 60 g/day to goal of 275 g dextrose    Goal TPN provides: 2035 kcals (24 kcals/kg), 1.8 g/kg, GIR: 2.27, 25% calories from fat.    -- If TF warranted this admission recommend: Osmolite 1.5 Devaughn (or equivalent) @ goal of  55ml/hr  (1320ml/day) + 2 packets Prosource TF20 provides: 2140 kcals (25 kcals/kg), 123 g PRO (1.5 g/kg) , 1005 ml free H20, 268 g CHO, and 0 g fiber daily.  - start TF at 15 ml/hr for 8 hrs and increase 10 ml every 8 hrs until goal rate  - 30 ml H2O flush q 4 hours for tube patency  - liquid multivitamin w/minerals 15 ml/daily   - will need to adjust TF recs if patient remains on propofol       REASON FOR ASSESSMENT  Siva Ramey is a/an 49 year old male assessed by the dietitian for Provider Order - Kidney/Pancreas Transplant Post Op    Per chart review patient with a PMH significant for DMII (on insulin pump) and resulting ESKD (on HD every MWF since 8/2021). PMH also significant for h/o CAD s/p PCI with IBAN 1/2019 and 2 vessel CABG in 2019 (currently only on ASA), PAD, COVID-19, HTN, obesity, left diaphragmatic elevation s/p CABG, anxiety     NUTRITION HISTORY  Per chart review patient last saw outpatient RD (11/29/2021) at this time his 24 hours recall was as follows:   Diet  "Recall  Breakfast Protein bar or boiled egg    Lunch Turkey (leftover from Thanksgiving) + salad, mandarin oranges    Dinner Grilled chicken w/ salad; protein most nights   Snacks Limited    Beverages Iced tea (unsweetened), coffee, zevia (black cherry)   Alcohol None    Dining out 2x/month      Patient was busy with other disciplines when this writer attempted to visit.     CURRENT NUTRITION ORDERS  Diet: NPO    LABS  Labs reviewed  (7/21): BUN 34.8 mg/dL (H), Cr 4.88 mg/dL (H),  U/L (H), ALT 84 U/L (H), phos 4.9 mg/dL (H)     MEDICATIONS  Medications reviewed  Oscal  Protonix  Miralax  Senokot-S  D5LR at 75 ml/hr (306 kcals)  Lasix  Insulin gtt  Propofol 21.5 ml/hr (this will provide 568 fat kcals over 24 hours)    ANTHROPOMETRICS  Height: 180.3 cm (5' 11\")  Most Recent Weight: 113.2 kg (249 lb 9 oz)    IBW: 78.2 kg  BMI: Obesity Grade I BMI 30-34.9  Weight History:   Wt Readings from Last 10 Encounters:   07/21/24 113.2 kg (249 lb 9 oz)   02/14/24 113.9 kg (251 lb 1.6 oz)   02/01/24 108.2 kg (238 lb 9.6 oz)   11/16/23 108.5 kg (239 lb 1.6 oz)   07/11/23 100.7 kg (222 lb)   06/27/23 105.2 kg (232 lb)   04/02/23 98.4 kg (217 lb)   03/20/23 102 kg (224 lb 13.9 oz)   01/09/23 103.5 kg (228 lb 3.2 oz)   02/09/22 111.1 kg (245 lb)   Admission weight 102.4 kg (7/19/24). 5.4% weight loss in ~ 5 months   Dosing Weight: 84 kg (adjBW based on IBW and admission weight)    ASSESSED NUTRITION NEEDS:  Estimated Energy Needs: 2960-6869 kcals (25 - 30 Kcal/Kg)  Justification: increased needs post-op SOT  Estimated Protein Needs: 109-168 grams protein (1.3-2 gm/Kg)  Justification: increased needs post op SOT  Estimated Fluid Needs: 2799-6033  mL (30-35 mL/Kg)  Justification: maintenance, or per MD pending fluid status and adequate UOP    PHYSICAL FINDINGS  See malnutrition section below.  + NGT    MALNUTRITION  % Intake: Unable to assess  % Weight Loss: Weight loss does not meet criteria  Subcutaneous Fat Loss: Unable to " assess  Muscle Loss: Unable to assess  Fluid Accumulation/Edema: None noted - per chart review  Malnutrition Diagnosis: Unable to determine due to incomplete NFPE     NUTRITION DIAGNOSIS  Increased nutrient needs energy/protein related to s/p SOT surgery as evidenced by increased estimated nutritional needs for healing       INTERVENTIONS  Implementation  Nutrition Education: Not appropriate at this time due to patient condition     Goals  Diet adv v nutrition support within 5-7 days.     Monitoring/Evaluation  Progress toward goals will be monitored and evaluated per protocol.  Duyen Quevedo MS/RD/LD/CNSC  Available on [facility]   M-F (7am-3:30pm) - SICU Clinical Dietitian  Weekend/Holiday Dietitian (7am-3:30pm)    ** Clinical Dietitians no longer available on pager

## 2024-07-21 NOTE — PLAN OF CARE
Major Shift Events:   Neuro:  Following commands. Moves all extremities appropriately. PERRLA  Pulm: extubated at 1830; 4L NC  CV:  Levo @ 0.02 titrated for MAP goal 65. NSR. Trending CVPs Q4hrs. Sheath removed and bedrest discontinued at 1700.    : Burt inplace for strict I&O 2/2 kidney transplant perfusion. Good UOP. Lasix gtt on or off based on hourly urine output.   GI: NG to LIS. No BM.   Skin: Midline incision. RLQ AMILCAR drain w/ serosanguenous output.   Drips: Insulin, Lasix (per order), D5 w/ LR @ 50ml/hr.   Plan: Continue to monitor BG and UOP

## 2024-07-21 NOTE — PROGRESS NOTES
Neuro: RASS -2 to -3. Following commands. Prop @35, Fent @ 75; npi stable; tracks appropriately.   Pulm: Fio2 weaned to 45%. PEEP increased to 7. Clear lung sounds bilaterally.   CV:  Levo @ 0.04. MAP goal 65. Trending Cvps Q1h (goal 4-12).   : Burt inplace for strict I&O 2/2 kidney transplant perfusion. 0.9 NS and 0.45NS titrated to achieve I=O with urine. D5LR 75cc/hr   GI: NG to LIS. No BM.   Skin: Midline incision. RLQ AMILCAR drain w/ serosanguenous output.   Drips: Insulin gtt to remain on. Heparin gtt at straight rate of 200. Lasix gtt per order.   Labs: potassium remained <6 to refrain from crrt, latest recheck was 5.0. Lactic downtrending @1.2. Tropes remain elevated.

## 2024-07-21 NOTE — PROGRESS NOTES
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     Patient extubated in PACU this morning post-op and stable throughout case. While getting renal ultrasound in PACU, patient cardiac arrested (V-fib) for approximately 5 mins. Received 2 rounds of CPR, epi x 2, 2g IV mag, 100mg IV lidocaine push during code before achieving ROSC. Lactate ~9. Trop 136. Code blue was managed by the PACU team. Post ROSC EKG revealed inferior STEMI. Arrived post-op to CVICU @ 0810. Cardiology consult team emergently engaged for inferior STEMI. Cath lab subsequently activated at 0815. Loaded with aspirin 325mg. Heparin gtt is approved from surgical standpoint.. Amio bolus and infusion due to Bigeminy and ongoing frequent PVCs. Plan of care discussed with both CICU attending and transplant surgeon, Dr Whitehead, who are all in agreement for CICU to take over ICU Cares in place of the SICU team. Coronary angiogram showed 100% prox RCA in stent thrombosis, now s/p 2 IBAN in ostial to prox RCA.     PLAN:    Updates:  -discussed with transplant, will decrease IVF in order to diurese and allow for decreased vent settings  -Wean prop, add precedex if needed, prep for SBT and possible extubation later today  -removal arterial sheath today  -heparin on hold per transplant given  on DAPT, can add back subQ heparin tonight    Neurological:  # Acute pain   # Sedation   # At risk for anoxic brain injury  - Monitor neurological status. Delirium preventions and precautions.   - Pain: Fent gtt     - Sedation plan: fentanyl, propofol   -wean propofol today, add precedex if needed to try and head toward Sbt and extubation  - Must ensure stability of mental exam post-arrest   - Will consider baseline head CT if warrented   - Upon arrival to CVICU, FSC x 4, AGUILAR upon holding sedation.   - Following commands off sedation    Cardiovascular:    # VT Cardiac arrest  # Inferior STEMI s/p 2 IBAN to ostial-proximal RCA 2/2 in stent thrombosis  # CAD s/p PCI with IBAN 1/2019   # CABG in 2019 (currently only on ASA)  - LIMA-LAD, SVG-RCA   # hx CAD s/p PCI to the proximal RCA (02/2024)  # HTN   # PAD   # Cardiac arrest (V fib) 7/20/24   While getting renal ultrasound in PACU, patient cardiac arrested (V-fib) for approximately 5-7 mins. Received 2 rounds of CPR, epi x 2, 2g IV mag, 100mg IV lidocaine push during code before achieving ROSC. Patient now presenting to SICU for further work-up, evaluation. EKG in PACU showing acute STEMI, STAT cardiology consult placed, Cath lab activated for inferior STEMI.  Workup:  - Trop 136 --> 199; trend q2h    - TTE EF 37% with inferior wall akinesis   - Heparin bolus and infusion (low intensity), now off   - Initial  in CVICU   - Tele monitor with frequent PVCs, ongoing bigeminy   - ASA/ Brillinta  - Transplant surgeon confirmed ok for any anticoag/antiplatelet agents needed     Pulmonary:   # Post operative ventilatory support   # Acute hypoxic respiratory support post brief Cardiac arrest  - VENT: Vent Mode: CMV/AC  (Continuous Mandatory Ventilation/ Assist Control)  FiO2 (%): 45 %  Resp Rate (Set): 16 breaths/min  Tidal Volume (Set, mL): 450 mL  PEEP (cm H2O): 7 cmH2O  Pressure Support (cm H2O): 5 cmH2O  Resp: 16    - Ventilatory bundle.  - Continue full vent support.    - PST when meets criteria.    - Supplemental oxygen to keep saturation above 92 %.  - CXR stable post-op     Gastroenterology/Nutrition:  # NPO status   # S/p pancreas transplant 7/20   - NG tube to LIS   - Will consult RD for NJ today   - D5 infusion per protocol   - Amylase, lipase daily   - KUB pending for NG tube     Renal/Fluids/Electrolytes:   # ESRD s/p kidney transplant 7/20   # Acute kidney injury  # Electrolyte monitoring   # Volume status   # lactic acidosis   - Will continue to monitor intake and output.  - Continue olea cares   - Trend lactate q4h; Down to 4.3 in ICU   - Electrolytes q4h   - Laxis drip post-op per protocol  - NS and 1/2 NS infusions ordered for goal urine output per hour per protocol    -Held temporarily today to help with volume removal and extubation    Endocrine:   # Stress hyperglycemia   # Diabetes mellitus with insulin pump   - Goal to keep BG< 180 for optimal wound healing   - Insulin drip must remain on   - Dextrose infusion per protocol     ID:  # Immunosuppression   # Stress Leukocytosis   # Post-operative antimicrobials   - Trend CBC, fever curve   - Following intra-op cultures   - Defer immunosuppression, post-op antimicrobials per transplant     Positive cultures:  - N/a    Heme:     # Acute blood loss anemia    # Anticoagulation needs   - Trend CBC q4h   - Transplant OK with full anticoagulation post-op   - Transfuse if hgb <7.0 or signs/symptoms of hypoperfusion. Monitor and trend.     Musculoskeletal/Skin:  # Weakness and deconditioning of critical illness  # incisional monitoring    - Physical and occupational therapy consult   - Incisional cares   - Drain monitoring     General Cares/Prophylaxis:    DVT Prophylaxis: Pneumatic Compression Devices, heparin gtt   GI Prophylaxis: PPI  Restraints: Restraints for medical healing needed: YES    Lines/ tubes/ drains:  - ETT  - Olea  - LIJ CVC   - Art line   - NG tube   - Drain to bulb suction     [facility]     Patient seen, findings and plan discussed with attending physician Dr. 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     REVIEW OF SYSTEMS: 10 point ROS neg other than the symptoms noted above in the HPI.    PAST MEDICAL HISTORY:   Past Medical History:   Diagnosis Date    Acquired elevated diaphragm     Anemia in chronic kidney disease     Angina pectoris (H24)     Chronic kidney disease     Coronary artery disease     Diabetes mellitus, type II (H) 12/2001    Diabetic nephropathy (H)     MARQUEZ (dyspnea on exertion)     Dyslipidemia 12/2001    End stage renal disease (H)     History of blood transfusion 2004    Hypertension     takes medication    Ischemic cardiomyopathy     Metabolic acidosis     Myocardial infarction (H)     STEMI -Diagonal branch of the LAD    Obesity (BMI 30-39.9)     Peripheral neuropathy     Proteinuria     Retinopathy     Vitamin D deficiency        SURGICAL HISTORY:   Past Surgical History:   Procedure Laterality Date    BACK SURGERY  2009    L5 disc cut    BYPASS GRAFT ARTERY CORONARY  06/20/2019    2 vessels    CV CORONARY ANGIOGRAM N/A 01/13/2019    Procedure: Coronary Angiogram;  Surgeon: Cielo Che MD;  Location: [facility] Cath Lab;  Service: Cardiology    CV CORONARY ANGIOGRAM N/A 05/02/2019    Procedure: Coronary Angiogram;  Surgeon: Cielo Che MD;  Location: [facility] Cath Lab;  Service: Cardiology    CV CORONARY  ANGIOGRAM N/A 04/30/2020    Procedure: Coronary Angiogram;  Surgeon: Cielo Che MD;  Location: [facility] Cath Lab;  Service: Cardiology    CV CORONARY ANGIOGRAM N/A 07/22/2021    Procedure: CV CORONARY ANGIOGRAM;  Surgeon: Adrian Crow MD;  Location: [facility] CATH LAB CV    CV CORONARY ANGIOGRAM N/A 02/01/2024    Procedure: Coronary Angiogram;  Surgeon: Delroy Carpio MD;  Location:  HEART CARDIAC CATH LAB    CV FRACTIONAL FLOW RATIO WIRE N/A 07/22/2021    Procedure: Fractional Flow Ratio Wire;  Surgeon: Adrian Crow MD;  Location: [facility] CATH LAB CV    CV LEFT HEART CATH N/A 07/22/2021    Procedure: Left Heart Cath;  Surgeon: Adrian Crow MD;  Location: [facility] CATH LAB CV    CV LEFT HEART CATHETERIZATION WITH LEFT VENTRICULOGRAM N/A 01/13/2019    Procedure: Left Heart Catheterization with Left Ventriculogram;  Surgeon: Cielo Che MD;  Location: [facility] Cath Lab;  Service: Cardiology    CV LEFT HEART CATHETERIZATION WITHOUT LEFT VENTRICULOGRAM Left 04/30/2020    Procedure: Left Heart Catheterization Without Left Ventriculogram;  Surgeon: Cielo Che MD;  Location: [facility] Cath Lab;  Service: Cardiology    CV PCI N/A 02/01/2024    Procedure: Percutaneous Coronary Intervention;  Surgeon: Delroy Carpio MD;  Location: [facility] CARDIAC CATH LAB    CV RIGHT HEART CATHETERIZATION N/A 04/30/2020    Procedure: Right Heart Catheterization;  Surgeon: Cielo Che MD;  Location: [facility] Cath Lab;  Service: Cardiology    EYE SURGERY      GENITOURINARY SURGERY      HERNIA REPAIR      OTHER SURGICAL HISTORY      Excise varicocele    STENT, CORONARY, DALE  2019    VASCULAR SURGERY         SOCIAL HISTORY:   Social History     Socioeconomic History    Marital status:      Spouse name: None    Number of children: None    Years of education: None    Highest education level: None   Tobacco Use    Smoking status: Never     Passive exposure: Past    Smokeless tobacco:  Never   Substance and Sexual Activity    Alcohol use: Never    Drug use: No    Sexual activity: Yes     Partners: Female   Other Topics Concern    Parent/sibling w/ CABG, MI or angioplasty before 65F 55M? Yes     Social Determinants of Health      [facility]    Financial Resource Strain    Received from [facility], [facility]    Social Connections     FAMILY HISTORY: No bleeding/clotting disorders nor problems with anesthesia.     ALLERGIES:   Allergies   Allergen Reactions    Amoxicillin Hives     & generalized pain    Venlafaxine      lethargic       MEDICATIONS:  Current Facility-Administered Medications   Medication Dose Route Frequency Provider Last Rate Last Admin    [START ON 7/23/2024] acetaminophen (TYLENOL) tablet 650 mg  650 mg Oral Q4H PRN Harrison Whitehead MD        acetaminophen (TYLENOL) tablet 975 mg  975 mg Oral Q8H Harrison Whtiehead MD   975 mg at 07/21/24 0355    albuterol (PROVENTIL HFA/VENTOLIN HFA) inhaler  2 puff Inhalation Q4H PRN Harrison Whitehead MD        aspirin (ASA) chewable tablet 81 mg  81 mg Oral or Feeding Tube Daily Bing Winslow MD        [START ON 7/23/2024] bisacodyl (DULCOLAX) suppository 10 mg  10 mg Rectal Daily PRN Harrison Whitehead MD        calcium carbonate-vitamin D (OSCAL) 500-5 MG-MCG per tablet 1 tablet  1 tablet Oral BID w/meals Harrison Whitehead MD        chlorhexidine (PERIDEX) 0.12 % solution 15 mL  15 mL Mouth/Throat Q12H Farzad Carreno PA-C   15 mL at 07/20/24 2011    dextrose 10% BOLUS 300 mL  300 mL Intravenous Once Bing Winslow MD        dextrose 10% infusion   Intravenous Continuous PRN Farzad Carreno PA-C        dextrose 5% in lactated ringers 1,000 mL infusion   Intravenous  Continuous Bing Winslow MD 75 mL/hr at 07/21/24 0700 Rate Verify at 07/21/24 0700    glucose gel 15-30 g  15-30 g Oral Q15 Min PRN Julienne Ibrahim PA-C        Or    dextrose 50 % injection 25-50 mL  25-50 mL Intravenous Q15 Min PRN Julienne Ibrahim PA-C        Or    glucagon injection 1 mg  1 mg Subcutaneous Q15 Min PRN Julienne Ibrahim PA-C        EPINEPHrine (ADRENALIN) 5 mg in sodium chloride 0.9 % 250 mL infusion CENTRAL  0.01-0.3 mcg/kg/min Intravenous Continuous Farzad Carreno PA-C   Held at 07/20/24 1152    fentaNYL (SUBLIMAZE) 50 mcg/mL bolus from pump  50 mcg Intravenous Q1H PRN Farzad Carreno PA-C        fentaNYL (SUBLIMAZE) infusion   mcg/hr Intravenous Continuous Farzad Carreno PA-C 1.5 mL/hr at 07/21/24 0700 75 mcg/hr at 07/21/24 0700    furosemide (LASIX) 100 mg in sodium chloride 0.9 % 100 mL infusion  10 mg/hr Intravenous Continuous Harrison Whitehead MD 10 mL/hr at 07/21/24 0700 10 mg/hr at 07/21/24 0700    heparin (porcine) 100 unit/mL in 0.45% Sodium Chloride ANTICOAGULANT infusion  200 Units/hr Intravenous Continuous Harrison Whitehead MD 2 mL/hr at 07/21/24 0700 200 Units/hr at 07/21/24 0700    HYDROmorphone (PF) (DILAUDID) injection 0.2 mg  0.2 mg Intravenous Q2H PRN Harrison Whitehead MD        Or    HYDROmorphone (PF) (DILAUDID) injection 0.4 mg  0.4 mg Intravenous Q2H PRN Harrison Whitehead MD        insulin regular (MYXREDLIN) 1 unit/mL infusion  0-24 Units/hr Intravenous Continuous Farzad Carreno PA-C 0.5 mL/hr at 07/21/24 0700 0.5 Units/hr at 07/21/24 0700    [START ON 7/22/2024] magnesium hydroxide (MILK OF MAGNESIA) suspension 30 mL  30 mL Oral Daily PRN Harrison Whitehead MD        [START ON 7/22/2024] magnesium oxide (MAG-OX) tablet 400 mg  400 mg Oral Q24H Harrison Whitehead MD        propofol (DIPRIVAN) infusion   5-75 mcg/kg/min Intravenous Continuous Farzad Carreno PA-C 21.5 mL/hr at 07/21/24 0700 35 mcg/kg/min at 07/21/24 0700    And    propofol (DIPRIVAN) bolus from bag or syringe pump  10 mg Intravenous Q15 Min PRN Farzad Carreno PA-C        And    Medication Instruction   Does not apply Continuous PRN Farzad Carreno PA-C        meropenem (MERREM) 500 mg vial to attach to  mL bag for ADULTS or 25 mL bag for PEDS  500 mg Intravenous Q12H Harrison Whitehead MD   500 mg at 07/20/24 2253    methocarbamol (ROBAXIN) tablet 750 mg  750 mg Oral Q6H PRN Harrison Whitehead MD        micafungin (MYCAMINE) 100 mg in sodium chloride 0.9 % 100 mL intermittent infusion  100 mg Intravenous Q24H Harrison Whitehead  mL/hr at 07/20/24 2015 100 mg at 07/20/24 2015    mycophenolate mofetil (CELLCEPT) 1,000 mg in D5W intermittent infusion  1,000 mg Intravenous BID IS Julienne Ibrahim PA-C 137.5 mL/hr at 07/20/24 1724 1,000 mg at 07/20/24 1724    naloxone (NARCAN) injection 0.2 mg  0.2 mg Intravenous Q2 Min PRN Harrison Whitehead MD        Or    naloxone (NARCAN) injection 0.4 mg  0.4 mg Intravenous Q2 Min PRN Harrison Whitehead MD        Or    naloxone (NARCAN) injection 0.2 mg  0.2 mg Intramuscular Q2 Min PRN Harrison Whitehead MD        Or    naloxone (NARCAN) injection 0.4 mg  0.4 mg Intramuscular Q2 Min PRN Harrison Whitehead MD        nitroGLYcerin (NITROSTAT) sublingual tablet 0.4 mg  0.4 mg Sublingual Q5 Min PRN Harrison Whitehead MD        norepinephrine (LEVOPHED) 16 mg in  mL infusion MAX CONC CENTRAL LINE  0.01-0.6 mcg/kg/min Intravenous Continuous Farzad Carreno PA-C 1.9 mL/hr at 07/21/24 0700 0.02 mcg/kg/min at 07/21/24 0700    octreotide (sandoSTATIN) injection 100 mcg  100 mcg Subcutaneous Q12H Harrison Whitehead MD   100 mcg  at 07/20/24 2038    ondansetron (ZOFRAN ODT) ODT tab 4 mg  4 mg Oral Q6H PRN Harrison Whitehead MD        Or    ondansetron (ZOFRAN) injection 4 mg  4 mg Intravenous Q6H PRN Harrison Whitehead MD        pantoprazole (PROTONIX) 2 mg/mL suspension 40 mg  40 mg Per NG tube Daily Harrison Whitehead MD   40 mg at 07/20/24 1251    polyethylene glycol (MIRALAX) Packet 17 g  17 g Oral Daily Harrison Whitehead MD        prochlorperazine (COMPAZINE) injection 10 mg  10 mg Intravenous Q6H PRN Harrison Whitehead MD        Or    prochlorperazine (COMPAZINE) tablet 10 mg  10 mg Oral Q6H PRN Harrison Whitehead MD        senna-docusate (SENOKOT-S/PERICOLACE) 8.6-50 MG per tablet 1 tablet  1 tablet Oral BID Harrison Whitehead MD   1 tablet at 07/20/24 2010    sodium chloride (PF) 0.9% PF flush 3 mL  3 mL Intravenous Q1H PRN Harrison Whitehead MD        sodium chloride (PF) 0.9% PF flush 3 mL  3 mL Intravenous Q8H Harrison Whitehead MD        sodium chloride (PF) 0.9% PF flush 3 mL  3 mL Intravenous Q1H PRN Harrison Whitehead MD        sodium chloride 0.45 % 1,000 mL infusion   Intravenous Continuous Harrison Whitehead MD   Paused at 07/21/24 0205    sodium chloride 0.9 % 1,000 mL infusion   Intravenous Continuous Harrison Whitehead  mL/hr at 07/21/24 0700 Rate Change at 07/21/24 0700    tacrolimus (GENERIC) suspension 3 mg  3 mg Oral or NG Tube BID IS Harrison Whitehead MD   3 mg at 07/20/24 1731    ticagrelor (BRILINTA) tablet 90 mg  90 mg Oral or Feeding Tube BID Bing Winslow MD   90 mg at 07/20/24 2010    [START ON 7/22/2024] valGANciclovir (VALCYTE) tablet 450 mg  450 mg Oral Once per day on Monday Thursday Harrison Whitehead MD           PHYSICAL EXAMINATION:  Temp:  [95.9  F (35.5  C)-99  F (37.2  C)] 96.8   F (36  C)  Pulse:  [75-97] 82  Resp:  [3-21] 16  BP: (94)/(60) 94/60  MAP:  [62 mmHg-86 mmHg] 73 mmHg  Arterial Line BP: ()/(50-69) 109/55  FiO2 (%):  [45 %-80 %] 45 %  SpO2:  [94 %-100 %] 99 %    General: Intubated, sedated  HEENT: Mucous membranes pink, moist, atraumatic. NG in place   Neuro: Alert off sedation. Opening eyes spontaneously. FSC x 4. AGUILAR x 4. PERRL.    Pulm/Resp: Coarse breath sounds bilaterally without rhonchi, crackles or wheeze. Full vent. Moderate secretions upon suctioning.   CV: RRR with frequent PVCs, no murmurs or extra heart sounds audible upon auscultation.   Abdomen: Soft, mildly distended. AMILCAR drain serosang. Incision dry.   : + olea catheter in place, urine yellow and clear  Incisions/Skin: as above   MSK/Extremities: no peripheral edema, peripheral pulses intact, extremities well perfused.    LABS: Reviewed.   Arterial Blood Gases   Recent Labs   Lab 07/21/24  0415 07/21/24  0000 07/20/24 2022 07/20/24  1422   PH 7.38 7.39 7.38 7.36   PCO2 45 45 46* 45   PO2 101 90 90 77*   HCO3 27 27 27 25     Complete Blood Count   Recent Labs   Lab 07/21/24  0612 07/21/24  0206 07/20/24  2101 07/20/24  1816   WBC 11.7* 12.1* 12.3* 11.8*   HGB 8.8* 8.8* 9.5* 9.5*   * 112* 137* 146*     Basic Metabolic Panel  Recent Labs   Lab 07/21/24  0636 07/21/24  0550 07/21/24  0500 07/21/24  0358 07/21/24  0255 07/21/24  0206 07/20/24  2148 07/20/24  2101 07/20/24  1900 07/20/24  1816 07/20/24  1813 07/20/24  1714 07/20/24  1501 07/20/24  1422   NA  --   --   --   --   --  138  --  138  --  136  --   --   --  134*   POTASSIUM  --   --   --   --   --  5.0  --  5.3  --  5.1  --  5.1  --  5.6*   CHLORIDE  --   --   --   --   --  103  --  103  --  100  --   --   --  99   CO2  --   --   --   --   --  25  --  25  --  25  --   --   --  24   BUN  --   --   --   --   --  34.2*  --  33.4*  --  33.1*  --   --   --  32.5*   CR  --   --   --   --   --  5.11*  --  5.29*  --  5.52*  --   --   --  5.58*   GLC  120* 119* 118* 114*   < > 112*   < > 87   < > 108*   < >  --    < > 186*    < > = values in this interval not displayed.     Liver Function Tests  Recent Labs   Lab 07/21/24  0206 07/20/24  2101 07/20/24  1816 07/20/24  1422 07/20/24  1138 07/20/24  0821 07/20/24  0600 07/19/24  1656   * 411* 364* 271*   < > 65*  --   --    ALT 80* 81* 75* 63   < > 43  --   --    ALKPHOS 60 65 62 62   < > 59  --  104   BILITOTAL 0.4 0.5 0.5 0.5   < > 0.6  --  0.5   ALBUMIN 3.0* 3.1* 3.1* 3.1*   < > 3.1*  --  5.5*   INR  --   --   --   --   --  1.39* 1.44* 0.96    < > = values in this interval not displayed.     Pancreatic Enzymes  Recent Labs   Lab 07/20/24  0600 07/19/24  1656   LIPASE 211* 67*   AMYLASE 171* 88     Coagulation Profile  Recent Labs   Lab 07/20/24  0821 07/20/24  0600 07/19/24  1656   INR 1.39* 1.44* 0.96   PTT 28 27 31       IMAGING:  Recent Results (from the past 24 hour(s))   Cardiac Catheterization    Narrative    Inferior STEMI.  Cardiac arrest.  VT/VF.  Severe two vessel CAD with prior CABG with LIMA to LAD, PCI to D1, and PCI   to mid RCA.  Patent LIMA to LAD.  Patent stent in the D1 with minimal ISR.  In stent thrombosis of the mid RCA stent culprit in inferior STEMI.  Aspiration thrombectomy of the RCA.  Intravascular lithotripsy of the RCA.  PCI of the RCA with two drug eluting stents.  IVUS of the RCA.     US Renal Transplant with Doppler    Narrative    ULTRASOUND RENAL TRANSPLANT WITH DOPPLER  7/20/2024 12:16 PM    CLINICAL HISTORY: s/p DDKT - please assess flows. New left lower  quadrant renal transplant.    COMPARISONS: None available.    REFERRING PROVIDER: WING RIVAS    TECHNIQUE: Left lower quadrant renal transplant evaluated with  grayscale, color Doppler, and Doppler waveform ultrasound. Transplant  renal vasculature evaluated with color Doppler and Doppler waveform  ultrasound.    Cine clips through the renal transplant and bladder were saved in the  patient's  record.    FINDINGS:   LEFT LOWER QUADRANT RENAL TRANSPLANT:         Fluid collections: Trace free fluid around the transplant.         Renal artery:            Hilum: 123/47 cm/s            Mid: 250/78 cm/s            Anastomosis: 311/125 cm/s         Renal artery - iliac ratio: 3.45         Renal vein:            Hilum: 29 cm/s            Mid: 49 cm/s            Anastomosis: 107 cm/s         Renal vein - iliac ratio: 2.18         Length: 9.5 cm         Segmental artery resistive index (superior / mid / inferior):  0.58 / 0.57 / 0.57         Parenchyma: Normal. No stone, mass, or hydronephrosis.    Iliac artery:       Above anastomosis: 90/12 cm/s       Below anastomosis: 70/0 cm/s    Iliac vein:       Above anastomosis: 49 cm/s       Below anastomosis: 18 cm/s    Bladder: Decompressed by Burt catheter.      Impression    IMPRESSION:   1. Elevated velocity at the renal transplant arterial anastomosis, 311  cm/s, but velocity ratio just under 3.5. Possibly due to  post-operative edema. Attention on follow-up.    2. No transplant renal venous stenosis demonstrated.    3. Normal segmental artery resistive indices.    4. Trace perinephric free fluid. Negative grayscale appearance of the  renal transplant.    GUIDELINES:  For native kidneys:       Diagnostic criteria suggestive of > 60% diameter stenosis             Renal artery:             Peak systolic velocity > 180 cm/s             RAR > 3.5             Turbulent flow         Arcuate/Segmental artery Resistive Index Normal < 0.7    For renal transplants:       Renal transplant peak systolic velocity criteria range from > 180  cm/s to > 400 cm/s       Source suggests using:            Peak systolic velocity > or = 300 cm/s            Spectral broadening            Intraparenchymal arterial acceleration time > or = 0.1 s     Source: Jose Daniel ZHONG, Francheska FITZGERALD, Mahamed BEST, et al. Screening for  transplant renal artery stenosis: Ultrasound-based stenosis  probability  stratification. American Journal of Roentgenology.  2017;209: 2561-0643. [id number]    MARGOT JONES MD         SYSTEM ID:  [id number]   US Pancreas Transplant    Narrative    EXAMINATION: US PANCREAS TRANSPLANT, 2024 12:16 PM     COMPARISON: None.    HISTORY: sp SPK= please assess flows    TECHNIQUE:  Grey-scale, color Doppler and spectral flow analysis.    Findings: The transplanted pancreas is located in the the right lower  quadrant and demonstrates normal echogenicity. There is no free fluid  adjacent to the transplant pancreas.     Transplant pancreas arterial flow:   Within pancreas: 7 - 26 cm/sec.   Outside pancreas: 48 cm/sec.   Anastomosis: 45 cm/sec.    Transplant pancreas venous flow:  Within pancreas: 8 cm/sec.   Outside pancreas: 12 cm/sec.   Anastomosis: 52 cm/sec.    Iliac artery:  Above the anastomosis: 88 cm/sec.  Below the anastomosis: 55 cm/sec.    Iliac vein  Above the transplant: Patent  Below the transplant: Patent    There are 2 right lower quadrant graft arteries with peak systolic  velocities of 232 and 86 cm/s respectively.      Impression    Impression:   1.  Right lower quadrant pancreas transplant without evidence of  peritransplant fluid collections  2.  Patent doppler evaluation without evidence of arterial or venous  stenosis.      I have personally reviewed the examination and initial interpretation  and I agree with the findings.    PATRICK SYKES MD         SYSTEM ID:  [id number]   Echo Complete   Result Value    Biplane LVEF 37%    LVEF  35-40% (moderately reduced)    Narrative    [id number]  [id number]  [id number]  [id number]^INOCENTE^KELTON^WILLY     [facility],[place]  Echocardiography Laboratory  [address] [place] [id number]     Name: ISABEL ARBOLEDA  MRN: [id number]  : 1975  Study Date: 2024 12:23 PM  Age: 49 yrs  Gender: Male  Patient Location: [facility]  Reason For Study: Cardiac Arrest  Ordering Physician: INOCENTE  KELTON AGUILERA  Performed By: Renetta Smith [facility], [place]     BSA: 2.2 m2  Height: 71 in  Weight: 225 lb  HR: 78  BP: 90/52 mmHg  ______________________________________________________________________________  Procedure  Complete Portable Echo Adult.  ______________________________________________________________________________  Interpretation Summary  Moderately reduced LV function, LVEF=37%.  Moderate diffuse hypokinesis with akinesis of all inferior segments.  Mild right ventricular dilation is present. Global right ventricular function  is moderately reduced.  No pericardial effusion is present.  This study was compared with the study from 8/28/23: LVEF has decreased and  regional wall motion abnormalities are new.  ______________________________________________________________________________  Left Ventricle  Biplane LVEF is 37%. Left ventricular diastolic function is abnormal. Left  ventricular function is decreased. The ejection fraction is 35-40% (moderately  reduced). Abnormal non-specific septal motion is present. Moderate diffuse  hypokinesis is present. Inferior wall akinesis is present.     Right Ventricle  Mild right ventricular dilation is present. Global right ventricular function  is moderately reduced.     Atria  The atria cannot be assessed.     Mitral Valve  Mild mitral insufficiency is present.     Aortic Valve  Trileaflet aortic sclerosis without stenosis.     Tricuspid Valve  The tricuspid valve is normal. Trace tricuspid insufficiency is present. The  peak velocity of the tricuspid regurgitant jet is not obtainable. Pulmonary  artery systolic pressure cannot be assessed.     Pulmonic Valve  The pulmonic valve is normal.     Vessels  The aorta root is normal. Dilation of the inferior vena cava is present with  abnormal respiratory variation in diameter.     Pericardium  No pericardial effusion is present.     Compared to Previous Study  This study was compared with the study from 8/28/23 . LVEF has  decreased and  regional wall motion abnormalities are new.  ______________________________________________________________________________  MMode/2D Measurements & Calculations     IVSd: 1.2 cm  LVIDd: 4.8 cm  LVIDs: 3.7 cm  LVPWd: 0.95 cm  FS: 23.8 %  LV mass(C)d: 185.5 grams  LV mass(C)dI: 83.7 grams/m2  Ao root diam: 3.2 cm  asc Aorta Diam: 3.1 cm  LVOT diam: 2.4 cm  LVOT area: 4.5 cm2     EF(MOD-bp): 37.0 %  Ao root diam index Ht(cm/m): 1.8  Ao root diam index BSA (cm/m2): 1.4  Asc Ao diam index BSA (cm/m2): 1.4  Asc Ao diam index Ht(cm/m): 1.7  RWT: 0.39  TAPSE: 1.1 cm     Doppler Measurements & Calculations  MV E max holley: 59.2 cm/sec  MV A max holley: 55.0 cm/sec  MV E/A: 1.1  MV dec slope: 295.1 cm/sec2  MV dec time: 0.20 sec  TV V2 max: 170.4 cm/sec  TV max P.6 mmHg  PA acc time: 0.11 sec  E/E' av.9  Lateral E/e': 6.7  Medial E/e': 9.1     ______________________________________________________________________________  Report approved by: Brianda Velásquez 2024 01:18 PM         XR Chest Port 1 View    Narrative    Exam: XR CHEST PORT 1 VIEW  2024 12:47 PM     History:  post-op       Comparison:  Same day chest radiograph 6:57    Findings: Single view of the chest.      Endotracheal tube tip terminates approximately 5.3 cm from the tip of  the bonifacio. Postsurgical changes of median sternotomy. Left upper  extremity PICC tip terminates in the mid SVC. Gastric tube sidehole  projects over the area of the stomach.     Trachea is midline. The cardiomediastinal silhouette is stable. Low  lung volumes. Slightly decreased perihilar opacities. Increased left  basilar and retrocardiac opacities. Unchanged right basilar opacities.  The visualized upper abdomen is unremarkable.      Impression    Impression:   1. ET tube terminates in the mid to upper thoracic trachea.  2. Slightly decreased mild pulmonary edema.  3. Similar low lung volumes and increased left basilar atelectasis.    I have  personally reviewed the examination and initial interpretation  and I agree with the findings.    ÁLVARO MAYA MD         SYSTEM ID:  [id number]   XR Abdomen Port 1 View    Narrative    EXAM: XR ABDOMEN PORT 1 VIEW  7/20/2024 12:48 PM     HISTORY:  OG tube       TECHNIQUE: Single frontal radiograph of the abdomen    COMPARISON:  CT abdomen 6/28/2024    FINDINGS:   Gastric tube sidehole and tip project over the stomach. Partially  visualized postsurgical changes of the lower abdomen and median  sternotomy wires. Nonobstructive bowel gas pattern No pneumatosis or  portal venous gas.      Impression    IMPRESSION: Gastric tube sidehole and tip projected over the stomach.     I have personally reviewed the examination and initial interpretation  and I agree with the findings.    PATRICK SYKES MD         SYSTEM ID:  [id number]

## 2024-07-21 NOTE — PROGRESS NOTES
[facility], Procedure Note          Extubation:       Siva Ramey  MRN# [id number]   July 21, 2024, 6:38 PM         Patient extubated at: July 21, 2024, 6:38 PM   Supplemental Oxygen: Via nasal cannula at 4 liters per minute   Cough: The cough is good   Secretion Mode: Able to clear   Secretion Amount: Moderate amount, moderately thick and white / yellow in color   Respiratory Exam:: Breath sounds: good aeration and clear     Location: bilaterally   Skin Exam:: Patient color: natural   Patient Status: Currently appears comfortable   Arterial Blood Gasses: pH Arterial (no units)   Date Value   07/21/2024 7.39     pO2 Arterial (mm Hg)   Date Value   07/21/2024 125 (H)     pCO2 Arterial (mm Hg)   Date Value   07/21/2024 45     Bicarbonate Arterial (mmol/L)   Date Value   07/21/2024 27            Recorded by Leda Cutler, RT

## 2024-07-22 ENCOUNTER — APPOINTMENT (OUTPATIENT)
Dept: GENERAL RADIOLOGY | Facility: CLINIC | Age: 49
DRG: 008 | End: 2024-07-22
Attending: PHYSICIAN ASSISTANT
Payer: MEDICARE

## 2024-07-22 ENCOUNTER — APPOINTMENT (OUTPATIENT)
Dept: GENERAL RADIOLOGY | Facility: CLINIC | Age: 49
DRG: 008 | End: 2024-07-22
Attending: NURSE PRACTITIONER
Payer: MEDICARE

## 2024-07-22 LAB
ALBUMIN SERPL BCG-MCNC: 3.3 G/DL (ref 3.5–5.2)
ALBUMIN SERPL BCG-MCNC: 3.4 G/DL (ref 3.5–5.2)
ALBUMIN SERPL BCG-MCNC: 3.6 G/DL (ref 3.5–5.2)
ALLEN'S TEST: ABNORMAL
ALLEN'S TEST: ABNORMAL
ALP SERPL-CCNC: 67 U/L (ref 40–150)
ALP SERPL-CCNC: 68 U/L (ref 40–150)
ALP SERPL-CCNC: 72 U/L (ref 40–150)
ALT SERPL W P-5'-P-CCNC: 75 U/L (ref 0–70)
ALT SERPL W P-5'-P-CCNC: 76 U/L (ref 0–70)
ALT SERPL W P-5'-P-CCNC: 79 U/L (ref 0–70)
AMYLASE SERPL-CCNC: 126 U/L (ref 28–100)
ANION GAP SERPL CALCULATED.3IONS-SCNC: 11 MMOL/L (ref 7–15)
ANION GAP SERPL CALCULATED.3IONS-SCNC: 12 MMOL/L (ref 7–15)
ANION GAP SERPL CALCULATED.3IONS-SCNC: 13 MMOL/L (ref 7–15)
AST SERPL W P-5'-P-CCNC: 193 U/L (ref 0–45)
AST SERPL W P-5'-P-CCNC: 211 U/L (ref 0–45)
AST SERPL W P-5'-P-CCNC: 234 U/L (ref 0–45)
ATRIAL RATE - MUSE: 100 BPM
ATRIAL RATE - MUSE: 89 BPM
ATRIAL RATE - MUSE: 93 BPM
BASE EXCESS BLDA CALC-SCNC: 2.2 MMOL/L (ref -3–3)
BASE EXCESS BLDA CALC-SCNC: 2.8 MMOL/L (ref -3–3)
BASE EXCESS BLDV CALC-SCNC: 2.6 MMOL/L (ref -3–3)
BASOPHILS # BLD AUTO: 0 10E3/UL (ref 0–0.2)
BASOPHILS NFR BLD AUTO: 0 %
BILIRUB SERPL-MCNC: 0.4 MG/DL
BUN SERPL-MCNC: 41.1 MG/DL (ref 6–20)
BUN SERPL-MCNC: 42 MG/DL (ref 6–20)
BUN SERPL-MCNC: 45.2 MG/DL (ref 6–20)
CA-I BLD-MCNC: 5.2 MG/DL (ref 4.4–5.2)
CA-I BLD-MCNC: 5.2 MG/DL (ref 4.4–5.2)
CA-I BLD-MCNC: 5.3 MG/DL (ref 4.4–5.2)
CA-I BLD-MCNC: 5.3 MG/DL (ref 4.4–5.2)
CA-I BLD-MCNC: 5.4 MG/DL (ref 4.4–5.2)
CA-I BLD-MCNC: 5.4 MG/DL (ref 4.4–5.2)
CALCIUM SERPL-MCNC: 10 MG/DL (ref 8.8–10.4)
CALCIUM SERPL-MCNC: 9.4 MG/DL (ref 8.8–10.4)
CALCIUM SERPL-MCNC: 9.5 MG/DL (ref 8.8–10.4)
CHLORIDE SERPL-SCNC: 104 MMOL/L (ref 98–107)
CMV IGG SERPL IA-ACNC: >10 U/ML
CMV IGG SERPL IA-ACNC: ABNORMAL
CMV IGM SERPL IA-ACNC: 12.8 AU/ML
CMV IGM SERPL IA-ACNC: NEGATIVE
COHGB MFR BLD: 91.3 % (ref 96–97)
COHGB MFR BLD: 92.1 % (ref 96–97)
CREAT SERPL-MCNC: 3.95 MG/DL (ref 0.67–1.17)
CREAT SERPL-MCNC: 4.11 MG/DL (ref 0.67–1.17)
CREAT SERPL-MCNC: 4.18 MG/DL (ref 0.67–1.17)
DIASTOLIC BLOOD PRESSURE - MUSE: NORMAL MMHG
EBV VCA IGG SER IA-ACNC: 77.9 U/ML
EBV VCA IGG SER IA-ACNC: POSITIVE
EBV VCA IGM SER IA-ACNC: <10 U/ML
EBV VCA IGM SER IA-ACNC: NORMAL
EGFRCR SERPLBLD CKD-EPI 2021: 17 ML/MIN/1.73M2
EGFRCR SERPLBLD CKD-EPI 2021: 17 ML/MIN/1.73M2
EGFRCR SERPLBLD CKD-EPI 2021: 18 ML/MIN/1.73M2
EOSINOPHIL # BLD AUTO: 0 10E3/UL (ref 0–0.7)
EOSINOPHIL NFR BLD AUTO: 0 %
ERYTHROCYTE [DISTWIDTH] IN BLOOD BY AUTOMATED COUNT: 13 % (ref 10–15)
ERYTHROCYTE [DISTWIDTH] IN BLOOD BY AUTOMATED COUNT: 13 % (ref 10–15)
ERYTHROCYTE [DISTWIDTH] IN BLOOD BY AUTOMATED COUNT: 13.2 % (ref 10–15)
GLUCOSE BLDC GLUCOMTR-MCNC: 133 MG/DL (ref 70–99)
GLUCOSE BLDC GLUCOMTR-MCNC: 135 MG/DL (ref 70–99)
GLUCOSE BLDC GLUCOMTR-MCNC: 137 MG/DL (ref 70–99)
GLUCOSE BLDC GLUCOMTR-MCNC: 138 MG/DL (ref 70–99)
GLUCOSE BLDC GLUCOMTR-MCNC: 141 MG/DL (ref 70–99)
GLUCOSE BLDC GLUCOMTR-MCNC: 148 MG/DL (ref 70–99)
GLUCOSE BLDC GLUCOMTR-MCNC: 167 MG/DL (ref 70–99)
GLUCOSE BLDC GLUCOMTR-MCNC: 182 MG/DL (ref 70–99)
GLUCOSE BLDC GLUCOMTR-MCNC: 184 MG/DL (ref 70–99)
GLUCOSE BLDC GLUCOMTR-MCNC: 184 MG/DL (ref 70–99)
GLUCOSE BLDC GLUCOMTR-MCNC: 187 MG/DL (ref 70–99)
GLUCOSE BLDC GLUCOMTR-MCNC: 192 MG/DL (ref 70–99)
GLUCOSE BLDC GLUCOMTR-MCNC: 200 MG/DL (ref 70–99)
GLUCOSE SERPL-MCNC: 133 MG/DL (ref 70–99)
GLUCOSE SERPL-MCNC: 141 MG/DL (ref 70–99)
GLUCOSE SERPL-MCNC: 143 MG/DL (ref 70–99)
HBV DNA SERPL QL NAA+PROBE: NORMAL
HCO3 BLD-SCNC: 27 MMOL/L (ref 21–28)
HCO3 BLD-SCNC: 27 MMOL/L (ref 21–28)
HCO3 BLDV-SCNC: 28 MMOL/L (ref 21–28)
HCO3 SERPL-SCNC: 24 MMOL/L (ref 22–29)
HCO3 SERPL-SCNC: 24 MMOL/L (ref 22–29)
HCO3 SERPL-SCNC: 25 MMOL/L (ref 22–29)
HCT VFR BLD AUTO: 26.7 % (ref 40–53)
HCT VFR BLD AUTO: 26.8 % (ref 40–53)
HCT VFR BLD AUTO: 26.8 % (ref 40–53)
HCV RNA SERPL QL NAA+PROBE: NORMAL
HGB BLD-MCNC: 9 G/DL (ref 13.3–17.7)
HGB BLD-MCNC: 9.1 G/DL (ref 13.3–17.7)
HGB BLD-MCNC: 9.2 G/DL (ref 13.3–17.7)
HIV1+2 RNA SERPL QL NAA+PROBE: NORMAL
IMM GRANULOCYTES # BLD: 0 10E3/UL
IMM GRANULOCYTES NFR BLD: 0 %
INTERPRETATION ECG - MUSE: NORMAL
LACTATE SERPL-SCNC: 1.3 MMOL/L (ref 0.7–2)
LACTATE SERPL-SCNC: 1.3 MMOL/L (ref 0.7–2)
LACTATE SERPL-SCNC: 1.4 MMOL/L (ref 0.7–2)
LACTATE SERPL-SCNC: 1.4 MMOL/L (ref 0.7–2)
LACTATE SERPL-SCNC: 1.8 MMOL/L (ref 0.7–2)
LACTATE SERPL-SCNC: 2.3 MMOL/L (ref 0.7–2)
LIPASE SERPL-CCNC: 169 U/L (ref 13–60)
LYMPHOCYTES # BLD AUTO: 0.1 10E3/UL (ref 0.8–5.3)
LYMPHOCYTES NFR BLD AUTO: 2 %
MAGNESIUM SERPL-MCNC: 1.9 MG/DL (ref 1.7–2.3)
MAGNESIUM SERPL-MCNC: 2 MG/DL (ref 1.7–2.3)
MAGNESIUM SERPL-MCNC: 2.1 MG/DL (ref 1.7–2.3)
MAGNESIUM SERPL-MCNC: 2.1 MG/DL (ref 1.7–2.3)
MCH RBC QN AUTO: 32.5 PG (ref 26.5–33)
MCH RBC QN AUTO: 32.7 PG (ref 26.5–33)
MCH RBC QN AUTO: 32.7 PG (ref 26.5–33)
MCHC RBC AUTO-ENTMCNC: 33.6 G/DL (ref 31.5–36.5)
MCHC RBC AUTO-ENTMCNC: 34 G/DL (ref 31.5–36.5)
MCHC RBC AUTO-ENTMCNC: 34.5 G/DL (ref 31.5–36.5)
MCV RBC AUTO: 95 FL (ref 78–100)
MCV RBC AUTO: 96 FL (ref 78–100)
MCV RBC AUTO: 98 FL (ref 78–100)
MONOCYTES # BLD AUTO: 0.1 10E3/UL (ref 0–1.3)
MONOCYTES NFR BLD AUTO: 2 %
NEUTROPHILS # BLD AUTO: 5.2 10E3/UL (ref 1.6–8.3)
NEUTROPHILS NFR BLD AUTO: 96 %
NRBC # BLD AUTO: 0 10E3/UL
NRBC BLD AUTO-RTO: 0 /100
O2/TOTAL GAS SETTING VFR VENT: 21 %
O2/TOTAL GAS SETTING VFR VENT: 35 %
O2/TOTAL GAS SETTING VFR VENT: 35 %
OXYHGB MFR BLDV: 77 % (ref 70–75)
P AXIS - MUSE: 47 DEGREES
P AXIS - MUSE: 53 DEGREES
P AXIS - MUSE: 53 DEGREES
PA ADP BLD-ACNC: 188 PRU
PCO2 BLD: 40 MM HG (ref 35–45)
PCO2 BLD: 42 MM HG (ref 35–45)
PCO2 BLDV: 45 MM HG (ref 40–50)
PEEP: 0 CM H2O
PH BLD: 7.42 [PH] (ref 7.35–7.45)
PH BLD: 7.44 [PH] (ref 7.35–7.45)
PH BLDV: 7.4 [PH] (ref 7.32–7.43)
PHOSPHATE SERPL-MCNC: 5.4 MG/DL (ref 2.5–4.5)
PHOSPHATE SERPL-MCNC: 5.5 MG/DL (ref 2.5–4.5)
PHOSPHATE SERPL-MCNC: 5.7 MG/DL (ref 2.5–4.5)
PLATELET # BLD AUTO: 93 10E3/UL (ref 150–450)
PLATELET # BLD AUTO: 96 10E3/UL (ref 150–450)
PLATELET # BLD AUTO: 98 10E3/UL (ref 150–450)
PO2 BLD: 63 MM HG (ref 80–105)
PO2 BLD: 67 MM HG (ref 80–105)
PO2 BLDV: 44 MM HG (ref 25–47)
POTASSIUM SERPL-SCNC: 4.7 MMOL/L (ref 3.4–5.3)
POTASSIUM SERPL-SCNC: 4.9 MMOL/L (ref 3.4–5.3)
POTASSIUM SERPL-SCNC: 5 MMOL/L (ref 3.4–5.3)
PR INTERVAL - MUSE: 182 MS
PR INTERVAL - MUSE: 196 MS
PR INTERVAL - MUSE: 204 MS
PROT SERPL-MCNC: 5.4 G/DL (ref 6.4–8.3)
PROT SERPL-MCNC: 5.5 G/DL (ref 6.4–8.3)
PROT SERPL-MCNC: 5.8 G/DL (ref 6.4–8.3)
QRS DURATION - MUSE: 104 MS
QRS DURATION - MUSE: 114 MS
QRS DURATION - MUSE: 96 MS
QT - MUSE: 368 MS
QT - MUSE: 382 MS
QT - MUSE: 392 MS
QTC - MUSE: 457 MS
QTC - MUSE: 476 MS
QTC - MUSE: 492 MS
R AXIS - MUSE: 50 DEGREES
R AXIS - MUSE: 61 DEGREES
R AXIS - MUSE: 79 DEGREES
RBC # BLD AUTO: 2.75 10E6/UL (ref 4.4–5.9)
RBC # BLD AUTO: 2.8 10E6/UL (ref 4.4–5.9)
RBC # BLD AUTO: 2.81 10E6/UL (ref 4.4–5.9)
SAO2 % BLDA: 90 % (ref 92–100)
SAO2 % BLDA: 91 % (ref 92–100)
SAO2 % BLDV: 79.1 % (ref 70–75)
SODIUM SERPL-SCNC: 140 MMOL/L (ref 135–145)
SODIUM SERPL-SCNC: 140 MMOL/L (ref 135–145)
SODIUM SERPL-SCNC: 141 MMOL/L (ref 135–145)
SYSTOLIC BLOOD PRESSURE - MUSE: NORMAL MMHG
T AXIS - MUSE: 103 DEGREES
T AXIS - MUSE: 93 DEGREES
T AXIS - MUSE: 96 DEGREES
TROPONIN T SERPL HS-MCNC: 3121 NG/L
TROPONIN T SERPL HS-MCNC: 3556 NG/L
TROPONIN T SERPL HS-MCNC: 3943 NG/L
UFH PPP CHRO-ACNC: <0.1 IU/ML
VENTRICULAR RATE- MUSE: 100 BPM
VENTRICULAR RATE- MUSE: 89 BPM
VENTRICULAR RATE- MUSE: 93 BPM
WBC # BLD AUTO: 5.4 10E3/UL (ref 4–11)
WBC # BLD AUTO: 5.5 10E3/UL (ref 4–11)
WBC # BLD AUTO: 5.9 10E3/UL (ref 4–11)

## 2024-07-22 PROCEDURE — 84484 ASSAY OF TROPONIN QUANT: CPT | Performed by: STUDENT IN AN ORGANIZED HEALTH CARE EDUCATION/TRAINING PROGRAM

## 2024-07-22 PROCEDURE — 258N000003 HC RX IP 258 OP 636: Performed by: NURSE PRACTITIONER

## 2024-07-22 PROCEDURE — 120N000002 HC R&B MED SURG/OB UMMC

## 2024-07-22 PROCEDURE — 258N000003 HC RX IP 258 OP 636: Performed by: SURGERY

## 2024-07-22 PROCEDURE — 83605 ASSAY OF LACTIC ACID: CPT | Performed by: PHYSICIAN ASSISTANT

## 2024-07-22 PROCEDURE — 93010 ELECTROCARDIOGRAM REPORT: CPT | Performed by: INTERNAL MEDICINE

## 2024-07-22 PROCEDURE — 85576 BLOOD PLATELET AGGREGATION: CPT

## 2024-07-22 PROCEDURE — 83735 ASSAY OF MAGNESIUM: CPT | Performed by: PHYSICIAN ASSISTANT

## 2024-07-22 PROCEDURE — 85025 COMPLETE CBC W/AUTO DIFF WBC: CPT | Performed by: STUDENT IN AN ORGANIZED HEALTH CARE EDUCATION/TRAINING PROGRAM

## 2024-07-22 PROCEDURE — 82150 ASSAY OF AMYLASE: CPT | Performed by: SURGERY

## 2024-07-22 PROCEDURE — 250N000011 HC RX IP 250 OP 636: Performed by: PHYSICIAN ASSISTANT

## 2024-07-22 PROCEDURE — 82330 ASSAY OF CALCIUM: CPT | Performed by: STUDENT IN AN ORGANIZED HEALTH CARE EDUCATION/TRAINING PROGRAM

## 2024-07-22 PROCEDURE — 83605 ASSAY OF LACTIC ACID: CPT | Performed by: STUDENT IN AN ORGANIZED HEALTH CARE EDUCATION/TRAINING PROGRAM

## 2024-07-22 PROCEDURE — 83735 ASSAY OF MAGNESIUM: CPT | Performed by: STUDENT IN AN ORGANIZED HEALTH CARE EDUCATION/TRAINING PROGRAM

## 2024-07-22 PROCEDURE — 82805 BLOOD GASES W/O2 SATURATION: CPT | Performed by: STUDENT IN AN ORGANIZED HEALTH CARE EDUCATION/TRAINING PROGRAM

## 2024-07-22 PROCEDURE — 85027 COMPLETE CBC AUTOMATED: CPT | Performed by: STUDENT IN AN ORGANIZED HEALTH CARE EDUCATION/TRAINING PROGRAM

## 2024-07-22 PROCEDURE — 71045 X-RAY EXAM CHEST 1 VIEW: CPT | Mod: 26 | Performed by: RADIOLOGY

## 2024-07-22 PROCEDURE — 250N000011 HC RX IP 250 OP 636: Performed by: NURSE PRACTITIONER

## 2024-07-22 PROCEDURE — 250N000013 HC RX MED GY IP 250 OP 250 PS 637: Performed by: SURGERY

## 2024-07-22 PROCEDURE — 250N000012 HC RX MED GY IP 250 OP 636 PS 637: Performed by: SURGERY

## 2024-07-22 PROCEDURE — 82040 ASSAY OF SERUM ALBUMIN: CPT | Performed by: STUDENT IN AN ORGANIZED HEALTH CARE EDUCATION/TRAINING PROGRAM

## 2024-07-22 PROCEDURE — 99233 SBSQ HOSP IP/OBS HIGH 50: CPT | Performed by: INTERNAL MEDICINE

## 2024-07-22 PROCEDURE — 258N000003 HC RX IP 258 OP 636: Performed by: PHYSICIAN ASSISTANT

## 2024-07-22 PROCEDURE — 83690 ASSAY OF LIPASE: CPT | Performed by: SURGERY

## 2024-07-22 PROCEDURE — 999N000065 XR ABDOMEN PORT 1 VIEW

## 2024-07-22 PROCEDURE — 82330 ASSAY OF CALCIUM: CPT | Performed by: PHYSICIAN ASSISTANT

## 2024-07-22 PROCEDURE — 250N000012 HC RX MED GY IP 250 OP 636 PS 637: Performed by: PHYSICIAN ASSISTANT

## 2024-07-22 PROCEDURE — 250N000013 HC RX MED GY IP 250 OP 250 PS 637: Performed by: STUDENT IN AN ORGANIZED HEALTH CARE EDUCATION/TRAINING PROGRAM

## 2024-07-22 PROCEDURE — 84460 ALANINE AMINO (ALT) (SGPT): CPT | Performed by: STUDENT IN AN ORGANIZED HEALTH CARE EDUCATION/TRAINING PROGRAM

## 2024-07-22 PROCEDURE — 30243S1 TRANSFUSION OF NONAUTOLOGOUS GLOBULIN INTO CENTRAL VEIN, PERCUTANEOUS APPROACH: ICD-10-PCS | Performed by: PHYSICIAN ASSISTANT

## 2024-07-22 PROCEDURE — 85520 HEPARIN ASSAY: CPT | Performed by: SURGERY

## 2024-07-22 PROCEDURE — 93005 ELECTROCARDIOGRAM TRACING: CPT

## 2024-07-22 PROCEDURE — 71045 X-RAY EXAM CHEST 1 VIEW: CPT

## 2024-07-22 PROCEDURE — 85027 COMPLETE CBC AUTOMATED: CPT | Performed by: SURGERY

## 2024-07-22 PROCEDURE — 250N000011 HC RX IP 250 OP 636: Performed by: STUDENT IN AN ORGANIZED HEALTH CARE EDUCATION/TRAINING PROGRAM

## 2024-07-22 PROCEDURE — 84100 ASSAY OF PHOSPHORUS: CPT | Performed by: STUDENT IN AN ORGANIZED HEALTH CARE EDUCATION/TRAINING PROGRAM

## 2024-07-22 PROCEDURE — 84484 ASSAY OF TROPONIN QUANT: CPT | Performed by: NURSE PRACTITIONER

## 2024-07-22 PROCEDURE — 74018 RADEX ABDOMEN 1 VIEW: CPT | Mod: 26 | Performed by: STUDENT IN AN ORGANIZED HEALTH CARE EDUCATION/TRAINING PROGRAM

## 2024-07-22 PROCEDURE — 250N000013 HC RX MED GY IP 250 OP 250 PS 637: Performed by: PHYSICIAN ASSISTANT

## 2024-07-22 PROCEDURE — 250N000011 HC RX IP 250 OP 636: Performed by: SURGERY

## 2024-07-22 PROCEDURE — 99291 CRITICAL CARE FIRST HOUR: CPT | Mod: 24 | Performed by: INTERNAL MEDICINE

## 2024-07-22 RX ORDER — ACETAMINOPHEN 325 MG/1
650 TABLET ORAL ONCE
Status: COMPLETED | OUTPATIENT
Start: 2024-07-22 | End: 2024-07-22

## 2024-07-22 RX ORDER — HYDRALAZINE HYDROCHLORIDE 20 MG/ML
10 INJECTION INTRAMUSCULAR; INTRAVENOUS EVERY 6 HOURS
Status: DISCONTINUED | OUTPATIENT
Start: 2024-07-22 | End: 2024-07-23

## 2024-07-22 RX ORDER — METHYLPREDNISOLONE SODIUM SUCCINATE 125 MG/2ML
100 INJECTION, POWDER, LYOPHILIZED, FOR SOLUTION INTRAMUSCULAR; INTRAVENOUS ONCE
Status: COMPLETED | OUTPATIENT
Start: 2024-07-22 | End: 2024-07-22

## 2024-07-22 RX ORDER — POLYETHYLENE GLYCOL 3350 17 G/17G
17 POWDER, FOR SOLUTION ORAL 2 TIMES DAILY
Status: DISCONTINUED | OUTPATIENT
Start: 2024-07-22 | End: 2024-07-30 | Stop reason: HOSPADM

## 2024-07-22 RX ORDER — AMOXICILLIN 250 MG
2 CAPSULE ORAL 2 TIMES DAILY
Status: DISCONTINUED | OUTPATIENT
Start: 2024-07-22 | End: 2024-07-30 | Stop reason: HOSPADM

## 2024-07-22 RX ORDER — LORAZEPAM 2 MG/ML
0.5 INJECTION INTRAMUSCULAR EVERY 6 HOURS PRN
Status: DISCONTINUED | OUTPATIENT
Start: 2024-07-22 | End: 2024-07-23

## 2024-07-22 RX ORDER — VALGANCICLOVIR 450 MG/1
450 TABLET, FILM COATED ORAL
Status: DISCONTINUED | OUTPATIENT
Start: 2024-07-22 | End: 2024-07-24

## 2024-07-22 RX ORDER — HYDRALAZINE HYDROCHLORIDE 20 MG/ML
10 INJECTION INTRAMUSCULAR; INTRAVENOUS EVERY 6 HOURS PRN
Status: DISCONTINUED | OUTPATIENT
Start: 2024-07-22 | End: 2024-07-25

## 2024-07-22 RX ORDER — DIPHENHYDRAMINE HCL 12.5MG/5ML
25-50 LIQUID (ML) ORAL ONCE
Status: COMPLETED | OUTPATIENT
Start: 2024-07-22 | End: 2024-07-22

## 2024-07-22 RX ORDER — DIPHENHYDRAMINE HCL 25 MG
25-50 CAPSULE ORAL ONCE
Status: COMPLETED | OUTPATIENT
Start: 2024-07-22 | End: 2024-07-22

## 2024-07-22 RX ORDER — METOCLOPRAMIDE HYDROCHLORIDE 5 MG/ML
10 INJECTION INTRAMUSCULAR; INTRAVENOUS EVERY 6 HOURS
Status: DISCONTINUED | OUTPATIENT
Start: 2024-07-22 | End: 2024-07-23

## 2024-07-22 RX ORDER — HYDRALAZINE HYDROCHLORIDE 25 MG/1
25 TABLET, FILM COATED ORAL EVERY 8 HOURS SCHEDULED
Status: DISCONTINUED | OUTPATIENT
Start: 2024-07-22 | End: 2024-07-30

## 2024-07-22 RX ORDER — ONDANSETRON 2 MG/ML
4 INJECTION INTRAMUSCULAR; INTRAVENOUS EVERY 6 HOURS
Status: DISCONTINUED | OUTPATIENT
Start: 2024-07-22 | End: 2024-07-27

## 2024-07-22 RX ADMIN — ASPIRIN 81 MG CHEWABLE TABLET 81 MG: 81 TABLET CHEWABLE at 08:39

## 2024-07-22 RX ADMIN — TACROLIMUS 3 MG: 5 CAPSULE ORAL at 08:54

## 2024-07-22 RX ADMIN — POLYETHYLENE GLYCOL 3350 17 G: 17 POWDER, FOR SOLUTION ORAL at 08:39

## 2024-07-22 RX ADMIN — MYCOPHENOLATE MOFETIL 1000 MG: 500 INJECTION, POWDER, LYOPHILIZED, FOR SOLUTION INTRAVENOUS at 08:59

## 2024-07-22 RX ADMIN — SODIUM CHLORIDE, POTASSIUM CHLORIDE, SODIUM LACTATE AND CALCIUM CHLORIDE 500 ML: 600; 310; 30; 20 INJECTION, SOLUTION INTRAVENOUS at 09:36

## 2024-07-22 RX ADMIN — ONDANSETRON 4 MG: 2 INJECTION INTRAMUSCULAR; INTRAVENOUS at 18:21

## 2024-07-22 RX ADMIN — HEPARIN SODIUM 5000 UNITS: 5000 INJECTION, SOLUTION INTRAVENOUS; SUBCUTANEOUS at 12:02

## 2024-07-22 RX ADMIN — TICAGRELOR 90 MG: 90 TABLET ORAL at 20:23

## 2024-07-22 RX ADMIN — SENNOSIDES AND DOCUSATE SODIUM 1 TABLET: 50; 8.6 TABLET ORAL at 08:40

## 2024-07-22 RX ADMIN — MEROPENEM 500 MG: 500 INJECTION, POWDER, FOR SOLUTION INTRAVENOUS at 11:54

## 2024-07-22 RX ADMIN — CHLORHEXIDINE GLUCONATE 0.12% ORAL RINSE 15 ML: 1.2 LIQUID ORAL at 08:39

## 2024-07-22 RX ADMIN — PROCHLORPERAZINE EDISYLATE 10 MG: 5 INJECTION INTRAMUSCULAR; INTRAVENOUS at 09:56

## 2024-07-22 RX ADMIN — METOCLOPRAMIDE 10 MG: 5 INJECTION, SOLUTION INTRAMUSCULAR; INTRAVENOUS at 20:23

## 2024-07-22 RX ADMIN — HYDRALAZINE HYDROCHLORIDE 10 MG: 20 INJECTION INTRAMUSCULAR; INTRAVENOUS at 10:34

## 2024-07-22 RX ADMIN — ANTI-THYMOCYTE GLOBULIN (RABBIT) 200 MG: 5 INJECTION, POWDER, LYOPHILIZED, FOR SOLUTION INTRAVENOUS at 10:52

## 2024-07-22 RX ADMIN — HEPARIN SODIUM 5000 UNITS: 5000 INJECTION, SOLUTION INTRAVENOUS; SUBCUTANEOUS at 20:23

## 2024-07-22 RX ADMIN — METOCLOPRAMIDE 10 MG: 5 INJECTION, SOLUTION INTRAMUSCULAR; INTRAVENOUS at 12:54

## 2024-07-22 RX ADMIN — ONDANSETRON 4 MG: 2 INJECTION INTRAMUSCULAR; INTRAVENOUS at 12:54

## 2024-07-22 RX ADMIN — LORAZEPAM 0.5 MG: 2 INJECTION INTRAMUSCULAR; INTRAVENOUS at 16:20

## 2024-07-22 RX ADMIN — TACROLIMUS 3 MG: 5 CAPSULE ORAL at 18:18

## 2024-07-22 RX ADMIN — Medication 1 TABLET: at 08:39

## 2024-07-22 RX ADMIN — METHYLPREDNISOLONE SODIUM SUCCINATE 100 MG: 125 INJECTION, POWDER, FOR SOLUTION INTRAMUSCULAR; INTRAVENOUS at 09:29

## 2024-07-22 RX ADMIN — SODIUM CHLORIDE 15 MG/HR: 0.9 INJECTION, SOLUTION INTRAVENOUS at 21:02

## 2024-07-22 RX ADMIN — SODIUM CHLORIDE, SODIUM LACTATE, POTASSIUM CHLORIDE, CALCIUM CHLORIDE AND DEXTROSE MONOHYDRATE: 5; 600; 310; 30; 20 INJECTION, SOLUTION INTRAVENOUS at 04:05

## 2024-07-22 RX ADMIN — OCTREOTIDE ACETATE 100 MCG: 100 INJECTION, SOLUTION INTRAVENOUS; SUBCUTANEOUS at 08:40

## 2024-07-22 RX ADMIN — HEPARIN SODIUM 5000 UNITS: 5000 INJECTION, SOLUTION INTRAVENOUS; SUBCUTANEOUS at 04:05

## 2024-07-22 RX ADMIN — SODIUM CHLORIDE, SODIUM LACTATE, POTASSIUM CHLORIDE, CALCIUM CHLORIDE AND DEXTROSE MONOHYDRATE: 5; 600; 310; 30; 20 INJECTION, SOLUTION INTRAVENOUS at 23:53

## 2024-07-22 RX ADMIN — Medication 40 MG: at 08:54

## 2024-07-22 RX ADMIN — SODIUM CHLORIDE 15 MG/HR: 0.9 INJECTION, SOLUTION INTRAVENOUS at 15:13

## 2024-07-22 RX ADMIN — ONDANSETRON 4 MG: 2 INJECTION INTRAMUSCULAR; INTRAVENOUS at 09:28

## 2024-07-22 RX ADMIN — SENNOSIDES AND DOCUSATE SODIUM 2 TABLET: 8.6; 5 TABLET ORAL at 20:23

## 2024-07-22 RX ADMIN — HYDRALAZINE HYDROCHLORIDE 10 MG: 20 INJECTION INTRAMUSCULAR; INTRAVENOUS at 14:09

## 2024-07-22 RX ADMIN — DIPHENHYDRAMINE HYDROCHLORIDE 25 MG: 25 CAPSULE ORAL at 09:32

## 2024-07-22 RX ADMIN — TICAGRELOR 90 MG: 90 TABLET ORAL at 08:40

## 2024-07-22 RX ADMIN — MEROPENEM 500 MG: 500 INJECTION, POWDER, FOR SOLUTION INTRAVENOUS at 04:06

## 2024-07-22 RX ADMIN — MEROPENEM 500 MG: 500 INJECTION, POWDER, FOR SOLUTION INTRAVENOUS at 20:24

## 2024-07-22 RX ADMIN — ACETAMINOPHEN 975 MG: 325 TABLET, FILM COATED ORAL at 04:05

## 2024-07-22 RX ADMIN — HYDRALAZINE HYDROCHLORIDE 10 MG: 20 INJECTION INTRAMUSCULAR; INTRAVENOUS at 20:23

## 2024-07-22 RX ADMIN — ACETAMINOPHEN 975 MG: 325 TABLET, FILM COATED ORAL at 20:22

## 2024-07-22 RX ADMIN — ACETAMINOPHEN 650 MG: 325 TABLET, FILM COATED ORAL at 09:32

## 2024-07-22 RX ADMIN — OCTREOTIDE ACETATE 100 MCG: 100 INJECTION, SOLUTION INTRAVENOUS; SUBCUTANEOUS at 20:25

## 2024-07-22 RX ADMIN — MYCOPHENOLATE MOFETIL 1000 MG: 500 INJECTION, POWDER, LYOPHILIZED, FOR SOLUTION INTRAVENOUS at 18:29

## 2024-07-22 RX ADMIN — VALGANCICLOVIR 450 MG: 450 TABLET, FILM COATED ORAL at 18:24

## 2024-07-22 RX ADMIN — MICAFUNGIN SODIUM 100 MG: 50 INJECTION, POWDER, LYOPHILIZED, FOR SOLUTION INTRAVENOUS at 22:02

## 2024-07-22 ASSESSMENT — ACTIVITIES OF DAILY LIVING (ADL)
ADLS_ACUITY_SCORE: 29
ADLS_ACUITY_SCORE: 26
ADLS_ACUITY_SCORE: 26
ADLS_ACUITY_SCORE: 29
ADLS_ACUITY_SCORE: 26
ADLS_ACUITY_SCORE: 29
ADLS_ACUITY_SCORE: 30
ADLS_ACUITY_SCORE: 29
ADLS_ACUITY_SCORE: 30
ADLS_ACUITY_SCORE: 30
ADLS_ACUITY_SCORE: 29
ADLS_ACUITY_SCORE: 26
ADLS_ACUITY_SCORE: 29
ADLS_ACUITY_SCORE: 26
ADLS_ACUITY_SCORE: 29
ADLS_ACUITY_SCORE: 30

## 2024-07-22 NOTE — PROGRESS NOTES
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      While getting renal ultrasound in PACU, patient cardiac arrested (V-fib) for approximately 5 mins. Received 2 rounds of CPR, epi x 2, 2g IV mag, 100mg IV lidocaine push during code before achieving ROSC. Lactate ~9. Trop 136. Code blue was managed by the PACU team. Post ROSC EKG revealed inferior STEMI. Arrived post-op to CVICU @ 0810. Cardiology consult team emergently engaged for inferior STEMI. Cath lab subsequently activated and patient underwent coronary angiogram which revealed 100% prox RCA in stent thrombosis, now s/p 2 IBAN in ostial to prox RCA. Echo reveals LVEF 37%, moderate diffuse hypokinesis with akinesis of all inferior segments, mild right ventricular dilation is present, global right ventricular function is moderately reduced.     Subjective and Interval:  Net negative 2 L yesterday, net neg 570 ml since midnight, remains on 10 mg/hr of Lasix. Lactate normal, denies pain though NG uncomfortable    Assessment and plan by system:   Today's changes:  Floor orders, remove carroll once bed available, ensure cuff correlates prior to removal  Keep NG given anatomy and procedure per transplant team  Stop Lasix, give 500 ml bolus per transplant team  BP control with PRN Hydral to keep SBP < or = to 160 and allow permissive HTN  Repeat CXR/start GDMT if other teams agreeable  CVP target 8-10, currently 5  Increase bowel reg  Stat EKG and Trop given potential chest pain  P2y12 lab given emesis this am  Consider  nitroglycerin if concern for ACS  Schedule Hydral     Neurological:  # Acute pain   # At risk for anoxic brain injury  - Monitor neurological status. Delirium preventions and precautions.   - Pain: Fent, PRN Dilaudid, RObaxin, scheduled Tylenol            A & O  Pain control  PT/OT     Cardiovascular:    # VT Cardiac arrest  # Inferior STEMI s/p 2 IBAN to ostial-proximal RCA 2/2 in stent thrombosis  # CAD s/p PCI with IBAN 1/2019, 2024  # CABG in 2019 - LIMA-LAD, SVG-RCA   # hx CAD s/p PCI to the proximal RCA (02/2024)  # HTN   # PAD   # Cardiac arrest (V fib) 7/20/24   While getting renal ultrasound in PACU, patient cardiac arrested (V-fib) for approximately 5-7 mins. Received 2 rounds of CPR, epi x 2, 2g IV mag, 100mg IV lidocaine push during code before achieving ROSC. Patient now presenting to SICU for further work-up, evaluation. EKG in PACU showing acute STEMI, STAT cardiology consult placed, Cath lab activated for inferior STEMI.    - TTE EF 37% with inferior wall akinesis   - Tele monitor with frequent PVCs, ongoing bigeminy   - ASA/ Brillinta  - Transplant surgeon confirmed ok for any anticoag/antiplatelet agents needed    - Start GDMT    Pulmonary:   # Post operative ventilatory support   # Acute hypoxic respiratory support post brief Cardiac arrest    - Supplemental oxygen to keep saturation above 92 %.  - CXR stable post-op, repeat today ordered  - 2l NC     Gastroenterology/Nutrition:  # NPO status   # S/p pancreas transplant 7/20   - NG tube to LIS   - Will consult RD for NJ per surgery?  - D5 infusion per protocol   - Amylase, lipase daily - Amylas up from 115--126 lipase 109--169  - Speech consult?  - Bowel reg in place       Renal, Electrolytes  # ESRD s/p kidney transplant 7/20   # Acute kidney injury  # Electrolyte monitoring   # Volume status   # lactic acidosis   - Will continue to monitor intake and output.  - Continue olea cares   - Electrolytes q4h   - Laxis drip post-op per protocol                 I/O last 3 completed shifts:  In: 5461.68 [P.O.:480; I.V.:4711.68; NG/GT:270]  Out: 8055 [Urine:7380; Emesis/NG output:350; Drains:325]   Recent Labs   Lab 24  0148 24  2211    140 140   POTASSIUM 4.9 5.0 4.8   CHLORIDE 104 104 104   CO2    BUN 42.0* 41.1* 38.4*   CR 4.11* 4.18* 4.20*   PHOS 5.7* 5.5* 5.0*   MAG 2.1 2.1 2.1     NG/OG/NJ Tube Nasogastric 16 fr Right nostril-Flush/Free Water (mL): 30 mL    Plan:  - Avoid nephrotoxins, renally dose meds  - Monitor urine output    Infectious DiseaseEndocrine:   # Stress hyperglycemia   # Diabetes mellitus with insulin pump   - Goal to keep BG< 180 for optimal wound healing   - Insulin drip must remain on   - Dextrose infusion per protocol      ID:  # Immunosuppression   # Stress Leukocytosis   # Post-operative antimicrobials   - Trend CBC, fever curve   - Following intra-op cultures   - Defer immunosuppression, post-op antimicrobials per transplant      Positive cultures:  - N/a     Recent Labs   Lab 24  0148 24  2211   WBC 5.4 5.5 5.5     Temp (24hrs), Av  F (36.1  C), Min:96.6  F (35.9  C), Max:97.5  F (36.4  C)      Antibiotics     Anti-infectives (From now, onward)      Start     Dose/Rate Route Frequency Ordered Stop    24 09  valGANciclovir (VALCYTE) tablet 450 mg         450 mg Oral Once per day on 24 0811      24 193  meropenem (MERREM) 500 mg vial to attach to  mL bag for ADULTS or 25 mL bag for PEDS         500 mg  over 30 Minutes Intravenous EVERY 8 HOURS 24 1511 24 1929    24  micafungin (MYCAMINE) 100 mg in sodium chloride 0.9 % 100 mL intermittent infusion         100 mg  100 mL/hr over 60 Minutes Intravenous EVERY 24 HOURS 24 0811 24 1959             Hematology  # Acute blood loss anemia    # Thrombocytopenia  - Trend CBC q4h   - Transplant OK with full anticoagulation post-op   - Transfuse if hgb <7.0  or signs/symptoms of hypoperfusion. Monitor and trend.   Recent Labs   Lab 07/22/24  0421 07/22/24  0148 07/21/24  2211   HGB 9.1* 9.0* 10.0*   HCT 26.8* 26.8* 29.5*   PLT 93* 98* 91*     Recent Labs   Lab 07/20/24  0821 07/20/24  0600 07/19/24  1656   INR 1.39* 1.44* 0.96   PTT 28 27 31     Hgb stable. No e/o bleeding.    ASA/ticagrelor for IBAN    DVT PPX: EVER    Plan:  - EVER for DVT ppx  - Transfusion parameters:   - 1u PRBC for hbg < 7.0   - 1u platelet for plt < 50   - 1u FFP for INR > 3   - 5u cryoprecipitate for fibrinogen <150     Musculoskeletal/Skin:  # Weakness and deconditioning of critical illness  # incisional monitoring    - Physical and occupational therapy consult   - Incisional cares   - Drain monitoring      General Cares/Prophylaxis:    DVT Prophylaxis: Pneumatic Compression Devices, heparin gtt   GI Prophylaxis: PPI  Restraints: Restraints for medical healing needed: YES     Lines/ tubes/ drains:  - ETT  - Burt  - LIJ CVC   - Art line   - NG tube   - Drain to bulb suction     Lines/Tubes/Drains:  Peripheral IV 07/19/24 Right Antecubital fossa (Active)   Site Assessment [facility] 07/22/24 0400   Line Status Infusing 07/22/24 0400   Dressing Transparent 07/22/24 0400   Dressing Status clean;dry;intact 07/22/24 0400   Line Intervention Flushed 07/22/24 0000   Phlebitis Scale 0-->no symptoms 07/22/24 0400   Infiltration? no 07/22/24 0400   Number of days: 3       Peripheral IV 07/20/24 Right;Anterior Lower forearm (Active)   Site Assessment [facility] 07/22/24 0400   Line Status Infusing 07/22/24 0400   Dressing Transparent 07/22/24 0400   Dressing Status clean;dry;intact 07/22/24 0400   Line Intervention Flushed 07/22/24 0400   Phlebitis Scale 0-->no symptoms 07/22/24 0400   Infiltration? no 07/22/24 0400   Number of days: 2       Arterial Line 07/19/24 Radial (Active)   Site Assessment [facility] 07/22/24 0400   Line Status Positional 07/22/24 0400   Arterine Line Cap Change Due 07/23/24 07/22/24 0400   Art Line  Waveform Appropriate 07/22/24 0400   Art Line Interventions Zeroed and calibrated;Leveled 07/22/24 0400   Color/Movement/Sensation Capillary refill less than 3 sec 07/22/24 0400   Line Necessity Yes, meets criteria 07/22/24 0400   Dressing Type Transparent 07/22/24 0400   Dressing Status Clean, dry, intact 07/22/24 0400   Dressing Intervention Dressing changed/new dressing 07/20/24 1700   Number of days: 3       CVC Triple Lumen Left Internal jugular (Active)   Site Assessment [facility] 07/22/24 0400   Dressing Chlorhexidine disk;Transparent 07/22/24 0400   Dressing Status clean;intact;dry 07/22/24 0400   Dressing Intervention dressing reinforced 07/21/24 0400   Dressing Change Due 07/26/24 07/22/24 0400   Line Necessity Yes, meets criteria 07/22/24 0400   Phlebitis Scale 0-->no symptoms 07/22/24 0400   Number of days: 3       Closed/Suction Drain 1 Right RLQ Bulb 19 [place] (Active)   Site Description [facility] 07/22/24 0400   Dressing Status Normal: Clean, Dry & Intact 07/22/24 0400   Drainage Appearance Bloody/Bright Red 07/22/24 0400   Status To bulb suction 07/22/24 0400   Output (ml) 35 ml 07/22/24 0600   Number of days: 2       NG/OG/NJ Tube Nasogastric 16 fr Right nostril (Active)   Site Description [facility] 07/22/24 0000   Status Suction-low intermittent 07/22/24 0400   Drainage Appearance Dark Red 07/21/24 2316   [place] (cm marking) at nare/mouth 63 cm 07/22/24 0000   Intake (ml) 30 ml 07/21/24 1600   Flush/Free Water (mL) 30 mL 07/22/24 0400   Container Amount 0 mL 07/22/24 0700   Output (ml) 0 ml 07/22/24 0700   Number of days: 3       Urethral Catheter 07/19/24 Non-latex;Straight-tip 12 fr (Active)   Tube Description Positional 07/22/24 0100   Catheter Care Done;Catheter wipes 07/22/24 0400   Collection Container Standard;Patent 07/22/24 0400   Securement Method Securing device (Describe) 07/22/24 0400   Rationale for Continued Use ICU only: hourly urine output needed for patient care 07/22/24 0400   Urine Output 175  "mL 07/22/24 0700   Number of days: 3       Incision/Surgical Site 07/20/24 Upper;Midline Abdomen (Active)   Incision Assessment UTV 07/22/24 0400   Dressing Dry gauze 07/22/24 0400   Luci-Incision Assessment UTV 07/22/24 0400   Closure MELISA 07/22/24 0400   Incision Drainage Amount UTV 07/22/24 0400   Drainage Description UTV 07/22/24 0400   Incision Care Normal saline 07/22/24 0000   Dressing Intervention New dressing applied;Dressing removed 07/22/24 0400   Number of days: 2      Patient seen and discussed with staff physician Dr. Singh.    JUANCARLOS Owusu [facility]  ICU Cardiology-CICU Service  Send message or 10 digit call back number Securely via [employer] with the Vocera Web Console (learn more here)    Objective:  Most recent vital signs:  BP (!) 161/63   Pulse 107   Temp 97.5  F (36.4  C) (Esophageal)   Resp 18   Ht 1.803 m (5' 11\")   Wt 113.2 kg (249 lb 9 oz)   SpO2 95%   BMI 34.81 kg/m    Temp:  [96.6  F (35.9  C)-97.5  F (36.4  C)] 97.5  F (36.4  C)  Pulse:  [] 107  Resp:  [18] 18  BP: (161)/(63) 161/63  MAP:  [63 mmHg-115 mmHg] 82 mmHg  Arterial Line BP: ()/(48-82) 124/63  FiO2 (%):  [30 %-45 %] 30 %  SpO2:  [91 %-100 %] 95 %      Physical exam:  General: In bed, in NAD  HEENT: PERRL, no scleral icterus or injection  CARDIAC: RRR, no m/r/g appreciated. Peripheral pulses dopplered  RESP: CTAB, no wheezes, rhonchi or crackles appreciated.  GI: soft, BS hypoactive  : Burt  EXTREMITIES: NO LE edema, pulses 2+. Femoral access site w/o bleeding, dressing c/d/i.   SKIN: No acute lesions appreciated  NEURO: alert and oriented    Imaging/procedure results:  XR Chest Port 1 View  Narrative: Exam: XR CHEST PORT 1 VIEW, 7/21/2024 11:07 AM    Indication: ETT position, pulmonary edema    Comparison: Chest radiograph 7/20/2024    Findings:   Median sternotomy wires appear intact. Left internal jugular central  venous catheter terminates at the brachiocephalic " confluence.  Endotracheal tube terminates in the upper thoracic trachea. Enteric  tube tip and sidehole projected over the stomach. Stable prominent  cardiomediastinal silhouette. Blunting of the left costophrenic angle.  Low lung volumes. Continued perihilar/retrocardiac opacities. No  pneumothorax.  Impression: Impression:     1. Endotracheal tube tip projects over high trachea about 6 cm above  the bonifacio. May consider advancementq  2. Continued perihilar/basilar opacities, likely  pulmonary edema  and/or atelectasis in the setting of low lung volumes.    I have personally reviewed the examination and initial interpretation  and I agree with the findings.    SHILO SCHREIBER MD         SYSTEM ID:  [id number]  US Renal Transplant with Doppler  Narrative: EXAMINATION: US RENAL TRANSPLANT,  7/21/2024 9:34 AM     COMPARISON: Renal transplant 7/20/2024    HISTORY: s/p DDKT- please assess flow    TECHNIQUE:  Grey-scale, color Doppler and spectral flow analysis.    FINDINGS:  The transplant kidney is located in the left lower quadrant, and  measures 9.2 cm. Parenchyma is of normal thickness and echogenicity.  No focal lesions. No hydronephrosis. Trace perinephric free fluid.    Renal artery flow:   89 cm/s peak systolic at hilum.  162 at the mid renal artery.  255 cm/s peak systolic at anastomosis. Previously 311 cm/s  Segmental artery resistive indices (upper to lower): 0.69, 0.70, 0.68    Renal Vein Flow:  33 cm/s at hilum.   38 at the mid renal vein  76 cm/s at anastomosis.    Iliac artery flow:  188 cm/s peak systolic above anastomosis.  146 cm/s peak systolic below anastomosis.    Iliac vein flow:  Patent above and below the anastomosis.    Bladder: Nondistended with Burt in place.  Impression: IMPRESSION:   1. Unremarkable grayscale appearance of the transplant kidney. Trace  perinephric fluid  2. Patent renal transplant vasculature by Doppler evaluation.  Increased resistive index in the renal artery at  anastomosis and in  the mid renal artery. May consider attention on follow up.     I have personally reviewed the examination and initial interpretation  and I agree with the findings.    SHILO SCHREIBER MD         SYSTEM ID:  [id number]  US Pancreas Transplant  Narrative: EXAMINATION: US PANCREAS TRANSPLANT, 7/21/2024 9:34 AM     COMPARISON: Pancreas transplant 7/20/2024    HISTORY: Post-Op Pancreas Transplant assess vascular flow with doppler    TECHNIQUE:  Grey-scale, color Doppler and spectral flow analysis.    Findings: The transplanted pancreas is located in the right lower  quadrant and demonstrates normal echogenicity. There is no free fluid  adjacent to the transplant pancreas.     Transplant pancreas arterial flow:   Within pancreas: 15 cm/sec.   Outside pancreas: 63/236 cm/sec.   Anastomosis: 79 cm/sec.    Transplant pancreas venous flow:  Within pancreas: 5 cm/sec.   Outside pancreas: 22 cm/sec.   Anastomosis: 42 cm/sec.    Iliac artery:  Above the anastomosis: 98 cm/sec.  Below the anastomosis: 82 cm/sec.    Iliac vein  Above the transplant: Patent.  Below the transplant: Patent.  Impression: Impression:  1. No peripancreatic fluid collection.  2. Patent pancreatic transplant vasculature by Doppler evaluation.  Slight increased resistive index in the arteries outside the pancreas.  May consider attention on follow-up.    I have personally reviewed the examination and initial interpretation  and I agree with the findings.    SHILO SCHREIBER MD         SYSTEM ID:  [id number]     Recent Results (from the past 24 hour(s))   US Pancreas Transplant    Narrative    EXAMINATION: US PANCREAS TRANSPLANT, 7/21/2024 9:34 AM     COMPARISON: Pancreas transplant 7/20/2024    HISTORY: Post-Op Pancreas Transplant assess vascular flow with doppler    TECHNIQUE:  Grey-scale, color Doppler and spectral flow analysis.    Findings: The transplanted pancreas is located in the right lower  quadrant and demonstrates normal  echogenicity. There is no free fluid  adjacent to the transplant pancreas.     Transplant pancreas arterial flow:   Within pancreas: 15 cm/sec.   Outside pancreas: 63/236 cm/sec.   Anastomosis: 79 cm/sec.    Transplant pancreas venous flow:  Within pancreas: 5 cm/sec.   Outside pancreas: 22 cm/sec.   Anastomosis: 42 cm/sec.    Iliac artery:  Above the anastomosis: 98 cm/sec.  Below the anastomosis: 82 cm/sec.    Iliac vein  Above the transplant: Patent.  Below the transplant: Patent.      Impression    Impression:  1. No peripancreatic fluid collection.  2. Patent pancreatic transplant vasculature by Doppler evaluation.  Slight increased resistive index in the arteries outside the pancreas.  May consider attention on follow-up.      I have personally reviewed the examination and initial interpretation  and I agree with the findings.    SHILO SCHREIBER MD         SYSTEM ID:  [id number]   US Renal Transplant with Doppler    Narrative    EXAMINATION: US RENAL TRANSPLANT,  7/21/2024 9:34 AM     COMPARISON: Renal transplant 7/20/2024    HISTORY: s/p DDKT- please assess flow    TECHNIQUE:  Grey-scale, color Doppler and spectral flow analysis.    FINDINGS:  The transplant kidney is located in the left lower quadrant, and  measures 9.2 cm. Parenchyma is of normal thickness and echogenicity.  No focal lesions. No hydronephrosis. Trace perinephric free fluid.    Renal artery flow:   89 cm/s peak systolic at hilum.  162 at the mid renal artery.  255 cm/s peak systolic at anastomosis. Previously 311 cm/s  Segmental artery resistive indices (upper to lower): 0.69, 0.70, 0.68    Renal Vein Flow:  33 cm/s at hilum.   38 at the mid renal vein  76 cm/s at anastomosis.    Iliac artery flow:  188 cm/s peak systolic above anastomosis.  146 cm/s peak systolic below anastomosis.    Iliac vein flow:  Patent above and below the anastomosis.    Bladder: Nondistended with Burt in place.      Impression    IMPRESSION:   1. Unremarkable  grayscale appearance of the transplant kidney. Trace  perinephric fluid  2. Patent renal transplant vasculature by Doppler evaluation.  Increased resistive index in the renal artery at anastomosis and in  the mid renal artery. May consider attention on follow up.     I have personally reviewed the examination and initial interpretation  and I agree with the findings.    SHILO SCHREIBER MD         SYSTEM ID:  [id number]   XR Chest Port 1 View    Narrative    Exam: XR CHEST PORT 1 VIEW, 7/21/2024 11:07 AM    Indication: ETT position, pulmonary edema    Comparison: Chest radiograph 7/20/2024    Findings:   Median sternotomy wires appear intact. Left internal jugular central  venous catheter terminates at the brachiocephalic confluence.  Endotracheal tube terminates in the upper thoracic trachea. Enteric  tube tip and sidehole projected over the stomach. Stable prominent  cardiomediastinal silhouette. Blunting of the left costophrenic angle.  Low lung volumes. Continued perihilar/retrocardiac opacities. No  pneumothorax.      Impression    Impression:     1. Endotracheal tube tip projects over high trachea about 6 cm above  the bonifacio. May consider advancementq  2. Continued perihilar/basilar opacities, likely  pulmonary edema  and/or atelectasis in the setting of low lung volumes.    I have personally reviewed the examination and initial interpretation  and I agree with the findings.    SHILO SCHREIBER MD         SYSTEM ID:  [id number]

## 2024-07-22 NOTE — PROGRESS NOTES
Neuro: Alert and Oriented; intermittently lethargic. Following commands Moves all extremities appropriately ;   Pulm: weaned to room air from 4LNC  CV:Hypertensive overnight; given hydralazine 20mg ivp x2; added nicardipine currently on hold for lower BP's... no explicit SBP goal.. NSR. Trending CVPs Q4hrs. Sheath removed from right groin.   : Burt inplace for strict I&O 2/2 kidney transplant perfusion. Good UOP. Lasix gtt on or off based on hourly urine output.   GI: NG to LIS. No BM.   Skin: Midline incision. RLQ AMILCAR drain w/ serosanguenous output.   Drips: Insulin, Lasix (per order),D5 w/ LR @ 50ml/hr.   Labs: K 4.9; phos 5.7; mag 2.1; trops downtrending; ABG wnl  Plan: further input from kidney and panc team

## 2024-07-22 NOTE — PROGRESS NOTES
Transplant Surgery  Inpatient Daily Progress Note  07/22/2024    Assessment & Plan: 49 year old male with PMH significant for DMII (on insulin pump) and resulting ESKD (on HD every MWF since 8/2021). 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 Underwent simultaneous kidney pancreas transplant on 7/20/24 complicated by VT/VF arrest in PACU.Received 2 rounds of CPR before ROSC Taken emergently to the cath lab and found to have 100% occlusion of the RCA now s/p IBAN x 2.    Changes for today:  -Aim for CVP > 8, will give 500 mL bolus and stop lasix drip  -Can transfer to 7A  -Keep NG tube, NPO  -Third (final) dose of thymo and Solu-Medrol 100mg    Graft function:  S/p pancreas transplant: Lipase 169 (109 < 211). Using ~0.5 U/hr on the insulin drip. Patent post-op US.  S/p kidney transplant: Pre-op Cr 5.22, now trending down to 4.1. Robust urine output. Patent post-op US. Will give back a little fluid, aim for CVP > 8.  Immunosuppression management:   Induction:  Thymo 200/200/200 mg (complete, 5.9 mg/kg)  Solu-Medrol: 500/250/100 mg  Maintenance:  Tacrolimus 3 mg BID, goal 8-10  MMF 1000 mg BID  Hematology: DAPT- Brilinta + ASA for at least 1 month. Will not give heparin at this time due to DAPT  Cardiorespiratory:   CAD, VT/VF arrest s/p PCI with IBAN:Previous CABG 2019, and IBAN 2019 with 100% thrombosis of the RCA, IBAN x 2 on 7/20/24   Acute respiratory failure: Extubated 7/21 PM. Wean O2 as tolerated  Hypertension: Goal SBP <165 mmHg. Ok with permissive hypertension to support kidney perfusion.  GI/Nutrition: NPO, NG tube in place until POD 3-4 or return of bowel function s/p pancreas transplant.   Endocrine: Steroid hyperglycemia, Insulin drip as above  Fluid/Electrolytes: MIVF:Stopped urine replacements, straight rated fluids. Will  stop lasix today, give a 500 mL fluid bolus  Hyperkalemia: Resolved with medical management without need for dialysis  : Burt to remain due to new surgical anastomosis  Infectious disease: Luci-op prophylaxis with meropenem, micafungin.  Prophylaxis: DVT, fall, GI, fungal  Disposition: CVICU, can transfer to the floor    Medical Decision Making: High  Subsequent visit 11350 (high level decision making)    KAT/Fellow/Resident Provider: Julienne Ibrahim PA-C    Faculty: Jarvis Michaud MD  _________________________________________________________________  Transplant History: Admitted 7/19/2024 for kidney pancreas transplant.  7/20/2024 (Kidney / Pancreas), Postoperative day: 2     Interval History: History is obtained from the electronic health record due to patient intubation  Overnight events: Stable overnight. Robust urine output    ROS:   A 10-point review of systems was negative except as noted above.    Meds:  Current Facility-Administered Medications   Medication Dose Route Frequency Provider Last Rate Last Admin    acetaminophen (TYLENOL) tablet 975 mg  975 mg Oral Q8H Harrison Whitehead MD   975 mg at 07/22/24 0405    anti-thymocyte globulin (THYMOGLOBULIN - Rabbit) 200 mg in sodium chloride 0.9 % 590 mL intermittent infusion  200 mg Intravenous Central line Once Julienne Ibrahim PA-C        aspirin (ASA) chewable tablet 81 mg  81 mg Oral or Feeding Tube Daily Bing Winslow MD   81 mg at 07/22/24 0839    calcium carbonate-vitamin D (OSCAL) 500-5 MG-MCG per tablet 1 tablet  1 tablet Oral BID w/meals Harrison Whitehead MD   1 tablet at 07/22/24 0839    heparin ANTICOAGULANT injection 5,000 Units  5,000 Units Subcutaneous Q8H Joe Heredia MD   5,000 Units at 07/22/24 0405    lactated ringers BOLUS 500 mL  500 mL Intravenous Once Margaret Mcfarlane APRN  mL/hr at 07/22/24 0936 500 mL at 07/22/24 0936    magnesium oxide (MAG-OX) tablet 400 mg  400  "mg Oral Q24H Harrison Whitehead MD        meropenem (MERREM) 500 mg vial to attach to  mL bag for ADULTS or 25 mL bag for PEDS  500 mg Intravenous Q8H Harrison Whitehead MD   500 mg at 07/22/24 0406    micafungin (MYCAMINE) 100 mg in sodium chloride 0.9 % 100 mL intermittent infusion  100 mg Intravenous Q24H Harrison Whitehead  mL/hr at 07/21/24 2120 100 mg at 07/21/24 2120    mycophenolate mofetil (CELLCEPT) 1,000 mg in D5W intermittent infusion  1,000 mg Intravenous BID IS Julienne Ibrahim PA-C 137.5 mL/hr at 07/22/24 0859 1,000 mg at 07/22/24 0859    octreotide (sandoSTATIN) injection 100 mcg  100 mcg Subcutaneous Q12H Harrison Whitehead MD   100 mcg at 07/22/24 0840    pantoprazole (PROTONIX) 2 mg/mL suspension 40 mg  40 mg Per NG tube Daily Harrison Whitehead MD   40 mg at 07/22/24 0854    polyethylene glycol (MIRALAX) Packet 17 g  17 g Oral Daily Harrison Whitehead MD   17 g at 07/22/24 0839    senna-docusate (SENOKOT-S/PERICOLACE) 8.6-50 MG per tablet 1 tablet  1 tablet Oral BID Harrison Whitehead MD   1 tablet at 07/22/24 0840    sodium chloride (PF) 0.9% PF flush 3 mL  3 mL Intravenous Q8H Harrison Whitehead MD   3 mL at 07/22/24 0841    tacrolimus (GENERIC) suspension 3 mg  3 mg Oral or NG Tube BID IS Harrison Whitehead MD   3 mg at 07/22/24 0854    ticagrelor (BRILINTA) tablet 90 mg  90 mg Oral or Feeding Tube BID Bing Winslow MD   90 mg at 07/22/24 0840    valGANciclovir (VALCYTE) tablet 450 mg  450 mg Oral Once per day on Monday Thursday Harrison Whitehead MD           Physical Exam:     Admit Weight: 102.4 kg (225 lb 12 oz)    Current vitals:   BP (!) 161/63   Pulse 107   Temp 97.5  F (36.4  C) (Esophageal)   Resp 18   Ht 1.803 m (5' 11\")   Wt 113.2 kg (249 lb 9 oz)   SpO2 95%   BMI 34.81 kg/m      CVP (mmHg): 10 " mmHg    Vital sign ranges:    Temp:  [96.6  F (35.9  C)-97.5  F (36.4  C)] 97.5  F (36.4  C)  Pulse:  [] 107  Resp:  [18] 18  BP: (161)/(63) 161/63  MAP:  [63 mmHg-115 mmHg] 82 mmHg  Arterial Line BP: ()/(48-82) 124/63  FiO2 (%):  [30 %] 30 %  SpO2:  [91 %-100 %] 95 %  Patient Vitals for the past 24 hrs:   BP Temp Temp src Pulse Resp SpO2   07/22/24 0600 -- -- -- 107 -- 95 %   07/22/24 0500 -- -- -- 108 -- 94 %   07/22/24 0400 -- 97.5  F (36.4  C) Esophageal 107 -- 92 %   07/22/24 0300 -- -- -- 106 -- 93 %   07/22/24 0200 -- -- -- 108 -- 92 %   07/22/24 0100 -- -- -- 113 -- 93 %   07/22/24 0000 -- -- -- 110 -- 91 %   07/21/24 2300 -- -- -- 113 -- 95 %   07/21/24 2205 (!) 161/63 -- -- -- -- --   07/21/24 2200 -- -- -- 114 -- 94 %   07/21/24 2100 -- -- -- 105 -- 99 %   07/21/24 2000 -- 97.3  F (36.3  C) Esophageal 99 18 100 %   07/21/24 1900 -- -- -- 98 -- 94 %   07/21/24 1845 -- -- -- 101 -- 95 %   07/21/24 1830 -- -- -- 94 -- 91 %   07/21/24 1815 -- -- -- 93 -- 96 %   07/21/24 1800 -- 97.2  F (36.2  C) -- 83 -- 98 %   07/21/24 1758 -- -- -- 83 -- 99 %   07/21/24 1745 -- -- -- 80 -- 97 %   07/21/24 1730 -- -- -- 78 -- 97 %   07/21/24 1715 -- -- -- 79 -- 99 %   07/21/24 1711 -- -- -- 80 -- 98 %   07/21/24 1700 -- 97  F (36.1  C) -- 79 -- 98 %   07/21/24 1645 -- -- -- 82 -- 98 %   07/21/24 1630 -- -- -- 82 -- 98 %   07/21/24 1615 -- -- -- 80 -- 98 %   07/21/24 1600 -- 97  F (36.1  C) Esophageal 79 -- 98 %   07/21/24 1545 -- -- -- 80 -- 98 %   07/21/24 1530 -- -- -- 82 -- 97 %   07/21/24 1515 -- -- -- 80 -- 97 %   07/21/24 1500 -- 97  F (36.1  C) -- 80 -- 98 %   07/21/24 1445 -- -- -- 83 -- 96 %   07/21/24 1430 -- -- -- 82 -- 96 %   07/21/24 1415 -- -- -- 80 -- 96 %   07/21/24 1400 -- 97  F (36.1  C) -- 81 -- 95 %   07/21/24 1330 -- -- -- 81 -- 98 %   07/21/24 1315 -- -- -- 82 -- 99 %   07/21/24 1305 -- -- -- 82 -- 99 %   07/21/24 1300 -- 97  F (36.1  C) -- 84 -- 99 %   07/21/24 1255 -- -- -- 82 -- 100 %    07/21/24 1250 -- -- -- 82 -- 100 %   07/21/24 1247 -- -- -- 82 -- 99 %   07/21/24 1246 -- -- -- 82 -- 100 %   07/21/24 1245 -- -- -- 83 -- 99 %   07/21/24 1244 -- -- -- 84 -- 100 %   07/21/24 1243 -- -- -- 83 -- 100 %   07/21/24 1242 -- -- -- 84 -- 100 %   07/21/24 1241 -- -- -- 81 -- 100 %   07/21/24 1200 -- 96.8  F (36  C) -- 81 -- 99 %   07/21/24 1100 -- (!) 96.6  F (35.9  C) -- 82 -- 99 %   07/21/24 1045 -- -- -- 81 -- 98 %   07/21/24 1040 -- -- -- 81 -- 98 %   07/21/24 1030 -- -- -- 81 -- 100 %   07/21/24 1015 -- -- -- 80 -- 99 %   07/21/24 1000 -- (!) 96.6  F (35.9  C) -- 83 -- 97 %     General Appearance: Awake, alert, oriented  Skin: normal, warm  Heart: regular rate and rhythm  Lungs: Nonlabored resps on RA  Abdomen: The abdomen is soft, incision covered. Drain with serosanguinous output  : olea is present.   Extremities: edema: none  Neurologic: awake, alert, oriented    Data:   CMP  Recent Labs   Lab 07/22/24  0923 07/22/24  0918 07/22/24  0621 07/22/24  0421 07/22/24  0150 07/22/24  0148 07/21/24  0636 07/21/24  0612   NA  --   --   --  141  --  140   < > 140   POTASSIUM  --   --   --  4.9  --  5.0   < > 4.9   CHLORIDE  --   --   --  104  --  104   < > 105   CO2  --   --   --  24  --  25   < > 25   GLC  --  135* 133* 143*   < > 141*   < > 127*   BUN  --   --   --  42.0*  --  41.1*   < > 34.8*   CR  --   --   --  4.11*  --  4.18*   < > 4.88*   GFRESTIMATED  --   --   --  17*  --  17*   < > 14*   BETHANY  --   --   --  9.5  --  9.4   < > 8.8   ICAW 5.3*  --   --  5.2  --  5.2   < > 4.9   MAG  --   --   --  2.1  --  2.1   < > 2.4*   PHOS  --   --   --  5.7*  --  5.5*   < > 4.9*   AMYLASE  --   --   --  126*  --   --   --  115*   LIPASE  --   --   --  169*  --   --   --  109*   ALBUMIN  --   --   --  3.4*  --  3.3*   < > 3.1*   BILITOTAL  --   --   --  0.4  --  0.4   < > 0.4   ALKPHOS  --   --   --  67  --  68   < > 60   AST  --   --   --  211*  --  234*   < > 403*   ALT  --   --   --  75*  --  79*   < >  84*    < > = values in this interval not displayed.     CBC  Recent Labs   Lab 07/22/24  0923 07/22/24  0421 07/20/24  1422 07/20/24  1138   HGB 9.2* 9.1*   < > 9.7*   WBC 5.9 5.4   < > 12.8*   PLT 96* 93*   < > 123*   A1C  --   --   --  6.4*    < > = values in this interval not displayed.     COAGS  Recent Labs   Lab 07/20/24  0821 07/20/24  0600   INR 1.39* 1.44*   PTT 28 27      Urinalysis  Recent Labs   Lab Test 07/20/24  0609 09/27/21  1220   COLOR Orange* Yellow   APPEARANCE Slightly Cloudy* Slightly Cloudy*   URINEGLC 50* 150*   URINEBILI Negative Negative   URINEKETONE Negative Negative   SG 1.013 1.013   UBLD Large* Negative   URINEPH 6.5 6.0   PROTEIN 300* >499*   NITRITE Negative Negative   LEUKEST Trace* Small*   RBCU >182* 2   WBCU 105* 4     Virology:  Hepatitis C Antibody   Date Value Ref Range Status   07/19/2024 Nonreactive Nonreactive Final     Comment:     A nonreactive screening test result does not exclude the possibility of exposure to or infection with HCV. Nonreactive screening test results in individuals with prior exposure to HCV may be due to antibody levels below the limit of detection of this assay or lack of reactivity to the HCV antigens used in this assay. Patients with recent HCV infections (<3 months from time of exposure) may have false-negative HCV antibody results due to the time needed for seroconversion (average of 8 to 9 weeks).

## 2024-07-22 NOTE — PROGRESS NOTES
[facility]  Transplant Nephrology Consult Note  Date of Admission:  7/19/2024  Today's Date: 07/22/2024  Requesting physician: Harrison Whitehead*    Reason for Consult:  SPK immunosuppression    Recommendations:   - no acute RRT indications   - good UOP while being off of diuretics  - ok to continue renally dosed bactrim with resolving hyperK and downtrending cr    Assessment & Plan   # DDKT (SPK): Trend down.  suspect ATN post arrest. UOP improving now with down trending creatinine.   - Baseline Creatinine: ~ TBD   - Proteinuria: Not checked post transplant   - DSA Hx:  Final cross match pending    - Last cPRA: 11%   - BK Viremia: Not checked post transplant   - Kidney Tx Biopsy Hx: No biopsy history.    # Pancreas Tx (SPK):    - Pancreatic Exocrine Drainage: Enteric drained     - Blood glucose: Elevated blood glucose      On insulin: Yes minimal dose   -  Latest HbA1c: 4%   - Pancreatic enzymes: Trend up   - DSA Hx:  final crossmatch pending   - Pancreas Tx Biopsy Hx: No biopsy history    # Immunosuppression: Tacrolimus immediate release (goal 8-10) and Mycophenolate mofetil (dose 1000 mg every 12 hours)   - Induction with Recent Transplant:  High Intensity Protocol   - Continue with intensive monitoring of immunosuppression for efficacy and toxicity.   - Historical Changes in Immunosuppression: None   - Changes: Not at this time    # VT/VF Cardiac arrest:  # CAD s/p CABG 2019 LIMA-LAD, SVG-RCA, IBAN 1/2019, IBAN to RCA 2/2024     - s/p inferior STEMI 2/2 ostial proximal RCA in stent thrombosis   - s/p 2 IBAN to RCA 7/20/2024   - Echo: LVEF=37%.Moderate diffuse hypokinesis with akinesis of all inferior segments.  Mild right ventricular dilation is present.Global  right ventricular function is moderately reduced.    # Infection Prevention:      - PJP: Sulfa/TMP (Bactrim) held given hhyperK, add G6PD  - CMV IgG Ab High Risk Discordance (D+/R-): No  - EBV IgG Ab High  Risk Discordance (D+/R-): No    # Blood Pressure:  borderline control ;  Goal BP: MAP > 65   - Changes: Not at this time     # Anemia in Chronic Renal Disease: Hgb: Trend down post SPK     KERRI: No   - Iron studies: Not checked recently. Monitor Hb trends    # Mineral Bone Disorder:    - Secondary renal hyperparathyroidism; PTH level: Unknown at this time, but checked with dialysis        On treatment: None  - Vitamin D; level: Unknown at this time, but checked with dialysis        On supplement: No  - Calcium; level: Normal        On supplement: No  - Phosphorus; level: Normal        On supplement: No    # Electrolytes:   - Potassium; level: High        On supplement: No  - Magnesium; level: High normal        On supplement: Yes  - Bicarbonate; level: Normal        On supplement: No  - Sodium; level: Low normal    # Other Significant PMH:   - Left diaphragmatic elevation; 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      Grisel Coombs MD  Transplant Nephrology  Contact information via Vocera Web Console or via [facility] Paging/Directory      Interval History  Mr. Ramey's creatinine is 3.9 (07/21 1351); Trend down  Off pressors  Extubated, on room air  Excellent UOP  [place] nauseous this morning,  no vomiting yet   Pain is fairly controlled    Review of Systems   Review of systems not obtained due to patient factors - intubation and sedation    MEDICATIONS:  Current Facility-Administered Medications   Medication Dose Route Frequency Provider Last Rate Last Admin    acetaminophen (TYLENOL) tablet 975 mg  975 mg Oral Q8H Harrison Whitehead MD   975 mg at 07/22/24 0405    anti-thymocyte globulin (THYMOGLOBULIN - Rabbit) 200 mg in  sodium chloride 0.9 % 590 mL intermittent infusion  200 mg Intravenous Central line Once Julienne Ibrahim PA-C        aspirin (ASA) chewable tablet 81 mg  81 mg Oral or Feeding Tube Daily Bing Winslow MD   81 mg at 07/22/24 0839    calcium carbonate-vitamin D (OSCAL) 500-5 MG-MCG per tablet 1 tablet  1 tablet Oral BID w/meals Harrison Whitehead MD   1 tablet at 07/22/24 0839    heparin ANTICOAGULANT injection 5,000 Units  5,000 Units Subcutaneous Q8H Joe Heredia MD   5,000 Units at 07/22/24 0405    lactated ringers BOLUS 500 mL  500 mL Intravenous Once Margaret Mcfarlane APRN  mL/hr at 07/22/24 0936 500 mL at 07/22/24 0936    magnesium oxide (MAG-OX) tablet 400 mg  400 mg Oral Q24H Harrison Whitehead MD        meropenem (MERREM) 500 mg vial to attach to  mL bag for ADULTS or 25 mL bag for PEDS  500 mg Intravenous Q8H Harrison Whitehead MD   500 mg at 07/22/24 0406    micafungin (MYCAMINE) 100 mg in sodium chloride 0.9 % 100 mL intermittent infusion  100 mg Intravenous Q24H Harrison Whitehead  mL/hr at 07/21/24 2120 100 mg at 07/21/24 2120    mycophenolate mofetil (CELLCEPT) 1,000 mg in D5W intermittent infusion  1,000 mg Intravenous BID IS Julienne Ibrahim PA-C 137.5 mL/hr at 07/22/24 0859 1,000 mg at 07/22/24 0859    octreotide (sandoSTATIN) injection 100 mcg  100 mcg Subcutaneous Q12H Harrison Whitehead MD   100 mcg at 07/22/24 0840    pantoprazole (PROTONIX) 2 mg/mL suspension 40 mg  40 mg Per NG tube Daily Harrison Whitehead MD   40 mg at 07/22/24 0854    polyethylene glycol (MIRALAX) Packet 17 g  17 g Oral Daily Harrison Whitehead MD   17 g at 07/22/24 0839    senna-docusate (SENOKOT-S/PERICOLACE) 8.6-50 MG per tablet 1 tablet  1 tablet Oral BID Harrison Whitehead MD   1 tablet at 07/22/24 0840    sodium chloride (PF) 0.9% PF flush 3 mL  3 mL  "Intravenous Q8H Harrison Whitehead MD   3 mL at 24 0841    tacrolimus (GENERIC) suspension 3 mg  3 mg Oral or NG Tube BID IS Harrison Whitehead MD   3 mg at 24 0854    ticagrelor (BRILINTA) tablet 90 mg  90 mg Oral or Feeding Tube BID Bing Winslow MD   90 mg at 24 0840    valGANciclovir (VALCYTE) tablet 450 mg  450 mg Oral Once per day on  Harrison Whitehead MD         Current Facility-Administered Medications   Medication Dose Route Frequency Provider Last Rate Last Admin    dextrose 10% infusion   Intravenous Continuous PRN Farzad Carreno PA-C        dextrose 5% in lactated ringers 1,000 mL infusion   Intravenous Continuous Julienne Ibrahim PA-C 50 mL/hr at 24 0700 Rate Verify at 24 0700    insulin regular (MYXREDLIN) 1 unit/mL infusion  0-24 Units/hr Intravenous Continuous Farzad Carreno PA-C 0.5 mL/hr at 24 0700 0.5 Units/hr at 24 0700    niCARdipine 40 mg in 200 mL NS (CARDENE) infusion  0.5-15 mg/hr Intravenous Continuous Filippo Mclaughlin MD 12.5 mL/hr at 24 0919 2.5 mg/hr at 24 0919       Physical Exam   Temp  Av.4  F (36.3  C)  Min: 96.1  F (35.6  C)  Max: 98.6  F (37  C)  Arterial Line BP  Min: 36/21  Max: 276/115  Arterial Line MAP (mmHg)  Av.3 mmHg  Min: 29 mmHg  Max: 232 mmHg      Pulse  Av.9  Min: 20  Max: 152 Resp  Avg: 15.5  Min: 3  Max: 38  FiO2 (%)  Av %  Min: 70 %  Max: 80 %  SpO2  Av.7 %  Min: 72 %  Max: 100 %    CVP (mmHg): 278 mmHgBP (!) 161/63   Pulse 107   Temp 97.5  F (36.4  C) (Esophageal)   Resp 18   Ht 1.803 m (5' 11\")   Wt 113.2 kg (249 lb 9 oz)   SpO2 95%   BMI 34.81 kg/m     Date 24 07 - 24 0659   Shift 7112-4629 1611-7875 9168-3722 24 Hour Total   INTAKE   I.V. 1839.32   1839.32   NG/   128   Shift Total(mL/kg) 1967.32(19.21)   1967.32(19.21)   OUTPUT   Urine 1325   1325 "   Emesis/NG output 100   100   Drains 875   875   Shift Total(mL/kg) 2300(22.46)   2300(22.46)   Weight (kg) 102.4 102.4 102.4 102.4      Admit Weight: 102.4 kg (225 lb 12 oz)     GENERAL APPEARANCE: intubated sedated  HENT: atrmatic, have NG tube in place  RESP: fine coarse breath sounds b/l  CV: regular rhythm, normal rate  EDEMA: improved  LE edema bilaterally  ABDOMEN: soft, nondistended, surgical incision  MS: extremities normal - no gross deformities noted  SKIN: no rash  Access LUE AVF +thrill/bruit    Data   All labs reviewed by me.  CMP  Recent Labs   Lab 07/22/24  0918 07/22/24  0621 07/22/24  0421 07/22/24  0412 07/22/24  0150 07/22/24  0148 07/22/24  0001 07/21/24  2211 07/21/24  1700 07/21/24  1654   NA  --   --  141  --   --  140  --  140  --  138   POTASSIUM  --   --  4.9  --   --  5.0  --  4.8  --  4.9   CHLORIDE  --   --  104  --   --  104  --  104  --  104   CO2  --   --  24  --   --  25  --  23  --  25   ANIONGAP  --   --  13  --   --  11  --  13  --  9   * 133* 143* 138*   < > 141*   < > 155*   < > 132*   BUN  --   --  42.0*  --   --  41.1*  --  38.4*  --  37.4*   CR  --   --  4.11*  --   --  4.18*  --  4.20*  --  4.54*   GFRESTIMATED  --   --  17*  --   --  17*  --  16*  --  15*   BETHANY  --   --  9.5  --   --  9.4  --  9.8  --  9.4   MAG  --   --  2.1  --   --  2.1  --  2.1  --  2.4*   PHOS  --   --  5.7*  --   --  5.5*  --  5.0*  --  5.5*   PROTTOTAL  --   --  5.5*  --   --  5.4*  --  5.8*  --  5.1*   ALBUMIN  --   --  3.4*  --   --  3.3*  --  3.7  --  3.2*   BILITOTAL  --   --  0.4  --   --  0.4  --  0.5  --  0.4   ALKPHOS  --   --  67  --   --  68  --  82  --  64   AST  --   --  211*  --   --  234*  --  301*  --  300*   ALT  --   --  75*  --   --  79*  --  90*  --  83*    < > = values in this interval not displayed.     CBC  Recent Labs   Lab 07/22/24  0923 07/22/24  0421 07/22/24  0148 07/21/24  2211   HGB 9.2* 9.1* 9.0* 10.0*   WBC 5.9 5.4 5.5 5.5   RBC 2.81* 2.80* 2.75* 3.05*   HCT  26.7* 26.8* 26.8* 29.5*   MCV 95 96 98 97   MCH 32.7 32.5 32.7 32.8   MCHC 34.5 34.0 33.6 33.9   RDW 13.0 13.0 13.2 13.0   PLT 96* 93* 98* 91*     INR  Recent Labs   Lab 07/20/24  0821 07/20/24  0600 07/19/24  1656   INR 1.39* 1.44* 0.96   PTT 28 27 31     ABG  Recent Labs   Lab 07/22/24  0923 07/22/24  0421 07/21/24 2002 07/21/24  1811   PH 7.44 7.42 7.39 7.39   PCO2 40 42 44 43   PO2 63* 67* 85 77*   HCO3 27 27 26 26   O2PER 21 35  35 36  35 30      Urine Studies  Recent Labs   Lab Test 07/20/24  0609 09/27/21  1220 07/24/21  1151 06/25/19  1439 06/19/19  0822   COLOR Orange* Yellow Light Yellow Straw Straw   APPEARANCE Slightly Cloudy* Slightly Cloudy* Clear Clear Clear   URINEGLC 50* 150* 200* 200 mg/dL* >1000 mg/dL*   URINEBILI Negative Negative Negative Negative Negative   URINEKETONE Negative Negative Negative Negative Negative   SG 1.013 1.013 1.018 1.010 1.012   UBLD Large* Negative 0.03 mg/dL* Moderate* Trace*   URINEPH 6.5 6.0 6.0 6.5 6.0   PROTEIN 300* >499* 600* 200 mg/dL* 300 mg/dL*   UROBILINOGEN  --   --   --  <2.0 E.U./dL <2.0 E.U./dL   NITRITE Negative Negative Negative Negative Negative   LEUKEST Trace* Small* Negative Negative Negative   RBCU >182* 2 1 25-50* 0-2   WBCU 105* 4 10* 0-5 0-5     No lab results found.  PTH  Recent Labs   Lab Test 07/20/21  1845   PTHI 190*     Iron Studies  No lab results found.    IMAGING:  All imaging studies reviewed by me.    Past Medical History    I have reviewed this patient's medical history and updated it with pertinent information if needed.   Past Medical History:   Diagnosis Date    Acquired elevated diaphragm     Anemia in chronic kidney disease     Angina pectoris (H24)     Chronic kidney disease     Coronary artery disease     Diabetes mellitus, type II (H) 12/2001    Diabetic nephropathy (H)     MARQUEZ (dyspnea on exertion)     Dyslipidemia 12/2001    End stage renal disease (H)     History of blood transfusion 2004    Hypertension     takes  medication    Ischemic cardiomyopathy     Metabolic acidosis     Myocardial infarction (H)     STEMI -Diagonal branch of the LAD    Obesity (BMI 30-39.9)     Peripheral neuropathy     Proteinuria     Retinopathy     Vitamin D deficiency        Past Surgical History   I have reviewed this patient's surgical history and updated it with pertinent information if needed.  Past Surgical History:   Procedure Laterality Date    BACK SURGERY  2009    L5 disc cut    BYPASS GRAFT ARTERY CORONARY  06/20/2019    2 vessels    CV CENTRAL VENOUS CATHETER PLACEMENT N/A 7/20/2024    Procedure: Central Venous Catheter Placement;  Surgeon: Dominic Robertson MD;  Location: [facility] CARDIAC CATH LAB    CV CORONARY ANGIOGRAM N/A 01/13/2019    Procedure: Coronary Angiogram;  Surgeon: Cielo Che MD;  Location: [facility] Cath Lab;  Service: Cardiology    CV CORONARY ANGIOGRAM N/A 05/02/2019    Procedure: Coronary Angiogram;  Surgeon: Cielo Che MD;  Location: [facility] Cath Lab;  Service: Cardiology    CV CORONARY ANGIOGRAM N/A 04/30/2020    Procedure: Coronary Angiogram;  Surgeon: Cielo Che MD;  Location: [facility] Cath Lab;  Service: Cardiology    CV CORONARY ANGIOGRAM N/A 07/22/2021    Procedure: CV CORONARY ANGIOGRAM;  Surgeon: Adrian Crow MD;  Location: [facility] CV    CV CORONARY ANGIOGRAM N/A 02/01/2024    Procedure: Coronary Angiogram;  Surgeon: Delroy Carpio MD;  Location: [facility] CARDIAC CATH LAB    CV CORONARY ANGIOGRAM N/A 7/20/2024    Procedure: Coronary Angiogram;  Surgeon: Dominic Robertson MD;  Location: [facility] CARDIAC CATH LAB    CV CORONARY LITHOTRIPSY PCI N/A 7/20/2024    Procedure: Percutaneous Coronary Intervention - Lithotripsy;  Surgeon: Dominic Robertson MD;  Location: [facility] CARDIAC CATH LAB    CV FRACTIONAL FLOW RATIO WIRE N/A 07/22/2021    Procedure: Fractional Flow Ratio Wire;  Surgeon: Adrian Crow MD;  Location: [facility]  CATH LAB CV    CV INTRAVASULAR ULTRASOUND N/A 7/20/2024    Procedure: Intravascular Ultrasound;  Surgeon: Dominic Robertson MD;  Location: [place] CARDIAC CATH LAB    CV LEFT HEART CATH N/A 07/22/2021    Procedure: Left Heart Cath;  Surgeon: Adrian Crow MD;  Location: [facility] CATH LAB CV    CV LEFT HEART CATHETERIZATION WITH LEFT VENTRICULOGRAM N/A 01/13/2019    Procedure: Left Heart Catheterization with Left Ventriculogram;  Surgeon: Cielo Che MD;  Location: [facility] Cath Lab;  Service: Cardiology    CV LEFT HEART CATHETERIZATION WITHOUT LEFT VENTRICULOGRAM Left 04/30/2020    Procedure: Left Heart Catheterization Without Left Ventriculogram;  Surgeon: Cielo Che MD;  Location: [facility] Cath Lab;  Service: Cardiology    CV PCI N/A 02/01/2024    Procedure: Percutaneous Coronary Intervention;  Surgeon: Delroy Carpio MD;  Location: [place] CARDIAC CATH LAB    CV PCI N/A 7/20/2024    Procedure: Percutaneous Coronary Intervention;  Surgeon: Dominic Robertson MD;  Location: [place] CARDIAC CATH LAB    CV PCI ASPIRATION THROMECTOMY N/A 7/20/2024    Procedure: Percutaneous Coronary Intervention Aspiration Thrombectomy;  Surgeon: Dominic Robertson MD;  Location: [place] CARDIAC CATH LAB    CV PCI STENT DRUG ELUTING N/A 7/20/2024    Procedure: Percutaneous Coronary Intervention Stent;  Surgeon: Dominic Robertson MD;  Location: [place] CARDIAC CATH LAB    CV RIGHT HEART CATHETERIZATION N/A 04/30/2020    Procedure: Right Heart Catheterization;  Surgeon: Cielo Che MD;  Location: [facility] Cath Lab;  Service: Cardiology    EYE SURGERY      GENITOURINARY SURGERY      HERNIA REPAIR      OTHER SURGICAL HISTORY      Excise varicocele    STENT, CORONARY, ADLE  2019    VASCULAR SURGERY         Family History   I have reviewed this patient's family history and updated it with pertinent information if needed.   Family History   Problem  Relation Age of Onset    Diabetes Type 2  Mother     Heart Disease Father 60    CABG Father 50        triple bypass    Diabetes Type 2  Father     Depression Sister     Substance Abuse Sister     Ovarian Cancer Maternal Grandmother     Brain Cancer Maternal Grandmother     Pancreatic Cancer Maternal Aunt     Prostate Cancer Maternal Uncle        Social History   I have reviewed this patient's social history and updated it with pertinent information if needed. Siva Ramey  reports that he has never smoked. He has been exposed to tobacco smoke. He has never used smokeless tobacco. He reports that he does not drink alcohol and does not use drugs.    Prior to Admission Medications   Medications Prior to Admission   Medication Sig Dispense Refill Last Dose    acetaminophen (TYLENOL) 500 MG tablet Take 500 mg by mouth every 4 hours as needed   7/19/2024    albuterol (PROAIR HFA/PROVENTIL HFA/VENTOLIN HFA) 108 (90 Base) MCG/ACT inhaler Inhale 2 puffs into the lungs every 4 hours as needed   More than a month    ALPRAZolam (XANAX) 0.25 MG tablet Take 0.25 mg by mouth 3 times daily as needed   More than a month    amLODIPine (NORVASC) 10 MG tablet Take 1 tablet (10 mg) by mouth daily 90 tablet 3 7/18/2024 at 0800    aspirin (ASA) 81 MG chewable tablet Take 1 tablet (81 mg) by mouth daily Starting tomorrow. 30 tablet 3 7/18/2024 at 0800    atorvastatin (LIPITOR) 80 MG tablet Take 1 tablet (80 mg) by mouth at bedtime 90 tablet 3 7/18/2024 at 2200    carvedilol (COREG) 12.5 MG tablet Take 3 tablets (37.5 mg) by mouth 2 times daily (with meals) 540 tablet 3 7/18/2024 at 2200    cholecalciferol, vitamin D3, 5,000 unit Tab [CHOLECALCIFEROL, VITAMIN D3, 5,000 UNIT TAB] Take 5,000 Units by mouth daily.   7/18/2024 at 2200    hydrALAZINE (APRESOLINE) 25 MG tablet Take 1 tablet (25 mg) by mouth 2 times daily 180 tablet 3 7/18/2024 at 0800    icosapent ethyl (VASCEPA) 1 g CAPS capsule Take 2 g by mouth 2 times daily (with meals)    7/18/2024 at 2200    insulin aspart (NOVOLOG VIAL) 100 UNITS/ML vial Inject Subcutaneous 3 times daily (with meals) To be used with the insulin pump       nitroGLYcerin (NITROSTAT) 0.4 MG sublingual tablet PLACE ONE TABLET UNDER TONGUE AS NEEDED FOR CHEST PAIN AS DIRECTED FOR 3 DOSES. IF SYMPTOMS PERSIST 5 MINUTES AFTER 1ST DOSE CALL 911 25 tablet 3 Unknown    VELPHORO 500 MG CHEW chewable tablet Take 1,000 mg by mouth 3 times daily (with meals)   7/18/2024 at 1200    zolpidem (AMBIEN) 5 MG tablet Take 5 mg by mouth nightly as needed for sleep   7/18/2024 at 2200    cloNIDine (CATAPRES) 0.1 MG tablet Take 1 tablet (0.1 mg) by mouth 3 times daily as needed (SBP > 160) 30 tablet 0     glucose (BD GLUCOSE) 4 g chewable tablet glucose 4 gram chewable tablet   CHEW AND SWALLOW 4 TIMES DAILY AS NEEDED       insulin aspart (NOVOLOG PEN) 100 UNIT/ML pen Inject 5 Units Subcutaneous 3 times daily (before meals) 4.5 mL 1

## 2024-07-22 NOTE — PROGRESS NOTES
Transplant Surgery  Inpatient Daily Progress Note  07/21/2024    Assessment & Plan: 49 year old male with PMH significant for DMII (on insulin pump) and resulting ESKD (on HD every MWF since 8/2021). 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 Underwent simultaneous kidney pancreas transplant on 7/20/24 complicated by VT/VF arrest in PACU.Received 2 rounds of CPR before ROSC Taken emergently to the cath lab and found to have 100% occlusion of the RCA now s/p IBAN x 2.    Graft function:  S/p pancreas transplant: Lipase 109 (211). Using ~0.5 U/hr on the insulin drip. Patent post-op US.  S/p kidney transplant: Pre-op Cr 5.22, now trending down to 4.54. Robust urine output. Patent post-op US. Will monitor fluid status closely with diuresis for cardiac perspective.  Immunosuppression management:   Induction:  Thymo 200/200 mg  Solu-Medrol: 500/250 mg  Maintenance:  Tacrolimus 3 mg BID  MMF 1000 mg BID  Hematology: DAPT- Brilinta + ASA for at least 1 month. Will not give heparin at this time due to DAPT  Cardiorespiratory:   CAD, VT/VF arrest s/p PCI with IBAN:Previous CABG, and IBAN with 100% thrombosis of the RCA, IBAN x 2 on 7/20/24   Acute respiratory failure: Extubated this evening  GI/Nutrition: NPO, NG tube in place until POD 3 or return of bowel function s/p pancreas transplant.   Endocrine: Steroid hyperglycemia, Insulin drip as above  Fluid/Electrolytes: MIVF:Stopped urine replacements, straight rated fluids. Diuresing. Lasix drip  Hyperkalemia: Resolved with medical management without need for dialysis  : Burt to remain due to new surgical anastomosis  Infectious disease: Luci-op prophylaxis with meropenem, micafungin.  Prophylaxis: DVT, fall, GI, fungal  Disposition: CVICU    Medical Decision Making: High  Subsequent visit 10066  (high level decision making)    KAT/Fellow/Resident Provider: Julienne Ibrahim PA-C    Faculty: Harrison Whitehead MD  _________________________________________________________________  Transplant History: Admitted 7/19/2024 for kidney pancreas transplant.  7/20/2024 (Kidney / Pancreas), Postoperative day: 1     Interval History: History is obtained from the electronic health record due to patient intubation  Overnight events: Stable overnight. Robust urine output    ROS:   A 10-point review of systems was negative except as noted above.    Meds:  Current Facility-Administered Medications   Medication Dose Route Frequency Provider Last Rate Last Admin    acetaminophen (TYLENOL) tablet 975 mg  975 mg Oral Q8H Harrison Whitehead MD   975 mg at 07/21/24 1942    aspirin (ASA) chewable tablet 81 mg  81 mg Oral or Feeding Tube Daily Bing Winslow MD   81 mg at 07/21/24 0830    calcium carbonate-vitamin D (OSCAL) 500-5 MG-MCG per tablet 1 tablet  1 tablet Oral BID w/meals Harrison Whitehead MD   1 tablet at 07/21/24 1700    chlorhexidine (PERIDEX) 0.12 % solution 15 mL  15 mL Mouth/Throat Q12H Farzad Carreno PA-C   15 mL at 07/21/24 0830    dextrose 10% BOLUS 300 mL  300 mL Intravenous Once Bing Winslow MD        heparin ANTICOAGULANT injection 5,000 Units  5,000 Units Subcutaneous Q8H Joe Heredia MD   5,000 Units at 07/21/24 1942    [START ON 7/22/2024] magnesium oxide (MAG-OX) tablet 400 mg  400 mg Oral Q24H Harrison Whitehead MD        meropenem (MERREM) 500 mg vial to attach to  mL bag for ADULTS or 25 mL bag for PEDS  500 mg Intravenous Q8H Harrison Whitehead MD   500 mg at 07/21/24 1942    micafungin (MYCAMINE) 100 mg in sodium chloride 0.9 % 100 mL intermittent infusion  100 mg Intravenous Q24H Harrison Whitehead  mL/hr at 07/20/24 2015 100 mg at 07/20/24 2015    mycophenolate mofetil  "(CELLCEPT) 1,000 mg in D5W intermittent infusion  1,000 mg Intravenous BID IS Julienne Ibrhaim PA-C 137.5 mL/hr at 07/21/24 1752 1,000 mg at 07/21/24 1752    octreotide (sandoSTATIN) injection 100 mcg  100 mcg Subcutaneous Q12H Harrison Whitehead MD   100 mcg at 07/21/24 1943    pantoprazole (PROTONIX) 2 mg/mL suspension 40 mg  40 mg Per NG tube Daily aHrrison Whitehead MD   40 mg at 07/21/24 0832    polyethylene glycol (MIRALAX) Packet 17 g  17 g Oral Daily Harrison Whitehead MD   17 g at 07/21/24 0831    senna-docusate (SENOKOT-S/PERICOLACE) 8.6-50 MG per tablet 1 tablet  1 tablet Oral BID Harrison Whitehead MD   1 tablet at 07/21/24 1942    sodium chloride (PF) 0.9% PF flush 3 mL  3 mL Intravenous Q8H Harrison Whitehead MD        tacrolimus (GENERIC) suspension 3 mg  3 mg Oral or NG Tube BID IS Harrison Whitehead MD   3 mg at 07/21/24 1712    ticagrelor (BRILINTA) tablet 90 mg  90 mg Oral or Feeding Tube BID Bing Winslow MD   90 mg at 07/21/24 1942    [START ON 7/22/2024] valGANciclovir (VALCYTE) tablet 450 mg  450 mg Oral Once per day on Monday Thursday Harrison Whitehead MD           Physical Exam:     Admit Weight: 102.4 kg (225 lb 12 oz)    Current vitals:   BP 94/60   Pulse 98   Temp 97.2  F (36.2  C)   Resp 16   Ht 1.803 m (5' 11\")   Wt 113.2 kg (249 lb 9 oz)   SpO2 94%   BMI 34.81 kg/m      CVP (mmHg): 10 mmHg    Vital sign ranges:    Temp:  [96.6  F (35.9  C)-98.2  F (36.8  C)] 97.2  F (36.2  C)  Pulse:  [] 98  MAP:  [63 mmHg-106 mmHg] 103 mmHg  Arterial Line BP: ()/(50-82) 152/74  FiO2 (%):  [30 %-45 %] 30 %  SpO2:  [91 %-100 %] 94 %  Patient Vitals for the past 24 hrs:   Temp Temp src Pulse SpO2 Weight   07/21/24 1900 -- -- 98 94 % --   07/21/24 1845 -- -- 101 95 % --   07/21/24 1830 -- -- 94 91 % --   07/21/24 1815 -- -- 93 96 % --   07/21/24 1800 97.2  F (36.2  C) " -- 83 98 % --   07/21/24 1758 -- -- 83 99 % --   07/21/24 1745 -- -- 80 97 % --   07/21/24 1730 -- -- 78 97 % --   07/21/24 1715 -- -- 79 99 % --   07/21/24 1711 -- -- 80 98 % --   07/21/24 1700 97  F (36.1  C) -- 79 98 % --   07/21/24 1645 -- -- 82 98 % --   07/21/24 1630 -- -- 82 98 % --   07/21/24 1615 -- -- 80 98 % --   07/21/24 1600 97  F (36.1  C) Esophageal 79 98 % --   07/21/24 1545 -- -- 80 98 % --   07/21/24 1530 -- -- 82 97 % --   07/21/24 1515 -- -- 80 97 % --   07/21/24 1500 97  F (36.1  C) -- 80 98 % --   07/21/24 1445 -- -- 83 96 % --   07/21/24 1430 -- -- 82 96 % --   07/21/24 1415 -- -- 80 96 % --   07/21/24 1400 97  F (36.1  C) -- 81 95 % --   07/21/24 1330 -- -- 81 98 % --   07/21/24 1315 -- -- 82 99 % --   07/21/24 1305 -- -- 82 99 % --   07/21/24 1300 97  F (36.1  C) -- 84 99 % --   07/21/24 1255 -- -- 82 100 % --   07/21/24 1250 -- -- 82 100 % --   07/21/24 1247 -- -- 82 99 % --   07/21/24 1246 -- -- 82 100 % --   07/21/24 1245 -- -- 83 99 % --   07/21/24 1244 -- -- 84 100 % --   07/21/24 1243 -- -- 83 100 % --   07/21/24 1242 -- -- 84 100 % --   07/21/24 1241 -- -- 81 100 % --   07/21/24 1200 96.8  F (36  C) -- 81 99 % --   07/21/24 1100 (!) 96.6  F (35.9  C) -- 82 99 % --   07/21/24 1045 -- -- 81 98 % --   07/21/24 1040 -- -- 81 98 % --   07/21/24 1030 -- -- 81 100 % --   07/21/24 1015 -- -- 80 99 % --   07/21/24 1000 (!) 96.6  F (35.9  C) -- 83 97 % --   07/21/24 0945 -- -- 84 97 % --   07/21/24 0935 -- -- 86 98 % --   07/21/24 0930 -- -- 86 99 % --   07/21/24 0915 -- -- 84 97 % --   07/21/24 0900 (!) 96.6  F (35.9  C) -- 89 99 % --   07/21/24 0800 96.8  F (36  C) Esophageal 80 100 % --   07/21/24 0700 96.8  F (36  C) -- 82 99 % --   07/21/24 0600 (!) 96.6  F (35.9  C) -- 81 100 % --   07/21/24 0500 96.8  F (36  C) -- 83 99 % --   07/21/24 0400 96.8  F (36  C) Esophageal 83 99 % --   07/21/24 0300 96.8  F (36  C) -- 80 99 % 113.2 kg (249 lb 9 oz)   07/21/24 0200 96.8  F (36  C) -- 84 99 % --    07/21/24 0100 97.2  F (36.2  C) -- 86 99 % --   07/21/24 0000 97.3  F (36.3  C) -- 85 98 % --   07/20/24 2300 97.7  F (36.5  C) -- 88 99 % --   07/20/24 2200 97.9  F (36.6  C) -- 84 98 % --   07/20/24 2100 98.1  F (36.7  C) -- 87 98 % --   07/20/24 2000 98.2  F (36.8  C) Esophageal 87 98 % --     General Appearance: Intubated, sedated on AM exam   Skin: normal, warm  Heart: regular rate and rhythm  Lungs: Intubated  Abdomen: The abdomen is soft, post op dressings in place. Drain with serosanguinous output  : olea is present. Urine    Extremities: edema:   Neurologic: sedated     Data:   CMP  Recent Labs   Lab 07/21/24  1755 07/21/24  1700 07/21/24  1654 07/21/24  1355 07/21/24  1351 07/21/24  0636 07/21/24  0612 07/20/24  0602 07/20/24  0600   NA  --   --  138  --  138   < > 140   < > 135   POTASSIUM  --   --  4.9  --  5.0   < > 4.9   < > 5.2   CHLORIDE  --   --  104  --  104   < > 105   < > 98   CO2  --   --  25  --  25   < > 25   < > 24   * 124* 132*   < > 136*   < > 127*   < > 126*   BUN  --   --  37.4*  --  36.9*   < > 34.8*   < > 29.2*   CR  --   --  4.54*  --  4.68*   < > 4.88*   < > 5.87*   GFRESTIMATED  --   --  15*  --  14*   < > 14*   < > 11*   BETHANY  --   --  9.4  --  9.3   < > 8.8   < > 9.5   ICAW  --   --  5.1  --  5.0   < > 4.9   < >  --    MAG  --   --  2.4*  --  2.4*   < > 2.4*   < > 2.2   PHOS  --   --  5.5*  --  5.3*   < > 4.9*   < > 2.2*   AMYLASE  --   --   --   --   --   --  115*  --  171*   LIPASE  --   --   --   --   --   --  109*  --  211*   ALBUMIN  --   --  3.2*  --  3.1*   < > 3.1*   < >  --    BILITOTAL  --   --  0.4  --  0.4   < > 0.4   < >  --    ALKPHOS  --   --  64  --  64   < > 60   < >  --    AST  --   --  300*  --  332*   < > 403*   < >  --    ALT  --   --  83*  --  82*   < > 84*   < >  --     < > = values in this interval not displayed.     CBC  Recent Labs   Lab 07/21/24  1654 07/21/24  1351 07/20/24  1422 07/20/24  1138   HGB 9.1* 9.0*   < > 9.7*   WBC 10.8 12.3*   < >  12.8*   * 106*   < > 123*   A1C  --   --   --  6.4*    < > = values in this interval not displayed.     COAGS  Recent Labs   Lab 07/20/24  0821 07/20/24  0600   INR 1.39* 1.44*   PTT 28 27      Urinalysis  Recent Labs   Lab Test 07/20/24  0609 09/27/21  1220   COLOR Orange* Yellow   APPEARANCE Slightly Cloudy* Slightly Cloudy*   URINEGLC 50* 150*   URINEBILI Negative Negative   URINEKETONE Negative Negative   SG 1.013 1.013   UBLD Large* Negative   URINEPH 6.5 6.0   PROTEIN 300* >499*   NITRITE Negative Negative   LEUKEST Trace* Small*   RBCU >182* 2   WBCU 105* 4     Virology:  Hepatitis C Antibody   Date Value Ref Range Status   07/19/2024 Nonreactive Nonreactive Final     Comment:     A nonreactive screening test result does not exclude the possibility of exposure to or infection with HCV. Nonreactive screening test results in individuals with prior exposure to HCV may be due to antibody levels below the limit of detection of this assay or lack of reactivity to the HCV antigens used in this assay. Patients with recent HCV infections (<3 months from time of exposure) may have false-negative HCV antibody results due to the time needed for seroconversion (average of 8 to 9 weeks).

## 2024-07-23 ENCOUNTER — APPOINTMENT (OUTPATIENT)
Dept: PHYSICAL THERAPY | Facility: CLINIC | Age: 49
DRG: 008 | End: 2024-07-23
Attending: NURSE PRACTITIONER
Payer: MEDICARE

## 2024-07-23 LAB
ALBUMIN SERPL BCG-MCNC: 3.7 G/DL (ref 3.5–5.2)
ALP SERPL-CCNC: 72 U/L (ref 40–150)
ALT SERPL W P-5'-P-CCNC: 59 U/L (ref 0–70)
AMYLASE SERPL-CCNC: 154 U/L (ref 28–100)
ANION GAP SERPL CALCULATED.3IONS-SCNC: 9 MMOL/L (ref 7–15)
AST SERPL W P-5'-P-CCNC: 85 U/L (ref 0–45)
ATRIAL RATE - MUSE: 111 BPM
BASOPHILS # BLD AUTO: 0 10E3/UL (ref 0–0.2)
BASOPHILS NFR BLD AUTO: 0 %
BILIRUB SERPL-MCNC: 0.5 MG/DL
BUN SERPL-MCNC: 50.4 MG/DL (ref 6–20)
CA-I BLD-MCNC: 5.5 MG/DL (ref 4.4–5.2)
CA-I BLD-MCNC: 5.5 MG/DL (ref 4.4–5.2)
CA-I BLD-MCNC: 5.6 MG/DL (ref 4.4–5.2)
CALCIUM SERPL-MCNC: 10 MG/DL (ref 8.8–10.4)
CELL TYPE ALLO: NORMAL
CELL TYPE AUTO: NORMAL
CHANNELSHIFTALLOB1: -80
CHANNELSHIFTALLOB2: -85
CHANNELSHIFTALLOT1: -54
CHANNELSHIFTALLOT2: -31
CHANNELSHIFTAUTOB1: -82
CHANNELSHIFTAUTOB2: -95
CHANNELSHIFTAUTOT1: -52
CHANNELSHIFTAUTOT2: -59
CHLORIDE SERPL-SCNC: 109 MMOL/L (ref 98–107)
COMMENT ALLOB2: NORMAL
CREAT SERPL-MCNC: 2.77 MG/DL (ref 0.67–1.17)
CROSSMATCHDATEALLO: NORMAL
CROSSMATCHDATEAUTO: NORMAL
DIASTOLIC BLOOD PRESSURE - MUSE: NORMAL MMHG
DONOR ALLO: NORMAL
DONOR AUTO: NORMAL
DONOR IDENTIFICATION: NORMAL
DONORCELLDATE ALLO: NORMAL
DONORCELLDATE AUTO: NORMAL
DSA COMMENTS: NORMAL
DSA PRESENT: NO
DSA TEST METHOD: NORMAL
EGFRCR SERPLBLD CKD-EPI 2021: 27 ML/MIN/1.73M2
EOSINOPHIL # BLD AUTO: 0 10E3/UL (ref 0–0.7)
EOSINOPHIL NFR BLD AUTO: 0 %
ERYTHROCYTE [DISTWIDTH] IN BLOOD BY AUTOMATED COUNT: 12.7 % (ref 10–15)
GLUCOSE BLDC GLUCOMTR-MCNC: 102 MG/DL (ref 70–99)
GLUCOSE BLDC GLUCOMTR-MCNC: 105 MG/DL (ref 70–99)
GLUCOSE BLDC GLUCOMTR-MCNC: 108 MG/DL (ref 70–99)
GLUCOSE BLDC GLUCOMTR-MCNC: 113 MG/DL (ref 70–99)
GLUCOSE BLDC GLUCOMTR-MCNC: 117 MG/DL (ref 70–99)
GLUCOSE BLDC GLUCOMTR-MCNC: 122 MG/DL (ref 70–99)
GLUCOSE BLDC GLUCOMTR-MCNC: 126 MG/DL (ref 70–99)
GLUCOSE BLDC GLUCOMTR-MCNC: 132 MG/DL (ref 70–99)
GLUCOSE BLDC GLUCOMTR-MCNC: 133 MG/DL (ref 70–99)
GLUCOSE BLDC GLUCOMTR-MCNC: 140 MG/DL (ref 70–99)
GLUCOSE BLDC GLUCOMTR-MCNC: 141 MG/DL (ref 70–99)
GLUCOSE BLDC GLUCOMTR-MCNC: 146 MG/DL (ref 70–99)
GLUCOSE BLDC GLUCOMTR-MCNC: 149 MG/DL (ref 70–99)
GLUCOSE BLDC GLUCOMTR-MCNC: 152 MG/DL (ref 70–99)
GLUCOSE SERPL-MCNC: 145 MG/DL (ref 70–99)
HCO3 SERPL-SCNC: 24 MMOL/L (ref 22–29)
HCT VFR BLD AUTO: 27.2 % (ref 40–53)
HGB BLD-MCNC: 9.5 G/DL (ref 13.3–17.7)
IMM GRANULOCYTES # BLD: 0 10E3/UL
IMM GRANULOCYTES NFR BLD: 1 %
INTERPRETATION ECG - MUSE: NORMAL
LACTATE SERPL-SCNC: 1.3 MMOL/L (ref 0.7–2)
LACTATE SERPL-SCNC: 1.4 MMOL/L (ref 0.7–2)
LACTATE SERPL-SCNC: 1.8 MMOL/L (ref 0.7–2)
LIPASE SERPL-CCNC: 138 U/L (ref 13–60)
LYMPHOCYTES # BLD AUTO: 0.2 10E3/UL (ref 0.8–5.3)
LYMPHOCYTES NFR BLD AUTO: 5 %
MAGNESIUM SERPL-MCNC: 1.9 MG/DL (ref 1.7–2.3)
MAGNESIUM SERPL-MCNC: 2.3 MG/DL (ref 1.7–2.3)
MAGNESIUM SERPL-MCNC: 2.3 MG/DL (ref 1.7–2.3)
MCH RBC QN AUTO: 33.1 PG (ref 26.5–33)
MCHC RBC AUTO-ENTMCNC: 34.9 G/DL (ref 31.5–36.5)
MCV RBC AUTO: 95 FL (ref 78–100)
MONOCYTES # BLD AUTO: 0.4 10E3/UL (ref 0–1.3)
MONOCYTES NFR BLD AUTO: 8 %
NEUTROPHILS # BLD AUTO: 4.1 10E3/UL (ref 1.6–8.3)
NEUTROPHILS NFR BLD AUTO: 86 %
NRBC # BLD AUTO: 0 10E3/UL
NRBC BLD AUTO-RTO: 0 /100
ORGAN: NORMAL
P AXIS - MUSE: 53 DEGREES
PA ADP BLD-ACNC: 170 PRU
PHOSPHATE SERPL-MCNC: 3.1 MG/DL (ref 2.5–4.5)
PLATELET # BLD AUTO: 102 10E3/UL (ref 150–450)
POS CUT OFF ALLO B: >69
POS CUT OFF ALLO T: >53
POS CUT OFF AUTO B: >69
POS CUT OFF AUTO T: >53
POTASSIUM SERPL-SCNC: 4.6 MMOL/L (ref 3.4–5.3)
PR INTERVAL - MUSE: 180 MS
PROT SERPL-MCNC: 5.8 G/DL (ref 6.4–8.3)
QRS DURATION - MUSE: 126 MS
QT - MUSE: 330 MS
QTC - MUSE: 448 MS
R AXIS - MUSE: 27 DEGREES
RBC # BLD AUTO: 2.87 10E6/UL (ref 4.4–5.9)
RESULT ALLO B1: NORMAL
RESULT ALLO B2: NORMAL
RESULT ALLO T1: NORMAL
RESULT ALLO T2: NORMAL
RESULT AUTO B1: NORMAL
RESULT AUTO B2: NORMAL
RESULT AUTO T1: NORMAL
RESULT AUTO T2: NORMAL
SA 1  COMMENTS: NORMAL
SA 1 CELL: NORMAL
SA 1 TEST METHOD: NORMAL
SA 2 CELL: NORMAL
SA 2 COMMENTS: NORMAL
SA 2 TEST METHOD: NORMAL
SA1 HI RISK ABY: NORMAL
SA1 MOD RISK ABY: NORMAL
SA2 HI RISK ABY: NORMAL
SA2 MOD RISK ABY: NORMAL
SERUM DATE ALLO B1: NORMAL
SERUM DATE ALLO B2: NORMAL
SERUM DATE ALLO T1: NORMAL
SERUM DATE ALLO T2: NORMAL
SERUM DATE AUTO B1: NORMAL
SERUM DATE AUTO B2: NORMAL
SERUM DATE AUTO T1: NORMAL
SERUM DATE AUTO T2: NORMAL
SODIUM SERPL-SCNC: 142 MMOL/L (ref 135–145)
SYSTOLIC BLOOD PRESSURE - MUSE: NORMAL MMHG
T AXIS - MUSE: 134 DEGREES
TACROLIMUS BLD-MCNC: 9.5 UG/L (ref 5–15)
TESTMETHODALLO: NORMAL
TESTMETHODAUTO: NORMAL
TME LAST DOSE: NORMAL H
TME LAST DOSE: NORMAL H
TREATMENT ALLO B1: NORMAL
TREATMENT ALLO B2: NORMAL
TREATMENT ALLO T1: NORMAL
TREATMENT ALLO T2: NORMAL
TREATMENT AUTO B1: NORMAL
TREATMENT AUTO B2: NORMAL
TREATMENT AUTO T1: NORMAL
TREATMENT AUTO T2: NORMAL
UFH PPP CHRO-ACNC: <0.1 IU/ML
UNACCEPTABLE ANTIGENS: NORMAL
UNOS CPRA: 11
VENTRICULAR RATE- MUSE: 111 BPM
WBC # BLD AUTO: 4.7 10E3/UL (ref 4–11)

## 2024-07-23 PROCEDURE — 250N000009 HC RX 250: Performed by: PHYSICIAN ASSISTANT

## 2024-07-23 PROCEDURE — 80197 ASSAY OF TACROLIMUS: CPT | Performed by: PHYSICIAN ASSISTANT

## 2024-07-23 PROCEDURE — 250N000011 HC RX IP 250 OP 636: Performed by: SURGERY

## 2024-07-23 PROCEDURE — 99233 SBSQ HOSP IP/OBS HIGH 50: CPT | Performed by: INTERNAL MEDICINE

## 2024-07-23 PROCEDURE — 83690 ASSAY OF LIPASE: CPT | Performed by: PHYSICIAN ASSISTANT

## 2024-07-23 PROCEDURE — 258N000003 HC RX IP 258 OP 636: Performed by: PHYSICIAN ASSISTANT

## 2024-07-23 PROCEDURE — 97161 PT EVAL LOW COMPLEX 20 MIN: CPT | Mod: GP | Performed by: STUDENT IN AN ORGANIZED HEALTH CARE EDUCATION/TRAINING PROGRAM

## 2024-07-23 PROCEDURE — 82330 ASSAY OF CALCIUM: CPT | Performed by: PHYSICIAN ASSISTANT

## 2024-07-23 PROCEDURE — 250N000011 HC RX IP 250 OP 636: Performed by: PHYSICIAN ASSISTANT

## 2024-07-23 PROCEDURE — 93005 ELECTROCARDIOGRAM TRACING: CPT

## 2024-07-23 PROCEDURE — 258N000003 HC RX IP 258 OP 636: Performed by: SURGERY

## 2024-07-23 PROCEDURE — 93010 ELECTROCARDIOGRAM REPORT: CPT | Performed by: INTERNAL MEDICINE

## 2024-07-23 PROCEDURE — 82374 ASSAY BLOOD CARBON DIOXIDE: CPT | Performed by: PHYSICIAN ASSISTANT

## 2024-07-23 PROCEDURE — 250N000012 HC RX MED GY IP 250 OP 636 PS 637: Performed by: PHYSICIAN ASSISTANT

## 2024-07-23 PROCEDURE — 99291 CRITICAL CARE FIRST HOUR: CPT | Mod: 24 | Performed by: INTERNAL MEDICINE

## 2024-07-23 PROCEDURE — 83735 ASSAY OF MAGNESIUM: CPT | Performed by: PHYSICIAN ASSISTANT

## 2024-07-23 PROCEDURE — 83605 ASSAY OF LACTIC ACID: CPT | Performed by: PHYSICIAN ASSISTANT

## 2024-07-23 PROCEDURE — 250N000013 HC RX MED GY IP 250 OP 250 PS 637: Performed by: PHYSICIAN ASSISTANT

## 2024-07-23 PROCEDURE — 250N000011 HC RX IP 250 OP 636: Performed by: NURSE PRACTITIONER

## 2024-07-23 PROCEDURE — 84100 ASSAY OF PHOSPHORUS: CPT | Performed by: PHYSICIAN ASSISTANT

## 2024-07-23 PROCEDURE — 97530 THERAPEUTIC ACTIVITIES: CPT | Mod: GP | Performed by: STUDENT IN AN ORGANIZED HEALTH CARE EDUCATION/TRAINING PROGRAM

## 2024-07-23 PROCEDURE — 250N000013 HC RX MED GY IP 250 OP 250 PS 637: Performed by: NURSE PRACTITIONER

## 2024-07-23 PROCEDURE — 82150 ASSAY OF AMYLASE: CPT | Performed by: PHYSICIAN ASSISTANT

## 2024-07-23 PROCEDURE — 85520 HEPARIN ASSAY: CPT | Performed by: PHYSICIAN ASSISTANT

## 2024-07-23 PROCEDURE — 258N000003 HC RX IP 258 OP 636: Performed by: NURSE PRACTITIONER

## 2024-07-23 PROCEDURE — 85025 COMPLETE CBC W/AUTO DIFF WBC: CPT | Performed by: PHYSICIAN ASSISTANT

## 2024-07-23 PROCEDURE — 120N000002 HC R&B MED SURG/OB UMMC

## 2024-07-23 PROCEDURE — 250N000013 HC RX MED GY IP 250 OP 250 PS 637: Performed by: STUDENT IN AN ORGANIZED HEALTH CARE EDUCATION/TRAINING PROGRAM

## 2024-07-23 PROCEDURE — 85576 BLOOD PLATELET AGGREGATION: CPT

## 2024-07-23 RX ORDER — MAGNESIUM SULFATE HEPTAHYDRATE 40 MG/ML
2 INJECTION, SOLUTION INTRAVENOUS ONCE
Status: COMPLETED | OUTPATIENT
Start: 2024-07-23 | End: 2024-07-23

## 2024-07-23 RX ORDER — ATORVASTATIN CALCIUM 40 MG/1
40 TABLET, FILM COATED ORAL EVERY EVENING
Status: DISCONTINUED | OUTPATIENT
Start: 2024-07-23 | End: 2024-07-30 | Stop reason: HOSPADM

## 2024-07-23 RX ORDER — CARVEDILOL 3.12 MG/1
3.12 TABLET ORAL 2 TIMES DAILY
Status: DISCONTINUED | OUTPATIENT
Start: 2024-07-23 | End: 2024-07-24

## 2024-07-23 RX ORDER — LORAZEPAM 2 MG/ML
1 INJECTION INTRAMUSCULAR EVERY 6 HOURS PRN
Status: DISCONTINUED | OUTPATIENT
Start: 2024-07-23 | End: 2024-07-23

## 2024-07-23 RX ADMIN — MICAFUNGIN SODIUM 100 MG: 50 INJECTION, POWDER, LYOPHILIZED, FOR SOLUTION INTRAVENOUS at 20:25

## 2024-07-23 RX ADMIN — OCTREOTIDE ACETATE 100 MCG: 100 INJECTION, SOLUTION INTRAVENOUS; SUBCUTANEOUS at 19:58

## 2024-07-23 RX ADMIN — CARVEDILOL 3.12 MG: 3.12 TABLET, FILM COATED ORAL at 19:58

## 2024-07-23 RX ADMIN — MYCOPHENOLATE MOFETIL 1000 MG: 500 INJECTION, POWDER, LYOPHILIZED, FOR SOLUTION INTRAVENOUS at 08:37

## 2024-07-23 RX ADMIN — TACROLIMUS 3 MG: 5 CAPSULE ORAL at 18:01

## 2024-07-23 RX ADMIN — SODIUM CHLORIDE 10 MG/HR: 0.9 INJECTION, SOLUTION INTRAVENOUS at 10:10

## 2024-07-23 RX ADMIN — HEPARIN SODIUM 5000 UNITS: 5000 INJECTION, SOLUTION INTRAVENOUS; SUBCUTANEOUS at 03:07

## 2024-07-23 RX ADMIN — Medication 40 MG: at 08:32

## 2024-07-23 RX ADMIN — ONDANSETRON 4 MG: 2 INJECTION INTRAMUSCULAR; INTRAVENOUS at 01:18

## 2024-07-23 RX ADMIN — MEROPENEM 500 MG: 500 INJECTION, POWDER, FOR SOLUTION INTRAVENOUS at 11:28

## 2024-07-23 RX ADMIN — NICARDIPINE HYDROCHLORIDE 5 MG/HR: 0.2 INJECTION, SOLUTION INTRAVENOUS at 19:52

## 2024-07-23 RX ADMIN — MAGNESIUM OXIDE TAB 400 MG (241.3 MG ELEMENTAL MG) 400 MG: 400 (241.3 MG) TAB at 13:09

## 2024-07-23 RX ADMIN — ASPIRIN 81 MG CHEWABLE TABLET 81 MG: 81 TABLET CHEWABLE at 10:24

## 2024-07-23 RX ADMIN — SENNOSIDES AND DOCUSATE SODIUM 2 TABLET: 8.6; 5 TABLET ORAL at 10:32

## 2024-07-23 RX ADMIN — MYCOPHENOLATE MOFETIL 1000 MG: 500 INJECTION, POWDER, LYOPHILIZED, FOR SOLUTION INTRAVENOUS at 17:57

## 2024-07-23 RX ADMIN — METOCLOPRAMIDE 10 MG: 5 INJECTION, SOLUTION INTRAMUSCULAR; INTRAVENOUS at 07:38

## 2024-07-23 RX ADMIN — HEPARIN SODIUM 5000 UNITS: 5000 INJECTION, SOLUTION INTRAVENOUS; SUBCUTANEOUS at 13:15

## 2024-07-23 RX ADMIN — ATORVASTATIN CALCIUM 40 MG: 40 TABLET, FILM COATED ORAL at 19:58

## 2024-07-23 RX ADMIN — SODIUM CHLORIDE 15 MG/HR: 0.9 INJECTION, SOLUTION INTRAVENOUS at 03:40

## 2024-07-23 RX ADMIN — SODIUM CHLORIDE, SODIUM LACTATE, POTASSIUM CHLORIDE, CALCIUM CHLORIDE AND DEXTROSE MONOHYDRATE: 5; 600; 310; 30; 20 INJECTION, SOLUTION INTRAVENOUS at 18:00

## 2024-07-23 RX ADMIN — ONDANSETRON 4 MG: 2 INJECTION INTRAMUSCULAR; INTRAVENOUS at 13:09

## 2024-07-23 RX ADMIN — MEROPENEM 500 MG: 500 INJECTION, POWDER, FOR SOLUTION INTRAVENOUS at 03:40

## 2024-07-23 RX ADMIN — HEPARIN SODIUM 5000 UNITS: 5000 INJECTION, SOLUTION INTRAVENOUS; SUBCUTANEOUS at 19:58

## 2024-07-23 RX ADMIN — MAGNESIUM SULFATE HEPTAHYDRATE 2 G: 2 INJECTION, SOLUTION INTRAVENOUS at 03:06

## 2024-07-23 RX ADMIN — TICAGRELOR 90 MG: 90 TABLET ORAL at 10:24

## 2024-07-23 RX ADMIN — HYDRALAZINE HYDROCHLORIDE 10 MG: 20 INJECTION INTRAMUSCULAR; INTRAVENOUS at 07:41

## 2024-07-23 RX ADMIN — HYDRALAZINE HYDROCHLORIDE 10 MG: 20 INJECTION INTRAMUSCULAR; INTRAVENOUS at 01:18

## 2024-07-23 RX ADMIN — TICAGRELOR 90 MG: 90 TABLET ORAL at 19:58

## 2024-07-23 RX ADMIN — SENNOSIDES AND DOCUSATE SODIUM 2 TABLET: 8.6; 5 TABLET ORAL at 19:58

## 2024-07-23 RX ADMIN — INSULIN HUMAN 2 UNITS/HR: 1 INJECTION, SOLUTION INTRAVENOUS at 08:56

## 2024-07-23 RX ADMIN — OCTREOTIDE ACETATE 100 MCG: 100 INJECTION, SOLUTION INTRAVENOUS; SUBCUTANEOUS at 08:41

## 2024-07-23 RX ADMIN — FOSAPREPITANT 150 MG: 150 INJECTION, POWDER, LYOPHILIZED, FOR SOLUTION INTRAVENOUS at 10:20

## 2024-07-23 RX ADMIN — TACROLIMUS 3 MG: 5 CAPSULE ORAL at 08:31

## 2024-07-23 RX ADMIN — HYDRALAZINE HYDROCHLORIDE 25 MG: 25 TABLET ORAL at 21:56

## 2024-07-23 RX ADMIN — METOCLOPRAMIDE 10 MG: 5 INJECTION, SOLUTION INTRAMUSCULAR; INTRAVENOUS at 01:18

## 2024-07-23 RX ADMIN — ONDANSETRON 4 MG: 2 INJECTION INTRAMUSCULAR; INTRAVENOUS at 19:58

## 2024-07-23 RX ADMIN — ONDANSETRON 4 MG: 2 INJECTION INTRAMUSCULAR; INTRAVENOUS at 07:38

## 2024-07-23 RX ADMIN — HYDRALAZINE HYDROCHLORIDE 25 MG: 25 TABLET ORAL at 13:09

## 2024-07-23 RX ADMIN — LORAZEPAM 1 MG: 2 INJECTION INTRAMUSCULAR; INTRAVENOUS at 09:20

## 2024-07-23 RX ADMIN — POLYETHYLENE GLYCOL 3350 17 G: 17 POWDER, FOR SOLUTION ORAL at 19:58

## 2024-07-23 RX ADMIN — POLYETHYLENE GLYCOL 3350 17 G: 17 POWDER, FOR SOLUTION ORAL at 10:32

## 2024-07-23 ASSESSMENT — ACTIVITIES OF DAILY LIVING (ADL)
ADLS_ACUITY_SCORE: 26
ADLS_ACUITY_SCORE: 27
ADLS_ACUITY_SCORE: 34
ADLS_ACUITY_SCORE: 26
ADLS_ACUITY_SCORE: 28
ADLS_ACUITY_SCORE: 26
ADLS_ACUITY_SCORE: 26
ADLS_ACUITY_SCORE: 34
ADLS_ACUITY_SCORE: 34
ADLS_ACUITY_SCORE: 26
ADLS_ACUITY_SCORE: 26
ADLS_ACUITY_SCORE: 34
ADLS_ACUITY_SCORE: 28
ADLS_ACUITY_SCORE: 34

## 2024-07-23 NOTE — PLAN OF CARE
Problem: Adult Inpatient Plan of Care  Goal: Plan of Care Review  Description: The Plan of Care Review/Shift note should be completed every shift.  The Outcome Evaluation is a brief statement about your assessment that the patient is improving, declining, or no change.  This information will be displayed automatically on your shift  note.  Outcome: Met  Flowsheets (Taken 7/22/2024 1957)  Outcome Evaluation: Patient nauseous the entirety of the day. NG to LIS. 500 mL output. Cardene titrated back on and is running at 15 mg/hr to maintain SBP < 160.  Plan of Care Reviewed With:   patient   spouse   child  Overall Patient Progress: no change    Neuro: A/O x4. Follows commands in all extremities. PERRLA. Afebrile.    CV: Cardene maxxed at 15 mg/hr. Oral antihypertensives held due to nausea. Scheduled IV Hydralazine given. 's. Trending CVP's q4 with goal of 8-12.     Pulm: RA, LS clear and equal bilaterally.    GI: NPO, NG to LIS. Very nauseous. Scheduled Reglan/Zofran. One dose of Compazine and Ativan given without much effect. Patient began passing gas today, but no BM.    : UO WDL. > 100 mL/hr    Skin/Drains: Midabdominal incision with minimal drainage. AMILCAR had 105 mL of serosanguinous drainage throughout the day.     Drips: Cardene 15, Ins 2, D5LR 50

## 2024-07-23 NOTE — PROGRESS NOTES
Transplant Surgery  Inpatient Daily Progress Note  07/23/2024    Assessment & Plan: 49 year old male with PMH significant for DMII (on insulin pump) and resulting ESKD (on HD every MWF since 8/2021). 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 Underwent simultaneous kidney pancreas transplant on 7/20/24 complicated by VT/VF arrest in PACU. Received 2 rounds of CPR before ROSC. Taken emergently to the cath lab and found to have 100% occlusion of the RCA now s/p IBAN x 2.    Changes for today:  -Can transfer to   -Keep NG tube, NPO    Graft function:  S/p pancreas transplant: Lipase 138 (169). Using 2 U/hr on the insulin drip. Patent post-op US.  S/p kidney transplant: Pre-op Cr 5.22, now trending down to 2.8. Robust urine output. Patent post-op US. Remove Burt POD 4.   Immunosuppression management:   Induction:  Thymo 200/200/200 mg (complete, 5.9 mg/kg)  Solu-Medrol: 500/250/100 mg  Maintenance:  Tacrolimus 3 mg BID, goal 8-10  MMF 1000 mg BID  Hematology: DAPT- Brilinta + ASA for at least 1 month. Will not give heparin at this time due to DAPT.  Cardiorespiratory:   CAD, VT/VF arrest s/p PCI with IBAN: Previous CABG 2019, and IBAN 2019 with 100% thrombosis of the RCA, IBAN x 2 on 7/20/24. Consult cards. Start PTA lipitor.   Acute respiratory failure: Extubated 7/21 PM. Now stable on room air.   Hypertension: Goal SBP <165 mmHg. Ok with permissive hypertension to support kidney perfusion. Has been on Nicardipine gtt with nausea. Now tolerating oral meds and Coreg 3.125 mg BID and Hydralazine 25 mg every 6 hours started. Will monitor.   GI/Nutrition: NPO, NG tube in place until POD return of bowel function s/p pancreas transplant.   Endocrine:   Steroid hyperglycemia: Insulin drip as above  Fluid/Electrolytes: MIVF: D5LR@50/hr.    Hyperkalemia: Resolved with medical management without need for dialysis  : Burt remained due to new surgical anastomosis, removed POD 4. Check PVR.   Infectious disease: Luci-op prophylaxis with meropenem, micafungin.  Prophylaxis: DVT, fall, GI, fungal  Disposition: CVICU, can transfer to the floor    KAT/Fellow/Resident Provider: Libra Valentino PA-C [id number]    Faculty: Jarvis Michaud MD  _________________________________________________________________  Transplant History: Admitted 7/19/2024 for kidney pancreas transplant.  7/20/2024 (Kidney / Pancreas), Postoperative day: 3     Interval History: History is obtained from the patient  Overnight events: Nausea improving.     ROS:   A 10-point review of systems was negative except as noted above.    Meds:  Current Facility-Administered Medications   Medication Dose Route Frequency Provider Last Rate Last Admin    aspirin (ASA) chewable tablet 81 mg  81 mg Oral or Feeding Tube Daily Julienne Ibrahim PA-C   81 mg at 07/23/24 1024    atorvastatin (LIPITOR) tablet 40 mg  40 mg Oral QPM Libra Valentino PA-C        calcium carbonate-vitamin D (OSCAL) 500-5 MG-MCG per tablet 1 tablet  1 tablet Oral BID w/meals Julienne Ibrahim PA-C   1 tablet at 07/22/24 0839    carvedilol (COREG) tablet 3.125 mg  3.125 mg Oral BID Ezequiel Martin APRN CNP        heparin ANTICOAGULANT injection 5,000 Units  5,000 Units Subcutaneous Q8H Julienne Ibrahim PA-C   5,000 Units at 07/23/24 0307    hydrALAZINE (APRESOLINE) tablet 25 mg  25 mg Oral Q8H Margaret Harrington APRN CNP   25 mg at 07/23/24 1309    magnesium oxide (MAG-OX) tablet 400 mg  400 mg Oral Q24H Julienne Ibrahim PA-C   400 mg at 07/23/24 1309    micafungin (MYCAMINE) 100 mg in sodium chloride 0.9 % 100 mL intermittent infusion  100 mg Intravenous Q24H Julienne Ibrahim PA-C 100 mL/hr at 07/22/24 2202 100 mg at 07/22/24 2202    mycophenolate mofetil (CELLCEPT) 1,000 mg in D5W  "intermittent infusion  1,000 mg Intravenous BID IS Julienne Ibrahim PA-C 137.5 mL/hr at 07/23/24 0837 1,000 mg at 07/23/24 0837    octreotide (sandoSTATIN) injection 100 mcg  100 mcg Subcutaneous Q12H Julienne Ibrahim PA-C   100 mcg at 07/23/24 0841    ondansetron (ZOFRAN) injection 4 mg  4 mg Intravenous Q6H Margaret Mcfarlane APRN CNP   4 mg at 07/23/24 1309    pantoprazole (PROTONIX) 2 mg/mL suspension 40 mg  40 mg Per NG tube Daily Julienne Ibrahim PA-C   40 mg at 07/23/24 0832    polyethylene glycol (MIRALAX) Packet 17 g  17 g Oral BID Ezequiel Martin APRN CNP   17 g at 07/23/24 1032    senna-docusate (SENOKOT-S/PERICOLACE) 8.6-50 MG per tablet 2 tablet  2 tablet Oral BID Ezequiel Martin APRN CNP   2 tablet at 07/23/24 1032    sodium chloride (PF) 0.9% PF flush 3 mL  3 mL Intravenous Q8H Julienne Ibrahim PA-C   3 mL at 07/23/24 0832    tacrolimus (GENERIC) suspension 3 mg  3 mg Oral or NG Tube BID IS Julienne Ibrahim PA-C   3 mg at 07/23/24 0831    ticagrelor (BRILINTA) tablet 90 mg  90 mg Oral or Feeding Tube BID Julienne Ibrahim PA-C   90 mg at 07/23/24 1024    valGANciclovir (VALCYTE) tablet 450 mg  450 mg Oral Once per day on Monday Thursday Julienne Ibrahim PA-C   450 mg at 07/22/24 1824       Physical Exam:     Admit Weight: 102.4 kg (225 lb 12 oz)    Current vitals:   BP (!) 172/71   Pulse 99   Temp 98.3  F (36.8  C) (Oral)   Resp 16   Ht 1.803 m (5' 11\")   Wt 112.1 kg (247 lb 2.2 oz)   SpO2 97%   BMI 34.47 kg/m      Vital sign ranges:    Temp:  [98.2  F (36.8  C)-99.1  F (37.3  C)] 98.3  F (36.8  C)  Pulse:  [] 99  Resp:  [16] 16  MAP:  [71 mmHg-112 mmHg] 92 mmHg  Arterial Line BP: (108-181)/(48-81) 157/62  SpO2:  [90 %-100 %] 97 %  Patient Vitals for the past 24 hrs:   Temp Temp src Pulse Resp SpO2 Weight   07/23/24 1245 -- -- 99 -- 97 % --   07/23/24 1230 -- -- 103 -- 97 % --   07/23/24 1215 -- -- 101 -- 93 % -- "   07/23/24 1200 98.3  F (36.8  C) Oral 98 -- 95 % --   07/23/24 1145 -- -- 98 -- 94 % --   07/23/24 1130 -- -- 99 -- 98 % --   07/23/24 1115 -- -- 95 -- 100 % --   07/23/24 1100 -- -- 96 -- 99 % --   07/23/24 1045 -- -- 104 -- 97 % --   07/23/24 1030 -- -- 106 -- 95 % --   07/23/24 1015 -- -- 98 -- 97 % --   07/23/24 1000 -- -- 99 -- 94 % --   07/23/24 0945 -- -- 100 -- 95 % --   07/23/24 0930 -- -- 110 -- 98 % --   07/23/24 0915 -- -- 111 -- 90 % --   07/23/24 0900 -- -- 112 -- 94 % --   07/23/24 0845 -- -- 112 -- 90 % --   07/23/24 0830 -- -- 109 -- 92 % --   07/23/24 0815 -- -- 110 -- 92 % --   07/23/24 0800 98.2  F (36.8  C) Oral 113 16 91 % --   07/23/24 0745 -- -- 110 -- 91 % --   07/23/24 0730 -- -- 105 -- 91 % --   07/23/24 0715 -- -- 113 -- 90 % --   07/23/24 0700 -- -- 111 -- 92 % --   07/23/24 0645 -- -- 107 -- 91 % --   07/23/24 0630 -- -- 108 -- 91 % --   07/23/24 0615 -- -- 106 -- 92 % --   07/23/24 0600 -- -- 115 -- 93 % --   07/23/24 0545 -- -- (!) 121 -- 95 % --   07/23/24 0530 -- -- 119 -- 94 % --   07/23/24 0515 -- -- 108 -- 92 % --   07/23/24 0500 -- -- 109 -- 92 % --   07/23/24 0445 -- -- 117 -- 93 % --   07/23/24 0430 -- -- 103 -- 91 % --   07/23/24 0415 -- -- 110 -- 90 % --   07/23/24 0400 98.7  F (37.1  C) Axillary 111 -- 93 % 112.1 kg (247 lb 2.2 oz)   07/23/24 0345 -- -- 108 -- 92 % --   07/23/24 0330 -- -- 112 -- 93 % --   07/23/24 0315 -- -- 113 -- 93 % --   07/23/24 0300 -- -- 111 -- 92 % --   07/23/24 0245 -- -- 111 -- 90 % --   07/23/24 0230 -- -- 111 -- 92 % --   07/23/24 0215 -- -- 110 -- 92 % --   07/23/24 0200 -- -- 112 -- 93 % --   07/23/24 0145 -- -- 112 -- 94 % --   07/23/24 0130 -- -- 112 -- 92 % --   07/23/24 0115 -- -- 112 -- 92 % --   07/23/24 0100 -- -- 113 -- 92 % --   07/23/24 0045 -- -- 112 -- 94 % --   07/23/24 0030 -- -- 113 -- 92 % --   07/23/24 0015 -- -- 114 -- 90 % --   07/23/24 0000 -- -- 115 -- 91 % --   07/22/24 2300 -- -- 114 -- 94 % --   07/22/24 2245 -- --  (!) 125 -- 96 % --   07/22/24 2230 -- -- 110 -- 91 % --   07/22/24 2215 -- -- 111 -- 90 % --   07/22/24 2200 -- -- 116 -- 92 % --   07/22/24 2145 -- -- 118 -- 93 % --   07/22/24 2130 -- -- 116 -- 93 % --   07/22/24 2115 -- -- 111 -- 91 % --   07/22/24 2100 -- -- 111 -- 91 % --   07/22/24 2045 -- -- 111 -- 91 % --   07/22/24 2030 -- -- 111 -- 92 % --   07/22/24 2015 -- -- 114 -- 91 % --   07/22/24 2000 98.8  F (37.1  C) Axillary 112 -- 93 % --   07/22/24 1945 -- -- 110 -- 93 % --   07/22/24 1930 -- -- 110 -- 91 % --   07/22/24 1915 -- -- 112 -- 91 % --   07/22/24 1900 -- -- 111 -- 91 % --   07/22/24 1845 -- -- 111 -- 90 % --   07/22/24 1830 -- -- 112 -- 93 % --   07/22/24 1815 -- -- 120 -- 96 % --   07/22/24 1800 99.1  F (37.3  C) Axillary 113 -- 90 % --   07/22/24 1745 -- -- 111 -- 91 % --   07/22/24 1730 -- -- 113 -- 91 % --   07/22/24 1715 -- -- 116 -- 92 % --   07/22/24 1700 -- -- 112 -- 92 % --   07/22/24 1645 -- -- 113 -- 91 % --   07/22/24 1630 -- -- 112 -- 92 % --   07/22/24 1615 -- -- (!) 124 -- 93 % --   07/22/24 1600 -- -- 112 16 92 % --   07/22/24 1545 -- -- 110 -- 93 % --   07/22/24 1530 -- -- 113 -- 92 % --   07/22/24 1515 -- -- 112 -- 93 % --   07/22/24 1500 -- -- 112 -- 93 % --   07/22/24 1445 -- -- 113 -- 92 % --   07/22/24 1430 -- -- (!) 124 -- 95 % --   07/22/24 1415 -- -- 110 -- 92 % --   07/22/24 1400 -- -- 112 -- 91 % --   07/22/24 1345 -- -- 110 -- 91 % --   07/22/24 1330 -- -- 111 -- 90 % --   07/22/24 1315 -- -- 110 -- 91 % --     General Appearance: Awake, alert, oriented  Skin: normal, warm  Heart: regular rate and rhythm  Lungs: Nonlabored resps on RA  Abdomen: The abdomen is soft, incision covered. Drain with serosanguinous output  : olea is present.   Extremities: edema: none  Neurologic: awake, alert, oriented    Data:   CMP  Recent Labs   Lab 07/23/24  1130 07/23/24  1126 07/23/24  0958 07/23/24  0607 07/23/24  0515 07/22/24  1302 07/22/24  0923 07/22/24  0621 07/22/24  0421   NA   --   --   --   --  142  --  140  --  141   POTASSIUM  --   --   --   --  4.6  --  4.7  --  4.9   CHLORIDE  --   --   --   --  109*  --  104  --  104   CO2  --   --   --   --  24  --  24  --  24   *  --  149*   < > 145*   < > 133*   < > 143*   BUN  --   --   --   --  50.4*  --  45.2*  --  42.0*   CR  --   --   --   --  2.77*  --  3.95*  --  4.11*   GFRESTIMATED  --   --   --   --  27*  --  18*  --  17*   BETHANY  --   --   --   --  10.0  --  10.0  --  9.5   ICAW  --  5.5*  --   --  5.6*   < > 5.3*  --  5.2   MAG  --  2.3  --   --  2.3   < > 2.0  --  2.1   PHOS  --   --   --   --  3.1  --  5.4*  --  5.7*   AMYLASE  --   --   --   --  154*  --   --   --  126*   LIPASE  --   --   --   --  138*  --   --   --  169*   ALBUMIN  --   --   --   --  3.7  --  3.6  --  3.4*   BILITOTAL  --   --   --   --  0.5  --  0.4  --  0.4   ALKPHOS  --   --   --   --  72  --  72  --  67   AST  --   --   --   --  85*  --  193*  --  211*   ALT  --   --   --   --  59  --  76*  --  75*    < > = values in this interval not displayed.     CBC  Recent Labs   Lab 07/23/24  0515 07/22/24  0923 07/20/24  1422 07/20/24  1138   HGB 9.5* 9.2*   < > 9.7*   WBC 4.7 5.9   < > 12.8*   * 96*   < > 123*   A1C  --   --   --  6.4*    < > = values in this interval not displayed.     COAGS  Recent Labs   Lab 07/20/24  0821 07/20/24  0600   INR 1.39* 1.44*   PTT 28 27      Urinalysis  Recent Labs   Lab Test 07/20/24  0609 09/27/21  1220   COLOR Orange* Yellow   APPEARANCE Slightly Cloudy* Slightly Cloudy*   URINEGLC 50* 150*   URINEBILI Negative Negative   URINEKETONE Negative Negative   SG 1.013 1.013   UBLD Large* Negative   URINEPH 6.5 6.0   PROTEIN 300* >499*   NITRITE Negative Negative   LEUKEST Trace* Small*   RBCU >182* 2   WBCU 105* 4     Virology:  Hepatitis C Antibody   Date Value Ref Range Status   07/19/2024 Nonreactive Nonreactive Final     Comment:     A nonreactive screening test result does not exclude the possibility of exposure to or  infection with HCV. Nonreactive screening test results in individuals with prior exposure to HCV may be due to antibody levels below the limit of detection of this assay or lack of reactivity to the HCV antigens used in this assay. Patients with recent HCV infections (<3 months from time of exposure) may have false-negative HCV antibody results due to the time needed for seroconversion (average of 8 to 9 weeks).

## 2024-07-23 NOTE — PROGRESS NOTES
07/23/24 0900   Appointment Info   Signing Clinician's Name / Credentials (PT) Viktoria Aguilera   Living Environment   People in Home child(jennifer), dependent;child(jennifer), adult;spouse   Current Living Arrangements house   Home Accessibility stairs to enter home   Number of Stairs, Main Entrance greater than 10 stairs  (14 steps)   Stair Railings, Main Entrance railing on right side (ascending)   Transportation Anticipated family or friend will provide   Living Environment Comments Patient lives with his wife and teenage boys (15-19). Has 14 steps into his home but then can be on 1 level. Reports someone will be home at all times at [place]   Self-Care   Usual Activity Tolerance good   Current Activity Tolerance good   Equipment Currently Used at Home none   Fall history within last six months no   Activity/Exercise/Self-Care Comment Reports he was independent with walking and all ADLS and IADLS prior to hospitalization. No currently working.Could walk community distances without trouble   General Information   Onset of Illness/Injury or Date of Surgery 07/20/24   Referring Physician Margaret Mcfarlane   Patient/Family Therapy Goals Statement (PT) Get home   Pertinent History of Current Problem (include personal factors and/or comorbidities that impact the POC) 49 year old male with PMH significant for DMII (on insulin pump) and resulting ESKD (on HD every MWF since 8/2021). 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 Underwent simultaneous kidney pancreas transplant on 7/20/24 complicated by VT/VF arrest in PACU.Received 2 rounds of CPR before ROSC Taken emergently to the cath lab and found to have 100% occlusion of the RCA now s/p IBAN x 2.   Existing Precautions/Restrictions abdominal   Heart Disease Risk Factors Diabetes;Medical history;Gender;Age    General Observations Patient with flat affect   Cognition   Affect/Mental Status (Cognition) WNL   Orientation Status (Cognition) oriented x 4   Follows Commands (Cognition) follows one-step commands;over 90% accuracy   Cognitive Status Comments Patient recalls 2/3 precautions. Appropriate and following all commands throughout session.   Pain Assessment   Patient Currently in Pain No   Integumentary/Edema   Integumentary/Edema Comments incision not observed. No edema noted   Posture    Posture Not impaired   Range of Motion (ROM)   ROM Comment WFL for mobility   Strength (Manual Muscle Testing)   Strength Comments Strong throughout. At least 3/5 per transfers and standing   Bed Mobility   Comment, (Bed Mobility) Rolls with CGA with verbal cues for abdominal precautions   Transfers   Comment, (Transfers) Sit to stand with CGA to SBA for line management and minimizing UE pushoff   Gait/Stairs (Locomotion)   Comment, (Gait/Stairs) Took 2-3 steps to the chair with SBA.   Sensory Examination   Sensory Perception Comments No deficits per patient report   Coordination   Coordination Comments NT   Clinical Impression   Criteria for Skilled Therapeutic Intervention Yes, treatment indicated   PT Diagnosis (PT) Impaired functional mobility due to fatigue and typical post-op sequella   Influenced by the following impairments Fatigue, nausea   Functional limitations due to impairments Bed mobility, transfer, gait and stairs   Clinical Presentation (PT Evaluation Complexity) stable   Clinical Presentation Rationale Clinical judgment, vitals response   Clinical Decision Making (Complexity) low complexity   Planned Therapy Interventions (PT) balance training;bed mobility training;gait training;stair training;strengthening;transfer training;progressive activity/exercise;risk factor education;home program guidelines   Risk & Benefits of therapy have been explained evaluation/treatment results reviewed;care plan/treatment goals  reviewed;patient   Clinical Impression Comments Patient moving very well for first attempt post- op. Anticipate with improved nausea and fatigue will be independent with all mobility and be able to return home. Skilled PT required to teach movement within abdominal precautions and to assess safety on stairs as has 14 steps to enter his home.   PT Total Evaluation Time   PT Eval, Low Complexity Minutes (74198) 7   Physical Therapy Goals   PT Frequency Daily   PT Predicted Duration/Target Date for Goal Attainment 07/30/24   PT Goals Bed Mobility;Transfers;Gait;Stairs   PT: Bed Mobility Independent;Supine to/from sit;Within precautions   PT: Transfers Independent;Sit to/from stand;Within precautions   PT: Gait Independent;Greater than 200 feet   PT: Stairs Independent;Greater than 10 stairs;Rail on right;Within precautions   Interventions   Interventions Quick Adds Therapeutic Activity   Therapeutic Activity   Therapeutic Activities: dynamic activities to improve functional performance Minutes (75440) 13   Symptoms Noted During/After Treatment None   Treatment Detail/Skilled Intervention PT: Patient greeted supine in bed on RA. Patient educated on abdominal precautions including no lifting, twisting and valsalva- able to recall via teach back method 2/3 at end of session. Patient lines readied for mobility. Patient rolls with SBA and verbal cues for log roll technique. Performs sidelying to sit with CGA only and help with lines. Patient reports no dizziness with sitting and VSS with HR between 105-115 bpm throughout session. Patient stands with cues to minimize UE push off. Stable with CGA only on stand pivot to chair. Sits with poor eccentric control. Lines arranged again for safety. Declines trial of walking due to nausea. Discussed need to trial stairs prior to DC and verbalizes agreement with POC   PT Discharge Planning   PT Plan Initiate gait training without AD, transfers, review abdominal precautions. Stairs  when able   PT Discharge Recommendation (DC Rec) home with assist   PT Rationale for DC Rec Patient moving very well for first attempt post- op. Anticipate with improved nausea and fatigue will be independent with all mobility and be able to return home. Skilled PT required to teach movement within abdominal precautions and to assess safety on stairs as has 14 steps to enter his home. Discharge to home with assistance pending trial of stairs.   PT Brief overview of current status CGA to SBA for transfers, has not walked yet   Total Session Time   Timed Code Treatment Minutes 13   Total Session Time (sum of timed and untimed services) 20

## 2024-07-23 NOTE — PROGRESS NOTES
[facility]  Transplant Nephrology Consult Note  Date of Admission:  7/19/2024  Today's Date: 07/23/2024  Requesting physician: Harrison Whitehead*    Reason for Consult:  SPK immunosuppression    Recommendations:   - no acute RRT indications   - good UOP while being off of diuretics  - Tac at goal with 3 mg Bid dose    Assessment & Plan   # DDKT (SPK): Trend down. UOP improving now with down trending creatinine.   - Baseline Creatinine: ~ TBD   - Proteinuria: Not checked post transplant   - DSA Hx:  Final cross match pending    - Last cPRA: 11%   - BK Viremia: Not checked post transplant   - Kidney Tx Biopsy Hx: No biopsy history.    # Pancreas Tx (SPK):    - Pancreatic Exocrine Drainage: Enteric drained     - Blood glucose: Elevated blood glucose      On insulin: Yes minimal dose   -  Latest HbA1c: 4%   - Pancreatic enzymes: Trend up   - DSA Hx:  final crossmatch pending   - Pancreas Tx Biopsy Hx: No biopsy history    # Immunosuppression: Tacrolimus immediate release (goal 8-10) and Mycophenolate mofetil (dose 1000 mg every 12 hours)   - Induction with Recent Transplant:  High Intensity Protocol   - Continue with intensive monitoring of immunosuppression for efficacy and toxicity.   - Historical Changes in Immunosuppression: None   - Changes: Not at this time    # VT/VF Cardiac arrest:  # CAD s/p CABG 2019 LIMA-LAD, SVG-RCA, IBAN 1/2019, IBAN to RCA 2/2024     - s/p inferior STEMI 2/2 ostial proximal RCA in stent thrombosis   - s/p 2 IBAN to RCA 7/20/2024   - Echo: LVEF=37%.Moderate diffuse hypokinesis with akinesis of all inferior segments.  Mild right ventricular dilation is present.Global  right ventricular function is moderately reduced.    # Infection Prevention:      - PJP: Sulfa/TMP (Bactrim) held given hhyperK, added G6PD for tomorrow labs  - CMV IgG Ab High Risk Discordance (D+/R-): No  - EBV IgG Ab High Risk Discordance (D+/R-): No    # Blood Pressure:   borderline control ;  Goal BP: MAP > 65   - Changes: Not at this time     # Anemia in Chronic Renal Disease: Hgb: Trend down post SPK     KERRI: No   - Iron studies: Not checked recently. Monitor Hb trends    # Mineral Bone Disorder:    - Secondary renal hyperparathyroidism; PTH level: Unknown at this time, but checked with dialysis        On treatment: None  - Vitamin D; level: Unknown at this time, but checked with dialysis        On supplement: No  - Calcium; level: Normal        On supplement: No  - Phosphorus; level: Normal        On supplement: No    # Electrolytes:   - Potassium; level: High        On supplement: No  - Magnesium; level: High normal        On supplement: Yes  - Bicarbonate; level: Normal        On supplement: No  - Sodium; level: Low normal    # Other Significant PMH:   - Left diaphragmatic elevation; 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      Grisel Coombs MD  Transplant Nephrology  Contact information via Vocera Web Console or via [facility] Paging/Directory      Interval History  Mr. Ramey's creatinine is 2.7, Trend down  Off pressors  Extubated, on room air  Excellent UOP  Pain is fairly controlled    Review of Systems   4 point ROS was obtained and negative except as noted in the Interval History.    MEDICATIONS:  Current Facility-Administered Medications   Medication Dose Route Frequency Provider Last Rate Last Admin    aspirin (ASA) chewable tablet 81 mg  81 mg Oral or Feeding Tube Daily Julienne Ibrahim PA-C   81 mg at 07/23/24 1024    atorvastatin (LIPITOR) tablet 40 mg  40 mg Oral QPM Libra Valentino PA-C        calcium carbonate-vitamin D (OSCAL) 500-5 MG-MCG per tablet 1 tablet  1 tablet  Oral BID w/meals Julienne Ibrahim PA-C   1 tablet at 07/22/24 0839    carvedilol (COREG) tablet 3.125 mg  3.125 mg Oral BID Ezequiel Martin APRN CNP        heparin ANTICOAGULANT injection 5,000 Units  5,000 Units Subcutaneous Q8H Julienne Ibrahim PA-C   5,000 Units at 07/23/24 1315    hydrALAZINE (APRESOLINE) tablet 25 mg  25 mg Oral Q8H Margaret Harrington APRN CNP   25 mg at 07/23/24 1309    magnesium oxide (MAG-OX) tablet 400 mg  400 mg Oral Q24H Julienne Ibrahim PA-C   400 mg at 07/23/24 1309    micafungin (MYCAMINE) 100 mg in sodium chloride 0.9 % 100 mL intermittent infusion  100 mg Intravenous Q24H Julienne Ibrahim PA-C 100 mL/hr at 07/22/24 2202 100 mg at 07/22/24 2202    mycophenolate mofetil (CELLCEPT) 1,000 mg in D5W intermittent infusion  1,000 mg Intravenous BID IS Julienne Ibrahim PA-C 137.5 mL/hr at 07/23/24 0837 1,000 mg at 07/23/24 0837    octreotide (sandoSTATIN) injection 100 mcg  100 mcg Subcutaneous Q12H Julienne Ibrahim PA-C   100 mcg at 07/23/24 0841    ondansetron (ZOFRAN) injection 4 mg  4 mg Intravenous Q6H Margaret Mcfarlane APRN CNP   4 mg at 07/23/24 1309    pantoprazole (PROTONIX) 2 mg/mL suspension 40 mg  40 mg Per NG tube Daily Julienne Ibrahim PA-C   40 mg at 07/23/24 0832    polyethylene glycol (MIRALAX) Packet 17 g  17 g Oral BID Ezequiel Martin APRN CNP   17 g at 07/23/24 1032    senna-docusate (SENOKOT-S/PERICOLACE) 8.6-50 MG per tablet 2 tablet  2 tablet Oral BID Ezequiel Martin APRN CNP   2 tablet at 07/23/24 1032    sodium chloride (PF) 0.9% PF flush 3 mL  3 mL Intravenous Q8H Julienne Ibrahim PA-C   3 mL at 07/23/24 0832    tacrolimus (GENERIC) suspension 3 mg  3 mg Oral or NG Tube BID IS Julienne Ibrahim PA-C   3 mg at 07/23/24 0831    ticagrelor (BRILINTA) tablet 90 mg  90 mg Oral or Feeding Tube BID Julienne Ibrahim PA-C   90 mg at 07/23/24 1024    valGANciclovir  "(VALCYTE) tablet 450 mg  450 mg Oral Once per day on  Julienne Ibrahim PA-C   450 mg at 24 1824     Current Facility-Administered Medications   Medication Dose Route Frequency Provider Last Rate Last Admin    dextrose 10% infusion   Intravenous Continuous PRN Julienne Ibrahim PA-C        dextrose 5% in lactated ringers 1,000 mL infusion   Intravenous Continuous Julienne Ibrahim PA-C 50 mL/hr at 24 0800 Rate Verify at 24 0800    insulin regular (MYXREDLIN) 1 unit/mL infusion  0-24 Units/hr Intravenous Continuous Julienne Ibrahim PA-C 1 mL/hr at 24 1600 1 Units/hr at 24 1600    niCARdipine (CARDENE) 100 mg in sodium chloride 0.9% in non-PVC container 250 mL infusion  0.5-15 mg/hr Intravenous Continuous Harrison Whitehead MD 6.3 mL/hr at 24 1600 2.5 mg/hr at 24 1600       Physical Exam   Temp  Av.4  F (36.3  C)  Min: 96.1  F (35.6  C)  Max: 98.6  F (37  C)  Arterial Line BP  Min: 36/21  Max: 276/115  Arterial Line MAP (mmHg)  Av.3 mmHg  Min: 29 mmHg  Max: 232 mmHg      Pulse  Av.9  Min: 20  Max: 152 Resp  Avg: 15.5  Min: 3  Max: 38  FiO2 (%)  Av %  Min: 70 %  Max: 80 %  SpO2  Av.7 %  Min: 72 %  Max: 100 %    CVP (mmHg): 278 mmHgBP (!) 172/71   Pulse 102   Temp 98.3  F (36.8  C) (Oral)   Resp 16   Ht 1.803 m (5' 11\")   Wt 112.1 kg (247 lb 2.2 oz)   SpO2 94%   BMI 34.47 kg/m     Date 24 0700 - 24 0659   Shift 4663-1527 9910-7361 1087-7167 24 Hour Total   INTAKE   I.V. 1839.32   1839.32   NG/   128   Shift Total(mL/kg) .32(19.21)   1967.32(19.21)   OUTPUT   Urine 1325   1325   Emesis/NG output 100   100   Drains 875   875   Shift Total(mL/kg) 2300(22.46)   2300(22.46)   Weight (kg) 102.4 102.4 102.4 102.4      Admit Weight: 102.4 kg (225 lb 12 oz)     GENERAL APPEARANCE: intubated sedated  HENT: atrmatic, have NG tube in place  RESP: fine coarse breath sounds " b/l  CV: regular rhythm, normal rate  EDEMA: improved  LE edema bilaterally  ABDOMEN: soft, nondistended, surgical incision  MS: extremities normal - no gross deformities noted  SKIN: no rash  Access LUE AVF +thrill/bruit    Data   All labs reviewed by me.  CMP  Recent Labs   Lab 07/23/24  1506 07/23/24  1342 07/23/24  1130 07/23/24  1126 07/23/24  0958 07/23/24  0607 07/23/24  0515 07/23/24  0203 07/23/24  0122 07/22/24  2259 07/22/24  2202 07/22/24  1302 07/22/24  0923 07/22/24  0621 07/22/24  0421 07/22/24  0150 07/22/24  0148   NA  --   --   --   --   --   --  142  --   --   --   --   --  140  --  141  --  140   POTASSIUM  --   --   --   --   --   --  4.6  --   --   --   --   --  4.7  --  4.9  --  5.0   CHLORIDE  --   --   --   --   --   --  109*  --   --   --   --   --  104  --  104  --  104   CO2  --   --   --   --   --   --  24  --   --   --   --   --  24  --  24  --  25   ANIONGAP  --   --   --   --   --   --  9  --   --   --   --   --  12  --  13  --  11   * 105* 140*  --  149*   < > 145*   < >  --    < >  --    < > 133*   < > 143*   < > 141*   BUN  --   --   --   --   --   --  50.4*  --   --   --   --   --  45.2*  --  42.0*  --  41.1*   CR  --   --   --   --   --   --  2.77*  --   --   --   --   --  3.95*  --  4.11*  --  4.18*   GFRESTIMATED  --   --   --   --   --   --  27*  --   --   --   --   --  18*  --  17*  --  17*   BETHANY  --   --   --   --   --   --  10.0  --   --   --   --   --  10.0  --  9.5  --  9.4   MAG  --   --   --  2.3  --   --  2.3  --  1.9  --  1.9   < > 2.0  --  2.1  --  2.1   PHOS  --   --   --   --   --   --  3.1  --   --   --   --   --  5.4*  --  5.7*  --  5.5*   PROTTOTAL  --   --   --   --   --   --  5.8*  --   --   --   --   --  5.8*  --  5.5*  --  5.4*   ALBUMIN  --   --   --   --   --   --  3.7  --   --   --   --   --  3.6  --  3.4*  --  3.3*   BILITOTAL  --   --   --   --   --   --  0.5  --   --   --   --   --  0.4  --  0.4  --  0.4   ALKPHOS  --   --   --   --   --   --   72  --   --   --   --   --  72  --  67  --  68   AST  --   --   --   --   --   --  85*  --   --   --   --   --  193*  --  211*  --  234*   ALT  --   --   --   --   --   --  59  --   --   --   --   --  76*  --  75*  --  79*    < > = values in this interval not displayed.     CBC  Recent Labs   Lab 07/23/24  0515 07/22/24  0923 07/22/24  0421 07/22/24  0148   HGB 9.5* 9.2* 9.1* 9.0*   WBC 4.7 5.9 5.4 5.5   RBC 2.87* 2.81* 2.80* 2.75*   HCT 27.2* 26.7* 26.8* 26.8*   MCV 95 95 96 98   MCH 33.1* 32.7 32.5 32.7   MCHC 34.9 34.5 34.0 33.6   RDW 12.7 13.0 13.0 13.2   * 96* 93* 98*     INR  Recent Labs   Lab 07/20/24  0821 07/20/24  0600 07/19/24  1656   INR 1.39* 1.44* 0.96   PTT 28 27 31     ABG  Recent Labs   Lab 07/22/24  0923 07/22/24  0421 07/21/24 2002 07/21/24  1811   PH 7.44 7.42 7.39 7.39   PCO2 40 42 44 43   PO2 63* 67* 85 77*   HCO3 27 27 26 26   O2PER 21 35  35 36  35 30      Urine Studies  Recent Labs   Lab Test 07/20/24  0609 09/27/21  1220 07/24/21  1151 06/25/19  1439 06/19/19  0822   COLOR Orange* Yellow Light Yellow Straw Straw   APPEARANCE Slightly Cloudy* Slightly Cloudy* Clear Clear Clear   URINEGLC 50* 150* 200* 200 mg/dL* >1000 mg/dL*   URINEBILI Negative Negative Negative Negative Negative   URINEKETONE Negative Negative Negative Negative Negative   SG 1.013 1.013 1.018 1.010 1.012   UBLD Large* Negative 0.03 mg/dL* Moderate* Trace*   URINEPH 6.5 6.0 6.0 6.5 6.0   PROTEIN 300* >499* 600* 200 mg/dL* 300 mg/dL*   UROBILINOGEN  --   --   --  <2.0 E.U./dL <2.0 E.U./dL   NITRITE Negative Negative Negative Negative Negative   LEUKEST Trace* Small* Negative Negative Negative   RBCU >182* 2 1 25-50* 0-2   WBCU 105* 4 10* 0-5 0-5     No lab results found.  PTH  Recent Labs   Lab Test 07/20/21  1845   PTHI 190*     Iron Studies  No lab results found.    IMAGING:  All imaging studies reviewed by me.    Past Medical History    I have reviewed this patient's medical history and updated it with pertinent  information if needed.   Past Medical History:   Diagnosis Date    Acquired elevated diaphragm     Anemia in chronic kidney disease     Angina pectoris (H24)     Chronic kidney disease     Coronary artery disease     Diabetes mellitus, type II (H) 12/2001    Diabetic nephropathy (H)     MARQUEZ (dyspnea on exertion)     Dyslipidemia 12/2001    End stage renal disease (H)     History of blood transfusion 2004    Hypertension     takes medication    Ischemic cardiomyopathy     Metabolic acidosis     Myocardial infarction (H)     STEMI -Diagonal branch of the LAD    Obesity (BMI 30-39.9)     Peripheral neuropathy     Proteinuria     Retinopathy     Vitamin D deficiency        Past Surgical History   I have reviewed this patient's surgical history and updated it with pertinent information if needed.  Past Surgical History:   Procedure Laterality Date    APPENDECTOMY OPEN N/A 7/19/2024    Procedure: Appendectomy open;  Surgeon: Harrison Whitehead MD;  Location:  OR    BACK SURGERY  2009    L5 disc cut    BENCH KIDNEY  7/19/2024    Procedure: Bench kidney;  Surgeon: Harrison Whitehead MD;  Location:  OR    BENCH PANCREAS N/A 7/19/2024    Procedure: Bench pancreas;  Surgeon: Harrison Whitehead MD;  Location:  OR    BYPASS GRAFT ARTERY CORONARY  06/20/2019    2 vessels    CV CENTRAL VENOUS CATHETER PLACEMENT N/A 7/20/2024    Procedure: Central Venous Catheter Placement;  Surgeon: Dominic Robertson MD;  Location:  HEART CARDIAC CATH LAB    CV CORONARY ANGIOGRAM N/A 01/13/2019    Procedure: Coronary Angiogram;  Surgeon: Cielo Che MD;  Location: [facility] Cath Lab;  Service: Cardiology    CV CORONARY ANGIOGRAM N/A 05/02/2019    Procedure: Coronary Angiogram;  Surgeon: Cielo Che MD;  Location: [facility] Cath Lab;  Service: Cardiology    CV CORONARY ANGIOGRAM N/A 04/30/2020    Procedure: Coronary Angiogram;  Surgeon: Cielo Che MD;  Location:  [facility] Cath Lab;  Service: Cardiology    CV CORONARY ANGIOGRAM N/A 07/22/2021    Procedure: CV CORONARY ANGIOGRAM;  Surgeon: Adrian Crow MD;  Location: [facility] CATH LAB CV    CV CORONARY ANGIOGRAM N/A 02/01/2024    Procedure: Coronary Angiogram;  Surgeon: Delroy Carpio MD;  Location: [facility] CARDIAC CATH LAB    CV CORONARY ANGIOGRAM N/A 7/20/2024    Procedure: Coronary Angiogram;  Surgeon: Dominic Robertson MD;  Location: [facility] CARDIAC CATH LAB    CV CORONARY LITHOTRIPSY PCI N/A 7/20/2024    Procedure: Percutaneous Coronary Intervention - Lithotripsy;  Surgeon: Dominic Robertson MD;  Location: [facility] CARDIAC CATH LAB    CV FRACTIONAL FLOW RATIO WIRE N/A 07/22/2021    Procedure: Fractional Flow Ratio Wire;  Surgeon: Adrian Crow MD;  Location: [facility] CV    CV INTRAVASULAR ULTRASOUND N/A 7/20/2024    Procedure: Intravascular Ultrasound;  Surgeon: Dominic Robertson MD;  Location: [facility] CARDIAC CATH LAB    CV LEFT HEART CATH N/A 07/22/2021    Procedure: Left Heart Cath;  Surgeon: Adrian Crow MD;  Location: [facility] CV    CV LEFT HEART CATHETERIZATION WITH LEFT VENTRICULOGRAM N/A 01/13/2019    Procedure: Left Heart Catheterization with Left Ventriculogram;  Surgeon: Cielo Che MD;  Location: [facility] Cath Lab;  Service: Cardiology    CV LEFT HEART CATHETERIZATION WITHOUT LEFT VENTRICULOGRAM Left 04/30/2020    Procedure: Left Heart Catheterization Without Left Ventriculogram;  Surgeon: Cielo Che MD;  Location: [facility] Cath Lab;  Service: Cardiology    CV PCI N/A 02/01/2024    Procedure: Percutaneous Coronary Intervention;  Surgeon: Delroy Carpio MD;  Location: [facility] CARDIAC CATH LAB    CV PCI N/A 7/20/2024    Procedure: Percutaneous Coronary Intervention;  Surgeon: Dominic Robertson MD;  Location: [facility] CARDIAC CATH LAB    CV PCI ASPIRATION THROMECTOMY N/A 7/20/2024    Procedure:  Percutaneous Coronary Intervention Aspiration Thrombectomy;  Surgeon: Dominic Robertson MD;  Location:  HEART CARDIAC CATH LAB    CV PCI STENT DRUG ELUTING N/A 2024    Procedure: Percutaneous Coronary Intervention Stent;  Surgeon: Dominic Robertson MD;  Location: [facility] CARDIAC CATH LAB    CV RIGHT HEART CATHETERIZATION N/A 2020    Procedure: Right Heart Catheterization;  Surgeon: Cielo Che MD;  Location: [facility] Cath Lab;  Service: Cardiology    EYE SURGERY      GENITOURINARY SURGERY      HERNIA REPAIR      OTHER SURGICAL HISTORY      Excise varicocele    STENT, CORONARY, DALE      TRANSPLANT PANCREAS, KIDNEY  DONOR, COMBINED N/A 2024    Procedure: Transplant pancreas, kidney  donor, with ureteral stent placement;  Surgeon: Harrison Whitehead MD;  Location:  OR    VASCULAR SURGERY         Family History   I have reviewed this patient's family history and updated it with pertinent information if needed.   Family History   Problem Relation Age of Onset    Diabetes Type 2  Mother     Heart Disease Father 60    CABG Father 50        triple bypass    Diabetes Type 2  Father     Depression Sister     Substance Abuse Sister     Ovarian Cancer Maternal Grandmother     Brain Cancer Maternal Grandmother     Pancreatic Cancer Maternal Aunt     Prostate Cancer Maternal Uncle        Social History   I have reviewed this patient's social history and updated it with pertinent information if needed. Siva Ramey  reports that he has never smoked. He has been exposed to tobacco smoke. He has never used smokeless tobacco. He reports that he does not drink alcohol and does not use drugs.    Prior to Admission Medications   Medications Prior to Admission   Medication Sig Dispense Refill Last Dose    acetaminophen (TYLENOL) 500 MG tablet Take 500 mg by mouth every 4 hours as needed   2024    albuterol (PROAIR HFA/PROVENTIL HFA/VENTOLIN  HFA) 108 (90 Base) MCG/ACT inhaler Inhale 2 puffs into the lungs every 4 hours as needed   More than a month    ALPRAZolam (XANAX) 0.25 MG tablet Take 0.25 mg by mouth 3 times daily as needed   More than a month    amLODIPine (NORVASC) 10 MG tablet Take 1 tablet (10 mg) by mouth daily 90 tablet 3 7/18/2024 at 0800    aspirin (ASA) 81 MG chewable tablet Take 1 tablet (81 mg) by mouth daily Starting tomorrow. 30 tablet 3 7/18/2024 at 0800    atorvastatin (LIPITOR) 80 MG tablet Take 1 tablet (80 mg) by mouth at bedtime 90 tablet 3 7/18/2024 at 2200    carvedilol (COREG) 12.5 MG tablet Take 3 tablets (37.5 mg) by mouth 2 times daily (with meals) 540 tablet 3 7/18/2024 at 2200    cholecalciferol, vitamin D3, 5,000 unit Tab [CHOLECALCIFEROL, VITAMIN D3, 5,000 UNIT TAB] Take 5,000 Units by mouth daily.   7/18/2024 at 2200    hydrALAZINE (APRESOLINE) 25 MG tablet Take 1 tablet (25 mg) by mouth 2 times daily 180 tablet 3 7/18/2024 at 0800    icosapent ethyl (VASCEPA) 1 g CAPS capsule Take 2 g by mouth 2 times daily (with meals)   7/18/2024 at 2200    insulin aspart (NOVOLOG VIAL) 100 UNITS/ML vial Inject Subcutaneous 3 times daily (with meals) To be used with the insulin pump       nitroGLYcerin (NITROSTAT) 0.4 MG sublingual tablet PLACE ONE TABLET UNDER TONGUE AS NEEDED FOR CHEST PAIN AS DIRECTED FOR 3 DOSES. IF SYMPTOMS PERSIST 5 MINUTES AFTER 1ST DOSE CALL 911 25 tablet 3 Unknown    VELPHORO 500 MG CHEW chewable tablet Take 1,000 mg by mouth 3 times daily (with meals)   7/18/2024 at 1200    zolpidem (AMBIEN) 5 MG tablet Take 5 mg by mouth nightly as needed for sleep   7/18/2024 at 2200    cloNIDine (CATAPRES) 0.1 MG tablet Take 1 tablet (0.1 mg) by mouth 3 times daily as needed (SBP > 160) 30 tablet 0     glucose (BD GLUCOSE) 4 g chewable tablet glucose 4 gram chewable tablet   CHEW AND SWALLOW 4 TIMES DAILY AS NEEDED       insulin aspart (NOVOLOG PEN) 100 UNIT/ML pen Inject 5 Units Subcutaneous 3 times daily (before  meals) 4.5 mL 1

## 2024-07-23 NOTE — PLAN OF CARE
Major Shift Events:   Ongoing complaints of nausea; PRN ativan used x1 and Emend started today. Late afternoon patient reported improvement in nausea. Able to tolerate PO pills with no vomitting. NG to LIS, sips of water for comfort. Large BM x1 and passing gas.   A&O, AGUILAR. Rating pain 2/10, declines PRN medications. Ax1-2 to chair. Nicardipine weaned, 2.5-5/hr majority of shift to meet goal of SBP <160. PO Hydralazine started. CVP 4-5. Room air. Insulin gtt 1-2u/hr on Alg 2. D5LR @ 50 continued. Burt removed, voided x2. PVRs 154 and 34. Abd AMILCAR in place. Family at bedside and updated.   Plan: SBP <160. CVP 4-12. PVR <150.   For vital signs and complete assessments, please see documentation flowsheets.

## 2024-07-23 NOTE — PROGRESS NOTES
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 7/19/24 and is now s/p kidney-pancreas transplant, urethral stent placement and appendectomy with Dr Whitehead on 7/20/2024.      While getting renal ultrasound in PACU, patient cardiac arrested (V-fib) for approximately 5 mins. Received 2 rounds of CPR, epi x 2, 2g IV mag, 100mg IV lidocaine push during code before achieving ROSC. Lactate ~9. Trop 136. Code blue was managed by the PACU team. Post ROSC EKG revealed inferior STEMI. Arrived post-op to CVICU @ 0810. Cardiology consult team emergently engaged for inferior STEMI. Cath lab subsequently activated and patient underwent coronary angiogram which revealed 100% prox RCA in stent thrombosis, now s/p 2 IBAN in ostial to prox RCA. Echo reveals LVEF 37%, moderate diffuse hypokinesis with akinesis of all inferior segments, mild right ventricular dilation is present, global right ventricular function is moderately reduced.     Subjective and Interval:  Patient remains in the ICU. Patient did not sleep last night due to ongoing nausea and vomiting. Yesterday, ongoing nausea and vomitting for which Zofran scheduled, PRN Zofran, Compazine, Ativan, Reglan given without improvement. Abdomen XR without concern, OG to LIS, Lipase downtrending 168--138, Amylase up 120--150. Remains on Nicardipine 15, unable to start PO Hydral given nausea/vomiting. CVP 10 this morning. Creatinine continues to improve. Remains on insulin. BP goal systolic < 165 per transplant team, troponin rechecked and downtrending and EKG without ischemic changes given patient having chest pain, P2y12 lab given emesis was  therapeutic    Assessment and plan by system:   Today's changes:  P2Y12 plt inhibitor lab, if subtherapeutic will start Cangrelor  EKG for QTc  Now that tolerating PO, add Hydralazine PO and COreg 3.125  Increase Ativan to 1 mg for nausea  Add Omend for ongoing nausea  When patient transfers upstairs, will need cardiology consult for ongoing cardiomyopathy management if desired      Neurological:  # Acute pain   # At risk for anoxic brain injury  - Monitor neurological status. Delirium preventions and precautions.   - Pain: Fent, PRN Dilaudid, RObaxin, scheduled Tylenol            A & O  Pain control  PT/OT     Cardiovascular:    # VT Cardiac arrest  # Inferior STEMI s/p 2 IBAN to ostial-proximal RCA 2/2 in stent thrombosis  # CAD s/p PCI with IBAN 1/2019, 2024  # CABG in 2019 - LIMA-LAD, SVG-RCA   # hx CAD s/p PCI to the proximal RCA (02/2024)  # HTN   # PAD   # Cardiac arrest (V fib) 7/20/24   While getting renal ultrasound in PACU, patient cardiac arrested (V-fib) for approximately 5-7 mins. Received 2 rounds of CPR, epi x 2, 2g IV mag, 100mg IV lidocaine push during code before achieving ROSC. Patient now presenting to SICU for further work-up, evaluation. EKG in PACU showing acute STEMI, STAT cardiology consult placed, Cath lab activated for inferior STEMI.    - TTE EF 37% with inferior wall akinesis   - Tele monitor with frequent PVCs, ongoing bigeminy   - ASA/ Brillinta  - Transplant surgeon confirmed ok for any anticoag/antiplatelet agents needed    - Start GDMT- Coreg and Hydral    Pulmonary:   # Post operative ventilatory support   # Acute hypoxic respiratory support post brief Cardiac arrest    - Supplemental oxygen to keep saturation above 92 %.  - CXR stable post-op  - Room air    Gastroenterology/Nutrition:  # NPO status   # S/p pancreas transplant 7/20   - NG tube to LIS   - D5 infusion per protocol   - Amylase, lipase daily   - Bowel reg in place       Renal, Electrolytes  # ESRD s/p kidney transplant     # Acute kidney injury  # Electrolyte monitoring   # Volume status   # lactic acidosis   - Will continue to monitor intake and output.  - Continue olea cares   - Electrolytes q4h      I/O last 3 completed shifts:  In: 1443.99 [P.O.:20; I.V.:1323.99; NG/GT:100]  Out: 4910 [Urine:3200; Emesis/NG output:1450; Drains:260]   Recent Labs   Lab 24  0515 24  0122 24  22024  1426 24  0923 24  0421     --   --   --  140 141   POTASSIUM 4.6  --   --   --  4.7 4.9   CHLORIDE 109*  --   --   --  104 104   CO2 24  --   --   --     BUN 50.4*  --   --   --  45.2* 42.0*   CR 2.77*  --   --   --  3.95* 4.11*   PHOS 3.1  --   --   --  5.4* 5.7*   MAG 2.3 1.9 1.9   < > 2.0 2.1    < > = values in this interval not displayed.     NG/OG/NJ Tube Nasogastric 16 fr Right nostril-Flush/Free Water (mL): 30 mL    Plan:  - Avoid nephrotoxins, renally dose meds  - Monitor urine output    Endocrine:   # Stress hyperglycemia   # Diabetes mellitus with insulin pump   - Goal to keep BG< 180 for optimal wound healing   - Insulin drip must remain on   - Dextrose infusion per protocol      ID:  # Immunosuppression   # Stress Leukocytosis   # Post-operative antimicrobials   - Trend CBC, fever curve   - Following intra-op cultures   - Defer immunosuppression, post-op antimicrobials per transplant      Positive cultures:  - N/a     Recent Labs   Lab 24  0515 07/22/24  0923 24  0421   WBC 4.7 5.9 5.4     Temp (24hrs), Av  F (36.1  C), Min:96.6  F (35.9  C), Max:97.5  F (36.4  C)      Antibiotics     Anti-infectives (From now, onward)      Start     Dose/Rate Route Frequency Ordered Stop    24 1641  valGANciclovir (VALCYTE) tablet 450 mg         450 mg Oral Once per day on 24 1637      24 1930  meropenem (MERREM) 500 mg vial to attach to  mL bag for ADULTS or 25 mL bag for PEDS         500 mg  over 30 Minutes Intravenous EVERY 8 HOURS 24 7424  07/23/24 1929 07/20/24 2000  micafungin (MYCAMINE) 100 mg in sodium chloride 0.9 % 100 mL intermittent infusion         100 mg  100 mL/hr over 60 Minutes Intravenous EVERY 24 HOURS 07/20/24 0811 07/26/24 1959             Hematology  # Acute blood loss anemia    # Thrombocytopenia  - Trend CBC q4h   - Transplant OK with full anticoagulation post-op   - Transfuse if hgb <7.0 or signs/symptoms of hypoperfusion. Monitor and trend.   Recent Labs   Lab 07/23/24  0515 07/22/24  0923 07/22/24  0421   HGB 9.5* 9.2* 9.1*   HCT 27.2* 26.7* 26.8*   * 96* 93*     Recent Labs   Lab 07/20/24  0821 07/20/24  0600 07/19/24  1656   INR 1.39* 1.44* 0.96   PTT 28 27 31     Hgb stable. No e/o bleeding.    ASA/ticagrelor for IBAN    DVT PPX: EVER     Musculoskeletal/Skin:  # Weakness and deconditioning of critical illness  # incisional monitoring    - Physical and occupational therapy consult   - Incisional cares   - Drain monitoring      Lines/ tubes/ drains:  - ETT  - Burt  - LIJ CVC   - Art line   - NG tube   - Drain to bulb suction     Lines/Tubes/Drains:  Peripheral IV 07/19/24 Right Antecubital fossa (Active)   Site Assessment [facility] 07/22/24 2000   Line Status Infusing 07/22/24 2000   Dressing Transparent 07/22/24 2000   Dressing Status clean;dry;intact 07/22/24 2000   Line Intervention Flushed 07/22/24 2000   Phlebitis Scale 0-->no symptoms 07/22/24 2000   Infiltration? no 07/22/24 2000   Number of days: 4       Peripheral IV 07/20/24 Right;Anterior Lower forearm (Active)   Site Assessment [facility] 07/22/24 2000   Line Status Infusing 07/22/24 2000   Dressing Transparent 07/22/24 2000   Dressing Status clean;dry;intact 07/22/24 2000   Line Intervention Flushed 07/22/24 2000   Phlebitis Scale 0-->no symptoms 07/22/24 2000   Infiltration? no 07/22/24 2000   Number of days: 3       Arterial Line 07/19/24 Radial (Active)   Site Assessment [facility] 07/22/24 2000   Line Status Pulsatile blood flow 07/22/24 2000   Arterine Line Cap Change  Due 07/23/24 07/22/24 2000   Art Line Waveform Appropriate 07/22/24 2000   Art Line Interventions Leveled;Connections checked and tightened;Flushed per protocol 07/22/24 2000   Color/Movement/Sensation Capillary refill less than 3 sec 07/22/24 2000   Line Necessity Yes, meets criteria 07/22/24 2000   Dressing Type Transparent 07/22/24 2000   Dressing Status Clean, dry, intact 07/22/24 2000   Dressing Intervention Dressing changed/new dressing 07/20/24 1700   Number of days: 4       CVC Triple Lumen Left Internal jugular (Active)   Site Assessment WDL except;Drainage 07/22/24 2000   Dressing Chlorhexidine disk;Transparent 07/22/24 2000   Dressing Status old drainage 07/22/24 1600   Dressing Intervention dressing reinforced 07/21/24 0400   Dressing Change Due 07/26/24 07/22/24 2000   Line Necessity Yes, meets criteria 07/22/24 2000   Phlebitis Scale 0-->no symptoms 07/22/24 2000   Infiltration? no 07/22/24 2000   Number of days: 4       Closed/Suction Drain 1 Right RLQ Bulb 19 [place] (Active)   Site Description WDL 07/23/24 0400   Dressing Status Normal: Clean, Dry & Intact 07/23/24 0400   Drainage Appearance Serosanguenous 07/23/24 0400   Status To bulb suction 07/23/24 0400   Output (ml) 45 ml 07/23/24 0600   Number of days: 3       NG/OG/NJ Tube Nasogastric 16 fr Right nostril (Active)   Site Description WDL 07/23/24 0400   Status Suction-low intermittent 07/23/24 0400   Drainage Appearance Green 07/23/24 0400   Placement Confirmation [place] unchanged 07/23/24 0400   [place] (cm marking) at nare/mouth 63 cm 07/23/24 0400   Intake (ml) 40 ml 07/22/24 2000   Flush/Free Water (mL) 30 mL 07/23/24 0400   Container Amount 350 mL 07/23/24 0400   Output (ml) 150 ml 07/23/24 0400   Number of days: 4       Urethral Catheter 07/19/24 Non-latex;Straight-tip 12 fr (Active)   Tube Description Positional 07/22/24 2000   Catheter Care Done;Catheter wipes 07/22/24 0800   Collection Container Standard;Patent 07/23/24 0400  "  Securement Method Securing device (Describe) 07/23/24 0400   Rationale for Continued Use ICU only: hourly urine output needed for patient care 07/23/24 0400   Urine Output 200 mL 07/23/24 0600   Number of days: 4       Incision/Surgical Site 07/20/24 Upper;Midline Abdomen (Active)   Incision Assessment UTV 07/23/24 0400   Dressing Dry gauze 07/23/24 0400   Luci-Incision Assessment UTV 07/23/24 0400   Closure MELISA 07/23/24 0400   Incision Drainage Amount Small 07/23/24 0400   Drainage Description Sanguinous 07/23/24 0400   Incision Care Normal saline 07/22/24 0000   Dressing Intervention Dressing marked 07/23/24 0400   Number of days: 3      Patient seen and discussed with staff physician Dr. Singh.    JUANCARLOS [facility]  ICU Cardiology-CICU Service  Send message or 10 digit call back number Securely via [employer] with the [employer] Web Console (learn more here)    Objective:  Most recent vital signs:  BP (!) 172/71   Pulse 105   Temp 98.7  F (37.1  C) (Axillary)   Resp 16   Ht 1.803 m (5' 11\")   Wt 112.1 kg (247 lb 2.2 oz)   SpO2 91%   BMI 34.47 kg/m    Temp:  [98.7  F (37.1  C)-99.1  F (37.3  C)] 98.7  F (37.1  C)  Pulse:  [103-125] 105  Resp:  [16-18] 16  BP: (172)/(71) 172/71  MAP:  [76 mmHg-119 mmHg] 92 mmHg  Arterial Line BP: (120-192)/(52-89) 157/66  SpO2:  [90 %-97 %] 91 %      Physical exam:  General: In bed, in NAD  HEENT: PERRL, no scleral icterus or injection  CARDIAC: RRR, no m/r/g appreciated. Peripheral pulses dopplered  RESP: CTAB, no wheezes, rhonchi or crackles appreciated.  GI: soft, BS hypoactive  : Burt  EXTREMITIES: NO LE edema, pulses 2+. Femoral access site w/o bleeding, dressing c/d/i.   SKIN: No acute lesions appreciated  NEURO: alert and oriented    Imaging/procedure results:  XR Abdomen Port 1 View  Narrative: EXAMINATION:  XR ABDOMEN PORT 1 VIEW 7/22/2024     COMPARISON: Abdominal radiograph, 7/20/2024.    HISTORY: Nausea, emesis despite NG tube, assess " placement.    FINDINGS: Supine frontal radiographic view of the abdomen.    Feeding tube with tip and sidehole projecting over the gastric body.     No abnormally dilated air-filled loops of bowel, or pneumatosis in the  visualized abdomen.. No portal venous gas.  Bibasilar patchy  opacities. Partially visualized median sternotomy wires appear intact.  Surgical staples over the mid abdomen. Thoracic findings  are detailed  on same-day chest radiograph.  Impression: IMPRESSION: Feeding tube tip and sidehole object over the stomach.    I have personally reviewed the examination and initial interpretation  and I agree with the findings.    SHILO SCHREIBER MD         SYSTEM ID:  [id number]  XR Chest Port 1 View  Narrative: Chest one view portable    HISTORY: Status post extubation    COMPARISON STUDY: 7/21/2024    FINDINGS: Cardiac silhouette is nonenlarged. Lung volumes are low.  Bibasilar subsegmental atelectasis. Endotracheal tube is been removed.  Enteric tube in stomach. Left IJ central line tip at the confluence of  innominate veins.  Impression: IMPRESSION: Interval removal of endotracheal tube with bibasilar  atelectasis.    MAXINE RUFFIN MD         SYSTEM ID:  [id number]  Cardiac Catheterization  Inferior STEMI.  Cardiac arrest.  VT/VF.  Severe two vessel CAD with prior CABG with LIMA to LAD, PCI to D1, and PCI   to mid RCA.  Patent LIMA to LAD.  Patent stent in the D1 with minimal ISR.  In stent thrombosis of the mid RCA stent culprit in inferior STEMI.  Aspiration thrombectomy of the RCA.  Intravascular lithotripsy of the RCA.  PCI of the RCA with two drug eluting stents.  IVUS of the RCA.     Recent Results (from the past 24 hour(s))   XR Chest Port 1 View    Narrative    Chest one view portable    HISTORY: Status post extubation    COMPARISON STUDY: 7/21/2024    FINDINGS: Cardiac silhouette is nonenlarged. Lung volumes are low.  Bibasilar subsegmental atelectasis. Endotracheal tube is been removed.  Enteric  tube in stomach. Left IJ central line tip at the confluence of  innominate veins.      Impression    IMPRESSION: Interval removal of endotracheal tube with bibasilar  atelectasis.    MAXINE RUFFIN MD         SYSTEM ID:  [id number]   XR Abdomen Port 1 View    Narrative    EXAMINATION:  XR ABDOMEN PORT 1 VIEW 7/22/2024     COMPARISON: Abdominal radiograph, 7/20/2024.    HISTORY: Nausea, emesis despite NG tube, assess placement.    FINDINGS: Supine frontal radiographic view of the abdomen.    Feeding tube with tip and sidehole projecting over the gastric body.     No abnormally dilated air-filled loops of bowel, or pneumatosis in the  visualized abdomen.. No portal venous gas.  Bibasilar patchy  opacities. Partially visualized median sternotomy wires appear intact.  Surgical staples over the mid abdomen. Thoracic findings  are detailed  on same-day chest radiograph.      Impression    IMPRESSION: Feeding tube tip and sidehole object over the stomach.    I have personally reviewed the examination and initial interpretation  and I agree with the findings.    SHILO SCHREIBER MD         SYSTEM ID:  [id number]

## 2024-07-23 NOTE — PROGRESS NOTES
Shift summary:     Neuro: Alert and Oriented; sleeping in between cares. Following commands Moves all extremities appropriately    Pulm: room air    CV: Sys BP goal <160. On nicardipine and scheduled IV hydralazine with PRN IV hydralazine available. NSR. Trending CVPs Q4hrs with goal of 8-12. Sheath removed from right groin 7/21.     : Burt inplace for strict I&O 2/2 kidney transplant perfusion. Good UOP. Lasix gtt d/c'd today and 500cc LR bolus given for CVP of 7-10    GI: NG to LIS. No BM. NPO besides meds until passing gas and NG tube can come out. Constant N/V this shift. Multiple PRNs given and both CICU and Transplant team aware.    Skin: Midline incision. RLQ AMILCAR drain w/ serosanguenous output.     Drips: Insulin, D5 w/ LR @ 50ml/hr, nicardipine     Labs: trops downtrending; creatinine much improved; GFR rising and lactate stable

## 2024-07-24 LAB
ALBUMIN SERPL BCG-MCNC: 3.6 G/DL (ref 3.5–5.2)
ALP SERPL-CCNC: 77 U/L (ref 40–150)
ALT SERPL W P-5'-P-CCNC: 45 U/L (ref 0–70)
AMYLASE SERPL-CCNC: 158 U/L (ref 28–100)
ANION GAP SERPL CALCULATED.3IONS-SCNC: 8 MMOL/L (ref 7–15)
AST SERPL W P-5'-P-CCNC: 47 U/L (ref 0–45)
ATRIAL RATE - MUSE: 112 BPM
BASOPHILS # BLD AUTO: 0 10E3/UL (ref 0–0.2)
BASOPHILS NFR BLD AUTO: 0 %
BILIRUB SERPL-MCNC: 0.6 MG/DL
BUN SERPL-MCNC: 45.8 MG/DL (ref 6–20)
CA-I BLD-MCNC: 5.6 MG/DL (ref 4.4–5.2)
CALCIUM SERPL-MCNC: 10 MG/DL (ref 8.8–10.4)
CHLORIDE SERPL-SCNC: 111 MMOL/L (ref 98–107)
CREAT SERPL-MCNC: 2.07 MG/DL (ref 0.67–1.17)
DIASTOLIC BLOOD PRESSURE - MUSE: NORMAL MMHG
EGFRCR SERPLBLD CKD-EPI 2021: 39 ML/MIN/1.73M2
EOSINOPHIL # BLD AUTO: 0 10E3/UL (ref 0–0.7)
EOSINOPHIL NFR BLD AUTO: 0 %
ERYTHROCYTE [DISTWIDTH] IN BLOOD BY AUTOMATED COUNT: 12.2 % (ref 10–15)
GLUCOSE BLDC GLUCOMTR-MCNC: 100 MG/DL (ref 70–99)
GLUCOSE BLDC GLUCOMTR-MCNC: 103 MG/DL (ref 70–99)
GLUCOSE BLDC GLUCOMTR-MCNC: 110 MG/DL (ref 70–99)
GLUCOSE BLDC GLUCOMTR-MCNC: 112 MG/DL (ref 70–99)
GLUCOSE BLDC GLUCOMTR-MCNC: 116 MG/DL (ref 70–99)
GLUCOSE BLDC GLUCOMTR-MCNC: 138 MG/DL (ref 70–99)
GLUCOSE BLDC GLUCOMTR-MCNC: 145 MG/DL (ref 70–99)
GLUCOSE BLDC GLUCOMTR-MCNC: 92 MG/DL (ref 70–99)
GLUCOSE BLDC GLUCOMTR-MCNC: 96 MG/DL (ref 70–99)
GLUCOSE BLDC GLUCOMTR-MCNC: 99 MG/DL (ref 70–99)
GLUCOSE SERPL-MCNC: 118 MG/DL (ref 70–99)
HCO3 SERPL-SCNC: 23 MMOL/L (ref 22–29)
HCT VFR BLD AUTO: 27.4 % (ref 40–53)
HGB BLD-MCNC: 9.5 G/DL (ref 13.3–17.7)
IMM GRANULOCYTES # BLD: 0.1 10E3/UL
IMM GRANULOCYTES NFR BLD: 2 %
INTERPRETATION ECG - MUSE: NORMAL
LACTATE SERPL-SCNC: 0.8 MMOL/L (ref 0.7–2)
LIPASE SERPL-CCNC: 130 U/L (ref 13–60)
LYMPHOCYTES # BLD AUTO: 0.2 10E3/UL (ref 0.8–5.3)
LYMPHOCYTES NFR BLD AUTO: 4 %
MAGNESIUM SERPL-MCNC: 2 MG/DL (ref 1.7–2.3)
MCH RBC QN AUTO: 32.6 PG (ref 26.5–33)
MCHC RBC AUTO-ENTMCNC: 34.7 G/DL (ref 31.5–36.5)
MCV RBC AUTO: 94 FL (ref 78–100)
MONOCYTES # BLD AUTO: 0.4 10E3/UL (ref 0–1.3)
MONOCYTES NFR BLD AUTO: 6 %
NEUTROPHILS # BLD AUTO: 5.2 10E3/UL (ref 1.6–8.3)
NEUTROPHILS NFR BLD AUTO: 88 %
NRBC # BLD AUTO: 0 10E3/UL
NRBC BLD AUTO-RTO: 0 /100
P AXIS - MUSE: 44 DEGREES
PHOSPHATE SERPL-MCNC: 2.6 MG/DL (ref 2.5–4.5)
PLATELET # BLD AUTO: 102 10E3/UL (ref 150–450)
POTASSIUM SERPL-SCNC: 4.6 MMOL/L (ref 3.4–5.3)
PR INTERVAL - MUSE: 172 MS
PROT SERPL-MCNC: 5.7 G/DL (ref 6.4–8.3)
QRS DURATION - MUSE: 122 MS
QT - MUSE: 322 MS
QTC - MUSE: 439 MS
R AXIS - MUSE: 28 DEGREES
RBC # BLD AUTO: 2.91 10E6/UL (ref 4.4–5.9)
SODIUM SERPL-SCNC: 142 MMOL/L (ref 135–145)
SYSTOLIC BLOOD PRESSURE - MUSE: NORMAL MMHG
T AXIS - MUSE: 127 DEGREES
TACROLIMUS BLD-MCNC: 15.9 UG/L (ref 5–15)
TME LAST DOSE: ABNORMAL H
TME LAST DOSE: ABNORMAL H
VENTRICULAR RATE- MUSE: 112 BPM
VIT D+METAB SERPL-MCNC: 65 NG/ML (ref 20–50)
WBC # BLD AUTO: 5.9 10E3/UL (ref 4–11)

## 2024-07-24 PROCEDURE — 250N000013 HC RX MED GY IP 250 OP 250 PS 637: Performed by: NURSE PRACTITIONER

## 2024-07-24 PROCEDURE — 93005 ELECTROCARDIOGRAM TRACING: CPT

## 2024-07-24 PROCEDURE — 258N000003 HC RX IP 258 OP 636: Performed by: PHYSICIAN ASSISTANT

## 2024-07-24 PROCEDURE — 83605 ASSAY OF LACTIC ACID: CPT | Performed by: NURSE PRACTITIONER

## 2024-07-24 PROCEDURE — 250N000013 HC RX MED GY IP 250 OP 250 PS 637

## 2024-07-24 PROCEDURE — 99291 CRITICAL CARE FIRST HOUR: CPT | Mod: 24 | Performed by: INTERNAL MEDICINE

## 2024-07-24 PROCEDURE — 250N000011 HC RX IP 250 OP 636: Performed by: PHYSICIAN ASSISTANT

## 2024-07-24 PROCEDURE — 250N000009 HC RX 250: Performed by: STUDENT IN AN ORGANIZED HEALTH CARE EDUCATION/TRAINING PROGRAM

## 2024-07-24 PROCEDURE — 250N000011 HC RX IP 250 OP 636: Performed by: STUDENT IN AN ORGANIZED HEALTH CARE EDUCATION/TRAINING PROGRAM

## 2024-07-24 PROCEDURE — 83735 ASSAY OF MAGNESIUM: CPT | Performed by: SURGERY

## 2024-07-24 PROCEDURE — 250N000013 HC RX MED GY IP 250 OP 250 PS 637: Performed by: STUDENT IN AN ORGANIZED HEALTH CARE EDUCATION/TRAINING PROGRAM

## 2024-07-24 PROCEDURE — 250N000013 HC RX MED GY IP 250 OP 250 PS 637: Performed by: PHYSICIAN ASSISTANT

## 2024-07-24 PROCEDURE — 250N000013 HC RX MED GY IP 250 OP 250 PS 637: Performed by: SURGERY

## 2024-07-24 PROCEDURE — 82330 ASSAY OF CALCIUM: CPT | Performed by: NURSE PRACTITIONER

## 2024-07-24 PROCEDURE — 258N000003 HC RX IP 258 OP 636: Performed by: STUDENT IN AN ORGANIZED HEALTH CARE EDUCATION/TRAINING PROGRAM

## 2024-07-24 PROCEDURE — 99233 SBSQ HOSP IP/OBS HIGH 50: CPT | Mod: 24 | Performed by: INTERNAL MEDICINE

## 2024-07-24 PROCEDURE — 250N000012 HC RX MED GY IP 250 OP 636 PS 637: Performed by: PHYSICIAN ASSISTANT

## 2024-07-24 PROCEDURE — 83690 ASSAY OF LIPASE: CPT | Performed by: PHYSICIAN ASSISTANT

## 2024-07-24 PROCEDURE — 250N000011 HC RX IP 250 OP 636: Performed by: NURSE PRACTITIONER

## 2024-07-24 PROCEDURE — 82955 ASSAY OF G6PD ENZYME: CPT | Performed by: INTERNAL MEDICINE

## 2024-07-24 PROCEDURE — 250N000011 HC RX IP 250 OP 636: Performed by: SURGERY

## 2024-07-24 PROCEDURE — 85025 COMPLETE CBC W/AUTO DIFF WBC: CPT | Performed by: PHYSICIAN ASSISTANT

## 2024-07-24 PROCEDURE — 80197 ASSAY OF TACROLIMUS: CPT | Performed by: PHYSICIAN ASSISTANT

## 2024-07-24 PROCEDURE — 80053 COMPREHEN METABOLIC PANEL: CPT | Performed by: PHYSICIAN ASSISTANT

## 2024-07-24 PROCEDURE — 82150 ASSAY OF AMYLASE: CPT | Performed by: PHYSICIAN ASSISTANT

## 2024-07-24 PROCEDURE — 120N000002 HC R&B MED SURG/OB UMMC

## 2024-07-24 PROCEDURE — 84100 ASSAY OF PHOSPHORUS: CPT | Performed by: PHYSICIAN ASSISTANT

## 2024-07-24 PROCEDURE — 82306 VITAMIN D 25 HYDROXY: CPT | Performed by: INTERNAL MEDICINE

## 2024-07-24 PROCEDURE — 93010 ELECTROCARDIOGRAM REPORT: CPT | Performed by: INTERNAL MEDICINE

## 2024-07-24 RX ORDER — CARVEDILOL 6.25 MG/1
6.25 TABLET ORAL 2 TIMES DAILY
Status: DISCONTINUED | OUTPATIENT
Start: 2024-07-24 | End: 2024-07-25

## 2024-07-24 RX ORDER — DIPHENHYDRAMINE HCL 25 MG
25-50 CAPSULE ORAL ONCE
Status: COMPLETED | OUTPATIENT
Start: 2024-07-24 | End: 2024-07-24

## 2024-07-24 RX ORDER — AMLODIPINE BESYLATE 10 MG/1
10 TABLET ORAL DAILY
Status: DISCONTINUED | OUTPATIENT
Start: 2024-07-24 | End: 2024-07-30 | Stop reason: HOSPADM

## 2024-07-24 RX ORDER — ACETAMINOPHEN 325 MG/1
650 TABLET ORAL ONCE
Status: COMPLETED | OUTPATIENT
Start: 2024-07-24 | End: 2024-07-24

## 2024-07-24 RX ORDER — OLANZAPINE 2.5 MG/1
2.5 TABLET, FILM COATED ORAL 2 TIMES DAILY PRN
Status: DISCONTINUED | OUTPATIENT
Start: 2024-07-24 | End: 2024-07-30 | Stop reason: HOSPADM

## 2024-07-24 RX ORDER — VALGANCICLOVIR 450 MG/1
450 TABLET, FILM COATED ORAL DAILY
Status: DISCONTINUED | OUTPATIENT
Start: 2024-07-24 | End: 2024-07-26

## 2024-07-24 RX ORDER — CARVEDILOL 6.25 MG/1
6.25 TABLET ORAL 2 TIMES DAILY
Status: DISCONTINUED | OUTPATIENT
Start: 2024-07-24 | End: 2024-07-24

## 2024-07-24 RX ORDER — CARVEDILOL 3.12 MG/1
3.12 TABLET ORAL ONCE
Status: COMPLETED | OUTPATIENT
Start: 2024-07-24 | End: 2024-07-24

## 2024-07-24 RX ORDER — MAGNESIUM SULFATE HEPTAHYDRATE 40 MG/ML
2 INJECTION, SOLUTION INTRAVENOUS ONCE
Status: COMPLETED | OUTPATIENT
Start: 2024-07-24 | End: 2024-07-24

## 2024-07-24 RX ORDER — CARVEDILOL 25 MG/1
25 TABLET ORAL 2 TIMES DAILY
Status: DISCONTINUED | OUTPATIENT
Start: 2024-07-24 | End: 2024-07-24

## 2024-07-24 RX ORDER — DIPHENHYDRAMINE HCL 12.5MG/5ML
25-50 LIQUID (ML) ORAL ONCE
Status: COMPLETED | OUTPATIENT
Start: 2024-07-24 | End: 2024-07-24

## 2024-07-24 RX ADMIN — PROCHLORPERAZINE EDISYLATE 10 MG: 5 INJECTION INTRAMUSCULAR; INTRAVENOUS at 02:30

## 2024-07-24 RX ADMIN — ONDANSETRON 4 MG: 2 INJECTION INTRAMUSCULAR; INTRAVENOUS at 20:19

## 2024-07-24 RX ADMIN — MYCOPHENOLATE MOFETIL 1000 MG: 500 INJECTION, POWDER, LYOPHILIZED, FOR SOLUTION INTRAVENOUS at 17:01

## 2024-07-24 RX ADMIN — VALGANCICLOVIR 450 MG: 450 TABLET, FILM COATED ORAL at 10:02

## 2024-07-24 RX ADMIN — ONDANSETRON 4 MG: 2 INJECTION INTRAMUSCULAR; INTRAVENOUS at 01:26

## 2024-07-24 RX ADMIN — NICARDIPINE HYDROCHLORIDE 5 MG/HR: 0.2 INJECTION, SOLUTION INTRAVENOUS at 13:27

## 2024-07-24 RX ADMIN — Medication 1 TABLET: at 07:38

## 2024-07-24 RX ADMIN — TACROLIMUS 3 MG: 5 CAPSULE ORAL at 07:39

## 2024-07-24 RX ADMIN — ATORVASTATIN CALCIUM 40 MG: 40 TABLET, FILM COATED ORAL at 20:19

## 2024-07-24 RX ADMIN — MAGNESIUM SULFATE HEPTAHYDRATE 2 G: 2 INJECTION, SOLUTION INTRAVENOUS at 04:43

## 2024-07-24 RX ADMIN — HEPARIN SODIUM 5000 UNITS: 5000 INJECTION, SOLUTION INTRAVENOUS; SUBCUTANEOUS at 20:19

## 2024-07-24 RX ADMIN — HEPARIN SODIUM 5000 UNITS: 5000 INJECTION, SOLUTION INTRAVENOUS; SUBCUTANEOUS at 03:54

## 2024-07-24 RX ADMIN — HEPARIN SODIUM 5000 UNITS: 5000 INJECTION, SOLUTION INTRAVENOUS; SUBCUTANEOUS at 12:17

## 2024-07-24 RX ADMIN — OCTREOTIDE ACETATE 100 MCG: 100 INJECTION, SOLUTION INTRAVENOUS; SUBCUTANEOUS at 07:38

## 2024-07-24 RX ADMIN — ONDANSETRON 4 MG: 2 INJECTION INTRAMUSCULAR; INTRAVENOUS at 03:54

## 2024-07-24 RX ADMIN — ACETAMINOPHEN 650 MG: 325 TABLET, FILM COATED ORAL at 10:04

## 2024-07-24 RX ADMIN — AMLODIPINE BESYLATE 10 MG: 10 TABLET ORAL at 14:09

## 2024-07-24 RX ADMIN — MICAFUNGIN SODIUM 100 MG: 50 INJECTION, POWDER, LYOPHILIZED, FOR SOLUTION INTRAVENOUS at 20:20

## 2024-07-24 RX ADMIN — NICARDIPINE HYDROCHLORIDE 7.5 MG/HR: 0.2 INJECTION, SOLUTION INTRAVENOUS at 05:31

## 2024-07-24 RX ADMIN — ONDANSETRON 4 MG: 2 INJECTION INTRAMUSCULAR; INTRAVENOUS at 14:09

## 2024-07-24 RX ADMIN — Medication 40 MG: at 07:39

## 2024-07-24 RX ADMIN — OLANZAPINE 2.5 MG: 2.5 TABLET, FILM COATED ORAL at 12:52

## 2024-07-24 RX ADMIN — ASPIRIN 81 MG CHEWABLE TABLET 81 MG: 81 TABLET CHEWABLE at 07:38

## 2024-07-24 RX ADMIN — HYDRALAZINE HYDROCHLORIDE 25 MG: 25 TABLET ORAL at 21:58

## 2024-07-24 RX ADMIN — HYDRALAZINE HYDROCHLORIDE 10 MG: 20 INJECTION INTRAMUSCULAR; INTRAVENOUS at 12:52

## 2024-07-24 RX ADMIN — MAGNESIUM OXIDE TAB 400 MG (241.3 MG ELEMENTAL MG) 400 MG: 400 (241.3 MG) TAB at 12:17

## 2024-07-24 RX ADMIN — NICARDIPINE HYDROCHLORIDE 5 MG/HR: 0.2 INJECTION, SOLUTION INTRAVENOUS at 01:14

## 2024-07-24 RX ADMIN — SENNOSIDES AND DOCUSATE SODIUM 2 TABLET: 8.6; 5 TABLET ORAL at 07:38

## 2024-07-24 RX ADMIN — ANTI-THYMOCYTE GLOBULIN (RABBIT) 75 MG: 5 INJECTION, POWDER, LYOPHILIZED, FOR SOLUTION INTRAVENOUS at 11:29

## 2024-07-24 RX ADMIN — CARVEDILOL 3.12 MG: 3.12 TABLET, FILM COATED ORAL at 07:38

## 2024-07-24 RX ADMIN — HYDRALAZINE HYDROCHLORIDE 25 MG: 25 TABLET ORAL at 14:09

## 2024-07-24 RX ADMIN — CARVEDILOL 6.25 MG: 6.25 TABLET, FILM COATED ORAL at 20:19

## 2024-07-24 RX ADMIN — SODIUM PHOSPHATE, MONOBASIC, MONOHYDRATE AND SODIUM PHOSPHATE, DIBASIC, ANHYDROUS 9 MMOL: 142; 276 INJECTION, SOLUTION INTRAVENOUS at 05:30

## 2024-07-24 RX ADMIN — SODIUM CHLORIDE, SODIUM LACTATE, POTASSIUM CHLORIDE, CALCIUM CHLORIDE AND DEXTROSE MONOHYDRATE: 5; 600; 310; 30; 20 INJECTION, SOLUTION INTRAVENOUS at 14:55

## 2024-07-24 RX ADMIN — OCTREOTIDE ACETATE 100 MCG: 100 INJECTION, SOLUTION INTRAVENOUS; SUBCUTANEOUS at 20:21

## 2024-07-24 RX ADMIN — TICAGRELOR 90 MG: 90 TABLET ORAL at 20:19

## 2024-07-24 RX ADMIN — HYDRALAZINE HYDROCHLORIDE 25 MG: 25 TABLET ORAL at 05:31

## 2024-07-24 RX ADMIN — DIPHENHYDRAMINE HYDROCHLORIDE 50 MG: 25 SOLUTION ORAL at 10:03

## 2024-07-24 RX ADMIN — TICAGRELOR 90 MG: 90 TABLET ORAL at 07:38

## 2024-07-24 RX ADMIN — MYCOPHENOLATE MOFETIL 1000 MG: 500 INJECTION, POWDER, LYOPHILIZED, FOR SOLUTION INTRAVENOUS at 07:39

## 2024-07-24 RX ADMIN — CARVEDILOL 3.12 MG: 3.12 TABLET, FILM COATED ORAL at 12:52

## 2024-07-24 RX ADMIN — PROCHLORPERAZINE EDISYLATE 10 MG: 5 INJECTION INTRAMUSCULAR; INTRAVENOUS at 09:34

## 2024-07-24 RX ADMIN — ONDANSETRON 4 MG: 2 INJECTION INTRAMUSCULAR; INTRAVENOUS at 07:37

## 2024-07-24 ASSESSMENT — ACTIVITIES OF DAILY LIVING (ADL)
ADLS_ACUITY_SCORE: 29
ADLS_ACUITY_SCORE: 28
ADLS_ACUITY_SCORE: 30
ADLS_ACUITY_SCORE: 29
ADLS_ACUITY_SCORE: 30
ADLS_ACUITY_SCORE: 30
ADLS_ACUITY_SCORE: 29
ADLS_ACUITY_SCORE: 30
ADLS_ACUITY_SCORE: 29
ADLS_ACUITY_SCORE: 30
ADLS_ACUITY_SCORE: 29
ADLS_ACUITY_SCORE: 30
ADLS_ACUITY_SCORE: 29
ADLS_ACUITY_SCORE: 29

## 2024-07-24 NOTE — PROGRESS NOTES
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      While getting renal ultrasound in PACU, patient cardiac arrested (V-fib) for approximately 5 mins. Received 2 rounds of CPR, epi x 2, 2g IV mag, 100mg IV lidocaine push during code before achieving ROSC. Lactate ~9. Trop 136. Code blue was managed by the PACU team. Post ROSC EKG revealed inferior STEMI. Arrived post-op to CVICU @ 0810. Cardiology consult team emergently engaged for inferior STEMI. Cath lab subsequently activated and patient underwent coronary angiogram which revealed 100% prox RCA in stent thrombosis, now s/p 2 IBAN in ostial to prox RCA. Echo reveals LVEF 37%, moderate diffuse hypokinesis with akinesis of all inferior segments, mild right ventricular dilation is present, global right ventricular function is moderately reduced.     Subjective and Interval:  Patient remains in the ICU. Patient did not sleep last night due to ongoing nausea. Added Zyprexa for nausea. Increased coreg to get nicardipine off. P2Y12 lab therapeutic.    Assessment and plan by system:   Today's changes:  - Increase Coreg  -Wean nicardipine off  - Zyprexa for nausea  - Touch base with transplant surgery, will he ever be a candidate for Entresto, an Ace, or SGT2i. Can we transition to sliding scale insulin.  - Transfer to floor  - Continue PT OT       Neurological:  # Acute pain   # At risk for anoxic brain injury  - Monitor neurological status. Delirium preventions and precautions.   - Pain: Fent, PRN Dilaudid, RObaxin, scheduled  Tylenol            A & O  Pain control  PT/OT     Cardiovascular:    # VT Cardiac arrest  # Inferior STEMI s/p 2 IBAN to ostial-proximal RCA 2/2 in stent thrombosis  # CAD s/p PCI with IBAN 1/2019, 2024  # CABG in 2019 - LIMA-LAD, SVG-RCA   # hx CAD s/p PCI to the proximal RCA (02/2024)  # HTN   # PAD   # Cardiac arrest (V fib) 7/20/24   While getting renal ultrasound in PACU, patient cardiac arrested (V-fib) for approximately 5-7 mins. Received 2 rounds of CPR, epi x 2, 2g IV mag, 100mg IV lidocaine push during code before achieving ROSC. Patient now presenting to SICU for further work-up, evaluation. EKG in PACU showing acute STEMI, STAT cardiology consult placed, Cath lab activated for inferior STEMI.  TTE EF 37% with inferior wall akinesis     - ASA/ Brillinta  - Transplant surgeon confirmed ok for any anticoag/antiplatelet agents needed    - Start GDMT- Coreg and Hydral  -Touch base with transplant about further GDMT with renal transplant    Pulmonary:   # Post operative ventilatory support   # Acute hypoxic respiratory support post brief Cardiac arrest  - Room air    Gastroenterology/Nutrition:  # NPO status   # S/p pancreas transplant 7/20   - NG tube to LIS   - D5 infusion per protocol   - Bowel reg in place       Renal, Electrolytes  # ESRD s/p kidney transplant 7/20   # Acute kidney injury  # Electrolyte monitoring   # Volume status   # lactic acidosis   - Will continue to monitor intake and output.    I/O last 3 completed shifts:  In: 5311.53 [P.O.:2120; I.V.:3106.53; NG/GT:85]  Out: 5685 [Urine:3350; Emesis/NG output:1950; Drains:385]   Recent Labs   Lab 07/24/24  0320 07/23/24  1126 07/23/24  0515 07/22/24  1426 07/22/24  0923     --  142  --  140   POTASSIUM 4.6  --  4.6  --  4.7   CHLORIDE 111*  --  109*  --  104   CO2 23  --  24  --  24   BUN 45.8*  --  50.4*  --  45.2*   CR 2.07*  --  2.77*  --  3.95*   PHOS 2.6  --  3.1  --  5.4*   MAG 2.0 2.3 2.3   < > 2.0    < > = values in this interval not  displayed.     NG/OG/NJ Tube Nasogastric 16 fr Right nostril-Flush/Free Water (mL): 30 mL    Plan:  - Avoid nephrotoxins, renally dose meds  - Monitor urine output    Endocrine:   # Stress hyperglycemia   # Diabetes mellitus with insulin pump   - Goal to keep BG< 180 for optimal wound healing   - Insulin drip must remain on   - Dextrose infusion per protocol      ID:  # Immunosuppression   # Stress Leukocytosis   # Post-operative antimicrobials   - Trend CBC, fever curve   - Following intra-op cultures   - Defer immunosuppression, post-op antimicrobials per transplant      Positive cultures:  - N/a     Recent Labs   Lab 24  0320 24  0515 24  0923   WBC 5.9 4.7 5.9     Temp (24hrs), Av  F (36.1  C), Min:96.6  F (35.9  C), Max:97.5  F (36.4  C)      Antibiotics     Anti-infectives (From now, onward)      Start     Dose/Rate Route Frequency Ordered Stop    24 1641  valGANciclovir (VALCYTE) tablet 450 mg         450 mg Oral Once per day on 24 1637      24  micafungin (MYCAMINE) 100 mg in sodium chloride 0.9 % 100 mL intermittent infusion         100 mg  100 mL/hr over 60 Minutes Intravenous EVERY 24 HOURS 24 0811 24 195             Hematology  # Acute blood loss anemia    # Thrombocytopenia  - Trend CBC QD  - Transplant OK with full anticoagulation post-op   - Transfuse if hgb <7.0 or signs/symptoms of hypoperfusion. Monitor and trend.   Recent Labs   Lab 24  0320 24  0515 24  0923   HGB 9.5* 9.5* 9.2*   HCT 27.4* 27.2* 26.7*   * 102* 96*     Recent Labs   Lab 24  0821 24  0600 24  1656   INR 1.39* 1.44* 0.96   PTT 28 27 31     Hgb stable. No e/o bleeding.    ASA/ticagrelor for IBAN    DVT PPX: EVER     Musculoskeletal/Skin:  # Weakness and deconditioning of critical illness  # incisional monitoring    - Physical and occupational therapy consult   - Incisional cares   - AMILCAR out today     Lines/ tubes/  drains:  - LIJ CVC   - Art line   - NG tube       Lines/Tubes/Drains:  Peripheral IV 07/19/24 Right Antecubital fossa (Active)   Site Assessment [facility] 07/24/24 0400   Line Status Saline locked 07/24/24 0400   Dressing Transparent 07/24/24 0400   Dressing Status clean;dry;intact 07/24/24 0400   Line Intervention Flushed 07/23/24 0800   Phlebitis Scale 0-->no symptoms 07/24/24 0400   Infiltration? no 07/24/24 0400   Number of days: 5       Peripheral IV 07/20/24 Right;Anterior Lower forearm (Active)   Site Assessment WDL 07/24/24 0400   Line Status Infusing 07/24/24 0400   Dressing Transparent 07/24/24 0400   Dressing Status clean;dry;intact 07/24/24 0400   Line Intervention Flushed 07/22/24 2000   Phlebitis Scale 0-->no symptoms 07/24/24 0400   Infiltration? no 07/24/24 0400   Number of days: 4       Arterial Line 07/19/24 Radial (Active)   Site Assessment [facility] 07/24/24 0400   Line Status Pulsatile blood flow 07/24/24 0400   Arterine Line Cap Change Due 07/28/24 07/24/24 0400   Art Line Waveform Appropriate;Square wave test performed 07/24/24 0400   Art Line Interventions Leveled;Zeroed and calibrated;Tubing changed;Connections checked and tightened;Flushed per protocol;Line pulled back 07/24/24 0400   Color/Movement/Sensation Capillary refill less than 3 sec 07/24/24 0400   Line Necessity Yes, meets criteria 07/24/24 0400   Dressing Type Transparent 07/24/24 0400   Dressing Status Clean, dry, intact 07/24/24 0400   Dressing Intervention Dressing changed/new dressing 07/20/24 1700   Dressing Change Due 07/26/24 07/24/24 0400   Number of days: 5       CVC Triple Lumen Left Internal jugular (Active)   Site Assessment [facility] 07/24/24 0400   Dressing Chlorhexidine disk;Transparent 07/24/24 0400   Dressing Status clean;dry;intact 07/24/24 0400   Dressing Intervention dressing changed 07/23/24 1400   Dressing Change Due 07/30/24 07/24/24 0400   Line Necessity Yes, meets criteria 07/24/24 0400   Phlebitis Scale 0-->no symptoms  "07/24/24 0400   Infiltration? no 07/24/24 0400   Number of days: 5       Closed/Suction Drain 1 Right RLQ Bulb 19 [place] (Active)   Site Description WDL 07/24/24 0400   Dressing Status Dressing Changed 07/24/24 0400   Dressing Change Due 07/25/24 07/24/24 0400   Drainage Appearance Serosanguenous 07/24/24 0400   Status To bulb suction 07/24/24 0400   Output (ml) 50 ml 07/24/24 0800   Number of days: 4       NG/OG/NJ Tube Nasogastric 16 fr Right nostril (Active)   Site Description WDL 07/24/24 0400   Status Suction-low intermittent 07/24/24 0400   Drainage Appearance Brown;Green;Yellow 07/23/24 1600   Placement Confirmation [place] unchanged 07/24/24 0400   [place] (cm marking) at nare/mouth 60 cm 07/24/24 0400   Intake (ml) 5 ml 07/23/24 1800   Flush/Free Water (mL) 30 mL 07/23/24 1800   Container Amount 400 mL 07/24/24 0800   Output (ml) 400 ml 07/24/24 0800   Number of days: 5       Incision/Surgical Site 07/20/24 Upper;Midline Abdomen (Active)   Incision Assessment WDL 07/24/24 0400   Dressing Dry gauze 07/23/24 1600   Luci-Incision Assessment UTV 07/23/24 0400   Closure [place] 07/24/24 0400   Incision Drainage Amount Small 07/24/24 0400   Drainage Description Sanguinous 07/24/24 0400   Incision Care Normal saline 07/22/24 0000   Dressing Intervention New dressing applied 07/24/24 0400   Number of days: 4      Patient seen and discussed with staff physician Dr. Singh.    Yaquelin Lowe DNP, APRN [facility]  Cardiology ICU  Pager [phone] or   Send message Securely via [employer] with the Vocera Web Console        Objective:  Most recent vital signs:  BP (!) 172/71   Pulse 106   Temp 98.2  F (36.8  C) (Oral)   Resp 14   Ht 1.803 m (5' 11\")   Wt 109.4 kg (241 lb 2.9 oz)   SpO2 94%   BMI 33.64 kg/m    Temp:  [98  F (36.7  C)-98.4  F (36.9  C)] 98.2  F (36.8  C)  Pulse:  [] 106  Resp:  [14-16] 14  MAP:  [70 mmHg-107 mmHg] 93 mmHg  Arterial Line BP: (108-175)/(46-81) 154/65  SpO2:  [90 %-100 %] 94 " %      Physical exam:  General: In bed, in NAD  HEENT: PERRL, no scleral icterus or injection  CARDIAC: RRR, no m/r/g appreciated. Peripheral pulses dopplered  RESP: CTAB, no wheezes, rhonchi or crackles appreciated.  GI: soft, BS hypoactive  : Burt  EXTREMITIES: NO LE edema, pulses 2+. Femoral access site w/o bleeding, dressing c/d/i.   SKIN: No acute lesions appreciated  NEURO: alert and oriented    Imaging/procedure results:  XR Abdomen Port 1 View  Narrative: EXAMINATION:  XR ABDOMEN PORT 1 VIEW 7/22/2024     COMPARISON: Abdominal radiograph, 7/20/2024.    HISTORY: Nausea, emesis despite NG tube, assess placement.    FINDINGS: Supine frontal radiographic view of the abdomen.    Feeding tube with tip and sidehole projecting over the gastric body.     No abnormally dilated air-filled loops of bowel, or pneumatosis in the  visualized abdomen.. No portal venous gas.  Bibasilar patchy  opacities. Partially visualized median sternotomy wires appear intact.  Surgical staples over the mid abdomen. Thoracic findings  are detailed  on same-day chest radiograph.  Impression: IMPRESSION: Feeding tube tip and sidehole object over the stomach.    I have personally reviewed the examination and initial interpretation  and I agree with the findings.    SHILO SCHREIBER MD         SYSTEM ID:  [id number]  XR Chest Port 1 View  Narrative: Chest one view portable    HISTORY: Status post extubation    COMPARISON STUDY: 7/21/2024    FINDINGS: Cardiac silhouette is nonenlarged. Lung volumes are low.  Bibasilar subsegmental atelectasis. Endotracheal tube is been removed.  Enteric tube in stomach. Left IJ central line tip at the confluence of  innominate veins.  Impression: IMPRESSION: Interval removal of endotracheal tube with bibasilar  atelectasis.    MAXINE RUFFIN MD         SYSTEM ID:  [id number]  Cardiac Catheterization  Inferior STEMI.  Cardiac arrest.  VT/VF.  Severe two vessel CAD with prior CABG with LIMA to LAD, PCI to D1, and  PCI   to mid RCA.  Patent LIMA to LAD.  Patent stent in the D1 with minimal ISR.  In stent thrombosis of the mid RCA stent culprit in inferior STEMI.  Aspiration thrombectomy of the RCA.  Intravascular lithotripsy of the RCA.  PCI of the RCA with two drug eluting stents.  IVUS of the RCA.     No results found for this or any previous visit (from the past 24 hour(s)).

## 2024-07-24 NOTE — PLAN OF CARE
Major Shift Events:  Patient continues to endorse nausea, scheduled zofran and PRNs given without improvement. Denies pain. Nicardipine gtt to maintain SBP<160, oral BP meds increased and PRN hydralazine IV given. SR/ST. Afebrile. Sating well on RA. NG to LIS, clamped intermittently for meds. NPO but okay for sips of clear liquids. Voiding spontaneously. Insulin gtt currently off for BG<100.     Lines:  -L TL CVC  -R PIV x2  -RR radial A-line    Gtts:  -Nicardipine @ 2.5  -Insulin gtt off  -D5 LR @ 50    Drains:  -RUQ AMILCAR drain with small bright red output.    Plan: Wean off of nicardipine gtt and transfer to floor.   For vital signs and complete assessments, please see documentation flowsheets.

## 2024-07-24 NOTE — PROGRESS NOTES
Transplant Surgery  Inpatient Daily Progress Note  07/24/2024    Assessment & Plan: 49 year old male with PMH significant for DMII (on insulin pump) and resulting ESKD (on HD every MWF since 8/2021). 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 Underwent simultaneous kidney pancreas transplant on 7/20/24 complicated by VT/VF arrest in PACU. Received 2 rounds of CPR before ROSC. Taken emergently to the cath lab and found to have 100% occlusion of the RCA now s/p IBAN x 2.    Graft function:  S/p pancreas transplant: Lipase 130 (138). On insulin gtt initially post operatively with steroids. Patent post-op US.  S/p kidney transplant: Pre-op Cr 5.22, now trending down to 2.1. Robust urine output. Patent post-op US. Remove Burt POD 4.   Immunosuppression management:   Induction:  Thymo 200/200/200 mg (complete, 5.9 mg/kg)  Solu-Medrol: 500/250/100 mg  Maintenance:  Tacrolimus 2 mg BID, goal 8-10  MMF 1000 mg BID  Hematology: DAPT- Brilinta + ASA for at least 1 month. Will not give heparin at this time due to DAPT.  Cardiorespiratory:   CAD, VT/VF arrest s/p PCI with IBAN: Previous CABG 2019, and IBAN 2019 with 100% thrombosis of the RCA, IBAN x 2 on 7/20/24. Consult cards. Start PTA lipitor.   Acute respiratory failure: Extubated 7/21 PM. Now stable on room air.   Hypertension: Goal SBP <165 mmHg. Ok with permissive hypertension to support kidney perfusion. Has been on Nicardipine gtt with nausea. Now tolerating oral meds continue Coreg increased to 6.25 (consider increasing further), Hydralazine 25 mg every 6 hours and add Norvasc 10 mg daily. Will monitor.   GI/Nutrition: NPO, NG tube in place until nausea improves and  return of bowel function s/p pancreas transplant. Will leave in place today due to continued nausea and 2L output over the  last 24 hours.   Endocrine:   Steroid hyperglycemia: Insulin drip as above  Fluid/Electrolytes: MIVF: D5LR@50/hr.   Hyperkalemia: Resolved with medical management without need for dialysis  : Burt remained due to new surgical anastomosis, removed POD 4. Negative PVR.   Infectious disease: Luci-op prophylaxis with meropenem completed, micafungin x7 days then start nystatin ppx.  Prophylaxis: DVT, fall, GI, fungal  Disposition: CVICU, can transfer to the floor when weaned off Nicardipine gtt     KAT/Fellow/Resident Provider: Libra Valentino PA-C [id number]    Faculty: Jarvis Michaud MD  _________________________________________________________________  Transplant History: Admitted 7/19/2024 for kidney pancreas transplant.  7/20/2024 (Kidney / Pancreas), Postoperative day: 4     Interval History: History is obtained from the patient  Overnight events: Nausea continues. NG in place with 2L output.     ROS:   A 10-point review of systems was negative except as noted above.    Meds:  Current Facility-Administered Medications   Medication Dose Route Frequency Provider Last Rate Last Admin    amLODIPine (NORVASC) tablet 10 mg  10 mg Oral Daily Libra Valentino PA-C   10 mg at 07/24/24 1409    aspirin (ASA) chewable tablet 81 mg  81 mg Oral or Feeding Tube Daily Julienne Ibrahim PA-C   81 mg at 07/24/24 0738    atorvastatin (LIPITOR) tablet 40 mg  40 mg Oral QPM Libra Valentino PA-C   40 mg at 07/23/24 1958    calcium carbonate-vitamin D (OSCAL) 500-5 MG-MCG per tablet 1 tablet  1 tablet Oral BID w/meals Julienne Ibrahim PA-C   1 tablet at 07/24/24 0738    carvedilol (COREG) tablet 25 mg  25 mg Oral BID Libra Valentino PA-C        heparin ANTICOAGULANT injection 5,000 Units  5,000 Units Subcutaneous Q8H Julienne Ibrahim PA-C   5,000 Units at 07/24/24 1217    hydrALAZINE (APRESOLINE) tablet 25 mg  25 mg Oral Q8H Margaret Harrington, APRN CNP   25 mg at 07/24/24 1409    magnesium oxide  "(MAG-OX) tablet 400 mg  400 mg Oral Q24H Julienne Ibrahim PA-C   400 mg at 07/24/24 1217    micafungin (MYCAMINE) 100 mg in sodium chloride 0.9 % 100 mL intermittent infusion  100 mg Intravenous Q24H Julienne Ibrahim PA-C 100 mL/hr at 07/23/24 2025 100 mg at 07/23/24 2025    mycophenolate mofetil (CELLCEPT) 1,000 mg in D5W intermittent infusion  1,000 mg Intravenous BID IS Julienne Ibarhim PA-C 137.5 mL/hr at 07/24/24 0739 1,000 mg at 07/24/24 0739    octreotide (sandoSTATIN) injection 100 mcg  100 mcg Subcutaneous Q12H Julienne Ibrahim PA-C   100 mcg at 07/24/24 0738    ondansetron (ZOFRAN) injection 4 mg  4 mg Intravenous Q6H Margaret Mcfarlane APRN CNP   4 mg at 07/24/24 1409    pantoprazole (PROTONIX) 2 mg/mL suspension 40 mg  40 mg Per NG tube Daily Julienne Ibrahim PA-C   40 mg at 07/24/24 0739    polyethylene glycol (MIRALAX) Packet 17 g  17 g Oral BID Ezequiel Martin APRN CNP   17 g at 07/23/24 1958    senna-docusate (SENOKOT-S/PERICOLACE) 8.6-50 MG per tablet 2 tablet  2 tablet Oral BID Ezequiel Martin APRN CNP   2 tablet at 07/24/24 0738    sodium chloride (PF) 0.9% PF flush 3 mL  3 mL Intravenous Q8H Julienne Ibrahim PA-C   3 mL at 07/23/24 0832    [START ON 7/25/2024] tacrolimus (GENERIC) suspension 2 mg  2 mg Oral or NG Tube BID IS Libra Valentino PA-C        ticagrelor (BRILINTA) tablet 90 mg  90 mg Oral or Feeding Tube BID Julienne Ibrahim PA-C   90 mg at 07/24/24 0738    valGANciclovir (VALCYTE) tablet 450 mg  450 mg Oral Daily Harrison Whitehead MD   450 mg at 07/24/24 1002       Physical Exam:     Admit Weight: 102.4 kg (225 lb 12 oz)    Current vitals:   BP (!) 172/71   Pulse 105   Temp 97.9  F (36.6  C) (Oral)   Resp 14   Ht 1.803 m (5' 11\")   Wt 109.4 kg (241 lb 2.9 oz)   SpO2 96%   BMI 33.64 kg/m      Vital sign ranges:    Temp:  [97.9  F (36.6  C)-98.4  F (36.9  C)] 97.9  F (36.6  C)  Pulse:  " [] 105  Resp:  [14-16] 14  MAP:  [70 mmHg-270 mmHg] 89 mmHg  Arterial Line BP: (112-175)/(46-81) 149/58  SpO2:  [90 %-100 %] 96 %  Patient Vitals for the past 24 hrs:   Temp Temp src Pulse Resp SpO2 Weight   07/24/24 1400 -- -- 105 -- 96 % --   07/24/24 1345 -- -- 102 -- 95 % --   07/24/24 1330 -- -- 103 -- 96 % --   07/24/24 1315 -- -- 101 -- 96 % --   07/24/24 1300 -- -- 106 -- 95 % --   07/24/24 1245 -- -- 101 -- -- --   07/24/24 1230 -- -- 101 -- -- --   07/24/24 1215 -- -- 107 -- -- --   07/24/24 1200 97.9  F (36.6  C) Oral 113 14 99 % --   07/24/24 1145 -- -- 103 -- 99 % --   07/24/24 1130 -- -- 102 -- 99 % --   07/24/24 1115 -- -- 101 -- 99 % --   07/24/24 1100 -- -- 104 -- 100 % --   07/24/24 1045 -- -- 107 -- 97 % --   07/24/24 1030 -- -- 103 -- 98 % --   07/24/24 1015 -- -- 114 -- 98 % --   07/24/24 1000 -- -- 101 -- 96 % --   07/24/24 0945 -- -- 109 -- 99 % --   07/24/24 0930 -- -- 114 -- 90 % --   07/24/24 0915 -- -- 108 -- 95 % --   07/24/24 0900 -- -- 108 -- 99 % --   07/24/24 0845 -- -- 108 -- 98 % --   07/24/24 0830 -- -- 99 -- 96 % --   07/24/24 0815 -- -- 118 -- 99 % --   07/24/24 0800 98.2  F (36.8  C) Oral 108 16 99 % --   07/24/24 0745 -- -- 108 -- 99 % --   07/24/24 0730 -- -- 113 -- 98 % --   07/24/24 0700 -- -- 106 -- 94 % --   07/24/24 0645 -- -- 109 -- 95 % --   07/24/24 0630 -- -- 109 -- 96 % --   07/24/24 0615 -- -- 111 -- 92 % --   07/24/24 0600 -- -- 111 -- 93 % 109.4 kg (241 lb 2.9 oz)   07/24/24 0545 -- -- 115 -- 91 % --   07/24/24 0530 -- -- 105 -- 92 % --   07/24/24 0515 -- -- 115 -- 93 % --   07/24/24 0500 -- -- 116 -- 93 % --   07/24/24 0445 -- -- 112 -- 95 % --   07/24/24 0430 -- -- 117 -- -- --   07/24/24 0415 -- -- 109 -- -- --   07/24/24 0400 98.4  F (36.9  C) Oral 115 -- 95 % --   07/24/24 0345 -- -- 113 -- -- --   07/24/24 0330 -- -- 112 -- -- --   07/24/24 0315 -- -- 108 -- -- --   07/24/24 0300 -- -- 111 -- -- --   07/24/24 0245 -- -- 111 -- -- --   07/24/24 0230 --  -- 105 -- -- --   07/24/24 0215 -- -- 113 -- -- --   07/24/24 0200 -- -- 104 -- -- --   07/24/24 0145 -- -- 103 -- -- --   07/24/24 0130 -- -- 106 -- -- --   07/24/24 0115 -- -- 97 -- -- --   07/24/24 0100 -- -- 101 -- -- --   07/24/24 0045 -- -- 114 -- -- --   07/24/24 0030 -- -- 97 -- -- --   07/24/24 0015 -- -- 99 -- -- --   07/24/24 0000 98.4  F (36.9  C) Oral 97 14 95 % --   07/23/24 2345 -- -- 100 -- -- --   07/23/24 2330 -- -- 105 -- -- --   07/23/24 2315 -- -- 114 -- -- --   07/23/24 2300 -- -- 101 -- -- --   07/23/24 2245 -- -- 98 -- -- --   07/23/24 2230 -- -- 110 -- -- --   07/23/24 2215 -- -- 110 -- -- --   07/23/24 2200 -- -- 105 -- -- --   07/23/24 2145 -- -- 98 -- -- --   07/23/24 2130 -- -- 100 -- -- --   07/23/24 2115 -- -- 99 -- -- --   07/23/24 2100 -- -- 110 -- -- --   07/23/24 2030 -- -- 106 -- -- --   07/23/24 2015 -- -- 105 -- -- --   07/23/24 2000 98.2  F (36.8  C) Oral 105 -- 95 % --   07/23/24 1945 -- -- 106 -- -- --   07/23/24 1930 -- -- 103 -- -- --   07/23/24 1915 -- -- 109 -- -- --   07/23/24 1900 -- -- 106 -- 95 % --   07/23/24 1845 -- -- 108 -- 97 % --   07/23/24 1830 -- -- 108 -- 93 % --   07/23/24 1815 -- -- 105 -- 94 % --   07/23/24 1800 -- -- 102 -- 93 % --   07/23/24 1745 -- -- 101 -- 95 % --   07/23/24 1730 -- -- 102 -- 93 % --   07/23/24 1715 -- -- 100 -- 93 % --   07/23/24 1700 -- -- 98 -- 92 % --   07/23/24 1645 -- -- 100 -- 90 % --   07/23/24 1630 -- -- 106 -- 94 % --   07/23/24 1615 -- -- 101 -- 92 % --   07/23/24 1600 98  F (36.7  C) Oral 107 16 -- --   07/23/24 1545 -- -- 102 -- 94 % --   07/23/24 1530 -- -- 105 -- 92 % --   07/23/24 1515 -- -- 112 -- 100 % --   07/23/24 1500 -- -- 99 -- 91 % --   07/23/24 1445 -- -- 101 -- 96 % --   07/23/24 1440 -- -- 104 -- 94 % --   07/23/24 1430 -- -- 98 -- 92 % --     General Appearance: Awake, alert, oriented  Skin: normal, warm  Heart: regular rate and rhythm  Lungs: Nonlabored resps on RA  Abdomen: The abdomen is soft, incision  covered. Drain with serosanguinous output  : olea is present.   Extremities: edema: none  Neurologic: awake, alert, oriented    Data:   CMP  Recent Labs   Lab 07/24/24  1408 07/24/24  1259 07/24/24  0328 07/24/24  0320 07/23/24  1130 07/23/24  1126 07/23/24  0607 07/23/24  0515   NA  --   --   --  142  --   --   --  142   POTASSIUM  --   --   --  4.6  --   --   --  4.6   CHLORIDE  --   --   --  111*  --   --   --  109*   CO2  --   --   --  23  --   --   --  24   GLC 92 99   < > 118*   < >  --    < > 145*   BUN  --   --   --  45.8*  --   --   --  50.4*   CR  --   --   --  2.07*  --   --   --  2.77*   GFRESTIMATED  --   --   --  39*  --   --   --  27*   BETHANY  --   --   --  10.0  --   --   --  10.0   ICAW  --   --   --  5.6*  --  5.5*  --  5.6*   MAG  --   --   --  2.0  --  2.3  --  2.3   PHOS  --   --   --  2.6  --   --   --  3.1   AMYLASE  --   --   --  158*  --   --   --  154*   LIPASE  --   --   --  130*  --   --   --  138*   ALBUMIN  --   --   --  3.6  --   --   --  3.7   BILITOTAL  --   --   --  0.6  --   --   --  0.5   ALKPHOS  --   --   --  77  --   --   --  72   AST  --   --   --  47*  --   --   --  85*   ALT  --   --   --  45  --   --   --  59    < > = values in this interval not displayed.     CBC  Recent Labs   Lab 07/24/24  0320 07/23/24  0515 07/20/24  1422 07/20/24  1138   HGB 9.5* 9.5*   < > 9.7*   WBC 5.9 4.7   < > 12.8*   * 102*   < > 123*   A1C  --   --   --  6.4*    < > = values in this interval not displayed.     COAGS  Recent Labs   Lab 07/20/24  0821 07/20/24  0600   INR 1.39* 1.44*   PTT 28 27      Urinalysis  Recent Labs   Lab Test 07/20/24  0609 09/27/21  1220   COLOR Orange* Yellow   APPEARANCE Slightly Cloudy* Slightly Cloudy*   URINEGLC 50* 150*   URINEBILI Negative Negative   URINEKETONE Negative Negative   SG 1.013 1.013   UBLD Large* Negative   URINEPH 6.5 6.0   PROTEIN 300* >499*   NITRITE Negative Negative   LEUKEST Trace* Small*   RBCU >182* 2   WBCU 105* 4      Virology:  Hepatitis C Antibody   Date Value Ref Range Status   07/19/2024 Nonreactive Nonreactive Final     Comment:     A nonreactive screening test result does not exclude the possibility of exposure to or infection with HCV. Nonreactive screening test results in individuals with prior exposure to HCV may be due to antibody levels below the limit of detection of this assay or lack of reactivity to the HCV antigens used in this assay. Patients with recent HCV infections (<3 months from time of exposure) may have false-negative HCV antibody results due to the time needed for seroconversion (average of 8 to 9 weeks).

## 2024-07-24 NOTE — PROGRESS NOTES
[facility]  Transplant Nephrology Consult Note  Date of Admission:  7/19/2024  Today's Date: 07/24/2024  Requesting physician: Harrison Whitehead*    Reason for Consult:  SPK immunosuppression    Recommendations:   - tac above goal, agree with decreasing the dose to 2 mg BID  -Restart amlodipine at 10 mg  -Increase carvedilol from 3.125 mg twice a day to 6.25 mg twice a day and still have room to go up on the dose to titrate down the nicardipine drip.  -Continue on hydralazine 25 mg 3 times daily  - agree with PTH given hypercalcemia, also contributes for high SBP along with high tac levels.  - hold vitamin D and ni supplementation (held for you)    Assessment & Plan   # DDKT (SPK): Trend down. UOP improving now with down trending creatinine.   - Baseline Creatinine: ~ TBD   - Proteinuria: Not checked post transplant   - DSA Hx:  Final cross match pending    - Last cPRA: 11%   - BK Viremia: Not checked post transplant   - Kidney Tx Biopsy Hx: No biopsy history.    # Pancreas Tx (SPK):    - Pancreatic Exocrine Drainage: Enteric drained     - Blood glucose: Elevated blood glucose      On insulin: Yes minimal dose   -  Latest HbA1c: 4%   - Pancreatic enzymes: Trend up   - DSA Hx:  final crossmatch pending   - Pancreas Tx Biopsy Hx: No biopsy history    # Immunosuppression: Tacrolimus immediate release (goal 8-10) and Mycophenolate mofetil (dose 1000 mg every 12 hours)   - Induction with Recent Transplant:  High Intensity Protocol   - Continue with intensive monitoring of immunosuppression for efficacy and toxicity.   - Historical Changes in Immunosuppression: None   - Changes: Not at this time, has been reduced to 2 mg twice a day from 3 mg twice a day    # VT/VF Cardiac arrest:  # CAD s/p CABG 2019 LIMA-LAD, SVG-RCA, IBAN 1/2019, IBAN to RCA 2/2024     - s/p inferior STEMI 2/2 ostial proximal RCA in stent thrombosis   - s/p 2 IBAN to RCA 7/20/2024   - Echo:  LVEF=37%.Moderate diffuse hypokinesis with akinesis of all inferior segments.  Mild right ventricular dilation is present.Global  right ventricular function is moderately reduced.    # Infection Prevention:      - PJP: Sulfa/TMP (Bactrim) held given hyperK, added G6PD, results pending  - CMV IgG Ab High Risk Discordance (D+/R-): No  - EBV IgG Ab High Risk Discordance (D+/R-): No    # Blood Pressure:  borderline control ;  Goal BP: MAP > 65   - Changes: Yes -  restarted his Home amlodipine  at 10 mg and increased coreg dose      # Anemia in Chronic Renal Disease: Hgb: Trend down post SPK     KERRI: No   - Iron studies: Not checked recently. Monitor Hb trends    # Mineral Bone Disorder:    - Secondary renal hyperparathyroidism; PTH level: Unknown at this time, but checked with dialysis        On treatment: None  - Vitamin D; level: High        On supplement: No  - Calcium; level: High        On supplement: No  - Phosphorus; level: Normal        On supplement: No    Hypercalcemia, likely multifactorial including intravascular volume depletion with new kidney, high PTH (recheck pending) and or high vitamin D. On vitamin D and ni supplementation, will hold it for now     # Electrolytes:   - Potassium; level: Normal        On supplement: No  - Magnesium; level: Normal        On supplement: Yes  - Bicarbonate; level: Normal        On supplement: No  - Sodium; level: Low normal    # Other Significant PMH:   - Left diaphragmatic elevation; 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      Grisel Coombs MD  Transplant Nephrology  Contact information via Vocera Web Console or via [facility] Paging/Directory      Interval History  Mr. Ramey's creatinine  is 2.07, Trend down  Off pressors  Extubated, on room air  Excellent UOP  Pain is fairly controlled    Review of Systems   4 point ROS was obtained and negative except as noted in the Interval History.    MEDICATIONS:  Current Facility-Administered Medications   Medication Dose Route Frequency Provider Last Rate Last Admin    amLODIPine (NORVASC) tablet 10 mg  10 mg Oral Daily Libra Valentino PA-C   10 mg at 07/24/24 1409    aspirin (ASA) chewable tablet 81 mg  81 mg Oral or Feeding Tube Daily Julienne Ibrahim PA-C   81 mg at 07/24/24 0738    atorvastatin (LIPITOR) tablet 40 mg  40 mg Oral QPM Libra Valentino PA-C   40 mg at 07/23/24 1958    calcium carbonate-vitamin D (OSCAL) 500-5 MG-MCG per tablet 1 tablet  1 tablet Oral BID w/meals Julienne Ibrahim PA-C   1 tablet at 07/24/24 0738    carvedilol (COREG) tablet 6.25 mg  6.25 mg Oral BID Sulma Lowe APRN CNP        heparin ANTICOAGULANT injection 5,000 Units  5,000 Units Subcutaneous Q8H Julienne Ibrahim PA-C   5,000 Units at 07/24/24 1217    hydrALAZINE (APRESOLINE) tablet 25 mg  25 mg Oral Q8H Margaret Harrington APRN CNP   25 mg at 07/24/24 1409    magnesium oxide (MAG-OX) tablet 400 mg  400 mg Oral Q24H Julienne Ibrahim PA-C   400 mg at 07/24/24 1217    micafungin (MYCAMINE) 100 mg in sodium chloride 0.9 % 100 mL intermittent infusion  100 mg Intravenous Q24H Julienne Ibrahim PA-C 100 mL/hr at 07/23/24 2025 100 mg at 07/23/24 2025    mycophenolate mofetil (CELLCEPT) 1,000 mg in D5W intermittent infusion  1,000 mg Intravenous BID IS Julienne Ibrahim PA-C 137.5 mL/hr at 07/24/24 0739 1,000 mg at 07/24/24 0739    octreotide (sandoSTATIN) injection 100 mcg  100 mcg Subcutaneous Q12H Julienne Ibrahim PA-C   100 mcg at 07/24/24 0738    ondansetron (ZOFRAN) injection 4 mg  4 mg Intravenous Q6H Margaret Mcfarlane APRN CNP   4 mg at 07/24/24 140    pantoprazole (PROTONIX) 2 mg/mL suspension 40 mg   40 mg Per NG tube Daily Julienne Ibrahim PA-C   40 mg at 24 0739    polyethylene glycol (MIRALAX) Packet 17 g  17 g Oral BID Ezequiel Martin APRN CNP   17 g at 24    senna-docusate (SENOKOT-S/PERICOLACE) 8.6-50 MG per tablet 2 tablet  2 tablet Oral BID Ezequiel Martin APRN CNP   2 tablet at 24 0738    sodium chloride (PF) 0.9% PF flush 3 mL  3 mL Intravenous Q8H Julienne Ibrahim PA-C   3 mL at 24 0832    [START ON 2024] tacrolimus (GENERIC) suspension 2 mg  2 mg Oral or NG Tube BID IS Libra Valentino PA-C        ticagrelor (BRILINTA) tablet 90 mg  90 mg Oral or Feeding Tube BID Julienne Ibrahim PA-C   90 mg at 24 0738    valGANciclovir (VALCYTE) tablet 450 mg  450 mg Oral Daily Harrison Whitehead MD   450 mg at 24 1002     Current Facility-Administered Medications   Medication Dose Route Frequency Provider Last Rate Last Admin    dextrose 10% infusion   Intravenous Continuous PRN Julienne Ibrahim PA-C        dextrose 5% in lactated ringers 1,000 mL infusion   Intravenous Continuous Julienne Ibrahim PA-C 50 mL/hr at 24 1455 New Bag at 24 1455    insulin regular (MYXREDLIN) 1 unit/mL infusion  0-24 Units/hr Intravenous Continuous Julienne Ibrahim PA-C   Stopped at 24 1200    niCARdipine 40 mg in 200 mL NS (CARDENE) infusion  0.5-15 mg/hr Intravenous Continuous Harrison Whitehead MD 12.5 mL/hr at 24 1600 2.5 mg/hr at 24 1600       Physical Exam   Temp  Av.4  F (36.3  C)  Min: 96.1  F (35.6  C)  Max: 98.6  F (37  C)  Arterial Line BP  Min: 36/21  Max: 276/115  Arterial Line MAP (mmHg)  Av.3 mmHg  Min: 29 mmHg  Max: 232 mmHg      Pulse  Av.9  Min: 20  Max: 152 Resp  Avg: 15.5  Min: 3  Max: 38  FiO2 (%)  Av %  Min: 70 %  Max: 80 %  SpO2  Av.7 %  Min: 72 %  Max: 100 %    CVP (mmHg): 278 mmHgBP (!) 172/71   Pulse 105   Temp  "97.9  F (36.6  C) (Oral)   Resp 14   Ht 1.803 m (5' 11\")   Wt 109.4 kg (241 lb 2.9 oz)   SpO2 98%   BMI 33.64 kg/m     Date 07/20/24 0700 - 07/21/24 0659   Shift 5749-2825 2512-9548 9246-8989 24 Hour Total   INTAKE   I.V. 1839.32   1839.32   NG/   128   Shift Total(mL/kg) 1967.32(19.21)   1967.32(19.21)   OUTPUT   Urine 1325   1325   Emesis/NG output 100   100   Drains 875   875   Shift Total(mL/kg) 2300(22.46)   2300(22.46)   Weight (kg) 102.4 102.4 102.4 102.4      Admit Weight: 102.4 kg (225 lb 12 oz)     GENERAL APPEARANCE: intubated sedated  HENT: atrmatic, have NG tube in place  RESP: fine coarse breath sounds b/l  CV: regular rhythm, normal rate  EDEMA: improved  LE edema bilaterally  ABDOMEN: soft, nondistended, surgical incision  MS: extremities normal - no gross deformities noted  SKIN: no rash  Access LUE AVF +thrill/bruit    Data   All labs reviewed by me.  CMP  Recent Labs   Lab 07/24/24  1408 07/24/24  1259 07/24/24  1216 07/24/24  1106 07/24/24  0328 07/24/24  0320 07/23/24  1130 07/23/24  1126 07/23/24  0607 07/23/24  0515 07/23/24  0203 07/23/24  0122 07/22/24  1302 07/22/24  0923 07/22/24  0621 07/22/24  0421   NA  --   --   --   --   --  142  --   --   --  142  --   --   --  140  --  141   POTASSIUM  --   --   --   --   --  4.6  --   --   --  4.6  --   --   --  4.7  --  4.9   CHLORIDE  --   --   --   --   --  111*  --   --   --  109*  --   --   --  104  --  104   CO2  --   --   --   --   --  23  --   --   --  24  --   --   --  24  --  24   ANIONGAP  --   --   --   --   --  8  --   --   --  9  --   --   --  12  --  13   GLC 92 99 96 138*   < > 118*   < >  --    < > 145*   < >  --    < > 133*   < > 143*   BUN  --   --   --   --   --  45.8*  --   --   --  50.4*  --   --   --  45.2*  --  42.0*   CR  --   --   --   --   --  2.07*  --   --   --  2.77*  --   --   --  3.95*  --  4.11*   GFRESTIMATED  --   --   --   --   --  39*  --   --   --  27*  --   --   --  18*  --  17*   BETHANY  --   --   --  "  --   --  10.0  --   --   --  10.0  --   --   --  10.0  --  9.5   MAG  --   --   --   --   --  2.0  --  2.3  --  2.3  --  1.9   < > 2.0  --  2.1   PHOS  --   --   --   --   --  2.6  --   --   --  3.1  --   --   --  5.4*  --  5.7*   PROTTOTAL  --   --   --   --   --  5.7*  --   --   --  5.8*  --   --   --  5.8*  --  5.5*   ALBUMIN  --   --   --   --   --  3.6  --   --   --  3.7  --   --   --  3.6  --  3.4*   BILITOTAL  --   --   --   --   --  0.6  --   --   --  0.5  --   --   --  0.4  --  0.4   ALKPHOS  --   --   --   --   --  77  --   --   --  72  --   --   --  72  --  67   AST  --   --   --   --   --  47*  --   --   --  85*  --   --   --  193*  --  211*   ALT  --   --   --   --   --  45  --   --   --  59  --   --   --  76*  --  75*    < > = values in this interval not displayed.     CBC  Recent Labs   Lab 07/24/24  0320 07/23/24  0515 07/22/24  0923 07/22/24  0421   HGB 9.5* 9.5* 9.2* 9.1*   WBC 5.9 4.7 5.9 5.4   RBC 2.91* 2.87* 2.81* 2.80*   HCT 27.4* 27.2* 26.7* 26.8*   MCV 94 95 95 96   MCH 32.6 33.1* 32.7 32.5   MCHC 34.7 34.9 34.5 34.0   RDW 12.2 12.7 13.0 13.0   * 102* 96* 93*     INR  Recent Labs   Lab 07/20/24  0821 07/20/24  0600 07/19/24  1656   INR 1.39* 1.44* 0.96   PTT 28 27 31     ABG  Recent Labs   Lab 07/22/24  0923 07/22/24  0421 07/21/24 2002 07/21/24  1811   PH 7.44 7.42 7.39 7.39   PCO2 40 42 44 43   PO2 63* 67* 85 77*   HCO3 27 27 26 26   O2PER 21 35  35 36  35 30      Urine Studies  Recent Labs   Lab Test 07/20/24  0609 09/27/21  1220 07/24/21  1151 06/25/19  1439 06/19/19  0822   COLOR Orange* Yellow Light Yellow Straw Straw   APPEARANCE Slightly Cloudy* Slightly Cloudy* Clear Clear Clear   URINEGLC 50* 150* 200* 200 mg/dL* >1000 mg/dL*   URINEBILI Negative Negative Negative Negative Negative   URINEKETONE Negative Negative Negative Negative Negative   SG 1.013 1.013 1.018 1.010 1.012   UBLD Large* Negative 0.03 mg/dL* Moderate* Trace*   URINEPH 6.5 6.0 6.0 6.5 6.0   PROTEIN 300*  >499* 600* 200 mg/dL* 300 mg/dL*   UROBILINOGEN  --   --   --  <2.0 E.U./dL <2.0 E.U./dL   NITRITE Negative Negative Negative Negative Negative   LEUKEST Trace* Small* Negative Negative Negative   RBCU >182* 2 1 25-50* 0-2   WBCU 105* 4 10* 0-5 0-5     No lab results found.  PTH  Recent Labs   Lab Test 07/20/21  1845   PTHI 190*     Iron Studies  No lab results found.    IMAGING:  All imaging studies reviewed by me.    Past Medical History    I have reviewed this patient's medical history and updated it with pertinent information if needed.   Past Medical History:   Diagnosis Date    Acquired elevated diaphragm     Anemia in chronic kidney disease     Angina pectoris (H24)     Chronic kidney disease     Coronary artery disease     Diabetes mellitus, type II (H) 12/2001    Diabetic nephropathy (H)     MARQUEZ (dyspnea on exertion)     Dyslipidemia 12/2001    End stage renal disease (H)     History of blood transfusion 2004    Hypertension     takes medication    Ischemic cardiomyopathy     Metabolic acidosis     Myocardial infarction (H)     STEMI -Diagonal branch of the LAD    Obesity (BMI 30-39.9)     Peripheral neuropathy     Proteinuria     Retinopathy     Vitamin D deficiency        Past Surgical History   I have reviewed this patient's surgical history and updated it with pertinent information if needed.  Past Surgical History:   Procedure Laterality Date    APPENDECTOMY OPEN N/A 7/19/2024    Procedure: Appendectomy open;  Surgeon: Harrison Whitehead MD;  Location: [facility] OR    BACK SURGERY  2009    L5 disc cut    BENCH KIDNEY  7/19/2024    Procedure: Bench kidney;  Surgeon: Harrison Whitehead MD;  Location: [facility] OR    BENCH PANCREAS N/A 7/19/2024    Procedure: Bench pancreas;  Surgeon: Harrison Whitehead MD;  Location: [facility] OR    BYPASS GRAFT ARTERY CORONARY  06/20/2019    2 vessels    CV CENTRAL VENOUS CATHETER PLACEMENT N/A 7/20/2024    Procedure: Central Venous Catheter  Placement;  Surgeon: Dominic Robertson MD;  Location: [facility] CARDIAC CATH LAB    CV CORONARY ANGIOGRAM N/A 01/13/2019    Procedure: Coronary Angiogram;  Surgeon: Cielo Che MD;  Location: [facility] Cath Lab;  Service: Cardiology    CV CORONARY ANGIOGRAM N/A 05/02/2019    Procedure: Coronary Angiogram;  Surgeon: Cielo Che MD;  Location: [facility] Cath Lab;  Service: Cardiology    CV CORONARY ANGIOGRAM N/A 04/30/2020    Procedure: Coronary Angiogram;  Surgeon: Cielo Che MD;  Location: [facility] Cath Lab;  Service: Cardiology    CV CORONARY ANGIOGRAM N/A 07/22/2021    Procedure: CV CORONARY ANGIOGRAM;  Surgeon: Adrian Crow MD;  Location: [facility] CV    CV CORONARY ANGIOGRAM N/A 02/01/2024    Procedure: Coronary Angiogram;  Surgeon: Delroy Carpio MD;  Location: [facility] CARDIAC CATH LAB    CV CORONARY ANGIOGRAM N/A 7/20/2024    Procedure: Coronary Angiogram;  Surgeon: Dominic Robertson MD;  Location: [facility] CARDIAC CATH LAB    CV CORONARY LITHOTRIPSY PCI N/A 7/20/2024    Procedure: Percutaneous Coronary Intervention - Lithotripsy;  Surgeon: Dominic Robertson MD;  Location: [facility] CARDIAC CATH LAB    CV FRACTIONAL FLOW RATIO WIRE N/A 07/22/2021    Procedure: Fractional Flow Ratio Wire;  Surgeon: Adrian Crow MD;  Location: [facility] CV    CV INTRAVASULAR ULTRASOUND N/A 7/20/2024    Procedure: Intravascular Ultrasound;  Surgeon: Dominic Robertson MD;  Location: [facility] CARDIAC CATH LAB    CV LEFT HEART CATH N/A 07/22/2021    Procedure: Left Heart Cath;  Surgeon: Adrian Crow MD;  Location: [facility] LAB CV    CV LEFT HEART CATHETERIZATION WITH LEFT VENTRICULOGRAM N/A 01/13/2019    Procedure: Left Heart Catheterization with Left Ventriculogram;  Surgeon: Cielo Che MD;  Location: [facility] Cath Lab;  Service: Cardiology    CV LEFT HEART CATHETERIZATION WITHOUT LEFT VENTRICULOGRAM Left  2020    Procedure: Left Heart Catheterization Without Left Ventriculogram;  Surgeon: Cielo Che MD;  Location: [facility] Cath Lab;  Service: Cardiology    CV PCI N/A 2024    Procedure: Percutaneous Coronary Intervention;  Surgeon: Delroy Carpio MD;  Location: [facility] CARDIAC CATH LAB    CV PCI N/A 2024    Procedure: Percutaneous Coronary Intervention;  Surgeon: Dominic Robertson MD;  Location: [facility] CARDIAC CATH LAB    CV PCI ASPIRATION THROMECTOMY N/A 2024    Procedure: Percutaneous Coronary Intervention Aspiration Thrombectomy;  Surgeon: Dominic Robertson MD;  Location: [facility] CARDIAC CATH LAB    CV PCI STENT DRUG ELUTING N/A 2024    Procedure: Percutaneous Coronary Intervention Stent;  Surgeon: Dominic Robertson MD;  Location: [facility] CARDIAC CATH LAB    CV RIGHT HEART CATHETERIZATION N/A 2020    Procedure: Right Heart Catheterization;  Surgeon: Cielo Che MD;  Location: [facility] Cath Lab;  Service: Cardiology    EYE SURGERY      GENITOURINARY SURGERY      HERNIA REPAIR      OTHER SURGICAL HISTORY      Excise varicocele    STENT, CORONARY, DALE      TRANSPLANT PANCREAS, KIDNEY  DONOR, COMBINED N/A 2024    Procedure: Transplant pancreas, kidney  donor, with ureteral stent placement;  Surgeon: Harrison Whitehead MD;  Location:  OR    VASCULAR SURGERY         Family History   I have reviewed this patient's family history and updated it with pertinent information if needed.   Family History   Problem Relation Age of Onset    Diabetes Type 2  Mother     Heart Disease Father 60    CABG Father 50        triple bypass    Diabetes Type 2  Father     Depression Sister     Substance Abuse Sister     Ovarian Cancer Maternal Grandmother     Brain Cancer Maternal Grandmother     Pancreatic Cancer Maternal Aunt     Prostate Cancer Maternal Uncle        Social History   I have reviewed  this patient's social history and updated it with pertinent information if needed. Siva Ramey  reports that he has never smoked. He has been exposed to tobacco smoke. He has never used smokeless tobacco. He reports that he does not drink alcohol and does not use drugs.    Prior to Admission Medications   Medications Prior to Admission   Medication Sig Dispense Refill Last Dose    acetaminophen (TYLENOL) 500 MG tablet Take 500 mg by mouth every 4 hours as needed   7/19/2024    albuterol (PROAIR HFA/PROVENTIL HFA/VENTOLIN HFA) 108 (90 Base) MCG/ACT inhaler Inhale 2 puffs into the lungs every 4 hours as needed   More than a month    ALPRAZolam (XANAX) 0.25 MG tablet Take 0.25 mg by mouth 3 times daily as needed   More than a month    amLODIPine (NORVASC) 10 MG tablet Take 1 tablet (10 mg) by mouth daily 90 tablet 3 7/18/2024 at 0800    aspirin (ASA) 81 MG chewable tablet Take 1 tablet (81 mg) by mouth daily Starting tomorrow. 30 tablet 3 7/18/2024 at 0800    atorvastatin (LIPITOR) 80 MG tablet Take 1 tablet (80 mg) by mouth at bedtime 90 tablet 3 7/18/2024 at 2200    carvedilol (COREG) 12.5 MG tablet Take 3 tablets (37.5 mg) by mouth 2 times daily (with meals) 540 tablet 3 7/18/2024 at 2200    cholecalciferol, vitamin D3, 5,000 unit Tab [CHOLECALCIFEROL, VITAMIN D3, 5,000 UNIT TAB] Take 5,000 Units by mouth daily.   7/18/2024 at 2200    hydrALAZINE (APRESOLINE) 25 MG tablet Take 1 tablet (25 mg) by mouth 2 times daily 180 tablet 3 7/18/2024 at 0800    icosapent ethyl (VASCEPA) 1 g CAPS capsule Take 2 g by mouth 2 times daily (with meals)   7/18/2024 at 2200    insulin aspart (NOVOLOG VIAL) 100 UNITS/ML vial Inject Subcutaneous 3 times daily (with meals) To be used with the insulin pump       nitroGLYcerin (NITROSTAT) 0.4 MG sublingual tablet PLACE ONE TABLET UNDER TONGUE AS NEEDED FOR CHEST PAIN AS DIRECTED FOR 3 DOSES. IF SYMPTOMS PERSIST 5 MINUTES AFTER 1ST DOSE CALL 911 25 tablet 3 Unknown    VELPHORO 500 MG  CHEW chewable tablet Take 1,000 mg by mouth 3 times daily (with meals)   7/18/2024 at 1200    zolpidem (AMBIEN) 5 MG tablet Take 5 mg by mouth nightly as needed for sleep   7/18/2024 at 2200    cloNIDine (CATAPRES) 0.1 MG tablet Take 1 tablet (0.1 mg) by mouth 3 times daily as needed (SBP > 160) 30 tablet 0     glucose (BD GLUCOSE) 4 g chewable tablet glucose 4 gram chewable tablet   CHEW AND SWALLOW 4 TIMES DAILY AS NEEDED       insulin aspart (NOVOLOG PEN) 100 UNIT/ML pen Inject 5 Units Subcutaneous 3 times daily (before meals) 4.5 mL 1

## 2024-07-24 NOTE — PLAN OF CARE
Major Shift Events:  A&Ox4, A1, afebrile. Nausea poorly managed per pt. Tachycardic, Nicardipine between 5 and 10 mg. CVPs 5. Voiding via urinal. No BM.   Plan: Continue POC, notify team with changes  For vital signs and complete assessments, please see documentation flowsheets.

## 2024-07-25 ENCOUNTER — APPOINTMENT (OUTPATIENT)
Dept: PHYSICAL THERAPY | Facility: CLINIC | Age: 49
DRG: 008 | End: 2024-07-25
Attending: SURGERY
Payer: MEDICARE

## 2024-07-25 LAB
ALBUMIN SERPL BCG-MCNC: 3.6 G/DL (ref 3.5–5.2)
ALP SERPL-CCNC: 87 U/L (ref 40–150)
ALT SERPL W P-5'-P-CCNC: 34 U/L (ref 0–70)
AMYLASE SERPL-CCNC: 178 U/L (ref 28–100)
ANION GAP SERPL CALCULATED.3IONS-SCNC: 8 MMOL/L (ref 7–15)
AST SERPL W P-5'-P-CCNC: 30 U/L (ref 0–45)
BILIRUB SERPL-MCNC: 0.6 MG/DL
BUN SERPL-MCNC: 35.8 MG/DL (ref 6–20)
CA-I BLD-MCNC: 5.4 MG/DL (ref 4.4–5.2)
CALCIUM SERPL-MCNC: 9.6 MG/DL (ref 8.8–10.4)
CHLORIDE SERPL-SCNC: 113 MMOL/L (ref 98–107)
CREAT SERPL-MCNC: 1.73 MG/DL (ref 0.67–1.17)
EGFRCR SERPLBLD CKD-EPI 2021: 48 ML/MIN/1.73M2
ERYTHROCYTE [DISTWIDTH] IN BLOOD BY AUTOMATED COUNT: 12.3 % (ref 10–15)
G6PD RBC-CCNT: 14.7 U/G HB
GLUCOSE BLDC GLUCOMTR-MCNC: 106 MG/DL (ref 70–99)
GLUCOSE BLDC GLUCOMTR-MCNC: 106 MG/DL (ref 70–99)
GLUCOSE BLDC GLUCOMTR-MCNC: 110 MG/DL (ref 70–99)
GLUCOSE BLDC GLUCOMTR-MCNC: 95 MG/DL (ref 70–99)
GLUCOSE SERPL-MCNC: 107 MG/DL (ref 70–99)
HCO3 SERPL-SCNC: 20 MMOL/L (ref 22–29)
HCT VFR BLD AUTO: 27.1 % (ref 40–53)
HGB BLD-MCNC: 9.8 G/DL (ref 13.3–17.7)
LIPASE SERPL-CCNC: 159 U/L (ref 13–60)
MAGNESIUM SERPL-MCNC: 1.9 MG/DL (ref 1.7–2.3)
MCH RBC QN AUTO: 33 PG (ref 26.5–33)
MCHC RBC AUTO-ENTMCNC: 36.2 G/DL (ref 31.5–36.5)
MCV RBC AUTO: 91 FL (ref 78–100)
PATH REPORT.COMMENTS IMP SPEC: NORMAL
PATH REPORT.COMMENTS IMP SPEC: NORMAL
PATH REPORT.FINAL DX SPEC: NORMAL
PATH REPORT.GROSS SPEC: NORMAL
PATH REPORT.MICROSCOPIC SPEC OTHER STN: NORMAL
PATH REPORT.RELEVANT HX SPEC: NORMAL
PHOSPHATE SERPL-MCNC: 2.1 MG/DL (ref 2.5–4.5)
PHOTO IMAGE: NORMAL
PLATELET # BLD AUTO: 120 10E3/UL (ref 150–450)
POTASSIUM SERPL-SCNC: 4.8 MMOL/L (ref 3.4–5.3)
PROT SERPL-MCNC: 5.5 G/DL (ref 6.4–8.3)
PTH-INTACT SERPL-MCNC: 199 PG/ML (ref 15–65)
RBC # BLD AUTO: 2.97 10E6/UL (ref 4.4–5.9)
SODIUM SERPL-SCNC: 141 MMOL/L (ref 135–145)
WBC # BLD AUTO: 7.5 10E3/UL (ref 4–11)

## 2024-07-25 PROCEDURE — 83690 ASSAY OF LIPASE: CPT | Performed by: PHYSICIAN ASSISTANT

## 2024-07-25 PROCEDURE — 250N000013 HC RX MED GY IP 250 OP 250 PS 637: Performed by: PHYSICIAN ASSISTANT

## 2024-07-25 PROCEDURE — 97116 GAIT TRAINING THERAPY: CPT | Mod: GP | Performed by: PHYSICAL THERAPIST

## 2024-07-25 PROCEDURE — 85027 COMPLETE CBC AUTOMATED: CPT | Performed by: STUDENT IN AN ORGANIZED HEALTH CARE EDUCATION/TRAINING PROGRAM

## 2024-07-25 PROCEDURE — 82330 ASSAY OF CALCIUM: CPT | Performed by: NURSE PRACTITIONER

## 2024-07-25 PROCEDURE — 82150 ASSAY OF AMYLASE: CPT | Performed by: PHYSICIAN ASSISTANT

## 2024-07-25 PROCEDURE — 250N000011 HC RX IP 250 OP 636: Performed by: PHYSICIAN ASSISTANT

## 2024-07-25 PROCEDURE — 250N000013 HC RX MED GY IP 250 OP 250 PS 637: Performed by: NURSE PRACTITIONER

## 2024-07-25 PROCEDURE — 83735 ASSAY OF MAGNESIUM: CPT | Performed by: NURSE PRACTITIONER

## 2024-07-25 PROCEDURE — 93005 ELECTROCARDIOGRAM TRACING: CPT

## 2024-07-25 PROCEDURE — 250N000013 HC RX MED GY IP 250 OP 250 PS 637

## 2024-07-25 PROCEDURE — 250N000012 HC RX MED GY IP 250 OP 636 PS 637: Performed by: PHYSICIAN ASSISTANT

## 2024-07-25 PROCEDURE — 250N000011 HC RX IP 250 OP 636: Performed by: NURSE PRACTITIONER

## 2024-07-25 PROCEDURE — 258N000003 HC RX IP 258 OP 636: Performed by: PHYSICIAN ASSISTANT

## 2024-07-25 PROCEDURE — 84100 ASSAY OF PHOSPHORUS: CPT | Performed by: PHYSICIAN ASSISTANT

## 2024-07-25 PROCEDURE — 80053 COMPREHEN METABOLIC PANEL: CPT | Performed by: PHYSICIAN ASSISTANT

## 2024-07-25 PROCEDURE — 93010 ELECTROCARDIOGRAM REPORT: CPT | Mod: GC | Performed by: INTERNAL MEDICINE

## 2024-07-25 PROCEDURE — 99233 SBSQ HOSP IP/OBS HIGH 50: CPT | Mod: 24 | Performed by: INTERNAL MEDICINE

## 2024-07-25 PROCEDURE — 99233 SBSQ HOSP IP/OBS HIGH 50: CPT | Mod: 24

## 2024-07-25 PROCEDURE — 250N000011 HC RX IP 250 OP 636: Performed by: SURGERY

## 2024-07-25 PROCEDURE — 120N000002 HC R&B MED SURG/OB UMMC

## 2024-07-25 PROCEDURE — 83970 ASSAY OF PARATHORMONE: CPT | Performed by: INTERNAL MEDICINE

## 2024-07-25 PROCEDURE — 250N000013 HC RX MED GY IP 250 OP 250 PS 637: Performed by: SURGERY

## 2024-07-25 PROCEDURE — 250N000013 HC RX MED GY IP 250 OP 250 PS 637: Performed by: STUDENT IN AN ORGANIZED HEALTH CARE EDUCATION/TRAINING PROGRAM

## 2024-07-25 RX ORDER — HYDRALAZINE HYDROCHLORIDE 20 MG/ML
10 INJECTION INTRAMUSCULAR; INTRAVENOUS EVERY 6 HOURS PRN
Status: DISCONTINUED | OUTPATIENT
Start: 2024-07-25 | End: 2024-07-27

## 2024-07-25 RX ORDER — DEXTROSE MONOHYDRATE 25 G/50ML
25-50 INJECTION, SOLUTION INTRAVENOUS
Status: DISCONTINUED | OUTPATIENT
Start: 2024-07-25 | End: 2024-07-26

## 2024-07-25 RX ORDER — CARVEDILOL 12.5 MG/1
12.5 TABLET ORAL 2 TIMES DAILY
Status: DISCONTINUED | OUTPATIENT
Start: 2024-07-25 | End: 2024-07-30

## 2024-07-25 RX ORDER — NICOTINE POLACRILEX 4 MG
15-30 LOZENGE BUCCAL
Status: DISCONTINUED | OUTPATIENT
Start: 2024-07-25 | End: 2024-07-26

## 2024-07-25 RX ADMIN — MAGNESIUM OXIDE TAB 400 MG (241.3 MG ELEMENTAL MG) 400 MG: 400 (241.3 MG) TAB at 12:59

## 2024-07-25 RX ADMIN — TACROLIMUS 2 MG: 5 CAPSULE ORAL at 17:33

## 2024-07-25 RX ADMIN — HYDRALAZINE HYDROCHLORIDE 10 MG: 20 INJECTION INTRAMUSCULAR; INTRAVENOUS at 02:45

## 2024-07-25 RX ADMIN — TICAGRELOR 90 MG: 90 TABLET ORAL at 20:01

## 2024-07-25 RX ADMIN — ONDANSETRON 4 MG: 2 INJECTION INTRAMUSCULAR; INTRAVENOUS at 20:01

## 2024-07-25 RX ADMIN — ONDANSETRON 4 MG: 2 INJECTION INTRAMUSCULAR; INTRAVENOUS at 02:00

## 2024-07-25 RX ADMIN — HEPARIN SODIUM 5000 UNITS: 5000 INJECTION, SOLUTION INTRAVENOUS; SUBCUTANEOUS at 20:01

## 2024-07-25 RX ADMIN — HYDRALAZINE HYDROCHLORIDE 25 MG: 25 TABLET ORAL at 06:04

## 2024-07-25 RX ADMIN — MYCOPHENOLATE MOFETIL 1000 MG: 500 INJECTION, POWDER, LYOPHILIZED, FOR SOLUTION INTRAVENOUS at 17:19

## 2024-07-25 RX ADMIN — VALGANCICLOVIR 450 MG: 450 TABLET, FILM COATED ORAL at 08:56

## 2024-07-25 RX ADMIN — POTASSIUM & SODIUM PHOSPHATES POWDER PACK 280-160-250 MG 1 PACKET: 280-160-250 PACK at 08:56

## 2024-07-25 RX ADMIN — HYDRALAZINE HYDROCHLORIDE 25 MG: 25 TABLET ORAL at 13:01

## 2024-07-25 RX ADMIN — ATORVASTATIN CALCIUM 40 MG: 40 TABLET, FILM COATED ORAL at 20:01

## 2024-07-25 RX ADMIN — HEPARIN SODIUM 5000 UNITS: 5000 INJECTION, SOLUTION INTRAVENOUS; SUBCUTANEOUS at 12:59

## 2024-07-25 RX ADMIN — SODIUM CHLORIDE, SODIUM LACTATE, POTASSIUM CHLORIDE, CALCIUM CHLORIDE AND DEXTROSE MONOHYDRATE: 5; 600; 310; 30; 20 INJECTION, SOLUTION INTRAVENOUS at 10:24

## 2024-07-25 RX ADMIN — HYDRALAZINE HYDROCHLORIDE 25 MG: 25 TABLET ORAL at 21:56

## 2024-07-25 RX ADMIN — CARVEDILOL 12.5 MG: 12.5 TABLET, FILM COATED ORAL at 08:56

## 2024-07-25 RX ADMIN — TICAGRELOR 90 MG: 90 TABLET ORAL at 08:56

## 2024-07-25 RX ADMIN — AMLODIPINE BESYLATE 10 MG: 10 TABLET ORAL at 08:56

## 2024-07-25 RX ADMIN — POTASSIUM & SODIUM PHOSPHATES POWDER PACK 280-160-250 MG 1 PACKET: 280-160-250 PACK at 17:19

## 2024-07-25 RX ADMIN — ASPIRIN 81 MG CHEWABLE TABLET 81 MG: 81 TABLET CHEWABLE at 08:56

## 2024-07-25 RX ADMIN — ONDANSETRON 4 MG: 2 INJECTION INTRAMUSCULAR; INTRAVENOUS at 08:55

## 2024-07-25 RX ADMIN — NICARDIPINE HYDROCHLORIDE 7.5 MG/HR: 0.2 INJECTION, SOLUTION INTRAVENOUS at 07:05

## 2024-07-25 RX ADMIN — MICAFUNGIN SODIUM 100 MG: 50 INJECTION, POWDER, LYOPHILIZED, FOR SOLUTION INTRAVENOUS at 20:01

## 2024-07-25 RX ADMIN — POTASSIUM & SODIUM PHOSPHATES POWDER PACK 280-160-250 MG 1 PACKET: 280-160-250 PACK at 12:59

## 2024-07-25 RX ADMIN — SENNOSIDES AND DOCUSATE SODIUM 2 TABLET: 8.6; 5 TABLET ORAL at 08:56

## 2024-07-25 RX ADMIN — MYCOPHENOLATE MOFETIL 1000 MG: 500 INJECTION, POWDER, LYOPHILIZED, FOR SOLUTION INTRAVENOUS at 08:58

## 2024-07-25 RX ADMIN — POLYETHYLENE GLYCOL 3350 17 G: 17 POWDER, FOR SOLUTION ORAL at 08:56

## 2024-07-25 RX ADMIN — Medication 40 MG: at 08:55

## 2024-07-25 RX ADMIN — HEPARIN SODIUM 5000 UNITS: 5000 INJECTION, SOLUTION INTRAVENOUS; SUBCUTANEOUS at 04:56

## 2024-07-25 RX ADMIN — CARVEDILOL 12.5 MG: 12.5 TABLET, FILM COATED ORAL at 20:01

## 2024-07-25 RX ADMIN — TACROLIMUS 2 MG: 5 CAPSULE ORAL at 08:57

## 2024-07-25 RX ADMIN — ONDANSETRON 4 MG: 2 INJECTION INTRAMUSCULAR; INTRAVENOUS at 13:01

## 2024-07-25 ASSESSMENT — ACTIVITIES OF DAILY LIVING (ADL)
ADLS_ACUITY_SCORE: 26
ADLS_ACUITY_SCORE: 28
ADLS_ACUITY_SCORE: 26
ADLS_ACUITY_SCORE: 28
ADLS_ACUITY_SCORE: 28
ADLS_ACUITY_SCORE: 26
ADLS_ACUITY_SCORE: 28
DEPENDENT_IADLS:: INDEPENDENT
ADLS_ACUITY_SCORE: 28
ADLS_ACUITY_SCORE: 28
ADLS_ACUITY_SCORE: 26
ADLS_ACUITY_SCORE: 28
ADLS_ACUITY_SCORE: 28
ADLS_ACUITY_SCORE: 26
ADLS_ACUITY_SCORE: 28

## 2024-07-25 NOTE — PLAN OF CARE
Major Shift Events:  No events overnight  Neuro - intact  Pulm - Room Air  CV - Sinus Tach 100-110's. -170. Hydralazine PO, prn IV given. Cardene 0.25 - 0.75  Plan: Wean off Cardene, transfer to floor.   For vital signs and complete assessments, please see documentation flowsheets.

## 2024-07-25 NOTE — PROGRESS NOTES
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      While getting renal ultrasound in PACU, patient cardiac arrested (V-fib) for approximately 5 mins. Received 2 rounds of CPR, epi x 2, 2g IV mag, 100mg IV lidocaine push during code before achieving ROSC. Lactate ~9. Trop 136. Code blue was managed by the PACU team. Post ROSC EKG revealed inferior STEMI. Arrived post-op to CVICU @ 0810. Cardiology consult team emergently engaged for inferior STEMI. Cath lab subsequently activated and patient underwent coronary angiogram which revealed 100% prox RCA in stent thrombosis, now s/p 2 IBAN in ostial to prox RCA. Echo reveals LVEF 37%, moderate diffuse hypokinesis with akinesis of all inferior segments, mild right ventricular dilation is present, global right ventricular function is moderately reduced.     Subjective and Interval:  Patient states he is feeling much better today and restless to get home. No nausea over the night, transplant surgery clamped OG today. If no nausea will start diet. Will change antiemetics to PRN tomorrow. Making excellent UO. Still requiring nicardipine. Will increase coreg and wean nicardipine when able.     Assessment and plan by system:   Today's changes:  - Increase Coreg  - Wean nicardipine off  - Per transplant surgery, will  be a candidate for GDMT [Entresto, an Ace, and SGT2i at some point in the future]  - Transfer to floor  - Continue PT OT       Neurological:  # Acute pain   # At risk for anoxic brain injury  - Monitor neurological status.  Delirium preventions and precautions.   -PT/OT     Cardiovascular:    # VT Cardiac arrest  # Inferior STEMI s/p 2 IBAN to ostial-proximal RCA 2/2 in stent thrombosis  # CAD s/p PCI with IBAN 1/2019, 2024  # CABG in 2019 - LIMA-LAD, SVG-RCA   # hx CAD s/p PCI to the proximal RCA (02/2024)  # HTN   # PAD   # Cardiac arrest (V fib) 7/20/24   While getting renal ultrasound in PACU, patient cardiac arrested (V-fib) for approximately 5-7 mins. Received 2 rounds of CPR, epi x 2, 2g IV mag, 100mg IV lidocaine push during code before achieving ROSC. Patient now presenting to SICU for further work-up, evaluation. EKG in PACU showing acute STEMI, STAT cardiology consult placed, Cath lab activated for inferior STEMI.  TTE EF 37% with inferior wall akinesis     - ASA/ Brillinta  - Transplant surgeon confirmed ok for any anticoag/antiplatelet agents needed    - Start GDMT- Coreg and Hydral  - Per transplant surgery will be able to start GDMT in the future [entresto, SGT2i, ace] but not now    Pulmonary:   # Post operative ventilatory support   # Acute hypoxic respiratory support post brief Cardiac arrest  - Room air    Gastroenterology/Nutrition:  # NPO status   # S/p pancreas transplant 7/20   -7/23 last BM, passing flatus  - NG tube clamp   - D5 infusion per protocol   - Bowel reg in place       Renal, Electrolytes  # ESRD s/p kidney transplant 7/20   # Acute kidney injury  # Electrolyte monitoring   # Volume status   # lactic acidosis   - Will continue to monitor intake and output.    I/O last 3 completed shifts:  In: 3294.92 [P.O.:860; I.V.:2324.92; NG/GT:110]  Out: 6450 [Urine:4350; Emesis/NG output:1675; Drains:425]   Recent Labs   Lab 07/25/24  0448 07/24/24  0320 07/23/24  1126 07/23/24  0515    142  --  142   POTASSIUM 4.8 4.6  --  4.6   CHLORIDE 113* 111*  --  109*   CO2 20* 23  --  24   BUN 35.8* 45.8*  --  50.4*   CR 1.73* 2.07*  --  2.77*   PHOS 2.1* 2.6  --  3.1   MAG 1.9 2.0 2.3 2.3        Plan:  - Avoid  nephrotoxins, renally dose meds  - Monitor urine output    Endocrine:   # Stress hyperglycemia   # Diabetes mellitus with insulin pump   - Goal to keep BG< 180 for optimal wound healing   - SSI  - Dextrose infusion until PO diet     ID:  # Immunosuppression   # Stress Leukocytosis   # Post-operative antimicrobials   - Trend CBC, fever curve   - Following intra-op cultures   - Defer immunosuppression, post-op antimicrobials per transplant      Cultures:    MRSA negative    UA negative    COVID negative     Recent Labs   Lab 24  0448 24  0320 24  0515   WBC 7.5 5.9 4.7     Temp (24hrs), Av.2  F (36.8  C), Min:98  F (36.7  C), Max:98.4  F (36.9  C)       Antibiotics     Anti-infectives (From now, onward)      Start     Dose/Rate Route Frequency Ordered Stop    24  valGANciclovir (VALCYTE) tablet 450 mg         450 mg Oral DAILY 24 0924  micafungin (MYCAMINE) 100 mg in sodium chloride 0.9 % 100 mL intermittent infusion         100 mg  100 mL/hr over 60 Minutes Intravenous EVERY 24 HOURS 24 0811 24 195             Hematology  # Acute blood loss anemia    # Thrombocytopenia  - Trend CBC QD  - Transplant OK with full anticoagulation post-op   - Transfuse if hgb <7.0 or signs/symptoms of hypoperfusion. Monitor and trend.   Recent Labs   Lab 24  0320 24  0515   HGB 9.8* 9.5* 9.5*   HCT 27.1* 27.4* 27.2*   * 102* 102*     Recent Labs   Lab 24  0821 24  0600 24  1656   INR 1.39* 1.44* 0.96   PTT 28 27 31     Hgb stable. No e/o bleeding.    ASA/ticagrelor for IBAN    DVT PPX: EVER     Musculoskeletal/Skin:  # Weakness and deconditioning of critical illness  # incisional monitoring    - Physical and occupational therapy consult   - Incisional cares: covered with prima pore dressing. Small amount of blood on bottom of dressing    Lines/ tubes/ drains:  - NG tube     Lines/Tubes/Drains:  Peripheral  IV 07/19/24 Right Antecubital fossa (Active)   Site Assessment WDL 07/25/24 1200   Line Status Saline locked 07/25/24 1200   Dressing Transparent 07/25/24 1200   Dressing Status clean;dry;intact 07/25/24 1200   Line Intervention Flushed 07/24/24 2000   Phlebitis Scale 0-->no symptoms 07/25/24 1200   Infiltration? no 07/25/24 1200   Number of days: 6       Peripheral IV 07/20/24 Right;Anterior Lower forearm (Active)   Site Assessment WDL 07/25/24 1200   Line Status Saline locked 07/25/24 1200   Dressing Transparent 07/25/24 1200   Dressing Status clean;dry;intact 07/25/24 1200   Line Intervention Flushed 07/24/24 2000   Phlebitis Scale 0-->no symptoms 07/25/24 1200   Infiltration? no 07/25/24 1200   Number of days: 5       Arterial Line 07/19/24 Radial (Active)   Site Assessment [facility] 07/25/24 1200   Line Status Pulsatile blood flow 07/25/24 1200   Arterine Line Cap Change Due 07/28/24 07/25/24 1200   Art Line Waveform Appropriate;Square wave test performed 07/25/24 1200   Art Line Interventions Leveled;Zeroed and calibrated;Tubing changed;Connections checked and tightened;Flushed per protocol;Line pulled back 07/25/24 1200   Color/Movement/Sensation Capillary refill less than 3 sec 07/25/24 1200   Line Necessity Yes, meets criteria 07/25/24 1200   Dressing Type Transparent 07/25/24 1200   Dressing Status Clean, dry, intact 07/25/24 1200   Dressing Intervention Dressing changed/new dressing 07/20/24 1700   Dressing Change Due 07/26/24 07/24/24 1600   Number of days: 6       CVC Triple Lumen Left Internal jugular (Active)   Site Assessment WDL 07/25/24 1200   Dressing Chlorhexidine disk;Transparent;Securement device 07/25/24 1200   Dressing Status clean;dry;intact 07/25/24 1200   Dressing Intervention dressing changed 07/23/24 1400   Dressing Change Due 07/30/24 07/24/24 0400   Line Necessity Yes, meets criteria 07/25/24 1200   Phlebitis Scale 0-->no symptoms 07/25/24 1200   Infiltration? no 07/25/24 1200   Number of  "days: 6       Closed/Suction Drain 1 Right RLQ Bulb 19 [place] (Active)   Site Description WDL 07/25/24 1200   Dressing Status Normal: Clean, Dry & Intact;Dressing Changed 07/25/24 1200   Dressing Change Due 07/26/24 07/25/24 1200   Drainage Appearance Serosanguenous 07/25/24 1200   Status To bulb suction 07/25/24 1200   Output (ml) 50 ml 07/25/24 1400   Number of days: 5       NG/OG/NJ Tube Nasogastric 16 fr Right nostril (Active)   Site Description WDL 07/25/24 1200   Status Suction-low intermittent 07/25/24 1415   Drainage Appearance Bile;Thin;Green 07/25/24 1200   Placement Confirmation [place] unchanged 07/25/24 1200   [place] (cm marking) at nare/mouth 60 cm 07/25/24 0800   Intake (ml) 110 ml 07/25/24 0800   Flush/Free Water (mL) 30 mL 07/24/24 0800   Container Amount 675 mL 07/25/24 1400   Output (ml) 0 ml 07/25/24 1400   Number of days: 6       Incision/Surgical Site 07/20/24 Upper;Midline Abdomen (Active)   Incision Assessment UTV 07/25/24 1200   Dressing Dry gauze 07/25/24 1200   Luci-Incision Assessment UTV 07/23/24 0400   Closure Staples 07/24/24 1600   Incision Drainage Amount Small 07/24/24 1600   Drainage Description Sanguinous 07/24/24 1600   Incision Care Normal saline 07/22/24 0000   Dressing Intervention Clean, dry, intact 07/25/24 1200   Number of days: 5      Patient seen and discussed with staff physician Dr. Singh.    Yaquelin Lowe DNP, APRN [facility]  Cardiology ICU  Pager [phone] or   Send message Securely via LookBooker with the Vocera Web Console        Objective:  Most recent vital signs:  /82   Pulse 99   Temp 98.2  F (36.8  C) (Oral)   Resp 21   Ht 1.803 m (5' 11\")   Wt 109.4 kg (241 lb 2.9 oz)   SpO2 100%   BMI 33.64 kg/m    Temp:  [98  F (36.7  C)-98.4  F (36.9  C)] 98.2  F (36.8  C)  Pulse:  [] 99  Resp:  [0-30] 21  BP: (117-127)/(51-82) 127/82  MAP:  [70 mmHg-113 mmHg] 93 mmHg  Arterial Line BP: (113-176)/(49-97) 113/75  SpO2:  [90 %-100 %] 100 %      Physical " exam:  General: In bed, in NAD  HEENT: PERRL, no scleral icterus or injection  CARDIAC: RRR, no m/r/g appreciated. Peripheral pulses dopplered  RESP: CTAB, no wheezes, rhonchi or crackles appreciated.  GI: soft, BS hypoactive  : urinal  EXTREMITIES: No LE edema, pulses 2+.  SKIN: Abdominal incision covered  NEURO: alert and oriented    Imaging/procedure results:  XR Abdomen Port 1 View  Narrative: EXAMINATION:  XR ABDOMEN PORT 1 VIEW 7/22/2024     COMPARISON: Abdominal radiograph, 7/20/2024.    HISTORY: Nausea, emesis despite NG tube, assess placement.    FINDINGS: Supine frontal radiographic view of the abdomen.    Feeding tube with tip and sidehole projecting over the gastric body.     No abnormally dilated air-filled loops of bowel, or pneumatosis in the  visualized abdomen.. No portal venous gas.  Bibasilar patchy  opacities. Partially visualized median sternotomy wires appear intact.  Surgical staples over the mid abdomen. Thoracic findings  are detailed  on same-day chest radiograph.  Impression: IMPRESSION: Feeding tube tip and sidehole object over the stomach.    I have personally reviewed the examination and initial interpretation  and I agree with the findings.    SHILO SCHREIBER MD         SYSTEM ID:  [id number]  XR Chest Port 1 View  Narrative: Chest one view portable    HISTORY: Status post extubation    COMPARISON STUDY: 7/21/2024    FINDINGS: Cardiac silhouette is nonenlarged. Lung volumes are low.  Bibasilar subsegmental atelectasis. Endotracheal tube is been removed.  Enteric tube in stomach. Left IJ central line tip at the confluence of  innominate veins.  Impression: IMPRESSION: Interval removal of endotracheal tube with bibasilar  atelectasis.    MAXINE RUFFIN MD         SYSTEM ID:  [id number]  Cardiac Catheterization  Inferior STEMI.  Cardiac arrest.  VT/VF.  Severe two vessel CAD with prior CABG with LIMA to LAD, PCI to D1, and PCI   to mid RCA.  Patent LIMA to LAD.  Patent stent in the D1 with  minimal ISR.  In stent thrombosis of the mid RCA stent culprit in inferior STEMI.  Aspiration thrombectomy of the RCA.  Intravascular lithotripsy of the RCA.  PCI of the RCA with two drug eluting stents.  IVUS of the RCA.     No results found for this or any previous visit (from the past 24 hour(s)).

## 2024-07-25 NOTE — PROGRESS NOTES
Transplant Surgery  Inpatient Daily Progress Note  07/25/2024    Assessment & Plan: 49 year old male with PMH significant for DMII (on insulin pump) and resulting ESKD (on HD every MWF since 8/2021). 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 Underwent simultaneous kidney pancreas transplant on 7/20/24 complicated by VT/VF arrest in PACU. Received 2 rounds of CPR before ROSC. Taken emergently to the cath lab and found to have 100% occlusion of the RCA now s/p IBAN x 2.    Graft function:  S/p pancreas transplant: Lipase 159 (130). On insulin gtt initially post operatively with steroids, now stopped. Patent post-op US.  S/p kidney transplant: Pre-op Cr 5.22, now trending down to 1.7. Robust urine output. Patent post-op US. Removed Burt POD 4.   Immunosuppression management:   Induction:  Thymo 200/200/200/75 mg (complete, 6.6 mg/kg)  Solu-Medrol: 500/250/100 mg  Maintenance:  Tacrolimus 2 mg BID, goal 8-10  MMF 1000 mg BID  Hematology: DAPT- Brilinta + ASA for at least 1 month. Will not give heparin at this time due to DAPT.  Cardiorespiratory:   CAD, VT/VF arrest s/p PCI with IBAN: Previous CABG 2019, and IBAN 2019 with 100% thrombosis of the RCA, IBAN x 2 on 7/20/24. Consult cards. Start PTA lipitor.   Acute respiratory failure: Extubated 7/21 PM. Now stable on room air.   Hypertension: Goal SBP <165 mmHg. Ok with permissive hypertension to support kidney perfusion. Has been on Nicardipine gtt with nausea. Now tolerating oral meds, Coreg increased to 12.5, Hydralazine 25 mg every 6 hours, and Norvasc 10 mg daily. Planning to wean off nicardipine today.  GI/Nutrition: NPO, NG tube in place until nausea improves and  return of bowel function s/p pancreas transplant. Still with large output, will trial clamping and remove if residual  low/pt tolerates clamping.  Endocrine:   Steroid hyperglycemia: Insulin drip stopped as above  Fluid/Electrolytes: MIVF: D5LR@50/hr.   Hyperkalemia: Resolved with medical management without need for dialysis  : [place] remained due to new surgical anastomosis, removed POD 4. Negative PVR.   Infectious disease: Luci-op prophylaxis with meropenem completed, micafungin x7 days then start nystatin ppx.  Prophylaxis: DVT, fall, GI, fungal  Disposition: CVICU, can transfer to the floor when weaned off Nicardipine gtt     KAT/Fellow/Resident Provider: Julienne Ibrahim PA-C [id number]    Faculty: Jarvis Michaud MD  _________________________________________________________________  Transplant History: Admitted 7/19/2024 for kidney pancreas transplant.  7/20/2024 (Kidney / Pancreas), Postoperative day: 5     Interval History: History is obtained from the patient  Overnight events: Nausea continues. NG in place with 600mL output since midnight, will trial clamping    ROS:   A 10-point review of systems was negative except as noted above.    Meds:  Current Facility-Administered Medications   Medication Dose Route Frequency Provider Last Rate Last Admin    amLODIPine (NORVASC) tablet 10 mg  10 mg Oral Daily Libra aVlentino PA-C   10 mg at 07/25/24 0856    aspirin (ASA) chewable tablet 81 mg  81 mg Oral or Feeding Tube Daily Julienne Ibrahim PA-C   81 mg at 07/25/24 0856    atorvastatin (LIPITOR) tablet 40 mg  40 mg Oral QPM Libra Valentino PA-C   40 mg at 07/24/24 2019    [Held by provider] calcium carbonate-vitamin D (OSCAL) 500-5 MG-MCG per tablet 1 tablet  1 tablet Oral BID w/meals Julienne Ibrahim PA-C   1 tablet at 07/24/24 0738    carvedilol (COREG) tablet 12.5 mg  12.5 mg Oral or Feeding Tube BID Sulma Lowe APRN CNP   12.5 mg at 07/25/24 0856    heparin ANTICOAGULANT injection 5,000 Units  5,000 Units Subcutaneous Q8H Julienne Ibrahim PA-C   5,000 Units at 07/25/24 0556    hydrALAZINE  (APRESOLINE) tablet 25 mg  25 mg Oral Q8H [facility] Margaret Mcfarlane APRN CNP   25 mg at 07/25/24 0604    insulin aspart (NovoLOG) injection (RAPID ACTING)  1-7 Units Subcutaneous Q4H Sulma Lowe APRN CNP        magnesium oxide (MAG-OX) tablet 400 mg  400 mg Oral Q24H Julienne Ibrahim PA-C   400 mg at 07/24/24 1217    micafungin (MYCAMINE) 100 mg in sodium chloride 0.9 % 100 mL intermittent infusion  100 mg Intravenous Q24H Julienne Ibrahim PA-C 100 mL/hr at 07/24/24 2020 100 mg at 07/24/24 2020    mycophenolate mofetil (CELLCEPT) 1,000 mg in D5W intermittent infusion  1,000 mg Intravenous BID IS Julienne Ibrahim PA-C 137.5 mL/hr at 07/25/24 0858 1,000 mg at 07/25/24 0858    ondansetron (ZOFRAN) injection 4 mg  4 mg Intravenous Q6H Margaret Mcfarlane APRN CNP   4 mg at 07/25/24 0855    pantoprazole (PROTONIX) 2 mg/mL suspension 40 mg  40 mg Per NG tube Daily Julienne Ibrahim PA-C   40 mg at 07/25/24 0855    polyethylene glycol (MIRALAX) Packet 17 g  17 g Oral BID Ezequiel Martin APRN CNP   17 g at 07/25/24 0856    potassium & sodium phosphates (NEUTRA-PHOS) Packet 1 packet  1 packet Oral or Feeding Tube Q4H Harrison Whitehead MD   1 packet at 07/25/24 0856    senna-docusate (SENOKOT-S/PERICOLACE) 8.6-50 MG per tablet 2 tablet  2 tablet Oral BID Ezequiel Martin APRN CNP   2 tablet at 07/25/24 0856    sodium chloride (PF) 0.9% PF flush 3 mL  3 mL Intravenous Q8H Julienne Ibrahim PA-C   3 mL at 07/23/24 0832    tacrolimus (GENERIC) suspension 2 mg  2 mg Oral or NG Tube BID IS Libra Valentino PA-C   2 mg at 07/25/24 0857    ticagrelor (BRILINTA) tablet 90 mg  90 mg Oral or Feeding Tube BID Julienne Ibrahim PA-C   90 mg at 07/25/24 0856    valGANciclovir (VALCYTE) tablet 450 mg  450 mg Oral Daily Harrison Whitehead MD   450 mg at 07/25/24 0856       Physical Exam:     Admit Weight: 102.4 kg (225 lb 12 oz)    Current vitals:  "  BP (!) 172/71   Pulse 89   Temp 98  F (36.7  C) (Oral)   Resp 18   Ht 1.803 m (5' 11\")   Wt 109.4 kg (241 lb 2.9 oz)   SpO2 96%   BMI 33.64 kg/m      Vital sign ranges:    Temp:  [98  F (36.7  C)-98.4  F (36.9  C)] 98  F (36.7  C)  Pulse:  [] 89  Resp:  [0-30] 18  MAP:  [70 mmHg-113 mmHg] 84 mmHg  Arterial Line BP: (116-176)/(49-97) 140/60  SpO2:  [90 %-100 %] 96 %  Patient Vitals for the past 24 hrs:   Temp Temp src Pulse Resp SpO2   07/25/24 1200 -- Oral -- -- --   07/25/24 1115 -- -- 89 18 96 %   07/25/24 1100 -- -- 98 20 99 %   07/25/24 1045 -- -- 95 17 97 %   07/25/24 1030 -- -- 82 -- 96 %   07/25/24 1015 -- -- 84 -- 96 %   07/25/24 1000 -- -- 88 23 98 %   07/25/24 0945 -- -- 95 12 100 %   07/25/24 0940 -- -- 101 21 99 %   07/25/24 0930 -- -- 108 -- 99 %   07/25/24 0900 -- -- 105 12 99 %   07/25/24 0800 98  F (36.7  C) Oral 105 19 98 %   07/25/24 0700 -- -- 105 12 98 %   07/25/24 0600 -- -- 118 15 99 %   07/25/24 0545 -- -- 110 16 98 %   07/25/24 0530 -- -- 98 16 97 %   07/25/24 0515 -- -- 97 18 98 %   07/25/24 0500 -- -- 109 10 98 %   07/25/24 0445 -- -- 101 16 98 %   07/25/24 0430 -- -- 104 28 97 %   07/25/24 0415 -- -- 106 24 96 %   07/25/24 0400 98.4  F (36.9  C) Oral 96 (!) 0 97 %   07/25/24 0345 -- -- 97 17 97 %   07/25/24 0330 -- -- 101 16 97 %   07/25/24 0315 -- -- 104 16 98 %   07/25/24 0300 -- -- 106 12 96 %   07/25/24 0245 -- -- 100 19 95 %   07/25/24 0230 -- -- 104 17 98 %   07/25/24 0215 -- -- 102 19 96 %   07/25/24 0200 -- -- 105 21 96 %   07/25/24 0145 -- -- 102 18 97 %   07/25/24 0130 -- -- 105 21 99 %   07/25/24 0115 -- -- 114 30 96 %   07/25/24 0100 -- -- 101 11 97 %   07/25/24 0045 -- -- 107 11 97 %   07/25/24 0030 -- -- 106 21 97 %   07/25/24 0015 -- -- 104 18 97 %   07/25/24 0000 -- -- 105 22 96 %   07/24/24 2345 -- -- 112 22 97 %   07/24/24 2330 -- -- 101 -- 96 %   07/24/24 2315 -- -- 101 14 97 %   07/24/24 2300 -- -- 102 -- 98 %   07/24/24 2245 -- -- 100 -- 97 %   07/24/24 " 2230 -- -- 100 12 97 %   07/24/24 2215 -- -- 112 23 99 %   07/24/24 2200 -- -- 115 14 99 %   07/24/24 2145 -- -- 104 17 96 %   07/24/24 2130 -- -- 101 16 97 %   07/24/24 2115 -- -- 100 13 96 %   07/24/24 2100 -- -- 103 18 97 %   07/24/24 2045 -- -- 110 18 95 %   07/24/24 2030 -- -- 112 22 98 %   07/24/24 2015 -- -- 107 18 97 %   07/24/24 2000 98.1  F (36.7  C) Oral 120 15 98 %   07/24/24 1945 -- -- 102 17 97 %   07/24/24 1930 -- -- 102 18 98 %   07/24/24 1915 -- -- 102 15 97 %   07/24/24 1900 -- -- 114 12 90 %   07/24/24 1845 -- -- 103 16 96 %   07/24/24 1830 -- -- 109 18 96 %   07/24/24 1815 -- -- 112 11 98 %   07/24/24 1800 -- -- 109 22 97 %   07/24/24 1745 -- -- 104 -- 96 %   07/24/24 1730 -- -- 102 -- 96 %   07/24/24 1715 -- -- 104 -- 97 %   07/24/24 1700 -- -- 108 15 95 %   07/24/24 1645 -- -- 112 15 96 %   07/24/24 1630 -- -- 109 16 96 %   07/24/24 1615 -- -- 105 18 95 %   07/24/24 1600 -- -- 106 16 98 %   07/24/24 1515 -- -- 105 -- 98 %   07/24/24 1500 -- -- 110 -- 96 %   07/24/24 1445 -- -- 102 -- 97 %   07/24/24 1430 -- -- 102 -- 96 %   07/24/24 1415 -- -- 107 -- 97 %   07/24/24 1400 -- -- 105 -- 96 %   07/24/24 1345 -- -- 102 -- 95 %   07/24/24 1330 -- -- 103 -- 96 %   07/24/24 1315 -- -- 101 -- 96 %   07/24/24 1300 -- -- 106 -- 95 %   07/24/24 1245 -- -- 101 -- --   07/24/24 1230 -- -- 101 -- --     General Appearance: Awake, alert, oriented  Skin: normal, warm  Heart: regular rate and rhythm  Lungs: Nonlabored resps on RA  Abdomen: The abdomen is soft. Drain with serosanguinous output  : voiding   Extremities: edema: none  Neurologic: awake, alert, oriented    Data:   CMP  Recent Labs   Lab 07/25/24  0926 07/25/24  0448 07/24/24  0328 07/24/24  0320   NA  --  141  --  142   POTASSIUM  --  4.8  --  4.6   CHLORIDE  --  113*  --  111*   CO2  --  20*  --  23   * 107*   < > 118*   BUN  --  35.8*  --  45.8*   CR  --  1.73*  --  2.07*   GFRESTIMATED  --  48*  --  39*   BETHANY  --  9.6  --  10.0   ICAW  --   5.4*  --  5.6*   MAG  --  1.9  --  2.0   PHOS  --  2.1*  --  2.6   AMYLASE  --  178*  --  158*   LIPASE  --  159*  --  130*   ALBUMIN  --  3.6  --  3.6   BILITOTAL  --  0.6  --  0.6   ALKPHOS  --  87  --  77   AST  --  30  --  47*   ALT  --  34  --  45    < > = values in this interval not displayed.     CBC  Recent Labs   Lab 07/25/24  0448 07/24/24  0320 07/20/24  1422 07/20/24  1138   HGB 9.8* 9.5*   < > 9.7*   WBC 7.5 5.9   < > 12.8*   * 102*   < > 123*   A1C  --   --   --  6.4*    < > = values in this interval not displayed.     COAGS  Recent Labs   Lab 07/20/24  0821 07/20/24  0600   INR 1.39* 1.44*   PTT 28 27      Urinalysis  Recent Labs   Lab Test 07/20/24  0609 09/27/21  1220   COLOR Orange* Yellow   APPEARANCE Slightly Cloudy* Slightly Cloudy*   URINEGLC 50* 150*   URINEBILI Negative Negative   URINEKETONE Negative Negative   SG 1.013 1.013   UBLD Large* Negative   URINEPH 6.5 6.0   PROTEIN 300* >499*   NITRITE Negative Negative   LEUKEST Trace* Small*   RBCU >182* 2   WBCU 105* 4     Virology:  Hepatitis C Antibody   Date Value Ref Range Status   07/19/2024 Nonreactive Nonreactive Final     Comment:     A nonreactive screening test result does not exclude the possibility of exposure to or infection with HCV. Nonreactive screening test results in individuals with prior exposure to HCV may be due to antibody levels below the limit of detection of this assay or lack of reactivity to the HCV antigens used in this assay. Patients with recent HCV infections (<3 months from time of exposure) may have false-negative HCV antibody results due to the time needed for seroconversion (average of 8 to 9 weeks).

## 2024-07-25 NOTE — PLAN OF CARE
"ICU End of Shift Summary. Please see flowsheets for vital signs and detailed assessment data.    Changes this shift: Nicardipine weaned off. Patient maintaining systolic blood pressure goal without intervention. Arterial line and CVC removed. Patient up with PT today, and intermittently up to chair as tolerated. Patient educated regarding NPO status and gastric residual in efforts to help patient meet patient-stated goal of removal of NG tube; however, patient confided that they have been consuming PO fluids in the absence of staff presence. When patient was informed that the NG tube would not be removed today, patient states: \"Then I'm gonna drink a lot tonight.\" Patient re-educated regarding NPO status and indication.     Plan: Continue with current plan of care, anticipate transfer out of unit pending bed availability.         Jus Asif RN  Critical Care Flex Team    "

## 2024-07-25 NOTE — PROGRESS NOTES
[facility]  Transplant Nephrology Consult Note  Date of Admission:  7/19/2024  Today's Date: 07/25/2024  Requesting physician: Harrison Whitehead*    Reason for Consult:  SPK immunosuppression    Recommendations:   - can increase coreg dose to 25 mg BID if unable to wean off nicardipine drip  - continue on amlo 10 daily and hydralazine 25 mg q6h.    Assessment & Plan   # DDKT (SPK): Trend down. UOP improving now with down trending creatinine.   - Baseline Creatinine: ~ TBD   - Proteinuria: Not checked post transplant   - DSA Hx:  Final cross match pending    - Last cPRA: 11%   - BK Viremia: Not checked post transplant   - Kidney Tx Biopsy Hx: No biopsy history.    # Pancreas Tx (SPK):    - Pancreatic Exocrine Drainage: Enteric drained     - Blood glucose: Elevated blood glucose      On insulin: Yes minimal dose   -  Latest HbA1c: 4%   - Pancreatic enzymes: Trend up   - DSA Hx:  final crossmatch pending   - Pancreas Tx Biopsy Hx: No biopsy history    # Immunosuppression: Tacrolimus immediate release (goal 8-10) and Mycophenolate mofetil (dose 1000 mg every 12 hours)   - Induction with Recent Transplant:  High Intensity Protocol   - Continue with intensive monitoring of immunosuppression for efficacy and toxicity.   - Historical Changes in Immunosuppression: None   - Changes: No,    # VT/VF Cardiac arrest:  # CAD s/p CABG 2019 LIMA-LAD, SVG-RCA, IBAN 1/2019, IBAN to RCA 2/2024     - s/p inferior STEMI 2/2 ostial proximal RCA in stent thrombosis   - s/p 2 IBAN to RCA 7/20/2024   - Echo: LVEF=37%.Moderate diffuse hypokinesis with akinesis of all inferior segments.  Mild right ventricular dilation is present.Global  right ventricular function is moderately reduced.    # Infection Prevention:      - PJP: Sulfa/TMP (Bactrim) held given hyperK, added G6PD, results pending  - CMV IgG Ab High Risk Discordance (D+/R-): No  - EBV IgG Ab High Risk Discordance (D+/R-): No    #  Blood Pressure:  borderline control ;  Goal BP: MAP > 65   - Changes: Yes -  restarted his Home amlodipine  at 10 mg and increased coreg dose      # Anemia in Chronic Renal Disease: Hgb: Trend down post SPK     KERRI: No   - Iron studies: Not checked recently. Monitor Hb trends    # Mineral Bone Disorder:    - Secondary renal hyperparathyroidism; PTH level: Unknown at this time, but checked with dialysis        On treatment: None  - Vitamin D; level: High        On supplement: No  - Calcium; level: High        On supplement: No  - Phosphorus; level: Normal        On supplement: No    Hypercalcemia, likely multifactorial including intravascular volume depletion with new kidney, high PTH  and or high vitamin D. On vitamin D and ni supplementation, held yesterday and his calcium improving. Continue to hold supplementation for now.    # Electrolytes:   - Potassium; level: Normal        On supplement: No  - Magnesium; level: Normal        On supplement: Yes  - Bicarbonate; level: Low normal        On supplement: No  - Sodium; level: Normal    # Other Significant PMH:   - Left diaphragmatic elevation; 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      Grisel Coombs MD  Transplant Nephrology  Contact information via Vocera Web Console or via [facility] Paging/Directory      Interval History  Mr. Meltons creatinine is 1.7, Trend down  Off pressors  Extubated, on room air  Excellent UOP  Pt was sleeping during my visit, tried to wake him up, but was not able to assess any of his symptoms as he slept right back.    Review of Systems   Unable because patient is somnolent    MEDICATIONS:  Current Facility-Administered Medications   Medication Dose Route  Frequency Provider Last Rate Last Admin    amLODIPine (NORVASC) tablet 10 mg  10 mg Oral Daily Libra Valentino PA-C   10 mg at 07/25/24 0856    aspirin (ASA) chewable tablet 81 mg  81 mg Oral or Feeding Tube Daily Julienne Ibrahim PA-C   81 mg at 07/25/24 0856    atorvastatin (LIPITOR) tablet 40 mg  40 mg Oral QPM Libra Valentino PA-C   40 mg at 07/24/24 2019    [Held by provider] calcium carbonate-vitamin D (OSCAL) 500-5 MG-MCG per tablet 1 tablet  1 tablet Oral BID w/meals Julienne Ibrahim PA-C   1 tablet at 07/24/24 0738    carvedilol (COREG) tablet 12.5 mg  12.5 mg Oral or Feeding Tube BID Sulma Lowe APRN CNP   12.5 mg at 07/25/24 0856    heparin ANTICOAGULANT injection 5,000 Units  5,000 Units Subcutaneous Q8H Julienne Ibrahim PA-C   5,000 Units at 07/25/24 1259    hydrALAZINE (APRESOLINE) tablet 25 mg  25 mg Oral Q8H [facility] Margaret Mcfarlane APRN CNP   25 mg at 07/25/24 1301    insulin aspart (NovoLOG) injection (RAPID ACTING)  1-7 Units Subcutaneous Q4H Sulma Lowe APRN CNP        magnesium oxide (MAG-OX) tablet 400 mg  400 mg Oral Q24H Julienne Irbahim PA-C   400 mg at 07/25/24 1259    micafungin (MYCAMINE) 100 mg in sodium chloride 0.9 % 100 mL intermittent infusion  100 mg Intravenous Q24H Julienne Ibrahim PA-C 100 mL/hr at 07/24/24 2020 100 mg at 07/24/24 2020    mycophenolate mofetil (CELLCEPT) 1,000 mg in D5W intermittent infusion  1,000 mg Intravenous BID IS Julienne Ibrahim PA-C 137.5 mL/hr at 07/25/24 0858 1,000 mg at 07/25/24 0858    ondansetron (ZOFRAN) injection 4 mg  4 mg Intravenous Q6H Margaret Mcfarlane APRN CNP   4 mg at 07/25/24 1301    pantoprazole (PROTONIX) 2 mg/mL suspension 40 mg  40 mg Per NG tube Daily Julienne Ibrahim PA-C   40 mg at 07/25/24 0855    polyethylene glycol (MIRALAX) Packet 17 g  17 g Oral BID Ezequiel Martin APRN CNP   17 g at 07/25/24 0856    potassium & sodium phosphates (NEUTRA-PHOS)  "Packet 1 packet  1 packet Oral or Feeding Tube Q4H Harrison Whitehead MD   1 packet at 24 1259    senna-docusate (SENOKOT-S/PERICOLACE) 8.6-50 MG per tablet 2 tablet  2 tablet Oral BID Ezequiel Martin APRN CNP   2 tablet at 24 0856    sodium chloride (PF) 0.9% PF flush 3 mL  3 mL Intravenous Q8H Julienne Ibrahim PA-C   3 mL at 24 0832    tacrolimus (GENERIC) suspension 2 mg  2 mg Oral or NG Tube BID IS Libra Valentino PA-C   2 mg at 24 0857    ticagrelor (BRILINTA) tablet 90 mg  90 mg Oral or Feeding Tube BID Julienne Ibrahim PA-C   90 mg at 24 0856    valGANciclovir (VALCYTE) tablet 450 mg  450 mg Oral Daily Harrison Whitehead MD   450 mg at 24 0856     Current Facility-Administered Medications   Medication Dose Route Frequency Provider Last Rate Last Admin    dextrose 5% in lactated ringers 1,000 mL infusion   Intravenous Continuous Julienne Ibrahim PA-C 50 mL/hr at 24 1300 Rate Verify at 24 1300       Physical Exam   Temp  Av.4  F (36.3  C)  Min: 96.1  F (35.6  C)  Max: 98.6  F (37  C)  Arterial Line BP  Min: 36/21  Max: 276/115  Arterial Line MAP (mmHg)  Av.3 mmHg  Min: 29 mmHg  Max: 232 mmHg      Pulse  Av.9  Min: 20  Max: 152 Resp  Avg: 15.5  Min: 3  Max: 38  FiO2 (%)  Av %  Min: 70 %  Max: 80 %  SpO2  Av.7 %  Min: 72 %  Max: 100 %    CVP (mmHg): 278 mmHgBP (!) 172/71   Pulse 89   Temp 98  F (36.7  C) (Oral)   Resp 18   Ht 1.803 m (5' 11\")   Wt 109.4 kg (241 lb 2.9 oz)   SpO2 96%   BMI 33.64 kg/m     Date 24 0700 - 24 0659   Shift 4194-4178 6374-1450 7678-4049 24 Hour Total   INTAKE   I.V. 1839.32   1839.32   NG/   128   Shift Total(mL/kg) .32(19.21)   .32(19.21)   OUTPUT   Urine 1325   1325   Emesis/NG output 100   100   Drains 875   875   Shift Total(mL/kg) 2300(22.46)   2300(22.46)   Weight (kg) 102.4 102.4 102.4 102.4      Admit Weight: " 102.4 kg (225 lb 12 oz)     GENERAL APPEARANCE: intubated sedated  HENT: atrmatic, have NG tube in place  RESP: fine coarse breath sounds b/l  CV: regular rhythm, normal rate  EDEMA: improved  LE edema bilaterally  ABDOMEN: soft, nondistended, surgical incision  MS: extremities normal - no gross deformities noted  SKIN: no rash  Access LUE AVF +thrill/bruit    Data   All labs reviewed by me.  CMP  Recent Labs   Lab 07/25/24  1323 07/25/24  0926 07/25/24  0448 07/24/24  1408 07/24/24  0328 07/24/24  0320 07/23/24  1130 07/23/24  1126 07/23/24  0607 07/23/24  0515 07/22/24  1302 07/22/24  0923   NA  --   --  141  --   --  142  --   --   --  142  --  140   POTASSIUM  --   --  4.8  --   --  4.6  --   --   --  4.6  --  4.7   CHLORIDE  --   --  113*  --   --  111*  --   --   --  109*  --  104   CO2  --   --  20*  --   --  23  --   --   --  24  --  24   ANIONGAP  --   --  8  --   --  8  --   --   --  9  --  12   GLC 95 106* 107* 92   < > 118*   < >  --    < > 145*   < > 133*   BUN  --   --  35.8*  --   --  45.8*  --   --   --  50.4*  --  45.2*   CR  --   --  1.73*  --   --  2.07*  --   --   --  2.77*  --  3.95*   GFRESTIMATED  --   --  48*  --   --  39*  --   --   --  27*  --  18*   BETHNAY  --   --  9.6  --   --  10.0  --   --   --  10.0  --  10.0   MAG  --   --  1.9  --   --  2.0  --  2.3  --  2.3   < > 2.0   PHOS  --   --  2.1*  --   --  2.6  --   --   --  3.1  --  5.4*   PROTTOTAL  --   --  5.5*  --   --  5.7*  --   --   --  5.8*  --  5.8*   ALBUMIN  --   --  3.6  --   --  3.6  --   --   --  3.7  --  3.6   BILITOTAL  --   --  0.6  --   --  0.6  --   --   --  0.5  --  0.4   ALKPHOS  --   --  87  --   --  77  --   --   --  72  --  72   AST  --   --  30  --   --  47*  --   --   --  85*  --  193*   ALT  --   --  34  --   --  45  --   --   --  59  --  76*    < > = values in this interval not displayed.     CBC  Recent Labs   Lab 07/25/24  0448 07/24/24  0320 07/23/24  0515 07/22/24  0923   HGB 9.8* 9.5* 9.5* 9.2*   WBC 7.5 5.9  4.7 5.9   RBC 2.97* 2.91* 2.87* 2.81*   HCT 27.1* 27.4* 27.2* 26.7*   MCV 91 94 95 95   MCH 33.0 32.6 33.1* 32.7   MCHC 36.2 34.7 34.9 34.5   RDW 12.3 12.2 12.7 13.0   * 102* 102* 96*     INR  Recent Labs   Lab 07/20/24  0821 07/20/24  0600 07/19/24  1656   INR 1.39* 1.44* 0.96   PTT 28 27 31     ABG  Recent Labs   Lab 07/22/24  0923 07/22/24  0421 07/21/24  2002 07/21/24  1811   PH 7.44 7.42 7.39 7.39   PCO2 40 42 44 43   PO2 63* 67* 85 77*   HCO3 27 27 26 26   O2PER 21 35  35 36  35 30      Urine Studies  Recent Labs   Lab Test 07/20/24  0609 09/27/21  1220 07/24/21  1151 06/25/19  1439 06/19/19  0822   COLOR Orange* Yellow Light Yellow Straw Straw   APPEARANCE Slightly Cloudy* Slightly Cloudy* Clear Clear Clear   URINEGLC 50* 150* 200* 200 mg/dL* >1000 mg/dL*   URINEBILI Negative Negative Negative Negative Negative   URINEKETONE Negative Negative Negative Negative Negative   SG 1.013 1.013 1.018 1.010 1.012   UBLD Large* Negative 0.03 mg/dL* Moderate* Trace*   URINEPH 6.5 6.0 6.0 6.5 6.0   PROTEIN 300* >499* 600* 200 mg/dL* 300 mg/dL*   UROBILINOGEN  --   --   --  <2.0 E.U./dL <2.0 E.U./dL   NITRITE Negative Negative Negative Negative Negative   LEUKEST Trace* Small* Negative Negative Negative   RBCU >182* 2 1 25-50* 0-2   WBCU 105* 4 10* 0-5 0-5     No lab results found.  PTH  Recent Labs   Lab Test 07/25/24  0448 07/20/21  1845   PTHI 199* 190*     Iron Studies  No lab results found.    IMAGING:  All imaging studies reviewed by me.    Past Medical History    I have reviewed this patient's medical history and updated it with pertinent information if needed.   Past Medical History:   Diagnosis Date    Acquired elevated diaphragm     Anemia in chronic kidney disease     Angina pectoris (H24)     Chronic kidney disease     Coronary artery disease     Diabetes mellitus, type II (H) 12/2001    Diabetic nephropathy (H)     MARQUEZ (dyspnea on exertion)     Dyslipidemia 12/2001    End stage renal disease (H)      History of blood transfusion 2004    Hypertension     takes medication    Ischemic cardiomyopathy     Metabolic acidosis     Myocardial infarction (H)     STEMI -Diagonal branch of the LAD    Obesity (BMI 30-39.9)     Peripheral neuropathy     Proteinuria     Retinopathy     Vitamin D deficiency        Past Surgical History   I have reviewed this patient's surgical history and updated it with pertinent information if needed.  Past Surgical History:   Procedure Laterality Date    APPENDECTOMY OPEN N/A 7/19/2024    Procedure: Appendectomy open;  Surgeon: Harrison Whitehead MD;  Location:  OR    BACK SURGERY  2009    L5 disc cut    BENCH KIDNEY  7/19/2024    Procedure: Bench kidney;  Surgeon: Harrison Whitehead MD;  Location:  OR    BENCH PANCREAS N/A 7/19/2024    Procedure: Bench pancreas;  Surgeon: Harrison Whitehead MD;  Location:  OR    BYPASS GRAFT ARTERY CORONARY  06/20/2019    2 vessels    CV CENTRAL VENOUS CATHETER PLACEMENT N/A 7/20/2024    Procedure: Central Venous Catheter Placement;  Surgeon: Dominic Robertson MD;  Location: [facility] CARDIAC CATH LAB    CV CORONARY ANGIOGRAM N/A 01/13/2019    Procedure: Coronary Angiogram;  Surgeon: Cielo Che MD;  Location: [facility] Cath Lab;  Service: Cardiology    CV CORONARY ANGIOGRAM N/A 05/02/2019    Procedure: Coronary Angiogram;  Surgeon: Cielo Che MD;  Location: [facility] Cath Lab;  Service: Cardiology    CV CORONARY ANGIOGRAM N/A 04/30/2020    Procedure: Coronary Angiogram;  Surgeon: Cielo Che MD;  Location: [facility] Cath Lab;  Service: Cardiology    CV CORONARY ANGIOGRAM N/A 07/22/2021    Procedure: CV CORONARY ANGIOGRAM;  Surgeon: Adrian Crow MD;  Location: [facility] CATH LAB CV    CV CORONARY ANGIOGRAM N/A 02/01/2024    Procedure: Coronary Angiogram;  Surgeon: Delroy Carpio MD;  Location: [facility] CARDIAC CATH LAB    CV CORONARY ANGIOGRAM N/A 7/20/2024     Procedure: Coronary Angiogram;  Surgeon: Dominic Robertson MD;  Location: [facility] CARDIAC CATH LAB    CV CORONARY LITHOTRIPSY PCI N/A 7/20/2024    Procedure: Percutaneous Coronary Intervention - Lithotripsy;  Surgeon: Dominic Robertson MD;  Location: [facility] CARDIAC CATH LAB    CV FRACTIONAL FLOW RATIO WIRE N/A 07/22/2021    Procedure: Fractional Flow Ratio Wire;  Surgeon: Adrian Crow MD;  Location: [facility] CV    CV INTRAVASULAR ULTRASOUND N/A 7/20/2024    Procedure: Intravascular Ultrasound;  Surgeon: Dominic Robertson MD;  Location: [facility] CARDIAC CATH LAB    CV LEFT HEART CATH N/A 07/22/2021    Procedure: Left Heart Cath;  Surgeon: Adrian Crow MD;  Location: [facility] CV    CV LEFT HEART CATHETERIZATION WITH LEFT VENTRICULOGRAM N/A 01/13/2019    Procedure: Left Heart Catheterization with Left Ventriculogram;  Surgeon: Cielo Che MD;  Location: [facility] Cath Lab;  Service: Cardiology    CV LEFT HEART CATHETERIZATION WITHOUT LEFT VENTRICULOGRAM Left 04/30/2020    Procedure: Left Heart Catheterization Without Left Ventriculogram;  Surgeon: Cielo Che MD;  Location: [facility] Cath Lab;  Service: Cardiology    CV PCI N/A 02/01/2024    Procedure: Percutaneous Coronary Intervention;  Surgeon: Delroy Carpio MD;  Location: [facility] CARDIAC CATH LAB    CV PCI N/A 7/20/2024    Procedure: Percutaneous Coronary Intervention;  Surgeon: Dominic Robertson MD;  Location: [facility] CARDIAC CATH LAB    CV PCI ASPIRATION THROMECTOMY N/A 7/20/2024    Procedure: Percutaneous Coronary Intervention Aspiration Thrombectomy;  Surgeon: Dominic Robertson MD;  Location: [facility] CARDIAC CATH LAB    CV PCI STENT DRUG ELUTING N/A 7/20/2024    Procedure: Percutaneous Coronary Intervention Stent;  Surgeon: Dominic Robertson MD;  Location: [facility] CARDIAC CATH LAB    CV RIGHT HEART CATHETERIZATION N/A 04/30/2020     Procedure: Right Heart Catheterization;  Surgeon: Cielo Che MD;  Location: [facility] Cath Lab;  Service: Cardiology    EYE SURGERY      GENITOURINARY SURGERY      HERNIA REPAIR      OTHER SURGICAL HISTORY      Excise varicocele    STENT, CORONARY, DALE      TRANSPLANT PANCREAS, KIDNEY  DONOR, COMBINED N/A 2024    Procedure: Transplant pancreas, kidney  donor, with ureteral stent placement;  Surgeon: Harrison Whitehead MD;  Location: [facility] OR    VASCULAR SURGERY         Family History   I have reviewed this patient's family history and updated it with pertinent information if needed.   Family History   Problem Relation Age of Onset    Diabetes Type 2  Mother     Heart Disease Father 60    CABG Father 50        triple bypass    Diabetes Type 2  Father     Depression Sister     Substance Abuse Sister     Ovarian Cancer Maternal Grandmother     Brain Cancer Maternal Grandmother     Pancreatic Cancer Maternal Aunt     Prostate Cancer Maternal Uncle        Social History   I have reviewed this patient's social history and updated it with pertinent information if needed. Siva Ramey  reports that he has never smoked. He has been exposed to tobacco smoke. He has never used smokeless tobacco. He reports that he does not drink alcohol and does not use drugs.    Prior to Admission Medications   Medications Prior to Admission   Medication Sig Dispense Refill Last Dose    acetaminophen (TYLENOL) 500 MG tablet Take 500 mg by mouth every 4 hours as needed   2024    albuterol (PROAIR HFA/PROVENTIL HFA/VENTOLIN HFA) 108 (90 Base) MCG/ACT inhaler Inhale 2 puffs into the lungs every 4 hours as needed   More than a month    ALPRAZolam (XANAX) 0.25 MG tablet Take 0.25 mg by mouth 3 times daily as needed   More than a month    amLODIPine (NORVASC) 10 MG tablet Take 1 tablet (10 mg) by mouth daily 90 tablet 3 2024 at 0800    aspirin (ASA) 81 MG chewable tablet Take 1 tablet (81 mg)  by mouth daily Starting tomorrow. 30 tablet 3 7/18/2024 at 0800    atorvastatin (LIPITOR) 80 MG tablet Take 1 tablet (80 mg) by mouth at bedtime 90 tablet 3 7/18/2024 at 2200    carvedilol (COREG) 12.5 MG tablet Take 3 tablets (37.5 mg) by mouth 2 times daily (with meals) 540 tablet 3 7/18/2024 at 2200    cholecalciferol, vitamin D3, 5,000 unit Tab [CHOLECALCIFEROL, VITAMIN D3, 5,000 UNIT TAB] Take 5,000 Units by mouth daily.   7/18/2024 at 2200    hydrALAZINE (APRESOLINE) 25 MG tablet Take 1 tablet (25 mg) by mouth 2 times daily 180 tablet 3 7/18/2024 at 0800    icosapent ethyl (VASCEPA) 1 g CAPS capsule Take 2 g by mouth 2 times daily (with meals)   7/18/2024 at 2200    insulin aspart (NOVOLOG VIAL) 100 UNITS/ML vial Inject Subcutaneous 3 times daily (with meals) To be used with the insulin pump       nitroGLYcerin (NITROSTAT) 0.4 MG sublingual tablet PLACE ONE TABLET UNDER TONGUE AS NEEDED FOR CHEST PAIN AS DIRECTED FOR 3 DOSES. IF SYMPTOMS PERSIST 5 MINUTES AFTER 1ST DOSE CALL 911 25 tablet 3 Unknown    VELPHORO 500 MG CHEW chewable tablet Take 1,000 mg by mouth 3 times daily (with meals)   7/18/2024 at 1200    zolpidem (AMBIEN) 5 MG tablet Take 5 mg by mouth nightly as needed for sleep   7/18/2024 at 2200    cloNIDine (CATAPRES) 0.1 MG tablet Take 1 tablet (0.1 mg) by mouth 3 times daily as needed (SBP > 160) 30 tablet 0     glucose (BD GLUCOSE) 4 g chewable tablet glucose 4 gram chewable tablet   CHEW AND SWALLOW 4 TIMES DAILY AS NEEDED       insulin aspart (NOVOLOG PEN) 100 UNIT/ML pen Inject 5 Units Subcutaneous 3 times daily (before meals) 4.5 mL 1

## 2024-07-26 LAB
ALBUMIN SERPL BCG-MCNC: 3.3 G/DL (ref 3.5–5.2)
ALP SERPL-CCNC: 88 U/L (ref 40–150)
ALT SERPL W P-5'-P-CCNC: 25 U/L (ref 0–70)
AMYLASE SERPL-CCNC: 232 U/L (ref 28–100)
ANION GAP SERPL CALCULATED.3IONS-SCNC: 8 MMOL/L (ref 7–15)
AST SERPL W P-5'-P-CCNC: 28 U/L (ref 0–45)
ATRIAL RATE - MUSE: 105 BPM
ATRIAL RATE - MUSE: 108 BPM
BILIRUB SERPL-MCNC: 0.7 MG/DL
BK VIRUS IGG ANTIBODY: ABNORMAL TITER
BUN SERPL-MCNC: 33.7 MG/DL (ref 6–20)
CA-I BLD-MCNC: 5.3 MG/DL (ref 4.4–5.2)
CALCIUM SERPL-MCNC: 9.6 MG/DL (ref 8.8–10.4)
CHLORIDE SERPL-SCNC: 111 MMOL/L (ref 98–107)
CREAT SERPL-MCNC: 1.74 MG/DL (ref 0.67–1.17)
DIASTOLIC BLOOD PRESSURE - MUSE: NORMAL MMHG
DIASTOLIC BLOOD PRESSURE - MUSE: NORMAL MMHG
EGFRCR SERPLBLD CKD-EPI 2021: 47 ML/MIN/1.73M2
ERYTHROCYTE [DISTWIDTH] IN BLOOD BY AUTOMATED COUNT: 12.7 % (ref 10–15)
GLUCOSE BLDC GLUCOMTR-MCNC: 109 MG/DL (ref 70–99)
GLUCOSE BLDC GLUCOMTR-MCNC: 140 MG/DL (ref 70–99)
GLUCOSE BLDC GLUCOMTR-MCNC: 86 MG/DL (ref 70–99)
GLUCOSE BLDC GLUCOMTR-MCNC: 92 MG/DL (ref 70–99)
GLUCOSE BLDC GLUCOMTR-MCNC: 93 MG/DL (ref 70–99)
GLUCOSE BLDC GLUCOMTR-MCNC: 97 MG/DL (ref 70–99)
GLUCOSE SERPL-MCNC: 98 MG/DL (ref 70–99)
HCO3 SERPL-SCNC: 18 MMOL/L (ref 22–29)
HCT VFR BLD AUTO: 26.1 % (ref 40–53)
HGB BLD-MCNC: 9.1 G/DL (ref 13.3–17.7)
INTERPRETATION ECG - MUSE: NORMAL
INTERPRETATION ECG - MUSE: NORMAL
LIPASE SERPL-CCNC: 199 U/L (ref 13–60)
MAGNESIUM SERPL-MCNC: 1.8 MG/DL (ref 1.7–2.3)
MCH RBC QN AUTO: 33 PG (ref 26.5–33)
MCHC RBC AUTO-ENTMCNC: 34.9 G/DL (ref 31.5–36.5)
MCV RBC AUTO: 95 FL (ref 78–100)
P AXIS - MUSE: 34 DEGREES
P AXIS - MUSE: 55 DEGREES
PHOSPHATE SERPL-MCNC: 2.4 MG/DL (ref 2.5–4.5)
PLATELET # BLD AUTO: 114 10E3/UL (ref 150–450)
POTASSIUM SERPL-SCNC: 5.3 MMOL/L (ref 3.4–5.3)
POTASSIUM SERPL-SCNC: 5.6 MMOL/L (ref 3.4–5.3)
PR INTERVAL - MUSE: 152 MS
PR INTERVAL - MUSE: 166 MS
PROT SERPL-MCNC: 5.3 G/DL (ref 6.4–8.3)
QRS DURATION - MUSE: 112 MS
QRS DURATION - MUSE: 78 MS
QT - MUSE: 356 MS
QT - MUSE: 370 MS
QTC - MUSE: 477 MS
QTC - MUSE: 489 MS
R AXIS - MUSE: 37 DEGREES
R AXIS - MUSE: 61 DEGREES
RBC # BLD AUTO: 2.76 10E6/UL (ref 4.4–5.9)
SODIUM SERPL-SCNC: 137 MMOL/L (ref 135–145)
SYSTOLIC BLOOD PRESSURE - MUSE: NORMAL MMHG
SYSTOLIC BLOOD PRESSURE - MUSE: NORMAL MMHG
T AXIS - MUSE: 109 DEGREES
T AXIS - MUSE: 111 DEGREES
TACROLIMUS BLD-MCNC: 8.8 UG/L (ref 5–15)
TME LAST DOSE: NORMAL H
TME LAST DOSE: NORMAL H
VENTRICULAR RATE- MUSE: 105 BPM
VENTRICULAR RATE- MUSE: 108 BPM
WBC # BLD AUTO: 7 10E3/UL (ref 4–11)

## 2024-07-26 PROCEDURE — 120N000011 HC R&B TRANSPLANT UMMC

## 2024-07-26 PROCEDURE — 250N000013 HC RX MED GY IP 250 OP 250 PS 637

## 2024-07-26 PROCEDURE — 250N000013 HC RX MED GY IP 250 OP 250 PS 637: Performed by: SURGERY

## 2024-07-26 PROCEDURE — 250N000012 HC RX MED GY IP 250 OP 636 PS 637

## 2024-07-26 PROCEDURE — 250N000013 HC RX MED GY IP 250 OP 250 PS 637: Performed by: PHYSICIAN ASSISTANT

## 2024-07-26 PROCEDURE — 250N000013 HC RX MED GY IP 250 OP 250 PS 637: Performed by: NURSE PRACTITIONER

## 2024-07-26 PROCEDURE — 250N000009 HC RX 250: Performed by: SURGERY

## 2024-07-26 PROCEDURE — 258N000003 HC RX IP 258 OP 636: Performed by: PHYSICIAN ASSISTANT

## 2024-07-26 PROCEDURE — 83735 ASSAY OF MAGNESIUM: CPT | Performed by: NURSE PRACTITIONER

## 2024-07-26 PROCEDURE — 84132 ASSAY OF SERUM POTASSIUM: CPT | Performed by: PHYSICIAN ASSISTANT

## 2024-07-26 PROCEDURE — 250N000012 HC RX MED GY IP 250 OP 636 PS 637: Performed by: PHYSICIAN ASSISTANT

## 2024-07-26 PROCEDURE — 84100 ASSAY OF PHOSPHORUS: CPT | Performed by: PHYSICIAN ASSISTANT

## 2024-07-26 PROCEDURE — 99233 SBSQ HOSP IP/OBS HIGH 50: CPT | Mod: 24 | Performed by: INTERNAL MEDICINE

## 2024-07-26 PROCEDURE — 250N000011 HC RX IP 250 OP 636: Performed by: PHYSICIAN ASSISTANT

## 2024-07-26 PROCEDURE — 36415 COLL VENOUS BLD VENIPUNCTURE: CPT | Performed by: PHYSICIAN ASSISTANT

## 2024-07-26 PROCEDURE — 82150 ASSAY OF AMYLASE: CPT | Performed by: PHYSICIAN ASSISTANT

## 2024-07-26 PROCEDURE — 85027 COMPLETE CBC AUTOMATED: CPT | Performed by: STUDENT IN AN ORGANIZED HEALTH CARE EDUCATION/TRAINING PROGRAM

## 2024-07-26 PROCEDURE — 258N000003 HC RX IP 258 OP 636: Performed by: SURGERY

## 2024-07-26 PROCEDURE — 250N000011 HC RX IP 250 OP 636: Performed by: NURSE PRACTITIONER

## 2024-07-26 PROCEDURE — 82330 ASSAY OF CALCIUM: CPT | Performed by: NURSE PRACTITIONER

## 2024-07-26 PROCEDURE — 82247 BILIRUBIN TOTAL: CPT | Performed by: PHYSICIAN ASSISTANT

## 2024-07-26 PROCEDURE — 83690 ASSAY OF LIPASE: CPT | Performed by: PHYSICIAN ASSISTANT

## 2024-07-26 PROCEDURE — 250N000013 HC RX MED GY IP 250 OP 250 PS 637: Performed by: INTERNAL MEDICINE

## 2024-07-26 PROCEDURE — 80197 ASSAY OF TACROLIMUS: CPT | Performed by: PHYSICIAN ASSISTANT

## 2024-07-26 RX ORDER — NYSTATIN 100000/ML
500000 SUSPENSION, ORAL (FINAL DOSE FORM) ORAL 4 TIMES DAILY
Status: DISCONTINUED | OUTPATIENT
Start: 2024-07-26 | End: 2024-07-30 | Stop reason: HOSPADM

## 2024-07-26 RX ORDER — DEXTROSE MONOHYDRATE 25 G/50ML
25-50 INJECTION, SOLUTION INTRAVENOUS
Status: DISCONTINUED | OUTPATIENT
Start: 2024-07-26 | End: 2024-07-30 | Stop reason: HOSPADM

## 2024-07-26 RX ORDER — MYCOPHENOLATE MOFETIL 500 MG/1
1000 TABLET ORAL
Status: DISCONTINUED | OUTPATIENT
Start: 2024-07-26 | End: 2024-07-30 | Stop reason: HOSPADM

## 2024-07-26 RX ORDER — VALGANCICLOVIR 450 MG/1
900 TABLET, FILM COATED ORAL DAILY
Status: DISCONTINUED | OUTPATIENT
Start: 2024-07-27 | End: 2024-07-30 | Stop reason: HOSPADM

## 2024-07-26 RX ORDER — SODIUM BICARBONATE 650 MG/1
1300 TABLET ORAL 2 TIMES DAILY
Status: DISCONTINUED | OUTPATIENT
Start: 2024-07-26 | End: 2024-07-27

## 2024-07-26 RX ORDER — FUROSEMIDE 10 MG/ML
40 INJECTION INTRAMUSCULAR; INTRAVENOUS ONCE
Status: COMPLETED | OUTPATIENT
Start: 2024-07-26 | End: 2024-07-26

## 2024-07-26 RX ORDER — NICOTINE POLACRILEX 4 MG
15-30 LOZENGE BUCCAL
Status: DISCONTINUED | OUTPATIENT
Start: 2024-07-26 | End: 2024-07-30 | Stop reason: HOSPADM

## 2024-07-26 RX ADMIN — HYDRALAZINE HYDROCHLORIDE 25 MG: 25 TABLET ORAL at 05:56

## 2024-07-26 RX ADMIN — SENNOSIDES AND DOCUSATE SODIUM 2 TABLET: 8.6; 5 TABLET ORAL at 20:08

## 2024-07-26 RX ADMIN — SODIUM BICARBONATE 1300 MG: 650 TABLET ORAL at 20:08

## 2024-07-26 RX ADMIN — TICAGRELOR 90 MG: 90 TABLET ORAL at 20:08

## 2024-07-26 RX ADMIN — CARVEDILOL 12.5 MG: 12.5 TABLET, FILM COATED ORAL at 08:13

## 2024-07-26 RX ADMIN — FUROSEMIDE 40 MG: 10 INJECTION, SOLUTION INTRAVENOUS at 09:48

## 2024-07-26 RX ADMIN — ASPIRIN 81 MG CHEWABLE TABLET 81 MG: 81 TABLET CHEWABLE at 08:13

## 2024-07-26 RX ADMIN — ATORVASTATIN CALCIUM 40 MG: 40 TABLET, FILM COATED ORAL at 20:08

## 2024-07-26 RX ADMIN — SODIUM CHLORIDE 500 ML: 9 INJECTION, SOLUTION INTRAVENOUS at 10:30

## 2024-07-26 RX ADMIN — ONDANSETRON 4 MG: 2 INJECTION INTRAMUSCULAR; INTRAVENOUS at 20:08

## 2024-07-26 RX ADMIN — MAGNESIUM OXIDE TAB 400 MG (241.3 MG ELEMENTAL MG) 400 MG: 400 (241.3 MG) TAB at 12:05

## 2024-07-26 RX ADMIN — VALGANCICLOVIR 450 MG: 450 TABLET, FILM COATED ORAL at 08:13

## 2024-07-26 RX ADMIN — HYDRALAZINE HYDROCHLORIDE 25 MG: 25 TABLET ORAL at 22:11

## 2024-07-26 RX ADMIN — MYCOPHENOLATE MOFETIL 1000 MG: 500 INJECTION, POWDER, LYOPHILIZED, FOR SOLUTION INTRAVENOUS at 08:24

## 2024-07-26 RX ADMIN — NYSTATIN 500000 UNITS: 100000 SUSPENSION ORAL at 20:22

## 2024-07-26 RX ADMIN — TICAGRELOR 90 MG: 90 TABLET ORAL at 08:13

## 2024-07-26 RX ADMIN — HEPARIN SODIUM 5000 UNITS: 5000 INJECTION, SOLUTION INTRAVENOUS; SUBCUTANEOUS at 03:52

## 2024-07-26 RX ADMIN — INSULIN ASPART 1 UNITS: 100 INJECTION, SOLUTION INTRAVENOUS; SUBCUTANEOUS at 12:10

## 2024-07-26 RX ADMIN — ONDANSETRON 4 MG: 2 INJECTION INTRAMUSCULAR; INTRAVENOUS at 08:14

## 2024-07-26 RX ADMIN — CARVEDILOL 12.5 MG: 12.5 TABLET, FILM COATED ORAL at 20:08

## 2024-07-26 RX ADMIN — SODIUM CHLORIDE, SODIUM LACTATE, POTASSIUM CHLORIDE, CALCIUM CHLORIDE AND DEXTROSE MONOHYDRATE: 5; 600; 310; 30; 20 INJECTION, SOLUTION INTRAVENOUS at 05:57

## 2024-07-26 RX ADMIN — ONDANSETRON 4 MG: 2 INJECTION INTRAMUSCULAR; INTRAVENOUS at 13:57

## 2024-07-26 RX ADMIN — TACROLIMUS 2 MG: 5 CAPSULE ORAL at 08:24

## 2024-07-26 RX ADMIN — HEPARIN SODIUM 5000 UNITS: 5000 INJECTION, SOLUTION INTRAVENOUS; SUBCUTANEOUS at 20:08

## 2024-07-26 RX ADMIN — AMLODIPINE BESYLATE 10 MG: 10 TABLET ORAL at 08:13

## 2024-07-26 RX ADMIN — MYCOPHENOLATE MOFETIL 1000 MG: 500 TABLET, FILM COATED ORAL at 17:30

## 2024-07-26 RX ADMIN — TACROLIMUS 2 MG: 5 CAPSULE ORAL at 17:30

## 2024-07-26 RX ADMIN — HEPARIN SODIUM 5000 UNITS: 5000 INJECTION, SOLUTION INTRAVENOUS; SUBCUTANEOUS at 12:05

## 2024-07-26 RX ADMIN — ONDANSETRON 4 MG: 2 INJECTION INTRAMUSCULAR; INTRAVENOUS at 01:47

## 2024-07-26 RX ADMIN — Medication 40 MG: at 08:24

## 2024-07-26 RX ADMIN — HYDRALAZINE HYDROCHLORIDE 25 MG: 25 TABLET ORAL at 13:57

## 2024-07-26 RX ADMIN — SODIUM PHOSPHATE, MONOBASIC, MONOHYDRATE AND SODIUM PHOSPHATE, DIBASIC, ANHYDROUS 15 MMOL: 142; 276 INJECTION, SOLUTION INTRAVENOUS at 09:30

## 2024-07-26 RX ADMIN — NYSTATIN 500000 UNITS: 100000 SUSPENSION ORAL at 16:20

## 2024-07-26 ASSESSMENT — ACTIVITIES OF DAILY LIVING (ADL)
ADLS_ACUITY_SCORE: 26

## 2024-07-26 NOTE — PROGRESS NOTES
Transplant Surgery  Inpatient Daily Progress Note  07/26/2024    Assessment & Plan: 49 year old male with PMH significant for DMII (on insulin pump) and resulting ESKD (on HD every MWF since 8/2021). 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 Underwent simultaneous kidney pancreas transplant on 7/20/24 complicated by VT/VF arrest in PACU. Received 2 rounds of CPR before ROSC. Taken emergently to the cath lab and found to have 100% occlusion of the RCA now s/p IBAN x 2.    Graft function:  S/p pancreas transplant: Lipase 199 (159), slight upward trend likely related to bowel function. On insulin gtt initially post operatively with steroids, now stopped. Patent post-op US.  S/p kidney transplant: Pre-op Cr 5.22, now trending down to 1.7. Robust urine output. Patent post-op US. Removed Burt POD 4.   Immunosuppression management:   Induction:  Thymo 200/200/200/75 mg (complete, 6.6 mg/kg)  Solu-Medrol: 500/250/100 mg  Maintenance:  Tacrolimus 2 mg BID, goal 8-10  MMF 1000 mg BID  Hematology: DAPT- Brilinta + ASA for at least 1 month. Will not give heparin at this time due to DAPT.  Cardiorespiratory:   CAD, VT/VF arrest s/p PCI with IBAN: Previous CABG 2019, and IBAN 2019 with 100% thrombosis of the RCA, IBAN x 2 on 7/20/24. Consult cards. Start PTA lipitor.   Acute respiratory failure: Extubated 7/21 PM. Now stable on room air.   Hypertension: Goal SBP <165 mmHg. Ok with permissive hypertension to support kidney perfusion. Has been on Nicardipine gtt with nausea. Now tolerating oral meds, Coreg increased to 12.5, Hydralazine 25 mg every 6 hours, and Norvasc 10 mg daily. Weaned off of nicardipine 7/25.  GI/Nutrition: + BM. NG tube came out overnight, started clear liquids this morning. Can advance as tolerated.  Endocrine:   Steroid  hyperglycemia: Insulin drip stopped as above  Fluid/Electrolytes: MIVF: discontinue with diet initiation  Hyperkalemia: Resolved with medical management without need for dialysis  : Burt remained due to new surgical anastomosis, removed POD 4. Negative PVR.   Infectious disease: Luci-op prophylaxis with meropenem completed, micafungin x7 days then start nystatin ppx.  Prophylaxis: DVT, fall, GI, fungal  Disposition: CVICU, can transfer to the floor when a bed is available    KAT/Fellow/Resident Provider: Julienne Ibrahim PA-C [id number]    Faculty: Jarvis Michaud MD  _________________________________________________________________  Transplant History: Admitted 7/19/2024 for kidney pancreas transplant.  7/20/2024 (Kidney / Pancreas), Postoperative day: 6     Interval History: History is obtained from the patient  Overnight events: Nausea improved so far this AM    ROS:   A 10-point review of systems was negative except as noted above.    Meds:  Current Facility-Administered Medications   Medication Dose Route Frequency Provider Last Rate Last Admin    amLODIPine (NORVASC) tablet 10 mg  10 mg Oral Daily Libra Valentino PA-C   10 mg at 07/26/24 0813    aspirin (ASA) chewable tablet 81 mg  81 mg Oral or Feeding Tube Daily Julienne Ibrahim PA-C   81 mg at 07/26/24 0813    atorvastatin (LIPITOR) tablet 40 mg  40 mg Oral QPM Libra Valentino PA-C   40 mg at 07/25/24 2001    [Held by provider] calcium carbonate-vitamin D (OSCAL) 500-5 MG-MCG per tablet 1 tablet  1 tablet Oral BID w/meals Julienne Ibrahim PA-C   1 tablet at 07/24/24 0738    carvedilol (COREG) tablet 12.5 mg  12.5 mg Oral or Feeding Tube BID Sulma Lowe APRN CNP   12.5 mg at 07/26/24 0813    heparin ANTICOAGULANT injection 5,000 Units  5,000 Units Subcutaneous Q8H Julienne Ibrahim PA-C   5,000 Units at 07/26/24 1205    hydrALAZINE (APRESOLINE) tablet 25 mg  25 mg Oral Q8H Margaret Harrington APRN CNP   25 mg at  "07/26/24 0556    insulin aspart (NovoLOG) injection (RAPID ACTING)  1-7 Units Subcutaneous Q4H Sulma Lowe APRN CNP   1 Units at 07/26/24 1210    magnesium oxide (MAG-OX) tablet 400 mg  400 mg Oral Q24H Julienne Ibrahim PA-C   400 mg at 07/26/24 1205    mycophenolate (GENERIC EQUIVALENT) tablet 1,000 mg  1,000 mg Oral BID IS Julienne Ibrahim PA-C        ondansetron (ZOFRAN) injection 4 mg  4 mg Intravenous Q6H Margaret Mcfarlane APRN CNP   4 mg at 07/26/24 0814    pantoprazole (PROTONIX) 2 mg/mL suspension 40 mg  40 mg Per NG tube Daily Julienne bIrahim PA-C   40 mg at 07/26/24 0824    polyethylene glycol (MIRALAX) Packet 17 g  17 g Oral BID Ezequiel Martin APRN CNP   17 g at 07/25/24 0856    senna-docusate (SENOKOT-S/PERICOLACE) 8.6-50 MG per tablet 2 tablet  2 tablet Oral BID Ezequiel Martin APRN CNP   2 tablet at 07/25/24 0856    sodium chloride (PF) 0.9% PF flush 3 mL  3 mL Intravenous Q8H Julienne Ibrahim PA-C   3 mL at 07/23/24 0832    tacrolimus (GENERIC) suspension 2 mg  2 mg Oral or NG Tube BID IS Libra Valentino PA-C   2 mg at 07/26/24 0824    ticagrelor (BRILINTA) tablet 90 mg  90 mg Oral or Feeding Tube BID Julienne Ibrahim PA-C   90 mg at 07/26/24 0813    [START ON 7/27/2024] valGANciclovir (VALCYTE) tablet 900 mg  900 mg Oral Daily Harrison Whitehead MD           Physical Exam:     Admit Weight: 102.4 kg (225 lb 12 oz)    Current vitals:   BP (!) 147/64   Pulse 73   Temp 98.9  F (37.2  C) (Oral)   Resp 16   Ht 1.803 m (5' 11\")   Wt 106.8 kg (235 lb 7.2 oz)   SpO2 97%   BMI 32.84 kg/m      Vital sign ranges:    Temp:  [97.6  F (36.4  C)-99  F (37.2  C)] 98.9  F (37.2  C)  Pulse:  [] 73  Resp:  [12-23] 16  BP: (103-156)/(49-95) 147/64  SpO2:  [96 %-100 %] 97 %  Patient Vitals for the past 24 hrs:   BP Temp Temp src Pulse Resp SpO2 Weight   07/26/24 1300 (!) 147/64 -- -- 73 16 97 % --   07/26/24 1200 128/66 98.9 "  F (37.2  C) Oral 84 16 98 % --   07/26/24 1100 139/55 -- -- 75 17 100 % --   07/26/24 1000 (!) 143/49 -- -- 68 17 98 % --   07/26/24 0900 130/74 -- -- 80 23 99 % --   07/26/24 0800 123/62 99  F (37.2  C) Oral 87 16 96 % --   07/26/24 0700 123/62 -- -- 82 19 98 % --   07/26/24 0600 (!) 156/95 -- -- 87 16 99 % 106.8 kg (235 lb 7.2 oz)   07/26/24 0500 -- -- -- 82 20 98 % --   07/26/24 0400 -- -- -- 88 23 99 % --   07/26/24 0300 104/64 -- -- 83 15 98 % --   07/26/24 0200 -- -- -- 81 12 97 % --   07/26/24 0100 130/83 -- -- 94 19 98 % --   07/26/24 0000 103/65 -- -- 82 17 98 % --   07/25/24 2300 113/55 -- -- 78 14 99 % --   07/25/24 2200 135/77 -- -- 92 22 100 % --   07/25/24 2100 127/65 -- -- 96 14 96 % --   07/25/24 2000 130/77 98.8  F (37.1  C) Oral 109 18 99 % --   07/25/24 1900 118/69 -- -- 93 17 98 % --   07/25/24 1800 (!) 143/88 -- -- 94 18 98 % --   07/25/24 1700 (!) 140/76 -- -- 90 21 100 % --   07/25/24 1600 120/68 97.6  F (36.4  C) Oral 96 19 100 % --   07/25/24 1500 106/77 -- -- 103 18 99 % --   07/25/24 1400 127/82 -- -- 99 21 100 % --     General Appearance: Awake, alert, oriented  Skin: normal, warm  Heart: regular rate and rhythm  Lungs: Nonlabored resps on RA  Abdomen: The abdomen is soft. Drain with serosanguinous output  : voiding   Extremities: edema: none  Neurologic: awake, alert, oriented    Data:   CMP  Recent Labs   Lab 07/26/24  1209 07/26/24  1200 07/26/24  0828 07/26/24  0716 07/25/24  0926 07/25/24  0448   NA  --   --   --  137  --  141   POTASSIUM  --  5.3  --  5.6*  --  4.8   CHLORIDE  --   --   --  111*  --  113*   CO2  --   --   --  18*  --  20*   *  --  92 98   < > 107*   BUN  --   --   --  33.7*  --  35.8*   CR  --   --   --  1.74*  --  1.73*   GFRESTIMATED  --   --   --  47*  --  48*   BETHANY  --   --   --  9.6  --  9.6   ICAW  --   --   --  5.3*  --  5.4*   MAG  --   --   --  1.8  --  1.9   PHOS  --   --   --  2.4*  --  2.1*   AMYLASE  --   --   --  232*  --  178*   LIPASE  --    --   --  199*  --  159*   ALBUMIN  --   --   --  3.3*  --  3.6   BILITOTAL  --   --   --  0.7  --  0.6   ALKPHOS  --   --   --  88  --  87   AST  --   --   --  28  --  30   ALT  --   --   --  25  --  34    < > = values in this interval not displayed.     CBC  Recent Labs   Lab 07/26/24  0716 07/25/24  0448 07/20/24  1422 07/20/24  1138   HGB 9.1* 9.8*   < > 9.7*   WBC 7.0 7.5   < > 12.8*   * 120*   < > 123*   A1C  --   --   --  6.4*    < > = values in this interval not displayed.     COAGS  Recent Labs   Lab 07/20/24  0821 07/20/24  0600   INR 1.39* 1.44*   PTT 28 27      Urinalysis  Recent Labs   Lab Test 07/20/24  0609 09/27/21  1220   COLOR Orange* Yellow   APPEARANCE Slightly Cloudy* Slightly Cloudy*   URINEGLC 50* 150*   URINEBILI Negative Negative   URINEKETONE Negative Negative   SG 1.013 1.013   UBLD Large* Negative   URINEPH 6.5 6.0   PROTEIN 300* >499*   NITRITE Negative Negative   LEUKEST Trace* Small*   RBCU >182* 2   WBCU 105* 4     Virology:  Hepatitis C Antibody   Date Value Ref Range Status   07/19/2024 Nonreactive Nonreactive Final     Comment:     A nonreactive screening test result does not exclude the possibility of exposure to or infection with HCV. Nonreactive screening test results in individuals with prior exposure to HCV may be due to antibody levels below the limit of detection of this assay or lack of reactivity to the HCV antigens used in this assay. Patients with recent HCV infections (<3 months from time of exposure) may have false-negative HCV antibody results due to the time needed for seroconversion (average of 8 to 9 weeks).

## 2024-07-26 NOTE — PLAN OF CARE
Patient has denied any pain throughout the day. Is up ambulating with stand-by-assist. Will continue to encourage activity. Tolerating regular diet with no problems. Plan to transfer out of ICU when bed becomes available. Refer to flowsheets for documentation.

## 2024-07-26 NOTE — PROGRESS NOTES
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      While getting renal ultrasound in PACU, patient cardiac arrested (V-fib) for approximately 5 mins. Received 2 rounds of CPR, epi x 2, 2g IV mag, 100mg IV lidocaine push during code before achieving ROSC. Lactate ~9. Trop 136. Code blue was managed by the PACU team. Post ROSC EKG revealed inferior STEMI. Arrived post-op to CVICU @ 0810. Cardiology consult team emergently engaged for inferior STEMI. Cath lab subsequently activated and patient underwent coronary angiogram which revealed 100% prox RCA in stent thrombosis, now s/p 2 IBAN in ostial to prox RCA. Echo reveals LVEF 37%, moderate diffuse hypokinesis with akinesis of all inferior segments, mild right ventricular dilation is present, global right ventricular function is moderately reduced.     Subjective and Interval:  OG clamped and removed yesterday per transplant surgery. Nausea improved and BM last night, passing flatus. Nicardipine remains off. Insulin off and blood sugar . Maintaining adequate UO, CR steadily trending down. Working with PT/OT. Waiting floor bed.    Assessment and plan by system:   Today's changes:  - Diet per transplant  - Per transplant surgery, will  be a candidate for GDMT [Entresto, an Ace, and SGT2i at some point in the future]  - Transfer to floor transplant surgery will take when transferred to floor with cardiology consult   - Continue PT OT       Neurological:  # Acute pain   # At risk for anoxic brain injury  - Monitor neurological  status. Delirium preventions and precautions.   -PT/OT     Cardiovascular:    # VT Cardiac arrest  # Inferior STEMI s/p 2 IBAN to ostial-proximal RCA 2/2 in stent thrombosis  # CAD s/p PCI with IBAN 1/2019, 2024  # CABG in 2019 - LIMA-LAD, SVG-RCA   # hx CAD s/p PCI to the proximal RCA (02/2024)  # HTN   # PAD   # Cardiac arrest (V fib) 7/20/24   While getting renal ultrasound in PACU, patient cardiac arrested (V-fib) for approximately 5-7 mins. Received 2 rounds of CPR, epi x 2, 2g IV mag, 100mg IV lidocaine push during code before achieving ROSC. Patient now presenting to SICU for further work-up, evaluation. EKG in PACU showing acute STEMI, STAT cardiology consult placed, Cath lab activated for inferior STEMI.  TTE EF 37% with inferior wall akinesis     - ASA/ Brillinta  - Transplant surgeon confirmed ok for any anticoag/antiplatelet agents needed    - Start GDMT- Coreg and Hydral  - Per transplant surgery will be able to start GDMT in the future [entresto, SGT2i, ace] but not now    Pulmonary:   # Post operative ventilatory support   # Acute hypoxic respiratory support post brief Cardiac arrest  - Room air    Gastroenterology/Nutrition:  # NPO status   # S/p pancreas transplant 7/20   -7/23 last BM, passing flatus  - NG tube clamp   - D5 infusion per protocol   - Bowel reg in place       Renal, Electrolytes  # ESRD s/p kidney transplant 7/20   # Acute kidney injury  # Electrolyte monitoring   # Volume status   # lactic acidosis   - Will continue to monitor intake and output.    I/O last 3 completed shifts:  In: 3233.58 [P.O.:1020; I.V.:2043.58; NG/GT:170]  Out: 3380 [Urine:2400; Emesis/NG output:625; Drains:355]   Recent Labs   Lab 07/25/24  0448 07/24/24  0320 07/23/24  1126 07/23/24  0515    142  --  142   POTASSIUM 4.8 4.6  --  4.6   CHLORIDE 113* 111*  --  109*   CO2 20* 23  --  24   BUN 35.8* 45.8*  --  50.4*   CR 1.73* 2.07*  --  2.77*   PHOS 2.1* 2.6  --  3.1   MAG 1.9 2.0 2.3 2.3        Plan:  -  Avoid nephrotoxins, renally dose meds  - Monitor urine output    Endocrine:   # Stress hyperglycemia   # Diabetes mellitus with insulin pump   - Goal to keep BG< 180 for optimal wound healing   - SSI  - Dextrose infusion until PO diet     ID:  # Immunosuppression   # Stress Leukocytosis   # Post-operative antimicrobials   - Trend CBC, fever curve   - Following intra-op cultures   - Defer immunosuppression, post-op antimicrobials per transplant   - micafungin off today  - start nystatin  Cultures:    MRSA negative    UA negative    COVID negative     Recent Labs   Lab 24  0716 24  0448 24  0320   WBC 7.0 7.5 5.9     Temp (24hrs), Av.2  F (36.8  C), Min:97.6  F (36.4  C), Max:98.8  F (37.1  C)       Antibiotics     Anti-infectives (From now, onward)      Start     Dose/Rate Route Frequency Ordered Stop    24 0926  valGANciclovir (VALCYTE) tablet 450 mg         450 mg Oral DAILY 24 0927               Hematology  # Acute blood loss anemia    # Thrombocytopenia  - Trend CBC QD  - Transplant OK with full anticoagulation post-op   - Transfuse if hgb <7.0 or signs/symptoms of hypoperfusion. Monitor and trend.   Recent Labs   Lab 24  0716 24  0448 24  0320   HGB 9.1* 9.8* 9.5*   HCT 26.1* 27.1* 27.4*   * 120* 102*     Recent Labs   Lab 24  0821 24  0600 24  1656   INR 1.39* 1.44* 0.96   PTT 28 27 31     Hgb stable. No e/o bleeding.    ASA/ticagrelor for IBAN    DVT PPX: EVER     Musculoskeletal/Skin:  # Weakness and deconditioning of critical illness  # incisional monitoring    - Physical and occupational therapy consult   - Incisional cares: covered with prima pore dressing. Small amount of blood on bottom of dressing    Lines/ tubes/ drains:  - NG tube     Lines/Tubes/Drains:  Peripheral IV 24 Right Antecubital fossa (Active)   Site Assessment WDL 24 0400   Line Status Infusing 24 0400   Dressing Transparent 24  "0400   Dressing Status clean;dry;intact 07/25/24 2000   Line Intervention Flushed 07/24/24 2000   Phlebitis Scale 0-->no symptoms 07/26/24 0400   Infiltration? no 07/26/24 0400   Number of days: 7       Peripheral IV 07/20/24 Right;Anterior Lower forearm (Active)   Site Assessment WDL 07/26/24 0400   Line Status Saline locked 07/26/24 0400   Dressing Transparent 07/26/24 0400   Dressing Status clean;dry;intact 07/25/24 2000   Line Intervention Flushed 07/25/24 2000   Phlebitis Scale 0-->no symptoms 07/26/24 0400   Infiltration? no 07/26/24 0400   Number of days: 6       Closed/Suction Drain 1 Right RLQ Bulb 19 Spanish (Active)   Site Description WDL 07/26/24 0400   Dressing Status Normal: Clean, Dry & Intact 07/26/24 0400   Dressing Change Due 07/26/24 07/25/24 1600   Drainage Appearance Serosanguenous 07/26/24 0400   Status To bulb suction 07/26/24 0400   Output (ml) 80 ml 07/26/24 0400   Number of days: 6       Incision/Surgical Site 07/20/24 Upper;Midline Abdomen (Active)   Incision Assessment UTV 07/26/24 0400   Dressing Dry gauze 07/25/24 2000   Luci-Incision Assessment UTV 07/25/24 2000   Closure Dary 07/24/24 1600   Incision Drainage Amount Small 07/24/24 1600   Drainage Description Sanguinous 07/24/24 1600   Incision Care Normal saline 07/22/24 0000   Dressing Intervention Clean, dry, intact 07/26/24 0400   Number of days: 6      Patient seen and discussed with staff physician Dr. Singh.    Yaquelin Lowe DNP, APRN [facility]  Cardiology ICU  Pager [phone] or   Send message Securely via Jellynote with the Vocera Web Console        Objective:  Most recent vital signs:  /62   Pulse 82   Temp 98.8  F (37.1  C) (Oral)   Resp 19   Ht 1.803 m (5' 11\")   Wt 106.8 kg (235 lb 7.2 oz)   SpO2 98%   BMI 32.84 kg/m    Temp:  [97.6  F (36.4  C)-98.8  F (37.1  C)] 98.8  F (37.1  C)  Pulse:  [] 82  Resp:  [12-23] 19  BP: (103-156)/(51-95) 123/62  MAP:  [70 mmHg-95 mmHg] 93 mmHg  Arterial Line BP: " (113-148)/(49-75) 113/75  SpO2:  [95 %-100 %] 98 %      Physical exam:  General: In bed, in NAD  HEENT: PERRL, no scleral icterus or injection  CARDIAC: RRR, no m/r/g appreciated. Peripheral pulses dopplered  RESP: CTAB, no wheezes, rhonchi or crackles appreciated.  GI: soft, BS hypoactive  : urinal  EXTREMITIES: No LE edema, pulses 2+.  SKIN: Abdominal incision covered  NEURO: alert and oriented    Imaging/procedure results:  XR Abdomen Port 1 View  Narrative: EXAMINATION:  XR ABDOMEN PORT 1 VIEW 7/22/2024     COMPARISON: Abdominal radiograph, 7/20/2024.    HISTORY: Nausea, emesis despite NG tube, assess placement.    FINDINGS: Supine frontal radiographic view of the abdomen.    Feeding tube with tip and sidehole projecting over the gastric body.     No abnormally dilated air-filled loops of bowel, or pneumatosis in the  visualized abdomen.. No portal venous gas.  Bibasilar patchy  opacities. Partially visualized median sternotomy wires appear intact.  Surgical staples over the mid abdomen. Thoracic findings  are detailed  on same-day chest radiograph.  Impression: IMPRESSION: Feeding tube tip and sidehole object over the stomach.    I have personally reviewed the examination and initial interpretation  and I agree with the findings.    SHILO SCHREIBER MD         SYSTEM ID:  [id number]  XR Chest Port 1 View  Narrative: Chest one view portable    HISTORY: Status post extubation    COMPARISON STUDY: 7/21/2024    FINDINGS: Cardiac silhouette is nonenlarged. Lung volumes are low.  Bibasilar subsegmental atelectasis. Endotracheal tube is been removed.  Enteric tube in stomach. Left IJ central line tip at the confluence of  innominate veins.  Impression: IMPRESSION: Interval removal of endotracheal tube with bibasilar  atelectasis.    MAXINE RUFFIN MD         SYSTEM ID:  [id number]  Cardiac Catheterization  Inferior STEMI.  Cardiac arrest.  VT/VF.  Severe two vessel CAD with prior CABG with LIMA to LAD, PCI to D1, and  PCI   to mid RCA.  Patent LIMA to LAD.  Patent stent in the D1 with minimal ISR.  In stent thrombosis of the mid RCA stent culprit in inferior STEMI.  Aspiration thrombectomy of the RCA.  Intravascular lithotripsy of the RCA.  PCI of the RCA with two drug eluting stents.  IVUS of the RCA.     No results found for this or any previous visit (from the past 24 hour(s)).

## 2024-07-26 NOTE — CONSULTS
Care Management Initial Consult    General Information  Assessment completed with: Siva Mccray  Type of CM/SW Visit: Initial Assessment    Primary Care Provider verified and updated as needed: No   Readmission within the last 30 days:        Reason for Consult: other (see comments) (post kidney and pancreas transplant)  Advance Care Planning: Advance Care Planning Reviewed: verified with patient          Communication Assessment  Patient's communication style: spoken language (English or Bilingual)    Hearing Difficulty or Deaf: no   Wear Glasses or Blind: no    Cognitive  Cognitive/Neuro/Behavioral: WDL  Level of Consciousness: alert  Arousal Level: opens eyes spontaneously  Orientation: oriented x 4  Mood/Behavior: calm, cooperative  Best Language: 0 - No aphasia  Speech: clear    Living Environment:   People in home: child(jennifre), dependent, spouse  Jolene, Elder Jose Guadalupe, and Ismael  Current living Arrangements: house      Able to return to prior arrangements: yes       Family/Social Support:  Care provided by: self  Provides care for: child(jennifer)  Marital Status:   Children, Wife  Jolene       Description of Support System: Supportive, Involved    Support Assessment: Adequate family and caregiver support    Current Resources:   Patient receiving home care services: No     Community Resources: None  Equipment currently used at home: none  Supplies currently used at home: None    Employment/Financial:  Employment Status: disabled        Financial Concerns: none   Referral to Financial Worker: No       Does the patient's insurance plan have a 3 day qualifying hospital stay waiver?  No    Lifestyle & Psychosocial Needs:  Social Determinants of Health     Food Insecurity: Not on file   Depression: Not at risk (4/19/2024)    Received from [facility]     PHQ-2     PHQ2 Total: 0   Housing Stability: Not on file   Tobacco Use: Low Risk  (7/19/2024)    Patient History     Smoking Tobacco Use: Never      Smokeless Tobacco Use: Never     Passive Exposure: Past   Financial Resource Strain: High Risk (2022)    [facility], [facility]    Financial Resource Strain     Difficulty of Paying Living Expenses: Not on file     Difficulty of Paying Living Expenses: Not on file   Alcohol Use: Not on file   Transportation Needs: Not on file   Physical Activity: Not on file   Interpersonal Safety: Not on file   Stress: Not on file   Social Connections: Unknown (4/10/2023)    [facility], [facility]    Social Connections     Frequency of Communication with Friends and Family: Not on file   Health Literacy: Not on file       Functional Status:  Prior to admission patient needed assistance:   Dependent ADLs:: Independent  Dependent IADLs:: Independent  Assesssment of Functional Status: At functional baseline    Mental Health Status:  Mental Health Status: No Current Concerns  Mental Health Management: Medication    Chemical Dependency Status:  Chemical Dependency Status: No Current Concerns             Values/Beliefs:  Spiritual, Cultural Beliefs, Moravian Practices, Values that affect care: no               Additional Information:  Patient underwent  left kidney and pancreas transplant on 2024. SW met with patient at bedside to update psychosocial assessment and provide brief education about SW role while inpatient, as well as expectations/requirements and follow up needs post-transplant. SW also provided education about need for compliance with transplant medications, and explained ESRD Medicare benefits and medication coverage under Medicare part B.  Medicare 2728 forms completed and signed by patient.    SOT*LIAISON (ISABEL) IS A (PANCREAS AND LEFT KIDNEY) TRANSPLANT PATIENT  (DATE OF TRANSPLANT: 24)         PRIMARY HEALTH BENEFIT: MEDICA  COMMERCIAL    ID# [id number] [facility]# [id number] (EFFECTIVE 04/01/22 )         SECONDARY HEALTH BENEFIT: MEDICARE    ID# [id number] (PART A EFFECTIVE 01/01/2023 , PART B EFFECTIVE 01/01/2023 )    PROCESSING INFO: MEDICA SECONDARY ID# [id number] GRP# OTHER PCN# [id number] BIN# [id number](EFFECTIVE 01/01/2023 )         PT WILL PAY $0 AT TIME OF SERVICE FOR IMMUNOS         PHARMACY BENEFIT: MEDICA COMMERCIAL    PROCESSING INFO: ID# [id number] GRP# [id number] PCN# [id number] BIN# [id number] (EFFECTIVE 01/01/2020).    DEDUCTIBLE $3,000 & MAX OUT-OF-POCKET $6,350    COPAY STRUCTURE:    $ 15 FOR GENERIC    $ 50 FOR BRAND         TEST CLAIM SPECIALTY #28    MYCOPHENOLATE 250mg (#240/30DS)-- $0 AT TIME OF SERVICE    PROGRAF 1mg (#120/30DS)-- $0 AT TIME OF SERVICE    TACROLIMUS 1mg (#120/30DS)-- $0 AT TIME OF SERVICE    CYCLOSPORINE 100mg (#60/30DS)-- $0 AT TIME OF SERVICE    VALGANCICLOVIR 450mg (#60/30DS)-- $15         TEST CLAIM DISCHARGE #27    MYCOPHENOLATE 250mg (#240/30DS)-- $0 AT TIME OF SERVICE    PROGRAF 1mg (#120/30DS)-- $0 AT TIME OF SERVICE    TACROLIMUS 1mg (#120/30DS)-- $0 AT TIME OF SERVICE    CYCLOSPORINE 100mg (#60/30DS)-- $0 AT TIME OF SERVICE    VALGANCICLOVIR 450mg (#60/30DS)-- $15         **CAN PATIENT FILL AT [facility] FOR MEDICATIONS LISTED? YES, MEDICARE IS SECONDARY (END STAGE RENAL DISEASE BENEFICIARY) PT HAS AN INSURANCE PLAN THAT PROCESSES THROUGH [employer] , AND PER OUR CONTRACT WITH THEM WE ARE ONLY ALLOWED TO SHIP TO THE PT IF THEY ARE PHYSICALLY UNABLE TO COME IN TO OUR PHARMACY TO . IF PT IS COMFORTABLE WITH US DOCUMENTING THIS IN THEIR RECORD, WE CAN SHIP OUT THEIR MEDS (FOR [place] RESIDENTS ONLY) IF NOT THEY WILL NEED TO UTILIZE [facility] ([phone]) OR  AT ANY [facility] SITE.                   MANUEL Chavira, [facility]  Kidney, Pancreas, Auto-Islet Pancreas   [address]  [place]  [address]  [email or web address]  Office: [phone]  Fax: [phone]  Employed by [employer]

## 2024-07-26 NOTE — PROGRESS NOTES
[facility]  Transplant Nephrology Consult Note  Date of Admission:  7/19/2024  Today's Date: 07/26/2024  Requesting physician: Harrison Whitehead*    Reason for Consult:  SPK immunosuppression    Recommendations:   - start him on sodium bicarb 1300 mg BID (ordered for you)  - Off of nicardipine drip with stable BP    Assessment & Plan   # DDKT (SPK): Stable. UOP improving now with down trending creatinine.   - Baseline Creatinine: ~ TBD   - Proteinuria: Not checked post transplant   - DSA Hx:  Final cross match pending    - Last cPRA: 11%   - BK Viremia: Not checked post transplant   - Kidney Tx Biopsy Hx: No biopsy history.    # Pancreas Tx (SPK):    - Pancreatic Exocrine Drainage: Enteric drained     - Blood glucose: Elevated blood glucose      On insulin: Yes minimal dose   -  Latest HbA1c: 4%   - Pancreatic enzymes: Trend up   - DSA Hx:  final crossmatch pending   - Pancreas Tx Biopsy Hx: No biopsy history    # Immunosuppression: Tacrolimus immediate release (goal 8-10) and Mycophenolate mofetil (dose 1000 mg every 12 hours)   - Induction with Recent Transplant:  High Intensity Protocol   - Continue with intensive monitoring of immunosuppression for efficacy and toxicity.   - Historical Changes in Immunosuppression: None   - Changes: No,    # VT/VF Cardiac arrest:  # CAD s/p CABG 2019 LIMA-LAD, SVG-RCA, IBAN 1/2019, IBAN to RCA 2/2024     - s/p inferior STEMI 2/2 ostial proximal RCA in stent thrombosis   - s/p 2 IBAN to RCA 7/20/2024   - Echo: LVEF=37%.Moderate diffuse hypokinesis with akinesis of all inferior segments.  Mild right ventricular dilation is present.Global  right ventricular function is moderately reduced.    # Infection Prevention:      - PJP: Sulfa/TMP (Bactrim) held given hyperK, added G6PD, results pending  - CMV IgG Ab High Risk Discordance (D+/R-): No  - EBV IgG Ab High Risk Discordance (D+/R-): No    # Blood Pressure:  borderline control ;   Goal BP: MAP > 65   - Changes: No     # Anemia in Chronic Renal Disease: Hgb: Trend down post SPK     KERRI: No   - Iron studies: Not checked recently. Monitor Hb trends    # Mineral Bone Disorder:    - Secondary renal hyperparathyroidism; PTH level: Unknown at this time, but checked with dialysis        On treatment: None  - Vitamin D; level: High        On supplement: No  - Calcium; level: High        On supplement: No  - Phosphorus; level: Normal        On supplement: No    Hypercalcemia, resolved. Continue to hold supplementation for now.    # Electrolytes:   - Potassium; level: High , improved on repeat check      On supplement: No  - Magnesium; level: Normal        On supplement: Yes  - Bicarbonate; level: Low normal        On supplement: No  - Sodium; level: Normal    # Other Significant PMH:   - Left diaphragmatic elevation; 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      Grisel Coombs MD  Transplant Nephrology  Contact information via Vocera Web Console or via [facility] Paging/Directory      Interval History  Mr. Ramey's creatinine is 1.7, Trend down  Off pressors  Extubated, on room air  Excellent UOP  Pt was sleeping during my visit, tried to wake him up, but was not able to assess any of his symptoms as he slept right back.    Review of Systems   Unable because patient is somnolent    MEDICATIONS:  Current Facility-Administered Medications   Medication Dose Route Frequency Provider Last Rate Last Admin    amLODIPine (NORVASC) tablet 10 mg  10 mg Oral Daily Libra Valentino PA-C   10 mg at 07/26/24 0813    aspirin (ASA) chewable tablet 81 mg  81 mg Oral or Feeding Tube Daily Julienne Ibrahim PA-C   81 mg at 07/26/24  0813    atorvastatin (LIPITOR) tablet 40 mg  40 mg Oral QPM Libra Valentino PA-C   40 mg at 07/25/24 2001    [Held by provider] calcium carbonate-vitamin D (OSCAL) 500-5 MG-MCG per tablet 1 tablet  1 tablet Oral BID w/meals Julienne Ibrahim PA-C   1 tablet at 07/24/24 0738    carvedilol (COREG) tablet 12.5 mg  12.5 mg Oral or Feeding Tube BID Sulma Lowe APRN CNP   12.5 mg at 07/26/24 0813    heparin ANTICOAGULANT injection 5,000 Units  5,000 Units Subcutaneous Q8H Julienne Ibrahim PA-C   5,000 Units at 07/26/24 1205    hydrALAZINE (APRESOLINE) tablet 25 mg  25 mg Oral Q8H VEER Margaret Mcfarlane APRN CNP   25 mg at 07/26/24 1357    insulin aspart (NovoLOG) injection (RAPID ACTING)  1-7 Units Subcutaneous Q4H Sulma Lowe APRN CNP   1 Units at 07/26/24 1210    magnesium oxide (MAG-OX) tablet 400 mg  400 mg Oral Q24H Julienne Ibrahim PA-C   400 mg at 07/26/24 1205    mycophenolate (GENERIC EQUIVALENT) tablet 1,000 mg  1,000 mg Oral BID IS Julienne Ibrahim PA-C        nystatin (MYCOSTATIN) suspension 500,000 Units  500,000 Units Oral 4x Daily Julienne Ibrahim PA-C        ondansetron (ZOFRAN) injection 4 mg  4 mg Intravenous Q6H Margaret Mcfarlane APRN CNP   4 mg at 07/26/24 1357    pantoprazole (PROTONIX) 2 mg/mL suspension 40 mg  40 mg Per NG tube Daily Julienne Ibrahim PA-C   40 mg at 07/26/24 0824    polyethylene glycol (MIRALAX) Packet 17 g  17 g Oral BID Ezequiel Martin APRN CNP   17 g at 07/25/24 0856    senna-docusate (SENOKOT-S/PERICOLACE) 8.6-50 MG per tablet 2 tablet  2 tablet Oral BID Ezequiel Martin APRN CNP   2 tablet at 07/25/24 0856    sodium chloride (PF) 0.9% PF flush 3 mL  3 mL Intravenous Q8H Julienne Ibrahim PA-C   3 mL at 07/23/24 0832    tacrolimus (GENERIC) suspension 2 mg  2 mg Oral or NG Tube BID IS Libra Valentino PA-C   2 mg at 07/26/24 0824    ticagrelor (BRILINTA) tablet 90 mg  90 mg Oral or Feeding  "Tube BID Julienne Ibrahim PA-C   90 mg at 24 0813    [START ON 2024] valGANciclovir (VALCYTE) tablet 900 mg  900 mg Oral Daily Harrison Whitehead MD         Current Facility-Administered Medications   Medication Dose Route Frequency Provider Last Rate Last Admin       Physical Exam   Temp  Av.4  F (36.3  C)  Min: 96.1  F (35.6  C)  Max: 98.6  F (37  C)  Arterial Line BP  Min: 36/21  Max: 276/115  Arterial Line MAP (mmHg)  Av.3 mmHg  Min: 29 mmHg  Max: 232 mmHg      Pulse  Av.9  Min: 20  Max: 152 Resp  Avg: 15.5  Min: 3  Max: 38  FiO2 (%)  Av %  Min: 70 %  Max: 80 %  SpO2  Av.7 %  Min: 72 %  Max: 100 %    CVP (mmHg): 278 mmHgBP (!) 155/66   Pulse 76   Temp 98.9  F (37.2  C) (Oral)   Resp 20   Ht 1.803 m (5' 11\")   Wt 106.8 kg (235 lb 7.2 oz)   SpO2 99%   BMI 32.84 kg/m     Date 24 07 - 24 0659   Shift 2994-3843 3873-7839 2913-0581 24 Hour Total   INTAKE   I.V. 1839.32   1839.32   NG/   128   Shift Total(mL/kg) 1967.32(19.21)   1967.32(19.21)   OUTPUT   Urine 1325   1325   Emesis/NG output 100   100   Drains 875   875   Shift Total(mL/kg) 2300(22.46)   2300(22.46)   Weight (kg) 102.4 102.4 102.4 102.4      Admit Weight: 102.4 kg (225 lb 12 oz)     GENERAL APPEARANCE: sleeping  HENT: atrmatic, have NG tube in place  RESP: fine coarse breath sounds b/l  CV: regular rhythm, normal rate  EDEMA: improved  LE edema bilaterally  ABDOMEN: soft, nondistended, surgical incision  MS: extremities normal - no gross deformities noted  SKIN: no rash  Access LUE AVF +thrill/bruit    Data   All labs reviewed by me.  CMP  Recent Labs   Lab 24  1451 24  1209 24  1200 24  0828 24  0716 24  0351 24  0926 24  0448 24  0328 24  0320 24  1130 24  1126 24  0607 24  0515   NA  --   --   --   --  137  --   --  141  --  142  --   --   --  142   POTASSIUM 5.3  --  5.3  --  5.6*  " --   --  4.8  --  4.6  --   --   --  4.6   CHLORIDE  --   --   --   --  111*  --   --  113*  --  111*  --   --   --  109*   CO2  --   --   --   --  18*  --   --  20*  --  23  --   --   --  24   ANIONGAP  --   --   --   --  8  --   --  8  --  8  --   --   --  9   GLC  --  140*  --  92 98 97   < > 107*   < > 118*   < >  --    < > 145*   BUN  --   --   --   --  33.7*  --   --  35.8*  --  45.8*  --   --   --  50.4*   CR  --   --   --   --  1.74*  --   --  1.73*  --  2.07*  --   --   --  2.77*   GFRESTIMATED  --   --   --   --  47*  --   --  48*  --  39*  --   --   --  27*   BETHANY  --   --   --   --  9.6  --   --  9.6  --  10.0  --   --   --  10.0   MAG  --   --   --   --  1.8  --   --  1.9  --  2.0  --  2.3  --  2.3   PHOS  --   --   --   --  2.4*  --   --  2.1*  --  2.6  --   --   --  3.1   PROTTOTAL  --   --   --   --  5.3*  --   --  5.5*  --  5.7*  --   --   --  5.8*   ALBUMIN  --   --   --   --  3.3*  --   --  3.6  --  3.6  --   --   --  3.7   BILITOTAL  --   --   --   --  0.7  --   --  0.6  --  0.6  --   --   --  0.5   ALKPHOS  --   --   --   --  88  --   --  87  --  77  --   --   --  72   AST  --   --   --   --  28  --   --  30  --  47*  --   --   --  85*   ALT  --   --   --   --  25  --   --  34  --  45  --   --   --  59    < > = values in this interval not displayed.     CBC  Recent Labs   Lab 07/26/24  0716 07/25/24  0448 07/24/24  0320 07/23/24  0515   HGB 9.1* 9.8* 9.5* 9.5*   WBC 7.0 7.5 5.9 4.7   RBC 2.76* 2.97* 2.91* 2.87*   HCT 26.1* 27.1* 27.4* 27.2*   MCV 95 91 94 95   MCH 33.0 33.0 32.6 33.1*   MCHC 34.9 36.2 34.7 34.9   RDW 12.7 12.3 12.2 12.7   * 120* 102* 102*     INR  Recent Labs   Lab 07/20/24  0821 07/20/24  0600 07/19/24  1656   INR 1.39* 1.44* 0.96   PTT 28 27 31     ABG  Recent Labs   Lab 07/22/24  0923 07/22/24  0421 07/21/24 2002 07/21/24  1811   PH 7.44 7.42 7.39 7.39   PCO2 40 42 44 43   PO2 63* 67* 85 77*   HCO3 27 27 26 26   O2PER 21 35  35 36  35 30      Urine Studies  Recent  Labs   Lab Test 07/20/24  0609 09/27/21  1220 07/24/21  1151 06/25/19  1439 06/19/19  0822   COLOR Orange* Yellow Light Yellow Straw Straw   APPEARANCE Slightly Cloudy* Slightly Cloudy* Clear Clear Clear   URINEGLC 50* 150* 200* 200 mg/dL* >1000 mg/dL*   URINEBILI Negative Negative Negative Negative Negative   URINEKETONE Negative Negative Negative Negative Negative   SG 1.013 1.013 1.018 1.010 1.012   UBLD Large* Negative 0.03 mg/dL* Moderate* Trace*   URINEPH 6.5 6.0 6.0 6.5 6.0   PROTEIN 300* >499* 600* 200 mg/dL* 300 mg/dL*   UROBILINOGEN  --   --   --  <2.0 E.U./dL <2.0 E.U./dL   NITRITE Negative Negative Negative Negative Negative   LEUKEST Trace* Small* Negative Negative Negative   RBCU >182* 2 1 25-50* 0-2   WBCU 105* 4 10* 0-5 0-5     No lab results found.  PTH  Recent Labs   Lab Test 07/25/24  0448 07/20/21  1845   PTHI 199* 190*     Iron Studies  No lab results found.    IMAGING:  All imaging studies reviewed by me.    Past Medical History    I have reviewed this patient's medical history and updated it with pertinent information if needed.   Past Medical History:   Diagnosis Date    Acquired elevated diaphragm     Anemia in chronic kidney disease     Angina pectoris (H24)     Chronic kidney disease     Coronary artery disease     Diabetes mellitus, type II (H) 12/2001    Diabetic nephropathy (H)     MARQUEZ (dyspnea on exertion)     Dyslipidemia 12/2001    End stage renal disease (H)     History of blood transfusion 2004    Hypertension     takes medication    Ischemic cardiomyopathy     Metabolic acidosis     Myocardial infarction (H)     STEMI -Diagonal branch of the LAD    Obesity (BMI 30-39.9)     Peripheral neuropathy     Proteinuria     Retinopathy     Vitamin D deficiency        Past Surgical History   I have reviewed this patient's surgical history and updated it with pertinent information if needed.  Past Surgical History:   Procedure Laterality Date    APPENDECTOMY OPEN N/A 7/19/2024    Procedure:  Appendectomy open;  Surgeon: Harrison Whitehead MD;  Location: [facility] OR    BACK SURGERY  2009    L5 disc cut    BENCH KIDNEY  7/19/2024    Procedure: Bench kidney;  Surgeon: Harrison Whitehead MD;  Location: [facility] OR    BENCH PANCREAS N/A 7/19/2024    Procedure: Bench pancreas;  Surgeon: Harrison Whitehead MD;  Location: [facility] OR    BYPASS GRAFT ARTERY CORONARY  06/20/2019    2 vessels    CV CENTRAL VENOUS CATHETER PLACEMENT N/A 7/20/2024    Procedure: Central Venous Catheter Placement;  Surgeon: Dominic Robertson MD;  Location: [facility] CARDIAC CATH LAB    CV CORONARY ANGIOGRAM N/A 01/13/2019    Procedure: Coronary Angiogram;  Surgeon: Cielo Che MD;  Location: [facility] Cath Lab;  Service: Cardiology    CV CORONARY ANGIOGRAM N/A 05/02/2019    Procedure: Coronary Angiogram;  Surgeon: Cielo Che MD;  Location: [facility] Cath Lab;  Service: Cardiology    CV CORONARY ANGIOGRAM N/A 04/30/2020    Procedure: Coronary Angiogram;  Surgeon: Cielo Che MD;  Location: [facility] Cath Lab;  Service: Cardiology    CV CORONARY ANGIOGRAM N/A 07/22/2021    Procedure: CV CORONARY ANGIOGRAM;  Surgeon: Adrian Crow MD;  Location: [facility] CV    CV CORONARY ANGIOGRAM N/A 02/01/2024    Procedure: Coronary Angiogram;  Surgeon: Delroy Carpio MD;  Location: [facility] CARDIAC CATH LAB    CV CORONARY ANGIOGRAM N/A 7/20/2024    Procedure: Coronary Angiogram;  Surgeon: Dominic Robertson MD;  Location: [facility] CARDIAC CATH LAB    CV CORONARY LITHOTRIPSY PCI N/A 7/20/2024    Procedure: Percutaneous Coronary Intervention - Lithotripsy;  Surgeon: Dominic Robertson MD;  Location: [facility] CARDIAC CATH LAB    CV FRACTIONAL FLOW RATIO WIRE N/A 07/22/2021    Procedure: Fractional Flow Ratio Wire;  Surgeon: Adrian Crow MD;  Location: [facility] LAB CV    CV INTRAVASULAR ULTRASOUND N/A 7/20/2024    Procedure: Intravascular  Ultrasound;  Surgeon: Dominic Robertson MD;  Location: [facility] CARDIAC CATH LAB    CV LEFT HEART CATH N/A 2021    Procedure: Left Heart Cath;  Surgeon: Adrian Crow MD;  Location: [facility] CV    CV LEFT HEART CATHETERIZATION WITH LEFT VENTRICULOGRAM N/A 2019    Procedure: Left Heart Catheterization with Left Ventriculogram;  Surgeon: Cielo Che MD;  Location: [facility] Cath Lab;  Service: Cardiology    CV LEFT HEART CATHETERIZATION WITHOUT LEFT VENTRICULOGRAM Left 2020    Procedure: Left Heart Catheterization Without Left Ventriculogram;  Surgeon: Cielo Che MD;  Location: [facility] Cath Lab;  Service: Cardiology    CV PCI N/A 2024    Procedure: Percutaneous Coronary Intervention;  Surgeon: Delroy Carpio MD;  Location: [facility] CARDIAC CATH LAB    CV PCI N/A 2024    Procedure: Percutaneous Coronary Intervention;  Surgeon: Dominic Robertson MD;  Location: [facility] CARDIAC CATH LAB    CV PCI ASPIRATION THROMECTOMY N/A 2024    Procedure: Percutaneous Coronary Intervention Aspiration Thrombectomy;  Surgeon: Dominic Robertson MD;  Location: [facility] CARDIAC CATH LAB    CV PCI STENT DRUG ELUTING N/A 2024    Procedure: Percutaneous Coronary Intervention Stent;  Surgeon: Dominic Robertson MD;  Location: [facility] CARDIAC CATH LAB    CV RIGHT HEART CATHETERIZATION N/A 2020    Procedure: Right Heart Catheterization;  Surgeon: Cielo Che MD;  Location: [facility] Cath Lab;  Service: Cardiology    EYE SURGERY      GENITOURINARY SURGERY      HERNIA REPAIR      OTHER SURGICAL HISTORY      Excise varicocele    STENT, CORONARY, DALE      TRANSPLANT PANCREAS, KIDNEY  DONOR, COMBINED N/A 2024    Procedure: Transplant pancreas, kidney  donor, with ureteral stent placement;  Surgeon: Harrison Whitehead MD;  Location:  OR    VASCULAR SURGERY         Family  History   I have reviewed this patient's family history and updated it with pertinent information if needed.   Family History   Problem Relation Age of Onset    Diabetes Type 2  Mother     Heart Disease Father 60    CABG Father 50        triple bypass    Diabetes Type 2  Father     Depression Sister     Substance Abuse Sister     Ovarian Cancer Maternal Grandmother     Brain Cancer Maternal Grandmother     Pancreatic Cancer Maternal Aunt     Prostate Cancer Maternal Uncle        Social History   I have reviewed this patient's social history and updated it with pertinent information if needed. Siva Ramey  reports that he has never smoked. He has been exposed to tobacco smoke. He has never used smokeless tobacco. He reports that he does not drink alcohol and does not use drugs.    Prior to Admission Medications   Medications Prior to Admission   Medication Sig Dispense Refill Last Dose    acetaminophen (TYLENOL) 500 MG tablet Take 500 mg by mouth every 4 hours as needed   7/19/2024    albuterol (PROAIR HFA/PROVENTIL HFA/VENTOLIN HFA) 108 (90 Base) MCG/ACT inhaler Inhale 2 puffs into the lungs every 4 hours as needed   More than a month    ALPRAZolam (XANAX) 0.25 MG tablet Take 0.25 mg by mouth 3 times daily as needed   More than a month    amLODIPine (NORVASC) 10 MG tablet Take 1 tablet (10 mg) by mouth daily 90 tablet 3 7/18/2024 at 0800    aspirin (ASA) 81 MG chewable tablet Take 1 tablet (81 mg) by mouth daily Starting tomorrow. 30 tablet 3 7/18/2024 at 0800    atorvastatin (LIPITOR) 80 MG tablet Take 1 tablet (80 mg) by mouth at bedtime 90 tablet 3 7/18/2024 at 2200    carvedilol (COREG) 12.5 MG tablet Take 3 tablets (37.5 mg) by mouth 2 times daily (with meals) 540 tablet 3 7/18/2024 at 2200    cholecalciferol, vitamin D3, 5,000 unit Tab [CHOLECALCIFEROL, VITAMIN D3, 5,000 UNIT TAB] Take 5,000 Units by mouth daily.   7/18/2024 at 2200    hydrALAZINE (APRESOLINE) 25 MG tablet Take 1 tablet (25 mg) by mouth 2  times daily 180 tablet 3 7/18/2024 at 0800    icosapent ethyl (VASCEPA) 1 g CAPS capsule Take 2 g by mouth 2 times daily (with meals)   7/18/2024 at 2200    insulin aspart (NOVOLOG VIAL) 100 UNITS/ML vial Inject Subcutaneous 3 times daily (with meals) To be used with the insulin pump       nitroGLYcerin (NITROSTAT) 0.4 MG sublingual tablet PLACE ONE TABLET UNDER TONGUE AS NEEDED FOR CHEST PAIN AS DIRECTED FOR 3 DOSES. IF SYMPTOMS PERSIST 5 MINUTES AFTER 1ST DOSE CALL 911 25 tablet 3 Unknown    VELPHORO 500 MG CHEW chewable tablet Take 1,000 mg by mouth 3 times daily (with meals)   7/18/2024 at 1200    zolpidem (AMBIEN) 5 MG tablet Take 5 mg by mouth nightly as needed for sleep   7/18/2024 at 2200    cloNIDine (CATAPRES) 0.1 MG tablet Take 1 tablet (0.1 mg) by mouth 3 times daily as needed (SBP > 160) 30 tablet 0     glucose (BD GLUCOSE) 4 g chewable tablet glucose 4 gram chewable tablet   CHEW AND SWALLOW 4 TIMES DAILY AS NEEDED       insulin aspart (NOVOLOG PEN) 100 UNIT/ML pen Inject 5 Units Subcutaneous 3 times daily (before meals) 4.5 mL 1

## 2024-07-26 NOTE — PLAN OF CARE
Major Shift Events:  NG removed per patient.     Neuro - intact, Pulm - RA, CV - Sinus 70-80's. SBP remained under goal of 165 with no prns..   Gi/ - voiding in urinal, adequate uo. Transferred with SBA to bedside commode. Large soft bowel movement.   Skin - Abd. Incision, AMILCAR drain., Lines - 2 PIV. D5LR running at 50.     Plan: Transfer to floor  For vital signs and complete assessments, please see documentation flowsheets.

## 2024-07-27 ENCOUNTER — APPOINTMENT (OUTPATIENT)
Dept: PHYSICAL THERAPY | Facility: CLINIC | Age: 49
DRG: 008 | End: 2024-07-27
Attending: SURGERY
Payer: MEDICARE

## 2024-07-27 PROBLEM — J96.21 ACUTE AND CHRONIC RESPIRATORY FAILURE WITH HYPOXIA (H): Status: ACTIVE | Noted: 2024-07-27

## 2024-07-27 PROBLEM — R73.9 STEROID-INDUCED HYPERGLYCEMIA: Status: ACTIVE | Noted: 2024-07-27

## 2024-07-27 PROBLEM — T38.0X5A STEROID-INDUCED HYPERGLYCEMIA: Status: ACTIVE | Noted: 2024-07-27

## 2024-07-27 PROBLEM — I46.9 CARDIAC ARREST WITH VENTRICULAR FIBRILLATION (H): Status: ACTIVE | Noted: 2024-07-27

## 2024-07-27 PROBLEM — D63.1 ANEMIA IN CHRONIC KIDNEY DISEASE: Status: ACTIVE | Noted: 2019-01-29

## 2024-07-27 PROBLEM — E87.5 HYPERKALEMIA: Status: ACTIVE | Noted: 2024-07-27

## 2024-07-27 PROBLEM — D84.9 IMMUNOSUPPRESSED STATUS (H): Status: ACTIVE | Noted: 2024-07-27

## 2024-07-27 PROBLEM — I49.01 CARDIAC ARREST WITH VENTRICULAR FIBRILLATION (H): Status: ACTIVE | Noted: 2024-07-27

## 2024-07-27 LAB
ALBUMIN SERPL BCG-MCNC: 3.3 G/DL (ref 3.5–5.2)
ALP SERPL-CCNC: 90 U/L (ref 40–150)
ALT SERPL W P-5'-P-CCNC: 29 U/L (ref 0–70)
AMYLASE SERPL-CCNC: 198 U/L (ref 28–100)
ANION GAP SERPL CALCULATED.3IONS-SCNC: 8 MMOL/L (ref 7–15)
AST SERPL W P-5'-P-CCNC: 27 U/L (ref 0–45)
BILIRUB SERPL-MCNC: 0.6 MG/DL
BUN SERPL-MCNC: 31.4 MG/DL (ref 6–20)
CA-I BLD-MCNC: 5 MG/DL (ref 4.4–5.2)
CALCIUM SERPL-MCNC: 9.5 MG/DL (ref 8.8–10.4)
CHLORIDE SERPL-SCNC: 108 MMOL/L (ref 98–107)
CREAT SERPL-MCNC: 1.81 MG/DL (ref 0.67–1.17)
EGFRCR SERPLBLD CKD-EPI 2021: 45 ML/MIN/1.73M2
ERYTHROCYTE [DISTWIDTH] IN BLOOD BY AUTOMATED COUNT: 12.7 % (ref 10–15)
GLUCOSE BLDC GLUCOMTR-MCNC: 123 MG/DL (ref 70–99)
GLUCOSE BLDC GLUCOMTR-MCNC: 151 MG/DL (ref 70–99)
GLUCOSE BLDC GLUCOMTR-MCNC: 76 MG/DL (ref 70–99)
GLUCOSE BLDC GLUCOMTR-MCNC: 82 MG/DL (ref 70–99)
GLUCOSE BLDC GLUCOMTR-MCNC: 96 MG/DL (ref 70–99)
GLUCOSE BLDC GLUCOMTR-MCNC: 97 MG/DL (ref 70–99)
GLUCOSE SERPL-MCNC: 84 MG/DL (ref 70–99)
HCO3 SERPL-SCNC: 17 MMOL/L (ref 22–29)
HCT VFR BLD AUTO: 27 % (ref 40–53)
HGB BLD-MCNC: 9.1 G/DL (ref 13.3–17.7)
LIPASE SERPL-CCNC: 157 U/L (ref 13–60)
MAGNESIUM SERPL-MCNC: 1.6 MG/DL (ref 1.7–2.3)
MCH RBC QN AUTO: 32.5 PG (ref 26.5–33)
MCHC RBC AUTO-ENTMCNC: 33.7 G/DL (ref 31.5–36.5)
MCV RBC AUTO: 96 FL (ref 78–100)
PHOSPHATE SERPL-MCNC: 2.5 MG/DL (ref 2.5–4.5)
PLATELET # BLD AUTO: 125 10E3/UL (ref 150–450)
POTASSIUM SERPL-SCNC: 5.4 MMOL/L (ref 3.4–5.3)
PROT SERPL-MCNC: 5.1 G/DL (ref 6.4–8.3)
RBC # BLD AUTO: 2.8 10E6/UL (ref 4.4–5.9)
SODIUM SERPL-SCNC: 133 MMOL/L (ref 135–145)
WBC # BLD AUTO: 8.3 10E3/UL (ref 4–11)

## 2024-07-27 PROCEDURE — 250N000013 HC RX MED GY IP 250 OP 250 PS 637: Performed by: PHYSICIAN ASSISTANT

## 2024-07-27 PROCEDURE — 84100 ASSAY OF PHOSPHORUS: CPT | Performed by: PHYSICIAN ASSISTANT

## 2024-07-27 PROCEDURE — 99233 SBSQ HOSP IP/OBS HIGH 50: CPT | Mod: 24 | Performed by: INTERNAL MEDICINE

## 2024-07-27 PROCEDURE — 250N000013 HC RX MED GY IP 250 OP 250 PS 637: Performed by: INTERNAL MEDICINE

## 2024-07-27 PROCEDURE — 250N000012 HC RX MED GY IP 250 OP 636 PS 637: Performed by: PHYSICIAN ASSISTANT

## 2024-07-27 PROCEDURE — 999N000111 HC STATISTIC OT IP EVAL DEFER: Performed by: OCCUPATIONAL THERAPIST

## 2024-07-27 PROCEDURE — 83735 ASSAY OF MAGNESIUM: CPT | Performed by: PHYSICIAN ASSISTANT

## 2024-07-27 PROCEDURE — 80053 COMPREHEN METABOLIC PANEL: CPT | Performed by: PHYSICIAN ASSISTANT

## 2024-07-27 PROCEDURE — 82330 ASSAY OF CALCIUM: CPT | Performed by: PHYSICIAN ASSISTANT

## 2024-07-27 PROCEDURE — 99291 CRITICAL CARE FIRST HOUR: CPT | Mod: 24 | Performed by: INTERNAL MEDICINE

## 2024-07-27 PROCEDURE — 97116 GAIT TRAINING THERAPY: CPT | Mod: GP

## 2024-07-27 PROCEDURE — 82150 ASSAY OF AMYLASE: CPT | Performed by: PHYSICIAN ASSISTANT

## 2024-07-27 PROCEDURE — 97530 THERAPEUTIC ACTIVITIES: CPT | Mod: GP

## 2024-07-27 PROCEDURE — 120N000011 HC R&B TRANSPLANT UMMC

## 2024-07-27 PROCEDURE — 250N000013 HC RX MED GY IP 250 OP 250 PS 637: Performed by: SURGERY

## 2024-07-27 PROCEDURE — 83690 ASSAY OF LIPASE: CPT | Performed by: PHYSICIAN ASSISTANT

## 2024-07-27 PROCEDURE — 250N000011 HC RX IP 250 OP 636: Performed by: PHYSICIAN ASSISTANT

## 2024-07-27 PROCEDURE — 258N000003 HC RX IP 258 OP 636: Performed by: PHYSICIAN ASSISTANT

## 2024-07-27 PROCEDURE — 250N000012 HC RX MED GY IP 250 OP 636 PS 637: Performed by: SURGERY

## 2024-07-27 PROCEDURE — 85027 COMPLETE CBC AUTOMATED: CPT | Performed by: PHYSICIAN ASSISTANT

## 2024-07-27 PROCEDURE — 36415 COLL VENOUS BLD VENIPUNCTURE: CPT | Performed by: PHYSICIAN ASSISTANT

## 2024-07-27 RX ORDER — MAGNESIUM OXIDE 400 MG/1
400 TABLET ORAL 2 TIMES DAILY
Status: DISCONTINUED | OUTPATIENT
Start: 2024-07-27 | End: 2024-07-28

## 2024-07-27 RX ORDER — PANTOPRAZOLE SODIUM 40 MG/1
40 TABLET, DELAYED RELEASE ORAL
Status: DISCONTINUED | OUTPATIENT
Start: 2024-07-27 | End: 2024-07-30 | Stop reason: HOSPADM

## 2024-07-27 RX ORDER — DAPSONE 25 MG/1
50 TABLET ORAL DAILY
Status: DISCONTINUED | OUTPATIENT
Start: 2024-07-27 | End: 2024-07-30 | Stop reason: HOSPADM

## 2024-07-27 RX ORDER — TACROLIMUS 1 MG/1
2 CAPSULE ORAL
Status: DISCONTINUED | OUTPATIENT
Start: 2024-07-27 | End: 2024-07-29

## 2024-07-27 RX ORDER — MAGNESIUM OXIDE 400 MG/1
400 TABLET ORAL EVERY 4 HOURS
Status: DISCONTINUED | OUTPATIENT
Start: 2024-07-27 | End: 2024-07-27

## 2024-07-27 RX ORDER — SODIUM BICARBONATE 650 MG/1
1300 TABLET ORAL 3 TIMES DAILY
Status: DISCONTINUED | OUTPATIENT
Start: 2024-07-27 | End: 2024-07-29

## 2024-07-27 RX ADMIN — HEPARIN SODIUM 5000 UNITS: 5000 INJECTION, SOLUTION INTRAVENOUS; SUBCUTANEOUS at 11:20

## 2024-07-27 RX ADMIN — ONDANSETRON 4 MG: 2 INJECTION INTRAMUSCULAR; INTRAVENOUS at 02:47

## 2024-07-27 RX ADMIN — PANTOPRAZOLE SODIUM 40 MG: 40 TABLET, DELAYED RELEASE ORAL at 08:38

## 2024-07-27 RX ADMIN — SODIUM CHLORIDE 1000 ML: 9 INJECTION, SOLUTION INTRAVENOUS at 11:33

## 2024-07-27 RX ADMIN — HYDRALAZINE HYDROCHLORIDE 25 MG: 25 TABLET ORAL at 06:40

## 2024-07-27 RX ADMIN — CARVEDILOL 12.5 MG: 12.5 TABLET, FILM COATED ORAL at 08:39

## 2024-07-27 RX ADMIN — MAGNESIUM OXIDE TAB 400 MG (241.3 MG ELEMENTAL MG) 400 MG: 400 (241.3 MG) TAB at 08:39

## 2024-07-27 RX ADMIN — ONDANSETRON 4 MG: 2 INJECTION INTRAMUSCULAR; INTRAVENOUS at 08:46

## 2024-07-27 RX ADMIN — HEPARIN SODIUM 5000 UNITS: 5000 INJECTION, SOLUTION INTRAVENOUS; SUBCUTANEOUS at 04:46

## 2024-07-27 RX ADMIN — HEPARIN SODIUM 5000 UNITS: 5000 INJECTION, SOLUTION INTRAVENOUS; SUBCUTANEOUS at 20:17

## 2024-07-27 RX ADMIN — MYCOPHENOLATE MOFETIL 1000 MG: 500 TABLET, FILM COATED ORAL at 18:01

## 2024-07-27 RX ADMIN — Medication 10 MG: at 20:17

## 2024-07-27 RX ADMIN — TACROLIMUS 2 MG: 1 CAPSULE ORAL at 18:02

## 2024-07-27 RX ADMIN — CARVEDILOL 12.5 MG: 12.5 TABLET, FILM COATED ORAL at 20:12

## 2024-07-27 RX ADMIN — VALGANCICLOVIR HYDROCHLORIDE 900 MG: 450 TABLET ORAL at 08:39

## 2024-07-27 RX ADMIN — SENNOSIDES AND DOCUSATE SODIUM 2 TABLET: 8.6; 5 TABLET ORAL at 20:16

## 2024-07-27 RX ADMIN — NYSTATIN 500000 UNITS: 100000 SUSPENSION ORAL at 08:39

## 2024-07-27 RX ADMIN — MAGNESIUM OXIDE TAB 400 MG (241.3 MG ELEMENTAL MG) 400 MG: 400 (241.3 MG) TAB at 20:12

## 2024-07-27 RX ADMIN — SODIUM BICARBONATE 1300 MG: 650 TABLET ORAL at 13:56

## 2024-07-27 RX ADMIN — ASPIRIN 81 MG CHEWABLE TABLET 81 MG: 81 TABLET CHEWABLE at 08:40

## 2024-07-27 RX ADMIN — TICAGRELOR 90 MG: 90 TABLET ORAL at 08:39

## 2024-07-27 RX ADMIN — SODIUM BICARBONATE 1300 MG: 650 TABLET ORAL at 08:39

## 2024-07-27 RX ADMIN — NYSTATIN 500000 UNITS: 100000 SUSPENSION ORAL at 20:17

## 2024-07-27 RX ADMIN — TACROLIMUS 2 MG: 1 CAPSULE ORAL at 08:39

## 2024-07-27 RX ADMIN — MYCOPHENOLATE MOFETIL 1000 MG: 500 TABLET, FILM COATED ORAL at 08:39

## 2024-07-27 RX ADMIN — TICAGRELOR 90 MG: 90 TABLET ORAL at 20:12

## 2024-07-27 RX ADMIN — ATORVASTATIN CALCIUM 40 MG: 40 TABLET, FILM COATED ORAL at 20:12

## 2024-07-27 RX ADMIN — NYSTATIN 500000 UNITS: 100000 SUSPENSION ORAL at 11:20

## 2024-07-27 RX ADMIN — DAPSONE 50 MG: 25 TABLET ORAL at 11:19

## 2024-07-27 RX ADMIN — SODIUM BICARBONATE 1300 MG: 650 TABLET ORAL at 20:12

## 2024-07-27 RX ADMIN — AMLODIPINE BESYLATE 10 MG: 10 TABLET ORAL at 08:39

## 2024-07-27 ASSESSMENT — ACTIVITIES OF DAILY LIVING (ADL)
ADLS_ACUITY_SCORE: 23
ADLS_ACUITY_SCORE: 24
ADLS_ACUITY_SCORE: 23
ADLS_ACUITY_SCORE: 23
ADLS_ACUITY_SCORE: 24
ADLS_ACUITY_SCORE: 22
ADLS_ACUITY_SCORE: 23
ADLS_ACUITY_SCORE: 24
ADLS_ACUITY_SCORE: 23
ADLS_ACUITY_SCORE: 24
ADLS_ACUITY_SCORE: 23
ADLS_ACUITY_SCORE: 22
ADLS_ACUITY_SCORE: 24

## 2024-07-27 NOTE — PROGRESS NOTES
Transplant Surgery  Inpatient Daily Progress Note  07/27/2024    Assessment & Plan: 49 year old male with PMH significant for DMII (on insulin pump) and resulting ESKD (on HD every MWF since 8/2021). 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 Underwent simultaneous kidney pancreas transplant on 7/20/24 complicated by VT/VF arrest in PACU. Received 2 rounds of CPR before ROSC. Taken emergently to the cath lab and found to have 100% occlusion of the RCA now s/p IBAN x 2.    Graft function:  S/p pancreas transplant: Lipase 157<-199 <-159, slight upward trend likely related to bowel function. On insulin gtt initially post operatively with steroids, now stopped. Patent post-op US.  S/p kidney transplant: Pre-op Cr 5.22, now trending down to 1.8. Robust urine output. Patent post-op US. Removed Burt POD 4.   Immunosuppression management:   Induction:  Thymo 200/200/200/75 mg (complete, 6.6 mg/kg)  Solu-Medrol: 500/250/100 mg  Maintenance:  Tacrolimus 2 mg BID, goal 8-10  MMF 1000 mg BID  Hematology: DAPT- Brilinta + ASA for at least 1 month. Will not give heparin at this time due to DAPT.  Cardiorespiratory:   CAD, VT/VF arrest s/p PCI with IBAN: Previous CABG 2019, and IBAN 2019 with 100% thrombosis of the RCA, IBAN x 2 on 7/20/24. Consult cards. Start PTA lipom air.   Hypertension: Goal SBP <165 mmHg. Ok with permissive hyitor.   Acute respiratory failure: Extubated 7/21 PM. Required ropertension to support kidney perfusion. Was on Nicardipine gtt with nausea initially. Now tolerating oral meds, Coreg increased to 12.5, Hydralazine 25 mg every 6 hours (hold today with hypotension), and Norvasc 10 mg daily. Weaned off of nicardipine 7/25.  GI/Nutrition: + BM. Tolerating regular diet.   Endocrine:   Steroid hyperglycemia: Insulin drip stopped  as above  Fluid/Electrolytes: MIVF: discontinued with diet initiation  Hyperkalemia: Resolved with medical management without need for dialysis  : Burt remained due to new surgical anastomosis, removed POD 4. Negative PVR.   Infectious disease: Luci-op prophylaxis with meropenem completed, micafungin x7 days then start nystatin ppx. Start Dapsone for ppx.   Prophylaxis: DVT, fall, GI, fungal  Disposition: 7A, plan for discharge tomorrow     KAT/Fellow/Resident Provider: Libra Valentino PA-C [id number]    Faculty: Jarvis Michaud MD  _________________________________________________________________  Transplant History: Admitted 7/19/2024 for kidney pancreas transplant.  7/20/2024 (Kidney / Pancreas), Postoperative day: 7     Interval History: History is obtained from the patient  Overnight events: No complaints. Tolerating diet.     ROS:   A 10-point review of systems was negative except as noted above.    Meds:  Current Facility-Administered Medications   Medication Dose Route Frequency Provider Last Rate Last Admin    amLODIPine (NORVASC) tablet 10 mg  10 mg Oral Daily Julienne Ibrahim PA-C   10 mg at 07/27/24 0839    aspirin (ASA) chewable tablet 81 mg  81 mg Oral or Feeding Tube Daily Julienne Ibrahim PA-C   81 mg at 07/27/24 0840    atorvastatin (LIPITOR) tablet 40 mg  40 mg Oral QPM Julienne Ibrahim PA-C   40 mg at 07/26/24 2008    carvedilol (COREG) tablet 12.5 mg  12.5 mg Oral or Feeding Tube BID Julienne Ibrahim PA-C   12.5 mg at 07/27/24 0839    dapsone (ACZONE) tablet 50 mg  50 mg Oral Daily Libra Valentino PA-C   50 mg at 07/27/24 1119    heparin ANTICOAGULANT injection 5,000 Units  5,000 Units Subcutaneous Q8H Julienne Ibrahim PA-C   5,000 Units at 07/27/24 1120    [Held by provider] hydrALAZINE (APRESOLINE) tablet 25 mg  25 mg Oral Q8H Julienne Gambino PA-C   25 mg at 07/27/24 0640    insulin aspart (NovoLOG) injection (RAPID ACTING)  1-7 Units  "Subcutaneous Q4H Julienne Ibrahim PA-C   1 Units at 07/26/24 1210    magnesium oxide (MAG-OX) tablet 400 mg  400 mg Oral BID Libra Valentino PA-C        mycophenolate (GENERIC EQUIVALENT) tablet 1,000 mg  1,000 mg Oral BID IS Julienne Ibrahim PA-C   1,000 mg at 07/27/24 0839    nystatin (MYCOSTATIN) suspension 500,000 Units  500,000 Units Oral 4x Daily Julienne Ibrahim PA-C   500,000 Units at 07/27/24 1120    pantoprazole (PROTONIX) EC tablet 40 mg  40 mg Oral QAM AC Harrison Whitehead MD   40 mg at 07/27/24 0838    polyethylene glycol (MIRALAX) Packet 17 g  17 g Oral BID Julienne Ibrahim PA-C   17 g at 07/25/24 0856    senna-docusate (SENOKOT-S/PERICOLACE) 8.6-50 MG per tablet 2 tablet  2 tablet Oral BID Julienne Ibrahim PA-C   2 tablet at 07/26/24 2008    sodium bicarbonate tablet 1,300 mg  1,300 mg Oral TID Grisel Coombs MD   1,300 mg at 07/27/24 1356    sodium chloride (PF) 0.9% PF flush 3 mL  3 mL Intravenous Q8H Julienne Ibrahim PA-C   3 mL at 07/27/24 0847    tacrolimus (GENERIC EQUIVALENT) capsule 2 mg  2 mg Oral BID IS Harrison Whitehead MD   2 mg at 07/27/24 0839    ticagrelor (BRILINTA) tablet 90 mg  90 mg Oral or Feeding Tube BID Julienne Ibrahim PA-C   90 mg at 07/27/24 0839    valGANciclovir (VALCYTE) tablet 900 mg  900 mg Oral Daily Harrison Whitehead MD   900 mg at 07/27/24 0839       Physical Exam:     Admit Weight: 102.4 kg (225 lb 12 oz)    Current vitals:   /62 (BP Location: Right arm)   Pulse 82   Temp 98.2  F (36.8  C) (Oral)   Resp 18   Ht 1.803 m (5' 11\")   Wt 106.8 kg (235 lb 7.2 oz)   SpO2 96%   BMI 32.84 kg/m      Vital sign ranges:    Temp:  [98.1  F (36.7  C)-98.6  F (37  C)] 98.2  F (36.8  C)  Pulse:  [] 82  Resp:  [13-21] 18  BP: (102-156)/(59-72) 102/62  SpO2:  [96 %-100 %] 96 %  Patient Vitals for the past 24 hrs:   BP Temp Temp src Pulse Resp SpO2 " "  07/27/24 1036 102/62 98.2  F (36.8  C) Oral 82 18 96 %   07/27/24 0607 120/59 98.1  F (36.7  C) Oral 73 21 98 %   07/26/24 2355 105/68 98.6  F (37  C) Oral 77 21 98 %   07/26/24 2138 114/71 98.5  F (36.9  C) Oral 83 21 97 %   07/26/24 1800 104/70 -- -- 85 -- 98 %   07/26/24 1700 113/72 -- -- 102 21 100 %   07/26/24 1600 (!) 156/71 98.4  F (36.9  C) Oral 83 13 99 %   07/26/24 1500 (!) 155/66 -- -- 76 20 99 %     General Appearance: Awake, alert, oriented  Skin: normal, warm  Heart: regular rate and rhythm  Lungs: Nonlabored resps on RA  Abdomen: The abdomen is soft. Drain with serosanguinous output  : voiding   Extremities: edema: none  Neurologic: awake, alert, oriented    Data:   CMP  Recent Labs   Lab 07/27/24  1124 07/27/24  0845 07/27/24  0602 07/26/24  2022 07/26/24  1819 07/26/24  0828 07/26/24  0716   NA  --   --  133*  --   --   --  137   POTASSIUM  --   --  5.4*  --  5.3   < > 5.6*   CHLORIDE  --   --  108*  --   --   --  111*   CO2  --   --  17*  --   --   --  18*   GLC 76 151* 84   < >  --    < > 98   BUN  --   --  31.4*  --   --   --  33.7*   CR  --   --  1.81*  --   --   --  1.74*   GFRESTIMATED  --   --  45*  --   --   --  47*   BETHANY  --   --  9.5  --   --   --  9.6   ICAW  --   --  5.0  --   --   --  5.3*   MAG  --   --  1.6*  --   --   --  1.8   PHOS  --   --  2.5  --   --   --  2.4*   AMYLASE  --   --  198*  --   --   --  232*   LIPASE  --   --  157*  --   --   --  199*   ALBUMIN  --   --  3.3*  --   --   --  3.3*   BILITOTAL  --   --  0.6  --   --   --  0.7   ALKPHOS  --   --  90  --   --   --  88   AST  --   --  27  --   --   --  28   ALT  --   --  29  --   --   --  25    < > = values in this interval not displayed.     CBC  Recent Labs   Lab 07/27/24  0602 07/26/24  0716   HGB 9.1* 9.1*   WBC 8.3 7.0   * 114*     COAGS  No lab results found in last 7 days.    Invalid input(s): \"XA\"     Urinalysis  Recent Labs   Lab Test 07/20/24  0609 09/27/21  1220   COLOR Orange* Yellow   APPEARANCE " Slightly Cloudy* Slightly Cloudy*   URINEGLC 50* 150*   URINEBILI Negative Negative   URINEKETONE Negative Negative   SG 1.013 1.013   UBLD Large* Negative   URINEPH 6.5 6.0   PROTEIN 300* >499*   NITRITE Negative Negative   LEUKEST Trace* Small*   RBCU >182* 2   WBCU 105* 4     Virology:  Hepatitis C Antibody   Date Value Ref Range Status   07/19/2024 Nonreactive Nonreactive Final     Comment:     A nonreactive screening test result does not exclude the possibility of exposure to or infection with HCV. Nonreactive screening test results in individuals with prior exposure to HCV may be due to antibody levels below the limit of detection of this assay or lack of reactivity to the HCV antigens used in this assay. Patients with recent HCV infections (<3 months from time of exposure) may have false-negative HCV antibody results due to the time needed for seroconversion (average of 8 to 9 weeks).

## 2024-07-27 NOTE — PROGRESS NOTES
[facility]  Transplant Nephrology Progress Note  Date of Admission:  7/19/2024  Today's Date: 07/27/2024  Requesting physician: Harrison Whitehead*    Reason for Consult:  SPK immunosuppression    Recommendations:   - hold hydralazine with lower BP and up trending creatinine  - I L of sodium chloride for hypotension and possible hypovolemia  - can decrease or have hold parameters for his coreg    Assessment & Plan   # DDKT (SPK): Trend up. Likely in setting of hypovolemia and hypotension.    - Baseline Creatinine: ~ TBD   - Proteinuria: Not checked post transplant   - DSA Hx:  Final cross match pending    - Last cPRA: 11%   - BK Viremia: Not checked post transplant   - Kidney Tx Biopsy Hx: No biopsy history.    # Pancreas Tx (SPK):    - Pancreatic Exocrine Drainage: Enteric drained     - Blood glucose: Elevated blood glucose      On insulin: Yes minimal dose   -  Latest HbA1c: 4%   - Pancreatic enzymes: Trend up   - DSA Hx:  final crossmatch pending   - Pancreas Tx Biopsy Hx: No biopsy history    # Immunosuppression: Tacrolimus immediate release (goal 8-10) and Mycophenolate mofetil (dose 1000 mg every 12 hours)   - Induction with Recent Transplant:  High Intensity Protocol   - Continue with intensive monitoring of immunosuppression for efficacy and toxicity.   - Historical Changes in Immunosuppression: None   - Changes: No,    # VT/VF Cardiac arrest:  # CAD s/p CABG 2019 LIMA-LAD, SVG-RCA, IBAN 1/2019, IBAN to RCA 2/2024   - s/p inferior STEMI 2/2 ostial proximal RCA in stent thrombosis   - s/p 2 IBAN to RCA 7/20/2024   - Echo: LVEF=37%.Moderate diffuse hypokinesis with akinesis of all inferior segments.  Mild right ventricular dilation is present.Global  right ventricular function is moderately reduced.    # Infection Prevention:      - PJP: Dapsone   - CMV IgG Ab High Risk Discordance (D+/R-): No  - EBV IgG Ab High Risk Discordance (D+/R-): No    # Blood Pressure:   hypotensive ;  Goal BP: MAP > 65   - Changes: Yes - hold hydralazine      # Anemia in Chronic Renal Disease: Hgb: Trend down post SPK     KERRI: No   - Iron studies: Not checked recently. Monitor Hb trends    # Mineral Bone Disorder:    - Secondary renal hyperparathyroidism; PTH level: Unknown at this time, but checked with dialysis        On treatment: None  - Vitamin D; level: High        On supplement: No  - Calcium; level: High        On supplement: No  - Phosphorus; level: Normal        On supplement: No    Hypercalcemia, resolved. Continue to hold supplementation for now.    # Electrolytes:   - Potassium; level: High , improved on repeat check      On supplement: No  - Magnesium; level: Normal        On supplement: Yes  - Bicarbonate; level: Low normal        On supplement: No  - Sodium; level: Normal    # Other Significant PMH:   - Left diaphragmatic elevation; 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    Grisel Coombs MD  Transplant Nephrology  Contact information via Vocera Web Console or via [facility] Paging/Directory      Interval History  Mr. Ramey's creatinine is 1.7, Trend down  Off pressors  Extubated, on room air  Excellent UOP  Denied chest pain, SOB, N/V/D. No dysuria.    Review of Systems   4 point ROS was obtained and negative except as noted in the Interval History.    MEDICATIONS:  Current Facility-Administered Medications   Medication Dose Route Frequency Provider Last Rate Last Admin    amLODIPine (NORVASC) tablet 10 mg  10 mg Oral Daily Julienne Ibrahim PA-C   10 mg at 07/27/24 0839    aspirin (ASA) chewable tablet 81 mg  81 mg Oral or Feeding Tube Daily Julienne Ibrahim PA-C   81 mg at 07/27/24  0840    atorvastatin (LIPITOR) tablet 40 mg  40 mg Oral QPM Julienne Ibrahim PA-C   40 mg at 07/26/24 2008    carvedilol (COREG) tablet 12.5 mg  12.5 mg Oral or Feeding Tube BID Julienne Ibrahim PA-C   12.5 mg at 07/27/24 0839    dapsone (ACZONE) tablet 50 mg  50 mg Oral Daily Libra Valentino PA-C   50 mg at 07/27/24 1119    heparin ANTICOAGULANT injection 5,000 Units  5,000 Units Subcutaneous Q8H Julienne Ibrahim PA-C   5,000 Units at 07/27/24 1120    [Held by provider] hydrALAZINE (APRESOLINE) tablet 25 mg  25 mg Oral Q8H [facility] Julienne Ibrahim PA-C   25 mg at 07/27/24 0640    insulin aspart (NovoLOG) injection (RAPID ACTING)  1-7 Units Subcutaneous Q4H Julienne Ibrahim PA-C   1 Units at 07/26/24 1210    magnesium oxide (MAG-OX) tablet 400 mg  400 mg Oral BID Libra Valentino PA-C        mycophenolate (GENERIC EQUIVALENT) tablet 1,000 mg  1,000 mg Oral BID IS Julienne Ibrahim PA-C   1,000 mg at 07/27/24 0839    nystatin (MYCOSTATIN) suspension 500,000 Units  500,000 Units Oral 4x Daily Julienne Ibrahim PA-C   500,000 Units at 07/27/24 1120    pantoprazole (PROTONIX) EC tablet 40 mg  40 mg Oral QAM Harrison Arciniega MD   40 mg at 07/27/24 0838    polyethylene glycol (MIRALAX) Packet 17 g  17 g Oral BID Julienne Ibrahim PA-C   17 g at 07/25/24 0856    senna-docusate (SENOKOT-S/PERICOLACE) 8.6-50 MG per tablet 2 tablet  2 tablet Oral BID Julienne Ibrahim PA-C   2 tablet at 07/26/24 2008    sodium bicarbonate tablet 1,300 mg  1,300 mg Oral TID Grisel Coombs MD   1,300 mg at 07/27/24 1356    sodium chloride (PF) 0.9% PF flush 3 mL  3 mL Intravenous Q8H Julienne Ibrahim PA-C   3 mL at 07/27/24 0847    tacrolimus (GENERIC EQUIVALENT) capsule 2 mg  2 mg Oral BID IS Harrison Whitehead MD   2 mg at 07/27/24 0839    ticagrelor (BRILINTA) tablet 90 mg  90 mg Oral or Feeding Tube BID Julienne Ibrahim  "SIS Lindsey   90 mg at 24 0839    valGANciclovir (VALCYTE) tablet 900 mg  900 mg Oral Daily Harrison Whitehead MD   900 mg at 24 0839     Current Facility-Administered Medications   Medication Dose Route Frequency Provider Last Rate Last Admin       Physical Exam   Temp  Av.4  F (36.3  C)  Min: 96.1  F (35.6  C)  Max: 98.6  F (37  C)  Arterial Line BP  Min: 36/21  Max: 276/115  Arterial Line MAP (mmHg)  Av.3 mmHg  Min: 29 mmHg  Max: 232 mmHg      Pulse  Av.9  Min: 20  Max: 152 Resp  Avg: 15.5  Min: 3  Max: 38  FiO2 (%)  Av %  Min: 70 %  Max: 80 %  SpO2  Av.7 %  Min: 72 %  Max: 100 %    CVP (mmHg): 278 mmHgBP 114/71 (BP Location: Right arm)   Pulse 79   Temp 98.3  F (36.8  C) (Oral)   Resp 16   Ht 1.803 m (5' 11\")   Wt 106.8 kg (235 lb 7.2 oz)   SpO2 98%   BMI 32.84 kg/m     Date 24 07 - 24 0659   Shift 4239-6080 9227-5727 3117-4244 24 Hour Total   INTAKE   I.V. 1839.32   1839.32   NG/   128   Shift Total(mL/kg) 1967.32(19.21)   1967.32(19.21)   OUTPUT   Urine 1325   1325   Emesis/NG output 100   100   Drains 875   875   Shift Total(mL/kg) 2300(22.46)   2300(22.46)   Weight (kg) 102.4 102.4 102.4 102.4      Admit Weight: 102.4 kg (225 lb 12 oz)     GENERAL APPEARANCE: sleeping  HENT: atraumatic  RESP: fine coarse breath sounds b/l  CV: regular rhythm, normal rate  EDEMA: no LE edema bilaterally  ABDOMEN: soft, nondistended, surgical incision  MS: extremities normal - no gross deformities noted  SKIN: no rash  Access LUE AVF +thrill/bruit    Data   All labs reviewed by me.  CMP  Recent Labs   Lab 24  1124 24  0845 24  0602 24  0449 24  2022 24  1819 24  1622 24  1451 24  1209 24  1200 24  0828 24  0716 24  0926 24  0448 24  0328 24  0320   NA  --   --  133*  --   --   --   --   --   --   --   --  137  --  141  --  142   POTASSIUM  --   --  5.4*  " --   --  5.3  --  5.3  --  5.3  --  5.6*  --  4.8  --  4.6   CHLORIDE  --   --  108*  --   --   --   --   --   --   --   --  111*  --  113*  --  111*   CO2  --   --  17*  --   --   --   --   --   --   --   --  18*  --  20*  --  23   ANIONGAP  --   --  8  --   --   --   --   --   --   --   --  8  --  8  --  8   GLC 76 151* 84 82   < >  --    < >  --    < >  --    < > 98   < > 107*   < > 118*   BUN  --   --  31.4*  --   --   --   --   --   --   --   --  33.7*  --  35.8*  --  45.8*   CR  --   --  1.81*  --   --   --   --   --   --   --   --  1.74*  --  1.73*  --  2.07*   GFRESTIMATED  --   --  45*  --   --   --   --   --   --   --   --  47*  --  48*  --  39*   BETHANY  --   --  9.5  --   --   --   --   --   --   --   --  9.6  --  9.6  --  10.0   MAG  --   --  1.6*  --   --   --   --   --   --   --   --  1.8  --  1.9  --  2.0   PHOS  --   --  2.5  --   --   --   --   --   --   --   --  2.4*  --  2.1*  --  2.6   PROTTOTAL  --   --  5.1*  --   --   --   --   --   --   --   --  5.3*  --  5.5*  --  5.7*   ALBUMIN  --   --  3.3*  --   --   --   --   --   --   --   --  3.3*  --  3.6  --  3.6   BILITOTAL  --   --  0.6  --   --   --   --   --   --   --   --  0.7  --  0.6  --  0.6   ALKPHOS  --   --  90  --   --   --   --   --   --   --   --  88  --  87  --  77   AST  --   --  27  --   --   --   --   --   --   --   --  28  --  30  --  47*   ALT  --   --  29  --   --   --   --   --   --   --   --  25  --  34  --  45    < > = values in this interval not displayed.     CBC  Recent Labs   Lab 07/27/24  0602 07/26/24  0716 07/25/24  0448 07/24/24  0320   HGB 9.1* 9.1* 9.8* 9.5*   WBC 8.3 7.0 7.5 5.9   RBC 2.80* 2.76* 2.97* 2.91*   HCT 27.0* 26.1* 27.1* 27.4*   MCV 96 95 91 94   MCH 32.5 33.0 33.0 32.6   MCHC 33.7 34.9 36.2 34.7   RDW 12.7 12.7 12.3 12.2   * 114* 120* 102*     INR  No lab results found in last 7 days.    ABG  Recent Labs   Lab 07/22/24  0923 07/22/24  0421 07/21/24 2002 07/21/24  1811   PH 7.44 7.42 7.39 7.39    PCO2 40 42 44 43   PO2 63* 67* 85 77*   HCO3 27 27 26 26   O2PER 21 35  35 36  35 30      Urine Studies  Recent Labs   Lab Test 07/20/24  0609 09/27/21  1220 07/24/21  1151 06/25/19  1439 06/19/19  0822   COLOR Orange* Yellow Light Yellow Straw Straw   APPEARANCE Slightly Cloudy* Slightly Cloudy* Clear Clear Clear   URINEGLC 50* 150* 200* 200 mg/dL* >1000 mg/dL*   URINEBILI Negative Negative Negative Negative Negative   URINEKETONE Negative Negative Negative Negative Negative   SG 1.013 1.013 1.018 1.010 1.012   UBLD Large* Negative 0.03 mg/dL* Moderate* Trace*   URINEPH 6.5 6.0 6.0 6.5 6.0   PROTEIN 300* >499* 600* 200 mg/dL* 300 mg/dL*   UROBILINOGEN  --   --   --  <2.0 E.U./dL <2.0 E.U./dL   NITRITE Negative Negative Negative Negative Negative   LEUKEST Trace* Small* Negative Negative Negative   RBCU >182* 2 1 25-50* 0-2   WBCU 105* 4 10* 0-5 0-5     No lab results found.  PTH  Recent Labs   Lab Test 07/25/24  0448 07/20/21  1845   PTHI 199* 190*     Iron Studies  No lab results found.    IMAGING:  All imaging studies reviewed by me.    Past Medical History    I have reviewed this patient's medical history and updated it with pertinent information if needed.   Past Medical History:   Diagnosis Date    Acquired elevated diaphragm     Anemia in chronic kidney disease     Angina pectoris (H24)     Chronic kidney disease     Coronary artery disease     Diabetes mellitus, type II (H) 12/2001    Diabetic nephropathy (H)     MARQUEZ (dyspnea on exertion)     Dyslipidemia 12/2001    End stage renal disease (H)     History of blood transfusion 2004    Hypertension     takes medication    Ischemic cardiomyopathy     Metabolic acidosis     Myocardial infarction (H)     STEMI -Diagonal branch of the LAD    Obesity (BMI 30-39.9)     Peripheral neuropathy     Proteinuria     Retinopathy     Vitamin D deficiency        Past Surgical History   I have reviewed this patient's surgical history and updated it with pertinent  information if needed.  Past Surgical History:   Procedure Laterality Date    APPENDECTOMY OPEN N/A 7/19/2024    Procedure: Appendectomy open;  Surgeon: Harrison Whitehead MD;  Location:  OR    BACK SURGERY  2009    L5 disc cut    BENCH KIDNEY  7/19/2024    Procedure: Bench kidney;  Surgeon: Harrison Whitehead MD;  Location: [facility] OR    BENCH PANCREAS N/A 7/19/2024    Procedure: Bench pancreas;  Surgeon: Harrison Whitehead MD;  Location:  OR    BYPASS GRAFT ARTERY CORONARY  06/20/2019    2 vessels    CV CENTRAL VENOUS CATHETER PLACEMENT N/A 7/20/2024    Procedure: Central Venous Catheter Placement;  Surgeon: Dominic Robertson MD;  Location: [facility] CARDIAC CATH LAB    CV CORONARY ANGIOGRAM N/A 01/13/2019    Procedure: Coronary Angiogram;  Surgeon: Cielo Che MD;  Location: [facility] Cath Lab;  Service: Cardiology    CV CORONARY ANGIOGRAM N/A 05/02/2019    Procedure: Coronary Angiogram;  Surgeon: Cielo Che MD;  Location: [facility] Cath Lab;  Service: Cardiology    CV CORONARY ANGIOGRAM N/A 04/30/2020    Procedure: Coronary Angiogram;  Surgeon: Cielo Che MD;  Location: [facility] Cath Lab;  Service: Cardiology    CV CORONARY ANGIOGRAM N/A 07/22/2021    Procedure: CV CORONARY ANGIOGRAM;  Surgeon: Adrian Crow MD;  Location: [facility] CATH LAB CV    CV CORONARY ANGIOGRAM N/A 02/01/2024    Procedure: Coronary Angiogram;  Surgeon: Delroy Carpio MD;  Location: [facility] CARDIAC CATH LAB    CV CORONARY ANGIOGRAM N/A 7/20/2024    Procedure: Coronary Angiogram;  Surgeon: Dominic Robertson MD;  Location: [facility] CARDIAC CATH LAB    CV CORONARY LITHOTRIPSY PCI N/A 7/20/2024    Procedure: Percutaneous Coronary Intervention - Lithotripsy;  Surgeon: Dominic Robertson MD;  Location: [facility] CARDIAC CATH LAB    CV FRACTIONAL FLOW RATIO WIRE N/A 07/22/2021    Procedure: Fractional Flow Ratio Wire;  Surgeon: Tristin  MD Adrian;  Location: [facility] CV    CV INTRAVASULAR ULTRASOUND N/A 2024    Procedure: Intravascular Ultrasound;  Surgeon: Dominic Robertson MD;  Location: [facility] CARDIAC CATH LAB    CV LEFT HEART CATH N/A 2021    Procedure: Left Heart Cath;  Surgeon: Adrian Crow MD;  Location: [facility] CV    CV LEFT HEART CATHETERIZATION WITH LEFT VENTRICULOGRAM N/A 2019    Procedure: Left Heart Catheterization with Left Ventriculogram;  Surgeon: Cielo Che MD;  Location: [facility] Cath Lab;  Service: Cardiology    CV LEFT HEART CATHETERIZATION WITHOUT LEFT VENTRICULOGRAM Left 2020    Procedure: Left Heart Catheterization Without Left Ventriculogram;  Surgeon: Cielo Che MD;  Location: [facility] Cath Lab;  Service: Cardiology    CV PCI N/A 2024    Procedure: Percutaneous Coronary Intervention;  Surgeon: Delroy Carpio MD;  Location: [facility] CARDIAC CATH LAB    CV PCI N/A 2024    Procedure: Percutaneous Coronary Intervention;  Surgeon: Dominic Robertson MD;  Location:  HEART CARDIAC CATH LAB    CV PCI ASPIRATION THROMECTOMY N/A 2024    Procedure: Percutaneous Coronary Intervention Aspiration Thrombectomy;  Surgeon: Dominic Robertson MD;  Location: [facility] CARDIAC CATH LAB    CV PCI STENT DRUG ELUTING N/A 2024    Procedure: Percutaneous Coronary Intervention Stent;  Surgeon: Dominic Robertson MD;  Location:  HEART CARDIAC CATH LAB    CV RIGHT HEART CATHETERIZATION N/A 2020    Procedure: Right Heart Catheterization;  Surgeon: Cielo Che MD;  Location: [facility] Cath Lab;  Service: Cardiology    EYE SURGERY      GENITOURINARY SURGERY      HERNIA REPAIR      OTHER SURGICAL HISTORY      Excise varicocele    STENT, CORONARY, DALE      TRANSPLANT PANCREAS, KIDNEY  DONOR, COMBINED N/A 2024    Procedure: Transplant pancreas, kidney  donor, with ureteral  stent placement;  Surgeon: Harrison Whitehead MD;  Location: [facility] OR    VASCULAR SURGERY         Family History   I have reviewed this patient's family history and updated it with pertinent information if needed.   Family History   Problem Relation Age of Onset    Diabetes Type 2  Mother     Heart Disease Father 60    CABG Father 50        triple bypass    Diabetes Type 2  Father     Depression Sister     Substance Abuse Sister     Ovarian Cancer Maternal Grandmother     Brain Cancer Maternal Grandmother     Pancreatic Cancer Maternal Aunt     Prostate Cancer Maternal Uncle        Social History   I have reviewed this patient's social history and updated it with pertinent information if needed. Siva Ramey  reports that he has never smoked. He has been exposed to tobacco smoke. He has never used smokeless tobacco. He reports that he does not drink alcohol and does not use drugs.    Prior to Admission Medications   Medications Prior to Admission   Medication Sig Dispense Refill Last Dose    acetaminophen (TYLENOL) 500 MG tablet Take 500 mg by mouth every 4 hours as needed   7/19/2024    albuterol (PROAIR HFA/PROVENTIL HFA/VENTOLIN HFA) 108 (90 Base) MCG/ACT inhaler Inhale 2 puffs into the lungs every 4 hours as needed   More than a month    ALPRAZolam (XANAX) 0.25 MG tablet Take 0.25 mg by mouth 3 times daily as needed   More than a month    amLODIPine (NORVASC) 10 MG tablet Take 1 tablet (10 mg) by mouth daily 90 tablet 3 7/18/2024 at 0800    aspirin (ASA) 81 MG chewable tablet Take 1 tablet (81 mg) by mouth daily Starting tomorrow. 30 tablet 3 7/18/2024 at 0800    atorvastatin (LIPITOR) 80 MG tablet Take 1 tablet (80 mg) by mouth at bedtime 90 tablet 3 7/18/2024 at 2200    carvedilol (COREG) 12.5 MG tablet Take 3 tablets (37.5 mg) by mouth 2 times daily (with meals) 540 tablet 3 7/18/2024 at 2200    cholecalciferol, vitamin D3, 5,000 unit Tab [CHOLECALCIFEROL, VITAMIN D3, 5,000 UNIT TAB] Take 5,000  Units by mouth daily.   7/18/2024 at 2200    hydrALAZINE (APRESOLINE) 25 MG tablet Take 1 tablet (25 mg) by mouth 2 times daily 180 tablet 3 7/18/2024 at 0800    icosapent ethyl (VASCEPA) 1 g CAPS capsule Take 2 g by mouth 2 times daily (with meals)   7/18/2024 at 2200    insulin aspart (NOVOLOG VIAL) 100 UNITS/ML vial Inject Subcutaneous 3 times daily (with meals) To be used with the insulin pump       nitroGLYcerin (NITROSTAT) 0.4 MG sublingual tablet PLACE ONE TABLET UNDER TONGUE AS NEEDED FOR CHEST PAIN AS DIRECTED FOR 3 DOSES. IF SYMPTOMS PERSIST 5 MINUTES AFTER 1ST DOSE CALL 911 25 tablet 3 Unknown    VELPHORO 500 MG CHEW chewable tablet Take 1,000 mg by mouth 3 times daily (with meals)   7/18/2024 at 1200    zolpidem (AMBIEN) 5 MG tablet Take 5 mg by mouth nightly as needed for sleep   7/18/2024 at 2200    cloNIDine (CATAPRES) 0.1 MG tablet Take 1 tablet (0.1 mg) by mouth 3 times daily as needed (SBP > 160) 30 tablet 0     glucose (BD GLUCOSE) 4 g chewable tablet glucose 4 gram chewable tablet   CHEW AND SWALLOW 4 TIMES DAILY AS NEEDED       insulin aspart (NOVOLOG PEN) 100 UNIT/ML pen Inject 5 Units Subcutaneous 3 times daily (before meals) 4.5 mL 1

## 2024-07-27 NOTE — PLAN OF CARE
"Goal Outcome Evaluation: progressing    /68 (BP Location: Right arm)   Pulse 77   Temp 98.6  F (37  C) (Oral)   Resp 21   Ht 1.803 m (5' 11\")   Wt 106.8 kg (235 lb 7.2 oz)   SpO2 98%   BMI 32.84 kg/m      Shift: 4634-7907  Isolation Status: N/A  VS: stable on RA, afebrile  Neuro: A&O x4  Behaviors: receptive to cares  BG: q4hr: 109, 86, 82  Labs/Imaging: RN managed Mag; Mag 1.8, K 5.3; pending AM labs  Respiratory: WDL  Cardiac: WDL, on tele  Pain/Nausea: Denied pain and nausea on shift  PRN: N/A  Diet: regular  LDA: R PIV x2; L AV fistula; R AMILCAR with ~ 100 mL serosang output overnight  Infusion(s): N/A  GI/: BM x2 on shift, loose/chunky/brown. Voids spontaneously, AUO, hat in toilet to measure  Skin: midline incision covered with dressing, C/D/I with minimal drainage  Mobility: SBA/UAL  Events/Education: Med card started, lab book updated  Plan: Notify MD of any significant changes, continue plan of care.    Hand off to be given to oncoming RN.       "

## 2024-07-27 NOTE — PLAN OF CARE
"/67 (BP Location: Right arm)   Pulse 79   Temp 98.3  F (36.8  C) (Oral)   Resp 18   Ht 1.803 m (5' 11\")   Wt 106.8 kg (235 lb 7.2 oz)   SpO2 98%   BMI 32.84 kg/m      2283-4552. VSS on RA, afebrile. Q4hr BG WDL. Denies pain or nausea. Regular diet with fair appetite. LBM 7/27. Voiding. Midline incision stapled, MAVERICK. AMILCAR with serosang drainage. UAL in room, worked with PT today. NS bolus given for hypotension and possible hypovolemia  Med card, lab book up to date and reviewed with patient. Possible discharge tomorrow, 7/28.   "

## 2024-07-27 NOTE — PROGRESS NOTES
Care Management Discharge Note    Discharge Date: 07/26/2024       Discharge Disposition:  home    Discharge Services:      Discharge DME:      Discharge Transportation: family or friend will provide    Private pay costs discussed: Not applicable    Does the patient's insurance plan have a 3 day qualifying hospital stay waiver?  No    PAS Confirmation Code:    Patient/family educated on Medicare website which has current facility and service quality ratings:      Education Provided on the Discharge Plan: yes   Persons Notified of Discharge Plans: PCP  Patient/Family in Agreement with the Plan:  yes    Handoff Referral Completed: Yes    Additional Information:    Per patient care rounds Siva will be discharging home today.    Met with Siva at bedside.  Discussed ATC appointments will be set up tomorrow and Sunday.   Discussed M- Th weekly transplant labs.  Offered home care but he declined.  Verbalized understanding.    Call to [facility] to set up appointments.  They will call me back.    Call back from [facility] and they will add him to the schedule for Su and Monday.     Appt.'s are scheduled.    11:09 AM  Discharge has been delayed as PT feels they need more time with him.  Plan is for discharge tomorrow.   Call to [facility] and she will cancel appt. Tomorrow.  When asked to schedule M and Tu she said that the Monday appt. Is good and to message the [facility] team to schedule the Tuesday appt.  Epic message sent with copy to YVETTE Ponce SOT. Sticky note updated.     .    Venus Nelson RN, BA   7/27/2024  Nurse Coordinator, [facility]      Social Work and Care Management Department       SEARCHABLE in [facility] - search CARE COORDINATOR       [place] [address] [place] ([phone]) Saturday & Sunday; ([phone]) [facility] Recognized Holidays     Units: 5A Onc 5201 - 5219 RNCC,  5A Onc 5220 thru 5240 RNCC, 5C OFFSERVICE [phone] RNCC & 5C OFF SERVICE [phone] RNCC       Units: 6B Vocera, 6C Card 6401 thru 6420 RNCC, 6C Card 6502 thru 6514 RNCC & 6C  Card 6515 thru 6566 RNCC        Units: 7A SOT RNCC Vocera, 7B Med Surg Vocera, 7C Med Surg 7401 thru 7418 RNCC & 7C Med Surg 7502 thru 7521 RNCC       Units: 6A Vocera & 4A CVICU Vocera, 4C MICU Vocera, and 4E SICU Vocera         Units: 5 Ortho Vocera & 5 Med Surg Vocera        Units: 6 Med Surg Vocera & 8 Med Surg Vocera

## 2024-07-27 NOTE — DISCHARGE SUMMARY
[facility]    Discharge Summary  Transplant Surgery    Date of Admission:  7/19/2024  Date of Discharge:  7/30/2024  Discharging Provider: Libra Valentino PA-C/Jarvis Michaud MD    Discharge Diagnoses   Active Problems:    Anemia in chronic kidney disease    HTN (hypertension)    Obesity (BMI 30.0-34.9)    Diabetes mellitus, type 2 (H)    Kidney replaced by transplant    Pancreas replaced by transplant (H)    Immunosuppressed status (H24)    Cardiac arrest with ventricular fibrillation (H)    Acute and chronic respiratory failure with hypoxia (H)    Steroid-induced hyperglycemia    Hyperkalemia     History of Present Illness   Siva Ramey is an 49 year old male with PMH significant for DMII (on insulin pump) and resulting ESKD (on HD every MWF since 8/2021). 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 Underwent simultaneous kidney pancreas transplant on 7/20/24 complicated by VT/VF arrest in PACU. Received 2 rounds of CPR before ROSC. Taken emergently to the cath lab and found to have 100% occlusion of the RCA now s/p IBAN x 2.    Hospital Course   Graft function:  S/p pancreas transplant: Lipase 285 on discharge, slight upward trend post operatively likely related to bowel function/diet. On insulin gtt initially post operatively with steroids, now discontinued. Patent post-op US. Repeat US on 7/29 within normal limits. Monitor closely.   S/p kidney transplant: Ureteral stent in place. Pre-op Cr 5.2, trended down to 1.8 on discharge. Robust urine output. Patent post-op US. Removed Burt POD 4.   -AMILCAR removed prior to discharge.     Immunosuppression management:   Induction:  Thymo 200/200/200/75 mg (complete, 6.6 mg/kg)  Solu-Medrol: 500/250/100 mg  Maintenance:  Tacrolimus 3 mg BID, goal  8-10  MMF 1000 mg BID  Hematology: DAPT- Brilinta + ASA for at least 1 month. Will not give heparin at this time due to DAPT.    Cardiorespiratory:   CAD, VT/VF arrest s/p PCI with IBAN: Previous CABG 2019, and IBAN 2019 with 100% thrombosis of the RCA, IBAN x 2 on 7/20/24. Consult cards: recommend starting Metoprolol. Start PTA lipitor.   Hypertension: Goal SBP <165 mmHg. Was on Nicardipine gtt with nausea initially. Now tolerating oral meds, will discharge on metoprolol. Weaned off of nicardipine 7/25.  Acute respiratory failure: Extubated 7/21 PM, now stable on room air.     GI/Nutrition: + BM. Tolerating regular diet.     Endocrine:   Steroid hyperglycemia: Insulin drip stopped as above  Fluid/Electrolytes:   MIVF: discontinued with diet initiation  Hyperkalemia: Resolved with medical management without need for dialysis  : Burt remained due to new surgical anastomosis, removed POD 4. Negative PVR.     Infectious disease: Luci-op prophylaxis with meropenem completed, micafungin x7 days then start nystatin ppx. Start Dapsone for ppx. Valcyte x3 months. CMV D+/R+, EBV D+/R+.       Significant Results and Procedures   Procedure Date:  Case start 7/19, complete 07/20/24    Preoperative Diagnosis:  End Stage renal failure due to diabetes mellitus type 2    Postoperative Diagnosis:  Same    Procedure:  1. 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 A J-J stent was placed.   2. Kidney allograft preparation on Back Table   3. Open appendectomy   4. Whole Pancreas Donation after Brain Death Pancreas Transplant   5. Pancreas allograft preparation on Back Table   6. Open repair of umbilical hernia, primary with closure    Surgeon:  Surgeons and Role:     * Harrison Whitehead MD - Primary     * Abraham Ch MD - Resident - Assisting. Dr. Ch was a secondary assistant for the procedure and participated in the dissection and anastomoses under my  supervision.      * Reed Painter MD - Fellow - Assisting. Dr. Painter was the primary assistant for the procedure and participated in all aspects of the case under my supervision. His presence was necessary due to lack of suitable level surgical .     Fellow/Assistant:  As above    Anesthesia:  General    Specimen:  Appendix (permanent)    Drains:  García-Enciso drain in right iliac fossa    Urine Output:  140 mls    Estimated Blood Loss:  250    Fluids Administered:           Code Status   Full    Primary Care Physician   Trinidad Hansen PA-C    Physical Exam   Temp: 98.3  F (36.8  C) Temp src: Oral BP: (!) 167/77 Pulse: 79   Resp: 16 SpO2: 98 % O2 Device: None (Room air)    Vitals:    07/23/24 0400 07/24/24 0600 07/26/24 0600   Weight: 112.1 kg (247 lb 2.2 oz) 109.4 kg (241 lb 2.9 oz) 106.8 kg (235 lb 7.2 oz)     Vital Signs with Ranges  Temp:  [98.1  F (36.7  C)-98.6  F (37  C)] 98.3  F (36.8  C)  Pulse:  [] 79  Resp:  [13-21] 16  BP: (102-167)/(59-77) 167/77  SpO2:  [96 %-100 %] 98 %  I/O last 3 completed shifts:  In: 3512.67 [P.O.:2140; I.V.:1372.67]  Out: 4715 [Urine:4475; Drains:240]    General Appearance: Awake, alert, oriented  Skin: normal, warm  Heart: regular rate and rhythm  Lungs: Nonlabored resps on RA  Abdomen: The abdomen is soft. Drain with serosanguinous output  : voiding   Extremities: edema: none  Neurologic: awake, alert, oriented    Time Spent on this Encounter   Libra GREEN PA-C, personally saw the patient today and spent greater than 30 minutes discharging this patient.    Discharge Disposition   Discharged to home  Condition at discharge: Stable    Consultations This Hospital Stay   NURSING TO CONSULT FOR VASCULAR ACCESS CARE IP CONSULT  NEPHROLOGY KIDNEY/PANCREAS TRANSPLANT ADULT IP CONSULT  PHARMACY IP CONSULT  CARDIOLOGY GENERAL ADULT IP CONSULT  CARDIOLOGY GENERAL ADULT IP CONSULT  SOCIAL WORK IP CONSULT  SOT MEDICATION HISTORY IP PHARMACY  CONSULT  NUTRITION SERVICES ADULT IP CONSULT  NEPHROLOGY KIDNEY/PANCREAS TRANSPLANT ADULT IP CONSULT  PHARMACY IP CONSULT  NURSING TO CONSULT FOR VASCULAR ACCESS CARE IP CONSULT  PHYSICAL THERAPY ADULT IP CONSULT  OCCUPATIONAL THERAPY ADULT IP CONSULT  CARE MANAGEMENT / SOCIAL WORK IP CONSULT    Discharge Orders      Primary Care - Care Coordination Referral      Reason for your hospital stay    Pancreas and kidney transplant.     Activity    Your activity upon discharge: Walk at least four times a day, lift no greater than 10 pounds for 8 weeks from the time of surgery.  No driving while taking narcotics or 3 weeks after surgery.     Monitor and record    Monitor blood pressure and weight daily.     When to contact your care team    Walk at least four times a day, lift no greater than 10 pounds for 8 weeks from the time of surgery.  No driving while taking narcotics or 3 weeks after surgery.     Wound care and dressings    Instructions to care for your wound at home: If you have staples in place, they will be removed about 3 weeks after surgery. Wash incision daily with soap and water. Do not soak or scrub.     Follow Up and recommended labs and tests    [facility] FOLLOW UP:     1. [facility]: Over the next 2 days you will be seen in the [facility] (ph. [phone], option 3). Your labs will be drawn at the beginning of your appointment. DO NOT take your medications prior to having labs drawn. Please bring all your medications with you from home to take after labs are drawn.     2. Follow up with Dr. Whitehead in Transplant Clinic in 1-2 weeks.     3. Follow up with Transplant Nephrologist as scheduled.     4. Ureteral stent removal in 4-6 weeks, to be scheduled by Transplant Coordinator. If a  does not contact you for this, please contact your transplant coordinator.     5. Follow up with your primary care provider in ~8 weeks. Patient to schedule.     6.  Follow up with cardiology as directed.     Remember to always bring an updated medication list to all appointments.        Call your Transplant Coordinator ([phone]) with questions about Transplant Center appointment scheduling.     LABS:     CBC, BMP, magnesium, phosphorus, lipase, tacrolimus level to be drawn daily while in AT[facility], then every Monday and Thursday by home health care nurse if arranged, or at an outpatient lab.     [facility] Follow-up and recommended labs and tests    Follow up with Dr. Whitehead in Transplant Clinic in 1-2 weeks.     Appointments on [place] and/or [facility] (with [facility] or [facility] provider or service). Call [phone] if you haven't heard regarding these appointments within 7 days of discharge.     Diet    Follow this diet upon discharge: Diet recommendations post-transplant: Heart healthy dietary habits long term (low saturated/trans fat, low sodium). High protein diet x 8 weeks. Practice food safety precautions.     Discharge Medications   Current Discharge Medication List        START taking these medications    Details   dapsone (ACZONE) 25 MG tablet Take 2 tablets (50 mg) by mouth daily  Qty: 30 tablet, Refills: 11    Associated Diagnoses: Kidney replaced by transplant      magnesium hydroxide (MILK OF MAGNESIA) 400 MG/5ML suspension Take 30 mLs by mouth daily as needed for constipation (Use if polyethylene glycol (Miralax) is not effective after 24 hours.)  Qty: 354 mL, Refills: 1    Associated Diagnoses: Kidney replaced by transplant      magnesium oxide (MAG-OX) 400 MG tablet Take 2 tablets (800 mg) by mouth 2 times daily  Qty: 120 tablet, Refills: 1    Associated Diagnoses: Kidney replaced by transplant      methocarbamol (ROBAXIN) 750 MG tablet Take 1 tablet (750 mg) by mouth every 6 hours as needed for muscle spasms  Qty: 20 tablet, Refills: 0    Associated Diagnoses: Kidney replaced by transplant      metoprolol succinate ER (TOPROL XL) 25 MG 24 hr tablet  Take 1 tablet (25 mg) by mouth daily  Qty: 30 tablet, Refills: 2    Associated Diagnoses: Kidney replaced by transplant      mycophenolate (GENERIC EQUIVALENT) 500 MG tablet Take 2 tablets (1,000 mg) by mouth 2 times daily  Qty: 120 tablet, Refills: 11    Associated Diagnoses: Kidney replaced by transplant      nystatin (MYCOSTATIN) 717654 UNIT/ML suspension Take 5 mLs (500,000 Units) by mouth 4 times daily  Qty: 600 mL, Refills: 2    Associated Diagnoses: Kidney replaced by transplant      ondansetron (ZOFRAN ODT) 4 MG ODT tab Take 1 tablet (4 mg) by mouth every 6 hours as needed for nausea or vomiting  Qty: 10 tablet, Refills: 1    Associated Diagnoses: Kidney replaced by transplant      pantoprazole (PROTONIX) 40 MG EC tablet Take 1 tablet (40 mg) by mouth every morning (before breakfast)  Qty: 30 tablet, Refills: 1    Associated Diagnoses: Kidney replaced by transplant      phosphorus tablet 250 mg (PHOSPHA 250 NEUTRAL) 250 MG per tablet Take 2 tablets (500 mg) by mouth 2 times daily  Qty: 60 tablet, Refills: 2    Associated Diagnoses: Kidney replaced by transplant      senna-docusate (SENOKOT-S/PERICOLACE) 8.6-50 MG tablet Take 2 tablets by mouth 2 times daily  Qty: 60 tablet, Refills: 1    Associated Diagnoses: Kidney replaced by transplant      sodium bicarbonate 650 MG tablet Take 3 tablets (1,950 mg) by mouth 3 times daily  Qty: 270 tablet, Refills: 2    Associated Diagnoses: Kidney replaced by transplant      tacrolimus (GENERIC EQUIVALENT) 1 MG capsule Take 3 capsules (3 mg) by mouth 2 times daily  Qty: 180 capsule, Refills: 11    Associated Diagnoses: Kidney replaced by transplant      ticagrelor (BRILINTA) 90 MG tablet Take 1 tablet (90 mg) by mouth or Feeding Tube 2 times daily  Qty: 60 tablet, Refills: 0    Associated Diagnoses: Kidney replaced by transplant      valGANciclovir (VALCYTE) 450 MG tablet Take 2 tablets (900 mg) by mouth daily  Qty: 60 tablet, Refills: 2    Associated Diagnoses: Kidney  replaced by transplant           CONTINUE these medications which have CHANGED    Details   atorvastatin (LIPITOR) 40 MG tablet Take 1 tablet (40 mg) by mouth every evening  Qty: 30 tablet, Refills: 2    Associated Diagnoses: Kidney replaced by transplant           CONTINUE these medications which have NOT CHANGED    Details   acetaminophen (TYLENOL) 500 MG tablet Take 500 mg by mouth every 4 hours as needed      albuterol (PROAIR HFA/PROVENTIL HFA/VENTOLIN HFA) 108 (90 Base) MCG/ACT inhaler Inhale 2 puffs into the lungs every 4 hours as needed      ALPRAZolam (XANAX) 0.25 MG tablet Take 0.25 mg by mouth 3 times daily as needed      aspirin (ASA) 81 MG chewable tablet Take 1 tablet (81 mg) by mouth daily Starting tomorrow.  Qty: 30 tablet, Refills: 3    Associated Diagnoses: S/P right coronary artery (RCA) stent placement      cholecalciferol, vitamin D3, 5,000 unit Tab [CHOLECALCIFEROL, VITAMIN D3, 5,000 UNIT TAB] Take 5,000 Units by mouth daily.      nitroGLYcerin (NITROSTAT) 0.4 MG sublingual tablet PLACE ONE TABLET UNDER TONGUE AS NEEDED FOR CHEST PAIN AS DIRECTED FOR 3 DOSES. IF SYMPTOMS PERSIST 5 MINUTES AFTER 1ST DOSE CALL 911  Qty: 25 tablet, Refills: 3    Associated Diagnoses: Coronary artery disease due to lipid rich plaque           STOP taking these medications       amLODIPine (NORVASC) 10 MG tablet Comments:   Reason for Stopping:         carvedilol (COREG) 12.5 MG tablet Comments:   Reason for Stopping:         cloNIDine (CATAPRES) 0.1 MG tablet Comments:   Reason for Stopping:         glucose (BD GLUCOSE) 4 g chewable tablet Comments:   Reason for Stopping:         hydrALAZINE (APRESOLINE) 25 MG tablet Comments:   Reason for Stopping:         icosapent ethyl (VASCEPA) 1 g CAPS capsule Comments:   Reason for Stopping:         insulin aspart (NOVOLOG PEN) 100 UNIT/ML pen Comments:   Reason for Stopping:         insulin aspart (NOVOLOG VIAL) 100 UNITS/ML vial Comments:   Reason for Stopping:          VELPHORO 500 MG CHEW chewable tablet Comments:   Reason for Stopping:         zolpidem (AMBIEN) 5 MG tablet Comments:   Reason for Stopping:             Allergies   Allergies   Allergen Reactions    Amoxicillin Hives     & generalized pain    Tolerated ceftriaxone in 2023 ([facility]) and 2024 ([facility])    Venlafaxine      lethargic     Data   Most Recent 3 CBC's:  Recent Labs   Lab Test 07/27/24  0602 07/26/24  0716 07/25/24  0448   WBC 8.3 7.0 7.5   HGB 9.1* 9.1* 9.8*   MCV 96 95 91   * 114* 120*      Most Recent 3 BMP's:  Recent Labs   Lab Test 07/27/24  1124 07/27/24  0845 07/27/24  0602 07/26/24 2022 07/26/24  1819 07/26/24  1622 07/26/24  1451 07/26/24  0828 07/26/24  0716 07/25/24  0926 07/25/24  0448   NA  --   --  133*  --   --   --   --   --  137  --  141   POTASSIUM  --   --  5.4*  --  5.3  --  5.3   < > 5.6*  --  4.8   CHLORIDE  --   --  108*  --   --   --   --   --  111*  --  113*   CO2  --   --  17*  --   --   --   --   --  18*  --  20*   BUN  --   --  31.4*  --   --   --   --   --  33.7*  --  35.8*   CR  --   --  1.81*  --   --   --   --   --  1.74*  --  1.73*   ANIONGAP  --   --  8  --   --   --   --   --  8  --  8   BETHANY  --   --  9.5  --   --   --   --   --  9.6  --  9.6   GLC 76 151* 84   < >  --    < >  --    < > 98   < > 107*    < > = values in this interval not displayed.     Most Recent 2 LFT's:  Recent Labs   Lab Test 07/27/24  0602 07/26/24  0716   AST 27 28   ALT 29 25   ALKPHOS 90 88   BILITOTAL 0.6 0.7     Most Recent INR's and Anticoagulation Dosing History:  Anticoagulation Dose History  More data exists         Latest Ref Rng & Units 6/20/2019 6/21/2019 10/4/2020 9/27/2021 2/1/2024 7/19/2024 7/20/2024   Recent Dosing and Labs   INR 0.85 - 1.15 1.30  1.35  1.24  1.03  1.13        Effective 7/11/2021, the reference range for this assay has changed. 1.16  0.96  1.39  1.44       Details          Multiple values from one day are sorted in reverse-chronological order              Most Recent 3 Troponin's:No lab results found.  Most Recent Cholesterol Panel:  Recent Labs   Lab Test 02/01/24  0853 12/19/23  0842   CHOL 179 144   LDL  --  42   HDL 18* 21*   TRIG 693* 520*     Most Recent 6 Bacteria Isolates From Any Culture (See EPIC Reports for Culture Details):No lab results found.  Most Recent TSH, T4 and A1c Labs:  Recent Labs   Lab Test 07/20/24  1138 12/19/23  0842 10/17/23  0806   TSH  --   --  1.78   A1C 6.4*   < > 9.2*    < > = values in this interval not displayed.     I have reviewed history, examined patient and discussed plan with the fellow/resident/KAT.    I concur with the findings in this note.    Time spent on discharge activities: 45 minutes.

## 2024-07-27 NOTE — PROGRESS NOTES
Admitted/transferred from: ICU  Time of arrival on unit ~1800  2 RN full  skin assessment completed by María Elena HUTSON and Baltazar BIRD RNs  Skin assessment finding: issues found L AV fistula, midline incision C/D/I, R AMILCAR drain, healed scar on sternum    Interventions/actions: skin interventions monitor midline incision      Will continue to monitor.

## 2024-07-27 NOTE — PROGRESS NOTES
Occupational Therapy: Orders received. Chart reviewed and discussed with physical therapy.? Occupational Therapy not indicated due to lack of OT needs, PT to follow for IP goals.? Defer discharge recommendations to PT.? Will complete orders.

## 2024-07-28 ENCOUNTER — APPOINTMENT (OUTPATIENT)
Dept: CARDIOLOGY | Facility: CLINIC | Age: 49
DRG: 008 | End: 2024-07-28
Attending: PHYSICIAN ASSISTANT
Payer: MEDICARE

## 2024-07-28 ENCOUNTER — APPOINTMENT (OUTPATIENT)
Dept: PHYSICAL THERAPY | Facility: CLINIC | Age: 49
DRG: 008 | End: 2024-07-28
Attending: SURGERY
Payer: MEDICARE

## 2024-07-28 LAB
ALBUMIN SERPL BCG-MCNC: 3.2 G/DL (ref 3.5–5.2)
ALP SERPL-CCNC: 90 U/L (ref 40–150)
ALT SERPL W P-5'-P-CCNC: 30 U/L (ref 0–70)
AMYLASE SERPL-CCNC: 217 U/L (ref 28–100)
ANION GAP SERPL CALCULATED.3IONS-SCNC: 6 MMOL/L (ref 7–15)
AST SERPL W P-5'-P-CCNC: 23 U/L (ref 0–45)
BILIRUB SERPL-MCNC: 0.5 MG/DL
BUN SERPL-MCNC: 28.2 MG/DL (ref 6–20)
CALCIUM SERPL-MCNC: 9.1 MG/DL (ref 8.8–10.4)
CHLORIDE SERPL-SCNC: 107 MMOL/L (ref 98–107)
CREAT SERPL-MCNC: 1.91 MG/DL (ref 0.67–1.17)
EGFRCR SERPLBLD CKD-EPI 2021: 42 ML/MIN/1.73M2
ERYTHROCYTE [DISTWIDTH] IN BLOOD BY AUTOMATED COUNT: 12.9 % (ref 10–15)
GLUCOSE BLDC GLUCOMTR-MCNC: 101 MG/DL (ref 70–99)
GLUCOSE BLDC GLUCOMTR-MCNC: 102 MG/DL (ref 70–99)
GLUCOSE BLDC GLUCOMTR-MCNC: 105 MG/DL (ref 70–99)
GLUCOSE BLDC GLUCOMTR-MCNC: 110 MG/DL (ref 70–99)
GLUCOSE BLDC GLUCOMTR-MCNC: 88 MG/DL (ref 70–99)
GLUCOSE SERPL-MCNC: 91 MG/DL (ref 70–99)
HCO3 SERPL-SCNC: 19 MMOL/L (ref 22–29)
HCT VFR BLD AUTO: 23.1 % (ref 40–53)
HGB BLD-MCNC: 8.1 G/DL (ref 13.3–17.7)
LIPASE SERPL-CCNC: 209 U/L (ref 13–60)
LVEF ECHO: NORMAL
MAGNESIUM SERPL-MCNC: 1.5 MG/DL (ref 1.7–2.3)
MCH RBC QN AUTO: 32.7 PG (ref 26.5–33)
MCHC RBC AUTO-ENTMCNC: 35.1 G/DL (ref 31.5–36.5)
MCV RBC AUTO: 93 FL (ref 78–100)
PHOSPHATE SERPL-MCNC: 2.3 MG/DL (ref 2.5–4.5)
PLATELET # BLD AUTO: 146 10E3/UL (ref 150–450)
POTASSIUM SERPL-SCNC: 5.3 MMOL/L (ref 3.4–5.3)
PROT SERPL-MCNC: 4.8 G/DL (ref 6.4–8.3)
RBC # BLD AUTO: 2.48 10E6/UL (ref 4.4–5.9)
SODIUM SERPL-SCNC: 132 MMOL/L (ref 135–145)
WBC # BLD AUTO: 8.1 10E3/UL (ref 4–11)

## 2024-07-28 PROCEDURE — 85027 COMPLETE CBC AUTOMATED: CPT | Performed by: PHYSICIAN ASSISTANT

## 2024-07-28 PROCEDURE — 250N000013 HC RX MED GY IP 250 OP 250 PS 637: Performed by: PHYSICIAN ASSISTANT

## 2024-07-28 PROCEDURE — 83690 ASSAY OF LIPASE: CPT | Performed by: PHYSICIAN ASSISTANT

## 2024-07-28 PROCEDURE — 93325 DOPPLER ECHO COLOR FLOW MAPG: CPT

## 2024-07-28 PROCEDURE — 93308 TTE F-UP OR LMTD: CPT | Mod: 26 | Performed by: INTERNAL MEDICINE

## 2024-07-28 PROCEDURE — 83735 ASSAY OF MAGNESIUM: CPT | Performed by: PHYSICIAN ASSISTANT

## 2024-07-28 PROCEDURE — 999N000128 HC STATISTIC PERIPHERAL IV START W/O US GUIDANCE

## 2024-07-28 PROCEDURE — 80053 COMPREHEN METABOLIC PANEL: CPT | Performed by: PHYSICIAN ASSISTANT

## 2024-07-28 PROCEDURE — 250N000013 HC RX MED GY IP 250 OP 250 PS 637: Performed by: INTERNAL MEDICINE

## 2024-07-28 PROCEDURE — 97530 THERAPEUTIC ACTIVITIES: CPT | Mod: GP

## 2024-07-28 PROCEDURE — 84100 ASSAY OF PHOSPHORUS: CPT | Performed by: PHYSICIAN ASSISTANT

## 2024-07-28 PROCEDURE — 36415 COLL VENOUS BLD VENIPUNCTURE: CPT | Performed by: PHYSICIAN ASSISTANT

## 2024-07-28 PROCEDURE — 250N000012 HC RX MED GY IP 250 OP 636 PS 637: Performed by: PHYSICIAN ASSISTANT

## 2024-07-28 PROCEDURE — 250N000013 HC RX MED GY IP 250 OP 250 PS 637: Performed by: SURGERY

## 2024-07-28 PROCEDURE — 99233 SBSQ HOSP IP/OBS HIGH 50: CPT | Mod: 24 | Performed by: INTERNAL MEDICINE

## 2024-07-28 PROCEDURE — 93321 DOPPLER ECHO F-UP/LMTD STD: CPT | Mod: 26 | Performed by: INTERNAL MEDICINE

## 2024-07-28 PROCEDURE — 93325 DOPPLER ECHO COLOR FLOW MAPG: CPT | Mod: 26 | Performed by: INTERNAL MEDICINE

## 2024-07-28 PROCEDURE — 97116 GAIT TRAINING THERAPY: CPT | Mod: GP

## 2024-07-28 PROCEDURE — 250N000012 HC RX MED GY IP 250 OP 636 PS 637: Performed by: SURGERY

## 2024-07-28 PROCEDURE — 120N000011 HC R&B TRANSPLANT UMMC

## 2024-07-28 PROCEDURE — 82150 ASSAY OF AMYLASE: CPT | Performed by: PHYSICIAN ASSISTANT

## 2024-07-28 PROCEDURE — 250N000011 HC RX IP 250 OP 636: Performed by: PHYSICIAN ASSISTANT

## 2024-07-28 RX ORDER — MAGNESIUM OXIDE 400 MG/1
800 TABLET ORAL 2 TIMES DAILY
Status: DISCONTINUED | OUTPATIENT
Start: 2024-07-28 | End: 2024-07-30 | Stop reason: HOSPADM

## 2024-07-28 RX ADMIN — TICAGRELOR 90 MG: 90 TABLET ORAL at 19:48

## 2024-07-28 RX ADMIN — ASPIRIN 81 MG CHEWABLE TABLET 81 MG: 81 TABLET CHEWABLE at 07:49

## 2024-07-28 RX ADMIN — HEPARIN SODIUM 5000 UNITS: 5000 INJECTION, SOLUTION INTRAVENOUS; SUBCUTANEOUS at 19:45

## 2024-07-28 RX ADMIN — SODIUM BICARBONATE 1300 MG: 650 TABLET ORAL at 15:38

## 2024-07-28 RX ADMIN — TACROLIMUS 2 MG: 1 CAPSULE ORAL at 17:57

## 2024-07-28 RX ADMIN — SODIUM BICARBONATE 1300 MG: 650 TABLET ORAL at 19:44

## 2024-07-28 RX ADMIN — HEPARIN SODIUM 5000 UNITS: 5000 INJECTION, SOLUTION INTRAVENOUS; SUBCUTANEOUS at 12:45

## 2024-07-28 RX ADMIN — MAGNESIUM OXIDE TAB 400 MG (241.3 MG ELEMENTAL MG) 800 MG: 400 (241.3 MG) TAB at 19:44

## 2024-07-28 RX ADMIN — MYCOPHENOLATE MOFETIL 1000 MG: 500 TABLET, FILM COATED ORAL at 17:57

## 2024-07-28 RX ADMIN — DAPSONE 50 MG: 25 TABLET ORAL at 07:50

## 2024-07-28 RX ADMIN — PANTOPRAZOLE SODIUM 40 MG: 40 TABLET, DELAYED RELEASE ORAL at 07:49

## 2024-07-28 RX ADMIN — NYSTATIN 500000 UNITS: 100000 SUSPENSION ORAL at 19:44

## 2024-07-28 RX ADMIN — ATORVASTATIN CALCIUM 40 MG: 40 TABLET, FILM COATED ORAL at 19:44

## 2024-07-28 RX ADMIN — MYCOPHENOLATE MOFETIL 1000 MG: 500 TABLET, FILM COATED ORAL at 07:50

## 2024-07-28 RX ADMIN — SENNOSIDES AND DOCUSATE SODIUM 2 TABLET: 8.6; 5 TABLET ORAL at 07:57

## 2024-07-28 RX ADMIN — TACROLIMUS 2 MG: 1 CAPSULE ORAL at 07:50

## 2024-07-28 RX ADMIN — SODIUM BICARBONATE 1300 MG: 650 TABLET ORAL at 07:49

## 2024-07-28 RX ADMIN — HEPARIN SODIUM 5000 UNITS: 5000 INJECTION, SOLUTION INTRAVENOUS; SUBCUTANEOUS at 03:59

## 2024-07-28 RX ADMIN — MAGNESIUM OXIDE TAB 400 MG (241.3 MG ELEMENTAL MG) 400 MG: 400 (241.3 MG) TAB at 07:49

## 2024-07-28 RX ADMIN — NYSTATIN 500000 UNITS: 100000 SUSPENSION ORAL at 12:45

## 2024-07-28 RX ADMIN — AMLODIPINE BESYLATE 10 MG: 10 TABLET ORAL at 07:49

## 2024-07-28 RX ADMIN — VALGANCICLOVIR HYDROCHLORIDE 900 MG: 450 TABLET ORAL at 07:50

## 2024-07-28 RX ADMIN — NYSTATIN 500000 UNITS: 100000 SUSPENSION ORAL at 07:49

## 2024-07-28 RX ADMIN — NYSTATIN 500000 UNITS: 100000 SUSPENSION ORAL at 15:38

## 2024-07-28 RX ADMIN — TICAGRELOR 90 MG: 90 TABLET ORAL at 07:50

## 2024-07-28 RX ADMIN — CARVEDILOL 12.5 MG: 12.5 TABLET, FILM COATED ORAL at 07:50

## 2024-07-28 ASSESSMENT — ACTIVITIES OF DAILY LIVING (ADL)
ADLS_ACUITY_SCORE: 22

## 2024-07-28 NOTE — PROGRESS NOTES
[facility]  Transplant Nephrology Progress Note  Date of Admission:  7/19/2024  Today's Date: 07/28/2024  Requesting physician: Harrison Whitehead*    Reason for Consult:  SPK and immunosuppression    Recommendations:   - hold coreg given lower BP and up trending creatinine (held for you)  - recommend ECHO today    Assessment & Plan   # DDKT (SPK): Trend up. Likely in setting of hypovolemia and hypotension vs others. ECHO likely will help assess his volume status   - Baseline Creatinine: ~ TBD   - Proteinuria: Not checked post transplant   - DSA Hx:  Final cross match pending    - Last cPRA: 11%   - BK Viremia: Not checked post transplant   - Kidney Tx Biopsy Hx: No biopsy history.    # Pancreas Tx (SPK):    - Pancreatic Exocrine Drainage: Enteric drained     - Blood glucose: Elevated blood glucose      On insulin: Yes minimal dose   -  Latest HbA1c: 4%   - Pancreatic enzymes: Trend up   - DSA Hx:  final crossmatch pending   - Pancreas Tx Biopsy Hx: No biopsy history    # Immunosuppression: Tacrolimus immediate release (goal 8-10) and Mycophenolate mofetil (dose 1000 mg every 12 hours)   - Induction with Recent Transplant:  High Intensity Protocol   - Continue with intensive monitoring of immunosuppression for efficacy and toxicity.   - Historical Changes in Immunosuppression: None   - Changes: No,    # VT/VF Cardiac arrest:  # CAD s/p CABG 2019 LIMA-LAD, SVG-RCA, IBAN 1/2019, IBAN to RCA 2/2024   - s/p inferior STEMI 2/2 ostial proximal RCA in stent thrombosis   - s/p 2 IBAN to RCA 7/20/2024   - Echo: LVEF=37%.Moderate diffuse hypokinesis with akinesis of all inferior segments.  Mild right ventricular dilation is present.Global  right ventricular function is moderately reduced.    # Infection Prevention:      - PJP: Dapsone   - CMV IgG Ab High Risk Discordance (D+/R-): No  - EBV IgG Ab High Risk Discordance (D+/R-): No    # Blood Pressure:  hypotensive ;  Goal BP:  MAP > 65   - Changes: Yes - hold hydralazine      # Anemia in Chronic Renal Disease: Hgb: Trend down post SPK     KERRI: No   - Iron studies: Not checked recently. Monitor Hb trends    # Mineral Bone Disorder:    - Secondary renal hyperparathyroidism; PTH level: Unknown at this time, but checked with dialysis        On treatment: None  - Vitamin D; level: High        On supplement: No  - Calcium; level: Normal        On supplement: No  - Phosphorus; level: Low normal        On supplement: No    Hypercalcemia, resolved. Continue to hold supplementation for now.    # Electrolytes:   - Potassium; level: High normal      On supplement: No  - Magnesium; level: Normal        On supplement: Yes  - Bicarbonate; level: Low        On supplement: Yes  - Sodium; level: Normal    # Other Significant PMH:   - Left diaphragmatic elevation; 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    Grisel Coombs MD  Transplant Nephrology  Contact information via Vocera Web Console or via [facility] Paging/Directory      Interval History  Mr. Ramey's creatinine is 1.9, Trend up  Off pressors  Extubated, on room air  Excellent UOP  Denied chest pain, SOB, N/V/D. No dysuria.  Had 2 regular BMs    Review of Systems   4 point ROS was obtained and negative except as noted in the Interval History.    MEDICATIONS:  Current Facility-Administered Medications   Medication Dose Route Frequency Provider Last Rate Last Admin    amLODIPine (NORVASC) tablet 10 mg  10 mg Oral Daily Julienne Ibrahim PA-C   10 mg at 07/28/24 0749    aspirin (ASA) chewable tablet 81 mg  81 mg Oral or Feeding Tube Daily Julienne Ibrahim PA-C   81 mg at 07/28/24 0749    atorvastatin  (LIPITOR) tablet 40 mg  40 mg Oral QPM Julienne Ibrahim PA-C   40 mg at 07/27/24 2012    [Held by provider] carvedilol (COREG) tablet 12.5 mg  12.5 mg Oral or Feeding Tube BID Julienne Ibrahim PA-C   12.5 mg at 07/28/24 0750    dapsone (ACZONE) tablet 50 mg  50 mg Oral Daily Libra Valentino PA-C   50 mg at 07/28/24 0750    heparin ANTICOAGULANT injection 5,000 Units  5,000 Units Subcutaneous Q8H Julienne Ibrahim PA-C   5,000 Units at 07/28/24 0359    [Held by provider] hydrALAZINE (APRESOLINE) tablet 25 mg  25 mg Oral Q8H [facility] Julienne Ibrahim PA-C   25 mg at 07/27/24 0640    insulin aspart (NovoLOG) injection (RAPID ACTING)  1-7 Units Subcutaneous Q4H Julienne Ibrahim PA-C   1 Units at 07/26/24 1210    magnesium oxide (MAG-OX) tablet 800 mg  800 mg Oral BID Libra Valentino PA-C        mycophenolate (GENERIC EQUIVALENT) tablet 1,000 mg  1,000 mg Oral BID IS Julienne Ibrahim PA-C   1,000 mg at 07/28/24 0750    nystatin (MYCOSTATIN) suspension 500,000 Units  500,000 Units Oral 4x Daily Julienne Ibrahim PA-C   500,000 Units at 07/28/24 0749    pantoprazole (PROTONIX) EC tablet 40 mg  40 mg Oral QAM Harrison Arciniega MD   40 mg at 07/28/24 0749    polyethylene glycol (MIRALAX) Packet 17 g  17 g Oral BID Julienne Ibrahim PA-C   17 g at 07/25/24 0856    senna-docusate (SENOKOT-S/PERICOLACE) 8.6-50 MG per tablet 2 tablet  2 tablet Oral BID Julienne Ibrahim PA-C   2 tablet at 07/28/24 0757    sodium bicarbonate tablet 1,300 mg  1,300 mg Oral TID Grisel Coombs MD   1,300 mg at 07/28/24 0749    sodium chloride (PF) 0.9% PF flush 3 mL  3 mL Intravenous Q8H Julienne Ibrahim PA-C   3 mL at 07/28/24 0757    tacrolimus (GENERIC EQUIVALENT) capsule 2 mg  2 mg Oral BID IS Harrison Whitehead MD   2 mg at 07/28/24 0750    ticagrelor (BRILINTA) tablet 90 mg  90 mg Oral or Feeding Tube BID Julienne Ibrahim  "SIS Lindsey   90 mg at 24 0750    valGANciclovir (VALCYTE) tablet 900 mg  900 mg Oral Daily Harrison Whitehead MD   900 mg at 24 0750     Current Facility-Administered Medications   Medication Dose Route Frequency Provider Last Rate Last Admin       Physical Exam   Temp  Av.4  F (36.3  C)  Min: 96.1  F (35.6  C)  Max: 98.6  F (37  C)  Arterial Line BP  Min: 36/21  Max: 276/115  Arterial Line MAP (mmHg)  Av.3 mmHg  Min: 29 mmHg  Max: 232 mmHg      Pulse  Av.9  Min: 20  Max: 152 Resp  Avg: 15.5  Min: 3  Max: 38  FiO2 (%)  Av %  Min: 70 %  Max: 80 %  SpO2  Av.7 %  Min: 72 %  Max: 100 %    CVP (mmHg): 278 mmHgBP 126/66 (BP Location: Right arm)   Pulse 81   Temp 98.2  F (36.8  C) (Oral)   Resp 16   Ht 1.803 m (5' 11\")   Wt 102.6 kg (226 lb 3.2 oz)   SpO2 98%   BMI 31.55 kg/m     Date 24 07 - 24 0659   Shift 4064-2410 4269-1590 5632-4116 24 Hour Total   INTAKE   I.V. 1839.32   1839.32   NG/   128   Shift Total(mL/kg) 1967.32(19.21)   1967.32(19.21)   OUTPUT   Urine 1325   1325   Emesis/NG output 100   100   Drains 875   875   Shift Total(mL/kg) 2300(22.46)   2300(22.46)   Weight (kg) 102.4 102.4 102.4 102.4      Admit Weight: 102.4 kg (225 lb 12 oz)     GENERAL APPEARANCE: awake and no distress  HENT: atraumatic  RESP: fine coarse breath sounds b/l  CV: regular rhythm, normal rate  EDEMA: no LE edema bilaterally  ABDOMEN: soft, nondistended, surgical incision  MS: extremities normal - no gross deformities noted  SKIN: no rash  Access LUE AVF +thrill/bruit    Data   All labs reviewed by me.  CMP  Recent Labs   Lab 24  0615 24  0401 24  0018 24  2143 24  0845 24  0602 24  2022 24  1819 24  1622 24  1451 24  0828 24  0716 24  0926 24  0448   *  --   --   --   --  133*  --   --   --   --   --  137  --  141   POTASSIUM 5.3  --   --   --   --  5.4*  --  5.3  -- "  5.3   < > 5.6*  --  4.8   CHLORIDE 107  --   --   --   --  108*  --   --   --   --   --  111*  --  113*   CO2 19*  --   --   --   --  17*  --   --   --   --   --  18*  --  20*   ANIONGAP 6*  --   --   --   --  8  --   --   --   --   --  8  --  8   GLC 91 88 101* 123*   < > 84   < >  --    < >  --    < > 98   < > 107*   BUN 28.2*  --   --   --   --  31.4*  --   --   --   --   --  33.7*  --  35.8*   CR 1.91*  --   --   --   --  1.81*  --   --   --   --   --  1.74*  --  1.73*   GFRESTIMATED 42*  --   --   --   --  45*  --   --   --   --   --  47*  --  48*   BETHANY 9.1  --   --   --   --  9.5  --   --   --   --   --  9.6  --  9.6   MAG 1.5*  --   --   --   --  1.6*  --   --   --   --   --  1.8  --  1.9   PHOS 2.3*  --   --   --   --  2.5  --   --   --   --   --  2.4*  --  2.1*   PROTTOTAL 4.8*  --   --   --   --  5.1*  --   --   --   --   --  5.3*  --  5.5*   ALBUMIN 3.2*  --   --   --   --  3.3*  --   --   --   --   --  3.3*  --  3.6   BILITOTAL 0.5  --   --   --   --  0.6  --   --   --   --   --  0.7  --  0.6   ALKPHOS 90  --   --   --   --  90  --   --   --   --   --  88  --  87   AST 23  --   --   --   --  27  --   --   --   --   --  28  --  30   ALT 30  --   --   --   --  29  --   --   --   --   --  25  --  34    < > = values in this interval not displayed.     CBC  Recent Labs   Lab 07/28/24  0615 07/27/24  0602 07/26/24  0716 07/25/24  0448   HGB 8.1* 9.1* 9.1* 9.8*   WBC 8.1 8.3 7.0 7.5   RBC 2.48* 2.80* 2.76* 2.97*   HCT 23.1* 27.0* 26.1* 27.1*   MCV 93 96 95 91   MCH 32.7 32.5 33.0 33.0   MCHC 35.1 33.7 34.9 36.2   RDW 12.9 12.7 12.7 12.3   * 125* 114* 120*     INR  No lab results found in last 7 days.    ABG  Recent Labs   Lab 07/22/24  0923 07/22/24  0421 07/21/24 2002 07/21/24  1811   PH 7.44 7.42 7.39 7.39   PCO2 40 42 44 43   PO2 63* 67* 85 77*   HCO3 27 27 26 26   O2PER 21 35  35 36  35 30      Urine Studies  Recent Labs   Lab Test 07/20/24  0609 09/27/21  1220 07/24/21  1151 06/25/19  1439  06/19/19  0822   COLOR Orange* Yellow Light Yellow Straw Straw   APPEARANCE Slightly Cloudy* Slightly Cloudy* Clear Clear Clear   URINEGLC 50* 150* 200* 200 mg/dL* >1000 mg/dL*   URINEBILI Negative Negative Negative Negative Negative   URINEKETONE Negative Negative Negative Negative Negative   SG 1.013 1.013 1.018 1.010 1.012   UBLD Large* Negative 0.03 mg/dL* Moderate* Trace*   URINEPH 6.5 6.0 6.0 6.5 6.0   PROTEIN 300* >499* 600* 200 mg/dL* 300 mg/dL*   UROBILINOGEN  --   --   --  <2.0 E.U./dL <2.0 E.U./dL   NITRITE Negative Negative Negative Negative Negative   LEUKEST Trace* Small* Negative Negative Negative   RBCU >182* 2 1 25-50* 0-2   WBCU 105* 4 10* 0-5 0-5     No lab results found.  PTH  Recent Labs   Lab Test 07/25/24  0448 07/20/21  1845   PTHI 199* 190*     Iron Studies  No lab results found.    IMAGING:  All imaging studies reviewed by me.

## 2024-07-28 NOTE — PROGRESS NOTES
Transplant Surgery  Inpatient Daily Progress Note  07/28/2024    Assessment & Plan: 49 year old male with PMH significant for DMII (on insulin pump) and resulting ESKD (on HD every MWF since 8/2021). 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 Underwent simultaneous kidney pancreas transplant on 7/20/24 complicated by VT/VF arrest in PACU. Received 2 rounds of CPR before ROSC. Taken emergently to the cath lab and found to have 100% occlusion of the RCA now s/p IBAN x 2.    Graft function:  S/p pancreas transplant: Lipase 209<-157<-199 <-159, slight upward trend likely related to bowel function. On insulin gtt initially post operatively with steroids, now stopped. Patent post-op US.  S/p kidney transplant: Pre-op Cr 5.2, Cr 1.9 from 1.8. Robust urine output. Patent post-op US. Removed Burt POD 4.   -AMILCAR in place, remove prior to discharge  Immunosuppression management:   Induction:  Thymo 200/200/200/75 mg (complete, 6.6 mg/kg)  Solu-Medrol: 500/250/100 mg  Maintenance:  Tacrolimus 2 mg BID, goal 8-10  MMF 1000 mg BID  Hematology: DAPT- Brilinta + ASA for at least 1 month. Will not give heparin at this time due to DAPT.  Cardiorespiratory:   CAD, VT/VF arrest s/p PCI with IBAN: Previous CABG 2019, and IBAN 2019 with 100% thrombosis of the RCA, IBAN x 2 on 7/20/24. Consult cards. Start PTA lipom air.   Hypertension: Goal SBP <165 mmHg. Ok with permissive hypertension.   Acute respiratory failure: Extubated 7/21 PM. Was on Nicardipine gtt with nausea initially. Now tolerating oral meds, Coreg increased to 12.5 (hold due to HTN), Hydralazine 25 mg every 6 hours (hold with hypotension), and Norvasc 10 mg daily. Weaned off of nicardipine 7/25. CHeck ECHO with hypotension.   GI/Nutrition: + BM. Tolerating regular diet.   Endocrine:   Steroid  hyperglycemia: Insulin drip stopped as above  Fluid/Electrolytes: MIVF: discontinued with diet initiation  Hyperkalemia: Resolved with medical management without need for dialysis  : Burt remained due to new surgical anastomosis, removed POD 4. Negative PVR.   Infectious disease: Luci-op prophylaxis with meropenem completed, micafungin x7 days then start nystatin ppx. Start Dapsone for ppx.   Prophylaxis: DVT, fall, GI, fungal  Disposition: 7A, plan for discharge tomorrow     KAT/Fellow/Resident Provider: Libra Valentino PA-C [id number]    Faculty: Jarvis Michaud MD  _________________________________________________________________  Transplant History: Admitted 7/19/2024 for kidney pancreas transplant.  7/20/2024 (Kidney / Pancreas), Postoperative day: 8     Interval History: History is obtained from the patient  Overnight events: No complaints. Tolerating diet.     ROS:   A 10-point review of systems was negative except as noted above.    Meds:  Current Facility-Administered Medications   Medication Dose Route Frequency Provider Last Rate Last Admin    amLODIPine (NORVASC) tablet 10 mg  10 mg Oral Daily Julienne Ibrahim PA-C   10 mg at 07/28/24 0749    aspirin (ASA) chewable tablet 81 mg  81 mg Oral or Feeding Tube Daily Julienne Ibrahim PA-C   81 mg at 07/28/24 0749    atorvastatin (LIPITOR) tablet 40 mg  40 mg Oral QPM Julienne Ibrahim PA-C   40 mg at 07/27/24 2012    [Held by provider] carvedilol (COREG) tablet 12.5 mg  12.5 mg Oral or Feeding Tube BID Julienne Ibrahim PA-C   12.5 mg at 07/28/24 0750    dapsone (ACZONE) tablet 50 mg  50 mg Oral Daily Libra Valentino PA-C   50 mg at 07/28/24 0750    heparin ANTICOAGULANT injection 5,000 Units  5,000 Units Subcutaneous Q8H Julienne Ibrahim PA-C   5,000 Units at 07/28/24 1245    [Held by provider] hydrALAZINE (APRESOLINE) tablet 25 mg  25 mg Oral Q8H Julienne Gambino PA-C   25 mg at 07/27/24 0640    insulin  "aspart (NovoLOG) injection (RAPID ACTING)  1-7 Units Subcutaneous Q4H Julienne Ibrahim PA-C   1 Units at 07/26/24 1210    magnesium oxide (MAG-OX) tablet 800 mg  800 mg Oral BID Libra Valentino PA-C        mycophenolate (GENERIC EQUIVALENT) tablet 1,000 mg  1,000 mg Oral BID IS Julienne Ibrahim PA-C   1,000 mg at 07/28/24 0750    nystatin (MYCOSTATIN) suspension 500,000 Units  500,000 Units Oral 4x Daily Julienne Ibrahim PA-C   500,000 Units at 07/28/24 1245    pantoprazole (PROTONIX) EC tablet 40 mg  40 mg Oral QAM AC Harrison Whitehead MD   40 mg at 07/28/24 0749    polyethylene glycol (MIRALAX) Packet 17 g  17 g Oral BID Julienne Ibrahim PA-C   17 g at 07/25/24 0856    senna-docusate (SENOKOT-S/PERICOLACE) 8.6-50 MG per tablet 2 tablet  2 tablet Oral BID Julienne Ibrahim PA-C   2 tablet at 07/28/24 0757    sodium bicarbonate tablet 1,300 mg  1,300 mg Oral TID Grisel Coombs MD   1,300 mg at 07/28/24 0749    sodium chloride (PF) 0.9% PF flush 3 mL  3 mL Intravenous Q8H Julienne Ibrahim PA-C   3 mL at 07/28/24 0757    tacrolimus (GENERIC EQUIVALENT) capsule 2 mg  2 mg Oral BID IS Harrison Whitehead MD   2 mg at 07/28/24 0750    ticagrelor (BRILINTA) tablet 90 mg  90 mg Oral or Feeding Tube BID Julienne Ibrahim PA-C   90 mg at 07/28/24 0750    valGANciclovir (VALCYTE) tablet 900 mg  900 mg Oral Daily Harrison Whitehead MD   900 mg at 07/28/24 0750       Physical Exam:     Admit Weight: 102.4 kg (225 lb 12 oz)    Current vitals:   /66 (BP Location: Right arm)   Pulse 81   Temp 98.2  F (36.8  C) (Oral)   Resp 16   Ht 1.803 m (5' 11\")   Wt 102.6 kg (226 lb 3.2 oz)   SpO2 98%   BMI 31.55 kg/m      Vital sign ranges:    Temp:  [98.2  F (36.8  C)-98.4  F (36.9  C)] 98.2  F (36.8  C)  Pulse:  [76-81] 81  Resp:  [16-18] 16  BP: (108-167)/(58-77) 126/66  SpO2:  [91 %-100 %] 98 %  Patient Vitals for the " "past 24 hrs:   BP Temp Temp src Pulse Resp SpO2 Weight   07/28/24 1109 126/66 98.2  F (36.8  C) Oral 81 16 98 % 102.6 kg (226 lb 3.2 oz)   07/28/24 0609 108/64 98.2  F (36.8  C) Oral 78 16 98 % --   07/28/24 0021 111/58 98.3  F (36.8  C) Oral 76 16 100 % --   07/27/24 2142 120/72 98.4  F (36.9  C) Oral 81 18 91 % --   07/27/24 1735 124/67 98.3  F (36.8  C) Oral 79 18 98 % --   07/27/24 1454 114/71 -- -- -- -- -- --   07/27/24 1422 (!) 167/77 98.3  F (36.8  C) Oral 79 16 98 % --     General Appearance: Awake, alert, oriented  Skin: normal, warm  Heart: regular rate and rhythm  Lungs: Nonlabored resps on RA  Abdomen: The abdomen is soft. Drain with serosanguinous output  : voiding   Extremities: edema: none  Neurologic: awake, alert, oriented    Data:   CMP  Recent Labs   Lab 07/28/24  0615 07/28/24  0401 07/27/24  0845 07/27/24  0602 07/26/24  0828 07/26/24  0716   *  --   --  133*  --  137   POTASSIUM 5.3  --   --  5.4*   < > 5.6*   CHLORIDE 107  --   --  108*  --  111*   CO2 19*  --   --  17*  --  18*   GLC 91 88   < > 84   < > 98   BUN 28.2*  --   --  31.4*  --  33.7*   CR 1.91*  --   --  1.81*  --  1.74*   GFRESTIMATED 42*  --   --  45*  --  47*   BETHANY 9.1  --   --  9.5  --  9.6   ICAW  --   --   --  5.0  --  5.3*   MAG 1.5*  --   --  1.6*  --  1.8   PHOS 2.3*  --   --  2.5  --  2.4*   AMYLASE 217*  --   --  198*  --  232*   LIPASE 209*  --   --  157*  --  199*   ALBUMIN 3.2*  --   --  3.3*  --  3.3*   BILITOTAL 0.5  --   --  0.6  --  0.7   ALKPHOS 90  --   --  90  --  88   AST 23  --   --  27  --  28   ALT 30  --   --  29  --  25    < > = values in this interval not displayed.     CBC  Recent Labs   Lab 07/28/24  0615 07/27/24  0602   HGB 8.1* 9.1*   WBC 8.1 8.3   * 125*     COAGS  No lab results found in last 7 days.    Invalid input(s): \"XA\"     Urinalysis  Recent Labs   Lab Test 07/20/24  0609 09/27/21  1220   COLOR Orange* Yellow   APPEARANCE Slightly Cloudy* Slightly Cloudy*   URINEGLC 50* " 150*   URINEBILI Negative Negative   URINEKETONE Negative Negative   SG 1.013 1.013   UBLD Large* Negative   URINEPH 6.5 6.0   PROTEIN 300* >499*   NITRITE Negative Negative   LEUKEST Trace* Small*   RBCU >182* 2   WBCU 105* 4     Virology:  Hepatitis C Antibody   Date Value Ref Range Status   07/19/2024 Nonreactive Nonreactive Final     Comment:     A nonreactive screening test result does not exclude the possibility of exposure to or infection with HCV. Nonreactive screening test results in individuals with prior exposure to HCV may be due to antibody levels below the limit of detection of this assay or lack of reactivity to the HCV antigens used in this assay. Patients with recent HCV infections (<3 months from time of exposure) may have false-negative HCV antibody results due to the time needed for seroconversion (average of 8 to 9 weeks).

## 2024-07-28 NOTE — PLAN OF CARE
"Goal Outcome Evaluation: progressing    /58 (BP Location: Right arm)   Pulse 76   Temp 98.3  F (36.8  C) (Oral)   Resp 16   Ht 1.803 m (5' 11\")   Wt 106.8 kg (235 lb 7.2 oz)   SpO2 100%   BMI 32.84 kg/m      Shift: 9032-2058  Isolation Status: N/A  VS: stable on RA, afebrile  Neuro: A&O x4  Behaviors: receptive to cares  BG: q4hr: 123, 101, 88  Labs/Imaging: K 5.4, Mag 1.6, Phos 2.5, Amylase 198, Creatinine 1.81; pending AM labs  Respiratory: WDL  Cardiac: WDL  Pain/Nausea: Denied nausea and pain  PRN: N/A  Diet: regular, good appetite  LDA: R AMILCAR drain with ~ 40 mL serosang output overnight  Infusion(s): N/A  GI/: LBM 7/27. Voiding spontaneously, AUO, hat in toilet to measure  Skin: midline incision stapled and MAVERICK  Mobility: UAL  Plan: Possible discharge pending team assessment, will receive specialty pharmacy at [facility]. Notify MD of any significant changes, continue plan of care.    Hand off to be given to oncoming RN.       "

## 2024-07-28 NOTE — PLAN OF CARE
"/68 (BP Location: Right arm)   Pulse 86   Temp 99  F (37.2  C) (Oral)   Resp 16   Ht 1.803 m (5' 11\")   Wt 102.6 kg (226 lb 3.2 oz)   SpO2 99%   BMI 31.55 kg/m      9517-3020. VSS on RA, afebrile. ACHS sugars WDL. Denies pain or nausea. Regular diet with a good appetite. Leonidas with serosang drainage. PIV SL. Patient reporting diarrhea. Voiding in urinal, emptying independently and writing totals on whiteboard. Midline incision stapled, MAVERICK. Patient UAL in room, not ambulating much outside of room. Med card and lab book reviewed again with patient. Continue to monitor Cr and lipase which were both increased today. Continue with plan of care and update team with any changes.   "

## 2024-07-28 NOTE — PROGRESS NOTES
Care Management Discharge Note    Discharge Date: 07/26/2024       Discharge Disposition: Home    Discharge Services: None    Discharge DME:      Discharge Transportation: family or friend will provide    Private pay costs discussed: Not applicable    Does the patient's insurance plan have a 3 day qualifying hospital stay waiver?  No    PAS Confirmation Code:    Patient/family educated on Medicare website which has current facility and service quality ratings: no    Education Provided on the Discharge Plan: Yes  Persons Notified of Discharge Plans: PCP, patient  Patient/Family in Agreement with the Plan:  yes    Handoff Referral Completed: Yes    Additional Information:    Plan is for Siva to discharge home today.  He declined home care.  Confirmed [facility]C appt.'s were set up for M and Tu.    IMM explained and signed.  Faxed to HIM.    Siva will not discharge home today.  [facility] weekend  request.    Venus Nelson, RN, BA   7/28/2024  Nurse Coordinator , [facility]     Social Work and Care Management Department       SEARCHABLE in [facility] - search CARE COORDINATOR       [place] ([phone]) Saturday & Sunday; ([phone]) [facility] Recognized Holidays     Units: 5A Onc 5201 - 5219 RNCC,  5A Onc 5220 thru 5240 RNCC, 5C OFFSERVICE 1155-0815 RNCC & 5C OFF SERVICE [phone] RNCC       Units: 6B Vocera, 6C Card 6401 thru 6420 RNCC, 6C Card 6502 thru 6514 RNCC & 6C Card 6515 thru 6519 RNCC        Units: 7A SOT RNCC Vocera, 7B Med Surg Vocera, 7C Med Surg 7401 thru 7418 RNCC & 7C Med Surg 7502 thru 7521 RNCC       Units: 6A Vocera & 4A CVICU Vocera, 4C MICU Vocera, and 4E SICU Vocera         Units: 5 Ortho Vocera & 5 Med Surg Vocera        Units: 6 Med Surg Vocera & 8 Med Surg Vocera

## 2024-07-29 ENCOUNTER — APPOINTMENT (OUTPATIENT)
Dept: PHYSICAL THERAPY | Facility: CLINIC | Age: 49
DRG: 008 | End: 2024-07-29
Attending: SURGERY
Payer: MEDICARE

## 2024-07-29 ENCOUNTER — APPOINTMENT (OUTPATIENT)
Dept: ULTRASOUND IMAGING | Facility: CLINIC | Age: 49
DRG: 008 | End: 2024-07-29
Attending: PHYSICIAN ASSISTANT
Payer: MEDICARE

## 2024-07-29 DIAGNOSIS — Z98.890 OTHER SPECIFIED POSTPROCEDURAL STATES: ICD-10-CM

## 2024-07-29 DIAGNOSIS — Z48.298 AFTERCARE FOLLOWING ORGAN TRANSPLANT: ICD-10-CM

## 2024-07-29 DIAGNOSIS — Z94.0 KIDNEY REPLACED BY TRANSPLANT: Primary | ICD-10-CM

## 2024-07-29 DIAGNOSIS — Z20.828 CONTACT WITH AND (SUSPECTED) EXPOSURE TO OTHER VIRAL COMMUNICABLE DISEASES: ICD-10-CM

## 2024-07-29 DIAGNOSIS — Z79.899 ENCOUNTER FOR LONG-TERM CURRENT USE OF MEDICATION: ICD-10-CM

## 2024-07-29 DIAGNOSIS — Z94.83 PANCREAS REPLACED BY TRANSPLANT (H): ICD-10-CM

## 2024-07-29 LAB
ALBUMIN SERPL BCG-MCNC: 3 G/DL (ref 3.5–5.2)
ALP SERPL-CCNC: 92 U/L (ref 40–150)
ALT SERPL W P-5'-P-CCNC: 28 U/L (ref 0–70)
AMYLASE SERPL-CCNC: 224 U/L (ref 28–100)
ANION GAP SERPL CALCULATED.3IONS-SCNC: 7 MMOL/L (ref 7–15)
AST SERPL W P-5'-P-CCNC: 25 U/L (ref 0–45)
BILIRUB SERPL-MCNC: 0.4 MG/DL
BUN SERPL-MCNC: 22.5 MG/DL (ref 6–20)
CALCIUM SERPL-MCNC: 9 MG/DL (ref 8.8–10.4)
CHLORIDE SERPL-SCNC: 109 MMOL/L (ref 98–107)
CREAT SERPL-MCNC: 1.71 MG/DL (ref 0.67–1.17)
EGFRCR SERPLBLD CKD-EPI 2021: 48 ML/MIN/1.73M2
ERYTHROCYTE [DISTWIDTH] IN BLOOD BY AUTOMATED COUNT: 13.2 % (ref 10–15)
GLUCOSE BLDC GLUCOMTR-MCNC: 103 MG/DL (ref 70–99)
GLUCOSE BLDC GLUCOMTR-MCNC: 110 MG/DL (ref 70–99)
GLUCOSE BLDC GLUCOMTR-MCNC: 122 MG/DL (ref 70–99)
GLUCOSE BLDC GLUCOMTR-MCNC: 171 MG/DL (ref 70–99)
GLUCOSE BLDC GLUCOMTR-MCNC: 89 MG/DL (ref 70–99)
GLUCOSE BLDC GLUCOMTR-MCNC: 90 MG/DL (ref 70–99)
GLUCOSE BLDC GLUCOMTR-MCNC: 96 MG/DL (ref 70–99)
GLUCOSE SERPL-MCNC: 97 MG/DL (ref 70–99)
HCO3 SERPL-SCNC: 17 MMOL/L (ref 22–29)
HCT VFR BLD AUTO: 21.8 % (ref 40–53)
HGB BLD-MCNC: 7.6 G/DL (ref 13.3–17.7)
LIPASE SERPL-CCNC: 271 U/L (ref 13–60)
MAGNESIUM SERPL-MCNC: 1.6 MG/DL (ref 1.7–2.3)
MCH RBC QN AUTO: 32.9 PG (ref 26.5–33)
MCHC RBC AUTO-ENTMCNC: 34.9 G/DL (ref 31.5–36.5)
MCV RBC AUTO: 94 FL (ref 78–100)
PHOSPHATE SERPL-MCNC: 2.3 MG/DL (ref 2.5–4.5)
PLATELET # BLD AUTO: 168 10E3/UL (ref 150–450)
POTASSIUM SERPL-SCNC: 5.4 MMOL/L (ref 3.4–5.3)
PROT SERPL-MCNC: 4.8 G/DL (ref 6.4–8.3)
RBC # BLD AUTO: 2.31 10E6/UL (ref 4.4–5.9)
SODIUM SERPL-SCNC: 133 MMOL/L (ref 135–145)
TACROLIMUS BLD-MCNC: 4.5 UG/L (ref 5–15)
TME LAST DOSE: ABNORMAL H
TME LAST DOSE: ABNORMAL H
WBC # BLD AUTO: 8.8 10E3/UL (ref 4–11)

## 2024-07-29 PROCEDURE — 250N000013 HC RX MED GY IP 250 OP 250 PS 637: Performed by: INTERNAL MEDICINE

## 2024-07-29 PROCEDURE — 85027 COMPLETE CBC AUTOMATED: CPT | Performed by: PHYSICIAN ASSISTANT

## 2024-07-29 PROCEDURE — 99233 SBSQ HOSP IP/OBS HIGH 50: CPT | Mod: 24 | Performed by: INTERNAL MEDICINE

## 2024-07-29 PROCEDURE — 250N000012 HC RX MED GY IP 250 OP 636 PS 637: Performed by: PHYSICIAN ASSISTANT

## 2024-07-29 PROCEDURE — 82150 ASSAY OF AMYLASE: CPT | Performed by: PHYSICIAN ASSISTANT

## 2024-07-29 PROCEDURE — 250N000011 HC RX IP 250 OP 636: Performed by: PHYSICIAN ASSISTANT

## 2024-07-29 PROCEDURE — 97530 THERAPEUTIC ACTIVITIES: CPT | Mod: GP

## 2024-07-29 PROCEDURE — 97116 GAIT TRAINING THERAPY: CPT | Mod: GP

## 2024-07-29 PROCEDURE — 83690 ASSAY OF LIPASE: CPT | Performed by: PHYSICIAN ASSISTANT

## 2024-07-29 PROCEDURE — 250N000013 HC RX MED GY IP 250 OP 250 PS 637: Performed by: PHYSICIAN ASSISTANT

## 2024-07-29 PROCEDURE — 80197 ASSAY OF TACROLIMUS: CPT | Performed by: PHYSICIAN ASSISTANT

## 2024-07-29 PROCEDURE — 250N000012 HC RX MED GY IP 250 OP 636 PS 637: Performed by: SURGERY

## 2024-07-29 PROCEDURE — 80053 COMPREHEN METABOLIC PANEL: CPT | Performed by: PHYSICIAN ASSISTANT

## 2024-07-29 PROCEDURE — 93975 VASCULAR STUDY: CPT

## 2024-07-29 PROCEDURE — 36415 COLL VENOUS BLD VENIPUNCTURE: CPT | Performed by: PHYSICIAN ASSISTANT

## 2024-07-29 PROCEDURE — 83735 ASSAY OF MAGNESIUM: CPT | Performed by: PHYSICIAN ASSISTANT

## 2024-07-29 PROCEDURE — 250N000013 HC RX MED GY IP 250 OP 250 PS 637: Performed by: SURGERY

## 2024-07-29 PROCEDURE — 84100 ASSAY OF PHOSPHORUS: CPT | Performed by: PHYSICIAN ASSISTANT

## 2024-07-29 PROCEDURE — 93975 VASCULAR STUDY: CPT | Mod: 26 | Performed by: RADIOLOGY

## 2024-07-29 PROCEDURE — 120N000011 HC R&B TRANSPLANT UMMC

## 2024-07-29 RX ORDER — MAGNESIUM SULFATE HEPTAHYDRATE 40 MG/ML
2 INJECTION, SOLUTION INTRAVENOUS ONCE
Status: COMPLETED | OUTPATIENT
Start: 2024-07-29 | End: 2024-07-29

## 2024-07-29 RX ORDER — TACROLIMUS 1 MG/1
3 CAPSULE ORAL
Status: DISCONTINUED | OUTPATIENT
Start: 2024-07-29 | End: 2024-07-30 | Stop reason: HOSPADM

## 2024-07-29 RX ORDER — SODIUM BICARBONATE 650 MG/1
1950 TABLET ORAL 3 TIMES DAILY
Status: DISCONTINUED | OUTPATIENT
Start: 2024-07-29 | End: 2024-07-30 | Stop reason: HOSPADM

## 2024-07-29 RX ADMIN — ATORVASTATIN CALCIUM 40 MG: 40 TABLET, FILM COATED ORAL at 20:05

## 2024-07-29 RX ADMIN — DAPSONE 50 MG: 25 TABLET ORAL at 07:59

## 2024-07-29 RX ADMIN — HEPARIN SODIUM 5000 UNITS: 5000 INJECTION, SOLUTION INTRAVENOUS; SUBCUTANEOUS at 04:16

## 2024-07-29 RX ADMIN — VALGANCICLOVIR HYDROCHLORIDE 900 MG: 450 TABLET ORAL at 08:00

## 2024-07-29 RX ADMIN — NYSTATIN 500000 UNITS: 100000 SUSPENSION ORAL at 20:05

## 2024-07-29 RX ADMIN — MAGNESIUM OXIDE TAB 400 MG (241.3 MG ELEMENTAL MG) 800 MG: 400 (241.3 MG) TAB at 08:00

## 2024-07-29 RX ADMIN — INSULIN ASPART 1 UNITS: 100 INJECTION, SOLUTION INTRAVENOUS; SUBCUTANEOUS at 00:15

## 2024-07-29 RX ADMIN — HEPARIN SODIUM 5000 UNITS: 5000 INJECTION, SOLUTION INTRAVENOUS; SUBCUTANEOUS at 20:05

## 2024-07-29 RX ADMIN — TACROLIMUS 2 MG: 1 CAPSULE ORAL at 07:59

## 2024-07-29 RX ADMIN — PANTOPRAZOLE SODIUM 40 MG: 40 TABLET, DELAYED RELEASE ORAL at 08:00

## 2024-07-29 RX ADMIN — TACROLIMUS 3 MG: 1 CAPSULE ORAL at 17:54

## 2024-07-29 RX ADMIN — MAGNESIUM SULFATE HEPTAHYDRATE 2 G: 2 INJECTION, SOLUTION INTRAVENOUS at 10:00

## 2024-07-29 RX ADMIN — ASPIRIN 81 MG CHEWABLE TABLET 81 MG: 81 TABLET CHEWABLE at 08:00

## 2024-07-29 RX ADMIN — MYCOPHENOLATE MOFETIL 1000 MG: 500 TABLET, FILM COATED ORAL at 08:00

## 2024-07-29 RX ADMIN — SODIUM BICARBONATE 1950 MG: 650 TABLET ORAL at 20:05

## 2024-07-29 RX ADMIN — SODIUM BICARBONATE 1950 MG: 650 TABLET ORAL at 13:49

## 2024-07-29 RX ADMIN — MAGNESIUM OXIDE TAB 400 MG (241.3 MG ELEMENTAL MG) 800 MG: 400 (241.3 MG) TAB at 20:05

## 2024-07-29 RX ADMIN — HEPARIN SODIUM 5000 UNITS: 5000 INJECTION, SOLUTION INTRAVENOUS; SUBCUTANEOUS at 13:49

## 2024-07-29 RX ADMIN — SODIUM BICARBONATE 1300 MG: 650 TABLET ORAL at 08:00

## 2024-07-29 RX ADMIN — TICAGRELOR 90 MG: 90 TABLET ORAL at 08:00

## 2024-07-29 RX ADMIN — MYCOPHENOLATE MOFETIL 1000 MG: 500 TABLET, FILM COATED ORAL at 17:54

## 2024-07-29 RX ADMIN — NYSTATIN 500000 UNITS: 100000 SUSPENSION ORAL at 13:49

## 2024-07-29 RX ADMIN — TICAGRELOR 90 MG: 90 TABLET ORAL at 21:27

## 2024-07-29 RX ADMIN — AMLODIPINE BESYLATE 10 MG: 10 TABLET ORAL at 08:00

## 2024-07-29 RX ADMIN — NYSTATIN 500000 UNITS: 100000 SUSPENSION ORAL at 08:01

## 2024-07-29 ASSESSMENT — ACTIVITIES OF DAILY LIVING (ADL)
ADLS_ACUITY_SCORE: 22

## 2024-07-29 NOTE — PROGRESS NOTES
"RN:     Vital signs: /70 (BP Location: Right arm)   Pulse 95   Temp 98.3  F (36.8  C) (Oral)   Resp 16   Ht 1.803 m (5' 11\")   Wt 101.9 kg (224 lb 9.6 oz)   SpO2 99%   BMI 31.33 kg/m      Status: 48 y/o M w/ hx: DMII & resulting ESKD (HD q m,w,f). CAD, 2 vessel CABG, PAD, HTN, obesity, & anxiety.   24: simultaneous kidney & pancreas transplant complicated by VT/VF in PACU- taken to emergency cath lab- found to have 100% occlusion of RCA.   Neuro: A&Ox4  Pain/Nausea: Denies pain & nausea.   Cardiac: WDL  Respiratory: WDL- denies SOB  Mobility: Independent- generalized weakness.   Diet: Regular  Labs: GFR: 48, Cr: 1.71, BUN: 22.5, last B  LDAs: L AV fistula (HD), R PIV SL.   Skin/incisions: RLQ AMILCAR drain to bulb suction, abd incision  GI/: WDL x-diarrhea/ 3x BM yesterday, BM meds held this morning.   Plan: Call light within reach. Able to make needs known. Continue with plan of care.       "

## 2024-07-29 NOTE — PROGRESS NOTES
Transplant Surgery  Inpatient Daily Progress Note  07/29/2024    Assessment & Plan: 49 year old male with PMH significant for DMII (on insulin pump) and resulting ESKD (on HD every MWF since 8/2021). 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 Underwent simultaneous kidney pancreas transplant on 7/20/24 complicated by VT/VF arrest in PACU. Received 2 rounds of CPR before ROSC. Taken emergently to the cath lab and found to have 100% occlusion of the RCA now s/p IBAN x 2.    Graft function:  S/p pancreas transplant: Lipase 271<-209<-157<-199 <-159, slight upward trend likely related to bowel function. On insulin gtt initially post operatively with steroids, now stopped. Patent post-op US. Will obtain additional US today.   S/p kidney transplant: Pre-op Cr 5.2, Cr 1,7<-1.9<-1.8. Robust urine output. Patent post-op US. Removed Burt POD 4.   -AMILCAR in place, remove prior to discharge  Immunosuppression management:   Induction:  Thymo 200/200/200/75 mg (complete, 6.6 mg/kg)  Solu-Medrol: 500/250/100 mg  Maintenance:  Tacrolimus 3 mg BID, goal 8-10  MMF 1000 mg BID  Hematology: DAPT- Brilinta + ASA for at least 1 month. Will not give heparin at this time due to DAPT.  Cardiorespiratory:   CAD, VT/VF arrest s/p PCI with IBAN: Previous CABG 2019, and IBAN 2019 with 100% thrombosis of the RCA, IBAN x 2 on 7/20/24. Consult cards. Start PTA lipitor.   Hypertension: Goal SBP <165 mmHg. Ok with permissive hypertension. Was on Nicardipine gtt with nausea initially. Now tolerating oral meds, Coreg increased to 12.5 (hold due to HTN), Hydralazine 25 mg every 6 hours (hold with hypotension), and Norvasc 10 mg daily. Weaned off of nicardipine 7/25. Check ECHO with hypotension.   Acute respiratory failure: Extubated 7/21 PM.   GI/Nutrition: + BM. Tolerating  regular diet.   Endocrine:   Steroid hyperglycemia: Insulin drip stopped as above  Fluid/Electrolytes: MIVF: discontinued with diet initiation  Hyperkalemia: Resolved with medical management without need for dialysis  Hyponatremia: Na 133 from 132, improving with renal function.   Low bicarb: Increase to 1950 mg.   Hypomagnesemia: Mag ox plus IV  : Burt remained due to new surgical anastomosis, removed POD 4. Negative PVR.   Infectious disease: Luci-op prophylaxis with meropenem completed, micafungin x7 days then start nystatin ppx. Start Dapsone for ppx.   Prophylaxis: DVT, fall, GI, fungal  Disposition: 7A, plan for discharge in 1-2 days     KAT/Fellow/Resident Provider: Libra Valentino PA-C [id number]    Faculty: Jarvis Michaud MD  _________________________________________________________________  Transplant History: Admitted 7/19/2024 for kidney pancreas transplant.  7/20/2024 (Kidney / Pancreas), Postoperative day: 9     Interval History: History is obtained from the patient  Overnight events: No complaints. Tolerating diet.     ROS:   A 10-point review of systems was negative except as noted above.    Meds:  Current Facility-Administered Medications   Medication Dose Route Frequency Provider Last Rate Last Admin    amLODIPine (NORVASC) tablet 10 mg  10 mg Oral Daily Julienne Ibrahim PA-C   10 mg at 07/29/24 0800    aspirin (ASA) chewable tablet 81 mg  81 mg Oral or Feeding Tube Daily Julienne Ibrahim PA-C   81 mg at 07/29/24 0800    atorvastatin (LIPITOR) tablet 40 mg  40 mg Oral QPM Julienne Ibrahim PA-C   40 mg at 07/28/24 1944    [Held by provider] carvedilol (COREG) tablet 12.5 mg  12.5 mg Oral or Feeding Tube BID Julienne Ibrahim PA-C   12.5 mg at 07/28/24 0750    dapsone (ACZONE) tablet 50 mg  50 mg Oral Daily Libra Valentino PA-C   50 mg at 07/29/24 0759    heparin ANTICOAGULANT injection 5,000 Units  5,000 Units Subcutaneous Q8H Julienne Ibrahim PA-C   5,000  "Units at 07/29/24 1349    [Held by provider] hydrALAZINE (APRESOLINE) tablet 25 mg  25 mg Oral Q8H [facility] Julienne Ibrahim PA-C   25 mg at 07/27/24 0640    insulin aspart (NovoLOG) injection (RAPID ACTING)  1-7 Units Subcutaneous Q4H Julienne Ibrahim PA-C   1 Units at 07/29/24 0015    magnesium oxide (MAG-OX) tablet 800 mg  800 mg Oral BID Libra Valentino PA-C   800 mg at 07/29/24 0800    mycophenolate (GENERIC EQUIVALENT) tablet 1,000 mg  1,000 mg Oral BID IS Julienne Ibrahim PA-C   1,000 mg at 07/29/24 0800    nystatin (MYCOSTATIN) suspension 500,000 Units  500,000 Units Oral 4x Daily Julienne Ibrahim PA-C   500,000 Units at 07/29/24 1349    pantoprazole (PROTONIX) EC tablet 40 mg  40 mg Oral QAM AC Harrison Whitehead MD   40 mg at 07/29/24 0800    polyethylene glycol (MIRALAX) Packet 17 g  17 g Oral BID Julienne Ibrahim PA-C   17 g at 07/25/24 0856    senna-docusate (SENOKOT-S/PERICOLACE) 8.6-50 MG per tablet 2 tablet  2 tablet Oral BID Julienne Ibrahim PA-C   2 tablet at 07/28/24 0757    sodium bicarbonate tablet 1,950 mg  1,950 mg Oral TID Libra Valentino PA-C   1,950 mg at 07/29/24 1349    sodium chloride (PF) 0.9% PF flush 3 mL  3 mL Intravenous Q8H Julienne Ibrahim PA-C   3 mL at 07/29/24 0807    tacrolimus (GENERIC EQUIVALENT) capsule 3 mg  3 mg Oral BID IS Libra Valentino PA-C        ticagrelor (BRILINTA) tablet 90 mg  90 mg Oral or Feeding Tube BID Julienne Ibrahim PA-C   90 mg at 07/29/24 0800    valGANciclovir (VALCYTE) tablet 900 mg  900 mg Oral Daily Harrison Whitehead MD   900 mg at 07/29/24 0800       Physical Exam:     Admit Weight: 102.4 kg (225 lb 12 oz)    Current vitals:   /70 (BP Location: Right arm, Patient Position: Chair, Cuff Size: Adult Regular)   Pulse 88   Temp 98.4  F (36.9  C) (Oral)   Resp 16   Ht 1.803 m (5' 11\")   Wt 101.9 kg (224 lb 9.6 oz)   SpO2 98%   BMI 31.33 " kg/m      Vital sign ranges:    Temp:  [98.4  F (36.9  C)-99.1  F (37.3  C)] 98.4  F (36.9  C)  Pulse:  [78-90] 88  Resp:  [16] 16  BP: (107-117)/(63-72) 116/70  SpO2:  [96 %-99 %] 98 %  Patient Vitals for the past 24 hrs:   BP Temp Temp src Pulse Resp SpO2 Weight   07/29/24 0753 116/70 98.4  F (36.9  C) Oral 88 16 98 % --   07/29/24 0514 111/72 98.6  F (37  C) Oral 87 16 98 % --   07/29/24 0500 -- -- -- -- -- -- 101.9 kg (224 lb 9.6 oz)   07/28/24 2153 107/63 99.1  F (37.3  C) Oral 90 16 96 % --   07/28/24 1751 117/68 99  F (37.2  C) Oral 86 16 99 % --   07/28/24 1446 112/66 98.5  F (36.9  C) Oral 78 16 98 % --     General Appearance: Awake, alert, oriented  Skin: normal, warm  Heart: regular rate and rhythm  Lungs: Nonlabored resps on RA  Abdomen: The abdomen is soft. Drain with serosanguinous output  : voiding   Extremities: edema: none  Neurologic: awake, alert, oriented    Data:   CMP  Recent Labs   Lab 07/29/24  0752 07/29/24  0507 07/28/24  1527 07/28/24  0615 07/27/24  0845 07/27/24  0602 07/26/24  0828 07/26/24  0716   NA  --  133*  --  132*  --  133*  --  137   POTASSIUM  --  5.4*  --  5.3  --  5.4*   < > 5.6*   CHLORIDE  --  109*  --  107  --  108*  --  111*   CO2  --  17*  --  19*  --  17*  --  18*   GLC 90 97   < > 91   < > 84   < > 98   BUN  --  22.5*  --  28.2*  --  31.4*  --  33.7*   CR  --  1.71*  --  1.91*  --  1.81*  --  1.74*   GFRESTIMATED  --  48*  --  42*  --  45*  --  47*   BETHANY  --  9.0  --  9.1  --  9.5  --  9.6   ICAW  --   --   --   --   --  5.0  --  5.3*   MAG  --  1.6*  --  1.5*  --  1.6*  --  1.8   PHOS  --  2.3*  --  2.3*  --  2.5  --  2.4*   AMYLASE  --  224*  --  217*  --  198*  --  232*   LIPASE  --  271*  --  209*  --  157*  --  199*   ALBUMIN  --  3.0*  --  3.2*  --  3.3*  --  3.3*   BILITOTAL  --  0.4  --  0.5  --  0.6  --  0.7   ALKPHOS  --  92  --  90  --  90  --  88   AST  --  25  --  23  --  27  --  28   ALT  --  28  --  30  --  29  --  25    < > = values in this interval  "not displayed.     CBC  Recent Labs   Lab 07/29/24  0507 07/28/24  0615   HGB 7.6* 8.1*   WBC 8.8 8.1    146*     COAGS  No lab results found in last 7 days.    Invalid input(s): \"XA\"     Urinalysis  Recent Labs   Lab Test 07/20/24  0609 09/27/21  1220   COLOR Orange* Yellow   APPEARANCE Slightly Cloudy* Slightly Cloudy*   URINEGLC 50* 150*   URINEBILI Negative Negative   URINEKETONE Negative Negative   SG 1.013 1.013   UBLD Large* Negative   URINEPH 6.5 6.0   PROTEIN 300* >499*   NITRITE Negative Negative   LEUKEST Trace* Small*   RBCU >182* 2   WBCU 105* 4     Virology:  Hepatitis C Antibody   Date Value Ref Range Status   07/19/2024 Nonreactive Nonreactive Final     Comment:     A nonreactive screening test result does not exclude the possibility of exposure to or infection with HCV. Nonreactive screening test results in individuals with prior exposure to HCV may be due to antibody levels below the limit of detection of this assay or lack of reactivity to the HCV antigens used in this assay. Patients with recent HCV infections (<3 months from time of exposure) may have false-negative HCV antibody results due to the time needed for seroconversion (average of 8 to 9 weeks).       "

## 2024-07-29 NOTE — PLAN OF CARE
"Goal Outcome Evaluation: progressing    /63 (BP Location: Right arm)   Pulse 90   Temp 99.1  F (37.3  C) (Oral)   Resp 16   Ht 1.803 m (5' 11\")   Wt 102.6 kg (226 lb 3.2 oz)   SpO2 96%   BMI 31.55 kg/m      Shift: 4991-0461  Isolation Status: N/A  VS: stable on RA, afebrile  Neuro: A&O x4  Behaviors: receptive to cares  BG: q4hr: 110, 171, 96  Labs/Imaging: Mag 1.5, Creatinine 1.91 (trending up), Na 132; pending AM labs  Respiratory: WDL  Cardiac: WDL  Pain/Nausea: Denies pain and nausea  PRN: N/A  Diet: regular, good appetite  LDA: R PIV, L AV fistula; R AMILCAR drain with ~ 70 mL serosang output overnight  Infusion(s): N/A  GI/: BM x1 on shift, pt reporting loose stools. Voiding spontaneously, AUO, pt measures urine and writes on board for staff to record  Skin: midline incision stapled and MAVERICK  Mobility: UAL  Events/Education: Med card and lab book up to date as of 7/28  Plan: Notify MD of any significant changes, continue plan of care.    Hand off to be given to oncoming RN.     "

## 2024-07-29 NOTE — PROGRESS NOTES
[facility]  Transplant Nephrology Progress Note  Date of Admission:  7/19/2024  Today's Date: 07/29/2024  Requesting physician: Harrison Whitehead*    Reason for Consult:  SPK and immunosuppression    Recommendations:   - holding his tomorrow morning dose of amlodipine with continued low BP's  - pancreatic US without any thrombosis    Assessment & Plan   # DDKT (SPK): Trend up. Likely in setting of hypovolemia and hypotension vs others. ECHO likely will help assess his volume status   - Baseline Creatinine: ~ TBD   - Proteinuria: Not checked post transplant   - DSA Hx:  Final cross match pending    - Last cPRA: 11%   - BK Viremia: Not checked post transplant   - Kidney Tx Biopsy Hx: No biopsy history.    # Pancreas Tx (SPK):    - Pancreatic Exocrine Drainage: Enteric drained     - Blood glucose: Near euglycemia      On insulin: No   -  Latest HbA1c: 4%   - Pancreatic enzymes: Trend up   - DSA Hx:  final crossmatch pending   - Pancreas Tx Biopsy Hx: No biopsy history    # Immunosuppression: Tacrolimus immediate release (goal 8-10) and Mycophenolate mofetil (dose 1000 mg every 12 hours)   - Induction with Recent Transplant:  High Intensity Protocol   - Continue with intensive monitoring of immunosuppression for efficacy and toxicity.   - Historical Changes in Immunosuppression: None   - Changes: No,    # VT/VF Cardiac arrest:  # CAD s/p CABG 2019 LIMA-LAD, SVG-RCA, IBAN 1/2019, IBAN to RCA 2/2024   - s/p inferior STEMI 2/2 ostial proximal RCA in stent thrombosis   - s/p 2 IBAN to RCA 7/20/2024   - Echo: LVEF=37%.Moderate diffuse hypokinesis with akinesis of all inferior segments.  Mild right ventricular dilation is present.Global  right ventricular function is moderately reduced.    # Infection Prevention:      - PJP: Dapsone   - CMV IgG Ab High Risk Discordance (D+/R-): No  - EBV IgG Ab High Risk Discordance (D+/R-): No    # Blood Pressure:  hypotensive ;  Goal BP: MAP  > 65   - Changes: Yes - hold amlodipine      # Anemia in Chronic Renal Disease: Hgb: Trend down post SPK     KERRI: No   - Iron studies: Not checked recently. Monitor Hb trends    # Mineral Bone Disorder:    - Secondary renal hyperparathyroidism; PTH level: Unknown at this time, but checked with dialysis        On treatment: None  - Vitamin D; level: High        On supplement: No  - Calcium; level: Normal        On supplement: No  - Phosphorus; level: Low normal        On supplement: No    Hypercalcemia, resolved. Continue to hold supplementation for now.    # Electrolytes:   - Potassium; level: High normal      On supplement: No  - Magnesium; level: Normal        On supplement: Yes  - Bicarbonate; level: Low        On supplement: Yes  - Sodium; level: Normal    # Other Significant PMH:   - Left diaphragmatic elevation; 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    Grisel Coombs MD  Transplant Nephrology  Contact information via Vocera Web Console or via [facility] Paging/Directory      Interval History  Mr. Ramey's creatinine is 1.7, Trend down  Excellent UOP  Denied chest pain, SOB, N/V/D. No dysuria.    Review of Systems   4 point ROS was obtained and negative except as noted in the Interval History.    MEDICATIONS:  Current Facility-Administered Medications   Medication Dose Route Frequency Provider Last Rate Last Admin    amLODIPine (NORVASC) tablet 10 mg  10 mg Oral Daily Julienne Ibrahim PA-C   10 mg at 07/29/24 0800    aspirin (ASA) chewable tablet 81 mg  81 mg Oral or Feeding Tube Daily Julienne Ibrahim PA-C   81 mg at 07/29/24 0800    atorvastatin (LIPITOR) tablet 40 mg  40 mg Oral QPM Julienne Ibrahim PA-C    40 mg at 07/28/24 1944    [Held by provider] carvedilol (COREG) tablet 12.5 mg  12.5 mg Oral or Feeding Tube BID Julienne Ibrahim PA-C   12.5 mg at 07/28/24 0750    dapsone (ACZONE) tablet 50 mg  50 mg Oral Daily Libra Valentino PA-C   50 mg at 07/29/24 0759    heparin ANTICOAGULANT injection 5,000 Units  5,000 Units Subcutaneous Q8H Julienne Ibrahim PA-C   5,000 Units at 07/29/24 1349    [Held by provider] hydrALAZINE (APRESOLINE) tablet 25 mg  25 mg Oral Q8H EVER Julienne Ibrahim PA-C   25 mg at 07/27/24 0640    insulin aspart (NovoLOG) injection (RAPID ACTING)  1-7 Units Subcutaneous Q4H Julienne Ibrahim PA-C   1 Units at 07/29/24 0015    magnesium oxide (MAG-OX) tablet 800 mg  800 mg Oral BID Libra Valentino PA-C   800 mg at 07/29/24 0800    mycophenolate (GENERIC EQUIVALENT) tablet 1,000 mg  1,000 mg Oral BID IS Julienne Ibrahim PA-C   1,000 mg at 07/29/24 0800    nystatin (MYCOSTATIN) suspension 500,000 Units  500,000 Units Oral 4x Daily Julienne Ibrahim PA-C   500,000 Units at 07/29/24 1349    pantoprazole (PROTONIX) EC tablet 40 mg  40 mg Oral Harrison Sanchez MD   40 mg at 07/29/24 0800    polyethylene glycol (MIRALAX) Packet 17 g  17 g Oral BID Julienne Ibrahim PA-C   17 g at 07/25/24 0856    senna-docusate (SENOKOT-S/PERICOLACE) 8.6-50 MG per tablet 2 tablet  2 tablet Oral BID Julienne Ibrahim PA-C   2 tablet at 07/28/24 0757    sodium bicarbonate tablet 1,950 mg  1,950 mg Oral TID Libra Valentino PA-C   1,950 mg at 07/29/24 1349    sodium chloride (PF) 0.9% PF flush 3 mL  3 mL Intravenous Q8H Julienne Ibrahim PA-C   3 mL at 07/29/24 0807    tacrolimus (GENERIC EQUIVALENT) capsule 3 mg  3 mg Oral BID IS Libra Valentino PA-C        ticagrelor (BRILINTA) tablet 90 mg  90 mg Oral or Feeding Tube BID Julienne Ibrahim PA-C   90 mg at 07/29/24 0800    valGANciclovir (VALCYTE) tablet 900  "mg  900 mg Oral Daily Harrison Whitehead MD   900 mg at 24 0800     Current Facility-Administered Medications   Medication Dose Route Frequency Provider Last Rate Last Admin       Physical Exam   Temp  Av.4  F (36.3  C)  Min: 96.1  F (35.6  C)  Max: 98.6  F (37  C)  Arterial Line BP  Min: 36/21  Max: 276/115  Arterial Line MAP (mmHg)  Av.3 mmHg  Min: 29 mmHg  Max: 232 mmHg      Pulse  Av.9  Min: 20  Max: 152 Resp  Avg: 15.5  Min: 3  Max: 38  FiO2 (%)  Av %  Min: 70 %  Max: 80 %  SpO2  Av.7 %  Min: 72 %  Max: 100 %    CVP (mmHg): 278 mmHgBP 119/87 (BP Location: Right arm)   Pulse 84   Temp 98.5  F (36.9  C) (Oral)   Resp 16   Ht 1.803 m (5' 11\")   Wt 101.9 kg (224 lb 9.6 oz)   SpO2 96%   BMI 31.33 kg/m     Date 24 0700 - 24 0659   Shift 8611-0204 9775-0311 2365-4483 24 Hour Total   INTAKE   I.V. 1839.32   1839.32   NG/   128   Shift Total(mL/kg) 1967.32(19.21)   1967.32(19.21)   OUTPUT   Urine 1325   1325   Emesis/NG output 100   100   Drains 875   875   Shift Total(mL/kg) 2300(22.46)   2300(22.46)   Weight (kg) 102.4 102.4 102.4 102.4      Admit Weight: 102.4 kg (225 lb 12 oz)     GENERAL APPEARANCE: awake and no distress  HENT: atraumatic  RESP: fine coarse breath sounds b/l  CV: regular rhythm, normal rate  EDEMA: no LE edema bilaterally  ABDOMEN: soft, nondistended, surgical incision  MS: extremities normal - no gross deformities noted  SKIN: no rash  Access LUE AVF +thrill/bruit    Data   All labs reviewed by me.  CMP  Recent Labs   Lab 24  1354 24  0752 24  0507 24  0415 24  1527 24  0615 24  0845 24  0602 24  18124  0828 2416   NA  --   --  133*  --   --  132*  --  133*  --   --   --  137   POTASSIUM  --   --  5.4*  --   --  5.3  --  5.4*  --  5.3   < > 5.6*   CHLORIDE  --   --  109*  --   --  107  --  108*  --   --   --  111*   CO2  --   --  17*  --   -- "  19*  --  17*  --   --   --  18*   ANIONGAP  --   --  7  --   --  6*  --  8  --   --   --  8   GLC 89 90 97 96   < > 91   < > 84   < >  --    < > 98   BUN  --   --  22.5*  --   --  28.2*  --  31.4*  --   --   --  33.7*   CR  --   --  1.71*  --   --  1.91*  --  1.81*  --   --   --  1.74*   GFRESTIMATED  --   --  48*  --   --  42*  --  45*  --   --   --  47*   BETHANY  --   --  9.0  --   --  9.1  --  9.5  --   --   --  9.6   MAG  --   --  1.6*  --   --  1.5*  --  1.6*  --   --   --  1.8   PHOS  --   --  2.3*  --   --  2.3*  --  2.5  --   --   --  2.4*   PROTTOTAL  --   --  4.8*  --   --  4.8*  --  5.1*  --   --   --  5.3*   ALBUMIN  --   --  3.0*  --   --  3.2*  --  3.3*  --   --   --  3.3*   BILITOTAL  --   --  0.4  --   --  0.5  --  0.6  --   --   --  0.7   ALKPHOS  --   --  92  --   --  90  --  90  --   --   --  88   AST  --   --  25  --   --  23  --  27  --   --   --  28   ALT  --   --  28  --   --  30  --  29  --   --   --  25    < > = values in this interval not displayed.     CBC  Recent Labs   Lab 07/29/24  0507 07/28/24  0615 07/27/24  0602 07/26/24  0716   HGB 7.6* 8.1* 9.1* 9.1*   WBC 8.8 8.1 8.3 7.0   RBC 2.31* 2.48* 2.80* 2.76*   HCT 21.8* 23.1* 27.0* 26.1*   MCV 94 93 96 95   MCH 32.9 32.7 32.5 33.0   MCHC 34.9 35.1 33.7 34.9   RDW 13.2 12.9 12.7 12.7    146* 125* 114*     INR  No lab results found in last 7 days.    ABG  No lab results found in last 7 days.     Urine Studies  Recent Labs   Lab Test 07/20/24  0609 09/27/21  1220 07/24/21  1151 06/25/19  1439 06/19/19  0822   COLOR Orange* Yellow Light Yellow Straw Straw   APPEARANCE Slightly Cloudy* Slightly Cloudy* Clear Clear Clear   URINEGLC 50* 150* 200* 200 mg/dL* >1000 mg/dL*   URINEBILI Negative Negative Negative Negative Negative   URINEKETONE Negative Negative Negative Negative Negative   SG 1.013 1.013 1.018 1.010 1.012   UBLD Large* Negative 0.03 mg/dL* Moderate* Trace*   URINEPH 6.5 6.0 6.0 6.5 6.0   PROTEIN 300* >499* 600* 200 mg/dL* 300  mg/dL*   UROBILINOGEN  --   --   --  <2.0 E.U./dL <2.0 E.U./dL   NITRITE Negative Negative Negative Negative Negative   LEUKEST Trace* Small* Negative Negative Negative   RBCU >182* 2 1 25-50* 0-2   WBCU 105* 4 10* 0-5 0-5     No lab results found.  PTH  Recent Labs   Lab Test 07/25/24  0448 07/20/21  1845   PTHI 199* 190*     Iron Studies  No lab results found.    IMAGING:  All imaging studies reviewed by me.

## 2024-07-30 ENCOUNTER — APPOINTMENT (OUTPATIENT)
Dept: PHYSICAL THERAPY | Facility: CLINIC | Age: 49
DRG: 008 | End: 2024-07-30
Attending: SURGERY
Payer: MEDICARE

## 2024-07-30 ENCOUNTER — TELEPHONE (OUTPATIENT)
Dept: PHARMACY | Facility: CLINIC | Age: 49
End: 2024-07-30

## 2024-07-30 VITALS
SYSTOLIC BLOOD PRESSURE: 106 MMHG | WEIGHT: 223 LBS | OXYGEN SATURATION: 97 % | TEMPERATURE: 98.8 F | BODY MASS INDEX: 31.22 KG/M2 | HEIGHT: 71 IN | DIASTOLIC BLOOD PRESSURE: 68 MMHG | HEART RATE: 104 BPM | RESPIRATION RATE: 18 BRPM

## 2024-07-30 LAB
ALBUMIN SERPL BCG-MCNC: 3.2 G/DL (ref 3.5–5.2)
ALP SERPL-CCNC: 105 U/L (ref 40–150)
ALT SERPL W P-5'-P-CCNC: 30 U/L (ref 0–70)
AMYLASE SERPL-CCNC: 239 U/L (ref 28–100)
ANION GAP SERPL CALCULATED.3IONS-SCNC: 7 MMOL/L (ref 7–15)
AST SERPL W P-5'-P-CCNC: 25 U/L (ref 0–45)
BILIRUB SERPL-MCNC: 0.4 MG/DL
BUN SERPL-MCNC: 18.3 MG/DL (ref 6–20)
CALCIUM SERPL-MCNC: 9.2 MG/DL (ref 8.8–10.4)
CHLORIDE SERPL-SCNC: 108 MMOL/L (ref 98–107)
CREAT SERPL-MCNC: 1.76 MG/DL (ref 0.67–1.17)
EGFRCR SERPLBLD CKD-EPI 2021: 47 ML/MIN/1.73M2
ERYTHROCYTE [DISTWIDTH] IN BLOOD BY AUTOMATED COUNT: 13.9 % (ref 10–15)
GLUCOSE BLDC GLUCOMTR-MCNC: 107 MG/DL (ref 70–99)
GLUCOSE BLDC GLUCOMTR-MCNC: 112 MG/DL (ref 70–99)
GLUCOSE BLDC GLUCOMTR-MCNC: 123 MG/DL (ref 70–99)
GLUCOSE BLDC GLUCOMTR-MCNC: 148 MG/DL (ref 70–99)
GLUCOSE BLDC GLUCOMTR-MCNC: 94 MG/DL (ref 70–99)
GLUCOSE SERPL-MCNC: 156 MG/DL (ref 70–99)
HCO3 SERPL-SCNC: 18 MMOL/L (ref 22–29)
HCT VFR BLD AUTO: 22.5 % (ref 40–53)
HGB BLD-MCNC: 7.9 G/DL (ref 13.3–17.7)
LIPASE SERPL-CCNC: 285 U/L (ref 13–60)
MAGNESIUM SERPL-MCNC: 1.7 MG/DL (ref 1.7–2.3)
MCH RBC QN AUTO: 33.5 PG (ref 26.5–33)
MCHC RBC AUTO-ENTMCNC: 35.1 G/DL (ref 31.5–36.5)
MCV RBC AUTO: 95 FL (ref 78–100)
PHOSPHATE SERPL-MCNC: 2 MG/DL (ref 2.5–4.5)
PLATELET # BLD AUTO: 220 10E3/UL (ref 150–450)
POTASSIUM SERPL-SCNC: 5.2 MMOL/L (ref 3.4–5.3)
PROT SERPL-MCNC: 5.2 G/DL (ref 6.4–8.3)
RBC # BLD AUTO: 2.36 10E6/UL (ref 4.4–5.9)
SODIUM SERPL-SCNC: 133 MMOL/L (ref 135–145)
WBC # BLD AUTO: 10.4 10E3/UL (ref 4–11)

## 2024-07-30 PROCEDURE — 83735 ASSAY OF MAGNESIUM: CPT | Performed by: PHYSICIAN ASSISTANT

## 2024-07-30 PROCEDURE — 99232 SBSQ HOSP IP/OBS MODERATE 35: CPT | Mod: 24 | Performed by: NURSE PRACTITIONER

## 2024-07-30 PROCEDURE — 250N000013 HC RX MED GY IP 250 OP 250 PS 637: Performed by: SURGERY

## 2024-07-30 PROCEDURE — 250N000011 HC RX IP 250 OP 636: Performed by: PHYSICIAN ASSISTANT

## 2024-07-30 PROCEDURE — 250N000013 HC RX MED GY IP 250 OP 250 PS 637: Performed by: PHYSICIAN ASSISTANT

## 2024-07-30 PROCEDURE — 250N000012 HC RX MED GY IP 250 OP 636 PS 637: Performed by: PHYSICIAN ASSISTANT

## 2024-07-30 PROCEDURE — 97530 THERAPEUTIC ACTIVITIES: CPT | Mod: GP

## 2024-07-30 PROCEDURE — 84100 ASSAY OF PHOSPHORUS: CPT | Performed by: PHYSICIAN ASSISTANT

## 2024-07-30 PROCEDURE — 85027 COMPLETE CBC AUTOMATED: CPT | Performed by: PHYSICIAN ASSISTANT

## 2024-07-30 PROCEDURE — 80053 COMPREHEN METABOLIC PANEL: CPT | Performed by: PHYSICIAN ASSISTANT

## 2024-07-30 PROCEDURE — 36415 COLL VENOUS BLD VENIPUNCTURE: CPT | Performed by: PHYSICIAN ASSISTANT

## 2024-07-30 PROCEDURE — 99233 SBSQ HOSP IP/OBS HIGH 50: CPT | Mod: 24 | Performed by: INTERNAL MEDICINE

## 2024-07-30 PROCEDURE — 82150 ASSAY OF AMYLASE: CPT | Performed by: PHYSICIAN ASSISTANT

## 2024-07-30 PROCEDURE — 250N000013 HC RX MED GY IP 250 OP 250 PS 637: Performed by: NURSE PRACTITIONER

## 2024-07-30 PROCEDURE — 83690 ASSAY OF LIPASE: CPT | Performed by: PHYSICIAN ASSISTANT

## 2024-07-30 RX ORDER — ATORVASTATIN CALCIUM 40 MG/1
40 TABLET, FILM COATED ORAL EVERY EVENING
Qty: 30 TABLET | Refills: 2 | Status: SHIPPED | OUTPATIENT
Start: 2024-07-30

## 2024-07-30 RX ORDER — AMOXICILLIN 250 MG
2 CAPSULE ORAL 2 TIMES DAILY
Qty: 60 TABLET | Refills: 1 | Status: SHIPPED | OUTPATIENT
Start: 2024-07-30 | End: 2024-08-24

## 2024-07-30 RX ORDER — PANTOPRAZOLE SODIUM 40 MG/1
40 TABLET, DELAYED RELEASE ORAL
Qty: 30 TABLET | Refills: 1 | Status: SHIPPED | OUTPATIENT
Start: 2024-07-31 | End: 2024-10-04

## 2024-07-30 RX ORDER — METHOCARBAMOL 750 MG/1
750 TABLET, FILM COATED ORAL EVERY 6 HOURS PRN
Qty: 20 TABLET | Refills: 0 | Status: SHIPPED | OUTPATIENT
Start: 2024-07-30 | End: 2024-08-02

## 2024-07-30 RX ORDER — TACROLIMUS 1 MG/1
3 CAPSULE ORAL 2 TIMES DAILY
Qty: 180 CAPSULE | Refills: 11 | Status: ACTIVE | OUTPATIENT
Start: 2024-07-30 | End: 2024-07-31

## 2024-07-30 RX ORDER — DAPSONE 25 MG/1
50 TABLET ORAL DAILY
Qty: 60 TABLET | Refills: 11 | Status: ACTIVE | OUTPATIENT
Start: 2024-07-31

## 2024-07-30 RX ORDER — METOPROLOL SUCCINATE 25 MG/1
25 TABLET, EXTENDED RELEASE ORAL DAILY
Qty: 30 TABLET | Refills: 2 | Status: SHIPPED | OUTPATIENT
Start: 2024-07-31 | End: 2024-08-12

## 2024-07-30 RX ORDER — DAPSONE 25 MG/1
50 TABLET ORAL DAILY
Qty: 30 TABLET | Refills: 11 | Status: SHIPPED | OUTPATIENT
Start: 2024-07-31 | End: 2024-07-30

## 2024-07-30 RX ORDER — MAGNESIUM OXIDE 400 MG/1
800 TABLET ORAL 2 TIMES DAILY
Qty: 120 TABLET | Refills: 1 | Status: SHIPPED | OUTPATIENT
Start: 2024-07-30 | End: 2024-08-12

## 2024-07-30 RX ORDER — VALGANCICLOVIR 450 MG/1
900 TABLET, FILM COATED ORAL DAILY
Qty: 60 TABLET | Refills: 2 | Status: ACTIVE | OUTPATIENT
Start: 2024-07-31

## 2024-07-30 RX ORDER — NYSTATIN 100000/ML
500000 SUSPENSION, ORAL (FINAL DOSE FORM) ORAL 4 TIMES DAILY
Qty: 600 ML | Refills: 2 | Status: SHIPPED | OUTPATIENT
Start: 2024-07-30

## 2024-07-30 RX ORDER — SODIUM BICARBONATE 650 MG/1
1950 TABLET ORAL 3 TIMES DAILY
Qty: 270 TABLET | Refills: 2 | Status: SHIPPED | OUTPATIENT
Start: 2024-07-30

## 2024-07-30 RX ORDER — MAGNESIUM SULFATE HEPTAHYDRATE 40 MG/ML
2 INJECTION, SOLUTION INTRAVENOUS ONCE
Status: COMPLETED | OUTPATIENT
Start: 2024-07-30 | End: 2024-07-30

## 2024-07-30 RX ORDER — MYCOPHENOLATE MOFETIL 500 MG/1
1000 TABLET ORAL 2 TIMES DAILY
Qty: 120 TABLET | Refills: 11 | Status: ACTIVE | OUTPATIENT
Start: 2024-07-30 | End: 2024-08-09

## 2024-07-30 RX ORDER — METOPROLOL SUCCINATE 25 MG/1
25 TABLET, EXTENDED RELEASE ORAL DAILY
Status: DISCONTINUED | OUTPATIENT
Start: 2024-07-30 | End: 2024-07-30 | Stop reason: HOSPADM

## 2024-07-30 RX ORDER — ONDANSETRON 4 MG/1
4 TABLET, ORALLY DISINTEGRATING ORAL EVERY 6 HOURS PRN
Qty: 10 TABLET | Refills: 1 | Status: SHIPPED | OUTPATIENT
Start: 2024-07-30 | End: 2024-08-13

## 2024-07-30 RX ADMIN — NYSTATIN 500000 UNITS: 100000 SUSPENSION ORAL at 11:36

## 2024-07-30 RX ADMIN — MAGNESIUM SULFATE HEPTAHYDRATE 2 G: 2 INJECTION, SOLUTION INTRAVENOUS at 09:25

## 2024-07-30 RX ADMIN — HEPARIN SODIUM 5000 UNITS: 5000 INJECTION, SOLUTION INTRAVENOUS; SUBCUTANEOUS at 05:24

## 2024-07-30 RX ADMIN — INSULIN ASPART 1 UNITS: 100 INJECTION, SOLUTION INTRAVENOUS; SUBCUTANEOUS at 05:52

## 2024-07-30 RX ADMIN — METOPROLOL SUCCINATE 25 MG: 25 TABLET, EXTENDED RELEASE ORAL at 11:35

## 2024-07-30 RX ADMIN — NYSTATIN 500000 UNITS: 100000 SUSPENSION ORAL at 08:56

## 2024-07-30 RX ADMIN — SODIUM BICARBONATE 1950 MG: 650 TABLET ORAL at 08:51

## 2024-07-30 RX ADMIN — SODIUM BICARBONATE 1950 MG: 650 TABLET ORAL at 13:47

## 2024-07-30 RX ADMIN — TACROLIMUS 3 MG: 1 CAPSULE ORAL at 08:49

## 2024-07-30 RX ADMIN — SODIUM PHOSPHATE, DIBASIC, ANHYDROUS, POTASSIUM PHOSPHATE, MONOBASIC, AND SODIUM PHOSPHATE, MONOBASIC, MONOHYDRATE 500 MG: 852; 155; 130 TABLET, COATED ORAL at 09:25

## 2024-07-30 RX ADMIN — MYCOPHENOLATE MOFETIL 1000 MG: 500 TABLET, FILM COATED ORAL at 08:49

## 2024-07-30 RX ADMIN — DAPSONE 50 MG: 25 TABLET ORAL at 08:50

## 2024-07-30 RX ADMIN — MAGNESIUM OXIDE TAB 400 MG (241.3 MG ELEMENTAL MG) 800 MG: 400 (241.3 MG) TAB at 08:52

## 2024-07-30 RX ADMIN — VALGANCICLOVIR HYDROCHLORIDE 900 MG: 450 TABLET ORAL at 08:50

## 2024-07-30 RX ADMIN — ASPIRIN 81 MG CHEWABLE TABLET 81 MG: 81 TABLET CHEWABLE at 08:51

## 2024-07-30 RX ADMIN — PANTOPRAZOLE SODIUM 40 MG: 40 TABLET, DELAYED RELEASE ORAL at 08:51

## 2024-07-30 RX ADMIN — TICAGRELOR 90 MG: 90 TABLET ORAL at 08:59

## 2024-07-30 ASSESSMENT — ACTIVITIES OF DAILY LIVING (ADL)
ADLS_ACUITY_SCORE: 22
ADLS_ACUITY_SCORE: 21
ADLS_ACUITY_SCORE: 21
ADLS_ACUITY_SCORE: 22
ADLS_ACUITY_SCORE: 22
ADLS_ACUITY_SCORE: 21
ADLS_ACUITY_SCORE: 21
ADLS_ACUITY_SCORE: 22
ADLS_ACUITY_SCORE: 21
ADLS_ACUITY_SCORE: 22
ADLS_ACUITY_SCORE: 22
ADLS_ACUITY_SCORE: 21
ADLS_ACUITY_SCORE: 21
ADLS_ACUITY_SCORE: 22
ADLS_ACUITY_SCORE: 22
ADLS_ACUITY_SCORE: 21
ADLS_ACUITY_SCORE: 22

## 2024-07-30 NOTE — PLAN OF CARE
Physical Therapy Discharge Summary    Reason for therapy discharge:    All goals and outcomes met, no further needs identified.    Progress towards therapy goal(s). See goals on Care Plan in [facility] electronic health record for goal details.  Goals met    Therapy recommendation(s):    Continue home exercise program.

## 2024-07-30 NOTE — PROGRESS NOTES
Care Management Follow Up    Additional Information:    07/30: SUZE has schedule ATC Appts for this Pt. for tomorrow Wednesday 07/31/2024 and Thursday 08/01/2024.    Rossy ARCINIEGA [address] [facility]  P [phone]   Fax: [phone]  Email: [email or web address]

## 2024-07-30 NOTE — PLAN OF CARE
"Goal Outcome Evaluation:       /77 (BP Location: Right arm)   Pulse 102   Temp 99.3  F (37.4  C) (Oral)   Resp 18   Ht 1.803 m (5' 11\")   Wt 101.2 kg (223 lb)   SpO2 99%   BMI 31.10 kg/m      Shift: 1765-1934    VS: Tachy 100s  Neuro: Aox4  Cardio: Tachy  Respiratory: WDL on RA  GI: LBM 7/29  : Voiding adequately  Skin: midline incision  Diet: Regular   BG: q4hr (103, 112, 148)  LDA:  L AVF, R PIV SL, R AMILCAR  Infusions: none  Mobility: Independent  Pain/Nausea: denies  PRN medications: none given                   "

## 2024-07-30 NOTE — PROGRESS NOTES
CLINICAL NUTRITION SERVICES - BRIEF/DISCHARGE NOTE     Nutrition Prescription    Future/Additional Recommendations:  Minimize diet restrictions as able d/t high calorie/protein needs post-transplant.  Oral supplements as needed to help meet nutritional needs.     High protein food choices with meals to help meet high needs post-transplant over the next 6-8 weeks.     Heart-healthy diet (low saturated fat, low sodium, high fiber) and food safety precautions long term due to immunosuppression regimen post-transplant         EVALUATION OF THE PROGRESS TOWARD GOALS   Diet: Regular    Intake: Pt reports eating is going well.        NEW FINDINGS/INTERVENTIONS    Provided instruction on post-transplant diet with discussion regarding protein sources and high protein needs in acute post-tx phase.   -- Reviewed recommendations to follow low fat/low sodium diet long term and discussed heart healthy diet tips.    -- Reviewed need for food safety precautions to prevent food borne illness.  -- Encouraged some caution with high K+ foods for the first week d/t high normal K+ levels.  Phos levels are low, so these sources were encouraged.   Provided handouts: Post Transplant Diet Guidelines and USDA food safety booklet  Pt verbalized understanding of diet following education and denied further nutritional questions.     Patient s discharge needs assessed and discharge planning has been conducted with the multidisciplinary transplant care team including physicians, pharmacy, social work and transplant coordinator.     Monitoring/Evaluation  Progress toward goals will be monitored and evaluated per protocol.    Once discharged, place outpatient nutrition consult via the transplant team if nutrition concerns arise.    Julianna Waddell, MS, RD, LD, CCTD, [facility]  7A + 7B (beds [id number])  Available on Vocera [7A or 7B Clinical Dietitian]   Weekend/Holiday: Vocera [Weekend Clinical Dietitian]

## 2024-07-30 NOTE — DISCHARGE INSTRUCTIONS
Diet recommendations post-transplant: High protein diet (110grams/day) x 8 weeks.  Heart healthy dietary habits long term (low saturated/trans fat, low sodium). Practice food safety precautions. See nutrition handout and food safety booklet for more information.

## 2024-07-30 NOTE — CONSULTS
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      While getting renal ultrasound in PACU, patient cardiac arrested (V-fib) for approximately 5 mins. Received 2 rounds of CPR, epi x 2, 2g IV mag, 100mg IV lidocaine push during code before achieving ROSC. Lactate ~9. Trop 136. Code blue was managed by the PACU team. Post ROSC EKG revealed inferior STEMI. Arrived post-op to CVICU @ 0810. Cardiology consult team emergently engaged for inferior STEMI. Cath lab subsequently activated and patient underwent coronary angiogram which revealed 100% prox RCA in stent thrombosis, now s/p 2 IBAN in ostial to prox RCA. Most recent TTE revealing of EF 35-40%, akinesis of the basal-mid inferior and basal inferoseptal segments present. RV normal size, normal function.    Cards 2 consulted for optimization of GDMT.       Recommendations:  - discontinue hydralazine and coreg  - start metoprolol succinate 25 mg daily   - will place referrals to [facility] clinic and general cardiology for outpatient follow up (recommend TTE in ~ 6 weeks), will attempt to optimize GDMT in outpatient setting       # Acute on chronic systolic heart failure/HFrEF secondary to ICM  # VF arrest (7/20/24)  # Inferior STEMI s/p IBAN x 2 to ostial-prox RCA 2/2 in stent thrombosis     # CAD s/p PCI with IBAN 1/2019, CABG 2v 2019, PCI to prox RCA 2/2024  Stage C. NYHA  "Class II.   Fluid status: euvolemic  ACEi/ARB/ARNI/afterload reduction: defer in setting of recent kidney tx, per renal transplant will likely be able to start ARNI, SGLT2i in future  BB: start metoprolol 25 mg daily (PTA coreg 37.5 mg BID)  Aldosterone antagonist: contraindicated due to renal dysfunction, recent kidney tx  SGLT2i: defer 2/2 to recent kidney tx with elevated SCr, consider in future  SCD prophylaxis: does not meet criteria for implant  CAD: continue statin and DAPT (if having significant SEs (SOB, diarrhea) to brilinta, recommend continuing for 4 weeks and then okay to switch to plavix 75 mg daily with a 600 mg loading dose).       Time/Communication  I spent 45 minutes reviewing results, care team notes, meeting directly with the patient, coordinating care, and completing documentation on the day of service.     Patient discussed with Dr. Jimenez.        Mary Ann Calderon, LAWSON, FNP-C  Advanced Heart Failure/Cardiology II Service  Vocera Preferred or Pager [phone]  ================================================================    Subjective/24-Hr Events:   Last 24 hr care team notes reviewed. No overnight events. Feeling well this morning. Denies CP, palpitations, SOB, MARQUEZ, orthopnea, PND, LE edema.     ROS:  4 point ROS including respiratory, CV, GI and  (other than that noted in the HPI) is negative.     Medications: Reviewed in EPIC.     Physical Exam:   /74 (BP Location: Right arm)   Pulse 104   Temp 98.4  F (36.9  C) (Oral)   Resp 18   Ht 1.803 m (5' 11\")   Wt 101.2 kg (223 lb)   SpO2 98%   BMI 31.10 kg/m      GENERAL: Appears comfortable, in no distress .  HEENT: Eye symmetrical, no discharge or icterus bilaterally. Mucous membranes moist and without lesions.  NECK: Supple, JVD < 6cm.   CV: RRR, +S1S2, no murmur, rub, or gallop.   RESPIRATORY: Respirations regular, even, and unlabored. Lungs CTA throughout.    GI: Soft and non distended with normoactive bowel sounds present in " all quadrants. No tenderness, rebound, guarding.   EXTREMITIES: No peripheral edema. 2+ bilateral pedal pulses.   NEUROLOGIC: Alert and oriented x 3. No focal deficits.   MUSCULOSKELETAL: No joint swelling or tenderness.   SKIN: No jaundice. No rashes or lesions.     Labs:  CMP  Recent Labs   Lab 07/30/24  1141 07/30/24  1012 07/30/24  0557 07/30/24  0549 07/29/24  0752 07/29/24  0507 07/28/24  1527 07/28/24  0615 07/27/24  0845 07/27/24  0602   NA  --   --  133*  --   --  133*  --  132*  --  133*   POTASSIUM  --   --  5.2  --   --  5.4*  --  5.3  --  5.4*   CHLORIDE  --   --  108*  --   --  109*  --  107  --  108*   CO2  --   --  18*  --   --  17*  --  19*  --  17*   ANIONGAP  --   --  7  --   --  7  --  6*  --  8   GLC 94 107* 156* 148*   < > 97   < > 91   < > 84   BUN  --   --  18.3  --   --  22.5*  --  28.2*  --  31.4*   CR  --   --  1.76*  --   --  1.71*  --  1.91*  --  1.81*   GFRESTIMATED  --   --  47*  --   --  48*  --  42*  --  45*   BETHANY  --   --  9.2  --   --  9.0  --  9.1  --  9.5   MAG  --   --  1.7  --   --  1.6*  --  1.5*  --  1.6*   PHOS  --   --  2.0*  --   --  2.3*  --  2.3*  --  2.5   PROTTOTAL  --   --  5.2*  --   --  4.8*  --  4.8*  --  5.1*   ALBUMIN  --   --  3.2*  --   --  3.0*  --  3.2*  --  3.3*   BILITOTAL  --   --  0.4  --   --  0.4  --  0.5  --  0.6   ALKPHOS  --   --  105  --   --  92  --  90  --  90   AST  --   --  25  --   --  25  --  23  --  27   ALT  --   --  30  --   --  28  --  30  --  29    < > = values in this interval not displayed.       CBC  Recent Labs   Lab 07/30/24  0557 07/29/24  0507 07/28/24  0615 07/27/24  0602   WBC 10.4 8.8 8.1 8.3   RBC 2.36* 2.31* 2.48* 2.80*   HGB 7.9* 7.6* 8.1* 9.1*   HCT 22.5* 21.8* 23.1* 27.0*   MCV 95 94 93 96   MCH 33.5* 32.9 32.7 32.5   MCHC 35.1 34.9 35.1 33.7   RDW 13.9 13.2 12.9 12.7    168 146* 125*       INR  No lab results found in last 7 days.

## 2024-07-30 NOTE — PLAN OF CARE
"Vitals: /74 (BP Location: Right arm)   Pulse 104   Temp 98.4  F (36.9  C) (Oral)   Resp 18   Ht 1.803 m (5' 11\")   Wt 101.2 kg (223 lb)   SpO2 98%   BMI 31.10 kg/m      Endocrine: Glucose 94.  Labs: Stable labs, slight increase in pancreas enzymes.  Pain: Denies pain.  PRN's: n/a  Diet: Regular diet, eating well.  LDA: PIV discontinued, AMILCAR.  GI: BM 7/29.  : Voids spontaneously.  Skin: Abdominal incision with staples, AMILCAR (to be removed prior to discharge).   Neuro: Alert and oriented.  Mobility: Up ad prasanna.  Education: Updated medication card, lab book, met with Specialty Pharmacy.  Plan: Discharge locally today.                          "

## 2024-07-30 NOTE — PROGRESS NOTES
[facility]  Transplant Nephrology Progress Note  Date of Admission:  7/19/2024  Today's Date: 07/30/2024  Requesting physician: Harrison Whitehead*    Reason for Consult:  SPK and immunosuppression    Recommendations:   - starting on metoprolol per cards recs  - agree with tac dose increase given lower dose    Assessment & Plan   # DDKT (SPK): Stable.    - Baseline Creatinine: ~ TBD   - Proteinuria: Not checked post transplant   - DSA Hx:  Final cross match pending    - Last cPRA: 11%   - BK Viremia: Not checked post transplant   - Kidney Tx Biopsy Hx: No biopsy history.    # Pancreas Tx (SPK):    - Pancreatic Exocrine Drainage: Enteric drained     - Blood glucose: Near euglycemia      On insulin: No   -  Latest HbA1c: 4%   - Pancreatic enzymes: Trend up   - DSA Hx:  final crossmatch pending   - Pancreas Tx Biopsy Hx: No biopsy history    # Immunosuppression: Tacrolimus immediate release (goal 8-10) and Mycophenolate mofetil (dose 1000 mg every 12 hours)   - Induction with Recent Transplant:  High Intensity Protocol   - Continue with intensive monitoring of immunosuppression for efficacy and toxicity.   - Historical Changes in Immunosuppression: None   - Changes: No,    # VT/VF Cardiac arrest:  # CAD s/p CABG 2019 LIMA-LAD, SVG-RCA, IBAN 1/2019, IBAN to RCA 2/2024   - s/p inferior STEMI 2/2 ostial proximal RCA in stent thrombosis   - s/p 2 IBAN to RCA 7/20/2024   - Echo 7/20: LVEF=37%.Moderate diffuse hypokinesis with akinesis of all inferior segments.Mild right ventricular dilation is present.Global  right ventricular function is moderately reduced.   ECHo 7/28 :Left ventricular size is normal. Left ventricular function is decreased. The ejection fraction is 35-40% (moderately reduced). Akinesis of the basal-mid  inferior and basal inferoseptal segments present.    # Infection Prevention:      - PJP: Dapsone , changed for continued hyperkalemia  - CMV IgG Ab High  Risk Discordance (D+/R-): No  - EBV IgG Ab High Risk Discordance (D+/R-): No    # Blood Pressure:  hypotensive ;  Goal BP: MAP > 65   - Changes: No     # Anemia in Chronic Renal Disease: Hgb: Trend down post SPK     KRERI: No   - Iron studies: Not checked recently. Monitor Hb trends    # Mineral Bone Disorder:    - Secondary renal hyperparathyroidism; PTH level: Unknown at this time, but checked with dialysis        On treatment: None  - Vitamin D; level: High        On supplement: No  - Calcium; level: Normal        On supplement: No  - Phosphorus; level: Low normal        On supplement: Yes    Hypercalcemia, resolved. Continue to hold supplementation for now.    # Electrolytes:   - Potassium; level: High normal      On supplement: No  - Magnesium; level: Normal        On supplement: Yes  - Bicarbonate; level: Low        On supplement: Yes  - Sodium; level: Normal    # Other Significant PMH:   - Left diaphragmatic elevation; 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    Grisel Coombs MD  Transplant Nephrology  Contact information via [employer] Web Console or via [facility] Paging/Directory      Interval History  Mr. Ramey's creatinine is 1.7, Stable  Excellent UOP  Denied chest pain, SOB, N/V/D. No dysuria.    Review of Systems   4 point ROS was obtained and negative except as noted in the Interval History.    MEDICATIONS:  Current Facility-Administered Medications   Medication Dose Route Frequency Provider Last Rate Last Admin    [Held by provider] amLODIPine (NORVASC) tablet 10 mg  10 mg Oral Daily Julienne Ibrahim PA-C   10 mg at 07/29/24 0800    aspirin (ASA) chewable tablet 81 mg  81 mg Oral or Feeding Tube Daily Julienne Ibrahim  SIS Lindsey   81 mg at 07/30/24 0851    atorvastatin (LIPITOR) tablet 40 mg  40 mg Oral QPM Julienne Ibrahim PA-C   40 mg at 07/29/24 2005    dapsone (ACZONE) tablet 50 mg  50 mg Oral Daily Libra Valentino PA-C   50 mg at 07/30/24 0850    heparin ANTICOAGULANT injection 5,000 Units  5,000 Units Subcutaneous Q8H Julienne Ibrahim PA-C   5,000 Units at 07/30/24 0524    insulin aspart (NovoLOG) injection (RAPID ACTING)  1-7 Units Subcutaneous Q4H Julienne Ibrahim PA-C   1 Units at 07/30/24 0552    magnesium oxide (MAG-OX) tablet 800 mg  800 mg Oral BID Libra Valentino PA-C   800 mg at 07/30/24 0852    metoprolol succinate ER (TOPROL XL) 24 hr tablet 25 mg  25 mg Oral Daily Mary Ann Calderon APRN CNP   25 mg at 07/30/24 1135    mycophenolate (GENERIC EQUIVALENT) tablet 1,000 mg  1,000 mg Oral BID IS Julienne Ibrahim PA-C   1,000 mg at 07/30/24 0849    nystatin (MYCOSTATIN) suspension 500,000 Units  500,000 Units Oral 4x Daily Julienne Ibrahim PA-C   500,000 Units at 07/30/24 1136    pantoprazole (PROTONIX) EC tablet 40 mg  40 mg Oral QAM Harrison Arciniega MD   40 mg at 07/30/24 0851    phosphorus tablet 250 mg (PHOSPHA 250 NEUTRAL) per tablet 500 mg  500 mg Oral BID Libra Valentino PA-C   500 mg at 07/30/24 0925    polyethylene glycol (MIRALAX) Packet 17 g  17 g Oral BID Julienne Ibrahim PA-C   17 g at 07/25/24 0856    senna-docusate (SENOKOT-S/PERICOLACE) 8.6-50 MG per tablet 2 tablet  2 tablet Oral BID Julienne Ibrahim PA-C   2 tablet at 07/28/24 0757    sodium bicarbonate tablet 1,950 mg  1,950 mg Oral TID Libra Valentino PA-C   1,950 mg at 07/30/24 1347    sodium chloride (PF) 0.9% PF flush 3 mL  3 mL Intravenous Q8H Julienne Ibrahim PA-C   3 mL at 07/30/24 0926    tacrolimus (GENERIC EQUIVALENT) capsule 3 mg  3 mg Oral BID IS Libra Valentino PA-C   3 mg at 07/30/24 0849    ticagrelor (BRILINTA) tablet 90 mg  90  "mg Oral or Feeding Tube BID Julienne Ibrahim PA-C   90 mg at 24 0859    valGANciclovir (VALCYTE) tablet 900 mg  900 mg Oral Daily Harrison Whitehead MD   900 mg at 24 0850     Current Facility-Administered Medications   Medication Dose Route Frequency Provider Last Rate Last Admin       Physical Exam   Temp  Av.4  F (36.3  C)  Min: 96.1  F (35.6  C)  Max: 98.6  F (37  C)  Arterial Line BP  Min: 36/21  Max: 276/115  Arterial Line MAP (mmHg)  Av.3 mmHg  Min: 29 mmHg  Max: 232 mmHg      Pulse  Av.9  Min: 20  Max: 152 Resp  Avg: 15.5  Min: 3  Max: 38  FiO2 (%)  Av %  Min: 70 %  Max: 80 %  SpO2  Av.7 %  Min: 72 %  Max: 100 %    CVP (mmHg): 278 mmHgBP 106/68   Pulse 104   Temp 98.8  F (37.1  C) (Oral)   Resp 18   Ht 1.803 m (5' 11\")   Wt 101.2 kg (223 lb)   SpO2 97%   BMI 31.10 kg/m     Date 24 07 - 24 0659   Shift 0702-2245 2468-1752 1009-6690 24 Hour Total   INTAKE   I.V. 1839.32   1839.32   NG/   128   Shift Total(mL/kg) 1967.32(19.21)   1967.32(19.21)   OUTPUT   Urine 1325   1325   Emesis/NG output 100   100   Drains 875   875   Shift Total(mL/kg) 2300(22.46)   2300(22.46)   Weight (kg) 102.4 102.4 102.4 102.4      Admit Weight: 102.4 kg (225 lb 12 oz)     GENERAL APPEARANCE: awake and no distress  HENT: atraumatic  RESP: fine coarse breath sounds b/l  CV: regular rhythm, normal rate  EDEMA: no LE edema bilaterally  ABDOMEN: soft, nondistended, surgical incision  MS: extremities normal - no gross deformities noted  SKIN: no rash  Access LUE AVF +thrill/bruit    Data   All labs reviewed by me.  CMP  Recent Labs   Lab 24  1141 24  1012 24  0557 24  0549 24  0752 24  0507 24  1527 24  0615 24  0845 24  0602   NA  --   --  133*  --   --  133*  --  132*  --  133*   POTASSIUM  --   --  5.2  --   --  5.4*  --  5.3  --  5.4*   CHLORIDE  --   --  108*  --   --  109*  --  107  --  " 108*   CO2  --   --  18*  --   --  17*  --  19*  --  17*   ANIONGAP  --   --  7  --   --  7  --  6*  --  8   GLC 94 107* 156* 148*   < > 97   < > 91   < > 84   BUN  --   --  18.3  --   --  22.5*  --  28.2*  --  31.4*   CR  --   --  1.76*  --   --  1.71*  --  1.91*  --  1.81*   GFRESTIMATED  --   --  47*  --   --  48*  --  42*  --  45*   BETHANY  --   --  9.2  --   --  9.0  --  9.1  --  9.5   MAG  --   --  1.7  --   --  1.6*  --  1.5*  --  1.6*   PHOS  --   --  2.0*  --   --  2.3*  --  2.3*  --  2.5   PROTTOTAL  --   --  5.2*  --   --  4.8*  --  4.8*  --  5.1*   ALBUMIN  --   --  3.2*  --   --  3.0*  --  3.2*  --  3.3*   BILITOTAL  --   --  0.4  --   --  0.4  --  0.5  --  0.6   ALKPHOS  --   --  105  --   --  92  --  90  --  90   AST  --   --  25  --   --  25  --  23  --  27   ALT  --   --  30  --   --  28  --  30  --  29    < > = values in this interval not displayed.     CBC  Recent Labs   Lab 07/30/24  0557 07/29/24  0507 07/28/24  0615 07/27/24  0602   HGB 7.9* 7.6* 8.1* 9.1*   WBC 10.4 8.8 8.1 8.3   RBC 2.36* 2.31* 2.48* 2.80*   HCT 22.5* 21.8* 23.1* 27.0*   MCV 95 94 93 96   MCH 33.5* 32.9 32.7 32.5   MCHC 35.1 34.9 35.1 33.7   RDW 13.9 13.2 12.9 12.7    168 146* 125*     INR  No lab results found in last 7 days.    ABG  No lab results found in last 7 days.     Urine Studies  Recent Labs   Lab Test 07/20/24  0609 09/27/21  1220 07/24/21  1151 06/25/19  1439 06/19/19  0822   COLOR Orange* Yellow Light Yellow Straw Straw   APPEARANCE Slightly Cloudy* Slightly Cloudy* Clear Clear Clear   URINEGLC 50* 150* 200* 200 mg/dL* >1000 mg/dL*   URINEBILI Negative Negative Negative Negative Negative   URINEKETONE Negative Negative Negative Negative Negative   SG 1.013 1.013 1.018 1.010 1.012   UBLD Large* Negative 0.03 mg/dL* Moderate* Trace*   URINEPH 6.5 6.0 6.0 6.5 6.0   PROTEIN 300* >499* 600* 200 mg/dL* 300 mg/dL*   UROBILINOGEN  --   --   --  <2.0 E.U./dL <2.0 E.U./dL   NITRITE Negative Negative Negative Negative  Negative   LEUKEST Trace* Small* Negative Negative Negative   RBCU >182* 2 1 25-50* 0-2   WBCU 105* 4 10* 0-5 0-5     No lab results found.  PTH  Recent Labs   Lab Test 07/25/24  0448 07/20/21  1845   PTHI 199* 190*     Iron Studies  No lab results found.    IMAGING:  All imaging studies reviewed by me.

## 2024-07-30 NOTE — PHARMACY-TRANSPLANT NOTE
Solid Organ Transplant Recipient Prior to Discharge Note    49 year old male s/p SPK transplant on 7/20/2024.    INDUCTION immunosuppression:  Thymoglobulin 6.5 mg/kg IV total course with methylprednisolone IV x3 dose taper    MAINTENANCE immunosuppression:  Mycophenolate mofetil 1000 mg by mouth twice daily.  Tacrolimus titrated to goal trough level of 8-10 mcg/L for first 6 months post-transplant. Last level was 7/29 = 4.9 mcg/L (11.2 hour trough) and dose was increased to tacrolimus 3 mg by mouth twice daily.    Opportunistic pathogen prophylaxis:  Dapsone 50 mg by mouth daily for PJP.  Nystatin suspension 500,000 units by mouth four times daily for thrush.  Valganciclovir 900 mg by mouth daily for CMV. CMV D-/R+ -- anticipate 3 month course.    Pharmacy has monitored for medication interactions and immunosuppression levels in conjunction with the multidisciplinary team. In anticipation for discharge, medication therapy needs have been addressed daily throughout the current admission via multidisciplinary rounds and/or discussions, order verification, daily clinical pharmacy review, and communication with prescribers.  Reyes Macias, KristyD

## 2024-07-30 NOTE — PLAN OF CARE
"  Problem: Adult Inpatient Plan of Care  Goal: Patient-Specific Goal (Individualized)  Description: You can add care plan individualizations to a care plan. Examples of Individualization might be:  \"Parent requests to be called daily at 9am for status\", \"I have a hard time hearing out of my right ear\", or \"Do not touch me to wake me up as it startles  me\".  Outcome: Met   Goal Outcome Evaluation:               DISCHARGE:  Patient with orders to discharge to home.     Education Provided:   Med Card updated   Lab Book updated  Handouts given   Specialty Pharmacy met with specialty pharmacy   LDAs removed     Discharge instructions, medications & follow ups reviewed with patient and wife. Copy of discharge summary given to patient. PIV and AMILCAR drain  removed.  [facility] notified, report given BETSY Hood.    Patient in stable condition. AVSS. Patient had no further questions regarding discharge instructions and medications. Patient transferred out by wheelchair & left with wife.  Plan for outpatient labs tomorrow morning.  .         " Chart accessed for purpose of chart review. Deon Brooke RNC

## 2024-07-30 NOTE — TELEPHONE ENCOUNTER
A pharmacist spent 15 minutes providing medication teaching with Siva Ramey and his wife Damari for discharge with a focus on new medications/dose changes.  The discharge medication list was reviewed with the patient/family and the following points were discussed, as applicable: Name, description, purpose, dose/strength, duration of medications, common side effects, food/medications to avoid, action to be taken if dose is missed, when to call MD, and how to obtain refills.  The patient will be responsible for managing medications. Additionally, the following transplant related education was covered: Purpose of medication card, Timing of medications and day of lab draw considerations , Prescription Insurance , and Discharge process for receiving meds   Patient will  transplant supplies including 7 day pill organizer, thermometer, and BP monitor at the discharge pharmacy along with medications.  Patient chooses to receive medications from [facility] specialty pharmacy (will pick-up on-site at [facility] in [place]).   Clinical Pharmacy Consult:                                                      Transplant Specific:   Date of Transplant: 7/20/24  Type of Transplant: kidney and pancreas  First Transplant: yes  History of rejection: no    Immunosuppression Regimen   TAC 3mg qAM & 3mg qPM and MMF 1000mg qAM & 1000mg qPM  Patient specific goal: 8-10 micrograms/L  Most recent level: 4.5 micrograms/L, date 7/29/24  Immunosuppressant Levels:  Subtherapeutic  Pt adherent to lab draws: yes  Scr:   Lab Results   Component Value Date    CR 1.76 07/30/2024     Side effects: no side effects    Prophylactic Medications  PJP Prophylactic: Dapsone 50mg daily  Scheduled Discontinue Date: Lifelong     Antifungal: Nystatin  Scheduled Discontinue Date: 3 months Anticipated date 10/20/24    CMV Prophylactic: CrCl >/=60 mL/minute: Valcyte 900mg daily   Scheduled Discontinue Date: 3 months Anticipated date 10/20/24    Acid  Reducer: Protonix (pantoprazole)  Scheduled Reviewed Date: N/A    Vascular Prophylactic: DAPT- Brilinta + ASA for at least 1 month   Scheduled Discontinue Date: N/A    Reminders:  Bring to first clinic appt: med box, med card, bp monitor, all medications being taken, and lab book.  2.   MTM pharmacist visit on first clinic appt and if ok, again in 3 to 4 months during follow up appt.  3.   Avoid Grapefruit and Grapefruit juice.   4.   Avoid herbal supplements. If wish to take other medications or supplements, call your coordinator.   5.   Keep lab appts.   6.   Can use apps on phone like [employer] to help manage medication lists and reminders.   7.   Make sure you are protecting your skin by wearing long sleeves and applying sunscreen to exposed skin, for any significant time in the sun.     Transplant Coordinator is Julianna Edwards RN     Thank you,  Duyen St, PharmD, BCACP  [facility]  [phone]

## 2024-07-31 ENCOUNTER — INFUSION THERAPY VISIT (OUTPATIENT)
Dept: INFUSION THERAPY | Facility: CLINIC | Age: 49
End: 2024-07-31
Attending: NURSE PRACTITIONER
Payer: COMMERCIAL

## 2024-07-31 ENCOUNTER — TELEPHONE (OUTPATIENT)
Dept: TRANSPLANT | Facility: CLINIC | Age: 49
End: 2024-07-31

## 2024-07-31 ENCOUNTER — LAB (OUTPATIENT)
Dept: LAB | Facility: CLINIC | Age: 49
End: 2024-07-31
Attending: NURSE PRACTITIONER
Payer: COMMERCIAL

## 2024-07-31 ENCOUNTER — TELEPHONE (OUTPATIENT)
Dept: PHARMACY | Facility: CLINIC | Age: 49
End: 2024-07-31

## 2024-07-31 ENCOUNTER — DOCUMENTATION ONLY (OUTPATIENT)
Dept: TRANSPLANT | Facility: CLINIC | Age: 49
End: 2024-07-31

## 2024-07-31 VITALS
RESPIRATION RATE: 18 BRPM | WEIGHT: 224.7 LBS | SYSTOLIC BLOOD PRESSURE: 121 MMHG | HEART RATE: 105 BPM | OXYGEN SATURATION: 97 % | DIASTOLIC BLOOD PRESSURE: 67 MMHG | BODY MASS INDEX: 31.34 KG/M2 | TEMPERATURE: 98.2 F

## 2024-07-31 DIAGNOSIS — Z94.0 KIDNEY REPLACED BY TRANSPLANT: Primary | ICD-10-CM

## 2024-07-31 DIAGNOSIS — Z94.0 KIDNEY REPLACED BY TRANSPLANT: ICD-10-CM

## 2024-07-31 DIAGNOSIS — Z48.298 AFTERCARE FOLLOWING ORGAN TRANSPLANT: ICD-10-CM

## 2024-07-31 DIAGNOSIS — Z94.0 STATUS POST SIMULTANEOUS KIDNEY AND PANCREAS TRANSPLANT (H): Primary | ICD-10-CM

## 2024-07-31 DIAGNOSIS — E11.9 DIABETES MELLITUS TYPE 2, UNCOMPLICATED (H): ICD-10-CM

## 2024-07-31 DIAGNOSIS — Z94.83 PANCREAS REPLACED BY TRANSPLANT (H): ICD-10-CM

## 2024-07-31 DIAGNOSIS — Z98.890 OTHER SPECIFIED POSTPROCEDURAL STATES: ICD-10-CM

## 2024-07-31 DIAGNOSIS — Z76.82 ORGAN TRANSPLANT CANDIDATE: ICD-10-CM

## 2024-07-31 DIAGNOSIS — Z79.899 ENCOUNTER FOR LONG-TERM CURRENT USE OF MEDICATION: ICD-10-CM

## 2024-07-31 DIAGNOSIS — N18.6 ESRD (END STAGE RENAL DISEASE) (H): ICD-10-CM

## 2024-07-31 DIAGNOSIS — Z94.83 STATUS POST SIMULTANEOUS KIDNEY AND PANCREAS TRANSPLANT (H): Primary | ICD-10-CM

## 2024-07-31 LAB
AMYLASE SERPL-CCNC: 270 U/L (ref 28–100)
ANION GAP SERPL CALCULATED.3IONS-SCNC: 8 MMOL/L (ref 7–15)
BASOPHILS # BLD AUTO: 0 10E3/UL (ref 0–0.2)
BASOPHILS NFR BLD AUTO: 0 %
BUN SERPL-MCNC: 14.8 MG/DL (ref 6–20)
CALCIUM SERPL-MCNC: 10 MG/DL (ref 8.8–10.4)
CHLORIDE SERPL-SCNC: 107 MMOL/L (ref 98–107)
CREAT SERPL-MCNC: 1.85 MG/DL (ref 0.67–1.17)
EGFRCR SERPLBLD CKD-EPI 2021: 44 ML/MIN/1.73M2
EOSINOPHIL # BLD AUTO: 0.3 10E3/UL (ref 0–0.7)
EOSINOPHIL NFR BLD AUTO: 3 %
ERYTHROCYTE [DISTWIDTH] IN BLOOD BY AUTOMATED COUNT: 14.6 % (ref 10–15)
GLUCOSE SERPL-MCNC: 99 MG/DL (ref 70–99)
HCO3 SERPL-SCNC: 21 MMOL/L (ref 22–29)
HCT VFR BLD AUTO: 23.7 % (ref 40–53)
HGB BLD-MCNC: 8 G/DL (ref 13.3–17.7)
IMM GRANULOCYTES # BLD: 0.2 10E3/UL
IMM GRANULOCYTES NFR BLD: 2 %
LIPASE SERPL-CCNC: 275 U/L (ref 13–60)
LYMPHOCYTES # BLD AUTO: 0.3 10E3/UL (ref 0.8–5.3)
LYMPHOCYTES NFR BLD AUTO: 2 %
MAGNESIUM SERPL-MCNC: 1.8 MG/DL (ref 1.7–2.3)
MCH RBC QN AUTO: 32.3 PG (ref 26.5–33)
MCHC RBC AUTO-ENTMCNC: 33.8 G/DL (ref 31.5–36.5)
MCV RBC AUTO: 96 FL (ref 78–100)
MONOCYTES # BLD AUTO: 0.3 10E3/UL (ref 0–1.3)
MONOCYTES NFR BLD AUTO: 3 %
NEUTROPHILS # BLD AUTO: 9.3 10E3/UL (ref 1.6–8.3)
NEUTROPHILS NFR BLD AUTO: 90 %
NRBC # BLD AUTO: 0 10E3/UL
NRBC BLD AUTO-RTO: 0 /100
PHOSPHATE SERPL-MCNC: 2.4 MG/DL (ref 2.5–4.5)
PLATELET # BLD AUTO: 247 10E3/UL (ref 150–450)
POTASSIUM SERPL-SCNC: 5 MMOL/L (ref 3.4–5.3)
RBC # BLD AUTO: 2.48 10E6/UL (ref 4.4–5.9)
SODIUM SERPL-SCNC: 136 MMOL/L (ref 135–145)
TACROLIMUS BLD-MCNC: 4.2 UG/L (ref 5–15)
TME LAST DOSE: ABNORMAL H
TME LAST DOSE: ABNORMAL H
WBC # BLD AUTO: 10.4 10E3/UL (ref 4–11)

## 2024-07-31 PROCEDURE — 83735 ASSAY OF MAGNESIUM: CPT | Performed by: PATHOLOGY

## 2024-07-31 PROCEDURE — 99213 OFFICE O/P EST LOW 20 MIN: CPT | Mod: 24 | Performed by: NURSE PRACTITIONER

## 2024-07-31 PROCEDURE — 85025 COMPLETE CBC W/AUTO DIFF WBC: CPT | Performed by: PATHOLOGY

## 2024-07-31 PROCEDURE — 83690 ASSAY OF LIPASE: CPT | Performed by: PATHOLOGY

## 2024-07-31 PROCEDURE — 36415 COLL VENOUS BLD VENIPUNCTURE: CPT | Performed by: PATHOLOGY

## 2024-07-31 PROCEDURE — 86833 HLA CLASS II HIGH DEFIN QUAL: CPT | Performed by: SURGERY

## 2024-07-31 PROCEDURE — 80048 BASIC METABOLIC PNL TOTAL CA: CPT | Performed by: PATHOLOGY

## 2024-07-31 PROCEDURE — 80197 ASSAY OF TACROLIMUS: CPT | Performed by: INTERNAL MEDICINE

## 2024-07-31 PROCEDURE — 86828 HLA CLASS I&II ANTIBODY QUAL: CPT | Performed by: SURGERY

## 2024-07-31 PROCEDURE — 82150 ASSAY OF AMYLASE: CPT | Performed by: PATHOLOGY

## 2024-07-31 PROCEDURE — 84100 ASSAY OF PHOSPHORUS: CPT | Performed by: PATHOLOGY

## 2024-07-31 PROCEDURE — 86832 HLA CLASS I HIGH DEFIN QUAL: CPT | Performed by: SURGERY

## 2024-07-31 PROCEDURE — 99000 SPECIMEN HANDLING OFFICE-LAB: CPT | Performed by: PATHOLOGY

## 2024-07-31 RX ORDER — TACROLIMUS 1 MG/1
4 CAPSULE ORAL 2 TIMES DAILY
Qty: 240 CAPSULE | Refills: 11 | Status: SHIPPED | OUTPATIENT
Start: 2024-07-31 | End: 2024-08-01

## 2024-07-31 NOTE — TELEPHONE ENCOUNTER
Tacrolimus IR level 4.2 on 7/31, goal 8-10, dose 3 mg BID.  Per Siva, he started 3 mg bid yesterday in the hospital     PLAN:   Call Patient and confirm this was an accurate 12-hour trough.   Verify Tacrolimus IR dose 3 mg BID.   Confirm no new medications or or missed doses.   Confirm no new illness / infection / diarrhea.   If accurate trough and accurate dose, increase Tacrolimus IR dose to 4 mg BID     Is this more than a 50% increase or decrease in current IS dose: No  If YES, justification: N/A    Repeat labs in 1 days.  *If > 50% change in immunosuppression dose, repeat labs in 1 week.     OUTCOME:   Spoke with Patient, they confirm accurate trough level and current dose 3 mg BID.   Patient confirmed dose change to 4 mg BID.  Patient agreed to repeat labs in 1 days.   Orders sent to preferred pharmacy for dose change and lab for repeat labs.   Patient voiced understanding of plan.     Labs: [facility] lab  Pharmacy: [facility] pharmacy

## 2024-07-31 NOTE — PROGRESS NOTES
TRANSPLANT SURGERY EARLY POST TRANSPLANT VISIT    Assessment & Plan   # DDKT (SPK): Cr slight Trend up Doing well. SIPC tomorrow.    - Baseline Creatinine: ~ TBD   - Proteinuria: Not checked recently   - Date DSA Last Checked: Jul/2024      Latest DSA: No DSA at time of transplant   - BK Viremia: No   - Kidney Tx Biopsy: No   - Transplant Ureteral Stent: Yes     # Pancreas Tx (SPK):    - Pancreatic Exocrine Drainage: Enteric drained     - Blood glucose: Near euglycemia      On insulin: No   - HbA1c: Last checked at time of transplant      Latest HbA1c: 6.4%   - Pancreatic enzymes: lipase high but down trending    - DSA Hx: No DSA   - Pancreas Tx Biopsy Hx: No biopsy history    # Immunosuppression: Tacrolimus immediate release (goal 8-10) and Mycophenolate mofetil (dose 1000 mg every 12 hours)   - Induction with Recent Transplant:  High Intensity Protocol   - Continue with intensive monitoring of immunosuppression for efficacy and toxicity.   - Changes: Not at this time    # Infection Prophylaxis:   - PJP: Dapsone - changed for continued hyperkalemia   - CMV: Valganciclovir (Valcyte)     # Hypertension: Controlled;  Goal BP: < 150/90   - Changes: Not at this time    # Anemia in Chronic Renal Disease: Hgb: Trend up      KERRI: No   - Iron studies: Not checked recently    # Mineral Bone Disorder:   - Secondary renal hyperparathyroidism; PTH level: Mildly elevated (151-300 pg/ml)        On treatment: None  - Vitamin D; level: Not checked recently        On supplement: Yes  - Calcium; level: Normal        On supplement: No  - Phosphorus; level: Low normal        On supplement: Yes    # Electrolytes:   - Potassium; level: Normal        On supplement: No  - Magnesium; level: Normal        On supplement: Yes  - Bicarbonate; level: Low normal        On supplement: Yes  - Sodium; level: Normal    # : # VT/VF Cardiac arrest:  # CAD s/p CABG 2019 LIMA-LAD, SVG-RCA, IBAN 1/2019, IBAN to RCA 2/2024              - s/p inferior STEMI  2/2 ostial proximal RCA in stent thrombosis              - s/p 2 IBAN to RCA 7/20/2024              - Echo 7/20: LVEF=37%.Moderate diffuse hypokinesis with akinesis of all inferior segments.Mild right ventricular dilation is present.Global  right ventricular function is moderately reduced.              ECHo 7/28 :Left ventricular size is normal. Left ventricular function is decreased. The ejection fraction is 35-40% (moderately reduced). Akinesis of the basal-mid  inferior and basal inferoseptal segments present.   -following with cardiology outpatient     # Medical Compliance: Yes    # Health Maintenance and Vaccination Review: No issues    # Transplant History:  Etiology of Kidney Failure: Diabetes mellitus type 2  Tx: DDKT (SPK)  Transplant: 7/20/2024 (Kidney / Pancreas)  Donor Type: Donation after Brain Death Donor Class:    Crossmatch at time of Tx: negative  DSA at time of Tx: No  Significant changes in immunosuppression: None  CMV IgG Ab High Risk Discordance (D+/R-): No  EBV IgG Ab High Risk Discordance (D+/R-): No  Significant transplant-related complications:  cardiac arrest post SPK due to STEMI    Transplant Office Phone Number: [phone]    Assessment and plan was discussed with the patient and he voiced his understanding and agreement.    Return visit:[facility] tomorrow     JUANCARLOS Silva CNP    Chief Complaint   Mr. Ramey is a 49 year old here for hospital follow up after kidney/pancreas transplant.     History of Present Illness    Siva Ramey is an 49 year old male with PMH significant for DMII (on insulin pump) and resulting ESKD (on HD every MWF since 8/2021). 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 Underwent simultaneous kidney pancreas transplant on 7/20/24 complicated by VT/VF arrest in PACU. Received 2  rounds of CPR before ROSC. Taken emergently to the cath lab and found to have 100% occlusion of the RCA now s/p IBAN x 2.     Doing well. Pain controlled. Urinating with no issues. LBM x3 yesterday, formed and soft.   Drinking close to 3 liters daily. No CP or SOB.  No N/V/D.   No leg edema.   Weight stable. Not orthostatic. Denies dizziness.       Home BP:  114/75    Problem List   Patient Active Problem List   Diagnosis    Type 2 diabetes mellitus with other circulatory complication, unspecified whether long term insulin use (H)    Dyslipidemia    Class 2 severe obesity due to excess calories with serious comorbidity in adult, unspecified BMI (H)    Atherosclerosis of other coronary artery bypass graft(s) with unstable angina pectoris (H)    Anemia in chronic kidney disease    HTN (hypertension)    S/P CABG (coronary artery bypass graft)    Balanoposthitis    Other stricture of anterior urethra in male    History of ST elevation myocardial infarction (STEMI)    Obesity (BMI 30.0-34.9)    MARQUEZ (dyspnea on exertion)    STEMI (ST elevation myocardial infarction) (H)    Vitamin D deficiency    Metabolic acidosis    Retinopathy    Peripheral neuropathy    Acquired elevated diaphragm    Median nerve neuropathy, left    Iron deficiency anemia, unspecified    End stage renal disease (H)    Allergy, unspecified, initial encounter    Anaphylactic shock, unspecified, initial encounter    Coagulation defect, unspecified (H24)    Fatigue, unspecified type    Non-restorative sleep    Obstructive sleep apnea treated with continuous positive airway pressure (CPAP)    Other disorders of phosphorus metabolism    Other specified symptoms and signs involving the circulatory and respiratory systems    Snoring    Palpitations    Gastroesophageal reflux disease without esophagitis    Diabetes mellitus, type 2 (H)    Kidney replaced by transplant    Pancreas replaced by transplant (H)    Immunosuppressed status (H24)    Cardiac arrest with  ventricular fibrillation (H)    Acute and chronic respiratory failure with hypoxia (H)    Steroid-induced hyperglycemia    Hyperkalemia       Allergies   Allergies   Allergen Reactions    Amoxicillin Hives     & generalized pain    Tolerated ceftriaxone in 2023 ([employer] [facility]) and 2024 ([facility])    Venlafaxine      lethargic       Medications   Current Outpatient Medications   Medication Sig Dispense Refill    acetaminophen (TYLENOL) 500 MG tablet Take 500 mg by mouth every 4 hours as needed      albuterol (PROAIR HFA/PROVENTIL HFA/VENTOLIN HFA) 108 (90 Base) MCG/ACT inhaler Inhale 2 puffs into the lungs every 4 hours as needed (Patient not taking: Reported on 7/31/2024)      ALPRAZolam (XANAX) 0.25 MG tablet Take 0.25 mg by mouth 3 times daily as needed      aspirin (ASA) 81 MG chewable tablet Take 1 tablet (81 mg) by mouth daily Starting tomorrow. 30 tablet 3    atorvastatin (LIPITOR) 40 MG tablet Take 1 tablet (40 mg) by mouth every evening 30 tablet 2    cholecalciferol, vitamin D3, 5,000 unit Tab [CHOLECALCIFEROL, VITAMIN D3, 5,000 UNIT TAB] Take 5,000 Units by mouth daily.      dapsone (ACZONE) 25 MG tablet Take 2 tablets (50 mg) by mouth daily 60 tablet 11    magnesium hydroxide (MILK OF MAGNESIA) 400 MG/5ML suspension Take 30 mLs by mouth daily as needed for constipation (Use if polyethylene glycol (Miralax) is not effective after 24 hours.) 354 mL 1    magnesium oxide (MAG-OX) 400 MG tablet Take 2 tablets (800 mg) by mouth 2 times daily 120 tablet 1    methocarbamol (ROBAXIN) 750 MG tablet Take 1 tablet (750 mg) by mouth every 6 hours as needed for muscle spasms 20 tablet 0    metoprolol succinate ER (TOPROL XL) 25 MG 24 hr tablet Take 1 tablet (25 mg) by mouth daily 30 tablet 2    mycophenolate (GENERIC EQUIVALENT) 500 MG tablet Take 2 tablets (1,000 mg) by mouth 2 times daily 120 tablet 11    nitroGLYcerin (NITROSTAT) 0.4 MG sublingual tablet PLACE ONE TABLET UNDER TONGUE AS NEEDED FOR CHEST  PAIN AS DIRECTED FOR 3 DOSES. IF SYMPTOMS PERSIST 5 MINUTES AFTER 1ST DOSE CALL 911 25 tablet 3    nystatin (MYCOSTATIN) 742224 UNIT/ML suspension Take 5 mLs (500,000 Units) by mouth 4 times daily 600 mL 2    ondansetron (ZOFRAN ODT) 4 MG ODT tab Take 1 tablet (4 mg) by mouth every 6 hours as needed for nausea or vomiting 10 tablet 1    pantoprazole (PROTONIX) 40 MG EC tablet Take 1 tablet (40 mg) by mouth every morning (before breakfast) 30 tablet 1    phosphorus tablet 250 mg (PHOSPHA 250 NEUTRAL) 250 MG per tablet Take 2 tablets (500 mg) by mouth 2 times daily 60 tablet 2    senna-docusate (SENOKOT-S/PERICOLACE) 8.6-50 MG tablet Take 2 tablets by mouth 2 times daily 60 tablet 1    sodium bicarbonate 650 MG tablet Take 3 tablets (1,950 mg) by mouth 3 times daily 270 tablet 2    tacrolimus (GENERIC EQUIVALENT) 1 MG capsule Take 3 capsules (3 mg) by mouth 2 times daily 180 capsule 11    ticagrelor (BRILINTA) 90 MG tablet Take 1 tablet (90 mg) by mouth or Feeding Tube 2 times daily 60 tablet 0    valGANciclovir (VALCYTE) 450 MG tablet Take 2 tablets (900 mg) by mouth daily 60 tablet 2     No current facility-administered medications for this visit.     There are no discontinued medications.    Physical Exam   Vital Signs: There were no vitals taken for this visit.    GENERAL APPEARANCE: alert and no distress  HENT: mouth without ulcers or lesions  RESP: lungs clear to auscultation - no rales, rhonchi or wheezes  CV: regular rhythm, normal rate, no rub, no murmur  EDEMA: no LE edema bilaterally  ABDOMEN: soft, nondistended, nontender, bowel sounds normal  MS: extremities normal - no gross deformities noted, no evidence of inflammation in joints, no muscle tenderness  SKIN: no rash  SKIN: midline abdomen incision intact with staples - CDI. No erythema or drainage.   AMILCAR site LLQ healing well.     Data         Latest Ref Rng & Units 7/31/2024     7:18 AM 7/30/2024     5:07 PM 7/30/2024    11:41 AM   Renal   Sodium 135 -  145 mmol/L 136      K 3.4 - 5.3 mmol/L 5.0      Cl 98 - 107 mmol/L 107      Cl (external) 98 - 107 mmol/L 107      CO2 22 - 29 mmol/L 21      Urea Nitrogen 6.0 - 20.0 mg/dL 14.8      Creatinine 0.67 - 1.17 mg/dL 1.85      Glucose 70 - 99 mg/dL 99  123  94    Calcium 8.8 - 10.4 mg/dL 10.0      Magnesium 1.7 - 2.3 mg/dL 1.8            Latest Ref Rng & Units 7/31/2024     7:18 AM 7/30/2024     5:57 AM 7/29/2024     5:07 AM   Bone Health   Phosphorus 2.5 - 4.5 mg/dL 2.4  2.0  2.3          Latest Ref Rng & Units 7/31/2024     7:18 AM 7/30/2024     5:57 AM 7/29/2024     5:07 AM   Heme   WBC 4.0 - 11.0 10e3/uL 10.4  10.4  8.8    Hgb 13.3 - 17.7 g/dL 8.0  7.9  7.6    Plt 150 - 450 10e3/uL 247  220  168          Latest Ref Rng & Units 7/30/2024     5:57 AM 7/29/2024     5:07 AM 7/28/2024     6:15 AM   Liver   AP 40 - 150 U/L 105  92  90    TBili <=1.2 mg/dL 0.4  0.4  0.5    ALT 0 - 70 U/L 30  28  30    AST 0 - 45 U/L 25  25  23    Tot Protein 6.4 - 8.3 g/dL 5.2  4.8  4.8    Albumin 3.5 - 5.2 g/dL 3.2  3.0  3.2          Latest Ref Rng & Units 7/31/2024     7:18 AM 7/30/2024     5:57 AM 7/29/2024     5:07 AM   Pancreas   Amylase 28 - 100 U/L 270  239  224    Lipase (Roche) 13 - 60 U/L 275  285  271             Latest Ref Rng & Units 7/19/2024     4:56 PM 9/27/2021    12:18 PM   [facility]   EBV CAPSID ANTIBODY IGG No detectable antibody. Positive  Positive      Failed to redirect to the Timeline version of the REVFS SmartLink.  Recent Labs   Lab Test 07/23/24  0515 07/24/24  0533 07/26/24  0716 07/29/24  0507   DOSTA  --  7/23/2024  --   --    TACROL 9.5 15.9* 8.8 4.5*

## 2024-07-31 NOTE — TELEPHONE ENCOUNTER
Siva Ramey is a post-kidney/pancreas transplant patient who discharged on 7/30/24. Patient chooses to receive medications from  specialty pharmacy (will pick-up on-site at [facility] in [place]). The patient will be responsible for managing medications.    SOT*LIAISON (SIVA) IS A (PANCREAS AND LEFT KIDNEY) TRANSPLANT PATIENT  (DATE OF TRANSPLANT: 07/20/24)     PRIMARY HEALTH BENEFIT: [employer]  ID# [id number] GRP# [id number] (EFFECTIVE 04/01/22 )     SECONDARY HEALTH BENEFIT: MEDICARE  ID# [id number] (PART A EFFECTIVE 01/01/2023 , PART B EFFECTIVE 01/01/2023 )  PROCESSING INFO: MDCR SECONDARY ID#  [id number] GRP# OTHER PCN# [id number] BIN# [id number](EFFECTIVE 01/01/2023 )     PT WILL PAY $0 AT TIME OF SERVICE FOR IMMUNOS (BILL TO COMMERCIAL AS PRIMARY THEN COB TO [facility])     PHARMACY BENEFIT: MEDICA COMMERCIAL  PROCESSING INFO: ID# [id number] GRP# [id number] PCN# [id number] BIN# [id number] (EFFECTIVE 01/01/2020).  DEDUCTIBLE $3,000 & MAX OUT-OF-POCKET $6,350  COPAY STRUCTURE:  $ 15 FOR GENERIC  $ 50 FOR BRAND     TEST CLAIM SPECIALTY #28  MYCOPHENOLATE 250MG (#240/30DS)-- $0 AT TIME OF SERVICE  PROGRAF 1MG (#120/30DS)-- $0 AT TIME OF SERVICE  TACROLIMUS 1MG (#120/30DS)-- $0 AT TIME OF SERVICE  CYCLOSPORINE 100MG (#60/30DS)-- $0 AT TIME OF SERVICE  VALGANCICLOVIR 450MG (#60/30DS)-- $15     TEST CLAIM DISCHARGE #27  MYCOPHENOLATE 250MG (#240/30DS)-- $0 AT TIME OF SERVICE  PROGRAF 1MG (#120/30DS)-- $0 AT TIME OF SERVICE  TACROLIMUS 1MG (#120/30DS)-- $0 AT TIME OF SERVICE  CYCLOSPORINE 100MG (#60/30DS)-- $0 AT TIME OF SERVICE  VALGANCICLOVIR 450MG (#60/30DS)-- $15     **CAN PATIENT FILL AT [facility] FOR MEDICATIONS LISTED? YES, MEDICARE IS SECONDARY (END STAGE RENAL DISEASE BENEFICIARY) PT HAS AN INSURANCE PLAN THAT PROCESSES THROUGH [employer] , AND PER OUR CONTRACT WITH THEM WE ARE ONLY ALLOWED TO SHIP TO THE PT IF THEY ARE PHYSICALLY UNABLE TO COME IN TO OUR PHARMACY TO . IF PT IS COMFORTABLE  WITH US DOCUMENTING THIS IN THEIR RECORD, WE CAN SHIP OUT THEIR MEDS (FOR [place] RESIDENTS ONLY) IF NOT THEY WILL NEED TO UTILIZE [facility] ([phone]) OR  AT ANY [facility] SITE.    Thank You,  Trinidad Segal, PharmD  [facility]  [phone]

## 2024-07-31 NOTE — PATIENT INSTRUCTIONS
Dear Siva Ramey    Thank you for choosing [facility] ([facility]) for your transplant cares.  The following information is a summary of our appointment as well as important reminders.      Please make sure you are available to answer your phone today because your transplant coordinator will call you IF you need to change the amount of anti-rejection medication that you are taking.    Contact Information:    Transplant Coordinator:  Julianna Edwards RN  Transplant Office:  [phone]  [facility]:  [phone]  Ask for physician on call for kidney/pancreas transplant.  Unit 7A (Transplant Unit):  [phone]  [facility]:  [phone]  [facility]:  [phone]     If you are a transplant patient and require transplant education, please click on this link: [email or web address].    If you have any questions on your upcoming Specialty Infusion appointments, please call scheduling at [phone].  It was a pleasure taking care of you today.    Sincerely,    [facility]  [address] [place] [address]  [id number]  Phone:  [phone]

## 2024-07-31 NOTE — LETTER
7/31/2024      Siva Ramey  [address]  [place]  [place] [address]      Dear Colleague,    Thank you for referring your patient, Siva Ramey, to the [facility] TREATMENT [facility]. Please see a copy of my visit note below.    TRANSPLANT SURGERY EARLY POST TRANSPLANT VISIT    Assessment & Plan  # DDKT (SPK): Cr slight Trend up Doing well. SIPC tomorrow.    - Baseline Creatinine: ~ TBD   - Proteinuria: Not checked recently   - Date DSA Last Checked: Jul/2024      Latest DSA: No DSA at time of transplant   - BK Viremia: No   - Kidney Tx Biopsy: No   - Transplant Ureteral Stent: Yes     # Pancreas Tx (SPK):    - Pancreatic Exocrine Drainage: Enteric drained     - Blood glucose: Near euglycemia      On insulin: No   - HbA1c: Last checked at time of transplant      Latest HbA1c: 6.4%   - Pancreatic enzymes: lipase high but down trending    - DSA Hx: No DSA   - Pancreas Tx Biopsy Hx: No biopsy history    # Immunosuppression: Tacrolimus immediate release (goal 8-10) and Mycophenolate mofetil (dose 1000 mg every 12 hours)   - Induction with Recent Transplant:  High Intensity Protocol   - Continue with intensive monitoring of immunosuppression for efficacy and toxicity.   - Changes: Not at this time    # Infection Prophylaxis:   - PJP: Dapsone - changed for continued hyperkalemia   - CMV: Valganciclovir (Valcyte)     # Hypertension: Controlled;  Goal BP: < 150/90   - Changes: Not at this time    # Anemia in Chronic Renal Disease: Hgb: Trend up      KERRI: No   - Iron studies: Not checked recently    # Mineral Bone Disorder:   - Secondary renal hyperparathyroidism; PTH level: Mildly elevated (151-300 pg/ml)        On treatment: None  - Vitamin D; level: Not checked recently        On supplement: Yes  - Calcium; level: Normal        On supplement: No  - Phosphorus; level: Low normal        On supplement: Yes    # Electrolytes:   - Potassium; level: Normal        On supplement: No  - Magnesium;  level: Normal        On supplement: Yes  - Bicarbonate; level: Low normal        On supplement: Yes  - Sodium; level: Normal    # : # VT/VF Cardiac arrest:  # CAD s/p CABG 2019 LIMA-LAD, SVG-RCA, IBAN 1/2019, IBAN to RCA 2/2024              - s/p inferior STEMI 2/2 ostial proximal RCA in stent thrombosis              - s/p 2 IBAN to RCA 7/20/2024              - Echo 7/20: LVEF=37%.Moderate diffuse hypokinesis with akinesis of all inferior segments.Mild right ventricular dilation is present.Global  right ventricular function is moderately reduced.              ECHo 7/28 :Left ventricular size is normal. Left ventricular function is decreased. The ejection fraction is 35-40% (moderately reduced). Akinesis of the basal-mid  inferior and basal inferoseptal segments present.   -following with cardiology outpatient     # Medical Compliance: Yes    # Health Maintenance and Vaccination Review: No issues    # Transplant History:  Etiology of Kidney Failure: Diabetes mellitus type 2  Tx: DDKT (SPK)  Transplant: 7/20/2024 (Kidney / Pancreas)  Donor Type: Donation after Brain Death Donor Class:    Crossmatch at time of Tx: negative  DSA at time of Tx: No  Significant changes in immunosuppression: None  CMV IgG Ab High Risk Discordance (D+/R-): No  EBV IgG Ab High Risk Discordance (D+/R-): No  Significant transplant-related complications:  cardiac arrest post SPK due to STEMI    Transplant Office Phone Number: [phone]    Assessment and plan was discussed with the patient and he voiced his understanding and agreement.    Return visit:[facility] tomorrow     JUANCARLOS Silva CNP    Chief Complaint  Mr. Ramey is a 49 year old here for hospital follow up after kidney/pancreas transplant.     History of Present Illness   Siva Ramey is an 49 year old male with PMH significant for DMII (on insulin pump) and resulting ESKD (on HD every MWF since 8/2021). 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 Underwent simultaneous kidney pancreas transplant on 7/20/24 complicated by VT/VF arrest in PACU. Received 2 rounds of CPR before ROSC. Taken emergently to the cath lab and found to have 100% occlusion of the RCA now s/p IBAN x 2.     Doing well. Pain controlled. Urinating with no issues. LBM x3 yesterday, formed and soft.   Drinking close to 3 liters daily. No CP or SOB.  No N/V/D.   No leg edema.   Weight stable. Not orthostatic. Denies dizziness.       Home BP:  114/75    Problem List  Patient Active Problem List   Diagnosis     Type 2 diabetes mellitus with other circulatory complication, unspecified whether long term insulin use (H)     Dyslipidemia     Class 2 severe obesity due to excess calories with serious comorbidity in adult, unspecified BMI (H)     Atherosclerosis of other coronary artery bypass graft(s) with unstable angina pectoris (H)     Anemia in chronic kidney disease     HTN (hypertension)     S/P CABG (coronary artery bypass graft)     Balanoposthitis     Other stricture of anterior urethra in male     History of ST elevation myocardial infarction (STEMI)     Obesity (BMI 30.0-34.9)     MARQUEZ (dyspnea on exertion)     STEMI (ST elevation myocardial infarction) (H)     Vitamin D deficiency     Metabolic acidosis     Retinopathy     Peripheral neuropathy     Acquired elevated diaphragm     Median nerve neuropathy, left     Iron deficiency anemia, unspecified     End stage renal disease (H)     Allergy, unspecified, initial encounter     Anaphylactic shock, unspecified, initial encounter     Coagulation defect, unspecified (H24)     Fatigue, unspecified type     Non-restorative sleep     Obstructive sleep apnea treated with continuous positive airway pressure (CPAP)     Other disorders of phosphorus metabolism     Other specified symptoms and signs  involving the circulatory and respiratory systems     Snoring     Palpitations     Gastroesophageal reflux disease without esophagitis     Diabetes mellitus, type 2 (H)     Kidney replaced by transplant     Pancreas replaced by transplant (H)     Immunosuppressed status (H24)     Cardiac arrest with ventricular fibrillation (H)     Acute and chronic respiratory failure with hypoxia (H)     Steroid-induced hyperglycemia     Hyperkalemia       Allergies  Allergies   Allergen Reactions     Amoxicillin Hives     & generalized pain    Tolerated ceftriaxone in 2023 ([facility]) and 2024 ([facility])     Venlafaxine      lethargic       Medications  Current Outpatient Medications   Medication Sig Dispense Refill     acetaminophen (TYLENOL) 500 MG tablet Take 500 mg by mouth every 4 hours as needed       albuterol (PROAIR HFA/PROVENTIL HFA/VENTOLIN HFA) 108 (90 Base) MCG/ACT inhaler Inhale 2 puffs into the lungs every 4 hours as needed (Patient not taking: Reported on 7/31/2024)       ALPRAZolam (XANAX) 0.25 MG tablet Take 0.25 mg by mouth 3 times daily as needed       aspirin (ASA) 81 MG chewable tablet Take 1 tablet (81 mg) by mouth daily Starting tomorrow. 30 tablet 3     atorvastatin (LIPITOR) 40 MG tablet Take 1 tablet (40 mg) by mouth every evening 30 tablet 2     cholecalciferol, vitamin D3, 5,000 unit Tab [CHOLECALCIFEROL, VITAMIN D3, 5,000 UNIT TAB] Take 5,000 Units by mouth daily.       dapsone (ACZONE) 25 MG tablet Take 2 tablets (50 mg) by mouth daily 60 tablet 11     magnesium hydroxide (MILK OF MAGNESIA) 400 MG/5ML suspension Take 30 mLs by mouth daily as needed for constipation (Use if polyethylene glycol (Miralax) is not effective after 24 hours.) 354 mL 1     magnesium oxide (MAG-OX) 400 MG tablet Take 2 tablets (800 mg) by mouth 2 times daily 120 tablet 1     methocarbamol (ROBAXIN) 750 MG tablet Take 1 tablet (750 mg) by mouth every 6 hours as needed for muscle spasms 20 tablet 0     metoprolol  succinate ER (TOPROL XL) 25 MG 24 hr tablet Take 1 tablet (25 mg) by mouth daily 30 tablet 2     mycophenolate (GENERIC EQUIVALENT) 500 MG tablet Take 2 tablets (1,000 mg) by mouth 2 times daily 120 tablet 11     nitroGLYcerin (NITROSTAT) 0.4 MG sublingual tablet PLACE ONE TABLET UNDER TONGUE AS NEEDED FOR CHEST PAIN AS DIRECTED FOR 3 DOSES. IF SYMPTOMS PERSIST 5 MINUTES AFTER 1ST DOSE CALL 911 25 tablet 3     nystatin (MYCOSTATIN) 174192 UNIT/ML suspension Take 5 mLs (500,000 Units) by mouth 4 times daily 600 mL 2     ondansetron (ZOFRAN ODT) 4 MG ODT tab Take 1 tablet (4 mg) by mouth every 6 hours as needed for nausea or vomiting 10 tablet 1     pantoprazole (PROTONIX) 40 MG EC tablet Take 1 tablet (40 mg) by mouth every morning (before breakfast) 30 tablet 1     phosphorus tablet 250 mg (PHOSPHA 250 NEUTRAL) 250 MG per tablet Take 2 tablets (500 mg) by mouth 2 times daily 60 tablet 2     senna-docusate (SENOKOT-S/PERICOLACE) 8.6-50 MG tablet Take 2 tablets by mouth 2 times daily 60 tablet 1     sodium bicarbonate 650 MG tablet Take 3 tablets (1,950 mg) by mouth 3 times daily 270 tablet 2     tacrolimus (GENERIC EQUIVALENT) 1 MG capsule Take 3 capsules (3 mg) by mouth 2 times daily 180 capsule 11     ticagrelor (BRILINTA) 90 MG tablet Take 1 tablet (90 mg) by mouth or Feeding Tube 2 times daily 60 tablet 0     valGANciclovir (VALCYTE) 450 MG tablet Take 2 tablets (900 mg) by mouth daily 60 tablet 2     No current facility-administered medications for this visit.     There are no discontinued medications.    Physical Exam  Vital Signs: There were no vitals taken for this visit.    GENERAL APPEARANCE: alert and no distress  HENT: mouth without ulcers or lesions  RESP: lungs clear to auscultation - no rales, rhonchi or wheezes  CV: regular rhythm, normal rate, no rub, no murmur  EDEMA: no LE edema bilaterally  ABDOMEN: soft, nondistended, nontender, bowel sounds normal  MS: extremities normal - no gross deformities  noted, no evidence of inflammation in joints, no muscle tenderness  SKIN: no rash  SKIN: midline abdomen incision intact with staples - CDI. No erythema or drainage.   AMILCAR site LLQ healing well.     Data        Latest Ref Rng & Units 7/31/2024     7:18 AM 7/30/2024     5:07 PM 7/30/2024    11:41 AM   Renal   Sodium 135 - 145 mmol/L 136      K 3.4 - 5.3 mmol/L 5.0      Cl 98 - 107 mmol/L 107      Cl (external) 98 - 107 mmol/L 107      CO2 22 - 29 mmol/L 21      Urea Nitrogen 6.0 - 20.0 mg/dL 14.8      Creatinine 0.67 - 1.17 mg/dL 1.85      Glucose 70 - 99 mg/dL 99  123  94    Calcium 8.8 - 10.4 mg/dL 10.0      Magnesium 1.7 - 2.3 mg/dL 1.8            Latest Ref Rng & Units 7/31/2024     7:18 AM 7/30/2024     5:57 AM 7/29/2024     5:07 AM   Bone Health   Phosphorus 2.5 - 4.5 mg/dL 2.4  2.0  2.3          Latest Ref Rng & Units 7/31/2024     7:18 AM 7/30/2024     5:57 AM 7/29/2024     5:07 AM   Heme   WBC 4.0 - 11.0 10e3/uL 10.4  10.4  8.8    Hgb 13.3 - 17.7 g/dL 8.0  7.9  7.6    Plt 150 - 450 10e3/uL 247  220  168          Latest Ref Rng & Units 7/30/2024     5:57 AM 7/29/2024     5:07 AM 7/28/2024     6:15 AM   Liver   AP 40 - 150 U/L 105  92  90    TBili <=1.2 mg/dL 0.4  0.4  0.5    ALT 0 - 70 U/L 30  28  30    AST 0 - 45 U/L 25  25  23    Tot Protein 6.4 - 8.3 g/dL 5.2  4.8  4.8    Albumin 3.5 - 5.2 g/dL 3.2  3.0  3.2          Latest Ref Rng & Units 7/31/2024     7:18 AM 7/30/2024     5:57 AM 7/29/2024     5:07 AM   Pancreas   Amylase 28 - 100 U/L 270  239  224    Lipase (Roche) 13 - 60 U/L 275  285  271             Latest Ref Rng & Units 7/19/2024     4:56 PM 9/27/2021    12:18 PM   [facility]   EBV CAPSID ANTIBODY IGG No detectable antibody. Positive  Positive      Failed to redirect to the Timeline version of the REVFS SmartLink.  Recent Labs   Lab Test 07/23/24  0515 07/24/24  0533 07/26/24  0716 07/29/24  0507   DOSTAC  --  7/23/2024  --   --    TACROL 9.5 15.9* 8.8 4.5*              Again, thank you for  allowing me to participate in the care of your patient.        Sincerely,        JUANCARLOS Silva CNP

## 2024-07-31 NOTE — PROGRESS NOTES
"Siva Ramey came to [facility] today for a lab and assess following a kidney/pancreas transplant on 07/20/2024.    Discharge date: 07/30/2024  Transplant coordinator: Julianna Edwards RN   Phone number patient can be reached at: [phone]      Physical Assessment:  See physical assessment located under \"Document Flowsheets\".  Incision site: Clean, dry, and intact. Very scant serosanguinous drainage.  Lines: No lines, AMILCAR drain pulled 07/30/2024.  Olea: No olea  Urine clarity: Light yellow, clear.  Hydration: ~60oz of non-caffeinated fluids consumed since leaving the hospital  Nutrition: Fair appetite, still eating normally, just less.  Last BM: 2 yesterday at home; soft and formed. Senna no longer scheduled, taking PRN.  Pain: 0/10, dull incisional pain. 3/10 chest pain from chest compressions; not concerning.  My transplant place videos watched: Working on it    Labs drawn by [facility] staff No  Vascular access: No vascular access needed for today's appointment.    Plan of care for today: Assessment, education, reviewing medication, filling pill box with patient, meeting with KAT.    Medication changes: Approved to take Magnesium Oxide at 6am and 6pm rather than 10am and 10pm by KAT.    Medications administered: All AM medications were administered at 0900 this morning while working on filling pill box.      Patient education:    The following teaching topics were addressed: Importance of drinking 2L of non-caffeinated fluids daily, Incisional care, Signs/symptoms of infection, Good handwashing, Medications (purposes, doses and times of administration), Phone numbers to call with concenrs (Transplant coordinator, [address] and [facility]), 7A discharge check list, and Plan of care   Patient verbalized understanding and all questions answered.    Drug level:  Patient took 3mg of Tacrolimus last evening at 2000.  Care coordinator to follow up with the result.    Face to face time: 90 minutes  Discharge Plan    Pt will " follow up with us tomorrow.  Discharge instructions reviewed with patient: YES  Patient/Representative verbalized understanding, all questions answered: YES    Discharged from unit at 0945 with whom: self to home.    Jigar Nieves RN

## 2024-08-01 ENCOUNTER — TELEPHONE (OUTPATIENT)
Dept: TRANSPLANT | Facility: CLINIC | Age: 49
End: 2024-08-01

## 2024-08-01 ENCOUNTER — TELEPHONE (OUTPATIENT)
Dept: CARDIOLOGY | Facility: CLINIC | Age: 49
End: 2024-08-01

## 2024-08-01 ENCOUNTER — OFFICE VISIT (OUTPATIENT)
Dept: INFUSION THERAPY | Facility: CLINIC | Age: 49
End: 2024-08-01
Attending: NURSE PRACTITIONER
Payer: MEDICARE

## 2024-08-01 ENCOUNTER — LAB (OUTPATIENT)
Dept: LAB | Facility: CLINIC | Age: 49
End: 2024-08-01
Attending: NURSE PRACTITIONER
Payer: COMMERCIAL

## 2024-08-01 VITALS
SYSTOLIC BLOOD PRESSURE: 119 MMHG | OXYGEN SATURATION: 98 % | RESPIRATION RATE: 18 BRPM | BODY MASS INDEX: 31.33 KG/M2 | HEART RATE: 94 BPM | TEMPERATURE: 98.2 F | WEIGHT: 224.6 LBS | DIASTOLIC BLOOD PRESSURE: 77 MMHG

## 2024-08-01 DIAGNOSIS — Z94.0 STATUS POST SIMULTANEOUS KIDNEY AND PANCREAS TRANSPLANT (H): ICD-10-CM

## 2024-08-01 DIAGNOSIS — Z94.0 KIDNEY REPLACED BY TRANSPLANT: ICD-10-CM

## 2024-08-01 DIAGNOSIS — Z94.0 STATUS POST SIMULTANEOUS KIDNEY AND PANCREAS TRANSPLANT (H): Primary | ICD-10-CM

## 2024-08-01 DIAGNOSIS — Z94.83 STATUS POST SIMULTANEOUS KIDNEY AND PANCREAS TRANSPLANT (H): ICD-10-CM

## 2024-08-01 DIAGNOSIS — Z94.0 KIDNEY REPLACED BY TRANSPLANT: Primary | ICD-10-CM

## 2024-08-01 DIAGNOSIS — Z94.83 STATUS POST SIMULTANEOUS KIDNEY AND PANCREAS TRANSPLANT (H): Primary | ICD-10-CM

## 2024-08-01 LAB
AMYLASE SERPL-CCNC: 213 U/L (ref 28–100)
ANION GAP SERPL CALCULATED.3IONS-SCNC: 7 MMOL/L (ref 7–15)
BASOPHILS # BLD AUTO: 0 10E3/UL (ref 0–0.2)
BASOPHILS NFR BLD AUTO: 0 %
BUN SERPL-MCNC: 15.3 MG/DL (ref 6–20)
CALCIUM SERPL-MCNC: 9.8 MG/DL (ref 8.8–10.4)
CHLORIDE SERPL-SCNC: 109 MMOL/L (ref 98–107)
CREAT SERPL-MCNC: 1.76 MG/DL (ref 0.67–1.17)
EGFRCR SERPLBLD CKD-EPI 2021: 47 ML/MIN/1.73M2
EOSINOPHIL # BLD AUTO: 0.2 10E3/UL (ref 0–0.7)
EOSINOPHIL NFR BLD AUTO: 3 %
ERYTHROCYTE [DISTWIDTH] IN BLOOD BY AUTOMATED COUNT: 14.7 % (ref 10–15)
GLUCOSE SERPL-MCNC: 99 MG/DL (ref 70–99)
HCO3 SERPL-SCNC: 22 MMOL/L (ref 22–29)
HCT VFR BLD AUTO: 23.1 % (ref 40–53)
HGB BLD-MCNC: 7.6 G/DL (ref 13.3–17.7)
IMM GRANULOCYTES # BLD: 0.1 10E3/UL
IMM GRANULOCYTES NFR BLD: 2 %
LIPASE SERPL-CCNC: 212 U/L (ref 13–60)
LYMPHOCYTES # BLD AUTO: 0.2 10E3/UL (ref 0.8–5.3)
LYMPHOCYTES NFR BLD AUTO: 3 %
MAGNESIUM SERPL-MCNC: 1.8 MG/DL (ref 1.7–2.3)
MCH RBC QN AUTO: 31.9 PG (ref 26.5–33)
MCHC RBC AUTO-ENTMCNC: 32.9 G/DL (ref 31.5–36.5)
MCV RBC AUTO: 97 FL (ref 78–100)
MONOCYTES # BLD AUTO: 0.2 10E3/UL (ref 0–1.3)
MONOCYTES NFR BLD AUTO: 3 %
NEUTROPHILS # BLD AUTO: 6.4 10E3/UL (ref 1.6–8.3)
NEUTROPHILS NFR BLD AUTO: 89 %
NRBC # BLD AUTO: 0 10E3/UL
NRBC BLD AUTO-RTO: 0 /100
PHOSPHATE SERPL-MCNC: 2.1 MG/DL (ref 2.5–4.5)
PLATELET # BLD AUTO: 234 10E3/UL (ref 150–450)
POTASSIUM SERPL-SCNC: 5.3 MMOL/L (ref 3.4–5.3)
RBC # BLD AUTO: 2.38 10E6/UL (ref 4.4–5.9)
SODIUM SERPL-SCNC: 138 MMOL/L (ref 135–145)
TACROLIMUS BLD-MCNC: 3.5 UG/L (ref 5–15)
TME LAST DOSE: ABNORMAL H
TME LAST DOSE: ABNORMAL H
WBC # BLD AUTO: 7.2 10E3/UL (ref 4–11)

## 2024-08-01 PROCEDURE — 99213 OFFICE O/P EST LOW 20 MIN: CPT | Mod: 24 | Performed by: NURSE PRACTITIONER

## 2024-08-01 PROCEDURE — 83735 ASSAY OF MAGNESIUM: CPT | Performed by: NURSE PRACTITIONER

## 2024-08-01 PROCEDURE — 85025 COMPLETE CBC W/AUTO DIFF WBC: CPT | Performed by: PATHOLOGY

## 2024-08-01 PROCEDURE — 36415 COLL VENOUS BLD VENIPUNCTURE: CPT | Performed by: PATHOLOGY

## 2024-08-01 PROCEDURE — 82150 ASSAY OF AMYLASE: CPT | Performed by: NURSE PRACTITIONER

## 2024-08-01 PROCEDURE — 36415 COLL VENOUS BLD VENIPUNCTURE: CPT | Performed by: NURSE PRACTITIONER

## 2024-08-01 PROCEDURE — 99000 SPECIMEN HANDLING OFFICE-LAB: CPT | Performed by: PATHOLOGY

## 2024-08-01 PROCEDURE — 80048 BASIC METABOLIC PNL TOTAL CA: CPT | Performed by: NURSE PRACTITIONER

## 2024-08-01 PROCEDURE — 84100 ASSAY OF PHOSPHORUS: CPT | Performed by: NURSE PRACTITIONER

## 2024-08-01 PROCEDURE — 83690 ASSAY OF LIPASE: CPT | Performed by: NURSE PRACTITIONER

## 2024-08-01 PROCEDURE — 80197 ASSAY OF TACROLIMUS: CPT | Performed by: NURSE PRACTITIONER

## 2024-08-01 RX ORDER — TACROLIMUS 1 MG/1
5 CAPSULE ORAL 2 TIMES DAILY
Qty: 300 CAPSULE | Refills: 11 | Status: SHIPPED | OUTPATIENT
Start: 2024-08-01 | End: 2024-08-02

## 2024-08-01 ASSESSMENT — PAIN SCALES - GENERAL: PAINLEVEL: MODERATE PAIN (4)

## 2024-08-01 NOTE — PROGRESS NOTES
TRANSPLANT SURGERY EARLY POST TRANSPLANT VISIT    Assessment & Plan   # DDKT (SPK): Cr down trending. Doing well. Labs tomorrow.    - Baseline Creatinine: ~ TBD   - Proteinuria: Not checked recently   - Date DSA Last Checked: Jul/2024      Latest DSA: No DSA at time of transplant   - BK Viremia: No   - Kidney Tx Biopsy: No   - Transplant Ureteral Stent: Yes     # Pancreas Tx (SPK):    - Pancreatic Exocrine Drainage: Enteric drained     - Blood glucose: Near euglycemia      On insulin: No   - HbA1c: Last checked at time of transplant      Latest HbA1c: 6.4%   - Pancreatic enzymes: lipase down trending    - DSA Hx: No DSA   - Pancreas Tx Biopsy Hx: No biopsy history    # Immunosuppression: Tacrolimus immediate release (goal 8-10) and Mycophenolate mofetil (dose 1000 mg every 12 hours)   - Induction with Recent Transplant:  High Intensity Protocol   - Continue with intensive monitoring of immunosuppression for efficacy and toxicity.   - Changes: Not at this time    # Infection Prophylaxis:   - PJP: Dapsone - changed for continued hyperkalemia   - CMV: Valganciclovir (Valcyte)     # Hypertension: Controlled;  Goal BP: < 150/90   - Changes: Not at this time    # Anemia in Chronic Renal Disease: Hgb: Trend down      KERRI: No   - Iron studies: Not checked recently    # Mineral Bone Disorder:   - Secondary renal hyperparathyroidism; PTH level: Mildly elevated (151-300 pg/ml)        On treatment: None  - Vitamin D; level: High        On supplement: Yes  - Calcium; level: Normal        On supplement: No  - Phosphorus; level: Low normal        On supplement: Yes    # Electrolytes:   - Potassium; level: Normal        On supplement: No  - Magnesium; level: Normal        On supplement: Yes  - Bicarbonate; level: Low normal        On supplement: Yes  - Sodium; level: Normal    # : # VT/VF Cardiac arrest:  # CAD s/p CABG 2019 LIMA-LAD, SVG-RCA, IBAN 1/2019, IBAN to RCA 2/2024              - s/p inferior STEMI 2/2 ostial proximal RCA  in stent thrombosis              - s/p 2 IBAN to RCA 7/20/2024              - Echo 7/20: LVEF=37%.Moderate diffuse hypokinesis with akinesis of all inferior segments.Mild right ventricular dilation is present.Global  right ventricular function is moderately reduced.              ECHo 7/28 :Left ventricular size is normal. Left ventricular function is decreased. The ejection fraction is 35-40% (moderately reduced). Akinesis of the basal-mid  inferior and basal inferoseptal segments present.   -following with cardiology outpatient     # Medical Compliance: Yes    # Health Maintenance and Vaccination Review: No issues    # Transplant History:  Etiology of Kidney Failure: Diabetes mellitus type 2  Tx: DDKT (SPK)  Transplant: 7/20/2024 (Kidney / Pancreas)  Donor Type: Donation after Brain Death Donor Class:    Crossmatch at time of Tx: negative  DSA at time of Tx: No  Significant changes in immunosuppression: None  CMV IgG Ab High Risk Discordance (D+/R-): No  EBV IgG Ab High Risk Discordance (D+/R-): No  Significant transplant-related complications:  cardiac arrest post SPK due to STEMI    Transplant Office Phone Number: [phone]    Assessment and plan was discussed with the patient and he voiced his understanding and agreement.    Return visit:Labs tomorrow     JUANCARLOS Silva CNP    Chief Complaint   Mr. Ramey is a 49 year old here for hospital follow up after kidney/pancreas transplant.     History of Present Illness    Siva Ramey is an 49 year old male with PMH significant for DMII (on insulin pump) and resulting ESKD (on HD every MWF since 8/2021). 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 Underwent simultaneous kidney pancreas transplant on 7/20/24 complicated by VT/VF arrest in PACU. Received 2 rounds of CPR before  ROSC. Taken emergently to the cath lab and found to have 100% occlusion of the RCA now s/p IBAN x 2.     Doing well. Pain controlled. Urinating with no issues. LBM x2 yesterday, x1 this AM - formed and soft.   Drinking over 3 L. Appetite is fair but finding good protein foods. No CP or SOB.  No N/V/D.   No leg edema.   Weight stable. Not orthostatic. Denies dizziness.       Home BP:  114/75    Problem List   Patient Active Problem List   Diagnosis    Type 2 diabetes mellitus with other circulatory complication, unspecified whether long term insulin use (H)    Dyslipidemia    Class 2 severe obesity due to excess calories with serious comorbidity in adult, unspecified BMI (H)    Atherosclerosis of other coronary artery bypass graft(s) with unstable angina pectoris (H)    Anemia in chronic kidney disease    HTN (hypertension)    S/P CABG (coronary artery bypass graft)    Balanoposthitis    Other stricture of anterior urethra in male    History of ST elevation myocardial infarction (STEMI)    Obesity (BMI 30.0-34.9)    MARQUEZ (dyspnea on exertion)    STEMI (ST elevation myocardial infarction) (H)    Vitamin D deficiency    Metabolic acidosis    Retinopathy    Peripheral neuropathy    Acquired elevated diaphragm    Median nerve neuropathy, left    Iron deficiency anemia, unspecified    End stage renal disease (H)    Allergy, unspecified, initial encounter    Anaphylactic shock, unspecified, initial encounter    Coagulation defect, unspecified (H24)    Fatigue, unspecified type    Non-restorative sleep    Obstructive sleep apnea treated with continuous positive airway pressure (CPAP)    Other disorders of phosphorus metabolism    Other specified symptoms and signs involving the circulatory and respiratory systems    Snoring    Palpitations    Gastroesophageal reflux disease without esophagitis    Diabetes mellitus, type 2 (H)    Kidney replaced by transplant    Pancreas replaced by transplant (H)    Immunosuppressed status  (H24)    Cardiac arrest with ventricular fibrillation (H)    Acute and chronic respiratory failure with hypoxia (H)    Steroid-induced hyperglycemia    Hyperkalemia       Allergies   Allergies   Allergen Reactions    Amoxicillin Hives     & generalized pain    Tolerated ceftriaxone in 2023 ([employer] [facility]) and 2024 ([facility])    Venlafaxine      lethargic       Medications   Current Outpatient Medications   Medication Sig Dispense Refill    acetaminophen (TYLENOL) 500 MG tablet Take 500 mg by mouth every 4 hours as needed      albuterol (PROAIR HFA/PROVENTIL HFA/VENTOLIN HFA) 108 (90 Base) MCG/ACT inhaler Inhale 2 puffs into the lungs every 4 hours as needed (Patient not taking: Reported on 7/31/2024)      ALPRAZolam (XANAX) 0.25 MG tablet Take 0.25 mg by mouth 3 times daily as needed      aspirin (ASA) 81 MG chewable tablet Take 1 tablet (81 mg) by mouth daily Starting tomorrow. 30 tablet 3    atorvastatin (LIPITOR) 40 MG tablet Take 1 tablet (40 mg) by mouth every evening 30 tablet 2    cholecalciferol, vitamin D3, 5,000 unit Tab [CHOLECALCIFEROL, VITAMIN D3, 5,000 UNIT TAB] Take 5,000 Units by mouth daily.      dapsone (ACZONE) 25 MG tablet Take 2 tablets (50 mg) by mouth daily 60 tablet 11    magnesium hydroxide (MILK OF MAGNESIA) 400 MG/5ML suspension Take 30 mLs by mouth daily as needed for constipation (Use if polyethylene glycol (Miralax) is not effective after 24 hours.) 354 mL 1    magnesium oxide (MAG-OX) 400 MG tablet Take 2 tablets (800 mg) by mouth 2 times daily 120 tablet 1    methocarbamol (ROBAXIN) 750 MG tablet Take 1 tablet (750 mg) by mouth every 6 hours as needed for muscle spasms 20 tablet 0    metoprolol succinate ER (TOPROL XL) 25 MG 24 hr tablet Take 1 tablet (25 mg) by mouth daily 30 tablet 2    mycophenolate (GENERIC EQUIVALENT) 500 MG tablet Take 2 tablets (1,000 mg) by mouth 2 times daily 120 tablet 11    nitroGLYcerin (NITROSTAT) 0.4 MG sublingual tablet PLACE ONE TABLET UNDER  TONGUE AS NEEDED FOR CHEST PAIN AS DIRECTED FOR 3 DOSES. IF SYMPTOMS PERSIST 5 MINUTES AFTER 1ST DOSE CALL 911 25 tablet 3    nystatin (MYCOSTATIN) 111078 UNIT/ML suspension Take 5 mLs (500,000 Units) by mouth 4 times daily 600 mL 2    ondansetron (ZOFRAN ODT) 4 MG ODT tab Take 1 tablet (4 mg) by mouth every 6 hours as needed for nausea or vomiting 10 tablet 1    pantoprazole (PROTONIX) 40 MG EC tablet Take 1 tablet (40 mg) by mouth every morning (before breakfast) 30 tablet 1    phosphorus tablet 250 mg (PHOSPHA 250 NEUTRAL) 250 MG per tablet Take 2 tablets (500 mg) by mouth 2 times daily 60 tablet 2    senna-docusate (SENOKOT-S/PERICOLACE) 8.6-50 MG tablet Take 2 tablets by mouth 2 times daily 60 tablet 1    sodium bicarbonate 650 MG tablet Take 3 tablets (1,950 mg) by mouth 3 times daily 270 tablet 2    tacrolimus (GENERIC EQUIVALENT) 1 MG capsule Take 4 capsules (4 mg) by mouth 2 times daily 240 capsule 11    ticagrelor (BRILINTA) 90 MG tablet Take 1 tablet (90 mg) by mouth or Feeding Tube 2 times daily 60 tablet 0    valGANciclovir (VALCYTE) 450 MG tablet Take 2 tablets (900 mg) by mouth daily 60 tablet 2     No current facility-administered medications for this visit.     There are no discontinued medications.    Physical Exam   Vital Signs: There were no vitals taken for this visit.    GENERAL APPEARANCE: alert and no distress  HENT: mouth without ulcers or lesions  RESP: lungs clear to auscultation - no rales, rhonchi or wheezes  CV: regular rhythm, normal rate, no rub, no murmur  EDEMA: no LE edema bilaterally  ABDOMEN: soft, nondistended, nontender, bowel sounds normal  MS: extremities normal - no gross deformities noted, no evidence of inflammation in joints, no muscle tenderness  SKIN: no rash  SKIN: midline abdomen incision intact with staples - CDI. No erythema. Scant serous drainage noted midline.    AMILCAR site LLQ healing well.     Data         Latest Ref Rng & Units 8/1/2024     7:29 AM 7/31/2024      7:18 AM 7/30/2024     5:07 PM   Renal   Sodium 135 - 145 mmol/L 138  136     K 3.4 - 5.3 mmol/L 5.3  5.0     Cl 98 - 107 mmol/L 109  107     Cl (external) 98 - 107 mmol/L 109  107     CO2 22 - 29 mmol/L 22  21     Urea Nitrogen 6.0 - 20.0 mg/dL 15.3  14.8     Creatinine 0.67 - 1.17 mg/dL 1.76  1.85     Glucose 70 - 99 mg/dL 99  99  123    Calcium 8.8 - 10.4 mg/dL 9.8  10.0     Magnesium 1.7 - 2.3 mg/dL 1.8  1.8           Latest Ref Rng & Units 8/1/2024     7:29 AM 7/31/2024     7:18 AM 7/30/2024     5:57 AM   Bone Health   Phosphorus 2.5 - 4.5 mg/dL 2.1  2.4  2.0          Latest Ref Rng & Units 8/1/2024     7:29 AM 7/31/2024     7:18 AM 7/30/2024     5:57 AM   Heme   WBC 4.0 - 11.0 10e3/uL 7.2  10.4  10.4    Hgb 13.3 - 17.7 g/dL 7.6  8.0  7.9    Plt 150 - 450 10e3/uL 234  247  220          Latest Ref Rng & Units 7/30/2024     5:57 AM 7/29/2024     5:07 AM 7/28/2024     6:15 AM   Liver   AP 40 - 150 U/L 105  92  90    TBili <=1.2 mg/dL 0.4  0.4  0.5    ALT 0 - 70 U/L 30  28  30    AST 0 - 45 U/L 25  25  23    Tot Protein 6.4 - 8.3 g/dL 5.2  4.8  4.8    Albumin 3.5 - 5.2 g/dL 3.2  3.0  3.2          Latest Ref Rng & Units 8/1/2024     7:29 AM 7/31/2024     7:18 AM 7/30/2024     5:57 AM   Pancreas   Amylase 28 - 100 U/L 213  270  239    Lipase (Roche) 13 - 60 U/L 212  275  285             Latest Ref Rng & Units 7/19/2024     4:56 PM 9/27/2021    12:18 PM   [facility]   EBV CAPSID ANTIBODY IGG No detectable antibody. Positive  Positive      Failed to redirect to the Timeline version of the REVFS SmartLink.  Recent Labs   Lab Test 07/23/24  0515 07/24/24  0533 07/26/24  0716 07/29/24  0507 07/31/24  0718   DOSTAC  --  7/23/2024  --   --  7/30/2024   TACROL 9.5 15.9* 8.8 4.5* 4.2*

## 2024-08-01 NOTE — TELEPHONE ENCOUNTER
8/1/2024 10:46AM Elena Galeano  Patient confirmed rescheduled appointment:  Date: 9/9/2024  Time: 12:30PM  Visit type: New Critical Care  Provider: [facility]  [place] [address]rd [place] [address]  Testing/imaging: Labs (1st Floor Imaging) prior at 12PM  Additional notes: 8/1 Reschedule 9/9 Critical Care back a half hour to make room on the schedule for other New Critical Care pts. Scheduled lab prior. Pt has orders for New Cardiology and New CORE- forwarded to N scheduling. COREY Galeano 8/1/2024 10:46AM

## 2024-08-01 NOTE — PATIENT INSTRUCTIONS
Dear Siva Ramey    Thank you for choosing [facility] ([facility] for your transplant cares.  The following information is a summary of our appointment as well as important reminders.      Please make sure you are available to answer your phone today because your transplant coordinator will call you IF you need to change the amount of anti-rejection medication that you are taking.    PLAN:  -Continue to drink liquids as you have been. 2-3L (70-100oz) daily  -Continue to push protein-rich foods. I have attached a handout on phosphorus-rich foods to help increase your phosphorus levels.   -[place] [facility] [place]. I will message our scheduling team to get you scheduled for that appt. After tomorrow, you can do labs Monday/Thursday at [facility]    If you are a transplant patient and require transplant education, please click on this link: [email or web address].    If you have any questions on your upcoming Specialty Infusion appointments, please call scheduling at [phone].  It was a pleasure taking care of you today.    Sincerely,    [facility]  [address] [place] [address]  [address]  Phone:  [phone]

## 2024-08-01 NOTE — TELEPHONE ENCOUNTER
ISSUE:   Tacrolimus IR level 3.5 on 8/1, goal 8-10, dose 4 mg BID.  Dose increased from 3 mg bid last evening.     PLAN:   Call Patient and confirm this was an accurate 12-hour trough.   Verify Tacrolimus IR dose 4 mg BID.   Confirm no new medications or or missed doses.   Confirm no new illness / infection / diarrhea.   If accurate trough and accurate dose, increase None dose to 5 mg BID     Is this more than a 50% increase or decrease in current IS dose: No  If YES, justification: N/A    Repeat labs in 1 days.  *If > 50% change in immunosuppression dose, repeat labs in 1 week.     OUTCOME:   Spoke with Patient, they confirm accurate trough level and current dose 4 mg BID.   Patient confirmed dose change to 5 mg BID.  Patient agreed to repeat labs in 1 days.   Orders sent to preferred pharmacy for dose change and lab for repeat labs.   Patient voiced understanding of plan.

## 2024-08-01 NOTE — LETTER
8/1/2024      Siva Ramey  [address]  [place]  [place] [address]      Dear Colleague,    Thank you for referring your patient, Siva Ramey, to the [facility] TREATMENT [facility]. Please see a copy of my visit note below.    TRANSPLANT SURGERY EARLY POST TRANSPLANT VISIT    Assessment & Plan  # DDKT (SPK): Cr down trending. Doing well. Labs tomorrow.    - Baseline Creatinine: ~ TBD   - Proteinuria: Not checked recently   - Date DSA Last Checked: Jul/2024      Latest DSA: No DSA at time of transplant   - BK Viremia: No   - Kidney Tx Biopsy: No   - Transplant Ureteral Stent: Yes     # Pancreas Tx (SPK):    - Pancreatic Exocrine Drainage: Enteric drained     - Blood glucose: Near euglycemia      On insulin: No   - HbA1c: Last checked at time of transplant      Latest HbA1c: 6.4%   - Pancreatic enzymes: lipase down trending    - DSA Hx: No DSA   - Pancreas Tx Biopsy Hx: No biopsy history    # Immunosuppression: Tacrolimus immediate release (goal 8-10) and Mycophenolate mofetil (dose 1000 mg every 12 hours)   - Induction with Recent Transplant:  High Intensity Protocol   - Continue with intensive monitoring of immunosuppression for efficacy and toxicity.   - Changes: Not at this time    # Infection Prophylaxis:   - PJP: Dapsone - changed for continued hyperkalemia   - CMV: Valganciclovir (Valcyte)     # Hypertension: Controlled;  Goal BP: < 150/90   - Changes: Not at this time    # Anemia in Chronic Renal Disease: Hgb: Trend down      KERRI: No   - Iron studies: Not checked recently    # Mineral Bone Disorder:   - Secondary renal hyperparathyroidism; PTH level: Mildly elevated (151-300 pg/ml)        On treatment: None  - Vitamin D; level: High        On supplement: Yes  - Calcium; level: Normal        On supplement: No  - Phosphorus; level: Low normal        On supplement: Yes    # Electrolytes:   - Potassium; level: Normal        On supplement: No  - Magnesium; level: Normal        On  supplement: Yes  - Bicarbonate; level: Low normal        On supplement: Yes  - Sodium; level: Normal    # : # VT/VF Cardiac arrest:  # CAD s/p CABG 2019 LIMA-LAD, SVG-RCA, IBAN 1/2019, IBAN to RCA 2/2024              - s/p inferior STEMI 2/2 ostial proximal RCA in stent thrombosis              - s/p 2 IBAN to RCA 7/20/2024              - Echo 7/20: LVEF=37%.Moderate diffuse hypokinesis with akinesis of all inferior segments.Mild right ventricular dilation is present.Global  right ventricular function is moderately reduced.              ECHo 7/28 :Left ventricular size is normal. Left ventricular function is decreased. The ejection fraction is 35-40% (moderately reduced). Akinesis of the basal-mid  inferior and basal inferoseptal segments present.   -following with cardiology outpatient     # Medical Compliance: Yes    # Health Maintenance and Vaccination Review: No issues    # Transplant History:  Etiology of Kidney Failure: Diabetes mellitus type 2  Tx: DDKT (SPK)  Transplant: 7/20/2024 (Kidney / Pancreas)  Donor Type: Donation after Brain Death Donor Class:    Crossmatch at time of Tx: negative  DSA at time of Tx: No  Significant changes in immunosuppression: None  CMV IgG Ab High Risk Discordance (D+/R-): No  EBV IgG Ab High Risk Discordance (D+/R-): No  Significant transplant-related complications:  cardiac arrest post SPK due to STEMI    Transplant Office Phone Number: [phone]    Assessment and plan was discussed with the patient and he voiced his understanding and agreement.    Return visit:Labs tomorrow     JUANCARLOS Silva CNP    Chief Complaint  Mr. Ramey is a 49 year old here for hospital follow up after kidney/pancreas transplant.     History of Present Illness   Siva Ramey is an 49 year old male with PMH significant for DMII (on insulin pump) and resulting ESKD (on HD every MWF since 8/2021). 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 Underwent simultaneous kidney pancreas transplant on 7/20/24 complicated by VT/VF arrest in PACU. Received 2 rounds of CPR before ROSC. Taken emergently to the cath lab and found to have 100% occlusion of the RCA now s/p IBAN x 2.     Doing well. Pain controlled. Urinating with no issues. LBM x2 yesterday, x1 this AM - formed and soft.   Drinking over 3 L. Appetite is fair but finding good protein foods. No CP or SOB.  No N/V/D.   No leg edema.   Weight stable. Not orthostatic. Denies dizziness.       Home BP:  114/75    Problem List  Patient Active Problem List   Diagnosis     Type 2 diabetes mellitus with other circulatory complication, unspecified whether long term insulin use (H)     Dyslipidemia     Class 2 severe obesity due to excess calories with serious comorbidity in adult, unspecified BMI (H)     Atherosclerosis of other coronary artery bypass graft(s) with unstable angina pectoris (H)     Anemia in chronic kidney disease     HTN (hypertension)     S/P CABG (coronary artery bypass graft)     Balanoposthitis     Other stricture of anterior urethra in male     History of ST elevation myocardial infarction (STEMI)     Obesity (BMI 30.0-34.9)     MARQUEZ (dyspnea on exertion)     STEMI (ST elevation myocardial infarction) (H)     Vitamin D deficiency     Metabolic acidosis     Retinopathy     Peripheral neuropathy     Acquired elevated diaphragm     Median nerve neuropathy, left     Iron deficiency anemia, unspecified     End stage renal disease (H)     Allergy, unspecified, initial encounter     Anaphylactic shock, unspecified, initial encounter     Coagulation defect, unspecified (H24)     Fatigue, unspecified type     Non-restorative sleep     Obstructive sleep apnea treated with continuous positive airway pressure (CPAP)     Other disorders of phosphorus metabolism     Other specified  symptoms and signs involving the circulatory and respiratory systems     Snoring     Palpitations     Gastroesophageal reflux disease without esophagitis     Diabetes mellitus, type 2 (H)     Kidney replaced by transplant     Pancreas replaced by transplant (H)     Immunosuppressed status (H24)     Cardiac arrest with ventricular fibrillation (H)     Acute and chronic respiratory failure with hypoxia (H)     Steroid-induced hyperglycemia     Hyperkalemia       Allergies  Allergies   Allergen Reactions     Amoxicillin Hives     & generalized pain    Tolerated ceftriaxone in 2023 ([facility]) and 2024 ([facility])     Venlafaxine      lethargic       Medications  Current Outpatient Medications   Medication Sig Dispense Refill     acetaminophen (TYLENOL) 500 MG tablet Take 500 mg by mouth every 4 hours as needed       albuterol (PROAIR HFA/PROVENTIL HFA/VENTOLIN HFA) 108 (90 Base) MCG/ACT inhaler Inhale 2 puffs into the lungs every 4 hours as needed (Patient not taking: Reported on 7/31/2024)       ALPRAZolam (XANAX) 0.25 MG tablet Take 0.25 mg by mouth 3 times daily as needed       aspirin (ASA) 81 MG chewable tablet Take 1 tablet (81 mg) by mouth daily Starting tomorrow. 30 tablet 3     atorvastatin (LIPITOR) 40 MG tablet Take 1 tablet (40 mg) by mouth every evening 30 tablet 2     cholecalciferol, vitamin D3, 5,000 unit Tab [CHOLECALCIFEROL, VITAMIN D3, 5,000 UNIT TAB] Take 5,000 Units by mouth daily.       dapsone (ACZONE) 25 MG tablet Take 2 tablets (50 mg) by mouth daily 60 tablet 11     magnesium hydroxide (MILK OF MAGNESIA) 400 MG/5ML suspension Take 30 mLs by mouth daily as needed for constipation (Use if polyethylene glycol (Miralax) is not effective after 24 hours.) 354 mL 1     magnesium oxide (MAG-OX) 400 MG tablet Take 2 tablets (800 mg) by mouth 2 times daily 120 tablet 1     methocarbamol (ROBAXIN) 750 MG tablet Take 1 tablet (750 mg) by mouth every 6 hours as needed for muscle spasms 20  tablet 0     metoprolol succinate ER (TOPROL XL) 25 MG 24 hr tablet Take 1 tablet (25 mg) by mouth daily 30 tablet 2     mycophenolate (GENERIC EQUIVALENT) 500 MG tablet Take 2 tablets (1,000 mg) by mouth 2 times daily 120 tablet 11     nitroGLYcerin (NITROSTAT) 0.4 MG sublingual tablet PLACE ONE TABLET UNDER TONGUE AS NEEDED FOR CHEST PAIN AS DIRECTED FOR 3 DOSES. IF SYMPTOMS PERSIST 5 MINUTES AFTER 1ST DOSE CALL 911 25 tablet 3     nystatin (MYCOSTATIN) 734621 UNIT/ML suspension Take 5 mLs (500,000 Units) by mouth 4 times daily 600 mL 2     ondansetron (ZOFRAN ODT) 4 MG ODT tab Take 1 tablet (4 mg) by mouth every 6 hours as needed for nausea or vomiting 10 tablet 1     pantoprazole (PROTONIX) 40 MG EC tablet Take 1 tablet (40 mg) by mouth every morning (before breakfast) 30 tablet 1     phosphorus tablet 250 mg (PHOSPHA 250 NEUTRAL) 250 MG per tablet Take 2 tablets (500 mg) by mouth 2 times daily 60 tablet 2     senna-docusate (SENOKOT-S/PERICOLACE) 8.6-50 MG tablet Take 2 tablets by mouth 2 times daily 60 tablet 1     sodium bicarbonate 650 MG tablet Take 3 tablets (1,950 mg) by mouth 3 times daily 270 tablet 2     tacrolimus (GENERIC EQUIVALENT) 1 MG capsule Take 4 capsules (4 mg) by mouth 2 times daily 240 capsule 11     ticagrelor (BRILINTA) 90 MG tablet Take 1 tablet (90 mg) by mouth or Feeding Tube 2 times daily 60 tablet 0     valGANciclovir (VALCYTE) 450 MG tablet Take 2 tablets (900 mg) by mouth daily 60 tablet 2     No current facility-administered medications for this visit.     There are no discontinued medications.    Physical Exam  Vital Signs: There were no vitals taken for this visit.    GENERAL APPEARANCE: alert and no distress  HENT: mouth without ulcers or lesions  RESP: lungs clear to auscultation - no rales, rhonchi or wheezes  CV: regular rhythm, normal rate, no rub, no murmur  EDEMA: no LE edema bilaterally  ABDOMEN: soft, nondistended, nontender, bowel sounds normal  MS: extremities normal  - no gross deformities noted, no evidence of inflammation in joints, no muscle tenderness  SKIN: no rash  SKIN: midline abdomen incision intact with staples - CDI. No erythema. Scant serous drainage noted midline.    AMILCAR site LLQ healing well.     Data        Latest Ref Rng & Units 8/1/2024     7:29 AM 7/31/2024     7:18 AM 7/30/2024     5:07 PM   Renal   Sodium 135 - 145 mmol/L 138  136     K 3.4 - 5.3 mmol/L 5.3  5.0     Cl 98 - 107 mmol/L 109  107     Cl (external) 98 - 107 mmol/L 109  107     CO2 22 - 29 mmol/L 22  21     Urea Nitrogen 6.0 - 20.0 mg/dL 15.3  14.8     Creatinine 0.67 - 1.17 mg/dL 1.76  1.85     Glucose 70 - 99 mg/dL 99  99  123    Calcium 8.8 - 10.4 mg/dL 9.8  10.0     Magnesium 1.7 - 2.3 mg/dL 1.8  1.8           Latest Ref Rng & Units 8/1/2024     7:29 AM 7/31/2024     7:18 AM 7/30/2024     5:57 AM   Bone Health   Phosphorus 2.5 - 4.5 mg/dL 2.1  2.4  2.0          Latest Ref Rng & Units 8/1/2024     7:29 AM 7/31/2024     7:18 AM 7/30/2024     5:57 AM   Heme   WBC 4.0 - 11.0 10e3/uL 7.2  10.4  10.4    Hgb 13.3 - 17.7 g/dL 7.6  8.0  7.9    Plt 150 - 450 10e3/uL 234  247  220          Latest Ref Rng & Units 7/30/2024     5:57 AM 7/29/2024     5:07 AM 7/28/2024     6:15 AM   Liver   AP 40 - 150 U/L 105  92  90    TBili <=1.2 mg/dL 0.4  0.4  0.5    ALT 0 - 70 U/L 30  28  30    AST 0 - 45 U/L 25  25  23    Tot Protein 6.4 - 8.3 g/dL 5.2  4.8  4.8    Albumin 3.5 - 5.2 g/dL 3.2  3.0  3.2          Latest Ref Rng & Units 8/1/2024     7:29 AM 7/31/2024     7:18 AM 7/30/2024     5:57 AM   Pancreas   Amylase 28 - 100 U/L 213  270  239    Lipase (Roche) 13 - 60 U/L 212  275  285             Latest Ref Rng & Units 7/19/2024     4:56 PM 9/27/2021    12:18 PM   [facility]   EBV CAPSID ANTIBODY IGG No detectable antibody. Positive  Positive      Failed to redirect to the Timeline version of the REVFS SmartLink.  Recent Labs   Lab Test 07/23/24  0515 07/24/24  0533 07/26/24  0716 07/29/24  0507 07/31/24  0718    DOSTAC  --  7/23/2024  --   --  7/30/2024   TACROL 9.5 15.9* 8.8 4.5* 4.2*              Again, thank you for allowing me to participate in the care of your patient.        Sincerely,        JUANCARLOS Silva CNP

## 2024-08-01 NOTE — TELEPHONE ENCOUNTER
8/1 Patient confirmed scheduled appointment:  Date: 9/5/2024  Time: 7:45 am  Visit type: Core Enrollment  Provider: Ronald  Location: [facility]  Testing/imaging: LABS PRIOR   Additional notes: N/A

## 2024-08-01 NOTE — PROGRESS NOTES
"Siva Ramey came to [facility] today for a lab and assess following a kidney/pancreas transplant on 7/20/24.      Discharge date: 7/30/24  Transplant coordinator: Ximena Edwards RN  Phone number patient can be reached at: [phone]       Physical Assessment:  See physical assessment located under \"Document Flowsheets\".  Incision site: approximated, closed with staples, CDI  Lines: N/A  Burt: N/A  Urine clarity: yellow, clear urine. Denies burning or retention. Reports urinating every 2 hours  Hydration: reports drinking 137 oz of noncaffeinated liquids  Nutrition: reports reduced appetite but still feels hunger. Patient has been prioritizing protein   Last BM: twice yesterday, once this AM. Soft consistency.   Pain: Chest soreness r/t CPR, L flank muscle spasms before bed last night.    My transplant place videos watched: Will watch them after today  /77 (BP Location: Right arm, Patient Position: Sitting, Cuff Size: Adult Regular)   Pulse 94   Temp 98.2  F (36.8  C) (Oral)   Resp 18   Wt 101.9 kg (224 lb 9.6 oz)   SpO2 98%   BMI 31.33 kg/m    Labs drawn by [facility] staff No  Vascular access: N/A    Plan of care for today:   -Assessment by RN and Shelia Magallon NP  -Review of labs  -Education reviewed    Medication changes: N/A    Medications administered:  Patient self-administered AM meds in [facility]    Patient education:    The following teaching topics were addressed: Importance of drinking 2L of non-caffeinated fluids daily, Incisional care, Signs/symptoms of infection, Good handwashing, Phone numbers to call with concenrs (Transplant coordinator, [facility] [address] and [facility]), 7A discharge check list, and Plan of care   Patient verbalized understanding and all questions answered.    Drug level:  Patient took 4mg of Tacrolimus last evening at 2000.  Care coordinator to follow up with the result.    Face to face time: 40 min  Discharge Plan    Pt will follow up with transplant team  Discharge instructions " reviewed with patient: YES  Patient/Representative verbalized understanding, all questions answered: YES    Discharged from unit at 0945 to home.    Cady Gomez RN

## 2024-08-02 ENCOUNTER — TELEPHONE (OUTPATIENT)
Dept: TRANSPLANT | Facility: CLINIC | Age: 49
End: 2024-08-02

## 2024-08-02 ENCOUNTER — LAB (OUTPATIENT)
Dept: LAB | Facility: CLINIC | Age: 49
End: 2024-08-02
Payer: COMMERCIAL

## 2024-08-02 DIAGNOSIS — Z94.0 KIDNEY REPLACED BY TRANSPLANT: ICD-10-CM

## 2024-08-02 DIAGNOSIS — Z48.298 AFTERCARE FOLLOWING ORGAN TRANSPLANT: ICD-10-CM

## 2024-08-02 DIAGNOSIS — Z98.890 OTHER SPECIFIED POSTPROCEDURAL STATES: ICD-10-CM

## 2024-08-02 DIAGNOSIS — Z94.83 PANCREAS REPLACED BY TRANSPLANT (H): ICD-10-CM

## 2024-08-02 DIAGNOSIS — Z79.899 ENCOUNTER FOR LONG-TERM CURRENT USE OF MEDICATION: ICD-10-CM

## 2024-08-02 LAB
AMYLASE SERPL-CCNC: 205 U/L (ref 28–100)
ANION GAP SERPL CALCULATED.3IONS-SCNC: 9 MMOL/L (ref 7–15)
BUN SERPL-MCNC: 14 MG/DL (ref 6–20)
CALCIUM SERPL-MCNC: 10 MG/DL (ref 8.8–10.4)
CHLORIDE SERPL-SCNC: 111 MMOL/L (ref 98–107)
CREAT SERPL-MCNC: 1.62 MG/DL (ref 0.67–1.17)
DONOR IDENTIFICATION: NORMAL
DSA COMMENTS: NORMAL
DSA PRESENT: NO
DSA TEST METHOD: NORMAL
EGFRCR SERPLBLD CKD-EPI 2021: 52 ML/MIN/1.73M2
ERYTHROCYTE [DISTWIDTH] IN BLOOD BY AUTOMATED COUNT: 15 % (ref 10–15)
GLUCOSE SERPL-MCNC: 91 MG/DL (ref 70–99)
HCO3 SERPL-SCNC: 20 MMOL/L (ref 22–29)
HCT VFR BLD AUTO: 23.9 % (ref 40–53)
HGB BLD-MCNC: 7.8 G/DL (ref 13.3–17.7)
INT SUB COMMENTS: NORMAL
INT SUB RESULT: NORMAL
INTERF SUBSTANCE: NORMAL
INTSUB TEST METHOD: NORMAL
LIPASE SERPL-CCNC: 207 U/L (ref 13–60)
MCH RBC QN AUTO: 32.2 PG (ref 26.5–33)
MCHC RBC AUTO-ENTMCNC: 32.6 G/DL (ref 31.5–36.5)
MCV RBC AUTO: 99 FL (ref 78–100)
ORGAN: NORMAL
PLATELET # BLD AUTO: 289 10E3/UL (ref 150–450)
POTASSIUM SERPL-SCNC: 5 MMOL/L (ref 3.4–5.3)
RBC # BLD AUTO: 2.42 10E6/UL (ref 4.4–5.9)
SA 1  COMMENTS: NORMAL
SA 1 CELL: NORMAL
SA 1 TEST METHOD: NORMAL
SA 2 CELL: NORMAL
SA 2 COMMENTS: NORMAL
SA 2 TEST METHOD: NORMAL
SA1 HI RISK ABY: NORMAL
SA1 MOD RISK ABY: NORMAL
SA2 HI RISK ABY: NORMAL
SA2 MOD RISK ABY: NORMAL
SODIUM SERPL-SCNC: 140 MMOL/L (ref 135–145)
TACROLIMUS BLD-MCNC: 5.5 UG/L (ref 5–15)
TME LAST DOSE: NORMAL H
TME LAST DOSE: NORMAL H
UNACCEPTABLE ANTIGENS: NORMAL
UNOS CPRA: 11
WBC # BLD AUTO: 6.6 10E3/UL (ref 4–11)

## 2024-08-02 PROCEDURE — 99000 SPECIMEN HANDLING OFFICE-LAB: CPT | Performed by: PATHOLOGY

## 2024-08-02 PROCEDURE — 80048 BASIC METABOLIC PNL TOTAL CA: CPT | Performed by: PATHOLOGY

## 2024-08-02 PROCEDURE — 85027 COMPLETE CBC AUTOMATED: CPT | Performed by: PATHOLOGY

## 2024-08-02 PROCEDURE — 83690 ASSAY OF LIPASE: CPT | Performed by: PATHOLOGY

## 2024-08-02 PROCEDURE — 36415 COLL VENOUS BLD VENIPUNCTURE: CPT | Performed by: PATHOLOGY

## 2024-08-02 PROCEDURE — 82150 ASSAY OF AMYLASE: CPT | Performed by: PATHOLOGY

## 2024-08-02 PROCEDURE — 80197 ASSAY OF TACROLIMUS: CPT | Performed by: INTERNAL MEDICINE

## 2024-08-02 RX ORDER — METHOCARBAMOL 750 MG/1
750 TABLET, FILM COATED ORAL EVERY 6 HOURS PRN
Qty: 20 TABLET | Refills: 0 | Status: SHIPPED | OUTPATIENT
Start: 2024-08-02

## 2024-08-02 RX ORDER — TACROLIMUS 1 MG/1
6 CAPSULE ORAL 2 TIMES DAILY
Qty: 360 CAPSULE | Refills: 11 | Status: SHIPPED | OUTPATIENT
Start: 2024-08-02 | End: 2024-08-08

## 2024-08-02 NOTE — TELEPHONE ENCOUNTER
ISSUE:   Tacrolimus IR level 5.5 on 8/2, goal 8-10, dose 5 mg BID.  Patient confirmed this was ~11 hr trough.     PLAN:   Call Patient and confirm this was an accurate 12-hour trough.   Verify Tacrolimus IR dose 5 mg BID.   Confirm no new medications or or missed doses.   Confirm no new illness / infection / diarrhea.   If accurate trough and accurate dose, increase Tacrolimus IR dose to 6 mg BID     Is this more than a 50% increase or decrease in current IS dose: No  If YES, justification: N/A    Repeat labs in 2 days.  *If > 50% change in immunosuppression dose, repeat labs in 1 week.     OUTCOME:   Spoke with Patient, they confirm accurate trough level and current dose 5 mg BID.   Patient confirmed dose change to 6 mg BID.  Patient agreed to repeat labs in 2 days.   Orders sent to preferred pharmacy for dose change and lab for repeat labs.   Patient voiced understanding of plan.     Reviewed labs from today, feeling well. Had 5-6 loose stools today, not on stool softeners. Feels like UOP has decreased but discussed there are multiple ways to lose water. SCr stable. Drinking 3 liters daily.     Has chest pain, very sore, likely from compressions. No shortness of breath. Robaxin script was sent to discharge pharmacy but Siva did not , sent script to [facility]. Keep up on tylenol, ok for heat/ice. Has follow up with cards on Monday.

## 2024-08-05 ENCOUNTER — LAB (OUTPATIENT)
Dept: LAB | Facility: CLINIC | Age: 49
End: 2024-08-05
Payer: COMMERCIAL

## 2024-08-05 ENCOUNTER — TELEPHONE (OUTPATIENT)
Dept: TRANSPLANT | Facility: CLINIC | Age: 49
End: 2024-08-05

## 2024-08-05 ENCOUNTER — OFFICE VISIT (OUTPATIENT)
Dept: CARDIOLOGY | Facility: CLINIC | Age: 49
End: 2024-08-05
Attending: NURSE PRACTITIONER
Payer: COMMERCIAL

## 2024-08-05 VITALS
BODY MASS INDEX: 31.64 KG/M2 | HEART RATE: 105 BPM | SYSTOLIC BLOOD PRESSURE: 94 MMHG | OXYGEN SATURATION: 97 % | RESPIRATION RATE: 18 BRPM | DIASTOLIC BLOOD PRESSURE: 62 MMHG | HEIGHT: 71 IN | WEIGHT: 226 LBS

## 2024-08-05 DIAGNOSIS — E78.5 DYSLIPIDEMIA, GOAL LDL BELOW 70: ICD-10-CM

## 2024-08-05 DIAGNOSIS — Z94.83 PANCREAS REPLACED BY TRANSPLANT (H): ICD-10-CM

## 2024-08-05 DIAGNOSIS — Z48.298 AFTERCARE FOLLOWING ORGAN TRANSPLANT: ICD-10-CM

## 2024-08-05 DIAGNOSIS — I50.22 CHRONIC SYSTOLIC CONGESTIVE HEART FAILURE (H): ICD-10-CM

## 2024-08-05 DIAGNOSIS — Z98.890 OTHER SPECIFIED POSTPROCEDURAL STATES: ICD-10-CM

## 2024-08-05 DIAGNOSIS — E55.9 VITAMIN D DEFICIENCY: Primary | ICD-10-CM

## 2024-08-05 DIAGNOSIS — Z94.0 KIDNEY REPLACED BY TRANSPLANT: ICD-10-CM

## 2024-08-05 DIAGNOSIS — Z79.899 ENCOUNTER FOR LONG-TERM CURRENT USE OF MEDICATION: ICD-10-CM

## 2024-08-05 DIAGNOSIS — I10 PRIMARY HYPERTENSION: ICD-10-CM

## 2024-08-05 DIAGNOSIS — I25.5 ISCHEMIC CARDIOMYOPATHY: ICD-10-CM

## 2024-08-05 DIAGNOSIS — I25.810 CORONARY ARTERY DISEASE INVOLVING CORONARY BYPASS GRAFT OF NATIVE HEART WITHOUT ANGINA PECTORIS: Primary | ICD-10-CM

## 2024-08-05 DIAGNOSIS — E11.59 TYPE 2 DIABETES MELLITUS WITH OTHER CIRCULATORY COMPLICATION, UNSPECIFIED WHETHER LONG TERM INSULIN USE (H): ICD-10-CM

## 2024-08-05 LAB
AMYLASE SERPL-CCNC: 151 U/L (ref 28–100)
ANION GAP SERPL CALCULATED.3IONS-SCNC: 7 MMOL/L (ref 7–15)
BUN SERPL-MCNC: 11.7 MG/DL (ref 6–20)
CALCIUM SERPL-MCNC: 10.3 MG/DL (ref 8.8–10.4)
CHLORIDE SERPL-SCNC: 110 MMOL/L (ref 98–107)
CREAT SERPL-MCNC: 1.64 MG/DL (ref 0.67–1.17)
EGFRCR SERPLBLD CKD-EPI 2021: 51 ML/MIN/1.73M2
ERYTHROCYTE [DISTWIDTH] IN BLOOD BY AUTOMATED COUNT: 16 % (ref 10–15)
FERRITIN SERPL-MCNC: 1725 NG/ML (ref 31–409)
GLUCOSE SERPL-MCNC: 100 MG/DL (ref 70–99)
HCO3 SERPL-SCNC: 20 MMOL/L (ref 22–29)
HCT VFR BLD AUTO: 25.6 % (ref 40–53)
HGB BLD-MCNC: 8.2 G/DL (ref 13.3–17.7)
IRON BINDING CAPACITY (ROCHE): 253 UG/DL (ref 240–430)
IRON SATN MFR SERPL: 17 % (ref 15–46)
IRON SERPL-MCNC: 44 UG/DL (ref 61–157)
LIPASE SERPL-CCNC: 126 U/L (ref 13–60)
MAGNESIUM SERPL-MCNC: 1.4 MG/DL (ref 1.7–2.3)
MCH RBC QN AUTO: 32.4 PG (ref 26.5–33)
MCHC RBC AUTO-ENTMCNC: 32 G/DL (ref 31.5–36.5)
MCV RBC AUTO: 101 FL (ref 78–100)
MYCOPHENOLATE SERPL LC/MS/MS-MCNC: 3.75 MG/L (ref 1–3.5)
MYCOPHENOLATE-G SERPL LC/MS/MS-MCNC: 78.9 MG/L (ref 30–95)
PHOSPHATE SERPL-MCNC: 1.3 MG/DL (ref 2.5–4.5)
PLATELET # BLD AUTO: 255 10E3/UL (ref 150–450)
POTASSIUM SERPL-SCNC: 5.3 MMOL/L (ref 3.4–5.3)
PTH-INTACT SERPL-MCNC: 263 PG/ML (ref 15–65)
RBC # BLD AUTO: 2.53 10E6/UL (ref 4.4–5.9)
SODIUM SERPL-SCNC: 137 MMOL/L (ref 135–145)
TACROLIMUS BLD-MCNC: 8.1 UG/L (ref 5–15)
TME LAST DOSE: ABNORMAL H
TME LAST DOSE: ABNORMAL H
TME LAST DOSE: NORMAL H
TME LAST DOSE: NORMAL H
VIT D+METAB SERPL-MCNC: 59 NG/ML (ref 20–50)
WBC # BLD AUTO: 4.9 10E3/UL (ref 4–11)

## 2024-08-05 PROCEDURE — 82150 ASSAY OF AMYLASE: CPT | Performed by: PATHOLOGY

## 2024-08-05 PROCEDURE — 84100 ASSAY OF PHOSPHORUS: CPT | Performed by: PATHOLOGY

## 2024-08-05 PROCEDURE — 85027 COMPLETE CBC AUTOMATED: CPT | Performed by: PATHOLOGY

## 2024-08-05 PROCEDURE — 36415 COLL VENOUS BLD VENIPUNCTURE: CPT | Performed by: PATHOLOGY

## 2024-08-05 PROCEDURE — 83690 ASSAY OF LIPASE: CPT | Performed by: PATHOLOGY

## 2024-08-05 PROCEDURE — 99000 SPECIMEN HANDLING OFFICE-LAB: CPT | Performed by: PATHOLOGY

## 2024-08-05 PROCEDURE — 99214 OFFICE O/P EST MOD 30 MIN: CPT | Performed by: INTERNAL MEDICINE

## 2024-08-05 PROCEDURE — 82728 ASSAY OF FERRITIN: CPT | Performed by: PATHOLOGY

## 2024-08-05 PROCEDURE — 83735 ASSAY OF MAGNESIUM: CPT | Performed by: PATHOLOGY

## 2024-08-05 PROCEDURE — 80197 ASSAY OF TACROLIMUS: CPT | Performed by: INTERNAL MEDICINE

## 2024-08-05 PROCEDURE — 83550 IRON BINDING TEST: CPT | Performed by: PATHOLOGY

## 2024-08-05 PROCEDURE — 80180 DRUG SCRN QUAN MYCOPHENOLATE: CPT | Performed by: INTERNAL MEDICINE

## 2024-08-05 PROCEDURE — 83970 ASSAY OF PARATHORMONE: CPT | Performed by: PATHOLOGY

## 2024-08-05 PROCEDURE — G2211 COMPLEX E/M VISIT ADD ON: HCPCS | Performed by: INTERNAL MEDICINE

## 2024-08-05 PROCEDURE — 82306 VITAMIN D 25 HYDROXY: CPT | Performed by: INTERNAL MEDICINE

## 2024-08-05 PROCEDURE — 83540 ASSAY OF IRON: CPT | Performed by: PATHOLOGY

## 2024-08-05 PROCEDURE — 80048 BASIC METABOLIC PNL TOTAL CA: CPT | Performed by: PATHOLOGY

## 2024-08-05 NOTE — PROGRESS NOTES
Assessment/Plan:   Coronary artery disease s/p 2V CABG patent LIMA to LAD, occluded SVG to RCA, s/p IBAN to D1, s/p IBAN to mid RCA with acute thrombus status post the IBAN on July 20, 2024: He recovered well, no chest pain or shortness of breath.  Continue aspirin, atorvastatin, Brilinta.  His blood pressure is low, only on low-dose of metoprolol succinate 25 mg daily.  The patient is advised to start cardiac rehab when he is ready.  Ischemic cardiomyopathy, LVEF 35 to 40%, chronic systolic congestive heart failure: He has no signs of fluid retention.  He is on metoprolol 25 mg daily.  His blood pressure is 94/62 this morning, not able to add other CHF medication.  Repeat echo in 3 months to reevaluate LV systolic function.  Low-salt diet.  He has all labs today.  Benign essential hypertension: His blood pressure at the low side.  He is on metoprolol ER 25 mg daily.  Dyslipidemia: Continue atorvastatin 40 mg at bedtime.  Lipid profile at this morning.  Type 2 diabetes, s/p pancreas and renal transplant: Follow-up with other team.      Thank you for the opportunity to be involved in the care of Siva Ramey. If you have any questions, please feel free to contact me.  I will see the patient again in 6 months and as needed.    Much or all of the text in this note was generated through the use of Dragon Dictate voice-to-text software. Errors in spelling or words which seem out of context are unintentional. Sound alike errors, in particular, may have escaped editing.       History of Present Illness:   It is my pleasure to see Siva Ramey at the [facility] for establishing general cardiology care.  Siva Ramey is a 49 year old male with a medical history of coronary artery disease s/p 2V CABG patent LIMA to LAD, occluded SVG to RCA, s/p IBAN to D1, s/p IBAN to mid RCA with acute thrombus status post the IBAN on July 20, 2024, ischemic cardiomyopathy, chronic systolic congestive heart  failure, benign essential hypertension, dyslipidemia, type 2 diabetes, s/p pancreas and renal transplant 16 days ago.    The patient was admitted to hospital at [facility] for pancreas and renal transplant last month.  During hospitalization, he had ventricular fibrillation and cardiac arrest secondary to acute inferior STEMI.  The patient was coded for 5 minutes.  He had urgent cath and found in-stent thrombosis in the mid RCA, treated with thrombectomy and IBAN.  Post the hospital discharge, the patient states that he had been doing pretty well.  He has mild sore of left chest wall due to previous CPR.  He has no other chest pain, no shortness of breath.  He had no palpitations, dizziness, orthopnea, PND or leg edema.  His blood pressure at the low side.  No side effects from current medications.  He did not start cardiac rehab yet.    Past Medical History:     Patient Active Problem List   Diagnosis    Type 2 diabetes mellitus with other circulatory complication, unspecified whether long term insulin use (H)    Dyslipidemia    Class 2 severe obesity due to excess calories with serious comorbidity in adult, unspecified BMI (H)    Atherosclerosis of other coronary artery bypass graft(s) with unstable angina pectoris (H)    Anemia in chronic kidney disease    HTN (hypertension)    S/P CABG (coronary artery bypass graft)    Balanoposthitis    Other stricture of anterior urethra in male    History of ST elevation myocardial infarction (STEMI)    Obesity (BMI 30.0-34.9)    MARQUEZ (dyspnea on exertion)    STEMI (ST elevation myocardial infarction) (H)    Vitamin D deficiency    Metabolic acidosis    Retinopathy    Peripheral neuropathy    Acquired elevated diaphragm    Median nerve neuropathy, left    Iron deficiency anemia, unspecified    End stage renal disease (H)    Allergy, unspecified, initial encounter    Anaphylactic shock, unspecified, initial encounter    Coagulation defect, unspecified (H24)    Fatigue, unspecified type     Non-restorative sleep    Obstructive sleep apnea treated with continuous positive airway pressure (CPAP)    Other disorders of phosphorus metabolism    Other specified symptoms and signs involving the circulatory and respiratory systems    Snoring    Palpitations    Gastroesophageal reflux disease without esophagitis    Diabetes mellitus, type 2 (H)    Kidney replaced by transplant    Pancreas replaced by transplant (H)    Immunosuppressed status (H24)    Cardiac arrest with ventricular fibrillation (H)    Acute and chronic respiratory failure with hypoxia (H)    Steroid-induced hyperglycemia    Hyperkalemia       Past Surgical History:     Past Surgical History:   Procedure Laterality Date    APPENDECTOMY OPEN N/A 7/19/2024    Procedure: Appendectomy open;  Surgeon: Harrison Whitehead MD;  Location: [facility] OR    BACK SURGERY  2009    L5 disc cut    BENCH KIDNEY  7/19/2024    Procedure: Bench kidney;  Surgeon: Harrison Whitehead MD;  Location: [facility] OR    BENCH PANCREAS N/A 7/19/2024    Procedure: Bench pancreas;  Surgeon: Harrison Whitehead MD;  [facility]    BYPASS GRAFT ARTERY CORONARY  06/20/2019    2 vessels    CV CENTRAL VENOUS CATHETER PLACEMENT N/A 7/20/2024    Procedure: Central Venous Catheter Placement;  Surgeon: Dominic Robertson MD;  Location:  HEART CARDIAC CATH LAB    CV CORONARY ANGIOGRAM N/A 01/13/2019    Procedure: Coronary Angiogram;  Surgeon: Cielo Che MD;  Location: [facility] Cath Lab;  Service: Cardiology    CV CORONARY ANGIOGRAM N/A 05/02/2019    Procedure: Coronary Angiogram;  Surgeon: Cielo Che MD;  Location: [facility] Cath Lab;  Service: Cardiology    CV CORONARY ANGIOGRAM N/A 04/30/2020    Procedure: Coronary Angiogram;  Surgeon: Cielo Che MD;  Location: [facility] Cath Lab;  Service: Cardiology    CV CORONARY ANGIOGRAM N/A 07/22/2021    Procedure: CV CORONARY ANGIOGRAM;  Surgeon: Adrian Crow MD;   Location: [facility] CV    CV CORONARY ANGIOGRAM N/A 02/01/2024    Procedure: Coronary Angiogram;  Surgeon: Delroy Carpio MD;  Location: [facility] CARDIAC CATH LAB    CV CORONARY ANGIOGRAM N/A 7/20/2024    Procedure: Coronary Angiogram;  Surgeon: Dominic Robertson MD;  Location: [facility] CARDIAC CATH LAB    CV CORONARY LITHOTRIPSY PCI N/A 7/20/2024    Procedure: Percutaneous Coronary Intervention - Lithotripsy;  Surgeon: Dominic Robertson MD;  Location: [facility] CARDIAC CATH LAB    CV FRACTIONAL FLOW RATIO WIRE N/A 07/22/2021    Procedure: Fractional Flow Ratio Wire;  Surgeon: Adrian Crow MD;  Location: [facility] CV    CV INTRAVASULAR ULTRASOUND N/A 7/20/2024    Procedure: Intravascular Ultrasound;  Surgeon: Dominic Robertson MD;  Location: [facility] CARDIAC CATH LAB    CV LEFT HEART CATH N/A 07/22/2021    Procedure: Left Heart Cath;  Surgeon: Adrian Crow MD;  Location: [facility] CV    CV LEFT HEART CATHETERIZATION WITH LEFT VENTRICULOGRAM N/A 01/13/2019    Procedure: Left Heart Catheterization with Left Ventriculogram;  Surgeon: Cielo Che MD;  Location: [facility] Cath Lab;  Service: Cardiology    CV LEFT HEART CATHETERIZATION WITHOUT LEFT VENTRICULOGRAM Left 04/30/2020    Procedure: Left Heart Catheterization Without Left Ventriculogram;  Surgeon: Cielo Che MD;  Location: [facility] Cath Lab;  Service: Cardiology    CV PCI N/A 02/01/2024    Procedure: Percutaneous Coronary Intervention;  Surgeon: Delroy Carpio MD;  Location: [facility] CARDIAC CATH LAB    CV PCI N/A 7/20/2024    Procedure: Percutaneous Coronary Intervention;  Surgeon: Dominic Robertson MD;  Location: [facility] CARDIAC CATH LAB    CV PCI ASPIRATION THROMECTOMY N/A 7/20/2024    Procedure: Percutaneous Coronary Intervention Aspiration Thrombectomy;  Surgeon: Dominic Robertson MD;  Location: [facility] CARDIAC CATH LAB    CV PCI STENT  DRUG ELUTING N/A 2024    Procedure: Percutaneous Coronary Intervention Stent;  Surgeon: Dominic Robertson MD;  Location:  HEART CARDIAC CATH LAB    CV RIGHT HEART CATHETERIZATION N/A 2020    Procedure: Right Heart Catheterization;  Surgeon: Cielo Che MD;  Location: [facility] Cath Lab;  Service: Cardiology    EYE SURGERY      GENITOURINARY SURGERY      HERNIA REPAIR      OTHER SURGICAL HISTORY      Excise varicocele    STENT, CORONARY, DALE      TRANSPLANT PANCREAS, KIDNEY  DONOR, COMBINED N/A 2024    Procedure: Transplant pancreas, kidney  donor, with ureteral stent placement;  Surgeon: Harrison Whitehead MD;  Location:  OR    VASCULAR SURGERY         Family History:     Family History   Problem Relation Age of Onset    Diabetes Type 2  Mother     Heart Disease Father 60    CABG Father 50        triple bypass    Diabetes Type 2  Father     Depression Sister     Substance Abuse Sister     Ovarian Cancer Maternal Grandmother     Brain Cancer Maternal Grandmother     Pancreatic Cancer Maternal Aunt     Prostate Cancer Maternal Uncle         Social History:    reports that he has never smoked. He has been exposed to tobacco smoke. He has never used smokeless tobacco. He reports that he does not drink alcohol and does not use drugs.    Review of Systems:   12 systems are reviewed negative except for in HPI.    Meds:     Current Outpatient Medications:     acetaminophen (TYLENOL) 500 MG tablet, Take 500 mg by mouth every 4 hours as needed, Disp: , Rfl:     ALPRAZolam (XANAX) 0.25 MG tablet, Take 0.25 mg by mouth 3 times daily as needed, Disp: , Rfl:     aspirin (ASA) 81 MG chewable tablet, Take 1 tablet (81 mg) by mouth daily Starting tomorrow., Disp: 30 tablet, Rfl: 3    atorvastatin (LIPITOR) 40 MG tablet, Take 1 tablet (40 mg) by mouth every evening, Disp: 30 tablet, Rfl: 2    dapsone (ACZONE) 25 MG tablet, Take 2 tablets (50 mg) by mouth  daily, Disp: 60 tablet, Rfl: 11    magnesium hydroxide (MILK OF MAGNESIA) 400 MG/5ML suspension, Take 30 mLs by mouth daily as needed for constipation (Use if polyethylene glycol (Miralax) is not effective after 24 hours.), Disp: 354 mL, Rfl: 1    magnesium oxide (MAG-OX) 400 MG tablet, Take 2 tablets (800 mg) by mouth 2 times daily, Disp: 120 tablet, Rfl: 1    methocarbamol (ROBAXIN) 750 MG tablet, Take 1 tablet (750 mg) by mouth every 6 hours as needed for muscle spasms, Disp: 20 tablet, Rfl: 0    metoprolol succinate ER (TOPROL XL) 25 MG 24 hr tablet, Take 1 tablet (25 mg) by mouth daily, Disp: 30 tablet, Rfl: 2    mycophenolate (GENERIC EQUIVALENT) 500 MG tablet, Take 2 tablets (1,000 mg) by mouth 2 times daily, Disp: 120 tablet, Rfl: 11    nystatin (MYCOSTATIN) 866873 UNIT/ML suspension, Take 5 mLs (500,000 Units) by mouth 4 times daily, Disp: 600 mL, Rfl: 2    ondansetron (ZOFRAN ODT) 4 MG ODT tab, Take 1 tablet (4 mg) by mouth every 6 hours as needed for nausea or vomiting, Disp: 10 tablet, Rfl: 1    pantoprazole (PROTONIX) 40 MG EC tablet, Take 1 tablet (40 mg) by mouth every morning (before breakfast), Disp: 30 tablet, Rfl: 1    senna-docusate (SENOKOT-S/PERICOLACE) 8.6-50 MG tablet, Take 2 tablets by mouth 2 times daily, Disp: 60 tablet, Rfl: 1    sodium bicarbonate 650 MG tablet, Take 3 tablets (1,950 mg) by mouth 3 times daily, Disp: 270 tablet, Rfl: 2    tacrolimus (GENERIC EQUIVALENT) 1 MG capsule, Take 6 capsules (6 mg) by mouth 2 times daily, Disp: 360 capsule, Rfl: 11    ticagrelor (BRILINTA) 90 MG tablet, Take 1 tablet (90 mg) by mouth or Feeding Tube 2 times daily, Disp: 60 tablet, Rfl: 0    valGANciclovir (VALCYTE) 450 MG tablet, Take 2 tablets (900 mg) by mouth daily, Disp: 60 tablet, Rfl: 2    cholecalciferol (VITAMIN D3) 25 mcg (1000 units) capsule, Take 2 capsules (50 mcg) by mouth daily, Disp: 60 capsule, Rfl: 3    nitroGLYcerin (NITROSTAT) 0.4 MG sublingual tablet, PLACE ONE TABLET UNDER  "TONGUE AS NEEDED FOR CHEST PAIN AS DIRECTED FOR 3 DOSES. IF SYMPTOMS PERSIST 5 MINUTES AFTER 1ST DOSE CALL 911 (Patient not taking: Reported on 8/5/2024), Disp: 25 tablet, Rfl: 3    phosphorus tablet 250 mg (PHOSPHA 250 NEUTRAL) 250 MG per tablet, Take 2 tablets (500 mg) by mouth 3 times daily, Disp: 180 tablet, Rfl: 0    Allergies:   Amoxicillin and Venlafaxine      Objective:      Physical Exam  102.5 kg (226 lb)  1.803 m (5' 11\")  Body mass index is 31.52 kg/m .  BP 94/62 (BP Location: Right arm, Patient Position: Sitting, Cuff Size: Adult Large)   Pulse 105   Resp 18   Ht 1.803 m (5' 11\")   Wt 102.5 kg (226 lb)   SpO2 97%   BMI 31.52 kg/m      General Appearance:   Awake, Alert, No acute distress.   HEENT:  Pupil equal and reactive to light. No scleral icterus; the mucous membranes were moist.   Neck: No cervical bruits. No JVD. No thyromegaly.     Chest: The spine was straight. The chest was symmetric.   Lungs:   Respirations unlabored; Lungs are clear to auscultation. No crackles. No wheezing.   Cardiovascular:   Regular rhythm and rate, normal first and second heart sounds with no murmurs. No rubs or gallops.    Abdomen:  Obese.  Soft. No tenderness. Non-distended. Bowels sounds are present   Extremities: Equal tibial pulses. No leg edema.   Skin: No rashes or ulcers. Warm, Dry.   Musculoskeletal: No tenderness. No deformity.   Neurologic: Mood and affect are appropriate. No focal deficits.         EKG: Personally reviewed  Sinus tachycardia   ST & T wave abnormality, consider lateral ischemia   Abnormal ECG   When compared with ECG of 24-Jul-2024 08:34, (unconfirmed)   Premature ventricular complexes are no longer Present   QRS duration has decreased   ST more elevated in Inferior leads     Cardiac Imaging Studies  Echo on July 27, 2024:  Left ventricular function is decreased. The ejection fraction is 35-40%  (moderately reduced).Akinesis of the basal-mid inferior and basal inferoseptal  segments " "present.  Global right ventricular function is normal.  Mild mitral insufficiency is present.  Estimated mean right atrial pressure is normal.  Trivial pericardial effusion is present.    Coronary angiogram with PCI on July 20, 2024:  Inferior STEMI.  Cardiac arrest.  VT/VF.  Severe two vessel CAD with prior CABG with LIMA to LAD, PCI to D1, and PCI to mid RCA.  Patent LIMA to LAD.  Patent stent in the D1 with minimal ISR.  In stent thrombosis of the mid RCA stent culprit in inferior STEMI.  Aspiration thrombectomy of the RCA.  Intravascular lithotripsy of the RCA.  PCI of the RCA with two drug eluting stents.  IVUS of the RCA.    Lab Review   Lab Results   Component Value Date     08/05/2024    CO2 20 08/05/2024    CO2 28 09/27/2021    BUN 11.7 08/05/2024    BUN 46 09/27/2021     Lab Results   Component Value Date    WBC 4.9 08/05/2024    HGB 8.2 08/05/2024    HCT 25.6 08/05/2024     08/05/2024     08/05/2024     Lab Results   Component Value Date    CHOL 179 02/01/2024    CHOL 144 12/19/2023    CHOL 153 10/17/2023     Lab Results   Component Value Date    HDL 18 (L) 02/01/2024    HDL 21 (L) 12/19/2023    HDL 20 (L) 10/17/2023     No components found for: \"LDLCALC\"  Lab Results   Component Value Date    TRIG 693 (H) 02/01/2024    TRIG 520 (H) 12/19/2023    TRIG 682 (H) 10/17/2023     No results found for: \"CHOLHDL\"  Lab Results   Component Value Date    TROPONINI <0.01 07/20/2021     Lab Results   Component Value Date    BNP 25 07/19/2021     Lab Results   Component Value Date    TSH 1.78 10/17/2023    TSH 2.65 01/14/2019             "

## 2024-08-05 NOTE — LETTER
8/5/2024    Trinidad Hansen PA-C, SIS  [facility] [place] [address]  [place] [address]    RE: Siva Ramey       Dear Colleague,     I had the pleasure of seeing Siva Ramey in the [facility].            Assessment/Plan:   Coronary artery disease s/p 2V CABG patent LIMA to LAD, occluded SVG to RCA, s/p IBAN to D1, s/p IBAN to mid RCA with acute thrombus status post the IBAN on July 20, 2024: He recovered well, no chest pain or shortness of breath.  Continue aspirin, atorvastatin, Brilinta.  His blood pressure is low, only on low-dose of metoprolol succinate 25 mg daily.  The patient is advised to start cardiac rehab when he is ready.  Ischemic cardiomyopathy, LVEF 35 to 40%, chronic systolic congestive heart failure: He has no signs of fluid retention.  He is on metoprolol 25 mg daily.  His blood pressure is 94/62 this morning, not able to add other CHF medication.  Repeat echo in 3 months to reevaluate LV systolic function.  Low-salt diet.  He has all labs today.  Benign essential hypertension: His blood pressure at the low side.  He is on metoprolol ER 25 mg daily.  Dyslipidemia: Continue atorvastatin 40 mg at bedtime.  Lipid profile at this morning.  Type 2 diabetes, s/p pancreas and renal transplant: Follow-up with other team.      Thank you for the opportunity to be involved in the care of Siva Ramey. If you have any questions, please feel free to contact me.  I will see the patient again in 6 months and as needed.    Much or all of the text in this note was generated through the use of Dragon Dictate voice-to-text software. Errors in spelling or words which seem out of context are unintentional. Sound alike errors, in particular, may have escaped editing.       History of Present Illness:   It is my pleasure to see Siva Ramey at the [facility] for establishing general cardiology care.  Siva Ramey is a 49 year old male with a medical  history of coronary artery disease s/p 2V CABG patent LIMA to LAD, occluded SVG to RCA, s/p IBAN to D1, s/p IBAN to mid RCA with acute thrombus status post the IBAN on July 20, 2024, ischemic cardiomyopathy, chronic systolic congestive heart failure, benign essential hypertension, dyslipidemia, type 2 diabetes, s/p pancreas and renal transplant 16 days ago.    The patient was admitted to hospital at [facility] for pancreas and renal transplant last month.  During hospitalization, he had ventricular fibrillation and cardiac arrest secondary to acute inferior STEMI.  The patient was coded for 5 minutes.  He had urgent cath and found in-stent thrombosis in the mid RCA, treated with thrombectomy and IBAN.  Post the hospital discharge, the patient states that he had been doing pretty well.  He has mild sore of left chest wall due to previous CPR.  He has no other chest pain, no shortness of breath.  He had no palpitations, dizziness, orthopnea, PND or leg edema.  His blood pressure at the low side.  No side effects from current medications.  He did not start cardiac rehab yet.    Past Medical History:     Patient Active Problem List   Diagnosis     Type 2 diabetes mellitus with other circulatory complication, unspecified whether long term insulin use (H)     Dyslipidemia     Class 2 severe obesity due to excess calories with serious comorbidity in adult, unspecified BMI (H)     Atherosclerosis of other coronary artery bypass graft(s) with unstable angina pectoris (H)     Anemia in chronic kidney disease     HTN (hypertension)     S/P CABG (coronary artery bypass graft)     Balanoposthitis     Other stricture of anterior urethra in male     History of ST elevation myocardial infarction (STEMI)     Obesity (BMI 30.0-34.9)     MARQUEZ (dyspnea on exertion)     STEMI (ST elevation myocardial infarction) (H)     Vitamin D deficiency     Metabolic acidosis     Retinopathy     Peripheral neuropathy     Acquired elevated diaphragm     Median  nerve neuropathy, left     Iron deficiency anemia, unspecified     End stage renal disease (H)     Allergy, unspecified, initial encounter     Anaphylactic shock, unspecified, initial encounter     Coagulation defect, unspecified (H24)     Fatigue, unspecified type     Non-restorative sleep     Obstructive sleep apnea treated with continuous positive airway pressure (CPAP)     Other disorders of phosphorus metabolism     Other specified symptoms and signs involving the circulatory and respiratory systems     Snoring     Palpitations     Gastroesophageal reflux disease without esophagitis     Diabetes mellitus, type 2 (H)     Kidney replaced by transplant     Pancreas replaced by transplant (H)     Immunosuppressed status (H24)     Cardiac arrest with ventricular fibrillation (H)     Acute and chronic respiratory failure with hypoxia (H)     Steroid-induced hyperglycemia     Hyperkalemia       Past Surgical History:     Past Surgical History:   Procedure Laterality Date     APPENDECTOMY OPEN N/A 7/19/2024    Procedure: Appendectomy open;  Surgeon: Harrison Whitehead MD;  Location:  OR     BACK SURGERY  2009    L5 disc cut     BENCH KIDNEY  7/19/2024    Procedure: Bench kidney;  Surgeon: Harrison Whitehead MD;  Location:  OR     BENCH PANCREAS N/A 7/19/2024    Procedure: Bench pancreas;  Surgeon: Harrison Whitehead MD;  Location:  OR     BYPASS GRAFT ARTERY CORONARY  06/20/2019    2 vessels     CV CENTRAL VENOUS CATHETER PLACEMENT N/A 7/20/2024    Procedure: Central Venous Catheter Placement;  Surgeon: Dominic Robertson MD;  Location:  HEART CARDIAC CATH LAB     CV CORONARY ANGIOGRAM N/A 01/13/2019    Procedure: Coronary Angiogram;  Surgeon: Cielo Che MD;  Location: [facility] Cath Lab;  Service: Cardiology     CV CORONARY ANGIOGRAM N/A 05/02/2019    Procedure: Coronary Angiogram;  Surgeon: Cielo Che MD;  Location: [facility] Cath Lab;   Service: Cardiology     CV CORONARY ANGIOGRAM N/A 04/30/2020    Procedure: Coronary Angiogram;  Surgeon: Cielo Che MD;  Location: [facility] Cath Lab;  Service: Cardiology     CV CORONARY ANGIOGRAM N/A 07/22/2021    Procedure: CV CORONARY ANGIOGRAM;  Surgeon: Adrian Crow MD;  Location: [facility] CATH LAB CV     CV CORONARY ANGIOGRAM N/A 02/01/2024    Procedure: Coronary Angiogram;  Surgeon: Delroy Carpio MD;  Location: [facility] CARDIAC CATH LAB     CV CORONARY ANGIOGRAM N/A 7/20/2024    Procedure: Coronary Angiogram;  Surgeon: Dominic Robertson MD;  Location: [facility] CARDIAC CATH LAB     CV CORONARY LITHOTRIPSY PCI N/A 7/20/2024    Procedure: Percutaneous Coronary Intervention - Lithotripsy;  Surgeon: Dominic Robertson MD;  Location: [facility] CARDIAC CATH LAB     CV FRACTIONAL FLOW RATIO WIRE N/A 07/22/2021    Procedure: Fractional Flow Ratio Wire;  Surgeon: Adrian Crow MD;  Location: [facility] LAB CV     CV INTRAVASULAR ULTRASOUND N/A 7/20/2024    Procedure: Intravascular Ultrasound;  Surgeon: Dominic Robertson MD;  Location: [facility] CARDIAC CATH LAB     CV LEFT HEART CATH N/A 07/22/2021    Procedure: Left Heart Cath;  Surgeon: Adrian Crow MD;  Location: [facility] LAB CV     CV LEFT HEART CATHETERIZATION WITH LEFT VENTRICULOGRAM N/A 01/13/2019    Procedure: Left Heart Catheterization with Left Ventriculogram;  Surgeon: Cielo Che MD;  Location: [facility] Cath Lab;  Service: Cardiology     CV LEFT HEART CATHETERIZATION WITHOUT LEFT VENTRICULOGRAM Left 04/30/2020    Procedure: Left Heart Catheterization Without Left Ventriculogram;  Surgeon: Cielo Che MD;  Location: [facility] Cath Lab;  Service: Cardiology     CV PCI N/A 02/01/2024    Procedure: Percutaneous Coronary Intervention;  Surgeon: Delroy Carpio MD;  Location: [facility] CARDIAC CATH LAB     CV PCI N/A 7/20/2024    Procedure: Percutaneous Coronary Intervention;   Surgeon: Dominic Robertson MD;  Location:  HEART CARDIAC CATH LAB     CV PCI ASPIRATION THROMECTOMY N/A 2024    Procedure: Percutaneous Coronary Intervention Aspiration Thrombectomy;  Surgeon: Dominic Robertson MD;  Location: [facility] CARDIAC CATH LAB     CV PCI STENT DRUG ELUTING N/A 2024    Procedure: Percutaneous Coronary Intervention Stent;  Surgeon: Dominic Robertson MD;  Location: [facility] CARDIAC CATH LAB     CV RIGHT HEART CATHETERIZATION N/A 2020    Procedure: Right Heart Catheterization;  Surgeon: Cielo Che MD;  Location: [facility] Cath Lab;  Service: Cardiology     EYE SURGERY       GENITOURINARY SURGERY       HERNIA REPAIR       OTHER SURGICAL HISTORY      Excise varicocele     STENT, CORONARY, DALE       TRANSPLANT PANCREAS, KIDNEY  DONOR, COMBINED N/A 2024    Procedure: Transplant pancreas, kidney  donor, with ureteral stent placement;  Surgeon: Harrison Whitehead MD;  Location:  OR     VASCULAR SURGERY         Family History:     Family History   Problem Relation Age of Onset     Diabetes Type 2  Mother      Heart Disease Father 60     CABG Father 50        triple bypass     Diabetes Type 2  Father      Depression Sister      Substance Abuse Sister      Ovarian Cancer Maternal Grandmother      Brain Cancer Maternal Grandmother      Pancreatic Cancer Maternal Aunt      Prostate Cancer Maternal Uncle         Social History:    reports that he has never smoked. He has been exposed to tobacco smoke. He has never used smokeless tobacco. He reports that he does not drink alcohol and does not use drugs.    Review of Systems:   12 systems are reviewed negative except for in HPI.    Meds:     Current Outpatient Medications:      acetaminophen (TYLENOL) 500 MG tablet, Take 500 mg by mouth every 4 hours as needed, Disp: , Rfl:      ALPRAZolam (XANAX) 0.25 MG tablet, Take 0.25 mg by mouth 3 times daily as  needed, Disp: , Rfl:      aspirin (ASA) 81 MG chewable tablet, Take 1 tablet (81 mg) by mouth daily Starting tomorrow., Disp: 30 tablet, Rfl: 3     atorvastatin (LIPITOR) 40 MG tablet, Take 1 tablet (40 mg) by mouth every evening, Disp: 30 tablet, Rfl: 2     dapsone (ACZONE) 25 MG tablet, Take 2 tablets (50 mg) by mouth daily, Disp: 60 tablet, Rfl: 11     magnesium hydroxide (MILK OF MAGNESIA) 400 MG/5ML suspension, Take 30 mLs by mouth daily as needed for constipation (Use if polyethylene glycol (Miralax) is not effective after 24 hours.), Disp: 354 mL, Rfl: 1     magnesium oxide (MAG-OX) 400 MG tablet, Take 2 tablets (800 mg) by mouth 2 times daily, Disp: 120 tablet, Rfl: 1     methocarbamol (ROBAXIN) 750 MG tablet, Take 1 tablet (750 mg) by mouth every 6 hours as needed for muscle spasms, Disp: 20 tablet, Rfl: 0     metoprolol succinate ER (TOPROL XL) 25 MG 24 hr tablet, Take 1 tablet (25 mg) by mouth daily, Disp: 30 tablet, Rfl: 2     mycophenolate (GENERIC EQUIVALENT) 500 MG tablet, Take 2 tablets (1,000 mg) by mouth 2 times daily, Disp: 120 tablet, Rfl: 11     nystatin (MYCOSTATIN) 617989 UNIT/ML suspension, Take 5 mLs (500,000 Units) by mouth 4 times daily, Disp: 600 mL, Rfl: 2     ondansetron (ZOFRAN ODT) 4 MG ODT tab, Take 1 tablet (4 mg) by mouth every 6 hours as needed for nausea or vomiting, Disp: 10 tablet, Rfl: 1     pantoprazole (PROTONIX) 40 MG EC tablet, Take 1 tablet (40 mg) by mouth every morning (before breakfast), Disp: 30 tablet, Rfl: 1     senna-docusate (SENOKOT-S/PERICOLACE) 8.6-50 MG tablet, Take 2 tablets by mouth 2 times daily, Disp: 60 tablet, Rfl: 1     sodium bicarbonate 650 MG tablet, Take 3 tablets (1,950 mg) by mouth 3 times daily, Disp: 270 tablet, Rfl: 2     tacrolimus (GENERIC EQUIVALENT) 1 MG capsule, Take 6 capsules (6 mg) by mouth 2 times daily, Disp: 360 capsule, Rfl: 11     ticagrelor (BRILINTA) 90 MG tablet, Take 1 tablet (90 mg) by mouth or Feeding Tube 2 times daily,  "Disp: 60 tablet, Rfl: 0     valGANciclovir (VALCYTE) 450 MG tablet, Take 2 tablets (900 mg) by mouth daily, Disp: 60 tablet, Rfl: 2     cholecalciferol (VITAMIN D3) 25 mcg (1000 units) capsule, Take 2 capsules (50 mcg) by mouth daily, Disp: 60 capsule, Rfl: 3     nitroGLYcerin (NITROSTAT) 0.4 MG sublingual tablet, PLACE ONE TABLET UNDER TONGUE AS NEEDED FOR CHEST PAIN AS DIRECTED FOR 3 DOSES. IF SYMPTOMS PERSIST 5 MINUTES AFTER 1ST DOSE CALL 911 (Patient not taking: Reported on 8/5/2024), Disp: 25 tablet, Rfl: 3     phosphorus tablet 250 mg (PHOSPHA 250 NEUTRAL) 250 MG per tablet, Take 2 tablets (500 mg) by mouth 3 times daily, Disp: 180 tablet, Rfl: 0    Allergies:   Amoxicillin and Venlafaxine      Objective:      Physical Exam  102.5 kg (226 lb)  1.803 m (5' 11\")  Body mass index is 31.52 kg/m .  BP 94/62 (BP Location: Right arm, Patient Position: Sitting, Cuff Size: Adult Large)   Pulse 105   Resp 18   Ht 1.803 m (5' 11\")   Wt 102.5 kg (226 lb)   SpO2 97%   BMI 31.52 kg/m      General Appearance:   Awake, Alert, No acute distress.   HEENT:  Pupil equal and reactive to light. No scleral icterus; the mucous membranes were moist.   Neck: No cervical bruits. No JVD. No thyromegaly.     Chest: The spine was straight. The chest was symmetric.   Lungs:   Respirations unlabored; Lungs are clear to auscultation. No crackles. No wheezing.   Cardiovascular:   Regular rhythm and rate, normal first and second heart sounds with no murmurs. No rubs or gallops.    Abdomen:  Obese.  Soft. No tenderness. Non-distended. Bowels sounds are present   Extremities: Equal tibial pulses. No leg edema.   Skin: No rashes or ulcers. Warm, Dry.   Musculoskeletal: No tenderness. No deformity.   Neurologic: Mood and affect are appropriate. No focal deficits.         EKG: Personally reviewed  Sinus tachycardia   ST & T wave abnormality, consider lateral ischemia   Abnormal ECG   When compared with ECG of 24-Jul-2024 08:34, (unconfirmed) " "  Premature ventricular complexes are no longer Present   QRS duration has decreased   ST more elevated in Inferior leads     Cardiac Imaging Studies  Echo on July 27, 2024:  Left ventricular function is decreased. The ejection fraction is 35-40%  (moderately reduced).Akinesis of the basal-mid inferior and basal inferoseptal  segments present.  Global right ventricular function is normal.  Mild mitral insufficiency is present.  Estimated mean right atrial pressure is normal.  Trivial pericardial effusion is present.    Coronary angiogram with PCI on July 20, 2024:  Inferior STEMI.  Cardiac arrest.  VT/VF.  Severe two vessel CAD with prior CABG with LIMA to LAD, PCI to D1, and PCI to mid RCA.  Patent LIMA to LAD.  Patent stent in the D1 with minimal ISR.  In stent thrombosis of the mid RCA stent culprit in inferior STEMI.  Aspiration thrombectomy of the RCA.  Intravascular lithotripsy of the RCA.  PCI of the RCA with two drug eluting stents.  IVUS of the RCA.    Lab Review   Lab Results   Component Value Date     08/05/2024    CO2 20 08/05/2024    CO2 28 09/27/2021    BUN 11.7 08/05/2024    BUN 46 09/27/2021     Lab Results   Component Value Date    WBC 4.9 08/05/2024    HGB 8.2 08/05/2024    HCT 25.6 08/05/2024     08/05/2024     08/05/2024     Lab Results   Component Value Date    CHOL 179 02/01/2024    CHOL 144 12/19/2023    CHOL 153 10/17/2023     Lab Results   Component Value Date    HDL 18 (L) 02/01/2024    HDL 21 (L) 12/19/2023    HDL 20 (L) 10/17/2023     No components found for: \"LDLCALC\"  Lab Results   Component Value Date    TRIG 693 (H) 02/01/2024    TRIG 520 (H) 12/19/2023    TRIG 682 (H) 10/17/2023     No results found for: \"CHOLHDL\"  Lab Results   Component Value Date    TROPONINI <0.01 07/20/2021     Lab Results   Component Value Date    BNP 25 07/19/2021     Lab Results   Component Value Date    TSH 1.78 10/17/2023    TSH 2.65 01/14/2019                 Thank you for allowing me " to participate in the care of your patient.      Sincerely,     Rose Gar MD     [facility]  cc:   Mary Ann Calderon, APRN CNP  [address] [place] [address]

## 2024-08-05 NOTE — TELEPHONE ENCOUNTER
ISSUE:  Low phos  Vitamin D elevated     Fernando Sorensen MD Poucher, Jessica, RN  Yes, okay to increase phosphorus supplement.    Would decrease cholecalciferol to 50 mcg daily (which is also helping phosphorus absorption).

## 2024-08-05 NOTE — TELEPHONE ENCOUNTER
Called Siva to check in and review labs, no answer. Left  requesting a return phone call.   Tac at goal 8-10.

## 2024-08-06 NOTE — TELEPHONE ENCOUNTER
Spoke with Siva, feeling well. C/o chest soreness from compressions, saw cardiology on Monday and plan for cardiac rehab. Eating and drinking fine, no fevers. Denies N/V/D. Has some irritation over incision from where waistband hits but is wearing looser fitting clothing.

## 2024-08-07 ENCOUNTER — TELEPHONE (OUTPATIENT)
Dept: TRANSPLANT | Facility: CLINIC | Age: 49
End: 2024-08-07
Payer: COMMERCIAL

## 2024-08-07 NOTE — TELEPHONE ENCOUNTER
Post Kidney and Pancreas Transplant Team Conference  Date: 8/7/2024  Transplant Coordinator: Julianna     Attendees:  [x]  Dr. Sorensen [] Leonor Kang, RN [x] Betty Landaverde LPN     []  Dr. Shell [x] Rosalia Romero, RN [] Lelo Reese LPN    [] Dr. Wallis [x] Ximena Edwards RN    [x] Dr. Coombs [] Mony Crabtree, BETSY [] Angella Liriano RN   [] Dr. Kong [] Asha Allred, RN    [] Dr. Christianson [] Cindy Ye RN    []  Dr. Whitehead [] Miryam Iyer, RN    [] Dr. Michaud [] Gino Mahan RN    [] Sobia Underwood NP [] Roxanna Monet RN    [x] Shelia Magallon NP [] Alisa Cutler RN        Verbal Plan Read Back:   No changes    Routed to RN Coordinator   Betty Landaverde LPN

## 2024-08-08 ENCOUNTER — LAB (OUTPATIENT)
Dept: LAB | Facility: HOSPITAL | Age: 49
End: 2024-08-08
Payer: COMMERCIAL

## 2024-08-08 ENCOUNTER — MYC REFILL (OUTPATIENT)
Dept: TRANSPLANT | Facility: CLINIC | Age: 49
End: 2024-08-08

## 2024-08-08 ENCOUNTER — TELEPHONE (OUTPATIENT)
Dept: TRANSPLANT | Facility: CLINIC | Age: 49
End: 2024-08-08

## 2024-08-08 DIAGNOSIS — Z98.890 OTHER SPECIFIED POSTPROCEDURAL STATES: ICD-10-CM

## 2024-08-08 DIAGNOSIS — Z48.298 AFTERCARE FOLLOWING ORGAN TRANSPLANT: ICD-10-CM

## 2024-08-08 DIAGNOSIS — Z94.0 KIDNEY REPLACED BY TRANSPLANT: ICD-10-CM

## 2024-08-08 DIAGNOSIS — Z94.83 PANCREAS REPLACED BY TRANSPLANT (H): ICD-10-CM

## 2024-08-08 DIAGNOSIS — R79.0 LOW MAGNESIUM LEVEL: Primary | ICD-10-CM

## 2024-08-08 DIAGNOSIS — Z79.899 ENCOUNTER FOR LONG-TERM CURRENT USE OF MEDICATION: ICD-10-CM

## 2024-08-08 LAB
AMYLASE SERPL-CCNC: 132 U/L (ref 28–100)
ANION GAP SERPL CALCULATED.3IONS-SCNC: 8 MMOL/L (ref 7–15)
BUN SERPL-MCNC: 12.9 MG/DL (ref 6–20)
CALCIUM SERPL-MCNC: 9.9 MG/DL (ref 8.8–10.4)
CHLORIDE SERPL-SCNC: 110 MMOL/L (ref 98–107)
CREAT SERPL-MCNC: 1.56 MG/DL (ref 0.67–1.17)
EGFRCR SERPLBLD CKD-EPI 2021: 54 ML/MIN/1.73M2
ERYTHROCYTE [DISTWIDTH] IN BLOOD BY AUTOMATED COUNT: 16.3 % (ref 10–15)
GLUCOSE SERPL-MCNC: 119 MG/DL (ref 70–99)
HCO3 SERPL-SCNC: 21 MMOL/L (ref 22–29)
HCT VFR BLD AUTO: 25.4 % (ref 40–53)
HGB BLD-MCNC: 7.9 G/DL (ref 13.3–17.7)
LIPASE SERPL-CCNC: 109 U/L (ref 13–60)
MAGNESIUM SERPL-MCNC: 1.2 MG/DL (ref 1.7–2.3)
MCH RBC QN AUTO: 31.2 PG (ref 26.5–33)
MCHC RBC AUTO-ENTMCNC: 31.1 G/DL (ref 31.5–36.5)
MCV RBC AUTO: 100 FL (ref 78–100)
PHOSPHATE SERPL-MCNC: 1.7 MG/DL (ref 2.5–4.5)
PLATELET # BLD AUTO: 261 10E3/UL (ref 150–450)
POTASSIUM SERPL-SCNC: 4.9 MMOL/L (ref 3.4–5.3)
RBC # BLD AUTO: 2.53 10E6/UL (ref 4.4–5.9)
SODIUM SERPL-SCNC: 139 MMOL/L (ref 135–145)
TACROLIMUS BLD-MCNC: 12.4 UG/L (ref 5–15)
TME LAST DOSE: NORMAL H
TME LAST DOSE: NORMAL H
WBC # BLD AUTO: 4 10E3/UL (ref 4–11)

## 2024-08-08 PROCEDURE — 83690 ASSAY OF LIPASE: CPT

## 2024-08-08 PROCEDURE — 80197 ASSAY OF TACROLIMUS: CPT

## 2024-08-08 PROCEDURE — 36415 COLL VENOUS BLD VENIPUNCTURE: CPT

## 2024-08-08 PROCEDURE — 85027 COMPLETE CBC AUTOMATED: CPT

## 2024-08-08 PROCEDURE — 84100 ASSAY OF PHOSPHORUS: CPT

## 2024-08-08 PROCEDURE — 82150 ASSAY OF AMYLASE: CPT

## 2024-08-08 PROCEDURE — 83735 ASSAY OF MAGNESIUM: CPT

## 2024-08-08 PROCEDURE — 80048 BASIC METABOLIC PNL TOTAL CA: CPT

## 2024-08-08 RX ORDER — TACROLIMUS 1 MG/1
5 CAPSULE ORAL 2 TIMES DAILY
Qty: 300 CAPSULE | Refills: 11 | Status: SHIPPED | OUTPATIENT
Start: 2024-08-08 | End: 2024-08-13

## 2024-08-08 RX ORDER — MAGNESIUM GLYCINATE 100 MG
200 CAPSULE ORAL 2 TIMES DAILY
Qty: 120 CAPSULE | Refills: 2 | Status: SHIPPED | OUTPATIENT
Start: 2024-08-08

## 2024-08-08 RX ORDER — TACROLIMUS 1 MG/1
6 CAPSULE ORAL 2 TIMES DAILY
Qty: 360 CAPSULE | Refills: 11 | Status: SHIPPED | OUTPATIENT
Start: 2024-08-08 | End: 2024-08-08

## 2024-08-08 NOTE — TELEPHONE ENCOUNTER
ISSUE:   Tacrolimus IR level 12.4 on 8/8, goal 8-10, dose 6 mg BID.    PLAN:   Call Patient and confirm this was an accurate 12-hour trough.   Verify Tacrolimus IR dose 6 mg BID.   Confirm no new medications or or missed doses.   Confirm no new illness / infection / diarrhea.   If accurate trough and accurate dose, decrease Tacrolimus IR dose to 5 mg BID     Is this more than a 50% increase or decrease in current IS dose: No  If YES, justification: N/A    Repeat labs in 3 days.  *If > 50% change in immunosuppression dose, repeat labs in 1 week.     OUTCOME:   Spoke with Patient, they confirm accurate trough level and current dose 6 mg BID.   Patient confirmed dose change to 5 mg BID.  Patient agreed to repeat labs in 3 days.   Orders sent to preferred pharmacy for dose change and lab for repeat labs.   Patient voiced understanding of plan.     Spoke with Siva, having 8-10 loose stools per day. Not taking laxatives or stool softeners. Will add in fiber.     Mag 1.2 - on 800 mg mag-ox bid. Will switch over to mag glycinate.     Denies fevers, no abd cramping. Otherwise feels well.     Sent to MD for review if ok to switch to MPA.

## 2024-08-09 ENCOUNTER — TELEPHONE (OUTPATIENT)
Dept: TRANSPLANT | Facility: CLINIC | Age: 49
End: 2024-08-09
Payer: COMMERCIAL

## 2024-08-09 DIAGNOSIS — Z94.83 PANCREAS REPLACED BY TRANSPLANT (H): ICD-10-CM

## 2024-08-09 DIAGNOSIS — Z94.0 KIDNEY REPLACED BY TRANSPLANT: Primary | ICD-10-CM

## 2024-08-09 RX ORDER — MYCOPHENOLIC ACID 180 MG/1
720 TABLET, DELAYED RELEASE ORAL 2 TIMES DAILY
Qty: 240 TABLET | Refills: 11 | Status: SHIPPED | OUTPATIENT
Start: 2024-08-09

## 2024-08-09 NOTE — TELEPHONE ENCOUNTER
ISSUE / PLAN:  Fernando Sorensen MD Poucher, Jessica, RN  Yes.  Would repeat MPA level next week.    Fransico     ----- Message -----  From: Julianna Edwards RN  Sent: 8/8/2024   5:43 PM CDT  To: Fernando Sorensen MD  Subject: Ok for MPA?                                      Ok to switch from MMF 1000 mg bid to  mg bid?    He's having 8-10 watery stools per day. We added fiber and switched him from mag-ox to magnesium glycinate instead.          OUTCOME:  RNCC spoke with Siva - he confirmed that he is currently taking MMF 1,000mg BID.  Discussed to STOP mycophenolate (discontinued) START Mycophenolic acid - 4 tabs, twice daily.     Gordoing voiced understanding that these are not interchangeable.       He reports minimal pain on the right side that started today -  a bit lower than where the pancreas sits. Pain increases with sitting and lying flat, improves when pressure is applied. RNCC suggested an abdominal binder over the weekend, call to on-call if increase in pain. Check labs and surgical KAT visit scheduled for Monday AM.

## 2024-08-12 ENCOUNTER — LAB (OUTPATIENT)
Dept: LAB | Facility: CLINIC | Age: 49
End: 2024-08-12
Payer: COMMERCIAL

## 2024-08-12 ENCOUNTER — LAB (OUTPATIENT)
Dept: LAB | Facility: HOSPITAL | Age: 49
End: 2024-08-12
Payer: COMMERCIAL

## 2024-08-12 ENCOUNTER — ANCILLARY PROCEDURE (OUTPATIENT)
Dept: CT IMAGING | Facility: CLINIC | Age: 49
End: 2024-08-12
Attending: NURSE PRACTITIONER
Payer: COMMERCIAL

## 2024-08-12 ENCOUNTER — OFFICE VISIT (OUTPATIENT)
Dept: TRANSPLANT | Facility: CLINIC | Age: 49
End: 2024-08-12
Attending: INTERNAL MEDICINE
Payer: COMMERCIAL

## 2024-08-12 VITALS
RESPIRATION RATE: 18 BRPM | DIASTOLIC BLOOD PRESSURE: 73 MMHG | BODY MASS INDEX: 30.95 KG/M2 | HEART RATE: 132 BPM | TEMPERATURE: 99.7 F | SYSTOLIC BLOOD PRESSURE: 104 MMHG | WEIGHT: 221.1 LBS | OXYGEN SATURATION: 99 % | HEIGHT: 71 IN

## 2024-08-12 DIAGNOSIS — Z79.899 ENCOUNTER FOR LONG-TERM CURRENT USE OF MEDICATION: ICD-10-CM

## 2024-08-12 DIAGNOSIS — R82.90 UNSPECIFIED ABNORMAL FINDINGS IN URINE: ICD-10-CM

## 2024-08-12 DIAGNOSIS — Z48.298 AFTERCARE FOLLOWING ORGAN TRANSPLANT: ICD-10-CM

## 2024-08-12 DIAGNOSIS — Z94.83 PANCREAS REPLACED BY TRANSPLANT (H): ICD-10-CM

## 2024-08-12 DIAGNOSIS — Z98.890 OTHER SPECIFIED POSTPROCEDURAL STATES: ICD-10-CM

## 2024-08-12 DIAGNOSIS — R19.7 DIARRHEA, UNSPECIFIED TYPE: Primary | ICD-10-CM

## 2024-08-12 DIAGNOSIS — Z94.0 KIDNEY REPLACED BY TRANSPLANT: ICD-10-CM

## 2024-08-12 LAB
ADV 40+41 DNA STL QL NAA+NON-PROBE: NEGATIVE
ALBUMIN UR-MCNC: 100 MG/DL
AMYLASE SERPL-CCNC: 119 U/L (ref 28–100)
ANION GAP SERPL CALCULATED.3IONS-SCNC: 8 MMOL/L (ref 7–15)
APPEARANCE UR: ABNORMAL
ASTRO TYP 1-8 RNA STL QL NAA+NON-PROBE: NEGATIVE
BACTERIA #/AREA URNS HPF: ABNORMAL /HPF
BILIRUB UR QL STRIP: NEGATIVE
BUN SERPL-MCNC: 16.1 MG/DL (ref 6–20)
C CAYETANENSIS DNA STL QL NAA+NON-PROBE: NEGATIVE
C DIFF TOX B STL QL: NEGATIVE
CALCIUM SERPL-MCNC: 10.2 MG/DL (ref 8.8–10.4)
CAMPYLOBACTER DNA SPEC NAA+PROBE: NEGATIVE
CHLORIDE SERPL-SCNC: 105 MMOL/L (ref 98–107)
CHOLEST SERPL-MCNC: 97 MG/DL
COLOR UR AUTO: YELLOW
CREAT SERPL-MCNC: 1.73 MG/DL (ref 0.67–1.17)
CRYPTOSP DNA STL QL NAA+NON-PROBE: NEGATIVE
E COLI O157 DNA STL QL NAA+NON-PROBE: ABNORMAL
E HISTOLYT DNA STL QL NAA+NON-PROBE: NEGATIVE
EAEC ASTA GENE ISLT QL NAA+PROBE: NEGATIVE
EC STX1+STX2 GENES STL QL NAA+NON-PROBE: NEGATIVE
EGFRCR SERPLBLD CKD-EPI 2021: 48 ML/MIN/1.73M2
EPEC EAE GENE STL QL NAA+NON-PROBE: POSITIVE
ERYTHROCYTE [DISTWIDTH] IN BLOOD BY AUTOMATED COUNT: 15.4 % (ref 10–15)
ETEC LTA+ST1A+ST1B TOX ST NAA+NON-PROBE: NEGATIVE
FASTING STATUS PATIENT QL REPORTED: YES
FASTING STATUS PATIENT QL REPORTED: YES
G LAMBLIA DNA STL QL NAA+NON-PROBE: NEGATIVE
GLUCOSE SERPL-MCNC: 122 MG/DL (ref 70–99)
GLUCOSE UR STRIP-MCNC: 50 MG/DL
HCO3 SERPL-SCNC: 20 MMOL/L (ref 22–29)
HCT VFR BLD AUTO: 26.7 % (ref 40–53)
HDLC SERPL-MCNC: 29 MG/DL
HGB BLD-MCNC: 8.2 G/DL (ref 13.3–17.7)
HGB UR QL STRIP: ABNORMAL
HYALINE CASTS: 4 /LPF
KETONES UR STRIP-MCNC: NEGATIVE MG/DL
LDLC SERPL CALC-MCNC: 35 MG/DL
LEUKOCYTE ESTERASE UR QL STRIP: ABNORMAL
LIPASE SERPL-CCNC: 88 U/L (ref 13–60)
MAGNESIUM SERPL-MCNC: 1.3 MG/DL (ref 1.7–2.3)
MCH RBC QN AUTO: 30.4 PG (ref 26.5–33)
MCHC RBC AUTO-ENTMCNC: 30.7 G/DL (ref 31.5–36.5)
MCV RBC AUTO: 99 FL (ref 78–100)
MYCOPHENOLATE SERPL LC/MS/MS-MCNC: 1.54 MG/L (ref 1–3.5)
MYCOPHENOLATE-G SERPL LC/MS/MS-MCNC: 128.2 MG/L (ref 30–95)
NITRATE UR QL: NEGATIVE
NONHDLC SERPL-MCNC: 68 MG/DL
NOROVIRUS GI+II RNA STL QL NAA+NON-PROBE: NEGATIVE
P SHIGELLOIDES DNA STL QL NAA+NON-PROBE: NEGATIVE
PH UR STRIP: 6 [PH] (ref 5–7)
PHOSPHATE SERPL-MCNC: 1.5 MG/DL (ref 2.5–4.5)
PLATELET # BLD AUTO: 208 10E3/UL (ref 150–450)
POTASSIUM SERPL-SCNC: 4.8 MMOL/L (ref 3.4–5.3)
RBC # BLD AUTO: 2.7 10E6/UL (ref 4.4–5.9)
RBC URINE: >182 /HPF
RVA RNA STL QL NAA+NON-PROBE: NEGATIVE
SALMONELLA SP RPOD STL QL NAA+PROBE: NEGATIVE
SAPO I+II+IV+V RNA STL QL NAA+NON-PROBE: NEGATIVE
SHIGELLA SP+EIEC IPAH ST NAA+NON-PROBE: NEGATIVE
SODIUM SERPL-SCNC: 133 MMOL/L (ref 135–145)
SP GR UR STRIP: 1.02 (ref 1–1.03)
SQUAMOUS EPITHELIAL: 1 /HPF
TACROLIMUS BLD-MCNC: 7.1 UG/L (ref 5–15)
TME LAST DOSE: ABNORMAL H
TME LAST DOSE: ABNORMAL H
TME LAST DOSE: NORMAL H
TME LAST DOSE: NORMAL H
TRIGL SERPL-MCNC: 164 MG/DL
UROBILINOGEN UR STRIP-MCNC: NORMAL MG/DL
V CHOLERAE DNA SPEC QL NAA+PROBE: NEGATIVE
VIBRIO DNA SPEC NAA+PROBE: NEGATIVE
WBC # BLD AUTO: 3.4 10E3/UL (ref 4–11)
WBC URINE: 23 /HPF
Y ENTEROCOL DNA STL QL NAA+PROBE: NEGATIVE

## 2024-08-12 PROCEDURE — 85027 COMPLETE CBC AUTOMATED: CPT

## 2024-08-12 PROCEDURE — 80180 DRUG SCRN QUAN MYCOPHENOLATE: CPT

## 2024-08-12 PROCEDURE — 99000 SPECIMEN HANDLING OFFICE-LAB: CPT | Performed by: PATHOLOGY

## 2024-08-12 PROCEDURE — 84100 ASSAY OF PHOSPHORUS: CPT

## 2024-08-12 PROCEDURE — 80197 ASSAY OF TACROLIMUS: CPT

## 2024-08-12 PROCEDURE — 87086 URINE CULTURE/COLONY COUNT: CPT | Performed by: NURSE PRACTITIONER

## 2024-08-12 PROCEDURE — 74176 CT ABD & PELVIS W/O CONTRAST: CPT | Mod: GC | Performed by: RADIOLOGY

## 2024-08-12 PROCEDURE — 87507 IADNA-DNA/RNA PROBE TQ 12-25: CPT | Performed by: NURSE PRACTITIONER

## 2024-08-12 PROCEDURE — 82150 ASSAY OF AMYLASE: CPT

## 2024-08-12 PROCEDURE — 87186 SC STD MICRODIL/AGAR DIL: CPT | Performed by: NURSE PRACTITIONER

## 2024-08-12 PROCEDURE — 80061 LIPID PANEL: CPT | Performed by: INTERNAL MEDICINE

## 2024-08-12 PROCEDURE — 87493 C DIFF AMPLIFIED PROBE: CPT | Mod: XU | Performed by: NURSE PRACTITIONER

## 2024-08-12 PROCEDURE — 80048 BASIC METABOLIC PNL TOTAL CA: CPT

## 2024-08-12 PROCEDURE — G0463 HOSPITAL OUTPT CLINIC VISIT: HCPCS | Performed by: NURSE PRACTITIONER

## 2024-08-12 PROCEDURE — 81001 URINALYSIS AUTO W/SCOPE: CPT | Performed by: NURSE PRACTITIONER

## 2024-08-12 PROCEDURE — 83690 ASSAY OF LIPASE: CPT

## 2024-08-12 PROCEDURE — 99214 OFFICE O/P EST MOD 30 MIN: CPT | Performed by: NURSE PRACTITIONER

## 2024-08-12 PROCEDURE — 99213 OFFICE O/P EST LOW 20 MIN: CPT | Mod: 24 | Performed by: NURSE PRACTITIONER

## 2024-08-12 PROCEDURE — 83735 ASSAY OF MAGNESIUM: CPT

## 2024-08-12 RX ORDER — LOPERAMIDE HCL 2 MG
2 CAPSULE ORAL 4 TIMES DAILY PRN
Qty: 30 CAPSULE | Refills: 0 | Status: SHIPPED | OUTPATIENT
Start: 2024-08-12 | End: 2024-09-11

## 2024-08-12 RX ORDER — CARVEDILOL 6.25 MG/1
3.12 TABLET ORAL 2 TIMES DAILY WITH MEALS
Qty: 30 TABLET | Refills: 0 | Status: SHIPPED | OUTPATIENT
Start: 2024-08-12 | End: 2024-09-09

## 2024-08-12 RX ORDER — WHEAT DEXTRIN/ASPARTAME 3 G/6 G
1 POWDER IN PACKET (EA) ORAL DAILY
Qty: 30 PACKET | Refills: 0 | Status: SHIPPED | OUTPATIENT
Start: 2024-08-12 | End: 2024-08-24

## 2024-08-12 RX ORDER — CIPROFLOXACIN 500 MG/1
500 TABLET, FILM COATED ORAL 2 TIMES DAILY
Qty: 28 TABLET | Refills: 0 | Status: SHIPPED | OUTPATIENT
Start: 2024-08-12 | End: 2024-08-26

## 2024-08-12 ASSESSMENT — PAIN SCALES - GENERAL: PAINLEVEL: NO PAIN (0)

## 2024-08-12 NOTE — LETTER
8/12/2024      Siva Ramey  [address] [address]      Dear Colleague,    Thank you for referring your patient, Siva Ramey, to the [facility]. Please see a copy of my visit note below.    Transplant Surgery Progress Note    Transplants:  7/20/2024 (Kidney / Pancreas); Postoperative day:  24  S:  49 year old male with PMH significant for DMII (on insulin pump) and resulting ESKD (on HD every MWF since 8/2021). 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 Underwent simultaneous kidney pancreas transplant on 7/20/24 complicated by VT/VF arrest in PACU. Received 2 rounds of CPR before ROSC. Taken emergently to the cath lab and found to have 100% occlusion of the RCA now s/p IBAN x 2.     Presented today and had a fever.  No vomiting, but has nausea.      No chest pain or shortness of breath,    He is having multiple loose stools per day.      No dysuria or hematuria.  Some frequency.    States overall he feels well, and did not know he had a temperature.      He states he is very hypotensive.   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   Transplant Date:  7/20/2024 (Kidney / Pancreas)   Ureteral Stent:  Yes   Crossmatch:  negative   DSA at Tx:  No  Baseline Cr: TBD   DeNovo DSA: No    Acute Rejection Hx:  No    Present Maintenance Immunosuppression:  Tacrolimus and Mycophenolic acid    CMV IgG Ab Discordance:  No  EBV IgG Ab Discordance:  No    BK Viremia:  No  EBV Viremia:  No    Transplant Coordinator: Julianna Edwards     Transplant Office Phone Number: [phone]     Immunosuppressant Medications       Immunosuppressive Agents Disp Start End     mycophenolic acid (GENERIC EQUIVALENT) 180 MG EC tablet 240 tablet 8/9/2024 --    Sig - Route:  Take 4 tablets (720 mg) by mouth 2 times daily - Oral    Class: E-Prescribe    Notes to Pharmacy: TXP DT 7/20/2024 (Kidney / Pancreas) TXP Dischg DT 7/30/2024 DX Kidney replaced by transplant Z94.0 TX [facility] ([place], [place])     tacrolimus (GENERIC EQUIVALENT) 1 MG capsule 300 capsule 8/8/2024 --    Sig - Route: Take 5 capsules (5 mg) by mouth 2 times daily - Oral    Class: E-Prescribe    Notes to Pharmacy: TXP DT 7/20/2024 (Kidney / Pancreas) TXP Dischg DT 7/30/2024 DX Kidney replaced by transplant Z94.0 TX [facility] ([place], [place])            Possible Immunosuppression-related side effects:   []             headache  []             vivid dreams  []             irritability  []             cognitive difficuties  []             fine tremor  []             nausea  []             diarrhea  []             neuropathy      []             edema  []             renal calcineurin toxicity  []             hyperkalemia  []             post-transplant diabetes  []             decreased appetite  []             increased appetite  []             other:  []             none    Prescription Medications as of 8/13/2024         Rx Number Disp Refills Start End Last Dispensed Date Next Fill Date Owning Pharmacy    acetaminophen (TYLENOL) 500 MG tablet  -- --  --       Sig: Take 500 mg by mouth every 4 hours as needed    Class: Historical    Route: Oral    ALPRAZolam (XANAX) 0.25 MG tablet  -- -- 9/1/2023 --       Sig: Take 0.25 mg by mouth 3 times daily as needed    Class: Historical    Route: Oral    aspirin (ASA) 81 MG chewable tablet  30 tablet 3 2/1/2024 --   [facility] - [place], [place] - [address] [facility]    Sig: Take 1 tablet (81 mg) by mouth daily Starting tomorrow.    Class: E-Prescribe    Route: Oral    atorvastatin (LIPITOR) 40 MG tablet  30 tablet 2 7/30/2024 --   [facility] -  [address]    Sig: Take 1 tablet (40 mg) by mouth every evening    Class: E-Prescribe    Route: Oral    bulk laxative (BENEFIBER DRINK MIX) packet  30 packet 0 8/12/2024 9/11/2024   [address]    Sig: Take 1 packet by mouth daily for 30 days    Class: E-Prescribe    Route: Oral    carvedilol (COREG) 6.25 MG tablet  30 tablet 0 8/12/2024 9/11/2024   [address]    Sig: Take 0.5 tablets (3.125 mg) by mouth 2 times daily (with meals) for 30 days    Class: E-Prescribe    Route: Oral    cholecalciferol (VITAMIN D3) 25 mcg (1000 units) capsule  60 capsule 3 8/5/2024 --   [address]    Sig: Take 2 capsules (50 mcg) by mouth daily    Class: E-Prescribe    Route: Oral    ciprofloxacin (CIPRO) 500 MG tablet  28 tablet 0 8/12/2024 8/26/2024   [address]    Sig: Take 1 tablet (500 mg) by mouth 2 times daily for 14 days    Class: E-Prescribe    Route: Oral    dapsone (ACZONE) 25 MG tablet  60 tablet 11 7/31/2024 --   [address]    Sig: Take 2 tablets (50 mg) by mouth daily    Class: E-Prescribe    Route: Oral    loperamide (IMODIUM A-D) 2 MG capsule  30 capsule 0 8/12/2024 9/11/2024   [address]    Sig: Take 1 capsule (2 mg) by mouth 4 times daily as needed for diarrhea    Class: E-Prescribe    Route: Oral    magnesium glycinate 100 MG CAPS capsule  120 capsule 2 8/8/2024 --   [address]    Sig: Take 2 capsules (200 mg) by mouth 2 times daily Take in place of magnesium-oxide    Class: E-Prescribe    Route: Oral    methocarbamol (ROBAXIN) 750 MG tablet  20 tablet 0 8/2/2024 --   [id number] -  [place] [address] AT [facility]    Sig: Take 1 tablet (750 mg) by mouth every 6 hours as needed for muscle spasms    Class: E-Prescribe    Route: Oral    mycophenolic acid (GENERIC EQUIVALENT) 180 MG EC tablet  240 tablet 11 8/9/2024 --   [address]    Sig: Take 4 tablets (720 mg) by mouth 2 times daily    Class: E-Prescribe    Notes to Pharmacy: TXP DT 7/20/2024 (Kidney / Pancreas) TXP Dischg DT 7/30/2024 DX Kidney replaced by transplant Z94.0 TX [facility] ([place], [place])    Route: Oral    nitroGLYcerin (NITROSTAT) 0.4 MG sublingual tablet  25 tablet 3 11/16/2023 --   [id number] - [place] [address] AT [facility]    Sig: PLACE ONE TABLET UNDER TONGUE AS NEEDED FOR CHEST PAIN AS DIRECTED FOR 3 DOSES. IF SYMPTOMS PERSIST 5 MINUTES AFTER 1ST DOSE CALL 911    Class: E-Prescribe    nystatin (MYCOSTATIN) 922681 UNIT/ML suspension  600 mL 2 7/30/2024 --   [address]    Sig: Take 5 mLs (500,000 Units) by mouth 4 times daily    Class: E-Prescribe    Route: Oral    ondansetron (ZOFRAN ODT) 4 MG ODT tab  10 tablet 1 7/30/2024 --   [address]    Sig: Take 1 tablet (4 mg) by mouth every 6 hours as needed for nausea or vomiting    Class: E-Prescribe    Route: Oral    pantoprazole (PROTONIX) 40 MG EC tablet  30 tablet 1 7/31/2024 --   [address]    Sig: Take 1 tablet (40 mg) by mouth every morning (before breakfast)    Class: E-Prescribe    Route: Oral    phosphorus tablet 250 mg (PHOSPHA 250 NEUTRAL) 250 MG per tablet  180 tablet 0 8/5/2024 --   [address]    Sig: Take 2 tablets (500 mg) by mouth 3 times  daily    Class: E-Prescribe    Route: Oral    psyllium (METAMUCIL/KONSYL) 58.6 % powder  -- -- 8/8/2024 --       Sig: Take 6 g (1 teaspoonful) by mouth 2 times daily as needed (diarrhea)    Class: No Print Out    Route: Oral    senna-docusate (SENOKOT-S/PERICOLACE) 8.6-50 MG tablet  60 tablet 1 7/30/2024 --   [address]    Sig: Take 2 tablets by mouth 2 times daily    Class: E-Prescribe    Route: Oral    sodium bicarbonate 650 MG tablet  270 tablet 2 7/30/2024 --   [address]    Sig: Take 3 tablets (1,950 mg) by mouth 3 times daily    Class: E-Prescribe    Route: Oral    tacrolimus (GENERIC EQUIVALENT) 1 MG capsule  300 capsule 11 8/8/2024 --   [address]    Sig: Take 5 capsules (5 mg) by mouth 2 times daily    Class: E-Prescribe    Notes to Pharmacy: TXP DT 7/20/2024 (Kidney / Pancreas) TXP Dischg DT 7/30/2024 DX Kidney replaced by transplant Z94.0 TX [facility] ([place], [place])    Route: Oral    ticagrelor (BRILINTA) 90 MG tablet  60 tablet 0 7/30/2024 --   [address]    Sig: Take 1 tablet (90 mg) by mouth or Feeding Tube 2 times daily    Class: E-Prescribe    Route: Oral or Feeding Tube    valGANciclovir (VALCYTE) 450 MG tablet  60 tablet 2 7/31/2024 --   [address]    Sig: Take 2 tablets (900 mg) by mouth daily    Class: E-Prescribe    Route: Oral            O:      General Appearance: in no apparent distress.   Skin: Normal, no rashes or jaundice  Heart: regular rate and rhythm  Lungs: easy respirations, no audible wheezing.  Abdomen: rounded, The wound is dry and intact, without hernia. The abdomen is non-tender. The kidney and pancreas graft is not tender.  There is no  ascites.  Extremities: Tremor absent.   Edema: absent.         Latest Ref Rng & Units 8/12/2024     7:55 AM 8/8/2024     7:48 AM 8/5/2024     7:58 AM 8/2/2024     7:49 AM 8/1/2024     7:29 AM   Transplant Immunosuppression Labs   Creat 0.67 - 1.17 mg/dL 1.73  1.56  1.64  1.62  1.76    Urea Nitrogen 6.0 - 20.0 mg/dL 16.1  12.9  11.7  14.0  15.3    WBC 4.0 - 11.0 10e3/uL 3.4  4.0  4.9  6.6  7.2    Neutrophil %     89        Chemistries:   Recent Labs   Lab Test 08/12/24  0755   BUN 16.1   CR 1.73*   GFRESTIMATED 48*   *     Lab Results   Component Value Date    A1C 6.4 07/20/2024    CPEPT 4.8 09/27/2021     Recent Labs   Lab Test 07/30/24  0557   ALBUMIN 3.2*   BILITOTAL 0.4   ALKPHOS 105   AST 25   ALT 30     Urine Studies:  Recent Labs   Lab Test 08/12/24  1141   COLOR Yellow   APPEARANCE Slightly Cloudy*   URINEGLC 50*   URINEBILI Negative   URINEKETONE Negative   SG 1.022   UBLD Large*   URINEPH 6.0   PROTEIN 100*   NITRITE Negative   LEUKEST Trace*   RBCU >182*   WBCU 23*     No lab results found.  Hematology:   Recent Labs   Lab Test 08/12/24  0755 08/08/24  0748 08/05/24  0758   HGB 8.2* 7.9* 8.2*    261 255   WBC 3.4* 4.0 4.9     Coags:   Recent Labs   Lab Test 07/20/24  0821 07/20/24  0600   INR 1.39* 1.44*     HLA antibodies:   SA1 HI RISK ARIADNE   Date Value Ref Range Status   07/31/2024 None  Final     SA1 MOD RISK ARIADNE   Date Value Ref Range Status   07/31/2024 None  Final     SA2 HI RISK ARIADNE   Date Value Ref Range Status   07/31/2024 None  Final     SA2 MOD RISK ARIADNE   Date Value Ref Range Status   07/31/2024 None  Final       Assessment: Siva Ramey is doing fairly well s/p DDKT (SPK):  Issues we addressed during his visit include:    Plan:    1. Graft function:  stable pancreas function, trending down.  Elevated cr. In setting of dehydration due to diarrhea.  Will schedule IVF  2. Immunosuppression Management: No change continue same IS   .  Complexity of management:Medium.  Contributing  factors: diarrhea  3. Diarrhea:  c diff negative.,  enteric panel shows e coli and started on cipro  4. Fevers: feels well overall.  CT of abdomen and pelvis without contrast is primarily normal   5. Diarrhea: can start benefiber and imodium    6. Hypotension: stop metoprolol xl and start coreg 3.125mg BID     Followup: as directed    Total Time: 25 min,   Counselling Time: 15 min.           Again, thank you for allowing me to participate in the care of your patient.        Sincerely,        Sobia Underwood NP

## 2024-08-12 NOTE — NURSING NOTE
"Chief Complaint   Patient presents with    Surgical Followup     23 day post Kidney/Pancreas transplant      Vital signs:  Temp: (!) 101.3  F (38.5  C) Temp src: Oral BP: 104/73 Pulse: (!) 132   Resp: 18 SpO2: 99 %     Height: 180.3 cm (5' 10.98\") Weight: 100.3 kg (221 lb 1.6 oz)  Estimated body mass index is 30.85 kg/m  as calculated from the following:    Height as of this encounter: 1.803 m (5' 10.98\").    Weight as of this encounter: 100.3 kg (221 lb 1.6 oz).      Gabriela Rodriguez [facility]  8/12/2024 10:13 AM    "

## 2024-08-13 ENCOUNTER — TELEPHONE (OUTPATIENT)
Dept: TRANSPLANT | Facility: CLINIC | Age: 49
End: 2024-08-13
Payer: COMMERCIAL

## 2024-08-13 DIAGNOSIS — Z94.0 KIDNEY REPLACED BY TRANSPLANT: ICD-10-CM

## 2024-08-13 DIAGNOSIS — E86.0 DEHYDRATION: ICD-10-CM

## 2024-08-13 DIAGNOSIS — N39.0 URINARY TRACT INFECTION: ICD-10-CM

## 2024-08-13 DIAGNOSIS — R11.2 NAUSEA & VOMITING: Primary | ICD-10-CM

## 2024-08-13 LAB — BACTERIA UR CULT: ABNORMAL

## 2024-08-13 RX ORDER — HEPARIN SODIUM (PORCINE) LOCK FLUSH IV SOLN 100 UNIT/ML 100 UNIT/ML
5 SOLUTION INTRAVENOUS
Status: CANCELLED | OUTPATIENT
Start: 2024-08-13

## 2024-08-13 RX ORDER — ALBUTEROL SULFATE 0.83 MG/ML
2.5 SOLUTION RESPIRATORY (INHALATION)
Status: CANCELLED | OUTPATIENT
Start: 2024-08-13

## 2024-08-13 RX ORDER — ALBUTEROL SULFATE 90 UG/1
1-2 AEROSOL, METERED RESPIRATORY (INHALATION)
Status: CANCELLED
Start: 2024-08-13

## 2024-08-13 RX ORDER — TACROLIMUS 1 MG/1
6 CAPSULE ORAL 2 TIMES DAILY
Qty: 360 CAPSULE | Refills: 11 | Status: SHIPPED | OUTPATIENT
Start: 2024-08-13 | End: 2024-08-20

## 2024-08-13 RX ORDER — ONDANSETRON 4 MG/1
4 TABLET, ORALLY DISINTEGRATING ORAL EVERY 6 HOURS PRN
Qty: 30 TABLET | Refills: 1 | Status: SHIPPED | OUTPATIENT
Start: 2024-08-13 | End: 2024-09-04

## 2024-08-13 RX ORDER — DIPHENHYDRAMINE HYDROCHLORIDE 50 MG/ML
50 INJECTION INTRAMUSCULAR; INTRAVENOUS
Status: CANCELLED
Start: 2024-08-13

## 2024-08-13 RX ORDER — MEPERIDINE HYDROCHLORIDE 25 MG/ML
25 INJECTION INTRAMUSCULAR; INTRAVENOUS; SUBCUTANEOUS EVERY 30 MIN PRN
Status: CANCELLED | OUTPATIENT
Start: 2024-08-13

## 2024-08-13 RX ORDER — METHYLPREDNISOLONE SODIUM SUCCINATE 125 MG/2ML
125 INJECTION, POWDER, LYOPHILIZED, FOR SOLUTION INTRAMUSCULAR; INTRAVENOUS
Status: CANCELLED
Start: 2024-08-13

## 2024-08-13 RX ORDER — HEPARIN SODIUM,PORCINE 10 UNIT/ML
5-20 VIAL (ML) INTRAVENOUS DAILY PRN
Status: CANCELLED | OUTPATIENT
Start: 2024-08-13

## 2024-08-13 RX ORDER — EPINEPHRINE 1 MG/ML
0.3 INJECTION, SOLUTION, CONCENTRATE INTRAVENOUS EVERY 5 MIN PRN
Status: CANCELLED | OUTPATIENT
Start: 2024-08-13

## 2024-08-13 NOTE — PROGRESS NOTES
Transplant Surgery Progress Note    Transplants:  7/20/2024 (Kidney / Pancreas); Postoperative day:  24  S:  49 year old male with PMH significant for DMII (on insulin pump) and resulting ESKD (on HD every MWF since 8/2021). 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 Underwent simultaneous kidney pancreas transplant on 7/20/24 complicated by VT/VF arrest in PACU. Received 2 rounds of CPR before ROSC. Taken emergently to the cath lab and found to have 100% occlusion of the RCA now s/p IBAN x 2.     Presented today and had a fever.  No vomiting, but has nausea.      No chest pain or shortness of breath,    He is having multiple loose stools per day.      No dysuria or hematuria.  Some frequency.    States overall he feels well, and did not know he had a temperature.      He states he is very hypotensive.   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   Transplant Date:  7/20/2024 (Kidney / Pancreas)   Ureteral Stent:  Yes   Crossmatch:  negative   DSA at Tx:  No  Baseline Cr: TBD   DeNovo DSA: No    Acute Rejection Hx:  No    Present Maintenance Immunosuppression:  Tacrolimus and Mycophenolic acid    CMV IgG Ab Discordance:  No  EBV IgG Ab Discordance:  No    BK Viremia:  No  EBV Viremia:  No    Transplant Coordinator: Julianna Edwards     Transplant Office Phone Number: [phone]     Immunosuppressant Medications       Immunosuppressive Agents Disp Start End     mycophenolic acid (GENERIC EQUIVALENT) 180 MG EC tablet 240 tablet 8/9/2024 --    Sig - Route: Take 4 tablets (720 mg) by mouth 2 times daily - Oral    Class: E-Prescribe    Notes to Pharmacy: TXP DT 7/20/2024 (Kidney / Pancreas) TXP Dischg DT 7/30/2024 DX Kidney replaced by transplant Z94.0 TX Center [facility]  [facility] ([place], [place])     tacrolimus (GENERIC EQUIVALENT) 1 MG capsule 300 capsule 8/8/2024 --    Sig - Route: Take 5 capsules (5 mg) by mouth 2 times daily - Oral    Class: E-Prescribe    Notes to Pharmacy: TXP DT 7/20/2024 (Kidney / Pancreas) TXP Dischg DT 7/30/2024 DX Kidney replaced by transplant Z94.0 TX Center [facility] ([place], [place])            Possible Immunosuppression-related side effects:   []             headache  []             vivid dreams  []             irritability  []             cognitive difficuties  []             fine tremor  []             nausea  []             diarrhea  []             neuropathy      []             edema  []             renal calcineurin toxicity  []             hyperkalemia  []             post-transplant diabetes  []             decreased appetite  []             increased appetite  []             other:  []             none    Prescription Medications as of 8/13/2024         Rx Number Disp Refills Start End Last Dispensed Date Next Fill Date Owning Pharmacy    acetaminophen (TYLENOL) 500 MG tablet  -- --  --       Sig: Take 500 mg by mouth every 4 hours as needed    Class: Historical    Route: Oral    ALPRAZolam (XANAX) 0.25 MG tablet  -- -- 9/1/2023 --       Sig: Take 0.25 mg by mouth 3 times daily as needed    Class: Historical    Route: Oral    aspirin (ASA) 81 MG chewable tablet  30 tablet 3 2/1/2024 --   [address]    Sig: Take 1 tablet (81 mg) by mouth daily Starting tomorrow.    Class: E-Prescribe    Route: Oral    atorvastatin (LIPITOR) 40 MG tablet  30 tablet 2 7/30/2024 --   [address]    Sig: Take 1 tablet (40 mg) by mouth every evening    Class: E-Prescribe    Route: Oral    bulk laxative (BENEFIBER DRINK MIX) packet  30 packet 0 8/12/2024 9/11/2024   [facility]  [address]    Sig: Take 1 packet by mouth daily for 30 days    Class: E-Prescribe    Route: Oral    carvedilol (COREG) 6.25 MG tablet  30 tablet 0 8/12/2024 9/11/2024   [address]    Sig: Take 0.5 tablets (3.125 mg) by mouth 2 times daily (with meals) for 30 days    Class: E-Prescribe    Route: Oral    cholecalciferol (VITAMIN D3) 25 mcg (1000 units) capsule  60 capsule 3 8/5/2024 --   [address]    Sig: Take 2 capsules (50 mcg) by mouth daily    Class: E-Prescribe    Route: Oral    ciprofloxacin (CIPRO) 500 MG tablet  28 tablet 0 8/12/2024 8/26/2024   [address]    Sig: Take 1 tablet (500 mg) by mouth 2 times daily for 14 days    Class: E-Prescribe    Route: Oral    dapsone (ACZONE) 25 MG tablet  60 tablet 11 7/31/2024 --   [address]    Sig: Take 2 tablets (50 mg) by mouth daily    Class: E-Prescribe    Route: Oral    loperamide (IMODIUM A-D) 2 MG capsule  30 capsule 0 8/12/2024 9/11/2024   [address]    Sig: Take 1 capsule (2 mg) by mouth 4 times daily as needed for diarrhea    Class: E-Prescribe    Route: Oral    magnesium glycinate 100 MG CAPS capsule  120 capsule 2 8/8/2024 --   [address]    Sig: Take 2 capsules (200 mg) by mouth 2 times daily Take in place of magnesium-oxide    Class: E-Prescribe    Route: Oral    methocarbamol (ROBAXIN) 750 MG tablet  20 tablet 0 8/2/2024 --   [facility] #[id number] - [address] AT [facility] [address]    Sig: Take 1 tablet (750 mg) by mouth every 6 hours as needed for muscle spasms    Class: E-Prescribe    Route: Oral    mycophenolic acid (GENERIC EQUIVALENT) 180 MG EC  tablet  240 tablet 11 8/9/2024 --   [address]    Sig: Take 4 tablets (720 mg) by mouth 2 times daily    Class: E-Prescribe    Notes to Pharmacy: TXP DT 7/20/2024 (Kidney / Pancreas) TXP Dischg DT 7/30/2024 DX Kidney replaced by transplant Z94.0 TX Center [facility] ([place], [place])    Route: Oral    nitroGLYcerin (NITROSTAT) 0.4 MG sublingual tablet  25 tablet 3 11/16/2023 --   [facility] #[id number] - [address] AT [facility] [address]    Sig: PLACE ONE TABLET UNDER TONGUE AS NEEDED FOR CHEST PAIN AS DIRECTED FOR 3 DOSES. IF SYMPTOMS PERSIST 5 MINUTES AFTER 1ST DOSE CALL 911    Class: E-Prescribe    nystatin (MYCOSTATIN) [id number] UNIT/ML suspension  600 mL 2 7/30/2024 --   [address]    Sig: Take 5 mLs (500,000 Units) by mouth 4 times daily    Class: E-Prescribe    Route: Oral    ondansetron (ZOFRAN ODT) 4 MG ODT tab  10 tablet 1 7/30/2024 --   [address]    Sig: Take 1 tablet (4 mg) by mouth every 6 hours as needed for nausea or vomiting    Class: E-Prescribe    Route: Oral    pantoprazole (PROTONIX) 40 MG EC tablet  30 tablet 1 7/31/2024 --   [address]    Sig: Take 1 tablet (40 mg) by mouth every morning (before breakfast)    Class: E-Prescribe    Route: Oral    phosphorus tablet 250 mg (PHOSPHA 250 NEUTRAL) 250 MG per tablet  180 tablet 0 8/5/2024 --   [address]    Sig: Take 2 tablets (500 mg) by mouth 3 times daily    Class: E-Prescribe    Route: Oral    psyllium (METAMUCIL/KONSYL) 58.6 % powder  -- -- 8/8/2024 --       Sig: Take 6 g (1 teaspoonful) by mouth 2 times daily as needed (diarrhea)    Class: No Print Out    Route: Oral    senna-docusate  (SENOKOT-S/PERICOLACE) 8.6-50 MG tablet  60 tablet 1 7/30/2024 --   [address]    Sig: Take 2 tablets by mouth 2 times daily    Class: E-Prescribe    Route: Oral    sodium bicarbonate 650 MG tablet  270 tablet 2 7/30/2024 --   [address]    Sig: Take 3 tablets (1,950 mg) by mouth 3 times daily    Class: E-Prescribe    Route: Oral    tacrolimus (GENERIC EQUIVALENT) 1 MG capsule  300 capsule 11 8/8/2024 --   [address]    Sig: Take 5 capsules (5 mg) by mouth 2 times daily    Class: E-Prescribe    Notes to Pharmacy: TXP DT 7/20/2024 (Kidney / Pancreas) TXP Dischg DT 7/30/2024 DX Kidney replaced by transplant Z94.0 TX Center [facility] ([place], [place])    Route: Oral    ticagrelor (BRILINTA) 90 MG tablet  60 tablet 0 7/30/2024 --   [address]    Sig: Take 1 tablet (90 mg) by mouth or Feeding Tube 2 times daily    Class: E-Prescribe    Route: Oral or Feeding Tube    valGANciclovir (VALCYTE) 450 MG tablet  60 tablet 2 7/31/2024 --   [address]    Sig: Take 2 tablets (900 mg) by mouth daily    Class: E-Prescribe    Route: Oral            O:      General Appearance: in no apparent distress.   Skin: Normal, no rashes or jaundice  Heart: regular rate and rhythm  Lungs: easy respirations, no audible wheezing.  Abdomen: rounded, The wound is dry and intact, without hernia. The abdomen is non-tender. The kidney and pancreas graft is not tender.  There is no ascites.  Extremities: Tremor absent.   Edema: absent.         Latest Ref Rng & Units 8/12/2024     7:55 AM 8/8/2024     7:48 AM 8/5/2024     7:58 AM 8/2/2024     7:49 AM 8/1/2024     7:29 AM   Transplant Immunosuppression Labs   Creat 0.67 -  1.17 mg/dL 1.73  1.56  1.64  1.62  1.76    Urea Nitrogen 6.0 - 20.0 mg/dL 16.1  12.9  11.7  14.0  15.3    WBC 4.0 - 11.0 10e3/uL 3.4  4.0  4.9  6.6  7.2    Neutrophil %     89        Chemistries:   Recent Labs   Lab Test 08/12/24  0755   BUN 16.1   CR 1.73*   GFRESTIMATED 48*   *     Lab Results   Component Value Date    A1C 6.4 07/20/2024    CPEPT 4.8 09/27/2021     Recent Labs   Lab Test 07/30/24  0557   ALBUMIN 3.2*   BILITOTAL 0.4   ALKPHOS 105   AST 25   ALT 30     Urine Studies:  Recent Labs   Lab Test 08/12/24  1141   COLOR Yellow   APPEARANCE Slightly Cloudy*   URINEGLC 50*   URINEBILI Negative   URINEKETONE Negative   SG 1.022   UBLD Large*   URINEPH 6.0   PROTEIN 100*   NITRITE Negative   LEUKEST Trace*   RBCU >182*   WBCU 23*     No lab results found.  Hematology:   Recent Labs   Lab Test 08/12/24  0755 08/08/24  0748 08/05/24  0758   HGB 8.2* 7.9* 8.2*    261 255   WBC 3.4* 4.0 4.9     Coags:   Recent Labs   Lab Test 07/20/24  0821 07/20/24  0600   INR 1.39* 1.44*     HLA antibodies:   SA1 HI RISK ARIADNE   Date Value Ref Range Status   07/31/2024 None  Final     SA1 MOD RISK ARIADNE   Date Value Ref Range Status   07/31/2024 None  Final     SA2 HI RISK ARIADNE   Date Value Ref Range Status   07/31/2024 None  Final     SA2 MOD RISK ARIADNE   Date Value Ref Range Status   07/31/2024 None  Final       Assessment: Siva Ramey is doing fairly well s/p DDKT (SPK):  Issues we addressed during his visit include:    Plan:    1. Graft function:  stable pancreas function, trending down.  Elevated cr. In setting of dehydration due to diarrhea.  Will schedule IVF  2. Immunosuppression Management: No change continue same IS   .  Complexity of management:Medium.  Contributing factors: diarrhea  3. Diarrhea:  c diff negative.,  enteric panel shows e coli and started on cipro  4. Fevers: feels well overall.  CT of abdomen and pelvis without contrast is primarily normal   5. Diarrhea: can start benefiber and imodium     6. Hypotension: stop metoprolol xl and start coreg 3.125mg BID   Zofran for nausea     Followup: as directed    Total Time: 25 min,   Counselling Time: 15 min.

## 2024-08-13 NOTE — TELEPHONE ENCOUNTER
ISSUE:  +UCx     Plan for weekly IVF, therapy plan updated and sent to [facility].     Fernando Sorensen MD Schuller, Laura M, ARIE; Julianna Edwards, RN  Would use fosfomycin 3 grams every other day x 3 doses.    ISSUE:   Tacrolimus IR level 7.1 on 8/12, goal 8-10, dose 5 mg BID.    PLAN:   Call Patient and confirm this was an accurate 12-hour trough.   Verify Tacrolimus IR dose 5 mg BID.   Confirm no new medications or or missed doses.   Confirm no new illness / infection / diarrhea.   If accurate trough and accurate dose, increase Tacrolimus IR dose to 6 mg BID     Is this more than a 50% increase or decrease in current IS dose: No  If YES, justification: N/A    Repeat labs in 2 days.  *If > 50% change in immunosuppression dose, repeat labs in 1 week.     OUTCOME:   Spoke with Patient, they confirm accurate trough level and current dose 5 mg BID.   Patient confirmed dose change to 6 mg BID.  Patient agreed to repeat labs in 2 days.   Orders sent to preferred pharmacy for dose change and lab for repeat labs.   Patient voiced understanding of plan.

## 2024-08-14 ENCOUNTER — VIRTUAL VISIT (OUTPATIENT)
Dept: PHARMACY | Facility: CLINIC | Age: 49
End: 2024-08-14
Payer: COMMERCIAL

## 2024-08-14 ENCOUNTER — TEAM CONFERENCE (OUTPATIENT)
Dept: TRANSPLANT | Facility: CLINIC | Age: 49
End: 2024-08-14
Payer: COMMERCIAL

## 2024-08-14 DIAGNOSIS — E78.5 DYSLIPIDEMIA: ICD-10-CM

## 2024-08-14 DIAGNOSIS — Z78.9 TAKES DIETARY SUPPLEMENTS: ICD-10-CM

## 2024-08-14 DIAGNOSIS — Z94.0 HTN, KIDNEY TRANSPLANT RELATED: ICD-10-CM

## 2024-08-14 DIAGNOSIS — Z94.83 HISTORY OF SIMULTANEOUS KIDNEY AND PANCREAS TRANSPLANT (H): Primary | ICD-10-CM

## 2024-08-14 DIAGNOSIS — I25.2 HISTORY OF ST ELEVATION MYOCARDIAL INFARCTION (STEMI): ICD-10-CM

## 2024-08-14 DIAGNOSIS — I15.1 HTN, KIDNEY TRANSPLANT RELATED: ICD-10-CM

## 2024-08-14 DIAGNOSIS — Z94.0 HISTORY OF SIMULTANEOUS KIDNEY AND PANCREAS TRANSPLANT (H): Primary | ICD-10-CM

## 2024-08-14 DIAGNOSIS — Z94.0 KIDNEY REPLACED BY TRANSPLANT: ICD-10-CM

## 2024-08-14 DIAGNOSIS — R52 PAIN: ICD-10-CM

## 2024-08-14 DIAGNOSIS — R19.5 LOOSE STOOLS: ICD-10-CM

## 2024-08-14 PROCEDURE — 99207 PR NO CHARGE LOS: CPT | Mod: 93 | Performed by: PHARMACIST

## 2024-08-14 RX ORDER — GRANULES FOR ORAL 3 G/1
3 POWDER ORAL EVERY OTHER DAY
Qty: 3 PACKET | Refills: 0 | Status: SHIPPED | OUTPATIENT
Start: 2024-08-14 | End: 2024-08-19

## 2024-08-14 NOTE — PROGRESS NOTES
Medication Therapy Management (MTM) Encounter    ASSESSMENT:                            Medication Adherence/Access: No issues identified    Kidney/ Pancreas Transplant:    Stable.    Supplements:   Stable.    Hyperlipidemia   Stable.     Hypertension   BP at goal <150/90 0-2 months post transplant. Patient has not been taking correct dose of Carvedilol, reduce to prescribed dose.     DAPT:   Stable.    Loose stools:   Patient had recent frequent diarrhea, but has not yet have a BM today and much improved yesterday. Of note, he has been taking Senna 2 tabs twice daily. He wants to continue this and then stop it if he gets loose stools again. This is fine as we don't want to cause constipation with the recent Panc transplant. He should start Benefiber as well to keep stools regular.     Pain:   Stable    PLAN:                            Reduce Carvedilol to 1/2 tablet (3.125mg) twice daily.   If you get loose again stop Senna.   Start using Benefiber 1-2 teaspoonfuls daily.     Follow-up: 2 months post transplant    SUBJECTIVE/OBJECTIVE:                          Siva Ramey is a 49 year old male seen for a transitions of care visit. He was discharged from [facility] on 7/30 for kidney/ pancreas txp.    Reason for visit: initial post txp.    Allergies/ADRs: Reviewed in chart  Past Medical History: Reviewed in chart  Tobacco: He reports that he has never smoked. He has been exposed to tobacco smoke. He has never used smokeless tobacco.  Alcohol: not currently using  THC/CBD: none    Medication Adherence/Access: Patient uses pill box(es) and uses reminders/alarms.  Patient takes medications 4 time(s) per day. 8, 12:30, 8, 10pm  Per patient, misses medication 0 times per week.   The patient fills medications at [facility]: YES.    Kidney/ Pancreas Transplant:    Tacrolimus 6 mg twice daily  Mycophenolic acid 720 mg twice daily.    Pt reports Diarrhea- although now resolved as of yesterday.   Transplant date: 7/20/24  CMV  prophylaxis: CrCl >/=60 mL/minute: Valcyte 900mg daily Treat 3 months post tx   Valcyte birth defects discussed: Yes   PJP prophylaxis: Dapsone 50 mg daily  Antifungal Prophylaxis: Nystatin 4 times daily   PPI use: Pantoprazole 40mg daily. Denies GERD sx.   Tx Coordinator: Amita Cruz MD: Dr. Sorensen, Using Med Card: Yes  Recent Infections:  E Coli: Cipro 500mg twice daily x 14 days, UTI: Fosfomycin 3g every other day 3 doses, has not started yet.   Immunizations: annual flu shot 2023; Pneumovax 23:  unknown; Prevnar 13: 2021; TDaP:  2020; Shingrix: unknown, HBV: Immunity per last titers, COVID: Primary x 3    Estimated Creatinine Clearance: 62.3 mL/min (A) (based on SCr of 1.73 mg/dL (H)).    Supplements:   Magnesium glycinate 200mg twice daily - just started yesterday.   Phosphorus 500mg 3 times daily. Appetite is fine. Has been pushing dairy.   Sodium Bicarbonate 1950mg 3 times daily   Vitamin D3 50mcg daily .  Lab Results   Component Value Date    MAG 1.3 (L) 08/12/2024    PHOS 1.5 (L) 08/12/2024    CO2 20 (L) 08/12/2024     Hyperlipidemia   Atorvastatin 40mg daily  Patient reports no significant myalgias or other side effects.  The ASCVD Risk score (Jaymie DK, et al., 2019) failed to calculate for the following reasons:    The patient has a prior MI or stroke diagnosis     Hypertension   Carvedilol 6.25mg twice daily.  Patient reports the following medication side effects: some mild dizziness on positional change.   Patient self monitors blood pressure.  Home BP monitoring 127/71 .     BP Readings from Last 3 Encounters:   08/12/24 104/73   08/05/24 94/62   08/01/24 119/77     DAPT:   Brilinta 90mg twice daily   Aspirin 81mg daily  Denies gastrointestinal bleed sx.  Had stent placed same day as transplant/    Loose stools:   Senna 2 tablets twice daily  Loperamide 2mg prn - took one dose only.   Benefiber 1 teaspoonful daily prn  Stools recently improved, was having 8-10 loose stools, but improved since  treating E Coli.     Pain:   Acetaminophen 500mg twice daily   Methocarbamol 750mg at bedtime  Pain controlled per patient.     Today's Vitals: There were no vitals taken for this visit.  ----------------  Post Discharge Medication Reconciliation Status: discharge medications reconciled and changed, per note/orders.    I spent 25 minutes with this patient today. All changes were made via collaborative practice agreement with Dr. Sorensen. A copy of the visit note was provided to the patient's provider(s).    A summary of these recommendations was sent via [email or web address].    Ariel Randall, PharmD  [facility] Pharmacist    Phone: [phone]     Telemedicine Visit Details  Type of service:  Telephone visit  Start Time: 9:33 AM  End Time: 9:57 AM     Medication Therapy Recommendations  HTN (hypertension)    Current Medication: carvedilol (COREG) 6.25 MG tablet   Rationale: Dose too high - Dosage too high - Safety   Recommendation: Decrease Dose   Status: Accepted - no CPA Needed         Loose stools    Current Medication: senna-docusate (SENOKOT-S/PERICOLACE) 8.6-50 MG tablet   Rationale: No medical indication at this time - Unnecessary medication therapy - Indication   Recommendation: Self-Monitoring   Status: Accepted - no CPA Needed          Rationale: Synergistic therapy - Needs additional medication therapy - Indication   Recommendation: Start Medication - BENEFIBER DRINK MIX PO   Status: Accepted - no CPA Needed

## 2024-08-14 NOTE — TELEPHONE ENCOUNTER
Post Kidney and Pancreas Transplant Team Conference  Date: 8/14/2024  Transplant Coordinator: Julianna Edwards     Attendees:  Attendees:  [x]  Dr. Sorensen [] Leonor Kagn RN [] Betty Landaverde LPN     []  Dr. Shell [x] Rosalia Romero, BETSY [x] Bijan Reese LPN    [x] Dr. Wallis [x] Ximena Edwards RN    [x] Dr. Coombs [x] Mony Crabtree RN [x] Angella Liriano RN   [x] Dr. Kong [x] Asha Allred RN    [] Dr. Christianson [x] Cindy Ye RN    [x]  Dr. Whitehead [x] Miryam Iyer RN    [] Dr. Michaud [x] Gino Mahan RN    [x] Sobia Underwood NP [] Roxanna Monet RN    [x] Shelia Magallon NP [] Alisa Cutler RN        Verbal Plan Read Back:   No changes to current lab or medication schedule  Will continue to follow current treatment plan    Routed to RN Coordinator   Bijan Reese LPN

## 2024-08-14 NOTE — PATIENT INSTRUCTIONS
"Recommendations from today's MTM visit:                                                      Reduce Carvedilol to 1/2 tablet (3.125mg) twice daily.   If you get loose again stop Senna.   Start using Benefiber 1-2 teaspoonfuls daily.     Follow-up: 2 months post transplant     It was great speaking with you today.  I value your experience and would be very thankful for your time in providing feedback in our clinic survey. In the next few days, you may receive an email or text message from [employer] with a link to a survey related to your  clinical pharmacist.\"     To schedule another MTM appointment, please call the clinic directly or you may call the MTM scheduling line at [phone] or toll-free at [phone].     My Clinical Pharmacist's contact information:                                                      Please feel free to contact me with any questions or concerns you have.      Ariel Randall, PharmD  MTM Pharmacist    Phone: [phone]     "

## 2024-08-15 ENCOUNTER — LAB (OUTPATIENT)
Dept: LAB | Facility: HOSPITAL | Age: 49
End: 2024-08-15
Payer: COMMERCIAL

## 2024-08-15 DIAGNOSIS — Z98.890 OTHER SPECIFIED POSTPROCEDURAL STATES: ICD-10-CM

## 2024-08-15 DIAGNOSIS — Z94.0 KIDNEY REPLACED BY TRANSPLANT: ICD-10-CM

## 2024-08-15 DIAGNOSIS — Z48.298 AFTERCARE FOLLOWING ORGAN TRANSPLANT: ICD-10-CM

## 2024-08-15 DIAGNOSIS — Z79.899 ENCOUNTER FOR LONG-TERM CURRENT USE OF MEDICATION: ICD-10-CM

## 2024-08-15 DIAGNOSIS — Z94.83 PANCREAS REPLACED BY TRANSPLANT (H): ICD-10-CM

## 2024-08-15 LAB
AMYLASE SERPL-CCNC: 97 U/L (ref 28–100)
ANION GAP SERPL CALCULATED.3IONS-SCNC: 12 MMOL/L (ref 7–15)
BUN SERPL-MCNC: 14.8 MG/DL (ref 6–20)
CALCIUM SERPL-MCNC: 10.1 MG/DL (ref 8.8–10.4)
CHLORIDE SERPL-SCNC: 102 MMOL/L (ref 98–107)
CREAT SERPL-MCNC: 1.95 MG/DL (ref 0.67–1.17)
EGFRCR SERPLBLD CKD-EPI 2021: 41 ML/MIN/1.73M2
ERYTHROCYTE [DISTWIDTH] IN BLOOD BY AUTOMATED COUNT: 15.1 % (ref 10–15)
GLUCOSE SERPL-MCNC: 123 MG/DL (ref 70–99)
HCO3 SERPL-SCNC: 22 MMOL/L (ref 22–29)
HCT VFR BLD AUTO: 26.3 % (ref 40–53)
HGB BLD-MCNC: 8.2 G/DL (ref 13.3–17.7)
LIPASE SERPL-CCNC: 67 U/L (ref 13–60)
MCH RBC QN AUTO: 30 PG (ref 26.5–33)
MCHC RBC AUTO-ENTMCNC: 31.2 G/DL (ref 31.5–36.5)
MCV RBC AUTO: 96 FL (ref 78–100)
PLATELET # BLD AUTO: 249 10E3/UL (ref 150–450)
POTASSIUM SERPL-SCNC: 4.3 MMOL/L (ref 3.4–5.3)
RBC # BLD AUTO: 2.73 10E6/UL (ref 4.4–5.9)
SODIUM SERPL-SCNC: 136 MMOL/L (ref 135–145)
TACROLIMUS BLD-MCNC: 10.4 UG/L (ref 5–15)
TME LAST DOSE: NORMAL H
TME LAST DOSE: NORMAL H
WBC # BLD AUTO: 3.2 10E3/UL (ref 4–11)

## 2024-08-15 PROCEDURE — 84100 ASSAY OF PHOSPHORUS: CPT

## 2024-08-15 PROCEDURE — 85027 COMPLETE CBC AUTOMATED: CPT

## 2024-08-15 PROCEDURE — 83690 ASSAY OF LIPASE: CPT

## 2024-08-15 PROCEDURE — 36415 COLL VENOUS BLD VENIPUNCTURE: CPT

## 2024-08-15 PROCEDURE — 80048 BASIC METABOLIC PNL TOTAL CA: CPT

## 2024-08-15 PROCEDURE — 80197 ASSAY OF TACROLIMUS: CPT

## 2024-08-15 PROCEDURE — 83735 ASSAY OF MAGNESIUM: CPT

## 2024-08-15 PROCEDURE — 82150 ASSAY OF AMYLASE: CPT

## 2024-08-16 ENCOUNTER — TELEPHONE (OUTPATIENT)
Dept: TRANSPLANT | Facility: CLINIC | Age: 49
End: 2024-08-16
Payer: COMMERCIAL

## 2024-08-16 DIAGNOSIS — Z48.298 AFTERCARE FOLLOWING ORGAN TRANSPLANT: Primary | ICD-10-CM

## 2024-08-16 LAB
MAGNESIUM SERPL-MCNC: 1.2 MG/DL (ref 1.7–2.3)
PHOSPHATE SERPL-MCNC: 2 MG/DL (ref 2.5–4.5)

## 2024-08-16 NOTE — TELEPHONE ENCOUNTER
ISSUE:  SCr 1.9   Tac at goal     Spoke with Siva, agreeable to IVF (cancelled appt earlier this week as he overslept). Sent to [facility] to schedule.     Drinking around 2 liters daily. Had some diarrhea yesterday after taking first dose of fosfomycin. Eating 2 servings of yogurt per day and taking imodium prn. /68, has some dizziness with standing. Eating but says he doesn't have much of an appetite.     Afebrile. No abd pain.

## 2024-08-18 ENCOUNTER — INFUSION THERAPY VISIT (OUTPATIENT)
Dept: INFUSION THERAPY | Facility: CLINIC | Age: 49
End: 2024-08-18
Attending: NURSE PRACTITIONER
Payer: COMMERCIAL

## 2024-08-18 VITALS
TEMPERATURE: 98.2 F | OXYGEN SATURATION: 99 % | RESPIRATION RATE: 16 BRPM | SYSTOLIC BLOOD PRESSURE: 132 MMHG | HEART RATE: 96 BPM | DIASTOLIC BLOOD PRESSURE: 77 MMHG

## 2024-08-18 DIAGNOSIS — E86.0 DEHYDRATION: Primary | ICD-10-CM

## 2024-08-18 PROCEDURE — 258N000003 HC RX IP 258 OP 636: Performed by: NURSE PRACTITIONER

## 2024-08-18 PROCEDURE — 96360 HYDRATION IV INFUSION INIT: CPT

## 2024-08-18 RX ORDER — DIPHENHYDRAMINE HYDROCHLORIDE 50 MG/ML
50 INJECTION INTRAMUSCULAR; INTRAVENOUS
Status: CANCELLED
Start: 2024-08-25

## 2024-08-18 RX ORDER — ALBUTEROL SULFATE 90 UG/1
1-2 AEROSOL, METERED RESPIRATORY (INHALATION)
Status: CANCELLED
Start: 2024-08-25

## 2024-08-18 RX ORDER — ALBUTEROL SULFATE 0.83 MG/ML
2.5 SOLUTION RESPIRATORY (INHALATION)
Status: CANCELLED | OUTPATIENT
Start: 2024-08-25

## 2024-08-18 RX ORDER — HEPARIN SODIUM (PORCINE) LOCK FLUSH IV SOLN 100 UNIT/ML 100 UNIT/ML
5 SOLUTION INTRAVENOUS
Status: CANCELLED | OUTPATIENT
Start: 2024-08-25

## 2024-08-18 RX ORDER — HEPARIN SODIUM,PORCINE 10 UNIT/ML
5-20 VIAL (ML) INTRAVENOUS DAILY PRN
Status: CANCELLED | OUTPATIENT
Start: 2024-08-25

## 2024-08-18 RX ORDER — EPINEPHRINE 1 MG/ML
0.3 INJECTION, SOLUTION INTRAMUSCULAR; SUBCUTANEOUS EVERY 5 MIN PRN
Status: CANCELLED | OUTPATIENT
Start: 2024-08-25

## 2024-08-18 RX ORDER — METHYLPREDNISOLONE SODIUM SUCCINATE 125 MG/2ML
125 INJECTION, POWDER, LYOPHILIZED, FOR SOLUTION INTRAMUSCULAR; INTRAVENOUS
Status: CANCELLED
Start: 2024-08-25

## 2024-08-18 RX ORDER — MEPERIDINE HYDROCHLORIDE 25 MG/ML
25 INJECTION INTRAMUSCULAR; INTRAVENOUS; SUBCUTANEOUS EVERY 30 MIN PRN
Status: CANCELLED | OUTPATIENT
Start: 2024-08-25

## 2024-08-18 RX ADMIN — SODIUM CHLORIDE 1000 ML: 9 INJECTION, SOLUTION INTRAVENOUS at 11:39

## 2024-08-18 ASSESSMENT — PAIN SCALES - GENERAL: PAINLEVEL: NO PAIN (0)

## 2024-08-18 NOTE — PROGRESS NOTES
Infusion Nursing Note:  Siva Ramey presents today for IV fluids.    Patient seen by provider today: No   present during visit today: Not Applicable.    Note: Pt given 1 L fluids over 1 hour per orders.      Intravenous Access:  Peripheral IV placed.    Treatment Conditions:  Not Applicable.    Post Infusion Assessment:  Patient tolerated infusion without incident.  Site patent and intact, free from redness, edema or discomfort.  Access discontinued per protocol.     Discharge Plan:   Patient and/or family verbalized understanding of discharge instructions and all questions answered.  Patient discharged in stable condition accompanied by: self.    Administrations This Visit       sodium chloride 0.9% BOLUS 1,000 mL       Admin Date  08/18/2024 Action  $New Bag Dose  1,000 mL Route  Intravenous Documented By  Shruti Garcia RN                  /66 (BP Location: Right arm, Patient Position: Sitting, Cuff Size: Adult Regular)   Pulse 96   Temp 98.2  F (36.8  C) (Oral)   Resp 16   SpO2 99%     Shruti Garcia RN

## 2024-08-19 ENCOUNTER — TELEPHONE (OUTPATIENT)
Dept: TRANSPLANT | Facility: CLINIC | Age: 49
End: 2024-08-19

## 2024-08-19 ENCOUNTER — LAB (OUTPATIENT)
Dept: LAB | Facility: HOSPITAL | Age: 49
End: 2024-08-19
Payer: COMMERCIAL

## 2024-08-19 DIAGNOSIS — Z98.890 OTHER SPECIFIED POSTPROCEDURAL STATES: ICD-10-CM

## 2024-08-19 DIAGNOSIS — Z48.298 AFTERCARE FOLLOWING ORGAN TRANSPLANT: ICD-10-CM

## 2024-08-19 DIAGNOSIS — Z94.0 KIDNEY REPLACED BY TRANSPLANT: ICD-10-CM

## 2024-08-19 DIAGNOSIS — Z94.83 PANCREAS REPLACED BY TRANSPLANT (H): ICD-10-CM

## 2024-08-19 DIAGNOSIS — Z79.899 ENCOUNTER FOR LONG-TERM CURRENT USE OF MEDICATION: ICD-10-CM

## 2024-08-19 LAB
AMYLASE SERPL-CCNC: 90 U/L (ref 28–100)
ANION GAP SERPL CALCULATED.3IONS-SCNC: 10 MMOL/L (ref 7–15)
BUN SERPL-MCNC: 13.6 MG/DL (ref 6–20)
CALCIUM SERPL-MCNC: 9.7 MG/DL (ref 8.8–10.4)
CHLORIDE SERPL-SCNC: 106 MMOL/L (ref 98–107)
CREAT SERPL-MCNC: 1.85 MG/DL (ref 0.67–1.17)
EGFRCR SERPLBLD CKD-EPI 2021: 44 ML/MIN/1.73M2
ERYTHROCYTE [DISTWIDTH] IN BLOOD BY AUTOMATED COUNT: 14.6 % (ref 10–15)
GLUCOSE SERPL-MCNC: 145 MG/DL (ref 70–99)
HCO3 SERPL-SCNC: 22 MMOL/L (ref 22–29)
HCT VFR BLD AUTO: 27 % (ref 40–53)
HGB BLD-MCNC: 8.3 G/DL (ref 13.3–17.7)
LIPASE SERPL-CCNC: 88 U/L (ref 13–60)
MAGNESIUM SERPL-MCNC: 1.3 MG/DL (ref 1.7–2.3)
MCH RBC QN AUTO: 29.4 PG (ref 26.5–33)
MCHC RBC AUTO-ENTMCNC: 30.7 G/DL (ref 31.5–36.5)
MCV RBC AUTO: 96 FL (ref 78–100)
PHOSPHATE SERPL-MCNC: 2.1 MG/DL (ref 2.5–4.5)
PLATELET # BLD AUTO: 303 10E3/UL (ref 150–450)
POTASSIUM SERPL-SCNC: 5.1 MMOL/L (ref 3.4–5.3)
RBC # BLD AUTO: 2.82 10E6/UL (ref 4.4–5.9)
SODIUM SERPL-SCNC: 138 MMOL/L (ref 135–145)
TACROLIMUS BLD-MCNC: 15.4 UG/L (ref 5–15)
TME LAST DOSE: ABNORMAL H
TME LAST DOSE: ABNORMAL H
WBC # BLD AUTO: 3.8 10E3/UL (ref 4–11)

## 2024-08-19 PROCEDURE — 84100 ASSAY OF PHOSPHORUS: CPT

## 2024-08-19 PROCEDURE — 83690 ASSAY OF LIPASE: CPT

## 2024-08-19 PROCEDURE — 36415 COLL VENOUS BLD VENIPUNCTURE: CPT

## 2024-08-19 PROCEDURE — 80048 BASIC METABOLIC PNL TOTAL CA: CPT

## 2024-08-19 PROCEDURE — 82150 ASSAY OF AMYLASE: CPT

## 2024-08-19 PROCEDURE — 83735 ASSAY OF MAGNESIUM: CPT

## 2024-08-19 PROCEDURE — 80197 ASSAY OF TACROLIMUS: CPT

## 2024-08-19 PROCEDURE — 85018 HEMOGLOBIN: CPT

## 2024-08-19 NOTE — TELEPHONE ENCOUNTER
ISSUE:  SCr 1.85   Lipase 88   Mag low     Spoke with Siva, feels well. Denies fevers. Has 2-3 loose stools per day which is an improvement. C/o chronic back pains and is planning to see a doctor about this.     ISSUE:   Tacrolimus IR level 15.4 on 8/19, goal 8-10, dose 6 mg BID.    PLAN:   Call Patient and confirm this was an accurate 12-hour trough.   Verify Tacrolimus IR dose 6 mg BID.   Confirm no new medications or or missed doses.   Confirm no new illness / infection / diarrhea.   If accurate trough and accurate dose, decrease Tacrolimus IR dose to 5 mg BID     Is this more than a 50% increase or decrease in current IS dose: No  If YES, justification: N/A    Repeat labs in 2 days.  *If > 50% change in immunosuppression dose, repeat labs in 1 week.     OUTCOME:   Spoke with Patient, they confirm accurate trough level and current dose 6 mg BID.   Patient confirmed dose change to 5 mg BID.  Patient agreed to repeat labs in 2 days.   Orders sent to preferred pharmacy for dose change and lab for repeat labs.   Patient voiced understanding of plan.

## 2024-08-20 RX ORDER — TACROLIMUS 1 MG/1
5 CAPSULE ORAL 2 TIMES DAILY
Qty: 300 CAPSULE | Refills: 11 | Status: SHIPPED | OUTPATIENT
Start: 2024-08-20 | End: 2024-08-23

## 2024-08-21 ENCOUNTER — INFUSION THERAPY VISIT (OUTPATIENT)
Dept: INFUSION THERAPY | Facility: CLINIC | Age: 49
End: 2024-08-21
Attending: NURSE PRACTITIONER
Payer: COMMERCIAL

## 2024-08-21 ENCOUNTER — TEAM CONFERENCE (OUTPATIENT)
Dept: TRANSPLANT | Facility: CLINIC | Age: 49
End: 2024-08-21

## 2024-08-21 VITALS
SYSTOLIC BLOOD PRESSURE: 132 MMHG | OXYGEN SATURATION: 99 % | TEMPERATURE: 98.7 F | HEART RATE: 100 BPM | RESPIRATION RATE: 16 BRPM | DIASTOLIC BLOOD PRESSURE: 78 MMHG

## 2024-08-21 DIAGNOSIS — E86.0 DEHYDRATION: Primary | ICD-10-CM

## 2024-08-21 PROCEDURE — 96360 HYDRATION IV INFUSION INIT: CPT

## 2024-08-21 PROCEDURE — 258N000003 HC RX IP 258 OP 636: Performed by: NURSE PRACTITIONER

## 2024-08-21 RX ORDER — HEPARIN SODIUM,PORCINE 10 UNIT/ML
5-20 VIAL (ML) INTRAVENOUS DAILY PRN
Status: CANCELLED | OUTPATIENT
Start: 2024-08-25

## 2024-08-21 RX ORDER — HEPARIN SODIUM (PORCINE) LOCK FLUSH IV SOLN 100 UNIT/ML 100 UNIT/ML
5 SOLUTION INTRAVENOUS
Status: CANCELLED | OUTPATIENT
Start: 2024-08-25

## 2024-08-21 RX ORDER — ALBUTEROL SULFATE 0.83 MG/ML
2.5 SOLUTION RESPIRATORY (INHALATION)
Status: CANCELLED | OUTPATIENT
Start: 2024-08-25

## 2024-08-21 RX ORDER — DIPHENHYDRAMINE HYDROCHLORIDE 50 MG/ML
50 INJECTION INTRAMUSCULAR; INTRAVENOUS
Status: CANCELLED
Start: 2024-08-25

## 2024-08-21 RX ORDER — MEPERIDINE HYDROCHLORIDE 25 MG/ML
25 INJECTION INTRAMUSCULAR; INTRAVENOUS; SUBCUTANEOUS EVERY 30 MIN PRN
Status: CANCELLED | OUTPATIENT
Start: 2024-08-25

## 2024-08-21 RX ORDER — METHYLPREDNISOLONE SODIUM SUCCINATE 125 MG/2ML
125 INJECTION, POWDER, LYOPHILIZED, FOR SOLUTION INTRAMUSCULAR; INTRAVENOUS
Status: CANCELLED
Start: 2024-08-25

## 2024-08-21 RX ORDER — EPINEPHRINE 1 MG/ML
0.3 INJECTION, SOLUTION INTRAMUSCULAR; SUBCUTANEOUS EVERY 5 MIN PRN
Status: CANCELLED | OUTPATIENT
Start: 2024-08-25

## 2024-08-21 RX ORDER — ALBUTEROL SULFATE 90 UG/1
1-2 AEROSOL, METERED RESPIRATORY (INHALATION)
Status: CANCELLED
Start: 2024-08-25

## 2024-08-21 RX ADMIN — SODIUM CHLORIDE 1000 ML: 9 INJECTION, SOLUTION INTRAVENOUS at 09:56

## 2024-08-21 ASSESSMENT — PAIN SCALES - GENERAL: PAINLEVEL: NO PAIN (0)

## 2024-08-21 NOTE — PATIENT INSTRUCTIONS
Dear Siva Ramey    Thank you for choosing [facility] ([facility] for your infusion.  The following information is a summary of our appointment as well as important reminders.      If you have any questions on your upcoming Specialty Infusion appointments, please call scheduling at [phone].  It was a pleasure taking care of you today.    Sincerely,    [facility]  [address] [place] [address]  [id number]  Phone:  [phone]

## 2024-08-21 NOTE — TELEPHONE ENCOUNTER
Post Kidney and Pancreas Transplant Team Conference  Date: 8/21/2024  Transplant Coordinator: Julianna Edwards     Attendees:  [x]  Dr. Sorensen [] Leonor Kang, BETSY [x] Betty Landaverde LPN     []  Dr. Shell [x] Rosalia Romero, BETSY [x] Bijan Reese LPN    [x] Dr. Wallis [x] Ximena Edwards RN    [] Dr. Coombs [x] Mony Crabtree RN [] Angella Liriano RN   [x] Dr. Kong [x] Asha Allred RN    [] Dr. Christianson [] Cindy Ye RN    [x]  Dr. Whitehead [x] Miryam Iyer, BETSY    [] Dr. Michaud [x] Gino Mahan RN    [] Sobia Underwood, ARIE [x] Roxanna Monet RN    [x] Shelia Magallon NP [] Alisa Cutler RN        Verbal Plan Read Back:   Creatinine baseline 1.6-1.9  DM2, no concerns with lipase or BG at this time     Routed to RN Coordinator   Bijan Reese LPN

## 2024-08-21 NOTE — PROGRESS NOTES
Infusion Nursing Note:  Siva Ramey presents today for IV fluids.    Patient seen by provider today: No   present during visit today: Not Applicable.    Note: 1L NS infused over 1 hour.  Pt reports he will have labs drawn tomorrow at his usual lab.    Intravenous Access:  Peripheral IV placed by VAT.    Treatment Conditions:  Not Applicable.    /78   Pulse 100   Temp 98.7  F (37.1  C) (Oral)   Resp 16   SpO2 99%     Administrations This Visit       sodium chloride 0.9% BOLUS 1,000 mL       Admin Date  08/21/2024 Action  $New Bag Dose  1,000 mL Route  Intravenous Documented By  Elena Damian RN                  Post Infusion Assessment:  Patient tolerated infusion without incident.  Site patent and intact, free from redness, edema or discomfort.  No evidence of extravasations.  Access discontinued per protocol.     Discharge Plan:   AVS to patient via MYCHART.  Patient will return 8/28/24 for next appointment.   Patient discharged in stable condition accompanied by: self.  Departure Mode: Ambulatory.    Elena Damian RN   none

## 2024-08-22 ENCOUNTER — TELEPHONE (OUTPATIENT)
Dept: TRANSPLANT | Facility: CLINIC | Age: 49
End: 2024-08-22

## 2024-08-22 ENCOUNTER — LAB (OUTPATIENT)
Dept: LAB | Facility: HOSPITAL | Age: 49
End: 2024-08-22
Payer: COMMERCIAL

## 2024-08-22 DIAGNOSIS — Z79.899 ENCOUNTER FOR LONG-TERM CURRENT USE OF MEDICATION: ICD-10-CM

## 2024-08-22 DIAGNOSIS — N50.89 SWOLLEN TESTICLE: Primary | ICD-10-CM

## 2024-08-22 DIAGNOSIS — Z48.298 AFTERCARE FOLLOWING ORGAN TRANSPLANT: ICD-10-CM

## 2024-08-22 DIAGNOSIS — Z94.0 KIDNEY REPLACED BY TRANSPLANT: ICD-10-CM

## 2024-08-22 DIAGNOSIS — Z98.890 OTHER SPECIFIED POSTPROCEDURAL STATES: ICD-10-CM

## 2024-08-22 DIAGNOSIS — Z94.83 PANCREAS REPLACED BY TRANSPLANT (H): ICD-10-CM

## 2024-08-22 LAB
AMYLASE SERPL-CCNC: 98 U/L (ref 28–100)
ANION GAP SERPL CALCULATED.3IONS-SCNC: 11 MMOL/L (ref 7–15)
BUN SERPL-MCNC: 13.4 MG/DL (ref 6–20)
CALCIUM SERPL-MCNC: 10.1 MG/DL (ref 8.8–10.4)
CHLORIDE SERPL-SCNC: 107 MMOL/L (ref 98–107)
CREAT SERPL-MCNC: 1.73 MG/DL (ref 0.67–1.17)
EGFRCR SERPLBLD CKD-EPI 2021: 48 ML/MIN/1.73M2
ERYTHROCYTE [DISTWIDTH] IN BLOOD BY AUTOMATED COUNT: 14.9 % (ref 10–15)
GLUCOSE SERPL-MCNC: 144 MG/DL (ref 70–99)
HCO3 SERPL-SCNC: 20 MMOL/L (ref 22–29)
HCT VFR BLD AUTO: 29.9 % (ref 40–53)
HGB BLD-MCNC: 9.1 G/DL (ref 13.3–17.7)
LIPASE SERPL-CCNC: 90 U/L (ref 13–60)
MAGNESIUM SERPL-MCNC: 1.3 MG/DL (ref 1.7–2.3)
MCH RBC QN AUTO: 29 PG (ref 26.5–33)
MCHC RBC AUTO-ENTMCNC: 30.4 G/DL (ref 31.5–36.5)
MCV RBC AUTO: 95 FL (ref 78–100)
PHOSPHATE SERPL-MCNC: 2.5 MG/DL (ref 2.5–4.5)
PLATELET # BLD AUTO: 319 10E3/UL (ref 150–450)
POTASSIUM SERPL-SCNC: 5.5 MMOL/L (ref 3.4–5.3)
RBC # BLD AUTO: 3.14 10E6/UL (ref 4.4–5.9)
SODIUM SERPL-SCNC: 138 MMOL/L (ref 135–145)
TACROLIMUS BLD-MCNC: 15.8 UG/L (ref 5–15)
TME LAST DOSE: ABNORMAL H
TME LAST DOSE: ABNORMAL H
WBC # BLD AUTO: 3.5 10E3/UL (ref 4–11)

## 2024-08-22 PROCEDURE — 80197 ASSAY OF TACROLIMUS: CPT

## 2024-08-22 PROCEDURE — 80048 BASIC METABOLIC PNL TOTAL CA: CPT

## 2024-08-22 PROCEDURE — 36415 COLL VENOUS BLD VENIPUNCTURE: CPT

## 2024-08-22 PROCEDURE — 85027 COMPLETE CBC AUTOMATED: CPT

## 2024-08-22 PROCEDURE — 83690 ASSAY OF LIPASE: CPT

## 2024-08-22 PROCEDURE — 84100 ASSAY OF PHOSPHORUS: CPT

## 2024-08-22 PROCEDURE — 83735 ASSAY OF MAGNESIUM: CPT

## 2024-08-22 PROCEDURE — 82150 ASSAY OF AMYLASE: CPT

## 2024-08-22 NOTE — TELEPHONE ENCOUNTER
ISSUE:   Tacrolimus IR level 15.8 on 8/22, goal 8-10, dose 5 mg BID.    PLAN:   Call Patient and confirm this was an accurate 12-hour trough.   Verify Tacrolimus IR dose 5 mg BID.   Confirm no new medications or or missed doses.   Confirm no new illness / infection / diarrhea.   If accurate trough and accurate dose, decrease Tacrolimus IR dose to 4 mg BID     Is this more than a 50% increase or decrease in current IS dose: No  If YES, justification: N/A    Repeat labs in 3 days.  *If > 50% change in immunosuppression dose, repeat labs in 1 week.     OUTCOME:   Spoke with Patient, they confirm accurate trough level and current dose 5 mg BID.   Patient confirmed dose change to 4 mg BID.  Patient agreed to repeat labs in 3 days.   Orders sent to preferred pharmacy for dose change and lab for repeat labs.   Patient voiced understanding of plan.     Siva states his testicle is 5x larger than the other one, very swollen. Is uncomfortable but not painful. Sent to [facility] for review.     Shelia Magallon APRN CNP Poucher, Jessica, RN  Yeah, I'm ok with proceeding with scrotal US.

## 2024-08-23 RX ORDER — TACROLIMUS 1 MG/1
4 CAPSULE ORAL 2 TIMES DAILY
Qty: 240 CAPSULE | Refills: 11 | Status: ON HOLD | OUTPATIENT
Start: 2024-08-23 | End: 2024-08-26

## 2024-08-24 ENCOUNTER — HOSPITAL ENCOUNTER (INPATIENT)
Facility: CLINIC | Age: 49
LOS: 2 days | Discharge: HOME | DRG: 699 | End: 2024-08-26
Attending: EMERGENCY MEDICINE | Admitting: SURGERY
Payer: MEDICARE

## 2024-08-24 ENCOUNTER — APPOINTMENT (OUTPATIENT)
Dept: ULTRASOUND IMAGING | Facility: CLINIC | Age: 49
DRG: 699 | End: 2024-08-24
Attending: EMERGENCY MEDICINE
Payer: MEDICARE

## 2024-08-24 ENCOUNTER — APPOINTMENT (OUTPATIENT)
Dept: CT IMAGING | Facility: CLINIC | Age: 49
DRG: 699 | End: 2024-08-24
Attending: EMERGENCY MEDICINE
Payer: MEDICARE

## 2024-08-24 ENCOUNTER — TELEPHONE (OUTPATIENT)
Dept: TRANSPLANT | Facility: CLINIC | Age: 49
End: 2024-08-24
Payer: COMMERCIAL

## 2024-08-24 DIAGNOSIS — E87.5 HYPERKALEMIA: ICD-10-CM

## 2024-08-24 DIAGNOSIS — R11.2 NAUSEA AND VOMITING, UNSPECIFIED VOMITING TYPE: ICD-10-CM

## 2024-08-24 DIAGNOSIS — R10.33 PERIUMBILICAL PAIN: Primary | ICD-10-CM

## 2024-08-24 DIAGNOSIS — Z94.83 PANCREAS REPLACED BY TRANSPLANT (H): ICD-10-CM

## 2024-08-24 DIAGNOSIS — Z94.0 STATUS POST KIDNEY TRANSPLANT: ICD-10-CM

## 2024-08-24 DIAGNOSIS — R00.0 TACHYCARDIA: ICD-10-CM

## 2024-08-24 DIAGNOSIS — Z94.0 KIDNEY REPLACED BY TRANSPLANT: ICD-10-CM

## 2024-08-24 LAB
ALBUMIN SERPL BCG-MCNC: 4.4 G/DL (ref 3.5–5.2)
ALBUMIN UR-MCNC: 100 MG/DL
ALP SERPL-CCNC: 186 U/L (ref 40–150)
ALT SERPL W P-5'-P-CCNC: 32 U/L (ref 0–70)
ANION GAP SERPL CALCULATED.3IONS-SCNC: 10 MMOL/L (ref 7–15)
APPEARANCE UR: ABNORMAL
AST SERPL W P-5'-P-CCNC: 26 U/L (ref 0–45)
ATRIAL RATE - MUSE: 108 BPM
BASOPHILS # BLD AUTO: 0.1 10E3/UL (ref 0–0.2)
BASOPHILS NFR BLD AUTO: 1 %
BILIRUB SERPL-MCNC: 0.6 MG/DL
BILIRUB UR QL STRIP: NEGATIVE
BUN SERPL-MCNC: 16 MG/DL (ref 6–20)
C PNEUM DNA SPEC QL NAA+PROBE: NOT DETECTED
CALCIUM SERPL-MCNC: 11 MG/DL (ref 8.8–10.4)
CHLORIDE SERPL-SCNC: 104 MMOL/L (ref 98–107)
COLOR UR AUTO: ABNORMAL
CREAT SERPL-MCNC: 1.75 MG/DL (ref 0.67–1.17)
DIASTOLIC BLOOD PRESSURE - MUSE: NORMAL MMHG
EGFRCR SERPLBLD CKD-EPI 2021: 47 ML/MIN/1.73M2
EOSINOPHIL # BLD AUTO: 0 10E3/UL (ref 0–0.7)
EOSINOPHIL NFR BLD AUTO: 0 %
ERYTHROCYTE [DISTWIDTH] IN BLOOD BY AUTOMATED COUNT: 15.3 % (ref 10–15)
FLUAV H1 2009 PAND RNA SPEC QL NAA+PROBE: NOT DETECTED
FLUAV H1 RNA SPEC QL NAA+PROBE: NOT DETECTED
FLUAV H3 RNA SPEC QL NAA+PROBE: NOT DETECTED
FLUAV RNA SPEC QL NAA+PROBE: NOT DETECTED
FLUBV RNA SPEC QL NAA+PROBE: NOT DETECTED
GLUCOSE SERPL-MCNC: 201 MG/DL (ref 70–99)
GLUCOSE UR STRIP-MCNC: >=1000 MG/DL
HADV DNA SPEC QL NAA+PROBE: NOT DETECTED
HCO3 SERPL-SCNC: 20 MMOL/L (ref 22–29)
HCOV PNL SPEC NAA+PROBE: NOT DETECTED
HCT VFR BLD AUTO: 34.3 % (ref 40–53)
HGB BLD-MCNC: 10.5 G/DL (ref 13.3–17.7)
HGB UR QL STRIP: ABNORMAL
HMPV RNA SPEC QL NAA+PROBE: NOT DETECTED
HOLD SPECIMEN: NORMAL
HOLD SPECIMEN: NORMAL
HPIV1 RNA SPEC QL NAA+PROBE: NOT DETECTED
HPIV2 RNA SPEC QL NAA+PROBE: NOT DETECTED
HPIV3 RNA SPEC QL NAA+PROBE: NOT DETECTED
HPIV4 RNA SPEC QL NAA+PROBE: NOT DETECTED
IMM GRANULOCYTES # BLD: 0 10E3/UL
IMM GRANULOCYTES NFR BLD: 1 %
INTERPRETATION ECG - MUSE: NORMAL
KETONES UR STRIP-MCNC: NEGATIVE MG/DL
LACTATE SERPL-SCNC: 1 MMOL/L (ref 0.7–2)
LEUKOCYTE ESTERASE UR QL STRIP: NEGATIVE
LIPASE SERPL-CCNC: 58 U/L (ref 13–60)
LYMPHOCYTES # BLD AUTO: 0.2 10E3/UL (ref 0.8–5.3)
LYMPHOCYTES NFR BLD AUTO: 3 %
M PNEUMO DNA SPEC QL NAA+PROBE: NOT DETECTED
MCH RBC QN AUTO: 29.2 PG (ref 26.5–33)
MCHC RBC AUTO-ENTMCNC: 30.6 G/DL (ref 31.5–36.5)
MCV RBC AUTO: 95 FL (ref 78–100)
MONOCYTES # BLD AUTO: 0.2 10E3/UL (ref 0–1.3)
MONOCYTES NFR BLD AUTO: 3 %
MUCOUS THREADS #/AREA URNS LPF: PRESENT /LPF
NEUTROPHILS # BLD AUTO: 5.8 10E3/UL (ref 1.6–8.3)
NEUTROPHILS NFR BLD AUTO: 92 %
NITRATE UR QL: NEGATIVE
NRBC # BLD AUTO: 0 10E3/UL
NRBC BLD AUTO-RTO: 0 /100
P AXIS - MUSE: 63 DEGREES
PH UR STRIP: 7 [PH] (ref 5–7)
PLATELET # BLD AUTO: 343 10E3/UL (ref 150–450)
POTASSIUM SERPL-SCNC: 5.9 MMOL/L (ref 3.4–5.3)
PR INTERVAL - MUSE: 162 MS
PROT SERPL-MCNC: 7.2 G/DL (ref 6.4–8.3)
QRS DURATION - MUSE: 112 MS
QT - MUSE: 346 MS
QTC - MUSE: 463 MS
R AXIS - MUSE: 31 DEGREES
RBC # BLD AUTO: 3.6 10E6/UL (ref 4.4–5.9)
RBC URINE: >182 /HPF
RSV RNA SPEC QL NAA+PROBE: NOT DETECTED
RSV RNA SPEC QL NAA+PROBE: NOT DETECTED
RV+EV RNA SPEC QL NAA+PROBE: NOT DETECTED
SODIUM SERPL-SCNC: 134 MMOL/L (ref 135–145)
SP GR UR STRIP: 1.02 (ref 1–1.03)
SYSTOLIC BLOOD PRESSURE - MUSE: NORMAL MMHG
T AXIS - MUSE: -21 DEGREES
UROBILINOGEN UR STRIP-MCNC: NORMAL MG/DL
VENTRICULAR RATE- MUSE: 108 BPM
WBC # BLD AUTO: 6.2 10E3/UL (ref 4–11)
WBC URINE: 9 /HPF

## 2024-08-24 PROCEDURE — 87086 URINE CULTURE/COLONY COUNT: CPT | Performed by: NURSE PRACTITIONER

## 2024-08-24 PROCEDURE — 258N000003 HC RX IP 258 OP 636: Performed by: EMERGENCY MEDICINE

## 2024-08-24 PROCEDURE — 250N000013 HC RX MED GY IP 250 OP 250 PS 637

## 2024-08-24 PROCEDURE — 99222 1ST HOSP IP/OBS MODERATE 55: CPT | Mod: FS | Performed by: NURSE PRACTITIONER

## 2024-08-24 PROCEDURE — 87633 RESP VIRUS 12-25 TARGETS: CPT | Performed by: EMERGENCY MEDICINE

## 2024-08-24 PROCEDURE — 99285 EMERGENCY DEPT VISIT HI MDM: CPT | Performed by: EMERGENCY MEDICINE

## 2024-08-24 PROCEDURE — 80053 COMPREHEN METABOLIC PANEL: CPT | Performed by: EMERGENCY MEDICINE

## 2024-08-24 PROCEDURE — 93975 VASCULAR STUDY: CPT | Mod: 26 | Performed by: STUDENT IN AN ORGANIZED HEALTH CARE EDUCATION/TRAINING PROGRAM

## 2024-08-24 PROCEDURE — 99207 US RENAL TRANSPLANT WITH DOPPLER: CPT | Mod: 26 | Performed by: STUDENT IN AN ORGANIZED HEALTH CARE EDUCATION/TRAINING PROGRAM

## 2024-08-24 PROCEDURE — 83690 ASSAY OF LIPASE: CPT | Performed by: EMERGENCY MEDICINE

## 2024-08-24 PROCEDURE — 99285 EMERGENCY DEPT VISIT HI MDM: CPT | Mod: 25 | Performed by: EMERGENCY MEDICINE

## 2024-08-24 PROCEDURE — 36415 COLL VENOUS BLD VENIPUNCTURE: CPT | Performed by: EMERGENCY MEDICINE

## 2024-08-24 PROCEDURE — 74176 CT ABD & PELVIS W/O CONTRAST: CPT | Mod: MG

## 2024-08-24 PROCEDURE — 96374 THER/PROPH/DIAG INJ IV PUSH: CPT | Performed by: EMERGENCY MEDICINE

## 2024-08-24 PROCEDURE — 96375 TX/PRO/DX INJ NEW DRUG ADDON: CPT | Performed by: EMERGENCY MEDICINE

## 2024-08-24 PROCEDURE — 93005 ELECTROCARDIOGRAM TRACING: CPT | Performed by: EMERGENCY MEDICINE

## 2024-08-24 PROCEDURE — 87040 BLOOD CULTURE FOR BACTERIA: CPT | Performed by: EMERGENCY MEDICINE

## 2024-08-24 PROCEDURE — 93975 VASCULAR STUDY: CPT

## 2024-08-24 PROCEDURE — 93010 ELECTROCARDIOGRAM REPORT: CPT | Performed by: EMERGENCY MEDICINE

## 2024-08-24 PROCEDURE — 85025 COMPLETE CBC W/AUTO DIFF WBC: CPT | Performed by: EMERGENCY MEDICINE

## 2024-08-24 PROCEDURE — 83605 ASSAY OF LACTIC ACID: CPT | Performed by: EMERGENCY MEDICINE

## 2024-08-24 PROCEDURE — 74176 CT ABD & PELVIS W/O CONTRAST: CPT | Mod: 26 | Performed by: STUDENT IN AN ORGANIZED HEALTH CARE EDUCATION/TRAINING PROGRAM

## 2024-08-24 PROCEDURE — 120N000011 HC R&B TRANSPLANT UMMC

## 2024-08-24 PROCEDURE — 81001 URINALYSIS AUTO W/SCOPE: CPT | Performed by: EMERGENCY MEDICINE

## 2024-08-24 PROCEDURE — 96361 HYDRATE IV INFUSION ADD-ON: CPT | Performed by: EMERGENCY MEDICINE

## 2024-08-24 PROCEDURE — G1010 CDSM STANSON: HCPCS | Performed by: STUDENT IN AN ORGANIZED HEALTH CARE EDUCATION/TRAINING PROGRAM

## 2024-08-24 PROCEDURE — 99024 POSTOP FOLLOW-UP VISIT: CPT | Mod: GC | Performed by: SURGERY

## 2024-08-24 PROCEDURE — 76776 US EXAM K TRANSPL W/DOPPLER: CPT

## 2024-08-24 PROCEDURE — 250N000011 HC RX IP 250 OP 636: Performed by: EMERGENCY MEDICINE

## 2024-08-24 PROCEDURE — 250N000013 HC RX MED GY IP 250 OP 250 PS 637: Performed by: NURSE PRACTITIONER

## 2024-08-24 PROCEDURE — 250N000012 HC RX MED GY IP 250 OP 636 PS 637: Performed by: NURSE PRACTITIONER

## 2024-08-24 RX ORDER — ASPIRIN 81 MG/1
81 TABLET, CHEWABLE ORAL DAILY
Status: DISCONTINUED | OUTPATIENT
Start: 2024-08-24 | End: 2024-08-26 | Stop reason: HOSPADM

## 2024-08-24 RX ORDER — METHOCARBAMOL 750 MG/1
750 TABLET, FILM COATED ORAL EVERY 6 HOURS PRN
Status: DISCONTINUED | OUTPATIENT
Start: 2024-08-24 | End: 2024-08-26 | Stop reason: HOSPADM

## 2024-08-24 RX ORDER — ATORVASTATIN CALCIUM 40 MG/1
40 TABLET, FILM COATED ORAL EVERY EVENING
Status: DISCONTINUED | OUTPATIENT
Start: 2024-08-24 | End: 2024-08-26 | Stop reason: HOSPADM

## 2024-08-24 RX ORDER — ACETAMINOPHEN 325 MG/1
325 TABLET ORAL EVERY 4 HOURS PRN
Status: DISCONTINUED | OUTPATIENT
Start: 2024-08-24 | End: 2024-08-26 | Stop reason: HOSPADM

## 2024-08-24 RX ORDER — MYCOPHENOLIC ACID 360 MG/1
720 TABLET, DELAYED RELEASE ORAL 2 TIMES DAILY
Status: DISCONTINUED | OUTPATIENT
Start: 2024-08-24 | End: 2024-08-26 | Stop reason: HOSPADM

## 2024-08-24 RX ORDER — CIPROFLOXACIN 500 MG/1
500 TABLET, FILM COATED ORAL 2 TIMES DAILY
Status: DISCONTINUED | OUTPATIENT
Start: 2024-08-24 | End: 2024-08-26 | Stop reason: HOSPADM

## 2024-08-24 RX ORDER — VITAMIN B COMPLEX
50 TABLET ORAL DAILY
Status: DISCONTINUED | OUTPATIENT
Start: 2024-08-24 | End: 2024-08-26 | Stop reason: HOSPADM

## 2024-08-24 RX ORDER — METOCLOPRAMIDE HYDROCHLORIDE 5 MG/ML
5 INJECTION INTRAMUSCULAR; INTRAVENOUS ONCE
Status: COMPLETED | OUTPATIENT
Start: 2024-08-24 | End: 2024-08-24

## 2024-08-24 RX ORDER — METOCLOPRAMIDE 10 MG/1
10 TABLET ORAL
Status: DISCONTINUED | OUTPATIENT
Start: 2024-08-24 | End: 2024-08-24

## 2024-08-24 RX ORDER — SODIUM BICARBONATE 650 MG/1
1950 TABLET ORAL 3 TIMES DAILY
Status: DISCONTINUED | OUTPATIENT
Start: 2024-08-24 | End: 2024-08-26 | Stop reason: HOSPADM

## 2024-08-24 RX ORDER — MAGNESIUM GLYCINATE 100 MG
200 CAPSULE ORAL 2 TIMES DAILY
Status: DISCONTINUED | OUTPATIENT
Start: 2024-08-24 | End: 2024-08-26 | Stop reason: HOSPADM

## 2024-08-24 RX ORDER — CARVEDILOL 3.12 MG/1
3.12 TABLET ORAL 2 TIMES DAILY WITH MEALS
Status: DISCONTINUED | OUTPATIENT
Start: 2024-08-24 | End: 2024-08-26

## 2024-08-24 RX ORDER — DAPSONE 25 MG/1
50 TABLET ORAL DAILY
Status: DISCONTINUED | OUTPATIENT
Start: 2024-08-24 | End: 2024-08-26 | Stop reason: HOSPADM

## 2024-08-24 RX ORDER — SODIUM CHLORIDE 9 MG/ML
INJECTION, SOLUTION INTRAVENOUS ONCE
Status: COMPLETED | OUTPATIENT
Start: 2024-08-24 | End: 2024-08-24

## 2024-08-24 RX ORDER — VALGANCICLOVIR 450 MG/1
900 TABLET, FILM COATED ORAL DAILY
Status: DISCONTINUED | OUTPATIENT
Start: 2024-08-24 | End: 2024-08-26 | Stop reason: HOSPADM

## 2024-08-24 RX ORDER — NYSTATIN 100000/ML
500000 SUSPENSION, ORAL (FINAL DOSE FORM) ORAL 4 TIMES DAILY
Status: DISCONTINUED | OUTPATIENT
Start: 2024-08-24 | End: 2024-08-26

## 2024-08-24 RX ORDER — ONDANSETRON 4 MG/1
4 TABLET, ORALLY DISINTEGRATING ORAL EVERY 6 HOURS PRN
Status: DISCONTINUED | OUTPATIENT
Start: 2024-08-24 | End: 2024-08-26 | Stop reason: HOSPADM

## 2024-08-24 RX ORDER — METOCLOPRAMIDE 10 MG/1
10 TABLET ORAL 4 TIMES DAILY PRN
Status: DISCONTINUED | OUTPATIENT
Start: 2024-08-24 | End: 2024-08-25

## 2024-08-24 RX ORDER — METOCLOPRAMIDE 10 MG/1
10 TABLET ORAL 3 TIMES DAILY PRN
Status: DISCONTINUED | OUTPATIENT
Start: 2024-08-24 | End: 2024-08-24

## 2024-08-24 RX ORDER — ONDANSETRON 2 MG/ML
4 INJECTION INTRAMUSCULAR; INTRAVENOUS ONCE
Status: COMPLETED | OUTPATIENT
Start: 2024-08-24 | End: 2024-08-24

## 2024-08-24 RX ORDER — PANTOPRAZOLE SODIUM 40 MG/1
40 TABLET, DELAYED RELEASE ORAL
Status: DISCONTINUED | OUTPATIENT
Start: 2024-08-25 | End: 2024-08-26

## 2024-08-24 RX ADMIN — SODIUM CHLORIDE 1000 ML: 9 INJECTION, SOLUTION INTRAVENOUS at 07:23

## 2024-08-24 RX ADMIN — ONDANSETRON 4 MG: 2 INJECTION INTRAMUSCULAR; INTRAVENOUS at 07:25

## 2024-08-24 RX ADMIN — METOCLOPRAMIDE 5 MG: 5 INJECTION, SOLUTION INTRAMUSCULAR; INTRAVENOUS at 11:08

## 2024-08-24 RX ADMIN — SODIUM PHOSPHATE, DIBASIC, ANHYDROUS, POTASSIUM PHOSPHATE, MONOBASIC, AND SODIUM PHOSPHATE, MONOBASIC, MONOHYDRATE 500 MG: 852; 155; 130 TABLET, COATED ORAL at 14:20

## 2024-08-24 RX ADMIN — METOCLOPRAMIDE 5 MG: 5 INJECTION, SOLUTION INTRAMUSCULAR; INTRAVENOUS at 12:50

## 2024-08-24 RX ADMIN — ACETAMINOPHEN 325 MG: 325 TABLET ORAL at 21:22

## 2024-08-24 RX ADMIN — TICAGRELOR 90 MG: 90 TABLET ORAL at 19:56

## 2024-08-24 RX ADMIN — METOCLOPRAMIDE 10 MG: 10 TABLET ORAL at 19:21

## 2024-08-24 RX ADMIN — SODIUM CHLORIDE: 9 INJECTION, SOLUTION INTRAVENOUS at 11:11

## 2024-08-24 RX ADMIN — SODIUM BICARBONATE 1950 MG: 650 TABLET ORAL at 14:20

## 2024-08-24 RX ADMIN — SODIUM PHOSPHATE, DIBASIC, ANHYDROUS, POTASSIUM PHOSPHATE, MONOBASIC, AND SODIUM PHOSPHATE, MONOBASIC, MONOHYDRATE 500 MG: 852; 155; 130 TABLET, COATED ORAL at 19:53

## 2024-08-24 RX ADMIN — VALGANCICLOVIR HYDROCHLORIDE 900 MG: 450 TABLET ORAL at 14:20

## 2024-08-24 RX ADMIN — Medication 200 MG: at 22:11

## 2024-08-24 RX ADMIN — CIPROFLOXACIN HYDROCHLORIDE 500 MG: 500 TABLET, FILM COATED ORAL at 19:54

## 2024-08-24 RX ADMIN — MYCOPHENOLIC ACID 720 MG: 360 TABLET, DELAYED RELEASE ORAL at 19:53

## 2024-08-24 RX ADMIN — METHOCARBAMOL 750 MG: 750 TABLET ORAL at 21:21

## 2024-08-24 RX ADMIN — CARVEDILOL 3.12 MG: 3.12 TABLET, FILM COATED ORAL at 19:55

## 2024-08-24 RX ADMIN — METOCLOPRAMIDE 5 MG: 5 INJECTION, SOLUTION INTRAMUSCULAR; INTRAVENOUS at 10:15

## 2024-08-24 RX ADMIN — ASPIRIN 81 MG CHEWABLE TABLET 81 MG: 81 TABLET CHEWABLE at 14:20

## 2024-08-24 RX ADMIN — ATORVASTATIN CALCIUM 40 MG: 40 TABLET, FILM COATED ORAL at 19:56

## 2024-08-24 RX ADMIN — Medication 50 MCG: at 14:20

## 2024-08-24 RX ADMIN — SODIUM BICARBONATE 1950 MG: 650 TABLET ORAL at 19:52

## 2024-08-24 RX ADMIN — NYSTATIN 500000 UNITS: 100000 SUSPENSION ORAL at 19:57

## 2024-08-24 RX ADMIN — DAPSONE 50 MG: 25 TABLET ORAL at 14:20

## 2024-08-24 ASSESSMENT — ACTIVITIES OF DAILY LIVING (ADL)
ADLS_ACUITY_SCORE: 37
ADLS_ACUITY_SCORE: 35
ADLS_ACUITY_SCORE: 37
ADLS_ACUITY_SCORE: 37

## 2024-08-24 ASSESSMENT — COLUMBIA-SUICIDE SEVERITY RATING SCALE - C-SSRS
1. IN THE PAST MONTH, HAVE YOU WISHED YOU WERE DEAD OR WISHED YOU COULD GO TO SLEEP AND NOT WAKE UP?: NO
2. HAVE YOU ACTUALLY HAD ANY THOUGHTS OF KILLING YOURSELF IN THE PAST MONTH?: NO
6. HAVE YOU EVER DONE ANYTHING, STARTED TO DO ANYTHING, OR PREPARED TO DO ANYTHING TO END YOUR LIFE?: NO

## 2024-08-24 NOTE — MEDICATION SCRIBE - ADMISSION MEDICATION HISTORY
Medication Scribe Admission Medication History    Admission medication history is complete. The information provided in this note is only as accurate as the sources available at the time of the update.    Information Source(s): Patient and CareEverywhere/SureScripts via in-person    Pertinent Information: None    Changes made to PTA medication list:  Added: None  Deleted: bulk laxative/benefiber (not taking), nitroglycerin sublingual tablet (doesn't have), senna-docusate (not taking),  Changed: None    Allergies reviewed with patient and updates made in EHR: yes    Medication History Completed By: Haley Armenta 8/24/2024 9:11 AM    PTA Med List   Medication Sig Last Dose    acetaminophen (TYLENOL) 500 MG tablet Take 500 mg by mouth every 4 hours as needed 8/24/2024 at 0300    aspirin (ASA) 81 MG chewable tablet Take 1 tablet (81 mg) by mouth daily Starting tomorrow. 8/23/2024 at 0800    atorvastatin (LIPITOR) 40 MG tablet Take 1 tablet (40 mg) by mouth every evening 8/23/2024 at 2000    carvedilol (COREG) 6.25 MG tablet Take 0.5 tablets (3.125 mg) by mouth 2 times daily (with meals) for 30 days 8/23/2024 at 2000    cholecalciferol (VITAMIN D3) 25 mcg (1000 units) capsule Take 2 capsules (50 mcg) by mouth daily 8/23/2024 at 0800    ciprofloxacin (CIPRO) 500 MG tablet Take 1 tablet (500 mg) by mouth 2 times daily for 14 days 8/23/2024 at 2000    dapsone (ACZONE) 25 MG tablet Take 2 tablets (50 mg) by mouth daily 8/23/2024 at 2000    loperamide (IMODIUM A-D) 2 MG capsule Take 1 capsule (2 mg) by mouth 4 times daily as needed for diarrhea Unknown    magnesium glycinate 100 MG CAPS capsule Take 2 capsules (200 mg) by mouth 2 times daily Take in place of magnesium-oxide 8/23/2024 at 2000    methocarbamol (ROBAXIN) 750 MG tablet Take 1 tablet (750 mg) by mouth every 6 hours as needed for muscle spasms 8/22/2024 at PM    mycophenolic acid (GENERIC EQUIVALENT) 180 MG EC tablet Take 4 tablets (720 mg) by mouth 2 times  daily 8/23/2024 at 2000    nystatin (MYCOSTATIN) 805357 UNIT/ML suspension Take 5 mLs (500,000 Units) by mouth 4 times daily 8/24/2024 at AM    ondansetron (ZOFRAN ODT) 4 MG ODT tab Take 1 tablet (4 mg) by mouth every 6 hours as needed for nausea or vomiting 8/24/2024 at 0100    pantoprazole (PROTONIX) 40 MG EC tablet Take 1 tablet (40 mg) by mouth every morning (before breakfast) 8/23/2024 at 0800    phosphorus tablet 250 mg (PHOSPHA 250 NEUTRAL) 250 MG per tablet Take 2 tablets (500 mg) by mouth 3 times daily 8/23/2024 at 2000    psyllium (METAMUCIL/KONSYL) 58.6 % powder Take 6 g (1 teaspoonful) by mouth 2 times daily as needed (diarrhea) 8/19/2024    sodium bicarbonate 650 MG tablet Take 3 tablets (1,950 mg) by mouth 3 times daily 8/23/2024 at 2000    tacrolimus (GENERIC EQUIVALENT) 1 MG capsule Take 4 capsules (4 mg) by mouth 2 times daily. 8/23/2024 at 2000    ticagrelor (BRILINTA) 90 MG tablet Take 1 tablet (90 mg) by mouth or Feeding Tube 2 times daily 8/23/2024 at 2000    valGANciclovir (VALCYTE) 450 MG tablet Take 2 tablets (900 mg) by mouth daily 8/23/2024 at 0800

## 2024-08-24 NOTE — CONSULTS
[facility]  Transplant Nephrology Consult Note  Date of Admission:  8/24/2024  Today's Date: 08/24/2024  Requesting physician: Judi Valentine MD    Reason for Consult:  Kidney/pancreas transplant    Recommendations:  - No acute indications for dialysis.   - Would continue home IS  - Agree with IVF -NS  - Recommend insulin  - Would schedule antiemetic for the next day or so to stay ahead of nausea.    Assessment & Plan   # DDKT (SPK): Stable   - Baseline Creatinine: ~ 1.6-1.9   - Proteinuria: Not checked post transplant   - DSA Hx: No DSA   - Last cPRA: 11%   - BK Viremia: Not checked post transplant   - Kidney Tx Biopsy Hx: No biopsy history.    # Pancreas Tx (SPK):    - Pancreatic Exocrine Drainage: Enteric drained     - Blood glucose: Euglycemia      On insulin: No   - HbA1c: Last checked at time of transplant      Latest HbA1c: 6.4%   - Pancreatic enzymes: Stable   - DSA Hx: No DSA   - Pancreas Tx Biopsy Hx: No biopsy history    # Immunosuppression: Tacrolimus immediate release (goal 8-10) and Mycophenolic acid (dose 720 mg every 12 hours)   - Induction with Recent Transplant:  High Intensity Protocol   - Continue with intensive monitoring of immunosuppression for efficacy and toxicity.   - Historical Changes in Immunosuppression: None   - Changes: No    # Infection Prevention:      - PJP: Dapsone  - CMV: Valganciclovir (Valcyte); CMV IgG Ab positive  - CMV IgG Ab High Risk Discordance (D+/R-): No  - EBV IgG Ab High Risk Discordance (D+/R-): No    # Hypertension: Borderline control;  Goal BP: < 140/90 (Hospitalization goal)   - Changes: Not at this time    # Anemia in Chronic Renal Disease: Hgb: Trend up      KERRI: No   - Iron studies: Low iron saturation, but high ferritin    # Mineral Bone Disorder:    - Secondary renal hyperparathyroidism; PTH level: Mildly elevated (151-300 pg/ml)        On treatment: None  - Vitamin D; level: High        On supplement: No  -  Calcium; level: High        On supplement: No; Will treat with IV fluids.  - Phosphorus; level: Normal        On supplement: No    # Electrolytes:   - Potassium; level: High        On supplement: No  - Magnesium; level: Low        On supplement: No  - Bicarbonate; level: Low        On supplement: No  - Sodium; level: Stable low    #Nausea  #Vomiting:  -recommend IVF  -recommend antiemetics    # H/o VT/VF Cardiac arrest:  # CAD s/p CABG 2019 LIMA-LAD, SVG-RCA, IBAN 1/2019, IBAN to RCA 2/2024              - s/p inferior STEMI 2/2 ostial proximal RCA in stent thrombosis              - s/p 2 IBAN to RCA 7/20/2024              - Echo: LVEF=37%.Moderate diffuse hypokinesis with akinesis of all inferior segments.  Mild right ventricular dilation is present.Global  right ventricular function is moderately reduced.    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    Seen and discussed with Dr. Sorensen.    Sobia Underwood NP  Transplant Nephrology  Contact information via [employer] Web Console or via [facility] Paging/Directory        Physician Attestation     I saw and evaluated Siva Ramey as part of a shared APRN/PA visit.     I personally reviewed the vital signs, medications, and labs.    I personally provided a substantive portion of care for this patient and I approve the care plan as written by the KAT.  I was involved with Medical Decision Making including: Please see A&P for additional details of medical decision making.  MANAGEMENT DISCUSSED with the following over the past 24 hours: No acute indications for dialysis.  Would continue on present immunosuppression.  Recommend IV fluids with NS for both high  "serum potassium and serum calcium levels.  Recommend scheduled antiemetics for the next day or so.     Fernando Sorensen MD  Date of Service (when I saw the patient): 24      History of Present Illness  Siva Ramey is an 49 year old male with PMH significant for DMII (on insulin pump) and resulting ESKD (on HD every MWF since 2021). PMH also significant for h/o CAD s/p PCI with IBAN 2019 and 2 vessel CABG in  (currently only on ASA), PAD, COVID-19, HTN, obesity, left diaphragmatic elevation s/p CABG, anxiety and tooth abscess jaw osteomyelitis 2023. Underwent simultaneous kidney pancreas transplant on 24 complicated by VT/VF arrest in PACU. Received 2 rounds of CPR before ROSC. Taken emergently to the cath lab and found to have 100% occlusion of the RCA now s/p IBAN x 2.     Presents with N/V and abdominal pain.    He states no one around him has been ill.  No chest pain or shortness of breath,      Review of Systems   The 10 point Review of Systems is negative other than noted in the HPI or here.      MEDICATIONS:  No current facility-administered medications for this encounter.     No current facility-administered medications for this encounter.       Physical Exam   Temp  Av.2  F (36.8  C)  Min: 98.2  F (36.8  C)  Max: 98.2  F (36.8  C)      Pulse  Av.9  Min: 104  Max: 116 Resp  Av  Min: 16  Max: 16  SpO2  Av.7 %  Min: 98 %  Max: 100 %     BP (!) 171/96   Pulse 108   Temp 98.2  F (36.8  C) (Oral)   Resp 16   Ht 1.778 m (5' 10\")   Wt 100.2 kg (221 lb)   SpO2 98%   BMI 31.71 kg/m      Admit Weight: 100.2 kg (221 lb)     GENERAL APPEARANCE: alert and no distress  HENT: mouth without ulcers or lesions  RESP: lungs clear to auscultation - no rales, rhonchi or wheezes  CV: regular rhythm, normal rate, no rub, no murmur  EDEMA: no LE edema bilaterally  ABDOMEN: soft, nondistended, nontender, bowel sounds normal  MS: extremities normal - no gross deformities noted, no " evidence of inflammation in joints, no muscle tenderness  SKIN: no rash  TX KIDNEY: normal    Data   All labs reviewed by me.  CMP  Recent Labs   Lab 08/24/24  0718 08/22/24  0738 08/19/24  0749   * 138 138   POTASSIUM 5.9* 5.5* 5.1   CHLORIDE 104 107 106   CO2 20* 20* 22   ANIONGAP 10 11 10   * 144* 145*   BUN 16.0 13.4 13.6   CR 1.75* 1.73* 1.85*   GFRESTIMATED 47* 48* 44*   BETHANY 11.0* 10.1 9.7   MAG  --  1.3* 1.3*   PHOS  --  2.5 2.1*   PROTTOTAL 7.2  --   --    ALBUMIN 4.4  --   --    BILITOTAL 0.6  --   --    ALKPHOS 186*  --   --    AST 26  --   --    ALT 32  --   --      CBC  Recent Labs   Lab 08/24/24  0718 08/22/24  0738 08/19/24  0749   HGB 10.5* 9.1* 8.3*   WBC 6.2 3.5* 3.8*   RBC 3.60* 3.14* 2.82*   HCT 34.3* 29.9* 27.0*   MCV 95 95 96   MCH 29.2 29.0 29.4   MCHC 30.6* 30.4* 30.7*   RDW 15.3* 14.9 14.6    319 303     INRNo lab results found in last 7 days.  ABGNo lab results found in last 7 days.   Urine Studies  Recent Labs   Lab Test 08/24/24  1056 08/12/24  1141 07/20/24  0609 09/27/21  1220 07/24/21  1151 06/25/19  1439 06/19/19  0822   COLOR Light Yellow Yellow Orange* Yellow   < > Straw Straw   APPEARANCE Slightly Cloudy* Slightly Cloudy* Slightly Cloudy* Slightly Cloudy*   < > Clear Clear   URINEGLC >=1000* 50* 50* 150*   < > 200 mg/dL* >1000 mg/dL*   URINEBILI Negative Negative Negative Negative   < > Negative Negative   URINEKETONE Negative Negative Negative Negative   < > Negative Negative   SG 1.017 1.022 1.013 1.013   < > 1.010 1.012   UBLD Large* Large* Large* Negative   < > Moderate* Trace*   URINEPH 7.0 6.0 6.5 6.0   < > 6.5 6.0   PROTEIN 100* 100* 300* >499*   < > 200 mg/dL* 300 mg/dL*   UROBILINOGEN  --   --   --   --   --  <2.0 E.U./dL <2.0 E.U./dL   NITRITE Negative Negative Negative Negative   < > Negative Negative   LEUKEST Negative Trace* Trace* Small*   < > Negative Negative   RBCU >182* >182* >182* 2   < > 25-50* 0-2   WBCU 9* 23* 105* 4   < > 0-5 0-5    < > =  values in this interval not displayed.     No lab results found.  PTH  Recent Labs   Lab Test 08/05/24  0758 07/25/24  0448 07/20/21  1845   PTHI 263* 199* 190*     Iron Studies  Recent Labs   Lab Test 08/05/24  0758   IRON 44*      IRONSAT 17   KADEN 1,725*       IMAGING:  All imaging studies reviewed by me.    Past Medical History    I have reviewed this patient's medical history and updated it with pertinent information if needed.   Past Medical History:   Diagnosis Date    Acquired elevated diaphragm     Anemia in chronic kidney disease     Angina pectoris (H24)     Chronic kidney disease     Coronary artery disease     Diabetes mellitus, type II (H) 12/2001    Diabetic nephropathy (H)     MARQUEZ (dyspnea on exertion)     Dyslipidemia 12/2001    End stage renal disease (H)     History of blood transfusion 2004    Hypertension     takes medication    Ischemic cardiomyopathy     Metabolic acidosis     Myocardial infarction (H)     STEMI -Diagonal branch of the LAD    Obesity (BMI 30-39.9)     Peripheral neuropathy     Proteinuria     Retinopathy     Vitamin D deficiency        Past Surgical History   I have reviewed this patient's surgical history and updated it with pertinent information if needed.  Past Surgical History:   Procedure Laterality Date    APPENDECTOMY OPEN N/A 7/19/2024    Procedure: Appendectomy open;  Surgeon: Harrison Whitehead MD;  Location:  OR    BACK SURGERY  2009    L5 disc cut    BENCH KIDNEY  7/19/2024    Procedure: Bench kidney;  Surgeon: Harrison Whitehead MD;  Location:  OR    BENCH PANCREAS N/A 7/19/2024    Procedure: Bench pancreas;  Surgeon: Harrison Whitehead MD;  Location:  OR    BYPASS GRAFT ARTERY CORONARY  06/20/2019    2 vessels    CV CENTRAL VENOUS CATHETER PLACEMENT N/A 7/20/2024    Procedure: Central Venous Catheter Placement;  Surgeon: Dominic Robertson MD;  Location:  HEART CARDIAC CATH LAB    CV CORONARY  ANGIOGRAM N/A 01/13/2019    Procedure: Coronary Angiogram;  Surgeon: Cielo Che MD;  Location: [facility] Cath Lab;  Service: Cardiology    CV CORONARY ANGIOGRAM N/A 05/02/2019    Procedure: Coronary Angiogram;  Surgeon: Cielo Che MD;  Location: [facility] Cath Lab;  Service: Cardiology    CV CORONARY ANGIOGRAM N/A 04/30/2020    Procedure: Coronary Angiogram;  Surgeon: Cielo Che MD;  Location: [facility] Cath Lab;  Service: Cardiology    CV CORONARY ANGIOGRAM N/A 07/22/2021    Procedure: CV CORONARY ANGIOGRAM;  Surgeon: Adrian Crow MD;  Location: [facility] LAB CV    CV CORONARY ANGIOGRAM N/A 02/01/2024    Procedure: Coronary Angiogram;  Surgeon: Delroy Carpio MD;  Location: [facility] CARDIAC CATH LAB    CV CORONARY ANGIOGRAM N/A 7/20/2024    Procedure: Coronary Angiogram;  Surgeon: Dominic Robertson MD;  Location: [facility] CARDIAC CATH LAB    CV CORONARY LITHOTRIPSY PCI N/A 7/20/2024    Procedure: Percutaneous Coronary Intervention - Lithotripsy;  Surgeon: Dominic Robertson MD;  Location: [facility] CARDIAC CATH LAB    CV FRACTIONAL FLOW RATIO WIRE N/A 07/22/2021    Procedure: Fractional Flow Ratio Wire;  Surgeon: Adrian Crow MD;  Location: [facility] LAB CV    CV INTRAVASULAR ULTRASOUND N/A 7/20/2024    Procedure: Intravascular Ultrasound;  Surgeon: Dominic Robertson MD;  Location: [facility] CARDIAC CATH LAB    CV LEFT HEART CATH N/A 07/22/2021    Procedure: Left Heart Cath;  Surgeon: Adrian Crow MD;  Location: [facility] LAB CV    CV LEFT HEART CATHETERIZATION WITH LEFT VENTRICULOGRAM N/A 01/13/2019    Procedure: Left Heart Catheterization with Left Ventriculogram;  Surgeon: Cielo Che MD;  Location: [facility] Cath Lab;  Service: Cardiology    CV LEFT HEART CATHETERIZATION WITHOUT LEFT VENTRICULOGRAM Left 04/30/2020    Procedure: Left Heart Catheterization Without Left Ventriculogram;  Surgeon: Cielo Che MD;   Location: [facility] Cath Lab;  Service: Cardiology    CV PCI N/A 2024    Procedure: Percutaneous Coronary Intervention;  Surgeon: Delroy Carpio MD;  Location:  HEART CARDIAC CATH LAB    CV PCI N/A 2024    Procedure: Percutaneous Coronary Intervention;  Surgeon: Dominic Robertson MD;  Location: [facility] CARDIAC CATH LAB    CV PCI ASPIRATION THROMECTOMY N/A 2024    Procedure: Percutaneous Coronary Intervention Aspiration Thrombectomy;  Surgeon: Dominic Robertson MD;  Location: [facility] CARDIAC CATH LAB    CV PCI STENT DRUG ELUTING N/A 2024    Procedure: Percutaneous Coronary Intervention Stent;  Surgeon: Dominic Robertson MD;  Location: [facility] CARDIAC CATH LAB    CV RIGHT HEART CATHETERIZATION N/A 2020    Procedure: Right Heart Catheterization;  Surgeon: Cielo Che MD;  Location: [facility] Cath Lab;  Service: Cardiology    EYE SURGERY      GENITOURINARY SURGERY      HERNIA REPAIR      OTHER SURGICAL HISTORY      Excise varicocele    STENT, CORONARY, DALE      TRANSPLANT PANCREAS, KIDNEY  DONOR, COMBINED N/A 2024    Procedure: Transplant pancreas, kidney  donor, with ureteral stent placement;  Surgeon: Harrison Whitehead MD;  Location:  OR    VASCULAR SURGERY         Family History   I have reviewed this patient's family history and updated it with pertinent information if needed.   Family History   Problem Relation Age of Onset    Diabetes Type 2  Mother     Heart Disease Father 60    CABG Father 50        triple bypass    Diabetes Type 2  Father     Depression Sister     Substance Abuse Sister     Ovarian Cancer Maternal Grandmother     Brain Cancer Maternal Grandmother     Pancreatic Cancer Maternal Aunt     Prostate Cancer Maternal Uncle        Social History   I have reviewed this patient's social history and updated it with pertinent information if needed. Siva Ramey  reports that he  has never smoked. He has been exposed to tobacco smoke. He has never used smokeless tobacco. He reports that he does not drink alcohol and does not use drugs.    Prior to Admission Medications   (Not in a hospital admission)

## 2024-08-24 NOTE — H&P
SOT H&P Pancreas Transplant     HPI:   Siva Ramey is an 49 year old male with PMH significant for DMII (on insulin pump) and resulting ESKD (on HD every MWF since 8/2021). 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 Underwent simultaneous kidney pancreas transplant on 7/20/24 complicated by VT/VF arrest in PACU. Received 2 rounds of CPR before ROSC. Taken emergently to the cath lab and found to have 100% occlusion of the RCA now s/p IBAN x 2.     Now returns with nausea/vomiting and abdominal pain and DELISA. Lipase pending.     ____________________________________________________________________    # DDKT (SPK): Cr slight Trend up Doing well. SIPC tomorrow.               - Baseline Creatinine: ~ TBD              - Proteinuria: Not checked recently              - Date DSA Last Checked: Jul/2024      Latest DSA: No DSA at time of transplant              - BK Viremia: No              - Kidney Tx Biopsy: No              - Transplant Ureteral Stent: Yes                # Pancreas Tx (SPK):               - Pancreatic Exocrine Drainage: Enteric drained                     - Blood glucose: Near euglycemia           On insulin: No              - HbA1c: Last checked at time of transplant      Latest HbA1c: 6.4%              - Pancreatic enzymes: lipase high but down trending               - DSA Hx: No DSA              - Pancreas Tx Biopsy Hx: No biopsy history     # Immunosuppression: Tacrolimus immediate release (goal 8-10) and Mycophenolate mofetil (dose 1000 mg every 12 hours)              - Induction with Recent Transplant:  High Intensity Protocol              - Continue with intensive monitoring of immunosuppression for efficacy and toxicity.              - Changes: Not at this time      # : # VT/VF Cardiac arrest:  # CAD s/p CABG 2019  "LIMA-LAD, SVG-RCA, IBAN 1/2019, IBAN to RCA 2/2024              - s/p inferior STEMI 2/2 ostial proximal RCA in stent thrombosis              - s/p 2 IBAN to RCA 7/20/2024              - Echo 7/20: LVEF=37%.Moderate diffuse hypokinesis with akinesis of all inferior segments.Mild right ventricular dilation is present.Global  right ventricular function is moderately reduced.              ECHo 7/28 :Left ventricular size is normal. Left ventricular function is decreased. The ejection fraction is 35-40% (moderately reduced). Akinesis of the basal-mid  inferior and basal inferoseptal segments present.              -following with cardiology outpatient         # Transplant History:  Etiology of Kidney Failure: Diabetes mellitus type 2  Tx: DDKT (SPK)  Transplant: 7/20/2024 (Kidney / Pancreas)  Donor Type: Donation after Brain Death Donor Class:    Crossmatch at time of Tx: negative  DSA at time of Tx: No  Significant changes in immunosuppression: None  CMV IgG Ab High Risk Discordance (D+/R-): No  EBV IgG Ab High Risk Discordance (D+/R-): No  Significant transplant-related complications:  cardiac arrest post SPK due to STEMI      Vital signs:  Temp: 98.2  F (36.8  C) Temp src: Oral BP: (!) 157/96 Pulse: 111   Resp: 16 SpO2: 98 % O2 Device: None (Room air)   Height: 177.8 cm (5' 10\") Weight: 100.2 kg (221 lb)  Estimated body mass index is 31.71 kg/m  as calculated from the following:    Height as of this encounter: 1.778 m (5' 10\").    Weight as of this encounter: 100.2 kg (221 lb).    Exam:    Awake, alert  RRR  Normal respiratory rate  Abdomen soft, nontender, nondistended  Normal skin color/turgor  2+ pulses  No LE edema    CBC RESULTS:   Recent Labs   Lab Test 08/24/24  0718   WBC 6.2   RBC 3.60*   HGB 10.5*   HCT 34.3*   MCV 95   MCH 29.2   MCHC 30.6*   RDW 15.3*           Last Comprehensive Metabolic Panel:  Sodium   Date Value Ref Range Status   08/24/2024 134 (L) 135 - 145 mmol/L Final     Potassium   Date " Value Ref Range Status   08/24/2024 5.9 (H) 3.4 - 5.3 mmol/L Final   09/27/2021 4.4 3.4 - 5.3 mmol/L Final     Potassium POCT   Date Value Ref Range Status   07/20/2024 6.0 (H) 3.4 - 5.3 mmol/L Final     Comment:     CRITICAL VALUES NOTED AND REPORTED TO MD     Chloride   Date Value Ref Range Status   08/24/2024 104 98 - 107 mmol/L Final   09/27/2021 101 94 - 109 mmol/L Final     Carbon Dioxide (CO2)   Date Value Ref Range Status   08/24/2024 20 (L) 22 - 29 mmol/L Final   09/27/2021 28 20 - 32 mmol/L Final     Anion Gap   Date Value Ref Range Status   08/24/2024 10 7 - 15 mmol/L Final   09/27/2021 8 3 - 14 mmol/L Final     Glucose   Date Value Ref Range Status   08/24/2024 201 (H) 70 - 99 mg/dL Final   09/27/2021 222 (H) 70 - 99 mg/dL Final     GLUCOSE BY METER POCT   Date Value Ref Range Status   07/30/2024 123 (H) 70 - 99 mg/dL Final     Urea Nitrogen   Date Value Ref Range Status   08/24/2024 16.0 6.0 - 20.0 mg/dL Final   09/27/2021 46 (H) 7 - 30 mg/dL Final     Creatinine   Date Value Ref Range Status   08/24/2024 1.75 (H) 0.67 - 1.17 mg/dL Final     GFR Estimate   Date Value Ref Range Status   08/24/2024 47 (L) >60 mL/min/1.73m2 Final     Comment:     eGFR calculated using 2021 CKD-EPI equation.   10/05/2020 23 (L) >60 mL/min/1.73m2 Final     Calcium   Date Value Ref Range Status   08/24/2024 11.0 (H) 8.8 - 10.4 mg/dL Final     Comment:     Reference intervals for this test were updated on 7/16/2024 to reflect our healthy population more accurately. There may be differences in the flagging of prior results with similar values performed with this method. Those prior results can be interpreted in the context of the updated reference intervals.     Bilirubin Total   Date Value Ref Range Status   08/24/2024 0.6 <=1.2 mg/dL Final     Alkaline Phosphatase   Date Value Ref Range Status   08/24/2024 186 (H) 40 - 150 U/L Final     ALT   Date Value Ref Range Status   08/24/2024 32 0 - 70 U/L Final     AST   Date Value  Ref Range Status   08/24/2024 26 0 - 45 U/L Final         Plan:  Admit to transplant  Prn pain control  HDS  RA  NPO/IVF  DELISA, pancreas and renal U/S  Follow up CT scan read  Prograf levels, restart IS  No abx, follow up infectious workup    Anuj Blanco MD  Transplant Surgery Fellow          Anuj Blanco MD  Transplant Surgery Fellow      Attestation: I saw and examined the patient with the transplant team. I independently reviewed all pertinent laboratory and imaging information and made independent management decisions including immunosuppression adjustment as indicted. I agree with the findings and plan as documented in this note.  Harrison Whitehead MD

## 2024-08-24 NOTE — ED PROVIDER NOTES
ED Provider Note  [facility]      History     Chief Complaint   Patient presents with     Nausea & Vomiting     HPI  Siva Ramey is a 49 year old male with a past medical history of diabetes type 2 status post kidney and pancreas transplant on July 20, 2024, CABG, history of cardiac arrest after transplant who presents with vomiting for the past 12 hours.  He states his stomach has been upset and he has tenderness throughout his middle abdomen.  He denies diarrhea but states he is having soft stool.  He denies fevers or shortness of breath.  He recently ate a peanut butter and jelly sandwich.  Nobody else in his family is sick or symptomatic.  He denies current chest pain or shortness of breath.  He states he used to be on dialysis but no longer undergoes dialysis since the transplant.  Social: Here with son Elder    Past Medical History  Past Medical History:   Diagnosis Date     Acquired elevated diaphragm      Anemia in chronic kidney disease      Angina pectoris (H24)      Chronic kidney disease      Coronary artery disease      Diabetes mellitus, type II (H) 12/2001     Diabetic nephropathy (H)      MARQUEZ (dyspnea on exertion)      Dyslipidemia 12/2001     End stage renal disease (H)      History of blood transfusion 2004     Hypertension     takes medication     Ischemic cardiomyopathy      Metabolic acidosis      Myocardial infarction (H)     STEMI -Diagonal branch of the LAD     Obesity (BMI 30-39.9)      Peripheral neuropathy      Proteinuria      Retinopathy      Vitamin D deficiency      Past Surgical History:   Procedure Laterality Date     APPENDECTOMY OPEN N/A 7/19/2024    Procedure: Appendectomy open;  Surgeon: Harrison Whitehead MD;  Location: [facility] OR     BACK SURGERY  2009    L5 disc cut     BENCH KIDNEY  7/19/2024    Procedure: Bench kidney;  Surgeon: Harrison Whitehead MD;  Location:  OR     BENCH PANCREAS N/A 7/19/2024    Procedure: Bench  pancreas;  Surgeon: Harrison Whitehead MD;  Location: [facility] OR     BYPASS GRAFT ARTERY CORONARY  06/20/2019    2 vessels     CV CENTRAL VENOUS CATHETER PLACEMENT N/A 7/20/2024    Procedure: Central Venous Catheter Placement;  Surgeon: Dominic Robertson MD;  Location:  HEART CARDIAC CATH LAB     CV CORONARY ANGIOGRAM N/A 01/13/2019    Procedure: Coronary Angiogram;  Surgeon: Cielo Che MD;  Location: [facility] Cath Lab;  Service: Cardiology     CV CORONARY ANGIOGRAM N/A 05/02/2019    Procedure: Coronary Angiogram;  Surgeon: Cielo Che MD;  Location: [facility] Cath Lab;  Service: Cardiology     CV CORONARY ANGIOGRAM N/A 04/30/2020    Procedure: Coronary Angiogram;  Surgeon: Cielo Che MD;  Location: [facility] Cath Lab;  Service: Cardiology     CV CORONARY ANGIOGRAM N/A 07/22/2021    Procedure: CV CORONARY ANGIOGRAM;  Surgeon: Adrian Crow MD;  Location: [facility] LAB CV     CV CORONARY ANGIOGRAM N/A 02/01/2024    Procedure: Coronary Angiogram;  Surgeon: Delroy Carpio MD;  Location: [facility] CARDIAC CATH LAB     CV CORONARY ANGIOGRAM N/A 7/20/2024    Procedure: Coronary Angiogram;  Surgeon: Dominic Robertson MD;  Location: [facility] CARDIAC CATH LAB     CV CORONARY LITHOTRIPSY PCI N/A 7/20/2024    Procedure: Percutaneous Coronary Intervention - Lithotripsy;  Surgeon: Dominic Robertson MD;  Location:  HEART CARDIAC CATH LAB     CV FRACTIONAL FLOW RATIO WIRE N/A 07/22/2021    Procedure: Fractional Flow Ratio Wire;  Surgeon: Adrian Crow MD;  Location: [facility] LAB CV     CV INTRAVASULAR ULTRASOUND N/A 7/20/2024    Procedure: Intravascular Ultrasound;  Surgeon: Dominic Robertson MD;  Location: [facility] CARDIAC CATH LAB     CV LEFT HEART CATH N/A 07/22/2021    Procedure: Left Heart Cath;  Surgeon: Adrian Crow MD;  Location: [facility] CV     CV LEFT HEART CATHETERIZATION WITH LEFT VENTRICULOGRAM N/A  2019    Procedure: Left Heart Catheterization with Left Ventriculogram;  Surgeon: Cielo Che MD;  Location: [facility] Cath Lab;  Service: Cardiology     CV LEFT HEART CATHETERIZATION WITHOUT LEFT VENTRICULOGRAM Left 2020    Procedure: Left Heart Catheterization Without Left Ventriculogram;  Surgeon: Cielo Che MD;  Location: [facility] Cath Lab;  Service: Cardiology     CV PCI N/A 2024    Procedure: Percutaneous Coronary Intervention;  Surgeon: Delroy Carpio MD;  Location: [facility] CARDIAC CATH LAB     CV PCI N/A 2024    Procedure: Percutaneous Coronary Intervention;  Surgeon: Dominic Robertson MD;  Location:  HEART CARDIAC CATH LAB     CV PCI ASPIRATION THROMECTOMY N/A 2024    Procedure: Percutaneous Coronary Intervention Aspiration Thrombectomy;  Surgeon: Dominic Robertson MD;  Location: [facility] CARDIAC CATH LAB     CV PCI STENT DRUG ELUTING N/A 2024    Procedure: Percutaneous Coronary Intervention Stent;  Surgeon: Dominic Robertson MD;  Location: [facility] CARDIAC CATH LAB     CV RIGHT HEART CATHETERIZATION N/A 2020    Procedure: Right Heart Catheterization;  Surgeon: Cielo Che MD;  Location: [facility] Cath Lab;  Service: Cardiology     EYE SURGERY       GENITOURINARY SURGERY       HERNIA REPAIR       OTHER SURGICAL HISTORY      Excise varicocele     STENT, CORONARY, DALE       TRANSPLANT PANCREAS, KIDNEY  DONOR, COMBINED N/A 2024    Procedure: Transplant pancreas, kidney  donor, with ureteral stent placement;  Surgeon: Harrison Whitehead MD;  Location: [facility] OR     VASCULAR SURGERY       acetaminophen (TYLENOL) 500 MG tablet  aspirin (ASA) 81 MG chewable tablet  atorvastatin (LIPITOR) 40 MG tablet  carvedilol (COREG) 6.25 MG tablet  cholecalciferol (VITAMIN D3) 25 mcg (1000 units) capsule  ciprofloxacin (CIPRO) 500 MG tablet  dapsone (ACZONE) 25 MG tablet  loperamide  "(IMODIUM A-D) 2 MG capsule  magnesium glycinate 100 MG CAPS capsule  methocarbamol (ROBAXIN) 750 MG tablet  mycophenolic acid (GENERIC EQUIVALENT) 180 MG EC tablet  nystatin (MYCOSTATIN) 384264 UNIT/ML suspension  ondansetron (ZOFRAN ODT) 4 MG ODT tab  pantoprazole (PROTONIX) 40 MG EC tablet  phosphorus tablet 250 mg (PHOSPHA 250 NEUTRAL) 250 MG per tablet  psyllium (METAMUCIL/KONSYL) 58.6 % powder  sodium bicarbonate 650 MG tablet  tacrolimus (GENERIC EQUIVALENT) 1 MG capsule  ticagrelor (BRILINTA) 90 MG tablet  valGANciclovir (VALCYTE) 450 MG tablet      Allergies   Allergen Reactions     Amoxicillin Hives     & generalized pain    Tolerated ceftriaxone in 2023 ([facility]) and 2024 ([facility])     Venlafaxine      lethargic     Family History  Family History   Problem Relation Age of Onset     Diabetes Type 2  Mother      Heart Disease Father 60     CABG Father 50        triple bypass     Diabetes Type 2  Father      Depression Sister      Substance Abuse Sister      Ovarian Cancer Maternal Grandmother      Brain Cancer Maternal Grandmother      Pancreatic Cancer Maternal Aunt      Prostate Cancer Maternal Uncle      Social History   Social History     Tobacco Use     Smoking status: Never     Passive exposure: Past     Smokeless tobacco: Never   Substance Use Topics     Alcohol use: Never     Drug use: No      A medically appropriate review of systems was performed with pertinent positives and negatives noted in the HPI, and all other systems negative.    Physical Exam   BP: (!) 157/96  Pulse: 116  Temp: 98.2  F (36.8  C)  Resp: 16  Height: 177.8 cm (5' 10\")  Weight: 100.2 kg (221 lb)  SpO2: 99 %  Physical Exam  Physical Exam   Constitutional:   well nourished, well developed, lying flat on his back with no respiratory difficulty, appears uncomfortable.  HENT:   Head: Normocephalic and atraumatic.   Eyes: Conjunctivae are normal. Pupils are equal, round, and reactive to light.   pharynx has no " erythema or exudate, mucous membranes are dry  Neck:   no adenopathy, no bony tenderness  Cardiovascular: Tachycardic without murmurs or gallops  Pulmonary/Chest: Clear to auscultation bilaterally, with no wheezes or retractions. No respiratory distress.  GI: Soft with good bowel sounds.  Diffusely, non-distended, with no guarding, no rebound, no peritoneal signs.   Back:  No bony or CVA tenderness   Musculoskeletal:  no edema  Skin: Skin is warm and dry. No rash noted.  Fistula in left upper extremity (not currently being used)  Neurological: alert and oriented to person, place, and time. Nonfocal exam  Psychiatric:  normal mood and affect.     ED Course, Procedures, & Data      Procedures             EKG Interpretation:      Interpreted by Judi Valentine MD  Time reviewed:0750 am   Symptoms at time of EKG: see hpi   Rhythm: Normal sinus  and Sinus tachycardia  Rate: 100-110  Axis: Normal  Ectopy: None  Conduction: Normal  ST Segments/ T Waves: No acute ischemic changes and T wave inversion III and aVF  Q Waves:  III, V1  Comparison to prior:  7/25/24: Sinus tachycardia rate of 105 bpm with ST elevation in lead III and inverted T waves in lateral light    Clinical Impression: Sinus tachycardia, rate of 108 bpm with inverted T waves in leads III and aVF     Results for orders placed or performed during the hospital encounter of 08/24/24   CT Abdomen Pelvis w/o Contrast     Status: None (Preliminary result)    Narrative    EXAMINATION: CT ABDOMEN PELVIS W/O CONTRAST, 8/24/2024 9:10 AM    TECHNIQUE:  Helical CT images from the thoracic inlet through the  symphysis pubis were obtained  with contrast. Contrast dose:    COMPARISON: CT abdomen 8/12/2024    HISTORY: Vomiting and diffuse abdominal pain and patient status post  kidney and pancreas transplant on July 20, 2024    FINDINGS:  Lower chest: Bilateral gynecomastia. Post-CABG surgical changes with  extensive atherosclerotic calcifications of the coronary arteries  and  grafts. Bandlike area of consolidation at the left lung base, likely  atelectasis, unchanged.    Liver: Normal noncontrast appearance.    Biliary System: Calcified dependently layering intraluminal  gallstones. No biliary ductal dilatation.    Spleen: Splenomegaly as before.    Pancreas: Unremarkable noncontrast appearance of the native pancreas.  Right lower quadrant pancreas transplant, with surrounding mild  inflammatory changes and fat stranding. No new or enlarging fluid  collections.    Adrenal glands: Within normal limits    Kidneys: Atrophic bilateral native kidneys, with unchanged left and  right simple and calcified cystic lesions. Left lower quadrant  transplant kidney with double-J ureteral stent extending from the  renal pelvis to the urinary bladder lumen. No peritransplant fluid  collections or hydronephrosis.    Gastrointestinal: Normal course and caliber. Normal appendix there  mild distal esophageal thickening. Change due to material in the right  lower quadrant related to pancreas transplant. Sigmoid diverticulosis.    Peritoneum: No free air or fluid.    Lymph nodes: No lymphadenopathy    Vasculature: Poorly evaluated without IV contrast.    Reproductive Organs: Within normal limits    Bladder: Within normal limits    Abdominal wall: Healing midline incision with surrounding mild  subcutaneous edema. Right inguinal hernia repair.    Musculoskeletal: Within normal limits      Impression    IMPRESSION: Compared to prior CT from 8/12/2024      1. Postsurgical changes of right lower quadrant pancreatic and left  lower quadrant renal transplant.  2. Increased fat stranding seen along the pancreatic transplant,  consider correlation with pancreatic enzymes to evaluate for  pancreatitis, no new pancreatic collection.   3. Minimal increase in left lower quadrant perinephric streakiness,  consider correlation with urinalysis for infection.  4. Additional incidental findings similar to prior with left  basilar  densities representing atelectasis/consolidation               US Pancreas Transplant     Status: None (Preliminary result)    Impression    RESIDENT PRELIMINARY INTERPRETATION  IMPRESSION:   1.  Peritransplant fluid collection measuring up to 3.3 cm, better  appreciated on same day CT.  2.  No evidence of arterial or venous stenosis.       US Renal Transplant with Doppler     Status: None (Preliminary result)    Impression    RESIDENT PRELIMINARY INTERPRETATION  IMPRESSION:   Circumferential urothelial thickening of the extended transplant  ureter. Otherwise unremarkable sonographic evaluation of the  transplant kidney   [facility] Draw     Status: None    Narrative    The following orders were created for panel order [facility].  Procedure                               Abnormality         Status                     ---------                               -----------         ------                     Extra Blue Top Tube[[id number]]                              Final result               Extra Red Top Tube[[id number]]                               Final result               Extra Green Top (Lithium...[[id number]]                                                 Extra Purple Top Tube[[id number]]                                                         Please view results for these tests on the individual orders.   Comprehensive metabolic panel     Status: Abnormal   Result Value Ref Range    Sodium 134 (L) 135 - 145 mmol/L    Potassium 5.9 (H) 3.4 - 5.3 mmol/L    Carbon Dioxide (CO2) 20 (L) 22 - 29 mmol/L    Anion Gap 10 7 - 15 mmol/L    Urea Nitrogen 16.0 6.0 - 20.0 mg/dL    Creatinine 1.75 (H) 0.67 - 1.17 mg/dL    GFR Estimate 47 (L) >60 mL/min/1.73m2    Calcium 11.0 (H) 8.8 - 10.4 mg/dL    Chloride 104 98 - 107 mmol/L    Glucose 201 (H) 70 - 99 mg/dL    Alkaline Phosphatase 186 (H) 40 - 150 U/L    AST 26 0 - 45 U/L    ALT 32 0 - 70 U/L    Protein Total 7.2 6.4 - 8.3 g/dL    Albumin 4.4 3.5 - 5.2 g/dL     Bilirubin Total 0.6 <=1.2 mg/dL   Extra Blue Top Tube     Status: None   Result Value Ref Range    Hold Specimen JIC    Extra Red Top Tube     Status: None   Result Value Ref Range    Hold Specimen JIC    CBC with platelets and differential     Status: Abnormal   Result Value Ref Range    WBC Count 6.2 4.0 - 11.0 10e3/uL    RBC Count 3.60 (L) 4.40 - 5.90 10e6/uL    Hemoglobin 10.5 (L) 13.3 - 17.7 g/dL    Hematocrit 34.3 (L) 40.0 - 53.0 %    MCV 95 78 - 100 fL    MCH 29.2 26.5 - 33.0 pg    MCHC 30.6 (L) 31.5 - 36.5 g/dL    RDW 15.3 (H) 10.0 - 15.0 %    Platelet Count 343 150 - 450 10e3/uL    % Neutrophils 92 %    % Lymphocytes 3 %    % Monocytes 3 %    % Eosinophils 0 %    % Basophils 1 %    % Immature Granulocytes 1 %    NRBCs per 100 WBC 0 <1 /100    Absolute Neutrophils 5.8 1.6 - 8.3 10e3/uL    Absolute Lymphocytes 0.2 (L) 0.8 - 5.3 10e3/uL    Absolute Monocytes 0.2 0.0 - 1.3 10e3/uL    Absolute Eosinophils 0.0 0.0 - 0.7 10e3/uL    Absolute Basophils 0.1 0.0 - 0.2 10e3/uL    Absolute Immature Granulocytes 0.0 <=0.4 10e3/uL    Absolute NRBCs 0.0 10e3/uL   Lipase     Status: Normal   Result Value Ref Range    Lipase 58 13 - 60 U/L   UA with Microscopic reflex to Culture     Status: Abnormal    Specimen: Urine, Midstream   Result Value Ref Range    Color Urine Light Yellow Colorless, Straw, Light Yellow, Yellow    Appearance Urine Slightly Cloudy (A) Clear    Glucose Urine >=1000 (A) Negative mg/dL    Bilirubin Urine Negative Negative    Ketones Urine Negative Negative mg/dL    Specific Gravity Urine 1.017 1.003 - 1.035    Blood Urine Large (A) Negative    pH Urine 7.0 5.0 - 7.0    Protein Albumin Urine 100 (A) Negative mg/dL    Urobilinogen Urine Normal Normal, 2.0 mg/dL    Nitrite Urine Negative Negative    Leukocyte Esterase Urine Negative Negative    Mucus Urine Present (A) None Seen /LPF    RBC Urine >182 (H) <=2 /HPF    WBC Urine 9 (H) <=5 /HPF    Narrative    Urine Culture not indicated   Lactic acid whole  blood with 1x repeat in 2 hr when >2     Status: Normal   Result Value Ref Range    Lactic Acid, Initial 1.0 0.7 - 2.0 mmol/L   EKG 12-lead, tracing only     Status: None   Result Value Ref Range    Systolic Blood Pressure  mmHg    Diastolic Blood Pressure  mmHg    Ventricular Rate 108 BPM    Atrial Rate 108 BPM    ND Interval 162 ms    QRS Duration 112 ms     ms    QTc 463 ms    P Axis 63 degrees    R AXIS 31 degrees    T Axis -21 degrees    Interpretation ECG       Sinus tachycardia  Cannot rule out Inferior infarct , age undetermined  Abnormal ECG    Unconfirmed report - interpretation of this ECG is computer generated - see medical record for final interpretation  Confirmed by - EMERGENCY ROOM, PHYSICIAN ([id number]),  MAXX CONTI ([id number]) on 8/24/2024 8:24:37 AM     CBC with platelets differential     Status: Abnormal    Narrative    The following orders were created for panel order CBC with platelets differential.  Procedure                               Abnormality         Status                     ---------                               -----------         ------                     CBC with platelets and d...[[id number]]  Abnormal            Final result                 Please view results for these tests on the individual orders.     Medications   metoclopramide (REGLAN) injection 5 mg (has no administration in time range)   sodium chloride 0.9% BOLUS 1,000 mL (0 mLs Intravenous Stopped 8/24/24 1010)   ondansetron (ZOFRAN) injection 4 mg (4 mg Intravenous $Given 8/24/24 0725)   metoclopramide (REGLAN) injection 5 mg (5 mg Intravenous $Given 8/24/24 1015)   sodium chloride 0.9 % infusion (0 mLs Intravenous Stopped 8/24/24 1219)   metoclopramide (REGLAN) injection 5 mg (5 mg Intravenous $Given 8/24/24 1108)     Labs Ordered and Resulted from Time of ED Arrival to Time of ED Departure   COMPREHENSIVE METABOLIC PANEL - Abnormal       Result Value    Sodium 134 (*)     Potassium 5.9 (*)     Carbon  Dioxide (CO2) 20 (*)     Anion Gap 10      Urea Nitrogen 16.0      Creatinine 1.75 (*)     GFR Estimate 47 (*)     Calcium 11.0 (*)     Chloride 104      Glucose 201 (*)     Alkaline Phosphatase 186 (*)     AST 26      ALT 32      Protein Total 7.2      Albumin 4.4      Bilirubin Total 0.6     CBC WITH PLATELETS AND DIFFERENTIAL - Abnormal    WBC Count 6.2      RBC Count 3.60 (*)     Hemoglobin 10.5 (*)     Hematocrit 34.3 (*)     MCV 95      MCH 29.2      MCHC 30.6 (*)     RDW 15.3 (*)     Platelet Count 343      % Neutrophils 92      % Lymphocytes 3      % Monocytes 3      % Eosinophils 0      % Basophils 1      % Immature Granulocytes 1      NRBCs per 100 WBC 0      Absolute Neutrophils 5.8      Absolute Lymphocytes 0.2 (*)     Absolute Monocytes 0.2      Absolute Eosinophils 0.0      Absolute Basophils 0.1      Absolute Immature Granulocytes 0.0      Absolute NRBCs 0.0     ROUTINE UA WITH MICROSCOPIC REFLEX TO CULTURE - Abnormal    Color Urine Light Yellow      Appearance Urine Slightly Cloudy (*)     Glucose Urine >=1000 (*)     Bilirubin Urine Negative      Ketones Urine Negative      Specific Gravity Urine 1.017      Blood Urine Large (*)     pH Urine 7.0      Protein Albumin Urine 100 (*)     Urobilinogen Urine Normal      Nitrite Urine Negative      Leukocyte Esterase Urine Negative      Mucus Urine Present (*)     RBC Urine >182 (*)     WBC Urine 9 (*)    LIPASE - Normal    Lipase 58     LACTIC ACID WHOLE BLOOD WITH 1X REPEAT IN 2 HR WHEN >2 - Normal    Lactic Acid, Initial 1.0     RESPIRATORY PANEL PCR   ENTERIC BACTERIA AND VIRUS PANEL BY PCR   BLOOD CULTURE     US Renal Transplant with Doppler   Preliminary Result   RESIDENT PRELIMINARY INTERPRETATION   IMPRESSION:    Circumferential urothelial thickening of the extended transplant   ureter. Otherwise unremarkable sonographic evaluation of the   transplant kidney      US Pancreas Transplant   Preliminary Result   RESIDENT PRELIMINARY INTERPRETATION    IMPRESSION:    1.  Peritransplant fluid collection measuring up to 3.3 cm, better   appreciated on same day CT.   2.  No evidence of arterial or venous stenosis.            CT Abdomen Pelvis w/o Contrast   Preliminary Result   IMPRESSION: Compared to prior CT from 8/12/2024         1. Postsurgical changes of right lower quadrant pancreatic and left   lower quadrant renal transplant.   2. Increased fat stranding seen along the pancreatic transplant,   consider correlation with pancreatic enzymes to evaluate for   pancreatitis, no new pancreatic collection.    3. Minimal increase in left lower quadrant perinephric streakiness,   consider correlation with urinalysis for infection.   4. Additional incidental findings similar to prior with left basilar   densities representing atelectasis/consolidation                               Critical care was not performed.     Medical Decision Making  The patient's presentation was of high complexity (a chronic illness severe exacerbation, progression, or side effect of treatment).    The patient's evaluation involved:  review of external note(s) from 2 sources (prior admissions)  review of 3+ test result(s) ordered prior to this encounter (prior laboratory, CT and imaging studies)  ordering and/or review of 3+ test(s) in this encounter (see separate area of note for details)  independent interpretation of testing performed by another health professional (I independently reviewed the imaging studies)  discussion of management or test interpretation with another health professional (kidney/pancreas transplant)    The patient's management necessitated moderate risk (prescription drug management including medications given in the ED) and high risk (a decision regarding hospitalization).    Assessment & Plan        I have reviewed the nursing notes.  Emergency Department course:  The patient was seen and examined at 0719 am in room 19,  An IV was established and I treated the  patient with a 500 cc bolus of normal saline IV as well as Zofran IV.  Later I treated him with Reglan IV.    Chart Review shows echocardiogram done on 7/2024 showed:  Interpretation Summary  Moderately reduced LV function, LVEF=37%.  Moderate diffuse hypokinesis with akinesis of all inferior segments.  Mild right ventricular dilation is present. Global right ventricular function  is moderately reduced.  No pericardial effusion is present.  Laboratory studies done on 822/24, 2 days ago show hyperkalemia, with a potassium of 5.5 and chronic kidney disease, with creatinine of 1.73 and a GFR of 48.    EKG shows sinus tachycardia, rate of 108 bpm with T waves as noted above.  There are no appreciable peaked T waves.  Laboratory studies show hyperkalemia, with potassium of 5.9.  Patient has chronic kidney disease, with creatinine of 1.75 with a GFR of 47, consistent with prior values.   He is hyponatremic, with a sodium 134.  CBC shows anemia, with a hemoglobin of 10.5.  Lactate is normal at 1.0.  Lipase is 58.  CT of the abdomen and pelvis done without contrast due to recent kidney transplant, shows IMPRESSION:  1.  Stable postsurgical changes related to left lower quadrant renal  transplant and right lower quadrant pancreas transplant without  evidence of postoperative complication.  2.  Interval mild increase fat stranding surrounding the pancreas  transplant, without new or enlarging fluid collections.    I spoke with the kidney/pancreas transplant fellow regarding the patient.  He requested multiple additional studies.  He would like an ultrasound of the transplanted pancreas and an ultrasound of the transplanted kidney.  In addition he requested a respiratory viral panel and enteric panel and a blood culture.  These tests are pending at the time of this dictation.    Siva Ramey is a 49 year old male with a history of kidney and pancreas transplant on July 20 who presents with vomiting.  He has received IV  fluids, IV Zofran and IV Reglan.  He is feeling better on reassessment at 10:35 AM.  However still is complaining of nausea.  CT suggest he has fat stranding around the pancreas.  He is hyperkalemic and labs suggest chronic kidney disease.  He will be admitted to the transplant service under the care of Dr. Whitehead.     Addendum: Ultrasound of the renal transplant shows IMPRESSION:   Circumferential urothelial thickening of the extended transplant  ureter. Otherwise unremarkable sonographic evaluation of the  transplant kidney.  Ultrasound of the pancreas transplant shows IMPRESSION:   1.  Peritransplant fluid collection measuring up to 3.3 cm, better  appreciated on same day CT.  2.  No evidence of arterial or venous stenosis.     I have reviewed the findings, diagnosis, plan and need for follow up with the patient.    New Prescriptions    No medications on file       Final diagnoses:   Nausea and vomiting, unspecified vomiting type   Hyperkalemia   Status post kidney transplant     This note was created in part by the use of Dragon voice recognition dictation system. Inadvertent grammatical errors and typographical errors may still exist.  MD Judi Scott  [facility] EMERGENCY DEPARTMENT  8/24/2024     Judi Valentine MD  08/24/24 4851

## 2024-08-24 NOTE — PROVIDER NOTIFICATION
Siva Ramey, 3515    Pt just arrived on 7A from ED. Still needs a diet order and is having nausea. They have been using reglan down in the ED and it has been effective.    BETSY Yo  [phone]    Paged on call a second time at [phone]

## 2024-08-24 NOTE — TELEPHONE ENCOUNTER
Siva paged that he has been vomiting 12 hours. No fevers.     Pt has small cough and headaches. Pt denies covid concern.    Pt has tried PO zofran and not helping. Pt does not feel can keep IS meds down with not being able to keep fluids in.    Pt will go into the ER

## 2024-08-25 LAB
ADV 40+41 DNA STL QL NAA+NON-PROBE: NEGATIVE
AMYLASE SERPL-CCNC: 86 U/L (ref 28–100)
ANION GAP SERPL CALCULATED.3IONS-SCNC: 8 MMOL/L (ref 7–15)
ASTRO TYP 1-8 RNA STL QL NAA+NON-PROBE: NEGATIVE
BASOPHILS # BLD AUTO: 0.1 10E3/UL (ref 0–0.2)
BASOPHILS NFR BLD AUTO: 1 %
BUN SERPL-MCNC: 16.4 MG/DL (ref 6–20)
C CAYETANENSIS DNA STL QL NAA+NON-PROBE: NEGATIVE
CALCIUM SERPL-MCNC: 10.3 MG/DL (ref 8.8–10.4)
CAMPYLOBACTER DNA SPEC NAA+PROBE: NEGATIVE
CHLORIDE SERPL-SCNC: 107 MMOL/L (ref 98–107)
CREAT SERPL-MCNC: 1.74 MG/DL (ref 0.67–1.17)
CRYPTOSP DNA STL QL NAA+NON-PROBE: NEGATIVE
E COLI O157 DNA STL QL NAA+NON-PROBE: NORMAL
E HISTOLYT DNA STL QL NAA+NON-PROBE: NEGATIVE
EAEC ASTA GENE ISLT QL NAA+PROBE: NEGATIVE
EC STX1+STX2 GENES STL QL NAA+NON-PROBE: NEGATIVE
EGFRCR SERPLBLD CKD-EPI 2021: 47 ML/MIN/1.73M2
EOSINOPHIL # BLD AUTO: 0.1 10E3/UL (ref 0–0.7)
EOSINOPHIL NFR BLD AUTO: 1 %
EPEC EAE GENE STL QL NAA+NON-PROBE: NEGATIVE
ERYTHROCYTE [DISTWIDTH] IN BLOOD BY AUTOMATED COUNT: 15.5 % (ref 10–15)
ETEC LTA+ST1A+ST1B TOX ST NAA+NON-PROBE: NEGATIVE
G LAMBLIA DNA STL QL NAA+NON-PROBE: NEGATIVE
GLUCOSE BLDC GLUCOMTR-MCNC: 114 MG/DL (ref 70–99)
GLUCOSE BLDC GLUCOMTR-MCNC: 122 MG/DL (ref 70–99)
GLUCOSE BLDC GLUCOMTR-MCNC: 131 MG/DL (ref 70–99)
GLUCOSE BLDC GLUCOMTR-MCNC: 97 MG/DL (ref 70–99)
GLUCOSE BLDC GLUCOMTR-MCNC: 98 MG/DL (ref 70–99)
GLUCOSE SERPL-MCNC: 112 MG/DL (ref 70–99)
HCO3 SERPL-SCNC: 19 MMOL/L (ref 22–29)
HCT VFR BLD AUTO: 32.5 % (ref 40–53)
HGB BLD-MCNC: 9.8 G/DL (ref 13.3–17.7)
IMM GRANULOCYTES # BLD: 0.1 10E3/UL
IMM GRANULOCYTES NFR BLD: 1 %
LIPASE SERPL-CCNC: 50 U/L (ref 13–60)
LYMPHOCYTES # BLD AUTO: 0.2 10E3/UL (ref 0.8–5.3)
LYMPHOCYTES NFR BLD AUTO: 4 %
MAGNESIUM SERPL-MCNC: 1.3 MG/DL (ref 1.7–2.3)
MCH RBC QN AUTO: 29 PG (ref 26.5–33)
MCHC RBC AUTO-ENTMCNC: 30.2 G/DL (ref 31.5–36.5)
MCV RBC AUTO: 96 FL (ref 78–100)
MONOCYTES # BLD AUTO: 0.2 10E3/UL (ref 0–1.3)
MONOCYTES NFR BLD AUTO: 4 %
NEUTROPHILS # BLD AUTO: 4.6 10E3/UL (ref 1.6–8.3)
NEUTROPHILS NFR BLD AUTO: 89 %
NOROVIRUS GI+II RNA STL QL NAA+NON-PROBE: NEGATIVE
NRBC # BLD AUTO: 0 10E3/UL
NRBC BLD AUTO-RTO: 0 /100
P SHIGELLOIDES DNA STL QL NAA+NON-PROBE: NEGATIVE
PHOSPHATE SERPL-MCNC: 2.7 MG/DL (ref 2.5–4.5)
PLATELET # BLD AUTO: 284 10E3/UL (ref 150–450)
POTASSIUM SERPL-SCNC: 5.5 MMOL/L (ref 3.4–5.3)
RBC # BLD AUTO: 3.38 10E6/UL (ref 4.4–5.9)
RVA RNA STL QL NAA+NON-PROBE: NEGATIVE
SALMONELLA SP RPOD STL QL NAA+PROBE: NEGATIVE
SAPO I+II+IV+V RNA STL QL NAA+NON-PROBE: NEGATIVE
SHIGELLA SP+EIEC IPAH ST NAA+NON-PROBE: NEGATIVE
SODIUM SERPL-SCNC: 134 MMOL/L (ref 135–145)
TACROLIMUS BLD-MCNC: 10.3 UG/L (ref 5–15)
TME LAST DOSE: NORMAL H
TME LAST DOSE: NORMAL H
V CHOLERAE DNA SPEC QL NAA+PROBE: NEGATIVE
VIBRIO DNA SPEC NAA+PROBE: NEGATIVE
WBC # BLD AUTO: 5.2 10E3/UL (ref 4–11)
Y ENTEROCOL DNA STL QL NAA+PROBE: NEGATIVE

## 2024-08-25 PROCEDURE — 258N000003 HC RX IP 258 OP 636: Performed by: STUDENT IN AN ORGANIZED HEALTH CARE EDUCATION/TRAINING PROGRAM

## 2024-08-25 PROCEDURE — 83735 ASSAY OF MAGNESIUM: CPT | Performed by: NURSE PRACTITIONER

## 2024-08-25 PROCEDURE — 250N000013 HC RX MED GY IP 250 OP 250 PS 637: Performed by: NURSE PRACTITIONER

## 2024-08-25 PROCEDURE — 84100 ASSAY OF PHOSPHORUS: CPT | Performed by: NURSE PRACTITIONER

## 2024-08-25 PROCEDURE — 87799 DETECT AGENT NOS DNA QUANT: CPT | Performed by: STUDENT IN AN ORGANIZED HEALTH CARE EDUCATION/TRAINING PROGRAM

## 2024-08-25 PROCEDURE — 82150 ASSAY OF AMYLASE: CPT | Performed by: NURSE PRACTITIONER

## 2024-08-25 PROCEDURE — 250N000012 HC RX MED GY IP 250 OP 636 PS 637: Performed by: NURSE PRACTITIONER

## 2024-08-25 PROCEDURE — 250N000013 HC RX MED GY IP 250 OP 250 PS 637: Performed by: STUDENT IN AN ORGANIZED HEALTH CARE EDUCATION/TRAINING PROGRAM

## 2024-08-25 PROCEDURE — 85025 COMPLETE CBC W/AUTO DIFF WBC: CPT | Performed by: NURSE PRACTITIONER

## 2024-08-25 PROCEDURE — 99233 SBSQ HOSP IP/OBS HIGH 50: CPT | Mod: FS | Performed by: NURSE PRACTITIONER

## 2024-08-25 PROCEDURE — 87086 URINE CULTURE/COLONY COUNT: CPT | Performed by: NURSE PRACTITIONER

## 2024-08-25 PROCEDURE — 87507 IADNA-DNA/RNA PROBE TQ 12-25: CPT | Performed by: EMERGENCY MEDICINE

## 2024-08-25 PROCEDURE — 80197 ASSAY OF TACROLIMUS: CPT | Performed by: NURSE PRACTITIONER

## 2024-08-25 PROCEDURE — 36415 COLL VENOUS BLD VENIPUNCTURE: CPT | Performed by: NURSE PRACTITIONER

## 2024-08-25 PROCEDURE — 250N000011 HC RX IP 250 OP 636: Performed by: NURSE PRACTITIONER

## 2024-08-25 PROCEDURE — 36415 COLL VENOUS BLD VENIPUNCTURE: CPT | Performed by: STUDENT IN AN ORGANIZED HEALTH CARE EDUCATION/TRAINING PROGRAM

## 2024-08-25 PROCEDURE — 120N000011 HC R&B TRANSPLANT UMMC

## 2024-08-25 PROCEDURE — 80048 BASIC METABOLIC PNL TOTAL CA: CPT | Performed by: NURSE PRACTITIONER

## 2024-08-25 PROCEDURE — 83690 ASSAY OF LIPASE: CPT | Performed by: NURSE PRACTITIONER

## 2024-08-25 RX ORDER — MAGNESIUM SULFATE HEPTAHYDRATE 40 MG/ML
2 INJECTION, SOLUTION INTRAVENOUS ONCE
Status: COMPLETED | OUTPATIENT
Start: 2024-08-25 | End: 2024-08-25

## 2024-08-25 RX ORDER — DEXTROSE MONOHYDRATE 25 G/50ML
25-50 INJECTION, SOLUTION INTRAVENOUS
Status: DISCONTINUED | OUTPATIENT
Start: 2024-08-25 | End: 2024-08-26 | Stop reason: HOSPADM

## 2024-08-25 RX ORDER — METOCLOPRAMIDE 5 MG/1
5 TABLET ORAL
Status: DISCONTINUED | OUTPATIENT
Start: 2024-08-25 | End: 2024-08-26 | Stop reason: HOSPADM

## 2024-08-25 RX ORDER — NICOTINE POLACRILEX 4 MG
15-30 LOZENGE BUCCAL
Status: DISCONTINUED | OUTPATIENT
Start: 2024-08-25 | End: 2024-08-26 | Stop reason: HOSPADM

## 2024-08-25 RX ORDER — SODIUM CHLORIDE 9 MG/ML
INJECTION, SOLUTION INTRAVENOUS CONTINUOUS
Status: DISCONTINUED | OUTPATIENT
Start: 2024-08-25 | End: 2024-08-26

## 2024-08-25 RX ORDER — TACROLIMUS 1 MG/1
3 CAPSULE ORAL
Status: DISCONTINUED | OUTPATIENT
Start: 2024-08-25 | End: 2024-08-26 | Stop reason: HOSPADM

## 2024-08-25 RX ORDER — LIDOCAINE 40 MG/G
CREAM TOPICAL
Status: DISCONTINUED | OUTPATIENT
Start: 2024-08-25 | End: 2024-08-26 | Stop reason: HOSPADM

## 2024-08-25 RX ADMIN — TICAGRELOR 90 MG: 90 TABLET ORAL at 21:04

## 2024-08-25 RX ADMIN — Medication 50 MCG: at 08:56

## 2024-08-25 RX ADMIN — CIPROFLOXACIN HYDROCHLORIDE 500 MG: 500 TABLET, FILM COATED ORAL at 05:32

## 2024-08-25 RX ADMIN — MYCOPHENOLIC ACID 720 MG: 360 TABLET, DELAYED RELEASE ORAL at 08:56

## 2024-08-25 RX ADMIN — TACROLIMUS 3 MG: 1 CAPSULE ORAL at 17:59

## 2024-08-25 RX ADMIN — NYSTATIN 500000 UNITS: 100000 SUSPENSION ORAL at 12:23

## 2024-08-25 RX ADMIN — NYSTATIN 500000 UNITS: 100000 SUSPENSION ORAL at 08:57

## 2024-08-25 RX ADMIN — SODIUM CHLORIDE, PRESERVATIVE FREE: 5 INJECTION INTRAVENOUS at 19:28

## 2024-08-25 RX ADMIN — VALGANCICLOVIR HYDROCHLORIDE 900 MG: 450 TABLET ORAL at 08:55

## 2024-08-25 RX ADMIN — SODIUM PHOSPHATE, DIBASIC, ANHYDROUS, POTASSIUM PHOSPHATE, MONOBASIC, AND SODIUM PHOSPHATE, MONOBASIC, MONOHYDRATE 500 MG: 852; 155; 130 TABLET, COATED ORAL at 14:06

## 2024-08-25 RX ADMIN — CARVEDILOL 3.12 MG: 3.12 TABLET, FILM COATED ORAL at 17:59

## 2024-08-25 RX ADMIN — NYSTATIN 500000 UNITS: 100000 SUSPENSION ORAL at 17:59

## 2024-08-25 RX ADMIN — MYCOPHENOLIC ACID 720 MG: 360 TABLET, DELAYED RELEASE ORAL at 20:56

## 2024-08-25 RX ADMIN — DAPSONE 50 MG: 25 TABLET ORAL at 08:56

## 2024-08-25 RX ADMIN — METOCLOPRAMIDE 5 MG: 5 TABLET ORAL at 12:23

## 2024-08-25 RX ADMIN — MAGNESIUM SULFATE HEPTAHYDRATE 2 G: 2 INJECTION, SOLUTION INTRAVENOUS at 14:06

## 2024-08-25 RX ADMIN — METOCLOPRAMIDE 5 MG: 5 TABLET ORAL at 17:59

## 2024-08-25 RX ADMIN — Medication 200 MG: at 08:56

## 2024-08-25 RX ADMIN — CIPROFLOXACIN HYDROCHLORIDE 500 MG: 500 TABLET, FILM COATED ORAL at 17:59

## 2024-08-25 RX ADMIN — SODIUM BICARBONATE 1950 MG: 650 TABLET ORAL at 20:56

## 2024-08-25 RX ADMIN — SODIUM PHOSPHATE, DIBASIC, ANHYDROUS, POTASSIUM PHOSPHATE, MONOBASIC, AND SODIUM PHOSPHATE, MONOBASIC, MONOHYDRATE 500 MG: 852; 155; 130 TABLET, COATED ORAL at 08:56

## 2024-08-25 RX ADMIN — SODIUM BICARBONATE 1950 MG: 650 TABLET ORAL at 08:56

## 2024-08-25 RX ADMIN — PANTOPRAZOLE SODIUM 40 MG: 40 TABLET, DELAYED RELEASE ORAL at 08:56

## 2024-08-25 RX ADMIN — ASPIRIN 81 MG CHEWABLE TABLET 81 MG: 81 TABLET CHEWABLE at 08:56

## 2024-08-25 RX ADMIN — Medication 200 MG: at 21:04

## 2024-08-25 RX ADMIN — SODIUM PHOSPHATE, DIBASIC, ANHYDROUS, POTASSIUM PHOSPHATE, MONOBASIC, AND SODIUM PHOSPHATE, MONOBASIC, MONOHYDRATE 500 MG: 852; 155; 130 TABLET, COATED ORAL at 20:56

## 2024-08-25 RX ADMIN — SODIUM CHLORIDE, PRESERVATIVE FREE: 5 INJECTION INTRAVENOUS at 10:14

## 2024-08-25 RX ADMIN — CARVEDILOL 3.12 MG: 3.12 TABLET, FILM COATED ORAL at 08:56

## 2024-08-25 RX ADMIN — ATORVASTATIN CALCIUM 40 MG: 40 TABLET, FILM COATED ORAL at 20:56

## 2024-08-25 RX ADMIN — NYSTATIN 500000 UNITS: 100000 SUSPENSION ORAL at 20:56

## 2024-08-25 RX ADMIN — TICAGRELOR 90 MG: 90 TABLET ORAL at 08:56

## 2024-08-25 RX ADMIN — SODIUM BICARBONATE 1950 MG: 650 TABLET ORAL at 14:06

## 2024-08-25 RX ADMIN — METOCLOPRAMIDE 5 MG: 5 TABLET ORAL at 10:13

## 2024-08-25 ASSESSMENT — ACTIVITIES OF DAILY LIVING (ADL)
ADLS_ACUITY_SCORE: 37
ADLS_ACUITY_SCORE: 20
ADLS_ACUITY_SCORE: 37
ADLS_ACUITY_SCORE: 20
ADLS_ACUITY_SCORE: 37
ADLS_ACUITY_SCORE: 20
ADLS_ACUITY_SCORE: 20
ADLS_ACUITY_SCORE: 37
ADLS_ACUITY_SCORE: 20
ADLS_ACUITY_SCORE: 37
ADLS_ACUITY_SCORE: 20
ADLS_ACUITY_SCORE: 37

## 2024-08-25 NOTE — PLAN OF CARE
"/84 (BP Location: Right arm)   Pulse 98   Temp 98.7  F (37.1  C) (Oral)   Resp 18   Ht 1.778 m (5' 10\")   Wt 97.2 kg (214 lb 4.8 oz)   SpO2 97%   BMI 30.75 kg/m      2300 - 0730  AVSS on RA, intermittently tachy on tele. UAL. NPO. Voiding via urinal. Needing a stool sample, LBM 8/24. PIV SL. Denied pain and nausea.     Goal Outcome Evaluation:  Plan of Care Reviewed With: patient  Overall Patient Progress: no change  Outcome Evaluation: denied pain and nausea      "

## 2024-08-25 NOTE — PROGRESS NOTES
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 Readmitted 8/24/2024 with reports of abdominal pain and nausea.     Graft function: 36   S/p pancreas transplant: Lipase normal 50, Lab glucose on presentation 201, likely erroneous. FBS 110s euglycemic. Off insulin. Continue to monitor AC &HS for now.   Patent post-op US. Will obtain additional US today.   S/p kidney transplant: SCr 1.7 stable. Received bolus IVF in ED. Resume mIVF with ongoing nausea.        Immunosuppression management:   Induction: Thymo 6.6 mg/kg +Solumedrol    Maintenance:  Tacrolimus supra therapeutic 15.8 on 8/22, reduced to 4 from 5. Had delay in IS meds in ED. ~24 level 10.3, restart at 3 mg BID, goal 8-10  Myfortic 720 mg BID    Hematology: DAPT- Brilinta + ASA for at least 1 month. Will not give heparin at this time due to DAPT.  Cardiorespiratory:   CAD, VT/VF arrest s/p PCI with IBAN: Previous CABG 2019, and IBAN 2019 with 100% thrombosis of the RCA, IBAN x 2 on 7/20/24.  PTA lipitor and Brilinta 90mg BID  Hypertension: PTA Coreg  3.25    GI/Nutrition:   Nausea: zofran scheduled, monitor with prolonged QT  Reglan TID, advance to regular diet.    Endocrine:   See above  Fluid/Electrolytes: MIVF: NS @ 100cc/hr  Hyperkalemia: Continue mIVF  Hyponatremia: Na 134    Low bicarb: 1950 mg  TID  Hypomagnesemia: Mag glycinate+ plus 2gm IVsupplement today.  :  voiding  Infectious disease:   UTI: Cipro 8/14x14 days (prescribed OP)   Prophylaxis: DVT, fall, GI, fungal (nystatin)   PCP ppx. (Dapsone)  Disposition: 7A, plan for discharge tomorrow if tolerating diet, pain controlled.    KAT/Fellow/Resident Provider: Shantal Vargas,  ARIE      Faculty: .  _________________________________________________________________  Transplant History: Admitted 7/19/2024 for kidney pancreas transplant.  7/20/2024 (Kidney / Pancreas), Postoperative day: 36     Interval History: History is obtained from the patient  Overnight events: No complaints.  Nausea without vomiting. Reports zofran not effective. Reglan has been effective.     ROS:   A 10-point review of systems was negative except as noted above.    Meds:  Current Facility-Administered Medications   Medication Dose Route Frequency Provider Last Rate Last Admin    aspirin (ASA) chewable tablet 81 mg  81 mg Oral Daily Shantal Vargas NP   81 mg at 08/25/24 0856    atorvastatin (LIPITOR) tablet 40 mg  40 mg Oral QPM Shantal Vargas NP   40 mg at 08/24/24 1956    carvedilol (COREG) tablet 3.125 mg  3.125 mg Oral BID w/meals Shantal Vargas NP   3.125 mg at 08/25/24 0856    ciprofloxacin (CIPRO) tablet 500 mg  500 mg Oral BID Shantal Vargas NP   500 mg at 08/25/24 0532    dapsone (ACZONE) tablet 50 mg  50 mg Oral Daily Shantal Vargas NP   50 mg at 08/25/24 0856    magnesium glycinate capsule 200 mg  200 mg Oral BID Shantal Vargas NP   200 mg at 08/25/24 0856    metoclopramide (REGLAN) tablet 5 mg  5 mg Oral TID Miles Marlow MD   5 mg at 08/25/24 1223    mycophenolic acid (GENERIC EQUIVALENT) EC tablet 720 mg  720 mg Oral BID Shantal Vargas NP   720 mg at 08/25/24 0856    nystatin (MYCOSTATIN) suspension 500,000 Units  500,000 Units Oral 4x Daily Shantal Vargas NP   500,000 Units at 08/25/24 1223    pantoprazole (PROTONIX) EC tablet 40 mg  40 mg Oral QAM AC Shantal Vargas NP   40 mg at 08/25/24 0856    phosphorus tablet 250 mg (PHOSPHA 250 NEUTRAL) per tablet 500 mg  500 mg Oral TID Shantal Vargas NP   500 mg at 08/25/24 0856    sodium bicarbonate tablet 1,950 mg  1,950 mg Oral TID Shantal Vargas NP   1,950 mg at 08/25/24 0856    sodium  "chloride (PF) 0.9% PF flush 3 mL  3 mL Intracatheter Q8H Shantal Vargas NP   3 mL at 08/25/24 1014    tacrolimus (GENERIC EQUIVALENT) capsule 3 mg  3 mg Oral BID IS Shantal Vargas NP        ticagrelor (BRILINTA) tablet 90 mg  90 mg Oral or Feeding Tube BID Shantal Vargas NP   90 mg at 08/25/24 0856    valGANciclovir (VALCYTE) tablet 900 mg  900 mg Oral Daily Shantal Vargas NP   900 mg at 08/25/24 0855    Vitamin D3 (CHOLECALCIFEROL) tablet 50 mcg  50 mcg Oral Daily Shantal Vargas NP   50 mcg at 08/25/24 0856       Physical Exam:     Admit Weight: 102.4 kg (225 lb 12 oz)    Current vitals:   /75 (BP Location: Right arm)   Pulse 108   Temp 98.1  F (36.7  C) (Oral)   Resp 16   Ht 1.778 m (5' 10\")   Wt 95.8 kg (211 lb 3.2 oz)   SpO2 100%   BMI 30.30 kg/m      Vital sign ranges:    Temp:  [98.1  F (36.7  C)-98.8  F (37.1  C)] 98.1  F (36.7  C)  Pulse:  [] 108  Resp:  [16-18] 16  BP: (109-155)/(75-97) 109/75  SpO2:  [97 %-100 %] 100 %  Patient Vitals for the past 24 hrs:   BP Temp Temp src Pulse Resp SpO2 Weight   08/25/24 0930 109/75 98.1  F (36.7  C) Oral 108 16 100 % --   08/25/24 0533 122/87 98.7  F (37.1  C) Oral 101 16 100 % 95.8 kg (211 lb 3.2 oz)   08/25/24 0148 124/84 98.7  F (37.1  C) Oral 98 18 97 % --   08/24/24 2140 128/84 98.8  F (37.1  C) Oral 109 18 100 % --   08/24/24 1714 (!) 143/97 98.8  F (37.1  C) Oral (!) 128 17 100 % 97.2 kg (214 lb 4.8 oz)   08/24/24 1400 (!) 155/87 -- -- 109 -- 100 % --     General Appearance: Awake, alert, oriented  Skin: normal, warm  Heart: regular rate and rhythm  Lungs: Nonlabored resps on RA  Abdomen: The abdomen is soft.  NT/ND  : voiding   Extremities: edema: none  Neurologic: awake, alert, oriented    Data:   CMP  Recent Labs   Lab 08/25/24  1225 08/25/24  0845 08/25/24  0653 08/25/24  0526 08/24/24  0718 08/22/24  0738   NA  --   --  134*  --  134* 138   POTASSIUM  --   --  5.5*  --  5.9* 5.5*   CHLORIDE  --   --  107  " "--  104 107   CO2  --   --  19*  --  20* 20*   GLC 97 98 112*   < > 201* 144*   BUN  --   --  16.4  --  16.0 13.4   CR  --   --  1.74*  --  1.75* 1.73*   GFRESTIMATED  --   --  47*  --  47* 48*   BETHANY  --   --  10.3  --  11.0* 10.1   MAG  --   --  1.3*  --   --  1.3*   PHOS  --   --  2.7  --   --  2.5   AMYLASE  --   --  86  --   --  98   LIPASE  --   --  50  --  58 90*   ALBUMIN  --   --   --   --  4.4  --    BILITOTAL  --   --   --   --  0.6  --    ALKPHOS  --   --   --   --  186*  --    AST  --   --   --   --  26  --    ALT  --   --   --   --  32  --     < > = values in this interval not displayed.     CBC  Recent Labs   Lab 08/25/24  0653 08/24/24  0718   HGB 9.8* 10.5*   WBC 5.2 6.2    343     COAGS  No lab results found in last 7 days.    Invalid input(s): \"XA\"     Urinalysis  Recent Labs   Lab Test 08/24/24  1056 08/12/24  1141   COLOR Light Yellow Yellow   APPEARANCE Slightly Cloudy* Slightly Cloudy*   URINEGLC >=1000* 50*   URINEBILI Negative Negative   URINEKETONE Negative Negative   SG 1.017 1.022   UBLD Large* Large*   URINEPH 7.0 6.0   PROTEIN 100* 100*   NITRITE Negative Negative   LEUKEST Negative Trace*   RBCU >182* >182*   WBCU 9* 23*     Virology:  Hepatitis C Antibody   Date Value Ref Range Status   07/19/2024 Nonreactive Nonreactive Final     Comment:     A nonreactive screening test result does not exclude the possibility of exposure to or infection with HCV. Nonreactive screening test results in individuals with prior exposure to HCV may be due to antibody levels below the limit of detection of this assay or lack of reactivity to the HCV antigens used in this assay. Patients with recent HCV infections (<3 months from time of exposure) may have false-negative HCV antibody results due to the time needed for seroconversion (average of 8 to 9 weeks).       "

## 2024-08-25 NOTE — PROVIDER NOTIFICATION
Siva Ramey, 9842    There are two orders for Reglan. Do you want to discontinue one of them so pharmacy can verify the other?    Thanks!  BETSY Yo  [phone]

## 2024-08-25 NOTE — PROGRESS NOTES
Admitted/transferred from: ED  Time of arrival on unit 1700  2 RN full  skin assessment completed by Sanaz DAWKINS RN and Alexus CALL RN  Skin assessment finding: issues found some generalized bruising on legs, healed midline incision    Interventions/actions: other none taken at this time      Will continue to monitor.

## 2024-08-25 NOTE — PLAN OF CARE
"Goal Outcome Evaluation:      Plan of Care Reviewed With: patient    Overall Patient Progress: improvingOverall Patient Progress: improving    Outcome Evaluation: denies pain and nausea. Eating and tolerating well. BMx1, Voiding SWD    /75 (BP Location: Right arm)   Pulse 108   Temp 98.1  F (36.7  C) (Oral)   Resp 16   Ht 1.778 m (5' 10\")   Wt 95.8 kg (211 lb 3.2 oz)   SpO2 100%   BMI 30.30 kg/m      Shift: 8297-6244  Isolation Status: Enteric for R/O  VS: stable on room air, afebrile  Neuro: Aox4  Behaviors: calm, cooperative  BG: achs (98,97,122,131)  Labs: Negative for enteric  Respiratory: WDL  Cardiac: WDL  Pain/Nausea: denies both   PRN: none given   Diet: Regular  IV Access: Right PIV  Infusion(s): NS @100 + magnesium sulfate 2g replacement given today   Lines/Drains: PIV x1  GI/: BM x1, voiding spontaneously without difficulty   Skin: WDL   Mobility: UAL  Plan: Continue POC    "

## 2024-08-25 NOTE — PROGRESS NOTES
[facility]  Transplant Nephrology Progress Note  Date of Admission:  8/24/2024  Today's Date: 08/25/2024  Requesting physician: Judi Valentine MD    Recommendations:  - No acute indications for dialysis.   - Would continue home IS  - Agree with metoclopramide.    Assessment & Plan   # DDKT (SPK): Stable   - Baseline Creatinine: ~ 1.6-1.9   - Proteinuria: Not checked post transplant   - DSA Hx: No DSA   - Last cPRA: 11%   - BK Viremia: Not checked post transplant   - Kidney Tx Biopsy Hx: No biopsy history.    # Pancreas Tx (SPK):    - Pancreatic Exocrine Drainage: Enteric drained     - Blood glucose: Euglycemia      On insulin: No   - HbA1c: Last checked at time of transplant      Latest HbA1c: 6.4%   - Pancreatic enzymes: Stable   - DSA Hx: No DSA   - Pancreas Tx Biopsy Hx: No biopsy history    # Immunosuppression: Tacrolimus immediate release (goal 8-10) and Mycophenolic acid (dose 720 mg every 12 hours)   - Induction with Recent Transplant:  High Intensity Protocol   - Continue with intensive monitoring of immunosuppression for efficacy and toxicity.   - Historical Changes in Immunosuppression: None   - Changes: No    # Infection Prevention:      - PJP: Dapsone  - CMV: Valganciclovir (Valcyte); CMV IgG Ab positive  - CMV IgG Ab High Risk Discordance (D+/R-): No  - EBV IgG Ab High Risk Discordance (D+/R-): No    # Hypertension: Controlled;  Goal BP: < 140/90 (Hospitalization goal)   - Changes: Not at this time    # Anemia in Chronic Renal Disease: Hgb: Trend down      KERRI: No   - Iron studies: Low iron saturation, but high ferritin    # Mineral Bone Disorder:    - Secondary renal hyperparathyroidism; PTH level: Mildly elevated (151-300 pg/ml)        On treatment: None  - Vitamin D; level: High        On supplement: No  - Calcium; level: Normal        On supplement: No;   - Phosphorus; level: Normal        On supplement: No    # Electrolytes:   - Potassium; level: High         On supplement: No  - Magnesium; level: Low        On supplement: Yes  - Bicarbonate; level: Low        On supplement: Yes  - Sodium; level: Stable low    #Nausea  #Vomiting:  -Improved  -recommend antiemetics    # H/o VT/VF Cardiac arrest:  # CAD s/p CABG 2019 LIMA-LAD, SVG-RCA, IBAN 1/2019, IBAN to RCA 2/2024              - s/p inferior STEMI 2/2 ostial proximal RCA in stent thrombosis              - s/p 2 IBAN to RCA 7/20/2024              - Echo: LVEF=37%.Moderate diffuse hypokinesis with akinesis of all inferior segments.  Mild right ventricular dilation is present.Global  right ventricular function is moderately reduced.    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    Seen and discussed with Dr. Sorensen.    Sobia Underwood NP  Transplant Nephrology  Contact information via Vocera Web Console or via [facility] Paging/Directory        Physician Attestation     I saw and evaluated Siva Ramey as part of a shared APRN/PA visit.     I personally reviewed the vital signs, medications, and labs.    I personally provided a substantive portion of care for this patient and I approve the care plan as written by the KAT.  I was involved with Medical Decision Making including: Please see A&P for additional details of medical decision making.  MANAGEMENT DISCUSSED with the following over the past 24 hours: No acute indications for dialysis.  Would continue on present immunosuppression.  Follow up on infectious work up, including adenovirus and BK due to microscopic hematuria.     Fernando Sorensen MD  Date of Service (when I saw the patient): 08/25/24    Interval History  Mr. Ramey's creatinine is  1.74 (08/25 0653); Stable.  good urine output.  Other significant labs/tests/vitals: VSS  no events overnight.  no chest pain or shortness of breath.  no leg swelling.  no nausea and vomiting.  Bowel movements are soft.  no fever, sweats or chills.    Review of Systems   4 point ROS was obtained and negative except as noted in the Interval History.    MEDICATIONS:  Current Facility-Administered Medications   Medication Dose Route Frequency Provider Last Rate Last Admin    aspirin (ASA) chewable tablet 81 mg  81 mg Oral Daily Shantal Vargas NP   81 mg at 08/25/24 0856    atorvastatin (LIPITOR) tablet 40 mg  40 mg Oral QPM Shantal Vargas NP   40 mg at 08/24/24 1956    carvedilol (COREG) tablet 3.125 mg  3.125 mg Oral BID w/meals Shantal Vargas NP   3.125 mg at 08/25/24 0856    ciprofloxacin (CIPRO) tablet 500 mg  500 mg Oral BID Shantal Vargas NP   500 mg at 08/25/24 0532    dapsone (ACZONE) tablet 50 mg  50 mg Oral Daily Shantal Vargas NP   50 mg at 08/25/24 0856    magnesium glycinate capsule 200 mg  200 mg Oral BID Shantal Vargas NP   200 mg at 08/25/24 0856    metoclopramide (REGLAN) tablet 5 mg  5 mg Oral TID Miles Marlow MD   5 mg at 08/25/24 1013    mycophenolic acid (GENERIC EQUIVALENT) EC tablet 720 mg  720 mg Oral BID Shantal Vargas NP   720 mg at 08/25/24 0856    nystatin (MYCOSTATIN) suspension 500,000 Units  500,000 Units Oral 4x Daily Shantal Vargas NP   500,000 Units at 08/25/24 0857    pantoprazole (PROTONIX) EC tablet 40 mg  40 mg Oral QAM AC Shantal Vargas NP   40 mg at 08/25/24 0856    phosphorus tablet 250 mg (PHOSPHA 250 NEUTRAL) per tablet 500 mg  500 mg Oral TID Shantal Vargas NP   500 mg at 08/25/24 0856    sodium bicarbonate tablet 1,950 mg  1,950 mg Oral TID Shantal Vargas NP   1,950 mg at 08/25/24 0856    sodium chloride (PF) 0.9% PF flush 3 mL  3 mL Intracatheter Q8H Shantal Vargas, NP   3 mL at 08/25/24 1015     "ticagrelor (BRILINTA) tablet 90 mg  90 mg Oral or Feeding Tube BID Shantal Vargas NP   90 mg at 24 0856    valGANciclovir (VALCYTE) tablet 900 mg  900 mg Oral Daily Shantal Vargas NP   900 mg at 24 0855    Vitamin D3 (CHOLECALCIFEROL) tablet 50 mcg  50 mcg Oral Daily Shantal Vargas NP   50 mcg at 24 0856     Current Facility-Administered Medications   Medication Dose Route Frequency Provider Last Rate Last Admin    sodium chloride 0.9 % infusion   Intravenous Continuous Miles Blanco  mL/hr at 24 1014 New Bag at 24 1014       Physical Exam   Temp  Av.2  F (36.8  C)  Min: 98.2  F (36.8  C)  Max: 98.2  F (36.8  C)      Pulse  Av.9  Min: 104  Max: 116 Resp  Av  Min: 16  Max: 16  SpO2  Av.7 %  Min: 98 %  Max: 100 %     /75 (BP Location: Right arm)   Pulse 108   Temp 98.1  F (36.7  C) (Oral)   Resp 16   Ht 1.778 m (5' 10\")   Wt 95.8 kg (211 lb 3.2 oz)   SpO2 100%   BMI 30.30 kg/m      Admit Weight: 100.2 kg (221 lb)     GENERAL APPEARANCE: alert and no distress  HENT: mouth without ulcers or lesions  RESP: lungs clear to auscultation - no rales, rhonchi or wheezes  CV: regular rhythm, normal rate, no rub, no murmur  EDEMA: no LE edema bilaterally  ABDOMEN: soft, nondistended, nontender, bowel sounds normal  MS: extremities normal - no gross deformities noted, no evidence of inflammation in joints, no muscle tenderness  SKIN: no rash  TX KIDNEY: normal    Data   All labs reviewed by me.  CMP  Recent Labs   Lab 24  0845 24  0653 24  0526 24  0718 24  0738 24  0749   NA  --  134*  --  134* 138 138   POTASSIUM  --  5.5*  --  5.9* 5.5* 5.1   CHLORIDE  --  107  --  104 107 106   CO2  --  19*  --  20* 20* 22   ANIONGAP  --  8  --  10 11 10   GLC 98 112* 114* 201* 144* 145*   BUN  --  16.4  --  16.0 13.4 13.6   CR  --  1.74*  --  1.75* 1.73* 1.85*   GFRESTIMATED  --  47*  --  47* 48* 44*   BETHANY  --  10.3  " --  11.0* 10.1 9.7   MAG  --  1.3*  --   --  1.3* 1.3*   PHOS  --  2.7  --   --  2.5 2.1*   PROTTOTAL  --   --   --  7.2  --   --    ALBUMIN  --   --   --  4.4  --   --    BILITOTAL  --   --   --  0.6  --   --    ALKPHOS  --   --   --  186*  --   --    AST  --   --   --  26  --   --    ALT  --   --   --  32  --   --      CBC  Recent Labs   Lab 08/25/24  0653 08/24/24  0718 08/22/24  0738 08/19/24  0749   HGB 9.8* 10.5* 9.1* 8.3*   WBC 5.2 6.2 3.5* 3.8*   RBC 3.38* 3.60* 3.14* 2.82*   HCT 32.5* 34.3* 29.9* 27.0*   MCV 96 95 95 96   MCH 29.0 29.2 29.0 29.4   MCHC 30.2* 30.6* 30.4* 30.7*   RDW 15.5* 15.3* 14.9 14.6    343 319 303     INRNo lab results found in last 7 days.  ABGNo lab results found in last 7 days.   Urine Studies  Recent Labs   Lab Test 08/24/24  1056 08/12/24  1141 07/20/24  0609 09/27/21  1220 07/24/21  1151 06/25/19  1439 06/19/19  0822   COLOR Light Yellow Yellow Orange* Yellow   < > Straw Straw   APPEARANCE Slightly Cloudy* Slightly Cloudy* Slightly Cloudy* Slightly Cloudy*   < > Clear Clear   URINEGLC >=1000* 50* 50* 150*   < > 200 mg/dL* >1000 mg/dL*   URINEBILI Negative Negative Negative Negative   < > Negative Negative   URINEKETONE Negative Negative Negative Negative   < > Negative Negative   SG 1.017 1.022 1.013 1.013   < > 1.010 1.012   UBLD Large* Large* Large* Negative   < > Moderate* Trace*   URINEPH 7.0 6.0 6.5 6.0   < > 6.5 6.0   PROTEIN 100* 100* 300* >499*   < > 200 mg/dL* 300 mg/dL*   UROBILINOGEN  --   --   --   --   --  <2.0 E.U./dL <2.0 E.U./dL   NITRITE Negative Negative Negative Negative   < > Negative Negative   LEUKEST Negative Trace* Trace* Small*   < > Negative Negative   RBCU >182* >182* >182* 2   < > 25-50* 0-2   WBCU 9* 23* 105* 4   < > 0-5 0-5    < > = values in this interval not displayed.     No lab results found.  PTH  Recent Labs   Lab Test 08/05/24  0758 07/25/24  0448 07/20/21  1845   PTHI 263* 199* 190*     Iron Studies  Recent Labs   Lab Test  08/05/24  0758   IRON 44*      IRONSAT 17   KADEN 1,725*       IMAGING:  All imaging studies reviewed by me.

## 2024-08-25 NOTE — PLAN OF CARE
"Goal Outcome Evaluation:      Plan of Care Reviewed With: patient    Overall Patient Progress: no changeOverall Patient Progress: no change    Outcome Evaluation: nausea well contolled with medication this shift    /84 (BP Location: Right arm)   Pulse 109   Temp 98.8  F (37.1  C) (Oral)   Resp 18   Ht 1.778 m (5' 10\")   Wt 97.2 kg (214 lb 4.8 oz)   SpO2 100%   BMI 30.75 kg/m      Shift: 0569-3153  Isolation Status: none  VS: tachy on room air, afebrile  Neuro: Aox4  Behaviors: calm and cooperative  BG: none  Labs: K 5.9  Respiratory: WDL  Cardiac: WDL  Pain/Nausea: 6/10 abdominal pain, intermittent nausea  PRN: Reglan x1, robaxin x1 and tyl x1  Diet: NPO  IV Access: PIV SL  Infusion(s): none  Lines/Drains: none  GI/: Voiding, not saving, no BM this shift  Skin: generalized bruising on legs, healed midline incision  Mobility: up ad prasanna  Events/Education: Still needs stool sample collected   Plan: Continue plan of care and notify team of any changes     "

## 2024-08-26 VITALS
TEMPERATURE: 98.6 F | HEART RATE: 91 BPM | WEIGHT: 216.3 LBS | DIASTOLIC BLOOD PRESSURE: 80 MMHG | OXYGEN SATURATION: 98 % | BODY MASS INDEX: 30.97 KG/M2 | SYSTOLIC BLOOD PRESSURE: 128 MMHG | RESPIRATION RATE: 16 BRPM | HEIGHT: 70 IN

## 2024-08-26 LAB
ANION GAP SERPL CALCULATED.3IONS-SCNC: 8 MMOL/L (ref 7–15)
BACTERIA UR CULT: NO GROWTH
BACTERIA UR CULT: NO GROWTH
BK VIRUS SPECIMEN TYPE: NORMAL
BKV DNA # SPEC NAA+PROBE: NOT DETECTED IU/ML
BUN SERPL-MCNC: 17.1 MG/DL (ref 6–20)
CALCIUM SERPL-MCNC: 9.3 MG/DL (ref 8.8–10.4)
CHLORIDE SERPL-SCNC: 109 MMOL/L (ref 98–107)
CMV DNA SPEC NAA+PROBE-ACNC: NOT DETECTED IU/ML
CREAT SERPL-MCNC: 1.79 MG/DL (ref 0.67–1.17)
EBV DNA SERPL NAA+PROBE-ACNC: NOT DETECTED IU/ML
EGFRCR SERPLBLD CKD-EPI 2021: 46 ML/MIN/1.73M2
ERYTHROCYTE [DISTWIDTH] IN BLOOD BY AUTOMATED COUNT: 15.7 % (ref 10–15)
GLUCOSE BLDC GLUCOMTR-MCNC: 103 MG/DL (ref 70–99)
GLUCOSE BLDC GLUCOMTR-MCNC: 121 MG/DL (ref 70–99)
GLUCOSE SERPL-MCNC: 110 MG/DL (ref 70–99)
HADV DNA # SPEC NAA+PROBE: NOT DETECTED COPIES/ML
HCO3 SERPL-SCNC: 17 MMOL/L (ref 22–29)
HCT VFR BLD AUTO: 29.4 % (ref 40–53)
HGB BLD-MCNC: 8.7 G/DL (ref 13.3–17.7)
MAGNESIUM SERPL-MCNC: 1.6 MG/DL (ref 1.7–2.3)
MCH RBC QN AUTO: 29.3 PG (ref 26.5–33)
MCHC RBC AUTO-ENTMCNC: 29.6 G/DL (ref 31.5–36.5)
MCV RBC AUTO: 99 FL (ref 78–100)
PHOSPHATE SERPL-MCNC: 2.7 MG/DL (ref 2.5–4.5)
PLATELET # BLD AUTO: 276 10E3/UL (ref 150–450)
POTASSIUM SERPL-SCNC: 5.3 MMOL/L (ref 3.4–5.3)
RBC # BLD AUTO: 2.97 10E6/UL (ref 4.4–5.9)
SODIUM SERPL-SCNC: 134 MMOL/L (ref 135–145)
TACROLIMUS BLD-MCNC: 8.4 UG/L (ref 5–15)
TME LAST DOSE: NORMAL H
TME LAST DOSE: NORMAL H
WBC # BLD AUTO: 4.1 10E3/UL (ref 4–11)

## 2024-08-26 PROCEDURE — 258N000003 HC RX IP 258 OP 636: Performed by: STUDENT IN AN ORGANIZED HEALTH CARE EDUCATION/TRAINING PROGRAM

## 2024-08-26 PROCEDURE — 250N000013 HC RX MED GY IP 250 OP 250 PS 637: Performed by: NURSE PRACTITIONER

## 2024-08-26 PROCEDURE — 85027 COMPLETE CBC AUTOMATED: CPT | Performed by: NURSE PRACTITIONER

## 2024-08-26 PROCEDURE — 250N000013 HC RX MED GY IP 250 OP 250 PS 637: Performed by: SURGERY

## 2024-08-26 PROCEDURE — 80197 ASSAY OF TACROLIMUS: CPT | Performed by: NURSE PRACTITIONER

## 2024-08-26 PROCEDURE — 36415 COLL VENOUS BLD VENIPUNCTURE: CPT | Performed by: NURSE PRACTITIONER

## 2024-08-26 PROCEDURE — 80048 BASIC METABOLIC PNL TOTAL CA: CPT | Performed by: NURSE PRACTITIONER

## 2024-08-26 PROCEDURE — 99233 SBSQ HOSP IP/OBS HIGH 50: CPT | Mod: 24

## 2024-08-26 PROCEDURE — 250N000013 HC RX MED GY IP 250 OP 250 PS 637: Performed by: STUDENT IN AN ORGANIZED HEALTH CARE EDUCATION/TRAINING PROGRAM

## 2024-08-26 PROCEDURE — 250N000012 HC RX MED GY IP 250 OP 636 PS 637: Performed by: NURSE PRACTITIONER

## 2024-08-26 PROCEDURE — 83735 ASSAY OF MAGNESIUM: CPT | Performed by: NURSE PRACTITIONER

## 2024-08-26 PROCEDURE — 250N000009 HC RX 250: Performed by: SURGERY

## 2024-08-26 PROCEDURE — 258N000003 HC RX IP 258 OP 636: Performed by: SURGERY

## 2024-08-26 PROCEDURE — 84100 ASSAY OF PHOSPHORUS: CPT | Performed by: NURSE PRACTITIONER

## 2024-08-26 PROCEDURE — 250N000013 HC RX MED GY IP 250 OP 250 PS 637

## 2024-08-26 RX ORDER — METOCLOPRAMIDE 5 MG/1
5 TABLET ORAL
Qty: 60 TABLET | Refills: 1 | Status: SHIPPED | OUTPATIENT
Start: 2024-08-26 | End: 2024-10-04

## 2024-08-26 RX ORDER — NYSTATIN 100000/ML
500000 SUSPENSION, ORAL (FINAL DOSE FORM) ORAL 4 TIMES DAILY
Status: DISCONTINUED | OUTPATIENT
Start: 2024-08-26 | End: 2024-08-26 | Stop reason: HOSPADM

## 2024-08-26 RX ORDER — PANTOPRAZOLE SODIUM 40 MG/1
40 TABLET, DELAYED RELEASE ORAL DAILY
Status: DISCONTINUED | OUTPATIENT
Start: 2024-08-27 | End: 2024-08-26 | Stop reason: HOSPADM

## 2024-08-26 RX ORDER — TACROLIMUS 1 MG/1
3 CAPSULE ORAL 2 TIMES DAILY
Qty: 240 CAPSULE | Refills: 11 | Status: ACTIVE | OUTPATIENT
Start: 2024-08-26 | End: 2024-09-24

## 2024-08-26 RX ORDER — CARVEDILOL 3.12 MG/1
3.12 TABLET ORAL 2 TIMES DAILY
Status: DISCONTINUED | OUTPATIENT
Start: 2024-08-26 | End: 2024-08-26 | Stop reason: HOSPADM

## 2024-08-26 RX ADMIN — CARVEDILOL 3.12 MG: 3.12 TABLET, FILM COATED ORAL at 09:16

## 2024-08-26 RX ADMIN — SODIUM CHLORIDE, PRESERVATIVE FREE: 5 INJECTION INTRAVENOUS at 05:55

## 2024-08-26 RX ADMIN — TICAGRELOR 90 MG: 90 TABLET ORAL at 09:17

## 2024-08-26 RX ADMIN — Medication 200 MG: at 09:15

## 2024-08-26 RX ADMIN — DAPSONE 50 MG: 25 TABLET ORAL at 09:16

## 2024-08-26 RX ADMIN — ACETAMINOPHEN 325 MG: 325 TABLET ORAL at 05:54

## 2024-08-26 RX ADMIN — SODIUM PHOSPHATE, DIBASIC, ANHYDROUS, POTASSIUM PHOSPHATE, MONOBASIC, AND SODIUM PHOSPHATE, MONOBASIC, MONOHYDRATE 500 MG: 852; 155; 130 TABLET, COATED ORAL at 13:41

## 2024-08-26 RX ADMIN — NYSTATIN 500000 UNITS: 100000 SUSPENSION ORAL at 12:18

## 2024-08-26 RX ADMIN — ASPIRIN 81 MG CHEWABLE TABLET 81 MG: 81 TABLET CHEWABLE at 09:16

## 2024-08-26 RX ADMIN — MYCOPHENOLIC ACID 720 MG: 360 TABLET, DELAYED RELEASE ORAL at 09:15

## 2024-08-26 RX ADMIN — SODIUM PHOSPHATE, DIBASIC, ANHYDROUS, POTASSIUM PHOSPHATE, MONOBASIC, AND SODIUM PHOSPHATE, MONOBASIC, MONOHYDRATE 500 MG: 852; 155; 130 TABLET, COATED ORAL at 09:16

## 2024-08-26 RX ADMIN — SODIUM BICARBONATE 1950 MG: 650 TABLET ORAL at 09:16

## 2024-08-26 RX ADMIN — METHOCARBAMOL 750 MG: 750 TABLET ORAL at 05:54

## 2024-08-26 RX ADMIN — VALGANCICLOVIR HYDROCHLORIDE 900 MG: 450 TABLET ORAL at 09:16

## 2024-08-26 RX ADMIN — PANTOPRAZOLE SODIUM 40 MG: 40 TABLET, DELAYED RELEASE ORAL at 09:17

## 2024-08-26 RX ADMIN — Medication 50 MCG: at 09:16

## 2024-08-26 RX ADMIN — METOCLOPRAMIDE 5 MG: 5 TABLET ORAL at 12:18

## 2024-08-26 RX ADMIN — SODIUM BICARBONATE: 84 INJECTION, SOLUTION INTRAVENOUS at 13:41

## 2024-08-26 RX ADMIN — METOCLOPRAMIDE 5 MG: 5 TABLET ORAL at 09:16

## 2024-08-26 RX ADMIN — NYSTATIN 500000 UNITS: 100000 SUSPENSION ORAL at 09:17

## 2024-08-26 RX ADMIN — TACROLIMUS 3 MG: 1 CAPSULE ORAL at 09:16

## 2024-08-26 RX ADMIN — SODIUM BICARBONATE 1950 MG: 650 TABLET ORAL at 13:41

## 2024-08-26 RX ADMIN — CIPROFLOXACIN HYDROCHLORIDE 500 MG: 500 TABLET, FILM COATED ORAL at 05:55

## 2024-08-26 ASSESSMENT — ACTIVITIES OF DAILY LIVING (ADL)
ADLS_ACUITY_SCORE: 20
DEPENDENT_IADLS:: INDEPENDENT
ADLS_ACUITY_SCORE: 20

## 2024-08-26 NOTE — PLAN OF CARE
Discharge education completed.   Given IV sodium bicarb half a liter.   IV access removed.   Pt has a ride.

## 2024-08-26 NOTE — CONSULTS
Care Management Initial Consult    General Information  Assessment completed with: Patient,    Type of CM/SW Visit: Initial Assessment    Primary Care Provider verified and updated as needed: No   Readmission within the last 30 days: other (see comments) (hyperkalemia, nausea, vomiting)         Advance Care Planning: Advance Care Planning Reviewed: verified with patient, no concerns identified          Communication Assessment  Patient's communication style: spoken language (English or Bilingual)    Hearing Difficulty or Deaf: no   Wear Glasses or Blind: no    Cognitive  Cognitive/Neuro/Behavioral: WDL                      Living Environment:   People in home: child(jennifer), dependent, spouse     Current living Arrangements: house      Able to return to prior arrangements: yes       Family/Social Support:  Care provided by: self  Provides care for: child(jennifer)  Marital Status:              Description of Support System: Supportive, Involved         Current Resources:   Patient receiving home care services: No     Community Resources: None  Equipment currently used at home: none  Supplies currently used at home: None    Employment/Financial:  Employment Status: disabled        Financial Concerns: none           Does the patient's insurance plan have a 3 day qualifying hospital stay waiver?  No    Lifestyle & Psychosocial Needs:  Social Determinants of Health     Food Insecurity: Not on file   Depression: Not at risk (8/12/2024)    PHQ-2     PHQ-2 Score: 0   Housing Stability: Not on file   Tobacco Use: Low Risk  (8/24/2024)    Patient History     Smoking Tobacco Use: Never     Smokeless Tobacco Use: Never     Passive Exposure: Past   Financial Resource Strain: High Risk (1/1/2022)    [facility] [employer] [facility]    Financial Resource Strain     Difficulty of Paying Living Expenses: Not on file     Difficulty of Paying Living Expenses: Not  on file   Alcohol Use: Not on file   Transportation Needs: Not on file   Physical Activity: Not on file   Interpersonal Safety: Not on file   Stress: Not on file   Social Connections: Unknown (4/10/2023)    Received from [facility] [employer] [facility], [facility] [facility]    Social Connections     Frequency of Communication with Friends and Family: Not on file   Health Literacy: Not on file       Functional Status:  Prior to admission patient needed assistance:   Dependent ADLs:: Independent  Dependent IADLs:: Independent       Mental Health Status:  Mental Health Status: No Current Concerns  Mental Health Management: Medication    Chemical Dependency Status:  Chemical Dependency Status: No Current Concerns             Values/Beliefs:  Spiritual, Cultural Beliefs, Mandaeism Practices, Values that affect care: no               Additional Information:  SW notified that pt was readmitted to hospital within last 30 days. SW met with patient at bedside for supportive visit. Pt reports he is feeling better than the previous few days. Willing to engage in conversation, able to track questions appropriately. He confirms his wife, Jolene continues to be his primary support. Pt also names other family and friends, reporting adequate support system. Pt denies experiencing any changes in mental or emotional health during this hospitalization. SW will remain available for support as needed.       MANUEL Caldwell, [facility]  Outpatient Kidney/Pancreas/Auto Islet Transplant Program  Ph: [phone]

## 2024-08-26 NOTE — PROGRESS NOTES
Px alert and oriented x4, able to make needs known. No c/o pain nor nausea. Independent. Tolerated breakfast without any nausea. Px was hoping that he will be discharged today. Continue with POC.

## 2024-08-26 NOTE — PLAN OF CARE
Goal Outcome Evaluation:      Plan of Care Reviewed With: patient    Overall Patient Progress: improving    Outcome Evaluation: Pt will discharge home with family support when medically cleared.

## 2024-08-26 NOTE — PROGRESS NOTES
[facility]  Transplant Nephrology Progress Note  Date of Admission:  8/24/2024  Today's Date: 08/26/2024  Requesting physician: Judi Valentine MD    Recommendations:  - Stop NS gtt.  - Give sodium bicarbonate 75 mEq in 500 ml sterile water IV once.  - Continue current immune suppression.    Assessment & Plan   # DDKT (SPK): Stable serum creatinine.   - Baseline Creatinine: ~ 1.6-1.9   - Proteinuria: Not checked post transplant   - DSA Hx: No DSA   - Last cPRA: 11%   - BK Viremia: Not checked post transplant   - Kidney Tx Biopsy Hx: No biopsy history.    # Pancreas Tx (SPK):    - Pancreatic Exocrine Drainage: Enteric drained     - Blood glucose: Euglycemia      On insulin: No   - HbA1c: Last checked at time of transplant      Latest HbA1c: 6.4%   - Pancreatic enzymes: Stable   - DSA Hx: No DSA   - Pancreas Tx Biopsy Hx: No biopsy history    # Immunosuppression: Tacrolimus immediate release (goal 8-10) and Mycophenolic acid (dose 720 mg every 12 hours)   - Induction with Recent Transplant:  High Intensity Protocol   - Continue with intensive monitoring of immunosuppression for efficacy and toxicity.   - Historical Changes in Immunosuppression: None   - 08/26/24  Tacrolimus level: 8.4 (~ 11 hour trough)   - Changes: Not at this time    # Infection Prevention:      - PJP: Dapsone  - CMV: Valganciclovir (Valcyte); CMV IgG Ab positive  - CMV IgG Ab High Risk Discordance (D+/R-): No  - EBV IgG Ab High Risk Discordance (D+/R-): No    # Hypertension: Controlled;  Goal BP: < 140/90 (Hospitalization goal)   - Changes: Not at this time.  Continue carvedilol  3.125 mg BID.    # Anemia in Chronic Renal Disease: Hgb: Trend down      KERRI: No   - Iron studies: Low iron saturation, but high ferritin    # Mineral Bone Disorder:    - Secondary renal hyperparathyroidism; PTH level: Mildly elevated (151-300 pg/ml)        On treatment: None  - Vitamin D; level: High          On supplement:  Yes  - Calcium; level: Normal          On supplement: No;   - Phosphorus; level: Normal         On supplement: Yes, phos tabs 500 mg PO TID    # Electrolytes:   - Potassium; level: High normal         On supplement: No  - Magnesium; level: Low          On supplement: Yes, magnesium glycinate 200 mg BID  - Bicarbonate; level: Low         On supplement: Yes, sodium bicarbonate 1950 mg PO TID  - Sodium; level: Stable low    #Nausea  #Vomiting: admitted 8/24/2024 with nausea and vomiting.  Completing course of ciprofloxacin for UTI OP.   - 8/25/24 CT-scan of abdomen and pelvis: Increased fat stranding seen along the pancreatic transplant.  Minimal increase in left lower quadrant perinephric streakiness.    - 8/24/2024 Transplant Renal Ultrasound: Apparent thickening of the ureter, reactive/inflammatory versus infective. Mild prominence of the pelvicalyceal system, difficult to exclude mild hydronephrosis.   - 8/24/2024 Transplant Pancreas Ultrasound: Peripheral anechogenicity/hypoechogenicity may represent loculated fluid/collection.        8/25/2024   Urine Culture No Growth    8/25/2024   Urine Culture Aerobic Bacterial No Growth    8/25/2024   Lidia Barr Virus Quantitative PCR In process    8/25/2024   Cytomegalovirus DNA by PCR Not detected    8/25/2024   Adenovirus, quantitative by PCR Not detected    8/25/2024   BK Virus Quantitative, PCR In process    8/25/2024 Enteric Panel Negative    8/24/2024  Blood Culture Peripheral Blood no growth to date         # H/o VT/VF Cardiac arrest:  # CAD s/p CABG 2019 LIMA-LAD, SVG-RCA, IBAN 1/2019, IBAN to RCA 2/2024              - s/p inferior STEMI 2/2 ostial proximal RCA in stent thrombosis              - s/p 2 IBAN to RCA 7/20/2024              - Echo: LVEF=37%.Moderate diffuse hypokinesis with akinesis of all inferior segments.  Mild right ventricular dilation is present.Global  right ventricular function is moderately reduced.    # Other Significant PMH:              - Left  diaphragmatic elevation; likely 2/2 nerve paralysis post CABG    # Transplant History:  Etiology of Kidney Failure: Diabetes mellitus type 2  Tx: DDKT (SPK)  Transplant: 7/20/2024 (Kidney / Pancreas)  Significant transplant-related complications: None    Recommendations were communicated to the primary team via this note.    Discussed with .    JUANCARLOS Gilliam [facility]  Transplant Nephrology  Contact information via Vocera Web Console or via [facility] Paging/Directory      Interval History  Mr. Mahmood creatinine is 1.79 (08/26 0453); Stable from 1.74 yesterday.  Baseline Creatinine: ~ 1.6-1.9  Estimated Creatinine Clearance: 58.6 mL/min (A) (based on SCr of 1.79 mg/dL (H)).   I/O last 3 completed shifts:  In: 1600 [I.V.:1600]  Out: 300 [Urine:300]   Other significant labs/tests/vitals: SBP 120s - 130s overnight. Afebrile     8/25/2024 Enteric Panel Negative      8/25/2024   Cytomegalovirus DNA by PCR Not detected    8/25/2024   Adenovirus, quantitative by PCR Not detected     No acute events overnight.  trace chest pain or shortness of breath.  No leg swelling.  No nausea or vomiting.  Bowel movements are soft.  No fever, sweats or chills.        Review of Systems   4 point ROS was obtained and negative except as noted in the Interval History.    MEDICATIONS:  Current Facility-Administered Medications   Medication Dose Route Frequency Provider Last Rate Last Admin    aspirin (ASA) chewable tablet 81 mg  81 mg Oral Daily Shantal Vargas NP   81 mg at 08/25/24 0856    atorvastatin (LIPITOR) tablet 40 mg  40 mg Oral QPM Shantal Vargas NP   40 mg at 08/25/24 2056    carvedilol (COREG) tablet 3.125 mg  3.125 mg Oral BID w/meals Shantal Vargas NP   3.125 mg at 08/25/24 1759    ciprofloxacin (CIPRO) tablet 500 mg  500 mg Oral BID Shantal Vargas NP   500 mg at 08/26/24 0555    dapsone (ACZONE) tablet 50 mg  50 mg Oral Daily Shantal Vargas NP   50 mg at 08/25/24 0856    magnesium  glycinate capsule 200 mg  200 mg Oral BID Shantal Vargas NP   200 mg at 24    metoclopramide (REGLAN) tablet 5 mg  5 mg Oral TID AC Miles Blanco MD   5 mg at 24    mycophenolic acid (GENERIC EQUIVALENT) EC tablet 720 mg  720 mg Oral BID Shantal Vargas NP   720 mg at 24    nystatin (MYCOSTATIN) suspension 500,000 Units  500,000 Units Oral 4x Daily Shantal Vargas NP   500,000 Units at 24    pantoprazole (PROTONIX) EC tablet 40 mg  40 mg Oral QAM AC Shantal Vargas NP   40 mg at 24    phosphorus tablet 250 mg (PHOSPHA 250 NEUTRAL) per tablet 500 mg  500 mg Oral TID Shantal Vargas NP   500 mg at 24    sodium bicarbonate tablet 1,950 mg  1,950 mg Oral TID Shantal Vargas NP   1,950 mg at 24    sodium chloride (PF) 0.9% PF flush 3 mL  3 mL Intracatheter Q8H Shantal Vargas NP   3 mL at 24    tacrolimus (GENERIC EQUIVALENT) capsule 3 mg  3 mg Oral BID IS Shantal Vargas NP   3 mg at 24    ticagrelor (BRILINTA) tablet 90 mg  90 mg Oral or Feeding Tube BID Shantal Vargas NP   90 mg at 24    valGANciclovir (VALCYTE) tablet 900 mg  900 mg Oral Daily Shantal Vargas NP   900 mg at 24    Vitamin D3 (CHOLECALCIFEROL) tablet 50 mcg  50 mcg Oral Daily Shantal Vargas NP   50 mcg at 24 0856     Current Facility-Administered Medications   Medication Dose Route Frequency Provider Last Rate Last Admin    sodium chloride 0.9 % infusion   Intravenous Continuous Miles Blanco  mL/hr at 24 0555 New Bag at 24 0555       Physical Exam   Temp  Av.2  F (36.8  C)  Min: 98.2  F (36.8  C)  Max: 98.2  F (36.8  C)      Pulse  Av.9  Min: 104  Max: 116 Resp  Av  Min: 16  Max: 16  SpO2  Av.7 %  Min: 98 %  Max: 100 %     /66 (BP Location: Right arm)   Pulse 89   Temp 99  F (37.2  C) (Oral)   Resp 16   Ht 1.778 m (5'  "10\")   Wt 98.1 kg (216 lb 4.8 oz)   SpO2 97%   BMI 31.04 kg/m      Admit Weight: 100.2 kg (221 lb)     GENERAL APPEARANCE: alert and no distress  RESP: lungs clear to auscultation - no rales, rhonchi or wheezes  CV: regular rhythm, normal rate, no rub, no murmur  EDEMA: no LE edema bilaterally  ABDOMEN: soft, nondistended, nontender  MS: extremities normal - no gross deformities noted, no evidence of inflammation in joints, no muscle tenderness  SKIN: no rash  TX KIDNEY: normal    Data   All labs reviewed by me.  CMP  Recent Labs   Lab 08/26/24  0453 08/26/24  0220 08/25/24  2146 08/25/24  1813 08/25/24  0845 08/25/24  0653 08/25/24  0526 08/24/24  0718 08/22/24  0738 08/19/24  0749   *  --   --   --   --  134*  --  134* 138 138   POTASSIUM 5.3  --   --   --   --  5.5*  --  5.9* 5.5* 5.1   CHLORIDE 109*  --   --   --   --  107  --  104 107 106   CO2 17*  --   --   --   --  19*  --  20* 20* 22   ANIONGAP 8  --   --   --   --  8  --  10 11 10   * 121* 131* 122*   < > 112*   < > 201* 144* 145*   BUN 17.1  --   --   --   --  16.4  --  16.0 13.4 13.6   CR 1.79*  --   --   --   --  1.74*  --  1.75* 1.73* 1.85*   GFRESTIMATED 46*  --   --   --   --  47*  --  47* 48* 44*   BETHANY 9.3  --   --   --   --  10.3  --  11.0* 10.1 9.7   MAG 1.6*  --   --   --   --  1.3*  --   --  1.3* 1.3*   PHOS 2.7  --   --   --   --  2.7  --   --  2.5 2.1*   PROTTOTAL  --   --   --   --   --   --   --  7.2  --   --    ALBUMIN  --   --   --   --   --   --   --  4.4  --   --    BILITOTAL  --   --   --   --   --   --   --  0.6  --   --    ALKPHOS  --   --   --   --   --   --   --  186*  --   --    AST  --   --   --   --   --   --   --  26  --   --    ALT  --   --   --   --   --   --   --  32  --   --     < > = values in this interval not displayed.     CBC  Recent Labs   Lab 08/26/24  0453 08/25/24  0653 08/24/24  0718 08/22/24  0738   HGB 8.7* 9.8* 10.5* 9.1*   WBC 4.1 5.2 6.2 3.5*   RBC 2.97* 3.38* 3.60* 3.14*   HCT 29.4* 32.5* " 34.3* 29.9*   MCV 99 96 95 95   MCH 29.3 29.0 29.2 29.0   MCHC 29.6* 30.2* 30.6* 30.4*   RDW 15.7* 15.5* 15.3* 14.9    284 343 319     INRNo lab results found in last 7 days.  ABGNo lab results found in last 7 days.   Urine Studies  Recent Labs   Lab Test 08/24/24  1056 08/12/24  1141 07/20/24  0609 09/27/21  1220 07/24/21  1151 06/25/19  1439 06/19/19  0822   COLOR Light Yellow Yellow Orange* Yellow   < > Straw Straw   APPEARANCE Slightly Cloudy* Slightly Cloudy* Slightly Cloudy* Slightly Cloudy*   < > Clear Clear   URINEGLC >=1000* 50* 50* 150*   < > 200 mg/dL* >1000 mg/dL*   URINEBILI Negative Negative Negative Negative   < > Negative Negative   URINEKETONE Negative Negative Negative Negative   < > Negative Negative   SG 1.017 1.022 1.013 1.013   < > 1.010 1.012   UBLD Large* Large* Large* Negative   < > Moderate* Trace*   URINEPH 7.0 6.0 6.5 6.0   < > 6.5 6.0   PROTEIN 100* 100* 300* >499*   < > 200 mg/dL* 300 mg/dL*   UROBILINOGEN  --   --   --   --   --  <2.0 E.U./dL <2.0 E.U./dL   NITRITE Negative Negative Negative Negative   < > Negative Negative   LEUKEST Negative Trace* Trace* Small*   < > Negative Negative   RBCU >182* >182* >182* 2   < > 25-50* 0-2   WBCU 9* 23* 105* 4   < > 0-5 0-5    < > = values in this interval not displayed.     No lab results found.  PTH  Recent Labs   Lab Test 08/05/24  0758 07/25/24  0448 07/20/21  1845   PTHI 263* 199* 190*     Iron Studies  Recent Labs   Lab Test 08/05/24 0758   IRON 44*      IRONSAT 17   KADEN 1,725*       IMAGING:  All imaging studies reviewed by me.

## 2024-08-26 NOTE — DISCHARGE SUMMARY
[facility]    Discharge Summary  Transplant Surgery    Date of Admission:  8/24/2024  Date of Discharge:  8/26/2024  Discharging Provider: Julienne Ibrahim PA-C/ Dr. 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 Readmitted 8/24/2024 with reports of abdominal pain and nausea.     Hospital Course   Graft function: POD 37   S/p pancreas transplant: Lipase normal 50, Lab glucose on presentation 201, likely erroneous. FBS 110s euglycemic. Off insulin. Continue to monitor AC &HS for now.   8/24 pancreas US patent.   S/p kidney transplant: SCr 1.7 stable. Fluid resuscitated while inpatient         Immunosuppression management:   Induction: Thymo 6.6 mg/kg +Solumedrol     Maintenance:  Tacrolimus supra therapeutic 15.8 on 8/22, reduced to 4 from 5. Had delay in IS meds in ED. ~24 level 10.3, restart at 3 mg BID, goal 8-10  Myfortic 720 mg BID     Hematology: DAPT- Brilinta + ASA for at least 1 month. Will not give heparin at this time due to DAPT.  Cardiorespiratory:   CAD, VT/VF arrest s/p PCI with IBAN: Previous CABG 2019, and IBAN 2019 with 100% thrombosis of the RCA, IBAN x 2 on 7/20/24.  PTA lipitor and Brilinta 90mg BID  Hypertension: PTA Coreg  3.25     GI/Nutrition:   Nausea: Managed inpatient with zofran and reglan, will discharge on reglan 5 mg twice  daily. Monitor with prolonged QT. Tolerating a regular diet prior to discharge.  Endocrine:   See above  Fluid/Electrolytes:   Hyperkalemia: Continue mIVF  Hyponatremia: Na 134    Low bicarb: 1950 mg  TID. Also given some IV bicarb prior to discharge.  Hypomagnesemia: Mag glycinate plus intermittent IVsupplementation  :  voiding  Infectious disease:   UTI: Cipro 8/14x14 days (prescribed OP)   Prophylaxis: DVT, fall, GI, fungal (nystatin) PCP ppx. (Dapsone)    Julienne Ibrahim PA-C    Discharge Disposition   Discharged to home   Condition at discharge: Stable    Pending Results   These results will be followed up by transplant team  Unresulted Labs Ordered in the Past 30 Days of this Admission       Date and Time Order Name Status Description    8/25/2024  8:34 AM Lidia Barr Virus Quantitative PCR, Plasma In process     8/25/2024  8:34 AM BK Virus Quantitative, PCR In process     8/24/2024 10:34 AM Blood Culture Peripheral Blood Preliminary     7/19/2024  7:25 PM Prepare red blood cells (unit) Preliminary     7/19/2024  7:25 PM Prepare red blood cells (unit) Preliminary             Primary Care Physician   Trinidad Hansen PA-C    Physical Exam   Temp: 98.6  F (37  C) Temp src: Oral BP: 128/80 Pulse: 91   Resp: 16 SpO2: 98 % O2 Device: None (Room air)    Vitals:    08/24/24 1714 08/25/24 0533 08/26/24 0550   Weight: 97.2 kg (214 lb 4.8 oz) 95.8 kg (211 lb 3.2 oz) 98.1 kg (216 lb 4.8 oz)     Vital Signs with Ranges  Temp:  [98.6  F (37  C)-99.5  F (37.5  C)] 98.6  F (37  C)  Pulse:  [89-98] 91  Resp:  [16] 16  BP: (127-139)/(66-83) 128/80  SpO2:  [94 %-99 %] 98 %  I/O last 3 completed shifts:  In: 1600 [I.V.:1600]  Out: 300 [Urine:300]    Constitutional: Awake, alert, cooperative, no apparent distress, and appears stated age.  Eyes: Lids and lashes normal, pupils equal, round, extra ocular muscles intact, sclera clear, conjunctiva normal.  ENT: Normocephalic, without obvious abnormality,  atraumatic  Respiratory: Nonlabored resps on RA  Cardiovascular: Perfused  GI: Soft, non-distended, non-tender, incision c/d/i  Genitourinary:  Voiding  Skin: No bruising or bleeding, normal skin color, texture  Musculoskeletal: There is no redness, warmth, or swelling of the joints.    Neurologic: Awake, alert, oriented to name, place and time.  Neuropsychiatric: Calm, normal eye contact, alert, normal affect, oriented to self, place, time and situation, memory for past and recent events intact and thought process normal.    Consultations This Hospital Stay   None    Time Spent on this Encounter   I have spent greater than 30 minutes on this discharge.    Discharge Orders   Discharge Medications   Current Discharge Medication List        START taking these medications    Details   metoclopramide (REGLAN) 5 MG tablet Take 1 tablet (5 mg) by mouth 2 times daily (before meals).  Qty: 60 tablet, Refills: 1    Associated Diagnoses: Pancreas replaced by transplant (H)           CONTINUE these medications which have CHANGED    Details   phosphorus tablet 250 mg (PHOSPHA 250 NEUTRAL) 250 MG per tablet Take 2 tablets (500 mg) by mouth 2 times daily.    Associated Diagnoses: Kidney replaced by transplant      tacrolimus (GENERIC EQUIVALENT) 1 MG capsule Take 3 capsules (3 mg) by mouth 2 times daily.  Qty: 240 capsule, Refills: 11    Associated Diagnoses: Kidney replaced by transplant           CONTINUE these medications which have NOT CHANGED    Details   acetaminophen (TYLENOL) 500 MG tablet Take 500 mg by mouth every 4 hours as needed      aspirin (ASA) 81 MG chewable tablet Take 1 tablet (81 mg) by mouth daily Starting tomorrow.  Qty: 30 tablet, Refills: 3    Associated Diagnoses: S/P right coronary artery (RCA) stent placement      atorvastatin (LIPITOR) 40 MG tablet Take 1 tablet (40 mg) by mouth every evening  Qty: 30 tablet, Refills: 2    Associated Diagnoses: Kidney replaced by transplant      carvedilol (COREG) 6.25  MG tablet Take 0.5 tablets (3.125 mg) by mouth 2 times daily (with meals) for 30 days  Qty: 30 tablet, Refills: 0    Associated Diagnoses: Aftercare following organ transplant      cholecalciferol (VITAMIN D3) 25 mcg (1000 units) capsule Take 2 capsules (50 mcg) by mouth daily  Qty: 60 capsule, Refills: 3    Associated Diagnoses: Kidney replaced by transplant; Vitamin D deficiency      ciprofloxacin (CIPRO) 500 MG tablet Take 1 tablet (500 mg) by mouth 2 times daily for 14 days  Qty: 28 tablet, Refills: 0    Associated Diagnoses: Diarrhea, unspecified type      dapsone (ACZONE) 25 MG tablet Take 2 tablets (50 mg) by mouth daily  Qty: 60 tablet, Refills: 11    Associated Diagnoses: Kidney replaced by transplant      loperamide (IMODIUM A-D) 2 MG capsule Take 1 capsule (2 mg) by mouth 4 times daily as needed for diarrhea  Qty: 30 capsule, Refills: 0    Associated Diagnoses: Kidney replaced by transplant; Aftercare following organ transplant      magnesium glycinate 100 MG CAPS capsule Take 2 capsules (200 mg) by mouth 2 times daily Take in place of magnesium-oxide  Qty: 120 capsule, Refills: 2    Associated Diagnoses: Low magnesium level      methocarbamol (ROBAXIN) 750 MG tablet Take 1 tablet (750 mg) by mouth every 6 hours as needed for muscle spasms  Qty: 20 tablet, Refills: 0    Associated Diagnoses: Kidney replaced by transplant      mycophenolic acid (GENERIC EQUIVALENT) 180 MG EC tablet Take 4 tablets (720 mg) by mouth 2 times daily  Qty: 240 tablet, Refills: 11    Comments: TXP DT 7/20/2024 (Kidney / Pancreas) TXP Dischg DT 7/30/2024 DX Kidney replaced by transplant Z94.0 [facility] ([place], [place])  Associated Diagnoses: Kidney replaced by transplant; Pancreas replaced by transplant (H)      nystatin (MYCOSTATIN) 279668 UNIT/ML suspension Take 5 mLs (500,000 Units) by mouth 4 times daily  Qty: 600 mL, Refills: 2    Associated Diagnoses: Kidney replaced by  transplant      ondansetron (ZOFRAN ODT) 4 MG ODT tab Take 1 tablet (4 mg) by mouth every 6 hours as needed for nausea or vomiting  Qty: 30 tablet, Refills: 1    Associated Diagnoses: Kidney replaced by transplant; Nausea & vomiting      pantoprazole (PROTONIX) 40 MG EC tablet Take 1 tablet (40 mg) by mouth every morning (before breakfast)  Qty: 30 tablet, Refills: 1    Associated Diagnoses: Kidney replaced by transplant      psyllium (METAMUCIL/KONSYL) 58.6 % powder Take 6 g (1 teaspoonful) by mouth 2 times daily as needed (diarrhea)    Associated Diagnoses: Kidney replaced by transplant      sodium bicarbonate 650 MG tablet Take 3 tablets (1,950 mg) by mouth 3 times daily  Qty: 270 tablet, Refills: 2    Associated Diagnoses: Kidney replaced by transplant      ticagrelor (BRILINTA) 90 MG tablet Take 1 tablet (90 mg) by mouth or Feeding Tube 2 times daily  Qty: 60 tablet, Refills: 0    Associated Diagnoses: Kidney replaced by transplant      valGANciclovir (VALCYTE) 450 MG tablet Take 2 tablets (900 mg) by mouth daily  Qty: 60 tablet, Refills: 2    Associated Diagnoses: Kidney replaced by transplant                Reason for your hospital stay    Patient was admitted with nausea, vomiting, and abdominal pain which was resolved prior to discharge. Infectious work up was negative, no specific etiology was identified.     Activity    Your activity upon discharge: Walk at least four times a day, lift no greater than 10 pounds for 6 weeks from the time of surgery.  After 6 weeks time please return to your normal exercise routine.  No driving while taking narcotics or until 3 weeks after surgery.     Adult [facility] Follow-up and recommended labs and tests    Resume transplant labs and other follow up as previously arranged.     When to contact your care team    WHEN TO CONTACT YOUR  COORDINATOR:     Transplant Coordinator [phone]     Notify your coordinator if you have pain over your kidney or pancreas, increased  redness or drainage from your incision, fever greater than 100F, decreased urine output or new or increased amount of blood in urine.     Notify your coordinator immediately if you are ever unable to take your immunosuppressive medications for any reason.     If you have URGENT concerns after office hours, please call the hospital switchboard at [phone] and ask to have the organ transplant nurse on-call paged. If you have a life-threatening emergency, go to the nearest emergency room.     Diet    Follow this diet upon discharge: Diet recommendations post-transplant: Heart healthy dietary habits long term (low saturated/trans fat, low sodium). High protein diet x 8 weeks. Practice food safety precautions.         Data   Most Recent 3 Creatinines:  Recent Labs   Lab Test 08/26/24  0453 08/25/24  0653 08/24/24  0718   CR 1.79* 1.74* 1.75*       Lab Results   Component Value Date    AMYLASE 86 08/25/2024    AMYLASE 98 08/22/2024    AMYLASE 90 08/19/2024     Lab Results   Component Value Date    LIPASE 50 08/25/2024    LIPASE 58 08/24/2024    LIPASE 90 (H) 08/22/2024     I have reviewed history, examined patient and discussed plan with the fellow/resident/KAT.    I concur with the findings in this note.    Time spent on discharge activities: 45 minutes.

## 2024-08-26 NOTE — PLAN OF CARE
Goal Outcome Evaluation:      Plan of Care Reviewed With: patient    Overall Patient Progress: improvingOverall Patient Progress: improving    [id number]  Neuro: Pt. alert & Ox4  Behavior:  Pt. calm and cooperative with cares.   Activity: Pt. up ad prasanna.  Vital:  T-99.0 (o), tachy 90's,AOVSS on RA.   LDAs: Right PIV with NS at 100cc/hour. Left AV fistula.  Cardiac: Pt. on tele., NSR  BG:  ACHS. 2am 121  Respiratory: LS clear bilat.  GI/: Pt. voiding spontaneously, stools x1 this shift.   Skin: Healed midline incision.    Pain/Nausea:  Abdominal pain managed prn Tylenol and Robaxin x1 . Pt. denies nausea.  Diet: Regular  Labs/Imaging: Am labs pending.    Plan:  Continue to follow POC and notify team with change in status.

## 2024-08-27 DIAGNOSIS — Z48.298 AFTERCARE FOLLOWING ORGAN TRANSPLANT: ICD-10-CM

## 2024-08-28 ENCOUNTER — INFUSION THERAPY VISIT (OUTPATIENT)
Dept: INFUSION THERAPY | Facility: CLINIC | Age: 49
End: 2024-08-28
Attending: NURSE PRACTITIONER
Payer: MEDICARE

## 2024-08-28 VITALS
TEMPERATURE: 98.4 F | RESPIRATION RATE: 16 BRPM | OXYGEN SATURATION: 100 % | DIASTOLIC BLOOD PRESSURE: 84 MMHG | SYSTOLIC BLOOD PRESSURE: 158 MMHG | HEART RATE: 84 BPM

## 2024-08-28 DIAGNOSIS — E86.0 DEHYDRATION: Primary | ICD-10-CM

## 2024-08-28 PROCEDURE — 999N000248 HC STATISTIC IV INSERT WITH US BY RN

## 2024-08-28 PROCEDURE — 96360 HYDRATION IV INFUSION INIT: CPT

## 2024-08-28 PROCEDURE — 258N000003 HC RX IP 258 OP 636: Performed by: NURSE PRACTITIONER

## 2024-08-28 RX ORDER — HEPARIN SODIUM (PORCINE) LOCK FLUSH IV SOLN 100 UNIT/ML 100 UNIT/ML
5 SOLUTION INTRAVENOUS
Status: CANCELLED | OUTPATIENT
Start: 2024-09-04

## 2024-08-28 RX ORDER — HEPARIN SODIUM,PORCINE 10 UNIT/ML
5-20 VIAL (ML) INTRAVENOUS DAILY PRN
Status: CANCELLED | OUTPATIENT
Start: 2024-09-04

## 2024-08-28 RX ORDER — DIPHENHYDRAMINE HYDROCHLORIDE 50 MG/ML
50 INJECTION INTRAMUSCULAR; INTRAVENOUS
Status: CANCELLED
Start: 2024-09-04

## 2024-08-28 RX ORDER — METHYLPREDNISOLONE SODIUM SUCCINATE 125 MG/2ML
125 INJECTION, POWDER, LYOPHILIZED, FOR SOLUTION INTRAMUSCULAR; INTRAVENOUS
Status: CANCELLED
Start: 2024-09-04

## 2024-08-28 RX ORDER — EPINEPHRINE 1 MG/ML
0.3 INJECTION, SOLUTION INTRAMUSCULAR; SUBCUTANEOUS EVERY 5 MIN PRN
Status: CANCELLED | OUTPATIENT
Start: 2024-09-04

## 2024-08-28 RX ORDER — ALBUTEROL SULFATE 0.83 MG/ML
2.5 SOLUTION RESPIRATORY (INHALATION)
Status: CANCELLED | OUTPATIENT
Start: 2024-09-04

## 2024-08-28 RX ORDER — ALBUTEROL SULFATE 90 UG/1
1-2 AEROSOL, METERED RESPIRATORY (INHALATION)
Status: CANCELLED
Start: 2024-09-04

## 2024-08-28 RX ORDER — MEPERIDINE HYDROCHLORIDE 25 MG/ML
25 INJECTION INTRAMUSCULAR; INTRAVENOUS; SUBCUTANEOUS EVERY 30 MIN PRN
Status: CANCELLED | OUTPATIENT
Start: 2024-09-04

## 2024-08-28 RX ADMIN — SODIUM CHLORIDE 1000 ML: 9 INJECTION, SOLUTION INTRAVENOUS at 08:36

## 2024-08-28 NOTE — PROGRESS NOTES
Infusion Nursing Note:  Siva Ramey presents today for IVF.    Patient seen by provider today: No   present during visit today: Not Applicable.    Note: Per orders, 1 L NS infused over an hr.  Administrations This Visit       sodium chloride 0.9% BOLUS 1,000 mL       Admin Date  08/28/2024 Action  $New Bag Dose  1,000 mL Route  Intravenous Documented By  Angella Madrigal RN                     Intravenous Access:  [facility] by [facility].  Per pt he will have lab draw tomorrow.    Treatment Conditions:  None.      Post Infusion Assessment:  Patient tolerated infusion without incident.  Blood return noted pre and post infusion.  Site patent and intact, free from redness, edema or discomfort.  No evidence of extravasations.  Access discontinued per protocol.       Discharge Plan:   Discharge instructions reviewed with: Patient.  Patient and/or family verbalized understanding of discharge instructions and all questions answered.  AVS to patient via [employer].  Patient will return to his provider as needed for next appointment.   Patient discharged in stable condition accompanied by: self.  Departure Mode: Ambulatory.      BP (!) 158/84   Pulse 84   Temp 98.4  F (36.9  C) (Oral)   Resp 16   SpO2 100%      Angella Madrigal RN

## 2024-08-28 NOTE — PATIENT INSTRUCTIONS
Dear Siva Ramey    Thank you for choosing [facility] ([facility] for your infusion.  The following information is a summary of our appointment as well as important reminders.          If you are a transplant patient and require transplant education, please click on this link: [email or web address].    If you have any questions on your upcoming Specialty Infusion appointments, please call scheduling at [phone].  It was a pleasure taking care of you today.    Sincerely,    [facility]  [address] [place] [address]  [id number]  Phone:  [phone]

## 2024-08-29 ENCOUNTER — LAB (OUTPATIENT)
Dept: LAB | Facility: HOSPITAL | Age: 49
End: 2024-08-29
Payer: MEDICARE

## 2024-08-29 ENCOUNTER — TELEPHONE (OUTPATIENT)
Dept: TRANSPLANT | Facility: CLINIC | Age: 49
End: 2024-08-29

## 2024-08-29 DIAGNOSIS — Z48.298 AFTERCARE FOLLOWING ORGAN TRANSPLANT: ICD-10-CM

## 2024-08-29 DIAGNOSIS — Z94.83 PANCREAS REPLACED BY TRANSPLANT (H): ICD-10-CM

## 2024-08-29 DIAGNOSIS — Z20.828 CONTACT WITH AND (SUSPECTED) EXPOSURE TO OTHER VIRAL COMMUNICABLE DISEASES: ICD-10-CM

## 2024-08-29 DIAGNOSIS — Z79.899 ENCOUNTER FOR LONG-TERM CURRENT USE OF MEDICATION: ICD-10-CM

## 2024-08-29 DIAGNOSIS — Z98.890 OTHER SPECIFIED POSTPROCEDURAL STATES: ICD-10-CM

## 2024-08-29 DIAGNOSIS — Z94.0 KIDNEY REPLACED BY TRANSPLANT: ICD-10-CM

## 2024-08-29 LAB
ALBUMIN MFR UR ELPH: 72 MG/DL
AMYLASE SERPL-CCNC: 96 U/L (ref 28–100)
ANION GAP SERPL CALCULATED.3IONS-SCNC: 9 MMOL/L (ref 7–15)
BACTERIA BLD CULT: NO GROWTH
BK VIRUS SPECIMEN TYPE: NORMAL
BKV DNA # SPEC NAA+PROBE: NOT DETECTED IU/ML
BUN SERPL-MCNC: 13.4 MG/DL (ref 6–20)
CALCIUM SERPL-MCNC: 9.8 MG/DL (ref 8.8–10.4)
CHLORIDE SERPL-SCNC: 109 MMOL/L (ref 98–107)
CREAT SERPL-MCNC: 1.56 MG/DL (ref 0.67–1.17)
CREAT UR-MCNC: 60.3 MG/DL
EGFRCR SERPLBLD CKD-EPI 2021: 54 ML/MIN/1.73M2
ERYTHROCYTE [DISTWIDTH] IN BLOOD BY AUTOMATED COUNT: 15.3 % (ref 10–15)
GLUCOSE SERPL-MCNC: 119 MG/DL (ref 70–99)
HCO3 SERPL-SCNC: 20 MMOL/L (ref 22–29)
HCT VFR BLD AUTO: 30.9 % (ref 40–53)
HGB BLD-MCNC: 9.2 G/DL (ref 13.3–17.7)
LIPASE SERPL-CCNC: 74 U/L (ref 13–60)
MAGNESIUM SERPL-MCNC: 1.4 MG/DL (ref 1.7–2.3)
MCH RBC QN AUTO: 28.5 PG (ref 26.5–33)
MCHC RBC AUTO-ENTMCNC: 29.8 G/DL (ref 31.5–36.5)
MCV RBC AUTO: 96 FL (ref 78–100)
MYCOPHENOLATE SERPL LC/MS/MS-MCNC: 2.88 MG/L (ref 1–3.5)
MYCOPHENOLATE-G SERPL LC/MS/MS-MCNC: 70.6 MG/L (ref 30–95)
PHOSPHATE SERPL-MCNC: 2.5 MG/DL (ref 2.5–4.5)
PLATELET # BLD AUTO: 261 10E3/UL (ref 150–450)
POTASSIUM SERPL-SCNC: 4.9 MMOL/L (ref 3.4–5.3)
PROT/CREAT 24H UR: 1.19 MG/MG CR (ref 0–0.2)
RBC # BLD AUTO: 3.23 10E6/UL (ref 4.4–5.9)
SODIUM SERPL-SCNC: 138 MMOL/L (ref 135–145)
TACROLIMUS BLD-MCNC: 8.9 UG/L (ref 5–15)
TME LAST DOSE: NORMAL H
WBC # BLD AUTO: 3 10E3/UL (ref 4–11)

## 2024-08-29 PROCEDURE — 87799 DETECT AGENT NOS DNA QUANT: CPT

## 2024-08-29 PROCEDURE — 87516 HEPATITIS B DNA AMP PROBE: CPT

## 2024-08-29 PROCEDURE — 82150 ASSAY OF AMYLASE: CPT

## 2024-08-29 PROCEDURE — 86833 HLA CLASS II HIGH DEFIN QUAL: CPT

## 2024-08-29 PROCEDURE — 80197 ASSAY OF TACROLIMUS: CPT

## 2024-08-29 PROCEDURE — 83735 ASSAY OF MAGNESIUM: CPT

## 2024-08-29 PROCEDURE — 80048 BASIC METABOLIC PNL TOTAL CA: CPT

## 2024-08-29 PROCEDURE — 83690 ASSAY OF LIPASE: CPT

## 2024-08-29 PROCEDURE — 86828 HLA CLASS I&II ANTIBODY QUAL: CPT

## 2024-08-29 PROCEDURE — 84156 ASSAY OF PROTEIN URINE: CPT

## 2024-08-29 PROCEDURE — 86832 HLA CLASS I HIGH DEFIN QUAL: CPT

## 2024-08-29 PROCEDURE — 85041 AUTOMATED RBC COUNT: CPT

## 2024-08-29 PROCEDURE — 36415 COLL VENOUS BLD VENIPUNCTURE: CPT

## 2024-08-29 PROCEDURE — 84100 ASSAY OF PHOSPHORUS: CPT

## 2024-08-29 PROCEDURE — 80180 DRUG SCRN QUAN MYCOPHENOLATE: CPT

## 2024-08-29 NOTE — TELEPHONE ENCOUNTER
ISSUE:  UPC 1.2 grams  Lipase slightly elevated     Grisel Coombs MD Poucher, Jessica, RN  Seems drug levels stable  Okay to monitor with repeating labs    Thank you  Grisel

## 2024-08-30 ENCOUNTER — TELEPHONE (OUTPATIENT)
Dept: TRANSPLANT | Facility: CLINIC | Age: 49
End: 2024-08-30
Payer: COMMERCIAL

## 2024-08-30 ENCOUNTER — APPOINTMENT (OUTPATIENT)
Dept: CT IMAGING | Facility: CLINIC | Age: 49
DRG: 392 | End: 2024-08-30
Attending: EMERGENCY MEDICINE
Payer: MEDICARE

## 2024-08-30 ENCOUNTER — HOSPITAL ENCOUNTER (INPATIENT)
Facility: CLINIC | Age: 49
LOS: 2 days | Discharge: HOME | DRG: 392 | End: 2024-09-01
Attending: EMERGENCY MEDICINE | Admitting: SURGERY
Payer: MEDICARE

## 2024-08-30 DIAGNOSIS — Z79.621 LONG-TERM CURRENT USE OF TACROLIMUS: ICD-10-CM

## 2024-08-30 DIAGNOSIS — Z94.83 PANCREAS TRANSPLANTED (H): ICD-10-CM

## 2024-08-30 DIAGNOSIS — R10.84 GENERALIZED ABDOMINAL PAIN: ICD-10-CM

## 2024-08-30 DIAGNOSIS — R00.0 TACHYCARDIA, UNSPECIFIED: Primary | ICD-10-CM

## 2024-08-30 DIAGNOSIS — Z79.899 LONG TERM USE OF DRUG: ICD-10-CM

## 2024-08-30 DIAGNOSIS — Z94.0 HISTORY OF SIMULTANEOUS KIDNEY AND PANCREAS TRANSPLANT (H): ICD-10-CM

## 2024-08-30 DIAGNOSIS — R11.2 NAUSEA AND VOMITING, UNSPECIFIED VOMITING TYPE: ICD-10-CM

## 2024-08-30 DIAGNOSIS — Z94.0 KIDNEY REPLACED BY TRANSPLANT: ICD-10-CM

## 2024-08-30 DIAGNOSIS — Z94.83 HISTORY OF SIMULTANEOUS KIDNEY AND PANCREAS TRANSPLANT (H): ICD-10-CM

## 2024-08-30 LAB
ALBUMIN SERPL BCG-MCNC: 4.5 G/DL (ref 3.5–5.2)
ALBUMIN UR-MCNC: 300 MG/DL
ALP SERPL-CCNC: 174 U/L (ref 40–150)
ALT SERPL W P-5'-P-CCNC: 38 U/L (ref 0–70)
ANION GAP SERPL CALCULATED.3IONS-SCNC: 9 MMOL/L (ref 7–15)
APPEARANCE UR: CLEAR
AST SERPL W P-5'-P-CCNC: 26 U/L (ref 0–45)
ATRIAL RATE - MUSE: 102 BPM
BASOPHILS # BLD AUTO: 0.1 10E3/UL (ref 0–0.2)
BASOPHILS NFR BLD AUTO: 1 %
BILIRUB SERPL-MCNC: 0.6 MG/DL
BILIRUB UR QL STRIP: NEGATIVE
BUN SERPL-MCNC: 14.4 MG/DL (ref 6–20)
CALCIUM SERPL-MCNC: 10.4 MG/DL (ref 8.8–10.4)
CHLORIDE SERPL-SCNC: 105 MMOL/L (ref 98–107)
COLOR UR AUTO: ABNORMAL
CREAT SERPL-MCNC: 1.54 MG/DL (ref 0.67–1.17)
DIASTOLIC BLOOD PRESSURE - MUSE: NORMAL MMHG
EGFRCR SERPLBLD CKD-EPI 2021: 55 ML/MIN/1.73M2
EOSINOPHIL # BLD AUTO: 0.1 10E3/UL (ref 0–0.7)
EOSINOPHIL NFR BLD AUTO: 1 %
ERYTHROCYTE [DISTWIDTH] IN BLOOD BY AUTOMATED COUNT: 15.5 % (ref 10–15)
GLUCOSE SERPL-MCNC: 137 MG/DL (ref 70–99)
GLUCOSE UR STRIP-MCNC: 500 MG/DL
HCO3 SERPL-SCNC: 21 MMOL/L (ref 22–29)
HCT VFR BLD AUTO: 35 % (ref 40–53)
HGB BLD-MCNC: 10.6 G/DL (ref 13.3–17.7)
HGB UR QL STRIP: ABNORMAL
IMM GRANULOCYTES # BLD: 0 10E3/UL
IMM GRANULOCYTES NFR BLD: 1 %
INTERPRETATION ECG - MUSE: NORMAL
KETONES UR STRIP-MCNC: NEGATIVE MG/DL
LACTATE SERPL-SCNC: 0.8 MMOL/L (ref 0.7–2)
LEUKOCYTE ESTERASE UR QL STRIP: ABNORMAL
LIPASE SERPL-CCNC: 56 U/L (ref 13–60)
LYMPHOCYTES # BLD AUTO: 0.2 10E3/UL (ref 0.8–5.3)
LYMPHOCYTES NFR BLD AUTO: 4 %
MCH RBC QN AUTO: 28.9 PG (ref 26.5–33)
MCHC RBC AUTO-ENTMCNC: 30.3 G/DL (ref 31.5–36.5)
MCV RBC AUTO: 95 FL (ref 78–100)
MONOCYTES # BLD AUTO: 0.1 10E3/UL (ref 0–1.3)
MONOCYTES NFR BLD AUTO: 2 %
NEUTROPHILS # BLD AUTO: 4.9 10E3/UL (ref 1.6–8.3)
NEUTROPHILS NFR BLD AUTO: 91 %
NITRATE UR QL: NEGATIVE
NRBC # BLD AUTO: 0 10E3/UL
NRBC BLD AUTO-RTO: 0 /100
P AXIS - MUSE: 66 DEGREES
PH UR STRIP: 7 [PH] (ref 5–7)
PLATELET # BLD AUTO: 244 10E3/UL (ref 150–450)
POTASSIUM SERPL-SCNC: 5.4 MMOL/L (ref 3.4–5.3)
PR INTERVAL - MUSE: 168 MS
PROT SERPL-MCNC: 7.2 G/DL (ref 6.4–8.3)
QRS DURATION - MUSE: 112 MS
QT - MUSE: 340 MS
QTC - MUSE: 443 MS
R AXIS - MUSE: 21 DEGREES
RBC # BLD AUTO: 3.67 10E6/UL (ref 4.4–5.9)
RBC URINE: >182 /HPF
SODIUM SERPL-SCNC: 135 MMOL/L (ref 135–145)
SP GR UR STRIP: 1.01 (ref 1–1.03)
SQUAMOUS EPITHELIAL: <1 /HPF
SYSTOLIC BLOOD PRESSURE - MUSE: NORMAL MMHG
T AXIS - MUSE: 51 DEGREES
TRANSITIONAL EPI: <1 /HPF
TROPONIN T SERPL HS-MCNC: 32 NG/L
TROPONIN T SERPL HS-MCNC: 35 NG/L
UROBILINOGEN UR STRIP-MCNC: NORMAL MG/DL
VENTRICULAR RATE- MUSE: 102 BPM
WBC # BLD AUTO: 5.3 10E3/UL (ref 4–11)
WBC URINE: 4 /HPF

## 2024-08-30 PROCEDURE — 250N000012 HC RX MED GY IP 250 OP 636 PS 637: Performed by: STUDENT IN AN ORGANIZED HEALTH CARE EDUCATION/TRAINING PROGRAM

## 2024-08-30 PROCEDURE — 258N000003 HC RX IP 258 OP 636: Performed by: EMERGENCY MEDICINE

## 2024-08-30 PROCEDURE — 83605 ASSAY OF LACTIC ACID: CPT | Performed by: EMERGENCY MEDICINE

## 2024-08-30 PROCEDURE — 250N000011 HC RX IP 250 OP 636: Performed by: STUDENT IN AN ORGANIZED HEALTH CARE EDUCATION/TRAINING PROGRAM

## 2024-08-30 PROCEDURE — 93010 ELECTROCARDIOGRAM REPORT: CPT | Performed by: EMERGENCY MEDICINE

## 2024-08-30 PROCEDURE — 81001 URINALYSIS AUTO W/SCOPE: CPT | Performed by: EMERGENCY MEDICINE

## 2024-08-30 PROCEDURE — 99285 EMERGENCY DEPT VISIT HI MDM: CPT | Mod: 25 | Performed by: EMERGENCY MEDICINE

## 2024-08-30 PROCEDURE — 93005 ELECTROCARDIOGRAM TRACING: CPT | Performed by: EMERGENCY MEDICINE

## 2024-08-30 PROCEDURE — 120N000011 HC R&B TRANSPLANT UMMC

## 2024-08-30 PROCEDURE — 85025 COMPLETE CBC W/AUTO DIFF WBC: CPT | Performed by: EMERGENCY MEDICINE

## 2024-08-30 PROCEDURE — 250N000013 HC RX MED GY IP 250 OP 250 PS 637: Performed by: STUDENT IN AN ORGANIZED HEALTH CARE EDUCATION/TRAINING PROGRAM

## 2024-08-30 PROCEDURE — 96374 THER/PROPH/DIAG INJ IV PUSH: CPT | Performed by: EMERGENCY MEDICINE

## 2024-08-30 PROCEDURE — 96361 HYDRATE IV INFUSION ADD-ON: CPT | Performed by: EMERGENCY MEDICINE

## 2024-08-30 PROCEDURE — 84484 ASSAY OF TROPONIN QUANT: CPT | Performed by: EMERGENCY MEDICINE

## 2024-08-30 PROCEDURE — 250N000011 HC RX IP 250 OP 636: Performed by: EMERGENCY MEDICINE

## 2024-08-30 PROCEDURE — 99285 EMERGENCY DEPT VISIT HI MDM: CPT | Performed by: EMERGENCY MEDICINE

## 2024-08-30 PROCEDURE — 74176 CT ABD & PELVIS W/O CONTRAST: CPT | Mod: 26 | Performed by: RADIOLOGY

## 2024-08-30 PROCEDURE — 250N000009 HC RX 250: Performed by: EMERGENCY MEDICINE

## 2024-08-30 PROCEDURE — 83690 ASSAY OF LIPASE: CPT | Performed by: EMERGENCY MEDICINE

## 2024-08-30 PROCEDURE — 80053 COMPREHEN METABOLIC PANEL: CPT | Performed by: EMERGENCY MEDICINE

## 2024-08-30 PROCEDURE — G1010 CDSM STANSON: HCPCS | Performed by: RADIOLOGY

## 2024-08-30 PROCEDURE — 36415 COLL VENOUS BLD VENIPUNCTURE: CPT | Performed by: EMERGENCY MEDICINE

## 2024-08-30 PROCEDURE — 74176 CT ABD & PELVIS W/O CONTRAST: CPT | Mod: MG

## 2024-08-30 RX ORDER — MAGNESIUM GLYCINATE 100 MG
200 CAPSULE ORAL 2 TIMES DAILY
Status: DISCONTINUED | OUTPATIENT
Start: 2024-08-30 | End: 2024-08-30

## 2024-08-30 RX ORDER — TACROLIMUS 1 MG/1
3 CAPSULE ORAL
Status: DISCONTINUED | OUTPATIENT
Start: 2024-08-30 | End: 2024-08-30

## 2024-08-30 RX ORDER — VITAMIN B COMPLEX
50 TABLET ORAL DAILY
Status: DISCONTINUED | OUTPATIENT
Start: 2024-08-30 | End: 2024-09-01 | Stop reason: HOSPADM

## 2024-08-30 RX ORDER — METOCLOPRAMIDE HYDROCHLORIDE 5 MG/ML
5 INJECTION INTRAMUSCULAR; INTRAVENOUS ONCE
Status: COMPLETED | OUTPATIENT
Start: 2024-08-30 | End: 2024-08-30

## 2024-08-30 RX ORDER — METOCLOPRAMIDE 5 MG/1
5 TABLET ORAL 2 TIMES DAILY
Status: DISCONTINUED | OUTPATIENT
Start: 2024-08-30 | End: 2024-08-30

## 2024-08-30 RX ORDER — ONDANSETRON 4 MG/1
4 TABLET, ORALLY DISINTEGRATING ORAL EVERY 6 HOURS PRN
Status: DISCONTINUED | OUTPATIENT
Start: 2024-08-30 | End: 2024-09-01 | Stop reason: HOSPADM

## 2024-08-30 RX ORDER — DAPSONE 25 MG/1
50 TABLET ORAL DAILY
Status: DISCONTINUED | OUTPATIENT
Start: 2024-08-30 | End: 2024-09-01 | Stop reason: HOSPADM

## 2024-08-30 RX ORDER — MAGNESIUM GLYCINATE 100 MG
200 CAPSULE ORAL 2 TIMES DAILY
Status: DISCONTINUED | OUTPATIENT
Start: 2024-08-30 | End: 2024-09-01 | Stop reason: HOSPADM

## 2024-08-30 RX ORDER — ASPIRIN 81 MG/1
81 TABLET, CHEWABLE ORAL DAILY
Status: DISCONTINUED | OUTPATIENT
Start: 2024-08-30 | End: 2024-08-30

## 2024-08-30 RX ORDER — METOCLOPRAMIDE 5 MG/1
5 TABLET ORAL
Status: DISCONTINUED | OUTPATIENT
Start: 2024-08-31 | End: 2024-09-01 | Stop reason: HOSPADM

## 2024-08-30 RX ORDER — METHOCARBAMOL 750 MG/1
750 TABLET, FILM COATED ORAL 4 TIMES DAILY
Status: DISCONTINUED | OUTPATIENT
Start: 2024-08-30 | End: 2024-08-30

## 2024-08-30 RX ORDER — ASPIRIN 81 MG/1
81 TABLET, CHEWABLE ORAL DAILY
Status: DISCONTINUED | OUTPATIENT
Start: 2024-08-30 | End: 2024-09-01 | Stop reason: HOSPADM

## 2024-08-30 RX ORDER — CHOLECALCIFEROL (VITAMIN D3) 1250 MCG
1250 CAPSULE ORAL
Status: DISCONTINUED | OUTPATIENT
Start: 2024-08-30 | End: 2024-08-30

## 2024-08-30 RX ORDER — MYCOPHENOLIC ACID 360 MG/1
720 TABLET, DELAYED RELEASE ORAL 2 TIMES DAILY
Status: DISCONTINUED | OUTPATIENT
Start: 2024-08-30 | End: 2024-09-01 | Stop reason: HOSPADM

## 2024-08-30 RX ORDER — VALGANCICLOVIR 450 MG/1
900 TABLET, FILM COATED ORAL DAILY
Status: DISCONTINUED | OUTPATIENT
Start: 2024-08-30 | End: 2024-08-30

## 2024-08-30 RX ORDER — NYSTATIN 100000/ML
500000 SUSPENSION, ORAL (FINAL DOSE FORM) ORAL 4 TIMES DAILY
Status: DISCONTINUED | OUTPATIENT
Start: 2024-08-30 | End: 2024-08-30

## 2024-08-30 RX ORDER — SODIUM BICARBONATE 650 MG/1
650 TABLET ORAL 3 TIMES DAILY
Status: DISCONTINUED | OUTPATIENT
Start: 2024-08-30 | End: 2024-08-30

## 2024-08-30 RX ORDER — SODIUM CHLORIDE 9 MG/ML
INJECTION, SOLUTION INTRAVENOUS CONTINUOUS
Status: DISCONTINUED | OUTPATIENT
Start: 2024-08-30 | End: 2024-08-31

## 2024-08-30 RX ORDER — ATORVASTATIN CALCIUM 40 MG/1
40 TABLET, FILM COATED ORAL EVERY EVENING
Status: DISCONTINUED | OUTPATIENT
Start: 2024-08-30 | End: 2024-08-30

## 2024-08-30 RX ORDER — METOPROLOL TARTRATE 1 MG/ML
5 INJECTION, SOLUTION INTRAVENOUS ONCE
Status: COMPLETED | OUTPATIENT
Start: 2024-08-30 | End: 2024-08-30

## 2024-08-30 RX ORDER — KIT FOR THE PREPARATION OF TECHNETIUM TC 99M MEBROFENIN 45 MG/10ML
4.8-7.2 INJECTION, POWDER, LYOPHILIZED, FOR SOLUTION INTRAVENOUS ONCE
Status: DISCONTINUED | OUTPATIENT
Start: 2024-08-30 | End: 2024-08-31

## 2024-08-30 RX ORDER — ONDANSETRON 4 MG/1
4 TABLET, FILM COATED ORAL EVERY 6 HOURS PRN
Status: DISCONTINUED | OUTPATIENT
Start: 2024-08-30 | End: 2024-08-30

## 2024-08-30 RX ORDER — MYCOPHENOLIC ACID 360 MG/1
720 TABLET, DELAYED RELEASE ORAL
Status: DISCONTINUED | OUTPATIENT
Start: 2024-08-30 | End: 2024-08-30

## 2024-08-30 RX ORDER — PANTOPRAZOLE SODIUM 40 MG/1
40 TABLET, DELAYED RELEASE ORAL
Status: DISCONTINUED | OUTPATIENT
Start: 2024-08-31 | End: 2024-09-01

## 2024-08-30 RX ORDER — CARVEDILOL 3.12 MG/1
3.12 TABLET ORAL 2 TIMES DAILY WITH MEALS
Status: DISCONTINUED | OUTPATIENT
Start: 2024-08-30 | End: 2024-08-30

## 2024-08-30 RX ORDER — VALGANCICLOVIR 450 MG/1
900 TABLET, FILM COATED ORAL DAILY
Status: DISCONTINUED | OUTPATIENT
Start: 2024-08-30 | End: 2024-09-01 | Stop reason: HOSPADM

## 2024-08-30 RX ORDER — METHOCARBAMOL 750 MG/1
750 TABLET, FILM COATED ORAL EVERY 6 HOURS PRN
Status: DISCONTINUED | OUTPATIENT
Start: 2024-08-30 | End: 2024-09-01 | Stop reason: HOSPADM

## 2024-08-30 RX ORDER — NYSTATIN 100000/ML
500000 SUSPENSION, ORAL (FINAL DOSE FORM) ORAL 4 TIMES DAILY
Status: DISCONTINUED | OUTPATIENT
Start: 2024-08-30 | End: 2024-09-01

## 2024-08-30 RX ORDER — DAPSONE 25 MG/1
25 TABLET ORAL DAILY
Status: DISCONTINUED | OUTPATIENT
Start: 2024-08-30 | End: 2024-08-30

## 2024-08-30 RX ORDER — ATORVASTATIN CALCIUM 40 MG/1
40 TABLET, FILM COATED ORAL EVERY EVENING
Status: DISCONTINUED | OUTPATIENT
Start: 2024-08-30 | End: 2024-09-01 | Stop reason: HOSPADM

## 2024-08-30 RX ORDER — CARVEDILOL 3.12 MG/1
3.12 TABLET ORAL 2 TIMES DAILY WITH MEALS
Status: DISCONTINUED | OUTPATIENT
Start: 2024-08-30 | End: 2024-09-01

## 2024-08-30 RX ORDER — SODIUM BICARBONATE 650 MG/1
1950 TABLET ORAL 3 TIMES DAILY
Status: DISCONTINUED | OUTPATIENT
Start: 2024-08-30 | End: 2024-09-01 | Stop reason: HOSPADM

## 2024-08-30 RX ORDER — TACROLIMUS 1 MG/1
3 CAPSULE ORAL
Status: DISCONTINUED | OUTPATIENT
Start: 2024-08-30 | End: 2024-09-01 | Stop reason: HOSPADM

## 2024-08-30 RX ORDER — PANTOPRAZOLE SODIUM 40 MG/1
40 TABLET, DELAYED RELEASE ORAL
Status: DISCONTINUED | OUTPATIENT
Start: 2024-08-31 | End: 2024-08-30

## 2024-08-30 RX ORDER — MORPHINE SULFATE 4 MG/ML
4 INJECTION, SOLUTION INTRAMUSCULAR; INTRAVENOUS ONCE
Status: COMPLETED | OUTPATIENT
Start: 2024-08-30 | End: 2024-08-30

## 2024-08-30 RX ADMIN — ASPIRIN 81 MG CHEWABLE TABLET 81 MG: 81 TABLET CHEWABLE at 23:36

## 2024-08-30 RX ADMIN — SODIUM CHLORIDE 1000 ML: 9 INJECTION, SOLUTION INTRAVENOUS at 10:02

## 2024-08-30 RX ADMIN — DAPSONE 50 MG: 25 TABLET ORAL at 23:30

## 2024-08-30 RX ADMIN — MORPHINE SULFATE 4 MG: 4 INJECTION INTRAVENOUS at 13:36

## 2024-08-30 RX ADMIN — PROCHLORPERAZINE EDISYLATE 10 MG: 5 INJECTION INTRAMUSCULAR; INTRAVENOUS at 13:37

## 2024-08-30 RX ADMIN — MYCOPHENOLIC ACID 720 MG: 360 TABLET, DELAYED RELEASE ORAL at 23:29

## 2024-08-30 RX ADMIN — ATORVASTATIN CALCIUM 40 MG: 40 TABLET, FILM COATED ORAL at 23:30

## 2024-08-30 RX ADMIN — METOCLOPRAMIDE 5 MG: 5 INJECTION, SOLUTION INTRAMUSCULAR; INTRAVENOUS at 12:09

## 2024-08-30 RX ADMIN — SINCALIDE 1.9 MCG: 5 INJECTION, POWDER, LYOPHILIZED, FOR SOLUTION INTRAVENOUS at 22:00

## 2024-08-30 RX ADMIN — CARVEDILOL 3.12 MG: 3.12 TABLET, FILM COATED ORAL at 23:30

## 2024-08-30 RX ADMIN — SODIUM BICARBONATE 1950 MG: 650 TABLET ORAL at 23:30

## 2024-08-30 RX ADMIN — Medication 200 MG: at 23:42

## 2024-08-30 RX ADMIN — TICAGRELOR 90 MG: 90 TABLET ORAL at 23:42

## 2024-08-30 RX ADMIN — VALGANCICLOVIR HYDROCHLORIDE 900 MG: 450 TABLET ORAL at 23:37

## 2024-08-30 RX ADMIN — TACROLIMUS 3 MG: 1 CAPSULE ORAL at 23:29

## 2024-08-30 RX ADMIN — NYSTATIN 500000 UNITS: 100000 SUSPENSION ORAL at 23:36

## 2024-08-30 RX ADMIN — METOPROLOL TARTRATE 5 MG: 5 INJECTION INTRAVENOUS at 12:10

## 2024-08-30 RX ADMIN — METOCLOPRAMIDE 5 MG: 5 INJECTION, SOLUTION INTRAMUSCULAR; INTRAVENOUS at 10:02

## 2024-08-30 RX ADMIN — SODIUM PHOSPHATE, DIBASIC, ANHYDROUS, POTASSIUM PHOSPHATE, MONOBASIC, AND SODIUM PHOSPHATE, MONOBASIC, MONOHYDRATE 500 MG: 852; 155; 130 TABLET, COATED ORAL at 23:30

## 2024-08-30 RX ADMIN — Medication 50 MCG: at 23:37

## 2024-08-30 ASSESSMENT — ACTIVITIES OF DAILY LIVING (ADL)
DIFFICULTY_EATING/SWALLOWING: NO
ADLS_ACUITY_SCORE: 37
DIFFICULTY_COMMUNICATING: NO
ADLS_ACUITY_SCORE: 22
DRESSING/BATHING_DIFFICULTY: NO
ADLS_ACUITY_SCORE: 37
ADLS_ACUITY_SCORE: 37
ADLS_ACUITY_SCORE: 22
CONCENTRATING,_REMEMBERING_OR_MAKING_DECISIONS_DIFFICULTY: NO
ADLS_ACUITY_SCORE: 37
ADLS_ACUITY_SCORE: 22
CHANGE_IN_FUNCTIONAL_STATUS_SINCE_ONSET_OF_CURRENT_ILLNESS/INJURY: NO
FALL_HISTORY_WITHIN_LAST_SIX_MONTHS: NO
WEAR_GLASSES_OR_BLIND: NO
NUMBER_OF_TIMES_PATIENT_HAS_FALLEN_WITHIN_LAST_SIX_MONTHS: 0
ADLS_ACUITY_SCORE: 22
TOILETING_ISSUES: NO
ADLS_ACUITY_SCORE: 35
ADLS_ACUITY_SCORE: 37
DOING_ERRANDS_INDEPENDENTLY_DIFFICULTY: NO
ADLS_ACUITY_SCORE: 37
HEARING_DIFFICULTY_OR_DEAF: NO

## 2024-08-30 ASSESSMENT — COLUMBIA-SUICIDE SEVERITY RATING SCALE - C-SSRS
2. HAVE YOU ACTUALLY HAD ANY THOUGHTS OF KILLING YOURSELF IN THE PAST MONTH?: NO
6. HAVE YOU EVER DONE ANYTHING, STARTED TO DO ANYTHING, OR PREPARED TO DO ANYTHING TO END YOUR LIFE?: NO
1. IN THE PAST MONTH, HAVE YOU WISHED YOU WERE DEAD OR WISHED YOU COULD GO TO SLEEP AND NOT WAKE UP?: NO

## 2024-08-30 NOTE — H&P
[facility] [place]    Transplant Surgery  History and Physical    Siva Ramey  : 1975  MRN # [id number]    ADMIT DATE: 2024    PCP: Trinidad Hansen    CHIEF COMPLAINT: Nausea, emesis, back pain    HPI: Siva Ramey is a 49 year old male  PMH significant for DMII (on insulin pump) and resulting ESKD (on HD every MWF since 2021). PMH also significant for h/o CAD s/p PCI with IBAN 2019 and 2 vessel CABG in  (currently only on ASA), PAD, COVID-19, HTN, obesity, left diaphragmatic elevation s/p CABG, anxiety and tooth abscess jaw osteomyelitis 2023. Underwent simultaneous kidney pancreas transplant on 24 complicated by VT/VF arrest in PACU. Received 2 rounds of CPR before ROSC. Taken emergently to the cath lab and found to have 100% occlusion of the RCA now s/p IBAN x 2     He has been back and forth to the ED twice now with nausea, emesis. He has back pain too, and some mild RUQ tenderness. He says he ate a cheeseburger and 4 chicken nuggets from [employer], and then 30 minutes later had dry heaving and threw up just clear fluid, no bile or blood. He then had dry heaving all night.     No fevers, no chills, no acute abdominal pain.       ROS:   CONSTITUTIONAL: Denies fever, chills, fatigue, or weight fluctuations  HEAD: Denies headache.  EENT: Denies vision changes, hearing changes, dysphagia, or sore throat.   NECK: Denies lymphadenopathy.   CV:Denies chest pain, palpitations, or shortness of breath.   PULMONARY:Denies shortness of breath, cough, or previous TB exposure.   GI:Denies nausea, vomiting, diarrhea, and abdominal pain. Bowel movements are regular.   :Denies urinary alterations, dysuria, urinary frequency, hematuria, and abnormal drainage.   EXT:Denies lower extremity edema.   SKIN:Denies abnormal rashes or lesions.   MUSCULOSKELETAL:Denies upper or lower extremity weakness and pain.   NEUROLOGIC:Denies lightheadedness, dizziness, seizures, or  upper or lower extremity paresthesia.   HEMATOLOGICAL:No abnormal bruising or bleeding.   PSYCHIATRIC:Denies any mood alterations.     PMH:  Past Medical History:   Diagnosis Date    Acquired elevated diaphragm     Anemia in chronic kidney disease     Angina pectoris (H24)     Chronic kidney disease     Coronary artery disease     Diabetes mellitus, type II (H) 12/2001    Diabetic nephropathy (H)     MARQUEZ (dyspnea on exertion)     Dyslipidemia 12/2001    End stage renal disease (H)     History of blood transfusion 2004    Hypertension     takes medication    Ischemic cardiomyopathy     Metabolic acidosis     Myocardial infarction (H)     STEMI -Diagonal branch of the LAD    Obesity (BMI 30-39.9)     Peripheral neuropathy     Proteinuria     Retinopathy     Vitamin D deficiency        PSH:  Past Surgical History:   Procedure Laterality Date    APPENDECTOMY OPEN N/A 7/19/2024    Procedure: Appendectomy open;  Surgeon: Harrison Whitehead MD;  Location: [facility] OR    BACK SURGERY  2009    L5 disc cut    BENCH KIDNEY  7/19/2024    Procedure: Bench kidney;  Surgeon: Harrison Whitehead MD;  Location: [facility] OR    BENCH PANCREAS N/A 7/19/2024    Procedure: Bench pancreas;  Surgeon: Harrison Whitehead MD;  Location: [facility] OR    BYPASS GRAFT ARTERY CORONARY  06/20/2019    2 vessels    CV CENTRAL VENOUS CATHETER PLACEMENT N/A 7/20/2024    Procedure: Central Venous Catheter Placement;  Surgeon: Dominic Robertson MD;  Location:  HEART CARDIAC CATH LAB    CV CORONARY ANGIOGRAM N/A 01/13/2019    Procedure: Coronary Angiogram;  Surgeon: Cielo Che MD;  Location: [facility] Cath Lab;  Service: Cardiology    CV CORONARY ANGIOGRAM N/A 05/02/2019    Procedure: Coronary Angiogram;  Surgeon: Cielo Che MD;  Location: [facility] Cath Lab;  Service: Cardiology    CV CORONARY ANGIOGRAM N/A 04/30/2020    Procedure: Coronary Angiogram;  Surgeon: Cielo Che MD;  Location:   [facility] Cath Lab;  Service: Cardiology    CV CORONARY ANGIOGRAM N/A 07/22/2021    Procedure: CV CORONARY ANGIOGRAM;  Surgeon: Adrian Crow MD;  Location: [facility] CATH LAB CV    CV CORONARY ANGIOGRAM N/A 02/01/2024    Procedure: Coronary Angiogram;  Surgeon: Delroy Carpio MD;  Location: [facility] CARDIAC CATH LAB    CV CORONARY ANGIOGRAM N/A 7/20/2024    Procedure: Coronary Angiogram;  Surgeon: Dominic Robertson MD;  Location: [facility] CARDIAC CATH LAB    CV CORONARY LITHOTRIPSY PCI N/A 7/20/2024    Procedure: Percutaneous Coronary Intervention - Lithotripsy;  Surgeon: Dominic Robertson MD;  Location: [facility] CARDIAC CATH LAB    CV FRACTIONAL FLOW RATIO WIRE N/A 07/22/2021    Procedure: Fractional Flow Ratio Wire;  Surgeon: Adrian Crow MD;  Location: [facility] CV    CV INTRAVASULAR ULTRASOUND N/A 7/20/2024    Procedure: Intravascular Ultrasound;  Surgeon: Dominic Robertson MD;  Location: [facility] CARDIAC CATH LAB    CV LEFT HEART CATH N/A 07/22/2021    Procedure: Left Heart Cath;  Surgeon: Adrian Crow MD;  Location: [facility] CV    CV LEFT HEART CATHETERIZATION WITH LEFT VENTRICULOGRAM N/A 01/13/2019    Procedure: Left Heart Catheterization with Left Ventriculogram;  Surgeon: Cielo Che MD;  Location: [facility] Cath Lab;  Service: Cardiology    CV LEFT HEART CATHETERIZATION WITHOUT LEFT VENTRICULOGRAM Left 04/30/2020    Procedure: Left Heart Catheterization Without Left Ventriculogram;  Surgeon: Cielo Che MD;  Location: [facility] Cath Lab;  Service: Cardiology    CV PCI N/A 02/01/2024    Procedure: Percutaneous Coronary Intervention;  Surgeon: Delroy Carpio MD;  Location: [facility] CARDIAC CATH LAB    CV PCI N/A 7/20/2024    Procedure: Percutaneous Coronary Intervention;  Surgeon: Dominic Robertson MD;  Location: [facility] CARDIAC CATH LAB    CV PCI ASPIRATION THROMECTOMY N/A 7/20/2024    Procedure:  Percutaneous Coronary Intervention Aspiration Thrombectomy;  Surgeon: Dominic Robertson MD;  Location:  HEART CARDIAC CATH LAB    CV PCI STENT DRUG ELUTING N/A 2024    Procedure: Percutaneous Coronary Intervention Stent;  Surgeon: Dominic Robertson MD;  Location: [facility] CARDIAC CATH LAB    CV RIGHT HEART CATHETERIZATION N/A 2020    Procedure: Right Heart Catheterization;  Surgeon: Cielo Che MD;  Location: [facility] Cath Lab;  Service: Cardiology    EYE SURGERY      GENITOURINARY SURGERY      HERNIA REPAIR      OTHER SURGICAL HISTORY      Excise varicocele    STENT, CORONARY, DALE      TRANSPLANT PANCREAS, KIDNEY  DONOR, COMBINED N/A 2024    Procedure: Transplant pancreas, kidney  donor, with ureteral stent placement;  Surgeon: Harrison Whitehead MD;  Location: [facility] OR    VASCULAR SURGERY         MEDICATIONS:  Prior to Admission Medications   Prescriptions Last Dose Informant Patient Reported? Taking?   acetaminophen (TYLENOL) 500 MG tablet 2024  Yes Yes   Sig: Take 500 mg by mouth every 4 hours as needed   aspirin (ASA) 81 MG chewable tablet 2024  No Yes   Sig: Take 1 tablet (81 mg) by mouth daily Starting tomorrow.   atorvastatin (LIPITOR) 40 MG tablet 2024  No Yes   Sig: Take 1 tablet (40 mg) by mouth every evening   carvedilol (COREG) 6.25 MG tablet 2024  No Yes   Sig: Take 0.5 tablets (3.125 mg) by mouth 2 times daily (with meals) for 30 days   cholecalciferol (VITAMIN D3) 25 mcg (1000 units) capsule 2024  No Yes   Sig: Take 2 capsules (50 mcg) by mouth daily   dapsone (ACZONE) 25 MG tablet 2024  No Yes   Sig: Take 2 tablets (50 mg) by mouth daily   loperamide (IMODIUM A-D) 2 MG capsule 2024  No Yes   Sig: Take 1 capsule (2 mg) by mouth 4 times daily as needed for diarrhea   magnesium glycinate 100 MG CAPS capsule 2024  No Yes   Sig: Take 2 capsules (200 mg) by mouth 2 times daily Take  in place of magnesium-oxide   methocarbamol (ROBAXIN) 750 MG tablet 8/29/2024  No Yes   Sig: Take 1 tablet (750 mg) by mouth every 6 hours as needed for muscle spasms   metoclopramide (REGLAN) 5 MG tablet 8/29/2024  No Yes   Sig: Take 1 tablet (5 mg) by mouth 2 times daily (before meals).   mycophenolic acid (GENERIC EQUIVALENT) 180 MG EC tablet 8/29/2024  No Yes   Sig: Take 4 tablets (720 mg) by mouth 2 times daily   nystatin (MYCOSTATIN) 910814 UNIT/ML suspension 8/29/2024  No Yes   Sig: Take 5 mLs (500,000 Units) by mouth 4 times daily   ondansetron (ZOFRAN ODT) 4 MG ODT tab 8/29/2024  No Yes   Sig: Take 1 tablet (4 mg) by mouth every 6 hours as needed for nausea or vomiting   pantoprazole (PROTONIX) 40 MG EC tablet 8/29/2024  No Yes   Sig: Take 1 tablet (40 mg) by mouth every morning (before breakfast)   phosphorus tablet 250 mg (PHOSPHA 250 NEUTRAL) 250 MG per tablet 8/29/2024  No Yes   Sig: Take 2 tablets (500 mg) by mouth 2 times daily.   psyllium (METAMUCIL/KONSYL) 58.6 % powder 8/29/2024  No Yes   Sig: Take 6 g (1 teaspoonful) by mouth 2 times daily as needed (diarrhea)   sodium bicarbonate 650 MG tablet 8/29/2024  No Yes   Sig: Take 3 tablets (1,950 mg) by mouth 3 times daily   tacrolimus (GENERIC EQUIVALENT) 1 MG capsule 8/29/2024  No Yes   Sig: Take 3 capsules (3 mg) by mouth 2 times daily.   ticagrelor (BRILINTA) 90 MG tablet 8/29/2024  No Yes   Sig: Take 1 tablet (90 mg) by mouth or Feeding Tube 2 times daily   valGANciclovir (VALCYTE) 450 MG tablet 8/29/2024  No Yes   Sig: Take 2 tablets (900 mg) by mouth daily      Facility-Administered Medications: None        ALLERGIES:     Allergies   Allergen Reactions    Amoxicillin Hives     & generalized pain    Tolerated ceftriaxone in 2023 ([facility]) and 2024 ([facility])    Venlafaxine      lethargic       FAMILY HISTORY:  Family History   Problem Relation Age of Onset    Diabetes Type 2  Mother     Heart Disease Father 60    CABG Father 50       "  triple bypass    Diabetes Type 2  Father     Depression Sister     Substance Abuse Sister     Ovarian Cancer Maternal Grandmother     Brain Cancer Maternal Grandmother     Pancreatic Cancer Maternal Aunt     Prostate Cancer Maternal Uncle        SOCIAL HISTORY:  Social History     Socioeconomic History    Marital status:      Spouse name: Not on file    Number of children: Not on file    Years of education: Not on file    Highest education level: Not on file   Occupational History    Not on file   Tobacco Use    Smoking status: Never     Passive exposure: Past    Smokeless tobacco: Never   Substance and Sexual Activity    Alcohol use: Never    Drug use: No    Sexual activity: Yes     Partners: Female   Other Topics Concern    Parent/sibling w/ CABG, MI or angioplasty before 65F 55M? Yes   Social History Narrative    Not on file     Social Determinants of Health     Financial Resource Strain: High Risk (1/1/2022)    [facility]    Financial Resource Strain     Difficulty of Paying Living Expenses: Not on file     Difficulty of Paying Living Expenses: Not on file   Food Insecurity: Not on file   Transportation Needs: Not on file   Physical Activity: Not on file   Stress: Not on file   Social Connections: Unknown (4/10/2023)    [facility]    Social Connections     Frequency of Communication with Friends and Family: Not on file   Interpersonal Safety: Not on file   Housing Stability: Not on file       PHYSICAL EXAM:  Blood pressure (!) 166/113, pulse 98, temperature 98.4  F (36.9  C), temperature source Oral, resp. rate 16, height 1.803 m (5' 11\"), weight 99 kg (218 lb 3.2 oz), SpO2 97%.  GENERAL: Appears alert and oriented times three.   HEENT: Eye symmetrical and free of discharge bilaterally. Mucous membranes moist and " "without lesions.  NECK: Atraumaic.  CV: WWP, RRR,    RESPIRATORY: EWOB, Symmetrical chest rise, saturating 95% or above on room air  GI: Soft and non distended  no rebound, guarding. No organomegaly. Incision is well healed slight ruq tenderness  EXTREMITIES: No peripheral edema. 2+ bilateral pedal pulses.   NEUROLOGIC: Alert and orientated x 3. No focal deficits.   MUSCULOSKELETAL: No joint swelling or tenderness.   SKIN: No jaundice. No rashes or lesions.     LABS:  CBC:  Recent Labs   Lab Test 08/30/24  0948   WBC 5.3   RBC 3.67*   HGB 10.6*   HCT 35.0*   MCV 95   MCH 28.9   MCHC 30.3*   RDW 15.5*          CMP:  Recent Labs   Lab Test 08/30/24  0948      POTASSIUM 5.4*   CHLORIDE 105   BETHANY 10.4   CO2 21*   BUN 14.4   CR 1.54*   *   AST 26   ALT 38   BILITOTAL 0.6   ALBUMIN 4.5   PROTTOTAL 7.2   ALKPHOS 174*       IMAGING: CT with PO contrast pending    ASSESSMENT & PLAN:    Nausea/emesis after KP transplant, and RUQ tenderness    Plan:  HIDA  CT with PO contrast  Trend CBC/BMP, follow tac level  Q6 vitals  Strict I/o      FEN: NPO, IVF  PROPHY:  Amb TID,   LINES:  None  DISPO:  Floor, 7a  CODE STATUS:  Full      - - - - - - - - - - - - - - - - - -  Jori Painter MD, PhD  Transplantt Surgery Fellow    See [facility] for on-call pager information: [facility] Paging/Directory   \"SURGERY TRANSPLANT ABDOMINAL KIDNEY/PANCREAS/LIVER/LIVING DONOR/[facility]/ \"            "

## 2024-08-30 NOTE — ED TRIAGE NOTES
49 year old male s/p renal/pancreas transplant July 19, 2024, presents with concerns of epigastric and gastric pain since 2200 yesterday associated with nausea and vomiting. Patient denies any fever, chills, hematemesis, diarrhea, or melena.  Patient tried Reglan at 0600, but achieved no relief.     Triage Assessment (Adult)       Row Name 08/30/24 0847          Triage Assessment    Airway WDL WDL        Respiratory WDL    Respiratory WDL WDL        Skin Circulation/Temperature WDL    Skin Circulation/Temperature WDL WDL        Cardiac WDL    Cardiac WDL WDL        Peripheral/Neurovascular WDL    Peripheral Neurovascular WDL WDL        Cognitive/Neuro/Behavioral WDL    Cognitive/Neuro/Behavioral WDL WDL

## 2024-08-30 NOTE — MEDICATION SCRIBE - ADMISSION MEDICATION HISTORY
Medication Scribe Admission Medication History    Admission medication history is complete. The information provided in this note is only as accurate as the sources available at the time of the update.    Information Source(s): Patient via in-person    Pertinent Information: Spoke with patient in person and completed medication hx.     Changes made to PTA medication list:  Added: None  Deleted: None  Changed: None    Allergies reviewed with patient and updates made in EHR: no    Medication History Completed By: Brianna Leyva 8/30/2024 12:02 PM    PTA Med List   Medication Sig Last Dose    acetaminophen (TYLENOL) 500 MG tablet Take 500 mg by mouth every 4 hours as needed 8/29/2024    aspirin (ASA) 81 MG chewable tablet Take 1 tablet (81 mg) by mouth daily Starting tomorrow. 8/29/2024    atorvastatin (LIPITOR) 40 MG tablet Take 1 tablet (40 mg) by mouth every evening 8/29/2024    carvedilol (COREG) 6.25 MG tablet Take 0.5 tablets (3.125 mg) by mouth 2 times daily (with meals) for 30 days 8/29/2024    cholecalciferol (VITAMIN D3) 25 mcg (1000 units) capsule Take 2 capsules (50 mcg) by mouth daily 8/29/2024    dapsone (ACZONE) 25 MG tablet Take 2 tablets (50 mg) by mouth daily 8/29/2024    loperamide (IMODIUM A-D) 2 MG capsule Take 1 capsule (2 mg) by mouth 4 times daily as needed for diarrhea 8/29/2024    magnesium glycinate 100 MG CAPS capsule Take 2 capsules (200 mg) by mouth 2 times daily Take in place of magnesium-oxide 8/29/2024    methocarbamol (ROBAXIN) 750 MG tablet Take 1 tablet (750 mg) by mouth every 6 hours as needed for muscle spasms 8/29/2024    metoclopramide (REGLAN) 5 MG tablet Take 1 tablet (5 mg) by mouth 2 times daily (before meals). 8/29/2024    mycophenolic acid (GENERIC EQUIVALENT) 180 MG EC tablet Take 4 tablets (720 mg) by mouth 2 times daily 8/29/2024    nystatin (MYCOSTATIN) 194420 UNIT/ML suspension Take 5 mLs (500,000 Units) by mouth 4 times daily 8/29/2024    ondansetron (ZOFRAN ODT) 4 MG  ODT tab Take 1 tablet (4 mg) by mouth every 6 hours as needed for nausea or vomiting 8/29/2024    pantoprazole (PROTONIX) 40 MG EC tablet Take 1 tablet (40 mg) by mouth every morning (before breakfast) 8/29/2024    phosphorus tablet 250 mg (PHOSPHA 250 NEUTRAL) 250 MG per tablet Take 2 tablets (500 mg) by mouth 2 times daily. 8/29/2024    psyllium (METAMUCIL/KONSYL) 58.6 % powder Take 6 g (1 teaspoonful) by mouth 2 times daily as needed (diarrhea) 8/29/2024    sodium bicarbonate 650 MG tablet Take 3 tablets (1,950 mg) by mouth 3 times daily 8/29/2024    tacrolimus (GENERIC EQUIVALENT) 1 MG capsule Take 3 capsules (3 mg) by mouth 2 times daily. 8/29/2024    ticagrelor (BRILINTA) 90 MG tablet Take 1 tablet (90 mg) by mouth or Feeding Tube 2 times daily 8/29/2024    valGANciclovir (VALCYTE) 450 MG tablet Take 2 tablets (900 mg) by mouth daily 8/29/2024

## 2024-08-30 NOTE — TELEPHONE ENCOUNTER
Gordon called on-call RNCC with reports of nausea and dry heaves since 7pm yesterday evening. Pt denies fever, does report abd pain.     Was able to keep txp meds down last night, but has not taken them this morning.     Was sent home from ED this week with reglan and zofran which are not helping.     RNCC recommends pt return to [facility] ED. Pt agrees.

## 2024-08-30 NOTE — ED PROVIDER NOTES
"    [place] EMERGENCY DEPARTMENT ([facility])    8/30/24       ED PROVIDER NOTE    History     Chief Complaint   Patient presents with    Abdominal Pain     The history is provided by the patient and medical records.     Siva Ramey is a 49 year old male with a complex past medical history of hypertension, CAD status post stenting IBAN 1/2019 and 2-vessel CABG in 2019, type 2 diabetes, end stage renal disease s/p kidney and pancreas transplant 07/20/24 complicated by VT/VF arrest in PACU. He underwent two rounds of CPR, two doses of epinephrine, and two defibrillations with ROSC. EKG showed STEMI and patient had repeat cardiac angiogram showing 100% occlusion of the RCA in-stent thrombosis and LAD s/p IBAN x 2 and thrombectomy on 7/20/24.  He was discharged from the hospital 10 days later on 7/30/2024 on dual antiplatelet therapy with Brilinta and aspirin for at least 1 month.  He is also on tacrolimus and mycophenolate.  Patient presented to the ED on 8/24/2024 with upset stomach, nausea and vomiting.  He was found to have elevated potassium of 5.9 with a stable creatinine and GFR.  CT showed increased fat stranding along pancreatic transplant as well as minimal increase streaking of the perinephric left lower quadrant.  He was readmitted to the transplant service and was just discharged 4 days ago.     Patient presents to the Emergency Department for evaluation of nausea and abdominal pain similar to prior hospitalization.  He states that these symptoms briefly resolved after last hospitalization but then recurred last night around 8 PM.  He denies rabia chest pain but notes that he feels as though he has a \"bubble\" in his chest related to the nausea and vomiting.  He is still passing gas, he is having bowel movements.  He reports this feels similar to his previous hospitalization.  No fever, cough, or urinary symptoms.  He is not on insulin at this time.    Per Chart Review:  IMPRESSION CT abdomen " pelvis w/o contrast from 08/24/24 compared to prior CT from 8/12/2024:  1. Postsurgical changes of right lower quadrant pancreatic and left  lower quadrant renal transplant.  2. Increased fat stranding seen along the pancreatic transplant,  consider correlation with pancreatic enzymes to evaluate for  pancreatitis, no new pancreatic collection.   3. Minimal increase in left lower quadrant perinephric streakiness,  consider correlation with urinalysis for infection.  4. Additional incidental findings similar to prior with left basilar  densities representing atelectasis/consolidation    IMPRESSION US RENAL TRANSPLANT with DOPPLER 08/24/24:   1. Patent left lower quadrant renal transplant vasculature by Doppler  evaluation  2. Apparent thickening of the ureter, reactive/inflammatory versus  infective. Partial visualization of ureteric stent in the renal  pelvicalyceal system and urinary bladder.  3. Mild prominence of the pelvicalyceal system , difficult to exclude  mild hydronephrosis, given partially distended urinary bladder; if  concern for hydronephrosis, may consider follow-up ultrasound with  decompressed urinary bladder, particularly post Burt catheterization    IMPRESSION US PANCREAS TRANSPLANT 08/24/24:   1. Patent pancreatic transplant vasculature by Doppler evaluation.  2. Peripheral anechogenicity/hypoechogenicity is seen along medial  pancreatic transplant may represent loculated fluid/collection.    Past Medical History  Past Medical History:   Diagnosis Date    Acquired elevated diaphragm     Anemia in chronic kidney disease     Angina pectoris (H24)     Chronic kidney disease     Coronary artery disease     Diabetes mellitus, type II (H) 12/2001    Diabetic nephropathy (H)     MARQUEZ (dyspnea on exertion)     Dyslipidemia 12/2001    End stage renal disease (H)     History of blood transfusion 2004    Hypertension     takes medication    Ischemic cardiomyopathy     Metabolic acidosis     Myocardial  infarction (H)     STEMI -Diagonal branch of the LAD    Obesity (BMI 30-39.9)     Peripheral neuropathy     Proteinuria     Retinopathy     Vitamin D deficiency      Past Surgical History:   Procedure Laterality Date    APPENDECTOMY OPEN N/A 7/19/2024    Procedure: Appendectomy open;  Surgeon: Harrison Whitehead MD;  Location: [facility] OR    BACK SURGERY  2009    L5 disc cut    BENCH KIDNEY  7/19/2024    Procedure: Bench kidney;  Surgeon: Harrison Whitehead MD;  Location: [facility] OR    BENCH PANCREAS N/A 7/19/2024    Procedure: Bench pancreas;  Surgeon: Harrison Whitehead MD;  [facility]    BYPASS GRAFT ARTERY CORONARY  06/20/2019    2 vessels    CV CENTRAL VENOUS CATHETER PLACEMENT N/A 7/20/2024    Procedure: Central Venous Catheter Placement;  Surgeon: Dominic Robertson MD;  Location: [facility] CARDIAC CATH LAB    CV CORONARY ANGIOGRAM N/A 01/13/2019    Procedure: Coronary Angiogram;  Surgeon: Cielo Che MD;  Location: [facility] Cath Lab;  Service: Cardiology    CV CORONARY ANGIOGRAM N/A 05/02/2019    Procedure: Coronary Angiogram;  Surgeon: Cielo Che MD;  Location: [facility] Cath Lab;  Service: Cardiology    CV CORONARY ANGIOGRAM N/A 04/30/2020    Procedure: Coronary Angiogram;  Surgeon: Cielo Che MD;  Location: [facility] Cath Lab;  Service: Cardiology    CV CORONARY ANGIOGRAM N/A 07/22/2021    Procedure: CV CORONARY ANGIOGRAM;  Surgeon: Adrian Crow MD;  Location: [facility] CATH LAB CV    CV CORONARY ANGIOGRAM N/A 02/01/2024    Procedure: Coronary Angiogram;  Surgeon: Delroy Carpio MD;  Location: [facility] CARDIAC CATH LAB    CV CORONARY ANGIOGRAM N/A 7/20/2024    Procedure: Coronary Angiogram;  Surgeon: Dominic Robertson MD;  Location: [facility] CARDIAC CATH LAB    CV CORONARY LITHOTRIPSY PCI N/A 7/20/2024    Procedure: Percutaneous Coronary Intervention - Lithotripsy;  Surgeon: Dominic Robertson MD;   Location: [facility] CARDIAC CATH LAB    CV FRACTIONAL FLOW RATIO WIRE N/A 07/22/2021    Procedure: Fractional Flow Ratio Wire;  Surgeon: Adrian Crow MD;  Location: [facility] CATH LAB CV    CV INTRAVASULAR ULTRASOUND N/A 7/20/2024    Procedure: Intravascular Ultrasound;  Surgeon: Dominic Robertson MD;  Location: [facility] CARDIAC CATH LAB    CV LEFT HEART CATH N/A 07/22/2021    Procedure: Left Heart Cath;  Surgeon: Adrian Crow MD;  Location: [facility] CV    CV LEFT HEART CATHETERIZATION WITH LEFT VENTRICULOGRAM N/A 01/13/2019    Procedure: Left Heart Catheterization with Left Ventriculogram;  Surgeon: Cielo Che MD;  Location: [facility] Cath Lab;  Service: Cardiology    CV LEFT HEART CATHETERIZATION WITHOUT LEFT VENTRICULOGRAM Left 04/30/2020    Procedure: Left Heart Catheterization Without Left Ventriculogram;  Surgeon: Cielo Che MD;  Location: [facility] Cath Lab;  Service: Cardiology    CV PCI N/A 02/01/2024    Procedure: Percutaneous Coronary Intervention;  Surgeon: Delroy Carpio MD;  Location: [facility] CARDIAC CATH LAB    CV PCI N/A 7/20/2024    Procedure: Percutaneous Coronary Intervention;  Surgeon: Dominic Robertson MD;  Location: [facility] CARDIAC CATH LAB    CV PCI ASPIRATION THROMECTOMY N/A 7/20/2024    Procedure: Percutaneous Coronary Intervention Aspiration Thrombectomy;  Surgeon: Dominic Robertson MD;  Location: [facility] CARDIAC CATH LAB    CV PCI STENT DRUG ELUTING N/A 7/20/2024    Procedure: Percutaneous Coronary Intervention Stent;  Surgeon: Dominic Robertson MD;  Location: [facility] CARDIAC CATH LAB    CV RIGHT HEART CATHETERIZATION N/A 04/30/2020    Procedure: Right Heart Catheterization;  Surgeon: Cielo Che MD;  Location: [facility] Lab;  Service: Cardiology    EYE SURGERY      GENITOURINARY SURGERY      HERNIA REPAIR      OTHER SURGICAL HISTORY      Excise varicocele    STENT, CORONARY, DALE  2019     TRANSPLANT PANCREAS, KIDNEY  DONOR, COMBINED N/A 2024    Procedure: Transplant pancreas, kidney  donor, with ureteral stent placement;  Surgeon: Harrison Whitehead MD;  Location: [facility] OR    VASCULAR SURGERY       acetaminophen (TYLENOL) 500 MG tablet  aspirin (ASA) 81 MG chewable tablet  atorvastatin (LIPITOR) 40 MG tablet  carvedilol (COREG) 6.25 MG tablet  cholecalciferol (VITAMIN D3) 25 mcg (1000 units) capsule  dapsone (ACZONE) 25 MG tablet  loperamide (IMODIUM A-D) 2 MG capsule  magnesium glycinate 100 MG CAPS capsule  methocarbamol (ROBAXIN) 750 MG tablet  metoclopramide (REGLAN) 5 MG tablet  mycophenolic acid (GENERIC EQUIVALENT) 180 MG EC tablet  nystatin (MYCOSTATIN) 585269 UNIT/ML suspension  ondansetron (ZOFRAN ODT) 4 MG ODT tab  pantoprazole (PROTONIX) 40 MG EC tablet  phosphorus tablet 250 mg (PHOSPHA 250 NEUTRAL) 250 MG per tablet  psyllium (METAMUCIL/KONSYL) 58.6 % powder  sodium bicarbonate 650 MG tablet  tacrolimus (GENERIC EQUIVALENT) 1 MG capsule  ticagrelor (BRILINTA) 90 MG tablet  valGANciclovir (VALCYTE) 450 MG tablet      Allergies   Allergen Reactions    Amoxicillin Hives     & generalized pain    [facility] and  ([facility])    Venlafaxine      lethargic     Family History  Family History   Problem Relation Age of Onset    Diabetes Type 2  Mother     Heart Disease Father 60    CABG Father 50        triple bypass    Diabetes Type 2  Father     Depression Sister     Substance Abuse Sister     Ovarian Cancer Maternal Grandmother     Brain Cancer Maternal Grandmother     Pancreatic Cancer Maternal Aunt     Prostate Cancer Maternal Uncle      Social History   Social History     Tobacco Use    Smoking status: Never     Passive exposure: Past    Smokeless tobacco: Never   Substance Use Topics    Alcohol use: Never    Drug use: No      A complete review of systems was performed with pertinent positives and negatives  "noted in the HPI, and all other systems negative.    Physical Exam   BP: (!) 187/105  Pulse: 109  Temp: 98.3  F (36.8  C)  Resp: 18  Height: 180.3 cm (5' 11\")  Weight: 99 kg (218 lb 3.2 oz)  SpO2: 100 %    Physical Exam  General: awake, alert, patient is retching holding an emesis bag.  Head: normal cephalic  HEENT: pupils equal, conjugate gaze intact  Neck: Supple  CV: regular rate and rhythm without murmur  Lungs: clear to auscultation  Abd: soft, non-tender, no guarding, no peritoneal signs.  Surgical incision is clean, dry, intact with no drainage or surrounding redness or warmth.  EXT: lower extremities without swelling or edema  Neuro: awake, answers questions appropriately. No focal deficits noted     ED Course, Procedures, & Data      Procedures            EKG Interpretation:      Interpreted by William Collado MD  Time reviewed: 9:17 AM  Symptoms at time of EKG: Abdominal pain  Rhythm: sinus tachycardia  Rate: 102  Axis: normal  Ectopy: none  Conduction: normal  ST Segments/ T Waves: Nonspecific ST-T wave changes  Q Waves: none  Comparison to prior: Unchanged from 8/24/2024    Clinical Impression: Sinus tachycardia, no sign of acute ischemia       Results for orders placed or performed during the hospital encounter of 08/30/24   Comprehensive metabolic panel     Status: Abnormal   Result Value Ref Range    Sodium 135 135 - 145 mmol/L    Potassium 5.4 (H) 3.4 - 5.3 mmol/L    Carbon Dioxide (CO2) 21 (L) 22 - 29 mmol/L    Anion Gap 9 7 - 15 mmol/L    Urea Nitrogen 14.4 6.0 - 20.0 mg/dL    Creatinine 1.54 (H) 0.67 - 1.17 mg/dL    GFR Estimate 55 (L) >60 mL/min/1.73m2    Calcium 10.4 8.8 - 10.4 mg/dL    Chloride 105 98 - 107 mmol/L    Glucose 137 (H) 70 - 99 mg/dL    Alkaline Phosphatase 174 (H) 40 - 150 U/L    AST 26 0 - 45 U/L    ALT 38 0 - 70 U/L    Protein Total 7.2 6.4 - 8.3 g/dL    Albumin 4.5 3.5 - 5.2 g/dL    Bilirubin Total 0.6 <=1.2 mg/dL   Lipase     Status: Normal   Result Value Ref Range    Lipase 56 13 " - 60 U/L   Troponin T, High Sensitivity     Status: Abnormal   Result Value Ref Range    Troponin T, High Sensitivity 35 (H) <=22 ng/L   UA with Microscopic reflex to Culture     Status: Abnormal    Specimen: Urine, Midstream   Result Value Ref Range    Color Urine Light Yellow Colorless, Straw, Light Yellow, Yellow    Appearance Urine Clear Clear    Glucose Urine 500 (A) Negative mg/dL    Bilirubin Urine Negative Negative    Ketones Urine Negative Negative mg/dL    Specific Gravity Urine 1.013 1.003 - 1.035    Blood Urine Large (A) Negative    pH Urine 7.0 5.0 - 7.0    Protein Albumin Urine 300 (A) Negative mg/dL    Urobilinogen Urine Normal Normal, 2.0 mg/dL    Nitrite Urine Negative Negative    Leukocyte Esterase Urine Trace (A) Negative    RBC Urine >182 (H) <=2 /HPF    WBC Urine 4 <=5 /HPF    Squamous Epithelials Urine <1 <=1 /HPF    Transitional Epithelials Urine <1 <=1 /HPF    Narrative    Urine Culture not indicated   Lactic acid whole blood with 1x repeat in 2 hr when >2     Status: Normal   Result Value Ref Range    Lactic Acid, Initial 0.8 0.7 - 2.0 mmol/L   CBC with platelets and differential     Status: Abnormal   Result Value Ref Range    WBC Count 5.3 4.0 - 11.0 10e3/uL    RBC Count 3.67 (L) 4.40 - 5.90 10e6/uL    Hemoglobin 10.6 (L) 13.3 - 17.7 g/dL    Hematocrit 35.0 (L) 40.0 - 53.0 %    MCV 95 78 - 100 fL    MCH 28.9 26.5 - 33.0 pg    MCHC 30.3 (L) 31.5 - 36.5 g/dL    RDW 15.5 (H) 10.0 - 15.0 %    Platelet Count 244 150 - 450 10e3/uL    % Neutrophils 91 %    % Lymphocytes 4 %    % Monocytes 2 %    % Eosinophils 1 %    % Basophils 1 %    % Immature Granulocytes 1 %    NRBCs per 100 WBC 0 <1 /100    Absolute Neutrophils 4.9 1.6 - 8.3 10e3/uL    Absolute Lymphocytes 0.2 (L) 0.8 - 5.3 10e3/uL    Absolute Monocytes 0.1 0.0 - 1.3 10e3/uL    Absolute Eosinophils 0.1 0.0 - 0.7 10e3/uL    Absolute Basophils 0.1 0.0 - 0.2 10e3/uL    Absolute Immature Granulocytes 0.0 <=0.4 10e3/uL    Absolute NRBCs 0.0  10e3/uL   EKG 12-lead, tracing only     Status: None   Result Value Ref Range    Systolic Blood Pressure  mmHg    Diastolic Blood Pressure  mmHg    Ventricular Rate 102 BPM    Atrial Rate 102 BPM    DC Interval 168 ms    QRS Duration 112 ms     ms    QTc 443 ms    P Axis 66 degrees    R AXIS 21 degrees    T Axis 51 degrees    Interpretation ECG       Sinus tachycardia  Nonspecific T wave abnormality  Abnormal ECG    Unconfirmed report - interpretation of this ECG is computer generated - see medical record for final interpretation  Confirmed by - EMERGENCY ROOM, PHYSICIAN ([id number]),  MAXX CONTI ([id number]) on 8/30/2024 10:14:31 AM     CBC with platelets differential     Status: Abnormal    Narrative    The following orders were created for panel order CBC with platelets differential.  Procedure                               Abnormality         Status                     ---------                               -----------         ------                     CBC with platelets and d...[[id number]]  Abnormal            Final result                 Please view results for these tests on the individual orders.     Medications   iohexol (OMNIPAQUE) 9 MG/ML oral solution 500 mL (has no administration in time range)   metoclopramide (REGLAN) injection 5 mg (5 mg Intravenous $Given 8/30/24 1002)   sodium chloride 0.9% BOLUS 1,000 mL (0 mLs Intravenous Stopped 8/30/24 1104)   metoclopramide (REGLAN) injection 5 mg (5 mg Intravenous $Given 8/30/24 1209)   metoprolol (LOPRESSOR) injection 5 mg (5 mg Intravenous $Given 8/30/24 1210)     Labs Ordered and Resulted from Time of ED Arrival to Time of ED Departure   COMPREHENSIVE METABOLIC PANEL - Abnormal       Result Value    Sodium 135      Potassium 5.4 (*)     Carbon Dioxide (CO2) 21 (*)     Anion Gap 9      Urea Nitrogen 14.4      Creatinine 1.54 (*)     GFR Estimate 55 (*)     Calcium 10.4      Chloride 105      Glucose 137 (*)     Alkaline Phosphatase 174 (*)      AST 26      ALT 38      Protein Total 7.2      Albumin 4.5      Bilirubin Total 0.6     TROPONIN T, HIGH SENSITIVITY - Abnormal    Troponin T, High Sensitivity 35 (*)    ROUTINE UA WITH MICROSCOPIC REFLEX TO CULTURE - Abnormal    Color Urine Light Yellow      Appearance Urine Clear      Glucose Urine 500 (*)     Bilirubin Urine Negative      Ketones Urine Negative      Specific Gravity Urine 1.013      Blood Urine Large (*)     pH Urine 7.0      Protein Albumin Urine 300 (*)     Urobilinogen Urine Normal      Nitrite Urine Negative      Leukocyte Esterase Urine Trace (*)     RBC Urine >182 (*)     WBC Urine 4      Squamous Epithelials Urine <1      Transitional Epithelials Urine <1     CBC WITH PLATELETS AND DIFFERENTIAL - Abnormal    WBC Count 5.3      RBC Count 3.67 (*)     Hemoglobin 10.6 (*)     Hematocrit 35.0 (*)     MCV 95      MCH 28.9      MCHC 30.3 (*)     RDW 15.5 (*)     Platelet Count 244      % Neutrophils 91      % Lymphocytes 4      % Monocytes 2      % Eosinophils 1      % Basophils 1      % Immature Granulocytes 1      NRBCs per 100 WBC 0      Absolute Neutrophils 4.9      Absolute Lymphocytes 0.2 (*)     Absolute Monocytes 0.1      Absolute Eosinophils 0.1      Absolute Basophils 0.1      Absolute Immature Granulocytes 0.0      Absolute NRBCs 0.0     LIPASE - Normal    Lipase 56     LACTIC ACID WHOLE BLOOD WITH 1X REPEAT IN 2 HR WHEN >2 - Normal    Lactic Acid, Initial 0.8     TROPONIN T, HIGH SENSITIVITY     CT Abdomen Pelvis w/o Contrast    (Results Pending)          Critical care was not performed.     Medical Decision Making  The patient's presentation was of high complexity (an acute health issue posing potential threat to life or bodily function).    The patient's evaluation involved:  review of external note(s) from 3+ sources (see separate area of note for details)  review of 3+ test result(s) ordered prior to this encounter (see separate area of note for details)  ordering and/or review  of 3+ test(s) in this encounter (see separate area of note for details)  discussion of management or test interpretation with another health professional (see separate area of note for details)    The patient's management necessitated high risk (a decision regarding hospitalization).    Assessment & Plan    Siva is a 49 male presents to the emergency department with nausea vomiting and inability to tolerate meds.    On exam he is hypertensive, retching holding an emesis bag.  Abdomen is benign.  Denies fever or recent infectious symptoms.    Differential includes infection, SBO, medication side effect.  Could also consider ACS.    his EKG shows no sign of acute ischemia; troponin near baseline.  Significantly lower than previous.    Labs notable for normal white count, normal hemoglobin normal lactic acid normal lipase.  Kidney function is baseline.  Mildly elevated potassium.  Urine without evidence of infection low blood consistent with previous UAs    Hypertensive, unable to tolerate oral antihypertensives today.  Will give IV metoprolol.  Discussed with surgery wanting repeat CT contrast this was ordered.    Baseline troponin stable.  Required ongoing doses of antiemetics and later pain meds.  Will be admitted to the transplant surgery service.  CT results are pending at this time.    I have reviewed the nursing notes. I have reviewed the findings, diagnosis, plan and need for follow up with the patient.    New Prescriptions    No medications on file       Final diagnoses:   Nausea and vomiting, unspecified vomiting type   Generalized abdominal pain   History of simultaneous kidney and pancreas transplant (H)       Tammy GREEN, am serving as a trained medical scribe to document services personally performed by William Mcmahon MD based on the provider's statements to me on August 30, 2024.  This document has been checked and approved by the attending provider.    William GREEN MD, was physically present and  have reviewed and verified the accuracy of this note documented by Tammy Carbajal, medical scribe.      William Collado MD  [facility] EMERGENCY DEPARTMENT  8/30/2024     William Collado MD  08/30/24 [id number]

## 2024-08-31 ENCOUNTER — APPOINTMENT (OUTPATIENT)
Dept: NUCLEAR MEDICINE | Facility: CLINIC | Age: 49
DRG: 392 | End: 2024-08-31
Attending: STUDENT IN AN ORGANIZED HEALTH CARE EDUCATION/TRAINING PROGRAM
Payer: MEDICARE

## 2024-08-31 DIAGNOSIS — Z94.0 KIDNEY REPLACED BY TRANSPLANT: ICD-10-CM

## 2024-08-31 LAB
AMYLASE SERPL-CCNC: 76 U/L (ref 28–100)
ANION GAP SERPL CALCULATED.3IONS-SCNC: 8 MMOL/L (ref 7–15)
BUN SERPL-MCNC: 15.8 MG/DL (ref 6–20)
CALCIUM SERPL-MCNC: 9.7 MG/DL (ref 8.8–10.4)
CHLORIDE SERPL-SCNC: 108 MMOL/L (ref 98–107)
CREAT SERPL-MCNC: 1.43 MG/DL (ref 0.67–1.17)
DONOR IDENTIFICATION: NORMAL
DSA COMMENTS: NORMAL
DSA PRESENT: NO
DSA TEST METHOD: NORMAL
EGFRCR SERPLBLD CKD-EPI 2021: 60 ML/MIN/1.73M2
ERYTHROCYTE [DISTWIDTH] IN BLOOD BY AUTOMATED COUNT: 15.6 % (ref 10–15)
GLUCOSE BLDC GLUCOMTR-MCNC: 105 MG/DL (ref 70–99)
GLUCOSE BLDC GLUCOMTR-MCNC: 107 MG/DL (ref 70–99)
GLUCOSE BLDC GLUCOMTR-MCNC: 119 MG/DL (ref 70–99)
GLUCOSE BLDC GLUCOMTR-MCNC: 125 MG/DL (ref 70–99)
GLUCOSE BLDC GLUCOMTR-MCNC: 160 MG/DL (ref 70–99)
GLUCOSE BLDC GLUCOMTR-MCNC: 79 MG/DL (ref 70–99)
GLUCOSE SERPL-MCNC: 111 MG/DL (ref 70–99)
HCO3 SERPL-SCNC: 19 MMOL/L (ref 22–29)
HCT VFR BLD AUTO: 32 % (ref 40–53)
HGB BLD-MCNC: 9.9 G/DL (ref 13.3–17.7)
INT SUB COMMENTS: NORMAL
INT SUB RESULT: NORMAL
INTERF SUBSTANCE: NORMAL
INTSUB TEST METHOD: NORMAL
LIPASE SERPL-CCNC: 43 U/L (ref 13–60)
MAGNESIUM SERPL-MCNC: 1.2 MG/DL (ref 1.7–2.3)
MCH RBC QN AUTO: 29.2 PG (ref 26.5–33)
MCHC RBC AUTO-ENTMCNC: 30.9 G/DL (ref 31.5–36.5)
MCV RBC AUTO: 94 FL (ref 78–100)
ORGAN: NORMAL
PHOSPHATE SERPL-MCNC: 2.9 MG/DL (ref 2.5–4.5)
PLATELET # BLD AUTO: 239 10E3/UL (ref 150–450)
POTASSIUM SERPL-SCNC: 4.7 MMOL/L (ref 3.4–5.3)
RBC # BLD AUTO: 3.39 10E6/UL (ref 4.4–5.9)
SA 1  COMMENTS: NORMAL
SA 1 CELL: NORMAL
SA 1 TEST METHOD: NORMAL
SA 2 CELL: NORMAL
SA 2 COMMENTS: NORMAL
SA 2 TEST METHOD: NORMAL
SA1 HI RISK ABY: NORMAL
SA1 MOD RISK ABY: NORMAL
SA2 HI RISK ABY: NORMAL
SA2 MOD RISK ABY: NORMAL
SARS-COV-2 RNA RESP QL NAA+PROBE: NEGATIVE
SODIUM SERPL-SCNC: 135 MMOL/L (ref 135–145)
TACROLIMUS BLD-MCNC: 8.5 UG/L (ref 5–15)
TME LAST DOSE: NORMAL H
TME LAST DOSE: NORMAL H
UNACCEPTABLE ANTIGENS: NORMAL
UNOS CPRA: 11
WBC # BLD AUTO: 5.1 10E3/UL (ref 4–11)

## 2024-08-31 PROCEDURE — 80048 BASIC METABOLIC PNL TOTAL CA: CPT | Performed by: STUDENT IN AN ORGANIZED HEALTH CARE EDUCATION/TRAINING PROGRAM

## 2024-08-31 PROCEDURE — 99222 1ST HOSP IP/OBS MODERATE 55: CPT | Mod: 24 | Performed by: INTERNAL MEDICINE

## 2024-08-31 PROCEDURE — 84100 ASSAY OF PHOSPHORUS: CPT | Performed by: STUDENT IN AN ORGANIZED HEALTH CARE EDUCATION/TRAINING PROGRAM

## 2024-08-31 PROCEDURE — 120N000011 HC R&B TRANSPLANT UMMC

## 2024-08-31 PROCEDURE — 83735 ASSAY OF MAGNESIUM: CPT | Performed by: STUDENT IN AN ORGANIZED HEALTH CARE EDUCATION/TRAINING PROGRAM

## 2024-08-31 PROCEDURE — 82150 ASSAY OF AMYLASE: CPT | Performed by: STUDENT IN AN ORGANIZED HEALTH CARE EDUCATION/TRAINING PROGRAM

## 2024-08-31 PROCEDURE — 258N000003 HC RX IP 258 OP 636: Performed by: STUDENT IN AN ORGANIZED HEALTH CARE EDUCATION/TRAINING PROGRAM

## 2024-08-31 PROCEDURE — 78227 HEPATOBIL SYST IMAGE W/DRUG: CPT | Mod: 26 | Performed by: RADIOLOGY

## 2024-08-31 PROCEDURE — G1010 CDSM STANSON: HCPCS | Performed by: RADIOLOGY

## 2024-08-31 PROCEDURE — 250N000013 HC RX MED GY IP 250 OP 250 PS 637: Performed by: STUDENT IN AN ORGANIZED HEALTH CARE EDUCATION/TRAINING PROGRAM

## 2024-08-31 PROCEDURE — 87635 SARS-COV-2 COVID-19 AMP PRB: CPT | Performed by: STUDENT IN AN ORGANIZED HEALTH CARE EDUCATION/TRAINING PROGRAM

## 2024-08-31 PROCEDURE — 36415 COLL VENOUS BLD VENIPUNCTURE: CPT | Performed by: STUDENT IN AN ORGANIZED HEALTH CARE EDUCATION/TRAINING PROGRAM

## 2024-08-31 PROCEDURE — 250N000011 HC RX IP 250 OP 636: Performed by: PHYSICIAN ASSISTANT

## 2024-08-31 PROCEDURE — 83690 ASSAY OF LIPASE: CPT | Performed by: STUDENT IN AN ORGANIZED HEALTH CARE EDUCATION/TRAINING PROGRAM

## 2024-08-31 PROCEDURE — G1010 CDSM STANSON: HCPCS

## 2024-08-31 PROCEDURE — A9537 TC99M MEBROFENIN: HCPCS | Performed by: STUDENT IN AN ORGANIZED HEALTH CARE EDUCATION/TRAINING PROGRAM

## 2024-08-31 PROCEDURE — 99222 1ST HOSP IP/OBS MODERATE 55: CPT | Mod: FS | Performed by: NURSE PRACTITIONER

## 2024-08-31 PROCEDURE — 343N000001 HC RX 343: Performed by: STUDENT IN AN ORGANIZED HEALTH CARE EDUCATION/TRAINING PROGRAM

## 2024-08-31 PROCEDURE — 80197 ASSAY OF TACROLIMUS: CPT | Performed by: STUDENT IN AN ORGANIZED HEALTH CARE EDUCATION/TRAINING PROGRAM

## 2024-08-31 PROCEDURE — 85027 COMPLETE CBC AUTOMATED: CPT | Performed by: STUDENT IN AN ORGANIZED HEALTH CARE EDUCATION/TRAINING PROGRAM

## 2024-08-31 PROCEDURE — 250N000012 HC RX MED GY IP 250 OP 636 PS 637: Performed by: STUDENT IN AN ORGANIZED HEALTH CARE EDUCATION/TRAINING PROGRAM

## 2024-08-31 PROCEDURE — 343N000001 HC RX 343 MED OP 636: Performed by: STUDENT IN AN ORGANIZED HEALTH CARE EDUCATION/TRAINING PROGRAM

## 2024-08-31 RX ORDER — KIT FOR THE PREPARATION OF TECHNETIUM TC 99M MEBROFENIN 45 MG/10ML
4.8-7.2 INJECTION, POWDER, LYOPHILIZED, FOR SOLUTION INTRAVENOUS ONCE
Status: COMPLETED | OUTPATIENT
Start: 2024-08-31 | End: 2024-08-31

## 2024-08-31 RX ORDER — MAGNESIUM SULFATE HEPTAHYDRATE 40 MG/ML
4 INJECTION, SOLUTION INTRAVENOUS ONCE
Status: COMPLETED | OUTPATIENT
Start: 2024-08-31 | End: 2024-08-31

## 2024-08-31 RX ORDER — SODIUM CHLORIDE 9 MG/ML
INJECTION, SOLUTION INTRAVENOUS CONTINUOUS
Status: DISCONTINUED | OUTPATIENT
Start: 2024-08-31 | End: 2024-09-01

## 2024-08-31 RX ORDER — ACETAMINOPHEN 325 MG/1
650 TABLET ORAL EVERY 4 HOURS PRN
Status: DISCONTINUED | OUTPATIENT
Start: 2024-08-31 | End: 2024-09-01 | Stop reason: HOSPADM

## 2024-08-31 RX ORDER — DIPHENHYDRAMINE HCL 25 MG
25-50 CAPSULE ORAL
Status: DISCONTINUED | OUTPATIENT
Start: 2024-08-31 | End: 2024-09-01 | Stop reason: HOSPADM

## 2024-08-31 RX ORDER — LIDOCAINE 40 MG/G
CREAM TOPICAL
Status: DISCONTINUED | OUTPATIENT
Start: 2024-08-31 | End: 2024-09-01 | Stop reason: HOSPADM

## 2024-08-31 RX ADMIN — VALGANCICLOVIR HYDROCHLORIDE 900 MG: 450 TABLET ORAL at 07:56

## 2024-08-31 RX ADMIN — Medication 200 MG: at 07:56

## 2024-08-31 RX ADMIN — CARVEDILOL 3.12 MG: 3.12 TABLET, FILM COATED ORAL at 18:16

## 2024-08-31 RX ADMIN — ATORVASTATIN CALCIUM 40 MG: 40 TABLET, FILM COATED ORAL at 19:51

## 2024-08-31 RX ADMIN — TACROLIMUS 3 MG: 1 CAPSULE ORAL at 18:17

## 2024-08-31 RX ADMIN — TICAGRELOR 90 MG: 90 TABLET ORAL at 07:55

## 2024-08-31 RX ADMIN — TICAGRELOR 90 MG: 90 TABLET ORAL at 19:51

## 2024-08-31 RX ADMIN — ASPIRIN 81 MG CHEWABLE TABLET 81 MG: 81 TABLET CHEWABLE at 07:56

## 2024-08-31 RX ADMIN — METOCLOPRAMIDE 5 MG: 5 TABLET ORAL at 07:56

## 2024-08-31 RX ADMIN — SODIUM BICARBONATE 1950 MG: 650 TABLET ORAL at 07:56

## 2024-08-31 RX ADMIN — NYSTATIN 500000 UNITS: 100000 SUSPENSION ORAL at 07:57

## 2024-08-31 RX ADMIN — METOCLOPRAMIDE 5 MG: 5 TABLET ORAL at 16:14

## 2024-08-31 RX ADMIN — SODIUM CHLORIDE: 9 INJECTION, SOLUTION INTRAVENOUS at 13:46

## 2024-08-31 RX ADMIN — MAGNESIUM SULFATE IN WATER 4 G: 40 INJECTION, SOLUTION INTRAVENOUS at 16:15

## 2024-08-31 RX ADMIN — Medication 50 MCG: at 07:56

## 2024-08-31 RX ADMIN — MYCOPHENOLIC ACID 720 MG: 360 TABLET, DELAYED RELEASE ORAL at 07:56

## 2024-08-31 RX ADMIN — MYCOPHENOLIC ACID 720 MG: 360 TABLET, DELAYED RELEASE ORAL at 19:51

## 2024-08-31 RX ADMIN — NYSTATIN 500000 UNITS: 100000 SUSPENSION ORAL at 16:14

## 2024-08-31 RX ADMIN — CARVEDILOL 3.12 MG: 3.12 TABLET, FILM COATED ORAL at 07:56

## 2024-08-31 RX ADMIN — SODIUM BICARBONATE 1950 MG: 650 TABLET ORAL at 19:50

## 2024-08-31 RX ADMIN — MEBROFENIN 5.6 MILLICURIE: 45 INJECTION, POWDER, LYOPHILIZED, FOR SOLUTION INTRAVENOUS at 08:29

## 2024-08-31 RX ADMIN — SODIUM CHLORIDE: 9 INJECTION, SOLUTION INTRAVENOUS at 01:38

## 2024-08-31 RX ADMIN — SODIUM PHOSPHATE, DIBASIC, ANHYDROUS, POTASSIUM PHOSPHATE, MONOBASIC, AND SODIUM PHOSPHATE, MONOBASIC, MONOHYDRATE 500 MG: 852; 155; 130 TABLET, COATED ORAL at 07:56

## 2024-08-31 RX ADMIN — PANTOPRAZOLE SODIUM 40 MG: 40 TABLET, DELAYED RELEASE ORAL at 07:56

## 2024-08-31 RX ADMIN — TACROLIMUS 3 MG: 1 CAPSULE ORAL at 07:56

## 2024-08-31 RX ADMIN — DAPSONE 50 MG: 25 TABLET ORAL at 07:56

## 2024-08-31 RX ADMIN — ACETAMINOPHEN 650 MG: 325 TABLET ORAL at 16:25

## 2024-08-31 RX ADMIN — SODIUM PHOSPHATE, DIBASIC, ANHYDROUS, POTASSIUM PHOSPHATE, MONOBASIC, AND SODIUM PHOSPHATE, MONOBASIC, MONOHYDRATE 500 MG: 852; 155; 130 TABLET, COATED ORAL at 19:51

## 2024-08-31 RX ADMIN — Medication 200 MG: at 19:50

## 2024-08-31 RX ADMIN — NYSTATIN 500000 UNITS: 100000 SUSPENSION ORAL at 19:51

## 2024-08-31 RX ADMIN — SODIUM BICARBONATE 1950 MG: 650 TABLET ORAL at 13:24

## 2024-08-31 ASSESSMENT — ACTIVITIES OF DAILY LIVING (ADL)
ADLS_ACUITY_SCORE: 22
ADLS_ACUITY_SCORE: 20
CHANGE_IN_FUNCTIONAL_STATUS_SINCE_ONSET_OF_CURRENT_ILLNESS/INJURY: NO
ADLS_ACUITY_SCORE: 22
DIFFICULTY_EATING/SWALLOWING: NO
ADLS_ACUITY_SCORE: 22
DEPENDENT_IADLS:: INDEPENDENT
WEAR_GLASSES_OR_BLIND: NO
ADLS_ACUITY_SCORE: 20
ADLS_ACUITY_SCORE: 20
HEARING_DIFFICULTY_OR_DEAF: NO
ADLS_ACUITY_SCORE: 20
DIFFICULTY_COMMUNICATING: NO
ADLS_ACUITY_SCORE: 22
ADLS_ACUITY_SCORE: 20
ADLS_ACUITY_SCORE: 20
ADLS_ACUITY_SCORE: 22
WALKING_OR_CLIMBING_STAIRS_DIFFICULTY: NO
ADLS_ACUITY_SCORE: 20
ADLS_ACUITY_SCORE: 22
ADLS_ACUITY_SCORE: 22
ADLS_ACUITY_SCORE: 20
DOING_ERRANDS_INDEPENDENTLY_DIFFICULTY: NO
ADLS_ACUITY_SCORE: 20
ADLS_ACUITY_SCORE: 20
CONCENTRATING,_REMEMBERING_OR_MAKING_DECISIONS_DIFFICULTY: NO
TOILETING_ISSUES: NO
DRESSING/BATHING_DIFFICULTY: NO
ADLS_ACUITY_SCORE: 20
FALL_HISTORY_WITHIN_LAST_SIX_MONTHS: NO

## 2024-08-31 NOTE — PLAN OF CARE
Admitted/transferred from:   Time of arrival on unit 2330 (from Nuclear Medicine)  2 RN full  skin assessment completed by Mony SOLARES  Skin assessment finding: issues found Healing midline incision w/steri strips at bottom aspect    Interventions/actions: other Continue to monitor      Will continue to monitor.    Mony Espino, BETSY

## 2024-08-31 NOTE — CONSULTS
Care Management Initial Consult    General Information  Assessment completed with: Patient,         Primary Care Provider verified and updated as needed:     Readmission within the last 30 days: other (see comments) (nausea, vomiting, pain)      Reason for Consult:  (elevated risk score)  Advance Care Planning: Advance Care Planning Reviewed: no concerns identified, verified with patient          Communication Assessment  Patient's communication style: spoken language (English or Bilingual)    Hearing Difficulty or Deaf: no   Wear Glasses or Blind: no    Cognitive  Cognitive/Neuro/Behavioral: WDL                      Living Environment:   People in home: child(jennifer), dependent, spouse     Current living Arrangements: house      Able to return to prior arrangements: yes       Family/Social Support:  Care provided by: spouse/significant other  Provides care for: child(jennifer)  Marital Status:   Support system:  (family)          Description of Support System: Supportive, Involved         Current Resources:   Patient receiving home care services: No        Community Resources: None  Equipment currently used at home: none  Supplies currently used at home: None    Employment/Financial:  Employment Status: disabled        Financial Concerns: none           Does the patient's insurance plan have a 3 day qualifying hospital stay waiver?  No    Lifestyle & Psychosocial Needs:  Social Determinants of Health     Food Insecurity: Low Risk  (8/31/2024)    Food Insecurity     Within the past 12 months, did you worry that your food would run out before you got money to buy more?: No     Within the past 12 months, did the food you bought just not last and you didn t have money to get more?: No   Depression: Not at risk (8/12/2024)    PHQ-2     PHQ-2 Score: 0   Housing Stability: Low Risk  (8/31/2024)    Housing Stability     Do you have housing? : Yes     Are you worried about losing your housing?: No   Tobacco Use: Low Risk   (8/24/2024)    Patient History     Smoking Tobacco Use: Never     Smokeless Tobacco Use: Never     Passive Exposure: Past   Financial Resource Strain: Low Risk  (8/31/2024)    Financial Resource Strain     Within the past 12 months, have you or your family members you live with been unable to get utilities (heat, electricity) when it was really needed?: No   Alcohol Use: Not on file   Transportation Needs: Low Risk  (8/31/2024)    Transportation Needs     Within the past 12 months, has lack of transportation kept you from medical appointments, getting your medicines, non-medical meetings or appointments, work, or from getting things that you need?: No   Physical Activity: Not on file   Interpersonal Safety: Low Risk  (8/31/2024)    Interpersonal Safety     Do you feel physically and emotionally safe where you currently live?: Yes     Within the past 12 months, have you been hit, slapped, kicked or otherwise physically hurt by someone?: No     Within the past 12 months, have you been humiliated or emotionally abused in other ways by your partner or ex-partner?: No   Stress: Not on file   Social Connections: Unknown (4/10/2023)    [facility]    Social Connections     Frequency of Communication with Friends and Family: Not on file   Health Literacy: Not on file       Functional Status:  Prior to admission patient needed assistance:   Dependent ADLs:: Independent  Dependent IADLs:: Independent       Mental Health Status: No current concerns. Mental Health Management: Medication          Chemical Dependency Status: No current concerns                Values/Beliefs:  Spiritual, Cultural Beliefs, Hoahaoism Practices, Values that affect care: no               Discussed  Partnership in Safe Discharge Planning  document with patient/family: No    Additional Information:  SW met with patient at bedside. Writer familiar with pt as  recent CMA was completed on 8/26. Pt now readmitted for nausea, vomiting and concerns with epigastric and gastric pain. SW consulted for elevated risk score.     Pt reports he is feeling better this morning compared to initial arrival to the hospital. He confirms his wife, Jolene continues to be his primary support, along with other family. Pt denies any mental or emotional health concerns at this time. He is hopeful to return home soon.     Next Steps: SW will be available as needs arise. Anticipate pt to discharge to home with family support pending medical clearance/stability.     MANUEL Caldwell, [facility]  Outpatient Kidney/Pancreas/[facility] Transplant Program  Ph: [phone]

## 2024-08-31 NOTE — PLAN OF CARE
"/79 (BP Location: Right arm)   Pulse 100   Temp 99.2  F (37.3  C) (Oral)   Resp 16   Ht 1.803 m (5' 11\")   Wt 97.1 kg (214 lb)   SpO2 97%   BMI 29.85 kg/m      Shift: 6216-1855  Isolation Status: None  VS: VSS on RA, afebrile  Neuro: Aox4, able to make needs known  Behaviors: Calm, cooperative  BG: ACHS + 0200 - 160  Labs/Imaging: Cl 108, Cr 1.43, Mg 1.2, Hgb 9.9, hematocrit 32.0; COVID swab sent - negative  Respiratory: WDL  Cardiac: Tachycardic low 100s  Pain/Nausea: Denies  PRN: N/A  Diet: NPO  LDA: R PIV infusing  Infusion(s): NS MIVF @ 75 mL/hr  GI/: Voiding spontaneously without difficulty. LBM 8/30, endorses passing gas.  Skin: Healing midline abdominal incision w/steri strips at base. 2-RN skin check completed (see Notes).  Mobility: UAL  Events/Education: HIDA scan unable to be completed overnight. NPO as of 0200 for HIDA scan in AM.  Plan: Continue with plan of care and notify team with any changes. NPO for HIDA scan this AM.  "

## 2024-08-31 NOTE — PLAN OF CARE
Goal Outcome Evaluation:      Plan of Care Reviewed With: patient    Overall Patient Progress: no change    Outcome Evaluation: Pt will discharge home pending medical clearance/stability

## 2024-08-31 NOTE — PROVIDER NOTIFICATION
"On-call paged with the following:    \"[address]. - Pt with new purulent drainage @ top aspect of incision. 9/10 pain, erythematous, increased pressure. Pt felt \"pop\" before drainage appeared. Please assess. Thanks! - Mony [phone]\"    Awaiting reply.    Mony Espino RN    "

## 2024-08-31 NOTE — PROGRESS NOTES
Transplant Surgery  Inpatient Daily Progress Note  08/31/2024    Assessment & Plan: Siva Ramey is a 49 year old year old with a past medical history significant for CAD s/p PCI, DM, and ESRD now s/p SPK on 7/20/24 c/b post op STEMI requiring PCI and stent placement. Pt returns to the ER yesterday with nausea emesis and some abdominal pain..     s/p SPK DBD: 42 Cr 1.43 (from 1.5). UOP not measured. Post-op US   Looks great.     Immunosuppression management:   /720  Tac 3/3  Last level is 8.5 8/31  Ppx dapsone, valgan  CMD D+R+    Neuro:  Acute post op pain: tylenol prn    Hematology:   Anemia of chronic disease: hgb 9.9 from 10.6      Cardiorespiratory:   HTN: coreg 3.125 BID      GI/Nutrition: Regular diet. Bowel regimen: Senokot-S, Miralax  CT SCAN looks fine, no acute concerns  We will obtain a HIDA to rule out cholecystitis, although CT reassuring  Will obtain GI consult to consider EGD     Endocrine:   DM, s/p SPK now with good glycemic control    Fluid/Electrolytes: HLIVF , reg diet    : no olea    Infectious disease: No acute issues.     Prophylaxis: DVT, fall, viral (Valcyte), PJP (dapsone)    Disposition: 7A, pending full work up of nausea emesis    KAT/Fellow/Resident Provider: Reed Painter MD    Faculty: Jaswant  _________________________________________________________________  Transplant History: Admitted 8/30/2024 for nausea emesis.  7/20/2024 (Kidney / Pancreas), Postoperative day: 42     Interval History: History is obtained from the patient  Overnight events: admitted for nausea and emesis     ROS:   A 10-point review of systems was negative except as noted above.    Meds:  Current Facility-Administered Medications   Medication Dose Route Frequency Provider Last Rate Last Admin    aspirin (ASA) chewable tablet 81 mg  81 mg Oral Daily Reed Painter MD   81 mg at 08/31/24 0756    atorvastatin (LIPITOR) tablet 40 mg  40 mg Oral QPM Reed Painter MD   40 mg at 08/30/24 8937     "carvedilol (COREG) tablet 3.125 mg  3.125 mg Oral BID w/meals Reed Painter MD   3.125 mg at 08/31/24 0756    dapsone (ACZONE) tablet 50 mg  50 mg Oral Daily Reed Painter MD   50 mg at 08/31/24 0756    magnesium glycinate capsule 200 mg  200 mg Oral BID Reed Painter MD   200 mg at 08/31/24 0756    metoclopramide (REGLAN) tablet 5 mg  5 mg Oral BID AC Reed Painter MD   5 mg at 08/31/24 0756    mycophenolic acid (GENERIC EQUIVALENT) EC tablet 720 mg  720 mg Oral BID Rede Paintre MD   720 mg at 08/31/24 0756    nystatin (MYCOSTATIN) suspension 500,000 Units  500,000 Units Oral 4x Daily Reed Painter MD   500,000 Units at 08/31/24 0757    pantoprazole (PROTONIX) EC tablet 40 mg  40 mg Oral QAM AC Reed Painter MD   40 mg at 08/31/24 0756    phosphorus tablet 250 mg (PHOSPHA 250 NEUTRAL) per tablet 500 mg  500 mg Oral BID Reed Painter MD   500 mg at 08/31/24 0756    sodium bicarbonate tablet 1,950 mg  1,950 mg Oral TID Reed Painter MD   1,950 mg at 08/31/24 0756    sodium chloride (PF) 0.9% PF flush 3 mL  3 mL Intracatheter Q8H Reed Painter MD   3 mL at 08/31/24 0138    tacrolimus (GENERIC EQUIVALENT) capsule 3 mg  3 mg Oral BID IS Reed Painter MD   3 mg at 08/31/24 0756    ticagrelor (BRILINTA) tablet 90 mg  90 mg Oral or Feeding Tube BID Reed Painter MD   90 mg at 08/31/24 0755    valGANciclovir (VALCYTE) tablet 900 mg  900 mg Oral Daily Reed Painter MD   900 mg at 08/31/24 0756    Vitamin D3 (CHOLECALCIFEROL) tablet 50 mcg  50 mcg Oral Daily Reed Painter MD   50 mcg at 08/31/24 0756       Physical Exam:     Admit Weight: 99 kg (218 lb 3.2 oz)    Current vitals:   /79 (BP Location: Right arm)   Pulse 100   Temp 99.2  F (37.3  C) (Oral)   Resp 16   Ht 1.803 m (5' 11\")   Wt 97.1 kg (214 lb)   SpO2 97%   BMI 29.85 kg/m           Vital sign ranges:    Temp:  [98.2  F (36.8  C)-99.2  F (37.3  C)] 99.2  F (37.3  C)  Pulse:  [] 100  Resp:  [16-18] 16  BP: " "(133-170)/() 133/79  SpO2:  [97 %-99 %] 97 %  Patient Vitals for the past 24 hrs:   BP Temp Temp src Pulse Resp SpO2 Weight   08/31/24 0558 133/79 99.2  F (37.3  C) Oral 100 16 97 % --   08/31/24 0131 135/78 98.2  F (36.8  C) Oral -- 18 97 % --   08/30/24 2126 (!) 146/86 98.9  F (37.2  C) Oral 101 18 98 % --   08/30/24 1932 (!) 138/95 98.9  F (37.2  C) Oral 100 16 97 % 97.1 kg (214 lb)   08/30/24 1300 (!) 170/102 -- -- 102 -- 99 % --   08/30/24 1229 (!) 166/113 -- -- 98 -- 97 % --   08/30/24 1224 (!) 154/87 -- -- 91 -- 98 % --   08/30/24 1210 -- -- -- 114 -- -- --     General Appearance: in no apparent distress.   Skin: normal  Heart: regular rate and rhythm  Lungs: clear to auscultation  Abdomen: soft, nt, nd well healed midline incision, some mild RUQ tenderness.  : grossly normal  MSK no edema, MAEW, no abnormalities  Neurologic: .NFD.     Data:   CMP  Recent Labs   Lab 08/31/24  0740 08/31/24  0600 08/31/24  0136 08/30/24  0948 08/29/24  0800   NA  --  135  --  135 138   POTASSIUM  --  4.7  --  5.4* 4.9   CHLORIDE  --  108*  --  105 109*   CO2  --  19*  --  21* 20*   * 111*   < > 137* 119*   BUN  --  15.8  --  14.4 13.4   CR  --  1.43*  --  1.54* 1.56*   GFRESTIMATED  --  60*  --  55* 54*   BETHANY  --  9.7  --  10.4 9.8   MAG  --  1.2*  --   --  1.4*   PHOS  --  2.9  --   --  2.5   AMYLASE  --  76  --   --  96   LIPASE  --  43  --  56 74*   ALBUMIN  --   --   --  4.5  --    BILITOTAL  --   --   --  0.6  --    ALKPHOS  --   --   --  174*  --    AST  --   --   --  26  --    ALT  --   --   --  38  --     < > = values in this interval not displayed.     CBC  Recent Labs   Lab 08/31/24  0600 08/30/24  0948   HGB 9.9* 10.6*   WBC 5.1 5.3    244     COAGSNo lab results found in last 7 days.    Invalid input(s): \"XA\"   Urinalysis  Recent Labs   Lab Test 08/30/24  1006 08/24/24  1056   COLOR Light Yellow Light Yellow   APPEARANCE Clear Slightly Cloudy*   URINEGLC 500* >=1000*   URINEBILI Negative " Negative   URINEKETONE Negative Negative   SG 1.013 1.017   UBLD Large* Large*   URINEPH 7.0 7.0   PROTEIN 300* 100*   NITRITE Negative Negative   LEUKEST Trace* Negative   RBCU >182* >182*   WBCU 4 9*     Virology:  Hepatitis C Antibody   Date Value Ref Range Status   07/19/2024 Nonreactive Nonreactive Final     Comment:     A nonreactive screening test result does not exclude the possibility of exposure to or infection with HCV. Nonreactive screening test results in individuals with prior exposure to HCV may be due to antibody levels below the limit of detection of this assay or lack of reactivity to the HCV antigens used in this assay. Patients with recent HCV infections (<3 months from time of exposure) may have false-negative HCV antibody results due to the time needed for seroconversion (average of 8 to 9 weeks).     BK Virus DNA IU/mL   Date Value Ref Range Status   08/29/2024 Not Detected Not Detected IU/mL Final   08/25/2024 Not Detected Not Detected IU/mL Final

## 2024-08-31 NOTE — CONSULTS
GASTROENTEROLOGY CONSULTATION      Date of Admission:  8/30/2024           Reason for Consultation:   We were asked by Dr. Michaud of transplant surg to evaluate this patient with N/V, heartburn           ASSESSMENT AND RECOMMENDATIONS:   Assessment:  49 year old male with a history of CAD s/p PCI in 2019, DMII, ESRD s/p renal and pancreas tx on 7/20, post-op course c/b VT/Vfib arrest 2/2 STEMI s/p PCI and thrombectomy on Brilinta and ASA. GI is consulted for abd pain and reflux.     #N/V  #Rt sided abd pain  #Reflux   Suspicious that these sxs are r/t GERD; patient not clear if he's taking pantoprazole although states he takes everything on his med list. If he had been taking it, it would be unlikely for him to develop ulcers; can also consider esophagitis/gastritis (team is specifically worried about Candida esophagitis) or injury from prior NG this hospitalization; biliary etiology for abd pain unlikely w/ unremarkable LFTs and nml HIDA      Recommendations  - EGD tomorrow  - NPO at midnight     Thank you for involving us in this patient's care. Please do not hesitate to contact the GI service with any questions or concerns.     Pt care plan discussed with Dr. Ramirez, GI staff physician.    Lily You MD  -------------------------------------------------------------------------------------------------------------------           History of Present Illness:   Siva Ramey is a 49 year old male with a history of CAD s/p PCI in 2019, DMII, ESRD s/p renal and pancreas tx on 7/20, post-op course c/b VT/Vfib arrest 2/2 STEMI s/p PCI and thrombectomy. GI is consulted for abd pain and reflux.     Patient reports that several days PTA he developed nausea and vomiting (non-bloody). He also has been having a burning sensation in his lower chest area. After the nausea developed, he also developed Rt sided abd pain. Unclear if he's taking pantoprazole although states he takes everything on his med list.            Data:   Key relevant imaging:    M HEPATOBILIARY SCAN WITH GB EF AND/OR PHARM       Date: 8/31/2024 10:25 AM                                                                   Impression:     1. Scintigraphic finding of patent cystic duct. No evidence of acute  cholecystitis.  2. Gallbladder ejection fraction at 35%, which is suggestive of  delayed emptying.        Previous Endoscopy:   none         Medications:     Medications Prior to Admission   Medication Sig Dispense Refill Last Dose    acetaminophen (TYLENOL) 500 MG tablet Take 500 mg by mouth every 4 hours as needed   8/29/2024    aspirin (ASA) 81 MG chewable tablet Take 1 tablet (81 mg) by mouth daily Starting tomorrow. 30 tablet 3 8/29/2024    atorvastatin (LIPITOR) 40 MG tablet Take 1 tablet (40 mg) by mouth every evening 30 tablet 2 8/29/2024    carvedilol (COREG) 6.25 MG tablet Take 0.5 tablets (3.125 mg) by mouth 2 times daily (with meals) for 30 days 30 tablet 0 8/29/2024    cholecalciferol (VITAMIN D3) 25 mcg (1000 units) capsule Take 2 capsules (50 mcg) by mouth daily 60 capsule 3 8/29/2024    dapsone (ACZONE) 25 MG tablet Take 2 tablets (50 mg) by mouth daily 60 tablet 11 8/29/2024    loperamide (IMODIUM A-D) 2 MG capsule Take 1 capsule (2 mg) by mouth 4 times daily as needed for diarrhea 30 capsule 0 8/29/2024    magnesium glycinate 100 MG CAPS capsule Take 2 capsules (200 mg) by mouth 2 times daily Take in place of magnesium-oxide 120 capsule 2 8/29/2024    methocarbamol (ROBAXIN) 750 MG tablet Take 1 tablet (750 mg) by mouth every 6 hours as needed for muscle spasms 20 tablet 0 8/29/2024    metoclopramide (REGLAN) 5 MG tablet Take 1 tablet (5 mg) by mouth 2 times daily (before meals). 60 tablet 1 8/29/2024    mycophenolic acid (GENERIC EQUIVALENT) 180 MG EC tablet Take 4 tablets (720 mg) by mouth 2 times daily 240 tablet 11 8/29/2024    nystatin (MYCOSTATIN) 794755 UNIT/ML suspension Take 5 mLs (500,000 Units) by mouth 4 times daily 600  "mL 2 8/29/2024    ondansetron (ZOFRAN ODT) 4 MG ODT tab Take 1 tablet (4 mg) by mouth every 6 hours as needed for nausea or vomiting 30 tablet 1 8/29/2024    pantoprazole (PROTONIX) 40 MG EC tablet Take 1 tablet (40 mg) by mouth every morning (before breakfast) 30 tablet 1 8/29/2024    phosphorus tablet 250 mg (PHOSPHA 250 NEUTRAL) 250 MG per tablet Take 2 tablets (500 mg) by mouth 2 times daily.   8/29/2024    psyllium (METAMUCIL/KONSYL) 58.6 % powder Take 6 g (1 teaspoonful) by mouth 2 times daily as needed (diarrhea)   8/29/2024    sodium bicarbonate 650 MG tablet Take 3 tablets (1,950 mg) by mouth 3 times daily 270 tablet 2 8/29/2024    tacrolimus (GENERIC EQUIVALENT) 1 MG capsule Take 3 capsules (3 mg) by mouth 2 times daily. 240 capsule 11 8/29/2024    ticagrelor (BRILINTA) 90 MG tablet Take 1 tablet (90 mg) by mouth or Feeding Tube 2 times daily 60 tablet 0 8/29/2024    valGANciclovir (VALCYTE) 450 MG tablet Take 2 tablets (900 mg) by mouth daily 60 tablet 2 8/29/2024             Physical Exam:   BP (!) 149/87 (BP Location: Right arm)   Pulse 92   Temp 98.8  F (37.1  C) (Oral)   Resp 18   Ht 1.803 m (5' 11\")   Wt 97.1 kg (214 lb)   SpO2 97%   BMI 29.85 kg/m    Wt:   Wt Readings from Last 2 Encounters:   08/30/24 97.1 kg (214 lb)   08/26/24 98.1 kg (216 lb 4.8 oz)      Gen: NAD  HEENT: ncat,  eomi,  Cardiac: RRR  Lungs: on RA   Abd:  soft, nd/nt   Neuro: non focal            Past Medical History:   Reviewed and edited as appropriate  Past Medical History:   Diagnosis Date    Acquired elevated diaphragm     Anemia in chronic kidney disease     Angina pectoris (H24)     Chronic kidney disease     Coronary artery disease     Diabetes mellitus, type II (H) 12/2001    Diabetic nephropathy (H)     MARQUEZ (dyspnea on exertion)     Dyslipidemia 12/2001    End stage renal disease (H)     History of blood transfusion 2004    Hypertension     takes medication    Ischemic cardiomyopathy     Metabolic acidosis     " Myocardial infarction (H)     STEMI -Diagonal branch of the LAD    Obesity (BMI 30-39.9)     Peripheral neuropathy     Proteinuria     Retinopathy     Vitamin D deficiency             Past Surgical History:   Reviewed and edited as appropriate   Past Surgical History:   Procedure Laterality Date    APPENDECTOMY OPEN N/A 7/19/2024    Procedure: Appendectomy open;  Surgeon: Harrison Whitehead MD;  Location: [facility] OR    BACK SURGERY  2009    L5 disc cut    BENCH KIDNEY  7/19/2024    Procedure: Bench kidney;  Surgeon: Harrison Whitehead MD;  Location: [facility] OR    BENCH PANCREAS N/A 7/19/2024    Procedure: Bench pancreas;  Surgeon: Harrison Whitehead MD;  Location: [facility] OR    BYPASS GRAFT ARTERY CORONARY  06/20/2019    2 vessels    CV CENTRAL VENOUS CATHETER PLACEMENT N/A 7/20/2024    Procedure: Central Venous Catheter Placement;  Surgeon: Dominic Robertson MD;  Location: [facility] CARDIAC CATH LAB    CV CORONARY ANGIOGRAM N/A 01/13/2019    Procedure: Coronary Angiogram;  Surgeon: Cielo Che MD;  Location: [facility] Cath Lab;  Service: Cardiology    CV CORONARY ANGIOGRAM N/A 05/02/2019    Procedure: Coronary Angiogram;  Surgeon: Cielo Che MD;  Location: [facility] Cath Lab;  Service: Cardiology    CV CORONARY ANGIOGRAM N/A 04/30/2020    Procedure: Coronary Angiogram;  Surgeon: Cielo Che MD;  Location: [facility] Cath Lab;  Service: Cardiology    CV CORONARY ANGIOGRAM N/A 07/22/2021    Procedure: CV CORONARY ANGIOGRAM;  Surgeon: Adrian Crow MD;  Location: [facility] CATH LAB CV    CV CORONARY ANGIOGRAM N/A 02/01/2024    Procedure: Coronary Angiogram;  Surgeon: Delroy Carpio MD;  Location: [facility] CARDIAC CATH LAB    CV CORONARY ANGIOGRAM N/A 7/20/2024    Procedure: Coronary Angiogram;  Surgeon: Dominic Robertson MD;  Location: [facility] CARDIAC CATH LAB    CV CORONARY LITHOTRIPSY PCI N/A 7/20/2024    Procedure: Percutaneous  Coronary Intervention - Lithotripsy;  Surgeon: Dominic Robertson MD;  Location: [facility] CARDIAC CATH LAB    CV FRACTIONAL FLOW RATIO WIRE N/A 07/22/2021    Procedure: Fractional Flow Ratio Wire;  Surgeon: Adrian Crow MD;  Location: [facility] CV    CV INTRAVASULAR ULTRASOUND N/A 7/20/2024    Procedure: Intravascular Ultrasound;  Surgeon: Dominic Robertson MD;  Location: [facility] CARDIAC CATH LAB    CV LEFT HEART CATH N/A 07/22/2021    Procedure: Left Heart Cath;  Surgeon: Adrian Crow MD;  Location: [facility] CV    CV LEFT HEART CATHETERIZATION WITH LEFT VENTRICULOGRAM N/A 01/13/2019    Procedure: Left Heart Catheterization with Left Ventriculogram;  Surgeon: Cielo Che MD;  Location: [facility] Cath Lab;  Service: Cardiology    CV LEFT HEART CATHETERIZATION WITHOUT LEFT VENTRICULOGRAM Left 04/30/2020    Procedure: Left Heart Catheterization Without Left Ventriculogram;  Surgeon: Cielo Che MD;  Location: [facility] Cath Lab;  Service: Cardiology    CV PCI N/A 02/01/2024    Procedure: Percutaneous Coronary Intervention;  Surgeon: Delroy Carpio MD;  Location: [facility] CARDIAC CATH LAB    CV PCI N/A 7/20/2024    Procedure: Percutaneous Coronary Intervention;  Surgeon: Dominic Robertson MD;  Location: [facility] CARDIAC CATH LAB    CV PCI ASPIRATION THROMECTOMY N/A 7/20/2024    Procedure: Percutaneous Coronary Intervention Aspiration Thrombectomy;  Surgeon: Dominic Robertson MD;  Location: [facility] CARDIAC CATH LAB    CV PCI STENT DRUG ELUTING N/A 7/20/2024    Procedure: Percutaneous Coronary Intervention Stent;  Surgeon: Dominic Robertson MD;  Location: [facility] CARDIAC CATH LAB    CV RIGHT HEART CATHETERIZATION N/A 04/30/2020    Procedure: Right Heart Catheterization;  Surgeon: Cielo Che MD;  Location: [facility] Cath Lab;  Service: Cardiology    EYE SURGERY      GENITOURINARY SURGERY      HERNIA REPAIR       OTHER SURGICAL HISTORY      Excise varicocele    STENT, CORONARY, DALE  2019    TRANSPLANT PANCREAS, KIDNEY  DONOR, COMBINED N/A 2024    Procedure: Transplant pancreas, kidney  donor, with ureteral stent placement;  Surgeon: Harrison Whitehead MD;  Location: [facility] OR    VASCULAR SURGERY              Social History:   Alcohol use: no  Smoking history: no         Family History:   Reviewed and edited as appropriate  Family History   Problem Relation Age of Onset    Diabetes Type 2  Mother     Heart Disease Father 60    CABG Father 50        triple bypass    Diabetes Type 2  Father     Depression Sister     Substance Abuse Sister     Ovarian Cancer Maternal Grandmother     Brain Cancer Maternal Grandmother     Pancreatic Cancer Maternal Aunt     Prostate Cancer Maternal Uncle      No known history of colorectal cancer, liver disease, or inflammatory bowel disease.         Allergies:   Reviewed and edited as appropriate     Allergies   Allergen Reactions    Amoxicillin Hives     & generalized pain    Tolerated ceftriaxone in  ([employer]) and  ([facility])    Venlafaxine      lethargic              Review of Systems:     A complete 10 point review of systems was performed and is negative except as noted in the HPI

## 2024-08-31 NOTE — CONSULTS
[facility]  Transplant Nephrology Consult Note  Date of Admission:  8/30/2024  Today's Date: 08/31/2024  Requesting physician: Jarvis Michaud MD    Reason for Consult:  Kidney transplant     Recommendations:   - agree with GI consult  - continue on current immunosuppression   - tac level AM     Assessment & Plan   # DDKT (SPK): Trend down with IV fluids   - Baseline Creatinine: ~ 1.6-1.9   - Proteinuria: Not checked post transplant   - DSA Hx: No DSA   - Last cPRA: 11%   - BK Viremia: Not checked post transplant   - Kidney Tx Biopsy Hx: No biopsy history.    # Pancreas Tx (SPK):    - Pancreatic Exocrine Drainage: Enteric drained     - Blood glucose: Euglycemia      On insulin: No   - HbA1c: Last checked at time of transplant      Latest HbA1c: 6.4%   - Pancreatic enzymes: Stable   - DSA Hx: No DSA   - Pancreas Tx Biopsy Hx: No biopsy history    # Immunosuppression: Tacrolimus immediate release (goal 8-10) and Mycophenolic acid (dose 720 mg every 12 hours)              - Induction with Recent Transplant:  High Intensity Protocol              - Continue with intensive monitoring of immunosuppression for efficacy and toxicity.              - Historical Changes in Immunosuppression: None              - Changes: No     # Infection Prevention:      - PJP: Dapsone  - CMV: Valganciclovir (Valcyte); CMV IgG Ab positive  - CMV IgG Ab High Risk Discordance (D+/R-): No  - EBV IgG Ab High Risk Discordance (D+/R-): No    # Hypertension: Controlled;  Goal BP: < 140/90 (Hospitalization goal)   - Changes: Not at this time    # Anemia in Chronic Renal Disease: Hgb: Trend down      KERRI: No   - Iron studies: Low iron saturation, but high ferritin    # Mineral Bone Disorder:    - Secondary renal hyperparathyroidism; PTH level: Mildly elevated (151-300 pg/ml)        On treatment: None  - Vitamin D; level: High        On supplement: Yes  - Calcium; level: Normal        On supplement: No  -  Phosphorus; level: Normal        On supplement: No    # Electrolytes:   - Potassium; level: Normal        On supplement: No  - Magnesium; level: Low        On supplement: Yes  - Bicarbonate; level: Low        On supplement: Yes  - Sodium; level: Normal    #Nausea  #Vomiting:  -recommend IVF  -recommend antiemetics  -GI consult     # H/o VT/VF Cardiac arrest:  # CAD s/p CABG 2019 LIMA-LAD, SVG-RCA, IBAN 1/2019, IBAN to RCA 2/2024              - s/p inferior STEMI 2/2 ostial proximal RCA in stent thrombosis              - s/p 2 IBAN to RCA 7/20/2024              - Echo: LVEF=37%.Moderate diffuse hypokinesis with akinesis of all inferior segments.  Mild right ventricular dilation is present.Global  right ventricular function is moderately reduced.     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    Seen and discussed with Dr. Malvin Underwood, NP  Transplant Nephrology  Contact information via Vocera Web Console or via [employer] Paging/Directory      History of Present Illness   Siva Ramey is a 49 year old male  PMH significant for DMII (on insulin pump) and resulting ESKD (on HD every MWF since 8/2021). 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 Underwent simultaneous kidney pancreas transplant on 7/20/24 complicated by VT/VF arrest in PACU. Received 2 rounds of CPR before ROSC. Taken emergently to the cath lab and found to have 100% occlusion of the RCA now s/p IBAN x 2      He has been back and forth to the ED  twice now with nausea, emesis. He has back pain too, and some mild RUQ tenderness. He says he ate a cheeseburger and 4 chicken nuggets from [employer], and then 30 minutes later had dry heaving and threw up just clear fluid, no bile or blood. He then had dry heaving all night.       He now feels better.  No nausea or vomiting.  No urinary complaints.     No chest pain or shortness of breath.       Review of Systems   The 10 point Review of Systems is negative other than noted in the HPI or here.      MEDICATIONS:  Current Facility-Administered Medications   Medication Dose Route Frequency Provider Last Rate Last Admin    aspirin (ASA) chewable tablet 81 mg  81 mg Oral Daily Reed Painter MD   81 mg at 08/30/24 2336    atorvastatin (LIPITOR) tablet 40 mg  40 mg Oral QPM Reed Painter MD   40 mg at 08/30/24 2330    carvedilol (COREG) tablet 3.125 mg  3.125 mg Oral BID w/meals Reed Painter MD   3.125 mg at 08/30/24 2330    dapsone (ACZONE) tablet 50 mg  50 mg Oral Daily Reed Painter MD   50 mg at 08/30/24 2330    magnesium glycinate capsule 200 mg  200 mg Oral BID Reed Painter MD   200 mg at 08/30/24 2342    metoclopramide (REGLAN) tablet 5 mg  5 mg Oral BID  Reed Painter MD        mycophenolic acid (GENERIC EQUIVALENT) EC tablet 720 mg  720 mg Oral BID Reed Painter MD   720 mg at 08/30/24 2329    nystatin (MYCOSTATIN) suspension 500,000 Units  500,000 Units Oral 4x Daily Reed Painter MD   500,000 Units at 08/30/24 2336    pantoprazole (PROTONIX) EC tablet 40 mg  40 mg Oral QAM AC Reed Painter MD        phosphorus tablet 250 mg (PHOSPHA 250 NEUTRAL) per tablet 500 mg  500 mg Oral BID Reed Painter MD   500 mg at 08/30/24 2330    sodium bicarbonate tablet 1,950 mg  1,950 mg Oral TID Reed Painter MD   1,950 mg at 08/30/24 2330    sodium chloride (PF) 0.9% PF flush 3 mL  3 mL Intracatheter Q8H Reed Painter MD   3 mL at 08/31/24 0138    tacrolimus (GENERIC EQUIVALENT) capsule 3 mg  3 mg  "Oral BID IS Reed Painter MD   3 mg at 24 2329    technetium Tc 99m mebrofenin (CHOLETEC) radioisotope injection 4.8-7.2 millicurie  4.8-7.2 millicurie Intravenous Once Reed Painter MD        ticagrelor (BRILINTA) tablet 90 mg  90 mg Oral or Feeding Tube BID Reed Painter MD   90 mg at 24 2342    valGANciclovir (VALCYTE) tablet 900 mg  900 mg Oral Daily Reed Painter MD   900 mg at 24 2337    Vitamin D3 (CHOLECALCIFEROL) tablet 50 mcg  50 mcg Oral Daily Reed Painter MD   50 mcg at 24 2337     Current Facility-Administered Medications   Medication Dose Route Frequency Provider Last Rate Last Admin    sodium chloride 0.9 % infusion   Intravenous Continuous Reed Painter MD 75 mL/hr at 24 0138 New Bag at 24 0138       Physical Exam   Temp  Av.7  F (37.1  C)  Min: 98.2  F (36.8  C)  Max: 99.2  F (37.3  C)      Pulse  Av.1  Min: 91  Max: 114 Resp  Av  Min: 16  Max: 18  SpO2  Av.8 %  Min: 97 %  Max: 100 %     /79 (BP Location: Right arm)   Pulse 100   Temp 99.2  F (37.3  C) (Oral)   Resp 16   Ht 1.803 m (5' 11\")   Wt 97.1 kg (214 lb)   SpO2 97%   BMI 29.85 kg/m      Admit Weight: 99 kg (218 lb 3.2 oz)     GENERAL APPEARANCE: alert and no distress  HENT: mouth without ulcers or lesions  RESP: lungs clear to auscultation - no rales, rhonchi or wheezes  CV: regular rhythm, normal rate, no rub, no murmur  EDEMA: no LE edema bilaterally  ABDOMEN: soft, nondistended, nontender, bowel sounds normal  MS: extremities normal - no gross deformities noted, no evidence of inflammation in joints, no muscle tenderness  SKIN: no rash    Data   All labs reviewed by me.  CMP  Recent Labs   Lab 24  0600 24  0136 24  0948 24  0800 24  0744 24  0453 24  0845 24  0653     --  135 138  --  134*  --  134*   POTASSIUM 4.7  --  5.4* 4.9  --  5.3  --  5.5*   CHLORIDE 108*  --  105 109*  --  109*  --  107   CO2 " 19*  --  21* 20*  --  17*  --  19*   ANIONGAP 8  --  9 9  --  8  --  8   * 160* 137* 119*   < > 110*   < > 112*   BUN 15.8  --  14.4 13.4  --  17.1  --  16.4   CR 1.43*  --  1.54* 1.56*  --  1.79*  --  1.74*   GFRESTIMATED 60*  --  55* 54*  --  46*  --  47*   BETHANY 9.7  --  10.4 9.8  --  9.3  --  10.3   MAG 1.2*  --   --  1.4*  --  1.6*  --  1.3*   PHOS 2.9  --   --  2.5  --  2.7  --  2.7   PROTTOTAL  --   --  7.2  --   --   --   --   --    ALBUMIN  --   --  4.5  --   --   --   --   --    BILITOTAL  --   --  0.6  --   --   --   --   --    ALKPHOS  --   --  174*  --   --   --   --   --    AST  --   --  26  --   --   --   --   --    ALT  --   --  38  --   --   --   --   --     < > = values in this interval not displayed.     CBC  Recent Labs   Lab 08/31/24  0600 08/30/24  0948 08/29/24  0800 08/26/24  0453   HGB 9.9* 10.6* 9.2* 8.7*   WBC 5.1 5.3 3.0* 4.1   RBC 3.39* 3.67* 3.23* 2.97*   HCT 32.0* 35.0* 30.9* 29.4*   MCV 94 95 96 99   MCH 29.2 28.9 28.5 29.3   MCHC 30.9* 30.3* 29.8* 29.6*   RDW 15.6* 15.5* 15.3* 15.7*    244 261 276     INRNo lab results found in last 7 days.  ABGNo lab results found in last 7 days.   Urine Studies  Recent Labs   Lab Test 08/30/24  1006 08/24/24  1056 08/12/24  1141 07/20/24  0609 07/24/21  1151 06/25/19  1439 06/19/19  0822   COLOR Light Yellow Light Yellow Yellow [place]*   < > Straw Straw   APPEARANCE Clear Slightly Cloudy* Slightly Cloudy* Slightly Cloudy*   < > Clear Clear   URINEGLC 500* >=1000* 50* 50*   < > 200 mg/dL* >1000 mg/dL*   URINEBILI Negative Negative Negative Negative   < > Negative Negative   URINEKETONE Negative Negative Negative Negative   < > Negative Negative   SG 1.013 1.017 1.022 1.013   < > 1.010 1.012   UBLD Large* Large* Large* Large*   < > Moderate* Trace*   URINEPH 7.0 7.0 6.0 6.5   < > 6.5 6.0   PROTEIN 300* 100* 100* 300*   < > 200 mg/dL* 300 mg/dL*   UROBILINOGEN  --   --   --   --   --  <2.0 E.U./dL <2.0 E.U./dL   NITRITE Negative Negative  Negative Negative   < > Negative Negative   LEUKEST Trace* Negative Trace* Trace*   < > Negative Negative   RBCU >182* >182* >182* >182*   < > 25-50* 0-2   WBCU 4 9* 23* 105*   < > 0-5 0-5    < > = values in this interval not displayed.     No lab results found.  PTH  Recent Labs   Lab Test 08/05/24  0758 07/25/24  0448 07/20/21  1845   PTHI 263* 199* 190*     Iron Studies  Recent Labs   Lab Test 08/05/24  0758   IRON 44*      IRONSAT 17   KADEN 1,725*       IMAGING:  All imaging studies reviewed by me.    Past Medical History    I have reviewed this patient's medical history and updated it with pertinent information if needed.   Past Medical History:   Diagnosis Date    Acquired elevated diaphragm     Anemia in chronic kidney disease     Angina pectoris (H24)     Chronic kidney disease     Coronary artery disease     Diabetes mellitus, type II (H) 12/2001    Diabetic nephropathy (H)     MARQUEZ (dyspnea on exertion)     Dyslipidemia 12/2001    End stage renal disease (H)     History of blood transfusion 2004    Hypertension     takes medication    Ischemic cardiomyopathy     Metabolic acidosis     Myocardial infarction (H)     STEMI -Diagonal branch of the LAD    Obesity (BMI 30-39.9)     Peripheral neuropathy     Proteinuria     Retinopathy     Vitamin D deficiency        Past Surgical History   I have reviewed this patient's surgical history and updated it with pertinent information if needed.  Past Surgical History:   Procedure Laterality Date    APPENDECTOMY OPEN N/A 7/19/2024    Procedure: Appendectomy open;  Surgeon: Harrison Whitehead MD;  Location: [facility] OR    BACK SURGERY  2009    L5 disc cut    BENCH KIDNEY  7/19/2024    Procedure: Bench kidney;  Surgeon: Harrison Whitehead MD;  Location: [facility] OR    BENCH PANCREAS N/A 7/19/2024    Procedure: Bench pancreas;  Surgeon: Harrison Whitehead MD;  Location: [facility] OR    BYPASS GRAFT ARTERY CORONARY  06/20/2019    2 vessels     CV CENTRAL VENOUS CATHETER PLACEMENT N/A 7/20/2024    Procedure: Central Venous Catheter Placement;  Surgeon: Dominic Robertson MD;  Location: [facility] CARDIAC CATH LAB    CV CORONARY ANGIOGRAM N/A 01/13/2019    Procedure: Coronary Angiogram;  Surgeon: Cielo Che MD;  Location: [facility] Cath Lab;  Service: Cardiology    CV CORONARY ANGIOGRAM N/A 05/02/2019    Procedure: Coronary Angiogram;  Surgeon: Cielo Che MD;  Location: [facility] Cath Lab;  Service: Cardiology    CV CORONARY ANGIOGRAM N/A 04/30/2020    Procedure: Coronary Angiogram;  Surgeon: Cielo Che MD;  Location: [facility] Cath Lab;  Service: Cardiology    CV CORONARY ANGIOGRAM N/A 07/22/2021    Procedure: CV CORONARY ANGIOGRAM;  Surgeon: Adrian Crow MD;  Location: [facility] LAB CV    CV CORONARY ANGIOGRAM N/A 02/01/2024    Procedure: Coronary Angiogram;  Surgeon: Delroy Carpio MD;  Location: [facility] CARDIAC CATH LAB    CV CORONARY ANGIOGRAM N/A 7/20/2024    Procedure: Coronary Angiogram;  Surgeon: Dominic Robertson MD;  Location: [facility] CARDIAC CATH LAB    CV CORONARY LITHOTRIPSY PCI N/A 7/20/2024    Procedure: Percutaneous Coronary Intervention - Lithotripsy;  Surgeon: Dominic Robertson MD;  Location: [facility] CARDIAC CATH LAB    CV FRACTIONAL FLOW RATIO WIRE N/A 07/22/2021    Procedure: Fractional Flow Ratio Wire;  Surgeon: Adrian Crow MD;  Location: [facility] LAB CV    CV INTRAVASULAR ULTRASOUND N/A 7/20/2024    Procedure: Intravascular Ultrasound;  Surgeon: Dominic Robertson MD;  Location: [facility] CARDIAC CATH LAB    CV LEFT HEART CATH N/A 07/22/2021    Procedure: Left Heart Cath;  Surgeon: Adrian Crow MD;  Location: [facility] LAB CV    CV LEFT HEART CATHETERIZATION WITH LEFT VENTRICULOGRAM N/A 01/13/2019    Procedure: Left Heart Catheterization with Left Ventriculogram;  Surgeon: Cielo Che MD;  Location: [facility];   Service: Cardiology    CV LEFT HEART CATHETERIZATION WITHOUT LEFT VENTRICULOGRAM Left 2020    Procedure: Left Heart Catheterization Without Left Ventriculogram;  Surgeon: Cielo Che MD;  Location: [facility] Cath Lab;  Service: Cardiology    CV PCI N/A 2024    Procedure: Percutaneous Coronary Intervention;  Surgeon: Delroy Carpio MD;  Location: [facility] CARDIAC CATH LAB    CV PCI N/A 2024    Procedure: Percutaneous Coronary Intervention;  Surgeon: Dominic Robertson MD;  Location: [facility] CARDIAC CATH LAB    CV PCI ASPIRATION THROMECTOMY N/A 2024    Procedure: Percutaneous Coronary Intervention Aspiration Thrombectomy;  Surgeon: Dominic Robertson MD;  Location: [facility] CARDIAC CATH LAB    CV PCI STENT DRUG ELUTING N/A 2024    Procedure: Percutaneous Coronary Intervention Stent;  Surgeon: Dominic Robertson MD;  Location: [facility] CARDIAC CATH LAB    CV RIGHT HEART CATHETERIZATION N/A 2020    Procedure: Right Heart Catheterization;  Surgeon: Cieol Che MD;  Location: [facility] Cath Lab;  Service: Cardiology    EYE SURGERY      GENITOURINARY SURGERY      HERNIA REPAIR      OTHER SURGICAL HISTORY      Excise varicocele    STENT, CORONARY, DALE      TRANSPLANT PANCREAS, KIDNEY  DONOR, COMBINED N/A 2024    Procedure: Transplant pancreas, kidney  donor, with ureteral stent placement;  Surgeon: Harrison Whitehead MD;  Location:  OR    VASCULAR SURGERY         Family History   I have reviewed this patient's family history and updated it with pertinent information if needed.   Family History   Problem Relation Age of Onset    Diabetes Type 2  Mother     Heart Disease Father 60    CABG Father 50        triple bypass    Diabetes Type 2  Father     Depression Sister     Substance Abuse Sister     Ovarian Cancer Maternal Grandmother     Brain Cancer Maternal Grandmother     Pancreatic Cancer  Maternal Aunt     Prostate Cancer Maternal Uncle        Social History   I have reviewed this patient's social history and updated it with pertinent information if needed. Siva Ramey  reports that he has never smoked. He has been exposed to tobacco smoke. He has never used smokeless tobacco. He reports that he does not drink alcohol and does not use drugs.    Prior to Admission Medications   Medications Prior to Admission   Medication Sig Dispense Refill Last Dose    acetaminophen (TYLENOL) 500 MG tablet Take 500 mg by mouth every 4 hours as needed   8/29/2024    aspirin (ASA) 81 MG chewable tablet Take 1 tablet (81 mg) by mouth daily Starting tomorrow. 30 tablet 3 8/29/2024    atorvastatin (LIPITOR) 40 MG tablet Take 1 tablet (40 mg) by mouth every evening 30 tablet 2 8/29/2024    carvedilol (COREG) 6.25 MG tablet Take 0.5 tablets (3.125 mg) by mouth 2 times daily (with meals) for 30 days 30 tablet 0 8/29/2024    cholecalciferol (VITAMIN D3) 25 mcg (1000 units) capsule Take 2 capsules (50 mcg) by mouth daily 60 capsule 3 8/29/2024    dapsone (ACZONE) 25 MG tablet Take 2 tablets (50 mg) by mouth daily 60 tablet 11 8/29/2024    loperamide (IMODIUM A-D) 2 MG capsule Take 1 capsule (2 mg) by mouth 4 times daily as needed for diarrhea 30 capsule 0 8/29/2024    magnesium glycinate 100 MG CAPS capsule Take 2 capsules (200 mg) by mouth 2 times daily Take in place of magnesium-oxide 120 capsule 2 8/29/2024    methocarbamol (ROBAXIN) 750 MG tablet Take 1 tablet (750 mg) by mouth every 6 hours as needed for muscle spasms 20 tablet 0 8/29/2024    metoclopramide (REGLAN) 5 MG tablet Take 1 tablet (5 mg) by mouth 2 times daily (before meals). 60 tablet 1 8/29/2024    mycophenolic acid (GENERIC EQUIVALENT) 180 MG EC tablet Take 4 tablets (720 mg) by mouth 2 times daily 240 tablet 11 8/29/2024    nystatin (MYCOSTATIN) 550681 UNIT/ML suspension Take 5 mLs (500,000 Units) by mouth 4 times daily 600 mL 2 8/29/2024    ondansetron  (ZOFRAN ODT) 4 MG ODT tab Take 1 tablet (4 mg) by mouth every 6 hours as needed for nausea or vomiting 30 tablet 1 8/29/2024    pantoprazole (PROTONIX) 40 MG EC tablet Take 1 tablet (40 mg) by mouth every morning (before breakfast) 30 tablet 1 8/29/2024    phosphorus tablet 250 mg (PHOSPHA 250 NEUTRAL) 250 MG per tablet Take 2 tablets (500 mg) by mouth 2 times daily.   8/29/2024    psyllium (METAMUCIL/KONSYL) 58.6 % powder Take 6 g (1 teaspoonful) by mouth 2 times daily as needed (diarrhea)   8/29/2024    sodium bicarbonate 650 MG tablet Take 3 tablets (1,950 mg) by mouth 3 times daily 270 tablet 2 8/29/2024    tacrolimus (GENERIC EQUIVALENT) 1 MG capsule Take 3 capsules (3 mg) by mouth 2 times daily. 240 capsule 11 8/29/2024    ticagrelor (BRILINTA) 90 MG tablet Take 1 tablet (90 mg) by mouth or Feeding Tube 2 times daily 60 tablet 0 8/29/2024    valGANciclovir (VALCYTE) 450 MG tablet Take 2 tablets (900 mg) by mouth daily 60 tablet 2 8/29/2024

## 2024-08-31 NOTE — PLAN OF CARE
Goal Outcome Evaluation:    Plan of Care Reviewed With: patient  Overall Patient Progress: no change  Outcome Evaluation: patient has had no changes today after HIDA scan    [id number]. VSS on RA, afebrile. ACHS BG. Mg 1.2, replaced. Patient endorsing some back pain, tylenol given PRN. Denies nausea. Regular diet, good appetite. NPO at midnight for possible EGD tomorrow. PIV infusing NS at 100 mL/hr. LBM 8/30. Voiding spontaneously. Midline abdominal incision healing well. L AV fistula. Patient UAL in room. Continue with plan of care and update team with any changes.

## 2024-09-01 VITALS
SYSTOLIC BLOOD PRESSURE: 155 MMHG | RESPIRATION RATE: 18 BRPM | OXYGEN SATURATION: 97 % | TEMPERATURE: 98.8 F | HEIGHT: 71 IN | HEART RATE: 97 BPM | BODY MASS INDEX: 30.31 KG/M2 | WEIGHT: 216.5 LBS | DIASTOLIC BLOOD PRESSURE: 87 MMHG

## 2024-09-01 DIAGNOSIS — Z94.0 KIDNEY REPLACED BY TRANSPLANT: ICD-10-CM

## 2024-09-01 LAB
AMYLASE SERPL-CCNC: 88 U/L (ref 28–100)
ANION GAP SERPL CALCULATED.3IONS-SCNC: 7 MMOL/L (ref 7–15)
BUN SERPL-MCNC: 14.2 MG/DL (ref 6–20)
CALCIUM SERPL-MCNC: 9.6 MG/DL (ref 8.8–10.4)
CHLORIDE SERPL-SCNC: 112 MMOL/L (ref 98–107)
CREAT SERPL-MCNC: 1.4 MG/DL (ref 0.67–1.17)
EGFRCR SERPLBLD CKD-EPI 2021: 62 ML/MIN/1.73M2
ERYTHROCYTE [DISTWIDTH] IN BLOOD BY AUTOMATED COUNT: 15.8 % (ref 10–15)
GLUCOSE BLDC GLUCOMTR-MCNC: 101 MG/DL (ref 70–99)
GLUCOSE BLDC GLUCOMTR-MCNC: 104 MG/DL (ref 70–99)
GLUCOSE SERPL-MCNC: 113 MG/DL (ref 70–99)
HBV DNA SERPL QL NAA+PROBE: NORMAL
HCO3 SERPL-SCNC: 19 MMOL/L (ref 22–29)
HCT VFR BLD AUTO: 30.2 % (ref 40–53)
HCV RNA SERPL QL NAA+PROBE: NORMAL
HGB BLD-MCNC: 9.2 G/DL (ref 13.3–17.7)
HIV1+2 RNA SERPL QL NAA+PROBE: NORMAL
LIPASE SERPL-CCNC: 66 U/L (ref 13–60)
MAGNESIUM SERPL-MCNC: 1.8 MG/DL (ref 1.7–2.3)
MCH RBC QN AUTO: 29.3 PG (ref 26.5–33)
MCHC RBC AUTO-ENTMCNC: 30.5 G/DL (ref 31.5–36.5)
MCV RBC AUTO: 96 FL (ref 78–100)
PHOSPHATE SERPL-MCNC: 2.6 MG/DL (ref 2.5–4.5)
PLATELET # BLD AUTO: 219 10E3/UL (ref 150–450)
POTASSIUM SERPL-SCNC: 5.2 MMOL/L (ref 3.4–5.3)
RBC # BLD AUTO: 3.14 10E6/UL (ref 4.4–5.9)
SODIUM SERPL-SCNC: 138 MMOL/L (ref 135–145)
TACROLIMUS BLD-MCNC: 8.1 UG/L (ref 5–15)
TME LAST DOSE: NORMAL H
TME LAST DOSE: NORMAL H
UPPER GI ENDOSCOPY: NORMAL
WBC # BLD AUTO: 3.4 10E3/UL (ref 4–11)

## 2024-09-01 PROCEDURE — 82150 ASSAY OF AMYLASE: CPT | Performed by: STUDENT IN AN ORGANIZED HEALTH CARE EDUCATION/TRAINING PROGRAM

## 2024-09-01 PROCEDURE — 999N000099 HC STATISTIC MODERATE SEDATION < 10 MIN: Performed by: INTERNAL MEDICINE

## 2024-09-01 PROCEDURE — 250N000011 HC RX IP 250 OP 636: Performed by: INTERNAL MEDICINE

## 2024-09-01 PROCEDURE — 250N000012 HC RX MED GY IP 250 OP 636 PS 637: Performed by: STUDENT IN AN ORGANIZED HEALTH CARE EDUCATION/TRAINING PROGRAM

## 2024-09-01 PROCEDURE — 250N000013 HC RX MED GY IP 250 OP 250 PS 637: Performed by: STUDENT IN AN ORGANIZED HEALTH CARE EDUCATION/TRAINING PROGRAM

## 2024-09-01 PROCEDURE — 83735 ASSAY OF MAGNESIUM: CPT | Performed by: STUDENT IN AN ORGANIZED HEALTH CARE EDUCATION/TRAINING PROGRAM

## 2024-09-01 PROCEDURE — 85027 COMPLETE CBC AUTOMATED: CPT | Performed by: STUDENT IN AN ORGANIZED HEALTH CARE EDUCATION/TRAINING PROGRAM

## 2024-09-01 PROCEDURE — 80197 ASSAY OF TACROLIMUS: CPT | Performed by: STUDENT IN AN ORGANIZED HEALTH CARE EDUCATION/TRAINING PROGRAM

## 2024-09-01 PROCEDURE — 43235 EGD DIAGNOSTIC BRUSH WASH: CPT | Performed by: INTERNAL MEDICINE

## 2024-09-01 PROCEDURE — 258N000003 HC RX IP 258 OP 636: Performed by: STUDENT IN AN ORGANIZED HEALTH CARE EDUCATION/TRAINING PROGRAM

## 2024-09-01 PROCEDURE — 99233 SBSQ HOSP IP/OBS HIGH 50: CPT | Mod: FS | Performed by: NURSE PRACTITIONER

## 2024-09-01 PROCEDURE — 250N000009 HC RX 250: Performed by: INTERNAL MEDICINE

## 2024-09-01 PROCEDURE — 84100 ASSAY OF PHOSPHORUS: CPT | Performed by: STUDENT IN AN ORGANIZED HEALTH CARE EDUCATION/TRAINING PROGRAM

## 2024-09-01 PROCEDURE — 0DJ08ZZ INSPECTION OF UPPER INTESTINAL TRACT, VIA NATURAL OR ARTIFICIAL OPENING ENDOSCOPIC: ICD-10-PCS | Performed by: INTERNAL MEDICINE

## 2024-09-01 PROCEDURE — 83690 ASSAY OF LIPASE: CPT | Performed by: STUDENT IN AN ORGANIZED HEALTH CARE EDUCATION/TRAINING PROGRAM

## 2024-09-01 PROCEDURE — 80048 BASIC METABOLIC PNL TOTAL CA: CPT | Performed by: STUDENT IN AN ORGANIZED HEALTH CARE EDUCATION/TRAINING PROGRAM

## 2024-09-01 PROCEDURE — 36415 COLL VENOUS BLD VENIPUNCTURE: CPT | Performed by: STUDENT IN AN ORGANIZED HEALTH CARE EDUCATION/TRAINING PROGRAM

## 2024-09-01 RX ORDER — FENTANYL CITRATE 50 UG/ML
INJECTION, SOLUTION INTRAMUSCULAR; INTRAVENOUS PRN
Status: DISCONTINUED | OUTPATIENT
Start: 2024-09-01 | End: 2024-09-01 | Stop reason: HOSPADM

## 2024-09-01 RX ORDER — PANTOPRAZOLE SODIUM 40 MG/1
40 TABLET, DELAYED RELEASE ORAL DAILY
Status: DISCONTINUED | OUTPATIENT
Start: 2024-09-02 | End: 2024-09-01 | Stop reason: HOSPADM

## 2024-09-01 RX ORDER — CARVEDILOL 3.12 MG/1
3.12 TABLET ORAL 2 TIMES DAILY
Status: DISCONTINUED | OUTPATIENT
Start: 2024-09-01 | End: 2024-09-01 | Stop reason: HOSPADM

## 2024-09-01 RX ORDER — NYSTATIN 100000/ML
500000 SUSPENSION, ORAL (FINAL DOSE FORM) ORAL 4 TIMES DAILY
Status: DISCONTINUED | OUTPATIENT
Start: 2024-09-01 | End: 2024-09-01 | Stop reason: HOSPADM

## 2024-09-01 RX ADMIN — DAPSONE 50 MG: 25 TABLET ORAL at 07:49

## 2024-09-01 RX ADMIN — SODIUM BICARBONATE 1950 MG: 650 TABLET ORAL at 07:50

## 2024-09-01 RX ADMIN — Medication 200 MG: at 07:49

## 2024-09-01 RX ADMIN — NYSTATIN 500000 UNITS: 100000 SUSPENSION ORAL at 07:53

## 2024-09-01 RX ADMIN — TICAGRELOR 90 MG: 90 TABLET ORAL at 07:50

## 2024-09-01 RX ADMIN — Medication 50 MCG: at 07:50

## 2024-09-01 RX ADMIN — VALGANCICLOVIR HYDROCHLORIDE 900 MG: 450 TABLET ORAL at 07:50

## 2024-09-01 RX ADMIN — PANTOPRAZOLE SODIUM 40 MG: 40 TABLET, DELAYED RELEASE ORAL at 07:50

## 2024-09-01 RX ADMIN — TACROLIMUS 3 MG: 1 CAPSULE ORAL at 07:50

## 2024-09-01 RX ADMIN — MYCOPHENOLIC ACID 720 MG: 360 TABLET, DELAYED RELEASE ORAL at 07:50

## 2024-09-01 RX ADMIN — METOCLOPRAMIDE 5 MG: 5 TABLET ORAL at 07:49

## 2024-09-01 RX ADMIN — SODIUM CHLORIDE: 9 INJECTION, SOLUTION INTRAVENOUS at 03:07

## 2024-09-01 RX ADMIN — CARVEDILOL 3.12 MG: 3.12 TABLET, FILM COATED ORAL at 07:50

## 2024-09-01 RX ADMIN — SODIUM PHOSPHATE, DIBASIC, ANHYDROUS, POTASSIUM PHOSPHATE, MONOBASIC, AND SODIUM PHOSPHATE, MONOBASIC, MONOHYDRATE 500 MG: 852; 155; 130 TABLET, COATED ORAL at 07:50

## 2024-09-01 RX ADMIN — ASPIRIN 81 MG CHEWABLE TABLET 81 MG: 81 TABLET CHEWABLE at 07:50

## 2024-09-01 ASSESSMENT — ACTIVITIES OF DAILY LIVING (ADL)
ADLS_ACUITY_SCORE: 20

## 2024-09-01 NOTE — PROCEDURES
Gastroenterology Endoscopy [facility] Brief Operative Note    Procedure:  Upper endoscopy   Post-operative diagnosis:  Nausea, improving   Staff Physician:  Dr. Jolene Ramirez   Fellow/Assistant(s):   -     Specimens:  Please see final procedure note for further details.   Findings:  Moderate amount of food in the stomach. Very mild spotty erythema in gastric antrum. Visualized portions of esophageal, gastric, and duodenal mucosa appeared normal. No noted ulcers or erosions.    Complications:  None.   Condition:  Stable   Recommendations  Diet:  Return to previous diet  PPI:  Continue PTA PPI  Anti-coagulants/platelets:  Continue DAPT  Octreotide:  N/A  Discharge Planning:   Return patient to hospital [facility].   Noted patient is reporting he feels much better with consideration of discharge soon.   Remaining gastric food contents may reflect delayed emptying from inpatient medications (such as pain meds, antiemetics) or c/b constipation. If nausea progresses and/or ongoing concern for delayed gastric emptying, could consider outpatient gastric emptying study after holding those medications.    Reasonable to consider liquid diet or mechanical soft diet if feeling more nauseated as this will clear faster from the stomach.   If symptoms progress, could consider repeat EGD as outpatient with prolonged fasting (or without pain meds/antiemetics) to evaluate areas of stomach not seen due to food contents.

## 2024-09-01 NOTE — PLAN OF CARE
"BP (!) 142/86 (BP Location: Right arm)   Pulse 86   Temp 98.3  F (36.8  C) (Oral)   Resp 18   Ht 1.803 m (5' 11\")   Wt 98.2 kg (216 lb 8 oz)   SpO2 97%   BMI 30.20 kg/m      Shift: [id number]  Isolation Status: None  VS: VSS on RA, afebrile  Neuro: Aox4, able to make needs known  Behaviors: Calm, cooperative, pleasant  BG: ACHS + 2 hrs after meals - 125, 116  Labs/Imaging: AM labs drawn, results pending  Respiratory: WDL room air  Cardiac: Hypertensive (max 140s/80s), otherwise WDL  Pain/Nausea: Denies nausea. Endorses back pain/sciatica, managed with hot packs, no medical interventions requested at this time.  PRN: N/A  Diet: Regular diet, NPO since 0000  LDA: R PIV infusing  Infusion(s): NS MIVF @ 75 mL/hr  GI/: Voiding spontaneously without difficulty. LBM 08/31, endorses passing gas.  Skin: WDL no new concerns  Mobility: UAL  Events/Education: Rest promoted overnight, cares clustered, lighting decreased.   Plan: Continue with plan of care and notify team with any changes. NPO since 0000 for EGD this AM.  "

## 2024-09-01 NOTE — PROGRESS NOTES
[facility]  Transplant Nephrology Progress Note  Date of Admission:  8/30/2024  Today's Date: 09/01/2024  Requesting physician: Jarvis Michaud MD    Recommendations:   - appreciate GI recs   - continue on current immunosuppression   - tac level pending    Assessment & Plan   # DDKT (SPK): Trend down    - Baseline Creatinine: ~ 1.6-1.9   - Proteinuria: Not checked post transplant   - DSA Hx: No DSA   - Last cPRA: 11%   - BK Viremia: Not checked post transplant   - Kidney Tx Biopsy Hx: No biopsy history.    # Pancreas Tx (SPK):    - Pancreatic Exocrine Drainage: Enteric drained     - Blood glucose: Euglycemia      On insulin: No   - HbA1c: Last checked at time of transplant      Latest HbA1c: 6.4%   - Pancreatic enzymes: Stable   - DSA Hx: No DSA   - Pancreas Tx Biopsy Hx: No biopsy history    # Immunosuppression: Tacrolimus immediate release (goal 8-10) and Mycophenolic acid (dose 720 mg every 12 hours)              - Induction with Recent Transplant:  High Intensity Protocol              - Continue with intensive monitoring of immunosuppression for efficacy and toxicity.              - Historical Changes in Immunosuppression: None              - Changes: No     # Infection Prevention:      - PJP: Dapsone  - CMV: Valganciclovir (Valcyte); CMV IgG Ab positive  - CMV IgG Ab High Risk Discordance (D+/R-): No  - EBV IgG Ab High Risk Discordance (D+/R-): No    # Hypertension: Controlled;  Goal BP: < 140/90 (Hospitalization goal)   - Changes: Not at this time    # Anemia in Chronic Renal Disease: Hgb: Trend down      KERRI: No   - Iron studies: Low iron saturation, but high ferritin    # Mineral Bone Disorder:    - Secondary renal hyperparathyroidism; PTH level: Mildly elevated (151-300 pg/ml)        On treatment: None  - Vitamin D; level: High        On supplement: Yes  - Calcium; level: Normal        On supplement: No  - Phosphorus; level: Normal        On supplement:  No    # Electrolytes:   - Potassium; level: Normal        On supplement: No  - Magnesium; level: Low        On supplement: Yes  - Bicarbonate; level: Low        On supplement: Yes  - Sodium; level: Normal    #Nausea  #Vomiting:  -appreciate GI recs  -Would recommend starting PPI     # H/o VT/VF Cardiac arrest:  # CAD s/p CABG 2019 LIMA-LAD, SVG-RCA, IBAN 1/2019, IBAN to RCA 2/2024              - s/p inferior STEMI 2/2 ostial proximal RCA in stent thrombosis              - s/p 2 IBAN to RCA 7/20/2024              - Echo: LVEF=37%.Moderate diffuse hypokinesis with akinesis of all inferior segments.  Mild right ventricular dilation is present.Global  right ventricular function is moderately reduced.     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    Seen and discussed with Dr. Malvin Underwood NP  Transplant Nephrology  Contact information via Vocera Web Console or via [facility] Paging/Directory        Physician Attestation     I saw and evaluated Siva Ramey as part of a shared APRN/PA visit.     I personally reviewed the vital signs, medications, labs, and imaging.    I personally provided a substantive portion of care for this patient and I approve the care plan as written by the KAT.  I was involved with Medical Decision Making including:   Pt's renal function is stable. EGD done today. Post procedure stable vitals. Continue on current immunosuppression as his tac is at goal of 8-10  Please see A&P for additional details of medical decision making.    Grisel Coombs MD  Date of Service (when I saw the patient): 09/01/24       Interval History  Mr. Ramey's creatinine is 1.40 (09/01 0731);  Trend down.  good urine output.  Other significant labs/tests/vitals: VSS  no events overnight.  no chest pain or shortness of breath.  no leg swelling.  no nausea and vomiting.  Bowel movements are soft.  no fever, sweats or chills.      Review of Systems   4 point ROS was obtained and negative except as noted in the Interval History.    MEDICATIONS:  Current Facility-Administered Medications   Medication Dose Route Frequency Provider Last Rate Last Admin    aspirin (ASA) chewable tablet 81 mg  81 mg Oral Daily Reed Painter MD   81 mg at 09/01/24 0750    atorvastatin (LIPITOR) tablet 40 mg  40 mg Oral QPM Reed Painter MD   40 mg at 08/31/24 1951    carvedilol (COREG) tablet 3.125 mg  3.125 mg Oral BID w/meals Reed Painter MD   3.125 mg at 09/01/24 0750    dapsone (ACZONE) tablet 50 mg  50 mg Oral Daily Reed Painter MD   50 mg at 09/01/24 0749    magnesium glycinate capsule 200 mg  200 mg Oral BID Reed Painter MD   200 mg at 09/01/24 0749    metoclopramide (REGLAN) tablet 5 mg  5 mg Oral BID AC Reed Painter MD   5 mg at 09/01/24 0749    mycophenolic acid (GENERIC EQUIVALENT) EC tablet 720 mg  720 mg Oral BID Reed Painter MD   720 mg at 09/01/24 0750    nystatin (MYCOSTATIN) suspension 500,000 Units  500,000 Units Oral 4x Daily Reed Painter MD   500,000 Units at 09/01/24 0753    pantoprazole (PROTONIX) EC tablet 40 mg  40 mg Oral QAM AC Reed Painter MD   40 mg at 09/01/24 0750    phosphorus tablet 250 mg (PHOSPHA 250 NEUTRAL) per tablet 500 mg  500 mg Oral BID Reed Painter MD   500 mg at 09/01/24 0750    sodium bicarbonate tablet 1,950 mg  1,950 mg Oral TID Reed Painter MD   1,950 mg at 09/01/24 0750    sodium chloride (PF) 0.9% PF flush 3 mL  3 mL Intracatheter Q8H Reed Painter MD   3 mL at 08/31/24 0138    tacrolimus (GENERIC EQUIVALENT) capsule 3 mg  3 mg Oral BID IS Reed Painter MD   3 mg at 09/01/24 0750    ticagrelor (BRILINTA) tablet 90 mg  90 mg Oral or Feeding Tube BID  "Reed Painter MD   90 mg at 24 0750    valGANciclovir (VALCYTE) tablet 900 mg  900 mg Oral Daily Reed Painter MD   900 mg at 24 0750    Vitamin D3 (CHOLECALCIFEROL) tablet 50 mcg  50 mcg Oral Daily Reed Painter MD   50 mcg at 24 0750     Current Facility-Administered Medications   Medication Dose Route Frequency Provider Last Rate Last Admin    sodium chloride 0.9 % infusion   Intravenous Continuous Reed Painter MD 75 mL/hr at 24 0307 New Bag at 24 030       Physical Exam   Temp  Av.7  F (37.1  C)  Min: 98.2  F (36.8  C)  Max: 99.2  F (37.3  C)      Pulse  Av.1  Min: 91  Max: 114 Resp  Av  Min: 16  Max: 18  SpO2  Av.8 %  Min: 97 %  Max: 100 %     BP (!) 142/86 (BP Location: Right arm)   Pulse 86   Temp 98.3  F (36.8  C) (Oral)   Resp 18   Ht 1.803 m (5' 11\")   Wt 98.2 kg (216 lb 8 oz)   SpO2 97%   BMI 30.20 kg/m      Admit Weight: 99 kg (218 lb 3.2 oz)     GENERAL APPEARANCE: alert and no distress  HENT: mouth without ulcers or lesions  RESP: lungs clear to auscultation - no rales, rhonchi or wheezes  CV: regular rhythm, normal rate, no rub, no murmur  EDEMA: no LE edema bilaterally  ABDOMEN: soft, nondistended, nontender, bowel sounds normal  MS: extremities normal - no gross deformities noted, no evidence of inflammation in joints, no muscle tenderness  SKIN: no rash    Data   All labs reviewed by me.  CMP  Recent Labs   Lab 24  0755 24  2135 24  1953 24  1817 24  0740 24  0600 24  0136 24  0948 24  0800 24  0744 24  0453   NA  --   --   --   --   --  135  --  135 138  --  134*   POTASSIUM  --   --   --   --   --  4.7  --  5.4* 4.9  --  5.3   CHLORIDE  --   --   --   --   --  108*  --  105 109*  --  109*   CO2  --   --   --   --   --  19*  --  21* 20*  --  17*   ANIONGAP  --   --   --   --   --  8  --  9 9  --  8   * 119* 125* 105*   < > 111*   < > 137* 119*   < > 110* "   BUN  --   --   --   --   --  15.8  --  14.4 13.4  --  17.1   CR  --   --   --   --   --  1.43*  --  1.54* 1.56*  --  1.79*   GFRESTIMATED  --   --   --   --   --  60*  --  55* 54*  --  46*   BETHANY  --   --   --   --   --  9.7  --  10.4 9.8  --  9.3   MAG  --   --   --   --   --  1.2*  --   --  1.4*  --  1.6*   PHOS  --   --   --   --   --  2.9  --   --  2.5  --  2.7   PROTTOTAL  --   --   --   --   --   --   --  7.2  --   --   --    ALBUMIN  --   --   --   --   --   --   --  4.5  --   --   --    BILITOTAL  --   --   --   --   --   --   --  0.6  --   --   --    ALKPHOS  --   --   --   --   --   --   --  174*  --   --   --    AST  --   --   --   --   --   --   --  26  --   --   --    ALT  --   --   --   --   --   --   --  38  --   --   --     < > = values in this interval not displayed.     CBC  Recent Labs   Lab 09/01/24  0731 08/31/24  0600 08/30/24  0948 08/29/24  0800   HGB 9.2* 9.9* 10.6* 9.2*   WBC 3.4* 5.1 5.3 3.0*   RBC 3.14* 3.39* 3.67* 3.23*   HCT 30.2* 32.0* 35.0* 30.9*   MCV 96 94 95 96   MCH 29.3 29.2 28.9 28.5   MCHC 30.5* 30.9* 30.3* 29.8*   RDW 15.8* 15.6* 15.5* 15.3*    239 244 261     INRNo lab results found in last 7 days.  ABGNo lab results found in last 7 days.   Urine Studies  Recent Labs   Lab Test 08/30/24  1006 08/24/24  1056 08/12/24  1141 07/20/24  0609 07/24/21  1151 06/25/19  1439 06/19/19  0822   COLOR Light Yellow Light Yellow Yellow [place]*   < > Straw Straw   APPEARANCE Clear Slightly Cloudy* Slightly Cloudy* Slightly Cloudy*   < > Clear Clear   URINEGLC 500* >=1000* 50* 50*   < > 200 mg/dL* >1000 mg/dL*   URINEBILI Negative Negative Negative Negative   < > Negative Negative   URINEKETONE Negative Negative Negative Negative   < > Negative Negative   SG 1.013 1.017 1.022 1.013   < > 1.010 1.012   UBLD Large* Large* Large* Large*   < > Moderate* Trace*   URINEPH 7.0 7.0 6.0 6.5   < > 6.5 6.0   PROTEIN 300* 100* 100* 300*   < > 200 mg/dL* 300 mg/dL*   UROBILINOGEN  --   --   --    --   --  <2.0 E.U./dL <2.0 E.U./dL   NITRITE Negative Negative Negative Negative   < > Negative Negative   LEUKEST Trace* Negative Trace* Trace*   < > Negative Negative   RBCU >182* >182* >182* >182*   < > 25-50* 0-2   WBCU 4 9* 23* 105*   < > 0-5 0-5    < > = values in this interval not displayed.     No lab results found.  PTH  Recent Labs   Lab Test 08/05/24  0758 07/25/24  0448 07/20/21  1845   PTHI 263* 199* 190*     Iron Studies  Recent Labs   Lab Test 08/05/24  0758   IRON 44*      IRONSAT 17   KADEN 1,725*       IMAGING:  All imaging studies reviewed by me.

## 2024-09-01 NOTE — PLAN OF CARE
"Goal Outcome Evaluation:    Plan of Care Reviewed With: patient  Overall Patient Progress: improving  Outcome Evaluation: discharge orders placed post EGD    BP (!) 155/87 (BP Location: Right arm)   Pulse 97   Temp 98.8  F (37.1  C) (Oral)   Resp 18   Ht 1.803 m (5' 11\")   Wt 98.2 kg (216 lb 8 oz)   SpO2 97%   BMI 30.20 kg/m      [id number]. Slightly hypertensive, OVSS on RA. ACHS BG WDL. Denies pain or nausea, no emesis all shift. Mechanical soft diet, good appetite. PIV removed prior to discharge. LBM 8/31, voiding spontaneously. Skin WDL. Patient UAL. EGD completed today, discharge orders placed.    DISCHARGE:  Patient with orders to discharge to home.     Discharge instructions, medications & follow ups reviewed with patient. Copy of discharge summary given to patient. PIV removed.     Patient in stable condition. AVSS. Patient had no further questions regarding discharge instructions and medications. Patient transferred out by foot & left with friend.    Plan for follow up with transplant team.    "

## 2024-09-01 NOTE — OR NURSING
EGD completed and pt tolerated well with moderate sedation and on 2L nc oxygen.  Report called to floor RN and transport to take pt back to dept.  Pt awake and conversing post procedure.

## 2024-09-03 RX ORDER — SOD PHOS DI, MONO/K PHOS MONO 250 MG
2 TABLET ORAL 3 TIMES DAILY
Qty: 180 TABLET | Refills: 0 | Status: SHIPPED | OUTPATIENT
Start: 2024-09-03 | End: 2024-09-04

## 2024-09-03 NOTE — PROGRESS NOTES
TRANSPLANT NEPHROLOGY CLINIC VISIT     Recommendation:  Labs today were done after taking Tacrolimus (peak).  Check labs tomorrow.  Stop Vitamin D.  If lipase and amylase continue to up-trend we will obtain US of pancreas.    Assessment & Plan   # DDKT (SPK): CKD Stage 2 - Trend down   - Baseline Creatinine: ~ 1.4 mg/dL brady on 9/01/24.   - Proteinuria: Moderate (1-3 grams), but down-trending.   - DSA Hx: No DSA   - Last cPRA: 11%   - BK Viremia: No   - Kidney Tx Biopsy Hx: No biopsy history.   - Cr a bit higher today, Tac is not a trough. Repeat labs tomorrow.    # Pancreas Tx (SPK):    - Pancreatic Exocrine Drainage: Enteric drained     - Blood glucose: Euglycemia      On insulin: No   - HbA1c: Trend down      Latest HbA1c: 6.4%   - Pancreatic enzymes: Trend down   - DSA Hx: No DSA   - Pancreas Tx Biopsy Hx: No biopsy history   - Amylase and Lipase with a small rise. Not checking glucose this week, but fasting glucose okay. Not constipated. Repeat labs tomorrow.    # Immunosuppression: Tacrolimus immediate release (goal 8-10) and Mycophenolic acid (dose 720 mg every 12 hours)   - Induction with Recent Transplant:  High Intensity Protocol   - Continue with intensive monitoring of immunosuppression for efficacy and toxicity.   - Historical Changes in Immunosuppression:  MMF to MPA due to nausea and vomiting.  - 9/4 tac level was drawn 2 hrs after he took his AM tac dose    - Changes: Not at this time as it was a peak level.    # Infection Prevention:      - PJP: Dapsone 50 mg every day (Changed from Bactrim due to hyperkalemia).  - CMV: Valganciclovir (Valcyte) 900 mg every day.  - CMV IgG Ab High Risk Discordance (D+/R-): No  - EBV IgG Ab High Risk Discordance (D+/R-): No    # Hypertension: Controlled;  Goal BP: < 140/90  - On coreg 3.1 BID    - Changes: No    # Anemia in Chronic Renal Disease: Hgb: Stable, near normal      KERRI: No   - Iron studies: Low iron saturation, but high ferritin    # Mineral Bone  Disorder:    - Secondary renal hyperparathyroidism; PTH level: Mildly elevated (151-300 pg/ml)        On treatment: None  - Vitamin D; level: High        On supplement: No  - Calcium; level: High        On supplement: No, stop vitamin d.  - Phosphorus; level: Normal        On supplement: No    # Electrolytes:   - Potassium; level: High normal        On supplement: No  - Magnesium; level: Low        On supplement: Yes, magnesium glycinate 200 mg twice a day.  - Bicarbonate; level: Low        On supplement: Yes, sodium bicarbonate 1950 mg twice a day.  - Sodium; level: Normal    # Other Significant PMH:   - CAD s/p PCI with IBAN placement to first diagonal in 1/2019, s/p two-vessel CABG in 6/2019.    - Vfib arrest post kidney transplant from STEMI s/p 2 IBAN to RCA. Brilinta + ASA for at least 1 month.   - HFrEF: LVEF of 35-40% (07/2024).     # Skin Cancer Risk:    - Discussed sun protection and recommend regular follow up with Dermatology.    # Transplant History:  Etiology of Kidney Failure: Diabetes mellitus type 2  Tx: DDKT (SPK)  Transplant: 7/20/2024 (Kidney / Pancreas)  Significant transplant-related complications:  Vfib cardiac arrest from STEMI post kidney transplant. PCI 7/20/2024 showed stent thrombosis of the mid RCA stent culprit in inferior STEMI s/p aspiration thrombectomy and PCI with two IBAN.    Transplant Office Phone Number: [phone]    Assessment and plan was discussed with the patient and he voiced his understanding and agreement.    Return visit: Return for appointment scheduled for 10/01/2024.    Edwin Wallis MD    The longitudinal plan of care for the diagnosis(es)/condition(s) as documented were addressed during this visit. Due to the added complexity in care, I will continue to support Siva in the subsequent management and with ongoing continuity of care.      Chief Complaint   Mr. Ramey is a 49 year old here for kidney transplant, pancreas transplant, and immunosuppression  management.     History of Present Illness    Mr. Ramey reports feeling good overall.    Since last clinic visit:   Hospitalizations: Yes, admitted 8/24/24 with nausea and vomiting, discharged on Reglan 5 mg twice daily.   New Medical Issues: No    Chest pain or shortness of breath: No  Lower extremity swelling: No  Weight change: No  Nausea and vomiting: No  Diarrhea: No  Heartburn symptoms: No  Fever, sweats or chills: No  Urinary complaints: No    Home BP:  120/70s    Problem List   Patient Active Problem List   Diagnosis    Type 2 diabetes mellitus with other circulatory complication, unspecified whether long term insulin use (H)    Dyslipidemia    Class 2 severe obesity due to excess calories with serious comorbidity in adult, unspecified BMI (H)    Atherosclerosis of other coronary artery bypass graft(s) with unstable angina pectoris (H)    Anemia in chronic kidney disease    HTN (hypertension)    S/P CABG (coronary artery bypass graft)    Balanoposthitis    Other stricture of anterior urethra in male    History of ST elevation myocardial infarction (STEMI)    Obesity (BMI 30.0-34.9)    MARQUEZ (dyspnea on exertion)    STEMI (ST elevation myocardial infarction) (H)    Vitamin D deficiency    Metabolic acidosis    Retinopathy    Peripheral neuropathy    Acquired elevated diaphragm    Median nerve neuropathy, left    Iron deficiency anemia, unspecified    End stage renal disease (H)    Allergy, unspecified, initial encounter    Anaphylactic shock, unspecified, initial encounter    Coagulation defect, unspecified (H24)    Fatigue, unspecified type    Non-restorative sleep    Obstructive sleep apnea treated with continuous positive airway pressure (CPAP)    Other disorders of phosphorus metabolism    Other specified symptoms and signs involving the circulatory and respiratory systems    Snoring    Palpitations    Gastroesophageal reflux disease without esophagitis    Diabetes mellitus, type 2 (H)    Kidney replaced by  transplant    Pancreas replaced by transplant (H)    Immunosuppressed status (H24)    Cardiac arrest with ventricular fibrillation (H)    Acute and chronic respiratory failure with hypoxia (H)    Steroid-induced hyperglycemia    Hyperkalemia    Dehydration    Status post kidney transplant    Nausea and vomiting, unspecified vomiting type    Generalized abdominal pain    History of simultaneous kidney and pancreas transplant (H)       Allergies   Allergies   Allergen Reactions    Amoxicillin Hives     & generalized pain    Tolerated ceftriaxone in 2023 ([facility]) and 2024 ([facility])    Venlafaxine      lethargic       Medications   Current Outpatient Medications   Medication Sig Dispense Refill    acetaminophen (TYLENOL) 500 MG tablet Take 500 mg by mouth every 4 hours as needed      aspirin (ASA) 81 MG chewable tablet Take 1 tablet (81 mg) by mouth daily Starting tomorrow. 30 tablet 3    atorvastatin (LIPITOR) 40 MG tablet Take 1 tablet (40 mg) by mouth every evening 30 tablet 2    carvedilol (COREG) 6.25 MG tablet Take 0.5 tablets (3.125 mg) by mouth 2 times daily (with meals) for 30 days 30 tablet 0    dapsone (ACZONE) 25 MG tablet Take 2 tablets (50 mg) by mouth daily 60 tablet 11    magnesium glycinate 100 MG CAPS capsule Take 2 capsules (200 mg) by mouth 2 times daily Take in place of magnesium-oxide 120 capsule 2    methocarbamol (ROBAXIN) 750 MG tablet Take 1 tablet (750 mg) by mouth every 6 hours as needed for muscle spasms 20 tablet 0    metoclopramide (REGLAN) 5 MG tablet Take 1 tablet (5 mg) by mouth 2 times daily (before meals). 60 tablet 1    mycophenolic acid (GENERIC EQUIVALENT) 180 MG EC tablet Take 4 tablets (720 mg) by mouth 2 times daily 240 tablet 11    nystatin (MYCOSTATIN) 559351 UNIT/ML suspension Take 5 mLs (500,000 Units) by mouth 4 times daily 600 mL 2    pantoprazole (PROTONIX) 40 MG EC tablet Take 1 tablet (40 mg) by mouth every morning (before breakfast) 30 tablet 1     sodium bicarbonate 650 MG tablet Take 3 tablets (1,950 mg) by mouth 3 times daily 270 tablet 2    tacrolimus (GENERIC EQUIVALENT) 1 MG capsule Take 3 capsules (3 mg) by mouth 2 times daily. 240 capsule 11    ticagrelor (BRILINTA) 90 MG tablet Take 1 tablet (90 mg) by mouth or Feeding Tube 2 times daily 60 tablet 0    valGANciclovir (VALCYTE) 450 MG tablet Take 2 tablets (900 mg) by mouth daily 60 tablet 2    loperamide (IMODIUM A-D) 2 MG capsule Take 1 capsule (2 mg) by mouth 4 times daily as needed for diarrhea (Patient not taking: Reported on 9/4/2024) 30 capsule 0    psyllium (METAMUCIL/KONSYL) 58.6 % powder Take 6 g (1 teaspoonful) by mouth 2 times daily as needed (diarrhea)       Current Facility-Administered Medications   Medication Dose Route Frequency Provider Last Rate Last Admin    levofloxacin (LEVAQUIN) tablet 500 mg  500 mg Oral Once Shelia Magallon APRN CNP        lidocaine (XYLOCAINE) 2 % external gel   Urethral Once Shelia Magallon APRN CNP        lidocaine (XYLOCAINE) 2 % external gel   Urethral Once Shelia Magallon APRN CNP         Medications Discontinued During This Encounter   Medication Reason    cholecalciferol (VITAMIN D3) 25 mcg (1000 units) capsule     K Phos [place]-Sod Phos Di & Mono (SONNY-PHOS 250 NEUTRAL) 155-852-130 MG TABS     ondansetron (ZOFRAN ODT) 4 MG ODT tab        Physical Exam   Vital Signs: BP (!) 143/85 (BP Location: Right arm, Patient Position: Sitting, Cuff Size: Adult Regular)   Pulse 87   Temp 98.1  F (36.7  C) (Oral)   Wt 97.5 kg (214 lb 14.4 oz)   SpO2 100%   BMI 29.97 kg/m      GENERAL APPEARANCE: alert and no distress  HENT: mouth without ulcers or lesions  RESP: lungs clear to auscultation - no rales, rhonchi or wheezes  CV: regular rhythm, normal rate, no rub, no murmur  EDEMA: no LE edema bilaterally  ABDOMEN: soft, nondistended, nontender, bowel sounds normal  MS: extremities normal - no gross deformities noted, no evidence of inflammation in joints, no  muscle tenderness  SKIN: no rash  TRANSPLANT: No bruit. Incision well healed.     Data         Latest Ref Rng & Units 9/4/2024    10:23 AM 9/1/2024    12:53 PM 9/1/2024     7:55 AM   Renal   Sodium 135 - 145 mmol/L 139      K 3.4 - 5.3 mmol/L 5.6      Cl 98 - 107 mmol/L 111      Cl (external) 98 - 107 mmol/L 111      CO2 22 - 29 mmol/L 19      Urea Nitrogen 6.0 - 20.0 mg/dL 20.0      Creatinine 0.67 - 1.17 mg/dL 1.54      Glucose 70 - 99 mg/dL 97  101  104    Calcium 8.8 - 10.4 mg/dL 10.9      Magnesium 1.7 - 2.3 mg/dL 1.5            Latest Ref Rng & Units 9/4/2024    10:23 AM 9/1/2024     7:31 AM 8/31/2024     6:00 AM   Bone Health   Phosphorus 2.5 - 4.5 mg/dL 1.9  2.6  2.9          Latest Ref Rng & Units 9/4/2024    10:23 AM 9/1/2024     7:31 AM 8/31/2024     6:00 AM   Heme   WBC 4.0 - 11.0 10e3/uL 3.8  3.4  5.1    Hgb 13.3 - 17.7 g/dL 11.2  9.2  9.9    Plt 150 - 450 10e3/uL 222  219  239          Latest Ref Rng & Units 8/30/2024     9:48 AM 8/24/2024     7:18 AM 7/30/2024     5:57 AM   Liver   AP 40 - 150 U/L 174  186  105    TBili <=1.2 mg/dL 0.6  0.6  0.4    ALT 0 - 70 U/L 38  32  30    AST 0 - 45 U/L 26  26  25    Tot Protein 6.4 - 8.3 g/dL 7.2  7.2  5.2    Albumin 3.5 - 5.2 g/dL 4.5  4.4  3.2          Latest Ref Rng & Units 9/4/2024    10:23 AM 9/1/2024     7:31 AM 8/31/2024     6:00 AM   Pancreas   Amylase 28 - 100 U/L 104  88  76    Lipase (Roche) 13 - 60 U/L 70  66  43          Latest Ref Rng & Units 8/5/2024     7:58 AM   Iron studies   Iron 61 - 157 ug/dL 44    Iron Sat Index 15 - 46 % 17    Ferritin 31 - 409 ng/mL 1,725          Latest Ref Rng & Units 7/19/2024     4:56 PM 9/27/2021    12:18 PM   [facility]   EBV CAPSID ANTIBODY IGG No detectable antibody. Positive  Positive      Failed to redirect to the Timeline version of the REVFS SmartLink.  Recent Labs   Lab Test 08/19/24  0749 08/22/24  0738 08/25/24  0653 08/29/24  0800 08/31/24  0600 09/01/24  0731   DOSTAC 8/18/2024 8/21/2024  --   8/28/2024  --   --    TACROL 15.4* 15.8*   < > 8.9 8.5 8.1    < > = values in this interval not displayed.     Recent Labs   Lab Test 08/05/24  0758 08/12/24  0755 08/29/24  0800   DOSMPA 8/4/2024   8:00 PM  --   --    MPACID 3.75* 1.54 2.88   MPAG 78.9 128.2* 70.6

## 2024-09-04 ENCOUNTER — TELEPHONE (OUTPATIENT)
Dept: TRANSPLANT | Facility: CLINIC | Age: 49
End: 2024-09-04

## 2024-09-04 ENCOUNTER — OFFICE VISIT (OUTPATIENT)
Dept: TRANSPLANT | Facility: CLINIC | Age: 49
End: 2024-09-04
Attending: INTERNAL MEDICINE
Payer: MEDICARE

## 2024-09-04 ENCOUNTER — LAB (OUTPATIENT)
Dept: LAB | Facility: CLINIC | Age: 49
End: 2024-09-04
Attending: INTERNAL MEDICINE
Payer: COMMERCIAL

## 2024-09-04 ENCOUNTER — ANCILLARY PROCEDURE (OUTPATIENT)
Dept: GENERAL RADIOLOGY | Facility: CLINIC | Age: 49
End: 2024-09-04
Attending: INTERNAL MEDICINE
Payer: COMMERCIAL

## 2024-09-04 VITALS
WEIGHT: 214.9 LBS | TEMPERATURE: 98.1 F | DIASTOLIC BLOOD PRESSURE: 85 MMHG | BODY MASS INDEX: 29.97 KG/M2 | SYSTOLIC BLOOD PRESSURE: 143 MMHG | OXYGEN SATURATION: 100 % | HEART RATE: 87 BPM

## 2024-09-04 DIAGNOSIS — Z29.89 NEED FOR PNEUMOCYSTIS PROPHYLAXIS: ICD-10-CM

## 2024-09-04 DIAGNOSIS — Z99.2 ESRD (END STAGE RENAL DISEASE) ON DIALYSIS (H): ICD-10-CM

## 2024-09-04 DIAGNOSIS — E78.5 DYSLIPIDEMIA: ICD-10-CM

## 2024-09-04 DIAGNOSIS — Z79.899 ENCOUNTER FOR LONG-TERM CURRENT USE OF MEDICATION: ICD-10-CM

## 2024-09-04 DIAGNOSIS — Z94.83 PANCREAS REPLACED BY TRANSPLANT (H): ICD-10-CM

## 2024-09-04 DIAGNOSIS — Z94.0 KIDNEY REPLACED BY TRANSPLANT: ICD-10-CM

## 2024-09-04 DIAGNOSIS — K21.9 GASTROESOPHAGEAL REFLUX DISEASE WITHOUT ESOPHAGITIS: Primary | ICD-10-CM

## 2024-09-04 DIAGNOSIS — N18.9 ANEMIA IN CHRONIC KIDNEY DISEASE, UNSPECIFIED CKD STAGE: ICD-10-CM

## 2024-09-04 DIAGNOSIS — Z95.5 S/P RIGHT CORONARY ARTERY (RCA) STENT PLACEMENT: ICD-10-CM

## 2024-09-04 DIAGNOSIS — I25.10 CORONARY ARTERY DISEASE INVOLVING NATIVE CORONARY ARTERY OF NATIVE HEART WITHOUT ANGINA PECTORIS: ICD-10-CM

## 2024-09-04 DIAGNOSIS — Z94.83 PANCREAS TRANSPLANTED (H): Primary | ICD-10-CM

## 2024-09-04 DIAGNOSIS — N25.81 SECONDARY HYPERPARATHYROIDISM OF RENAL ORIGIN (H): ICD-10-CM

## 2024-09-04 DIAGNOSIS — E87.20 METABOLIC ACIDOSIS: ICD-10-CM

## 2024-09-04 DIAGNOSIS — D84.9 IMMUNOSUPPRESSED STATUS (H): ICD-10-CM

## 2024-09-04 DIAGNOSIS — I21.11 ST ELEVATION MYOCARDIAL INFARCTION INVOLVING RIGHT CORONARY ARTERY (H): ICD-10-CM

## 2024-09-04 DIAGNOSIS — Z98.890 OTHER SPECIFIED POSTPROCEDURAL STATES: ICD-10-CM

## 2024-09-04 DIAGNOSIS — Z48.298 AFTERCARE FOLLOWING ORGAN TRANSPLANT: ICD-10-CM

## 2024-09-04 DIAGNOSIS — N18.6 ESRD (END STAGE RENAL DISEASE) ON DIALYSIS (H): ICD-10-CM

## 2024-09-04 DIAGNOSIS — E11.59 TYPE 2 DIABETES MELLITUS WITH OTHER CIRCULATORY COMPLICATION, UNSPECIFIED WHETHER LONG TERM INSULIN USE (H): ICD-10-CM

## 2024-09-04 DIAGNOSIS — I15.1 HYPERTENSION SECONDARY TO OTHER RENAL DISORDERS: ICD-10-CM

## 2024-09-04 DIAGNOSIS — Z96.0 URETERAL STENT PRESENT: Primary | ICD-10-CM

## 2024-09-04 DIAGNOSIS — E87.5 HYPERKALEMIA: ICD-10-CM

## 2024-09-04 DIAGNOSIS — E83.42 HYPOMAGNESEMIA: ICD-10-CM

## 2024-09-04 DIAGNOSIS — D63.1 ANEMIA IN CHRONIC KIDNEY DISEASE, UNSPECIFIED CKD STAGE: ICD-10-CM

## 2024-09-04 LAB
AMYLASE SERPL-CCNC: 104 U/L (ref 28–100)
ANION GAP SERPL CALCULATED.3IONS-SCNC: 9 MMOL/L (ref 7–15)
BUN SERPL-MCNC: 20 MG/DL (ref 6–20)
CALCIUM SERPL-MCNC: 10.9 MG/DL (ref 8.8–10.4)
CHLORIDE SERPL-SCNC: 111 MMOL/L (ref 98–107)
CHOLEST SERPL-MCNC: 95 MG/DL
CREAT SERPL-MCNC: 1.54 MG/DL (ref 0.67–1.17)
EGFRCR SERPLBLD CKD-EPI 2021: 55 ML/MIN/1.73M2
ERYTHROCYTE [DISTWIDTH] IN BLOOD BY AUTOMATED COUNT: 15.6 % (ref 10–15)
FASTING STATUS PATIENT QL REPORTED: YES
FASTING STATUS PATIENT QL REPORTED: YES
GLUCOSE SERPL-MCNC: 97 MG/DL (ref 70–99)
HCO3 SERPL-SCNC: 19 MMOL/L (ref 22–29)
HCT VFR BLD AUTO: 37.3 % (ref 40–53)
HDLC SERPL-MCNC: 32 MG/DL
HGB BLD-MCNC: 11.2 G/DL (ref 13.3–17.7)
LDLC SERPL CALC-MCNC: 40 MG/DL
LIPASE SERPL-CCNC: 70 U/L (ref 13–60)
MAGNESIUM SERPL-MCNC: 1.5 MG/DL (ref 1.7–2.3)
MCH RBC QN AUTO: 29.1 PG (ref 26.5–33)
MCHC RBC AUTO-ENTMCNC: 30 G/DL (ref 31.5–36.5)
MCV RBC AUTO: 97 FL (ref 78–100)
MYCOPHENOLATE SERPL LC/MS/MS-MCNC: 3.8 MG/L (ref 1–3.5)
MYCOPHENOLATE-G SERPL LC/MS/MS-MCNC: 104.3 MG/L (ref 30–95)
NONHDLC SERPL-MCNC: 63 MG/DL
PHOSPHATE SERPL-MCNC: 1.9 MG/DL (ref 2.5–4.5)
PLATELET # BLD AUTO: 222 10E3/UL (ref 150–450)
POTASSIUM SERPL-SCNC: 5.6 MMOL/L (ref 3.4–5.3)
RBC # BLD AUTO: 3.85 10E6/UL (ref 4.4–5.9)
SODIUM SERPL-SCNC: 139 MMOL/L (ref 135–145)
TACROLIMUS BLD-MCNC: 25.3 UG/L (ref 5–15)
TME LAST DOSE: ABNORMAL H
TRIGL SERPL-MCNC: 113 MG/DL
WBC # BLD AUTO: 3.8 10E3/UL (ref 4–11)

## 2024-09-04 PROCEDURE — 74018 RADEX ABDOMEN 1 VIEW: CPT | Mod: GC | Performed by: STUDENT IN AN ORGANIZED HEALTH CARE EDUCATION/TRAINING PROGRAM

## 2024-09-04 PROCEDURE — 80180 DRUG SCRN QUAN MYCOPHENOLATE: CPT | Performed by: INTERNAL MEDICINE

## 2024-09-04 PROCEDURE — 80048 BASIC METABOLIC PNL TOTAL CA: CPT | Performed by: PATHOLOGY

## 2024-09-04 PROCEDURE — 85027 COMPLETE CBC AUTOMATED: CPT | Performed by: PATHOLOGY

## 2024-09-04 PROCEDURE — G0463 HOSPITAL OUTPT CLINIC VISIT: HCPCS | Performed by: STUDENT IN AN ORGANIZED HEALTH CARE EDUCATION/TRAINING PROGRAM

## 2024-09-04 PROCEDURE — 84100 ASSAY OF PHOSPHORUS: CPT | Performed by: PATHOLOGY

## 2024-09-04 PROCEDURE — 83690 ASSAY OF LIPASE: CPT | Performed by: PATHOLOGY

## 2024-09-04 PROCEDURE — 99000 SPECIMEN HANDLING OFFICE-LAB: CPT | Performed by: PATHOLOGY

## 2024-09-04 PROCEDURE — 82150 ASSAY OF AMYLASE: CPT | Performed by: PATHOLOGY

## 2024-09-04 PROCEDURE — 36415 COLL VENOUS BLD VENIPUNCTURE: CPT | Performed by: PATHOLOGY

## 2024-09-04 PROCEDURE — 83735 ASSAY OF MAGNESIUM: CPT | Performed by: PATHOLOGY

## 2024-09-04 PROCEDURE — 99215 OFFICE O/P EST HI 40 MIN: CPT | Mod: 24 | Performed by: STUDENT IN AN ORGANIZED HEALTH CARE EDUCATION/TRAINING PROGRAM

## 2024-09-04 PROCEDURE — 80197 ASSAY OF TACROLIMUS: CPT | Performed by: INTERNAL MEDICINE

## 2024-09-04 PROCEDURE — 80061 LIPID PANEL: CPT | Performed by: PATHOLOGY

## 2024-09-04 PROCEDURE — G2211 COMPLEX E/M VISIT ADD ON: HCPCS | Performed by: STUDENT IN AN ORGANIZED HEALTH CARE EDUCATION/TRAINING PROGRAM

## 2024-09-04 RX ORDER — LEVOFLOXACIN 250 MG/1
500 TABLET, FILM COATED ORAL ONCE
Status: DISPENSED | OUTPATIENT
Start: 2024-09-04

## 2024-09-04 RX ORDER — LIDOCAINE HYDROCHLORIDE 20 MG/ML
JELLY TOPICAL ONCE
Status: ACTIVE | OUTPATIENT
Start: 2024-09-04

## 2024-09-04 RX ORDER — LIDOCAINE HYDROCHLORIDE 20 MG/ML
JELLY TOPICAL ONCE
Status: DISPENSED | OUTPATIENT
Start: 2024-09-04

## 2024-09-04 ASSESSMENT — PAIN SCALES - GENERAL: PAINLEVEL: MODERATE PAIN (5)

## 2024-09-04 NOTE — LETTER
9/4/2024      Siva Ramey  [address] [place] [address]      Dear Colleague,    Thank you for referring your patient, Siva Ramey, to the [facility]PL[facility]. Please see a copy of my visit note below.    TRANSPLANT NEPHROLOGY CLINIC VISIT     Recommendation:  Labs today were done after taking Tacrolimus (peak).  Check labs tomorrow.  Stop Vitamin D.  If lipase and amylase continue to up-trend we will obtain US of pancreas.    Assessment & Plan  # DDKT (SPK): CKD Stage 2 - Trend down   - Baseline Creatinine: ~ 1.4 mg/dL brady on 9/01/24.   - Proteinuria: Moderate (1-3 grams), but down-trending.   - DSA Hx: No DSA   - Last cPRA: 11%   - BK Viremia: No   - Kidney Tx Biopsy Hx: No biopsy history.   - Cr a bit higher today, Tac is not a trough. Repeat labs tomorrow.    # Pancreas Tx (SPK):    - Pancreatic Exocrine Drainage: Enteric drained     - Blood glucose: Euglycemia      On insulin: No   - HbA1c: Trend down      Latest HbA1c: 6.4%   - Pancreatic enzymes: Trend down   - DSA Hx: No DSA   - Pancreas Tx Biopsy Hx: No biopsy history   - Amylase and Lipase with a small rise. Not checking glucose this week, but fasting glucose okay. Not constipated. Repeat labs tomorrow.    # Immunosuppression: Tacrolimus immediate release (goal 8-10) and Mycophenolic acid (dose 720 mg every 12 hours)   - Induction with Recent Transplant:  High Intensity Protocol   - Continue with intensive monitoring of immunosuppression for efficacy and toxicity.   - Historical Changes in Immunosuppression:  MMF to MPA due to nausea and vomiting.  - 9/4 tac level was drawn 2 hrs after he took his AM tac dose    - Changes: Not at this time as it was a peak level.    # Infection Prevention:      - PJP: Dapsone 50 mg every day (Changed from Bactrim due to hyperkalemia).  - CMV: Valganciclovir (Valcyte) 900 mg every day.  - CMV IgG Ab High Risk Discordance (D+/R-): No  - EBV IgG Ab High Risk Discordance (D+/R-): No    #  Hypertension: Controlled;  Goal BP: < 140/90  - On coreg 3.1 BID    - Changes: No    # Anemia in Chronic Renal Disease: Hgb: Stable, near normal      KERRI: No   - Iron studies: Low iron saturation, but high ferritin    # Mineral Bone Disorder:    - Secondary renal hyperparathyroidism; PTH level: Mildly elevated (151-300 pg/ml)        On treatment: None  - Vitamin D; level: High        On supplement: No  - Calcium; level: High        On supplement: No, stop vitamin d.  - Phosphorus; level: Normal        On supplement: No    # Electrolytes:   - Potassium; level: High normal        On supplement: No  - Magnesium; level: Low        On supplement: Yes, magnesium glycinate 200 mg twice a day.  - Bicarbonate; level: Low        On supplement: Yes, sodium bicarbonate 1950 mg twice a day.  - Sodium; level: Normal    # Other Significant PMH:   - CAD s/p PCI with IBAN placement to first diagonal in 1/2019, s/p two-vessel CABG in 6/2019.    - Vfib arrest post kidney transplant from STEMI s/p 2 IBAN to RCA. Brilinta + ASA for at least 1 month.   - HFrEF: LVEF of 35-40% (07/2024).     # Skin Cancer Risk:    - Discussed sun protection and recommend regular follow up with Dermatology.    # Transplant History:  Etiology of Kidney Failure: Diabetes mellitus type 2  Tx: DDKT (SPK)  Transplant: 7/20/2024 (Kidney / Pancreas)  Significant transplant-related complications:  Vfib cardiac arrest from STEMI post kidney transplant. PCI 7/20/2024 showed stent thrombosis of the mid RCA stent culprit in inferior STEMI s/p aspiration thrombectomy and PCI with two IBAN.    Transplant Office Phone Number: [phone]    Assessment and plan was discussed with the patient and he voiced his understanding and agreement.    Return visit: Return for appointment scheduled for 10/01/2024.    Edwin Wallis MD    The longitudinal plan of care for the diagnosis(es)/condition(s) as documented were addressed during this visit. Due to the added complexity  in care, I will continue to support Siva in the subsequent management and with ongoing continuity of care.      Chief Complaint  Mr. Ramey is a 49 year old here for kidney transplant, pancreas transplant, and immunosuppression management.     History of Present Illness   Mr. Ramey reports feeling good overall.    Since last clinic visit:   Hospitalizations: Yes, admitted 8/24/24 with nausea and vomiting, discharged on Reglan 5 mg twice daily.   New Medical Issues: No    Chest pain or shortness of breath: No  Lower extremity swelling: No  Weight change: No  Nausea and vomiting: No  Diarrhea: No  Heartburn symptoms: No  Fever, sweats or chills: No  Urinary complaints: No    Home BP:  120/70s    Problem List  Patient Active Problem List   Diagnosis     Type 2 diabetes mellitus with other circulatory complication, unspecified whether long term insulin use (H)     Dyslipidemia     Class 2 severe obesity due to excess calories with serious comorbidity in adult, unspecified BMI (H)     Atherosclerosis of other coronary artery bypass graft(s) with unstable angina pectoris (H)     Anemia in chronic kidney disease     HTN (hypertension)     S/P CABG (coronary artery bypass graft)     Balanoposthitis     Other stricture of anterior urethra in male     History of ST elevation myocardial infarction (STEMI)     Obesity (BMI 30.0-34.9)     MARQUEZ (dyspnea on exertion)     STEMI (ST elevation myocardial infarction) (H)     Vitamin D deficiency     Metabolic acidosis     Retinopathy     Peripheral neuropathy     Acquired elevated diaphragm     Median nerve neuropathy, left     Iron deficiency anemia, unspecified     End stage renal disease (H)     Allergy, unspecified, initial encounter     Anaphylactic shock, unspecified, initial encounter     Coagulation defect, unspecified (H24)     Fatigue, unspecified type     Non-restorative sleep     Obstructive sleep apnea treated with continuous positive airway pressure (CPAP)     Other  disorders of phosphorus metabolism     Other specified symptoms and signs involving the circulatory and respiratory systems     Snoring     Palpitations     Gastroesophageal reflux disease without esophagitis     Diabetes mellitus, type 2 (H)     Kidney replaced by transplant     Pancreas replaced by transplant (H)     Immunosuppressed status (H24)     Cardiac arrest with ventricular fibrillation (H)     Acute and chronic respiratory failure with hypoxia (H)     Steroid-induced hyperglycemia     Hyperkalemia     Dehydration     Status post kidney transplant     Nausea and vomiting, unspecified vomiting type     Generalized abdominal pain     History of simultaneous kidney and pancreas transplant (H)       Allergies  Allergies   Allergen Reactions     Amoxicillin Hives     & generalized pain    Tolerated ceftriaxone in 2023 ([facility]) and 2024 ([facility])     Venlafaxine      lethargic       Medications  Current Outpatient Medications   Medication Sig Dispense Refill     acetaminophen (TYLENOL) 500 MG tablet Take 500 mg by mouth every 4 hours as needed       aspirin (ASA) 81 MG chewable tablet Take 1 tablet (81 mg) by mouth daily Starting tomorrow. 30 tablet 3     atorvastatin (LIPITOR) 40 MG tablet Take 1 tablet (40 mg) by mouth every evening 30 tablet 2     carvedilol (COREG) 6.25 MG tablet Take 0.5 tablets (3.125 mg) by mouth 2 times daily (with meals) for 30 days 30 tablet 0     dapsone (ACZONE) 25 MG tablet Take 2 tablets (50 mg) by mouth daily 60 tablet 11     magnesium glycinate 100 MG CAPS capsule Take 2 capsules (200 mg) by mouth 2 times daily Take in place of magnesium-oxide 120 capsule 2     methocarbamol (ROBAXIN) 750 MG tablet Take 1 tablet (750 mg) by mouth every 6 hours as needed for muscle spasms 20 tablet 0     metoclopramide (REGLAN) 5 MG tablet Take 1 tablet (5 mg) by mouth 2 times daily (before meals). 60 tablet 1     mycophenolic acid (GENERIC EQUIVALENT) 180 MG EC tablet Take 4  tablets (720 mg) by mouth 2 times daily 240 tablet 11     nystatin (MYCOSTATIN) 877717 UNIT/ML suspension Take 5 mLs (500,000 Units) by mouth 4 times daily 600 mL 2     pantoprazole (PROTONIX) 40 MG EC tablet Take 1 tablet (40 mg) by mouth every morning (before breakfast) 30 tablet 1     sodium bicarbonate 650 MG tablet Take 3 tablets (1,950 mg) by mouth 3 times daily 270 tablet 2     tacrolimus (GENERIC EQUIVALENT) 1 MG capsule Take 3 capsules (3 mg) by mouth 2 times daily. 240 capsule 11     ticagrelor (BRILINTA) 90 MG tablet Take 1 tablet (90 mg) by mouth or Feeding Tube 2 times daily 60 tablet 0     valGANciclovir (VALCYTE) 450 MG tablet Take 2 tablets (900 mg) by mouth daily 60 tablet 2     loperamide (IMODIUM A-D) 2 MG capsule Take 1 capsule (2 mg) by mouth 4 times daily as needed for diarrhea (Patient not taking: Reported on 9/4/2024) 30 capsule 0     psyllium (METAMUCIL/KONSYL) 58.6 % powder Take 6 g (1 teaspoonful) by mouth 2 times daily as needed (diarrhea)       Current Facility-Administered Medications   Medication Dose Route Frequency Provider Last Rate Last Admin     levofloxacin (LEVAQUIN) tablet 500 mg  500 mg Oral Once Shelia Magallon APRN CNP         lidocaine (XYLOCAINE) 2 % external gel   Urethral Once Shelia Magallon APRN CNP         lidocaine (XYLOCAINE) 2 % external gel   Urethral Once Shelia Magallon APRN CNP         Medications Discontinued During This Encounter   Medication Reason     cholecalciferol (VITAMIN D3) 25 mcg (1000 units) capsule      K Phos [place]ain-Sod Phos Di & Mono (SONNY-PHOS 250 NEUTRAL) 155-852-130 MG TABS      ondansetron (ZOFRAN ODT) 4 MG ODT tab        Physical Exam  Vital Signs: BP (!) 143/85 (BP Location: Right arm, Patient Position: Sitting, Cuff Size: Adult Regular)   Pulse 87   Temp 98.1  F (36.7  C) (Oral)   Wt 97.5 kg (214 lb 14.4 oz)   SpO2 100%   BMI 29.97 kg/m      GENERAL APPEARANCE: alert and no distress  HENT: mouth without ulcers or lesions  RESP: lungs  clear to auscultation - no rales, rhonchi or wheezes  CV: regular rhythm, normal rate, no rub, no murmur  EDEMA: no LE edema bilaterally  ABDOMEN: soft, nondistended, nontender, bowel sounds normal  MS: extremities normal - no gross deformities noted, no evidence of inflammation in joints, no muscle tenderness  SKIN: no rash  TRANSPLANT: No bruit. Incision well healed.     Data        Latest Ref Rng & Units 9/4/2024    10:23 AM 9/1/2024    12:53 PM 9/1/2024     7:55 AM   Renal   Sodium 135 - 145 mmol/L 139      K 3.4 - 5.3 mmol/L 5.6      Cl 98 - 107 mmol/L 111      Cl (external) 98 - 107 mmol/L 111      CO2 22 - 29 mmol/L 19      Urea Nitrogen 6.0 - 20.0 mg/dL 20.0      Creatinine 0.67 - 1.17 mg/dL 1.54      Glucose 70 - 99 mg/dL 97  101  104    Calcium 8.8 - 10.4 mg/dL 10.9      Magnesium 1.7 - 2.3 mg/dL 1.5            Latest Ref Rng & Units 9/4/2024    10:23 AM 9/1/2024     7:31 AM 8/31/2024     6:00 AM   Bone Health   Phosphorus 2.5 - 4.5 mg/dL 1.9  2.6  2.9          Latest Ref Rng & Units 9/4/2024    10:23 AM 9/1/2024     7:31 AM 8/31/2024     6:00 AM   Heme   WBC 4.0 - 11.0 10e3/uL 3.8  3.4  5.1    Hgb 13.3 - 17.7 g/dL 11.2  9.2  9.9    Plt 150 - 450 10e3/uL 222  219  239          Latest Ref Rng & Units 8/30/2024     9:48 AM 8/24/2024     7:18 AM 7/30/2024     5:57 AM   Liver   AP 40 - 150 U/L 174  186  105    TBili <=1.2 mg/dL 0.6  0.6  0.4    ALT 0 - 70 U/L 38  32  30    AST 0 - 45 U/L 26  26  25    Tot Protein 6.4 - 8.3 g/dL 7.2  7.2  5.2    Albumin 3.5 - 5.2 g/dL 4.5  4.4  3.2          Latest Ref Rng & Units 9/4/2024    10:23 AM 9/1/2024     7:31 AM 8/31/2024     6:00 AM   Pancreas   Amylase 28 - 100 U/L 104  88  76    Lipase (Roche) 13 - 60 U/L 70  66  43          Latest Ref Rng & Units 8/5/2024     7:58 AM   Iron studies   Iron 61 - 157 ug/dL 44    Iron Sat Index 15 - 46 % 17    Ferritin 31 - 409 ng/mL 1,725          Latest Ref Rng & Units 7/19/2024     4:56 PM 9/27/2021    12:18 PM   [facility]    EBV CAPSID ANTIBODY IGG No detectable antibody. Positive  Positive      Failed to redirect to the Timeline version of the REVFS SmartLink.  Recent Labs   Lab Test 08/19/24  0749 08/22/24  0738 08/25/24  0653 08/29/24  0800 08/31/24  0600 09/01/24  0731   DOSTAC 8/18/2024 8/21/2024  --  8/28/2024  --   --    TACROL 15.4* 15.8*   < > 8.9 8.5 8.1    < > = values in this interval not displayed.     Recent Labs   Lab Test 08/05/24  0758 08/12/24  0755 08/29/24  0800   DOSMPA 8/4/2024   8:00 PM  --   --    MPACID 3.75* 1.54 2.88   MPAG 78.9 128.2* 70.6         Again, thank you for allowing me to participate in the care of your patient.        Sincerely,        Edwin Wallis MD

## 2024-09-04 NOTE — LETTER
9/4/2024      Siva Ramey  [address]  [place]  [place] [address]      Dear Colleague,    Thank you for referring your patient, Siva Ramey, to the [facility]. Please see a copy of my visit note below.    See procedure note for details.       Again, thank you for allowing me to participate in the care of your patient.        Sincerely,        JUANCARLOS Silva CNP

## 2024-09-04 NOTE — PATIENT INSTRUCTIONS
Patient Recommendations:  - Stop Vitamin D  - Labs tomorrow.    Transplant Patient Information  Your Post Transplant Coordinator is: Ximena Edwards  For non urgent items, we encourage you to contact your coordinator/care team online via [employer]  You and your care team can also contact your transplant coordinator Monday - Friday, 8am - 5pm at [phone] (Option 2 to reach the coordinator or Option 4 to schedule an appointment).  After hours for urgent matters, please call [facility] at [phone].

## 2024-09-04 NOTE — NURSING NOTE
Chief Complaint   Patient presents with    RECHECK     K/P POST ACUTE TXP NEPH - PKT, ordering provider Dr. Fernando Sorensen, staffed with Dr. Whitehead, scheduled per request.         Vitals:    09/04/24 1100 09/04/24 1104   BP: (!) 144/87 (!) 143/85   BP Location: Right arm Right arm   Patient Position: Sitting Sitting   Cuff Size: Adult Regular Adult Regular   Pulse: 87    Temp: 98.1  F (36.7  C)    TempSrc: Oral    SpO2: 100%    Weight: 97.5 kg (214 lb 14.4 oz)        BP Readings from Last 3 Encounters:   09/04/24 (!) 143/85   09/01/24 (!) 155/87   08/28/24 (!) 158/84       BP (!) 143/85 (BP Location: Right arm, Patient Position: Sitting, Cuff Size: Adult Regular)   Pulse 87   Temp 98.1  F (36.7  C) (Oral)   Wt 97.5 kg (214 lb 14.4 oz)   SpO2 100%   BMI 29.97 kg/m       Tino Ortiz, [facility]

## 2024-09-05 ENCOUNTER — TELEPHONE (OUTPATIENT)
Dept: TRANSPLANT | Facility: CLINIC | Age: 49
End: 2024-09-05

## 2024-09-05 ENCOUNTER — LAB (OUTPATIENT)
Dept: LAB | Facility: HOSPITAL | Age: 49
End: 2024-09-05
Payer: MEDICARE

## 2024-09-05 DIAGNOSIS — R74.8 ELEVATED LIPASE: Primary | ICD-10-CM

## 2024-09-05 DIAGNOSIS — Z94.83 PANCREAS TRANSPLANTED (H): ICD-10-CM

## 2024-09-05 DIAGNOSIS — Z48.22 ENCOUNTER FOR AFTERCARE FOLLOWING KIDNEY TRANSPLANT: ICD-10-CM

## 2024-09-05 LAB
AMYLASE SERPL-CCNC: 100 U/L (ref 28–100)
ANION GAP SERPL CALCULATED.3IONS-SCNC: 8 MMOL/L (ref 7–15)
BUN SERPL-MCNC: 18.1 MG/DL (ref 6–20)
CALCIUM SERPL-MCNC: 10.3 MG/DL (ref 8.8–10.4)
CHLORIDE SERPL-SCNC: 111 MMOL/L (ref 98–107)
CREAT SERPL-MCNC: 1.55 MG/DL (ref 0.67–1.17)
EGFRCR SERPLBLD CKD-EPI 2021: 55 ML/MIN/1.73M2
ERYTHROCYTE [DISTWIDTH] IN BLOOD BY AUTOMATED COUNT: 15.6 % (ref 10–15)
GLUCOSE SERPL-MCNC: 179 MG/DL (ref 70–99)
HCO3 SERPL-SCNC: 20 MMOL/L (ref 22–29)
HCT VFR BLD AUTO: 35.1 % (ref 40–53)
HGB BLD-MCNC: 10.7 G/DL (ref 13.3–17.7)
LIPASE SERPL-CCNC: 83 U/L (ref 13–60)
MCH RBC QN AUTO: 29.3 PG (ref 26.5–33)
MCHC RBC AUTO-ENTMCNC: 30.5 G/DL (ref 31.5–36.5)
MCV RBC AUTO: 96 FL (ref 78–100)
PLATELET # BLD AUTO: 238 10E3/UL (ref 150–450)
POTASSIUM SERPL-SCNC: 5.1 MMOL/L (ref 3.4–5.3)
RBC # BLD AUTO: 3.65 10E6/UL (ref 4.4–5.9)
SODIUM SERPL-SCNC: 139 MMOL/L (ref 135–145)
TACROLIMUS BLD-MCNC: 10.5 UG/L (ref 5–15)
TME LAST DOSE: NORMAL H
TME LAST DOSE: NORMAL H
WBC # BLD AUTO: 4.2 10E3/UL (ref 4–11)

## 2024-09-05 PROCEDURE — 80048 BASIC METABOLIC PNL TOTAL CA: CPT

## 2024-09-05 PROCEDURE — 85027 COMPLETE CBC AUTOMATED: CPT

## 2024-09-05 PROCEDURE — 82150 ASSAY OF AMYLASE: CPT

## 2024-09-05 PROCEDURE — 80197 ASSAY OF TACROLIMUS: CPT

## 2024-09-05 PROCEDURE — 83690 ASSAY OF LIPASE: CPT

## 2024-09-05 PROCEDURE — 36415 COLL VENOUS BLD VENIPUNCTURE: CPT

## 2024-09-05 NOTE — PROCEDURES
Transplant Surgery  Operative Note    Preop dx: Status post  Donor kidney pancreas transplant.  Post op dx: same   Procedure: Flexible cystoscopy and ureteral stent removal   Surgeon: Shelia Magallon NP  Assistant: Galdino Sim RN   Anesthesia: local  EBL: 0   Specimens: none.  Findings: none abnormal. Stent inspected and noted to be intact.   Indication: The patient is status post kidney transplant and the ureteral stent is no longer needed.    Procedure: The patient was positioned supine on the table.  The groin was sterilely prepped and draped in the usual fashion. Time out was done. Urojet was applied to the urethra. A flexible cystoscope was attempted to be inserted into the urethra but unsuccessful.   Not able to insert or advance the scope. Patient reports history of urethral dilation 15+ years ago. Patient tolerated the attempted insertion.     Surgeon updated and patient referred to Urology for cystoscopy and ureteral stent removal.

## 2024-09-05 NOTE — TELEPHONE ENCOUNTER
ISSUE: lipase continues to trend up. Today's result: 83, from 70    PLAN:  Edwin Rajput MD Reilly, Megan, RN  Yes, please. Thank you, Mony.          Previous Messages       ----- Message -----  From: Mony Crabtree, RN  Sent: 9/5/2024   3:12 PM CDT  To: Edwin Wallis MD    Lipase trend up. Would you like panc US ordered at this time?

## 2024-09-06 ENCOUNTER — TELEPHONE (OUTPATIENT)
Dept: PHARMACY | Facility: CLINIC | Age: 49
End: 2024-09-06
Payer: COMMERCIAL

## 2024-09-06 ENCOUNTER — TELEPHONE (OUTPATIENT)
Dept: UROLOGY | Facility: CLINIC | Age: 49
End: 2024-09-06
Payer: COMMERCIAL

## 2024-09-06 DIAGNOSIS — Z94.0 KIDNEY REPLACED BY TRANSPLANT: ICD-10-CM

## 2024-09-06 NOTE — TELEPHONE ENCOUNTER
Patient already has a appointment here at [facility] 9/9    Removed hold

## 2024-09-06 NOTE — TELEPHONE ENCOUNTER
This encounter is being sent to inform the clinic that this patient has a referral from Shelia Magallon APRN CNP in  [place] for the diagnoses of Kidney replaced by transplant; Ureteral stent present; s/p kidney pancreas transplant with ureteral stent. Unable to advance cystoscopy scope for stent removal. Hx of urethra dilation. Please remove ureteral stent. and has requested that this patient be seen within Priority: 1-2 Weeks and/or with Priority: 1-2 Weeks. Based on the availability of our provider(s), we are unable to accommodate this request.    Were all sites offered this patient?  Yes  Pt requesting [facility] in [place] only please  Does scheduling algorithm request to schedule next available?  Patient appointment has not been scheduled.  Please review the referral request for accommodation and contact the patient.  If unable to accommodate, please resubmit a referral and indicate a preferred partner or affiliate location using Provider Finder or Scheduling Instructions field.       Referral Order in Epic  Records in Epic  No outside Records    Stent Removal says do not schedule - sent encounter to clinical pool    Please review and call Pt to schedule    Thank you!

## 2024-09-06 NOTE — TELEPHONE ENCOUNTER
[facility] Call Center    Phone Message    May a detailed message be left on voicemail: yes     Reason for Call: Medication Refill Request    Has the patient contacted the pharmacy for the refill? Yes   Name of medication being requested: laura   Provider who prescribed the medication: unknown  Pharmacy: [facility] [place] [id number]    Date medication is needed: asap, pt out       Action Taken: Other: cardio    Travel Screening: Not Applicable     Date of Service:

## 2024-09-06 NOTE — DISCHARGE SUMMARY
[facility]    Discharge Summary  Transplant Surgery    Date of Admission:  8/30/2024  Date of Discharge:  9/1/2024  1:48 PM  Discharging Provider: Julienne Ibrahim PA-C/Dr. Michaud  Date of Service (when I saw the patient): 9/1/2024    Discharge Diagnoses   Active Problems:    Nausea and vomiting, unspecified vomiting type    Generalized abdominal pain    History of simultaneous kidney and pancreas transplant (H)     Immunosuppressed status    Procedure/Surgery Information   Procedure: Procedure(s):  Esophagoscopy, gastroscopy, duodenoscopy (EGD), combined   Surgeon(s): Surgeons and Role:     * Jolene Ramirez MD - Primary       Non-operative procedures None performed     History of Present Illness   Siva Ramey is a 49 year old year old with a past medical history significant for CAD s/p PCI, DM, and ESRD now s/p SPK on 7/20/24 c/b post op STEMI requiring PCI and stent placement. Pt returns to the ER yesterday with nausea, emesis and some abdominal pain.    Hospital Course   S/p SPK DBD: Cr 1.4. UOP not measured.     Immunosuppression management:    mg BID  Tac 3 mg BID, goal level 8-10  Ppx with dapsone and valganciclovir  CMD D+R+    Hematology:   Anemia of chronic disease: Hgb 9.2 on the day of discharge    Cardiorespiratory:   HTN: coreg 3.125 BID  H/o CAD s/p IBAN: Continue Brilinta and ASA.     GI/Nutrition: Admitted with nausea, emesis and some abdominal pain. Patient states it was after consuming Wiley's. CT SCAN looks fine, no acute concerns  HIDA obtained to rule out cholecystitis, consistent with delayed emptying.  GI consulted to consider EGD. Performed on 9/1 with moderate amount of food visualized in the stomach, very mild spotty erythema in gastric antrum. No biopsies sent. Patient's symptoms resolved prior to discharge, patient to follow up outpatient.  Endocrine:   DM, s/p SPK now with good glycemic  control    Transplant coordinator: Julianna Edwards [phone]      Discharge Disposition   Discharged to home   Condition at discharge: Stable    Pending Results   None    Final pathology results: No pathology submitted  Primary Care Physician   Trinidad Hansen PA-C    Physical Exam                      Vitals:    08/30/24 0852 08/30/24 1932 09/01/24 0556   Weight: 99 kg (218 lb 3.2 oz) 97.1 kg (214 lb) 98.2 kg (216 lb 8 oz)     Vital Signs with Ranges     No intake/output data recorded.    Constitutional: Awake, alert, cooperative, no apparent distress, and appears stated age.  Eyes: Lids and lashes normal, pupils equal, round, extra ocular muscles intact, sclera clear, conjunctiva normal.  ENT: Normocephalic, without obvious abnormality, atraumatic  Respiratory: Nonlabored resps on RA  Cardiovascular: Perfused  GI: Soft, non-distended, non-tender  Genitourinary:  Voiding  Skin: No bruising or bleeding, normal skin color, texture, turgor  Musculoskeletal: There is no redness, warmth, or swelling of the joints  Neurologic: Awake, alert, oriented to name, place and time.    Neuropsychiatric: Calm, normal eye contact, alert, normal affect, oriented to self, place, time and situation, memory for past and recent events intact and thought process normal.    Consultations This Hospital Stay   NEPHROLOGY KIDNEY/PANCREAS TRANSPLANT ADULT IP CONSULT  CARE MANAGEMENT / SOCIAL WORK IP CONSULT  GI LUMINAL ADULT IP CONSULT    Time Spent on this Encounter   I have spent greater than 30 minutes on this discharge.    Discharge Orders   Discharge Medications   Discharge Medication List as of 9/1/2024 12:57 PM        CONTINUE these medications which have NOT CHANGED    Details   acetaminophen (TYLENOL) 500 MG tablet Take 500 mg by mouth every 4 hours as needed, Historical      aspirin (ASA) 81 MG chewable tablet Take 1 tablet (81 mg) by mouth daily Starting tomorrow., Disp-30 tablet, R-3, E-Prescribe      atorvastatin (LIPITOR)  40 MG tablet Take 1 tablet (40 mg) by mouth every evening, Disp-30 tablet, R-2, E-Prescribe      carvedilol (COREG) 6.25 MG tablet Take 0.5 tablets (3.125 mg) by mouth 2 times daily (with meals) for 30 days, Disp-30 tablet, R-0, E-Prescribe      dapsone (ACZONE) 25 MG tablet Take 2 tablets (50 mg) by mouth daily, Disp-60 tablet, R-11, E-Prescribe      loperamide (IMODIUM A-D) 2 MG capsule Take 1 capsule (2 mg) by mouth 4 times daily as needed for diarrhea, Disp-30 capsule, R-0, E-Prescribe      magnesium glycinate 100 MG CAPS capsule Take 2 capsules (200 mg) by mouth 2 times daily Take in place of magnesium-oxide, Disp-120 capsule, R-2, E-Prescribe      methocarbamol (ROBAXIN) 750 MG tablet Take 1 tablet (750 mg) by mouth every 6 hours as needed for muscle spasms, Disp-20 tablet, R-0, E-Prescribe      metoclopramide (REGLAN) 5 MG tablet Take 1 tablet (5 mg) by mouth 2 times daily (before meals)., Disp-60 tablet, R-1, E-Prescribe      mycophenolic acid (GENERIC EQUIVALENT) 180 MG EC tablet Take 4 tablets (720 mg) by mouth 2 times daily, Disp-240 tablet, R-11, E-PrescribeTXP DT 7/20/2024 (Kidney / Pancreas) TXP Dischg DT 7/30/2024 DX Kidney replaced by transplant Z94.0 [facility] ([place], [place])      nystatin (MYCOSTATIN) 154930 UNIT/ML suspension Take 5 mLs (500,000 Units) by mouth 4 times dailyDisp-600 mL, [id number]      pantoprazole (PROTONIX) 40 MG EC tablet Take 1 tablet (40 mg) by mouth every morning (before breakfast), Disp-30 tablet, R-1, E-Prescribe      sodium bicarbonate 650 MG tablet Take 3 tablets (1,950 mg) by mouth 3 times daily, Disp-270 tablet, R-2, E-Prescribe      tacrolimus (GENERIC EQUIVALENT) 1 MG capsule Take 3 capsules (3 mg) by mouth 2 times daily., Disp-240 capsule, R-11, E-Prescribe      valGANciclovir (VALCYTE) 450 MG tablet Take 2 tablets (900 mg) by mouth daily, Disp-60 tablet, R-2, E-Prescribe      cholecalciferol (VITAMIN D3) 25  mcg (1000 units) capsule Take 2 capsules (50 mcg) by mouth daily, Disp-60 capsule, R-3, E-Prescribe      ondansetron (ZOFRAN ODT) 4 MG ODT tab Take 1 tablet (4 mg) by mouth every 6 hours as needed for nausea or vomiting, Disp-30 tablet, R-1, E-Prescribe      phosphorus tablet 250 mg (PHOSPHA 250 NEUTRAL) 250 MG per tablet Take 2 tablets (500 mg) by mouth 2 times daily., No Print Out      psyllium (METAMUCIL/KONSYL) 58.6 % powder Take 6 g (1 teaspoonful) by mouth 2 times daily as needed (diarrhea), No Print Out      ticagrelor (BRILINTA) 90 MG tablet Take 1 tablet (90 mg) by mouth or Feeding Tube 2 times daily, Disp-60 tablet, R-0, E-Prescribe                Reason for your hospital stay    Patient was admitted due to nausea and abdominal pain, work up was negative for acute causes and pain was improving prior to discharge.     Activity    Your activity upon discharge: Walk at least four times a day, lift no greater than 10 pounds for 6 weeks from the time of surgery.  After 6 weeks time please return to your normal exercise routine.     [facility] Follow-up and recommended labs and tests    Resume transplant labs: CBC, BMP, amylase, lipase, mag, phos, and tacrolimus levels.    Resume all other follow up as previously arranged.     When to contact your care team    WHEN TO CONTACT YOUR  COORDINATOR:     Transplant Coordinator [phone]     Notify your coordinator if you have pain over your kidney or pancreas, increased redness or drainage from your incision, fever greater than 100F, decreased urine output or new or increased amount of blood in urine.     Notify your coordinator immediately if you are ever unable to take your immunosuppressive medications for any reason.     If you have URGENT concerns after office hours, please call the hospital switchboard at [phone] and ask to have the organ transplant nurse on-call paged. If you have a life-threatening emergency, go to the nearest emergency room.      Diet    Follow this diet upon discharge: Low fat, soft diet         I have reviewed history, examined patient and discussed plan with the fellow/resident/KAT.    I concur with the findings in this note.    Time spent on discharge activities: 45 minutes.

## 2024-09-09 ENCOUNTER — PRE VISIT (OUTPATIENT)
Dept: UROLOGY | Facility: CLINIC | Age: 49
End: 2024-09-09

## 2024-09-09 ENCOUNTER — LAB (OUTPATIENT)
Dept: LAB | Facility: HOSPITAL | Age: 49
End: 2024-09-09
Payer: MEDICARE

## 2024-09-09 ENCOUNTER — TELEPHONE (OUTPATIENT)
Dept: PHARMACY | Facility: CLINIC | Age: 49
End: 2024-09-09

## 2024-09-09 ENCOUNTER — TELEPHONE (OUTPATIENT)
Dept: TRANSPLANT | Facility: CLINIC | Age: 49
End: 2024-09-09

## 2024-09-09 ENCOUNTER — OFFICE VISIT (OUTPATIENT)
Dept: CARDIOLOGY | Facility: CLINIC | Age: 49
End: 2024-09-09
Payer: MEDICARE

## 2024-09-09 VITALS
DIASTOLIC BLOOD PRESSURE: 80 MMHG | WEIGHT: 222.2 LBS | SYSTOLIC BLOOD PRESSURE: 155 MMHG | HEART RATE: 102 BPM | BODY MASS INDEX: 30.99 KG/M2 | OXYGEN SATURATION: 96 %

## 2024-09-09 DIAGNOSIS — Z94.0 KIDNEY REPLACED BY TRANSPLANT: ICD-10-CM

## 2024-09-09 DIAGNOSIS — Z98.890 OTHER SPECIFIED POSTPROCEDURAL STATES: ICD-10-CM

## 2024-09-09 DIAGNOSIS — Z48.298 AFTERCARE FOLLOWING ORGAN TRANSPLANT: ICD-10-CM

## 2024-09-09 DIAGNOSIS — Z94.83 PANCREAS REPLACED BY TRANSPLANT (H): ICD-10-CM

## 2024-09-09 DIAGNOSIS — Z79.899 ENCOUNTER FOR LONG-TERM CURRENT USE OF MEDICATION: ICD-10-CM

## 2024-09-09 LAB
AMYLASE SERPL-CCNC: 89 U/L (ref 28–100)
ANION GAP SERPL CALCULATED.3IONS-SCNC: 10 MMOL/L (ref 7–15)
BUN SERPL-MCNC: 27.8 MG/DL (ref 6–20)
CALCIUM SERPL-MCNC: 9.9 MG/DL (ref 8.8–10.4)
CHLORIDE SERPL-SCNC: 110 MMOL/L (ref 98–107)
CREAT SERPL-MCNC: 1.47 MG/DL (ref 0.67–1.17)
EGFRCR SERPLBLD CKD-EPI 2021: 58 ML/MIN/1.73M2
ERYTHROCYTE [DISTWIDTH] IN BLOOD BY AUTOMATED COUNT: 16.3 % (ref 10–15)
GLUCOSE SERPL-MCNC: 136 MG/DL (ref 70–99)
HCO3 SERPL-SCNC: 22 MMOL/L (ref 22–29)
HCT VFR BLD AUTO: 36.1 % (ref 40–53)
HGB BLD-MCNC: 10.9 G/DL (ref 13.3–17.7)
LIPASE SERPL-CCNC: 82 U/L (ref 13–60)
MAGNESIUM SERPL-MCNC: 1.3 MG/DL (ref 1.7–2.3)
MCH RBC QN AUTO: 28.8 PG (ref 26.5–33)
MCHC RBC AUTO-ENTMCNC: 30.2 G/DL (ref 31.5–36.5)
MCV RBC AUTO: 96 FL (ref 78–100)
PHOSPHATE SERPL-MCNC: 2.3 MG/DL (ref 2.5–4.5)
PLATELET # BLD AUTO: 254 10E3/UL (ref 150–450)
POTASSIUM SERPL-SCNC: 4.5 MMOL/L (ref 3.4–5.3)
RBC # BLD AUTO: 3.78 10E6/UL (ref 4.4–5.9)
SODIUM SERPL-SCNC: 142 MMOL/L (ref 135–145)
TACROLIMUS BLD-MCNC: 10.4 UG/L (ref 5–15)
TME LAST DOSE: NORMAL H
TME LAST DOSE: NORMAL H
WBC # BLD AUTO: 6.5 10E3/UL (ref 4–11)

## 2024-09-09 PROCEDURE — 80048 BASIC METABOLIC PNL TOTAL CA: CPT

## 2024-09-09 PROCEDURE — 36415 COLL VENOUS BLD VENIPUNCTURE: CPT

## 2024-09-09 PROCEDURE — 82150 ASSAY OF AMYLASE: CPT

## 2024-09-09 PROCEDURE — 99215 OFFICE O/P EST HI 40 MIN: CPT | Mod: 24

## 2024-09-09 PROCEDURE — 83690 ASSAY OF LIPASE: CPT

## 2024-09-09 PROCEDURE — 84100 ASSAY OF PHOSPHORUS: CPT

## 2024-09-09 PROCEDURE — 85027 COMPLETE CBC AUTOMATED: CPT

## 2024-09-09 PROCEDURE — G0463 HOSPITAL OUTPT CLINIC VISIT: HCPCS

## 2024-09-09 PROCEDURE — 80197 ASSAY OF TACROLIMUS: CPT

## 2024-09-09 PROCEDURE — 83735 ASSAY OF MAGNESIUM: CPT

## 2024-09-09 RX ORDER — CYCLOBENZAPRINE HCL 5 MG
5 TABLET ORAL
COMMUNITY
Start: 2024-09-05 | End: 2024-09-23

## 2024-09-09 RX ORDER — CARVEDILOL 6.25 MG/1
6.25 TABLET ORAL 2 TIMES DAILY WITH MEALS
Qty: 180 TABLET | Refills: 3 | Status: SHIPPED | OUTPATIENT
Start: 2024-09-09

## 2024-09-09 RX ORDER — PREDNISONE 20 MG/1
20 TABLET ORAL
COMMUNITY
Start: 2024-09-05 | End: 2024-09-23

## 2024-09-09 ASSESSMENT — PAIN SCALES - GENERAL: PAINLEVEL: NO PAIN (0)

## 2024-09-09 NOTE — PROGRESS NOTES
"Critical Care Cardiology Survivor Clinic  09/09/2024        History of Presenting Illness:  Siva Ramey is a 49 year old male w/ h/o PCI s/p IBAN 2019 and 2/2024, 2v CABG 2019, PAD, HTN, DM2, ESRD now s/p renal transplant 7/20/2024.  His post-op course was c/b VF arrest w/ short duration of CPR followed by ROSC and EKG showing inferior STEMI.  He underwent emergent coronary angiography found to have acute stent thrombosis of the RCA stent placed 2/2024.  He then had aspiration thrombectomy and further PCI of that area.  He spent ~1 week in the cardiac ICU during which time he recovered well with full neurologic recovery.  His LVEF recovered to 40% with inferior wall akinesis.  He transferred out of the ICU 7/26 and was discharged 7/30.      Since hospital discharge, Mr. Ramey notes that he initially had difficulty understanding and coping with the events of his transplant and cardiac arrest.  After 1 month he feels that he \"got over it\" and is now feeling emotionally and intellectually adjusted to his medical history.  He is active, working for [employer] and walking for hours each day though in 10-15min bursts.  He has not engaged in dedicated exercise.  He is drinking substantial amounts of water in accordance with his transplant team's directions.  He recently started prednisone.  He also consumed 32 oz of coffee this AM which is atypical for him as he typically does not consume any caffenated beverages.  He denies any chest pain, palpitations, SOB, edema, orthopnea, PND, syncope or near syncope.    Home medications:   Current Outpatient Medications   Medication Sig Dispense Refill    acetaminophen (TYLENOL) 500 MG tablet Take 500 mg by mouth every 4 hours as needed      aspirin (ASA) 81 MG chewable tablet Take 1 tablet (81 mg) by mouth daily Starting tomorrow. 30 tablet 3    atorvastatin (LIPITOR) 40 MG tablet Take 1 tablet (40 mg) by mouth every evening 30 tablet 2    carvedilol (COREG) 6.25 MG tablet Take 0.5 " tablets (3.125 mg) by mouth 2 times daily (with meals) for 30 days 30 tablet 0    cyclobenzaprine (FLEXERIL) 5 MG tablet Take 5 mg by mouth.      dapsone (ACZONE) 25 MG tablet Take 2 tablets (50 mg) by mouth daily 60 tablet 11    loperamide (IMODIUM A-D) 2 MG capsule Take 1 capsule (2 mg) by mouth 4 times daily as needed for diarrhea 30 capsule 0    magnesium glycinate 100 MG CAPS capsule Take 2 capsules (200 mg) by mouth 2 times daily Take in place of magnesium-oxide 120 capsule 2    methocarbamol (ROBAXIN) 750 MG tablet Take 1 tablet (750 mg) by mouth every 6 hours as needed for muscle spasms 20 tablet 0    metoclopramide (REGLAN) 5 MG tablet Take 1 tablet (5 mg) by mouth 2 times daily (before meals). 60 tablet 1    mycophenolic acid (GENERIC EQUIVALENT) 180 MG EC tablet Take 4 tablets (720 mg) by mouth 2 times daily 240 tablet 11    nystatin (MYCOSTATIN) 653029 UNIT/ML suspension Take 5 mLs (500,000 Units) by mouth 4 times daily 600 mL 2    pantoprazole (PROTONIX) 40 MG EC tablet Take 1 tablet (40 mg) by mouth every morning (before breakfast) 30 tablet 1    predniSONE (DELTASONE) 20 MG tablet Take 20 mg by mouth.      sodium bicarbonate 650 MG tablet Take 3 tablets (1,950 mg) by mouth 3 times daily 270 tablet 2    tacrolimus (GENERIC EQUIVALENT) 1 MG capsule Take 3 capsules (3 mg) by mouth 2 times daily. 240 capsule 11    ticagrelor (BRILINTA) 90 MG tablet Take 1 tablet (90 mg) by mouth 2 times daily. 60 tablet 5    valGANciclovir (VALCYTE) 450 MG tablet Take 2 tablets (900 mg) by mouth daily 60 tablet 2       Allergies:  Allergies   Allergen Reactions    Amoxicillin Hives     & generalized pain    Tolerated ceftriaxone in 2023 ([facility]) and 2024 ([facility])    Venlafaxine      lethargic       Past Medical History:  Past Medical History:   Diagnosis Date    Acquired elevated diaphragm     Anemia in chronic kidney disease     Angina pectoris (H24)     Chronic kidney disease     Coronary artery  disease     Diabetes mellitus, type II (H) 12/2001    Diabetic nephropathy (H)     MARQUEZ (dyspnea on exertion)     Dyslipidemia 12/2001    End stage renal disease (H)     History of blood transfusion 2004    Hypertension     takes medication    Ischemic cardiomyopathy     Metabolic acidosis     Myocardial infarction (H)     STEMI -Diagonal branch of the LAD    Obesity (BMI 30-39.9)     Peripheral neuropathy     Proteinuria     Retinopathy     Vitamin D deficiency        Family History:  Family History   Problem Relation Age of Onset    Diabetes Type 2  Mother     Heart Disease Father 60    CABG Father 50        triple bypass    Diabetes Type 2  Father     Depression Sister     Substance Abuse Sister     Ovarian Cancer Maternal Grandmother     Brain Cancer Maternal Grandmother     Pancreatic Cancer Maternal Aunt     Prostate Cancer Maternal Uncle        Social History:  Siva  reports no history of alcohol use. and  reports that he has never smoked. He has been exposed to tobacco smoke. He has never used smokeless tobacco..    ROS:  A complete 10-point ROS was negative except as above.    Physical Exam:  There were no vitals taken for this visit.  There is no height or weight on file to calculate BMI.  Wt Readings from Last 2 Encounters:   09/04/24 97.5 kg (214 lb 14.4 oz)   09/01/24 98.2 kg (216 lb 8 oz)     Gen: alert, interactive, NAD  HEENT: atraumatic, EOMI, MMM  Neck: supple, no JVP elevation appreciated.  CV: RRR, no murmurs, rubs.  Chest: CTAB, no wheezes or crackles  Abd: soft, NT, ND, +BS  Ext: no LE edema, 2+ peripheral pulses  Skin: warm and dry, no rashes on exposed surfaces  Neuro: alert and oriented, no focal deficits    Labs:   Labs and cardiac data reviewed personally in clinic.    CMP:  Sodium   Date Value Ref Range Status   09/09/2024 142 135 - 145 mmol/L Final     Potassium   Date Value Ref Range Status   09/09/2024 4.5 3.4 - 5.3 mmol/L Final   09/27/2021 4.4 3.4 - 5.3 mmol/L Final     Potassium  POCT   Date Value Ref Range Status   07/20/2024 6.0 (H) 3.4 - 5.3 mmol/L Final     Comment:     CRITICAL VALUES NOTED AND REPORTED TO MD     Chloride   Date Value Ref Range Status   09/09/2024 110 (H) 98 - 107 mmol/L Final   09/27/2021 101 94 - 109 mmol/L Final     Carbon Dioxide (CO2)   Date Value Ref Range Status   09/09/2024 22 22 - 29 mmol/L Final   09/27/2021 28 20 - 32 mmol/L Final     Anion Gap   Date Value Ref Range Status   09/09/2024 10 7 - 15 mmol/L Final   09/27/2021 8 3 - 14 mmol/L Final     Glucose   Date Value Ref Range Status   09/09/2024 136 (H) 70 - 99 mg/dL Final   09/27/2021 222 (H) 70 - 99 mg/dL Final     GLUCOSE BY METER POCT   Date Value Ref Range Status   09/01/2024 101 (H) 70 - 99 mg/dL Final     Urea Nitrogen   Date Value Ref Range Status   09/09/2024 27.8 (H) 6.0 - 20.0 mg/dL Final   09/27/2021 46 (H) 7 - 30 mg/dL Final     Creatinine   Date Value Ref Range Status   09/09/2024 1.47 (H) 0.67 - 1.17 mg/dL Final     GFR Estimate   Date Value Ref Range Status   09/09/2024 58 (L) >60 mL/min/1.73m2 Final     Comment:     eGFR calculated using 2021 CKD-EPI equation.   10/05/2020 23 (L) >60 mL/min/1.73m2 Final     Calcium   Date Value Ref Range Status   09/09/2024 9.9 8.8 - 10.4 mg/dL Final     Comment:     Reference intervals for this test were updated on 7/16/2024 to reflect our healthy population more accurately. There may be differences in the flagging of prior results with similar values performed with this method. Those prior results can be interpreted in the context of the updated reference intervals.     Bilirubin Total   Date Value Ref Range Status   08/30/2024 0.6 <=1.2 mg/dL Final     Alkaline Phosphatase   Date Value Ref Range Status   08/30/2024 174 (H) 40 - 150 U/L Final     ALT   Date Value Ref Range Status   08/30/2024 38 0 - 70 U/L Final     AST   Date Value Ref Range Status   08/30/2024 26 0 - 45 U/L Final       CBC  CBC RESULTS:   Recent Labs   Lab Test 09/09/24  0726   WBC 6.5    RBC 3.78*   HGB 10.9*   HCT 36.1*   MCV 96   MCH 28.8   MCHC 30.2*   RDW 16.3*          LIPIDS  Recent Labs   Lab Test 09/04/24  1023 08/12/24  0755   CHOL 95 97   HDL 32* 29*   LDL 40 35   TRIG 113 164*       TSH  TSH   Date Value Ref Range Status   10/17/2023 1.78 0.30 - 4.20 uIU/mL Final   01/14/2019 2.65 0.30 - 5.00 uIU/mL Final       HBA1C  Lab Results   Component Value Date    A1C 6.4 07/20/2024    A1C 6.5 07/19/2024    A1C 7.4 12/19/2023    A1C 9.2 10/17/2023    A1C 8.1 09/27/2021       Cardiac Data:   ECHO: 7/28/2024  Left ventricular function is decreased. The ejection fraction is 35-40%  (moderately reduced).Akinesis of the basal-mid inferior and basal inferoseptal segments present.  Global right ventricular function is normal.  Mild mitral insufficiency is present.  Estimated mean right atrial pressure is normal.  Trivial pericardial effusion is present.    CARDIAC CATH:  7/20/2024  Inferior STEMI.  Cardiac arrest.  VT/VF.  Severe two vessel CAD with prior CABG with LIMA to LAD, PCI to D1, and PCI to mid RCA.  Patent LIMA to LAD.  Patent stent in the D1 with minimal ISR.  In stent thrombosis of the mid RCA stent culprit in inferior STEMI.  Aspiration thrombectomy of the RCA.  Intravascular lithotripsy of the RCA.  PCI of the RCA with two drug eluting stents.  IVUS of the RCA.    CABG/ Cardiac surgery: 6/20/2019  1.  Coronary artery bypass grafting x 2 (reversed saphenous vein graft to the right posterior descending coronary artery and pedicled left internal mammary artery to left anterior descending coronary artery).  2.  Endoscopic vein harvest of the greater saphenous vein from the left lower extremity.    Assessment/Plan:  Siva Ramey is a 49 year old male w/ h/o PCI s/p IBAN 2019 and 2/2024, 2v CABG 2019, PAD, HTN, DM2, ESRD now s/p renal transplant 7/20/2024.  His post-op course was c/b VF arrest w/ short duration of CPR followed by ROSC and EKG showing inferior STEMI.  He underwent  emergent coronary angiography found to have acute stent thrombosis of the RCA stent placed 2/2024 now s/p aspiration thrombectomy and PCI.    VF cardiac arrest date 7/20/2024  Etiology: Ischemic w/ inferior STEMI s/p aspiration thrombectomy and PCI  Fully revasc: yes  LVEF:  TTE 7/28/2024 LVEF: 40 RV function: normal  ICD:  Not indicated  --Increase carvedilol to 6.25mg BID with plans to increase to 12.5mg BID in the future if tolerated  --Defer ACEi and ARB to nephrology  --Offered neuropsych and behavioral health but desired at this time.  Will contact us if desired in the future.  --Increase exercise to 30min cardio 5 times per week if possible    CAD: PCI to RCA and D1 in 2019, 2v CABG w/ LIMA-LAD and SVG-RPDA in 2019, repeat PCI to RCA 2/2024, inferior STEMI s/p PCI to RCA 7/2024.  SVG-RPDA closed.  --Continue ASA 81mg Qday and ticagrelor 90mg BID  --Continue atorvastatin 40mg Qday  --Coreg as above    HTN: in setting of prednisone  --coreg as above  --additional BP management per nephrology; afterload reduction would be beneficial from cardiomyopathy perspective but must be balanced against perfusion of the kidney    I personally spent 45 min today reviewing the medical record, meeting with the patient, and completing this note.    Thank you for allowing us to take part in the care of this very pleasant patient.  Please do not hesitate to call if any further questions or concerns arise.    Ryan Willis MD, PhD  Interventional/Critical Care Cardiology  [phone]    September 9, 2024

## 2024-09-09 NOTE — TELEPHONE ENCOUNTER
Reason for visit: cystosopy + stent removal      Dx/Hx/Sx:  Kidney replaced by transplant; Ureteral stent present; s/p kidney pancreas transplant with ureteral stent. Unable to advance cystoscopy scope for stent removal. Hx of urethra dilation.     Records/imaging/labs/orders: in epic     At Rooming: consent - ask pt if they would like lido - have stent grasper readily available - have  bio hazard bin readily available

## 2024-09-09 NOTE — TELEPHONE ENCOUNTER
Clinical Pharmacy Consult:                                                      Transplant Specific: 1 month post transplant discharge medication review  Date of Transplant: 07/20/2024  Type of Transplant: kidney and pancreas  First Transplant: yes  History of rejection: no    Immunosuppression Regimen   TAC 3mg qAM & 3mg qPM and MPA 720mg qAM & 720mg qPM  Patient specific goal: 8-10  Most recent level: 10.5, date 9/5/24  Immunosuppressant Levels:  Therapeutic  Pt adherent to lab draws: yes  Scr:   Lab Results   Component Value Date    CR 1.47 09/09/2024     Side effects: unknown    Prophylactic Medications  Antibacterial:  Dapsone 50 mg daily  Scheduled Discontinue Date: Lifelong    Antifungal: Nystatin  Scheduled Discontinue Date: 3 months    Antiviral: CrCl >/=60 mL/minute: Valcyte 900mg daily   Scheduled Discontinue Date: 3 months    Acid Reducer: Protonix (pantoprazole)  Scheduled Reviewed Date:  TBD    Thrombosis Prevention: Aspirin 81 mg PO daily  Scheduled Discontinue Date:  TBD    Blood Pressure Management  Frequency of home Blood Pressure checks: unknown  Most recent home BP: 118/81 at clinic 9/5/24  Patient Blood pressure goal: <140/90  Patient blood pressure at goal:  yes  Hospitalizations/ER visits since last assessment: 2, abdominal pain/nausea    Med rec/DUR performed: yes, chart and profile  Med Rec Discrepancies: no    Briefly spoke with patient via phone. He was driving and did not want to talk at all. Tried finding a good time to call back but was told he would be busy until 8pm today. And he does not know if he can get to phone the rest of week.  Tac is at goal. BP is at goal. Patient fills medications at [facility] and has recent histories.    No other concerns at this time. Follow up in 3 weeks at 2 month post transplant discharge.    Efrain Macias, Pharm D  [facility]

## 2024-09-09 NOTE — PATIENT INSTRUCTIONS
Critical Care Cardiology Survivor Clinic                                                                                                          You were seen today in the Critical Care Cardiology Survivor Clinic at the [facility]:       Dr. 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     -What is Health Psychology?  Health Psychology is a specialty that helps people cope with the stress and anxiety that often occurs with illness, emotional and psychological issues, and preparation for medical treatment including surgical procedures.    Please contact our psychologists directly with questions or to make an appointment.    Elder Byers, Ph.D.    [phone]  Trinidad Herrmann, Ph.D.,                 [phone]  Jenny Paul, Ph.D.                      [phone]  Mony Farias, Ph.D.,                    [phone]          [place], Ph.D., [facility],   [phone]       Thank you for your visit today!     Please MyChart message me or call Marianne Zimmerman RN or Yvette Zamora RN if you have any questions or concerns.      During Business Hours:  [phone], option # 1 (University) then option # 4 (medical questions) and ask to speak with my nurse.     After hours, weekends or holidays:   [phone], Option #4  Ask to speak to the On-Call Cardiologist. Inform them you are a heart failure patient at the  [place].

## 2024-09-09 NOTE — TELEPHONE ENCOUNTER
Issue:  Lipase still elevated, pt due for panc ultrasound.    Plan:  Call placed to check in with patient, unable to reach him. RNCC left VM asking to call and schedule panc US and call SOT to check in.

## 2024-09-11 ENCOUNTER — OFFICE VISIT (OUTPATIENT)
Dept: UROLOGY | Facility: CLINIC | Age: 49
End: 2024-09-11
Payer: COMMERCIAL

## 2024-09-11 ENCOUNTER — HOSPITAL ENCOUNTER (OUTPATIENT)
Facility: AMBULATORY SURGERY CENTER | Age: 49
End: 2024-09-11
Attending: UROLOGY
Payer: COMMERCIAL

## 2024-09-11 ENCOUNTER — TELEPHONE (OUTPATIENT)
Dept: UROLOGY | Facility: CLINIC | Age: 49
End: 2024-09-11

## 2024-09-11 ENCOUNTER — TELEPHONE (OUTPATIENT)
Dept: TRANSPLANT | Facility: CLINIC | Age: 49
End: 2024-09-11

## 2024-09-11 VITALS — DIASTOLIC BLOOD PRESSURE: 86 MMHG | HEART RATE: 73 BPM | SYSTOLIC BLOOD PRESSURE: 143 MMHG

## 2024-09-11 DIAGNOSIS — R33.9 URINARY RETENTION: Primary | ICD-10-CM

## 2024-09-11 DIAGNOSIS — N35.819 OTHER STRICTURE OF URETHRA IN MALE: ICD-10-CM

## 2024-09-11 DIAGNOSIS — Z94.0 KIDNEY REPLACED BY TRANSPLANT: Primary | ICD-10-CM

## 2024-09-11 DIAGNOSIS — Z94.0 KIDNEY REPLACED BY TRANSPLANT: ICD-10-CM

## 2024-09-11 PROCEDURE — 99204 OFFICE O/P NEW MOD 45 MIN: CPT | Performed by: UROLOGY

## 2024-09-11 RX ORDER — ACETAMINOPHEN 650 MG/1
650 SUPPOSITORY RECTAL ONCE
OUTPATIENT
Start: 2024-09-11

## 2024-09-11 RX ORDER — CEFAZOLIN SODIUM 2 G/50ML
2 SOLUTION INTRAVENOUS SEE ADMIN INSTRUCTIONS
OUTPATIENT
Start: 2024-09-11

## 2024-09-11 RX ORDER — ACETAMINOPHEN 325 MG/1
975 TABLET ORAL ONCE
OUTPATIENT
Start: 2024-09-11 | End: 2024-09-11

## 2024-09-11 RX ORDER — CEFAZOLIN SODIUM 2 G/50ML
2 SOLUTION INTRAVENOUS
OUTPATIENT
Start: 2024-09-11

## 2024-09-11 ASSESSMENT — PAIN SCALES - GENERAL: PAINLEVEL: MODERATE PAIN (4)

## 2024-09-11 NOTE — PATIENT INSTRUCTIONS
"AFTER YOUR CYSTOSCOPY        You have just completed a cystoscopy, or \"cysto\", which allowed your physician to learn more about your bladder (or to remove a stent placed after surgery). We suggest that you continue to avoid caffeine, fruit juice, and alcohol for the next 24 hours, however, you are encouraged to return to your normal activities.         A few things that are considered normal after your cystoscopy:     * Small amount of bleeding (or spotting) that clears within the next 24 hours     * Slight burning sensation with urination     * Sensation to of needing to avoid more frequently     * The feeling of \"air\" in your urine     * Mild discomfort that is relieved with Tylenol        Please contact our office promptly if you:     * Develop a fever above 101 degrees     * Are unable to urinate     * Develop bright red blood that does not stop     * Severe pain or swelling         Please contact our office with any concerns or questions @ [phone]   "

## 2024-09-11 NOTE — TELEPHONE ENCOUNTER
Spoke with Siva, unable to remove ureteral stent in clinic today. Per urology, will schedule removal in OR.

## 2024-09-11 NOTE — TELEPHONE ENCOUNTER
Post Kidney and Pancreas Transplant Team Conference  Date: 9/11/2024  Transplant Coordinator: Ximena Edwards RN     Attendees:  [x]  Dr. Sorensen [] Leonor Kang RN [] Betty Landaverde LPN     [x]  Dr. Shell [] Rosalia Romero, RN [] Lelo Reese LPN    [x] Dr. Wallis [x] Ximena Edwards RN    [x] Dr. Coombs [x] Mony Crabtree RN [x] Angella Liriano RN   [] Dr. Kong [x] Asha Allred RN    [] Dr. Christianson [x] BETSY Mcgrath NP   []  Dr. Whitehead [x] Miryam Iyer, BETSY Crisostomo RN   [x] Dr. Michaud [x] Gino Mahan RN    [] Sobia Underwood NP [x] Roxanna Monet RN    [x] Shelia Magallon NP [x] Alisa Cutler RN        Verbal Plan Read Back:   Scheduled in OR with Urology for JJ stent removal, given unsuccessful attempts by [facility] and urology in clinic via cysto, likely due to urethral stricture.      Routed to RN Coordinator   Jena Liriano RN

## 2024-09-11 NOTE — NURSING NOTE
Chief Complaint   Patient presents with    cystoscopy     Stent removal         There were no vitals taken for this visit. There is no height or weight on file to calculate BMI.    Patient Active Problem List   Diagnosis    Type 2 diabetes mellitus with other circulatory complication, unspecified whether long term insulin use (H)    Dyslipidemia    Class 2 severe obesity due to excess calories with serious comorbidity in adult, unspecified BMI (H)    Atherosclerosis of other coronary artery bypass graft(s) with unstable angina pectoris (H)    Anemia in chronic kidney disease    HTN (hypertension)    S/P CABG (coronary artery bypass graft)    Balanoposthitis    Other stricture of anterior urethra in male    History of ST elevation myocardial infarction (STEMI)    Obesity (BMI 30.0-34.9)    MARQUEZ (dyspnea on exertion)    STEMI (ST elevation myocardial infarction) (H)    Vitamin D deficiency    Metabolic acidosis    Retinopathy    Peripheral neuropathy    Acquired elevated diaphragm    Median nerve neuropathy, left    Iron deficiency anemia, unspecified    End stage renal disease (H)    Allergy, unspecified, initial encounter    Anaphylactic shock, unspecified, initial encounter    Coagulation defect, unspecified (H24)    Fatigue, unspecified type    Non-restorative sleep    Obstructive sleep apnea treated with continuous positive airway pressure (CPAP)    Other disorders of phosphorus metabolism    Other specified symptoms and signs involving the circulatory and respiratory systems    Snoring    Palpitations    Gastroesophageal reflux disease without esophagitis    Diabetes mellitus, type 2 (H)    Kidney replaced by transplant    Pancreas replaced by transplant (H)    Immunosuppressed status (H24)    Cardiac arrest with ventricular fibrillation (H)    Acute and chronic respiratory failure with hypoxia (H)    Steroid-induced hyperglycemia    Hyperkalemia    Dehydration    Status post kidney transplant    Nausea and  vomiting, unspecified vomiting type    Generalized abdominal pain    History of simultaneous kidney and pancreas transplant (H)    Hypomagnesemia    Secondary hyperparathyroidism of renal origin (H24)    Need for pneumocystis prophylaxis       Allergies   Allergen Reactions    Amoxicillin Hives     & generalized pain    Tolerated ceftriaxone in 2023 ([facility]) and 2024 ([facility])    Venlafaxine      lethargic       Current Outpatient Medications   Medication Sig Dispense Refill    acetaminophen (TYLENOL) 500 MG tablet Take 500 mg by mouth every 4 hours as needed      aspirin (ASA) 81 MG chewable tablet Take 1 tablet (81 mg) by mouth daily Starting tomorrow. 30 tablet 3    atorvastatin (LIPITOR) 40 MG tablet Take 1 tablet (40 mg) by mouth every evening 30 tablet 2    carvedilol (COREG) 6.25 MG tablet Take 1 tablet (6.25 mg) by mouth 2 times daily (with meals). 180 tablet 3    cyclobenzaprine (FLEXERIL) 5 MG tablet Take 5 mg by mouth.      dapsone (ACZONE) 25 MG tablet Take 2 tablets (50 mg) by mouth daily 60 tablet 11    loperamide (IMODIUM A-D) 2 MG capsule Take 1 capsule (2 mg) by mouth 4 times daily as needed for diarrhea 30 capsule 0    magnesium glycinate 100 MG CAPS capsule Take 2 capsules (200 mg) by mouth 2 times daily Take in place of magnesium-oxide 120 capsule 2    methocarbamol (ROBAXIN) 750 MG tablet Take 1 tablet (750 mg) by mouth every 6 hours as needed for muscle spasms 20 tablet 0    metoclopramide (REGLAN) 5 MG tablet Take 1 tablet (5 mg) by mouth 2 times daily (before meals). 60 tablet 1    mycophenolic acid (GENERIC EQUIVALENT) 180 MG EC tablet Take 4 tablets (720 mg) by mouth 2 times daily 240 tablet 11    nystatin (MYCOSTATIN) 232913 UNIT/ML suspension Take 5 mLs (500,000 Units) by mouth 4 times daily 600 mL 2    pantoprazole (PROTONIX) 40 MG EC tablet Take 1 tablet (40 mg) by mouth every morning (before breakfast) 30 tablet 1    predniSONE (DELTASONE) 20 MG tablet Take 20 mg by  mouth.      sodium bicarbonate 650 MG tablet Take 3 tablets (1,950 mg) by mouth 3 times daily 270 tablet 2    tacrolimus (GENERIC EQUIVALENT) 1 MG capsule Take 3 capsules (3 mg) by mouth 2 times daily. 240 capsule 11    ticagrelor (BRILINTA) 90 MG tablet Take 1 tablet (90 mg) by mouth 2 times daily. 60 tablet 5    valGANciclovir (VALCYTE) 450 MG tablet Take 2 tablets (900 mg) by mouth daily 60 tablet 2       Social History     Tobacco Use    Smoking status: Never     Passive exposure: Past    Smokeless tobacco: Never   Substance Use Topics    Alcohol use: Never    Drug use: Never       Invasive Procedure Safety Checklist:    Procedure: Cystoscopy    Action: Complete sections and checkboxes as appropriate.    Pre-procedure:  1. Patient ID Verified with 2 identifiers (Christina and  or MRN) : YES    2. Procedure and site verified with patient/designee (when able) : YES    3. Accurate consent documentation in medical record : YES    4. H&P (or appropriate assessment) documented in medical record : N/A  H&P must be up to 30 days prior to procedure an updated within 24 hours of                 Procedure as applicable.     5. Relevant diagnostic and radiology test results appropriately labeled and displayed as applicable : YES    6. Blood products, implants, devices, and/or special equipment available for the procedure as applicable : YES    7. Procedure site(s) marked with provider initials [Exclusions: none] : NO    8. Marking not required. Reason : Yes  Procedure does not require site marking    Time Out:     Time-Out performed immediately prior to starting procedure, including verbal and active participation of all team members addressing: YES    1. Correct patient identity.  2. Confirmed that the correct side and site are marked.  3. An accurate procedure to be done.  4. Agreement on the procedure to be done.  5. Correct patient position.  6. Relevant images and results are properly labeled and appropriately  displayed.  7. The need to administer antibiotics or fluids for irrigation purposes during the procedure as applicable.  8. Safety precautions based on patient history or medication use.    During Procedure: Verification of correct person, site, and procedure occurs any time the responsibility for care of the patient is transferred to another member of the care team.    The following medication was given:     Prior to med admin, verified patient identity using patient's name and date of birth.  Due to med administration, patient instructed to remain in clinic for 15 minutes  afterwards, and to report any adverse reaction to me immediately.    Drug Amount Wasted:  None.  Vial/Syringe: Syringe      Alexus Soriano  9/11/2024  9:38 AM

## 2024-09-11 NOTE — LETTER
9/11/2024       RE: Siva Ramey  [address] [address]     Dear Colleague,    Thank you for referring your patient, Siva Ramey, to the [facility]. Please see a copy of my visit note below.    Siva Ramey is a 49 year old male with history of urethral stricture.  On visual exam it appears he has meatal stenosis.  He had a kidney pancreas transplant July 2024.  He still has a stent in place.  Plan is to remove stent but the scope wouldn't pass with the transplant surgery team      Vital signs reviewed    Exam:  Meatal stenosis.  Otherwise patient ambulatory and appears well      A/P   I discussed operative stent removal in OR using peds rigid offset scope.  Will also eval urethra during stent removal.    NAJMA Card      Again, thank you for allowing me to participate in the care of your patient.      Sincerely,    Farzad Harris MD

## 2024-09-12 ENCOUNTER — LAB (OUTPATIENT)
Dept: LAB | Facility: HOSPITAL | Age: 49
End: 2024-09-12
Payer: MEDICARE

## 2024-09-12 DIAGNOSIS — Z94.83 PANCREAS REPLACED BY TRANSPLANT (H): ICD-10-CM

## 2024-09-12 DIAGNOSIS — Z98.890 OTHER SPECIFIED POSTPROCEDURAL STATES: ICD-10-CM

## 2024-09-12 DIAGNOSIS — Z79.899 ENCOUNTER FOR LONG-TERM CURRENT USE OF MEDICATION: ICD-10-CM

## 2024-09-12 DIAGNOSIS — Z48.298 AFTERCARE FOLLOWING ORGAN TRANSPLANT: ICD-10-CM

## 2024-09-12 DIAGNOSIS — Z94.0 KIDNEY REPLACED BY TRANSPLANT: ICD-10-CM

## 2024-09-12 LAB
AMYLASE SERPL-CCNC: 84 U/L (ref 28–100)
ANION GAP SERPL CALCULATED.3IONS-SCNC: 9 MMOL/L (ref 7–15)
BUN SERPL-MCNC: 19.4 MG/DL (ref 6–20)
CALCIUM SERPL-MCNC: 9.6 MG/DL (ref 8.8–10.4)
CHLORIDE SERPL-SCNC: 109 MMOL/L (ref 98–107)
CREAT SERPL-MCNC: 1.57 MG/DL (ref 0.67–1.17)
EGFRCR SERPLBLD CKD-EPI 2021: 54 ML/MIN/1.73M2
ERYTHROCYTE [DISTWIDTH] IN BLOOD BY AUTOMATED COUNT: 16.7 % (ref 10–15)
GLUCOSE SERPL-MCNC: 165 MG/DL (ref 70–99)
HCO3 SERPL-SCNC: 23 MMOL/L (ref 22–29)
HCT VFR BLD AUTO: 35.5 % (ref 40–53)
HGB BLD-MCNC: 10.9 G/DL (ref 13.3–17.7)
LIPASE SERPL-CCNC: 64 U/L (ref 13–60)
MCH RBC QN AUTO: 29.6 PG (ref 26.5–33)
MCHC RBC AUTO-ENTMCNC: 30.7 G/DL (ref 31.5–36.5)
MCV RBC AUTO: 97 FL (ref 78–100)
PLATELET # BLD AUTO: 207 10E3/UL (ref 150–450)
POTASSIUM SERPL-SCNC: 4.9 MMOL/L (ref 3.4–5.3)
RBC # BLD AUTO: 3.68 10E6/UL (ref 4.4–5.9)
SODIUM SERPL-SCNC: 141 MMOL/L (ref 135–145)
TACROLIMUS BLD-MCNC: 9.2 UG/L (ref 5–15)
TME LAST DOSE: NORMAL H
TME LAST DOSE: NORMAL H
WBC # BLD AUTO: 5.4 10E3/UL (ref 4–11)

## 2024-09-12 PROCEDURE — 83690 ASSAY OF LIPASE: CPT

## 2024-09-12 PROCEDURE — 82374 ASSAY BLOOD CARBON DIOXIDE: CPT

## 2024-09-12 PROCEDURE — 85014 HEMATOCRIT: CPT

## 2024-09-12 PROCEDURE — 84295 ASSAY OF SERUM SODIUM: CPT

## 2024-09-12 PROCEDURE — 36415 COLL VENOUS BLD VENIPUNCTURE: CPT

## 2024-09-12 PROCEDURE — 82150 ASSAY OF AMYLASE: CPT

## 2024-09-12 PROCEDURE — 80197 ASSAY OF TACROLIMUS: CPT

## 2024-09-12 NOTE — TELEPHONE ENCOUNTER
Called patient to schedule surgery with Dr. Harris    Spoke with: Siva    Date of Surgery: 12/2    Approximate arrival time given:  Yes    Location of surgery: [facility]     Pre-Op H&P: [facility]    Post-Op Appt Date: NONE     Imaging needed:  No    Discussed with patient pre-op RN will call 2-3 days prior to surgery with arrival time and instructions:  Yes     Packet sent out: Yes 09/11/24  via [employer]      Additional Comments:  NA    All patients questions were answered and was instructed to review surgical packet and call back with any questions or concerns.       Lori Cleaning on 9/11/2024 at 7:20 PM

## 2024-09-13 ENCOUNTER — TELEPHONE (OUTPATIENT)
Dept: TRANSPLANT | Facility: CLINIC | Age: 49
End: 2024-09-13
Payer: COMMERCIAL

## 2024-09-13 NOTE — PROGRESS NOTES
Siva Ramey is a 49 year old male with history of urethral stricture.  On visual exam it appears he has meatal stenosis.  He had a kidney pancreas transplant July 2024.  He still has a stent in place.  Plan is to remove stent but the scope wouldn't pass with the transplant surgery team      Vital signs reviewed    Exam:  Meatal stenosis.  Otherwise patient ambulatory and appears well      A/P   I discussed operative stent removal in OR using peds rigid offset scope.  Will also eval urethra during stent removal.    NAJMA Card

## 2024-09-13 NOTE — TELEPHONE ENCOUNTER
Patient Contacted for the patient to call back and schedule the following:    Appointment type: [facility] Social Work   Provider: MANUEL Caldwell  Return date: 09/03/24  Specialty phone number: [phone]  Additional appointment(s) needed: n/a  Additonal Notes: Writer contacted pt to r/s appt w/MANUEL Chavira, on 9/30 @12p to 9/30/24 @1p w/MAUNEL Caldwell r/s confirmed w/pt-AL 9/13/24

## 2024-09-16 ENCOUNTER — LAB (OUTPATIENT)
Dept: LAB | Facility: HOSPITAL | Age: 49
End: 2024-09-16
Payer: COMMERCIAL

## 2024-09-16 ENCOUNTER — ANCILLARY PROCEDURE (OUTPATIENT)
Dept: ULTRASOUND IMAGING | Facility: CLINIC | Age: 49
End: 2024-09-16
Attending: STUDENT IN AN ORGANIZED HEALTH CARE EDUCATION/TRAINING PROGRAM

## 2024-09-16 ENCOUNTER — OFFICE VISIT (OUTPATIENT)
Dept: TRANSPLANT | Facility: CLINIC | Age: 49
End: 2024-09-16
Attending: INTERNAL MEDICINE
Payer: MEDICARE

## 2024-09-16 VITALS
DIASTOLIC BLOOD PRESSURE: 90 MMHG | BODY MASS INDEX: 30.63 KG/M2 | WEIGHT: 219.6 LBS | TEMPERATURE: 98.6 F | SYSTOLIC BLOOD PRESSURE: 150 MMHG | OXYGEN SATURATION: 100 % | HEART RATE: 108 BPM | RESPIRATION RATE: 18 BRPM

## 2024-09-16 DIAGNOSIS — R74.8 ELEVATED LIPASE: ICD-10-CM

## 2024-09-16 DIAGNOSIS — Z98.890 OTHER SPECIFIED POSTPROCEDURAL STATES: ICD-10-CM

## 2024-09-16 DIAGNOSIS — Z94.83 PANCREAS REPLACED BY TRANSPLANT (H): ICD-10-CM

## 2024-09-16 DIAGNOSIS — Z94.0 KIDNEY REPLACED BY TRANSPLANT: ICD-10-CM

## 2024-09-16 DIAGNOSIS — Z48.22 ENCOUNTER FOR AFTERCARE FOLLOWING KIDNEY TRANSPLANT: ICD-10-CM

## 2024-09-16 DIAGNOSIS — Z79.899 ENCOUNTER FOR LONG-TERM CURRENT USE OF MEDICATION: ICD-10-CM

## 2024-09-16 DIAGNOSIS — Z48.298 AFTERCARE FOLLOWING ORGAN TRANSPLANT: ICD-10-CM

## 2024-09-16 LAB
AMYLASE SERPL-CCNC: 72 U/L (ref 28–100)
ANION GAP SERPL CALCULATED.3IONS-SCNC: 10 MMOL/L (ref 7–15)
BUN SERPL-MCNC: 19.2 MG/DL (ref 6–20)
CALCIUM SERPL-MCNC: 10 MG/DL (ref 8.8–10.4)
CHLORIDE SERPL-SCNC: 110 MMOL/L (ref 98–107)
CREAT SERPL-MCNC: 1.46 MG/DL (ref 0.67–1.17)
EGFRCR SERPLBLD CKD-EPI 2021: 59 ML/MIN/1.73M2
ERYTHROCYTE [DISTWIDTH] IN BLOOD BY AUTOMATED COUNT: 16 % (ref 10–15)
GLUCOSE SERPL-MCNC: 165 MG/DL (ref 70–99)
HCO3 SERPL-SCNC: 21 MMOL/L (ref 22–29)
HCT VFR BLD AUTO: 36.6 % (ref 40–53)
HGB BLD-MCNC: 11.2 G/DL (ref 13.3–17.7)
LIPASE SERPL-CCNC: 62 U/L (ref 13–60)
MCH RBC QN AUTO: 29.2 PG (ref 26.5–33)
MCHC RBC AUTO-ENTMCNC: 30.6 G/DL (ref 31.5–36.5)
MCV RBC AUTO: 96 FL (ref 78–100)
PLATELET # BLD AUTO: 197 10E3/UL (ref 150–450)
POTASSIUM SERPL-SCNC: 4.7 MMOL/L (ref 3.4–5.3)
RBC # BLD AUTO: 3.83 10E6/UL (ref 4.4–5.9)
SODIUM SERPL-SCNC: 141 MMOL/L (ref 135–145)
TACROLIMUS BLD-MCNC: 10.7 UG/L (ref 5–15)
TME LAST DOSE: NORMAL H
TME LAST DOSE: NORMAL H
WBC # BLD AUTO: 4.8 10E3/UL (ref 4–11)

## 2024-09-16 PROCEDURE — 82150 ASSAY OF AMYLASE: CPT

## 2024-09-16 PROCEDURE — 93975 VASCULAR STUDY: CPT | Performed by: STUDENT IN AN ORGANIZED HEALTH CARE EDUCATION/TRAINING PROGRAM

## 2024-09-16 PROCEDURE — 80197 ASSAY OF TACROLIMUS: CPT

## 2024-09-16 PROCEDURE — 85027 COMPLETE CBC AUTOMATED: CPT

## 2024-09-16 PROCEDURE — 80048 BASIC METABOLIC PNL TOTAL CA: CPT

## 2024-09-16 PROCEDURE — 83690 ASSAY OF LIPASE: CPT

## 2024-09-16 PROCEDURE — G0463 HOSPITAL OUTPT CLINIC VISIT: HCPCS | Performed by: SURGERY

## 2024-09-16 PROCEDURE — 36415 COLL VENOUS BLD VENIPUNCTURE: CPT

## 2024-09-16 PROCEDURE — 99213 OFFICE O/P EST LOW 20 MIN: CPT | Mod: 24 | Performed by: SURGERY

## 2024-09-16 NOTE — PROGRESS NOTES
This patient was exposed to a person with a known CP- and has the potential for having acquired this pathogen of concern. The [place] Department of Health ([facility]) and CDC recommend that we screen this patient to prevent the spread of these organisms within our healthcare facility. Infection Prevention has placed orders for collecting a rectal swab for CP- to evaluate if this patient is now a carrier.     This patient was identified to have a low risk exposure in which case Contact Precautions are not necessary unless the patient tests positive.      Screening is voluntary.  Please notify Infection Prevention if the patient declines testing.    Additional information and resources can be found on the Infection Prevention [employer]point page.

## 2024-09-16 NOTE — NURSING NOTE
"Chief Complaint   Patient presents with    Follow Up     Kidney/Pancreas transplant follow-up       Vital signs:  Temp: 98.6  F (37  C) Temp src: Oral BP: (!) 150/90 Pulse: 108   Resp: 18 SpO2: 100 %       Weight: 99.6 kg (219 lb 9.6 oz)  Estimated body mass index is 30.63 kg/m  as calculated from the following:    Height as of 8/30/24: 1.803 m (5' 11\").    Weight as of this encounter: 99.6 kg (219 lb 9.6 oz).      Galdino Sim RN on 9/16/2024 at 3:01 PM    "

## 2024-09-16 NOTE — Clinical Note
9/16/2024      Siva Ramey  [address] [address]      Dear Colleague,    Thank you for referring your patient, Siva Ramey, to the [facility]. Please see a copy of my visit note below.    No notes on file    Again, thank you for allowing me to participate in the care of your patient.        Sincerely,        Harrison Whitehead MD  
[phone]

## 2024-09-19 ENCOUNTER — APPOINTMENT (OUTPATIENT)
Dept: LAB | Facility: HOSPITAL | Age: 49
End: 2024-09-19
Payer: MEDICARE

## 2024-09-19 ENCOUNTER — TELEPHONE (OUTPATIENT)
Dept: TRANSPLANT | Facility: CLINIC | Age: 49
End: 2024-09-19

## 2024-09-19 ENCOUNTER — LAB (OUTPATIENT)
Dept: LAB | Facility: HOSPITAL | Age: 49
End: 2024-09-19
Payer: MEDICARE

## 2024-09-19 DIAGNOSIS — Z94.0 KIDNEY REPLACED BY TRANSPLANT: ICD-10-CM

## 2024-09-19 DIAGNOSIS — Z98.890 OTHER SPECIFIED POSTPROCEDURAL STATES: ICD-10-CM

## 2024-09-19 DIAGNOSIS — Z94.0 KIDNEY REPLACED BY TRANSPLANT: Primary | ICD-10-CM

## 2024-09-19 DIAGNOSIS — R33.9 URINARY RETENTION: ICD-10-CM

## 2024-09-19 DIAGNOSIS — Z94.83 PANCREAS REPLACED BY TRANSPLANT (H): ICD-10-CM

## 2024-09-19 DIAGNOSIS — N35.819 OTHER STRICTURE OF URETHRA IN MALE: ICD-10-CM

## 2024-09-19 DIAGNOSIS — Z48.298 AFTERCARE FOLLOWING ORGAN TRANSPLANT: ICD-10-CM

## 2024-09-19 DIAGNOSIS — Z79.899 ENCOUNTER FOR LONG-TERM CURRENT USE OF MEDICATION: ICD-10-CM

## 2024-09-19 LAB
AMYLASE SERPL-CCNC: 84 U/L (ref 28–100)
ANION GAP SERPL CALCULATED.3IONS-SCNC: 9 MMOL/L (ref 7–15)
BUN SERPL-MCNC: 21.4 MG/DL (ref 6–20)
CALCIUM SERPL-MCNC: 9.8 MG/DL (ref 8.8–10.4)
CHLORIDE SERPL-SCNC: 110 MMOL/L (ref 98–107)
CREAT SERPL-MCNC: 1.47 MG/DL (ref 0.67–1.17)
EGFRCR SERPLBLD CKD-EPI 2021: 58 ML/MIN/1.73M2
ERYTHROCYTE [DISTWIDTH] IN BLOOD BY AUTOMATED COUNT: 16.3 % (ref 10–15)
GLUCOSE SERPL-MCNC: 150 MG/DL (ref 70–99)
HCO3 SERPL-SCNC: 19 MMOL/L (ref 22–29)
HCT VFR BLD AUTO: 35.8 % (ref 40–53)
HGB BLD-MCNC: 10.9 G/DL (ref 13.3–17.7)
LIPASE SERPL-CCNC: 61 U/L (ref 13–60)
MAGNESIUM SERPL-MCNC: 1.2 MG/DL (ref 1.7–2.3)
MCH RBC QN AUTO: 28.6 PG (ref 26.5–33)
MCHC RBC AUTO-ENTMCNC: 30.4 G/DL (ref 31.5–36.5)
MCV RBC AUTO: 94 FL (ref 78–100)
MYCOPHENOLATE SERPL LC/MS/MS-MCNC: 3.42 MG/L (ref 1–3.5)
MYCOPHENOLATE-G SERPL LC/MS/MS-MCNC: 54.9 MG/L (ref 30–95)
PHOSPHATE SERPL-MCNC: 1.6 MG/DL (ref 2.5–4.5)
PLATELET # BLD AUTO: 194 10E3/UL (ref 150–450)
POTASSIUM SERPL-SCNC: 4.9 MMOL/L (ref 3.4–5.3)
RBC # BLD AUTO: 3.81 10E6/UL (ref 4.4–5.9)
SODIUM SERPL-SCNC: 138 MMOL/L (ref 135–145)
TACROLIMUS BLD-MCNC: 12.5 UG/L (ref 5–15)
TME LAST DOSE: NORMAL H
WBC # BLD AUTO: 6.8 10E3/UL (ref 4–11)

## 2024-09-19 PROCEDURE — 80048 BASIC METABOLIC PNL TOTAL CA: CPT

## 2024-09-19 PROCEDURE — 83735 ASSAY OF MAGNESIUM: CPT

## 2024-09-19 PROCEDURE — 85014 HEMATOCRIT: CPT

## 2024-09-19 PROCEDURE — 36415 COLL VENOUS BLD VENIPUNCTURE: CPT

## 2024-09-19 PROCEDURE — 83690 ASSAY OF LIPASE: CPT

## 2024-09-19 PROCEDURE — 80180 DRUG SCRN QUAN MYCOPHENOLATE: CPT

## 2024-09-19 PROCEDURE — 80197 ASSAY OF TACROLIMUS: CPT

## 2024-09-19 PROCEDURE — 84100 ASSAY OF PHOSPHORUS: CPT

## 2024-09-19 PROCEDURE — 82150 ASSAY OF AMYLASE: CPT

## 2024-09-19 RX ORDER — SODIUM BICARBONATE
2 POWDER (GRAM) MISCELLANEOUS DAILY
Status: SHIPPED
Start: 2024-09-19

## 2024-09-19 NOTE — TELEPHONE ENCOUNTER
----- Message from Fernando Sorensen sent at 9/19/2024 10:34 AM CDT -----  Stable kidney function, but low bicarbonate and phosphorus levels.  Recommend starting phosphorus supplement 250 mg twice daily and would consider adding baking soda 1/2 tsp daily to sodium bicarbonate supplement.

## 2024-09-19 NOTE — TELEPHONE ENCOUNTER
ISSUE:   Low bicarbonate and phosphorus levels    PLAN:   Per Dr. Sorensen, start phosphorous 250 mg BID, add baking soda 1/2 tsp daily to sodium bicarbonate supplement     OUTCOME:   Patient updated with added supplements of phosphorous and baking soda. No questions or concerns.     Alisa Tang, RN   Transplant Coordinator  [phone]

## 2024-09-21 ENCOUNTER — HEALTH MAINTENANCE LETTER (OUTPATIENT)
Age: 49
End: 2024-09-21

## 2024-09-23 ENCOUNTER — LAB (OUTPATIENT)
Dept: LAB | Facility: HOSPITAL | Age: 49
End: 2024-09-23
Attending: INTERNAL MEDICINE
Payer: MEDICARE

## 2024-09-23 ENCOUNTER — TELEPHONE (OUTPATIENT)
Dept: TRANSPLANT | Facility: CLINIC | Age: 49
End: 2024-09-23

## 2024-09-23 ENCOUNTER — OFFICE VISIT (OUTPATIENT)
Dept: CARDIOLOGY | Facility: CLINIC | Age: 49
End: 2024-09-23
Attending: NURSE PRACTITIONER
Payer: COMMERCIAL

## 2024-09-23 VITALS
BODY MASS INDEX: 30.52 KG/M2 | RESPIRATION RATE: 16 BRPM | HEIGHT: 71 IN | SYSTOLIC BLOOD PRESSURE: 128 MMHG | DIASTOLIC BLOOD PRESSURE: 64 MMHG | WEIGHT: 218 LBS | HEART RATE: 104 BPM

## 2024-09-23 DIAGNOSIS — Z48.298 AFTERCARE FOLLOWING ORGAN TRANSPLANT: ICD-10-CM

## 2024-09-23 DIAGNOSIS — I49.01 CARDIAC ARREST WITH VENTRICULAR FIBRILLATION (H): ICD-10-CM

## 2024-09-23 DIAGNOSIS — I50.21 ACUTE SYSTOLIC HEART FAILURE (H): Primary | ICD-10-CM

## 2024-09-23 DIAGNOSIS — I21.11 ST ELEVATION MYOCARDIAL INFARCTION INVOLVING RIGHT CORONARY ARTERY (H): ICD-10-CM

## 2024-09-23 DIAGNOSIS — Z94.0 KIDNEY REPLACED BY TRANSPLANT: ICD-10-CM

## 2024-09-23 DIAGNOSIS — E83.39 HYPOPHOSPHATEMIA: ICD-10-CM

## 2024-09-23 DIAGNOSIS — Z95.1 S/P CABG (CORONARY ARTERY BYPASS GRAFT): ICD-10-CM

## 2024-09-23 DIAGNOSIS — Z94.83 PANCREAS REPLACED BY TRANSPLANT (H): ICD-10-CM

## 2024-09-23 DIAGNOSIS — I46.9 CARDIAC ARREST WITH VENTRICULAR FIBRILLATION (H): ICD-10-CM

## 2024-09-23 DIAGNOSIS — Z79.899 ENCOUNTER FOR LONG-TERM CURRENT USE OF MEDICATION: ICD-10-CM

## 2024-09-23 DIAGNOSIS — Z98.890 OTHER SPECIFIED POSTPROCEDURAL STATES: ICD-10-CM

## 2024-09-23 DIAGNOSIS — Z94.0 KIDNEY REPLACED BY TRANSPLANT: Primary | ICD-10-CM

## 2024-09-23 LAB
ALBUMIN MFR UR ELPH: 126.4 MG/DL
ALBUMIN UR-MCNC: 200 MG/DL
AMYLASE SERPL-CCNC: 67 U/L (ref 28–100)
ANION GAP SERPL CALCULATED.3IONS-SCNC: 9 MMOL/L (ref 7–15)
APPEARANCE UR: CLEAR
BACTERIA #/AREA URNS HPF: ABNORMAL /HPF
BILIRUB UR QL STRIP: NEGATIVE
BK VIRUS SPECIMEN TYPE: NORMAL
BKV DNA # SPEC NAA+PROBE: NOT DETECTED IU/ML
BUN SERPL-MCNC: 17.5 MG/DL (ref 6–20)
CALCIUM SERPL-MCNC: 10.3 MG/DL (ref 8.8–10.4)
CHLORIDE SERPL-SCNC: 104 MMOL/L (ref 98–107)
COLOR UR AUTO: ABNORMAL
CREAT SERPL-MCNC: 1.77 MG/DL (ref 0.67–1.17)
CREAT UR-MCNC: 105.6 MG/DL
EGFRCR SERPLBLD CKD-EPI 2021: 47 ML/MIN/1.73M2
ERYTHROCYTE [DISTWIDTH] IN BLOOD BY AUTOMATED COUNT: 15.6 % (ref 10–15)
GLUCOSE SERPL-MCNC: 187 MG/DL (ref 70–99)
GLUCOSE UR STRIP-MCNC: 100 MG/DL
HCO3 SERPL-SCNC: 23 MMOL/L (ref 22–29)
HCT VFR BLD AUTO: 36.5 % (ref 40–53)
HGB BLD-MCNC: 11.5 G/DL (ref 13.3–17.7)
HGB UR QL STRIP: ABNORMAL
KETONES UR STRIP-MCNC: NEGATIVE MG/DL
LEUKOCYTE ESTERASE UR QL STRIP: ABNORMAL
LIPASE SERPL-CCNC: 43 U/L (ref 13–60)
MCH RBC QN AUTO: 28.9 PG (ref 26.5–33)
MCHC RBC AUTO-ENTMCNC: 31.5 G/DL (ref 31.5–36.5)
MCV RBC AUTO: 92 FL (ref 78–100)
MUCOUS THREADS #/AREA URNS LPF: PRESENT /LPF
MYCOPHENOLATE SERPL LC/MS/MS-MCNC: 2.81 MG/L (ref 1–3.5)
MYCOPHENOLATE-G SERPL LC/MS/MS-MCNC: 72 MG/L (ref 30–95)
NITRATE UR QL: NEGATIVE
PH UR STRIP: 7.5 [PH] (ref 5–7)
PLATELET # BLD AUTO: 190 10E3/UL (ref 150–450)
POTASSIUM SERPL-SCNC: 5 MMOL/L (ref 3.4–5.3)
PROT/CREAT 24H UR: 1.2 MG/MG CR (ref 0–0.2)
RBC # BLD AUTO: 3.98 10E6/UL (ref 4.4–5.9)
RBC URINE: 22 /HPF
SODIUM SERPL-SCNC: 136 MMOL/L (ref 135–145)
SP GR UR STRIP: 1.02 (ref 1–1.03)
SQUAMOUS EPITHELIAL: 1 /HPF
TACROLIMUS BLD-MCNC: 13.5 UG/L (ref 5–15)
TME LAST DOSE: NORMAL H
UROBILINOGEN UR STRIP-MCNC: <2 MG/DL
WBC # BLD AUTO: 5.6 10E3/UL (ref 4–11)
WBC URINE: 7 /HPF

## 2024-09-23 PROCEDURE — 99215 OFFICE O/P EST HI 40 MIN: CPT | Performed by: INTERNAL MEDICINE

## 2024-09-23 PROCEDURE — G2211 COMPLEX E/M VISIT ADD ON: HCPCS | Performed by: INTERNAL MEDICINE

## 2024-09-23 PROCEDURE — 99205 OFFICE O/P NEW HI 60 MIN: CPT | Performed by: INTERNAL MEDICINE

## 2024-09-23 PROCEDURE — 36415 COLL VENOUS BLD VENIPUNCTURE: CPT

## 2024-09-23 PROCEDURE — 84156 ASSAY OF PROTEIN URINE: CPT

## 2024-09-23 PROCEDURE — 81001 URINALYSIS AUTO W/SCOPE: CPT

## 2024-09-23 PROCEDURE — 83690 ASSAY OF LIPASE: CPT

## 2024-09-23 PROCEDURE — 84295 ASSAY OF SERUM SODIUM: CPT

## 2024-09-23 PROCEDURE — 87086 URINE CULTURE/COLONY COUNT: CPT | Performed by: UROLOGY

## 2024-09-23 PROCEDURE — 80180 DRUG SCRN QUAN MYCOPHENOLATE: CPT

## 2024-09-23 PROCEDURE — 85027 COMPLETE CBC AUTOMATED: CPT

## 2024-09-23 PROCEDURE — 82150 ASSAY OF AMYLASE: CPT

## 2024-09-23 PROCEDURE — 82374 ASSAY BLOOD CARBON DIOXIDE: CPT

## 2024-09-23 PROCEDURE — 87799 DETECT AGENT NOS DNA QUANT: CPT

## 2024-09-23 PROCEDURE — 80197 ASSAY OF TACROLIMUS: CPT

## 2024-09-23 NOTE — PATIENT INSTRUCTIONS
Thank you for allowing the [facility] to be a part of your care. Please pay close attention to the following medications as they have been changed during this visit.    1.) Please STOP TAKING Carvedilol    2.) Please START TAKING metoprolol succinate (Toprol XL) 25 mg one time daily

## 2024-09-23 NOTE — LETTER
9/23/2024    Trinidad Hansen PA-C, SIS  [employer] [place] [address]  [place] [address]    RE: Siva Ramey       Dear Colleague,     I had the pleasure of seeing Siva Ramey in the [facility].    HEART CARE NOTE          Assessment/Recommendations     1. Severe HFrEF   Assessment / Plan  Near euvolemia on physical exam; denies HF symptoms of orthopnea, PND, LE edema - no changes to regimen at this time   Patient is high risk for adverse cardiac events 2/2 systolic dysfunction, elevated NTproBNP  GDMT as detailed below; Repeat echo 11/24    Current Pharmacotherapy AHA Guideline-Directed Medical Therapy   Lisinopril - held 2/2 renal dysfunction Lisinopril 20 mg twice daily   Metoprolol succinate 25 mg daily Carvedilol 25 mg twice daily   Spironolactone not started Spironolactone 25 mg once daily   Hydralazine NA Hydralazine 100 mg three times daily   Isosorbide dinitrate NA Isosorbide dinitrate 40 mg three times daily   SGLT2 inhibitor:Dapagliflozin/Empagliflozin - not started  Dapagliflozin or Empagliflozin 10 mg daily     2. DM2  Assessment / Plan  Management per PMD    3. CAD  Assessment / Plan  S/p CABG in 2019 and multiple PCIs in 2019 and 2024  Denies chest pain or anginal equivalents     4. ESRD s/p renal transplant   Assessment / Plan  Follows with renal transplant team    60 minutes spent reviewing prior records (including documentation, laboratory studies, cardiac testing/imaging), history and physical exam, planning, and subsequent documentation.    The longitudinal plan of care for HFrEF was addressed during this visit. Due to the added complexity in care, I will continue to support Mr. Siva Ramey in the subsequent management of this condition(s) and with the ongoing continuity of care of this condition(s).      History of Present Illness/Subjective    Mr. Siva Ramey is a 49 year old male with a PMHx significant for (per Epic) PCI s/p IBAN 2019 and 2/2024, 2v  "CABG 2019, PAD, HTN, DM2, ESRD now s/p pancreas & renal transplant 7/20/2024. His post-op course was c/b VF arrest w/ short duration of CPR followed by ROSC and EKG showing inferior STEMI who presents to [facility] clinic to establish care    Today, Mr. Ramey denies acute cardiac events or complaints; denies HF symptoms of orthopnea, PND, fluid retention or edema. Management plan as detailed above.    We discussed HF diet and lifestyle modifications including the 2 g Na and 2L fluid restrictions. He does not currently adhere, but will do so moving forward. Patient was provided with the HF educational binder as well as a book to log his daily weights as well as HF education by our C[facility] RN    ECG: personally reviewed; nonspecific ST and T waves changes, sinus tachycardia.    ECHO (personnaly Reviewed on 9/23/24):   Left ventricular function is decreased. The ejection fraction is 35-40%  (moderately reduced).Akinesis of the basal-mid inferior and basal inferoseptal  segments present.  Global right ventricular function is normal.  Mild mitral insufficiency is present.  Estimated mean right atrial pressure is normal.  Trivial pericardial effusion is present.    Lab results: personally reviewed September 23, 2024; notable for elevated NTproBNP    Medical history and pertinent documents reviewed in Care Everywhere please where applicable see details above        Physical Examination Review of Systems   /64 (BP Location: Right arm, Patient Position: Sitting, Cuff Size: Adult Regular)   Pulse 104   Resp 16   Ht 1.803 m (5' 11\")   Wt 98.9 kg (218 lb)   BMI 30.40 kg/m    Body mass index is 30.4 kg/m .  Wt Readings from Last 3 Encounters:   09/23/24 98.9 kg (218 lb)   09/16/24 99.6 kg (219 lb 9.6 oz)   09/09/24 100.8 kg (222 lb 3.2 oz)     General Appearance:   no distress, normal body habitus   ENT/Mouth: membranes moist, no oral lesions or bleeding gums.      EYES:  no scleral icterus, normal conjunctivae   Neck: no " carotid bruits or thyromegaly   Chest/Lungs:   lungs are clear to auscultation, no rales or wheezing, equal chest wall expansion    Cardiovascular:   Regular. Normal first and second heart sounds with no murmurs, rubs, or gallops; the carotid, radial and posterior tibial pulses are intact, no JVD or LE edema bilaterally    Abdomen:  no organomegaly, masses, bruits, or tenderness; bowel sounds are present   Extremities: no cyanosis or clubbing   Skin: no xanthelasma, warm.    Neurologic: NAD     Psychiatric: alert and oriented x3, calm     A complete 10 systems ROS was reviewed  And is negative except what is listed in the HPI.          Medical History  Surgical History Family History Social History   Past Medical History:   Diagnosis Date     Acquired elevated diaphragm      Anemia in chronic kidney disease      Angina pectoris (H24)      Chronic kidney disease      Coronary artery disease      Diabetes mellitus, type II (H) 12/2001     Diabetic nephropathy (H)      MARQUEZ (dyspnea on exertion)      Dyslipidemia 12/2001     End stage renal disease (H)      History of blood transfusion 2004     Hypertension     takes medication     Ischemic cardiomyopathy      Metabolic acidosis      Myocardial infarction (H)     STEMI -Diagonal branch of the LAD     Obesity (BMI 30-39.9)      Peripheral neuropathy      Proteinuria      Retinopathy      Vitamin D deficiency     Past Surgical History:   Procedure Laterality Date     APPENDECTOMY OPEN N/A 7/19/2024    Procedure: Appendectomy open;  Surgeon: Harrison Whitehead MD;  Location: [facility] OR     BACK SURGERY  2009    L5 disc cut     BENCH KIDNEY  7/19/2024    Procedure: Bench kidney;  Surgeon: Harrison Whitehead MD;  Location: [facility] OR     BENCH PANCREAS N/A 7/19/2024    Procedure: Bench pancreas;  Surgeon: Harrison Whitehead MD;  Location: [facility] OR     BYPASS GRAFT ARTERY CORONARY  06/20/2019    2 vessels     CV CENTRAL VENOUS CATHETER  PLACEMENT N/A 7/20/2024    Procedure: Central Venous Catheter Placement;  Surgeon: Dominic Robertson MD;  Location: [facility] CARDIAC CATH LAB     CV CORONARY ANGIOGRAM N/A 01/13/2019    Procedure: Coronary Angiogram;  Surgeon: Cielo Che MD;  Location: [facility] Cath Lab;  Service: Cardiology     CV CORONARY ANGIOGRAM N/A 05/02/2019    Procedure: Coronary Angiogram;  Surgeon: Cielo Che MD;  Location: [facility] Cath Lab;  Service: Cardiology     CV CORONARY ANGIOGRAM N/A 04/30/2020    Procedure: Coronary Angiogram;  Surgeon: Cielo Che MD;  Location: [facility] Cath Lab;  Service: Cardiology     CV CORONARY ANGIOGRAM N/A 07/22/2021    Procedure: CV CORONARY ANGIOGRAM;  Surgeon: Adrian Crow MD;  Location: [facility] LAB CV     CV CORONARY ANGIOGRAM N/A 02/01/2024    Procedure: Coronary Angiogram;  Surgeon: Delroy Carpio MD;  Location: [facility] CARDIAC CATH LAB     CV CORONARY ANGIOGRAM N/A 7/20/2024    Procedure: Coronary Angiogram;  Surgeon: Dominic Robertson MD;  Location: [facility] CARDIAC CATH LAB     CV CORONARY LITHOTRIPSY PCI N/A 7/20/2024    Procedure: Percutaneous Coronary Intervention - Lithotripsy;  Surgeon: Dominic Robertson MD;  Location: [facility] CARDIAC CATH LAB     CV FRACTIONAL FLOW RATIO WIRE N/A 07/22/2021    Procedure: Fractional Flow Ratio Wire;  Surgeon: Adrian Crow MD;  Location: [facility] LAB CV     CV INTRAVASULAR ULTRASOUND N/A 7/20/2024    Procedure: Intravascular Ultrasound;  Surgeon: Dominic Robertson MD;  Location: [facility] CARDIAC CATH LAB     CV LEFT HEART CATH N/A 07/22/2021    Procedure: Left Heart Cath;  Surgeon: Adrian Crow MD;  Location: [facility] LAB CV     CV LEFT HEART CATHETERIZATION WITH LEFT VENTRICULOGRAM N/A 01/13/2019    Procedure: Left Heart Catheterization with Left Ventriculogram;  Surgeon: Cielo Che MD;  Location: [facility] Cath Lab;  Service: Cardiology      CV LEFT HEART CATHETERIZATION WITHOUT LEFT VENTRICULOGRAM Left 2020    Procedure: Left Heart Catheterization Without Left Ventriculogram;  Surgeon: Cielo Che MD;  Location: [facility] Cath Lab;  Service: Cardiology     CV PCI N/A 2024    Procedure: Percutaneous Coronary Intervention;  Surgeon: Delroy Carpio MD;  Location: [facility] CARDIAC CATH LAB     CV PCI N/A 2024    Procedure: Percutaneous Coronary Intervention;  Surgeon: Dominic Robertson MD;  Location: [facility] CARDIAC CATH LAB     CV PCI ASPIRATION THROMECTOMY N/A 2024    Procedure: Percutaneous Coronary Intervention Aspiration Thrombectomy;  Surgeon: Dominic Robertson MD;  Location: [facility] CARDIAC CATH LAB     CV PCI STENT DRUG ELUTING N/A 2024    Procedure: Percutaneous Coronary Intervention Stent;  Surgeon: Dominic Robertson MD;  Location: [facility] CARDIAC CATH LAB     CV RIGHT HEART CATHETERIZATION N/A 2020    Procedure: Right Heart Catheterization;  Surgeon: Cielo Che MD;  Location: [facility] Cath Lab;  Service: Cardiology     ESOPHAGOSCOPY, GASTROSCOPY, DUODENOSCOPY (EGD), COMBINED N/A 2024    Procedure: Esophagoscopy, gastroscopy, duodenoscopy (EGD), combined;  Surgeon: Jolene Ramirez MD;  Location:  GI     EYE SURGERY       GENITOURINARY SURGERY       HERNIA REPAIR       OTHER SURGICAL HISTORY      Excise varicocele     STENT, CORONARY, DALE  2019     TRANSPLANT PANCREAS, KIDNEY  DONOR, COMBINED N/A 2024    Procedure: Transplant pancreas, kidney  donor, with ureteral stent placement;  Surgeon: Harrison Whitehead MD;  Location:  OR     VASCULAR SURGERY      no family history of premature coronary artery disease Social History     Socioeconomic History     Marital status:      Spouse name: Not on file     Number of children: Not on file     Years of education: Not on file     Highest  education level: Not on file   Occupational History     Not on file   Tobacco Use     Smoking status: Never     Passive exposure: Past     Smokeless tobacco: Never   Substance and Sexual Activity     Alcohol use: Never     Drug use: Never     Sexual activity: Yes     Partners: Female   Other Topics Concern     Parent/sibling w/ CABG, MI or angioplasty before 65F 55M? Yes   Social History Narrative     Not on file     Social Determinants of Health     Financial Resource Strain: Low Risk  (8/31/2024)    Financial Resource Strain      Within the past 12 months, have you or your family members you live with been unable to get utilities (heat, electricity) when it was really needed?: No   Food Insecurity: Low Risk  (8/31/2024)    Food Insecurity      Within the past 12 months, did you worry that your food would run out before you got money to buy more?: No      Within the past 12 months, did the food you bought just not last and you didn t have money to get more?: No   Transportation Needs: Low Risk  (8/31/2024)    Transportation Needs      Within the past 12 months, has lack of transportation kept you from medical appointments, getting your medicines, non-medical meetings or appointments, work, or from getting things that you need?: No   Physical Activity: Not on file   Stress: Not on file   Social Connections: Unknown (4/10/2023)    Received from [facility], [facility]    Social Connections      Frequency of Communication with Friends and Family: Not on file   Interpersonal Safety: Low Risk  (8/31/2024)    Interpersonal Safety      Do you feel physically and emotionally safe where you currently live?: Yes      Within the past 12 months, have you been hit, slapped, kicked or otherwise physically hurt by someone?: No      Within the past 12 months, have you been humiliated or emotionally abused in other ways by your partner or ex-partner?: No   Housing  Stability: Low Risk  (8/31/2024)    Housing Stability      Do you have housing? : Yes      Are you worried about losing your housing?: No           Lab Results    Chemistry/lipid CBC Cardiac Enzymes/BNP/TSH/INR   Lab Results   Component Value Date    CHOL 95 09/04/2024    HDL 32 (L) 09/04/2024    TRIG 113 09/04/2024    BUN 17.5 09/23/2024     09/23/2024    CO2 23 09/23/2024    Lab Results   Component Value Date    WBC 5.6 09/23/2024    HGB 11.5 (L) 09/23/2024    HCT 36.5 (L) 09/23/2024    MCV 92 09/23/2024     09/23/2024    Lab Results   Component Value Date    TROPONINI <0.01 07/20/2021    BNP 25 07/19/2021    TSH 1.78 10/17/2023    INR 1.39 (H) 07/20/2024     Lab Results   Component Value Date    TROPONINI <0.01 07/20/2021          Weight:    Wt Readings from Last 3 Encounters:   09/23/24 98.9 kg (218 lb)   09/16/24 99.6 kg (219 lb 9.6 oz)   09/09/24 100.8 kg (222 lb 3.2 oz)       Allergies  Allergies   Allergen Reactions     Amoxicillin Hives     & generalized pain    Tolerated ceftriaxone in 2023 ([facility]) and 2024 ([facility])     Venlafaxine      lethargic         Surgical History  Past Surgical History:   Procedure Laterality Date     APPENDECTOMY OPEN N/A 7/19/2024    Procedure: Appendectomy open;  Surgeon: Harrison Whitehead MD;  Location:  OR     BACK SURGERY  2009    L5 disc cut     BENCH KIDNEY  7/19/2024    Procedure: Bench kidney;  Surgeon: Harrison Whitehead MD;  Location:  OR     BENCH PANCREAS N/A 7/19/2024    Procedure: Bench pancreas;  Surgeon: Harrison Whitehead MD;  Location:  OR     BYPASS GRAFT ARTERY CORONARY  06/20/2019    2 vessels     CV CENTRAL VENOUS CATHETER PLACEMENT N/A 7/20/2024    Procedure: Central Venous Catheter Placement;  Surgeon: Dominic Robertson MD;  Location:  HEART CARDIAC CATH LAB     CV CORONARY ANGIOGRAM N/A 01/13/2019    Procedure: Coronary Angiogram;  Surgeon: Cielo Che,  MD;  Location: [facility] Cath Lab;  Service: Cardiology     CV CORONARY ANGIOGRAM N/A 05/02/2019    Procedure: Coronary Angiogram;  Surgeon: Cielo Che MD;  Location: [facility] Cath Lab;  Service: Cardiology     CV CORONARY ANGIOGRAM N/A 04/30/2020    Procedure: Coronary Angiogram;  Surgeon: Cielo Che MD;  Location: [facility] Cath Lab;  Service: Cardiology     CV CORONARY ANGIOGRAM N/A 07/22/2021    Procedure: CV CORONARY ANGIOGRAM;  Surgeon: Adrian Crow MD;  Location: [facility] LAB CV     CV CORONARY ANGIOGRAM N/A 02/01/2024    Procedure: Coronary Angiogram;  Surgeon: Delroy Carpio MD;  Location: [facility] CARDIAC CATH LAB     CV CORONARY ANGIOGRAM N/A 7/20/2024    Procedure: Coronary Angiogram;  Surgeon: Dominic Robertson MD;  Location: [facility] CARDIAC CATH LAB     CV CORONARY LITHOTRIPSY PCI N/A 7/20/2024    Procedure: Percutaneous Coronary Intervention - Lithotripsy;  Surgeon: Dmoinic Robertson MD;  Location: [facility] CARDIAC CATH LAB     CV FRACTIONAL FLOW RATIO WIRE N/A 07/22/2021    Procedure: Fractional Flow Ratio Wire;  Surgeon: Adrian Crow MD;  Location: [facility] CV     CV INTRAVASULAR ULTRASOUND N/A 7/20/2024    Procedure: Intravascular Ultrasound;  Surgeon: Dominic Robertson MD;  Location: [facility] CARDIAC CATH LAB     CV LEFT HEART CATH N/A 07/22/2021    Procedure: Left Heart Cath;  Surgeon: Adrian Crow MD;  Location: [facility] CV     CV LEFT HEART CATHETERIZATION WITH LEFT VENTRICULOGRAM N/A 01/13/2019    Procedure: Left Heart Catheterization with Left Ventriculogram;  Surgeon: Cielo Che MD;  Location: [facility] Cath Lab;  Service: Cardiology     CV LEFT HEART CATHETERIZATION WITHOUT LEFT VENTRICULOGRAM Left 04/30/2020    Procedure: Left Heart Catheterization Without Left Ventriculogram;  Surgeon: Cielo Che MD;  Location: [facility] Cath Lab;  Service: Cardiology     CV PCI N/A  2024    Procedure: Percutaneous Coronary Intervention;  Surgeon: Delroy Carpio MD;  Location: [facility] CARDIAC CATH LAB     CV PCI N/A 2024    Procedure: Percutaneous Coronary Intervention;  Surgeon: Dominic Robertson MD;  Location: [facility] CARDIAC CATH LAB     CV PCI ASPIRATION THROMECTOMY N/A 2024    Procedure: Percutaneous Coronary Intervention Aspiration Thrombectomy;  Surgeon: Dominic Robertson MD;  Location: [facility] CARDIAC CATH LAB     CV PCI STENT DRUG ELUTING N/A 2024    Procedure: Percutaneous Coronary Intervention Stent;  Surgeon: Dominic Robertson MD;  Location: [facility] CARDIAC CATH LAB     CV RIGHT HEART CATHETERIZATION N/A 2020    Procedure: Right Heart Catheterization;  Surgeon: Cielo Che MD;  Location: [facility] Cath Lab;  Service: Cardiology     ESOPHAGOSCOPY, GASTROSCOPY, DUODENOSCOPY (EGD), COMBINED N/A 2024    Procedure: Esophagoscopy, gastroscopy, duodenoscopy (EGD), combined;  Surgeon: Jolene Ramirez MD;  Location:  GI     EYE SURGERY       GENITOURINARY SURGERY       HERNIA REPAIR       OTHER SURGICAL HISTORY      Excise varicocele     STENT, CORONARY, DALE       TRANSPLANT PANCREAS, KIDNEY  DONOR, COMBINED N/A 2024    Procedure: Transplant pancreas, kidney  donor, with ureteral stent placement;  Surgeon: Harrison Whitehead MD;  Location:  OR     VASCULAR SURGERY         Social History  Tobacco:   History   Smoking Status     Never   Smokeless Tobacco     Never    Alcohol:   Social History    Substance and Sexual Activity      Alcohol use: Never   Illicit Drugs:   History   Drug Use Unknown       Family History  Family History   Problem Relation Age of Onset     Diabetes Type 2  Mother      Heart Disease Father 60     CABG Father 50        triple bypass     Diabetes Type 2  Father      Depression Sister      Substance Abuse Sister      Ovarian Cancer  Maternal Grandmother      Brain Cancer Maternal Grandmother      Pancreatic Cancer Maternal Aunt      Prostate Cancer Maternal Uncle           Nehemiah Mendoza MD on 9/23/2024      cc: Trinidad Hansen [facility]: Heart Failure Care Coordination   Heart Failure Education    Situation/Background:      RN CC provided heart failure education to Siva Ramey during clinic visit.    Assessment:      Living situation: home with family    Barriers to Heart Failure follow-up: None    Medication management: independent    CHF assessment:  No assessment indicated.     Goals Addressed    None         Patient was receptive and understanding to the eduction.  With his recent kidney and pancrease transplant, limiting fluids are on hold at this time per Dr Mendoza.      Intervention/Plan:      CM/HF education topics reviewed:  Low sodium: 2000 mg or less daily, meal choices and label reading   Daily weight monitoring and logging   Medication review and importance of compliance   Overview of C.O.R.E. clinic   Importance of exercise       Education materials provided:  Low sodium food and drink handout  Low sodium food product examples  HF stoplight tool  Guide to HF booklet  C.O.R.E. information brochure          Patient to call/Koviohart message with updates.  Patient to follow up as scheduled.  RN CC reviewed and reinforced fluid/dietary sodium restrictions; patient stated understanding.  Instructed patient to call RN line with new or worsening heart failure symptoms and/or rapid weight gain.     Patient expressed understanding of above education/instructions and denied further questions at this time.       Thank you for allowing me to participate in the care of your patient.      Sincerely,     MD NAJMA [facility]  cc:   Mary Ann Calderon, APRN CNP  [address] [place] [address]

## 2024-09-23 NOTE — TELEPHONE ENCOUNTER
ISSUE:  Tac 13.5 (goal 8-10)   SCr 1.7, above baseline     Fernando Sorensen MD Poucher, Jessica, RN  Elevated serum creatinine and recommend usual assessment for fever, recent illness, pain over kidney allograft, blood pressure and volume status, and repeat labs.    Spoke with Siva, has been dealing with a cold the last few days. Covid test was negative. Symptoms are mild and not taking any OTC medications. Eating and drinking normally, no changes to weight or BPs. No abd pain.      on labs, Siva says he was fasting for 10 hours before labs. Will review with MD. Amylase/lipase WNL     ISSUE:   Tacrolimus IR level 13.5 on 9/23, goal 8-10, dose 3 mg BID.    PLAN:   Call Patient and confirm this was an accurate 12-hour trough.   Verify Tacrolimus IR dose 3 mg BID.   Confirm no new medications or or missed doses.   Confirm no new illness / infection / diarrhea.   If accurate trough and accurate dose, decrease Tacrolimus IR dose to 2.5 mg BID     Repeat labs in 2 days.  *If > 50% change in immunosuppression dose, repeat labs in 1 week.     OUTCOME:   Spoke with Patient, they confirm accurate trough level and current dose 3 mg BID.   Patient confirmed dose change to 2.5 mg BID.  Patient agreed to repeat labs in 2 days.   Orders sent to preferred pharmacy for dose change and lab for repeat labs.   Patient voiced understanding of plan.

## 2024-09-23 NOTE — PROGRESS NOTES
[facility]: Heart Failure Care Coordination   Heart Failure Education    Situation/Background:      RN CC provided heart failure education to Siva Ramey during clinic visit.    Assessment:      Living situation: home with family    Barriers to Heart Failure follow-up: None    Medication management: independent    CHF assessment:  No assessment indicated.     Goals Addressed    None         Patient was receptive and understanding to the eduction.  With his recent kidney and pancrease transplant, limiting fluids are on hold at this time per Dr Mendoza.      Intervention/Plan:      CM/HF education topics reviewed:  Low sodium: 2000 mg or less daily, meal choices and label reading   Daily weight monitoring and logging   Medication review and importance of compliance   Overview of C.O.R.E. clinic   Importance of exercise       Education materials provided:  Low sodium food and drink handout  Low sodium food product examples  HF stoplight tool  Guide to HF booklet  C.O.R.E. information brochure          Patient to call/MyChart message with updates.  Patient to follow up as scheduled.  RN CC reviewed and reinforced fluid/dietary sodium restrictions; patient stated understanding.  Instructed patient to call RN line with new or worsening heart failure symptoms and/or rapid weight gain.     Patient expressed understanding of above education/instructions and denied further questions at this time.

## 2024-09-23 NOTE — PROGRESS NOTES
HEART CARE NOTE          Assessment/Recommendations     1. Severe HFrEF   Assessment / Plan  Near euvolemia on physical exam; denies HF symptoms of orthopnea, PND, LE edema - no changes to regimen at this time   Patient is high risk for adverse cardiac events 2/2 systolic dysfunction, elevated NTproBNP  GDMT as detailed below; Repeat echo 11/24    Current Pharmacotherapy AHA Guideline-Directed Medical Therapy   Lisinopril - held 2/2 renal dysfunction Lisinopril 20 mg twice daily   Metoprolol succinate 25 mg daily Carvedilol 25 mg twice daily   Spironolactone not started Spironolactone 25 mg once daily   Hydralazine NA Hydralazine 100 mg three times daily   Isosorbide dinitrate NA Isosorbide dinitrate 40 mg three times daily   SGLT2 inhibitor:Dapagliflozin/Empagliflozin - not started  Dapagliflozin or Empagliflozin 10 mg daily     2. DM2  Assessment / Plan  Management per PMD    3. CAD  Assessment / Plan  S/p CABG in 2019 and multiple PCIs in 2019 and 2024  Denies chest pain or anginal equivalents     4. ESRD s/p renal transplant   Assessment / Plan  Follows with renal transplant team    60 minutes spent reviewing prior records (including documentation, laboratory studies, cardiac testing/imaging), history and physical exam, planning, and subsequent documentation.    The longitudinal plan of care for HFrEF was addressed during this visit. Due to the added complexity in care, I will continue to support Mr. Siva Ramey in the subsequent management of this condition(s) and with the ongoing continuity of care of this condition(s).      History of Present Illness/Subjective    Mr. Siva Ramey is a 49 year old male with a PMHx significant for (per Epic) PCI s/p IBAN 2019 and 2/2024, 2v CABG 2019, PAD, HTN, DM2, ESRD now s/p pancreas & renal transplant 7/20/2024. His post-op course was c/b VF arrest w/ short duration of CPR followed by ROSC and EKG showing inferior STEMI who presents to [facility] clinic to establish  "care    Today, Mr. Ramey denies acute cardiac events or complaints; denies HF symptoms of orthopnea, PND, fluid retention or edema. Management plan as detailed above.    We discussed HF diet and lifestyle modifications including the 2 g Na and 2L fluid restrictions. He does not currently adhere, but will do so moving forward. Patient was provided with the HF educational binder as well as a book to log his daily weights as well as HF education by our CORE RN    ECG: personally reviewed; nonspecific ST and T waves changes, sinus tachycardia.    ECHO (personnaly Reviewed on 9/23/24):   Left ventricular function is decreased. The ejection fraction is 35-40%  (moderately reduced).Akinesis of the basal-mid inferior and basal inferoseptal  segments present.  Global right ventricular function is normal.  Mild mitral insufficiency is present.  Estimated mean right atrial pressure is normal.  Trivial pericardial effusion is present.    Lab results: personally reviewed September 23, 2024; notable for elevated NTproBNP    Medical history and pertinent documents reviewed in Care Everywhere please where applicable see details above        Physical Examination Review of Systems   /64 (BP Location: Right arm, Patient Position: Sitting, Cuff Size: Adult Regular)   Pulse 104   Resp 16   Ht 1.803 m (5' 11\")   Wt 98.9 kg (218 lb)   BMI 30.40 kg/m    Body mass index is 30.4 kg/m .  Wt Readings from Last 3 Encounters:   09/23/24 98.9 kg (218 lb)   09/16/24 99.6 kg (219 lb 9.6 oz)   09/09/24 100.8 kg (222 lb 3.2 oz)     General Appearance:   no distress, normal body habitus   ENT/Mouth: membranes moist, no oral lesions or bleeding gums.      EYES:  no scleral icterus, normal conjunctivae   Neck: no carotid bruits or thyromegaly   Chest/Lungs:   lungs are clear to auscultation, no rales or wheezing, equal chest wall expansion    Cardiovascular:   Regular. Normal first and second heart sounds with no murmurs, rubs, or gallops; " the carotid, radial and posterior tibial pulses are intact, no JVD or LE edema bilaterally    Abdomen:  no organomegaly, masses, bruits, or tenderness; bowel sounds are present   Extremities: no cyanosis or clubbing   Skin: no xanthelasma, warm.    Neurologic: NAD     Psychiatric: alert and oriented x3, calm     A complete 10 systems ROS was reviewed  And is negative except what is listed in the HPI.          Medical History  Surgical History Family History Social History   Past Medical History:   Diagnosis Date    Acquired elevated diaphragm     Anemia in chronic kidney disease     Angina pectoris (H24)     Chronic kidney disease     Coronary artery disease     Diabetes mellitus, type II (H) 12/2001    Diabetic nephropathy (H)     MARQUEZ (dyspnea on exertion)     Dyslipidemia 12/2001    End stage renal disease (H)     History of blood transfusion 2004    Hypertension     takes medication    Ischemic cardiomyopathy     Metabolic acidosis     Myocardial infarction (H)     STEMI -Diagonal branch of the LAD    Obesity (BMI 30-39.9)     Peripheral neuropathy     Proteinuria     Retinopathy     Vitamin D deficiency     Past Surgical History:   Procedure Laterality Date    APPENDECTOMY OPEN N/A 7/19/2024    Procedure: Appendectomy open;  Surgeon: Harrison Whitehead MD;  Location:  OR    BACK SURGERY  2009    L5 disc cut    BENCH KIDNEY  7/19/2024    Procedure: Bench kidney;  Surgeon: Harrison Whitehead MD;  Location:  OR    BENCH PANCREAS N/A 7/19/2024    Procedure: Bench pancreas;  Surgeon: Harrison Whitehead MD;  Location:  OR    BYPASS GRAFT ARTERY CORONARY  06/20/2019    2 vessels    CV CENTRAL VENOUS CATHETER PLACEMENT N/A 7/20/2024    Procedure: Central Venous Catheter Placement;  Surgeon: Dominic Robertson MD;  Location:  HEART CARDIAC CATH LAB    CV CORONARY ANGIOGRAM N/A 01/13/2019    Procedure: Coronary Angiogram;  Surgeon: Cielo Che MD;   Location: [facility] Cath Lab;  Service: Cardiology    CV CORONARY ANGIOGRAM N/A 05/02/2019    Procedure: Coronary Angiogram;  Surgeon: Cielo Che MD;  Location: [facility] Cath Lab;  Service: Cardiology    CV CORONARY ANGIOGRAM N/A 04/30/2020    Procedure: Coronary Angiogram;  Surgeon: Cielo Che MD;  Location: [facility] Cath Lab;  Service: Cardiology    CV CORONARY ANGIOGRAM N/A 07/22/2021    Procedure: CV CORONARY ANGIOGRAM;  Surgeon: Adrian Crow MD;  Location: [facility] LAB CV    CV CORONARY ANGIOGRAM N/A 02/01/2024    Procedure: Coronary Angiogram;  Surgeon: Delroy Carpio MD;  Location: [facility] CARDIAC CATH LAB    CV CORONARY ANGIOGRAM N/A 7/20/2024    Procedure: Coronary Angiogram;  Surgeon: Dominic Robertson MD;  Location: [facility] CARDIAC CATH LAB    CV CORONARY LITHOTRIPSY PCI N/A 7/20/2024    Procedure: Percutaneous Coronary Intervention - Lithotripsy;  Surgeon: Dominic Robertson MD;  Location: [facility] CARDIAC CATH LAB    CV FRACTIONAL FLOW RATIO WIRE N/A 07/22/2021    Procedure: Fractional Flow Ratio Wire;  Surgeon: Adrian Crow MD;  Location: [facility] CV    CV INTRAVASULAR ULTRASOUND N/A 7/20/2024    Procedure: Intravascular Ultrasound;  Surgeon: Dominic Robertson MD;  Location: [facility] CARDIAC CATH LAB    CV LEFT HEART CATH N/A 07/22/2021    Procedure: Left Heart Cath;  Surgeon: Adrian Crow MD;  Location: [facility] CV    CV LEFT HEART CATHETERIZATION WITH LEFT VENTRICULOGRAM N/A 01/13/2019    Procedure: Left Heart Catheterization with Left Ventriculogram;  Surgeon: Cielo Che MD;  Location: [facility] Cath Lab;  Service: Cardiology    CV LEFT HEART CATHETERIZATION WITHOUT LEFT VENTRICULOGRAM Left 04/30/2020    Procedure: Left Heart Catheterization Without Left Ventriculogram;  Surgeon: Cielo Che MD;  Location: [facility] Cath Lab;  Service: Cardiology    CV PCI N/A 02/01/2024    Procedure:  Percutaneous Coronary Intervention;  Surgeon: Delroy Carpio MD;  Location: [facility] CARDIAC CATH LAB    CV PCI N/A 2024    Procedure: Percutaneous Coronary Intervention;  Surgeon: Dominic Robertson MD;  Location: [facility] CARDIAC CATH LAB    CV PCI ASPIRATION THROMECTOMY N/A 2024    Procedure: Percutaneous Coronary Intervention Aspiration Thrombectomy;  Surgeon: Dominic Robertson MD;  Location: [facility] CARDIAC CATH LAB    CV PCI STENT DRUG ELUTING N/A 2024    Procedure: Percutaneous Coronary Intervention Stent;  Surgeon: Dominic Robertson MD;  Location: [facility] CARDIAC CATH LAB    CV RIGHT HEART CATHETERIZATION N/A 2020    Procedure: Right Heart Catheterization;  Surgeon: Cielo Che MD;  Location: [facility] Cath Lab;  Service: Cardiology    ESOPHAGOSCOPY, GASTROSCOPY, DUODENOSCOPY (EGD), COMBINED N/A 2024    Procedure: Esophagoscopy, gastroscopy, duodenoscopy (EGD), combined;  Surgeon: Jolene Ramirez MD;  Location:  GI    EYE SURGERY      GENITOURINARY SURGERY      HERNIA REPAIR      OTHER SURGICAL HISTORY      Excise varicocele    STENT, CORONARY, DALE  2019    TRANSPLANT PANCREAS, KIDNEY  DONOR, COMBINED N/A 2024    Procedure: Transplant pancreas, kidney  donor, with ureteral stent placement;  Surgeon: Harrison Whitehead MD;  Location:  OR    VASCULAR SURGERY      no family history of premature coronary artery disease Social History     Socioeconomic History    Marital status:      Spouse name: Not on file    Number of children: Not on file    Years of education: Not on file    Highest education level: Not on file   Occupational History    Not on file   Tobacco Use    Smoking status: Never     Passive exposure: Past    Smokeless tobacco: Never   Substance and Sexual Activity    Alcohol use: Never    Drug use: Never    Sexual activity: Yes     Partners: Female   Other Topics  Concern    Parent/sibling w/ CABG, MI or angioplasty before 65F 55M? Yes   Social History Narrative    Not on file     Social Determinants of Health     Financial Resource Strain: Low Risk  (8/31/2024)    Financial Resource Strain     Within the past 12 months, have you or your family members you live with been unable to get utilities (heat, electricity) when it was really needed?: No   Food Insecurity: Low Risk  (8/31/2024)    Food Insecurity     Within the past 12 months, did you worry that your food would run out before you got money to buy more?: No     Within the past 12 months, did the food you bought just not last and you didn t have money to get more?: No   Transportation Needs: Low Risk  (8/31/2024)    Transportation Needs     Within the past 12 months, has lack of transportation kept you from medical appointments, getting your medicines, non-medical meetings or appointments, work, or from getting things that you need?: No   Physical Activity: Not on file   Stress: Not on file   Social Connections: Unknown (4/10/2023)    Received [facility] [employer] [facility] [facility]    Social Connections     Frequency of Communication with Friends and Family: Not on file   Interpersonal Safety: Low Risk  (8/31/2024)    Interpersonal Safety     Do you feel physically and emotionally safe where you currently live?: Yes     Within the past 12 months, have you been hit, slapped, kicked or otherwise physically hurt by someone?: No     Within the past 12 months, have you been humiliated or emotionally abused in other ways by your partner or ex-partner?: No   Housing Stability: Low Risk  (8/31/2024)    Housing Stability     Do you have housing? : Yes     Are you worried about losing your housing?: No           Lab Results    Chemistry/lipid CBC Cardiac Enzymes/BNP/TSH/INR   Lab Results   Component Value Date    CHOL 95 09/04/2024    HDL 32 (L) 09/04/2024    TRIG 113  09/04/2024    BUN 17.5 09/23/2024     09/23/2024    CO2 23 09/23/2024    Lab Results   Component Value Date    WBC 5.6 09/23/2024    HGB 11.5 (L) 09/23/2024    HCT 36.5 (L) 09/23/2024    MCV 92 09/23/2024     09/23/2024    Lab Results   Component Value Date    TROPONINI <0.01 07/20/2021    BNP 25 07/19/2021    TSH 1.78 10/17/2023    INR 1.39 (H) 07/20/2024     Lab Results   Component Value Date    TROPONINI <0.01 07/20/2021          Weight:    Wt Readings from Last 3 Encounters:   09/23/24 98.9 kg (218 lb)   09/16/24 99.6 kg (219 lb 9.6 oz)   09/09/24 100.8 kg (222 lb 3.2 oz)       Allergies  Allergies   Allergen Reactions    Amoxicillin Hives     & generalized pain    Tolerated ceftriaxone in 2023 ([facility]) and 2024 ([facility])    Venlafaxine      lethargic         Surgical History  Past Surgical History:   Procedure Laterality Date    APPENDECTOMY OPEN N/A 7/19/2024    Procedure: Appendectomy open;  Surgeon: Harrison Whitehead MD;  Location:  OR    BACK SURGERY  2009    L5 disc cut    BENCH KIDNEY  7/19/2024    Procedure: Bench kidney;  Surgeon: Harrison Whitehead MD;  Location:  OR    BENCH PANCREAS N/A 7/19/2024    Procedure: Bench pancreas;  Surgeon: Harrison Whitehead MD;  Location:  OR    BYPASS GRAFT ARTERY CORONARY  06/20/2019    2 vessels    CV CENTRAL VENOUS CATHETER PLACEMENT N/A 7/20/2024    Procedure: Central Venous Catheter Placement;  Surgeon: Dominic Robertson MD;  Location:  HEART CARDIAC CATH LAB    CV CORONARY ANGIOGRAM N/A 01/13/2019    Procedure: Coronary Angiogram;  Surgeon: Cielo Che MD;  Location: [facility] Cath Lab;  Service: Cardiology    CV CORONARY ANGIOGRAM N/A 05/02/2019    Procedure: Coronary Angiogram;  Surgeon: Cielo Che MD;  Location: [facility] Cath Lab;  Service: Cardiology    CV CORONARY ANGIOGRAM N/A 04/30/2020    Procedure: Coronary Angiogram;  Surgeon: Yane  MD Cielo;  Location: [facility] Cath Lab;  Service: Cardiology    CV CORONARY ANGIOGRAM N/A 07/22/2021    Procedure: CV CORONARY ANGIOGRAM;  Surgeon: Adrian Crow MD;  Location: [facility] CATH LAB CV    CV CORONARY ANGIOGRAM N/A 02/01/2024    Procedure: Coronary Angiogram;  Surgeon: Delroy Carpio MD;  Location: [facility] CARDIAC CATH LAB    CV CORONARY ANGIOGRAM N/A 7/20/2024    Procedure: Coronary Angiogram;  Surgeon: Dominic Robertson MD;  Location: [facility] CARDIAC CATH LAB    CV CORONARY LITHOTRIPSY PCI N/A 7/20/2024    Procedure: Percutaneous Coronary Intervention - Lithotripsy;  Surgeon: Dominic Robertson MD;  Location: [facility] CARDIAC CATH LAB    CV FRACTIONAL FLOW RATIO WIRE N/A 07/22/2021    Procedure: Fractional Flow Ratio Wire;  Surgeon: Adrian Crow MD;  Location: [facility] CV    CV INTRAVASULAR ULTRASOUND N/A 7/20/2024    Procedure: Intravascular Ultrasound;  Surgeon: Dominic Robertson MD;  Location: [facility] CARDIAC CATH LAB    CV LEFT HEART CATH N/A 07/22/2021    Procedure: Left Heart Cath;  Surgeon: Adrian Crow MD;  Location: [facility] CV    CV LEFT HEART CATHETERIZATION WITH LEFT VENTRICULOGRAM N/A 01/13/2019    Procedure: Left Heart Catheterization with Left Ventriculogram;  Surgeon: Cielo Che MD;  Location: [facility] Cath Lab;  Service: Cardiology    CV LEFT HEART CATHETERIZATION WITHOUT LEFT VENTRICULOGRAM Left 04/30/2020    Procedure: Left Heart Catheterization Without Left Ventriculogram;  Surgeon: Cielo Che MD;  Location: [facility] Cath Lab;  Service: Cardiology    CV PCI N/A 02/01/2024    Procedure: Percutaneous Coronary Intervention;  Surgeon: Delroy Carpio MD;  Location: [facility] CARDIAC CATH LAB    CV PCI N/A 7/20/2024    Procedure: Percutaneous Coronary Intervention;  Surgeon: Dominic Robertson MD;  Location: [facility] CARDIAC CATH LAB    CV PCI ASPIRATION THROMECTOMY N/A  2024    Procedure: Percutaneous Coronary Intervention Aspiration Thrombectomy;  Surgeon: Dominic Robertson MD;  Location:  HEART CARDIAC CATH LAB    CV PCI STENT DRUG ELUTING N/A 2024    Procedure: Percutaneous Coronary Intervention Stent;  Surgeon: Dominic Robertson MD;  Location:  HEART CARDIAC CATH LAB    CV RIGHT HEART CATHETERIZATION N/A 2020    Procedure: Right Heart Catheterization;  Surgeon: Cielo Che MD;  Location: [facility] Cath Lab;  Service: Cardiology    ESOPHAGOSCOPY, GASTROSCOPY, DUODENOSCOPY (EGD), COMBINED N/A 2024    Procedure: Esophagoscopy, gastroscopy, duodenoscopy (EGD), combined;  Surgeon: Jolene Ramirez MD;  Location:  GI    EYE SURGERY      GENITOURINARY SURGERY      HERNIA REPAIR      OTHER SURGICAL HISTORY      Excise varicocele    STENT, CORONARY, DALE      TRANSPLANT PANCREAS, KIDNEY  DONOR, COMBINED N/A 2024    Procedure: Transplant pancreas, kidney  donor, with ureteral stent placement;  Surgeon: Harrison hWitehead MD;  Location:  OR    VASCULAR SURGERY         Social History  Tobacco:   History   Smoking Status    Never   Smokeless Tobacco    Never    Alcohol:   Social History    Substance and Sexual Activity      Alcohol use: Never   Illicit Drugs:   History   Drug Use Unknown       Family History  Family History   Problem Relation Age of Onset    Diabetes Type 2  Mother     Heart Disease Father 60    CABG Father 50        triple bypass    Diabetes Type 2  Father     Depression Sister     Substance Abuse Sister     Ovarian Cancer Maternal Grandmother     Brain Cancer Maternal Grandmother     Pancreatic Cancer Maternal Aunt     Prostate Cancer Maternal Uncle           Nehemiah Mendoza MD on 2024      cc: Trinidad Hansen

## 2024-09-24 RX ORDER — TACROLIMUS 1 MG/1
2 CAPSULE ORAL 2 TIMES DAILY
Qty: 120 CAPSULE | Refills: 11 | Status: SHIPPED | OUTPATIENT
Start: 2024-09-24 | End: 2024-09-27

## 2024-09-24 RX ORDER — TACROLIMUS 0.5 MG/1
0.5 CAPSULE ORAL 2 TIMES DAILY
Qty: 60 CAPSULE | Refills: 11 | Status: SHIPPED | OUTPATIENT
Start: 2024-09-24 | End: 2024-09-27

## 2024-09-25 LAB — BACTERIA UR CULT: NORMAL

## 2024-09-26 ENCOUNTER — LAB (OUTPATIENT)
Dept: LAB | Facility: HOSPITAL | Age: 49
End: 2024-09-26
Payer: MEDICARE

## 2024-09-26 DIAGNOSIS — Z48.298 AFTERCARE FOLLOWING ORGAN TRANSPLANT: ICD-10-CM

## 2024-09-26 DIAGNOSIS — Z94.0 KIDNEY REPLACED BY TRANSPLANT: ICD-10-CM

## 2024-09-26 DIAGNOSIS — Z79.899 ENCOUNTER FOR LONG-TERM CURRENT USE OF MEDICATION: ICD-10-CM

## 2024-09-26 DIAGNOSIS — Z94.83 PANCREAS REPLACED BY TRANSPLANT (H): ICD-10-CM

## 2024-09-26 DIAGNOSIS — E83.39 HYPOPHOSPHATEMIA: ICD-10-CM

## 2024-09-26 DIAGNOSIS — Z98.890 OTHER SPECIFIED POSTPROCEDURAL STATES: ICD-10-CM

## 2024-09-26 LAB
AMYLASE SERPL-CCNC: 80 U/L (ref 28–100)
ANION GAP SERPL CALCULATED.3IONS-SCNC: 8 MMOL/L (ref 7–15)
BUN SERPL-MCNC: 20.2 MG/DL (ref 6–20)
CALCIUM SERPL-MCNC: 10.4 MG/DL (ref 8.8–10.4)
CHLORIDE SERPL-SCNC: 104 MMOL/L (ref 98–107)
CREAT SERPL-MCNC: 1.88 MG/DL (ref 0.67–1.17)
EGFRCR SERPLBLD CKD-EPI 2021: 43 ML/MIN/1.73M2
ERYTHROCYTE [DISTWIDTH] IN BLOOD BY AUTOMATED COUNT: 15.2 % (ref 10–15)
GLUCOSE SERPL-MCNC: 154 MG/DL (ref 70–99)
HCO3 SERPL-SCNC: 22 MMOL/L (ref 22–29)
HCT VFR BLD AUTO: 35 % (ref 40–53)
HGB BLD-MCNC: 10.9 G/DL (ref 13.3–17.7)
LIPASE SERPL-CCNC: 47 U/L (ref 13–60)
MAGNESIUM SERPL-MCNC: 1.2 MG/DL (ref 1.7–2.3)
MCH RBC QN AUTO: 28.2 PG (ref 26.5–33)
MCHC RBC AUTO-ENTMCNC: 31.1 G/DL (ref 31.5–36.5)
MCV RBC AUTO: 90 FL (ref 78–100)
PHOSPHATE SERPL-MCNC: 2.1 MG/DL (ref 2.5–4.5)
PLATELET # BLD AUTO: 187 10E3/UL (ref 150–450)
POTASSIUM SERPL-SCNC: 5.2 MMOL/L (ref 3.4–5.3)
RBC # BLD AUTO: 3.87 10E6/UL (ref 4.4–5.9)
SODIUM SERPL-SCNC: 134 MMOL/L (ref 135–145)
TACROLIMUS BLD-MCNC: 12.8 UG/L (ref 5–15)
TME LAST DOSE: NORMAL H
TME LAST DOSE: NORMAL H
WBC # BLD AUTO: 4.1 10E3/UL (ref 4–11)

## 2024-09-26 PROCEDURE — 36415 COLL VENOUS BLD VENIPUNCTURE: CPT

## 2024-09-26 PROCEDURE — 82150 ASSAY OF AMYLASE: CPT

## 2024-09-26 PROCEDURE — 83690 ASSAY OF LIPASE: CPT

## 2024-09-26 PROCEDURE — 80197 ASSAY OF TACROLIMUS: CPT

## 2024-09-26 PROCEDURE — 83735 ASSAY OF MAGNESIUM: CPT

## 2024-09-26 PROCEDURE — 84100 ASSAY OF PHOSPHORUS: CPT

## 2024-09-26 PROCEDURE — 85027 COMPLETE CBC AUTOMATED: CPT

## 2024-09-26 PROCEDURE — 80048 BASIC METABOLIC PNL TOTAL CA: CPT

## 2024-09-27 ENCOUNTER — HOSPITAL ENCOUNTER (OUTPATIENT)
Facility: CLINIC | Age: 49
End: 2024-09-27
Attending: UROLOGY | Admitting: UROLOGY
Payer: COMMERCIAL

## 2024-09-27 ENCOUNTER — TELEPHONE (OUTPATIENT)
Dept: UROLOGY | Facility: CLINIC | Age: 49
End: 2024-09-27
Payer: COMMERCIAL

## 2024-09-27 ENCOUNTER — TELEPHONE (OUTPATIENT)
Dept: TRANSPLANT | Facility: CLINIC | Age: 49
End: 2024-09-27
Payer: COMMERCIAL

## 2024-09-27 DIAGNOSIS — Z94.0 KIDNEY REPLACED BY TRANSPLANT: ICD-10-CM

## 2024-09-27 DIAGNOSIS — Z94.83 PANCREAS REPLACED BY TRANSPLANT (H): ICD-10-CM

## 2024-09-27 DIAGNOSIS — R33.9 URINARY RETENTION: Primary | ICD-10-CM

## 2024-09-27 DIAGNOSIS — E11.9 DM2 (DIABETES MELLITUS, TYPE 2) (H): Primary | ICD-10-CM

## 2024-09-27 RX ORDER — TACROLIMUS 0.5 MG/1
CAPSULE ORAL
Status: SHIPPED
Start: 2024-09-27 | End: 2024-10-01

## 2024-09-27 RX ORDER — TACROLIMUS 1 MG/1
2 CAPSULE ORAL 2 TIMES DAILY
Qty: 120 CAPSULE | Refills: 11 | Status: SHIPPED | OUTPATIENT
Start: 2024-09-27 | End: 2024-10-01

## 2024-09-27 NOTE — TELEPHONE ENCOUNTER
Post Kidney and Pancreas Transplant Team Conference  Date: 9/27/2024  Transplant Coordinator: Ximena GARCIA     Attendees:  []  Dr. Sorensen [] Leonor Kang, BETSY [] Betty Landaverde LPN     []  Dr. Shell [] Rosalia Romero, RN [] Lelo Reese LPN    [] Dr. Wallis [] Ximena Edwards RN    [] Dr. Coombs [] Mony Crabtree RN [] Angella Liriano RN   [] Dr. Kong [] Asha Allred RN    [] Dr. Christianson [] Cindy Ye RN    []  Dr. Whitehead [] Miryam Iyer RN    [] Dr. Michaud [] Gino Mahan RN    [] Sobia Underwood NP [] Roxanna Monet RN    [] Shelia Magallon NP [] Alisa Cutler RN        Verbal Plan Read Back:   Continue to try to move up stent removal with urology.   Consult with endocrinology regarding starting SGLT-2 inhibitor.     Routed to RN Coordinator   Julianna Edwards RN

## 2024-09-27 NOTE — TELEPHONE ENCOUNTER
ISSUE:   Tacrolimus IR level 12.8 on 9/26, goal 8-10, dose 2.5 mg BID.    PLAN:   Call Patient and confirm this was an accurate 12-hour trough.   Verify Tacrolimus IR dose 2.5 mg BID.   Confirm no new medications or or missed doses.   Confirm no new illness / infection / diarrhea.   If accurate trough and accurate dose, decrease Tacrolimus IR dose to 2 mg BID     Is this more than a 50% increase or decrease in current IS dose: No  If YES, justification: N/A    Repeat labs in 3 days.  *If > 50% change in immunosuppression dose, repeat labs in 1 week.     OUTCOME:   Left VM, sent [employer] message.     Endocrinology referral placed for SGLT-2 inhibitor management.

## 2024-09-27 NOTE — TELEPHONE ENCOUNTER
Called patient to schedule surgery with Dr. Harris / Dr. Lutz     Spoke with: Siva      Date of Surgery: 10/11/2024    Estimated Arrival time Discussed with Patient:  Yes - likely early morning     Location of surgery: [facility]     Pre-Op H&P: PCP, patient will call his clinic to schedule     Post-Op Appt Date w/ Surgeon:  N/A     Discussed with patient pre-op RN will call 2-3 days prior to surgery with arrival time and instructions:  Yes       Standard Surgery Packet Sent: Yes 09/27/24  via [employer] Message      Additional Comments:       All patients questions were answered and was instructed to review surgical packet and call back with any questions or concerns.       Lurdes Ibrahim on 9/27/2024 at 5:46 PM

## 2024-09-30 ENCOUNTER — LAB (OUTPATIENT)
Dept: LAB | Facility: HOSPITAL | Age: 49
End: 2024-09-30
Payer: MEDICARE

## 2024-09-30 ENCOUNTER — VIRTUAL VISIT (OUTPATIENT)
Dept: TRANSPLANT | Facility: CLINIC | Age: 49
End: 2024-09-30
Attending: INTERNAL MEDICINE
Payer: COMMERCIAL

## 2024-09-30 DIAGNOSIS — Z98.890 OTHER SPECIFIED POSTPROCEDURAL STATES: ICD-10-CM

## 2024-09-30 DIAGNOSIS — Z48.298 AFTERCARE FOLLOWING ORGAN TRANSPLANT: ICD-10-CM

## 2024-09-30 DIAGNOSIS — Z48.298 AFTERCARE FOLLOWING ORGAN TRANSPLANT: Primary | ICD-10-CM

## 2024-09-30 DIAGNOSIS — Z94.0 KIDNEY REPLACED BY TRANSPLANT: ICD-10-CM

## 2024-09-30 DIAGNOSIS — Z79.899 ENCOUNTER FOR LONG-TERM CURRENT USE OF MEDICATION: ICD-10-CM

## 2024-09-30 DIAGNOSIS — Z94.83 PANCREAS REPLACED BY TRANSPLANT (H): ICD-10-CM

## 2024-09-30 LAB
AMYLASE SERPL-CCNC: 77 U/L (ref 28–100)
ANION GAP SERPL CALCULATED.3IONS-SCNC: 10 MMOL/L (ref 7–15)
BUN SERPL-MCNC: 20.3 MG/DL (ref 6–20)
CALCIUM SERPL-MCNC: 10.6 MG/DL (ref 8.8–10.4)
CHLORIDE SERPL-SCNC: 107 MMOL/L (ref 98–107)
CREAT SERPL-MCNC: 1.96 MG/DL (ref 0.67–1.17)
EGFRCR SERPLBLD CKD-EPI 2021: 41 ML/MIN/1.73M2
ERYTHROCYTE [DISTWIDTH] IN BLOOD BY AUTOMATED COUNT: 15 % (ref 10–15)
GLUCOSE SERPL-MCNC: 152 MG/DL (ref 70–99)
HCO3 SERPL-SCNC: 22 MMOL/L (ref 22–29)
HCT VFR BLD AUTO: 38.4 % (ref 40–53)
HGB BLD-MCNC: 11.9 G/DL (ref 13.3–17.7)
LIPASE SERPL-CCNC: 49 U/L (ref 13–60)
MAGNESIUM SERPL-MCNC: 1.6 MG/DL (ref 1.7–2.3)
MCH RBC QN AUTO: 28.6 PG (ref 26.5–33)
MCHC RBC AUTO-ENTMCNC: 31 G/DL (ref 31.5–36.5)
MCV RBC AUTO: 92 FL (ref 78–100)
PHOSPHATE SERPL-MCNC: 2.6 MG/DL (ref 2.5–4.5)
PLATELET # BLD AUTO: 211 10E3/UL (ref 150–450)
POTASSIUM SERPL-SCNC: 5.1 MMOL/L (ref 3.4–5.3)
RBC # BLD AUTO: 4.16 10E6/UL (ref 4.4–5.9)
SODIUM SERPL-SCNC: 139 MMOL/L (ref 135–145)
TACROLIMUS BLD-MCNC: 15.2 UG/L (ref 5–15)
TME LAST DOSE: ABNORMAL H
TME LAST DOSE: ABNORMAL H
WBC # BLD AUTO: 3.7 10E3/UL (ref 4–11)

## 2024-09-30 PROCEDURE — 83735 ASSAY OF MAGNESIUM: CPT

## 2024-09-30 PROCEDURE — 80197 ASSAY OF TACROLIMUS: CPT

## 2024-09-30 PROCEDURE — 82150 ASSAY OF AMYLASE: CPT

## 2024-09-30 PROCEDURE — 83690 ASSAY OF LIPASE: CPT

## 2024-09-30 PROCEDURE — 99207 PR DROP WITH A PROCEDURE: CPT | Mod: 93

## 2024-09-30 PROCEDURE — 84100 ASSAY OF PHOSPHORUS: CPT

## 2024-09-30 PROCEDURE — 85027 COMPLETE CBC AUTOMATED: CPT

## 2024-09-30 PROCEDURE — 80048 BASIC METABOLIC PNL TOTAL CA: CPT

## 2024-09-30 PROCEDURE — 36415 COLL VENOUS BLD VENIPUNCTURE: CPT

## 2024-09-30 NOTE — PROGRESS NOTES
Post Transplant Patient Social Work Assessment -Outpatient    Patient Name: Siva Ramey  : 1975  Age: 49 year old  MRN: [id number]  Date of transplant: 2024    Patient known to this writer from follow up in the transplant program. Seen today to update assessment.      Presenting Information   Living Situation:   Functional Status: Independent with ADL's   Cultural/Language/Spiritual Considerations: NA    Support System  Primary Support Person wife  Other support:  family     Health Care Directive  Decision Maker: Self   Alternate Decision Maker: Wife  Health Care Directive: Provided education    Mental Health/Coping:   History of Mental Health: no current concerns   History of Chemical Health: no current concerns   Current status: appropriate   Coping: has strong family support   Services Needed/Recommended: NA    Financial   Income: disability   Impact of transplant on income: May no longer be eligible for disability one year post successful transplant if disability is solely due to kidney health issues.   Insurance and medication coverage: [employer], will apply to ESRD Medicare  Financial concerns: None noted   Resources needed: Re-sent 6273 form in mail along with additional information via [employer].        Assessment and recommendations and plan:  SW spoke with patient via phone. Pt denies any concerns. Reports he did not receive his 9468 form in the mail. Writer resent this and sent additional information regarding ESRD Medicare to pt via [employer].       MANUEL Caldwell, [facility]   Outpatient Kidney/Pancreas/Auto Islet Transplant Program  Ph: [phone]

## 2024-10-01 ENCOUNTER — TELEPHONE (OUTPATIENT)
Dept: PHARMACY | Facility: CLINIC | Age: 49
End: 2024-10-01

## 2024-10-01 ENCOUNTER — ANCILLARY PROCEDURE (OUTPATIENT)
Dept: ULTRASOUND IMAGING | Facility: CLINIC | Age: 49
End: 2024-10-01
Attending: INTERNAL MEDICINE
Payer: COMMERCIAL

## 2024-10-01 ENCOUNTER — TELEPHONE (OUTPATIENT)
Dept: UROLOGY | Facility: CLINIC | Age: 49
End: 2024-10-01

## 2024-10-01 ENCOUNTER — TELEPHONE (OUTPATIENT)
Dept: TRANSPLANT | Facility: CLINIC | Age: 49
End: 2024-10-01

## 2024-10-01 ENCOUNTER — OFFICE VISIT (OUTPATIENT)
Dept: TRANSPLANT | Facility: CLINIC | Age: 49
End: 2024-10-01
Attending: INTERNAL MEDICINE
Payer: COMMERCIAL

## 2024-10-01 VITALS
TEMPERATURE: 97.8 F | BODY MASS INDEX: 30.34 KG/M2 | DIASTOLIC BLOOD PRESSURE: 86 MMHG | HEART RATE: 92 BPM | OXYGEN SATURATION: 98 % | SYSTOLIC BLOOD PRESSURE: 131 MMHG | WEIGHT: 217.5 LBS

## 2024-10-01 DIAGNOSIS — Z94.83 PANCREAS REPLACED BY TRANSPLANT (H): Primary | ICD-10-CM

## 2024-10-01 DIAGNOSIS — E83.42 HYPOMAGNESEMIA: ICD-10-CM

## 2024-10-01 DIAGNOSIS — E87.20 METABOLIC ACIDOSIS: ICD-10-CM

## 2024-10-01 DIAGNOSIS — D84.9 IMMUNOSUPPRESSED STATUS (H): ICD-10-CM

## 2024-10-01 DIAGNOSIS — Z94.0 KIDNEY REPLACED BY TRANSPLANT: ICD-10-CM

## 2024-10-01 DIAGNOSIS — I15.1 HTN, KIDNEY TRANSPLANT RELATED: ICD-10-CM

## 2024-10-01 DIAGNOSIS — I50.20 HFREF (HEART FAILURE WITH REDUCED EJECTION FRACTION) (H): ICD-10-CM

## 2024-10-01 DIAGNOSIS — N18.32 ANEMIA IN STAGE 3B CHRONIC KIDNEY DISEASE (H): ICD-10-CM

## 2024-10-01 DIAGNOSIS — Z94.0 HTN, KIDNEY TRANSPLANT RELATED: ICD-10-CM

## 2024-10-01 DIAGNOSIS — D63.1 ANEMIA IN STAGE 3B CHRONIC KIDNEY DISEASE (H): ICD-10-CM

## 2024-10-01 DIAGNOSIS — Z48.298 AFTERCARE FOLLOWING ORGAN TRANSPLANT: ICD-10-CM

## 2024-10-01 DIAGNOSIS — R79.89 ELEVATED SERUM CREATININE: ICD-10-CM

## 2024-10-01 DIAGNOSIS — R79.89 ELEVATED SERUM CREATININE: Primary | ICD-10-CM

## 2024-10-01 DIAGNOSIS — Z29.89 NEED FOR PNEUMOCYSTIS PROPHYLAXIS: ICD-10-CM

## 2024-10-01 DIAGNOSIS — I73.9 PAD (PERIPHERAL ARTERY DISEASE) (H): ICD-10-CM

## 2024-10-01 DIAGNOSIS — N18.32 STAGE 3B CHRONIC KIDNEY DISEASE (H): ICD-10-CM

## 2024-10-01 PROCEDURE — 99215 OFFICE O/P EST HI 40 MIN: CPT | Mod: 24 | Performed by: INTERNAL MEDICINE

## 2024-10-01 PROCEDURE — G0463 HOSPITAL OUTPT CLINIC VISIT: HCPCS | Performed by: INTERNAL MEDICINE

## 2024-10-01 PROCEDURE — G2211 COMPLEX E/M VISIT ADD ON: HCPCS | Performed by: INTERNAL MEDICINE

## 2024-10-01 PROCEDURE — 76776 US EXAM K TRANSPL W/DOPPLER: CPT | Performed by: RADIOLOGY

## 2024-10-01 RX ORDER — TACROLIMUS 1 MG/1
1 CAPSULE ORAL 2 TIMES DAILY
Qty: 60 CAPSULE | Refills: 11 | Status: SHIPPED | OUTPATIENT
Start: 2024-10-01

## 2024-10-01 RX ORDER — TACROLIMUS 0.5 MG/1
0.5 CAPSULE ORAL 2 TIMES DAILY
Qty: 60 CAPSULE | Refills: 11 | Status: SHIPPED | OUTPATIENT
Start: 2024-10-01

## 2024-10-01 RX ORDER — METOPROLOL SUCCINATE 25 MG/1
25 TABLET, EXTENDED RELEASE ORAL DAILY
Qty: 90 TABLET | Refills: 3 | Status: SHIPPED | OUTPATIENT
Start: 2024-10-01

## 2024-10-01 ASSESSMENT — PAIN SCALES - GENERAL: PAINLEVEL: NO PAIN (0)

## 2024-10-01 NOTE — TELEPHONE ENCOUNTER
ISSUE:  SCr 2.0 (baseline ~1.5)   Tac 15.2 (goal 8-10) on 9/30, on 2 mg bid.     Fernando Sorensen MD Poucher, Jessica, RN  Yes an ultrasound.     Will reduce tac to goal. If currently taking 2 mg bid and accurate trough, reduce to 1.5 mg bid. Repeat labs on Thursday.   US booked for 1:25 pm today.     Left detailed VM for Siva with instructions.   Further follow up with Dr. Sorensen in clinic today.     Addendum: SOP pharmacist spoke with patient, confirmed 12 hour trough and current dose of 2 mg bid. Will reduce to 1.5 mg bid and repeat labs on Thursday.

## 2024-10-01 NOTE — PROGRESS NOTES
TRANSPLANT NEPHROLOGY CLINIC VISIT     Assessment & Plan   # DDKT (SPK): CKD Stage 3b - Trend up in creatinine, possibly due to high tacrolimus levels.  Concerned that prolonged indwelling ureteral stent may be contributory.  Will obtain a kidney transplant ultrasound.   - Baseline Creatinine: ~ 1.5-1.8   - Proteinuria: Moderate (1-3 grams)   - DSA Hx: No DSA   - Last cPRA: 11%   - BK Viremia: No   - Kidney Tx Biopsy Hx: No biopsy history.    # Pancreas Tx (SPK): Patient with persistently higher blood glucose since transplant.  Would consider SGLT2 inhibitor.  Will defer to PCP to manage.   - Pancreatic Exocrine Drainage: Enteric drained     - Blood glucose: Elevated blood glucose      On insulin: No   - HbA1c: Stable      Latest HbA1c: 6.4%   - Pancreatic enzymes: Stable   - DSA Hx: No DSA   - Pancreas Tx Biopsy Hx: No biopsy history    # Immunosuppression: Tacrolimus immediate release (goal 8-10) and Mycophenolic acid (dose 720 mg every 12 hours)   - Induction with Recent Transplant:  High Intensity Protocol   - Continue with intensive monitoring of immunosuppression for efficacy and toxicity.   - Historical Changes in Immunosuppression: None   - Changes: Not at this time    # Infection Prevention:      - PJP: Dapsone; Changed off Bactrim due to hyperkalemia.  - CMV: Valganciclovir (Valcyte)  - Thrush: Nystatin (Mycostatin) swish and swallow      - CMV IgG Ab High Risk Discordance (D+/R-): No  CMV Serostatus: Positive  - EBV IgG Ab High Risk Discordance (D+/R-): No  EBV Serostatus: Positive    # Hypertension: Controlled;  Goal BP: < 140/90   - Changes: Not at this time    # Anemia in Chronic Renal Disease: Hgb: Stable      KERRI: No   - Iron studies: Low iron saturation, but high ferritin    # Mineral Bone Disorder:    - Secondary renal hyperparathyroidism; PTH level: Mildly elevated (151-300 pg/ml)        On treatment: None  - Vitamin D; level: High        On supplement: No; Cholecalciferol held due to high  level.  - Calcium; level: High        On supplement: No  - Phosphorus; level: Normal        On supplement: Yes; Will stop oral phosphorus supplement.    # Electrolytes:   - Potassium; level: High normal        On supplement: No  - Magnesium; level: Stable low        On supplement: Yes  - Bicarbonate; level: Normal        On supplement: Yes    # Urethral Stricture: Patient has meatal stenosis and so far unable to have ureteral stent removed in the outpatient setting.  Set up for ureteral stent removal in OR by Urology on 10/11/24.    # Obesity, Class I (BMI = 30.4): Weight is mostly stable.   - Recommend weight loss for overall health by increasing exercise and watching caloric intake.    # Other Significant PMH:   - CAD, s/p CABG: Asymptomatic.   - HFrEF: Last cardiac echo (Jul/2024) showed moderately decreased LVEF ~ 35-40% with akinesis of the basal-mid inferior and basal inferoseptal segments.   - H/o V. Fib Arrest: Patient with V. Fib arrest and STEMI following kidney transplant and emergently brought to Cath Lab and found to have in stent thrombosis of the previous mid RCA stent, which was felt to be the culprit in inferior STEMI.  Patient underwent aspiration thrombectomy and PCI with two IBAN.  Followed by Cardiology.   - PAD: No claudication symptoms.   - GERD: Asymptomatic on PPI.    - Recommend tapering off pantoprazole by starting to take it every other day.  If tolerated without significant symptoms, patient can stop pantoprazole and just take it or OTC famotidine (Pepcid) as needed for heartburn symptoms.   - Lower Back Pain with Sciatica: Patient with h/o lower back pain and sciatica years ago, now returned with pain radiating down right leg.  Has been taking cyclobenzaprine.     # Skin Cancer Risk:    - Discussed sun protection and recommend regular follow up with Dermatology.    # Transplant History:  Etiology of Kidney Failure: Diabetes mellitus type 2  Tx: SPK  Transplant: 7/20/2024 (Kidney /  Pancreas)  Significant transplant-related complications:  Peritransplant STEMI and V. Fib arrest.    Transplant Office Phone Number: [phone]    Assessment and plan was discussed with the patient and he voiced his understanding and agreement.    Return visit: Return for previously scheduled visit.    Fernando Sorensen MD    The longitudinal plan of care for the diagnosis(es)/condition(s) as documented were addressed during this visit. Due to the added complexity in care, I will continue to support Siva in the subsequent management and with ongoing continuity of care.      Chief Complaint   Mr. Ramey is a 49 year old here for kidney transplant, pancreas transplant, and immunosuppression management.     History of Present Illness    Mr. Ramey reports feeling okay overall.  Since last clinic visit:   Hospitalizations: No   New Medical Issues: Yes; Return of lower back pain with radiation of pain down right leg.  He reports having similar symptoms ~ 8 years ago, but resolved until recently.  Has been taking Flexeril.  He tries to stay active, but only can do a little being limited by back pain.  Chest pain or shortness of breath: No, but still tires easy.  Lower extremity swelling: No  Weight change: No; Weight is mostly stable to down a pound or so.  Nausea and vomiting: No  Diarrhea: No  Heartburn symptoms: No  Fever, sweats or chills: No  Night sweats: Yes; Occasional, mild to moderate symptoms.  Urinary complaints: No    Home BP:  120-130/70-80s    Problem List   Patient Active Problem List   Diagnosis    Type 2 diabetes mellitus with other circulatory complication, unspecified whether long term insulin use (H)    Dyslipidemia    Class 2 severe obesity due to excess calories with serious comorbidity in adult, unspecified BMI (H)    Atherosclerosis of other coronary artery bypass graft(s) with unstable angina pectoris (H)    Anemia in chronic renal disease    HTN, kidney transplant related    S/P CABG  (coronary artery bypass graft)    Balanoposthitis    History of ST elevation myocardial infarction (STEMI)    Obesity (BMI 30.0-34.9)    MARQUEZ (dyspnea on exertion)    STEMI (ST elevation myocardial infarction) (H)    Metabolic acidosis    Retinopathy    Peripheral neuropathy    Acquired elevated diaphragm    Median nerve neuropathy, left    Iron deficiency anemia, unspecified    Stage 3b chronic kidney disease (H)    Coagulation defect, unspecified (H)    Fatigue, unspecified type    Obstructive sleep apnea treated with continuous positive airway pressure (CPAP)    Snoring    Palpitations    Gastroesophageal reflux disease without esophagitis    Diabetes mellitus, type 2 (H)    Kidney replaced by transplant    Pancreas replaced by transplant (H)    Immunosuppressed status (H)    Cardiac arrest with ventricular fibrillation (H)    Steroid-induced hyperglycemia    History of simultaneous kidney and pancreas transplant (H)    Hypomagnesemia    Secondary renal hyperparathyroidism (H)    Need for pneumocystis prophylaxis    Other stricture of urethra in male    Aftercare following organ transplant    CAD (coronary artery disease)    PAD (peripheral artery disease) (H)    HFrEF (heart failure with reduced ejection fraction) (H)       Allergies   Allergies   Allergen Reactions    Amoxicillin Hives     & generalized pain    [facility]    Venlafaxine      lethargic       Medications   Current Outpatient Medications   Medication Sig Dispense Refill    acetaminophen (TYLENOL) 500 MG tablet Take 500 mg by mouth every 4 hours as needed      aspirin (ASA) 81 MG chewable tablet Take 1 tablet (81 mg) by mouth daily Starting tomorrow. 30 tablet 3    atorvastatin (LIPITOR) 40 MG tablet Take 1 tablet (40 mg) by mouth every evening 30 tablet 2    carvedilol (COREG) 6.25 MG tablet Take 1 tablet (6.25 mg) by mouth 2 times daily (with meals). 180 tablet 3    dapsone (ACZONE)  25 MG tablet Take 2 tablets (50 mg) by mouth daily 60 tablet 11    magnesium glycinate 100 MG CAPS capsule Take 2 capsules (200 mg) by mouth 2 times daily Take in place of magnesium-oxide 120 capsule 2    methocarbamol (ROBAXIN) 750 MG tablet Take 1 tablet (750 mg) by mouth every 6 hours as needed for muscle spasms 20 tablet 0    metoclopramide (REGLAN) 5 MG tablet Take 1 tablet (5 mg) by mouth 2 times daily (before meals). 60 tablet 1    mycophenolic acid (GENERIC EQUIVALENT) 180 MG EC tablet Take 4 tablets (720 mg) by mouth 2 times daily 240 tablet 11    nystatin (MYCOSTATIN) 614522 UNIT/ML suspension Take 5 mLs (500,000 Units) by mouth 4 times daily 600 mL 2    pantoprazole (PROTONIX) 40 MG EC tablet Take 1 tablet (40 mg) by mouth every morning (before breakfast) 30 tablet 1    sodium bicarbonate 650 MG tablet Take 3 tablets (1,950 mg) by mouth 3 times daily 270 tablet 2    Sodium Bicarbonate, antacid, (NICE PURE BAKING SODA) POWD Take 2 g by mouth daily. Take 1/2 tsp (2gm) daily      tacrolimus (GENERIC EQUIVALENT) 0.5 MG capsule Take 1 capsule (0.5 mg) by mouth 2 times daily. Total dose = 1.5 mg twice daily 60 capsule 11    tacrolimus (GENERIC EQUIVALENT) 1 MG capsule Take 1 capsule (1 mg) by mouth 2 times daily. Total dose = 1.5 mg twice daily 60 capsule 11    ticagrelor (BRILINTA) 90 MG tablet Take 1 tablet (90 mg) by mouth 2 times daily. 60 tablet 5    valGANciclovir (VALCYTE) 450 MG tablet Take 2 tablets (900 mg) by mouth daily 60 tablet 2    metoprolol succinate ER (TOPROL XL) 25 MG 24 hr tablet Take 1 tablet (25 mg) by mouth daily. 90 tablet 3     Current Facility-Administered Medications   Medication Dose Route Frequency Provider Last Rate Last Admin    levofloxacin (LEVAQUIN) tablet 500 mg  500 mg Oral Once Shelia Magallon APRN CNP        lidocaine (XYLOCAINE) 2 % external gel   Urethral Once Shelia Magallon APRN CNP        lidocaine (XYLOCAINE) 2 % external gel   Urethral Once Shelia Magallon APRN  CNP         Medications Discontinued During This Encounter   Medication Reason    phosphorus tablet 250 mg 250 MG per tablet        Physical Exam   Vital Signs: /86   Pulse 92   Temp 97.8  F (36.6  C) (Oral)   Wt 98.7 kg (217 lb 8 oz)   SpO2 98%   BMI 30.34 kg/m      GENERAL APPEARANCE: alert and no distress  HENT: mouth without ulcers or lesions  RESP: lungs clear to auscultation - no rales, rhonchi or wheezes  CV: regular rhythm, normal rate, no rub, no murmur  EDEMA: no LE edema bilaterally  ABDOMEN: soft, nondistended, nontender, bowel sounds normal  MS: extremities normal - no gross deformities noted, no evidence of inflammation in joints, no muscle tenderness  SKIN: no rash  TX KIDNEY: normal  DIALYSIS ACCESS:  LUE AV fistula with good thrill    Data         Latest Ref Rng & Units 9/30/2024     8:25 AM 9/26/2024     9:00 AM 9/23/2024     7:41 AM   Renal   Sodium 135 - 145 mmol/L 139  134  136    K 3.4 - 5.3 mmol/L 5.1  5.2  5.0    Cl 98 - 107 mmol/L 107  104  104    Cl (external) 98 - 107 mmol/L 107  104  104    CO2 22 - 29 mmol/L 22  22  23    Urea Nitrogen 6.0 - 20.0 mg/dL 20.3  20.2  17.5    Creatinine 0.67 - 1.17 mg/dL 1.96  1.88  1.77    Glucose 70 - 99 mg/dL 152  154  187    Calcium 8.8 - 10.4 mg/dL 10.6  10.4  10.3    Magnesium 1.7 - 2.3 mg/dL 1.6  1.2           Latest Ref Rng & Units 9/30/2024     8:25 AM 9/26/2024     9:00 AM 9/19/2024     8:50 AM   Bone Health   Phosphorus 2.5 - 4.5 mg/dL 2.6  2.1  1.6          Latest Ref Rng & Units 9/30/2024     8:25 AM 9/26/2024     9:00 AM 9/23/2024     7:41 AM   Heme   WBC 4.0 - 11.0 10e3/uL 3.7  4.1  5.6    Hgb 13.3 - 17.7 g/dL 11.9  10.9  11.5    Plt 150 - 450 10e3/uL 211  187  190          Latest Ref Rng & Units 8/30/2024     9:48 AM 8/24/2024     7:18 AM 7/30/2024     5:57 AM   Liver   AP 40 - 150 U/L 174  186  105    TBili <=1.2 mg/dL 0.6  0.6  0.4    ALT 0 - 70 U/L 38  32  30    AST 0 - 45 U/L 26  26  25    Tot Protein 6.4 - 8.3 g/dL 7.2  7.2   5.2    Albumin 3.5 - 5.2 g/dL 4.5  4.4  3.2          Latest Ref Rng & Units 9/30/2024     8:25 AM 9/26/2024     9:00 AM 9/23/2024     7:41 AM   Pancreas   Amylase 28 - 100 U/L 77  80  67    Lipase (Roche) 13 - 60 U/L 49  47  43          Latest Ref Rng & Units 8/5/2024     7:58 AM   Iron studies   Iron 61 - 157 ug/dL 44    Iron Sat Index 15 - 46 % 17    Ferritin 31 - 409 ng/mL 1,725          Latest Ref Rng & Units 7/19/2024     4:56 PM 9/27/2021    12:18 PM   [facility]   EBV CAPSID ANTIBODY IGG No detectable antibody. Positive  Positive      Failed to redirect to the Timeline version of the REVFS SmartLink.  Recent Labs   Lab Test 09/23/24  0741 09/26/24  0900 09/30/24  0825   DOSTAC 9/22/2024 9/25/2024 9/29/2024   TACROL 13.5 12.8 15.2*     Recent Labs   Lab Test 09/04/24  1023 09/19/24  0850 09/23/24  0741   DOSMPA 9/4/2024   8:00 AM 9/18/2024   8:00 PM  --    MPACID 3.80* 3.42 2.81   MPAG 104.3* 54.9 72.0

## 2024-10-01 NOTE — NURSING NOTE
Chief Complaint   Patient presents with    RECHECK       /86   Pulse 92   Temp 97.8  F (36.6  C) (Oral)   Wt 98.7 kg (217 lb 8 oz)   SpO2 98%   BMI 30.34 kg/m      Yoav Ibrahim on 10/1/2024 at 10:27 AM

## 2024-10-01 NOTE — TELEPHONE ENCOUNTER
Called PT to discuss Brillinta medication, needs to be stopped 5 days before surgery.  VM left requesting PT call back to discuss this medication.     Elena Lowe RN

## 2024-10-01 NOTE — LETTER
10/1/2024      Siva Ramey  [address] [facility] [address]      Dear Colleague,    Thank you for referring your patient, Siva Ramey, to the [facility]ANSPL[facility]. Please see a copy of my visit note below.    TRANSPLANT NEPHROLOGY CLINIC VISIT     Assessment & Plan  # DDKT (SPK): CKD Stage 3b - Trend up in creatinine, possibly due to high tacrolimus levels.  Concerned that prolonged indwelling ureteral stent may be contributory.  Will obtain a kidney transplant ultrasound.   - Baseline Creatinine: ~ 1.5-1.8   - Proteinuria: Moderate (1-3 grams)   - DSA Hx: No DSA   - Last cPRA: 11%   - BK Viremia: No   - Kidney Tx Biopsy Hx: No biopsy history.    # Pancreas Tx (SPK): Patient with persistently higher blood glucose since transplant.  Would consider SGLT2 inhibitor.  Will defer to PCP to manage.   - Pancreatic Exocrine Drainage: Enteric drained     - Blood glucose: Elevated blood glucose      On insulin: No   - HbA1c: Stable      Latest HbA1c: 6.4%   - Pancreatic enzymes: Stable   - DSA Hx: No DSA   - Pancreas Tx Biopsy Hx: No biopsy history    # Immunosuppression: Tacrolimus immediate release (goal 8-10) and Mycophenolic acid (dose 720 mg every 12 hours)   - Induction with Recent Transplant:  High Intensity Protocol   - Continue with intensive monitoring of immunosuppression for efficacy and toxicity.   - Historical Changes in Immunosuppression: None   - Changes: Not at this time    # Infection Prevention:      - PJP: Dapsone; Changed off Bactrim due to hyperkalemia.  - CMV: Valganciclovir (Valcyte)  - Thrush: Nystatin (Mycostatin) swish and swallow      - CMV IgG Ab High Risk Discordance (D+/R-): No  CMV Serostatus: Positive  - EBV IgG Ab High Risk Discordance (D+/R-): No  EBV Serostatus: Positive    # Hypertension: Controlled;  Goal BP: < 140/90   - Changes: Not at this time    # Anemia in Chronic Renal Disease: Hgb: Stable      KERRI: No   - Iron studies: Low iron saturation, but high  ferritin    # Mineral Bone Disorder:    - Secondary renal hyperparathyroidism; PTH level: Mildly elevated (151-300 pg/ml)        On treatment: None  - Vitamin D; level: High        On supplement: No; Cholecalciferol held due to high level.  - Calcium; level: High        On supplement: No  - Phosphorus; level: Normal        On supplement: Yes; Will stop oral phosphorus supplement.    # Electrolytes:   - Potassium; level: High normal        On supplement: No  - Magnesium; level: Stable low        On supplement: Yes  - Bicarbonate; level: Normal        On supplement: Yes    # Urethral Stricture: Patient has meatal stenosis and so far unable to have ureteral stent removed in the outpatient setting.  Set up for ureteral stent removal in OR by Urology on 10/11/24.    # Obesity, Class I (BMI = 30.4): Weight is mostly stable.   - Recommend weight loss for overall health by increasing exercise and watching caloric intake.    # Other Significant PMH:   - CAD, s/p CABG: Asymptomatic.   - HFrEF: Last cardiac echo (Jul/2024) showed moderately decreased LVEF ~ 35-40% with akinesis of the basal-mid inferior and basal inferoseptal segments.   - H/o V. Fib Arrest: Patient with V. Fib arrest and STEMI following kidney transplant and emergently brought to Cath Lab and found to have in stent thrombosis of the previous mid RCA stent, which was felt to be the culprit in inferior STEMI.  Patient underwent aspiration thrombectomy and PCI with two IBAN.  Followed by Cardiology.   - PAD: No claudication symptoms.   - GERD: Asymptomatic on PPI.    - Recommend tapering off pantoprazole by starting to take it every other day.  If tolerated without significant symptoms, patient can stop pantoprazole and just take it or OTC famotidine (Pepcid) as needed for heartburn symptoms.   - Lower Back Pain with Sciatica: Patient with h/o lower back pain and sciatica years ago, now returned with pain radiating down right leg.  Has been taking  cyclobenzaprine.     # Skin Cancer Risk:    - Discussed sun protection and recommend regular follow up with Dermatology.    # Transplant History:  Etiology of Kidney Failure: Diabetes mellitus type 2  Tx: SPK  Transplant: 7/20/2024 (Kidney / Pancreas)  Significant transplant-related complications:  Peritransplant STEMI and V. Fib arrest.    Transplant Office Phone Number: [phone]    Assessment and plan was discussed with the patient and he voiced his understanding and agreement.    Return visit: Return for previously scheduled visit.    Fernando Sorensen MD    The longitudinal plan of care for the diagnosis(es)/condition(s) as documented were addressed during this visit. Due to the added complexity in care, I will continue to support Siva in the subsequent management and with ongoing continuity of care.      Chief Complaint  Mr. Ramey is a 49 year old here for kidney transplant, pancreas transplant, and immunosuppression management.     History of Present Illness   Mr. Ramey reports feeling okay overall.  Since last clinic visit:   Hospitalizations: No   New Medical Issues: Yes; Return of lower back pain with radiation of pain down right leg.  He reports having similar symptoms ~ 8 years ago, but resolved until recently.  Has been taking Flexeril.  He tries to stay active, but only can do a little being limited by back pain.  Chest pain or shortness of breath: No, but still tires easy.  Lower extremity swelling: No  Weight change: No; Weight is mostly stable to down a pound or so.  Nausea and vomiting: No  Diarrhea: No  Heartburn symptoms: No  Fever, sweats or chills: No  Night sweats: Yes; Occasional, mild to moderate symptoms.  Urinary complaints: No    Home BP:  120-130/70-80s    Problem List  Patient Active Problem List   Diagnosis     Type 2 diabetes mellitus with other circulatory complication, unspecified whether long term insulin use (H)     Dyslipidemia     Class 2 severe obesity due to excess  calories with serious comorbidity in adult, unspecified BMI (H)     Atherosclerosis of other coronary artery bypass graft(s) with unstable angina pectoris (H)     Anemia in chronic renal disease     HTN, kidney transplant related     S/P CABG (coronary artery bypass graft)     Balanoposthitis     History of ST elevation myocardial infarction (STEMI)     Obesity (BMI 30.0-34.9)     MARQUEZ (dyspnea on exertion)     STEMI (ST elevation myocardial infarction) (H)     Metabolic acidosis     Retinopathy     Peripheral neuropathy     Acquired elevated diaphragm     Median nerve neuropathy, left     Iron deficiency anemia, unspecified     Stage 3b chronic kidney disease (H)     Coagulation defect, unspecified (H)     Fatigue, unspecified type     Obstructive sleep apnea treated with continuous positive airway pressure (CPAP)     Snoring     Palpitations     Gastroesophageal reflux disease without esophagitis     Diabetes mellitus, type 2 (H)     Kidney replaced by transplant     Pancreas replaced by transplant (H)     Immunosuppressed status (H)     Cardiac arrest with ventricular fibrillation (H)     Steroid-induced hyperglycemia     History of simultaneous kidney and pancreas transplant (H)     Hypomagnesemia     Secondary renal hyperparathyroidism (H)     Need for pneumocystis prophylaxis     Other stricture of urethra in male     Aftercare following organ transplant     CAD (coronary artery disease)     PAD (peripheral artery disease) (H)     HFrEF (heart failure with reduced ejection fraction) (H)       Allergies  Allergies   Allergen Reactions     Amoxicillin Hives     & generalized pain    Tolerated ceftriaxone in 2023 ([facility]) and 2024 ([facility])     Venlafaxine      lethargic       Medications  Current Outpatient Medications   Medication Sig Dispense Refill     acetaminophen (TYLENOL) 500 MG tablet Take 500 mg by mouth every 4 hours as needed       aspirin (ASA) 81 MG chewable tablet Take 1 tablet (81  mg) by mouth daily Starting tomorrow. 30 tablet 3     atorvastatin (LIPITOR) 40 MG tablet Take 1 tablet (40 mg) by mouth every evening 30 tablet 2     carvedilol (COREG) 6.25 MG tablet Take 1 tablet (6.25 mg) by mouth 2 times daily (with meals). 180 tablet 3     dapsone (ACZONE) 25 MG tablet Take 2 tablets (50 mg) by mouth daily 60 tablet 11     magnesium glycinate 100 MG CAPS capsule Take 2 capsules (200 mg) by mouth 2 times daily Take in place of magnesium-oxide 120 capsule 2     methocarbamol (ROBAXIN) 750 MG tablet Take 1 tablet (750 mg) by mouth every 6 hours as needed for muscle spasms 20 tablet 0     metoclopramide (REGLAN) 5 MG tablet Take 1 tablet (5 mg) by mouth 2 times daily (before meals). 60 tablet 1     mycophenolic acid (GENERIC EQUIVALENT) 180 MG EC tablet Take 4 tablets (720 mg) by mouth 2 times daily 240 tablet 11     nystatin (MYCOSTATIN) 574545 UNIT/ML suspension Take 5 mLs (500,000 Units) by mouth 4 times daily 600 mL 2     pantoprazole (PROTONIX) 40 MG EC tablet Take 1 tablet (40 mg) by mouth every morning (before breakfast) 30 tablet 1     sodium bicarbonate 650 MG tablet Take 3 tablets (1,950 mg) by mouth 3 times daily 270 tablet 2     Sodium Bicarbonate, antacid, (NICE PURE BAKING SODA) POWD Take 2 g by mouth daily. Take 1/2 tsp (2gm) daily       tacrolimus (GENERIC EQUIVALENT) 0.5 MG capsule Take 1 capsule (0.5 mg) by mouth 2 times daily. Total dose = 1.5 mg twice daily 60 capsule 11     tacrolimus (GENERIC EQUIVALENT) 1 MG capsule Take 1 capsule (1 mg) by mouth 2 times daily. Total dose = 1.5 mg twice daily 60 capsule 11     ticagrelor (BRILINTA) 90 MG tablet Take 1 tablet (90 mg) by mouth 2 times daily. 60 tablet 5     valGANciclovir (VALCYTE) 450 MG tablet Take 2 tablets (900 mg) by mouth daily 60 tablet 2     metoprolol succinate ER (TOPROL XL) 25 MG 24 hr tablet Take 1 tablet (25 mg) by mouth daily. 90 tablet 3     Current Facility-Administered Medications   Medication Dose Route  Frequency Provider Last Rate Last Admin     levofloxacin (LEVAQUIN) tablet 500 mg  500 mg Oral Once Shelia Magallon APRN CNP         lidocaine (XYLOCAINE) 2 % external gel   Urethral Once Shelia Magallon APRN CNP         lidocaine (XYLOCAINE) 2 % external gel   Urethral Once Shelia Magallon APRN CNP         Medications Discontinued During This Encounter   Medication Reason     phosphorus tablet 250 mg 250 MG per tablet        Physical Exam  Vital Signs: /86   Pulse 92   Temp 97.8  F (36.6  C) (Oral)   Wt 98.7 kg (217 lb 8 oz)   SpO2 98%   BMI 30.34 kg/m      GENERAL APPEARANCE: alert and no distress  HENT: mouth without ulcers or lesions  RESP: lungs clear to auscultation - no rales, rhonchi or wheezes  CV: regular rhythm, normal rate, no rub, no murmur  EDEMA: no LE edema bilaterally  ABDOMEN: soft, nondistended, nontender, bowel sounds normal  MS: extremities normal - no gross deformities noted, no evidence of inflammation in joints, no muscle tenderness  SKIN: no rash  TX KIDNEY: normal  DIALYSIS ACCESS:  LUE AV fistula with good thrill    Data        Latest Ref Rng & Units 9/30/2024     8:25 AM 9/26/2024     9:00 AM 9/23/2024     7:41 AM   Renal   Sodium 135 - 145 mmol/L 139  134  136    K 3.4 - 5.3 mmol/L 5.1  5.2  5.0    Cl 98 - 107 mmol/L 107  104  104    Cl (external) 98 - 107 mmol/L 107  104  104    CO2 22 - 29 mmol/L 22  22  23    Urea Nitrogen 6.0 - 20.0 mg/dL 20.3  20.2  17.5    Creatinine 0.67 - 1.17 mg/dL 1.96  1.88  1.77    Glucose 70 - 99 mg/dL 152  154  187    Calcium 8.8 - 10.4 mg/dL 10.6  10.4  10.3    Magnesium 1.7 - 2.3 mg/dL 1.6  1.2           Latest Ref Rng & Units 9/30/2024     8:25 AM 9/26/2024     9:00 AM 9/19/2024     8:50 AM   Bone Health   Phosphorus 2.5 - 4.5 mg/dL 2.6  2.1  1.6          Latest Ref Rng & Units 9/30/2024     8:25 AM 9/26/2024     9:00 AM 9/23/2024     7:41 AM   Heme   WBC 4.0 - 11.0 10e3/uL 3.7  4.1  5.6    Hgb 13.3 - 17.7 g/dL 11.9  10.9  11.5    Plt 150 -  450 10e3/uL 211  187  190          Latest Ref Rng & Units 8/30/2024     9:48 AM 8/24/2024     7:18 AM 7/30/2024     5:57 AM   Liver   AP 40 - 150 U/L 174  186  105    TBili <=1.2 mg/dL 0.6  0.6  0.4    ALT 0 - 70 U/L 38  32  30    AST 0 - 45 U/L 26  26  25    Tot Protein 6.4 - 8.3 g/dL 7.2  7.2  5.2    Albumin 3.5 - 5.2 g/dL 4.5  4.4  3.2          Latest Ref Rng & Units 9/30/2024     8:25 AM 9/26/2024     9:00 AM 9/23/2024     7:41 AM   Pancreas   Amylase 28 - 100 U/L 77  80  67    Lipase (Roche) 13 - 60 U/L 49  47  43          Latest Ref Rng & Units 8/5/2024     7:58 AM   Iron studies   Iron 61 - 157 ug/dL 44    Iron Sat Index 15 - 46 % 17    Ferritin 31 - 409 ng/mL 1,725          Latest Ref Rng & Units 7/19/2024     4:56 PM 9/27/2021    12:18 PM   [facility]   EBV CAPSID ANTIBODY IGG No detectable antibody. Positive  Positive      Failed to redirect to the Timeline version of the REVFS SmartLink.  Recent Labs   Lab Test 09/23/24  0741 09/26/24  0900 09/30/24  0825   DOSTAC 9/22/2024 9/25/2024 9/29/2024   TACROL 13.5 12.8 15.2*     Recent Labs   Lab Test 09/04/24  1023 09/19/24  0850 09/23/24  0741   DOSMPA 9/4/2024   8:00 AM 9/18/2024   8:00 PM  --    MPACID 3.80* 3.42 2.81   MPAG 104.3* 54.9 72.0          Again, thank you for allowing me to participate in the care of your patient.        Sincerely,        Fernando Sorensen MD

## 2024-10-01 NOTE — TELEPHONE ENCOUNTER
"Clinical Pharmacy Consult:                                                      Transplant Specific: 2 month post transplant discharge medication review  Date of Transplant: 07/20/2024  Type of Transplant: kidney and pancreas  First Transplant: yes  History of rejection: no    Immunosuppression Regimen   TAC 1.5mg qAM & 1.5mg qPM and MPA 720mg qAM & 720mg qPM  Patient specific goal: 8-10  Most recent level: 15.2, date 9/30/24  Immunosuppressant Levels:  Supratherapeutic, dose adjustef  Pt adherent to lab draws: yes  Scr:   Lab Results   Component Value Date    CR 1.47 09/09/2024     Side effects: none    Prophylactic Medications  Antibacterial:  Dapsone 50 mg daily  Scheduled Discontinue Date: Lifelong    Antifungal: Nystatin  Scheduled Discontinue Date: 3 months    Antiviral: CrCl >/=60 mL/minute: Valcyte 900mg daily   Scheduled Discontinue Date: 3 months    Acid Reducer: Protonix (pantoprazole)  Scheduled Reviewed Date:  TBD    Thrombosis Prevention: Aspirin 81 mg PO daily  Scheduled Discontinue Date:  TBD    Blood Pressure Management  Frequency of home Blood Pressure checks: not checking  Most recent home BP: 128/64 at clinic 9/23/24  Patient Blood pressure goal: <140/90  Patient blood pressure at goal:  yes  Hospitalizations/ER visits since last assessment: 0    Med rec/DUR performed: yes partial, chart and profile  Med Rec Discrepancies: no    Briefly spoke with patient via phone today. He was on his way to an appointment. Patient is doing very well, denied missed dose. No side effects to report. GI symptoms well controlled with pantoprazole. Denied N/V/D. No fever, pain , swelling or fatigue. Weight is \"fine\".  Discussed tac level. Patient confirmed 12-hour trough, no infection, no new meds, was taking 2mg BID. Patient verbalize understanding of new dose at 1.5mg BID.  BP at goal from recent clinic visit.    No other concerns at this time. Follow up in 1 month.    Efrain Macias, Pharm D  [facility]

## 2024-10-01 NOTE — PATIENT INSTRUCTIONS
Patient Recommendations:  - Stop phosphorus supplement.    Transplant Patient Information  Your Post Transplant Coordinator is: Ximena Edwards  For non urgent items, we encourage you to contact your coordinator/care team online via Poken  You and your care team can also contact your transplant coordinator Monday - Friday, 8am - 5pm at [phone] (Option 2 to reach the coordinator or Option 4 to schedule an appointment).  After hours for urgent matters, please call [facility] at [phone].    Kidney Transplant Lab Frequency Protocol  0 to 2 Months:  Twice weekly  2 to 4 Months:  Once weekly  4 to 7 Months:  Every other week  7 Months to > 5 years: Monthly

## 2024-10-01 NOTE — LETTER
10/1/2024      RE: Siva Ramey  [address] [facility] [address]       TRANSPLANT NEPHROLOGY CLINIC VISIT     Assessment & Plan  # DDKT (SPK): CKD Stage 3b - Trend up in creatinine, possibly due to high tacrolimus levels.  Concerned that prolonged indwelling ureteral stent may be contributory.  Will obtain a kidney transplant ultrasound.   - Baseline Creatinine: ~ 1.5-1.8   - Proteinuria: Moderate (1-3 grams)   - DSA Hx: No DSA   - Last cPRA: 11%   - BK Viremia: No   - Kidney Tx Biopsy Hx: No biopsy history.    # Pancreas Tx (SPK): Patient with persistently higher blood glucose since transplant.  Would consider SGLT2 inhibitor.  Will defer to PCP to manage.   - Pancreatic Exocrine Drainage: Enteric drained     - Blood glucose: Elevated blood glucose      On insulin: No   - HbA1c: Stable      Latest HbA1c: 6.4%   - Pancreatic enzymes: Stable   - DSA Hx: No DSA   - Pancreas Tx Biopsy Hx: No biopsy history    # Immunosuppression: Tacrolimus immediate release (goal 8-10) and Mycophenolic acid (dose 720 mg every 12 hours)   - Induction with Recent Transplant:  High Intensity Protocol   - Continue with intensive monitoring of immunosuppression for efficacy and toxicity.   - Historical Changes in Immunosuppression: None   - Changes: Not at this time    # Infection Prevention:      - PJP: Dapsone; Changed off Bactrim due to hyperkalemia.  - CMV: Valganciclovir (Valcyte)  - Thrush: Nystatin (Mycostatin) swish and swallow      - CMV IgG Ab High Risk Discordance (D+/R-): No  CMV Serostatus: Positive  - EBV IgG Ab High Risk Discordance (D+/R-): No  EBV Serostatus: Positive    # Hypertension: Controlled;  Goal BP: < 140/90   - Changes: Not at this time    # Anemia in Chronic Renal Disease: Hgb: Stable      KERRI: No   - Iron studies: Low iron saturation, but high ferritin    # Mineral Bone Disorder:    - Secondary renal hyperparathyroidism; PTH level: Mildly elevated (151-300 pg/ml)        On treatment: None  - Vitamin  D; level: High        On supplement: No; Cholecalciferol held due to high level.  - Calcium; level: High        On supplement: No  - Phosphorus; level: Normal        On supplement: Yes; Will stop oral phosphorus supplement.    # Electrolytes:   - Potassium; level: High normal        On supplement: No  - Magnesium; level: Stable low        On supplement: Yes  - Bicarbonate; level: Normal        On supplement: Yes    # Urethral Stricture: Patient has meatal stenosis and so far unable to have ureteral stent removed in the outpatient setting.  Set up for ureteral stent removal in OR by Urology on 10/11/24.    # Obesity, Class I (BMI = 30.4): Weight is mostly stable.   - Recommend weight loss for overall health by increasing exercise and watching caloric intake.    # Other Significant PMH:   - CAD, s/p CABG: Asymptomatic.   - HFrEF: Last cardiac echo (Jul/2024) showed moderately decreased LVEF ~ 35-40% with akinesis of the basal-mid inferior and basal inferoseptal segments.   - H/o V. Fib Arrest: Patient with V. Fib arrest and STEMI following kidney transplant and emergently brought to Cath Lab and found to have in stent thrombosis of the previous mid RCA stent, which was felt to be the culprit in inferior STEMI.  Patient underwent aspiration thrombectomy and PCI with two IBAN.  Followed by Cardiology.   - PAD: No claudication symptoms.   - GERD: Asymptomatic on PPI.    - Recommend tapering off pantoprazole by starting to take it every other day.  If tolerated without significant symptoms, patient can stop pantoprazole and just take it or OTC famotidine (Pepcid) as needed for heartburn symptoms.   - Lower Back Pain with Sciatica: Patient with h/o lower back pain and sciatica years ago, now returned with pain radiating down right leg.  Has been taking cyclobenzaprine.     # Skin Cancer Risk:    - Discussed sun protection and recommend regular follow up with Dermatology.    # Transplant History:  Etiology of Kidney Failure:  Diabetes mellitus type 2  Tx: SPK  Transplant: 7/20/2024 (Kidney / Pancreas)  Significant transplant-related complications:  Peritransplant STEMI and V. Fib arrest.    Transplant Office Phone Number: [phone]    Assessment and plan was discussed with the patient and he voiced his understanding and agreement.    Return visit: Return for previously scheduled visit.    Fernando Sorensen MD    The longitudinal plan of care for the diagnosis(es)/condition(s) as documented were addressed during this visit. Due to the added complexity in care, I will continue to support Siva in the subsequent management and with ongoing continuity of care.      Chief Complaint  Mr. Ramey is a 49 year old here for kidney transplant, pancreas transplant, and immunosuppression management.     History of Present Illness   Mr. Ramey reports feeling okay overall.  Since last clinic visit:   Hospitalizations: No   New Medical Issues: Yes; Return of lower back pain with radiation of pain down right leg.  He reports having similar symptoms ~ 8 years ago, but resolved until recently.  Has been taking Flexeril.  He tries to stay active, but only can do a little being limited by back pain.  Chest pain or shortness of breath: No, but still tires easy.  Lower extremity swelling: No  Weight change: No; Weight is mostly stable to down a pound or so.  Nausea and vomiting: No  Diarrhea: No  Heartburn symptoms: No  Fever, sweats or chills: No  Night sweats: Yes; Occasional, mild to moderate symptoms.  Urinary complaints: No    Home BP:  120-130/70-80s    Problem List  Patient Active Problem List   Diagnosis     Type 2 diabetes mellitus with other circulatory complication, unspecified whether long term insulin use (H)     Dyslipidemia     Class 2 severe obesity due to excess calories with serious comorbidity in adult, unspecified BMI (H)     Atherosclerosis of other coronary artery bypass graft(s) with unstable angina pectoris (H)     Anemia in  chronic renal disease     HTN, kidney transplant related     S/P CABG (coronary artery bypass graft)     Balanoposthitis     History of ST elevation myocardial infarction (STEMI)     Obesity (BMI 30.0-34.9)     MARQUEZ (dyspnea on exertion)     STEMI (ST elevation myocardial infarction) (H)     Metabolic acidosis     Retinopathy     Peripheral neuropathy     Acquired elevated diaphragm     Median nerve neuropathy, left     Iron deficiency anemia, unspecified     Stage 3b chronic kidney disease (H)     Coagulation defect, unspecified (H)     Fatigue, unspecified type     Obstructive sleep apnea treated with continuous positive airway pressure (CPAP)     Snoring     Palpitations     Gastroesophageal reflux disease without esophagitis     Diabetes mellitus, type 2 (H)     Kidney replaced by transplant     Pancreas replaced by transplant (H)     Immunosuppressed status (H)     Cardiac arrest with ventricular fibrillation (H)     Steroid-induced hyperglycemia     History of simultaneous kidney and pancreas transplant (H)     Hypomagnesemia     Secondary renal hyperparathyroidism (H)     Need for pneumocystis prophylaxis     Other stricture of urethra in male     Aftercare following organ transplant     CAD (coronary artery disease)     PAD (peripheral artery disease) (H)     HFrEF (heart failure with reduced ejection fraction) (H)       Allergies  Allergies   Allergen Reactions     Amoxicillin Hives     & generalized pain    Tolerated ceftriaxone in 2023 ([facility]) and 2024 ([facility])     Venlafaxine      lethargic       Medications  Current Outpatient Medications   Medication Sig Dispense Refill     acetaminophen (TYLENOL) 500 MG tablet Take 500 mg by mouth every 4 hours as needed       aspirin (ASA) 81 MG chewable tablet Take 1 tablet (81 mg) by mouth daily Starting tomorrow. 30 tablet 3     atorvastatin (LIPITOR) 40 MG tablet Take 1 tablet (40 mg) by mouth every evening 30 tablet 2     carvedilol (COREG)  6.25 MG tablet Take 1 tablet (6.25 mg) by mouth 2 times daily (with meals). 180 tablet 3     dapsone (ACZONE) 25 MG tablet Take 2 tablets (50 mg) by mouth daily 60 tablet 11     magnesium glycinate 100 MG CAPS capsule Take 2 capsules (200 mg) by mouth 2 times daily Take in place of magnesium-oxide 120 capsule 2     methocarbamol (ROBAXIN) 750 MG tablet Take 1 tablet (750 mg) by mouth every 6 hours as needed for muscle spasms 20 tablet 0     metoclopramide (REGLAN) 5 MG tablet Take 1 tablet (5 mg) by mouth 2 times daily (before meals). 60 tablet 1     mycophenolic acid (GENERIC EQUIVALENT) 180 MG EC tablet Take 4 tablets (720 mg) by mouth 2 times daily 240 tablet 11     nystatin (MYCOSTATIN) 157055 UNIT/ML suspension Take 5 mLs (500,000 Units) by mouth 4 times daily 600 mL 2     pantoprazole (PROTONIX) 40 MG EC tablet Take 1 tablet (40 mg) by mouth every morning (before breakfast) 30 tablet 1     sodium bicarbonate 650 MG tablet Take 3 tablets (1,950 mg) by mouth 3 times daily 270 tablet 2     Sodium Bicarbonate, antacid, (NICE PURE BAKING SODA) POWD Take 2 g by mouth daily. Take 1/2 tsp (2gm) daily       tacrolimus (GENERIC EQUIVALENT) 0.5 MG capsule Take 1 capsule (0.5 mg) by mouth 2 times daily. Total dose = 1.5 mg twice daily 60 capsule 11     tacrolimus (GENERIC EQUIVALENT) 1 MG capsule Take 1 capsule (1 mg) by mouth 2 times daily. Total dose = 1.5 mg twice daily 60 capsule 11     ticagrelor (BRILINTA) 90 MG tablet Take 1 tablet (90 mg) by mouth 2 times daily. 60 tablet 5     valGANciclovir (VALCYTE) 450 MG tablet Take 2 tablets (900 mg) by mouth daily 60 tablet 2     metoprolol succinate ER (TOPROL XL) 25 MG 24 hr tablet Take 1 tablet (25 mg) by mouth daily. 90 tablet 3     Current Facility-Administered Medications   Medication Dose Route Frequency Provider Last Rate Last Admin     levofloxacin (LEVAQUIN) tablet 500 mg  500 mg Oral Once Shelia Magallon APRN CNP         lidocaine (XYLOCAINE) 2 % external gel    Urethral Once Shelia Magallon APRN CNP         lidocaine (XYLOCAINE) 2 % external gel   Urethral Once Shelia Magallon APRN CNP         Medications Discontinued During This Encounter   Medication Reason     phosphorus tablet 250 mg 250 MG per tablet        Physical Exam  Vital Signs: /86   Pulse 92   Temp 97.8  F (36.6  C) (Oral)   Wt 98.7 kg (217 lb 8 oz)   SpO2 98%   BMI 30.34 kg/m      GENERAL APPEARANCE: alert and no distress  HENT: mouth without ulcers or lesions  RESP: lungs clear to auscultation - no rales, rhonchi or wheezes  CV: regular rhythm, normal rate, no rub, no murmur  EDEMA: no LE edema bilaterally  ABDOMEN: soft, nondistended, nontender, bowel sounds normal  MS: extremities normal - no gross deformities noted, no evidence of inflammation in joints, no muscle tenderness  SKIN: no rash  TX KIDNEY: normal  DIALYSIS ACCESS:  LUE AV fistula with good thrill    Data        Latest Ref Rng & Units 9/30/2024     8:25 AM 9/26/2024     9:00 AM 9/23/2024     7:41 AM   Renal   Sodium 135 - 145 mmol/L 139  134  136    K 3.4 - 5.3 mmol/L 5.1  5.2  5.0    Cl 98 - 107 mmol/L 107  104  104    Cl (external) 98 - 107 mmol/L 107  104  104    CO2 22 - 29 mmol/L 22  22  23    Urea Nitrogen 6.0 - 20.0 mg/dL 20.3  20.2  17.5    Creatinine 0.67 - 1.17 mg/dL 1.96  1.88  1.77    Glucose 70 - 99 mg/dL 152  154  187    Calcium 8.8 - 10.4 mg/dL 10.6  10.4  10.3    Magnesium 1.7 - 2.3 mg/dL 1.6  1.2           Latest Ref Rng & Units 9/30/2024     8:25 AM 9/26/2024     9:00 AM 9/19/2024     8:50 AM   Bone Health   Phosphorus 2.5 - 4.5 mg/dL 2.6  2.1  1.6          Latest Ref Rng & Units 9/30/2024     8:25 AM 9/26/2024     9:00 AM 9/23/2024     7:41 AM   Heme   WBC 4.0 - 11.0 10e3/uL 3.7  4.1  5.6    Hgb 13.3 - 17.7 g/dL 11.9  10.9  11.5    Plt 150 - 450 10e3/uL 211  187  190          Latest Ref Rng & Units 8/30/2024     9:48 AM 8/24/2024     7:18 AM 7/30/2024     5:57 AM   Liver   AP 40 - 150 U/L 174  186  105    TBili  <=1.2 mg/dL 0.6  0.6  0.4    ALT 0 - 70 U/L 38  32  30    AST 0 - 45 U/L 26  26  25    Tot Protein 6.4 - 8.3 g/dL 7.2  7.2  5.2    Albumin 3.5 - 5.2 g/dL 4.5  4.4  3.2          Latest Ref Rng & Units 9/30/2024     8:25 AM 9/26/2024     9:00 AM 9/23/2024     7:41 AM   Pancreas   Amylase 28 - 100 U/L 77  80  67    Lipase (Roche) 13 - 60 U/L 49  47  43          Latest Ref Rng & Units 8/5/2024     7:58 AM   Iron studies   Iron 61 - 157 ug/dL 44    Iron Sat Index 15 - 46 % 17    Ferritin 31 - 409 ng/mL 1,725          Latest Ref Rng & Units 7/19/2024     4:56 PM 9/27/2021    12:18 PM   [facility]   EBV CAPSID ANTIBODY IGG No detectable antibody. Positive  Positive      Failed to redirect to the Timeline version of the REVFS SmartLink.  Recent Labs   Lab Test 09/23/24  0741 09/26/24  0900 09/30/24  0825   DOSTAC 9/22/2024 9/25/2024 9/29/2024   TACROL 13.5 12.8 15.2*     Recent Labs   Lab Test 09/04/24  1023 09/19/24  0850 09/23/24  0741   DOSMPA 9/4/2024   8:00 AM 9/18/2024   8:00 PM  --    MPACID 3.80* 3.42 2.81   MPAG 104.3* 54.9 72.0        Fernando Sorensen MD

## 2024-10-02 PROBLEM — G47.8 NON-RESTORATIVE SLEEP: Status: RESOLVED | Noted: 2022-07-28 | Resolved: 2024-10-02

## 2024-10-02 PROBLEM — I73.9 PAD (PERIPHERAL ARTERY DISEASE) (H): Status: ACTIVE | Noted: 2024-10-02

## 2024-10-02 PROBLEM — E83.39 HYPOPHOSPHATEMIA: Status: RESOLVED | Noted: 2022-02-17 | Resolved: 2024-10-02

## 2024-10-02 PROBLEM — T78.40XA ALLERGY, UNSPECIFIED, INITIAL ENCOUNTER: Status: RESOLVED | Noted: 2021-11-10 | Resolved: 2024-10-02

## 2024-10-02 PROBLEM — Z94.0 STATUS POST KIDNEY TRANSPLANT: Status: RESOLVED | Noted: 2024-08-24 | Resolved: 2024-10-02

## 2024-10-02 PROBLEM — N18.32 STAGE 3B CHRONIC KIDNEY DISEASE (H): Status: ACTIVE | Noted: 2021-08-26

## 2024-10-02 PROBLEM — E87.5 HYPERKALEMIA: Status: RESOLVED | Noted: 2024-07-27 | Resolved: 2024-10-02

## 2024-10-02 PROBLEM — I25.10 CAD (CORONARY ARTERY DISEASE): Status: ACTIVE | Noted: 2024-10-02

## 2024-10-02 PROBLEM — E86.0 DEHYDRATION: Status: RESOLVED | Noted: 2024-08-13 | Resolved: 2024-10-02

## 2024-10-02 PROBLEM — I15.1 HTN, KIDNEY TRANSPLANT RELATED: Status: ACTIVE | Noted: 2019-04-19

## 2024-10-02 PROBLEM — R11.2 NAUSEA AND VOMITING, UNSPECIFIED VOMITING TYPE: Status: RESOLVED | Noted: 2024-08-24 | Resolved: 2024-10-02

## 2024-10-02 PROBLEM — R10.84 GENERALIZED ABDOMINAL PAIN: Status: RESOLVED | Noted: 2024-08-30 | Resolved: 2024-10-02

## 2024-10-02 PROBLEM — R09.89 OTHER SPECIFIED SYMPTOMS AND SIGNS INVOLVING THE CIRCULATORY AND RESPIRATORY SYSTEMS: Status: RESOLVED | Noted: 2022-06-06 | Resolved: 2024-10-02

## 2024-10-02 PROBLEM — I50.20 HFREF (HEART FAILURE WITH REDUCED EJECTION FRACTION) (H): Status: ACTIVE | Noted: 2024-10-02

## 2024-10-02 PROBLEM — Z94.0 HTN, KIDNEY TRANSPLANT RELATED: Status: ACTIVE | Noted: 2019-04-19

## 2024-10-02 PROBLEM — T78.2XXA ANAPHYLACTIC SHOCK, UNSPECIFIED, INITIAL ENCOUNTER: Status: RESOLVED | Noted: 2021-11-10 | Resolved: 2024-10-02

## 2024-10-02 PROBLEM — J96.21 ACUTE AND CHRONIC RESPIRATORY FAILURE WITH HYPOXIA (H): Status: RESOLVED | Noted: 2024-07-27 | Resolved: 2024-10-02

## 2024-10-02 PROBLEM — Z48.298 AFTERCARE FOLLOWING ORGAN TRANSPLANT: Status: ACTIVE | Noted: 2024-10-02

## 2024-10-03 ENCOUNTER — LAB (OUTPATIENT)
Dept: LAB | Facility: HOSPITAL | Age: 49
End: 2024-10-03
Payer: MEDICARE

## 2024-10-03 ENCOUNTER — TELEPHONE (OUTPATIENT)
Dept: TRANSPLANT | Facility: CLINIC | Age: 49
End: 2024-10-03

## 2024-10-03 DIAGNOSIS — Z98.890 OTHER SPECIFIED POSTPROCEDURAL STATES: ICD-10-CM

## 2024-10-03 DIAGNOSIS — Z48.298 AFTERCARE FOLLOWING ORGAN TRANSPLANT: ICD-10-CM

## 2024-10-03 DIAGNOSIS — Z79.899 ENCOUNTER FOR LONG-TERM CURRENT USE OF MEDICATION: ICD-10-CM

## 2024-10-03 DIAGNOSIS — Z94.83 PANCREAS REPLACED BY TRANSPLANT (H): ICD-10-CM

## 2024-10-03 DIAGNOSIS — R33.9 URINARY RETENTION: ICD-10-CM

## 2024-10-03 DIAGNOSIS — Z94.0 KIDNEY REPLACED BY TRANSPLANT: ICD-10-CM

## 2024-10-03 LAB
AMYLASE SERPL-CCNC: 74 U/L (ref 28–100)
ANION GAP SERPL CALCULATED.3IONS-SCNC: 9 MMOL/L (ref 7–15)
BUN SERPL-MCNC: 19.1 MG/DL (ref 6–20)
CALCIUM SERPL-MCNC: 10.3 MG/DL (ref 8.8–10.4)
CHLORIDE SERPL-SCNC: 106 MMOL/L (ref 98–107)
CREAT SERPL-MCNC: 1.81 MG/DL (ref 0.67–1.17)
EGFRCR SERPLBLD CKD-EPI 2021: 45 ML/MIN/1.73M2
ERYTHROCYTE [DISTWIDTH] IN BLOOD BY AUTOMATED COUNT: 14.9 % (ref 10–15)
GLUCOSE SERPL-MCNC: 137 MG/DL (ref 70–99)
HCO3 SERPL-SCNC: 23 MMOL/L (ref 22–29)
HCT VFR BLD AUTO: 37.5 % (ref 40–53)
HGB BLD-MCNC: 11.4 G/DL (ref 13.3–17.7)
LIPASE SERPL-CCNC: 47 U/L (ref 13–60)
MAGNESIUM SERPL-MCNC: 1.4 MG/DL (ref 1.7–2.3)
MCH RBC QN AUTO: 27.9 PG (ref 26.5–33)
MCHC RBC AUTO-ENTMCNC: 30.4 G/DL (ref 31.5–36.5)
MCV RBC AUTO: 92 FL (ref 78–100)
PHOSPHATE SERPL-MCNC: 2.6 MG/DL (ref 2.5–4.5)
PLATELET # BLD AUTO: 202 10E3/UL (ref 150–450)
POTASSIUM SERPL-SCNC: 4.9 MMOL/L (ref 3.4–5.3)
RBC # BLD AUTO: 4.09 10E6/UL (ref 4.4–5.9)
SODIUM SERPL-SCNC: 138 MMOL/L (ref 135–145)
TACROLIMUS BLD-MCNC: 14.1 UG/L (ref 5–15)
TME LAST DOSE: NORMAL H
TME LAST DOSE: NORMAL H
WBC # BLD AUTO: 3.1 10E3/UL (ref 4–11)

## 2024-10-03 PROCEDURE — 80197 ASSAY OF TACROLIMUS: CPT

## 2024-10-03 PROCEDURE — 84100 ASSAY OF PHOSPHORUS: CPT

## 2024-10-03 PROCEDURE — 36415 COLL VENOUS BLD VENIPUNCTURE: CPT

## 2024-10-03 PROCEDURE — 80048 BASIC METABOLIC PNL TOTAL CA: CPT

## 2024-10-03 PROCEDURE — 85027 COMPLETE CBC AUTOMATED: CPT

## 2024-10-03 PROCEDURE — 83690 ASSAY OF LIPASE: CPT

## 2024-10-03 PROCEDURE — 83735 ASSAY OF MAGNESIUM: CPT

## 2024-10-03 PROCEDURE — 82150 ASSAY OF AMYLASE: CPT

## 2024-10-03 NOTE — TELEPHONE ENCOUNTER
Post Kidney and Pancreas Transplant Team Conference  Date: 10/3/2024  Transplant Coordinator: Ximena GARCIA     Attendees:  []  Dr. Sorensen [] Leonor Kang, BETSY [] Betty Landaverde LPN     []  Dr. Shell [] Rosalia Romero, RN [] Lelo Reese LPN    [] Dr. Wallis [] Ximena Edwards RN    [] Dr. Coombs [] Mony Crabtree RN [] Angella Liriano RN   [] Dr. Kong [] Asha Allred RN    [] Dr. Christianson [] Cindy Ye RN    []  Dr. Whitehead [] Miryam Iyer RN    [] Dr. Michaud [] Gino Mahan RN    [] Sobia Underwood NP [] Roxanna Monet RN    [] Shelia Magallon NP [] Alisa Cutler RN        Verbal Plan Read Back:   Dr. Roberson and surgical fellow to reach out to urology to discuss admitting patient for stent removal and heparin bridging     Routed to RN Coordinator   Julianna Edwards RN

## 2024-10-04 ENCOUNTER — TEAM CONFERENCE (OUTPATIENT)
Dept: UROLOGY | Facility: CLINIC | Age: 49
End: 2024-10-04
Payer: COMMERCIAL

## 2024-10-04 ENCOUNTER — TELEPHONE (OUTPATIENT)
Dept: TRANSPLANT | Facility: CLINIC | Age: 49
End: 2024-10-04
Payer: COMMERCIAL

## 2024-10-04 DIAGNOSIS — Z94.0 KIDNEY REPLACED BY TRANSPLANT: Primary | ICD-10-CM

## 2024-10-04 NOTE — PROGRESS NOTES
Urology Brief Documentation Note:    As the urology on-call provider today, I was contacted by Dr. Blanco from Nephrology regarding Siva Ramey, a 49M who had a kidney pancreas transplant on 7/19/24 who has had an elevated SCr over the past 10 days (1.81mg/dL up from 1.47mg/dL on 9/19/24.  He also had a transplant US on 10/1/24 showing mild to mod hydronephrosis in the setting of partially distended bladder.      The patient is known to our service:  - The transplant team was unable to remove his stent postoperatively potentially because of some stenosis of the meatus vs distal urethral stricture.    - Dr. Harris saw the patient in clinic on 9/11/24 and agreed to remove the stent (and potentially dilate the urethra) in the OR.  This appt was coordinated for Friday 10/11/24.     Today Transplant asks that we try to remove the stent asap.     A/P   1) DELISA and hydronephrosis in the setting of a transplant stent  - I spoke with Dr. Harris, who will be unable to add the patient on his OR schedule any sooner than 10/11  - Also spoke with Dr. Connolly, our staff on call.  He recommends checking UA (to rule out infectious etiology for DELISA) as well as bladderscan PVR (to rule out reflux as the etiology for hydronephrosis).  While stent removal is desirable since the stent has been in place since 7/19, urologic standard of care is to leave stents in place for 3-4 months at a time, typically without issue.  At this time, would recommend keeping the stent removal procedure scheduled for 10/11/24.     - discussed with Dr. Blanco on the telephone  - I have reached out to clinic coordinators and asked them to add the patient to our nurse clinic on Monday (or tuesday at the latest) to check urinalysis and postvoid residual.    - Dr. Harris is looking forward to seeing the patient in the OR on Friday 10/11/24  - please feel free to continue to reach out to urology with any concerns.     [facility]

## 2024-10-04 NOTE — TELEPHONE ENCOUNTER
ISSUE:   Tacrolimus IR level 14.1 on 10/3, goal 8-10, dose 1.5 mg BID.  Last tac 15.2, dose reduced from 2 mg bid to 1.5 mg bid on 10/1    PLAN:   Call Patient and confirm this was an accurate 12-hour trough.   Verify Tacrolimus IR dose 1.5 mg BID.   Confirm no new medications or or missed doses.   Confirm no new illness / infection / diarrhea.   If accurate trough and accurate dose, decrease Tacrolimus IR dose to 1.5/1 mg BID     Repeat labs in 1 week.  *If > 50% change in immunosuppression dose, repeat labs in 1 week.     OUTCOME:   Spoke with Patient, they confirm plan above.    LPN task:  Please update med list.

## 2024-10-07 ENCOUNTER — TELEPHONE (OUTPATIENT)
Dept: UROLOGY | Facility: CLINIC | Age: 49
End: 2024-10-07

## 2024-10-07 ENCOUNTER — LAB (OUTPATIENT)
Dept: LAB | Facility: HOSPITAL | Age: 49
End: 2024-10-07
Payer: MEDICARE

## 2024-10-07 DIAGNOSIS — Z79.899 ENCOUNTER FOR LONG-TERM CURRENT USE OF MEDICATION: ICD-10-CM

## 2024-10-07 DIAGNOSIS — Z94.83 PANCREAS REPLACED BY TRANSPLANT (H): ICD-10-CM

## 2024-10-07 DIAGNOSIS — Z48.298 AFTERCARE FOLLOWING ORGAN TRANSPLANT: ICD-10-CM

## 2024-10-07 DIAGNOSIS — Z98.890 OTHER SPECIFIED POSTPROCEDURAL STATES: ICD-10-CM

## 2024-10-07 DIAGNOSIS — Z94.0 KIDNEY REPLACED BY TRANSPLANT: ICD-10-CM

## 2024-10-07 LAB
AMYLASE SERPL-CCNC: 82 U/L (ref 28–100)
ANION GAP SERPL CALCULATED.3IONS-SCNC: 10 MMOL/L (ref 7–15)
BUN SERPL-MCNC: 18.5 MG/DL (ref 6–20)
CALCIUM SERPL-MCNC: 10.6 MG/DL (ref 8.8–10.4)
CHLORIDE SERPL-SCNC: 106 MMOL/L (ref 98–107)
CREAT SERPL-MCNC: 1.82 MG/DL (ref 0.67–1.17)
EGFRCR SERPLBLD CKD-EPI 2021: 45 ML/MIN/1.73M2
ERYTHROCYTE [DISTWIDTH] IN BLOOD BY AUTOMATED COUNT: 14.9 % (ref 10–15)
GLUCOSE SERPL-MCNC: 142 MG/DL (ref 70–99)
HCO3 SERPL-SCNC: 22 MMOL/L (ref 22–29)
HCT VFR BLD AUTO: 38 % (ref 40–53)
HGB BLD-MCNC: 11.7 G/DL (ref 13.3–17.7)
LIPASE SERPL-CCNC: 49 U/L (ref 13–60)
MCH RBC QN AUTO: 28.3 PG (ref 26.5–33)
MCHC RBC AUTO-ENTMCNC: 30.8 G/DL (ref 31.5–36.5)
MCV RBC AUTO: 92 FL (ref 78–100)
PLATELET # BLD AUTO: 219 10E3/UL (ref 150–450)
POTASSIUM SERPL-SCNC: 5.1 MMOL/L (ref 3.4–5.3)
RBC # BLD AUTO: 4.13 10E6/UL (ref 4.4–5.9)
SODIUM SERPL-SCNC: 138 MMOL/L (ref 135–145)
TACROLIMUS BLD-MCNC: 9.9 UG/L (ref 5–15)
TME LAST DOSE: NORMAL H
TME LAST DOSE: NORMAL H
WBC # BLD AUTO: 4.7 10E3/UL (ref 4–11)

## 2024-10-07 PROCEDURE — 36415 COLL VENOUS BLD VENIPUNCTURE: CPT

## 2024-10-07 PROCEDURE — 85027 COMPLETE CBC AUTOMATED: CPT

## 2024-10-07 PROCEDURE — 80197 ASSAY OF TACROLIMUS: CPT

## 2024-10-07 PROCEDURE — 82150 ASSAY OF AMYLASE: CPT

## 2024-10-07 PROCEDURE — 80048 BASIC METABOLIC PNL TOTAL CA: CPT

## 2024-10-07 PROCEDURE — 83690 ASSAY OF LIPASE: CPT

## 2024-10-07 NOTE — TELEPHONE ENCOUNTER
This writer spoke to Siva after receiving a staff message from kidney/transplant RN.  Please see message below.      Julianna Edwards, BETSY Lowe, BETSY Zaragoza,    I think you've been assisting with coordinating Siva's stent removal later this week? Just wanted to include you in on cardiology's response regarding not stopping Brillinta.    Can the cysto still be done while taking Brillinta?    Thanks,  Ximena Edwards RN, BSN  Post-Kidney/Pancreas Transplant Coordinator    PT was advised by this writer to continue taking his Brillanta.  Message sent to Dr. Harris to verify that continuing medication is okay and provider it aware.      Elena Lowe, BETSY

## 2024-10-08 ENCOUNTER — ALLIED HEALTH/NURSE VISIT (OUTPATIENT)
Dept: UROLOGY | Facility: CLINIC | Age: 49
End: 2024-10-08
Payer: COMMERCIAL

## 2024-10-08 ENCOUNTER — TELEPHONE (OUTPATIENT)
Dept: TRANSPLANT | Facility: CLINIC | Age: 49
End: 2024-10-08

## 2024-10-08 DIAGNOSIS — R33.9 URINARY RETENTION: Primary | ICD-10-CM

## 2024-10-08 DIAGNOSIS — N39.0 URINARY TRACT INFECTION: Primary | ICD-10-CM

## 2024-10-08 LAB
ALBUMIN UR-MCNC: 30 MG/DL
APPEARANCE UR: ABNORMAL
BACTERIA #/AREA URNS HPF: ABNORMAL /HPF
BILIRUB UR QL STRIP: NEGATIVE
COLOR UR AUTO: ABNORMAL
GLUCOSE UR STRIP-MCNC: 500 MG/DL
HGB UR QL STRIP: ABNORMAL
HYALINE CASTS: 1 /LPF
KETONES UR STRIP-MCNC: NEGATIVE MG/DL
LEUKOCYTE ESTERASE UR QL STRIP: ABNORMAL
NITRATE UR QL: POSITIVE
PH UR STRIP: 7 [PH] (ref 5–7)
RBC URINE: 27 /HPF
SP GR UR STRIP: 1.02 (ref 1–1.03)
SQUAMOUS EPITHELIAL: 1 /HPF
UROBILINOGEN UR STRIP-MCNC: NORMAL MG/DL
WBC URINE: 27 /HPF

## 2024-10-08 PROCEDURE — 87186 SC STD MICRODIL/AGAR DIL: CPT | Performed by: UROLOGY

## 2024-10-08 PROCEDURE — 99000 SPECIMEN HANDLING OFFICE-LAB: CPT | Performed by: PATHOLOGY

## 2024-10-08 PROCEDURE — 81001 URINALYSIS AUTO W/SCOPE: CPT | Performed by: PATHOLOGY

## 2024-10-08 PROCEDURE — 51798 US URINE CAPACITY MEASURE: CPT

## 2024-10-08 RX ORDER — NITROFURANTOIN 25; 75 MG/1; MG/1
100 CAPSULE ORAL 2 TIMES DAILY
Qty: 6 CAPSULE | Refills: 0 | Status: SHIPPED | OUTPATIENT
Start: 2024-10-08 | End: 2024-10-11

## 2024-10-08 NOTE — TELEPHONE ENCOUNTER
----- Message from Rose Gar sent at 10/4/2024  2:00 PM CDT -----  Regarding: RE: Plan for ticagrelor  The patient was s/p IBAN to D1, s/p IBAN to mid RCA with acute thrombus status post the IBAN on July 20, 2024.  He had stent placement less than 3 months.  It is better to not discontinue ticagrelor.  If has to stop it, please stop.  3 days prior to procedure and restarted postprocedure day.  Thanks  Jairon  ----- Message -----  From: Elena Ramos RN  Sent: 10/4/2024  12:52 PM CDT  To: Rose Gar MD; Farzad Harris MD; #  Subject: Plan for ticagrelor                              Hello,    This patient has an upcoming ureteral stent removal scheduled in the OR next week. I wanted to communicate with both urology and cardiology.     Does this patient need to stop ticagrelor before cytoscopy? If so, how long before procedure?    Dr. Gar, can he safely hold ticagrelor or would he require bridging for anticoagulation?    Please advise,    Elena Crisostomo, RN, BSN  Solid Organ Transplant Office, Post Kidney and Pancreas Transplant Coordinator  [phone], option [phone]

## 2024-10-08 NOTE — PROGRESS NOTES
Chief Complaint   Patient presents with    Allied Health Visit       Patient Active Problem List   Diagnosis    Type 2 diabetes mellitus with other circulatory complication, unspecified whether long term insulin use (H)    Dyslipidemia    Class 2 severe obesity due to excess calories with serious comorbidity in adult, unspecified BMI (H)    Atherosclerosis of other coronary artery bypass graft(s) with unstable angina pectoris (H)    Anemia in chronic renal disease    HTN, kidney transplant related    S/P CABG (coronary artery bypass graft)    Balanoposthitis    History of ST elevation myocardial infarction (STEMI)    Obesity (BMI 30.0-34.9)    MARQUEZ (dyspnea on exertion)    STEMI (ST elevation myocardial infarction) (H)    Metabolic acidosis    Retinopathy    Peripheral neuropathy    Acquired elevated diaphragm    Median nerve neuropathy, left    Iron deficiency anemia, unspecified    Stage 3b chronic kidney disease (H)    Coagulation defect, unspecified (H)    Fatigue, unspecified type    Obstructive sleep apnea treated with continuous positive airway pressure (CPAP)    Snoring    Palpitations    Gastroesophageal reflux disease without esophagitis    Diabetes mellitus, type 2 (H)    Kidney replaced by transplant    Pancreas replaced by transplant (H)    Immunosuppressed status (H)    Cardiac arrest with ventricular fibrillation (H)    Steroid-induced hyperglycemia    History of simultaneous kidney and pancreas transplant (H)    Hypomagnesemia    Secondary renal hyperparathyroidism (H)    Need for pneumocystis prophylaxis    Other stricture of urethra in male    Aftercare following organ transplant    CAD (coronary artery disease)    PAD (peripheral artery disease) (H)    HFrEF (heart failure with reduced ejection fraction) (H)       Allergies   Allergen Reactions    Amoxicillin Hives     & generalized pain    Tolerated ceftriaxone in 2023 ([facility]    Venlafaxine      lethargic        Current Outpatient Medications   Medication Sig Dispense Refill    acetaminophen (TYLENOL) 500 MG tablet Take 500 mg by mouth every 4 hours as needed      aspirin (ASA) 81 MG chewable tablet Take 1 tablet (81 mg) by mouth daily Starting tomorrow. 30 tablet 3    atorvastatin (LIPITOR) 40 MG tablet Take 1 tablet (40 mg) by mouth every evening 30 tablet 2    carvedilol (COREG) 6.25 MG tablet Take 1 tablet (6.25 mg) by mouth 2 times daily (with meals). 180 tablet 3    dapsone (ACZONE) 25 MG tablet Take 2 tablets (50 mg) by mouth daily 60 tablet 11    magnesium glycinate 100 MG CAPS capsule Take 2 capsules (200 mg) by mouth 2 times daily Take in place of magnesium-oxide 120 capsule 2    methocarbamol (ROBAXIN) 750 MG tablet Take 1 tablet (750 mg) by mouth every 6 hours as needed for muscle spasms 20 tablet 0    metoprolol succinate ER (TOPROL XL) 25 MG 24 hr tablet Take 1 tablet (25 mg) by mouth daily. 90 tablet 3    mycophenolic acid (GENERIC EQUIVALENT) 180 MG EC tablet Take 4 tablets (720 mg) by mouth 2 times daily 240 tablet 11    nystatin (MYCOSTATIN) 055957 UNIT/ML suspension Take 5 mLs (500,000 Units) by mouth 4 times daily 600 mL 2    sodium bicarbonate 650 MG tablet Take 3 tablets (1,950 mg) by mouth 3 times daily 270 tablet 2    Sodium Bicarbonate, antacid, (NICE PURE BAKING SODA) POWD Take 2 g by mouth daily. Take 1/2 tsp (2gm) daily      tacrolimus (GENERIC EQUIVALENT) 0.5 MG capsule Take 1 capsule (0.5 mg) by mouth 2 times daily. Total dose = 1.5 mg twice daily 60 capsule 11    tacrolimus (GENERIC EQUIVALENT) 1 MG capsule Take 1 capsule (1 mg) by mouth 2 times daily. Total dose = 1.5 mg twice daily 60 capsule 11    ticagrelor (BRILINTA) 90 MG tablet Take 1 tablet (90 mg) by mouth 2 times daily. 60 tablet 5    valGANciclovir (VALCYTE) 450 MG tablet Take 2 tablets (900 mg) by mouth daily 60 tablet 2       Social History     Tobacco Use    Smoking status: Never     Passive exposure: Past    Smokeless  tobacco: Never   Substance Use Topics    Alcohol use: Never    Drug use: Never       Siva Ramey comes into clinic today at the request of Farzad Harris for PVR.    Patient diagnosis: Urinary retention    Patient first sat to urinate and initial PVR was 165mL. Patient then informed writer that he never feels fully empty when he sits to urinate. Had patient attempt to urinate again while standing this time and PVR was 64 mL.     Residual Volume by Ultrasound: 64 mL    This service provided today was under the direct supervision of Joe Thurman MD, who was available if needed.    Amanda Bush  10/8/2024  3:43 PM

## 2024-10-09 ENCOUNTER — TELEPHONE (OUTPATIENT)
Dept: UROLOGY | Facility: CLINIC | Age: 49
End: 2024-10-09
Payer: COMMERCIAL

## 2024-10-09 ENCOUNTER — ANESTHESIA EVENT (OUTPATIENT)
Dept: SURGERY | Facility: CLINIC | Age: 49
End: 2024-10-09
Payer: COMMERCIAL

## 2024-10-09 RX ORDER — CYCLOBENZAPRINE HCL 10 MG
10 TABLET ORAL 3 TIMES DAILY PRN
COMMUNITY
End: 2024-10-15

## 2024-10-09 NOTE — TELEPHONE ENCOUNTER
Called patient regarding upcoming procedure with Dr. Harris. Informed patient that we would like to proceed with surgery despite the IV fluid shortage. Patient was informed that we would like to move him from Friday, 10/11 to Thursday, 10/09/2024 likely in the morning.     Patient states he has received conflicting information on his Brilinta. Patient is unsure if he needs to stop taking or continue. Will have RN reach out.     Lurdes Ibrahim on 10/9/2024 at 1:09 PM

## 2024-10-09 NOTE — TELEPHONE ENCOUNTER
Spoke to PT and went over medication recommendation from pharmacy to continue on Brilinta without stopping prior to upcoming procedure on Friday.  No other questions at this time.      Elena Lowe RN   2:38 PM

## 2024-10-10 ENCOUNTER — ANESTHESIA (OUTPATIENT)
Dept: SURGERY | Facility: CLINIC | Age: 49
End: 2024-10-10
Payer: COMMERCIAL

## 2024-10-10 ENCOUNTER — HOSPITAL ENCOUNTER (OUTPATIENT)
Facility: CLINIC | Age: 49
Discharge: HOME | End: 2024-10-10
Attending: UROLOGY | Admitting: UROLOGY
Payer: COMMERCIAL

## 2024-10-10 VITALS
HEART RATE: 98 BPM | DIASTOLIC BLOOD PRESSURE: 84 MMHG | SYSTOLIC BLOOD PRESSURE: 126 MMHG | HEIGHT: 71 IN | BODY MASS INDEX: 30.19 KG/M2 | RESPIRATION RATE: 18 BRPM | WEIGHT: 215.61 LBS | TEMPERATURE: 97.4 F | OXYGEN SATURATION: 94 %

## 2024-10-10 LAB
GLUCOSE BLDC GLUCOMTR-MCNC: 121 MG/DL (ref 70–99)
GLUCOSE BLDC GLUCOMTR-MCNC: 144 MG/DL (ref 70–99)

## 2024-10-10 PROCEDURE — 360N000075 HC SURGERY LEVEL 2, PER MIN: Performed by: UROLOGY

## 2024-10-10 PROCEDURE — 999N000248 HC STATISTIC IV INSERT WITH US BY RN

## 2024-10-10 PROCEDURE — 710N000012 HC RECOVERY PHASE 2, PER MINUTE: Performed by: UROLOGY

## 2024-10-10 PROCEDURE — 250N000011 HC RX IP 250 OP 636: Performed by: UROLOGY

## 2024-10-10 PROCEDURE — 250N000009 HC RX 250: Performed by: REGISTERED NURSE

## 2024-10-10 PROCEDURE — 272N000001 HC OR GENERAL SUPPLY STERILE: Performed by: UROLOGY

## 2024-10-10 PROCEDURE — 250N000013 HC RX MED GY IP 250 OP 250 PS 637: Performed by: UROLOGY

## 2024-10-10 PROCEDURE — 52310 CYSTOSCOPY AND TREATMENT: CPT | Performed by: ANESTHESIOLOGY

## 2024-10-10 PROCEDURE — 82962 GLUCOSE BLOOD TEST: CPT

## 2024-10-10 PROCEDURE — 370N000017 HC ANESTHESIA TECHNICAL FEE, PER MIN: Performed by: UROLOGY

## 2024-10-10 PROCEDURE — 52310 CYSTOSCOPY AND TREATMENT: CPT | Mod: 58 | Performed by: UROLOGY

## 2024-10-10 PROCEDURE — 999N000141 HC STATISTIC PRE-PROCEDURE NURSING ASSESSMENT: Performed by: UROLOGY

## 2024-10-10 PROCEDURE — 52310 CYSTOSCOPY AND TREATMENT: CPT | Performed by: REGISTERED NURSE

## 2024-10-10 PROCEDURE — 250N000011 HC RX IP 250 OP 636: Performed by: REGISTERED NURSE

## 2024-10-10 RX ORDER — OXYCODONE HYDROCHLORIDE 5 MG/1
5 TABLET ORAL
Status: DISCONTINUED | OUTPATIENT
Start: 2024-10-10 | End: 2024-10-10 | Stop reason: HOSPADM

## 2024-10-10 RX ORDER — NALOXONE HYDROCHLORIDE 0.4 MG/ML
0.1 INJECTION, SOLUTION INTRAMUSCULAR; INTRAVENOUS; SUBCUTANEOUS
Status: DISCONTINUED | OUTPATIENT
Start: 2024-10-10 | End: 2024-10-10 | Stop reason: HOSPADM

## 2024-10-10 RX ORDER — OXYCODONE HYDROCHLORIDE 10 MG/1
10 TABLET ORAL
Status: DISCONTINUED | OUTPATIENT
Start: 2024-10-10 | End: 2024-10-10 | Stop reason: HOSPADM

## 2024-10-10 RX ORDER — HYDROMORPHONE HCL IN WATER/PF 6 MG/30 ML
0.2 PATIENT CONTROLLED ANALGESIA SYRINGE INTRAVENOUS EVERY 5 MIN PRN
Status: DISCONTINUED | OUTPATIENT
Start: 2024-10-10 | End: 2024-10-10 | Stop reason: HOSPADM

## 2024-10-10 RX ORDER — ONDANSETRON 2 MG/ML
4 INJECTION INTRAMUSCULAR; INTRAVENOUS EVERY 30 MIN PRN
Status: DISCONTINUED | OUTPATIENT
Start: 2024-10-10 | End: 2024-10-10 | Stop reason: HOSPADM

## 2024-10-10 RX ORDER — PROPOFOL 10 MG/ML
INJECTION, EMULSION INTRAVENOUS PRN
Status: DISCONTINUED | OUTPATIENT
Start: 2024-10-10 | End: 2024-10-10

## 2024-10-10 RX ORDER — LIDOCAINE HYDROCHLORIDE 20 MG/ML
INJECTION, SOLUTION INFILTRATION; PERINEURAL PRN
Status: DISCONTINUED | OUTPATIENT
Start: 2024-10-10 | End: 2024-10-10

## 2024-10-10 RX ORDER — ACETAMINOPHEN 650 MG/1
650 SUPPOSITORY RECTAL ONCE
Status: COMPLETED | OUTPATIENT
Start: 2024-10-10 | End: 2024-10-10

## 2024-10-10 RX ORDER — FENTANYL CITRATE 50 UG/ML
25 INJECTION, SOLUTION INTRAMUSCULAR; INTRAVENOUS EVERY 5 MIN PRN
Status: DISCONTINUED | OUTPATIENT
Start: 2024-10-10 | End: 2024-10-10 | Stop reason: HOSPADM

## 2024-10-10 RX ORDER — CEFAZOLIN SODIUM/WATER 2 G/20 ML
2 SYRINGE (ML) INTRAVENOUS SEE ADMIN INSTRUCTIONS
Status: DISCONTINUED | OUTPATIENT
Start: 2024-10-10 | End: 2024-10-10 | Stop reason: HOSPADM

## 2024-10-10 RX ORDER — SODIUM CHLORIDE, SODIUM LACTATE, POTASSIUM CHLORIDE, CALCIUM CHLORIDE 600; 310; 30; 20 MG/100ML; MG/100ML; MG/100ML; MG/100ML
INJECTION, SOLUTION INTRAVENOUS CONTINUOUS
Status: DISCONTINUED | OUTPATIENT
Start: 2024-10-10 | End: 2024-10-10 | Stop reason: HOSPADM

## 2024-10-10 RX ORDER — LIDOCAINE 40 MG/G
CREAM TOPICAL
Status: DISCONTINUED | OUTPATIENT
Start: 2024-10-10 | End: 2024-10-10 | Stop reason: HOSPADM

## 2024-10-10 RX ORDER — ONDANSETRON 2 MG/ML
INJECTION INTRAMUSCULAR; INTRAVENOUS PRN
Status: DISCONTINUED | OUTPATIENT
Start: 2024-10-10 | End: 2024-10-10

## 2024-10-10 RX ORDER — DEXAMETHASONE SODIUM PHOSPHATE 4 MG/ML
4 INJECTION, SOLUTION INTRA-ARTICULAR; INTRALESIONAL; INTRAMUSCULAR; INTRAVENOUS; SOFT TISSUE
Status: DISCONTINUED | OUTPATIENT
Start: 2024-10-10 | End: 2024-10-10 | Stop reason: HOSPADM

## 2024-10-10 RX ORDER — PROPOFOL 10 MG/ML
INJECTION, EMULSION INTRAVENOUS CONTINUOUS PRN
Status: DISCONTINUED | OUTPATIENT
Start: 2024-10-10 | End: 2024-10-10

## 2024-10-10 RX ORDER — CYCLOBENZAPRINE HCL 10 MG
10 TABLET ORAL 3 TIMES DAILY PRN
Status: ON HOLD | COMMUNITY
Start: 2024-09-25 | End: 2024-10-10

## 2024-10-10 RX ORDER — HYDROMORPHONE HCL IN WATER/PF 6 MG/30 ML
0.4 PATIENT CONTROLLED ANALGESIA SYRINGE INTRAVENOUS EVERY 5 MIN PRN
Status: DISCONTINUED | OUTPATIENT
Start: 2024-10-10 | End: 2024-10-10 | Stop reason: HOSPADM

## 2024-10-10 RX ORDER — CEFAZOLIN SODIUM/WATER 2 G/20 ML
2 SYRINGE (ML) INTRAVENOUS
Status: COMPLETED | OUTPATIENT
Start: 2024-10-10 | End: 2024-10-10

## 2024-10-10 RX ORDER — FENTANYL CITRATE 50 UG/ML
50 INJECTION, SOLUTION INTRAMUSCULAR; INTRAVENOUS EVERY 5 MIN PRN
Status: DISCONTINUED | OUTPATIENT
Start: 2024-10-10 | End: 2024-10-10 | Stop reason: HOSPADM

## 2024-10-10 RX ORDER — ONDANSETRON 4 MG/1
4 TABLET, ORALLY DISINTEGRATING ORAL EVERY 30 MIN PRN
Status: DISCONTINUED | OUTPATIENT
Start: 2024-10-10 | End: 2024-10-10 | Stop reason: HOSPADM

## 2024-10-10 RX ORDER — ACETAMINOPHEN 325 MG/1
975 TABLET ORAL ONCE
Status: COMPLETED | OUTPATIENT
Start: 2024-10-10 | End: 2024-10-10

## 2024-10-10 RX ADMIN — PROPOFOL 30 MG: 10 INJECTION, EMULSION INTRAVENOUS at 08:01

## 2024-10-10 RX ADMIN — Medication 2 G: at 07:53

## 2024-10-10 RX ADMIN — ACETAMINOPHEN 975 MG: 325 TABLET ORAL at 06:41

## 2024-10-10 RX ADMIN — LIDOCAINE HYDROCHLORIDE 80 MG: 20 INJECTION, SOLUTION INFILTRATION; PERINEURAL at 07:59

## 2024-10-10 RX ADMIN — PROPOFOL 100 MCG/KG/MIN: 10 INJECTION, EMULSION INTRAVENOUS at 07:58

## 2024-10-10 RX ADMIN — ONDANSETRON 4 MG: 2 INJECTION INTRAMUSCULAR; INTRAVENOUS at 08:05

## 2024-10-10 ASSESSMENT — ACTIVITIES OF DAILY LIVING (ADL)
ADLS_ACUITY_SCORE: 33

## 2024-10-10 NOTE — ANESTHESIA POSTPROCEDURE EVALUATION
Patient: Siva Ramey    Procedure: Procedure(s):  CYSTOSCOPY, WITH TRANSPLANT URETERAL STENT REMOVAL       Anesthesia Type:  MAC    Note:  Disposition: Outpatient   Postop Pain Control: Uneventful            Sign Out: Well controlled pain   PONV: No   Neuro/Psych: Uneventful            Sign Out: Acceptable/Baseline neuro status   Airway/Respiratory: Uneventful            Sign Out: Acceptable/Baseline resp. status   CV/Hemodynamics: Uneventful            Sign Out: Acceptable CV status; No obvious hypovolemia; No obvious fluid overload   Other NRE: NONE   DID A NON-ROUTINE EVENT OCCUR?            Last vitals:  Vitals Value Taken Time   /80 10/10/24 0833   Temp     Pulse     Resp     SpO2 95 % 10/10/24 0834   Vitals shown include unfiled device data.    Electronically Signed By: Kylie Oneal MD  October 10, 2024  8:35 AM

## 2024-10-10 NOTE — ANESTHESIA POSTPROCEDURE EVALUATION
Patient: Siva Ramey    Procedure: Procedure(s):  CYSTOSCOPY, WITH TRANSPLANT URETERAL STENT REMOVAL       Anesthesia Type:  MAC    Note:  Disposition: Outpatient   Postop Pain Control: Uneventful            Sign Out: Well controlled pain   PONV: No   Neuro/Psych: Uneventful            Sign Out: Acceptable/Baseline neuro status   Airway/Respiratory: Uneventful            Sign Out: Acceptable/Baseline resp. status   CV/Hemodynamics: Uneventful            Sign Out: Acceptable CV status; No obvious hypovolemia; No obvious fluid overload   Other NRE: NONE   DID A NON-ROUTINE EVENT OCCUR? No           Last vitals:  Vitals Value Taken Time   /84 10/10/24 0916   Temp 36.3  C (97.4  F) 10/10/24 0915   Pulse 98 10/10/24 0830   Resp 18 10/10/24 0915   SpO2 95 % 10/10/24 0916   Vitals shown include unfiled device data.    Electronically Signed By: Jess Estevez MD  October 10, 2024  11:32 AM

## 2024-10-10 NOTE — ANESTHESIA CARE TRANSFER NOTE
Patient: Siva Ramey    Procedure: Procedure(s):  CYSTOSCOPY, WITH TRANSPLANT URETERAL STENT REMOVAL       Diagnosis: Kidney replaced by transplant [Z94.0]  Other stricture of urethra in male [N35.819]  Diagnosis Additional Information: No value filed.    Anesthesia Type:   MAC     Note:    Oropharynx: oropharynx clear of all foreign objects and spontaneously breathing  Level of Consciousness: awake  Oxygen Supplementation: room air    Independent Airway: airway patency satisfactory and stable  Dentition: dentition unchanged  Vital Signs Stable: post-procedure vital signs reviewed and stable  Report to RN Given: handoff report given  Patient transferred to: PACU    Handoff Report: Identifed the Patient, Identified the Reponsible Provider, Reviewed the pertinent medical history, Discussed the surgical course, Reviewed Intra-OP anesthesia mangement and issues during anesthesia, Set expectations for post-procedure period and Allowed opportunity for questions and acknowledgement of understanding      Vitals:  Vitals Value Taken Time   /80 10/10/24 0833   Temp     Pulse     Resp     SpO2 94 % 10/10/24 0838   Vitals shown include unfiled device data.    Electronically Signed By: JUANCARLOS Flower CRNA  October 10, 2024  8:38 AM

## 2024-10-10 NOTE — ANESTHESIA PREPROCEDURE EVALUATION
Anesthesia Pre-Procedure Evaluation    Patient: Siva Ramey   MRN: [id number] : 1975        Procedure : Procedure(s):  CYSTOSCOPY, WITH TRANSPLANT URETERAL STENT REMOVAL          Past Medical History:   Diagnosis Date    Acquired elevated diaphragm     Anemia in chronic kidney disease     Angina pectoris (H)     Chronic kidney disease     Coronary artery disease     Diabetes mellitus, type II (H) 2001    Diabetic nephropathy (H)     MARQUEZ (dyspnea on exertion)     Dyslipidemia 2001    End stage renal disease (H)     History of blood transfusion     Hypertension     takes medication    Ischemic cardiomyopathy     Metabolic acidosis     Myocardial infarction (H)     STEMI -Diagonal branch of the LAD    Obesity (BMI 30-39.9)     Peripheral neuropathy     Proteinuria     Retinopathy     Sleep apnea     Vitamin D deficiency       Past Surgical History:   Procedure Laterality Date    APPENDECTOMY OPEN N/A 2024    Procedure: Appendectomy open;  Surgeon: Harrison Whitehead MD;  Location: [facility] OR    BACK SURGERY      L5 disc cut    BENCH KIDNEY  2024    Procedure: Bench kidney;  Surgeon: Harrison Whitehead MD;  Location: [facility] OR    BENCH PANCREAS N/A 2024    Procedure: Bench pancreas;  Surgeon: Harrison Whitehead MD;  Location: [facility] OR    BYPASS GRAFT ARTERY CORONARY  2019    2 vessels    CV CENTRAL VENOUS CATHETER PLACEMENT N/A 2024    Procedure: Central Venous Catheter Placement;  Surgeon: Dominic Robertson MD;  Location:  HEART CARDIAC CATH LAB    CV CORONARY ANGIOGRAM N/A 2019    Procedure: Coronary Angiogram;  Surgeon: Cielo Che MD;  Location: [facility] Cath Lab;  Service: Cardiology    CV CORONARY ANGIOGRAM N/A 2019    Procedure: Coronary Angiogram;  Surgeon: Cielo Che MD;  Location: [facility] Cath Lab;  Service: Cardiology    CV CORONARY ANGIOGRAM N/A 2020    Procedure: Coronary  Angiogram;  Surgeon: Cielo Che MD;  Location: [facility] Cath Lab;  Service: Cardiology    CV CORONARY ANGIOGRAM N/A 07/22/2021    Procedure: CV CORONARY ANGIOGRAM;  Surgeon: Adrian Crow MD;  Location: [facility] CATH LAB CV    CV CORONARY ANGIOGRAM N/A 02/01/2024    Procedure: Coronary Angiogram;  Surgeon: Delroy Carpio MD;  Location: [facility] CARDIAC CATH LAB    CV CORONARY ANGIOGRAM N/A 7/20/2024    Procedure: Coronary Angiogram;  Surgeon: Dominic Robertson MD;  Location: [facility] CARDIAC CATH LAB    CV CORONARY LITHOTRIPSY PCI N/A 7/20/2024    Procedure: Percutaneous Coronary Intervention - Lithotripsy;  Surgeon: Dominic Robertson MD;  Location: [facility] CARDIAC CATH LAB    CV FRACTIONAL FLOW RATIO WIRE N/A 07/22/2021    Procedure: Fractional Flow Ratio Wire;  Surgeon: Adrian Crow MD;  Location: [facility] CV    CV INTRAVASULAR ULTRASOUND N/A 7/20/2024    Procedure: Intravascular Ultrasound;  Surgeon: Dominic Robertson MD;  Location: [facility] CARDIAC CATH LAB    CV LEFT HEART CATH N/A 07/22/2021    Procedure: Left Heart Cath;  Surgeon: Adrian Crow MD;  Location: [facility] CV    CV LEFT HEART CATHETERIZATION WITH LEFT VENTRICULOGRAM N/A 01/13/2019    Procedure: Left Heart Catheterization with Left Ventriculogram;  Surgeon: Cielo Che MD;  Location: [facility] Cath Lab;  Service: Cardiology    CV LEFT HEART CATHETERIZATION WITHOUT LEFT VENTRICULOGRAM Left 04/30/2020    Procedure: Left Heart Catheterization Without Left Ventriculogram;  Surgeon: Cielo Che MD;  Location: [facility] Cath Lab;  Service: Cardiology    CV PCI N/A 02/01/2024    Procedure: Percutaneous Coronary Intervention;  Surgeon: Delroy Carpio MD;  Location: [facility] CARDIAC CATH LAB    CV PCI N/A 7/20/2024    Procedure: Percutaneous Coronary Intervention;  Surgeon: Dominic Robertson MD;  Location: [facility] CARDIAC CATH LAB    CV PCI  ASPIRATION THROMECTOMY N/A 2024    Procedure: Percutaneous Coronary Intervention Aspiration Thrombectomy;  Surgeon: Dominic Robertson MD;  Location:  HEART CARDIAC CATH LAB    CV PCI STENT DRUG ELUTING N/A 2024    Procedure: Percutaneous Coronary Intervention Stent;  Surgeon: Dominic Robertson MD;  Location: [facility] CARDIAC CATH LAB    CV RIGHT HEART CATHETERIZATION N/A 2020    Procedure: Right Heart Catheterization;  Surgeon: Cielo Che MD;  Location: [facility] Cath Lab;  Service: Cardiology    ESOPHAGOSCOPY, GASTROSCOPY, DUODENOSCOPY (EGD), COMBINED N/A 2024    Procedure: Esophagoscopy, gastroscopy, duodenoscopy (EGD), combined;  Surgeon: Jolene Ramirez MD;  Location:  GI    EYE SURGERY      GENITOURINARY SURGERY      HERNIA REPAIR      OTHER SURGICAL HISTORY      Excise varicocele    STENT, CORONARY, DALE      TRANSPLANT PANCREAS, KIDNEY  DONOR, COMBINED N/A 2024    Procedure: Transplant pancreas, kidney  donor, with ureteral stent placement;  Surgeon: Harrison Whitehead MD;  Location:  OR    VASCULAR SURGERY        Allergies   Allergen Reactions    Amoxicillin Hives     & generalized pain    Tolerated ceftriaxone in  ([facility]) and  ([facility])    Venlafaxine      lethargic      Social History     Tobacco Use    Smoking status: Never     Passive exposure: Past    Smokeless tobacco: Never   Substance Use Topics    Alcohol use: Never      Wt Readings from Last 1 Encounters:   10/10/24 97.8 kg (215 lb 9.8 oz)        Anesthesia Evaluation        History of anesthetic complications       ROS/MED HX  ENT/Pulmonary:     (+) sleep apnea,                                       Neurologic:       Cardiovascular:     (+)  hypertension- -  CAD angina-  - -                                      METS/Exercise Tolerance:     Hematologic:       Musculoskeletal:       GI/Hepatic:     (+) GERD,       "             Renal/Genitourinary:     (+) renal disease,             Endo:     (+) type I DM,              Obesity,       Psychiatric/Substance Use:       Infectious Disease:       Malignancy:       Other:            Physical Exam    Airway        Mallampati: II       Respiratory Devices and Support         Dental       (+) Modest Abnormalities - crowns, retainers, 1 or 2 missing teeth      Cardiovascular          Rhythm and rate: regular and normal     Pulmonary                   OUTSIDE LABS:  CBC:   Lab Results   Component Value Date    WBC 4.7 10/07/2024    WBC 3.1 (L) 10/03/2024    HGB 11.7 (L) 10/07/2024    HGB 11.4 (L) 10/03/2024    HCT 38.0 (L) 10/07/2024    HCT 37.5 (L) 10/03/2024     10/07/2024     10/03/2024     BMP:   Lab Results   Component Value Date     10/07/2024     10/03/2024    POTASSIUM 5.1 10/07/2024    POTASSIUM 4.9 10/03/2024    CHLORIDE 106 10/07/2024    CHLORIDE 106 10/03/2024    CO2 22 10/07/2024    CO2 23 10/03/2024    BUN 18.5 10/07/2024    BUN 19.1 10/03/2024    CR 1.82 (H) 10/07/2024    CR 1.81 (H) 10/03/2024     (H) 10/10/2024     (H) 10/07/2024     COAGS:   Lab Results   Component Value Date    PTT 28 07/20/2024    INR 1.39 (H) 07/20/2024    FIBR 283 07/20/2024     POC: No results found for: \"BGM\", \"HCG\", \"HCGS\"  HEPATIC:   Lab Results   Component Value Date    ALBUMIN 4.5 08/30/2024    PROTTOTAL 7.2 08/30/2024    ALT 38 08/30/2024    AST 26 08/30/2024    ALKPHOS 174 (H) 08/30/2024    BILITOTAL 0.6 08/30/2024     OTHER:   Lab Results   Component Value Date    PH 7.44 07/22/2024    LACT 0.8 08/30/2024    A1C 6.4 (H) 07/20/2024    BETHANY 10.6 (H) 10/07/2024    PHOS 2.6 10/03/2024    MAG 1.4 (L) 10/03/2024    LIPASE 49 10/07/2024    AMYLASE 82 10/07/2024    TSH 1.78 10/17/2023    CRP 9.8 (H) 06/26/2019       Anesthesia Plan    ASA Status:  3       Anesthesia Type: MAC.              Consents    Anesthesia Plan(s) and associated risks, benefits, and " "realistic alternatives discussed. Questions answered and patient/representative(s) expressed understanding.     - Discussed:     - Discussed with:  Patient            Postoperative Care    Pain management: Multi-modal analgesia.        Comments:               Jess Estevez MD    I have reviewed the pertinent notes and labs in the chart from the past 30 days and (re)examined the patient.  Any updates or changes from those notes are reflected in this note.             # Drug Induced Platelet Defect: home medication list includes an antiplatelet medication   # Hypertension: Noted on problem list  # Chronic heart failure with reduced ejection fraction: last echo with EF <40%        # Obesity: Estimated body mass index is 30.07 kg/m  as calculated from the following:    Height as of this encounter: 1.803 m (5' 11\").    Weight as of this encounter: 97.8 kg (215 lb 9.8 oz).       # Financial/Environmental Concerns:     # History of CABG: noted on surgical history      "

## 2024-10-10 NOTE — OP NOTE
Operative Note  10/10/24    Pre-operative diagnosis:   Transplant kidney with stent in place  Urethral stricture (mostly fossa with concentric rings through pendulous and distal bulbar urethra  Post-operative diagnosis: Same     Procedure:   Cystoscopy with ureteral stent removal     Surgeon: Farzad Harris MD  Resident: Lela Mishra MD    Anesthesia: MAC   Estimated Blood Loss: None    Drains: None  Specimens: None  Findings: Meatal narrowing, able to pass pediatric offset cystoscopy with gentle pressure. Soft annular narrowings present throughout entire urethra, but urethra open. Stent removed intact, moderately encrusted.   Complications: None.    Brief Operative Indications:   Siva Ariza is a 49-year-old male who had a kidney/pancreas transplant on 7/19/2024.  The transplant team was unable to remove the stent postoperatively because of meatal stenosis.  He presents today for cystoscopic removal of transplant ureteral stent.  He was counseled on the risks, benefits, and alternatives of this procedure and opted to proceed.    Summary of Procedure:   After informed consent was obtained, the patient was brought to the operating room.  Adequate anesthesia was delivered, and the patient was positioned in dorsal lithotomy.  He was prepped and draped in standard sterile fashion.  Timeout was taken before the procedure to confirm patient, position, and procedure.    A pediatric offset cystoscope was inserted into well-lubricated urethra.  There was some mild narrowing at the meatus that we were able to pass with gentle pressure.  We noted soft annular narrowings throughout the entire urethra, but the urethra was open and patent.  Upon entering the bladder, we noted the distal curl of the transplant ureteral stent.  A flexible stent grasper was passed through the cystoscope, the distal curl was grasped, and the stent was removed intact.  The bladder was drained and the procedure was concluded.    Post-Operative  Plan:   - Discharge today  - Follow up as needed with Dr. Harris if concerns for voiding symptoms     As attending surgeon, I, Farzad Harris MD, was scrubbed and present for the entire procedure.

## 2024-10-10 NOTE — DISCHARGE INSTRUCTIONS
Contacting your Doctor -   To contact a doctor, call Dr. Harris [phone] (Monday-Friday 8:00-4:30) or:  [phone] and ask for the resident on call for Urology (answered 24 hours a day)   Emergency Department:  [place]: [phone]  [facility]: [phone] 911 if you are in need of immediate or emergent help

## 2024-10-12 LAB
BACTERIA UR CULT: ABNORMAL

## 2024-10-14 ENCOUNTER — LAB (OUTPATIENT)
Dept: LAB | Facility: HOSPITAL | Age: 49
End: 2024-10-14
Payer: MEDICARE

## 2024-10-14 DIAGNOSIS — Z94.83 PANCREAS REPLACED BY TRANSPLANT (H): ICD-10-CM

## 2024-10-14 DIAGNOSIS — Z94.0 KIDNEY REPLACED BY TRANSPLANT: ICD-10-CM

## 2024-10-14 DIAGNOSIS — Z48.298 AFTERCARE FOLLOWING ORGAN TRANSPLANT: ICD-10-CM

## 2024-10-14 DIAGNOSIS — Z98.890 OTHER SPECIFIED POSTPROCEDURAL STATES: ICD-10-CM

## 2024-10-14 DIAGNOSIS — Z79.899 ENCOUNTER FOR LONG-TERM CURRENT USE OF MEDICATION: ICD-10-CM

## 2024-10-14 LAB
AMYLASE SERPL-CCNC: 95 U/L (ref 28–100)
ANION GAP SERPL CALCULATED.3IONS-SCNC: 9 MMOL/L (ref 7–15)
BUN SERPL-MCNC: 18.4 MG/DL (ref 6–20)
CALCIUM SERPL-MCNC: 10.5 MG/DL (ref 8.8–10.4)
CHLORIDE SERPL-SCNC: 107 MMOL/L (ref 98–107)
CREAT SERPL-MCNC: 1.47 MG/DL (ref 0.67–1.17)
EGFRCR SERPLBLD CKD-EPI 2021: 58 ML/MIN/1.73M2
ERYTHROCYTE [DISTWIDTH] IN BLOOD BY AUTOMATED COUNT: 15 % (ref 10–15)
GLUCOSE SERPL-MCNC: 143 MG/DL (ref 70–99)
HCO3 SERPL-SCNC: 21 MMOL/L (ref 22–29)
HCT VFR BLD AUTO: 36.8 % (ref 40–53)
HGB BLD-MCNC: 11.7 G/DL (ref 13.3–17.7)
LIPASE SERPL-CCNC: 59 U/L (ref 13–60)
MAGNESIUM SERPL-MCNC: 1.5 MG/DL (ref 1.7–2.3)
MCH RBC QN AUTO: 28.7 PG (ref 26.5–33)
MCHC RBC AUTO-ENTMCNC: 31.8 G/DL (ref 31.5–36.5)
MCV RBC AUTO: 90 FL (ref 78–100)
MYCOPHENOLATE SERPL LC/MS/MS-MCNC: 3.4 MG/L (ref 1–3.5)
MYCOPHENOLATE-G SERPL LC/MS/MS-MCNC: 58.2 MG/L (ref 30–95)
PHOSPHATE SERPL-MCNC: 2 MG/DL (ref 2.5–4.5)
PLATELET # BLD AUTO: 190 10E3/UL (ref 150–450)
POTASSIUM SERPL-SCNC: 4.7 MMOL/L (ref 3.4–5.3)
RBC # BLD AUTO: 4.07 10E6/UL (ref 4.4–5.9)
SODIUM SERPL-SCNC: 137 MMOL/L (ref 135–145)
TACROLIMUS BLD-MCNC: 6.3 UG/L (ref 5–15)
TME LAST DOSE: NORMAL H
WBC # BLD AUTO: 4.6 10E3/UL (ref 4–11)

## 2024-10-14 PROCEDURE — 80180 DRUG SCRN QUAN MYCOPHENOLATE: CPT

## 2024-10-14 PROCEDURE — 84100 ASSAY OF PHOSPHORUS: CPT

## 2024-10-14 PROCEDURE — 82947 ASSAY GLUCOSE BLOOD QUANT: CPT

## 2024-10-14 PROCEDURE — 36415 COLL VENOUS BLD VENIPUNCTURE: CPT

## 2024-10-14 PROCEDURE — 80197 ASSAY OF TACROLIMUS: CPT

## 2024-10-14 PROCEDURE — 80048 BASIC METABOLIC PNL TOTAL CA: CPT

## 2024-10-14 PROCEDURE — 83735 ASSAY OF MAGNESIUM: CPT

## 2024-10-14 PROCEDURE — 85027 COMPLETE CBC AUTOMATED: CPT

## 2024-10-14 PROCEDURE — 82150 ASSAY OF AMYLASE: CPT

## 2024-10-14 PROCEDURE — 83690 ASSAY OF LIPASE: CPT

## 2024-10-14 RX ORDER — TACROLIMUS 0.5 MG/1
0.5 CAPSULE ORAL EVERY EVENING
Qty: 60 CAPSULE | Refills: 11 | Status: SHIPPED | OUTPATIENT
Start: 2024-10-14 | End: 2024-10-15

## 2024-10-14 RX ORDER — TACROLIMUS 1 MG/1
1 CAPSULE ORAL 2 TIMES DAILY
Qty: 60 CAPSULE | Refills: 11 | Status: SHIPPED | OUTPATIENT
Start: 2024-10-14 | End: 2024-10-15

## 2024-10-15 ENCOUNTER — TELEPHONE (OUTPATIENT)
Dept: TRANSPLANT | Facility: CLINIC | Age: 49
End: 2024-10-15
Payer: COMMERCIAL

## 2024-10-15 DIAGNOSIS — R82.90 BAD ODOR OF URINE: Primary | ICD-10-CM

## 2024-10-15 DIAGNOSIS — Z94.0 KIDNEY REPLACED BY TRANSPLANT: ICD-10-CM

## 2024-10-15 RX ORDER — TACROLIMUS 0.5 MG/1
CAPSULE ORAL
Status: SHIPPED
Start: 2024-10-15 | End: 2024-11-11

## 2024-10-15 RX ORDER — TACROLIMUS 1 MG/1
2 CAPSULE ORAL 2 TIMES DAILY
Qty: 120 CAPSULE | Refills: 11 | Status: SHIPPED | OUTPATIENT
Start: 2024-10-15 | End: 2024-11-11

## 2024-10-15 NOTE — TELEPHONE ENCOUNTER
Siva feels well after stent removal but c/o some continued pain with urination. Denies hematuria, frequency or urgency. Denies fevers or chills. Says his urine is smelly. Smell went away while he was on abx, but came back after he stopped. Will check UA/UCx with labs on Thursday.    ISSUE:   Tacrolimus IR level 6.3 on 10/14, goal 8-10, dose 1.5/1 mg BID.    PLAN:   Call Patient and confirm this was an accurate 12-hour trough.   Verify Tacrolimus IR dose 1.5/1 mg BID.   Confirm no new medications or or missed doses.   Confirm no new illness / infection / diarrhea.   If accurate trough and accurate dose, increase Tacrolimus IR dose to 2 mg BID     Is this more than a 50% increase or decrease in current IS dose: No  If YES, justification: N/A    Repeat labs in 1 weeks.  *If > 50% change in immunosuppression dose, repeat labs in 1 week.     OUTCOME:   Spoke with Patient, they confirm accurate trough level and current dose 1.5/1 mg BID.   Patient confirmed dose change to 2 mg BID.  Patient agreed to repeat labs in 1 weeks.   Orders sent to preferred pharmacy for dose change and lab for repeat labs.   Patient voiced understanding of plan.

## 2024-10-18 ENCOUNTER — LAB (OUTPATIENT)
Dept: LAB | Facility: HOSPITAL | Age: 49
End: 2024-10-18
Payer: MEDICARE

## 2024-10-18 ENCOUNTER — TELEPHONE (OUTPATIENT)
Dept: CARDIOLOGY | Facility: CLINIC | Age: 49
End: 2024-10-18

## 2024-10-18 DIAGNOSIS — Z94.0 KIDNEY REPLACED BY TRANSPLANT: ICD-10-CM

## 2024-10-18 DIAGNOSIS — Z94.83 PANCREAS REPLACED BY TRANSPLANT (H): ICD-10-CM

## 2024-10-18 DIAGNOSIS — R82.90 BAD ODOR OF URINE: ICD-10-CM

## 2024-10-18 DIAGNOSIS — R82.81 PYURIA: ICD-10-CM

## 2024-10-18 DIAGNOSIS — Z48.298 AFTERCARE FOLLOWING ORGAN TRANSPLANT: ICD-10-CM

## 2024-10-18 DIAGNOSIS — Z98.890 OTHER SPECIFIED POSTPROCEDURAL STATES: ICD-10-CM

## 2024-10-18 DIAGNOSIS — R82.81 PYURIA: Primary | ICD-10-CM

## 2024-10-18 DIAGNOSIS — Z79.899 ENCOUNTER FOR LONG-TERM CURRENT USE OF MEDICATION: ICD-10-CM

## 2024-10-18 LAB
ALBUMIN UR-MCNC: 50 MG/DL
AMYLASE SERPL-CCNC: 76 U/L (ref 28–100)
ANION GAP SERPL CALCULATED.3IONS-SCNC: 11 MMOL/L (ref 7–15)
APPEARANCE UR: CLEAR
BACTERIA #/AREA URNS HPF: ABNORMAL /HPF
BILIRUB UR QL STRIP: NEGATIVE
BUN SERPL-MCNC: 16.4 MG/DL (ref 6–20)
CALCIUM SERPL-MCNC: 10.7 MG/DL (ref 8.8–10.4)
CHLORIDE SERPL-SCNC: 104 MMOL/L (ref 98–107)
COLOR UR AUTO: ABNORMAL
CREAT SERPL-MCNC: 1.53 MG/DL (ref 0.67–1.17)
EGFRCR SERPLBLD CKD-EPI 2021: 55 ML/MIN/1.73M2
GLUCOSE SERPL-MCNC: 146 MG/DL (ref 70–99)
GLUCOSE UR STRIP-MCNC: NEGATIVE MG/DL
HCO3 SERPL-SCNC: 21 MMOL/L (ref 22–29)
HGB UR QL STRIP: NEGATIVE
KETONES UR STRIP-MCNC: NEGATIVE MG/DL
LEUKOCYTE ESTERASE UR QL STRIP: ABNORMAL
LIPASE SERPL-CCNC: 51 U/L (ref 13–60)
MAGNESIUM SERPL-MCNC: 1.3 MG/DL (ref 1.7–2.3)
NITRATE UR QL: NEGATIVE
PH UR STRIP: 7 [PH] (ref 5–7)
PHOSPHATE SERPL-MCNC: 1.9 MG/DL (ref 2.5–4.5)
POTASSIUM SERPL-SCNC: 4.7 MMOL/L (ref 3.4–5.3)
RBC URINE: 1 /HPF
SODIUM SERPL-SCNC: 136 MMOL/L (ref 135–145)
SP GR UR STRIP: 1.02 (ref 1–1.03)
TACROLIMUS BLD-MCNC: 9.4 UG/L (ref 5–15)
TME LAST DOSE: NORMAL H
TME LAST DOSE: NORMAL H
UROBILINOGEN UR STRIP-MCNC: <2 MG/DL
WBC URINE: 6 /HPF

## 2024-10-18 PROCEDURE — 80180 DRUG SCRN QUAN MYCOPHENOLATE: CPT

## 2024-10-18 PROCEDURE — 80048 BASIC METABOLIC PNL TOTAL CA: CPT

## 2024-10-18 PROCEDURE — 36415 COLL VENOUS BLD VENIPUNCTURE: CPT

## 2024-10-18 PROCEDURE — 82150 ASSAY OF AMYLASE: CPT

## 2024-10-18 PROCEDURE — 81003 URINALYSIS AUTO W/O SCOPE: CPT

## 2024-10-18 PROCEDURE — 83690 ASSAY OF LIPASE: CPT

## 2024-10-18 PROCEDURE — 84100 ASSAY OF PHOSPHORUS: CPT

## 2024-10-18 PROCEDURE — 80197 ASSAY OF TACROLIMUS: CPT

## 2024-10-18 PROCEDURE — 83735 ASSAY OF MAGNESIUM: CPT

## 2024-10-18 PROCEDURE — 87186 SC STD MICRODIL/AGAR DIL: CPT

## 2024-10-18 NOTE — TELEPHONE ENCOUNTER
Patient Contacted for the patient to call back and schedule the following:    Appointment type: [facility] CARDIO  Provider: MATTHEW  Return date: NONE  Specialty phone number: [phone] OPT 1  Additional appointment(s) needed: N/A  Additonal Notes:Pt has new cardiologist and does not want to double up appts with them and here - elected to stay with their current appt and follow up with them elsewhere

## 2024-10-19 ENCOUNTER — TELEPHONE (OUTPATIENT)
Dept: TRANSPLANT | Facility: CLINIC | Age: 49
End: 2024-10-19
Payer: COMMERCIAL

## 2024-10-19 DIAGNOSIS — N39.0 URINARY TRACT INFECTION: ICD-10-CM

## 2024-10-19 DIAGNOSIS — Z94.0 KIDNEY REPLACED BY TRANSPLANT: Primary | ICD-10-CM

## 2024-10-19 RX ORDER — LINEZOLID 600 MG/1
600 TABLET, FILM COATED ORAL EVERY 12 HOURS
Qty: 14 TABLET | Refills: 0 | Status: SHIPPED | OUTPATIENT
Start: 2024-10-19 | End: 2024-10-26

## 2024-10-19 NOTE — TELEPHONE ENCOUNTER
lPt has uti. Per dr laguna inezolid 600 mg twice daily x 7 days.   Pt aware and will  at local pharm and call with any issues.

## 2024-10-20 LAB — BACTERIA UR CULT: ABNORMAL

## 2024-10-21 ENCOUNTER — LAB (OUTPATIENT)
Dept: LAB | Facility: HOSPITAL | Age: 49
End: 2024-10-21
Payer: MEDICARE

## 2024-10-21 DIAGNOSIS — Z98.890 OTHER SPECIFIED POSTPROCEDURAL STATES: ICD-10-CM

## 2024-10-21 DIAGNOSIS — Z79.899 ENCOUNTER FOR LONG-TERM CURRENT USE OF MEDICATION: ICD-10-CM

## 2024-10-21 DIAGNOSIS — Z48.298 AFTERCARE FOLLOWING ORGAN TRANSPLANT: ICD-10-CM

## 2024-10-21 DIAGNOSIS — Z94.0 KIDNEY REPLACED BY TRANSPLANT: ICD-10-CM

## 2024-10-21 DIAGNOSIS — Z94.83 PANCREAS REPLACED BY TRANSPLANT (H): ICD-10-CM

## 2024-10-21 LAB
AMYLASE SERPL-CCNC: 71 U/L (ref 28–100)
ANION GAP SERPL CALCULATED.3IONS-SCNC: 11 MMOL/L (ref 7–15)
BUN SERPL-MCNC: 14.8 MG/DL (ref 6–20)
CALCIUM SERPL-MCNC: 10.5 MG/DL (ref 8.8–10.4)
CHLORIDE SERPL-SCNC: 105 MMOL/L (ref 98–107)
CREAT SERPL-MCNC: 1.54 MG/DL (ref 0.67–1.17)
EGFRCR SERPLBLD CKD-EPI 2021: 55 ML/MIN/1.73M2
ERYTHROCYTE [DISTWIDTH] IN BLOOD BY AUTOMATED COUNT: 15.2 % (ref 10–15)
GLUCOSE SERPL-MCNC: 155 MG/DL (ref 70–99)
HCO3 SERPL-SCNC: 22 MMOL/L (ref 22–29)
HCT VFR BLD AUTO: 34.6 % (ref 40–53)
HGB BLD-MCNC: 11 G/DL (ref 13.3–17.7)
LIPASE SERPL-CCNC: 47 U/L (ref 13–60)
MCH RBC QN AUTO: 28.4 PG (ref 26.5–33)
MCHC RBC AUTO-ENTMCNC: 31.8 G/DL (ref 31.5–36.5)
MCV RBC AUTO: 89 FL (ref 78–100)
PLATELET # BLD AUTO: 171 10E3/UL (ref 150–450)
POTASSIUM SERPL-SCNC: 4.6 MMOL/L (ref 3.4–5.3)
RBC # BLD AUTO: 3.88 10E6/UL (ref 4.4–5.9)
SODIUM SERPL-SCNC: 138 MMOL/L (ref 135–145)
TACROLIMUS BLD-MCNC: 11.2 UG/L (ref 5–15)
TME LAST DOSE: NORMAL H
TME LAST DOSE: NORMAL H
WBC # BLD AUTO: 4.8 10E3/UL (ref 4–11)

## 2024-10-21 PROCEDURE — 85027 COMPLETE CBC AUTOMATED: CPT

## 2024-10-21 PROCEDURE — 82150 ASSAY OF AMYLASE: CPT

## 2024-10-21 PROCEDURE — 80048 BASIC METABOLIC PNL TOTAL CA: CPT

## 2024-10-21 PROCEDURE — 36415 COLL VENOUS BLD VENIPUNCTURE: CPT

## 2024-10-21 PROCEDURE — 83690 ASSAY OF LIPASE: CPT

## 2024-10-21 PROCEDURE — 80197 ASSAY OF TACROLIMUS: CPT

## 2024-10-22 LAB
MYCOPHENOLATE SERPL LC/MS/MS-MCNC: 3.59 MG/L (ref 1–3.5)
MYCOPHENOLATE-G SERPL LC/MS/MS-MCNC: 78.1 MG/L (ref 30–95)
TME LAST DOSE: ABNORMAL H
TME LAST DOSE: ABNORMAL H

## 2024-10-28 ENCOUNTER — LAB (OUTPATIENT)
Dept: LAB | Facility: HOSPITAL | Age: 49
End: 2024-10-28
Payer: COMMERCIAL

## 2024-10-28 DIAGNOSIS — Z94.0 KIDNEY REPLACED BY TRANSPLANT: ICD-10-CM

## 2024-10-28 DIAGNOSIS — Z79.899 ENCOUNTER FOR LONG-TERM CURRENT USE OF MEDICATION: ICD-10-CM

## 2024-10-28 DIAGNOSIS — Z98.890 OTHER SPECIFIED POSTPROCEDURAL STATES: ICD-10-CM

## 2024-10-28 DIAGNOSIS — Z94.83 PANCREAS REPLACED BY TRANSPLANT (H): ICD-10-CM

## 2024-10-28 DIAGNOSIS — Z48.298 AFTERCARE FOLLOWING ORGAN TRANSPLANT: ICD-10-CM

## 2024-10-28 LAB
AMYLASE SERPL-CCNC: 89 U/L (ref 28–100)
ANION GAP SERPL CALCULATED.3IONS-SCNC: 10 MMOL/L (ref 7–15)
BUN SERPL-MCNC: 13.6 MG/DL (ref 6–20)
CALCIUM SERPL-MCNC: 10.4 MG/DL (ref 8.8–10.4)
CHLORIDE SERPL-SCNC: 108 MMOL/L (ref 98–107)
CREAT SERPL-MCNC: 1.67 MG/DL (ref 0.67–1.17)
EGFRCR SERPLBLD CKD-EPI 2021: 50 ML/MIN/1.73M2
ERYTHROCYTE [DISTWIDTH] IN BLOOD BY AUTOMATED COUNT: 16.1 % (ref 10–15)
GLUCOSE SERPL-MCNC: 145 MG/DL (ref 70–99)
HCO3 SERPL-SCNC: 21 MMOL/L (ref 22–29)
HCT VFR BLD AUTO: 35.8 % (ref 40–53)
HGB BLD-MCNC: 11.1 G/DL (ref 13.3–17.7)
LIPASE SERPL-CCNC: 61 U/L (ref 13–60)
MCH RBC QN AUTO: 28.8 PG (ref 26.5–33)
MCHC RBC AUTO-ENTMCNC: 31 G/DL (ref 31.5–36.5)
MCV RBC AUTO: 93 FL (ref 78–100)
PLATELET # BLD AUTO: 203 10E3/UL (ref 150–450)
POTASSIUM SERPL-SCNC: 5.4 MMOL/L (ref 3.4–5.3)
RBC # BLD AUTO: 3.85 10E6/UL (ref 4.4–5.9)
SODIUM SERPL-SCNC: 139 MMOL/L (ref 135–145)
TACROLIMUS BLD-MCNC: 10.9 UG/L (ref 5–15)
TME LAST DOSE: NORMAL H
TME LAST DOSE: NORMAL H
WBC # BLD AUTO: 4.1 10E3/UL (ref 4–11)

## 2024-10-28 PROCEDURE — 80197 ASSAY OF TACROLIMUS: CPT

## 2024-10-28 PROCEDURE — 36415 COLL VENOUS BLD VENIPUNCTURE: CPT

## 2024-10-28 PROCEDURE — 85018 HEMOGLOBIN: CPT

## 2024-10-28 PROCEDURE — 83690 ASSAY OF LIPASE: CPT

## 2024-10-28 PROCEDURE — 80048 BASIC METABOLIC PNL TOTAL CA: CPT

## 2024-10-28 PROCEDURE — 82150 ASSAY OF AMYLASE: CPT

## 2024-10-28 RX ORDER — ATORVASTATIN CALCIUM 40 MG/1
40 TABLET, FILM COATED ORAL EVERY EVENING
Qty: 90 TABLET | Refills: 4 | Status: SHIPPED | OUTPATIENT
Start: 2024-10-28

## 2024-10-29 ENCOUNTER — LAB (OUTPATIENT)
Dept: LAB | Facility: CLINIC | Age: 49
End: 2024-10-29
Payer: COMMERCIAL

## 2024-10-29 ENCOUNTER — OFFICE VISIT (OUTPATIENT)
Dept: ENDOCRINOLOGY | Facility: CLINIC | Age: 49
End: 2024-10-29
Attending: NURSE PRACTITIONER
Payer: COMMERCIAL

## 2024-10-29 VITALS
BODY MASS INDEX: 30.7 KG/M2 | WEIGHT: 219.3 LBS | HEIGHT: 71 IN | DIASTOLIC BLOOD PRESSURE: 78 MMHG | HEART RATE: 100 BPM | SYSTOLIC BLOOD PRESSURE: 136 MMHG

## 2024-10-29 DIAGNOSIS — Z94.0 KIDNEY REPLACED BY TRANSPLANT: ICD-10-CM

## 2024-10-29 DIAGNOSIS — I15.1 HTN, KIDNEY TRANSPLANT RELATED: ICD-10-CM

## 2024-10-29 DIAGNOSIS — E11.29 TYPE 2 DIABETES MELLITUS WITH OTHER DIABETIC KIDNEY COMPLICATION, WITH LONG-TERM CURRENT USE OF INSULIN (H): ICD-10-CM

## 2024-10-29 DIAGNOSIS — Z94.83 PANCREAS REPLACED BY TRANSPLANT (H): ICD-10-CM

## 2024-10-29 DIAGNOSIS — Z79.4 TYPE 2 DIABETES MELLITUS WITH OTHER DIABETIC KIDNEY COMPLICATION, WITH LONG-TERM CURRENT USE OF INSULIN (H): ICD-10-CM

## 2024-10-29 DIAGNOSIS — Z94.0 HTN, KIDNEY TRANSPLANT RELATED: ICD-10-CM

## 2024-10-29 LAB
EST. AVERAGE GLUCOSE BLD GHB EST-MCNC: 131 MG/DL
HBA1C MFR BLD: 6.2 % (ref 0–5.6)

## 2024-10-29 PROCEDURE — 83036 HEMOGLOBIN GLYCOSYLATED A1C: CPT

## 2024-10-29 PROCEDURE — 36415 COLL VENOUS BLD VENIPUNCTURE: CPT

## 2024-10-29 PROCEDURE — 99214 OFFICE O/P EST MOD 30 MIN: CPT | Performed by: NURSE PRACTITIONER

## 2024-10-29 PROCEDURE — G2211 COMPLEX E/M VISIT ADD ON: HCPCS | Performed by: NURSE PRACTITIONER

## 2024-10-29 RX ORDER — SODIUM BICARBONATE 650 MG/1
1950 TABLET ORAL 3 TIMES DAILY
Qty: 270 TABLET | Refills: 3 | Status: SHIPPED | OUTPATIENT
Start: 2024-10-29

## 2024-10-29 RX ORDER — NYSTATIN 100000 [USP'U]/ML
SUSPENSION ORAL
Qty: 600 ML | Refills: 1 | OUTPATIENT
Start: 2024-10-29

## 2024-10-29 NOTE — LETTER
"10/29/2024      Siva Ramey  [address] [facility] [id number]      Dear Colleague,    Thank you for referring your patient, Siva Ramey, to the [facility]. Please see a copy of my visit note below.    [facility] ENDOCRINOLOGY    Diabetes Note 10/29/2024    Siva Ramey, 1975, [id number]          Reason for visit      1. 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 Pancreas replaced by transplant (H)        HPI     Siva Ramey is a very pleasant 49 year old old male who presents for follow up.  SUMMARY:    Siva is here in referral for DM 2. Referral was placed prior to DDKT(SPK), which was done in July. Pt is currently euglycemic. He is on no anti-hyperglycemia medications at the present time.     He has a complicated medical hx. Prior to the transplantation, he was on Hemodynamic Dialysis for ESRD for the three years prior. 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 Post transplantation he had a post op STEMI that required a Stent placement.     He was on an insulin pump for several years prior to his Transplant and has Guardian CGMs (not sure which generation). He has not ventured to put one on outside of association with the pump. He was in another Health Care [facility] while on the pump, and I have been unable to access records. He did, obviously have a Medtronic model.     He is unable to take Metformin, even enteric coated because of GI side effects.     He has been on Trulicity in the past, which a good glycemic as well as weight loss response. He reports that \"then my Insurance stopped covering it\". He eventually went on Soliqua, and apparently while he was on the insulin pump. Basal insulin is not usually indicated when there is a pump in use, but again, I have no access to records for the thought process.     Pt " reports that he has a Glucometer, but ran out of test strips and didn't think to ask anyone for refills.       Blood glucose data:      Past Medical History     Patient Active Problem List   Diagnosis     Type 2 diabetes mellitus with other circulatory complication, unspecified whether long term insulin use (H)     Dyslipidemia     Class 2 severe obesity due to excess calories with serious comorbidity in adult, unspecified BMI (H)     Atherosclerosis of other coronary artery bypass graft(s) with unstable angina pectoris (H)     Anemia in chronic renal disease     HTN, kidney transplant related     S/P CABG (coronary artery bypass graft)     Balanoposthitis     History of ST elevation myocardial infarction (STEMI)     Obesity (BMI 30.0-34.9)     MARQUEZ (dyspnea on exertion)     STEMI (ST elevation myocardial infarction) (H)     Metabolic acidosis     Retinopathy     Peripheral neuropathy     Acquired elevated diaphragm     Median nerve neuropathy, left     Iron deficiency anemia, unspecified     Stage 3b chronic kidney disease (H)     Coagulation defect, unspecified (H)     Fatigue, unspecified type     Obstructive sleep apnea treated with continuous positive airway pressure (CPAP)     Snoring     Palpitations     Gastroesophageal reflux disease without esophagitis     Diabetes mellitus, type 2 (H)     Kidney replaced by transplant     Pancreas replaced by transplant (H)     Immunosuppressed status (H)     Cardiac arrest with ventricular fibrillation (H)     Steroid-induced hyperglycemia     History of simultaneous kidney and pancreas transplant (H)     Hypomagnesemia     Secondary renal hyperparathyroidism (H)     Need for pneumocystis prophylaxis     Other stricture of urethra in male     Aftercare following organ transplant     CAD (coronary artery disease)     PAD (peripheral artery disease) (H)     HFrEF (heart failure with reduced ejection fraction) (H)        Family History       family history includes Brain  "Cancer in his maternal grandmother; CABG (age of onset: 50) in his father; Depression in his sister; Diabetes Type 2  in his father and mother; Heart Disease (age of onset: 60) in his father; Ovarian Cancer in his maternal grandmother; Pancreatic Cancer in his maternal aunt; Prostate Cancer in his maternal uncle; Substance Abuse in his sister.    Social History      reports that he has never smoked. He has been exposed to tobacco smoke. He has never used smokeless tobacco. He reports that he does not drink alcohol and does not use drugs.      Review of Systems     Patient has no polyuria or polydipsia, no chest pain, dyspnea or TIA's, no numbness, tingling or pain in extremities  Remainder negative except as noted in HPI.    Vital Signs     /78 (BP Location: Right arm, Patient Position: Sitting, Cuff Size: Adult Regular)   Pulse 100   Ht 1.803 m (5' 11\")   Wt 99.5 kg (219 lb 4.8 oz)   BMI 30.59 kg/m    Wt Readings from Last 3 Encounters:   10/29/24 99.5 kg (219 lb 4.8 oz)   10/10/24 97.8 kg (215 lb 9.8 oz)   10/01/24 98.7 kg (217 lb 8 oz)       Physical Exam     Constitutional:  Well developed, Well nourished  HENT:  Normocephalic,   Neck: Thyroid normal, No lymph nodes, Supple  Eyes:  PERRL, Conjunctiva pink  Respiratory:  Normal breath sounds, No respiratory distress  Cardiovascular:  Heart rate is elevated, Normal rhythm, No murmurs  GI:  Bowel sounds normal, Soft, No tenderness  Musculoskeletal:  No gross deformity or lesions, normal dorsalis pedis pulses  Skin: No acanthosis nigricans, lipoatrophy or lipodystrophy  Neurologic:  Alert & oriented x 3, nonfocal  Psychiatric:  Affect, Mood, Insight appropriate      Assessment     1. 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 Pancreas replaced by transplant (H)        Plan     His current A1c is 6.2 and has dropped a bit from the post transplant value of 6.4. Pt was told that the " "pancreas was \"bruised\" and also smaller than they would ideally want for an adult male.     I directed him to get the CGM on (he can download an Jim for his phone) and keep an eye on his BG. I also refilled his test strips.     He will continue to work with the Transplant Team as well as Nephrology. He will remain immunosuppressed to preserve the transplanted organs.       I will see him back in 6 months. If he is still without need for medication, he can remain with his PCP and the Transplant team for care.         Nelly Hand NP  [facility]  10/29/2024  10:47 AM      Lab Results     No results found for: \"HGBA1C\", \"CREATININE\", \"MICROALBUR\"    Cholesterol   Date Value Ref Range Status   09/04/2024 95 <200 mg/dL Final     Direct Measure HDL   Date Value Ref Range Status   09/04/2024 32 (L) >=40 mg/dL Final     Triglycerides   Date Value Ref Range Status   09/04/2024 113 <150 mg/dL Final       [unfilled]      Current Medications     Outpatient Medications Prior to Visit   Medication Sig Dispense Refill     acetaminophen (TYLENOL) 500 MG tablet Take 500 mg by mouth every 4 hours as needed       aspirin (ASA) 81 MG chewable tablet Take 1 tablet (81 mg) by mouth daily Starting tomorrow. 30 tablet 3     atorvastatin (LIPITOR) 40 MG tablet Take 1 tablet (40 mg) by mouth every evening. 90 tablet 4     carvedilol (COREG) 6.25 MG tablet Take 1 tablet (6.25 mg) by mouth 2 times daily (with meals). 180 tablet 3     dapsone (ACZONE) 25 MG tablet Take 2 tablets (50 mg) by mouth daily 60 tablet 11     magnesium glycinate 100 MG CAPS capsule Take 2 capsules (200 mg) by mouth 2 times daily Take in place of magnesium-oxide 120 capsule 2     methocarbamol (ROBAXIN) 750 MG tablet Take 1 tablet (750 mg) by mouth every 6 hours as needed for muscle spasms 20 tablet 0     metoprolol succinate ER (TOPROL XL) 25 MG 24 hr tablet Take 1 tablet (25 mg) by mouth daily. 90 tablet 3     mycophenolic acid (GENERIC EQUIVALENT) 180 MG EC " tablet Take 4 tablets (720 mg) by mouth 2 times daily 240 tablet 11     sodium bicarbonate 650 MG tablet Take 3 tablets (1,950 mg) by mouth 3 times daily 270 tablet 2     Sodium Bicarbonate, antacid, (NICE PURE BAKING SODA) POWD Take 2 g by mouth daily. Take 1/2 tsp (2gm) daily       tacrolimus (GENERIC EQUIVALENT) 0.5 MG capsule Profile Rx: patient will contact pharmacy when needed       tacrolimus (GENERIC EQUIVALENT) 1 MG capsule Take 2 capsules (2 mg) by mouth 2 times daily. 120 capsule 11     ticagrelor (BRILINTA) 90 MG tablet Take 1 tablet (90 mg) by mouth 2 times daily. 60 tablet 5     Facility-Administered Medications Prior to Visit   Medication Dose Route Frequency Provider Last Rate Last Admin     lidocaine (XYLOCAINE) 2 % external gel   Urethral Once Shelia Magallon APRN CNP         lidocaine (XYLOCAINE) 2 % external gel   Urethral Once Shelia Magallon APRN CNP               Again, thank you for allowing me to participate in the care of your patient.        Sincerely,        Nelly Hand NP

## 2024-10-29 NOTE — PROGRESS NOTES
"[facility] ENDOCRINOLOGY    Diabetes Note 10/29/2024    Siva Ramey, 1975, [id number]          Reason for visit      1. 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 Pancreas replaced by transplant (H)        HPI     Siva Ramey is a very pleasant 49 year old old male who presents for follow up.  SUMMARY:    Siva is here in referral for DM 2. Referral was placed prior to DDKT(SPK), which was done in July. Pt is currently euglycemic. He is on no anti-hyperglycemia medications at the present time.     He has a complicated medical hx. Prior to the transplantation, he was on Hemodynamic Dialysis for ESRD for the three years prior. 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 Post transplantation he had a post op STEMI that required a Stent placement.     He was on an insulin pump for several years prior to his Transplant and has Guardian CGMs (not sure which generation). He has not ventured to put one on outside of association with the pump. He was in another Health Care [facility] while on the pump, and I have been unable to access records. He did, obviously have a Medtronic model.     He is unable to take Metformin, even enteric coated because of GI side effects.     He has been on Trulicity in the past, which a good glycemic as well as weight loss response. He reports that \"then my Insurance stopped covering it\". He eventually went on Soliqua, and apparently while he was on the insulin pump. Basal insulin is not usually indicated when there is a pump in use, but again, I have no access to records for the thought process.     Pt reports that he has a Glucometer, but ran out of test strips and didn't think to ask anyone for refills.       Blood glucose data:      Past Medical History     Patient Active Problem List   Diagnosis    Type 2 diabetes mellitus with other " circulatory complication, unspecified whether long term insulin use (H)    Dyslipidemia    Class 2 severe obesity due to excess calories with serious comorbidity in adult, unspecified BMI (H)    Atherosclerosis of other coronary artery bypass graft(s) with unstable angina pectoris (H)    Anemia in chronic renal disease    HTN, kidney transplant related    S/P CABG (coronary artery bypass graft)    Balanoposthitis    History of ST elevation myocardial infarction (STEMI)    Obesity (BMI 30.0-34.9)    MARQUEZ (dyspnea on exertion)    STEMI (ST elevation myocardial infarction) (H)    Metabolic acidosis    Retinopathy    Peripheral neuropathy    Acquired elevated diaphragm    Median nerve neuropathy, left    Iron deficiency anemia, unspecified    Stage 3b chronic kidney disease (H)    Coagulation defect, unspecified (H)    Fatigue, unspecified type    Obstructive sleep apnea treated with continuous positive airway pressure (CPAP)    Snoring    Palpitations    Gastroesophageal reflux disease without esophagitis    Diabetes mellitus, type 2 (H)    Kidney replaced by transplant    Pancreas replaced by transplant (H)    Immunosuppressed status (H)    Cardiac arrest with ventricular fibrillation (H)    Steroid-induced hyperglycemia    History of simultaneous kidney and pancreas transplant (H)    Hypomagnesemia    Secondary renal hyperparathyroidism (H)    Need for pneumocystis prophylaxis    Other stricture of urethra in male    Aftercare following organ transplant    CAD (coronary artery disease)    PAD (peripheral artery disease) (H)    HFrEF (heart failure with reduced ejection fraction) (H)        Family History       family history includes Brain Cancer in his maternal grandmother; CABG (age of onset: 50) in his father; Depression in his sister; Diabetes Type 2  in his father and mother; Heart Disease (age of onset: 60) in his father; Ovarian Cancer in his maternal grandmother; Pancreatic Cancer in his maternal aunt;  "Prostate Cancer in his maternal uncle; Substance Abuse in his sister.    Social History      reports that he has never smoked. He has been exposed to tobacco smoke. He has never used smokeless tobacco. He reports that he does not drink alcohol and does not use drugs.      Review of Systems     Patient has no polyuria or polydipsia, no chest pain, dyspnea or TIA's, no numbness, tingling or pain in extremities  Remainder negative except as noted in HPI.    Vital Signs     /78 (BP Location: Right arm, Patient Position: Sitting, Cuff Size: Adult Regular)   Pulse 100   Ht 1.803 m (5' 11\")   Wt 99.5 kg (219 lb 4.8 oz)   BMI 30.59 kg/m    Wt Readings from Last 3 Encounters:   10/29/24 99.5 kg (219 lb 4.8 oz)   10/10/24 97.8 kg (215 lb 9.8 oz)   10/01/24 98.7 kg (217 lb 8 oz)       Physical Exam     Constitutional:  Well developed, Well nourished  HENT:  Normocephalic,   Neck: Thyroid normal, No lymph nodes, Supple  Eyes:  PERRL, Conjunctiva pink  Respiratory:  Normal breath sounds, No respiratory distress  Cardiovascular:  Heart rate is elevated, Normal rhythm, No murmurs  GI:  Bowel sounds normal, Soft, No tenderness  Musculoskeletal:  No gross deformity or lesions, normal dorsalis pedis pulses  Skin: No acanthosis nigricans, lipoatrophy or lipodystrophy  Neurologic:  Alert & oriented x 3, nonfocal  Psychiatric:  Affect, Mood, Insight appropriate      Assessment     1. 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 Pancreas replaced by transplant (H)        Plan     His current A1c is 6.2 and has dropped a bit from the post transplant value of 6.4. Pt was told that the pancreas was \"bruised\" and also smaller than they would ideally want for an adult male.     I directed him to get the CGM on (he can download an Jim for his phone) and keep an eye on his BG. I also refilled his test strips.     He will continue to work with the Transplant Team as " "well as Nephrology. He will remain immunosuppressed to preserve the transplanted organs.       I will see him back in 6 months. If he is still without need for medication, he can remain with his PCP and the Transplant team for care.         Nelly Hand NP  [facility]  10/29/2024  10:47 AM      Lab Results     No results found for: \"HGBA1C\", \"CREATININE\", \"MICROALBUR\"    Cholesterol   Date Value Ref Range Status   09/04/2024 95 <200 mg/dL Final     Direct Measure HDL   Date Value Ref Range Status   09/04/2024 32 (L) >=40 mg/dL Final     Triglycerides   Date Value Ref Range Status   09/04/2024 113 <150 mg/dL Final       [unfilled]      Current Medications     Outpatient Medications Prior to Visit   Medication Sig Dispense Refill    acetaminophen (TYLENOL) 500 MG tablet Take 500 mg by mouth every 4 hours as needed      aspirin (ASA) 81 MG chewable tablet Take 1 tablet (81 mg) by mouth daily Starting tomorrow. 30 tablet 3    atorvastatin (LIPITOR) 40 MG tablet Take 1 tablet (40 mg) by mouth every evening. 90 tablet 4    carvedilol (COREG) 6.25 MG tablet Take 1 tablet (6.25 mg) by mouth 2 times daily (with meals). 180 tablet 3    dapsone (ACZONE) 25 MG tablet Take 2 tablets (50 mg) by mouth daily 60 tablet 11    magnesium glycinate 100 MG CAPS capsule Take 2 capsules (200 mg) by mouth 2 times daily Take in place of magnesium-oxide 120 capsule 2    methocarbamol (ROBAXIN) 750 MG tablet Take 1 tablet (750 mg) by mouth every 6 hours as needed for muscle spasms 20 tablet 0    metoprolol succinate ER (TOPROL XL) 25 MG 24 hr tablet Take 1 tablet (25 mg) by mouth daily. 90 tablet 3    mycophenolic acid (GENERIC EQUIVALENT) 180 MG EC tablet Take 4 tablets (720 mg) by mouth 2 times daily 240 tablet 11    sodium bicarbonate 650 MG tablet Take 3 tablets (1,950 mg) by mouth 3 times daily 270 tablet 2    Sodium Bicarbonate, antacid, (NICE PURE BAKING SODA) POWD Take 2 g by mouth daily. Take 1/2 tsp (2gm) daily      tacrolimus " (GENERIC EQUIVALENT) 0.5 MG capsule Profile Rx: patient will contact pharmacy when needed      tacrolimus (GENERIC EQUIVALENT) 1 MG capsule Take 2 capsules (2 mg) by mouth 2 times daily. 120 capsule 11    ticagrelor (BRILINTA) 90 MG tablet Take 1 tablet (90 mg) by mouth 2 times daily. 60 tablet 5     Facility-Administered Medications Prior to Visit   Medication Dose Route Frequency Provider Last Rate Last Admin    lidocaine (XYLOCAINE) 2 % external gel   Urethral Once Shelia Magallon APRN CNP        lidocaine (XYLOCAINE) 2 % external gel   Urethral Once Shelia Magallon APRN CNP

## 2024-10-30 DIAGNOSIS — Z94.0 HTN, KIDNEY TRANSPLANT RELATED: ICD-10-CM

## 2024-10-30 DIAGNOSIS — I15.1 HTN, KIDNEY TRANSPLANT RELATED: ICD-10-CM

## 2024-10-30 RX ORDER — NYSTATIN 100000 [USP'U]/ML
500000 SUSPENSION ORAL 4 TIMES DAILY
Qty: 600 ML | Refills: 0 | Status: CANCELLED | OUTPATIENT
Start: 2024-10-30

## 2024-10-30 RX ORDER — VALGANCICLOVIR 450 MG/1
900 TABLET, FILM COATED ORAL DAILY
Qty: 60 TABLET | Refills: 0 | Status: CANCELLED | OUTPATIENT
Start: 2024-10-30

## 2024-11-07 ENCOUNTER — TELEPHONE (OUTPATIENT)
Dept: PHARMACY | Facility: CLINIC | Age: 49
End: 2024-11-07
Payer: COMMERCIAL

## 2024-11-07 ENCOUNTER — LAB (OUTPATIENT)
Dept: LAB | Facility: HOSPITAL | Age: 49
End: 2024-11-07
Payer: MEDICARE

## 2024-11-07 DIAGNOSIS — Z94.83 PANCREAS REPLACED BY TRANSPLANT (H): ICD-10-CM

## 2024-11-07 DIAGNOSIS — Z79.899 ENCOUNTER FOR LONG-TERM CURRENT USE OF MEDICATION: ICD-10-CM

## 2024-11-07 DIAGNOSIS — Z48.298 AFTERCARE FOLLOWING ORGAN TRANSPLANT: ICD-10-CM

## 2024-11-07 DIAGNOSIS — Z94.0 KIDNEY REPLACED BY TRANSPLANT: ICD-10-CM

## 2024-11-07 DIAGNOSIS — Z98.890 OTHER SPECIFIED POSTPROCEDURAL STATES: ICD-10-CM

## 2024-11-07 LAB
AMYLASE SERPL-CCNC: 70 U/L (ref 28–100)
ANION GAP SERPL CALCULATED.3IONS-SCNC: 11 MMOL/L (ref 7–15)
BUN SERPL-MCNC: 19.7 MG/DL (ref 6–20)
CALCIUM SERPL-MCNC: 10.8 MG/DL (ref 8.8–10.4)
CHLORIDE SERPL-SCNC: 102 MMOL/L (ref 98–107)
CREAT SERPL-MCNC: 1.69 MG/DL (ref 0.67–1.17)
EGFRCR SERPLBLD CKD-EPI 2021: 49 ML/MIN/1.73M2
ERYTHROCYTE [DISTWIDTH] IN BLOOD BY AUTOMATED COUNT: 17 % (ref 10–15)
GLUCOSE SERPL-MCNC: 149 MG/DL (ref 70–99)
HCO3 SERPL-SCNC: 22 MMOL/L (ref 22–29)
HCT VFR BLD AUTO: 34.2 % (ref 40–53)
HGB BLD-MCNC: 10.9 G/DL (ref 13.3–17.7)
LIPASE SERPL-CCNC: 42 U/L (ref 13–60)
MCH RBC QN AUTO: 29.2 PG (ref 26.5–33)
MCHC RBC AUTO-ENTMCNC: 31.9 G/DL (ref 31.5–36.5)
MCV RBC AUTO: 92 FL (ref 78–100)
PLATELET # BLD AUTO: 147 10E3/UL (ref 150–450)
POTASSIUM SERPL-SCNC: 4 MMOL/L (ref 3.4–5.3)
RBC # BLD AUTO: 3.73 10E6/UL (ref 4.4–5.9)
SODIUM SERPL-SCNC: 135 MMOL/L (ref 135–145)
TACROLIMUS BLD-MCNC: 12.5 UG/L (ref 5–15)
TME LAST DOSE: NORMAL H
TME LAST DOSE: NORMAL H
WBC # BLD AUTO: 5.4 10E3/UL (ref 4–11)

## 2024-11-07 PROCEDURE — 85027 COMPLETE CBC AUTOMATED: CPT

## 2024-11-07 PROCEDURE — 80197 ASSAY OF TACROLIMUS: CPT

## 2024-11-07 PROCEDURE — 83690 ASSAY OF LIPASE: CPT

## 2024-11-07 PROCEDURE — 80048 BASIC METABOLIC PNL TOTAL CA: CPT

## 2024-11-07 PROCEDURE — 82150 ASSAY OF AMYLASE: CPT

## 2024-11-07 PROCEDURE — 36415 COLL VENOUS BLD VENIPUNCTURE: CPT

## 2024-11-07 NOTE — TELEPHONE ENCOUNTER
Clinical Pharmacy Consult:                                                      Transplant Specific: 3 month post transplant discharge medication review  Date of Transplant: 07/20/2024  Type of Transplant: kidney and pancreas  First Transplant: yes  History of rejection: no    Immunosuppression Regimen   Tacrolimus 2 mg qAM & 2 mg qPM and Mycophenolic Acid 720mg qAM & 720mg qPM  Patient specific goal: 8-10  Most recent level: 10.9 on 10/28/2024  Immunosuppressant Levels:  Slightly supratherapeutic  Pt adherent to lab draws: yes  Scr:   Lab Results   Component Value Date    CR 1.47 09/09/2024     Side effects:    Prophylactic Medications  PJP Prophylaxis:  Dapsone 50 mg daily  Scheduled Discontinue Date: Lifelong    Antifungal: Nystatin  Scheduled Discontinue Date: Therapy Complete    CMV: Valcyte 900mg daily   Scheduled Discontinue Date: Therapy Complete    Acid Reducer: Protonix (pantoprazole)  Scheduled Reviewed Date:  Tapered off and stopped after 10/1 visit    Thrombosis Prevention: Aspirin 81 mg PO daily  Scheduled Discontinue Date:  TBD    Blood Pressure Management  Frequency of home Blood Pressure checks:   Most recent home BP: 131/86 mmHg last office visit  Patient Blood pressure goal: <140/90  Patient blood pressure at goal:  yes  Hospitalizations/ER visits since last assessment: 0    Med rec/DUR performed: yes   Med Rec Discrepancies:     Unable to reach Siva after multiple attempts for pharmacy check in, chart review. Last tacrolimus level slightly not, dose not adjusted. Subjectively refilling medications appropriately based on refill history at local  retail site. His transplant percent days covered ratio is 1.00 supporting great adherence.     Follow up in 1 month with MTM.    Time Spent: 10 minutes    Shay Barrios, PharmD, BCACP  [facility]  [address] [place] [id number]  Specialty - [phone]  Transplant - [phone]  Mail - [phone]

## 2024-11-08 ENCOUNTER — TELEPHONE (OUTPATIENT)
Dept: TRANSPLANT | Facility: CLINIC | Age: 49
End: 2024-11-08
Payer: COMMERCIAL

## 2024-11-08 DIAGNOSIS — I15.1 HTN, KIDNEY TRANSPLANT RELATED: ICD-10-CM

## 2024-11-08 DIAGNOSIS — Z94.0 KIDNEY REPLACED BY TRANSPLANT: ICD-10-CM

## 2024-11-08 DIAGNOSIS — Z94.0 HTN, KIDNEY TRANSPLANT RELATED: ICD-10-CM

## 2024-11-08 NOTE — TELEPHONE ENCOUNTER
ISSUE:   Tacrolimus IR level 12.5 on 11/7, goal 8-10, dose 2 mg BID.    PLAN:   Call Patient and confirm this was an accurate 12-hour trough.   Verify Tacrolimus IR dose 2 mg BID.   Confirm no new medications or or missed doses.   Confirm no new illness / infection / diarrhea.   If accurate trough and accurate dose, decrease Tacrolimus IR dose to 1.5 mg BID     Is this more than a 50% increase or decrease in current IS dose: No  If YES, justification: N/A    Repeat labs in 1 weeks.  *If > 50% change in immunosuppression dose, repeat labs in 1 week.     OUTCOME:   Left VM and sent [employer] message.     ADDENDUM: Siva confirmed 12 hour trough and current dose. Will reduce dose to 1.5 mg bid.

## 2024-11-08 NOTE — TELEPHONE ENCOUNTER
Medication Refill  Route to [facility]    Pharmacy Name: [facility]  Pharmacy Location: [address] [place] [address]  Name of Medication: Valganciclovir HCL  Quantity / Dose: 450 MG Tabs Qty 60

## 2024-11-11 ENCOUNTER — TELEPHONE (OUTPATIENT)
Dept: INFUSION THERAPY | Facility: CLINIC | Age: 49
End: 2024-11-11
Payer: COMMERCIAL

## 2024-11-11 DIAGNOSIS — I15.1 HTN, KIDNEY TRANSPLANT RELATED: ICD-10-CM

## 2024-11-11 DIAGNOSIS — Z94.0 HTN, KIDNEY TRANSPLANT RELATED: ICD-10-CM

## 2024-11-11 RX ORDER — TACROLIMUS 0.5 MG/1
0.5 CAPSULE ORAL 2 TIMES DAILY
Qty: 60 CAPSULE | Refills: 11 | Status: SHIPPED | OUTPATIENT
Start: 2024-11-11

## 2024-11-11 RX ORDER — TACROLIMUS 1 MG/1
1 CAPSULE ORAL 2 TIMES DAILY
Qty: 60 CAPSULE | Refills: 11 | Status: SHIPPED | OUTPATIENT
Start: 2024-11-11

## 2024-11-11 NOTE — TELEPHONE ENCOUNTER
Hello,    Patient is looking to refill their Valganciclovir 450mg tablets.    Thank you,    Brandi Locke, T  [facility]

## 2024-11-13 ENCOUNTER — LAB (OUTPATIENT)
Dept: LAB | Facility: HOSPITAL | Age: 49
End: 2024-11-13
Payer: MEDICARE

## 2024-11-13 DIAGNOSIS — Z94.83 PANCREAS REPLACED BY TRANSPLANT (H): ICD-10-CM

## 2024-11-13 DIAGNOSIS — Z94.0 KIDNEY REPLACED BY TRANSPLANT: ICD-10-CM

## 2024-11-13 DIAGNOSIS — Z79.899 ENCOUNTER FOR LONG-TERM CURRENT USE OF MEDICATION: ICD-10-CM

## 2024-11-13 DIAGNOSIS — Z98.890 OTHER SPECIFIED POSTPROCEDURAL STATES: ICD-10-CM

## 2024-11-13 DIAGNOSIS — Z48.298 AFTERCARE FOLLOWING ORGAN TRANSPLANT: ICD-10-CM

## 2024-11-13 LAB
ALBUMIN UR-MCNC: 100 MG/DL
AMYLASE SERPL-CCNC: 98 U/L (ref 28–100)
ANION GAP SERPL CALCULATED.3IONS-SCNC: 9 MMOL/L (ref 7–15)
APPEARANCE UR: CLEAR
BACTERIA #/AREA URNS HPF: ABNORMAL /HPF
BILIRUB UR QL STRIP: NEGATIVE
BUN SERPL-MCNC: 16 MG/DL (ref 6–20)
CALCIUM SERPL-MCNC: 10.5 MG/DL (ref 8.8–10.4)
CHLORIDE SERPL-SCNC: 103 MMOL/L (ref 98–107)
COLOR UR AUTO: YELLOW
CREAT SERPL-MCNC: 1.66 MG/DL (ref 0.67–1.17)
EGFRCR SERPLBLD CKD-EPI 2021: 50 ML/MIN/1.73M2
ERYTHROCYTE [DISTWIDTH] IN BLOOD BY AUTOMATED COUNT: 16 % (ref 10–15)
GLUCOSE SERPL-MCNC: 179 MG/DL (ref 70–99)
GLUCOSE UR STRIP-MCNC: 30 MG/DL
HCO3 SERPL-SCNC: 23 MMOL/L (ref 22–29)
HCT VFR BLD AUTO: 35.4 % (ref 40–53)
HGB BLD-MCNC: 11.1 G/DL (ref 13.3–17.7)
HGB UR QL STRIP: ABNORMAL
KETONES UR STRIP-MCNC: NEGATIVE MG/DL
LEUKOCYTE ESTERASE UR QL STRIP: ABNORMAL
LIPASE SERPL-CCNC: 46 U/L (ref 13–60)
MCH RBC QN AUTO: 29.1 PG (ref 26.5–33)
MCHC RBC AUTO-ENTMCNC: 31.4 G/DL (ref 31.5–36.5)
MCV RBC AUTO: 93 FL (ref 78–100)
MYCOPHENOLATE SERPL LC/MS/MS-MCNC: 5.5 MG/L (ref 1–3.5)
MYCOPHENOLATE-G SERPL LC/MS/MS-MCNC: 61.7 MG/L (ref 30–95)
NITRATE UR QL: NEGATIVE
PH UR STRIP: 7 [PH] (ref 5–7)
PLATELET # BLD AUTO: 212 10E3/UL (ref 150–450)
POTASSIUM SERPL-SCNC: 4.7 MMOL/L (ref 3.4–5.3)
RBC # BLD AUTO: 3.81 10E6/UL (ref 4.4–5.9)
RBC URINE: 12 /HPF
SODIUM SERPL-SCNC: 135 MMOL/L (ref 135–145)
SP GR UR STRIP: 1.02 (ref 1–1.03)
SQUAMOUS EPITHELIAL: 2 /HPF
TACROLIMUS BLD-MCNC: 10.9 UG/L (ref 5–15)
TME LAST DOSE: ABNORMAL H
TME LAST DOSE: ABNORMAL H
TME LAST DOSE: NORMAL H
TME LAST DOSE: NORMAL H
UROBILINOGEN UR STRIP-MCNC: <2 MG/DL
WBC # BLD AUTO: 7.2 10E3/UL (ref 4–11)
WBC URINE: 17 /HPF

## 2024-11-13 PROCEDURE — 81001 URINALYSIS AUTO W/SCOPE: CPT

## 2024-11-13 PROCEDURE — 87088 URINE BACTERIA CULTURE: CPT | Performed by: UROLOGY

## 2024-11-13 PROCEDURE — 85018 HEMOGLOBIN: CPT

## 2024-11-13 PROCEDURE — 82150 ASSAY OF AMYLASE: CPT

## 2024-11-13 PROCEDURE — 84520 ASSAY OF UREA NITROGEN: CPT

## 2024-11-13 PROCEDURE — 80180 DRUG SCRN QUAN MYCOPHENOLATE: CPT

## 2024-11-13 PROCEDURE — 84295 ASSAY OF SERUM SODIUM: CPT

## 2024-11-13 PROCEDURE — 83690 ASSAY OF LIPASE: CPT

## 2024-11-13 PROCEDURE — 36415 COLL VENOUS BLD VENIPUNCTURE: CPT

## 2024-11-13 PROCEDURE — 80197 ASSAY OF TACROLIMUS: CPT

## 2024-11-13 PROCEDURE — 87186 SC STD MICRODIL/AGAR DIL: CPT | Performed by: UROLOGY

## 2024-11-13 PROCEDURE — 82310 ASSAY OF CALCIUM: CPT

## 2024-11-15 ENCOUNTER — TELEPHONE (OUTPATIENT)
Dept: TRANSPLANT | Facility: CLINIC | Age: 49
End: 2024-11-15
Payer: COMMERCIAL

## 2024-11-15 DIAGNOSIS — I15.1 HTN, KIDNEY TRANSPLANT RELATED: ICD-10-CM

## 2024-11-15 DIAGNOSIS — Z94.0 KIDNEY REPLACED BY TRANSPLANT: ICD-10-CM

## 2024-11-15 DIAGNOSIS — R19.7 DIARRHEA: Primary | ICD-10-CM

## 2024-11-15 DIAGNOSIS — Z94.0 HTN, KIDNEY TRANSPLANT RELATED: ICD-10-CM

## 2024-11-15 DIAGNOSIS — N39.0 URINARY TRACT INFECTION: ICD-10-CM

## 2024-11-15 LAB
BACTERIA UR CULT: ABNORMAL
BACTERIA UR CULT: ABNORMAL

## 2024-11-15 RX ORDER — LINEZOLID 600 MG/1
600 TABLET, FILM COATED ORAL 2 TIMES DAILY
Qty: 28 TABLET | Refills: 0 | Status: SHIPPED | OUTPATIENT
Start: 2024-11-15

## 2024-11-15 RX ORDER — CIPROFLOXACIN 500 MG/1
500 TABLET, FILM COATED ORAL 2 TIMES DAILY
Qty: 28 TABLET | Refills: 0 | Status: SHIPPED | OUTPATIENT
Start: 2024-11-15

## 2024-11-15 NOTE — TELEPHONE ENCOUNTER
ISSUE:  +UCx     Spoke with Siva, c/o urinary urgency, frequency and dysuria. Says symptoms did not improve after last round of abx. Send to Dr. Sorensen.     Also c/o diarrhea the last 2 weeks. Has nausea and dry heaves but no vomiting. Has very little energy, not eating much. Asked if he felt like he needed to come into ER and he declined. Has not taken BPs or weights.     Will plan for stool studies and clinic visit next week. Reviewed when to go to ER, voiced understanding.    Fernando Sorensen MD Poucher, Jessica, RN  Would recommend treating with ciprofloxacin 500 mg bid x 14 days and linezolid 600 mg bid x 14 days.    Would refer patient to Transplant ID for recurrent UTIs.    Fransico

## 2024-11-16 ENCOUNTER — LAB (OUTPATIENT)
Dept: LAB | Facility: HOSPITAL | Age: 49
End: 2024-11-16
Payer: MEDICARE

## 2024-11-16 DIAGNOSIS — R19.7 DIARRHEA: ICD-10-CM

## 2024-11-16 LAB

## 2024-11-16 PROCEDURE — 87493 C DIFF AMPLIFIED PROBE: CPT

## 2024-11-16 PROCEDURE — 87507 IADNA-DNA/RNA PROBE TQ 12-25: CPT

## 2024-11-18 ENCOUNTER — TELEPHONE (OUTPATIENT)
Dept: TRANSPLANT | Facility: CLINIC | Age: 49
End: 2024-11-18
Payer: COMMERCIAL

## 2024-11-18 ENCOUNTER — OFFICE VISIT (OUTPATIENT)
Dept: TRANSPLANT | Facility: CLINIC | Age: 49
End: 2024-11-18
Attending: INTERNAL MEDICINE
Payer: MEDICARE

## 2024-11-18 ENCOUNTER — INFUSION THERAPY VISIT (OUTPATIENT)
Dept: INFUSION THERAPY | Facility: CLINIC | Age: 49
End: 2024-11-18
Attending: INTERNAL MEDICINE
Payer: MEDICARE

## 2024-11-18 VITALS
OXYGEN SATURATION: 95 % | WEIGHT: 212.4 LBS | HEART RATE: 126 BPM | BODY MASS INDEX: 29.62 KG/M2 | SYSTOLIC BLOOD PRESSURE: 93 MMHG | DIASTOLIC BLOOD PRESSURE: 64 MMHG | TEMPERATURE: 98.3 F

## 2024-11-18 VITALS
HEART RATE: 100 BPM | RESPIRATION RATE: 16 BRPM | SYSTOLIC BLOOD PRESSURE: 111 MMHG | DIASTOLIC BLOOD PRESSURE: 74 MMHG | OXYGEN SATURATION: 95 %

## 2024-11-18 DIAGNOSIS — A09 DIARRHEA OF INFECTIOUS ORIGIN: ICD-10-CM

## 2024-11-18 DIAGNOSIS — E86.0 DEHYDRATION: Primary | ICD-10-CM

## 2024-11-18 DIAGNOSIS — I15.1 HTN, KIDNEY TRANSPLANT RELATED: ICD-10-CM

## 2024-11-18 DIAGNOSIS — Z94.0 HTN, KIDNEY TRANSPLANT RELATED: ICD-10-CM

## 2024-11-18 DIAGNOSIS — T86.10 COMPLICATION OF KIDNEY TRANSPLANT: ICD-10-CM

## 2024-11-18 DIAGNOSIS — A08.11 NOROVIRUS: Primary | ICD-10-CM

## 2024-11-18 PROCEDURE — 96360 HYDRATION IV INFUSION INIT: CPT

## 2024-11-18 PROCEDURE — G0463 HOSPITAL OUTPT CLINIC VISIT: HCPCS | Mod: 25 | Performed by: NURSE PRACTITIONER

## 2024-11-18 PROCEDURE — 258N000003 HC RX IP 258 OP 636: Performed by: NURSE PRACTITIONER

## 2024-11-18 RX ORDER — HEPARIN SODIUM (PORCINE) LOCK FLUSH IV SOLN 100 UNIT/ML 100 UNIT/ML
5 SOLUTION INTRAVENOUS
Status: CANCELLED | OUTPATIENT
Start: 2024-11-18

## 2024-11-18 RX ORDER — ALBUTEROL SULFATE 0.83 MG/ML
2.5 SOLUTION RESPIRATORY (INHALATION)
Status: CANCELLED | OUTPATIENT
Start: 2024-11-18

## 2024-11-18 RX ORDER — DIPHENHYDRAMINE HYDROCHLORIDE 50 MG/ML
50 INJECTION INTRAMUSCULAR; INTRAVENOUS
Status: CANCELLED
Start: 2024-11-18

## 2024-11-18 RX ORDER — HEPARIN SODIUM,PORCINE 10 UNIT/ML
5-20 VIAL (ML) INTRAVENOUS DAILY PRN
Status: CANCELLED | OUTPATIENT
Start: 2024-11-18

## 2024-11-18 RX ORDER — EPINEPHRINE 1 MG/ML
0.3 INJECTION, SOLUTION, CONCENTRATE INTRAVENOUS EVERY 5 MIN PRN
Status: CANCELLED | OUTPATIENT
Start: 2024-11-18

## 2024-11-18 RX ORDER — EPINEPHRINE 1 MG/ML
0.3 INJECTION, SOLUTION INTRAMUSCULAR; SUBCUTANEOUS EVERY 5 MIN PRN
Status: CANCELLED | OUTPATIENT
Start: 2024-11-18

## 2024-11-18 RX ORDER — NITAZOXANIDE 500 MG/1
500 TABLET ORAL 2 TIMES DAILY
Qty: 28 TABLET | Refills: 0 | Status: ON HOLD | OUTPATIENT
Start: 2024-11-18

## 2024-11-18 RX ORDER — MEPERIDINE HYDROCHLORIDE 25 MG/ML
25 INJECTION INTRAMUSCULAR; INTRAVENOUS; SUBCUTANEOUS
Status: CANCELLED | OUTPATIENT
Start: 2024-11-18

## 2024-11-18 RX ORDER — METHYLPREDNISOLONE SODIUM SUCCINATE 40 MG/ML
40 INJECTION INTRAMUSCULAR; INTRAVENOUS
Status: CANCELLED
Start: 2024-11-18

## 2024-11-18 RX ORDER — ALBUTEROL SULFATE 90 UG/1
1-2 INHALANT RESPIRATORY (INHALATION)
Status: CANCELLED
Start: 2024-11-18

## 2024-11-18 RX ORDER — DIPHENHYDRAMINE HYDROCHLORIDE 50 MG/ML
25 INJECTION INTRAMUSCULAR; INTRAVENOUS
Status: CANCELLED
Start: 2024-11-18

## 2024-11-18 RX ADMIN — SODIUM CHLORIDE, POTASSIUM CHLORIDE, SODIUM LACTATE AND CALCIUM CHLORIDE 1000 ML: 600; 310; 30; 20 INJECTION, SOLUTION INTRAVENOUS at 16:32

## 2024-11-18 ASSESSMENT — PAIN SCALES - GENERAL: PAINLEVEL_OUTOF10: MODERATE PAIN (5)

## 2024-11-18 NOTE — TELEPHONE ENCOUNTER
ISSUE:  +norovirus     Fernando Sorensen MD Poucher, Jessica, RN  Positive for norovirus and if patient is still having diarrhea, would treat with nitazoxanide 500 mg twice daily x 14 days.    Siva has clinic appt today, will send script to [facility].

## 2024-11-18 NOTE — PROGRESS NOTES
Nursing Note  Siva Ramey presents today [facility] for:   Chief Complaint   Patient presents with    Infusion     IVF     During today's [facility] appointment, orders from Shelia Magallon NP were completed.  Frequency: once    Progress note:  Patient identification verified by name and date of birth.  Assessment completed.  Vitals recorded in Doc Flowsheets.  Patient was provided with education regarding medication/procedure and possible side effects.  Patient verbalized understanding.     present during visit today: Not Applicable.    Treatment Conditions: Non-applicable.  Premedications: were not ordered.  Drug Waste Record: No  Infusion length and rate:  999 ml/hr., over one hour  Labs: were not ordered for this appointment.  Vascular access: peripheral IV placed today.  Is the next appt scheduled? No    Post Infusion Assessment:  Patient tolerated infusion without incident.  Site patent and intact, free from redness, edema or discomfort.  No evidence of extravasations.  Access discontinued per protocol.     Discharge Plan:   Follow up plan of care with: transplant coordinator.  Discharge instructions were reviewed with patient.  Patient/representative verbalized understanding of discharge instructions and all questions answered.  Patient discharged from [facility] in stable condition.    Tamiko Caraballo RN    Administrations This Visit       lactated ringers BOLUS 1,000 mL       Admin Date  11/18/2024 Action  $New Bag Dose  1,000 mL Rate  1,000 mL/hr Route  Intravenous Documented By  Angella Madrigal RN                    Pulse 105

## 2024-11-18 NOTE — PROGRESS NOTES
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   Whole family recently sick with V/D.   Stool +norovirus on 11/16, having frequent watery stools. 10-15 stools per day since 11/10/24.   Feeling weak today, brain feels foggy. +dizziness.   Also with +UTI  picked up Cipro and linezolid today. Still with urinary s/s.  No fever.    Follow up with tx ID pending.     Vomits about once every 4 days.   Hardly eating, getting in mostly jello and yogurt.   +cramping with eating.   No pain currently.   Drinking liquids ok.       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   Transplant Date:  7/20/2024 (Kidney / Pancreas)   Ureteral Stent:  No-removed    Crossmatch:  negative   DSA at Tx:  No  Baseline Cr: 1.5 - 1.8    DeNovo DSA: No    Acute Rejection Hx:  No    Present Maintenance Immunosuppression:  Tacrolimus and Mycophenolic acid    CMV IgG Ab Discordance:  No  EBV IgG Ab Discordance:  No    BK Viremia:  No  EBV Viremia:  No    Transplant Coordinator: Julianna Edwards     Transplant Office Phone Number: [phone]     Immunosuppressant Medications       Immunosuppressive Agents Disp Start End     mycophenolic acid (GENERIC EQUIVALENT) 180 MG EC tablet 240 tablet 8/9/2024 --    Sig - Route: Take 4 tablets (720 mg) by mouth 2 times daily - Oral    Class: E-Prescribe    Notes to Pharmacy: TXP DT 7/20/2024 (Kidney / Pancreas) TXP Dischg DT 7/30/2024 DX Kidney replaced by transplant Z94.0 [facility] ([place], [place])     tacrolimus (GENERIC EQUIVALENT) 0.5 MG capsule 60 capsule 11/11/2024 --    Sig - Route: Take 1 capsule (0.5 mg) by mouth 2 times daily. Total dose = 1.5 mg twice daily - Oral    Class: E-Prescribe    Notes to Pharmacy: TXP DT 7/20/2024 (Kidney / Pancreas) TXP Dischg DT 7/30/2024 DX Kidney replaced by transplant Z94.0 TX Center  [facility] ([place], [place])     tacrolimus (GENERIC EQUIVALENT) 1 MG capsule 60 capsule 11/11/2024 --    Sig - Route: Take 1 capsule (1 mg) by mouth 2 times daily. Total dose = 1.5 mg twice daily - Oral    Class: E-Prescribe    Notes to Pharmacy: TXP DT 7/20/2024 (Kidney / Pancreas) TXP Dischg DT 7/30/2024 DX Kidney replaced by transplant Z94.0 [facility] ([place], [place])            Possible Immunosuppression-related side effects:   []             headache  []             vivid dreams  []             irritability  []             cognitive difficuties  []             fine tremor  []             nausea  []             diarrhea  []             neuropathy      []             edema  []             renal calcineurin toxicity  []             hyperkalemia  []             post-transplant diabetes  []             decreased appetite  []             increased appetite  []             other:  []             none    Prescription Medications as of 11/18/2024         Rx Number Disp Refills Start End Last Dispensed Date Next Fill Date Owning Pharmacy    acetaminophen (TYLENOL) 500 MG tablet  -- --  --       Sig: Take 500 mg by mouth every 4 hours as needed    Class: Historical    Route: Oral    aspirin (ASA) 81 MG chewable tablet  30 tablet 3 2/1/2024 --   [facility] - [place], [place] - [address] [place] [address]    Sig: Take 1 tablet (81 mg) by mouth daily Starting tomorrow.    Class: E-Prescribe    Route: Oral    atorvastatin (LIPITOR) 40 MG tablet  90 tablet 4 10/28/2024 --   [address]    Sig: Take 1 tablet (40 mg) by mouth every evening.    Class: E-Prescribe    Route: Oral    blood glucose (NO BRAND SPECIFIED) test strip  100 strip 5 10/29/2024 --   [address]    Sig: Use to test blood sugar 4 times daily or as  directed.    Class: E-Prescribe    Notes to Pharmacy: Pt has an Accu chek meter    carvedilol (COREG) 6.25 MG tablet  180 tablet 3 9/9/2024 --   [address]    Sig: Take 1 tablet (6.25 mg) by mouth 2 times daily (with meals).    Class: E-Prescribe    Route: Oral    ciprofloxacin (CIPRO) 500 MG tablet  28 tablet 0 11/15/2024 --   [address]    Sig: Take 1 tablet (500 mg) by mouth 2 times daily.    Class: E-Prescribe    Route: Oral    dapsone (ACZONE) 25 MG tablet  60 tablet 11 7/31/2024 --   [address]    Sig: Take 2 tablets (50 mg) by mouth daily    Class: E-Prescribe    Route: Oral    linezolid (ZYVOX) 600 MG tablet  28 tablet 0 11/15/2024 --   [address]    Sig: Take 1 tablet (600 mg) by mouth 2 times daily.    Class: E-Prescribe    Route: Oral    No prior authorization was found for this prescription.    Found prior authorization for another prescription for the same medication: Approved    magnesium glycinate 100 MG CAPS capsule  120 capsule 2 8/8/2024 --   [address]    Sig: Take 2 capsules (200 mg) by mouth 2 times daily Take in place of magnesium-oxide    Class: E-Prescribe    Route: Oral    methocarbamol (ROBAXIN) 750 MG tablet  20 tablet 0 8/2/2024 --   [id number] - [facility] [address] AT [facility]    Sig: Take 1 tablet (750 mg) by mouth every 6 hours as needed for muscle spasms    Class: E-Prescribe    Route: Oral    metoprolol succinate ER (TOPROL XL) 25 MG 24 hr tablet  90 tablet 3 10/1/2024 --   [facility][id number] - [facility] [address] AT [facility]    Sig: Take 1 tablet (25 mg) by mouth daily.    Class: E-Prescribe    Route: Oral     mycophenolic acid (GENERIC EQUIVALENT) 180 MG EC tablet  240 tablet 11 8/9/2024 --   [address]    Sig: Take 4 tablets (720 mg) by mouth 2 times daily    Class: E-Prescribe    Notes to Pharmacy: TXP DT 7/20/2024 (Kidney / Pancreas) TXP Dischg DT 7/30/2024 DX Kidney replaced by transplant Z94.0 TX [facility] ([place], [place])    Route: Oral    nitazoxanide (ALINIA) 500 MG tablet  28 tablet 0 11/18/2024 --   [id number]    Sig: Take 1 tablet (500 mg) by mouth 2 times daily.    Class: E-Prescribe    Route: Oral    sodium bicarbonate 650 MG tablet  270 tablet 3 10/29/2024 --   [address]    Sig: Take 3 tablets (1,950 mg) by mouth 3 times daily.    Class: E-Prescribe    Route: Oral    Sodium Bicarbonate, antacid, (NICE PURE BAKING SODA) POWD  -- -- 9/19/2024 --       Sig: Take 2 g by mouth daily. Take 1/2 tsp (2gm) daily    Class: No Print Out    Route: Oral    tacrolimus (GENERIC EQUIVALENT) 0.5 MG capsule  60 capsule 11 11/11/2024 --   [address]    Sig: Take 1 capsule (0.5 mg) by mouth 2 times daily. Total dose = 1.5 mg twice daily    Class: E-Prescribe    Notes to Pharmacy: TXP DT 7/20/2024 (Kidney / Pancreas) TXP Dischg DT 7/30/2024 DX Kidney replaced by transplant Z94.0 TX [facility] ([place], [place])    Route: Oral    tacrolimus (GENERIC EQUIVALENT) 1 MG capsule  60 capsule 11 11/11/2024 --   [address]    Sig: Take 1 capsule (1 mg) by mouth 2 times daily. Total dose = 1.5 mg twice daily    Class: E-Prescribe    Notes to Pharmacy: TXP DT 7/20/2024 (Kidney / Pancreas) TXP Dischg DT 7/30/2024 DX Kidney replaced by transplant Z94.0  [facility] ([place], [place])    Route: Oral    ticagrelor (BRILINTA) 90 MG tablet  60 tablet 5 9/6/2024 --   [facility] [place], [place] - [address] [place] [address]    Sig: Take 1 tablet (90 mg) by mouth 2 times daily.    Class: E-Prescribe    Route: Oral          Clinic-Administered Medications as of 11/18/2024         Dose Frequency Start End    lidocaine (XYLOCAINE) 2 % external gel  ONCE 9/4/2024 --    Class: E-Prescribe    Route: Urethral    lidocaine (XYLOCAINE) 2 % external gel  ONCE 9/4/2024 --    Class: E-Prescribe    Route: Urethral            O:   Temp:  [98.3  F (36.8  C)] 98.3  F (36.8  C)  Pulse:  [126] 126  BP: (93)/(64) 93/64  SpO2:  [95 %] 95 %  General Appearance: in no apparent distress.   Skin: Normal, no rashes or jaundice  Heart: tachycardic  Lungs: easy respirations, no audible wheezing.  Abdomen: rounded, The wound is dry and intact. Incision well healed. The abdomen is non-tender. The kidney and pancreas graft is not tender.  There is no ascites.  Extremities: Tremor absent.   Edema: absent.         Latest Ref Rng & Units 11/13/2024     7:30 AM 11/7/2024    10:13 AM 10/28/2024     7:46 AM 10/21/2024     7:42 AM 10/18/2024     8:26 AM   Transplant Immunosuppression Labs   Creat 0.67 - 1.17 mg/dL 1.66  1.69  1.67  1.54  1.53    Urea Nitrogen 6.0 - 20.0 mg/dL 16.0  19.7  13.6  14.8  16.4    WBC 4.0 - 11.0 10e3/uL 7.2  5.4  4.1  4.8         Chemistries:   Recent Labs   Lab Test 11/13/24  0730   BUN 16.0   CR 1.66*   GFRESTIMATED 50*   *     Lab Results   Component Value Date    A1C 6.2 10/29/2024    CPEPT 4.8 09/27/2021     Recent Labs   Lab Test 08/30/24  0948   ALBUMIN 4.5   BILITOTAL 0.6   ALKPHOS 174*   AST 26   ALT 38     Urine Studies:  Recent Labs   Lab Test 11/13/24  0734   COLOR Yellow   APPEARANCE Clear   URINEGLC 30*   URINEBILI Negative   URINEKETONE Negative   SG 1.018   UBLD 0.06 mg/dL*   URINEPH 7.0   PROTEIN 100*    NITRITE Negative   LEUKEST 250 Aliza/uL*   RBCU 12*   WBCU 17*     No lab results found.  Hematology:   Recent Labs   Lab Test 11/13/24  0730 11/07/24  1013 10/28/24  0746   HGB 11.1* 10.9* 11.1*    147* 203   WBC 7.2 5.4 4.1     Coags:   Recent Labs   Lab Test 07/20/24  0821 07/20/24  0600   INR 1.39* 1.44*     HLA antibodies:   SA1 HI RISK ARIADNE   Date Value Ref Range Status   08/29/2024 None  Final     SA1 MOD RISK ARIADNE   Date Value Ref Range Status   08/29/2024 None  Final     SA2 HI RISK ARIADNE   Date Value Ref Range Status   08/29/2024 None  Final     SA2 MOD RISK ARIADNE   Date Value Ref Range Status   08/29/2024 None  Final       Assessment: dehydration d/t diarrhea   Enteric panel +norovirus   +UTI     Plan:    1. Graft function: Cr stable   2. Immunosuppression Management: No change    .  Complexity of management:Medium.  Contributing factors: diarrhea and dehydration, UTI   3. IV fluids today in [facility]. Push oral fluids.   On linezolid and Cipro for UTI.   Start nitazoxanide for norovirus today. Ensure hydration. Report back any s/s dehydration or worsening s/s.     Followup: with transplant ID.     Total Time: 20 min,   Counselling Time: 10 min.    Shelia Magallon NP      Reviewed with Dr. Chavez.

## 2024-11-18 NOTE — LETTER
11/18/2024      Siva Ramey  [address] [facility] [address]      Dear Colleague,    Thank you for referring your patient, Siva Ramey, to the [facility]. Please see a copy of my visit note below.    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   Whole family recently sick with V/D.   Stool +norovirus on 11/16, having frequent watery stools. 10-15 stools per day since 11/10/24.   Feeling weak today, brain feels foggy. +dizziness.   Also with +UTI  picked up Cipro and linezolid today. Still with urinary s/s.  No fever.    Follow up with tx ID pending.     Vomits about once every 4 days.   Hardly eating, getting in mostly jello and yogurt.   +cramping with eating.   No pain currently.   Drinking liquids ok.       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   Transplant Date:  7/20/2024 (Kidney / Pancreas)   Ureteral Stent:  No-removed    Crossmatch:  negative   DSA at Tx:  No  Baseline Cr: 1.5 - 1.8    DeNovo DSA: No    Acute Rejection Hx:  No    Present Maintenance Immunosuppression:  Tacrolimus and Mycophenolic acid    CMV IgG Ab Discordance:  No  EBV IgG Ab Discordance:  No    BK Viremia:  No  EBV Viremia:  No    Transplant Coordinator: Julianna Edwards     Transplant Office Phone Number: [phone]     Immunosuppressant Medications       Immunosuppressive Agents Disp Start End     mycophenolic acid (GENERIC EQUIVALENT) 180 MG EC tablet 240 tablet 8/9/2024 --    Sig - Route: Take 4 tablets (720 mg) by mouth 2 times daily - Oral    Class: E-Prescribe    Notes to Pharmacy: TXP DT 7/20/2024 (Kidney / Pancreas) TXP Dischg DT 7/30/2024 DX Kidney replaced by transplant Z94.0 TX [facility] ([place], [place])     tacrolimus (GENERIC EQUIVALENT) 0.5 MG capsule 60 capsule 11/11/2024 --    Sig - Route:  Take 1 capsule (0.5 mg) by mouth 2 times daily. Total dose = 1.5 mg twice daily - Oral    Class: E-Prescribe    Notes to Pharmacy: TXP DT 7/20/2024 (Kidney / Pancreas) TXP Dischg DT 7/30/2024 DX Kidney replaced by transplant Z94.0 [facility] ([place], [place])     tacrolimus (GENERIC EQUIVALENT) 1 MG capsule 60 capsule 11/11/2024 --    Sig - Route: Take 1 capsule (1 mg) by mouth 2 times daily. Total dose = 1.5 mg twice daily - Oral    Class: E-Prescribe    Notes to Pharmacy: TXP DT 7/20/2024 (Kidney / Pancreas) TXP Dischg DT 7/30/2024 DX Kidney replaced by transplant Z94.0 TX [facility] ([place], [place])            Possible Immunosuppression-related side effects:   []             headache  []             vivid dreams  []             irritability  []             cognitive difficuties  []             fine tremor  []             nausea  []             diarrhea  []             neuropathy      []             edema  []             renal calcineurin toxicity  []             hyperkalemia  []             post-transplant diabetes  []             decreased appetite  []             increased appetite  []             other:  []             none    Prescription Medications as of 11/18/2024         Rx Number Disp Refills Start End Last Dispensed Date Next Fill Date Owning Pharmacy    acetaminophen (TYLENOL) 500 MG tablet  -- --  --       Sig: Take 500 mg by mouth every 4 hours as needed    Class: Historical    Route: Oral    aspirin (ASA) 81 MG chewable tablet  30 tablet 3 2/1/2024 --   [facility] - [place], [place] - [address] [facility]    Sig: Take 1 tablet (81 mg) by mouth daily Starting tomorrow.    Class: E-Prescribe    Route: Oral    atorvastatin (LIPITOR) 40 MG tablet  90 tablet 4 10/28/2024 --   [facility] [place], [place] - [address] [place] [address]    Sig: Take 1 tablet (40 mg) by mouth every  evening.    Class: E-Prescribe    Route: Oral    blood glucose (NO BRAND SPECIFIED) test strip  100 strip 5 10/29/2024 --   [address]    Sig: Use to test blood sugar 4 times daily or as directed.    Class: E-Prescribe    Notes to Pharmacy: Pt has an Accu chek meter    carvedilol (COREG) 6.25 MG tablet  180 tablet 3 9/9/2024 --   [address]    Sig: Take 1 tablet (6.25 mg) by mouth 2 times daily (with meals).    Class: E-Prescribe    Route: Oral    ciprofloxacin (CIPRO) 500 MG tablet  28 tablet 0 11/15/2024 --   [address]    Sig: Take 1 tablet (500 mg) by mouth 2 times daily.    Class: E-Prescribe    Route: Oral    dapsone (ACZONE) 25 MG tablet  60 tablet 11 7/31/2024 --   [address]    Sig: Take 2 tablets (50 mg) by mouth daily    Class: E-Prescribe    Route: Oral    linezolid (ZYVOX) 600 MG tablet  28 tablet 0 11/15/2024 --   [address]    Sig: Take 1 tablet (600 mg) by mouth 2 times daily.    Class: E-Prescribe    Route: Oral    No prior authorization was found for this prescription.    Found prior authorization for another prescription for the same medication: Approved    magnesium glycinate 100 MG CAPS capsule  120 capsule 2 8/8/2024 --   [address]    Sig: Take 2 capsules (200 mg) by mouth 2 times daily Take in place of magnesium-oxide    Class: E-Prescribe    Route: Oral    methocarbamol (ROBAXIN) 750 MG tablet  20 tablet 0 8/2/2024 --   [facility] #[id number] - [address] AT [facility] [address]    Sig: Take 1 tablet (750 mg) by mouth every 6 hours as needed for muscle spasms    Class: E-Prescribe    Route: Oral    metoprolol  succinate ER (TOPROL XL) 25 MG 24 hr tablet  90 tablet 3 10/1/2024 --   [facility][id number] - [address] AT [facility] [address]    Sig: Take 1 tablet (25 mg) by mouth daily.    Class: E-Prescribe    Route: Oral    mycophenolic acid (GENERIC EQUIVALENT) 180 MG EC tablet  240 tablet 11 8/9/2024 --   [address]    Sig: Take 4 tablets (720 mg) by mouth 2 times daily    Class: E-Prescribe    Notes to Pharmacy: TXP DT 7/20/2024 (Kidney / Pancreas) TXP Dischg DT 7/30/2024 DX Kidney replaced by transplant Z94.0 [facility] ([place], [place])    Route: Oral    nitazoxanide (ALINIA) 500 MG tablet  28 tablet 0 11/18/2024 --   [address]    Sig: Take 1 tablet (500 mg) by mouth 2 times daily.    Class: E-Prescribe    Route: Oral    sodium bicarbonate 650 MG tablet  270 tablet 3 10/29/2024 --   [address]    Sig: Take 3 tablets (1,950 mg) by mouth 3 times daily.    Class: E-Prescribe    Route: Oral    Sodium Bicarbonate, antacid, (NICE PURE BAKING SODA) POWD  -- -- 9/19/2024 --       Sig: Take 2 g by mouth daily. Take 1/2 tsp (2gm) daily    Class: No Print Out    Route: Oral    tacrolimus (GENERIC EQUIVALENT) 0.5 MG capsule  60 capsule 11 11/11/2024 --   [address]    Sig: Take 1 capsule (0.5 mg) by mouth 2 times daily. Total dose = 1.5 mg twice daily    Class: E-Prescribe    Notes to Pharmacy: TXP DT 7/20/2024 (Kidney / Pancreas) TXP Dischg DT 7/30/2024 DX Kidney replaced by transplant Z94.0 [facility] ([place], [place])    Route: Oral    tacrolimus (GENERIC EQUIVALENT) 1 MG capsule  60 capsule 11 11/11/2024 --   [facility] - [place], [place] -  [address] [place] [address]    Sig: Take 1 capsule (1 mg) by mouth 2 times daily. Total dose = 1.5 mg twice daily    Class: E-Prescribe    Notes to Pharmacy: TXP DT 7/20/2024 (Kidney / Pancreas) TXP Dischg DT 7/30/2024 DX Kidney replaced by transplant Z94.0 [facility] ([place], [place])    Route: Oral    ticagrelor (BRILINTA) 90 MG tablet  60 tablet 5 9/6/2024 --   [facility] [address] [facility]    Sig: Take 1 tablet (90 mg) by mouth 2 times daily.    Class: E-Prescribe    Route: Oral          Clinic-Administered Medications as of 11/18/2024         Dose Frequency Start End    lidocaine (XYLOCAINE) 2 % external gel  ONCE 9/4/2024 --    Class: E-Prescribe    Route: Urethral    lidocaine (XYLOCAINE) 2 % external gel  ONCE 9/4/2024 --    Class: E-Prescribe    Route: Urethral            O:   Temp:  [98.3  F (36.8  C)] 98.3  F (36.8  C)  Pulse:  [126] 126  BP: (93)/(64) 93/64  SpO2:  [95 %] 95 %  General Appearance: in no apparent distress.   Skin: Normal, no rashes or jaundice  Heart: tachycardic  Lungs: easy respirations, no audible wheezing.  Abdomen: rounded, The wound is dry and intact. Incision well healed. The abdomen is non-tender. The kidney and pancreas graft is not tender.  There is no ascites.  Extremities: Tremor absent.   Edema: absent.         Latest Ref Rng & Units 11/13/2024     7:30 AM 11/7/2024    10:13 AM 10/28/2024     7:46 AM 10/21/2024     7:42 AM 10/18/2024     8:26 AM   Transplant Immunosuppression Labs   Creat 0.67 - 1.17 mg/dL 1.66  1.69  1.67  1.54  1.53    Urea Nitrogen 6.0 - 20.0 mg/dL 16.0  19.7  13.6  14.8  16.4    WBC 4.0 - 11.0 10e3/uL 7.2  5.4  4.1  4.8         Chemistries:   Recent Labs   Lab Test 11/13/24  0730   BUN 16.0   CR 1.66*   GFRESTIMATED 50*   *     Lab Results   Component Value Date    A1C 6.2 10/29/2024    CPEPT 4.8 09/27/2021     Recent Labs   Lab Test 08/30/24  0948   ALBUMIN 4.5    BILITOTAL 0.6   ALKPHOS 174*   AST 26   ALT 38     Urine Studies:  Recent Labs   Lab Test 11/13/24  0734   COLOR Yellow   APPEARANCE Clear   URINEGLC 30*   URINEBILI Negative   URINEKETONE Negative   SG 1.018   UBLD 0.06 mg/dL*   URINEPH 7.0   PROTEIN 100*   NITRITE Negative   LEUKEST 250 Aliza/uL*   RBCU 12*   WBCU 17*     No lab results found.  Hematology:   Recent Labs   Lab Test 11/13/24  0730 11/07/24  1013 10/28/24  0746   HGB 11.1* 10.9* 11.1*    147* 203   WBC 7.2 5.4 4.1     Coags:   Recent Labs   Lab Test 07/20/24  0821 07/20/24  0600   INR 1.39* 1.44*     HLA antibodies:   SA1 HI RISK ARIADNE   Date Value Ref Range Status   08/29/2024 None  Final     SA1 MOD RISK ARIADNE   Date Value Ref Range Status   08/29/2024 None  Final     SA2 HI RISK ARIADNE   Date Value Ref Range Status   08/29/2024 None  Final     SA2 MOD RISK ARIADNE   Date Value Ref Range Status   08/29/2024 None  Final       Assessment: dehydration d/t diarrhea   Enteric panel +norovirus   +UTI     Plan:    1. Graft function: Cr stable   2. Immunosuppression Management: No change    .  Complexity of management:Medium.  Contributing factors: diarrhea and dehydration, UTI   3. IV fluids today in [facility]. Push oral fluids.   On linezolid and Cipro for UTI.   Start nitazoxanide for norovirus today. Ensure hydration. Report back any s/s dehydration or worsening s/s.     Followup: with transplant ID.     Total Time: 20 min,   Counselling Time: 10 min.    Shelia Magallon NP      Reviewed with Dr. Chavez.       Again, thank you for allowing me to participate in the care of your patient.        Sincerely,        JUANCARLOS Silva CNP

## 2024-11-18 NOTE — CONFIDENTIAL NOTE
DIAGNOSIS:    s: Urinary tract infection   Kidney replaced by transplant      DATE RECEIVED:  11.26.2024    NOTES (Gather within 2 years) STATUS DETAILS   OFFICE NOTE from referring provider   Internal 11.15.2024 Fernando Sorensen MD    LABS (any labs) Internal    MEDICATION LIST Internal    IMAGING  (NEED IMAGES AND REPORTS)     Other (anything related to diagnoses Internal 10.01.2024, 08.24.2024  US Renal Complete

## 2024-11-21 ENCOUNTER — APPOINTMENT (OUTPATIENT)
Dept: ULTRASOUND IMAGING | Facility: CLINIC | Age: 49
DRG: 699 | End: 2024-11-21
Attending: EMERGENCY MEDICINE
Payer: MEDICARE

## 2024-11-21 ENCOUNTER — TELEPHONE (OUTPATIENT)
Dept: TRANSPLANT | Facility: CLINIC | Age: 49
End: 2024-11-21

## 2024-11-21 ENCOUNTER — HOSPITAL ENCOUNTER (INPATIENT)
Facility: CLINIC | Age: 49
LOS: 4 days | Discharge: HOME | DRG: 699 | End: 2024-11-25
Attending: EMERGENCY MEDICINE | Admitting: SURGERY
Payer: MEDICARE

## 2024-11-21 ENCOUNTER — LAB (OUTPATIENT)
Dept: LAB | Facility: HOSPITAL | Age: 49
End: 2024-11-21
Payer: MEDICARE

## 2024-11-21 ENCOUNTER — APPOINTMENT (OUTPATIENT)
Dept: CARDIOLOGY | Facility: CLINIC | Age: 49
DRG: 699 | End: 2024-11-21
Attending: STUDENT IN AN ORGANIZED HEALTH CARE EDUCATION/TRAINING PROGRAM
Payer: MEDICARE

## 2024-11-21 ENCOUNTER — APPOINTMENT (OUTPATIENT)
Dept: GENERAL RADIOLOGY | Facility: CLINIC | Age: 49
DRG: 699 | End: 2024-11-21
Attending: STUDENT IN AN ORGANIZED HEALTH CARE EDUCATION/TRAINING PROGRAM
Payer: MEDICARE

## 2024-11-21 DIAGNOSIS — A08.11 NOROVIRUS: ICD-10-CM

## 2024-11-21 DIAGNOSIS — I15.1 HTN, KIDNEY TRANSPLANT RELATED: ICD-10-CM

## 2024-11-21 DIAGNOSIS — Z94.0 KIDNEY REPLACED BY TRANSPLANT: ICD-10-CM

## 2024-11-21 DIAGNOSIS — Z94.83 PANCREAS REPLACED BY TRANSPLANT (H): ICD-10-CM

## 2024-11-21 DIAGNOSIS — Z98.890 OTHER SPECIFIED POSTPROCEDURAL STATES: ICD-10-CM

## 2024-11-21 DIAGNOSIS — Z79.01 ANTICOAGULATED: ICD-10-CM

## 2024-11-21 DIAGNOSIS — Z94.0 HTN, KIDNEY TRANSPLANT RELATED: ICD-10-CM

## 2024-11-21 DIAGNOSIS — T86.10 COMPLICATION OF KIDNEY TRANSPLANT: ICD-10-CM

## 2024-11-21 DIAGNOSIS — Z79.899 ENCOUNTER FOR LONG-TERM CURRENT USE OF MEDICATION: ICD-10-CM

## 2024-11-21 DIAGNOSIS — E11.22 TYPE 2 DIABETES MELLITUS WITH STAGE 3B CHRONIC KIDNEY DISEASE, WITHOUT LONG-TERM CURRENT USE OF INSULIN (H): ICD-10-CM

## 2024-11-21 DIAGNOSIS — I50.20 SYSTOLIC HEART FAILURE, UNSPECIFIED HF CHRONICITY (H): ICD-10-CM

## 2024-11-21 DIAGNOSIS — R19.7 DIARRHEA: ICD-10-CM

## 2024-11-21 DIAGNOSIS — I13.0 HYPERTENSIVE HEART AND RENAL DISEASE WITH CONGESTIVE HEART FAILURE (H): ICD-10-CM

## 2024-11-21 DIAGNOSIS — R55 SYNCOPE, UNSPECIFIED SYNCOPE TYPE: ICD-10-CM

## 2024-11-21 DIAGNOSIS — R53.83 OTHER FATIGUE: ICD-10-CM

## 2024-11-21 DIAGNOSIS — N18.32 TYPE 2 DIABETES MELLITUS WITH STAGE 3B CHRONIC KIDNEY DISEASE, WITHOUT LONG-TERM CURRENT USE OF INSULIN (H): ICD-10-CM

## 2024-11-21 DIAGNOSIS — Z48.298 AFTERCARE FOLLOWING ORGAN TRANSPLANT: ICD-10-CM

## 2024-11-21 DIAGNOSIS — R42 DIZZINESS AND GIDDINESS: ICD-10-CM

## 2024-11-21 DIAGNOSIS — I50.20 HFREF (HEART FAILURE WITH REDUCED EJECTION FRACTION) (H): Primary | ICD-10-CM

## 2024-11-21 LAB
ALBUMIN SERPL BCG-MCNC: 3.5 G/DL (ref 3.5–5.2)
ALBUMIN UR-MCNC: NEGATIVE MG/DL
ALP SERPL-CCNC: 125 U/L (ref 40–150)
ALT SERPL W P-5'-P-CCNC: 47 U/L (ref 0–70)
AMYLASE SERPL-CCNC: 177 U/L (ref 28–100)
ANION GAP SERPL CALCULATED.3IONS-SCNC: 11 MMOL/L (ref 7–15)
ANION GAP SERPL CALCULATED.3IONS-SCNC: 14 MMOL/L (ref 7–15)
APPEARANCE UR: CLEAR
AST SERPL W P-5'-P-CCNC: 36 U/L (ref 0–45)
ATRIAL RATE - MUSE: 80 BPM
BASOPHILS # BLD AUTO: 0 10E3/UL (ref 0–0.2)
BASOPHILS NFR BLD AUTO: 1 %
BILIRUB SERPL-MCNC: 0.3 MG/DL
BILIRUB UR QL STRIP: NEGATIVE
BUN SERPL-MCNC: 40.9 MG/DL (ref 6–20)
BUN SERPL-MCNC: 44.1 MG/DL (ref 6–20)
CALCIUM SERPL-MCNC: 10 MG/DL (ref 8.8–10.4)
CALCIUM SERPL-MCNC: 10.1 MG/DL (ref 8.8–10.4)
CHLORIDE SERPL-SCNC: 100 MMOL/L (ref 98–107)
CHLORIDE SERPL-SCNC: 103 MMOL/L (ref 98–107)
CMV DNA SPEC NAA+PROBE-ACNC: NOT DETECTED IU/ML
COLOR UR AUTO: YELLOW
CREAT SERPL-MCNC: 2.55 MG/DL (ref 0.67–1.17)
CREAT SERPL-MCNC: 2.63 MG/DL (ref 0.67–1.17)
DIASTOLIC BLOOD PRESSURE - MUSE: NORMAL MMHG
EGFRCR SERPLBLD CKD-EPI 2021: 29 ML/MIN/1.73M2
EGFRCR SERPLBLD CKD-EPI 2021: 30 ML/MIN/1.73M2
EOSINOPHIL # BLD AUTO: 0.1 10E3/UL (ref 0–0.7)
EOSINOPHIL NFR BLD AUTO: 2 %
ERYTHROCYTE [DISTWIDTH] IN BLOOD BY AUTOMATED COUNT: 15.5 % (ref 10–15)
ERYTHROCYTE [DISTWIDTH] IN BLOOD BY AUTOMATED COUNT: 15.6 % (ref 10–15)
GLUCOSE SERPL-MCNC: 129 MG/DL (ref 70–99)
GLUCOSE SERPL-MCNC: 150 MG/DL (ref 70–99)
GLUCOSE UR STRIP-MCNC: NEGATIVE MG/DL
HCO3 SERPL-SCNC: 22 MMOL/L (ref 22–29)
HCO3 SERPL-SCNC: 23 MMOL/L (ref 22–29)
HCT VFR BLD AUTO: 30.5 % (ref 40–53)
HCT VFR BLD AUTO: 34.9 % (ref 40–53)
HGB BLD-MCNC: 11.2 G/DL (ref 13.3–17.7)
HGB BLD-MCNC: 9.8 G/DL (ref 13.3–17.7)
HGB UR QL STRIP: ABNORMAL
IMM GRANULOCYTES # BLD: 0.1 10E3/UL
IMM GRANULOCYTES NFR BLD: 1 %
INTERPRETATION ECG - MUSE: NORMAL
KETONES UR STRIP-MCNC: NEGATIVE MG/DL
LEUKOCYTE ESTERASE UR QL STRIP: ABNORMAL
LIPASE SERPL-CCNC: 157 U/L (ref 13–60)
LIPASE SERPL-CCNC: 158 U/L (ref 13–60)
LVEF ECHO: NORMAL
LYMPHOCYTES # BLD AUTO: 0.3 10E3/UL (ref 0.8–5.3)
LYMPHOCYTES NFR BLD AUTO: 6 %
MAGNESIUM SERPL-MCNC: 2 MG/DL (ref 1.7–2.3)
MCH RBC QN AUTO: 28.8 PG (ref 26.5–33)
MCH RBC QN AUTO: 28.8 PG (ref 26.5–33)
MCHC RBC AUTO-ENTMCNC: 32.1 G/DL (ref 31.5–36.5)
MCHC RBC AUTO-ENTMCNC: 32.1 G/DL (ref 31.5–36.5)
MCV RBC AUTO: 90 FL (ref 78–100)
MCV RBC AUTO: 90 FL (ref 78–100)
MONOCYTES # BLD AUTO: 0.6 10E3/UL (ref 0–1.3)
MONOCYTES NFR BLD AUTO: 11 %
MUCOUS THREADS #/AREA URNS LPF: PRESENT /LPF
NEUTROPHILS # BLD AUTO: 3.9 10E3/UL (ref 1.6–8.3)
NEUTROPHILS NFR BLD AUTO: 79 %
NITRATE UR QL: NEGATIVE
NRBC # BLD AUTO: 0 10E3/UL
NRBC BLD AUTO-RTO: 0 /100
P AXIS - MUSE: 45 DEGREES
PH UR STRIP: 5.5 [PH] (ref 5–7)
PHOSPHATE SERPL-MCNC: 2.6 MG/DL (ref 2.5–4.5)
PLATELET # BLD AUTO: 233 10E3/UL (ref 150–450)
PLATELET # BLD AUTO: 336 10E3/UL (ref 150–450)
POTASSIUM SERPL-SCNC: 3.8 MMOL/L (ref 3.4–5.3)
POTASSIUM SERPL-SCNC: 3.9 MMOL/L (ref 3.4–5.3)
PR INTERVAL - MUSE: 172 MS
PROT SERPL-MCNC: 6 G/DL (ref 6.4–8.3)
QRS DURATION - MUSE: 134 MS
QT - MUSE: 432 MS
QTC - MUSE: 498 MS
R AXIS - MUSE: 0 DEGREES
RBC # BLD AUTO: 3.4 10E6/UL (ref 4.4–5.9)
RBC # BLD AUTO: 3.89 10E6/UL (ref 4.4–5.9)
RBC URINE: 2 /HPF
SODIUM SERPL-SCNC: 136 MMOL/L (ref 135–145)
SODIUM SERPL-SCNC: 137 MMOL/L (ref 135–145)
SP GR UR STRIP: 1.02 (ref 1–1.03)
SPECIMEN TYPE: NORMAL
SQUAMOUS EPITHELIAL: <1 /HPF
SYSTOLIC BLOOD PRESSURE - MUSE: NORMAL MMHG
T AXIS - MUSE: -17 DEGREES
TACROLIMUS BLD-MCNC: 26.7 UG/L (ref 5–15)
TME LAST DOSE: ABNORMAL H
TME LAST DOSE: ABNORMAL H
TRANSITIONAL EPI: <1 /HPF
TROPONIN T SERPL HS-MCNC: 25 NG/L
TROPONIN T SERPL HS-MCNC: 26 NG/L
UROBILINOGEN UR STRIP-MCNC: NORMAL MG/DL
VENTRICULAR RATE- MUSE: 80 BPM
WBC # BLD AUTO: 5 10E3/UL (ref 4–11)
WBC # BLD AUTO: 6 10E3/UL (ref 4–11)
WBC URINE: 16 /HPF

## 2024-11-21 PROCEDURE — 99285 EMERGENCY DEPT VISIT HI MDM: CPT | Mod: 25 | Performed by: EMERGENCY MEDICINE

## 2024-11-21 PROCEDURE — 82150 ASSAY OF AMYLASE: CPT

## 2024-11-21 PROCEDURE — 80069 RENAL FUNCTION PANEL: CPT

## 2024-11-21 PROCEDURE — 80053 COMPREHEN METABOLIC PANEL: CPT

## 2024-11-21 PROCEDURE — 85025 COMPLETE CBC W/AUTO DIFF WBC: CPT

## 2024-11-21 PROCEDURE — 250N000013 HC RX MED GY IP 250 OP 250 PS 637: Performed by: NURSE PRACTITIONER

## 2024-11-21 PROCEDURE — 80048 BASIC METABOLIC PNL TOTAL CA: CPT

## 2024-11-21 PROCEDURE — 76776 US EXAM K TRANSPL W/DOPPLER: CPT | Mod: 26 | Performed by: RADIOLOGY

## 2024-11-21 PROCEDURE — 83690 ASSAY OF LIPASE: CPT | Performed by: EMERGENCY MEDICINE

## 2024-11-21 PROCEDURE — 85018 HEMOGLOBIN: CPT

## 2024-11-21 PROCEDURE — 83735 ASSAY OF MAGNESIUM: CPT | Performed by: EMERGENCY MEDICINE

## 2024-11-21 PROCEDURE — 85041 AUTOMATED RBC COUNT: CPT

## 2024-11-21 PROCEDURE — 85027 COMPLETE CBC AUTOMATED: CPT

## 2024-11-21 PROCEDURE — 255N000002 HC RX 255 OP 636: Performed by: INTERNAL MEDICINE

## 2024-11-21 PROCEDURE — 93306 TTE W/DOPPLER COMPLETE: CPT | Mod: 26 | Performed by: INTERNAL MEDICINE

## 2024-11-21 PROCEDURE — 83690 ASSAY OF LIPASE: CPT

## 2024-11-21 PROCEDURE — 250N000012 HC RX MED GY IP 250 OP 636 PS 637: Performed by: NURSE PRACTITIONER

## 2024-11-21 PROCEDURE — 99285 EMERGENCY DEPT VISIT HI MDM: CPT | Performed by: EMERGENCY MEDICINE

## 2024-11-21 PROCEDURE — 93005 ELECTROCARDIOGRAM TRACING: CPT | Performed by: EMERGENCY MEDICINE

## 2024-11-21 PROCEDURE — 36415 COLL VENOUS BLD VENIPUNCTURE: CPT | Performed by: EMERGENCY MEDICINE

## 2024-11-21 PROCEDURE — 999N000208 ECHOCARDIOGRAM COMPLETE

## 2024-11-21 PROCEDURE — 99222 1ST HOSP IP/OBS MODERATE 55: CPT | Mod: FS | Performed by: NURSE PRACTITIONER

## 2024-11-21 PROCEDURE — 76776 US EXAM K TRANSPL W/DOPPLER: CPT

## 2024-11-21 PROCEDURE — 84484 ASSAY OF TROPONIN QUANT: CPT | Performed by: EMERGENCY MEDICINE

## 2024-11-21 PROCEDURE — 85048 AUTOMATED LEUKOCYTE COUNT: CPT

## 2024-11-21 PROCEDURE — 80197 ASSAY OF TACROLIMUS: CPT

## 2024-11-21 PROCEDURE — 36415 COLL VENOUS BLD VENIPUNCTURE: CPT

## 2024-11-21 PROCEDURE — 85014 HEMATOCRIT: CPT | Performed by: EMERGENCY MEDICINE

## 2024-11-21 PROCEDURE — 96360 HYDRATION IV INFUSION INIT: CPT | Performed by: EMERGENCY MEDICINE

## 2024-11-21 PROCEDURE — 258N000003 HC RX IP 258 OP 636: Performed by: EMERGENCY MEDICINE

## 2024-11-21 PROCEDURE — 93010 ELECTROCARDIOGRAM REPORT: CPT | Performed by: EMERGENCY MEDICINE

## 2024-11-21 PROCEDURE — 71045 X-RAY EXAM CHEST 1 VIEW: CPT

## 2024-11-21 PROCEDURE — 120N000011 HC R&B TRANSPLANT UMMC

## 2024-11-21 PROCEDURE — 82374 ASSAY BLOOD CARBON DIOXIDE: CPT

## 2024-11-21 PROCEDURE — 82310 ASSAY OF CALCIUM: CPT

## 2024-11-21 PROCEDURE — 84100 ASSAY OF PHOSPHORUS: CPT | Performed by: EMERGENCY MEDICINE

## 2024-11-21 PROCEDURE — 85014 HEMATOCRIT: CPT

## 2024-11-21 PROCEDURE — 82565 ASSAY OF CREATININE: CPT | Performed by: EMERGENCY MEDICINE

## 2024-11-21 PROCEDURE — 82247 BILIRUBIN TOTAL: CPT | Performed by: EMERGENCY MEDICINE

## 2024-11-21 PROCEDURE — 71045 X-RAY EXAM CHEST 1 VIEW: CPT | Mod: 26 | Performed by: RADIOLOGY

## 2024-11-21 PROCEDURE — 87086 URINE CULTURE/COLONY COUNT: CPT | Performed by: EMERGENCY MEDICINE

## 2024-11-21 PROCEDURE — 81003 URINALYSIS AUTO W/O SCOPE: CPT | Performed by: EMERGENCY MEDICINE

## 2024-11-21 PROCEDURE — 258N000003 HC RX IP 258 OP 636: Performed by: STUDENT IN AN ORGANIZED HEALTH CARE EDUCATION/TRAINING PROGRAM

## 2024-11-21 RX ORDER — SODIUM BICARBONATE 650 MG/1
1950 TABLET ORAL 3 TIMES DAILY
Status: DISCONTINUED | OUTPATIENT
Start: 2024-11-21 | End: 2024-11-25 | Stop reason: HOSPADM

## 2024-11-21 RX ORDER — NITAZOXANIDE 500 MG/1
500 TABLET ORAL 2 TIMES DAILY
Status: COMPLETED | OUTPATIENT
Start: 2024-11-21 | End: 2024-11-23

## 2024-11-21 RX ORDER — ASPIRIN 81 MG/1
81 TABLET, CHEWABLE ORAL DAILY
Status: DISCONTINUED | OUTPATIENT
Start: 2024-11-22 | End: 2024-11-25 | Stop reason: HOSPADM

## 2024-11-21 RX ORDER — ATORVASTATIN CALCIUM 40 MG/1
40 TABLET, FILM COATED ORAL EVERY EVENING
Status: DISCONTINUED | OUTPATIENT
Start: 2024-11-21 | End: 2024-11-25 | Stop reason: HOSPADM

## 2024-11-21 RX ORDER — ONDANSETRON 4 MG/1
4 TABLET, ORALLY DISINTEGRATING ORAL EVERY 6 HOURS PRN
Status: DISCONTINUED | OUTPATIENT
Start: 2024-11-21 | End: 2024-11-25 | Stop reason: HOSPADM

## 2024-11-21 RX ORDER — MYCOPHENOLIC ACID 360 MG/1
720 TABLET, DELAYED RELEASE ORAL 2 TIMES DAILY
Status: DISCONTINUED | OUTPATIENT
Start: 2024-11-21 | End: 2024-11-22

## 2024-11-21 RX ORDER — DAPSONE 25 MG/1
50 TABLET ORAL DAILY
Status: DISCONTINUED | OUTPATIENT
Start: 2024-11-22 | End: 2024-11-25 | Stop reason: HOSPADM

## 2024-11-21 RX ORDER — CIPROFLOXACIN 500 MG/1
500 TABLET, FILM COATED ORAL 2 TIMES DAILY
Status: DISCONTINUED | OUTPATIENT
Start: 2024-11-21 | End: 2024-11-25 | Stop reason: HOSPADM

## 2024-11-21 RX ORDER — POTASSIUM CHLORIDE 7.45 MG/ML
10 INJECTION INTRAVENOUS ONCE
Status: DISCONTINUED | OUTPATIENT
Start: 2024-11-21 | End: 2024-11-21

## 2024-11-21 RX ORDER — ACETAMINOPHEN 500 MG
500 TABLET ORAL EVERY 4 HOURS PRN
Status: DISCONTINUED | OUTPATIENT
Start: 2024-11-21 | End: 2024-11-25 | Stop reason: HOSPADM

## 2024-11-21 RX ORDER — SODIUM CHLORIDE, SODIUM LACTATE, POTASSIUM CHLORIDE, CALCIUM CHLORIDE 600; 310; 30; 20 MG/100ML; MG/100ML; MG/100ML; MG/100ML
INJECTION, SOLUTION INTRAVENOUS CONTINUOUS
Status: DISCONTINUED | OUTPATIENT
Start: 2024-11-21 | End: 2024-11-22

## 2024-11-21 RX ORDER — LINEZOLID 600 MG/1
600 TABLET, FILM COATED ORAL 2 TIMES DAILY
Status: DISCONTINUED | OUTPATIENT
Start: 2024-11-21 | End: 2024-11-21

## 2024-11-21 RX ORDER — LIDOCAINE 40 MG/G
CREAM TOPICAL
Status: DISCONTINUED | OUTPATIENT
Start: 2024-11-21 | End: 2024-11-25 | Stop reason: HOSPADM

## 2024-11-21 RX ADMIN — SODIUM BICARBONATE 1950 MG: 650 TABLET ORAL at 20:02

## 2024-11-21 RX ADMIN — MYCOPHENOLIC ACID 720 MG: 360 TABLET, DELAYED RELEASE ORAL at 20:02

## 2024-11-21 RX ADMIN — TICAGRELOR 90 MG: 90 TABLET ORAL at 20:53

## 2024-11-21 RX ADMIN — SODIUM CHLORIDE 1000 ML: 9 INJECTION, SOLUTION INTRAVENOUS at 16:02

## 2024-11-21 RX ADMIN — CIPROFLOXACIN 500 MG: 500 TABLET ORAL at 20:02

## 2024-11-21 RX ADMIN — HUMAN ALBUMIN MICROSPHERES AND PERFLUTREN 6 ML: 10; .22 INJECTION, SOLUTION INTRAVENOUS at 15:42

## 2024-11-21 RX ADMIN — SODIUM CHLORIDE, POTASSIUM CHLORIDE, SODIUM LACTATE AND CALCIUM CHLORIDE: 600; 310; 30; 20 INJECTION, SOLUTION INTRAVENOUS at 20:52

## 2024-11-21 RX ADMIN — NITAZOXANIDE 500 MG: 500 TABLET ORAL at 20:52

## 2024-11-21 RX ADMIN — SODIUM CHLORIDE 1000 ML: 9 INJECTION, SOLUTION INTRAVENOUS at 12:56

## 2024-11-21 RX ADMIN — ATORVASTATIN CALCIUM 40 MG: 40 TABLET, FILM COATED ORAL at 20:02

## 2024-11-21 ASSESSMENT — ACTIVITIES OF DAILY LIVING (ADL)
ADLS_ACUITY_SCORE: 0

## 2024-11-21 ASSESSMENT — COLUMBIA-SUICIDE SEVERITY RATING SCALE - C-SSRS
6. HAVE YOU EVER DONE ANYTHING, STARTED TO DO ANYTHING, OR PREPARED TO DO ANYTHING TO END YOUR LIFE?: NO
2. HAVE YOU ACTUALLY HAD ANY THOUGHTS OF KILLING YOURSELF IN THE PAST MONTH?: NO
1. IN THE PAST MONTH, HAVE YOU WISHED YOU WERE DEAD OR WISHED YOU COULD GO TO SLEEP AND NOT WAKE UP?: NO

## 2024-11-21 NOTE — ED TRIAGE NOTES
Pt experienced a syncopal episode last night. Pt called physician's office this AM and was instructed to come to the ED. Additionally, pt reports increased dizziness when standing and SOB with exertion.        Triage Assessment (Adult)       Row Name 11/21/24 1025          Triage Assessment    Airway WDL WDL        Respiratory WDL    Respiratory WDL WDL        Skin Circulation/Temperature WDL    Skin Circulation/Temperature WDL WDL        Cardiac WDL    Cardiac WDL WDL        Peripheral/Neurovascular WDL    Peripheral Neurovascular WDL WDL        Cognitive/Neuro/Behavioral WDL    Cognitive/Neuro/Behavioral WDL WDL

## 2024-11-21 NOTE — H&P
[facility]    History and Physical  Solid Organ Transplant     Date of Admission:  11/21/2024    Assessment & Plan   Siva Ramey is a 49 year old male with a history of CAD s/p PCI, DM2, and ESRD now s/p SPK on 7/20/24 c/b post op STEMI requiring PCI and stent placement. He presented to the ED after a syncopal episode.     Plan:   - Admit to 7A   - Continue IVF   - TTE ordered   - Restart PTA immunosuppressants  ______________________________    SPK 7/20/2024:   Pancreas (Enteric drained): Lipase 157, elevated. BGs persistently elevated since transplant. Hgb A1c 6.2%.   DELISA of transplanted kidney: Cr 2.5 (Baseline Cr 1.5-1.8). Ureteral stent removed 10/10/24. Renal US WNL.    - Continue IVF   - Treatment for UTI as below    Immunosuppressed status secondary to medications:   Maintenance:   - Myfortic 720 mg BID   - Tacrolimus 1.5 mg BID. Goal level 8-10.     Hematology:   Anemia of chronic disease: Hgb 10-11.     Cardiorespiratory:   CAD, VT/VF arrest s/p PCI with IBAN: Previous CABG 2019, and IBAN 2019 with 100% thrombosis of the RCA, IBAN x 2 on 7/20/24.     - Continue PTA atorvastatin, Brilinta 90 mg BID    - Will clarify beta blocker and restart in the AM  HTN:    - PTA on beta blocker; will clarify and restart in the AM.  SOB: CXR with mild atelectasis.    - TTE   - Encourage IS/CDB    GI/Nutrition: Regular diet as tolerated. Nutrition consulted d/t recent weight loss.   Diarrhea; +Norovirus: See ID below.    - Continue IVF.     Endocrine: See above.     Fluid/Electrolytes: IVF:  mL/hr  Metabolic acidosis: Continue PTA sodium bicarb.  Hypomagnesemia: Hold PTA Mag glycinate with diarrhea.     Infectious disease:   +Norovirus: Positive 11/16.    - Continue PTA nitazoxanide (11/18-present).  UTI: 11/21 UCx in process.     - Continue PTA Linezolid/Cipro.     Prophylaxis: pneumocystis (Dapsone)    Disposition: Admit to 7A            Diet: Regular Diet Adult  DVT  "Prophylaxis: Pneumatic Compression Devices  Burt Catheter: Not present  Lines: None     Cardiac Monitoring: None    Clinically Significant Risk Factors Present on Admission                 # Drug Induced Platelet Defect: home medication list includes an antiplatelet medication  # Acute Kidney Injury, unspecified: based on a >150% or 0.3 mg/dL increase in last creatinine compared to past 90 day average, will monitor renal function  # Hypertension: Noted on problem list  # Chronic heart failure with reduced ejection fraction: last echo with EF <40%     # Anemia: based on hgb <11       # Overweight: Estimated body mass index is 29.57 kg/m  as calculated from the following:    Height as of this encounter: 1.803 m (5' 11\").    Weight as of this encounter: 96.2 kg (212 lb).         # Financial/Environmental Concerns:     # History of CABG: noted on surgical history       Disposition Plan      Expected Discharge Date: 11/23/2024                The patient's care was discussed with the Chief Resident/Fellow.      Julianna Alexis, AREI    ______________________________________________________________________    Chief Complaint   SOB    History is obtained from the patient, spouse    History of Present Illness   Siva Ramey is a 49 year old male with a history of CAD s/p PCI, DM2, and ESRD now s/p SPK on 7/20/24 c/b post op STEMI requiring PCI and stent placement. He presented to the ED after a syncopal episode.     He reports he has been fatigued overall of days but yesterday while standing up from the couch his body \"quit,\" he became weak and lightheaded, and he lowered himself to the floor. He did not lose consciousness nor hit his head. He was also recently diagnosed with both a UTI and Norovirus. He has had ongoing diarrhea for the past few weeks that has only just started improving since starting nitazoxanide on Monday. He only started eating regular foods a couple of days ago.         Review of Systems    The 10 point " Review of Systems is negative other than noted in the HPI.    Past Medical History    I have reviewed this patient's medical history and updated it with pertinent information if needed.   Past Medical History:   Diagnosis Date    Acquired elevated diaphragm     Anemia in chronic kidney disease     Angina pectoris (H)     Chronic kidney disease     Coronary artery disease     Diabetes mellitus, type II (H) 12/2001    Diabetic nephropathy (H)     MARQUEZ (dyspnea on exertion)     Dyslipidemia 12/2001    End stage renal disease (H)     History of blood transfusion 2004    Hypertension     takes medication    Ischemic cardiomyopathy     Metabolic acidosis     Myocardial infarction (H)     STEMI -Diagonal branch of the LAD    Obesity (BMI 30-39.9)     Peripheral neuropathy     Proteinuria     Retinopathy     Sleep apnea     Vitamin D deficiency        Past Surgical History   Past Surgical History:   Procedure Laterality Date    APPENDECTOMY OPEN N/A 7/19/2024    Procedure: Appendectomy open;  Surgeon: Harrison Whitehead MD;  Location:  OR    BACK SURGERY  2009    L5 disc cut    BENCH KIDNEY  7/19/2024    Procedure: Bench kidney;  Surgeon: Harrison Whitehead MD;  Location:  OR    BENCH PANCREAS N/A 7/19/2024    Procedure: Bench pancreas;  Surgeon: Harrison Whitehead MD;  Location: [facility] OR    BYPASS GRAFT ARTERY CORONARY  06/20/2019    2 vessels    CV CENTRAL VENOUS CATHETER PLACEMENT N/A 7/20/2024    Procedure: Central Venous Catheter Placement;  Surgeon: Dominic Robertson MD;  Location:  HEART CARDIAC CATH LAB    CV CORONARY ANGIOGRAM N/A 01/13/2019    Procedure: Coronary Angiogram;  Surgeon: Cielo Che MD;  Location: [facility] Cath Lab;  Service: Cardiology    CV CORONARY ANGIOGRAM N/A 05/02/2019    Procedure: Coronary Angiogram;  Surgeon: Cielo Che MD;  Location: [facility] Cath Lab;  Service: Cardiology    CV CORONARY ANGIOGRAM N/A 04/30/2020     Procedure: Coronary Angiogram;  Surgeon: Cielo Che MD;  Location: [facility] Cath Lab;  Service: Cardiology    CV CORONARY ANGIOGRAM N/A 07/22/2021    Procedure: CV CORONARY ANGIOGRAM;  Surgeon: Adrian Crow MD;  Location: [facility] CATH LAB CV    CV CORONARY ANGIOGRAM N/A 02/01/2024    Procedure: Coronary Angiogram;  Surgeon: Delroy Carpio MD;  Location: [facility] CARDIAC CATH LAB    CV CORONARY ANGIOGRAM N/A 7/20/2024    Procedure: Coronary Angiogram;  Surgeon: Dominic Robertson MD;  Location: [facility] CARDIAC CATH LAB    CV CORONARY LITHOTRIPSY PCI N/A 7/20/2024    Procedure: Percutaneous Coronary Intervention - Lithotripsy;  Surgeon: Dominic Robertson MD;  Location: [facility] CARDIAC CATH LAB    CV FRACTIONAL FLOW RATIO WIRE N/A 07/22/2021    Procedure: Fractional Flow Ratio Wire;  Surgeon: Adrian Crow MD;  Location: [facility] LAB CV    CV INTRAVASULAR ULTRASOUND N/A 7/20/2024    Procedure: Intravascular Ultrasound;  Surgeon: Dominic Robertson MD;  Location: [facility] CARDIAC CATH LAB    CV LEFT HEART CATH N/A 07/22/2021    Procedure: Left Heart Cath;  Surgeon: Adrian Crow MD;  Location: [facility] CV    CV LEFT HEART CATHETERIZATION WITH LEFT VENTRICULOGRAM N/A 01/13/2019    Procedure: Left Heart Catheterization with Left Ventriculogram;  Surgeon: Cielo Che MD;  Location: [facility] Cath Lab;  Service: Cardiology    CV LEFT HEART CATHETERIZATION WITHOUT LEFT VENTRICULOGRAM Left 04/30/2020    Procedure: Left Heart Catheterization Without Left Ventriculogram;  Surgeon: Cielo Che MD;  Location: [facility] Cath Lab;  Service: Cardiology    CV PCI N/A 02/01/2024    Procedure: Percutaneous Coronary Intervention;  Surgeon: Delroy Carpio MD;  Location: [facility] CARDIAC CATH LAB    CV PCI N/A 7/20/2024    Procedure: Percutaneous Coronary Intervention;  Surgeon: Dominic Robertson MD;  Location: [facility]  CATH LAB    CV PCI ASPIRATION THROMECTOMY N/A 2024    Procedure: Percutaneous Coronary Intervention Aspiration Thrombectomy;  Surgeon: Dominic Robertson MD;  Location:  HEART CARDIAC CATH LAB    CV PCI STENT DRUG ELUTING N/A 2024    Procedure: Percutaneous Coronary Intervention Stent;  Surgeon: Dominic Robertson MD;  Location:  HEART CARDIAC CATH LAB    CV RIGHT HEART CATHETERIZATION N/A 2020    Procedure: Right Heart Catheterization;  Surgeon: Cielo Che MD;  Location: [facility] Cath Lab;  Service: Cardiology    CYSTOSCOPY, REMOVE STENT(S), COMBINED N/A 10/10/2024    Procedure: CYSTOSCOPY, WITH TRANSPLANT URETERAL STENT REMOVAL;  Surgeon: Farzad Harris MD;  Location:  OR    ESOPHAGOSCOPY, GASTROSCOPY, DUODENOSCOPY (EGD), COMBINED N/A 2024    Procedure: Esophagoscopy, gastroscopy, duodenoscopy (EGD), combined;  Surgeon: Jolene Ramirez MD;  Location:  GI    EYE SURGERY      GENITOURINARY SURGERY      HERNIA REPAIR      OTHER SURGICAL HISTORY      Excise varicocele    STENT, CORONARY, DALE      TRANSPLANT PANCREAS, KIDNEY  DONOR, COMBINED N/A 2024    Procedure: Transplant pancreas, kidney  donor, with ureteral stent placement;  Surgeon: Harrison Whitehead MD;  Location:  OR    VASCULAR SURGERY         Prior to Admission Medications   Prior to Admission Medications   Prescriptions Last Dose Informant Patient Reported? Taking?   Sodium Bicarbonate, antacid, (NICE PURE BAKING SODA) POWD   No No   Sig: Take 2 g by mouth daily. Take 1/2 tsp (2gm) daily   acetaminophen (TYLENOL) 500 MG tablet   Yes No   Sig: Take 500 mg by mouth every 4 hours as needed   aspirin (ASA) 81 MG chewable tablet   No No   Sig: Take 1 tablet (81 mg) by mouth daily Starting tomorrow.   atorvastatin (LIPITOR) 40 MG tablet   No No   Sig: Take 1 tablet (40 mg) by mouth every evening.   blood glucose (NO BRAND SPECIFIED) test strip    No No   Sig: Use to test blood sugar 4 times daily or as directed.   carvedilol (COREG) 6.25 MG tablet   No No   Sig: Take 1 tablet (6.25 mg) by mouth 2 times daily (with meals).   ciprofloxacin (CIPRO) 500 MG tablet   No No   Sig: Take 1 tablet (500 mg) by mouth 2 times daily.   dapsone (ACZONE) 25 MG tablet   No No   Sig: Take 2 tablets (50 mg) by mouth daily   linezolid (ZYVOX) 600 MG tablet   No No   Sig: Take 1 tablet (600 mg) by mouth 2 times daily.   magnesium glycinate 100 MG CAPS capsule   No No   Sig: Take 2 capsules (200 mg) by mouth 2 times daily Take in place of magnesium-oxide   methocarbamol (ROBAXIN) 750 MG tablet   No No   Sig: Take 1 tablet (750 mg) by mouth every 6 hours as needed for muscle spasms   metoprolol succinate ER (TOPROL XL) 25 MG 24 hr tablet   No No   Sig: Take 1 tablet (25 mg) by mouth daily.   mycophenolic acid (GENERIC EQUIVALENT) 180 MG EC tablet   No No   Sig: Take 4 tablets (720 mg) by mouth 2 times daily   nitazoxanide (ALINIA) 500 MG tablet   No No   Sig: Take 1 tablet (500 mg) by mouth 2 times daily.   sodium bicarbonate 650 MG tablet   No No   Sig: Take 3 tablets (1,950 mg) by mouth 3 times daily.   tacrolimus (GENERIC EQUIVALENT) 0.5 MG capsule   No No   Sig: Take 1 capsule (0.5 mg) by mouth 2 times daily. Total dose = 1.5 mg twice daily   tacrolimus (GENERIC EQUIVALENT) 1 MG capsule   No No   Sig: Take 1 capsule (1 mg) by mouth 2 times daily. Total dose = 1.5 mg twice daily   ticagrelor (BRILINTA) 90 MG tablet   No No   Sig: Take 1 tablet (90 mg) by mouth 2 times daily.      Facility-Administered Medications Last Administration Doses Remaining   lidocaine (XYLOCAINE) 2 % external gel None recorded 1   lidocaine (XYLOCAINE) 2 % external gel None recorded 1           Physical Exam   Vital Signs: Temp: 97.7  F (36.5  C) Temp src: Oral BP: 117/83 Pulse: 72   Resp: 17 SpO2: 97 % O2 Device: None (Room air)    Weight: 212 lbs 0 oz    General Appearance: comfortably resting in  bed  Respiratory: unlabored on RA  Cardiovascular: RRR  GI: abdomen soft, TTP LLQ. Midline incision well healed with hernia noted in superior aspect of incision.   Skin: warm, dry  Other: No edema noted.        Data     I have personally reviewed the following data over the past 24 hrs:    5.0  \   9.8 (L)   / 233     137 103 44.1 (H) /  129 (H)   3.9 23 2.55 (H) \     ALT: 47 AST: 36 AP: 125 TBILI: 0.3   ALB: 3.5 TOT PROTEIN: 6.0 (L) LIPASE: 157 (H)     Trop: 25 (H) BNP: N/A       Imaging results reviewed over the past 24 hrs:   Recent Results (from the past 24 hours)   US Renal Transplant with Doppler    Narrative    ULTRASOUND RENAL TRANSPLANT WITH DOPPLER  11/21/2024 2:40 PM    CLINICAL HISTORY: Syncope. History of transplant. Pancreas and kidney  transplant 7/19/2024.    COMPARISONS: Ultrasound renal transplant with Doppler 10/1/2024.    REFERRING PROVIDER: YENNI STEPHENS W    TECHNIQUE: Left lower quadrant renal transplant evaluated with  grayscale, color Doppler, and Doppler waveform ultrasound. Transplant  renal vasculature evaluated with color Doppler and Doppler waveform  ultrasound.    FINDINGS:   LEFT LOWER QUADRANT RENAL TRANSPLANT:         Fluid collections: None         Renal artery:            Hilum: 162/30 cm/s            Anastomosis: 271/52 cm/s (previously 378/106 cm/s)         Renal artery - iliac ratio: 2.46 (previously 3.23)         Renal vein:            Hilum: 90 cm/s            Anastomosis: 56 cm/s         Length: 10.7 cm         Segmental artery resistive index (superior / mid / inferior):  0.76 / 0.67 / 0.46 (previously 0.74 / 0.70 / 0.64)         Parenchyma: Normal. No stone, mass, or hydronephrosis.    Iliac artery:       Above anastomosis: 110/0 cm/s       Below anastomosis: 117/0 cm/s    Iliac vein:       Above anastomosis: 30 cm/s       Below anastomosis: 21 cm/s    Bladder: Distended.      Impression    IMPRESSION:   1. Improved renal transplant arterial velocity and velocity ratio.  No  transplant renal arterial or venous stenosis demonstrated.    2. Superior segmental artery resistive index remains elevated.    3. Negative grayscale appearance of the renal transplant.    GUIDELINES:  For native kidneys:       Diagnostic criteria suggestive of > 60% diameter stenosis             Renal artery:             Peak systolic velocity > 180 cm/s             RAR > 3.5             Turbulent flow         Arcuate/Segmental artery Resistive Index Normal < 0.7    For renal transplants:       Renal transplant peak systolic velocity criteria range from > 180  cm/s to > 400 cm/s       Source suggests using:            Peak systolic velocity > or = 300 cm/s            Spectral broadening            Intraparenchymal arterial acceleration time > or = 0.1 s     Source: Jose Daniel ZHONG, Francheska FITZGERALD, Mahamed BEST, et al. Screening for  transplant renal artery stenosis: Ultrasound-based stenosis  probability stratification. American Journal of Roentgenology.  2017;209: 9248-4728. [id number]/[id number]    MARGOT JONES MD         SYSTEM ID:  [id number]   XR Chest Port 1 View    Narrative    EXAM: XR CHEST PORT 1 VIEW 11/21/2024 3:15 PM    DEMOGRAPHICS: 49 years Male    INDICATION: syncope    COMPARISON: Chest x-ray 7/22/2024, 7/21/2024.    TECHNIQUE: Single portable AP view of the chest.    FINDINGS:   Sternotomy wires appear intact. Left upper quadrant surgical clips.    Trachea is midline. Cardiac silhouette is within normal limits. No  pleural effusion. No pneumothorax. Streaky/linear opacities in the  perihilar and bibasilar lungs, most prominent in the left lower lobe.  The upper abdomen is unremarkable. No acute osseous abnormality.      Impression    IMPRESSION:   No acute airspace disease. Mild perihilar and bibasilar atelectasis,  most prominent in the left lower lobe.    I have personally reviewed the examination and initial interpretation  and I agree with the findings.    ÁLVARO MAYA MD         SYSTEM ID:   [id number]   Echo Complete   Result Value    LVEF  35-40% (moderately reduced)    [facility]    [id number]  [id number]  [id number]  [id number]^PASTOR     [facility],[place]  Echocardiography Laboratory  [address] [place] [address]     Name: ISABEL ARBOLEDA  MRN: [id number]  : 1975  Study Date: 2024 03:25 PM  Age: 49 yrs  Gender: Male  Patient Location: [facility]  Reason For Study: Abn EKG  Ordering Physician: CLARA SÁNCHEZ  Performed By: Nenita Paul     BSA: 2.2 m2  Height: 71 in  Weight: 212 lb  HR: 73  BP: 123/77 mmHg  ______________________________________________________________________________  Procedure  Complete Portable Echo Adult. Contrast Optison. Optison (NDC #2356-9336-28)  given intravenously. Patient was given 6 ml mixture of 3 ml Optison and 6 ml  saline. 3 ml wasted.  ______________________________________________________________________________  Interpretation Summary  Left ventricular function is decreased. The ejection fraction is 35-40%  (moderately reduced). Akinesis of the basal-mid inferior and basal  inferoseptal segments present.  Right ventricular size and function are normal.  No significant valvular abnormalities present.  No pericardial effusion is present.     This study was compared with the study from 2024. No significant changes  noted.  ______________________________________________________________________________  Left Ventricle  Left ventricular size is normal. Relative wall thickness is increased  consistent with concentric remodeling. Left ventricular function is decreased.  The ejection fraction is 35-40% (moderately reduced). Akinesis of the basal-  mid inferior and basal inferoseptal segments present.     Right Ventricle  Right ventricular function, chamber size, wall motion, and thickness are  normal.     Atria  Both atria appear normal.     Mitral Valve  The mitral valve is normal. Trace mitral insufficiency is  present.     Aortic Valve  The aortic valve is tricuspid. Aortic valve sclerosis is present.     Tricuspid Valve  The tricuspid valve is normal.     Pulmonic Valve  The pulmonic valve is normal.     Vessels  The aorta root is normal. IVC diameter <2.1 cm collapsing >50% with sniff  suggests a normal RA pressure of 3 mmHg.     Pericardium  No pericardial effusion is present.     Miscellaneous  No significant valvular abnormalities present.     Compared to Previous Study  This study was compared with the study from 7/28/2024 . No significant changes  noted.     Attestation  I have personally viewed the imaging and agree with the interpretation and  report as documented by the fellow, Elena Gutierrez, and/or edited by me.  ______________________________________________________________________________  MMode/2D Measurements & Calculations  IVSd: 1.1 cm  LVIDd: 4.6 cm  LVPWd: 1.2 cm  LV mass(C)d: 204.0 grams  LV mass(C)dI: 94.4 grams/m2  Ao root diam: 3.5 cm     EF(MOD-bp): 39.9 %  Ao root diam index Ht(cm/m): 1.9  Ao root diam index BSA (cm/m2): 1.6  RWT: 0.54     Doppler Measurements & Calculations  PA acc time: 0.10 sec     ______________________________________________________________________________  Report approved by: Brianda Fernandez 11/21/2024 04:53 PM

## 2024-11-21 NOTE — TELEPHONE ENCOUNTER
ISSUE:  Tacro level 26.7 (goal 8-10)     Called Siva in ER and spoke with his wife Jolene. They confirmed accurate 12 hour trough and current dose of 1.5 mg bid. Siva aware of his high level and knows he should either hold tacrolimus dose this evening or take a reduced dose if ordered differently by inpatient team.

## 2024-11-21 NOTE — ED PROVIDER NOTES
[place] EMERGENCY DEPARTMENT ([facility])    11/21/24       ED PROVIDER NOTE   11:39 AM   History     Chief Complaint   Patient presents with    Syncope     Pt experienced a syncopal episode last night. Pt called physician's office this AM and was instructed to come to the ED. Additionally, pt reports increased dizziness when standing and SOB with exertion.       The history is provided by the patient, medical records and the spouse.     Siva Ramey is a 49 year old male with a past medical history of HFrEF, CAD s/p CABG, PAD, HTN, DM II, CKD stage 3b s/p kidney and pancreas transplant (7/19/24), and anemia who presents to the emergency department for further evaluation of progressively worsening lightheadedness, fatigue, lack of energy, diarrhea that has been ongoing for weeks as well as episode of orthostatic lightheadedness with fall yesterday.  He is accompanied by wife who is very familiar with his history.  Patient states that he has been experiencing symptoms with generalized weakness and diarrhea for the past few weeks.  He states that he was recently diagnosed with a UTI on 11/13/2024, urine culture on 11/15/2024 grew out Pseudomonas and Enterococcus and was started on antibiotics (linezolid and ciprofloxacin).  Has had diarrhea since 11/4/24, had enteric pathogen workup and was diagnosed with norovirus on 11/17/2024.  He was started on nitazoxanide 500 mg 1 tablet (500 mg) by mouth 2 times daily x 28 tablets.  He states that he did not start taking antibiotics until 3 days ago, still feels generally weak and washed out.  He also notes a history of orthostatic hypotension and tachycardia ever since his kidney transplant surgery in July 2024.  He is supposed to get up gradually and because after sitting upright before getting up to standing but his lightheadedness with standing has been getting worse over the past few weeks.  Last night he heard his dog do something and he stood up quickly  to go check on the dog.  He suddenly felt very lightheaded and fell to the floor, landing on his side.  His children heard him fall and checked on him right away.  No loss of consciousness, altered mental status, head injury or other injuries with this.  He laid there 5-10 minutes then got up.  He notes that he started getting better yesterday because he was able to eat food.  Prior to this he has been on a clear liquid diet for the past 10 days and has lost 9 lbs in the process. He just doesn't feel like himself. Wife states he has no energy and that today was the first time he has ever asked for a wheelchair in order to get to the building. Patient is on Brilinta anticoagulation as well as aspirin.  He is having some pain over transplanted kidney site in his lower abdomen.    Per chart review, patient had a syncopal episode last night. Per phone call with transplant clinic this morning, patient reported recent dizziness with standing and dyspnea on exertion. Blood work was done this morning notable for elevated amylase and lipase.     Past Medical History  Past Medical History:   Diagnosis Date    Acquired elevated diaphragm     Anemia in chronic kidney disease     Angina pectoris (H)     Chronic kidney disease     Coronary artery disease     Diabetes mellitus, type II (H) 12/2001    Diabetic nephropathy (H)     MARQUEZ (dyspnea on exertion)     Dyslipidemia 12/2001    End stage renal disease (H)     History of blood transfusion 2004    Hypertension     takes medication    Ischemic cardiomyopathy     Metabolic acidosis     Myocardial infarction (H)     STEMI -Diagonal branch of the LAD    Obesity (BMI 30-39.9)     Peripheral neuropathy     Proteinuria     Retinopathy     Sleep apnea     Vitamin D deficiency      Past Surgical History:   Procedure Laterality Date    APPENDECTOMY OPEN N/A 7/19/2024    Procedure: Appendectomy open;  Surgeon: Harrison Whitehead MD;  Location: [facility] OR    BACK SURGERY  2009     L5 disc cut    BENCH KIDNEY  7/19/2024    Procedure: Bench kidney;  Surgeon: Harrison Whitehead MD;  Location: [facility] OR    BENCH PANCREAS N/A 7/19/2024    Procedure: Bench pancreas;  Surgeon: Harrison Whitehead MD;  Location: [facility] OR    BYPASS GRAFT ARTERY CORONARY  06/20/2019    2 vessels    CV CENTRAL VENOUS CATHETER PLACEMENT N/A 7/20/2024    Procedure: Central Venous Catheter Placement;  Surgeon: Dominic Robertson MD;  Location: [facility] CARDIAC CATH LAB    CV CORONARY ANGIOGRAM N/A 01/13/2019    Procedure: Coronary Angiogram;  Surgeon: Cielo Che MD;  Location: [facility] Cath Lab;  Service: Cardiology    CV CORONARY ANGIOGRAM N/A 05/02/2019    Procedure: Coronary Angiogram;  Surgeon: Cielo Che MD;  Location: [facility] Cath Lab;  Service: Cardiology    CV CORONARY ANGIOGRAM N/A 04/30/2020    Procedure: Coronary Angiogram;  Surgeon: Cielo Che MD;  Location: [facility] Cath Lab;  Service: Cardiology    CV CORONARY ANGIOGRAM N/A 07/22/2021    Procedure: CV CORONARY ANGIOGRAM;  Surgeon: Adrian Crow MD;  Location: [facility] LAB CV    CV CORONARY ANGIOGRAM N/A 02/01/2024    Procedure: Coronary Angiogram;  Surgeon: Delroy Carpio MD;  Location: [facility] CARDIAC CATH LAB    CV CORONARY ANGIOGRAM N/A 7/20/2024    Procedure: Coronary Angiogram;  Surgeon: Dominic Robertson MD;  Location: [facility] CARDIAC CATH LAB    CV CORONARY LITHOTRIPSY PCI N/A 7/20/2024    Procedure: Percutaneous Coronary Intervention - Lithotripsy;  Surgeon: Dominic Robertson MD;  Location: [facility] CARDIAC CATH LAB    CV FRACTIONAL FLOW RATIO WIRE N/A 07/22/2021    Procedure: Fractional Flow Ratio Wire;  Surgeon: Adrian Crow MD;  Location: [facility] CV    CV INTRAVASULAR ULTRASOUND N/A 7/20/2024    Procedure: Intravascular Ultrasound;  Surgeon: Dominic Robertson MD;  Location: [facility] HEART CARDIAC CATH LAB    CV LEFT HEART CATH  N/A 07/22/2021    Procedure: Left Heart Cath;  Surgeon: Adrian Crow MD;  Location: [facility] CV    CV LEFT HEART CATHETERIZATION WITH LEFT VENTRICULOGRAM N/A 01/13/2019    Procedure: Left Heart Catheterization with Left Ventriculogram;  Surgeon: Cielo Che MD;  Location: [facility] Cath Lab;  Service: Cardiology    CV LEFT HEART CATHETERIZATION WITHOUT LEFT VENTRICULOGRAM Left 04/30/2020    Procedure: Left Heart Catheterization Without Left Ventriculogram;  Surgeon: Cielo Che MD;  Location: [facility] Cath Lab;  Service: Cardiology    CV PCI N/A 02/01/2024    Procedure: Percutaneous Coronary Intervention;  Surgeon: Delroy Carpio MD;  Location: [facility] CARDIAC CATH LAB    CV PCI N/A 7/20/2024    Procedure: Percutaneous Coronary Intervention;  Surgeon: Dominic Robertson MD;  Location: [facility] CARDIAC CATH LAB    CV PCI ASPIRATION THROMECTOMY N/A 7/20/2024    Procedure: Percutaneous Coronary Intervention Aspiration Thrombectomy;  Surgeon: Dominic Robertson MD;  Location: [facility] CARDIAC CATH LAB    CV PCI STENT DRUG ELUTING N/A 7/20/2024    Procedure: Percutaneous Coronary Intervention Stent;  Surgeon: Dominic Robertson MD;  Location: [facility] CARDIAC CATH LAB    CV RIGHT HEART CATHETERIZATION N/A 04/30/2020    Procedure: Right Heart Catheterization;  Surgeon: Cielo Che MD;  Location: [facility] Lab;  Service: Cardiology    CYSTOSCOPY, REMOVE STENT(S), COMBINED N/A 10/10/2024    Procedure: CYSTOSCOPY, WITH TRANSPLANT URETERAL STENT REMOVAL;  Surgeon: Farzad Harris MD;  Location:  OR    ESOPHAGOSCOPY, GASTROSCOPY, DUODENOSCOPY (EGD), COMBINED N/A 9/1/2024    Procedure: Esophagoscopy, gastroscopy, duodenoscopy (EGD), combined;  Surgeon: Jolene Raimrez MD;  Location:  GI    EYE SURGERY      GENITOURINARY SURGERY      HERNIA REPAIR      OTHER SURGICAL HISTORY      Excise varicocele    STENT, CORONARY, DALE  2019     "TRANSPLANT PANCREAS, KIDNEY  DONOR, COMBINED N/A 2024    Procedure: Transplant pancreas, kidney  donor, with ureteral stent placement;  Surgeon: Harrison Whitehead MD;  Location: [facility] OR    VASCULAR SURGERY       nitazoxanide (ALINIA) 500 MG tablet      Allergies   Allergen Reactions    Amoxicillin Hives     & generalized pain    Tolerated ceftriaxone in  ([facility]) and  ([facility])    Venlafaxine      lethargic     Family History  Family History   Problem Relation Age of Onset    Diabetes Type 2  Mother     Heart Disease Father 60    CABG Father 50        triple bypass    Diabetes Type 2  Father     Depression Sister     Substance Abuse Sister     Ovarian Cancer Maternal Grandmother     Brain Cancer Maternal Grandmother     Pancreatic Cancer Maternal Aunt     Prostate Cancer Maternal Uncle      Social History   Social History     Tobacco Use    Smoking status: Never     Passive exposure: Past    Smokeless tobacco: Never   Substance Use Topics    Alcohol use: Never    Drug use: Never      Past medical history, past surgical history, medications, allergies, family history, and social history were reviewed with the patient. No additional pertinent items.   A medically appropriate review of systems was performed with pertinent positives and negatives noted in the HPI, and all other systems negative.    Physical Exam   BP: 100/67  Pulse: 89  Temp: 97.4  F (36.3  C)  Resp: 16  Height: 180.3 cm (5' 11\")  Weight: 96.2 kg (212 lb)  SpO2: 98 %  Physical Exam  Vitals and nursing note reviewed.   Constitutional:       General: He is not in acute distress.     Appearance: He is well-developed. He is not diaphoretic.   HENT:      Head: Normocephalic and atraumatic.      Nose: Nose normal. No congestion.      Mouth/Throat:      Mouth: Mucous membranes are moist.      Pharynx: Oropharynx is clear.   Eyes:      General: No scleral icterus.     Extraocular Movements: Extraocular " movements intact.      Conjunctiva/sclera: Conjunctivae normal.      Pupils: Pupils are equal, round, and reactive to light.   Cardiovascular:      Rate and Rhythm: Normal rate.   Pulmonary:      Effort: Pulmonary effort is normal.   Abdominal:      General: Abdomen is flat.   Musculoskeletal:         General: No tenderness or deformity. Normal range of motion.      Cervical back: Normal range of motion and neck supple.   Lymphadenopathy:      Cervical: No cervical adenopathy.   Skin:     General: Skin is warm and dry.      Capillary Refill: Capillary refill takes less than 2 seconds.      Findings: No rash.   Neurological:      General: No focal deficit present.      Mental Status: He is alert and oriented to person, place, and time.      Cranial Nerves: No cranial nerve deficit.      Sensory: No sensory deficit.      Coordination: Coordination normal.             ED Course, Procedures, & Data     ED Course as of 11/21/24 1219   Thu Nov 21, 2024   1143 Assessed patient in Vertical Triage A     1144 Labs ordered     Procedures            EKG Interpretation:      Interpreted by Shirin Cisneros                        Scheduling Type..: Person, MD  Time reviewed: per Epic  Symptoms at time of EKG: none   Rhythm: normal sinus   Rate: normal  Axis: normal  Ectopy: none  Conduction: QTc prolonged at 498  ST Segments/ T Waves: No ST-T wave changes  Q Waves: none  Comparison to prior: Unchanged    Clinical Impression: normal sinus rhythm, prolonged QTc       Results for orders placed or performed in visit on 11/21/24   Amylase     Status: Abnormal   Result Value Ref Range    Amylase 177 (H) 28 - 100 U/L   Basic metabolic panel     Status: Abnormal   Result Value Ref Range    Sodium 136 135 - 145 mmol/L    Potassium 3.8 3.4 - 5.3 mmol/L    Chloride 100 98 - 107 mmol/L    Carbon Dioxide (CO2) 22 22 - 29 mmol/L    Anion Gap 14 7 - 15 mmol/L    Urea Nitrogen 40.9 (H) 6.0 - 20.0 mg/dL    Creatinine 2.63 (H) 0.67 - 1.17 mg/dL     GFR Estimate 29 (L) >60 mL/min/1.73m2    Calcium 10.1 8.8 - 10.4 mg/dL    Glucose 150 (H) 70 - 99 mg/dL   Lipase     Status: Abnormal   Result Value Ref Range    Lipase 158 (H) 13 - 60 U/L   CBC with platelets     Status: Abnormal   Result Value Ref Range    WBC Count 6.0 4.0 - 11.0 10e3/uL    RBC Count 3.89 (L) 4.40 - 5.90 10e6/uL    Hemoglobin 11.2 (L) 13.3 - 17.7 g/dL    Hematocrit 34.9 (L) 40.0 - 53.0 %    MCV 90 78 - 100 fL    MCH 28.8 26.5 - 33.0 pg    MCHC 32.1 31.5 - 36.5 g/dL    RDW 15.5 (H) 10.0 - 15.0 %    Platelet Count 336 150 - 450 10e3/uL     Medications - No data to display  Labs Ordered and Resulted from Time of ED Arrival to Time of ED Departure - No data to display  No orders to display          Critical care was not performed.     Medical Decision Making  The patient's presentation was of high complexity (an acute health issue posing potential threat to life or bodily function).    The patient's evaluation involved:  ordering and/or review of 3+ test(s) in this encounter (see separate area of note for details)    The patient's management necessitated high risk (a decision regarding hospitalization).    Assessment & Plan    Siva Ramey is a 49 year old male with a past medical history of HFrEF, CAD s/p CABG, PAD, HTN, DM II, CKD stage 3b s/p kidney and pancreas transplant (7/19/24), and anemia who presents to the emergency department for further evaluation of progressively worsening lightheadedness, fatigue, lack of energy, diarrhea that has been ongoing for weeks as well as episode of orthostatic lightheadedness with fall yesterday. Discussed likely admission, plan for labs, imaging, consult with kidney transplant team.  Vital signs arrival notable for relative hypotension 100/67 but otherwise afebrile stable including normal pulse ox at 100% on room air.  EKG obtained which revealed normal sinus rhythm, IV established and labs sent which revealed DELISA with a BUN/creatinine of 44/3.5, elevated  lipase to 157 otherwise normal CBC and electrolytes, troponinemia at 25, UA with 16 WBCs but negative nitrites.  Stool studies sent and pending at time of patient's ED encounter.  Patient given a liter normal saline IV fluid bolus and case discussed with kidney surgery transplant service with agreement to admit for further evaluation and care.        I have reviewed the nursing notes. I have reviewed the findings, diagnosis, plan and need for follow up with the patient.    New Prescriptions    No medications on file       Final diagnoses:   Norovirus   Complication of kidney transplant   I, Martine Quintanilla, am serving as a trained medical scribe to document services personally performed by Shirin Cisneros MD based on the provider's statements to me on November 21, 2024.  This document has been checked and approved by the attending provider.    I, Shirin Cisneros MD was physically present and have reviewed and verified the accuracy of this note documented by Martine Quintanilla, medical scribe.      Shirin Cisneros MD      [facility] EMERGENCY DEPARTMENT  11/21/2024     Shirin Cisneros MD  11/23/24 0289

## 2024-11-21 NOTE — TELEPHONE ENCOUNTER
ISSUE:  SCr 2.6   Amylase/lipase elevated above baseline     Spoke with Siva, says his diarrhea has improved over the last day or two but now c/o dizziness with standing, and dyspnea on exertion. Fell last night, did not hurt himself but blacked out and came to on the floor. Denies chest pains.     Did not take BP yesterday or today, asked him to take BP while on the phone. Siva walked to get his cuff and was audibly short of breath, near syncopal episode from walking a few feet. Recommended Siva come into ER for evaluation, he is agreeable to plan and will call his wife.

## 2024-11-21 NOTE — TELEPHONE ENCOUNTER
DATE:  11/21/2024     TIME OF RECEIPT FROM LAB:  3:31 pm    ORDERING PROVIDER: Franchesca    LAB TEST:  Tacrolimus level    LAB VALUE:  26.7  Tacrolimus taken at 8:00 pm on 11/20/24  Tacrolimus level drawn at 7:34 am on 11/21/24

## 2024-11-22 ENCOUNTER — DOCUMENTATION ONLY (OUTPATIENT)
Dept: MEDSURG UNIT | Facility: CLINIC | Age: 49
End: 2024-11-22

## 2024-11-22 ENCOUNTER — APPOINTMENT (OUTPATIENT)
Dept: ULTRASOUND IMAGING | Facility: CLINIC | Age: 49
DRG: 699 | End: 2024-11-22
Attending: PHYSICIAN ASSISTANT
Payer: MEDICARE

## 2024-11-22 DIAGNOSIS — I15.1 HTN, KIDNEY TRANSPLANT RELATED: ICD-10-CM

## 2024-11-22 DIAGNOSIS — Z94.0 HTN, KIDNEY TRANSPLANT RELATED: ICD-10-CM

## 2024-11-22 PROBLEM — N39.0 URINARY TRACT INFECTION: Status: ACTIVE | Noted: 2024-11-22

## 2024-11-22 PROBLEM — A08.11 NOROVIRUS: Status: ACTIVE | Noted: 2024-11-22

## 2024-11-22 PROBLEM — N17.9 AKI (ACUTE KIDNEY INJURY) (H): Status: ACTIVE | Noted: 2024-11-22

## 2024-11-22 PROBLEM — D84.9 IMMUNOSUPPRESSION (H): Status: ACTIVE | Noted: 2024-11-22

## 2024-11-22 LAB
ADV 40+41 DNA STL QL NAA+NON-PROBE: NEGATIVE
ALBUMIN SERPL BCG-MCNC: 3 G/DL (ref 3.5–5.2)
ALP SERPL-CCNC: 107 U/L (ref 40–150)
ALT SERPL W P-5'-P-CCNC: 31 U/L (ref 0–70)
ANION GAP SERPL CALCULATED.3IONS-SCNC: 11 MMOL/L (ref 7–15)
AST SERPL W P-5'-P-CCNC: 29 U/L (ref 0–45)
ASTRO TYP 1-8 RNA STL QL NAA+NON-PROBE: NEGATIVE
BACTERIA UR CULT: NO GROWTH
BASOPHILS # BLD AUTO: 0 10E3/UL (ref 0–0.2)
BASOPHILS NFR BLD AUTO: 1 %
BILIRUB SERPL-MCNC: 0.3 MG/DL
BUN SERPL-MCNC: 36.3 MG/DL (ref 6–20)
C CAYETANENSIS DNA STL QL NAA+NON-PROBE: NEGATIVE
CALCIUM SERPL-MCNC: 9.7 MG/DL (ref 8.8–10.4)
CAMPYLOBACTER DNA SPEC NAA+PROBE: NEGATIVE
CHLORIDE SERPL-SCNC: 107 MMOL/L (ref 98–107)
CREAT SERPL-MCNC: 2.16 MG/DL (ref 0.67–1.17)
CRYPTOSP DNA STL QL NAA+NON-PROBE: NEGATIVE
E COLI O157 DNA STL QL NAA+NON-PROBE: ABNORMAL
E HISTOLYT DNA STL QL NAA+NON-PROBE: NEGATIVE
EAEC ASTA GENE ISLT QL NAA+PROBE: NEGATIVE
EC STX1+STX2 GENES STL QL NAA+NON-PROBE: NEGATIVE
EGFRCR SERPLBLD CKD-EPI 2021: 37 ML/MIN/1.73M2
EOSINOPHIL # BLD AUTO: 0.1 10E3/UL (ref 0–0.7)
EOSINOPHIL NFR BLD AUTO: 2 %
EPEC EAE GENE STL QL NAA+NON-PROBE: NEGATIVE
ERYTHROCYTE [DISTWIDTH] IN BLOOD BY AUTOMATED COUNT: 15.7 % (ref 10–15)
ETEC LTA+ST1A+ST1B TOX ST NAA+NON-PROBE: NEGATIVE
G LAMBLIA DNA STL QL NAA+NON-PROBE: NEGATIVE
GLUCOSE SERPL-MCNC: 123 MG/DL (ref 70–99)
HCO3 SERPL-SCNC: 19 MMOL/L (ref 22–29)
HCT VFR BLD AUTO: 27.9 % (ref 40–53)
HGB BLD-MCNC: 8.9 G/DL (ref 13.3–17.7)
IMM GRANULOCYTES # BLD: 0 10E3/UL
IMM GRANULOCYTES NFR BLD: 1 %
LIPASE SERPL-CCNC: 214 U/L (ref 13–60)
LYMPHOCYTES # BLD AUTO: 0.2 10E3/UL (ref 0.8–5.3)
LYMPHOCYTES NFR BLD AUTO: 7 %
MAGNESIUM SERPL-MCNC: 1.8 MG/DL (ref 1.7–2.3)
MCH RBC QN AUTO: 28.5 PG (ref 26.5–33)
MCHC RBC AUTO-ENTMCNC: 31.9 G/DL (ref 31.5–36.5)
MCV RBC AUTO: 89 FL (ref 78–100)
MONOCYTES # BLD AUTO: 0.4 10E3/UL (ref 0–1.3)
MONOCYTES NFR BLD AUTO: 11 %
NEUTROPHILS # BLD AUTO: 2.9 10E3/UL (ref 1.6–8.3)
NEUTROPHILS NFR BLD AUTO: 79 %
NOROVIRUS GI+II RNA STL QL NAA+NON-PROBE: POSITIVE
NRBC # BLD AUTO: 0 10E3/UL
NRBC BLD AUTO-RTO: 0 /100
P SHIGELLOIDES DNA STL QL NAA+NON-PROBE: NEGATIVE
PHOSPHATE SERPL-MCNC: 2.7 MG/DL (ref 2.5–4.5)
PLATELET # BLD AUTO: 221 10E3/UL (ref 150–450)
POTASSIUM SERPL-SCNC: 3.9 MMOL/L (ref 3.4–5.3)
PROT SERPL-MCNC: 5.2 G/DL (ref 6.4–8.3)
RBC # BLD AUTO: 3.12 10E6/UL (ref 4.4–5.9)
RVA RNA STL QL NAA+NON-PROBE: NEGATIVE
SALMONELLA SP RPOD STL QL NAA+PROBE: NEGATIVE
SAPO I+II+IV+V RNA STL QL NAA+NON-PROBE: NEGATIVE
SHIGELLA SP+EIEC IPAH ST NAA+NON-PROBE: NEGATIVE
SODIUM SERPL-SCNC: 137 MMOL/L (ref 135–145)
TACROLIMUS BLD-MCNC: 18.4 UG/L (ref 5–15)
TME LAST DOSE: ABNORMAL H
TME LAST DOSE: ABNORMAL H
V CHOLERAE DNA SPEC QL NAA+PROBE: NEGATIVE
VIBRIO DNA SPEC NAA+PROBE: NEGATIVE
WBC # BLD AUTO: 3.7 10E3/UL (ref 4–11)
Y ENTEROCOL DNA STL QL NAA+PROBE: NEGATIVE

## 2024-11-22 PROCEDURE — 80053 COMPREHEN METABOLIC PANEL: CPT | Performed by: STUDENT IN AN ORGANIZED HEALTH CARE EDUCATION/TRAINING PROGRAM

## 2024-11-22 PROCEDURE — 99223 1ST HOSP IP/OBS HIGH 75: CPT | Mod: FS | Performed by: PHYSICIAN ASSISTANT

## 2024-11-22 PROCEDURE — 120N000011 HC R&B TRANSPLANT UMMC

## 2024-11-22 PROCEDURE — 93975 VASCULAR STUDY: CPT

## 2024-11-22 PROCEDURE — 83690 ASSAY OF LIPASE: CPT | Performed by: NURSE PRACTITIONER

## 2024-11-22 PROCEDURE — 250N000012 HC RX MED GY IP 250 OP 636 PS 637: Performed by: NURSE PRACTITIONER

## 2024-11-22 PROCEDURE — 85004 AUTOMATED DIFF WBC COUNT: CPT | Performed by: STUDENT IN AN ORGANIZED HEALTH CARE EDUCATION/TRAINING PROGRAM

## 2024-11-22 PROCEDURE — 87507 IADNA-DNA/RNA PROBE TQ 12-25: CPT | Performed by: STUDENT IN AN ORGANIZED HEALTH CARE EDUCATION/TRAINING PROGRAM

## 2024-11-22 PROCEDURE — 258N000003 HC RX IP 258 OP 636: Performed by: STUDENT IN AN ORGANIZED HEALTH CARE EDUCATION/TRAINING PROGRAM

## 2024-11-22 PROCEDURE — 93975 VASCULAR STUDY: CPT | Mod: 26 | Performed by: RADIOLOGY

## 2024-11-22 PROCEDURE — 250N000012 HC RX MED GY IP 250 OP 636 PS 637: Performed by: PHYSICIAN ASSISTANT

## 2024-11-22 PROCEDURE — 84100 ASSAY OF PHOSPHORUS: CPT | Performed by: NURSE PRACTITIONER

## 2024-11-22 PROCEDURE — 80197 ASSAY OF TACROLIMUS: CPT | Performed by: STUDENT IN AN ORGANIZED HEALTH CARE EDUCATION/TRAINING PROGRAM

## 2024-11-22 PROCEDURE — 36415 COLL VENOUS BLD VENIPUNCTURE: CPT | Performed by: STUDENT IN AN ORGANIZED HEALTH CARE EDUCATION/TRAINING PROGRAM

## 2024-11-22 PROCEDURE — 83735 ASSAY OF MAGNESIUM: CPT | Performed by: NURSE PRACTITIONER

## 2024-11-22 PROCEDURE — 250N000013 HC RX MED GY IP 250 OP 250 PS 637: Performed by: NURSE PRACTITIONER

## 2024-11-22 RX ORDER — NITAZOXANIDE 500 MG/1
500 TABLET ORAL 2 TIMES DAILY
Qty: 28 TABLET | Refills: 0 | Status: ACTIVE | OUTPATIENT
Start: 2024-11-23 | End: 2024-11-25

## 2024-11-22 RX ORDER — TACROLIMUS 0.5 MG/1
0.5 CAPSULE ORAL
Status: DISCONTINUED | OUTPATIENT
Start: 2024-11-22 | End: 2024-11-25 | Stop reason: HOSPADM

## 2024-11-22 RX ADMIN — SODIUM CHLORIDE, POTASSIUM CHLORIDE, SODIUM LACTATE AND CALCIUM CHLORIDE: 600; 310; 30; 20 INJECTION, SOLUTION INTRAVENOUS at 05:03

## 2024-11-22 RX ADMIN — MYCOPHENOLIC ACID 720 MG: 360 TABLET, DELAYED RELEASE ORAL at 08:01

## 2024-11-22 RX ADMIN — CIPROFLOXACIN 500 MG: 500 TABLET ORAL at 19:58

## 2024-11-22 RX ADMIN — TICAGRELOR 90 MG: 90 TABLET ORAL at 08:01

## 2024-11-22 RX ADMIN — DAPSONE 50 MG: 25 TABLET ORAL at 08:01

## 2024-11-22 RX ADMIN — NITAZOXANIDE 500 MG: 500 TABLET ORAL at 08:01

## 2024-11-22 RX ADMIN — TACROLIMUS 0.5 MG: 0.5 CAPSULE ORAL at 18:12

## 2024-11-22 RX ADMIN — CIPROFLOXACIN 500 MG: 500 TABLET ORAL at 08:01

## 2024-11-22 RX ADMIN — SODIUM BICARBONATE 1950 MG: 650 TABLET ORAL at 19:58

## 2024-11-22 RX ADMIN — TICAGRELOR 90 MG: 90 TABLET ORAL at 19:58

## 2024-11-22 RX ADMIN — SODIUM BICARBONATE 1950 MG: 650 TABLET ORAL at 14:18

## 2024-11-22 RX ADMIN — SODIUM BICARBONATE 1950 MG: 650 TABLET ORAL at 08:01

## 2024-11-22 RX ADMIN — ASPIRIN 81 MG CHEWABLE TABLET 81 MG: 81 TABLET CHEWABLE at 08:01

## 2024-11-22 RX ADMIN — ATORVASTATIN CALCIUM 40 MG: 40 TABLET, FILM COATED ORAL at 19:58

## 2024-11-22 RX ADMIN — MYCOPHENOLIC ACID 540 MG: 360 TABLET, DELAYED RELEASE ORAL at 19:58

## 2024-11-22 RX ADMIN — NITAZOXANIDE 500 MG: 500 TABLET ORAL at 19:58

## 2024-11-22 ASSESSMENT — ACTIVITIES OF DAILY LIVING (ADL)
ADLS_ACUITY_SCORE: 0
DEPENDENT_IADLS:: INDEPENDENT
ADLS_ACUITY_SCORE: 0

## 2024-11-22 NOTE — PLAN OF CARE
Goal Outcome Evaluation:    Plan of Care Reviewed With: Patient.     Overall Patient Progress: No change.    Outcome Evaluation: Plans to discharge home with family support.

## 2024-11-22 NOTE — PROGRESS NOTES
Care Management Initial Consult    General Information  Assessment completed with: Shazia Siva  Type of CM/SW Visit: Initial Assessment    Primary Care Provider verified and updated as needed: No   Readmission within the last 30 days: no previous admission in last 30 days      Reason for Consult: Discharge planning  Advance Care Planning: Advance Care Planning Reviewed: No concerns identified        Communication Assessment  Patient's communication style: Spoken language (English or Bilingual)    Hearing Difficulty or Deaf: No   Wear Glasses or Blind: Yes    Cognitive  Cognitive/Neuro/Behavioral: WDL  Level of Consciousness: alert  Arousal Level: opens eyes spontaneously  Orientation: oriented x 4  Mood/Behavior: calm, cooperative     Speech: logical    Living Environment:   People in home: Child(jennifer), dependent, Spouse     Current living Arrangements: House      Able to return to prior arrangements: Yes    Family/Social Support:  Care provided by: Self, Spouse/significant other  Provides care for: Child(jennifer)  Marital Status:   Support system: Wife, Jolene Ramey.       Description of Support System: Supportive    Support Assessment: Adequate family and caregiver support    Current Resources:   Patient receiving home care services: No     Community Resources: None  Equipment currently used at home: None  Supplies currently used at home: None    Employment/Financial:  Employment Status: Disabled        Financial Concerns: None   Referral to Financial Worker: No     Does the patient's insurance plan have a 3 day qualifying hospital stay waiver?  No    Lifestyle & Psychosocial Needs:  Social Drivers of Health     Food Insecurity: High Risk (11/21/2024)    Food Insecurity     Within the past 12 months, did you worry that your food would run out before you got money to buy more?: Yes     Within the past 12 months, did the food you bought just not last and you didn t have money to get more?: No   Depression:  Not at risk (8/12/2024)    PHQ-2     PHQ-2 Score: 0   Housing Stability: Low Risk  (11/21/2024)    Housing Stability     Do you have housing? : Yes     Are you worried about losing your housing?: No   Tobacco Use: Low Risk  (11/21/2024)    Patient History     Smoking Tobacco Use: Never     Smokeless Tobacco Use: Never     Passive Exposure: Past   Financial Resource Strain: Low Risk  (11/21/2024)    Financial Resource Strain     Within the past 12 months, have you or your family members you live with been unable to get utilities (heat, electricity) when it was really needed?: No   Alcohol Use: Not on file   Transportation Needs: Low Risk  (11/21/2024)    Transportation Needs     Within the past 12 months, has lack of transportation kept you from medical appointments, getting your medicines, non-medical meetings or appointments, work, or from getting things that you need?: No   Physical Activity: Not on file   Interpersonal Safety: Low Risk  (11/21/2024)    Interpersonal Safety     Do you feel physically and emotionally safe where you currently live?: Yes     Within the past 12 months, have you been hit, slapped, kicked or otherwise physically hurt by someone?: No     Within the past 12 months, have you been humiliated or emotionally abused in other ways by your partner or ex-partner?: No   Stress: Not on file   Social Connections: Unknown (4/10/2023)    [facility]    Social Connections     Frequency of Communication with Friends and Family: Not on file   Health Literacy: Not on file       Functional Status:  Prior to admission patient needed assistance:   Dependent ADLs: Independent  Dependent IADLs: Independent    Mental Health Status:  Mental Health Status: No Current Concerns       Chemical Dependency Status:  Chemical Dependency Status: No Current Concerns           Values/Beliefs:  Spiritual, Cultural Beliefs, Oriental orthodox  Practices, Values that affect care: No          Discussed  Partnership in Safe Discharge Planning  document with patient/family: No    Additional Information:  Patient readmission after  kidney/pancreas transplant on 24. Met with patient alone at bedside to update psychosocial assessment and provide brief introduction and education about SW role while inpatient. Patient appeared alert and oriented x4 with adequate insight and judgment. Behavior was appropriate during assessment. Patient has adequate social supports and discharge plan. Denied concerns or resources at this time. SW encouraged patient to follow-up as needed for questions and concerns.     Next Steps: SOT SW team to continue to follow for psychosocial needs.     Sonia Gomez, [facility]  Outpatient Kidney/Pancreas/[facility] Islet Transplant  Phone: [phone]

## 2024-11-22 NOTE — PLAN OF CARE
"Goal Outcome Evaluation:    /60 (BP Location: Right arm)   Pulse 80   Temp 98  F (36.7  C) (Oral)   Resp 20   Ht 1.803 m (5' 11\")   Wt 96.2 kg (212 lb)   SpO2 96%   BMI 29.57 kg/m      Pt in NAD, A&OX4, VSS, resp even & unlabored.                         "

## 2024-11-22 NOTE — CONSULTS
[facility]  Transplant Nephrology Consult Note  Date of Admission:  11/21/2024  Today's Date: 11/22/2024  Requesting physician: Ramya Munoz MD    Reason for Consult:  SPK with DELISA    Recommendations:   - No acute indications for dialysis.  - Decrease mycophenolic acid to 540 mg bid x 1-2 weeks in setting of acute infection  - Continue nitazoxanide for total of 3 weeks given severity of symptoms with norovirus infection  - Will follow tacrolimus level and agree with tacrolimus 0.5 mg dose this morning  - Agree with sodium bicarbonate supplementation     Assessment & Plan   # DDKT (SPK): DELISA with improved creatinine.  Had creatinine peaking at 2.6 mg/dl up from baseline of 1.5-1.8 mg/dl, likely in setting of UTI, dehydration, and elevated tacrolimus level. Transplant renal US without evidence of obstruction.  No acute indications for dialysis.   - Baseline Creatinine: ~ 1.5-1.8 mg/dl   - Proteinuria: Not checked post transplant   - DSA Hx: No DSA   - Last cPRA: 11%   - BK Viremia: No   - Kidney Tx Biopsy Hx: No biopsy history.    # Pancreas Tx (SPK):    - Pancreatic Exocrine Drainage: Enteric drained     - Blood glucose: Elevated blood glucose      On insulin: No   - HbA1c: Stable      Latest HbA1c: 6.2%   - Pancreatic enzymes: Trend up. Transplant surgery is planning pancreas transplant US today   - DSA Hx: No DSA   - Pancreas Tx Biopsy Hx: No biopsy history    # Immunosuppression: Tacrolimus immediate release (goal 8-10) and Mycophenolic acid (dose 720 mg every 12 hours)   - Induction with Recent Transplant:  High Intensity Protocol   - Continue with intensive monitoring of immunosuppression for efficacy and toxicity.   - Historical Changes in Immunosuppression: None   - Changes: Yes - recommend decreasing mycophenolic acid to 540 mg bid in setting of acute infection    # Infection Prevention:   - PJP: Dapsone (changed off bactrim due to hyperkalemia)  - CMV:  None, prophylaxis completed      - CMV IgG Ab High Risk Discordance (D+/R-): No  CMV Serostatus: Positive  - EBV IgG Ab High Risk Discordance (D+/R-): No  EBV Serostatus: Positive    # Hypertension: Controlled;  Goal BP: < 140/90   - Changes: No    # Anemia in Chronic Renal Disease: Hgb: Trend down      KERRI: No   - Iron studies: Low iron saturation, but high ferritin    # Mineral Bone Disorder:    - Secondary renal hyperparathyroidism; PTH level: Mildly elevated (151-300 pg/ml)        On treatment: None  - Vitamin D; level: High        On supplement: No  - Calcium; level: Normal        On supplement: No  - Phosphorus; level: Normal        On supplement: No    # Electrolytes:   - Potassium; level: Normal        On supplement: No  - Magnesium; level: Normal        On supplement: No  - Bicarbonate; level: Low        On supplement:  Yes, sodium bicarbonate 1,950 mg tid     # UTI: urine culture from 11/13/24 grew >100k Pseudomonas aeruginosa and Enterococcus faecalis. Currently on ciprofloxacin and linezolid with some improvement in symptoms. Continue 2 week course.      # Norovirus: tested positive on 11/16/24 and on nitazoxanide. Given severity of symptoms, will continue treatment for 3 weeks.     # Other Significant PMH:   - CAD, s/p CABG: Asymptomatic.              - HFrEF: Last cardiac echo (Jul/2024) showed moderately decreased LVEF ~ 35-40% with akinesis of the basal-mid inferior and basal inferoseptal segments. Updated ECHO this admission on 11/21/24 showed no change.              - H/o V. Fib Arrest: Patient with V. Fib arrest and STEMI following kidney transplant and emergently brought to Cath Lab and found to have in stent thrombosis of the previous mid RCA stent, which was felt to be the culprit in inferior STEMI.  Patient underwent aspiration thrombectomy and PCI with two IBAN.  Followed by Cardiology.              - PAD: No claudication symptoms.              - GERD: Asymptomatic on PPI.              - Lower  Back Pain with Sciatica: Patient with h/o lower back pain and sciatica years ago, now returned with pain radiating down right leg. Has been taking cyclobenzaprine.    # Transplant History:  Etiology of Kidney Failure: Diabetes mellitus type 2  Tx: DDKT (SPK)  Transplant: 7/20/2024 (Kidney / Pancreas)  Significant transplant-related complications: None    Recommendations were communicated to the primary team verbally.    Seen and discussed with PONCHO GrangerC  Transplant Nephrology  Contact information via Vocera Web Console or via [facility] Paging/Directory    Physician Attestation     I saw and evaluated Siva Ramey as part of a shared APRN/PA visit.     I personally reviewed the vital signs, medications, and labs.    I personally provided a substantive portion of care for this patient and I approve the care plan as written by the KAT.  I was involved with Medical Decision Making including: Please see A&P for additional details of medical decision making.  MANAGEMENT DISCUSSED with the following over the past 24 hours: No acute indications for dialysis.  Okay to hold further IV fluids at this time and follow.  Would restart tacrolimus at 0.5 mg bid this morning and reassess once tacrolimus level comes back.  Recommend finishing antibiotics for UTI.  Check C. Diff.  Would continue on nitazoxanide for total of 3 weeks due to severity of symptoms.     Fernando Sorensen MD  Date of Service (when I saw the patient): 11/22/24    History of Present Illness  Mr. Ramey is 49-year-old male with history of ESKD from presumed type 2 diabetes on HD since Aug 2021 via LUE AVF, CAD s/p CABG 2019 LIMA-LAD, SVG-RCA, IBAN 1/2019, IBAN to RCA 2/2024 on brilinta x 3 months, s/p SPK 7/20/24 complicated by V fib arrest and STEMI with in-stent thrombosis of the previous mid RCA stent s/p aspiration thrombectomy and PCI with two IBAN, PAD, GERD, and low back pain with sciatica. He presented to the ED yesterday  following a presyncopal episode (got up quick from the couch to check on his dog) in the setting of acute illness of non-bloody watery diarrhea with positive norovirus (11/13) and UTI (11/16, pseudomonas, linezolid) that started about 3 weeks ago. He was having frequent (15+) watery stools along with urinary frequency and dysuria, and was feeling very fatigued, dehydrated, and weak. He denied loss of consciousness or head trauma. Overall today he is feeling better and notes decrease frequency in stools (down to 4 times yesterday). Stools are no longer watery. No blood in the stool. He still notes some dysuria, but it is improved. Tacrolimus level was quite elevated. His last dose of tacrolimus was 11/21/24 around 8am. ECHO yesterday without any significant change. CXR with mild perihilar and bibasilar atelectasis mostly in the left lower lobe. No fever, sweats, chills, nausea, or vomiting, abdominal or flank pain, chest pain or SOB.     Review of Systems   The 10 point Review of Systems is negative other than noted in the HPI or here.      MEDICATIONS:  Current Facility-Administered Medications   Medication Dose Route Frequency Provider Last Rate Last Admin    aspirin (ASA) chewable tablet 81 mg  81 mg Oral Daily Julianna Alexis NP   81 mg at 11/22/24 0801    atorvastatin (LIPITOR) tablet 40 mg  40 mg Oral QPM Julianna Alexis NP   40 mg at 11/21/24 2002    ciprofloxacin (CIPRO) tablet 500 mg  500 mg Oral BID Julianna Alexis NP   500 mg at 11/22/24 0801    dapsone (ACZONE) tablet 50 mg  50 mg Oral Daily Julianna Alexis NP   50 mg at 11/22/24 0801    mycophenolic acid (GENERIC EQUIVALENT) EC tablet 720 mg  720 mg Oral BID Julianna Alexis NP   720 mg at 11/22/24 0801    nitazoxanide (ALINIA) tablet 500 mg  500 mg Oral BID Julianna Alexis NP   500 mg at 11/22/24 0801    sodium bicarbonate tablet 1,950 mg  1,950 mg Oral TID Julianna Alexis NP   1,950 mg at 11/22/24 0801    sodium  "chloride (PF) 0.9% PF flush 3 mL  3 mL Intracatheter Q8H Julianna Alexis NP   3 mL at 24 0802    [Held by provider] tacrolimus (GENERIC EQUIVALENT) capsule 1.5 mg  1.5 mg Oral BID IS Julianna Alexis NP        ticagrelor (BRILINTA) tablet 90 mg  90 mg Oral BID Julianna lAexis NP   90 mg at 24 0801     Current Facility-Administered Medications   Medication Dose Route Frequency Provider Last Rate Last Admin    [Held by provider] lactated ringers infusion   Intravenous Continuous Miles Blanco MD   Stopped at 24 0801       Physical Exam   Temp  Av.6  F (36.4  C)  Min: 97.2  F (36.2  C)  Max: 98  F (36.7  C)      Pulse  Av  Min: 71  Max: 89 Resp  Av.1  Min: 16  Max: 20  SpO2  Av.5 %  Min: 91 %  Max: 100 %     /60 (BP Location: Right arm)   Pulse 80   Temp 98  F (36.7  C) (Oral)   Resp 20   Ht 1.803 m (5' 11\")   Wt 96.1 kg (211 lb 14.4 oz)   SpO2 96%   BMI 29.55 kg/m     Date 24 0700 - 24 0659   Shift 5718-1134 7985-2931 7399-1782 24 Hour Total   INTAKE   Shift Total(mL/kg)       OUTPUT   Urine 500   500   Shift Total(mL/kg) 500(5.2)   500(5.2)   Weight (kg) 96.12 96.12 96.12 96.12      Admit Weight: 96.2 kg (212 lb)     GENERAL APPEARANCE: alert and no distress  HENT: mouth without ulcers or lesions  RESP: lungs clear to auscultation - no rales, rhonchi or wheezes  CV: regular rhythm, normal rate, no rub, no murmur  EDEMA: no LE edema bilaterally  ABDOMEN: soft, nondistended, nontender, bowel sounds normal  MS: extremities normal - no gross deformities noted, no evidence of inflammation in joints, no muscle tenderness  SKIN: no rash  GRAFT: no tenderness to palpation    Data   All labs reviewed by me.  CMP  Recent Labs   Lab 24  0548 24  1307 24  0734    137 136   POTASSIUM 3.9 3.9 3.8   CHLORIDE 107 103 100   CO2 19* 23 22   ANIONGAP 11 11 14   * 129* 150*   BUN 36.3* 44.1* 40.9*   CR 2.16* 2.55* 2.63* "   GFRESTIMATED 37* 30* 29*   BETHANY 9.7 10.0 10.1   MAG 1.8 2.0  --    PHOS 2.7 2.6  --    PROTTOTAL 5.2* 6.0*  --    ALBUMIN 3.0* 3.5  --    BILITOTAL 0.3 0.3  --    ALKPHOS 107 125  --    AST 29 36  --    ALT 31 47  --      CBC  Recent Labs   Lab 11/22/24  0548 11/21/24  1307 11/21/24  0734   HGB 8.9* 9.8* 11.2*   WBC 3.7* 5.0 6.0   RBC 3.12* 3.40* 3.89*   HCT 27.9* 30.5* 34.9*   MCV 89 90 90   MCH 28.5 28.8 28.8   MCHC 31.9 32.1 32.1   RDW 15.7* 15.6* 15.5*    233 336     INRNo lab results found in last 7 days.  ABGNo lab results found in last 7 days.   Urine Studies  Recent Labs   Lab Test 11/21/24  1308 11/13/24  0734 10/18/24  0814 10/08/24  1524 07/24/21  1151 06/25/19  1439 06/19/19  0822   COLOR Yellow Yellow Light Yellow Light Yellow   < > Straw Straw   APPEARANCE Clear Clear Clear Slightly Cloudy*   < > Clear Clear   URINEGLC Negative 30* Negative 500*   < > 200 mg/dL* >1000 mg/dL*   URINEBILI Negative Negative Negative Negative   < > Negative Negative   URINEKETONE Negative Negative Negative Negative   < > Negative Negative   SG 1.016 1.018 1.016 1.018   < > 1.010 1.012   UBLD Small* 0.06 mg/dL* Negative Small*   < > Moderate* Trace*   URINEPH 5.5 7.0 7.0 7.0   < > 6.5 6.0   PROTEIN Negative 100* 50* 30*   < > 200 mg/dL* 300 mg/dL*   UROBILINOGEN  --   --   --   --   --  <2.0 E.U./dL <2.0 E.U./dL   NITRITE Negative Negative Negative Positive*   < > Negative Negative   LEUKEST Moderate* 250 Aliza/uL* 75 Aliza/uL* Moderate*   < > Negative Negative   RBCU 2 12* 1 27*   < > 25-50* 0-2   WBCU 16* 17* 6* 27*   < > 0-5 0-5    < > = values in this interval not displayed.     No lab results found.  PTH  Recent Labs   Lab Test 08/05/24  0758 07/25/24  0448 07/20/21  1845   PTHI 263* 199* 190*     Iron Studies  Recent Labs   Lab Test 08/05/24 0758   IRON 44*      IRONSAT 17   KADEN 1,725*       IMAGING:  All imaging studies reviewed by me.    Past Medical History    I have reviewed this patient's medical  history and updated it with pertinent information if needed.   Past Medical History:   Diagnosis Date    Acquired elevated diaphragm     Anemia in chronic kidney disease     Angina pectoris (H)     Chronic kidney disease     Coronary artery disease     Diabetes mellitus, type II (H) 12/2001    Diabetic nephropathy (H)     MARQUEZ (dyspnea on exertion)     Dyslipidemia 12/2001    End stage renal disease (H)     History of blood transfusion 2004    Hypertension     takes medication    Ischemic cardiomyopathy     Metabolic acidosis     Myocardial infarction (H)     STEMI -Diagonal branch of the LAD    Obesity (BMI 30-39.9)     Peripheral neuropathy     Proteinuria     Retinopathy     Sleep apnea     Vitamin D deficiency        Past Surgical History   I have reviewed this patient's surgical history and updated it with pertinent information if needed.  Past Surgical History:   Procedure Laterality Date    APPENDECTOMY OPEN N/A 7/19/2024    Procedure: Appendectomy open;  Surgeon: Harrison Whitehead MD;  [facility]    BACK SURGERY  2009    L5 disc cut    BENCH KIDNEY  7/19/2024    Procedure: Bench kidney;  Surgeon: Harrison Whitehead MD;  [facility]    BENCH PANCREAS N/A 7/19/2024    Procedure: Bench pancreas;  Surgeon: Harrison Whitehead MD;  [facility]    BYPASS GRAFT ARTERY CORONARY  06/20/2019    2 vessels    CV CENTRAL VENOUS CATHETER PLACEMENT N/A 7/20/2024    Procedure: Central Venous Catheter Placement;  Surgeon: Dominic Robertson MD;  Location:  HEART CARDIAC CATH LAB    CV CORONARY ANGIOGRAM N/A 01/13/2019    Procedure: Coronary Angiogram;  Surgeon: Cielo Che MD;  Location: [facility] Cath Lab;  Service: Cardiology    CV CORONARY ANGIOGRAM N/A 05/02/2019    Procedure: Coronary Angiogram;  Surgeon: Cielo Che MD;  Location: [facility] Cath Lab;  Service: Cardiology    CV CORONARY ANGIOGRAM N/A 04/30/2020    Procedure: Coronary  Angiogram;  Surgeon: Cielo Che MD;  Location: [facility] Cath Lab;  Service: Cardiology    CV CORONARY ANGIOGRAM N/A 07/22/2021    Procedure: CV CORONARY ANGIOGRAM;  Surgeon: Adrian Crow MD;  Location: [facility] CATH LAB CV    CV CORONARY ANGIOGRAM N/A 02/01/2024    Procedure: Coronary Angiogram;  Surgeon: Delroy Carpio MD;  Location: [facility] CARDIAC CATH LAB    CV CORONARY ANGIOGRAM N/A 7/20/2024    Procedure: Coronary Angiogram;  Surgeon: Dominic Robertson MD;  Location: [facility] CARDIAC CATH LAB    CV CORONARY LITHOTRIPSY PCI N/A 7/20/2024    Procedure: Percutaneous Coronary Intervention - Lithotripsy;  Surgeon: Dominic Robertson MD;  Location: [facility] CARDIAC CATH LAB    CV FRACTIONAL FLOW RATIO WIRE N/A 07/22/2021    Procedure: Fractional Flow Ratio Wire;  Surgeon: Adrina Crow MD;  Location: [facility] CV    CV INTRAVASULAR ULTRASOUND N/A 7/20/2024    Procedure: Intravascular Ultrasound;  Surgeon: Dominic Robertson MD;  Location: [facility] CARDIAC CATH LAB    CV LEFT HEART CATH N/A 07/22/2021    Procedure: Left Heart Cath;  Surgeon: Adrian Crow MD;  Location: [facility] CV    CV LEFT HEART CATHETERIZATION WITH LEFT VENTRICULOGRAM N/A 01/13/2019    Procedure: Left Heart Catheterization with Left Ventriculogram;  Surgeon: Cielo Che MD;  Location: [facility] Cath Lab;  Service: Cardiology    CV LEFT HEART CATHETERIZATION WITHOUT LEFT VENTRICULOGRAM Left 04/30/2020    Procedure: Left Heart Catheterization Without Left Ventriculogram;  Surgeon: Cielo Che MD;  Location: [facility] Cath Lab;  Service: Cardiology    CV PCI N/A 02/01/2024    Procedure: Percutaneous Coronary Intervention;  Surgeon: Delroy Carpio MD;  Location: [facility] CARDIAC CATH LAB    CV PCI N/A 7/20/2024    Procedure: Percutaneous Coronary Intervention;  Surgeon: Dominic Robertson MD;  Location: [facility] CARDIAC CATH LAB    CV PCI  ASPIRATION THROMECTOMY N/A 2024    Procedure: Percutaneous Coronary Intervention Aspiration Thrombectomy;  Surgeon: Dominic Robertson MD;  Location:  HEART CARDIAC CATH LAB    CV PCI STENT DRUG ELUTING N/A 2024    Procedure: Percutaneous Coronary Intervention Stent;  Surgeon: Dominic Robertson MD;  Location:  HEART CARDIAC CATH LAB    CV RIGHT HEART CATHETERIZATION N/A 2020    Procedure: Right Heart Catheterization;  Surgeon: Cielo Che MD;  Location: [facility] Cath Lab;  Service: Cardiology    CYSTOSCOPY, REMOVE STENT(S), COMBINED N/A 10/10/2024    Procedure: CYSTOSCOPY, WITH TRANSPLANT URETERAL STENT REMOVAL;  Surgeon: Farzad Harris MD;  Location:  OR    ESOPHAGOSCOPY, GASTROSCOPY, DUODENOSCOPY (EGD), COMBINED N/A 2024    Procedure: Esophagoscopy, gastroscopy, duodenoscopy (EGD), combined;  Surgeon: Jolene Ramirez MD;  Location:  GI    EYE SURGERY      GENITOURINARY SURGERY      HERNIA REPAIR      OTHER SURGICAL HISTORY      Excise varicocele    STENT, CORONARY, DALE      TRANSPLANT PANCREAS, KIDNEY  DONOR, COMBINED N/A 2024    Procedure: Transplant pancreas, kidney  donor, with ureteral stent placement;  Surgeon: Harrison Whitehead MD;  Location:  OR    VASCULAR SURGERY         Family History   I have reviewed this patient's family history and updated it with pertinent information if needed.   Family History   Problem Relation Age of Onset    Diabetes Type 2  Mother     Heart Disease Father 60    CABG Father 50        triple bypass    Diabetes Type 2  Father     Depression Sister     Substance Abuse Sister     Ovarian Cancer Maternal Grandmother     Brain Cancer Maternal Grandmother     Pancreatic Cancer Maternal Aunt     Prostate Cancer Maternal Uncle        Social History   I have reviewed this patient's social history and updated it with pertinent information if needed. Siva Ramey   reports that he has never smoked. He has been exposed to tobacco smoke. He has never used smokeless tobacco. He reports that he does not drink alcohol and does not use drugs.    Prior to Admission Medications   Facility-Administered Medications Prior to Admission   Medication Dose Route Frequency Provider Last Rate Last Admin    lidocaine (XYLOCAINE) 2 % external gel   Urethral Once Shelia Magallon APRN CNP        lidocaine (XYLOCAINE) 2 % external gel   Urethral Once Shelia Magallon APRN CNP         Medications Prior to Admission   Medication Sig Dispense Refill Last Dose/Taking    acetaminophen (TYLENOL) 500 MG tablet Take 500 mg by mouth every 4 hours as needed       aspirin (ASA) 81 MG chewable tablet Take 1 tablet (81 mg) by mouth daily Starting tomorrow. 30 tablet 3     atorvastatin (LIPITOR) 40 MG tablet Take 1 tablet (40 mg) by mouth every evening. 90 tablet 4     blood glucose (NO BRAND SPECIFIED) test strip Use to test blood sugar 4 times daily or as directed. 100 strip 5     carvedilol (COREG) 6.25 MG tablet Take 1 tablet (6.25 mg) by mouth 2 times daily (with meals). 180 tablet 3     ciprofloxacin (CIPRO) 500 MG tablet Take 1 tablet (500 mg) by mouth 2 times daily. 28 tablet 0     dapsone (ACZONE) 25 MG tablet Take 2 tablets (50 mg) by mouth daily 60 tablet 11     linezolid (ZYVOX) 600 MG tablet Take 1 tablet (600 mg) by mouth 2 times daily. 28 tablet 0     magnesium glycinate 100 MG CAPS capsule Take 2 capsules (200 mg) by mouth 2 times daily Take in place of magnesium-oxide 120 capsule 2     methocarbamol (ROBAXIN) 750 MG tablet Take 1 tablet (750 mg) by mouth every 6 hours as needed for muscle spasms 20 tablet 0     metoprolol succinate ER (TOPROL XL) 25 MG 24 hr tablet Take 1 tablet (25 mg) by mouth daily. 90 tablet 3     mycophenolic acid (GENERIC EQUIVALENT) 180 MG EC tablet Take 4 tablets (720 mg) by mouth 2 times daily 240 tablet 11     nitazoxanide (ALINIA) 500 MG tablet Take 1 tablet (500 mg)  by mouth 2 times daily. 28 tablet 0     sodium bicarbonate 650 MG tablet Take 3 tablets (1,950 mg) by mouth 3 times daily. 270 tablet 3     Sodium Bicarbonate, antacid, (NICE PURE BAKING SODA) POWD Take 2 g by mouth daily. Take 1/2 tsp (2gm) daily       tacrolimus (GENERIC EQUIVALENT) 0.5 MG capsule Take 1 capsule (0.5 mg) by mouth 2 times daily. Total dose = 1.5 mg twice daily 60 capsule 11     tacrolimus (GENERIC EQUIVALENT) 1 MG capsule Take 1 capsule (1 mg) by mouth 2 times daily. Total dose = 1.5 mg twice daily 60 capsule 11     ticagrelor (BRILINTA) 90 MG tablet Take 1 tablet (90 mg) by mouth 2 times daily. 60 tablet 5

## 2024-11-22 NOTE — PLAN OF CARE
Admitted/transferred from:   Time of arrival on unit 1953  2 RN full  skin assessment completed by Cindy KAMINSKI  Skin assessment finding: skin intact, no problems   Interventions/actions: other        Will continue to monitor.

## 2024-11-22 NOTE — PLAN OF CARE
Problem: Adult Inpatient Plan of Care  Goal: Plan of Care Review  Description: The Plan of Care Review/Shift note should be completed every shift.  The Outcome Evaluation is a brief statement about your assessment that the patient is improving, declining, or no change.  This information will be displayed automatically on your shift  note.  11/21/2024 2203 by Kath Salinas, RN  Outcome: Progressing  Flowsheets (Taken 11/21/2024 2203)  Outcome Evaluation: fall risk due to syncope. onMIVF  Plan of Care Reviewed With: patient  Overall Patient Progress: improving  11/21/2024 2202 by Kath Salinas, RN  Flowsheets (Taken 11/21/2024 2202)  Plan of Care Reviewed With: patient  Overall Patient Progress: no change  Vitals: stable room air.   Neuro : a x o x 4.   Blood glucose: na.   Pain/nausea: denies  Diet: regular.   Lines: PIVR infusing [id number].   : voids well.   GI: bm regular. Needs stool sample.   Drains: na.   Skin: WDL.   Mobility: up ad prasanna.

## 2024-11-22 NOTE — PROGRESS NOTES
Prior Authorization Initiated    Medication: NITAZOXANIDE 500 MG PO TABS  Insurance Company: [employer] Non-Specialty PA's - Phone [phone] Fax [phone]  Filling Pharmacy: [facility] - [address]  Start Date: 11/22/2024  [facility] [place] / Reference #: [id number]   Comments:       Alisa Spears  [facility] Pharm[facility] Liaison, M-TANIA  Ph: [phone]  Fax: [phone]  Available on Vocera (learn more here)

## 2024-11-22 NOTE — PROGRESS NOTES
PRIOR AUTHORIZATION DENIED    Medication: NITAZOXANIDE 500 MG PO TABS  PA Initiated: 11/22/2024  PA Type: Quantity Limit    Insurance Company: [employer] Non-Specialty PA's - Phone [phone] Fax [phone]  [facility] / Reference #: [id number]   Denial Date: 11/22/2024  Appeal Information: Nitazoxanide denied for off-label use. Contact pharmacy liaison if interested in submitting an appeal.        Alisa Spears  [facility] Pharmacy Liaison, M-Z  Ph: [phone]  Fax: [phone]  Available on Teams and Vocera

## 2024-11-22 NOTE — DISCHARGE SUMMARY
[facility]    Discharge Summary  Transplant Surgery    Date of Admission:  11/21/2024  Date of Discharge:  11/25/2024  Discharging Provider: JUANCARLOS Elizabeth CNP / Dr. Yunior Christianson  Date of Service (when I saw the patient): 11/25/24    Discharge Diagnoses   Patient Active Problem List   Diagnosis    Type 2 diabetes mellitus with other circulatory complication, unspecified whether long term insulin use (H)    Dyslipidemia    Class 2 severe obesity due to excess calories with serious comorbidity in adult, unspecified BMI (H)    Atherosclerosis of other coronary artery bypass graft(s) with unstable angina pectoris (H)    Anemia in chronic renal disease    HTN, kidney transplant related    S/P CABG (coronary artery bypass graft)    Balanoposthitis    History of ST elevation myocardial infarction (STEMI)    Obesity (BMI 30.0-34.9)    MARQUEZ (dyspnea on exertion)    STEMI (ST elevation myocardial infarction) (H)    Metabolic acidosis    Retinopathy    Peripheral neuropathy    Acquired elevated diaphragm    Median nerve neuropathy, left    Iron deficiency anemia, unspecified    Stage 3b chronic kidney disease (H)    Coagulation defect, unspecified (H)    Fatigue, unspecified type    Obstructive sleep apnea treated with continuous positive airway pressure (CPAP)    Snoring    Palpitations    Gastroesophageal reflux disease without esophagitis    Diabetes mellitus, type 2 (H)    Kidney replaced by transplant    Pancreas replaced by transplant (H)    Immunosuppressed status (H)    Cardiac arrest with ventricular fibrillation (H)    Steroid-induced hyperglycemia    History of simultaneous kidney and pancreas transplant (H)    Hypomagnesemia    Secondary renal hyperparathyroidism (H)    Need for pneumocystis prophylaxis    Other stricture of urethra in male    Aftercare following organ transplant    CAD (coronary artery disease)    PAD (peripheral artery disease) (H)    HFrEF  (heart failure with reduced ejection fraction) (H)    Dehydration    Syncope    Immunosuppression (H)    DELISA (acute kidney injury) (H)    Norovirus    Urinary tract infection         History of Present Illness   Siva Ramey is a 49 year old male with a history of CAD s/p PCI, DM2, and ESRD now s/p SPK on 7/20/24 c/b post op STEMI requiring PCI and stent placement. He presented to the ED after a syncopal episode.     Hospital Course   Siva Ramey was admitted on 11/21/2024.  The following problems were addressed during his hospitalization:    SPK 7/20/2024:   Pancreas (Enteric drained): Lipase 214 (157). BGs persistently elevated since transplant. 10/29/24 Hgb A1c 6.2%. Seen by Endocrinology in 10/2024 - recommendation to monitor BG with CGM and follow up in 2/2024  -Check HA1c 2/2025  -US pancreas: transplanted pancreas is located in the right lower  quadrant and demonstrates normal echogenicity. There is no free fluid  adjacent to the transplant pancreas.     DELISA of transplanted kidney: Cr 2.6 on admission (Baseline Cr 1.5-1.8). Likely due to dehydration and CNI toxicity. Ureteral stent removed 10/10/24. 11/21 Renal tx US WNL. Cr decreased to 1.65 after receiving IV fluids and holding/decreasing tac dose.    Immunosuppressed status secondary to medications:   Maintenance:   - PTA Myfortic 720 mg BID. Decrease to Myfortic 540 mg BID. Resume 720 mg BID once follow-up appointment with Dr. Sorensen has been completed.   - PTA Tacrolimus 1.5 mg BID. Goal level 8-10. 11/21 tac level 26.7. Tac held 11/21 PM. Restarted tac 0.5 mg BID on 11/22. Discharged on 0.5 mg BID with follow up tacrolimus trough to be checked on Tues 11/26/2024.      Anemia of chronic disease: Hgb 9-10.      CAD, VT/VF arrest s/p PCI with IBAN: Previous CABG 2019, and IBAN 2019 with 100% thrombosis of the RCA, IBAN x 2 on 7/20/24.  11/21 ECHO LV decreased function, EF 35-40%, Akinesis of basal-mid inf and basal inferoseptal segments. Normal RV size  and function. No sig valvular abnl. - stable from ECHO 7/2024.    - Continue PTA atorvastatin, Brilinta 90 mg BID, ASA 81 mg daily, and metoprolol succinate ER 25 mg daily   - Follow up with Cardiology as rescheduled from 11/25/2024 appointment     HTN:    - PTA Metoprolol succinate ER 25 mg daily (discontinued). Continue carvedilol 3.125 mg BID.     SOB: CXR with mild atelectasis. Stable on RA. Denies SOB.   - TTE - decreased LV function. EF 35-40%.    - Encourage IS/CDB     Nutrition: Regular diet as tolerated. Nutrition consulted d/t recent weight loss. Did not meet criteria    Diarrhea; +Norovirus: See below.    - Nitazoxanide discontinued this admission.   - Myfortic dose reduced   - Follow up in outpatient infusion center as needed for IV fluids based on outpatient lab results     Hypovolemia secondary diarrhea: Received IVF resuscitation. Orthostatic symptoms resolved.    -Encourage PO fluid   -Consider outpatient IV bolus PRN    Metabolic acidosis: Continue PTA sodium bicarb.    Hypomagnesemia: Hold PTA Mag glycinate with diarrhea, continue medication at home with resolution of diarrhea     UTI: 11/15 UC grew > 100k Pseudomonas aeruginosa and > 100k Enterococcus faecalis. 11/21 UCx no growth.     - Continue PTA Cipro (11/15-11/28).    - Linezolid completed 11/21      Discharge Disposition   Discharged to home   Condition at discharge: Stable    Pending Results   Unresulted Labs Ordered in the Past 30 Days of this Admission       Date and Time Order Name Status Description    11/24/2024 11:48 AM [facility] In process           Final pathology results: No pathology submitted  Primary Care Physician   Trinidad Hansen PA-C    Physical Exam   Temp: 98.4  F (36.9  C) Temp src: Oral BP: 122/69 Pulse: 85   Resp: 18 SpO2: 93 % O2 Device: None (Room air)    Vitals:    11/22/24 0831 11/24/24 1440 11/25/24 0219   Weight: 96.1 kg (211 lb 14.4 oz) 100 kg (220 lb 7.4 oz) 97.9 kg (215 lb 14.4 oz)      Vital Signs with Ranges  Temp:  [97.8  F (36.6  C)-98.4  F (36.9  C)] 98.4  F (36.9  C)  Pulse:  [83-85] 85  Resp:  [16-20] 18  BP: (108-127)/(69-76) 122/69  SpO2:  [92 %-95 %] 93 %  I/O last 3 completed shifts:  In: 400 [P.O.:400]  Out: 450 [Urine:450]    Constitutional: awake, alert, cooperative, no apparent distress, and appears stated age  Respiratory: No increased work of breathing, on RA  Cardiovascular: Perfused  GI: Abd soft, possible incisional hernia, scar present  Genitounirinary: voiding   Skin: no concerns, scar on abd   Neurologic: A&Ox4    Consultations This Hospital Stay   NEPHROLOGY KIDNEY/PANCREAS TRANSPLANT ADULT IP CONSULT  NUTRITION SERVICES ADULT IP CONSULT  INFECTIOUS DISEASE TRANSPLANT SOT ADULT IP CONSULT  CARE MANAGEMENT / SOCIAL WORK IP CONSULT    Time Spent on this Encounter   I have spent greater than 30 minutes on this discharge.    Discharge Orders      Reason for your hospital stay    Acute kidney injury, UTI, norovirus     Activity    Your activity upon discharge: activity as tolerated     When to contact your care team    WHEN TO CONTACT YOUR  COORDINATOR:     Transplant Coordinator [phone]     Notify your coordinator if you have pain over your kidney or pancreas, fever greater than 100F, decreased urine output or new or increased amount of blood in urine.     Notify your coordinator immediately if you are ever unable to take your immunosuppressive medications for any reason.     If you have URGENT concerns after office hours, please call the hospital switchboard at [phone] and ask to have the organ transplant nurse on-call paged. If you have a life-threatening emergency, go to the nearest emergency room.     Adult [facility] Follow-up and recommended labs and tests    1. Transplant labs (BMP, Magnesium, CBC, Lipase, tacrolimus trough) Tues 11/26/2024, then twice weekly for 2 weeks.  2. Reschedule missed Cardiology appointment for follow up on CAD and HFeEF  3. Follow up  with Urology for fluid collection inferior to prostate  4. Follow up with Transplant Surgery, Harrison Whitehead MD, for evaluation of possible incisional hernia  5. UA with reflex to culture in one week     Diet    Follow this diet upon discharge: Regular Diet Adult       Discharge Medications   Current Discharge Medication List        START taking these medications    Details   carvedilol (COREG) 3.125 MG tablet Take 1 tablet (3.125 mg) by mouth 2 times daily.  Qty: 60 tablet, Refills: 11    Associated Diagnoses: HFrEF (heart failure with reduced ejection fraction) (H)           CONTINUE these medications which have CHANGED    Details   mycophenolic acid (GENERIC EQUIVALENT) 180 MG EC tablet Take 540 mg twice a day until follow up appointment with Dr. Sorensen    Associated Diagnoses: Kidney replaced by transplant; Pancreas replaced by transplant (H)      !! tacrolimus (GENERIC EQUIVALENT) 0.5 MG capsule Take 1 capsule (0.5 mg) by mouth 2 times daily.    Associated Diagnoses: Kidney replaced by transplant      !! tacrolimus (GENERIC EQUIVALENT) 1 MG capsule Hold 1 mg capsules for now.    Associated Diagnoses: Kidney replaced by transplant       !! - Potential duplicate medications found. Please discuss with provider.        CONTINUE these medications which have NOT CHANGED    Details   acetaminophen (TYLENOL) 500 MG tablet Take 500 mg by mouth every 4 hours as needed      aspirin (ASA) 81 MG chewable tablet Take 1 tablet (81 mg) by mouth daily Starting tomorrow.  Qty: 30 tablet, Refills: 3    Associated Diagnoses: S/P right coronary artery (RCA) stent placement      atorvastatin (LIPITOR) 40 MG tablet Take 1 tablet (40 mg) by mouth every evening.  Qty: 90 tablet, Refills: 4    Associated Diagnoses: Kidney replaced by transplant      ciprofloxacin (CIPRO) 500 MG tablet Take 1 tablet (500 mg) by mouth 2 times daily.  Qty: 28 tablet, Refills: 0    Associated Diagnoses: Urinary tract infection; Kidney replaced by  transplant      dapsone (ACZONE) 25 MG tablet Take 2 tablets (50 mg) by mouth daily  Qty: 60 tablet, Refills: 11    Associated Diagnoses: Kidney replaced by transplant      magnesium glycinate 100 MG CAPS capsule Take 2 capsules (200 mg) by mouth 2 times daily Take in place of magnesium-oxide  Qty: 120 capsule, Refills: 2    Associated Diagnoses: Low magnesium level      sodium bicarbonate 650 MG tablet Take 3 tablets (1,950 mg) by mouth 3 times daily.  Qty: 270 tablet, Refills: 3    Associated Diagnoses: Kidney replaced by transplant      ticagrelor (BRILINTA) 90 MG tablet Take 1 tablet (90 mg) by mouth 2 times daily.  Qty: 60 tablet, Refills: 5    Associated Diagnoses: Kidney replaced by transplant      blood glucose (NO BRAND SPECIFIED) test strip Use to test blood sugar 4 times daily or as directed.  Qty: 100 strip, Refills: 5    Comments: Pt has an Accu chek meter  Associated Diagnoses: Type 2 diabetes mellitus with other diabetic kidney complication, with long-term current use of insulin (H)           STOP taking these medications       linezolid (ZYVOX) 600 MG tablet Comments:   Reason for Stopping:         metoprolol succinate ER (TOPROL XL) 25 MG 24 hr tablet Comments:   Reason for Stopping:         nitazoxanide (ALINIA) 500 MG tablet Comments:   Reason for Stopping:                  Reason for your hospital stay    Acute kidney injury, UTI, norovirus     Activity    Your activity upon discharge: activity as tolerated     When to contact your care team    WHEN TO CONTACT YOUR  COORDINATOR:     Transplant Coordinator [phone]     Notify your coordinator if you have pain over your kidney or pancreas, fever greater than 100F, decreased urine output or new or increased amount of blood in urine.     Notify your coordinator immediately if you are ever unable to take your immunosuppressive medications for any reason.     If you have URGENT concerns after office hours, please call the hospital switchboard at  [phone] and ask to have the organ transplant nurse on-call paged. If you have a life-threatening emergency, go to the nearest emergency room.     Adult [facility] Follow-up and recommended labs and tests    1. Transplant labs (BMP, Magnesium, CBC, Lipase, tacrolimus trough) Tues 11/26/2024, then twice weekly for 2 weeks.  2. Reschedule missed Cardiology appointment for follow up on CAD and HFeEF  3. Follow up with Urology for fluid collection inferior to prostate  4. Follow up with Transplant Surgery, Harrison Whitehead MD, for evaluation of possible incisional hernia  5. UA with reflex to culture in one week     Diet    Follow this diet upon discharge: Regular Diet Adult         Data   Most Recent 3 CBC's:  Recent Labs   Lab Test 11/22/24  0548 11/21/24  1307 11/21/24  0734   WBC 3.7* 5.0 6.0   HGB 8.9* 9.8* 11.2*   MCV 89 90 90    233 336      Most Recent 3 BMP's:  Recent Labs   Lab Test 11/25/24  0736 11/25/24  0620 11/25/24  0221 11/24/24  1343 11/24/24  0632 11/23/24  0607   NA  --  137  --   --  138 138   POTASSIUM  --  5.1  --   --  4.8 4.5   CHLORIDE  --  109*  --   --  108* 110*   CO2  --  20*  --   --  22 19*   BUN  --  19.6  --   --  24.7* 26.6*   CR  --  1.65*  --   --  1.79* 1.88*   ANIONGAP  --  8  --   --  8 9   BETHANY  --  9.9  --   --  10.0 9.8   * 124* 126*   < > 120* 138*    < > = values in this interval not displayed.     Most Recent 2 LFT's:  Recent Labs   Lab Test 11/22/24  0548 11/21/24  1307   AST 29 36   ALT 31 47   ALKPHOS 107 125   BILITOTAL 0.3 0.3     Most Recent INR's and Anticoagulation Dosing History:  Anticoagulation Dose History  More data exists         Latest Ref Rng & Units 6/20/2019 6/21/2019 10/4/2020 9/27/2021 2/1/2024 7/19/2024 7/20/2024   Recent Dosing and Labs   INR 0.85 - 1.15 1.30  1.35  1.24  1.03  1.13        Effective 7/11/2021, the reference range for this assay has changed. 1.16  0.96  1.39  1.44       Details          Multiple values from one day are  sorted in reverse-chronological order             Most Recent 3 Troponin's:No lab results found.  Most Recent Cholesterol Panel:  Recent Labs   Lab Test 09/04/24  1023   CHOL 95   LDL 40   HDL 32*   TRIG 113     Most Recent 6 Bacteria Isolates From Any Culture (See EPIC Reports for Culture Details):No lab results found.  Most Recent TSH, T4 and A1c Labs:  Recent Labs   Lab Test 10/29/24  0938 12/19/23  0842 10/17/23  0806   TSH  --   --  1.78   A1C 6.2*   < > 9.2*    < > = values in this interval not displayed.       Lab Results   Component Value Date    LIPASE 153 (H) 11/25/2024    LIPASE 257 (H) 11/24/2024    LIPASE 237 (H) 11/23/2024    LIPASE 214 (H) 11/22/2024    LIPASE 157 (H) 11/21/2024     Lab Results   Component Value Date    AMYLASE 177 (H) 11/21/2024    AMYLASE 98 11/13/2024    AMYLASE 70 11/07/2024    AMYLASE 89 10/28/2024    AMYLASE 71 10/21/2024

## 2024-11-22 NOTE — PROGRESS NOTES
"CLINICAL NUTRITION SERVICES - ASSESSMENT NOTE     Nutrition Prescription  Malnutrition Status:    Patient does not meet two of the established criteria necessary for diagnosing malnutrition    Future/Additional Recommendations:  -- monitor oral intake of meals and supplements      REASON FOR ASSESSMENT  Siva Ramey is a/an 49 year old male assessed by the dietitian for Provider Order - recent 9 lb wt loss with Norovirus    Per chart review patient with a history of CAD s/p PCI, DM2, and ESRD now s/p SPK on 7/20/24     NUTRITION HISTORY  Siva reports his appetite has been decreased for a few weeks with some weight loss. He reports his appetite is now back and he is eating well. He is following the post transplant nutrition guidelines at home.     CURRENT NUTRITION ORDERS  Diet: Regular  Intake/Tolerance: Siva reports good appetite.     LABS  Labs reviewed  (11/22): BUN 36.3 mg/dL (H), Cr 2.16 mg/dL (H)     MEDICATIONS  Medications reviewed  Lipitor    ANTHROPOMETRICS  Height: 180.3 cm (5' 11\")  Most Recent Weight: 96.2 kg (212 lb)    IBW: 78.2 kg  BMI: Overweight BMI 25-29.9   Weight History:   Wt Readings from Last 10 Encounters:   11/21/24 96.2 kg (212 lb)   11/18/24 96.3 kg (212 lb 6.4 oz)   10/29/24 99.5 kg (219 lb 4.8 oz)   10/10/24 97.8 kg (215 lb 9.8 oz)   10/01/24 98.7 kg (217 lb 8 oz)   09/23/24 98.9 kg (218 lb)   09/16/24 99.6 kg (219 lb 9.6 oz)   09/09/24 100.8 kg (222 lb 3.2 oz)   09/04/24 97.5 kg (214 lb 14.4 oz)   09/01/24 98.2 kg (216 lb 8 oz)   Weight 100.3 kg (8/12/24)  Weight 105.8 kg (5/20/24) - 9.1% weight loss over 6 months   4% weight loss over 1 month  Dosing Weight: 83 kg (adjBW based on IBW and admission wt)    ASSESSED NUTRITION NEEDS  Estimated Energy Needs: 6527-9829 kcals/day (25 - 30 kcals/kg)  Justification: Increased needs  Estimated Protein Needs: 100-125 grams protein/day (1.2 - 1.5 grams of pro/kg)  Justification: Increased needs  Estimated Fluid Needs: (1 mL/kcal) "   Justification: Maintenance    PHYSICAL FINDINGS  See malnutrition section below.    MALNUTRITION  % Intake: < 75% for >/= 1 month (moderate)  % Weight Loss: Weight loss does not meet criteria  Subcutaneous Fat Loss: None observed  Muscle Loss: None observed  Fluid Accumulation/Edema: None noted  Malnutrition Diagnosis: Patient does not meet two of the established criteria necessary for diagnosing malnutrition    NUTRITION DIAGNOSIS  Predicted inadequate nutrient intake energy/protein related to previous decreased appetite and recent weight loss     INTERVENTIONS  Implementation  Nutrition Education: Provided education on Reviewed RD role in nutrition POC     Goals  Patient to consume % of nutritionally adequate meal trays TID, or the equivalent with supplements/snacks.     Monitoring/Evaluation  Progress toward goals will be monitored and evaluated per protocol.  Duyen Quevedo MS/RD/LD/CNSC  Available on [employer]   M-F (7am-3:30pm) - 7A/7B Clinical Dietitian  Weekend/Holiday Dietitian (7am-3:30pm)    ** Clinical Dietitians no longer available on pager

## 2024-11-22 NOTE — PROGRESS NOTES
Transplant Surgery  Inpatient Daily Progress Note  11/22/2024    Assessment & Plan: Siva Ramey is a 49 year old male with a history of CAD s/p PCI, DM2, and ESRD now s/p SPK on 7/20/24 c/b post op STEMI requiring PCI and stent placement. He presented to the ED after a syncopal episode.     SPK 7/20/2024:   Pancreas (Enteric drained): Lipase 214 (157). BGs persistently elevated since transplant. 10/29/24 Hgb A1c 6.2%. Seen by Endocrinology in 10/2024 - recommendation to monitor BG with CGM and follow up in 2/2024  -Check HA1c 2/2025  -US pancreas today  DELISA of transplanted kidney: Cr 2.6 on admission (Baseline Cr 1.5-1.8). Likely due to dehydration and CNI toxicity. Ureteral stent removed 10/10/24. 11/21 Renal tx US WNL. Cr decreased 2.2 after receiving IV fluids and holding tac dose.   - Monitor I/O. Fluid bolus as needed   - Follow up tac level    - Treatment for UTI as below     Immunosuppressed status secondary to medications:   Maintenance:   - PTA Myfortic 720 mg BID. Decrease to Myfortic 540 mg BID for 1-2 weeks. Resume 720 mg BID once diarrhea/infection resolved.    - PTA Tacrolimus 1.5 mg BID. Goal level 8-10. 11/21 tac level 26.7. Tac held 11/21 PM. Restart tac 0.5 mg BID. Check level today.     Hematology:   Anemia of chronic disease: Hgb 10-11.      Cardiorespiratory:   CAD, VT/VF arrest s/p PCI with IBAN: Previous CABG 2019, and IBAN 2019 with 100% thrombosis of the RCA, IBAN x 2 on 7/20/24.  11/21 ECHO LV decreased function, EF 35-40%, Akinesis of basal-mid inf and basal inferoseptal segments. Normal RV size and function. No sig valvular abnl. - stable from ECHO 7/2024.    - Continue PTA atorvastatin, Brilinta 90 mg BID, ASA 81 mg daily    - PTA Metoprolol succinate ER 25 mg daily. See below   HTN:    - PTA Metoprolol succinate ER 25 mg daily. Continue to hold today.   SOB: CXR with mild atelectasis. Stable on RA. Denies SOB.   - TTE - decreased LV function. EF 35-40%.    - Encourage IS/CDB      GI/Nutrition: Regular diet as tolerated. Nutrition consulted d/t recent weight loss.   Diarrhea; +Norovirus: See below.    - Continue nitazoxanide for 3 weeks total per nephrology request.      Endocrine: See above.      Fluid/Electrolytes: 2L NS on 11/21. IVF:  mL/hr, stopped this AM.   Hypovolemia 2/2 diarrhea: Received IVF resuscitation. Orthostatic symptoms resolved.   -Stop IVF.   -Encourage PO fluid   -Will monitor I/O today.   -Repeat IV bolus tomorrow if needed.   -Consider outpatient IV bolus PRN  Metabolic acidosis: Continue PTA sodium bicarb.  Hypomagnesemia: Hold PTA Mag glycinate with diarrhea.      Infectious disease:   +Norovirus: Positive 11/16.    - Continue PTA nitazoxanide (11/18-present).  UTI: 11/21 UCx no growth.     - Continue PTA Linezolid/Cipro (11/15-11/28).      Prophylaxis: pneumocystis (Dapsone)     Disposition: 7A. Possible discharge tomorrow if Cr improving.    KAT/Fellow/Resident Provider: Mindy Tenorio PA-C    Faculty: Ramya Munoz MD    _________________________________________________________________  Transplant History: Admitted 11/21/2024 for DELISA, dehydration.  7/20/2024 (Kidney / Pancreas), Postoperative day: 125     Interval History: History is obtained from the patient  Overnight events: Symptoms of lightheadedness improved. Denies SOB. No abdominal pain. Tolerating diet. Loose/formed stools, improved per patient    ROS:   A 10-point review of systems was negative except as noted above.    Meds:  Current Facility-Administered Medications   Medication Dose Route Frequency Provider Last Rate Last Admin    aspirin (ASA) chewable tablet 81 mg  81 mg Oral Daily Julianna Alexis NP        atorvastatin (LIPITOR) tablet 40 mg  40 mg Oral QPM Julianna Alexis NP   40 mg at 11/21/24 2002    ciprofloxacin (CIPRO) tablet 500 mg  500 mg Oral BID Julianna Alexis NP   500 mg at 11/21/24 2002    dapsone (ACZONE) tablet 50 mg  50 mg Oral Daily Julianna Alexis  "ARIE Snyder        mycophenolic acid (GENERIC EQUIVALENT) EC tablet 720 mg  720 mg Oral BID Julianna Alexis NP   720 mg at 11/21/24 2002    nitazoxanide (ALINIA) tablet 500 mg  500 mg Oral BID Julianna Alexis NP   500 mg at 11/21/24 2052    sodium bicarbonate tablet 1,950 mg  1,950 mg Oral TID Julianna Alexis NP   1,950 mg at 11/21/24 2002    sodium chloride (PF) 0.9% PF flush 3 mL  3 mL Intracatheter Q8H Julianna Alexis NP        [Held by provider] tacrolimus (GENERIC EQUIVALENT) capsule 1.5 mg  1.5 mg Oral BID IS Julianna Alexis NP        ticagrelor (BRILINTA) tablet 90 mg  90 mg Oral BID Julianna Alexis NP   90 mg at 11/21/24 2053       Physical Exam:     Admit Weight: 96.2 kg (212 lb)    Current vitals:   /60 (BP Location: Right arm)   Pulse 80   Temp 98  F (36.7  C) (Oral)   Resp 20   Ht 1.803 m (5' 11\")   Wt 96.2 kg (212 lb)   SpO2 96%   BMI 29.57 kg/m           Vital sign ranges:    Temp:  [97.2  F (36.2  C)-98  F (36.7  C)] 98  F (36.7  C)  Pulse:  [71-89] 80  Resp:  [16-20] 20  BP: (100-136)/(60-83) 117/60  SpO2:  [91 %-100 %] 96 %  Patient Vitals for the past 24 hrs:   BP Temp Temp src Pulse Resp SpO2 Height Weight   11/22/24 0542 117/60 98  F (36.7  C) Oral 80 20 96 % -- --   11/22/24 0214 115/70 97.6  F (36.4  C) Oral 78 20 91 % -- --   11/21/24 2312 126/71 97.2  F (36.2  C) Oral 73 20 97 % -- --   11/21/24 1921 114/82 97.6  F (36.4  C) Oral 77 18 98 % -- --   11/21/24 1900 122/77 -- -- 76 17 96 % -- --   11/21/24 1830 117/78 -- -- 76 17 97 % -- --   11/21/24 1800 125/77 -- -- 77 -- 100 % -- --   11/21/24 1730 122/82 -- -- 82 -- 100 % -- --   11/21/24 1700 135/74 -- -- 71 -- 94 % -- --   11/21/24 1600 117/83 97.7  F (36.5  C) Oral 72 17 97 % -- --   11/21/24 1400 123/77 -- -- 74 -- 96 % -- --   11/21/24 1314 106/71 -- -- 76 -- 94 % -- --   11/21/24 1310 136/83 -- -- -- -- -- -- --   11/21/24 1024 100/67 97.4  F (36.3  C) Oral 89 16 98 % 1.803 m (5' 11\") 96.2 " "kg (212 lb)     General Appearance: in no apparent distress.   Skin: normal, warm  Heart: well perfused  Lungs: Nonlabored resps on RA  Abdomen: The abdomen is soft, nontender, incision healing well  : olea is not present.   Extremities: edema: absent.   Neurologic: awake, alert and oriented. Tremor: mild      Data:   CMP  Recent Labs   Lab 11/22/24  0548 11/21/24  1307 11/21/24  0734    137 136   POTASSIUM 3.9 3.9 3.8   CHLORIDE 107 103 100   CO2 19* 23 22   * 129* 150*   BUN 36.3* 44.1* 40.9*   CR 2.16* 2.55* 2.63*   GFRESTIMATED 37* 30* 29*   BETHANY 9.7 10.0 10.1   MAG 1.8 2.0  --    PHOS 2.7 2.6  --    AMYLASE  --   --  177*   LIPASE 214* 157* 158*   ALBUMIN 3.0* 3.5  --    BILITOTAL 0.3 0.3  --    ALKPHOS 107 125  --    AST 29 36  --    ALT 31 47  --      CBC  Recent Labs   Lab 11/22/24  0548 11/21/24  1307   HGB 8.9* 9.8*   WBC 3.7* 5.0    233     COAGSNo lab results found in last 7 days.    Invalid input(s): \"XA\"   Urinalysis  Recent Labs   Lab Test 11/21/24  1308 11/13/24  0734   COLOR Yellow Yellow   APPEARANCE Clear Clear   URINEGLC Negative 30*   URINEBILI Negative Negative   URINEKETONE Negative Negative   SG 1.016 1.018   UBLD Small* 0.06 mg/dL*   URINEPH 5.5 7.0   PROTEIN Negative 100*   NITRITE Negative Negative   LEUKEST Moderate* 250 Aliza/uL*   RBCU 2 12*   WBCU 16* 17*     Virology:  Hepatitis C Antibody   Date Value Ref Range Status   07/19/2024 Nonreactive Nonreactive Final     Comment:     A nonreactive screening test result does not exclude the possibility of exposure to or infection with HCV. Nonreactive screening test results in individuals with prior exposure to HCV may be due to antibody levels below the limit of detection of this assay or lack of reactivity to the HCV antigens used in this assay. Patients with recent HCV infections (<3 months from time of exposure) may have false-negative HCV antibody results due to the time needed for seroconversion (average of 8 to 9 " weeks).     BK Virus DNA IU/mL   Date Value Ref Range Status   09/23/2024 Not Detected Not Detected IU/mL Final   08/29/2024 Not Detected Not Detected IU/mL Final   08/25/2024 Not Detected Not Detected IU/mL Final

## 2024-11-23 LAB
ANION GAP SERPL CALCULATED.3IONS-SCNC: 9 MMOL/L (ref 7–15)
BUN SERPL-MCNC: 26.6 MG/DL (ref 6–20)
CALCIUM SERPL-MCNC: 9.8 MG/DL (ref 8.8–10.4)
CHLORIDE SERPL-SCNC: 110 MMOL/L (ref 98–107)
CREAT SERPL-MCNC: 1.88 MG/DL (ref 0.67–1.17)
EGFRCR SERPLBLD CKD-EPI 2021: 43 ML/MIN/1.73M2
GLUCOSE SERPL-MCNC: 138 MG/DL (ref 70–99)
HCO3 SERPL-SCNC: 19 MMOL/L (ref 22–29)
LIPASE SERPL-CCNC: 237 U/L (ref 13–60)
MAGNESIUM SERPL-MCNC: 1.7 MG/DL (ref 1.7–2.3)
PHOSPHATE SERPL-MCNC: 2.1 MG/DL (ref 2.5–4.5)
POTASSIUM SERPL-SCNC: 4.5 MMOL/L (ref 3.4–5.3)
SODIUM SERPL-SCNC: 138 MMOL/L (ref 135–145)
TACROLIMUS BLD-MCNC: 13.8 UG/L (ref 5–15)
TME LAST DOSE: NORMAL H
TME LAST DOSE: NORMAL H

## 2024-11-23 PROCEDURE — 36415 COLL VENOUS BLD VENIPUNCTURE: CPT | Performed by: PHYSICIAN ASSISTANT

## 2024-11-23 PROCEDURE — 80197 ASSAY OF TACROLIMUS: CPT | Performed by: PHYSICIAN ASSISTANT

## 2024-11-23 PROCEDURE — 250N000013 HC RX MED GY IP 250 OP 250 PS 637: Performed by: PHYSICIAN ASSISTANT

## 2024-11-23 PROCEDURE — 250N000013 HC RX MED GY IP 250 OP 250 PS 637: Performed by: NURSE PRACTITIONER

## 2024-11-23 PROCEDURE — 80048 BASIC METABOLIC PNL TOTAL CA: CPT | Performed by: NURSE PRACTITIONER

## 2024-11-23 PROCEDURE — 83735 ASSAY OF MAGNESIUM: CPT | Performed by: NURSE PRACTITIONER

## 2024-11-23 PROCEDURE — 99233 SBSQ HOSP IP/OBS HIGH 50: CPT | Mod: FS | Performed by: NURSE PRACTITIONER

## 2024-11-23 PROCEDURE — 120N000011 HC R&B TRANSPLANT UMMC

## 2024-11-23 PROCEDURE — 250N000012 HC RX MED GY IP 250 OP 636 PS 637: Performed by: PHYSICIAN ASSISTANT

## 2024-11-23 PROCEDURE — 83690 ASSAY OF LIPASE: CPT | Performed by: NURSE PRACTITIONER

## 2024-11-23 PROCEDURE — 84100 ASSAY OF PHOSPHORUS: CPT | Performed by: NURSE PRACTITIONER

## 2024-11-23 RX ORDER — CARVEDILOL 3.12 MG/1
3.12 TABLET ORAL 2 TIMES DAILY
Status: DISCONTINUED | OUTPATIENT
Start: 2024-11-23 | End: 2024-11-25 | Stop reason: HOSPADM

## 2024-11-23 RX ADMIN — SODIUM BICARBONATE 1950 MG: 650 TABLET ORAL at 19:54

## 2024-11-23 RX ADMIN — TACROLIMUS 0.5 MG: 0.5 CAPSULE ORAL at 09:50

## 2024-11-23 RX ADMIN — CARVEDILOL 3.12 MG: 3.12 TABLET, FILM COATED ORAL at 10:41

## 2024-11-23 RX ADMIN — CIPROFLOXACIN 500 MG: 500 TABLET ORAL at 09:45

## 2024-11-23 RX ADMIN — MYCOPHENOLIC ACID 540 MG: 360 TABLET, DELAYED RELEASE ORAL at 09:45

## 2024-11-23 RX ADMIN — TICAGRELOR 90 MG: 90 TABLET ORAL at 09:45

## 2024-11-23 RX ADMIN — ATORVASTATIN CALCIUM 40 MG: 40 TABLET, FILM COATED ORAL at 19:54

## 2024-11-23 RX ADMIN — SODIUM BICARBONATE 1950 MG: 650 TABLET ORAL at 09:45

## 2024-11-23 RX ADMIN — NITAZOXANIDE 500 MG: 500 TABLET ORAL at 09:50

## 2024-11-23 RX ADMIN — NITAZOXANIDE 500 MG: 500 TABLET ORAL at 19:47

## 2024-11-23 RX ADMIN — TICAGRELOR 90 MG: 90 TABLET ORAL at 19:54

## 2024-11-23 RX ADMIN — CARVEDILOL 3.12 MG: 3.12 TABLET, FILM COATED ORAL at 19:54

## 2024-11-23 RX ADMIN — MYCOPHENOLIC ACID 540 MG: 360 TABLET, DELAYED RELEASE ORAL at 19:54

## 2024-11-23 RX ADMIN — ASPIRIN 81 MG CHEWABLE TABLET 81 MG: 81 TABLET CHEWABLE at 09:45

## 2024-11-23 RX ADMIN — DAPSONE 50 MG: 25 TABLET ORAL at 09:44

## 2024-11-23 RX ADMIN — SODIUM BICARBONATE 1950 MG: 650 TABLET ORAL at 14:41

## 2024-11-23 RX ADMIN — TACROLIMUS 0.5 MG: 0.5 CAPSULE ORAL at 18:02

## 2024-11-23 RX ADMIN — CIPROFLOXACIN 500 MG: 500 TABLET ORAL at 19:54

## 2024-11-23 NOTE — PROGRESS NOTES
Transplant Surgery  Inpatient Daily Progress Note  11/23/2024    Assessment & Plan: Siva Ramey is a 49 year old male with a history of CAD s/p PCI, DM2, and ESRD now s/p SPK on 7/20/24 c/b post op STEMI requiring PCI and stent placement. He presented to the ED after a syncopal episode.     SPK 7/20/2024:   Pancreas (Enteric drained): Lipase 237 (214<157). BGs persistently elevated since transplant. 10/29/24 Hgb A1c 6.2%. Seen by Endocrinology in 10/2024 - recommendation to monitor BG with CGM and follow up in 2/2024  -Check HA1c 2/2025  -US pancreas 11/22 with patent vasculature  DELISA of transplanted kidney: Cr 2.6 on admission (Baseline Cr 1.5-1.8). Likely due to dehydration and CNI toxicity. Ureteral stent removed 10/10/24. 11/21 Renal tx US WNL. Cr decreased 1.9 after receiving IV fluids and holding tac dose.   - Monitor I/O. Fluid bolus as needed   - Follow up tac level    - Treatment for UTI as below     Immunosuppressed status secondary to medications:   Maintenance:   - PTA Myfortic 720 mg BID. Decrease to Myfortic 540 mg BID for 1-2 weeks. Resume 720 mg BID once diarrhea/infection resolved.    - PTA Tacrolimus 1.5 mg BID. Goal level 8-10. 11/21 tac level 26.7. Tac held 11/21 PM. Restart tac 0.5 mg BID.      Hematology:   Anemia of chronic disease: Hgb 10-11.      Cardiorespiratory:   CAD, VT/VF arrest s/p PCI with IBAN: Previous CABG 2019, and IBAN 2019 with 100% thrombosis of the RCA, IBAN x 2 on 7/20/24.  11/21 ECHO LV decreased function, EF 35-40%, Akinesis of basal-mid inf and basal inferoseptal segments. Normal RV size and function. No sig valvular abnl. - stable from ECHO 7/2024.    - Continue PTA atorvastatin, Brilinta 90 mg BID, ASA 81 mg daily    - PTA Metoprolol succinate ER 25 mg daily- will start coreg 3.125 mg BID  HTN:    - PTA Metoprolol succinate ER 25 mg daily- will start coreg 3.125 mg BID  SOB: CXR with mild atelectasis. Stable on RA. Denies SOB.   - TTE - decreased LV function. EF 35-40%.     - Encourage IS/CDB     GI/Nutrition: Regular diet as tolerated. Nutrition consulted d/t recent weight loss.   Diarrhea; +Norovirus: See below.    - Continue nitazoxanide for 3 weeks total per nephrology request.      Endocrine: See above.      Fluid/Electrolytes: 2L NS on 11/21. IVF: Now stopped  Hypovolemia 2/2 diarrhea: Received IVF resuscitation. Orthostatic symptoms resolved.   -Stop IVF.   -Encourage PO fluid   -Will monitor I/O today.   -Repeat IV bolus PRN  -Consider outpatient IV bolus PRN  Metabolic acidosis: Continue PTA sodium bicarb.  Hypomagnesemia: Hold PTA Mag glycinate with diarrhea.      Infectious disease:   +Norovirus: Positive 11/16.    - Continue PTA nitazoxanide (11/18-present).  UTI: 11/21 UCx no growth.     - Continue PTA Linezolid/Cipro (11/15-11/28).      Prophylaxis: pneumocystis (Dapsone)     Disposition: 7A. Possible discharge tomorrow if lipase improving.    KAT/Fellow/Resident Provider: Julienne Ibrahim PA-C [id number]    Faculty: Ramya Munoz MD    _________________________________________________________________  Transplant History: Admitted 11/21/2024 for DELISA, dehydration.  7/20/2024 (Kidney / Pancreas), Postoperative day: 126     Interval History: History is obtained from the patient  Overnight events: Symptoms of lightheadedness improved. Denies SOB. No abdominal pain. Tolerating diet. Stools getting more formed and less volume per patient    ROS:   A 10-point review of systems was negative except as noted above.    Meds:  Current Facility-Administered Medications   Medication Dose Route Frequency Provider Last Rate Last Admin    aspirin (ASA) chewable tablet 81 mg  81 mg Oral Daily Julianna Alexis NP   81 mg at 11/23/24 0945    atorvastatin (LIPITOR) tablet 40 mg  40 mg Oral QPM Julianna Alexis NP   40 mg at 11/22/24 1958    carvedilol (COREG) tablet 3.125 mg  3.125 mg Oral BID Julienne Ibrahim PA-C        ciprofloxacin (CIPRO) tablet 500 mg  500 mg Oral  "BID Julianna Alexis NP   500 mg at 11/23/24 0945    dapsone (ACZONE) tablet 50 mg  50 mg Oral Daily Julianna Alxeis NP   50 mg at 11/23/24 0944    mycophenolic acid (GENERIC EQUIVALENT) EC tablet 540 mg  540 mg Oral BID Mindy Tenorio PA-C   540 mg at 11/23/24 0945    nitazoxanide (ALINIA) tablet 500 mg  500 mg Oral BID Julienne Ibrahim PA-C   500 mg at 11/23/24 0950    sodium bicarbonate tablet 1,950 mg  1,950 mg Oral TID Julianna Alexis NP   1,950 mg at 11/23/24 0945    sodium chloride (PF) 0.9% PF flush 3 mL  3 mL Intracatheter Q8H Julianna Alexis NP   3 mL at 11/23/24 0951    tacrolimus (GENERIC EQUIVALENT) capsule 0.5 mg  0.5 mg Oral BID IS Mindy Tenorio PA-C   0.5 mg at 11/23/24 0950    ticagrelor (BRILINTA) tablet 90 mg  90 mg Oral BID Julianna Alexis NP   90 mg at 11/23/24 0945       Physical Exam:     Admit Weight: 96.2 kg (212 lb)    Current vitals:   /68 (BP Location: Right arm)   Pulse 89   Temp 96.8  F (36  C) (Oral)   Resp 18   Ht 1.803 m (5' 11\")   Wt 96.1 kg (211 lb 14.4 oz)   SpO2 95%   BMI 29.55 kg/m           Vital sign ranges:    Temp:  [96.8  F (36  C)-98.5  F (36.9  C)] 96.8  F (36  C)  Pulse:  [81-89] 89  Resp:  [16-18] 18  BP: (120-132)/(64-83) 120/68  SpO2:  [93 %-100 %] 95 %  Patient Vitals for the past 24 hrs:   BP Temp Temp src Pulse Resp SpO2   11/23/24 0518 120/68 96.8  F (36  C) Oral 89 18 95 %   11/22/24 2154 132/83 98.5  F (36.9  C) Oral 88 18 93 %   11/22/24 1343 122/64 98.2  F (36.8  C) Oral 81 16 100 %   11/22/24 1034 126/80 -- -- -- -- --     General Appearance: in no apparent distress.   Skin: normal, warm  Heart: well perfused  Lungs: Nonlabored resps on RA  Abdomen: The abdomen is soft, nontender, incision healing well  : olea is not present.   Extremities: edema: absent.   Neurologic: awake, alert and oriented. Tremor: mild      Data:   CMP  Recent Labs   Lab 11/23/24  0607 11/22/24  0548 11/21/24  1307 " "11/21/24  0734 11/21/24  0734    137 137  --  136   POTASSIUM 4.5 3.9 3.9  --  3.8   CHLORIDE 110* 107 103  --  100   CO2 19* 19* 23  --  22   * 123* 129*  --  150*   BUN 26.6* 36.3* 44.1*  --  40.9*   CR 1.88* 2.16* 2.55*  --  2.63*   GFRESTIMATED 43* 37* 30*  --  29*   BETHANY 9.8 9.7 10.0  --  10.1   MAG 1.7 1.8 2.0   < >  --    PHOS 2.1* 2.7 2.6   < >  --    AMYLASE  --   --   --   --  177*   LIPASE 237* 214* 157*  --  158*   ALBUMIN  --  3.0* 3.5  --   --    BILITOTAL  --  0.3 0.3  --   --    ALKPHOS  --  107 125  --   --    AST  --  29 36  --   --    ALT  --  31 47  --   --     < > = values in this interval not displayed.     CBC  Recent Labs   Lab 11/22/24  0548 11/21/24  1307   HGB 8.9* 9.8*   WBC 3.7* 5.0    233     COAGSNo lab results found in last 7 days.    Invalid input(s): \"XA\"   Urinalysis  Recent Labs   Lab Test 11/21/24  1308 11/13/24  0734   COLOR Yellow Yellow   APPEARANCE Clear Clear   URINEGLC Negative 30*   URINEBILI Negative Negative   URINEKETONE Negative Negative   SG 1.016 1.018   UBLD Small* 0.06 mg/dL*   URINEPH 5.5 7.0   PROTEIN Negative 100*   NITRITE Negative Negative   LEUKEST Moderate* 250 Aliza/uL*   RBCU 2 12*   WBCU 16* 17*     Virology:  Hepatitis C Antibody   Date Value Ref Range Status   07/19/2024 Nonreactive Nonreactive Final     Comment:     A nonreactive screening test result does not exclude the possibility of exposure to or infection with HCV. Nonreactive screening test results in individuals with prior exposure to HCV may be due to antibody levels below the limit of detection of this assay or lack of reactivity to the HCV antigens used in this assay. Patients with recent HCV infections (<3 months from time of exposure) may have false-negative HCV antibody results due to the time needed for seroconversion (average of 8 to 9 weeks).     BK Virus DNA IU/mL   Date Value Ref Range Status   09/23/2024 Not Detected Not Detected IU/mL Final   08/29/2024 Not " Detected Not Detected IU/mL Final   08/25/2024 Not Detected Not Detected IU/mL Final

## 2024-11-23 NOTE — PLAN OF CARE
Goal Outcome Evaluation:      Plan of Care Reviewed With: patient    Overall Patient Progress: improvingOverall Patient Progress: improving    Outcome Evaluation: fall risk due to syncope. onMIVF         Vitals: stable room air.   Neuro : a x o x 4.   Blood glucose: na.   Pain/nausea: denies  Diet: regular.   Lines: PIVR saline locked.  : voids well.   GI: bm x 3. stool sample pending  Drains: na.   Skin: WDL.   Mobility: up ad prasanna.   Plan : panc US done today. Adjusting tac dose.

## 2024-11-23 NOTE — CONSULTS
Care management consult for discharge planning - see SW note from 11.22.24 for details.    TYLER Wise  7A/B       Social Work and Care Management Department       SEARCHABLE in [facility] - search SOCIAL WORK       [place] (0800 - 1630) Saturday and Sunday     Units: 4A Vocera, 4C Vocera, & 4E Vocera        Units: 5A 2270-6400 Vocera, 5A 6090-3156 Vocera , BMT SW 1 BMT SW 2, BMT SW 3 & BMT SW 4  5C Off Service 5401 - 5416  5C Off Service [id number]     Units: 6A Vocera & 6B Vocera      Units: 6C Vocera     Units: 7A Vocera & 7B Vocera      Units: 7C Med Surg 7401 thru 7418 and 7C Med Surg 7502 thru 7521      Unit: [place] ED Vocera & [place] Obs Vocera     [facility] ([phone]) Saturday and Sunday      Units: 5 Ortho Vocera, 5 Med Surg Vocera & WB ED Vocera     Units: 6 Med Surg Vocera, 8 Med Surg Vocera, & 10 ICU Vocera      After hours Vocera [facility] and After Hours Vocera [place]     Please NOTE changes to times below:    **Saturday & Sunday (1630 - 2030)    **Mon-Fri ([phone])     **[facility] Recognized Holidays  ([id number])    Units: ALL   - see above VOCERA links to units

## 2024-11-23 NOTE — PLAN OF CARE
"/83 (BP Location: Right arm)   Pulse 88   Temp 98.5  F (36.9  C) (Oral)   Resp 18   Ht 1.803 m (5' 11\")   Wt 96.1 kg (211 lb 14.4 oz)   SpO2 93%   BMI 29.55 kg/m     Time: 4972-3067   Reason for admission: Syncope  Activity: SBA  Pain: denied pain or discomfort.   Neuro: alert and oriented.   Cardiac: WDL  Resp: denied SOB, lung sounds clear bilaterally.   GI/: regular diet, voids without difficulties, bowel sounds present x four quadrants.   Lines: PIV, saline locked.   Skin: WDL  Labs/Imaging: no lab or imaging this shift.   New changes to shift: no change.   Plan: continue with current plan of cares.   "

## 2024-11-23 NOTE — PLAN OF CARE
"Goal Outcome Evaluation:               /84 (BP Location: Right arm)   Pulse 90   Temp 97.5  F (36.4  C) (Oral)   Resp 18   Ht 1.803 m (5' 11\")   Wt 96.1 kg (211 lb 14.4 oz)   SpO2 94%   BMI 29.55 kg/m        Shift: 9195-3107  Isolation Status: Enteric- Norovirus  VS: VSS on RA, afebrile  Neuro: Aox4  Behaviors: calm and cooperative  Labs: Lipase :237  Respiratory: WDL  Cardiac: WDL  Pain/Nausea: denies  Diet: regular  IV Access: LAV fistula, RPIV  GI/: voiding, 2 Bms this shift  Skin: No new deficits  Mobility: Ind  Events/Education: Elevated lipase continue to monitor labs for changes   Plan: continue plan of care          "

## 2024-11-23 NOTE — PROGRESS NOTES
[facility]  Transplant Nephrology Progress Note  Date of Admission:  11/21/2024  Today's Date: 11/23/2024  Requesting physician: Ramya Munoz MD    Recommendations:   - Decrease mycophenolic acid to 540 mg bid x 1-2 weeks in setting of acute infection  - Complete nitazoxanide today  - start coreg 3.125 mg BID  hold for SBP<110, given previous heart history and up trending BPs    Assessment & Plan   # DDKT (SPK): DELISA peaked at 2.6mg/dl likely in setting of UTI, dehydration, and elevated tacrolimus level. improved creatinine back to baseline. Transplant renal US without evidence of obstruction.  No acute indications for dialysis.   - Baseline Creatinine: ~ 1.5-1.8 mg/dl   - Proteinuria: Minimally elevated (18-29 mg/g Cr)   - DSA Hx: No DSA   - Last cPRA: 11% 8/2024   - BK Viremia: No   - Kidney Tx Biopsy Hx: No biopsy history.    # Pancreas Tx (SPK):    - Pancreatic Exocrine Drainage: Enteric drained     - Blood glucose: Elevated blood glucose      On insulin: No   - HbA1c: Stable      Latest HbA1c: 6.2%   - Pancreatic enzymes: Trend up. Normal pancreas transplant US    - DSA Hx: No DSA   - Pancreas Tx Biopsy Hx: No biopsy history    # Immunosuppression: Tacrolimus immediate release (goal 8-10) and Mycophenolic acid (dose 720 mg every 12 hours)   - Induction with Recent Transplant:  High Intensity Protocol   - Continue with intensive monitoring of immunosuppression for efficacy and toxicity.   - Historical Changes in Immunosuppression: None   - Changes: Yes - recommend decreasing mycophenolic acid to 540 mg bid in setting of acute infection  x2 weeks then resume 720mg BID.    # Infection Prevention:   - PJP: Dapsone (changed off bactrim due to hyperkalemia)  - CMV: None, prophylaxis completed      - CMV IgG Ab High Risk Discordance (D+/R-): No  CMV Serostatus: Positive  - EBV IgG Ab High Risk Discordance (D+/R-): No  EBV Serostatus: Positive    # Hypertension:  Borderline control;  Goal BP: < 140/90   - Changes: Yes -   coreg 3.125 mg BID  hold for SBP<110    # Anemia in Chronic Renal Disease: Hgb: Trend down      KERRI: No   - Iron studies: Low iron saturation, but high ferritin    # Mineral Bone Disorder:    - Secondary renal hyperparathyroidism; PTH level: Mildly elevated (151-300 pg/ml)        On treatment: None  - Vitamin D; level: High        On supplement: No  - Calcium; level: Normal        On supplement: No  - Phosphorus; level: Normal        On supplement: No    # Electrolytes:   - Potassium; level: Normal        On supplement: No  - Magnesium; level: Normal        On supplement: No  - Bicarbonate; level: Low        On supplement:  Yes, sodium bicarbonate 1,950 mg tid     # UTI: urine culture from 11/13/24 grew >100k Pseudomonas aeruginosa and Enterococcus faecalis. Currently on ciprofloxacin and linezolid with some improvement in symptoms. Continue 2 week course.      # Norovirus: tested positive on 11/16/24 and on nitazoxanide. Given severity of symptoms, will continue treatment for 3 weeks.     # Other Significant PMH:   - CAD, s/p CABG: Asymptomatic.              - HFrEF: Last cardiac echo (Jul/2024) showed moderately decreased LVEF ~ 35-40% with akinesis of the basal-mid inferior and basal inferoseptal segments. Updated ECHO this admission on 11/21/24 showed no change.              - H/o V. Fib Arrest: Patient with V. Fib arrest and STEMI following kidney transplant and emergently brought to Cath Lab and found to have in stent thrombosis of the previous mid RCA stent, which was felt to be the culprit in inferior STEMI.  Patient underwent aspiration thrombectomy and PCI with two IBAN.  Followed by Cardiology.              - PAD: No claudication symptoms.              - GERD: Asymptomatic on PPI.              - Lower Back Pain with Sciatica: Patient with h/o lower back pain and sciatica years ago, now returned with pain radiating down right leg. Has been taking  cyclobenzaprine.    # Transplant History:  Etiology of Kidney Failure: Diabetes mellitus type 2  Tx: DDKT (SPK)  Transplant: 7/20/2024 (Kidney / Pancreas)  Significant transplant-related complications: None    Recommendations were communicated to the primary team verbally.    Seen and discussed with Dr. Malvin Vargas NP  Transplant Nephrology  Contact information via Vocera Web Console or via [facility] Paging/Directory        Physician Attestation     I saw and evaluated Siva Ramey as part of a shared APRN/PA visit.     I personally reviewed the vital signs, medications, labs, and imaging.    I personally provided a substantive portion of care for this patient and I approve the care plan as written by the KAT.  I was involved with Medical Decision Making including:   Mr. Ramey is a 49 years old, s/l SPK in July 2024 presented for syncopal episodes likely 2/2 hypovolemia given prolonged severe diarrhea in setting of Norovirus. Now diarrhea improved, symptoms almost resolved. Continued on lower MMF for couple weeks and increase. Will monitor lipase given up trended today.    Please see A&P for additional details of medical decision making.    Grisel Coombs MD  Date of Service (when I saw the patient): 11/23/24      Interval History  Mr. Ramey is 49-year-old male with history of ESKD from presumed type 2 diabetes on HD since Aug 2021 via LUE AVF, CAD s/p CABG 2019 LIMA-LAD, SVG-RCA, IBAN 1/2019, IBAN to RCA 2/2024 on brilinta x 3 months, s/p SPK 7/20/24 complicated by V fib arrest and STEMI with in-stent thrombosis of the previous mid RCA stent s/p aspiration thrombectomy and PCI with two IBAN, PAD, GERD, and low back pain with sciatica. He presented to the ED yesterday following a presyncopal episode (got up quick from the couch to check on his dog) in the setting of acute illness of non-bloody watery diarrhea with positive norovirus (11/13) and UTI (11/16, pseudomonas, linezolid) that started about 3  weeks ago. He was having frequent (15+) watery stools along with urinary frequency and dysuria, and was feeling very fatigued, dehydrated, and weak.     Mr. Ramey's creatinine is 1.88 (11/23 0607); Trend down.  Good urine output.  Other significant labs/tests/vitals: Lipase uptrending  No acute events overnight.  No chest pain or shortness of breath.  No leg swelling.  No nausea and vomiting.  Bowel movements are Now normal, large formed today per pt.  No fever, sweats or chills.     Review of Systems   4 point ROS was obtained and negative except as noted in the Interval History.    MEDICATIONS:  Current Facility-Administered Medications   Medication Dose Route Frequency Provider Last Rate Last Admin    aspirin (ASA) chewable tablet 81 mg  81 mg Oral Daily Julianna Alexis NP   81 mg at 11/22/24 0801    atorvastatin (LIPITOR) tablet 40 mg  40 mg Oral QPM Julianna Alexis NP   40 mg at 11/22/24 1958    ciprofloxacin (CIPRO) tablet 500 mg  500 mg Oral BID Julianna Alexis NP   500 mg at 11/22/24 1958    dapsone (ACZONE) tablet 50 mg  50 mg Oral Daily Julianna Alexis NP   50 mg at 11/22/24 0801    mycophenolic acid (GENERIC EQUIVALENT) EC tablet 540 mg  540 mg Oral BID Mindy Tenorio PA-C   540 mg at 11/22/24 1958    nitazoxanide (ALINIA) tablet 500 mg  500 mg Oral BID Julianna Alexis NP   500 mg at 11/22/24 1958    sodium bicarbonate tablet 1,950 mg  1,950 mg Oral TID Julianna Alexis NP   1,950 mg at 11/22/24 1958    sodium chloride (PF) 0.9% PF flush 3 mL  3 mL Intracatheter Q8H Julianna Alexis NP   3 mL at 11/22/24 1812    tacrolimus (GENERIC EQUIVALENT) capsule 0.5 mg  0.5 mg Oral BID IS Mindy Tenorio PA-C   0.5 mg at 11/22/24 1812    ticagrelor (BRILINTA) tablet 90 mg  90 mg Oral BID Julianna Alexis NP   90 mg at 11/22/24 1958     Current Facility-Administered Medications   Medication Dose Route Frequency Provider Last Rate Last Admin       Physical Exam  "  Temp  Av.6  F (36.4  C)  Min: 97.2  F (36.2  C)  Max: 98  F (36.7  C)      Pulse  Av  Min: 71  Max: 89 Resp  Av.1  Min: 16  Max: 20  SpO2  Av.5 %  Min: 91 %  Max: 100 %     /68 (BP Location: Right arm)   Pulse 89   Temp 96.8  F (36  C) (Oral)   Resp 18   Ht 1.803 m (5' 11\")   Wt 96.1 kg (211 lb 14.4 oz)   SpO2 95%   BMI 29.55 kg/m     Date 24 0700 - 24 0659   Shift 2252-4464 6962-3037 7823-8826 24 Hour Total   INTAKE   Shift Total(mL/kg)       OUTPUT   Urine 500   500   Shift Total(mL/kg) 500(5.2)   500(5.2)   Weight (kg) 96.12 96.12 96.12 96.12      Admit Weight: 96.2 kg (212 lb)     GENERAL APPEARANCE: alert and no distress  HENT: mouth without ulcers or lesions  RESP: lungs clear to auscultation - no rales, rhonchi or wheezes  CV: regular rhythm, normal rate, no rub, no murmur  EDEMA: no LE edema bilaterally  ABDOMEN: soft, nondistended, nontender, bowel sounds normal  MS: extremities normal - no gross deformities noted, no evidence of inflammation in joints, no muscle tenderness  SKIN: no rash  GRAFT: no tenderness to palpation    Data   All labs reviewed by me.  CMP  Recent Labs   Lab 24  0607 24  0548 24  1307 24  0734    137 137 136   POTASSIUM 4.5 3.9 3.9 3.8   CHLORIDE 110* 107 103 100   CO2 19* 19* 23 22   ANIONGAP 9 11 11 14   * 123* 129* 150*   BUN 26.6* 36.3* 44.1* 40.9*   CR 1.88* 2.16* 2.55* 2.63*   GFRESTIMATED 43* 37* 30* 29*   BETHANY 9.8 9.7 10.0 10.1   MAG 1.7 1.8 2.0  --    PHOS 2.1* 2.7 2.6  --    PROTTOTAL  --  5.2* 6.0*  --    ALBUMIN  --  3.0* 3.5  --    BILITOTAL  --  0.3 0.3  --    ALKPHOS  --  107 125  --    AST  --  29 36  --    ALT  --  31 47  --      CBC  Recent Labs   Lab 24  0548 24  1307 24  0734   HGB 8.9* 9.8* 11.2*   WBC 3.7* 5.0 6.0   RBC 3.12* 3.40* 3.89*   HCT 27.9* 30.5* 34.9*   MCV 89 90 90   MCH 28.5 28.8 28.8   MCHC 31.9 32.1 32.1   RDW 15.7* 15.6* 15.5*    233 336 "     INRNo lab results found in last 7 days.  ABGNo lab results found in last 7 days.   Urine Studies  Recent Labs   Lab Test 11/21/24  1308 11/13/24  0734 10/18/24  0814 10/08/24  1524 07/24/21  1151 06/25/19  1439 06/19/19  0822   COLOR Yellow Yellow Light Yellow Light Yellow   < > Straw Straw   APPEARANCE Clear Clear Clear Slightly Cloudy*   < > Clear Clear   URINEGLC Negative 30* Negative 500*   < > 200 mg/dL* >1000 mg/dL*   URINEBILI Negative Negative Negative Negative   < > Negative Negative   URINEKETONE Negative Negative Negative Negative   < > Negative Negative   SG 1.016 1.018 1.016 1.018   < > 1.010 1.012   UBLD Small* 0.06 mg/dL* Negative Small*   < > Moderate* Trace*   URINEPH 5.5 7.0 7.0 7.0   < > 6.5 6.0   PROTEIN Negative 100* 50* 30*   < > 200 mg/dL* 300 mg/dL*   UROBILINOGEN  --   --   --   --   --  <2.0 E.U./dL <2.0 E.U./dL   NITRITE Negative Negative Negative Positive*   < > Negative Negative   LEUKEST Moderate* 250 Aliza/uL* 75 Aliza/uL* Moderate*   < > Negative Negative   RBCU 2 12* 1 27*   < > 25-50* 0-2   WBCU 16* 17* 6* 27*   < > 0-5 0-5    < > = values in this interval not displayed.     No lab results found.  PTH  Recent Labs   Lab Test 08/05/24  0758 07/25/24  0448 07/20/21  1845   PTHI 263* 199* 190*     Iron Studies  Recent Labs   Lab Test 08/05/24  0758   IRON 44*      IRONSAT 17   KADEN 1,725*       IMAGING:  All imaging studies reviewed by me.

## 2024-11-24 ENCOUNTER — APPOINTMENT (OUTPATIENT)
Dept: CT IMAGING | Facility: CLINIC | Age: 49
DRG: 699 | End: 2024-11-24
Attending: STUDENT IN AN ORGANIZED HEALTH CARE EDUCATION/TRAINING PROGRAM
Payer: MEDICARE

## 2024-11-24 LAB
ANION GAP SERPL CALCULATED.3IONS-SCNC: 8 MMOL/L (ref 7–15)
BUN SERPL-MCNC: 24.7 MG/DL (ref 6–20)
CALCIUM SERPL-MCNC: 10 MG/DL (ref 8.8–10.4)
CHLORIDE SERPL-SCNC: 108 MMOL/L (ref 98–107)
CREAT SERPL-MCNC: 1.79 MG/DL (ref 0.67–1.17)
EGFRCR SERPLBLD CKD-EPI 2021: 46 ML/MIN/1.73M2
GLUCOSE BLDC GLUCOMTR-MCNC: 110 MG/DL (ref 70–99)
GLUCOSE BLDC GLUCOMTR-MCNC: 190 MG/DL (ref 70–99)
GLUCOSE BLDC GLUCOMTR-MCNC: 194 MG/DL (ref 70–99)
GLUCOSE SERPL-MCNC: 120 MG/DL (ref 70–99)
HCO3 SERPL-SCNC: 22 MMOL/L (ref 22–29)
LIPASE SERPL-CCNC: 257 U/L (ref 13–60)
MAGNESIUM SERPL-MCNC: 1.4 MG/DL (ref 1.7–2.3)
PHOSPHATE SERPL-MCNC: 2.4 MG/DL (ref 2.5–4.5)
POTASSIUM SERPL-SCNC: 4.8 MMOL/L (ref 3.4–5.3)
SODIUM SERPL-SCNC: 138 MMOL/L (ref 135–145)
TACROLIMUS BLD-MCNC: 12 UG/L (ref 5–15)
TME LAST DOSE: NORMAL H
TME LAST DOSE: NORMAL H

## 2024-11-24 PROCEDURE — 99233 SBSQ HOSP IP/OBS HIGH 50: CPT | Mod: FS | Performed by: NURSE PRACTITIONER

## 2024-11-24 PROCEDURE — 36415 COLL VENOUS BLD VENIPUNCTURE: CPT | Performed by: PHYSICIAN ASSISTANT

## 2024-11-24 PROCEDURE — 80197 ASSAY OF TACROLIMUS: CPT | Performed by: PHYSICIAN ASSISTANT

## 2024-11-24 PROCEDURE — 250N000011 HC RX IP 250 OP 636: Performed by: STUDENT IN AN ORGANIZED HEALTH CARE EDUCATION/TRAINING PROGRAM

## 2024-11-24 PROCEDURE — 86832 HLA CLASS I HIGH DEFIN QUAL: CPT | Performed by: PHYSICIAN ASSISTANT

## 2024-11-24 PROCEDURE — 80048 BASIC METABOLIC PNL TOTAL CA: CPT | Performed by: NURSE PRACTITIONER

## 2024-11-24 PROCEDURE — 250N000013 HC RX MED GY IP 250 OP 250 PS 637: Performed by: NURSE PRACTITIONER

## 2024-11-24 PROCEDURE — 82374 ASSAY BLOOD CARBON DIOXIDE: CPT | Performed by: NURSE PRACTITIONER

## 2024-11-24 PROCEDURE — 84100 ASSAY OF PHOSPHORUS: CPT | Performed by: NURSE PRACTITIONER

## 2024-11-24 PROCEDURE — 82565 ASSAY OF CREATININE: CPT | Performed by: NURSE PRACTITIONER

## 2024-11-24 PROCEDURE — 83690 ASSAY OF LIPASE: CPT | Performed by: NURSE PRACTITIONER

## 2024-11-24 PROCEDURE — 83735 ASSAY OF MAGNESIUM: CPT | Performed by: NURSE PRACTITIONER

## 2024-11-24 PROCEDURE — 86833 HLA CLASS II HIGH DEFIN QUAL: CPT | Performed by: PHYSICIAN ASSISTANT

## 2024-11-24 PROCEDURE — 120N000011 HC R&B TRANSPLANT UMMC

## 2024-11-24 PROCEDURE — G1010 CDSM STANSON: HCPCS | Performed by: RADIOLOGY

## 2024-11-24 PROCEDURE — 74177 CT ABD & PELVIS W/CONTRAST: CPT | Mod: MG

## 2024-11-24 PROCEDURE — 250N000012 HC RX MED GY IP 250 OP 636 PS 637: Performed by: PHYSICIAN ASSISTANT

## 2024-11-24 PROCEDURE — 258N000003 HC RX IP 258 OP 636: Performed by: PHYSICIAN ASSISTANT

## 2024-11-24 PROCEDURE — 250N000013 HC RX MED GY IP 250 OP 250 PS 637: Performed by: PHYSICIAN ASSISTANT

## 2024-11-24 PROCEDURE — 74177 CT ABD & PELVIS W/CONTRAST: CPT | Mod: 26 | Performed by: RADIOLOGY

## 2024-11-24 RX ORDER — NICOTINE POLACRILEX 4 MG
15-30 LOZENGE BUCCAL
Status: DISCONTINUED | OUTPATIENT
Start: 2024-11-24 | End: 2024-11-25 | Stop reason: HOSPADM

## 2024-11-24 RX ORDER — DEXTROSE MONOHYDRATE 25 G/50ML
25-50 INJECTION, SOLUTION INTRAVENOUS
Status: DISCONTINUED | OUTPATIENT
Start: 2024-11-24 | End: 2024-11-25 | Stop reason: HOSPADM

## 2024-11-24 RX ORDER — IOPAMIDOL 755 MG/ML
130 INJECTION, SOLUTION INTRAVASCULAR ONCE
Status: COMPLETED | OUTPATIENT
Start: 2024-11-24 | End: 2024-11-24

## 2024-11-24 RX ORDER — MAGNESIUM OXIDE 400 MG/1
400 TABLET ORAL DAILY
Status: DISCONTINUED | OUTPATIENT
Start: 2024-11-24 | End: 2024-11-25 | Stop reason: HOSPADM

## 2024-11-24 RX ORDER — NICOTINE POLACRILEX 4 MG
15-30 LOZENGE BUCCAL
Status: DISCONTINUED | OUTPATIENT
Start: 2024-11-24 | End: 2024-11-24

## 2024-11-24 RX ORDER — DEXTROSE MONOHYDRATE 25 G/50ML
25-50 INJECTION, SOLUTION INTRAVENOUS
Status: DISCONTINUED | OUTPATIENT
Start: 2024-11-24 | End: 2024-11-24

## 2024-11-24 RX ADMIN — CIPROFLOXACIN 500 MG: 500 TABLET ORAL at 20:48

## 2024-11-24 RX ADMIN — ATORVASTATIN CALCIUM 40 MG: 40 TABLET, FILM COATED ORAL at 20:48

## 2024-11-24 RX ADMIN — TACROLIMUS 0.5 MG: 0.5 CAPSULE ORAL at 09:54

## 2024-11-24 RX ADMIN — SODIUM CHLORIDE 1000 ML: 9 INJECTION, SOLUTION INTRAVENOUS at 09:59

## 2024-11-24 RX ADMIN — INSULIN ASPART 1 UNITS: 100 INJECTION, SOLUTION INTRAVENOUS; SUBCUTANEOUS at 16:56

## 2024-11-24 RX ADMIN — DAPSONE 50 MG: 25 TABLET ORAL at 09:51

## 2024-11-24 RX ADMIN — SODIUM BICARBONATE 1950 MG: 650 TABLET ORAL at 13:44

## 2024-11-24 RX ADMIN — MYCOPHENOLIC ACID 540 MG: 360 TABLET, DELAYED RELEASE ORAL at 09:54

## 2024-11-24 RX ADMIN — CARVEDILOL 3.12 MG: 3.12 TABLET, FILM COATED ORAL at 20:48

## 2024-11-24 RX ADMIN — SODIUM BICARBONATE 1950 MG: 650 TABLET ORAL at 20:48

## 2024-11-24 RX ADMIN — TACROLIMUS 0.5 MG: 0.5 CAPSULE ORAL at 18:07

## 2024-11-24 RX ADMIN — TICAGRELOR 90 MG: 90 TABLET ORAL at 09:53

## 2024-11-24 RX ADMIN — ASPIRIN 81 MG CHEWABLE TABLET 81 MG: 81 TABLET CHEWABLE at 09:51

## 2024-11-24 RX ADMIN — MAGNESIUM OXIDE TAB 400 MG (241.3 MG ELEMENTAL MG) 400 MG: 400 (241.3 MG) TAB at 13:44

## 2024-11-24 RX ADMIN — SODIUM BICARBONATE 1950 MG: 650 TABLET ORAL at 09:51

## 2024-11-24 RX ADMIN — INSULIN ASPART 2 UNITS: 100 INJECTION, SOLUTION INTRAVENOUS; SUBCUTANEOUS at 13:44

## 2024-11-24 RX ADMIN — TICAGRELOR 90 MG: 90 TABLET ORAL at 20:48

## 2024-11-24 RX ADMIN — CARVEDILOL 3.12 MG: 3.12 TABLET, FILM COATED ORAL at 09:51

## 2024-11-24 RX ADMIN — MYCOPHENOLIC ACID 540 MG: 360 TABLET, DELAYED RELEASE ORAL at 20:48

## 2024-11-24 RX ADMIN — IOPAMIDOL 130 ML: 755 INJECTION, SOLUTION INTRAVENOUS at 10:17

## 2024-11-24 RX ADMIN — CIPROFLOXACIN 500 MG: 500 TABLET ORAL at 09:51

## 2024-11-24 NOTE — PLAN OF CARE
Goal Outcome Evaluation:      Plan of Care Reviewed With: patient    Overall Patient Progress: no changeOverall Patient Progress: no change    Vitals: stable room air.   Neuro : a x o x 4.   Blood glucose: na.   Pain/nausea: denies.  Diet: regular.   Lines: PIVR saline locked.   : good uop  GI: diarrhea (norovirus).   Drains: na.   Skin: WDL.   Mobility: up ad prasanna.

## 2024-11-24 NOTE — PROGRESS NOTES
[facility]  Transplant Nephrology Progress Note  Date of Admission:  11/21/2024  Today's Date: 11/24/2024  Requesting physician: Ramya Munoz MD    Recommendations:   - DSA level and agree with CT A/P to evaluate for elevated lipase  - Magnesium supplement  - Monitor blood glucoses   - Currently only on cipro for UTI and last dose will be 11/28.    Assessment & Plan   # DDKT (SPK): DELISA, Trend down back to baseline, had peaked at 2.6mg/dl likely in setting of UTI, dehydration, and elevated tacrolimus level. Transplant renal US without evidence of obstruction.  No acute indications for dialysis.   - Baseline Creatinine: ~ 1.5-1.8 mg/dl   - Proteinuria: Minimally elevated (18-29 mg/g Cr)   - DSA Hx: No DSA   - Last cPRA: 11% 8/2024   - BK Viremia: No   - Kidney Tx Biopsy Hx: No biopsy history.    # Pancreas Tx (SPK):    - Pancreatic Exocrine Drainage: Enteric drained     - Blood glucose: Elevated blood glucose      On insulin: No   - HbA1c: Stable      Latest HbA1c: 6.2%   - Pancreatic enzymes: Trend up. Normal pancreas transplant US    - DSA Hx: No DSA   - Pancreas Tx Biopsy Hx: No biopsy history    # Immunosuppression: Tacrolimus immediate release (goal 8-10) and Mycophenolic acid (dose 540 mg every 12 hours)   - Induction with Recent Transplant:  High Intensity Protocol   - Continue with intensive monitoring of immunosuppression for efficacy and toxicity.   - Historical Changes in Immunosuppression: None   - Changes: Not at this time . Tacrolimus remains supratherapeutic on low dose. Myfortic reduced to 540mg x2 weeks in the setting of ongoing infections, lipase  now trending up.     # Infection Prevention:   - PJP: Dapsone (changed off bactrim due to hyperkalemia)  - CMV: None, prophylaxis completed      - CMV IgG Ab High Risk Discordance (D+/R-): No  CMV Serostatus: Positive  - EBV IgG Ab High Risk Discordance (D+/R-): No  EBV Serostatus: Positive    #  Hypertension: Borderline control;  Goal BP: < 140/90   - Changes: Yes -   coreg 3.125 mg BID  hold for SBP<110    # Anemia in Chronic Renal Disease: Hgb: Trend down      KERRI: No   - Iron studies: Low iron saturation, but high ferritin    # Mineral Bone Disorder:    - Secondary renal hyperparathyroidism; PTH level: Mildly elevated (151-300 pg/ml)        On treatment: None  - Vitamin D; level: High        On supplement: No  - Calcium; level: Normal        On supplement: No  - Phosphorus; level: Normal        On supplement: No    # Electrolytes:   - Potassium; level: Normal        On supplement: No  - Magnesium; level: Low        On supplement: No  - Bicarbonate; level: Low        On supplement:  Yes, sodium bicarbonate 1,950 mg tid     # UTI: urine culture from 11/13/24 grew >100k Pseudomonas aeruginosa and Enterococcus faecalis. Currently on ciprofloxacin with some improvement in symptoms. Continue 2 week course.      # Norovirus: tested positive on 11/16/24 and on nitazoxanide. Given severity of symptoms, will continue treatment for 3 weeks. Diarrhea resolved.    # Other Significant PMH:   - CAD, s/p CABG: Asymptomatic.              - HFrEF: Last cardiac echo (Jul/2024) showed moderately decreased LVEF ~ 35-40% with akinesis of the basal-mid inferior and basal inferoseptal segments. Updated ECHO this admission on 11/21/24 showed no change.              - H/o V. Fib Arrest: Patient with V. Fib arrest and STEMI following kidney transplant and emergently brought to Cath Lab and found to have in stent thrombosis of the previous mid RCA stent, which was felt to be the culprit in inferior STEMI.  Patient underwent aspiration thrombectomy and PCI with two IBAN.  Followed by Cardiology.              - PAD: No claudication symptoms.              - GERD: Asymptomatic on PPI.              - Lower Back Pain with Sciatica: Patient with h/o lower back pain and sciatica years ago, now returned with pain radiating down right leg.  Has been taking cyclobenzaprine.    # Transplant History:  Etiology of Kidney Failure: Diabetes mellitus type 2  Tx: DDKT (SPK)  Transplant: 7/20/2024 (Kidney / Pancreas)  Significant transplant-related complications: None    Recommendations were communicated to the primary team verbally.    Seen and discussed with Dr. Malvin Vargas NP  Transplant Nephrology  Contact information via Vocera Web Console or via [facility] Paging/Directory        Physician Attestation     I saw and evaluated Siva Ramey as part of a shared APRN/PA visit.     I personally reviewed the vital signs, medications, labs, and imaging.    I personally provided a substantive portion of care for this patient and I approve the care plan as written by the KAT.  I was involved with Medical Decision Making including:   Mr. Ramey, h/o SPK on 7/20/2024, complicated with post op RCA stent thrombosis and VT arrest at that time, now admitted with significant diarrhea which resulted in presyncope and high tac levels. His diarrhea improved since admission and renal function improved almost back to baseline. Tac is still slightly above goal but only on 0.5 mg BID. On linezolid and cipro for UTI, but only on cipro for now.  Noted his lipase is up trending of clear etiology. With given infections his MMF decreased to 540 mg BID. Never had DSA, last checked in August. Will repea.  Will await for CT abd results.    Please see A&P for additional details of medical decision making.    Grisel Coombs MD  Date of Service (when I saw the patient): 11/24/24         Interval History  Mr. Ramey is 49-year-old male with history of ESKD from presumed type 2 diabetes on HD since Aug 2021 via LUE AVF, CAD s/p CABG 2019 LIMA-LAD, SVG-RCA, IBAN 1/2019, IBAN to RCA 2/2024 on brilinta x 3 months, s/p SPK 7/20/24 complicated by V fib arrest and STEMI with in-stent thrombosis of the previous mid RCA stent s/p aspiration thrombectomy and PCI with two IBAN, PAD, GERD,  and low back pain with sciatica. He presented to the ED yesterday following a presyncopal episode (got up quick from the couch to check on his dog) in the setting of acute illness of non-bloody watery diarrhea with positive norovirus (11/13) and UTI (11/16, pseudomonas, linezolid) that started about 3 weeks ago. He was having frequent (15+) watery stools along with urinary frequency and dysuria, and was feeling very fatigued, dehydrated, and weak.     Mr. Meltons creatinine is 1.79 (11/24  ); Trend down.  Good urine output.  Other significant labs/tests/vitals: Lipase uptrending  No acute events overnight.  No chest pain or shortness of breath.  No leg swelling.  No nausea and vomiting. Good appetite  Bowel movements are now normal, large formed today per pt.  No fever, sweats or chills.     Review of Systems   4 point ROS was obtained and negative except as noted in the Interval History.    MEDICATIONS:  Current Facility-Administered Medications   Medication Dose Route Frequency Provider Last Rate Last Admin    aspirin (ASA) chewable tablet 81 mg  81 mg Oral Daily Julianna Alexis NP   81 mg at 11/24/24 0951    atorvastatin (LIPITOR) tablet 40 mg  40 mg Oral QPM Julianna Alexis NP   40 mg at 11/23/24 1954    carvedilol (COREG) tablet 3.125 mg  3.125 mg Oral BID Julienne Ibrahim PA-C   3.125 mg at 11/24/24 0951    ciprofloxacin (CIPRO) tablet 500 mg  500 mg Oral BID Julianna Alexis NP   500 mg at 11/24/24 0951    dapsone (ACZONE) tablet 50 mg  50 mg Oral Daily Julianna Alexis NP   50 mg at 11/24/24 0951    mycophenolic acid (GENERIC EQUIVALENT) EC tablet 540 mg  540 mg Oral BID Mindy Tenorio PA-C   540 mg at 11/24/24 0954    sodium bicarbonate tablet 1,950 mg  1,950 mg Oral TID Julianna Alexis NP   1,950 mg at 11/24/24 0951    sodium chloride (PF) 0.9% PF flush 3 mL  3 mL Intracatheter Q8H Julianna Alexis NP   3 mL at 11/24/24 0959    tacrolimus (GENERIC EQUIVALENT)  "capsule 0.5 mg  0.5 mg Oral BID IS Mindy Tenorio PA-C   0.5 mg at 24 0954    ticagrelor (BRILINTA) tablet 90 mg  90 mg Oral BID Julianna Alexis NP   90 mg at 24 0953     Current Facility-Administered Medications   Medication Dose Route Frequency Provider Last Rate Last Admin       Physical Exam   Temp  Av.6  F (36.4  C)  Min: 97.2  F (36.2  C)  Max: 98  F (36.7  C)      Pulse  Av  Min: 71  Max: 89 Resp  Av.1  Min: 16  Max: 20  SpO2  Av.5 %  Min: 91 %  Max: 100 %     /83 (BP Location: Right arm)   Pulse 80   Temp 98  F (36.7  C) (Oral)   Resp 20   Ht 1.803 m (5' 11\")   Wt 96.1 kg (211 lb 14.4 oz)   SpO2 99%   BMI 29.55 kg/m     Date 24 07 - 24 0659   Shift 8212-0831 5924-3466 8663-2515 24 Hour Total   INTAKE   Shift Total(mL/kg)       OUTPUT   Urine 500   500   Shift Total(mL/kg) 500(5.2)   500(5.2)   Weight (kg) 96.12 96.12 96.12 96.12      Admit Weight: 96.2 kg (212 lb)     GENERAL APPEARANCE: alert and no distress  HENT: mouth without ulcers or lesions  RESP: lungs clear to auscultation - no rales, rhonchi or wheezes  CV: regular rhythm, normal rate, no rub, no murmur  EDEMA: no LE edema bilaterally  ABDOMEN: soft, nondistended, nontender, bowel sounds normal  MS: extremities normal - no gross deformities noted, no evidence of inflammation in joints, no muscle tenderness  SKIN: no rash  GRAFT: no tenderness to palpation    Data   All labs reviewed by me.  CMP  Recent Labs   Lab 24  0632 24  0607 24  0548 24  1307    138 137 137   POTASSIUM 4.8 4.5 3.9 3.9   CHLORIDE 108* 110* 107 103   CO2 22 19* 19* 23   ANIONGAP 8 9 11 11   * 138* 123* 129*   BUN 24.7* 26.6* 36.3* 44.1*   CR 1.79* 1.88* 2.16* 2.55*   GFRESTIMATED 46* 43* 37* 30*   BETHANY 10.0 9.8 9.7 10.0   MAG 1.4* 1.7 1.8 2.0   PHOS 2.4* 2.1* 2.7 2.6   PROTTOTAL  --   --  5.2* 6.0*   ALBUMIN  --   --  3.0* 3.5   BILITOTAL  --   --  0.3 0.3   ALKPHOS  --   -- "  107 125   AST  --   --  29 36   ALT  --   --  31 47     CBC  Recent Labs   Lab 11/22/24  0548 11/21/24  1307 11/21/24  0734   HGB 8.9* 9.8* 11.2*   WBC 3.7* 5.0 6.0   RBC 3.12* 3.40* 3.89*   HCT 27.9* 30.5* 34.9*   MCV 89 90 90   MCH 28.5 28.8 28.8   MCHC 31.9 32.1 32.1   RDW 15.7* 15.6* 15.5*    233 336     INRNo lab results found in last 7 days.  ABGNo lab results found in last 7 days.   Urine Studies  Recent Labs   Lab Test 11/21/24  1308 11/13/24  0734 10/18/24  0814 10/08/24  1524 07/24/21  1151 06/25/19  1439 06/19/19  0822   COLOR Yellow Yellow Light Yellow Light Yellow   < > Straw Straw   APPEARANCE Clear Clear Clear Slightly Cloudy*   < > Clear Clear   URINEGLC Negative 30* Negative 500*   < > 200 mg/dL* >1000 mg/dL*   URINEBILI Negative Negative Negative Negative   < > Negative Negative   URINEKETONE Negative Negative Negative Negative   < > Negative Negative   SG 1.016 1.018 1.016 1.018   < > 1.010 1.012   UBLD Small* 0.06 mg/dL* Negative Small*   < > Moderate* Trace*   URINEPH 5.5 7.0 7.0 7.0   < > 6.5 6.0   PROTEIN Negative 100* 50* 30*   < > 200 mg/dL* 300 mg/dL*   UROBILINOGEN  --   --   --   --   --  <2.0 E.U./dL <2.0 E.U./dL   NITRITE Negative Negative Negative Positive*   < > Negative Negative   LEUKEST Moderate* 250 Aliza/uL* 75 Aliza/uL* Moderate*   < > Negative Negative   RBCU 2 12* 1 27*   < > 25-50* 0-2   WBCU 16* 17* 6* 27*   < > 0-5 0-5    < > = values in this interval not displayed.     No lab results found.  PTH  Recent Labs   Lab Test 08/05/24  0758 07/25/24  0448 07/20/21  1845   PTHI 263* 199* 190*     Iron Studies  Recent Labs   Lab Test 08/05/24  0758   IRON 44*      IRONSAT 17   KADEN 1,725*       IMAGING:  All imaging studies reviewed by me.

## 2024-11-24 NOTE — PLAN OF CARE
"Goal Outcome Evaluation:      Plan of Care Reviewed With: patient    Overall Patient Progress: no changeOverall Patient Progress: no change    Outcome Evaluation: Pt's lipase continues to be elevated, Abd ultrasound performed this shift, pt also received NS bolus     /76 (BP Location: Right arm)   Pulse 83   Temp 97.8  F (36.6  C) (Oral)   Resp 20   Ht 1.803 m (5' 11\")   Wt 100 kg (220 lb 7.4 oz)   SpO2 95%   BMI 30.75 kg/m      Shift: 8822-3244  Isolation Status: Enteric- Norovirus  VS: VSS on RA, afebrile  Neuro: Aox4  Behaviors: calm and cooperative  Labs: Lipase :257  Respiratory: WDL  Cardiac: WDL  BG: ACHS 190  Pain/Nausea: denies  Diet: regular  IV Access: LAV fistula, RPIV  GI/: voiding, 2 Bms this shift  Skin: No new deficits  Mobility: Ind  Events/Education: Abd Ultrasound this shift,pt has Elevated lipase levels continue to monitor labs for changes   Plan: continue plan of care       "

## 2024-11-24 NOTE — PLAN OF CARE
"/76 (BP Location: Right arm)   Pulse 83   Temp 97.8  F (36.6  C) (Oral)   Resp 20   Ht 1.803 m (5' 11\")   Wt 100 kg (220 lb 7.4 oz)   SpO2 95%   BMI 30.75 kg/m      Shift: [id number]  Isolation Status: enteric   VS: stable on RA, afebrile  Neuro: Aox4  Behaviors: none   BG: AC HS was 194 and covered with insulin   Labs: Lipase 257  Respiratory: RA  Cardiac: WNL   Pain/Nausea: some abdominal discomfort but denied any interventions, denied nausea   PRN: none   Diet: Regular   IV Access: PIV  Infusion(s): none   GI/: stated had X1 BM today and is voiding   Skin: intact   Mobility: UAL   Plan: monitor Lipase    Goal Outcome Evaluation:      Plan of Care Reviewed With: patient          Outcome Evaluation: Pt had a CT today. started insulin stated caused some abdominal discomfort, and continues to have loose stools.      "

## 2024-11-24 NOTE — PROGRESS NOTES
Transplant Surgery  Inpatient Daily Progress Note  11/24/2024    Assessment & Plan: Siva Ramey is a 49 year old male with a history of CAD s/p PCI, DM2, and ESRD now s/p SPK on 7/20/24 c/b post op STEMI requiring PCI and stent placement. He presented to the ED after a syncopal episode.     SPK 7/20/2024:   Pancreas (Enteric drained): Lipase 257 (237<214<157). BGs persistently elevated since transplant. 10/29/24 Hgb A1c 6.2%. Seen by Endocrinology in 10/2024 - recommendation to monitor BG with CGM and follow up in 2/2024  -Check HA1c 2/2025  -US pancreas 11/22 with patent vasculature  -CT with patent pancreas.  - Pt will not be a great candidate for biopsy due to Brilinta, will order DSA and Prospera  DELISA of transplanted kidney: Cr 2.6 on admission (Baseline Cr 1.5-1.8). Likely due to dehydration and CNI toxicity. Ureteral stent removed 10/10/24. 11/21 Renal tx US WNL. Cr decreased 1.9 after receiving IV fluids and holding tac dose.   - Monitor I/O. Fluid bolus as needed   - Follow up tac level    - Treatment for UTI as below     Immunosuppressed status secondary to medications:   Maintenance:   - PTA Myfortic 720 mg BID. Decrease to Myfortic 540 mg BID for 1-2 weeks. Resume 720 mg BID once diarrhea/infection resolved.    - PTA Tacrolimus 1.5 mg BID. Goal level 8-10. 11/21 tac level 26.7. Tac held 11/21 PM. Restart tac 0.5 mg BID.      Hematology:   Anemia of chronic disease: Hgb 10-11.      Cardiorespiratory:   CAD, VT/VF arrest s/p PCI with IBAN: Previous CABG 2019, and IBAN 2019 with 100% thrombosis of the RCA, IBAN x 2 on 7/20/24.  11/21 ECHO LV decreased function, EF 35-40%, Akinesis of basal-mid inf and basal inferoseptal segments. Normal RV size and function. No sig valvular abnl. - stable from ECHO 7/2024.    - Continue PTA atorvastatin, Brilinta 90 mg BID, ASA 81 mg daily    - PTA Metoprolol succinate ER 25 mg daily- will start coreg 3.125 mg BID  HTN:    - PTA Metoprolol succinate ER 25 mg daily- will  start coreg 3.125 mg BID  SOB: CXR with mild atelectasis. Stable on RA. Denies SOB.   - TTE - decreased LV function. EF 35-40%.    - Encourage IS/CDB     GI/Nutrition: Regular diet as tolerated. Nutrition consulted d/t recent weight loss.   Diarrhea; +Norovirus: See below.    - Continue nitazoxanide for 3 weeks total per nephrology request.      Endocrine: See above.      Fluid/Electrolytes: 2L NS on 11/21. IVF: Now stopped  Hypovolemia 2/2 diarrhea: Received IVF resuscitation. Orthostatic symptoms resolved.   -Stop IVF.   -Encourage PO fluid   -Will monitor I/O today.   -Repeat IV bolus PRN (give 1L bolus today in light of IV contrast with CT scan)  -Consider outpatient IV bolus PRN  Metabolic acidosis: Continue PTA sodium bicarb.  Hypomagnesemia: Hold PTA Mag glycinate with diarrhea.      Infectious disease:   +Norovirus: Positive 11/16.    - Continue PTA nitazoxanide (11/18-present).  UTI: 11/21 UCx no growth.     - Continue PTA Cipro (11/15-11/28). (Linezolid stopped)     Prophylaxis: pneumocystis (Dapsone)     Disposition: 7A. Possible discharge tomorrow if lipase improving.    KAT/Fellow/Resident Provider: Julienne Ibrahim PA-C [id number]    Faculty: Ramya Munoz MD    _________________________________________________________________  Transplant History: Admitted 11/21/2024 for DELISA, dehydration.  7/20/2024 (Kidney / Pancreas), Postoperative day: 127     Interval History: History is obtained from the patient  Overnight events: Intermittent abdominal pain. Tolerating diet. Stools getting more formed and less volume per patient    ROS:   A 10-point review of systems was negative except as noted above.    Meds:  Current Facility-Administered Medications   Medication Dose Route Frequency Provider Last Rate Last Admin    aspirin (ASA) chewable tablet 81 mg  81 mg Oral Daily Julianna Alexis, NP   81 mg at 11/24/24 0951    atorvastatin (LIPITOR) tablet 40 mg  40 mg Oral QPM Julianna Alexis, ARIE   40 mg at  "11/23/24 1954    carvedilol (COREG) tablet 3.125 mg  3.125 mg Oral BID Julienne Ibrahim PA-C   3.125 mg at 11/24/24 0951    ciprofloxacin (CIPRO) tablet 500 mg  500 mg Oral BID Julianna Alexis NP   500 mg at 11/24/24 0951    dapsone (ACZONE) tablet 50 mg  50 mg Oral Daily Julianna Alexis NP   50 mg at 11/24/24 0951    insulin aspart (NovoLOG) injection (RAPID ACTING)  1-7 Units Subcutaneous TID AC Julienne Ibrahim PA-C        insulin aspart (NovoLOG) injection (RAPID ACTING)  1-5 Units Subcutaneous At Bedtime Julienne Ibrahim PA-C        magnesium oxide (MAG-OX) tablet 400 mg  400 mg Oral Daily Julienne Ibrahim PA-C        mycophenolic acid (GENERIC EQUIVALENT) EC tablet 540 mg  540 mg Oral BID Mindy Tenorio PA-C   540 mg at 11/24/24 0954    sodium bicarbonate tablet 1,950 mg  1,950 mg Oral TID Julianna Alexis NP   1,950 mg at 11/24/24 0951    sodium chloride (PF) 0.9% PF flush 3 mL  3 mL Intracatheter Q8H Julianna Alexis NP   3 mL at 11/24/24 0959    tacrolimus (GENERIC EQUIVALENT) capsule 0.5 mg  0.5 mg Oral BID IS Mindy Tenorio PA-C   0.5 mg at 11/24/24 0954    ticagrelor (BRILINTA) tablet 90 mg  90 mg Oral BID Julianna Alexis NP   90 mg at 11/24/24 0953       Physical Exam:     Admit Weight: 96.2 kg (212 lb)    Current vitals:   /83 (BP Location: Right arm)   Pulse 80   Temp 98  F (36.7  C) (Oral)   Resp 20   Ht 1.803 m (5' 11\")   Wt 96.1 kg (211 lb 14.4 oz)   SpO2 99%   BMI 29.55 kg/m           Vital sign ranges:    Temp:  [97.5  F (36.4  C)-98.1  F (36.7  C)] 98  F (36.7  C)  Pulse:  [80-90] 80  Resp:  [18-20] 20  BP: (118-138)/(69-84) 136/83  SpO2:  [92 %-99 %] 99 %  Patient Vitals for the past 24 hrs:   BP Temp Temp src Pulse Resp SpO2   11/24/24 1030 136/83 98  F (36.7  C) Oral 80 20 99 %   11/24/24 0951 133/74 -- -- -- -- --   11/24/24 0146 138/81 98.1  F (36.7  C) Oral -- 18 92 %   11/23/24 2105 118/69 98.1  F (36.7  C) " "Oral 88 18 93 %   11/23/24 1412 133/84 97.5  F (36.4  C) Oral 90 18 94 %     General Appearance: in no apparent distress.   Skin: normal, warm  Heart: well perfused  Lungs: Nonlabored resps on RA  Abdomen: The abdomen is soft, nontender, incision healing well  : olea is not present.   Extremities: edema: absent.   Neurologic: awake, alert and oriented. Tremor: mild      Data:   CMP  Recent Labs   Lab 11/24/24  0632 11/23/24  0607 11/22/24  0548 11/21/24  1307 11/21/24  0734 11/21/24  0734    138 137 137  --  136   POTASSIUM 4.8 4.5 3.9 3.9  --  3.8   CHLORIDE 108* 110* 107 103  --  100   CO2 22 19* 19* 23  --  22   * 138* 123* 129*  --  150*   BUN 24.7* 26.6* 36.3* 44.1*  --  40.9*   CR 1.79* 1.88* 2.16* 2.55*  --  2.63*   GFRESTIMATED 46* 43* 37* 30*  --  29*   BETHANY 10.0 9.8 9.7 10.0  --  10.1   MAG 1.4* 1.7 1.8 2.0   < >  --    PHOS 2.4* 2.1* 2.7 2.6   < >  --    AMYLASE  --   --   --   --   --  177*   LIPASE 257* 237* 214* 157*  --  158*   ALBUMIN  --   --  3.0* 3.5  --   --    BILITOTAL  --   --  0.3 0.3  --   --    ALKPHOS  --   --  107 125  --   --    AST  --   --  29 36  --   --    ALT  --   --  31 47  --   --     < > = values in this interval not displayed.     CBC  Recent Labs   Lab 11/22/24  0548 11/21/24  1307   HGB 8.9* 9.8*   WBC 3.7* 5.0    233     COAGSNo lab results found in last 7 days.    Invalid input(s): \"XA\"   Urinalysis  Recent Labs   Lab Test 11/21/24  1308 11/13/24  0734   COLOR Yellow Yellow   APPEARANCE Clear Clear   URINEGLC Negative 30*   URINEBILI Negative Negative   URINEKETONE Negative Negative   SG 1.016 1.018   UBLD Small* 0.06 mg/dL*   URINEPH 5.5 7.0   PROTEIN Negative 100*   NITRITE Negative Negative   LEUKEST Moderate* 250 Aliza/uL*   RBCU 2 12*   WBCU 16* 17*     Virology:  Hepatitis C Antibody   Date Value Ref Range Status   07/19/2024 Nonreactive Nonreactive Final     Comment:     A nonreactive screening test result does not exclude the possibility of " exposure to or infection with HCV. Nonreactive screening test results in individuals with prior exposure to HCV may be due to antibody levels below the limit of detection of this assay or lack of reactivity to the HCV antigens used in this assay. Patients with recent HCV infections (<3 months from time of exposure) may have false-negative HCV antibody results due to the time needed for seroconversion (average of 8 to 9 weeks).     BK Virus DNA IU/mL   Date Value Ref Range Status   09/23/2024 Not Detected Not Detected IU/mL Final   08/29/2024 Not Detected Not Detected IU/mL Final   08/25/2024 Not Detected Not Detected IU/mL Final

## 2024-11-25 ENCOUNTER — TELEPHONE (OUTPATIENT)
Dept: TRANSPLANT | Facility: CLINIC | Age: 49
End: 2024-11-25

## 2024-11-25 VITALS
SYSTOLIC BLOOD PRESSURE: 122 MMHG | OXYGEN SATURATION: 93 % | HEART RATE: 85 BPM | HEIGHT: 71 IN | TEMPERATURE: 98.4 F | DIASTOLIC BLOOD PRESSURE: 69 MMHG | BODY MASS INDEX: 30.23 KG/M2 | RESPIRATION RATE: 18 BRPM | WEIGHT: 215.9 LBS

## 2024-11-25 DIAGNOSIS — Z94.0 HTN, KIDNEY TRANSPLANT RELATED: ICD-10-CM

## 2024-11-25 DIAGNOSIS — N39.0 UTI (URINARY TRACT INFECTION): Primary | ICD-10-CM

## 2024-11-25 DIAGNOSIS — Z94.83 PANCREAS REPLACED BY TRANSPLANT (H): ICD-10-CM

## 2024-11-25 DIAGNOSIS — I15.1 HTN, KIDNEY TRANSPLANT RELATED: ICD-10-CM

## 2024-11-25 DIAGNOSIS — N41.2 PROSTATE ABSCESS: ICD-10-CM

## 2024-11-25 DIAGNOSIS — Z94.0 KIDNEY REPLACED BY TRANSPLANT: ICD-10-CM

## 2024-11-25 LAB
ANION GAP SERPL CALCULATED.3IONS-SCNC: 8 MMOL/L (ref 7–15)
BUN SERPL-MCNC: 19.6 MG/DL (ref 6–20)
CALCIUM SERPL-MCNC: 9.9 MG/DL (ref 8.8–10.4)
CHLORIDE SERPL-SCNC: 109 MMOL/L (ref 98–107)
CREAT SERPL-MCNC: 1.65 MG/DL (ref 0.67–1.17)
DONOR IDENTIFICATION: NORMAL
DSA COMMENTS: NORMAL
DSA PRESENT: NO
DSA TEST METHOD: NORMAL
EGFRCR SERPLBLD CKD-EPI 2021: 51 ML/MIN/1.73M2
GLUCOSE BLDC GLUCOMTR-MCNC: 105 MG/DL (ref 70–99)
GLUCOSE BLDC GLUCOMTR-MCNC: 119 MG/DL (ref 70–99)
GLUCOSE BLDC GLUCOMTR-MCNC: 126 MG/DL (ref 70–99)
GLUCOSE SERPL-MCNC: 124 MG/DL (ref 70–99)
HCO3 SERPL-SCNC: 20 MMOL/L (ref 22–29)
HOLD SPECIMEN: NORMAL
LIPASE SERPL-CCNC: 153 U/L (ref 13–60)
MAGNESIUM SERPL-MCNC: 1.2 MG/DL (ref 1.7–2.3)
ORGAN: NORMAL
PHOSPHATE SERPL-MCNC: 2.1 MG/DL (ref 2.5–4.5)
POTASSIUM SERPL-SCNC: 5.1 MMOL/L (ref 3.4–5.3)
PSA SERPL DL<=0.01 NG/ML-MCNC: 0.95 NG/ML (ref 0–2.5)
SA 1  COMMENTS: NORMAL
SA 1 CELL: NORMAL
SA 1 TEST METHOD: NORMAL
SA 2 CELL: NORMAL
SA 2 COMMENTS: NORMAL
SA 2 TEST METHOD: NORMAL
SA1 HI RISK ABY: NORMAL
SA1 MOD RISK ABY: NORMAL
SA2 HI RISK ABY: NORMAL
SA2 MOD RISK ABY: NORMAL
SODIUM SERPL-SCNC: 137 MMOL/L (ref 135–145)
UNACCEPTABLE ANTIGENS: NORMAL
UNOS CPRA: 11

## 2024-11-25 PROCEDURE — 250N000013 HC RX MED GY IP 250 OP 250 PS 637: Performed by: NURSE PRACTITIONER

## 2024-11-25 PROCEDURE — 250N000013 HC RX MED GY IP 250 OP 250 PS 637: Performed by: PHYSICIAN ASSISTANT

## 2024-11-25 PROCEDURE — 250N000012 HC RX MED GY IP 250 OP 636 PS 637: Performed by: PHYSICIAN ASSISTANT

## 2024-11-25 PROCEDURE — 83690 ASSAY OF LIPASE: CPT | Performed by: NURSE PRACTITIONER

## 2024-11-25 PROCEDURE — 36415 COLL VENOUS BLD VENIPUNCTURE: CPT | Performed by: NURSE PRACTITIONER

## 2024-11-25 PROCEDURE — 250N000011 HC RX IP 250 OP 636

## 2024-11-25 PROCEDURE — 84100 ASSAY OF PHOSPHORUS: CPT | Performed by: NURSE PRACTITIONER

## 2024-11-25 PROCEDURE — 84153 ASSAY OF PSA TOTAL: CPT

## 2024-11-25 PROCEDURE — 99233 SBSQ HOSP IP/OBS HIGH 50: CPT | Performed by: STUDENT IN AN ORGANIZED HEALTH CARE EDUCATION/TRAINING PROGRAM

## 2024-11-25 PROCEDURE — 83735 ASSAY OF MAGNESIUM: CPT | Performed by: NURSE PRACTITIONER

## 2024-11-25 PROCEDURE — 80048 BASIC METABOLIC PNL TOTAL CA: CPT | Performed by: NURSE PRACTITIONER

## 2024-11-25 RX ORDER — MAGNESIUM SULFATE HEPTAHYDRATE 40 MG/ML
4 INJECTION, SOLUTION INTRAVENOUS ONCE
Status: COMPLETED | OUTPATIENT
Start: 2024-11-25 | End: 2024-11-25

## 2024-11-25 RX ORDER — CARVEDILOL 3.12 MG/1
3.12 TABLET ORAL 2 TIMES DAILY
Qty: 60 TABLET | Refills: 11 | Status: SHIPPED | OUTPATIENT
Start: 2024-11-25

## 2024-11-25 RX ORDER — MYCOPHENOLIC ACID 180 MG/1
TABLET, DELAYED RELEASE ORAL
Status: ACTIVE
Start: 2024-11-25

## 2024-11-25 RX ORDER — TACROLIMUS 0.5 MG/1
0.5 CAPSULE ORAL 2 TIMES DAILY
Status: ACTIVE
Start: 2024-11-25 | End: 2024-12-06

## 2024-11-25 RX ORDER — CARVEDILOL 6.25 MG/1
TABLET ORAL
Qty: 30 TABLET | Refills: 0 | OUTPATIENT
Start: 2024-11-25

## 2024-11-25 RX ORDER — TACROLIMUS 1 MG/1
CAPSULE ORAL
Status: ACTIVE
Start: 2024-11-25 | End: 2024-12-06

## 2024-11-25 RX ADMIN — MAGNESIUM SULFATE HEPTAHYDRATE 4 G: 40 INJECTION, SOLUTION INTRAVENOUS at 09:56

## 2024-11-25 RX ADMIN — TACROLIMUS 0.5 MG: 0.5 CAPSULE ORAL at 07:37

## 2024-11-25 RX ADMIN — DAPSONE 50 MG: 25 TABLET ORAL at 07:37

## 2024-11-25 RX ADMIN — CIPROFLOXACIN 500 MG: 500 TABLET ORAL at 07:37

## 2024-11-25 RX ADMIN — ASPIRIN 81 MG CHEWABLE TABLET 81 MG: 81 TABLET CHEWABLE at 07:37

## 2024-11-25 RX ADMIN — SODIUM BICARBONATE 1950 MG: 650 TABLET ORAL at 07:37

## 2024-11-25 RX ADMIN — MAGNESIUM OXIDE TAB 400 MG (241.3 MG ELEMENTAL MG) 400 MG: 400 (241.3 MG) TAB at 07:37

## 2024-11-25 RX ADMIN — MYCOPHENOLIC ACID 540 MG: 360 TABLET, DELAYED RELEASE ORAL at 07:37

## 2024-11-25 RX ADMIN — CARVEDILOL 3.12 MG: 3.12 TABLET, FILM COATED ORAL at 07:37

## 2024-11-25 RX ADMIN — TICAGRELOR 90 MG: 90 TABLET ORAL at 07:37

## 2024-11-25 ASSESSMENT — ACTIVITIES OF DAILY LIVING (ADL)
ADLS_ACUITY_SCORE: 0
ADLS_ACUITY_SCORE: 39
ADLS_ACUITY_SCORE: 39
ADLS_ACUITY_SCORE: 0
ADLS_ACUITY_SCORE: 39
ADLS_ACUITY_SCORE: 0

## 2024-11-25 NOTE — PLAN OF CARE
Goal Outcome Evaluation:      Plan of Care Reviewed With: patient    Overall Patient Progress: no changeOverall Patient Progress: no change    Outcome Evaluation: Discharging early afternoon this shift. No complaints.    DISCHARGE:  Patient with orders to discharge to home.     Discharge instructions, medications & follow ups reviewed with patient. Copy of discharge summary given to patient. PIV removed. Belongings returned from security n/a. [facility] notified, report given n/a.    Patient in stable condition. AVSS. Pt had no further questions regarding discharge instructions and medications. Patient transferred out by self & left with family.

## 2024-11-25 NOTE — PLAN OF CARE
"/76 (BP Location: Right arm)   Pulse 83   Temp 97.8  F (36.6  C) (Oral)   Resp 20   Ht 1.803 m (5' 11\")   Wt 100 kg (220 lb 7.4 oz)   SpO2 95%   BMI 30.75 kg/m      Status: Dehydration, fluid buildup surrounding transplanted pancreas  Activity: Up ad prasanna  Neuros: A&Ox4, no deficits noted.  Cardiac: WDL, denies chest pain.  Respiratory: WDL on RA, denies SOB.  GI/: +BS, LBM 11/24, voids spont with AUOP.  Diet: Tolerating regular diet.  Skin/Incisions: WDL.  Lines/Drains: PIV SL.  Pain/Nausea: Denies.  New Changes: No acute changes this shift.  "

## 2024-11-25 NOTE — PROGRESS NOTES
[facility]  Transplant Nephrology Progress Note  Date of Admission:  11/21/2024  Today's Date: 11/25/2024  Requesting physician: Ramya Munoz MD    Recommendations:   - Decrease mycophenolic acid to 540 mg bid x 1-2 weeks in setting of acute infection  - Check PSA  ( ordered for you)   - Continue coreg 3.125 mg BID  hold for SBP<110, given previous heart history and up trending Bps  - Agree with scheduling urology follow up as OP.     Assessment & Plan   # DDKT (SPK): DELISA peaked at 2.6mg/dl likely in setting of UTI, dehydration, and elevated tacrolimus level. improved creatinine back to baseline. Transplant renal US without evidence of obstruction.  No acute indications for dialysis.   - Baseline Creatinine: ~ 1.5-1.8 mg/dl   - Proteinuria: Minimally elevated (18-29 mg/g Cr)   - DSA Hx: No DSA   - Last cPRA: 11% 8/2024   - BK Viremia: No   - Kidney Tx Biopsy Hx: No biopsy history.    # Pancreas Tx (SPK):    - Pancreatic Exocrine Drainage: Enteric drained     - Blood glucose: Elevated blood glucose      On insulin: No   - HbA1c: Stable      Latest HbA1c: 6.2%   - Pancreatic enzymes: Trend up. Normal pancreas transplant US    - DSA Hx: No DSA   - Pancreas Tx Biopsy Hx: No biopsy history    # Immunosuppression: Tacrolimus immediate release (goal 8-10) and Mycophenolic acid (dose 720 mg every 12 hours)   - Induction with Recent Transplant:  High Intensity Protocol   - Continue with intensive monitoring of immunosuppression for efficacy and toxicity.   - Historical Changes in Immunosuppression: None   - Changes: Yes - recommend decreasing mycophenolic acid to 540 mg bid in setting of acute infection  x2 weeks then resume 720mg BID.    # Infection Prevention:   - PJP: Dapsone (changed off bactrim due to hyperkalemia)  - CMV: None, prophylaxis completed      - CMV IgG Ab High Risk Discordance (D+/R-): No  CMV Serostatus: Positive  - EBV IgG Ab High Risk Discordance  (D+/R-): No  EBV Serostatus: Positive    # Hypertension: Borderline control;  Goal BP: < 140/90   - Changes: Yes -   coreg 3.125 mg BID  hold for SBP<110    # Anemia in Chronic Renal Disease: Hgb: Trend down      KERRI: No   - Iron studies: Low iron saturation, but high ferritin    # Mineral Bone Disorder:    - Secondary renal hyperparathyroidism; PTH level: Mildly elevated (151-300 pg/ml)        On treatment: None  - Vitamin D; level: High        On supplement: No  - Calcium; level: Normal        On supplement: No  - Phosphorus; level: Normal        On supplement: No    # Electrolytes:   - Potassium; level: Normal        On supplement: No  - Magnesium; level: Normal        On supplement: No  - Bicarbonate; level: Low        On supplement:  Yes, sodium bicarbonate 1,950 mg tid     # UTI: urine culture from 11/13/24 grew >100k Pseudomonas aeruginosa and Enterococcus faecalis. Currently on ciprofloxacin and linezolid with some improvement in symptoms. Continue 2 week course.   Given that he has history of few bouts of UTI, and now CT showing  Peripherally enhancing 1.8 cm fluid collection inferior to the prostate, raises concern for  chronic prostatitis.   it reasonable to continue current abx for two weeks. If UTI recur ,will consider longer treatment.  - Agree involving Urology.  - Check PSA     # Norovirus: tested positive on 11/16/24 and on nitazoxanide. Given severity of symptoms, will continue treatment for 3 weeks.     # Other Significant PMH:   - CAD, s/p CABG: Asymptomatic.              - HFrEF: Last cardiac echo (Jul/2024) showed moderately decreased LVEF ~ 35-40% with akinesis of the basal-mid inferior and basal inferoseptal segments. Updated ECHO this admission on 11/21/24 showed no change.              - H/o V. Fib Arrest: Patient with V. Fib arrest and STEMI following kidney transplant and emergently brought to Cath Lab and found to have in stent thrombosis of the previous mid RCA stent, which was felt to  be the culprit in inferior STEMI.  Patient underwent aspiration thrombectomy and PCI with two IBAN.  Followed by Cardiology.              - PAD: No claudication symptoms.              - GERD: Asymptomatic on PPI.              - Lower Back Pain with Sciatica: Patient with h/o lower back pain and sciatica years ago, now returned with pain radiating down right leg. Has been taking cyclobenzaprine.    # Transplant History:  Etiology of Kidney Failure: Diabetes mellitus type 2  Tx: DDKT (SPK)  Transplant: 7/20/2024 (Kidney / Pancreas)  Significant transplant-related complications: None    Recommendations were communicated to the primary team verbally.    Seen and discussed with Dr. Mlavin Crow MD  Nephrology Fellow      Interval History  Mr. Ramey's creatinine is 1.65 (11/25 0620); Trend down.now back to baseline  Continued to have good amount of urine output though he does still have dysuria. No supra pubic pain or discomfort.  Other significant labs/tests/vitals: stable , no dizziness or light headedness.  no events overnight.  no chest pain or shortness of breath.  no leg swelling.  no nausea and vomiting.  Bowel movements are normal.  no fever, sweats or chills.   CT/abd/pelvis :  Lower quadrant transplant pancreas with mild surrounding free fluid, transplant kidney with mildly heterogeneous  Enhancement and Peripherally enhancing 1.8 cm fluid collection inferior to the prostate       Review of Systems   4 point ROS was obtained and negative except as noted in the Interval History.    MEDICATIONS:  Current Facility-Administered Medications   Medication Dose Route Frequency Provider Last Rate Last Admin    aspirin (ASA) chewable tablet 81 mg  81 mg Oral Daily Julianna Alexis NP   81 mg at 11/24/24 0951    atorvastatin (LIPITOR) tablet 40 mg  40 mg Oral QPM Julianna Alexis NP   40 mg at 11/24/24 2048    carvedilol (COREG) tablet 3.125 mg  3.125 mg Oral BID Julienne Ibrahim PA-C   3.125  "mg at 24    ciprofloxacin (CIPRO) tablet 500 mg  500 mg Oral BID Julianna Alexis NP   500 mg at 24    dapsone (ACZONE) tablet 50 mg  50 mg Oral Daily Julianna Alexis NP   50 mg at 24 0951    insulin aspart (NovoLOG) injection (RAPID ACTING)  1-7 Units Subcutaneous TID AC Julienne Ibrahim PA-C   1 Units at 24 1656    insulin aspart (NovoLOG) injection (RAPID ACTING)  1-5 Units Subcutaneous At Bedtime Julienne Ibrahim PA-C        magnesium oxide (MAG-OX) tablet 400 mg  400 mg Oral Daily Julienne Ibrahim PA-C   400 mg at 24 1344    mycophenolic acid (GENERIC EQUIVALENT) EC tablet 540 mg  540 mg Oral BID Mindy Tenorio PA-C   540 mg at 24    sodium bicarbonate tablet 1,950 mg  1,950 mg Oral TID Julianna Alexis NP   1,950 mg at 248    sodium chloride (PF) 0.9% PF flush 3 mL  3 mL Intracatheter Q8H Barbara Ceja APRN CNP   3 mL at 24 1636    tacrolimus (GENERIC EQUIVALENT) capsule 0.5 mg  0.5 mg Oral BID IS Mindy Tenroio PA-C   0.5 mg at 24 1807    ticagrelor (BRILINTA) tablet 90 mg  90 mg Oral BID Julianna Alexis NP   90 mg at 24 2048     Current Facility-Administered Medications   Medication Dose Route Frequency Provider Last Rate Last Admin       Physical Exam   Temp  Av.6  F (36.4  C)  Min: 97.2  F (36.2  C)  Max: 98  F (36.7  C)      Pulse  Av  Min: 71  Max: 89 Resp  Av.1  Min: 16  Max: 20  SpO2  Av.5 %  Min: 91 %  Max: 100 %     /69   Pulse 85   Temp 98.4  F (36.9  C) (Oral)   Resp 18   Ht 1.803 m (5' 11\")   Wt 97.9 kg (215 lb 14.4 oz)   SpO2 93%   BMI 30.11 kg/m     Date 24 07 - 24 0659   Shift 4567-5264 6549-0558 4557-2816 24 Hour Total   INTAKE   Shift Total(mL/kg)       OUTPUT   Urine 500   500   Shift Total(mL/kg) 500(5.2)   500(5.2)   Weight (kg) 96.12 96.12 96.12 96.12      Admit Weight: 96.2 kg (212 lb)     GENERAL " APPEARANCE: alert and no distress  HENT: mouth without ulcers or lesions  RESP: lungs clear to auscultation - no rales, rhonchi or wheezes  CV: regular rhythm, normal rate, no rub, no murmur  EDEMA: no LE edema bilaterally  ABDOMEN: soft, nondistended, nontender, bowel sounds normal  MS: extremities normal - no gross deformities noted, no evidence of inflammation in joints, no muscle tenderness  SKIN: no rash  GRAFT: no tenderness to palpation    Data   All labs reviewed by me.  CMP  Recent Labs   Lab 11/25/24  0221 11/24/24  2209 11/24/24  1644 11/24/24  1343 11/24/24  0632 11/23/24  0607 11/22/24  0548 11/21/24  1307   NA  --   --   --   --  138 138 137 137   POTASSIUM  --   --   --   --  4.8 4.5 3.9 3.9   CHLORIDE  --   --   --   --  108* 110* 107 103   CO2  --   --   --   --  22 19* 19* 23   ANIONGAP  --   --   --   --  8 9 11 11   * 110* 194* 190* 120* 138* 123* 129*   BUN  --   --   --   --  24.7* 26.6* 36.3* 44.1*   CR  --   --   --   --  1.79* 1.88* 2.16* 2.55*   GFRESTIMATED  --   --   --   --  46* 43* 37* 30*   BETHANY  --   --   --   --  10.0 9.8 9.7 10.0   MAG  --   --   --   --  1.4* 1.7 1.8 2.0   PHOS  --   --   --   --  2.4* 2.1* 2.7 2.6   PROTTOTAL  --   --   --   --   --   --  5.2* 6.0*   ALBUMIN  --   --   --   --   --   --  3.0* 3.5   BILITOTAL  --   --   --   --   --   --  0.3 0.3   ALKPHOS  --   --   --   --   --   --  107 125   AST  --   --   --   --   --   --  29 36   ALT  --   --   --   --   --   --  31 47     CBC  Recent Labs   Lab 11/22/24  0548 11/21/24  1307 11/21/24  0734   HGB 8.9* 9.8* 11.2*   WBC 3.7* 5.0 6.0   RBC 3.12* 3.40* 3.89*   HCT 27.9* 30.5* 34.9*   MCV 89 90 90   MCH 28.5 28.8 28.8   MCHC 31.9 32.1 32.1   RDW 15.7* 15.6* 15.5*    233 336     INRNo lab results found in last 7 days.  ABGNo lab results found in last 7 days.   Urine Studies  Recent Labs   Lab Test 11/21/24  1308 11/13/24  0734 10/18/24  0814 10/08/24  1524 07/24/21  1151 06/25/19  1439 06/19/19  0822    COLOR Yellow Yellow Light Yellow Light Yellow   < > Straw Straw   APPEARANCE Clear Clear Clear Slightly Cloudy*   < > Clear Clear   URINEGLC Negative 30* Negative 500*   < > 200 mg/dL* >1000 mg/dL*   URINEBILI Negative Negative Negative Negative   < > Negative Negative   URINEKETONE Negative Negative Negative Negative   < > Negative Negative   SG 1.016 1.018 1.016 1.018   < > 1.010 1.012   UBLD Small* 0.06 mg/dL* Negative Small*   < > Moderate* Trace*   URINEPH 5.5 7.0 7.0 7.0   < > 6.5 6.0   PROTEIN Negative 100* 50* 30*   < > 200 mg/dL* 300 mg/dL*   UROBILINOGEN  --   --   --   --   --  <2.0 E.U./dL <2.0 E.U./dL   NITRITE Negative Negative Negative Positive*   < > Negative Negative   LEUKEST Moderate* 250 Aliza/uL* 75 Aliza/uL* Moderate*   < > Negative Negative   RBCU 2 12* 1 27*   < > 25-50* 0-2   WBCU 16* 17* 6* 27*   < > 0-5 0-5    < > = values in this interval not displayed.     No lab results found.  PTH  Recent Labs   Lab Test 08/05/24  0758 07/25/24  0448 07/20/21  1845   PTHI 263* 199* 190*     Iron Studies  Recent Labs   Lab Test 08/05/24  0758   IRON 44*      IRONSAT 17   KADEN 1,725*       IMAGING:  All imaging studies reviewed by me.

## 2024-11-25 NOTE — TELEPHONE ENCOUNTER
Shanti Celeste APRN CNP Poucher, Jessica, RN  Patient discharging home today (11/25). Will need the follow-up below:    1. Transplant labs (BMP, Magnesium, CBC, Lipase, tacrolimus trough) Tues 11/26/2024, then twice weekly for 2 weeks.  2. Reschedule missed Cardiology appointment for follow up on CAD and HFeEF  3. Follow up with Urology for fluid collection inferior to prostate  4. Follow up with Transplant Surgery, Harrison Whitehead MD, for evaluation of possible incisional hernia  5. UA with reflex to culture in one week    Standing lab orders updated. Message sent to [facility] at [facility] to rescheduled missed appointment. Follow up with surgery requested.

## 2024-11-26 ENCOUNTER — TELEPHONE (OUTPATIENT)
Dept: UROLOGY | Facility: CLINIC | Age: 49
End: 2024-11-26

## 2024-11-26 ENCOUNTER — PRE VISIT (OUTPATIENT)
Dept: INFECTIOUS DISEASES | Facility: CLINIC | Age: 49
End: 2024-11-26
Payer: COMMERCIAL

## 2024-11-26 DIAGNOSIS — I15.1 HTN, KIDNEY TRANSPLANT RELATED: ICD-10-CM

## 2024-11-26 DIAGNOSIS — Z94.0 HTN, KIDNEY TRANSPLANT RELATED: ICD-10-CM

## 2024-11-26 NOTE — TELEPHONE ENCOUNTER
[facility] Call Center    Phone Message    May a detailed message be left on voicemail: yes     Reason for Call: Appointment Intake    Referring Provider Name:  ESTEBAN PRICE  Diagnosis and/or Symptoms: Prostate abscess    Diagnosis not on protocols. Please review and call patient to schedule.    Action Taken: Message routed to:  [facility] ([facility] Urology    Travel Screening: Not Applicable

## 2024-11-26 NOTE — TELEPHONE ENCOUNTER
"Incoming call and VM from the patient. Apologizing for not returning phone calls, etc. States that he is doing \"ok,even a little better\". Informed Dr. Che of call back. She encouraged to reach out to the patient to arrange a phone call between patient and provider, a time that works for him. SHERI,RN  " [[id number]] [[phone]]

## 2024-11-27 DIAGNOSIS — I15.1 HTN, KIDNEY TRANSPLANT RELATED: ICD-10-CM

## 2024-11-27 DIAGNOSIS — Z94.0 HTN, KIDNEY TRANSPLANT RELATED: ICD-10-CM

## 2024-11-29 LAB — PROSPERA TRANSPLANT MONITORING: 0.42 %

## 2024-12-02 NOTE — TELEPHONE ENCOUNTER
MEDICAL RECORDS REQUEST   [facility]  Urology Clinic  [address] [place] [address]  PHONE: [phone]  Fax: [phone]        FUTURE VISIT INFORMATION                                                   Siva Ramey, : 1975 scheduled for future visit at [facility]    APPOINTMENT INFORMATION:  Date: 2025  Provider:  GENEVA Major  Reason for Visit/Diagnosis: Prostate Abscess    REFERRAL INFORMATION:  Referring provider:  Dr. Sorensen  Specialty: SOT  Referring providers clinic:  [facility]    RECORDS REQUESTED FOR VISIT                                                     NOTES  STATUS/DETAILS   OFFICE NOTE from referring provider  yes  [facility]:  24 - Referral from Dr. Sorensen  10/1/24 - SOT OV with Dr. Sorensen   OFFICE NOTE from other specialist  yes  [facility]:  24 - SOT OV with Shelia Magallon NP  24 - URO OV with Dr. Harris    [facility]:  10/8/24 - PCC OV with Dr. Solis   DISCHARGE SUMMARY from hospital  yes  [facility]:  24 - Admission with Dr. Munoz  24 - Admission with Dr. Ramirez  24 - Admission with Dr. Whitehead   DISCHARGE REPORT from the ER  no   OPERATIVE REPORT  yes  [facility]:  24 - OP Note for Transplant pancreas, kidney  donor, with ureteral stent placement with Dr. Whitehead   MEDICATION LIST  yes   PROSTATE CANCER     CT ABDOMEN (IMAGES & REPORT)  Yes:  24 - CT Abd/Pelvis  24 - NM Hepatobiliary  24 - CT Abd/Pelvis  24 - CT Abd/Pelvis  24 - CT Abd/Pelvis  24 - CT Abd/Pelvis   US  Yes  [facility]:  24 - US Pancreas  24 - US Renal  10/1/24 - US Renal  24 - US Pancreas  * Additional in Epic/PACs   PSA (LAB)  yes  [facility]:  24 - PSA     PRE-VISIT CHECKLIST      Joint diagnostic appointment coordinated correctly          (ensure right order & amount of time) Yes   RECORD COLLECTION COMPLETE Yes

## 2024-12-03 ENCOUNTER — OFFICE VISIT (OUTPATIENT)
Dept: TRANSPLANT | Facility: CLINIC | Age: 49
End: 2024-12-03
Attending: INTERNAL MEDICINE
Payer: MEDICARE

## 2024-12-03 ENCOUNTER — LAB (OUTPATIENT)
Dept: LAB | Facility: CLINIC | Age: 49
End: 2024-12-03
Attending: INTERNAL MEDICINE
Payer: COMMERCIAL

## 2024-12-03 VITALS
TEMPERATURE: 98.7 F | WEIGHT: 219.4 LBS | HEART RATE: 91 BPM | SYSTOLIC BLOOD PRESSURE: 129 MMHG | BODY MASS INDEX: 30.6 KG/M2 | OXYGEN SATURATION: 96 % | DIASTOLIC BLOOD PRESSURE: 80 MMHG

## 2024-12-03 DIAGNOSIS — N18.31 ANEMIA IN STAGE 3A CHRONIC KIDNEY DISEASE (H): ICD-10-CM

## 2024-12-03 DIAGNOSIS — E83.42 HYPOMAGNESEMIA: ICD-10-CM

## 2024-12-03 DIAGNOSIS — Z94.0 KIDNEY REPLACED BY TRANSPLANT: ICD-10-CM

## 2024-12-03 DIAGNOSIS — Z29.89 NEED FOR PNEUMOCYSTIS PROPHYLAXIS: ICD-10-CM

## 2024-12-03 DIAGNOSIS — E66.811 OBESITY (BMI 30.0-34.9): ICD-10-CM

## 2024-12-03 DIAGNOSIS — I15.1 HTN, KIDNEY TRANSPLANT RELATED: ICD-10-CM

## 2024-12-03 DIAGNOSIS — N39.0 UTI (URINARY TRACT INFECTION): ICD-10-CM

## 2024-12-03 DIAGNOSIS — E87.20 METABOLIC ACIDOSIS: ICD-10-CM

## 2024-12-03 DIAGNOSIS — D63.1 ANEMIA IN STAGE 3A CHRONIC KIDNEY DISEASE (H): ICD-10-CM

## 2024-12-03 DIAGNOSIS — Z48.298 AFTERCARE FOLLOWING ORGAN TRANSPLANT: ICD-10-CM

## 2024-12-03 DIAGNOSIS — D84.9 IMMUNOSUPPRESSED STATUS (H): ICD-10-CM

## 2024-12-03 DIAGNOSIS — N18.31 STAGE 3A CHRONIC KIDNEY DISEASE (H): ICD-10-CM

## 2024-12-03 DIAGNOSIS — Z94.83 PANCREAS REPLACED BY TRANSPLANT (H): Primary | ICD-10-CM

## 2024-12-03 DIAGNOSIS — Z94.83 PANCREAS REPLACED BY TRANSPLANT (H): ICD-10-CM

## 2024-12-03 DIAGNOSIS — Z94.0 HTN, KIDNEY TRANSPLANT RELATED: ICD-10-CM

## 2024-12-03 PROBLEM — R55 SYNCOPE: Status: RESOLVED | Noted: 2024-11-21 | Resolved: 2024-12-03

## 2024-12-03 PROBLEM — R53.83 FATIGUE, UNSPECIFIED TYPE: Status: RESOLVED | Noted: 2022-07-28 | Resolved: 2024-12-03

## 2024-12-03 PROBLEM — E86.0 DEHYDRATION: Status: RESOLVED | Noted: 2024-11-18 | Resolved: 2024-12-03

## 2024-12-03 PROBLEM — N17.9 AKI (ACUTE KIDNEY INJURY) (H): Status: RESOLVED | Noted: 2024-11-22 | Resolved: 2024-12-03

## 2024-12-03 PROBLEM — D50.9 IRON DEFICIENCY ANEMIA, UNSPECIFIED: Status: RESOLVED | Noted: 2021-08-26 | Resolved: 2024-12-03

## 2024-12-03 PROBLEM — A08.11 NOROVIRUS: Status: RESOLVED | Noted: 2024-11-22 | Resolved: 2024-12-03

## 2024-12-03 LAB
AMYLASE SERPL-CCNC: 153 U/L (ref 28–100)
ANION GAP SERPL CALCULATED.3IONS-SCNC: 8 MMOL/L (ref 7–15)
BUN SERPL-MCNC: 17.7 MG/DL (ref 6–20)
CALCIUM SERPL-MCNC: 10.8 MG/DL (ref 8.8–10.4)
CHLORIDE SERPL-SCNC: 109 MMOL/L (ref 98–107)
CREAT SERPL-MCNC: 1.56 MG/DL (ref 0.67–1.17)
EGFRCR SERPLBLD CKD-EPI 2021: 54 ML/MIN/1.73M2
ERYTHROCYTE [DISTWIDTH] IN BLOOD BY AUTOMATED COUNT: 19.9 % (ref 10–15)
GLUCOSE SERPL-MCNC: 123 MG/DL (ref 70–99)
HCO3 SERPL-SCNC: 22 MMOL/L (ref 22–29)
HCT VFR BLD AUTO: 34 % (ref 40–53)
HGB BLD-MCNC: 10.2 G/DL (ref 13.3–17.7)
LIPASE SERPL-CCNC: 124 U/L (ref 13–60)
MAGNESIUM SERPL-MCNC: 1.4 MG/DL (ref 1.7–2.3)
MCH RBC QN AUTO: 29.9 PG (ref 26.5–33)
MCHC RBC AUTO-ENTMCNC: 30 G/DL (ref 31.5–36.5)
MCV RBC AUTO: 100 FL (ref 78–100)
PLATELET # BLD AUTO: 149 10E3/UL (ref 150–450)
POTASSIUM SERPL-SCNC: 5.5 MMOL/L (ref 3.4–5.3)
RBC # BLD AUTO: 3.41 10E6/UL (ref 4.4–5.9)
SODIUM SERPL-SCNC: 139 MMOL/L (ref 135–145)
TACROLIMUS BLD-MCNC: 6.7 UG/L (ref 5–15)
TME LAST DOSE: NORMAL H
TME LAST DOSE: NORMAL H
WBC # BLD AUTO: 7.9 10E3/UL (ref 4–11)

## 2024-12-03 PROCEDURE — 83735 ASSAY OF MAGNESIUM: CPT | Performed by: PATHOLOGY

## 2024-12-03 PROCEDURE — 83690 ASSAY OF LIPASE: CPT | Performed by: PATHOLOGY

## 2024-12-03 PROCEDURE — G0463 HOSPITAL OUTPT CLINIC VISIT: HCPCS | Performed by: INTERNAL MEDICINE

## 2024-12-03 PROCEDURE — 85027 COMPLETE CBC AUTOMATED: CPT | Performed by: PATHOLOGY

## 2024-12-03 PROCEDURE — 80048 BASIC METABOLIC PNL TOTAL CA: CPT | Performed by: PATHOLOGY

## 2024-12-03 PROCEDURE — 36415 COLL VENOUS BLD VENIPUNCTURE: CPT | Performed by: PATHOLOGY

## 2024-12-03 PROCEDURE — 82150 ASSAY OF AMYLASE: CPT | Performed by: PATHOLOGY

## 2024-12-03 PROCEDURE — 99000 SPECIMEN HANDLING OFFICE-LAB: CPT | Performed by: PATHOLOGY

## 2024-12-03 PROCEDURE — 80197 ASSAY OF TACROLIMUS: CPT | Performed by: INTERNAL MEDICINE

## 2024-12-03 ASSESSMENT — PAIN SCALES - GENERAL: PAINLEVEL_OUTOF10: NO PAIN (0)

## 2024-12-03 NOTE — NURSING NOTE
Chief Complaint   Patient presents with    RECHECK       /80   Pulse 91   Temp 98.7  F (37.1  C) (Oral)   Wt 99.5 kg (219 lb 6.4 oz)   SpO2 96%   BMI 30.60 kg/m      Yoav Ibrahim on 12/3/2024 at 10:09 AM

## 2024-12-03 NOTE — PATIENT INSTRUCTIONS
Patient Recommendations:  - Recommend low potassium diet.  - Recommend weight loss for overall health by increasing exercise and watching caloric intake.  - Would consider wearing compression stockings to help with lightheadedness on position change.    Transplant Patient Information  Your Post Transplant Coordinator is: Ximena Edwards  For non urgent items, we encourage you to contact your coordinator/care team online via [employer]  You and your care team can also contact your transplant coordinator Monday - Friday, 8am - 5pm at [phone] (Option 2 to reach the coordinator or Option 4 to schedule an appointment).  After hours for urgent matters, please call [facility] at [phone].

## 2024-12-03 NOTE — LETTER
12/3/2024      RE: Siva MONAHAN Tanvir  [address] [address]       TRANSPLANT NEPHROLOGY CLINIC VISIT     Assessment & Plan  # DDKT (SPK): CKD Stage 3b - Stable creatinine, now at baseline following recent hospital admission for DELISA.   - Baseline Creatinine: ~ 1.5-1.8   - Proteinuria: Moderate (1-3 grams)   - DSA Hx: No DSA   - Last cPRA: 11%   - BK Viremia: No   - Kidney Tx Biopsy Hx: No biopsy history.    # Pancreas Tx (SPK): Patient with persistently higher blood glucose since transplant.  Would consider SGLT2 inhibitor.  Will defer to PCP to manage.   - Pancreatic Exocrine Drainage: Enteric drained     - Blood glucose: Elevated blood glucose      On insulin: No   - HbA1c: Stable to slight trend down      Latest HbA1c: 6.2%   - Pancreatic enzymes: Trend down, still elevated   - DSA Hx: No DSA   - Pancreas Tx Biopsy Hx: No biopsy history    # Immunosuppression: Tacrolimus immediate release (goal 8-10) and Mycophenolic acid (dose 540 mg every 12 hours)   - Induction with Recent Transplant:  High Intensity Protocol   - Continue with intensive monitoring of immunosuppression for efficacy and toxicity.   - Historical Changes in Immunosuppression:  Slightly lower mycophenolic acid dose due to norovirus infection.   - Changes: Not at this time; Will repeat MPA level and consider increasing mycophenolic acid dose back to 720 mg bid.    # Infection Prevention:      - PJP: Dapsone; Changed off Bactrim due to hyperkalemia.  - CMV: None, prophylaxis completed      - CMV IgG Ab High Risk Discordance (D+/R-): No  CMV Serostatus: Positive  - EBV IgG Ab High Risk Discordance (D+/R-): No  EBV Serostatus: Positive    # Hypertension: Controlled;  Goal BP: < 130/80   - Changes: Not at this time    # Anemia in Chronic Renal Disease: Hgb: Trend up after decrease with recent DELISA      KERRI: No   - Iron studies: Low iron saturation, but high ferritin    # Mineral Bone Disorder:    - Secondary renal hyperparathyroidism; PTH  "level: Mildly elevated (151-300 pg/ml)        On treatment: None  - Vitamin D; level: High        On supplement: No; Cholecalciferol held due to high level.  - Calcium; level: High        On supplement: No    # Electrolytes:   - Potassium; level: High        On supplement: No; Discussed low potassium diet.  - Magnesium; level: Stable low        On supplement: Yes  - Bicarbonate; level: Normal        On supplement: Yes    # Urethral Stricture: Patient has meatal stenosis and s/p ureteral stent removal in OR by Urology on 10/10/24.  Patient reports that he feels he has to stand to completely empty his bladder, but no concerns.  Followed by Urology.    # UTI, Possible Prostate Abscess: Patient with symptomatic and positive urine culture 11/13/24 for Pseudomonas aeruginosa and Enterococcus faecalis.  He was started on ciprofloxacin and linezolid.  During recent hospitalization soon after UTI diagnosis, patient had CT abd/pelvis Nov/2024 that showed \"Peripheral enhancing fluid collection measuring 18 x 10 mm along the inferior margin of the prostate.\"  This was felt to possibly be a prostate abscess.  Patient completed course of antibiotics and was referred to Urology for further evaluation.  Denies any UTI symptoms.    # Norovirus Infection: Symptoms now resolved after completing course of nitazoxanide and slightly lower immunosuppression.    # Obesity, Class I (BMI = 30.6): Stable weight.   - Recommend weight loss for overall health by increasing exercise and watching caloric intake.  - Patient may benefit from SGLT2 inhibitors.  However, because of their pancreas transplant, would avoid GLP1 agonists due to increased risk of pancreatitis.      # Other Significant PMH:   - CAD, s/p CABG: Asymptomatic.   - HFrEF: Last cardiac echo (Jul/2024) showed moderately decreased LVEF ~ 35-40% with akinesis of the basal-mid inferior and basal inferoseptal segments.   - H/o V. Fib Arrest: Patient with V. Fib arrest and STEMI " following kidney transplant and emergently brought to Cath Lab and found to have in stent thrombosis of the previous mid RCA stent, which was felt to be the culprit in inferior STEMI.  Patient underwent aspiration thrombectomy and PCI with two IBAN.  Followed by Cardiology.   - PAD: No claudication symptoms.   - GERD: Asymptomatic and now off PPI.   - Lower Back Pain with Sciatica: Patient with h/o lower back pain and sciatica years ago, now returned with pain radiating down right leg.  Has been taking cyclobenzaprine.  Started physical therapy this week.     # Skin Cancer Risk:    - Discussed sun protection and recommend regular follow up with Dermatology.    # Transplant History:  Etiology of Kidney Failure: Diabetes mellitus type 2  Tx: SPK  Transplant: 7/20/2024 (Kidney / Pancreas)  Significant transplant-related complications:  Peritransplant STEMI and V. Fib arrest.    Transplant Office Phone Number: [phone]    Assessment and plan was discussed with the patient and he voiced his understanding and agreement.    Return visit: Return for previously scheduled visit.    Fernando Sorensen MD    The longitudinal plan of care for the diagnosis(es)/condition(s) as documented were addressed during this visit. Due to the added complexity in care, I will continue to support Siva in the subsequent management and with ongoing continuity of care.      Chief Complaint  Mr. Ramey is a 49 year old here for kidney transplant, pancreas transplant, and immunosuppression management.     History of Present Illness   Mr. Ramey reports feeling good overall.  Since last clinic visit:   Hospitalizations: Yes; Admitted 11/21-11/25 for near syncope, dehydration and DELISA secondary to norovirus and UTI with urine culture positive for Pseudomonas aeruginosa and Enterococcus faecalis.  He was treated with ciprofloxacin and linezolid (completed) for his UTI.  In addition, he is completing out a 3 week course of nitazoxanide for  norovirus infection, along with slightly lowered mycophenolic acid dose.   New Medical Issues: No; He does have ongoing lower back pain with sciatic symptoms radiating down his right leg, which can limit his activity at times.  Patient just started physical therapy this week.  Chest pain or shortness of breath: Yes; No chest pain, but some shortness of breath with exertion, which is stable.  He feels this is mostly due to deconditioning.  Lower extremity swelling: No  Weight change: No  Appetite is good.  Nausea and vomiting: No  Diarrhea: No; Now just soft stools 2-3x per day.  Heartburn symptoms: No and now off medications for this.  Fever, sweats or chills: No  Night sweats: No, has since resolved.  Urinary complaints: No, but does report that he feels he has to stand when urinating in order to completely empty his bladder.    Home BP:  120/70s   Does note brief lightheadedness upon standing and after exercise, but only lasts a few seconds.    Problem List  Patient Active Problem List   Diagnosis     Type 2 diabetes mellitus with other circulatory complication, unspecified whether long term insulin use (H)     Dyslipidemia     Atherosclerosis of other coronary artery bypass graft(s) with unstable angina pectoris (H)     Anemia in chronic renal disease     HTN, kidney transplant related     S/P CABG (coronary artery bypass graft)     Balanoposthitis     History of ST elevation myocardial infarction (STEMI)     Obesity (BMI 30.0-34.9)     MARQUEZ (dyspnea on exertion)     STEMI (ST elevation myocardial infarction) (H)     Metabolic acidosis     Retinopathy     Peripheral neuropathy     Acquired elevated diaphragm     Median nerve neuropathy, left     Stage 3a chronic kidney disease (H)     Coagulation defect, unspecified (H)     Obstructive sleep apnea treated with continuous positive airway pressure (CPAP)     Snoring     Palpitations     Gastroesophageal reflux disease without esophagitis     Diabetes mellitus, type  2 (H)     Kidney replaced by transplant     Pancreas replaced by transplant (H)     Immunosuppressed status (H)     Cardiac arrest with ventricular fibrillation (H)     Steroid-induced hyperglycemia     History of simultaneous kidney and pancreas transplant (H)     Hypomagnesemia     Secondary renal hyperparathyroidism (H)     Need for pneumocystis prophylaxis     Other stricture of urethra in male     Aftercare following organ transplant     CAD (coronary artery disease)     PAD (peripheral artery disease) (H)     HFrEF (heart failure with reduced ejection fraction) (H)     Urinary tract infection       Allergies  Allergies   Allergen Reactions     Amoxicillin Hives     & generalized pain    Tolerated ceftriaxone in 2023 ([employer] [facility]) and 2024 ([facility])     Venlafaxine      lethargic       Medications  Current Outpatient Medications   Medication Sig Dispense Refill     acetaminophen (TYLENOL) 500 MG tablet Take 500 mg by mouth every 4 hours as needed       aspirin (ASA) 81 MG chewable tablet Take 1 tablet (81 mg) by mouth daily Starting tomorrow. 30 tablet 3     atorvastatin (LIPITOR) 40 MG tablet Take 1 tablet (40 mg) by mouth every evening. 90 tablet 4     blood glucose (NO BRAND SPECIFIED) test strip Use to test blood sugar 4 times daily or as directed. 100 strip 5     carvedilol (COREG) 3.125 MG tablet Take 1 tablet (3.125 mg) by mouth 2 times daily. 60 tablet 11     ciprofloxacin (CIPRO) 500 MG tablet Take 1 tablet (500 mg) by mouth 2 times daily. 28 tablet 0     dapsone (ACZONE) 25 MG tablet Take 2 tablets (50 mg) by mouth daily 60 tablet 11     magnesium glycinate 100 MG CAPS capsule Take 2 capsules (200 mg) by mouth 2 times daily Take in place of magnesium-oxide 120 capsule 2     mycophenolic acid (GENERIC EQUIVALENT) 180 MG EC tablet Take 540 mg twice a day until follow up appointment with Dr. Sorensen       sodium bicarbonate 650 MG tablet Take 3 tablets (1,950 mg) by mouth 3 times daily. 270  tablet 3     tacrolimus (GENERIC EQUIVALENT) 0.5 MG capsule Take 1 capsule (0.5 mg) by mouth 2 times daily.       tacrolimus (GENERIC EQUIVALENT) 1 MG capsule Hold 1 mg capsules for now.       ticagrelor (BRILINTA) 90 MG tablet Take 1 tablet (90 mg) by mouth 2 times daily. 60 tablet 5     No current facility-administered medications for this visit.     There are no discontinued medications.      Physical Exam  Vital Signs: /80   Pulse 91   Temp 98.7  F (37.1  C) (Oral)   Wt 99.5 kg (219 lb 6.4 oz)   SpO2 96%   BMI 30.60 kg/m      GENERAL APPEARANCE: alert and no distress  HENT: mouth without ulcers or lesions  RESP: lungs clear to auscultation - no rales, rhonchi or wheezes  CV: regular rhythm, normal rate, no rub, no murmur  EDEMA: no LE edema bilaterally  ABDOMEN: soft, nondistended, nontender, bowel sounds normal; moderate sized upper incisional hernia  MS: extremities normal - no gross deformities noted, no evidence of inflammation in joints, no muscle tenderness  SKIN: no rash  TX KIDNEY: normal  DIALYSIS ACCESS:  LUE AV fistula with good thrill    Data        Latest Ref Rng & Units 12/3/2024     9:42 AM 11/25/2024    11:49 AM 11/25/2024     7:36 AM   Renal   Sodium 135 - 145 mmol/L 139      K 3.4 - 5.3 mmol/L 5.5      Cl 98 - 107 mmol/L 109      Cl (external) 98 - 107 mmol/L 109      CO2 22 - 29 mmol/L 22      Urea Nitrogen 6.0 - 20.0 mg/dL 17.7      Creatinine 0.67 - 1.17 mg/dL 1.56      Glucose 70 - 99 mg/dL 123  105  119    Calcium 8.8 - 10.4 mg/dL 10.8      Magnesium 1.7 - 2.3 mg/dL 1.4            Latest Ref Rng & Units 11/25/2024     6:20 AM 11/24/2024     6:32 AM 11/23/2024     6:07 AM   Bone Health   Phosphorus 2.5 - 4.5 mg/dL 2.1  2.4  2.1          Latest Ref Rng & Units 12/3/2024     9:42 AM 11/22/2024     5:48 AM 11/21/2024     1:07 PM   Heme   WBC 4.0 - 11.0 10e3/uL 7.9  3.7  5.0    Hgb 13.3 - 17.7 g/dL 10.2  8.9  9.8    Plt 150 - 450 10e3/uL 149  221  233          Latest Ref Rng & Units  11/22/2024     5:48 AM 11/21/2024     1:07 PM 8/30/2024     9:48 AM   Liver   AP 40 - 150 U/L 107  125  174    TBili <=1.2 mg/dL 0.3  0.3  0.6    ALT 0 - 70 U/L 31  47  38    AST 0 - 45 U/L 29  36  26    Tot Protein 6.4 - 8.3 g/dL 5.2  6.0  7.2    Albumin 3.5 - 5.2 g/dL 3.0  3.5  4.5          Latest Ref Rng & Units 12/3/2024     9:42 AM 11/25/2024     6:20 AM 11/24/2024     6:32 AM   Pancreas   Amylase 28 - 100 U/L 153      Lipase (Roche) 13 - 60 U/L 124  153  257          Latest Ref Rng & Units 8/5/2024     7:58 AM   Iron studies   Iron 61 - 157 ug/dL 44    Iron Sat Index 15 - 46 % 17    Ferritin 31 - 409 ng/mL 1,725          Latest Ref Rng & Units 7/19/2024     4:56 PM 9/27/2021    12:18 PM   [facility]   EBV CAPSID ANTIBODY IGG No detectable antibody. Positive  Positive      Failed to redirect to the Timeline version of the REVFS SmartLink.  Recent Labs   Lab Test 11/07/24  1013 11/13/24  0730 11/21/24  0734 11/22/24  0548 11/23/24  0607 11/24/24  0632   DOSTAC 11/6/2024 11/12/2024 11/20/2024  --   --   --    TACROL 12.5 10.9 26.7* 18.4* 13.8 12.0     Recent Labs   Lab Test 09/19/24  0850 09/23/24  0741 10/14/24  0805 10/18/24  0826 11/13/24  0730   DOSMPA 9/18/2024   8:00 PM  --   --  10/17/2024   8:00 PM  --    MPACID 3.42   < > 3.40 3.59* 5.50*   MPAG 54.9   < > 58.2 78.1 61.7    < > = values in this interval not displayed.        Fernando Sorensen MD

## 2024-12-03 NOTE — LETTER
12/3/2024      Siva Ramey  [address] [facility] [address]      Dear Colleague,    Thank you for referring your patient, Siva Ramey, to the [facility]. Please see a copy of my visit note below.    TRANSPLANT NEPHROLOGY CLINIC VISIT     Assessment & Plan  # DDKT (SPK): CKD Stage 3b - Stable creatinine, now at baseline following recent hospital admission for DELISA.   - Baseline Creatinine: ~ 1.5-1.8   - Proteinuria: Moderate (1-3 grams)   - DSA Hx: No DSA   - Last cPRA: 11%   - BK Viremia: No   - Kidney Tx Biopsy Hx: No biopsy history.    # Pancreas Tx (SPK): Patient with persistently higher blood glucose since transplant.  Would consider SGLT2 inhibitor.  Will defer to PCP to manage.   - Pancreatic Exocrine Drainage: Enteric drained     - Blood glucose: Elevated blood glucose      On insulin: No   - HbA1c: Stable to slight trend down      Latest HbA1c: 6.2%   - Pancreatic enzymes: Trend down, still elevated   - DSA Hx: No DSA   - Pancreas Tx Biopsy Hx: No biopsy history    # Immunosuppression: Tacrolimus immediate release (goal 8-10) and Mycophenolic acid (dose 540 mg every 12 hours)   - Induction with Recent Transplant:  High Intensity Protocol   - Continue with intensive monitoring of immunosuppression for efficacy and toxicity.   - Historical Changes in Immunosuppression:  Slightly lower mycophenolic acid dose due to norovirus infection.   - Changes: Not at this time; Will repeat MPA level and consider increasing mycophenolic acid dose back to 720 mg bid.    # Infection Prevention:      - PJP: Dapsone; Changed off Bactrim due to hyperkalemia.  - CMV: None, prophylaxis completed      - CMV IgG Ab High Risk Discordance (D+/R-): No  CMV Serostatus: Positive  - EBV IgG Ab High Risk Discordance (D+/R-): No  EBV Serostatus: Positive    # Hypertension: Controlled;  Goal BP: < 130/80   - Changes: Not at this time    # Anemia in Chronic Renal Disease: Hgb: Trend up after decrease  "with recent DELISA      KERRI: No   - Iron studies: Low iron saturation, but high ferritin    # Mineral Bone Disorder:    - Secondary renal hyperparathyroidism; PTH level: Mildly elevated (151-300 pg/ml)        On treatment: None  - Vitamin D; level: High        On supplement: No; Cholecalciferol held due to high level.  - Calcium; level: High        On supplement: No    # Electrolytes:   - Potassium; level: High        On supplement: No; Discussed low potassium diet.  - Magnesium; level: Stable low        On supplement: Yes  - Bicarbonate; level: Normal        On supplement: Yes    # Urethral Stricture: Patient has meatal stenosis and s/p ureteral stent removal in OR by Urology on 10/10/24.  Patient reports that he feels he has to stand to completely empty his bladder, but no concerns.  Followed by Urology.    # UTI, Possible Prostate Abscess: Patient with symptomatic and positive urine culture 11/13/24 for Pseudomonas aeruginosa and Enterococcus faecalis.  He was started on ciprofloxacin and linezolid.  During recent hospitalization soon after UTI diagnosis, patient had CT abd/pelvis Nov/2024 that showed \"Peripheral enhancing fluid collection measuring 18 x 10 mm along the inferior margin of the prostate.\"  This was felt to possibly be a prostate abscess.  Patient completed course of antibiotics and was referred to Urology for further evaluation.  Denies any UTI symptoms.    # Norovirus Infection: Symptoms now resolved after completing course of nitazoxanide and slightly lower immunosuppression.    # Obesity, Class I (BMI = 30.6): Stable weight.   - Recommend weight loss for overall health by increasing exercise and watching caloric intake.  - Patient may benefit from SGLT2 inhibitors.  However, because of their pancreas transplant, would avoid GLP1 agonists due to increased risk of pancreatitis.      # Other Significant PMH:   - CAD, s/p CABG: Asymptomatic.   - HFrEF: Last cardiac echo (Jul/2024) showed moderately " decreased LVEF ~ 35-40% with akinesis of the basal-mid inferior and basal inferoseptal segments.   - H/o V. Fib Arrest: Patient with V. Fib arrest and STEMI following kidney transplant and emergently brought to Cath Lab and found to have in stent thrombosis of the previous mid RCA stent, which was felt to be the culprit in inferior STEMI.  Patient underwent aspiration thrombectomy and PCI with two IBAN.  Followed by Cardiology.   - PAD: No claudication symptoms.   - GERD: Asymptomatic and now off PPI.   - Lower Back Pain with Sciatica: Patient with h/o lower back pain and sciatica years ago, now returned with pain radiating down right leg.  Has been taking cyclobenzaprine.  Started physical therapy this week.     # Skin Cancer Risk:    - Discussed sun protection and recommend regular follow up with Dermatology.    # Transplant History:  Etiology of Kidney Failure: Diabetes mellitus type 2  Tx: SPK  Transplant: 7/20/2024 (Kidney / Pancreas)  Significant transplant-related complications:  Peritransplant STEMI and V. Fib arrest.    Transplant Office Phone Number: [phone]    Assessment and plan was discussed with the patient and he voiced his understanding and agreement.    Return visit: Return for previously scheduled visit.    Fernando Sorensen MD    The longitudinal plan of care for the diagnosis(es)/condition(s) as documented were addressed during this visit. Due to the added complexity in care, I will continue to support Siva in the subsequent management and with ongoing continuity of care.      Chief Complaint  Mr. Ramey is a 49 year old here for kidney transplant, pancreas transplant, and immunosuppression management.     History of Present Illness   Mr. Ramey reports feeling good overall.  Since last clinic visit:   Hospitalizations: Yes; Admitted 11/21-11/25 for near syncope, dehydration and DELISA secondary to norovirus and UTI with urine culture positive for Pseudomonas aeruginosa and Enterococcus  faecalis.  He was treated with ciprofloxacin and linezolid (completed) for his UTI.  In addition, he is completing out a 3 week course of nitazoxanide for norovirus infection, along with slightly lowered mycophenolic acid dose.   New Medical Issues: No; He does have ongoing lower back pain with sciatic symptoms radiating down his right leg, which can limit his activity at times.  Patient just started physical therapy this week.  Chest pain or shortness of breath: Yes; No chest pain, but some shortness of breath with exertion, which is stable.  He feels this is mostly due to deconditioning.  Lower extremity swelling: No  Weight change: No  Appetite is good.  Nausea and vomiting: No  Diarrhea: No; Now just soft stools 2-3x per day.  Heartburn symptoms: No and now off medications for this.  Fever, sweats or chills: No  Night sweats: No, has since resolved.  Urinary complaints: No, but does report that he feels he has to stand when urinating in order to completely empty his bladder.    Home BP:  120/70s   Does note brief lightheadedness upon standing and after exercise, but only lasts a few seconds.    Problem List  Patient Active Problem List   Diagnosis     Type 2 diabetes mellitus with other circulatory complication, unspecified whether long term insulin use (H)     Dyslipidemia     Atherosclerosis of other coronary artery bypass graft(s) with unstable angina pectoris (H)     Anemia in chronic renal disease     HTN, kidney transplant related     S/P CABG (coronary artery bypass graft)     Balanoposthitis     History of ST elevation myocardial infarction (STEMI)     Obesity (BMI 30.0-34.9)     MARQUEZ (dyspnea on exertion)     STEMI (ST elevation myocardial infarction) (H)     Metabolic acidosis     Retinopathy     Peripheral neuropathy     Acquired elevated diaphragm     Median nerve neuropathy, left     Stage 3a chronic kidney disease (H)     Coagulation defect, unspecified (H)     Obstructive sleep apnea treated with  continuous positive airway pressure (CPAP)     Snoring     Palpitations     Gastroesophageal reflux disease without esophagitis     Diabetes mellitus, type 2 (H)     Kidney replaced by transplant     Pancreas replaced by transplant (H)     Immunosuppressed status (H)     Cardiac arrest with ventricular fibrillation (H)     Steroid-induced hyperglycemia     History of simultaneous kidney and pancreas transplant (H)     Hypomagnesemia     Secondary renal hyperparathyroidism (H)     Need for pneumocystis prophylaxis     Other stricture of urethra in male     Aftercare following organ transplant     CAD (coronary artery disease)     PAD (peripheral artery disease) (H)     HFrEF (heart failure with reduced ejection fraction) (H)     Urinary tract infection       Allergies  Allergies   Allergen Reactions     Amoxicillin Hives     & generalized pain    Tolerated ceftriaxone in 2023 ([facility]) and 2024 ([facility])     Venlafaxine      lethargic       Medications  Current Outpatient Medications   Medication Sig Dispense Refill     acetaminophen (TYLENOL) 500 MG tablet Take 500 mg by mouth every 4 hours as needed       aspirin (ASA) 81 MG chewable tablet Take 1 tablet (81 mg) by mouth daily Starting tomorrow. 30 tablet 3     atorvastatin (LIPITOR) 40 MG tablet Take 1 tablet (40 mg) by mouth every evening. 90 tablet 4     blood glucose (NO BRAND SPECIFIED) test strip Use to test blood sugar 4 times daily or as directed. 100 strip 5     carvedilol (COREG) 3.125 MG tablet Take 1 tablet (3.125 mg) by mouth 2 times daily. 60 tablet 11     ciprofloxacin (CIPRO) 500 MG tablet Take 1 tablet (500 mg) by mouth 2 times daily. 28 tablet 0     dapsone (ACZONE) 25 MG tablet Take 2 tablets (50 mg) by mouth daily 60 tablet 11     magnesium glycinate 100 MG CAPS capsule Take 2 capsules (200 mg) by mouth 2 times daily Take in place of magnesium-oxide 120 capsule 2     mycophenolic acid (GENERIC EQUIVALENT) 180 MG EC tablet Take  540 mg twice a day until follow up appointment with Dr. Sorensen       sodium bicarbonate 650 MG tablet Take 3 tablets (1,950 mg) by mouth 3 times daily. 270 tablet 3     tacrolimus (GENERIC EQUIVALENT) 0.5 MG capsule Take 1 capsule (0.5 mg) by mouth 2 times daily.       tacrolimus (GENERIC EQUIVALENT) 1 MG capsule Hold 1 mg capsules for now.       ticagrelor (BRILINTA) 90 MG tablet Take 1 tablet (90 mg) by mouth 2 times daily. 60 tablet 5     No current facility-administered medications for this visit.     There are no discontinued medications.      Physical Exam  Vital Signs: /80   Pulse 91   Temp 98.7  F (37.1  C) (Oral)   Wt 99.5 kg (219 lb 6.4 oz)   SpO2 96%   BMI 30.60 kg/m      GENERAL APPEARANCE: alert and no distress  HENT: mouth without ulcers or lesions  RESP: lungs clear to auscultation - no rales, rhonchi or wheezes  CV: regular rhythm, normal rate, no rub, no murmur  EDEMA: no LE edema bilaterally  ABDOMEN: soft, nondistended, nontender, bowel sounds normal; moderate sized upper incisional hernia  MS: extremities normal - no gross deformities noted, no evidence of inflammation in joints, no muscle tenderness  SKIN: no rash  TX KIDNEY: normal  DIALYSIS ACCESS:  LUE AV fistula with good thrill    Data        Latest Ref Rng & Units 12/3/2024     9:42 AM 11/25/2024    11:49 AM 11/25/2024     7:36 AM   Renal   Sodium 135 - 145 mmol/L 139      K 3.4 - 5.3 mmol/L 5.5      Cl 98 - 107 mmol/L 109      Cl (external) 98 - 107 mmol/L 109      CO2 22 - 29 mmol/L 22      Urea Nitrogen 6.0 - 20.0 mg/dL 17.7      Creatinine 0.67 - 1.17 mg/dL 1.56      Glucose 70 - 99 mg/dL 123  105  119    Calcium 8.8 - 10.4 mg/dL 10.8      Magnesium 1.7 - 2.3 mg/dL 1.4            Latest Ref Rng & Units 11/25/2024     6:20 AM 11/24/2024     6:32 AM 11/23/2024     6:07 AM   Bone Health   Phosphorus 2.5 - 4.5 mg/dL 2.1  2.4  2.1          Latest Ref Rng & Units 12/3/2024     9:42 AM 11/22/2024     5:48 AM 11/21/2024     1:07 PM    Heme   WBC 4.0 - 11.0 10e3/uL 7.9  3.7  5.0    Hgb 13.3 - 17.7 g/dL 10.2  8.9  9.8    Plt 150 - 450 10e3/uL 149  221  233          Latest Ref Rng & Units 11/22/2024     5:48 AM 11/21/2024     1:07 PM 8/30/2024     9:48 AM   Liver   AP 40 - 150 U/L 107  125  174    TBili <=1.2 mg/dL 0.3  0.3  0.6    ALT 0 - 70 U/L 31  47  38    AST 0 - 45 U/L 29  36  26    Tot Protein 6.4 - 8.3 g/dL 5.2  6.0  7.2    Albumin 3.5 - 5.2 g/dL 3.0  3.5  4.5          Latest Ref Rng & Units 12/3/2024     9:42 AM 11/25/2024     6:20 AM 11/24/2024     6:32 AM   Pancreas   Amylase 28 - 100 U/L 153      Lipase (Roche) 13 - 60 U/L 124  153  257          Latest Ref Rng & Units 8/5/2024     7:58 AM   Iron studies   Iron 61 - 157 ug/dL 44    Iron Sat Index 15 - 46 % 17    Ferritin 31 - 409 ng/mL 1,725          Latest Ref Rng & Units 7/19/2024     4:56 PM 9/27/2021    12:18 PM   [facility]   EBV CAPSID ANTIBODY IGG No detectable antibody. Positive  Positive      Failed to redirect to the Timeline version of the [facility] SmartLink.  Recent Labs   Lab Test 11/07/24  1013 11/13/24  0730 11/21/24  0734 11/22/24  0548 11/23/24  0607 11/24/24  0632   DOSTAC 11/6/2024 11/12/2024 11/20/2024  --   --   --    TACROL 12.5 10.9 26.7* 18.4* 13.8 12.0     Recent Labs   Lab Test 09/19/24  0850 09/23/24  0741 10/14/24  0805 10/18/24  0826 11/13/24  0730   DOSMPA 9/18/2024   8:00 PM  --   --  10/17/2024   8:00 PM  --    MPACID 3.42   < > 3.40 3.59* 5.50*   MPAG 54.9   < > 58.2 78.1 61.7    < > = values in this interval not displayed.          Again, thank you for allowing me to participate in the care of your patient.        Sincerely,        Fernando Sorensen MD

## 2024-12-03 NOTE — PROGRESS NOTES
TRANSPLANT NEPHROLOGY CLINIC VISIT     Assessment & Plan   # DDKT (SPK): CKD Stage 3b - Stable creatinine, now at baseline following recent hospital admission for DELISA.   - Baseline Creatinine: ~ 1.5-1.8   - Proteinuria: Moderate (1-3 grams)   - DSA Hx: No DSA   - Last cPRA: 11%   - BK Viremia: No   - Kidney Tx Biopsy Hx: No biopsy history.    # Pancreas Tx (SPK): Patient with persistently higher blood glucose since transplant.  Would consider SGLT2 inhibitor.  Will defer to PCP to manage.   - Pancreatic Exocrine Drainage: Enteric drained     - Blood glucose: Elevated blood glucose      On insulin: No   - HbA1c: Stable to slight trend down      Latest HbA1c: 6.2%   - Pancreatic enzymes: Trend down, still elevated   - DSA Hx: No DSA   - Pancreas Tx Biopsy Hx: No biopsy history    # Immunosuppression: Tacrolimus immediate release (goal 8-10) and Mycophenolic acid (dose 540 mg every 12 hours)   - Induction with Recent Transplant:  High Intensity Protocol   - Continue with intensive monitoring of immunosuppression for efficacy and toxicity.   - Historical Changes in Immunosuppression:  Slightly lower mycophenolic acid dose due to norovirus infection.   - Changes: Not at this time; Will repeat MPA level and consider increasing mycophenolic acid dose back to 720 mg bid.    # Infection Prevention:      - PJP: Dapsone; Changed off Bactrim due to hyperkalemia.  - CMV: None, prophylaxis completed      - CMV IgG Ab High Risk Discordance (D+/R-): No  CMV Serostatus: Positive  - EBV IgG Ab High Risk Discordance (D+/R-): No  EBV Serostatus: Positive    # Hypertension: Controlled;  Goal BP: < 130/80   - Changes: Not at this time    # Anemia in Chronic Renal Disease: Hgb: Trend up after decrease with recent DELISA      KERRI: No   - Iron studies: Low iron saturation, but high ferritin    # Mineral Bone Disorder:    - Secondary renal hyperparathyroidism; PTH level: Mildly elevated (151-300 pg/ml)        On treatment: None  - Vitamin D;  "level: High        On supplement: No; Cholecalciferol held due to high level.  - Calcium; level: High        On supplement: No    # Electrolytes:   - Potassium; level: High        On supplement: No; Discussed low potassium diet.  - Magnesium; level: Stable low        On supplement: Yes  - Bicarbonate; level: Normal        On supplement: Yes    # Urethral Stricture: Patient has meatal stenosis and s/p ureteral stent removal in OR by Urology on 10/10/24.  Patient reports that he feels he has to stand to completely empty his bladder, but no concerns.  Followed by Urology.    # UTI, Possible Prostate Abscess: Patient with symptomatic and positive urine culture 11/13/24 for Pseudomonas aeruginosa and Enterococcus faecalis.  He was started on ciprofloxacin and linezolid.  During recent hospitalization soon after UTI diagnosis, patient had CT abd/pelvis Nov/2024 that showed \"Peripheral enhancing fluid collection measuring 18 x 10 mm along the inferior margin of the prostate.\"  This was felt to possibly be a prostate abscess.  Patient completed course of antibiotics and was referred to Urology for further evaluation.  Denies any UTI symptoms.    # Norovirus Infection: Symptoms now resolved after completing course of nitazoxanide and slightly lower immunosuppression.    # Obesity, Class I (BMI = 30.6): Stable weight.   - Recommend weight loss for overall health by increasing exercise and watching caloric intake.  - Patient may benefit from SGLT2 inhibitors.  However, because of their pancreas transplant, would avoid GLP1 agonists due to increased risk of pancreatitis.      # Other Significant PMH:   - CAD, s/p CABG: Asymptomatic.   - HFrEF: Last cardiac echo (Jul/2024) showed moderately decreased LVEF ~ 35-40% with akinesis of the basal-mid inferior and basal inferoseptal segments.   - H/o V. Fib Arrest: Patient with V. Fib arrest and STEMI following kidney transplant and emergently brought to Cath Lab and found to have in " stent thrombosis of the previous mid RCA stent, which was felt to be the culprit in inferior STEMI.  Patient underwent aspiration thrombectomy and PCI with two IBAN.  Followed by Cardiology.   - PAD: No claudication symptoms.   - GERD: Asymptomatic and now off PPI.   - Lower Back Pain with Sciatica: Patient with h/o lower back pain and sciatica years ago, now returned with pain radiating down right leg.  Has been taking cyclobenzaprine.  Started physical therapy this week.     # Skin Cancer Risk:    - Discussed sun protection and recommend regular follow up with Dermatology.    # Transplant History:  Etiology of Kidney Failure: Diabetes mellitus type 2  Tx: SPK  Transplant: 7/20/2024 (Kidney / Pancreas)  Significant transplant-related complications:  Peritransplant STEMI and V. Fib arrest.    Transplant Office Phone Number: [phone]    Assessment and plan was discussed with the patient and he voiced his understanding and agreement.    Return visit: Return for previously scheduled visit.    Fernando Sorensen MD    The longitudinal plan of care for the diagnosis(es)/condition(s) as documented were addressed during this visit. Due to the added complexity in care, I will continue to support Siva in the subsequent management and with ongoing continuity of care.      Chief Complaint   Mr. Ramey is a 49 year old here for kidney transplant, pancreas transplant, and immunosuppression management.     History of Present Illness    Mr. Ramey reports feeling good overall.  Since last clinic visit:   Hospitalizations: Yes; Admitted 11/21-11/25 for near syncope, dehydration and DELISA secondary to norovirus and UTI with urine culture positive for Pseudomonas aeruginosa and Enterococcus faecalis.  He was treated with ciprofloxacin and linezolid (completed) for his UTI.  In addition, he is completing out a 3 week course of nitazoxanide for norovirus infection, along with slightly lowered mycophenolic acid dose.   New Medical  Issues: No; He does have ongoing lower back pain with sciatic symptoms radiating down his right leg, which can limit his activity at times.  Patient just started physical therapy this week.  Chest pain or shortness of breath: Yes; No chest pain, but some shortness of breath with exertion, which is stable.  He feels this is mostly due to deconditioning.  Lower extremity swelling: No  Weight change: No  Appetite is good.  Nausea and vomiting: No  Diarrhea: No; Now just soft stools 2-3x per day.  Heartburn symptoms: No and now off medications for this.  Fever, sweats or chills: No  Night sweats: No, has since resolved.  Urinary complaints: No, but does report that he feels he has to stand when urinating in order to completely empty his bladder.    Home BP:  120/70s   Does note brief lightheadedness upon standing and after exercise, but only lasts a few seconds.    Problem List   Patient Active Problem List   Diagnosis    Type 2 diabetes mellitus with other circulatory complication, unspecified whether long term insulin use (H)    Dyslipidemia    Atherosclerosis of other coronary artery bypass graft(s) with unstable angina pectoris (H)    Anemia in chronic renal disease    HTN, kidney transplant related    S/P CABG (coronary artery bypass graft)    Balanoposthitis    History of ST elevation myocardial infarction (STEMI)    Obesity (BMI 30.0-34.9)    MARQUEZ (dyspnea on exertion)    STEMI (ST elevation myocardial infarction) (H)    Metabolic acidosis    Retinopathy    Peripheral neuropathy    Acquired elevated diaphragm    Median nerve neuropathy, left    Stage 3a chronic kidney disease (H)    Coagulation defect, unspecified (H)    Obstructive sleep apnea treated with continuous positive airway pressure (CPAP)    Snoring    Palpitations    Gastroesophageal reflux disease without esophagitis    Diabetes mellitus, type 2 (H)    Kidney replaced by transplant    Pancreas replaced by transplant (H)    Immunosuppressed status (H)     Cardiac arrest with ventricular fibrillation (H)    Steroid-induced hyperglycemia    History of simultaneous kidney and pancreas transplant (H)    Hypomagnesemia    Secondary renal hyperparathyroidism (H)    Need for pneumocystis prophylaxis    Other stricture of urethra in male    Aftercare following organ transplant    CAD (coronary artery disease)    PAD (peripheral artery disease) (H)    HFrEF (heart failure with reduced ejection fraction) (H)    Urinary tract infection       Allergies   Allergies   Allergen Reactions    Amoxicillin Hives     & generalized pain    Tolerated ceftriaxone in 2023 ([facility]) and 2024 ([facility])    Venlafaxine      lethargic       Medications   Current Outpatient Medications   Medication Sig Dispense Refill    acetaminophen (TYLENOL) 500 MG tablet Take 500 mg by mouth every 4 hours as needed      aspirin (ASA) 81 MG chewable tablet Take 1 tablet (81 mg) by mouth daily Starting tomorrow. 30 tablet 3    atorvastatin (LIPITOR) 40 MG tablet Take 1 tablet (40 mg) by mouth every evening. 90 tablet 4    blood glucose (NO BRAND SPECIFIED) test strip Use to test blood sugar 4 times daily or as directed. 100 strip 5    carvedilol (COREG) 3.125 MG tablet Take 1 tablet (3.125 mg) by mouth 2 times daily. 60 tablet 11    ciprofloxacin (CIPRO) 500 MG tablet Take 1 tablet (500 mg) by mouth 2 times daily. 28 tablet 0    dapsone (ACZONE) 25 MG tablet Take 2 tablets (50 mg) by mouth daily 60 tablet 11    magnesium glycinate 100 MG CAPS capsule Take 2 capsules (200 mg) by mouth 2 times daily Take in place of magnesium-oxide 120 capsule 2    mycophenolic acid (GENERIC EQUIVALENT) 180 MG EC tablet Take 540 mg twice a day until follow up appointment with Dr. Sorensen      sodium bicarbonate 650 MG tablet Take 3 tablets (1,950 mg) by mouth 3 times daily. 270 tablet 3    tacrolimus (GENERIC EQUIVALENT) 0.5 MG capsule Take 1 capsule (0.5 mg) by mouth 2 times daily.      tacrolimus (GENERIC  EQUIVALENT) 1 MG capsule Hold 1 mg capsules for now.      ticagrelor (BRILINTA) 90 MG tablet Take 1 tablet (90 mg) by mouth 2 times daily. 60 tablet 5     No current facility-administered medications for this visit.     There are no discontinued medications.      Physical Exam   Vital Signs: /80   Pulse 91   Temp 98.7  F (37.1  C) (Oral)   Wt 99.5 kg (219 lb 6.4 oz)   SpO2 96%   BMI 30.60 kg/m      GENERAL APPEARANCE: alert and no distress  HENT: mouth without ulcers or lesions  RESP: lungs clear to auscultation - no rales, rhonchi or wheezes  CV: regular rhythm, normal rate, no rub, no murmur  EDEMA: no LE edema bilaterally  ABDOMEN: soft, nondistended, nontender, bowel sounds normal; moderate sized upper incisional hernia  MS: extremities normal - no gross deformities noted, no evidence of inflammation in joints, no muscle tenderness  SKIN: no rash  TX KIDNEY: normal  DIALYSIS ACCESS:  LUE AV fistula with good thrill    Data         Latest Ref Rng & Units 12/3/2024     9:42 AM 11/25/2024    11:49 AM 11/25/2024     7:36 AM   Renal   Sodium 135 - 145 mmol/L 139      K 3.4 - 5.3 mmol/L 5.5      Cl 98 - 107 mmol/L 109      Cl (external) 98 - 107 mmol/L 109      CO2 22 - 29 mmol/L 22      Urea Nitrogen 6.0 - 20.0 mg/dL 17.7      Creatinine 0.67 - 1.17 mg/dL 1.56      Glucose 70 - 99 mg/dL 123  105  119    Calcium 8.8 - 10.4 mg/dL 10.8      Magnesium 1.7 - 2.3 mg/dL 1.4            Latest Ref Rng & Units 11/25/2024     6:20 AM 11/24/2024     6:32 AM 11/23/2024     6:07 AM   Bone Health   Phosphorus 2.5 - 4.5 mg/dL 2.1  2.4  2.1          Latest Ref Rng & Units 12/3/2024     9:42 AM 11/22/2024     5:48 AM 11/21/2024     1:07 PM   Heme   WBC 4.0 - 11.0 10e3/uL 7.9  3.7  5.0    Hgb 13.3 - 17.7 g/dL 10.2  8.9  9.8    Plt 150 - 450 10e3/uL 149  221  233          Latest Ref Rng & Units 11/22/2024     5:48 AM 11/21/2024     1:07 PM 8/30/2024     9:48 AM   Liver   AP 40 - 150 U/L 107  125  174    TBili <=1.2 mg/dL 0.3   0.3  0.6    ALT 0 - 70 U/L 31  47  38    AST 0 - 45 U/L 29  36  26    Tot Protein 6.4 - 8.3 g/dL 5.2  6.0  7.2    Albumin 3.5 - 5.2 g/dL 3.0  3.5  4.5          Latest Ref Rng & Units 12/3/2024     9:42 AM 11/25/2024     6:20 AM 11/24/2024     6:32 AM   Pancreas   Amylase 28 - 100 U/L 153      Lipase (Roche) 13 - 60 U/L 124  153  257          Latest Ref Rng & Units 8/5/2024     7:58 AM   Iron studies   Iron 61 - 157 ug/dL 44    Iron Sat Index 15 - 46 % 17    Ferritin 31 - 409 ng/mL 1,725          Latest Ref Rng & Units 7/19/2024     4:56 PM 9/27/2021    12:18 PM   [facility]   EBV CAPSID ANTIBODY IGG No detectable antibody. Positive  Positive      Failed to redirect to the Timeline version of the REVFS SmartLink.  Recent Labs   Lab Test 11/07/24  1013 11/13/24  0730 11/21/24  0734 11/22/24  0548 11/23/24  0607 11/24/24  0632   DOSTAC 11/6/2024 11/12/2024 11/20/2024  --   --   --    TACROL 12.5 10.9 26.7* 18.4* 13.8 12.0     Recent Labs   Lab Test 09/19/24  0850 09/23/24  0741 10/14/24  0805 10/18/24  0826 11/13/24  0730   DOSMPA 9/18/2024   8:00 PM  --   --  10/17/2024   8:00 PM  --    MPACID 3.42   < > 3.40 3.59* 5.50*   MPAG 54.9   < > 58.2 78.1 61.7    < > = values in this interval not displayed.

## 2024-12-11 ENCOUNTER — LAB (OUTPATIENT)
Dept: LAB | Facility: HOSPITAL | Age: 49
End: 2024-12-11
Payer: MEDICARE

## 2024-12-11 ENCOUNTER — TELEPHONE (OUTPATIENT)
Dept: TRANSPLANT | Facility: CLINIC | Age: 49
End: 2024-12-11

## 2024-12-11 DIAGNOSIS — Z94.0 HTN, KIDNEY TRANSPLANT RELATED: ICD-10-CM

## 2024-12-11 DIAGNOSIS — Z94.0 KIDNEY REPLACED BY TRANSPLANT: ICD-10-CM

## 2024-12-11 DIAGNOSIS — I15.1 HTN, KIDNEY TRANSPLANT RELATED: ICD-10-CM

## 2024-12-11 DIAGNOSIS — Z94.83 PANCREAS REPLACED BY TRANSPLANT (H): ICD-10-CM

## 2024-12-11 LAB
AMYLASE SERPL-CCNC: 132 U/L (ref 28–100)
ANION GAP SERPL CALCULATED.3IONS-SCNC: 10 MMOL/L (ref 7–15)
BUN SERPL-MCNC: 20.8 MG/DL (ref 6–20)
CALCIUM SERPL-MCNC: 10.6 MG/DL (ref 8.8–10.4)
CHLORIDE SERPL-SCNC: 110 MMOL/L (ref 98–107)
CREAT SERPL-MCNC: 1.55 MG/DL (ref 0.67–1.17)
EGFRCR SERPLBLD CKD-EPI 2021: 55 ML/MIN/1.73M2
ERYTHROCYTE [DISTWIDTH] IN BLOOD BY AUTOMATED COUNT: 17.5 % (ref 10–15)
GLUCOSE SERPL-MCNC: 104 MG/DL (ref 70–99)
HCO3 SERPL-SCNC: 21 MMOL/L (ref 22–29)
HCT VFR BLD AUTO: 34.4 % (ref 40–53)
HGB BLD-MCNC: 10.3 G/DL (ref 13.3–17.7)
LIPASE SERPL-CCNC: 158 U/L (ref 13–60)
MAGNESIUM SERPL-MCNC: 1.8 MG/DL (ref 1.7–2.3)
MCH RBC QN AUTO: 29.2 PG (ref 26.5–33)
MCHC RBC AUTO-ENTMCNC: 29.9 G/DL (ref 31.5–36.5)
MCV RBC AUTO: 98 FL (ref 78–100)
PLATELET # BLD AUTO: 152 10E3/UL (ref 150–450)
POTASSIUM SERPL-SCNC: 4.9 MMOL/L (ref 3.4–5.3)
RBC # BLD AUTO: 3.53 10E6/UL (ref 4.4–5.9)
SODIUM SERPL-SCNC: 141 MMOL/L (ref 135–145)
TACROLIMUS BLD-MCNC: 4.2 UG/L (ref 5–15)
TME LAST DOSE: ABNORMAL H
TME LAST DOSE: ABNORMAL H
WBC # BLD AUTO: 1.7 10E3/UL (ref 4–11)

## 2024-12-11 PROCEDURE — 80180 DRUG SCRN QUAN MYCOPHENOLATE: CPT

## 2024-12-11 PROCEDURE — 36415 COLL VENOUS BLD VENIPUNCTURE: CPT

## 2024-12-11 PROCEDURE — 85027 COMPLETE CBC AUTOMATED: CPT

## 2024-12-11 PROCEDURE — 87799 DETECT AGENT NOS DNA QUANT: CPT

## 2024-12-11 PROCEDURE — 82150 ASSAY OF AMYLASE: CPT

## 2024-12-11 PROCEDURE — 80197 ASSAY OF TACROLIMUS: CPT

## 2024-12-11 PROCEDURE — 83735 ASSAY OF MAGNESIUM: CPT

## 2024-12-11 PROCEDURE — 83690 ASSAY OF LIPASE: CPT

## 2024-12-11 PROCEDURE — 80048 BASIC METABOLIC PNL TOTAL CA: CPT

## 2024-12-11 NOTE — TELEPHONE ENCOUNTER
Post Kidney and Pancreas Transplant Team Conference  Date: 12/11/2024  Transplant Coordinator: Ximena Edwards     Attendees:  [x]  Dr. Sorensen [] Leonor Kang, BETSY [] Betty Landaverde LPN     []  Dr. Shell [] Rosalia Romero, RN [] Lelo Reese LPN    [x] Dr. Wallis [] Ximena Edwards RN    [x] Dr. Coombs [] Mony Crabtree RN [] Angella Liriano RN   [] Dr. Kong [] Asha Allred, RN    [x] Dr. Christianson [] Cindy Ye RN    []  Dr. Whitehead [] Miryam Iyer RN    [] Dr. Michaud [] Gino Mahan RN    [] Sobia Underwood NP [] Roxanna Monet RN    [x] Shelia Magallon NP [] Alisa Cutler RN        Verbal Plan Read Back:   Increased Lipase levels, pancreas biopsy recommended.  Check with Cardiology on stopping Bralenta.      Routed to RN Coordinator   Jhoana Lara RN

## 2024-12-12 LAB
BK VIRUS DNA IU/ML, INSTRUMENT (6800): 360 IU/ML
BK VIRUS SPECIMEN TYPE: ABNORMAL
BKV DNA SPEC NAA+PROBE-LOG#: 2.6 {LOG_COPIES}/ML
MYCOPHENOLATE SERPL LC/MS/MS-MCNC: 2.61 MG/L (ref 1–3.5)
MYCOPHENOLATE-G SERPL LC/MS/MS-MCNC: 49.7 MG/L (ref 30–95)
TME LAST DOSE: NORMAL H
TME LAST DOSE: NORMAL H

## 2024-12-16 ENCOUNTER — LAB (OUTPATIENT)
Dept: LAB | Facility: HOSPITAL | Age: 49
End: 2024-12-16
Payer: MEDICARE

## 2024-12-16 ENCOUNTER — TELEPHONE (OUTPATIENT)
Dept: TRANSPLANT | Facility: CLINIC | Age: 49
End: 2024-12-16

## 2024-12-16 DIAGNOSIS — Z48.298 AFTERCARE FOLLOWING ORGAN TRANSPLANT: ICD-10-CM

## 2024-12-16 DIAGNOSIS — Z79.899 ENCOUNTER FOR LONG-TERM CURRENT USE OF MEDICATION: ICD-10-CM

## 2024-12-16 DIAGNOSIS — B25.9 CMV (CYTOMEGALOVIRUS INFECTION) (H): Primary | ICD-10-CM

## 2024-12-16 DIAGNOSIS — Z98.890 OTHER SPECIFIED POSTPROCEDURAL STATES: ICD-10-CM

## 2024-12-16 DIAGNOSIS — I15.1 HTN, KIDNEY TRANSPLANT RELATED: ICD-10-CM

## 2024-12-16 DIAGNOSIS — T86.899 COMPLICATION OF TRANSPLANTED PANCREAS: ICD-10-CM

## 2024-12-16 DIAGNOSIS — R74.8 ELEVATED LIPASE: ICD-10-CM

## 2024-12-16 DIAGNOSIS — D70.9 NEUTROPENIA (H): ICD-10-CM

## 2024-12-16 DIAGNOSIS — Z94.0 KIDNEY REPLACED BY TRANSPLANT: ICD-10-CM

## 2024-12-16 DIAGNOSIS — Z94.0 HTN, KIDNEY TRANSPLANT RELATED: ICD-10-CM

## 2024-12-16 DIAGNOSIS — Z94.83 PANCREAS REPLACED BY TRANSPLANT (H): ICD-10-CM

## 2024-12-16 LAB
AMYLASE SERPL-CCNC: 98 U/L (ref 28–100)
CMV DNA SPEC NAA+PROBE-ACNC: 35 IU/ML
CMV DNA SPEC NAA+PROBE-LOG#: 1.5 {LOG_COPIES}/ML
LIPASE SERPL-CCNC: 58 U/L (ref 13–60)
MAGNESIUM SERPL-MCNC: 1.7 MG/DL (ref 1.7–2.3)
SPECIMEN TYPE: ABNORMAL
TACROLIMUS BLD-MCNC: 7.2 UG/L (ref 5–15)
TME LAST DOSE: NORMAL H
TME LAST DOSE: NORMAL H

## 2024-12-16 PROCEDURE — 82150 ASSAY OF AMYLASE: CPT

## 2024-12-16 PROCEDURE — 36415 COLL VENOUS BLD VENIPUNCTURE: CPT

## 2024-12-16 PROCEDURE — 83735 ASSAY OF MAGNESIUM: CPT

## 2024-12-16 PROCEDURE — 80197 ASSAY OF TACROLIMUS: CPT

## 2024-12-16 PROCEDURE — 83690 ASSAY OF LIPASE: CPT

## 2024-12-16 NOTE — TELEPHONE ENCOUNTER
Fernando Sorensen MD Poucher, Jessica, RN  Significant improvement in serum lipase and okay to hold on pancreas transplant biopsy for now.    Siva notified and will resume Brilinta today (held doses on Saturday and Sunday). Says nothing else has changed, doesn't know the reason for the improvement in lipase. Will continue weekly labs for now.

## 2024-12-16 NOTE — CONSULTS
Outpatient IR Referral  12/16/24    Referring Provider: Dr. Sorensen   IR Referral Request: Transplant pancreas elevated lipase low immunosuppression levels, will stop Brilinta on 12/14, needs to stay on 81 mg ASA daily throughout procedure   Recommendations/Plan:  CT with US in the room Transplant pancreas biopsy.  See CT 11/24/24  series 4 image 271. Patient to hold brilinta, not able to hold ASA this increases bleeding risks.   Surg path entered  by referring team.     Case and imaging was reviewed with Dr. Nando Chandler MD.  IR recommendations also relayed Epic messaging.    IR referral converted to procedure order.      Brief History:    Siva Ramey is a 49 year old male with history of DM 2, Kidney Pancreas transplant 7/20/24, HTN, ureteral stricture, probable prostate abscess completed antibiotics currently denies UTI symptoms, CAD s/p CABG, VFIB arrest HFrEF, elevated lipase.     Pertinent Medications:    Current Outpatient Medications   Medication Sig Dispense Refill    acetaminophen (TYLENOL) 500 MG tablet Take 500 mg by mouth every 4 hours as needed      aspirin (ASA) 81 MG chewable tablet Take 1 tablet (81 mg) by mouth daily Starting tomorrow. 30 tablet 3    atorvastatin (LIPITOR) 40 MG tablet Take 1 tablet (40 mg) by mouth every evening. 90 tablet 4    blood glucose (NO BRAND SPECIFIED) test strip Use to test blood sugar 4 times daily or as directed. (Patient not taking: Reported on 12/6/2024) 100 strip 5    carvedilol (COREG) 3.125 MG tablet Take 1 tablet (3.125 mg) by mouth 2 times daily. 60 tablet 11    dapsone (ACZONE) 25 MG tablet Take 2 tablets (50 mg) by mouth daily. 180 tablet 1    magnesium glycinate 100 MG CAPS capsule Take 300 mg by mouth 2 times daily.      mycophenolic acid (GENERIC EQUIVALENT) 180 MG EC tablet Take 540 mg twice a day until follow up appointment with Dr. Sorensen      sodium bicarbonate 650 MG tablet Take 3 tablets (1,950 mg) by mouth 3 times daily. 270 tablet 3     tacrolimus (GENERIC EQUIVALENT) 0.5 MG capsule Take 1 capsule (0.5 mg) by mouth 2 times daily. Total dose = 1.5 mg twice daily 180 capsule 3    tacrolimus (GENERIC EQUIVALENT) 1 MG capsule Take 1 capsule (1 mg) by mouth 2 times daily. Total dose = 1.5 mg twice daily 180 capsule 3    ticagrelor (BRILINTA) 90 MG tablet Take 1 tablet (90 mg) by mouth 2 times daily. 60 tablet 5     No current facility-administered medications for this visit.       Pertinent Imaging Reviewed:                CT 11/24/24     IMPRESSION:     1. Right lower quadrant transplant pancreas with mild surrounding free  fluid. Normal contrast opacification of the transplant vasculature.     2. Left lower quadrant transplant kidney with mildly heterogeneous  enhancement. Findings could represent underlying infection or  transplant dysfunction. No hydronephrosis identified.     3. Peripherally enhancing 1.8 cm fluid collection inferior to the  prostate. Previous studies were performed without IV contrast. This  could be a chronic finding. Also consider infection and  prostate-specific antigen screening for prostate cancer.     4. Mild thickening of the bladder wall. Consider under distention  versus UTI.         US 11/22/24     Findings: The transplanted pancreas is located in the right lower  quadrant and demonstrates normal echogenicity. There is no free fluid  adjacent to the transplant pancreas.   Most Recent Labs:  Lab Results   Component Value Date    WBC 1.7 12/11/2024     Lab Results   Component Value Date    RBC 3.53 12/11/2024     Lab Results   Component Value Date    HGB 10.3 12/11/2024     Lab Results   Component Value Date    HCT 34.4 12/11/2024     Lab Results   Component Value Date     12/11/2024    Last Comprehensive Metabolic Panel:  Lab Results   Component Value Date     12/11/2024    POTASSIUM 4.9 12/11/2024    CHLORIDE 110 (H) 12/11/2024    CO2 21 (L) 12/11/2024    ANIONGAP 10 12/11/2024     (H) 12/11/2024     "BUN 20.8 (H) 12/11/2024    CR 1.55 (H) 12/11/2024    GFRESTIMATED 55 (L) 12/11/2024    BETHANY 10.6 (H) 12/11/2024      INR   Date Value Ref Range Status   07/20/2024 1.39 (H) 0.85 - 1.15 Final          If requesting team would like sample sent for anything else please use the IR order set \"IR RAD Biopsy or Fluid Aspirate Specimens\" to select your necessary diagnostic labs and pend for admission.     If there are labs you desire that are not found in this order set or you have questions regarding specific diagnostic labs please call the associated lab personnel.       JUANCARLOS Jaquez CNP  Interventional Radiology   [phone] (IR RN triage)       12/17/24    Referring team has cancelled referral ask 12/16/24 Discontinued by: Julianna Edwards, RN 12/16/24 1040 [Other (condition improved, biopsy no longer needed.    Anjali Gifford APRN DNP  IR  "

## 2024-12-16 NOTE — TELEPHONE ENCOUNTER
ISSUE:  CMV 35     CMV IgG+   Current ISx: Tac goal 8-10,  mg bid with therapeutic MPA levels     Fernando Sorensen MD Poucher, Jessica, RN  New minimal CMV viremia.  Would recommend repeating CMV PCR in a week and hold off on treatment, unless patient is symptomatic.

## 2024-12-17 ENCOUNTER — TELEPHONE (OUTPATIENT)
Dept: TRANSPLANT | Facility: CLINIC | Age: 49
End: 2024-12-17
Payer: COMMERCIAL

## 2024-12-17 DIAGNOSIS — Z94.0 KIDNEY REPLACED BY TRANSPLANT: ICD-10-CM

## 2024-12-17 DIAGNOSIS — Z94.0 HTN, KIDNEY TRANSPLANT RELATED: ICD-10-CM

## 2024-12-17 DIAGNOSIS — I15.1 HTN, KIDNEY TRANSPLANT RELATED: ICD-10-CM

## 2024-12-17 RX ORDER — TACROLIMUS 1 MG/1
2 CAPSULE ORAL 2 TIMES DAILY
Qty: 360 CAPSULE | Refills: 3 | Status: SHIPPED | OUTPATIENT
Start: 2024-12-17

## 2024-12-17 RX ORDER — TACROLIMUS 0.5 MG/1
CAPSULE ORAL
Status: SHIPPED
Start: 2024-12-17

## 2024-12-17 NOTE — TELEPHONE ENCOUNTER
ISSUE:   Tacrolimus IR level 7.2 on 12/16, goal 8-10, dose 1.5 mg BID.    PLAN:   Call Patient and confirm this was an accurate 12-hour trough.   Verify Tacrolimus IR dose 1.5 mg BID.   Confirm no new medications or or missed doses.   Confirm no new illness / infection / diarrhea.   If accurate trough and accurate dose, increase Tacrolimus IR dose to 2 mg BID     Is this more than a 50% increase or decrease in current IS dose: No  If YES, justification: N/A    Repeat labs in 1 weeks.  *If > 50% change in immunosuppression dose, repeat labs in 1 week.     OUTCOME:   Spoke with Patient, says this was a short trough around 10.5 hours, and current dose 1.5 mg BID.   Patient confirmed dose change to 2 mg BID.  Patient agreed to repeat labs in 1 weeks.   Orders sent to preferred pharmacy for dose change and lab for repeat labs.   Patient voiced understanding of plan.

## 2024-12-17 NOTE — PROGRESS NOTES
Transplant Surgery Progress Note    Transplants:  7/20/2024 (Kidney / Pancreas)  S:  49 year old male with PMH significant for DMII (on insulin pump) and resulting ESKD (on HD every MWF since 8/2021). 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 Underwent simultaneous kidney pancreas transplant on 7/20/24 complicated by VT/VF arrest in PACU. Received 2 rounds of CPR before ROSC. Taken emergently to the cath lab and found to have 100% occlusion of the RCA now s/p IBAN x 2.     Overall feeling well today. No pain at incision. Eating/drinking/voiding well. No chest pain, no shortness of breath, increasing activity level. No other complaints.     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   Transplant Date:  7/20/2024 (Kidney / Pancreas)   Ureteral Stent:  Yes   Crossmatch:  negative   DSA at Tx:  No  Baseline Cr: TBD   DeNovo DSA: No    Acute Rejection Hx:  No    Present Maintenance Immunosuppression:  Tacrolimus and Mycophenolic acid    CMV IgG Ab Discordance:  No  EBV IgG Ab Discordance:  No    BK Viremia:  No  EBV Viremia:  No    Transplant Coordinator: Julianna Edwards     Transplant Office Phone Number: [phone]     Immunosuppressant Medications       Immunosuppressive Agents Disp Start End     mycophenolic acid (GENERIC EQUIVALENT) 180 MG EC tablet 240 tablet 8/9/2024 --    Sig - Route: Take 4 tablets (720 mg) by mouth 2 times daily - Oral    Class: E-Prescribe    Notes to Pharmacy: TXP DT 7/20/2024 (Kidney / Pancreas) TXP Dischg DT 7/30/2024 DX Kidney replaced by transplant Z94.0 [facility] ([place], [place])     tacrolimus (GENERIC EQUIVALENT) 1 MG capsule 300 capsule 8/8/2024 --    Sig - Route: Take 5 capsules (5 mg) by mouth 2 times  daily - Oral    Class: E-Prescribe    Notes to Pharmacy: TXP DT 7/20/2024 (Kidney / Pancreas) TXP Dischg DT 7/30/2024 DX Kidney replaced by transplant Z94.0 [facility] ([place], [place])            Possible Immunosuppression-related side effects:   []             headache  []             vivid dreams  []             irritability  []             cognitive difficuties  []             fine tremor  []             nausea  []             diarrhea  []             neuropathy      []             edema  []             renal calcineurin toxicity  []             hyperkalemia  []             post-transplant diabetes  []             decreased appetite  []             increased appetite  []             other:  []             none    Prescription Medications as of 12/17/2024         Rx Number Disp Refills Start End Last Dispensed Date Next Fill Date Owning Pharmacy    tacrolimus (GENERIC EQUIVALENT) 0.5 MG capsule  -- -- 12/17/2024 --       Sig: Profile Rx: patient will contact pharmacy when needed    Class: No Print Out    Notes to Pharmacy: TXP DT 7/20/2024 (Kidney / Pancreas) TXP Dischg DT 7/30/2024 DX Kidney replaced by transplant Z94.0 [facility] ([place], [place])    tacrolimus (GENERIC EQUIVALENT) 1 MG capsule  360 capsule 3 12/17/2024 --   [facility] [address]    Sig: Take 2 capsules (2 mg) by mouth 2 times daily.    Class: E-Prescribe    Notes to Pharmacy: TXP DT 7/20/2024 (Kidney / Pancreas) TXP Dischg DT 7/30/2024 DX Kidney replaced by transplant Z94.0 [facility] ([place], [place])    Route: Oral    acetaminophen (TYLENOL) 500 MG tablet  -- --  --       Sig: Take 500 mg by mouth every 4 hours as needed    Class: Historical    Route: Oral    aspirin (ASA) 81 MG chewable tablet  30 tablet 3 2/1/2024 --   [facility] -  [address]    Sig: Take 1 tablet (81 mg) by mouth daily Starting tomorrow.    Class: E-Prescribe    Route: Oral    atorvastatin (LIPITOR) 40 MG tablet  90 tablet 4 10/28/2024 --   [address]    Sig: Take 1 tablet (40 mg) by mouth every evening.    Class: E-Prescribe    Route: Oral    blood glucose (NO BRAND SPECIFIED) test strip  100 strip 5 10/29/2024 --   [address]    Sig: Use to test blood sugar 4 times daily or as directed.    Class: E-Prescribe    Notes to Pharmacy: Pt has an Accu chek meter    carvedilol (COREG) 3.125 MG tablet  60 tablet 11 11/25/2024 --   [address]    Sig: Take 1 tablet (3.125 mg) by mouth 2 times daily.    Class: E-Prescribe    Route: Oral    Renewals       Renewal requests to authorizing provider (Shanti Celeste APRN CNP) <b>prohibited</b>            dapsone (ACZONE) 25 MG tablet  180 tablet 1 12/6/2024 --   [address]    Sig: Take 2 tablets (50 mg) by mouth daily.    Class: E-Prescribe    Route: Oral    magnesium glycinate 100 MG CAPS capsule  -- --  --       Sig: Take 300 mg by mouth 2 times daily.    Class: Historical    Route: Oral    mycophenolic acid (GENERIC EQUIVALENT) 180 MG EC tablet  -- -- 11/25/2024 --       Sig: Take 540 mg twice a day until follow up appointment with Dr. Sorensen    Class: No Print Out    sodium bicarbonate 650 MG tablet  270 tablet 3 10/29/2024 --   [address]    Sig: Take 3 tablets (1,950 mg) by mouth 3 times daily.    Class: E-Prescribe    Route: Oral    ticagrelor (BRILINTA) 90 MG tablet  60 tablet 5 9/6/2024 --   [address]    Sig: Take 1 tablet (90 mg) by mouth 2 times daily.    Class:  E-Prescribe    Route: Oral            O:      General Appearance: in no apparent distress.   Skin: Normal, no rashes or jaundice  Heart: regular rate and rhythm  Lungs: easy respirations, no audible wheezing.  Abdomen: abdomen soft, rounded, nontender. Incision healed well, no obvious hernia.   Extremities: Tremor absent.   Edema: absent.         Latest Ref Rng & Units 12/11/2024     9:50 AM 12/3/2024     9:42 AM 11/25/2024     6:20 AM 11/24/2024     6:32 AM 11/23/2024     6:07 AM   Transplant Immunosuppression Labs   Creat 0.67 - 1.17 mg/dL 1.55  1.56  1.65  1.79  1.88    Urea Nitrogen 6.0 - 20.0 mg/dL 20.8  17.7  19.6  24.7  26.6    WBC 4.0 - 11.0 10e3/uL 1.7  7.9           Chemistries:   Recent Labs   Lab Test 12/11/24  0950   BUN 20.8*   CR 1.55*   GFRESTIMATED 55*   *     Lab Results   Component Value Date    A1C 6.4 07/20/2024    CPEPT 4.8 09/27/2021     Recent Labs   Lab Test 11/22/24  0548   ALBUMIN 3.0*   BILITOTAL 0.3   ALKPHOS 107   AST 29   ALT 31     Urine Studies:  Recent Labs   Lab Test 11/21/24  1308   COLOR Yellow   APPEARANCE Clear   URINEGLC Negative   URINEBILI Negative   URINEKETONE Negative   SG 1.016   UBLD Small*   URINEPH 5.5   PROTEIN Negative   NITRITE Negative   LEUKEST Moderate*   RBCU 2   WBCU 16*     No lab results found.  Hematology:   Recent Labs   Lab Test 12/11/24  0950 12/03/24  0942 11/22/24  0548   HGB 10.3* 10.2* 8.9*    149* 221   WBC 1.7* 7.9 3.7*     Coags:   Recent Labs   Lab Test 07/20/24  0821 07/20/24  0600   INR 1.39* 1.44*     HLA antibodies:   SA1 HI RISK ARIADNE   Date Value Ref Range Status   11/24/2024 None  Final     SA1 MOD RISK ARIADNE   Date Value Ref Range Status   11/24/2024 None  Final     SA2 HI RISK ARIADNE   Date Value Ref Range Status   11/24/2024 None  Final     SA2 MOD RISK ARIADNE   Date Value Ref Range Status   11/24/2024 None  Final       Assessment: Siva TANIYA Ramey is doing fairly well s/p DDKT (SPK):  Issues we addressed during his visit  include:    Plan:    1. Graft function:  good, stable. Lipase labile but overall trending down. Euglycemic but does have some insulin resistance in phenotype- will need to be monitored carefully  2. Immunosuppression Management: No change continue same IS   .  Complexity of management:Medium.  Contributing factors: diarrhea  3. Has improved from GI perspective    Followup: as needed    Total Time: 20 min,   Counselling Time: 15 min.    Harrison Whitehead MD

## 2024-12-26 ENCOUNTER — HOSPITAL ENCOUNTER (EMERGENCY)
Facility: HOSPITAL | Age: 49
Discharge: STILL ADMITTED OR ADMINISTRATIVE | End: 2024-12-26
Attending: STUDENT IN AN ORGANIZED HEALTH CARE EDUCATION/TRAINING PROGRAM
Payer: OTHER MISCELLANEOUS

## 2024-12-26 ENCOUNTER — APPOINTMENT (OUTPATIENT)
Dept: RADIOLOGY | Facility: HOSPITAL | Age: 49
End: 2024-12-26
Attending: STUDENT IN AN ORGANIZED HEALTH CARE EDUCATION/TRAINING PROGRAM
Payer: OTHER MISCELLANEOUS

## 2024-12-26 VITALS
HEART RATE: 93 BPM | OXYGEN SATURATION: 100 % | SYSTOLIC BLOOD PRESSURE: 125 MMHG | BODY MASS INDEX: 30.68 KG/M2 | HEIGHT: 71 IN | WEIGHT: 219.14 LBS | TEMPERATURE: 97 F | RESPIRATION RATE: 19 BRPM | DIASTOLIC BLOOD PRESSURE: 73 MMHG

## 2024-12-26 DIAGNOSIS — T20.20XA PARTIAL THICKNESS BURN OF FACE, INITIAL ENCOUNTER: ICD-10-CM

## 2024-12-26 DIAGNOSIS — T20.30XA: ICD-10-CM

## 2024-12-26 LAB
ANION GAP SERPL CALCULATED.3IONS-SCNC: 13 MMOL/L (ref 7–15)
BASOPHILS # BLD AUTO: 0 10E3/UL (ref 0–0.2)
BASOPHILS NFR BLD AUTO: 1 %
BUN SERPL-MCNC: 23.8 MG/DL (ref 6–20)
CALCIUM SERPL-MCNC: 11.4 MG/DL (ref 8.8–10.4)
CHLORIDE SERPL-SCNC: 105 MMOL/L (ref 98–107)
CREAT SERPL-MCNC: 1.52 MG/DL (ref 0.67–1.17)
EGFRCR SERPLBLD CKD-EPI 2021: 56 ML/MIN/1.73M2
EOSINOPHIL # BLD AUTO: 0.1 10E3/UL (ref 0–0.7)
EOSINOPHIL NFR BLD AUTO: 2 %
ERYTHROCYTE [DISTWIDTH] IN BLOOD BY AUTOMATED COUNT: 17.3 % (ref 10–15)
GLUCOSE SERPL-MCNC: 151 MG/DL (ref 70–99)
HCO3 SERPL-SCNC: 19 MMOL/L (ref 22–29)
HCT VFR BLD AUTO: 39.4 % (ref 40–53)
HGB BLD-MCNC: 12.4 G/DL (ref 13.3–17.7)
HOLD SPECIMEN: NORMAL
IMM GRANULOCYTES # BLD: 0 10E3/UL
IMM GRANULOCYTES NFR BLD: 1 %
LYMPHOCYTES # BLD AUTO: 0.6 10E3/UL (ref 0.8–5.3)
LYMPHOCYTES NFR BLD AUTO: 14 %
MCH RBC QN AUTO: 29.8 PG (ref 26.5–33)
MCHC RBC AUTO-ENTMCNC: 31.5 G/DL (ref 31.5–36.5)
MCV RBC AUTO: 95 FL (ref 78–100)
MONOCYTES # BLD AUTO: 0.4 10E3/UL (ref 0–1.3)
MONOCYTES NFR BLD AUTO: 10 %
NEUTROPHILS # BLD AUTO: 3 10E3/UL (ref 1.6–8.3)
NEUTROPHILS NFR BLD AUTO: 72 %
NRBC # BLD AUTO: 0 10E3/UL
NRBC BLD AUTO-RTO: 0 /100
PLATELET # BLD AUTO: 150 10E3/UL (ref 150–450)
POTASSIUM SERPL-SCNC: 4.7 MMOL/L (ref 3.4–5.3)
RBC # BLD AUTO: 4.16 10E6/UL (ref 4.4–5.9)
SODIUM SERPL-SCNC: 137 MMOL/L (ref 135–145)
WBC # BLD AUTO: 4.2 10E3/UL (ref 4–11)

## 2024-12-26 PROCEDURE — 250N000009 HC RX 250: Performed by: STUDENT IN AN ORGANIZED HEALTH CARE EDUCATION/TRAINING PROGRAM

## 2024-12-26 PROCEDURE — 999N000157 HC STATISTIC RCP TIME EA 10 MIN

## 2024-12-26 PROCEDURE — 96374 THER/PROPH/DIAG INJ IV PUSH: CPT

## 2024-12-26 PROCEDURE — 999N000065 XR CHEST PORT 1 VIEW

## 2024-12-26 PROCEDURE — 96376 TX/PRO/DX INJ SAME DRUG ADON: CPT

## 2024-12-26 PROCEDURE — 31500 INSERT EMERGENCY AIRWAY: CPT

## 2024-12-26 PROCEDURE — 85025 COMPLETE CBC W/AUTO DIFF WBC: CPT | Performed by: STUDENT IN AN ORGANIZED HEALTH CARE EDUCATION/TRAINING PROGRAM

## 2024-12-26 PROCEDURE — 80048 BASIC METABOLIC PNL TOTAL CA: CPT | Performed by: STUDENT IN AN ORGANIZED HEALTH CARE EDUCATION/TRAINING PROGRAM

## 2024-12-26 PROCEDURE — 94002 VENT MGMT INPAT INIT DAY: CPT | Mod: 59

## 2024-12-26 PROCEDURE — 85048 AUTOMATED LEUKOCYTE COUNT: CPT | Performed by: STUDENT IN AN ORGANIZED HEALTH CARE EDUCATION/TRAINING PROGRAM

## 2024-12-26 PROCEDURE — 250N000011 HC RX IP 250 OP 636: Performed by: STUDENT IN AN ORGANIZED HEALTH CARE EDUCATION/TRAINING PROGRAM

## 2024-12-26 PROCEDURE — 450N000003

## 2024-12-26 PROCEDURE — 684N000002 HC FULL TRAUMA W/O CC LEVEL IV

## 2024-12-26 PROCEDURE — 99285 EMERGENCY DEPT VISIT HI MDM: CPT | Mod: 25

## 2024-12-26 PROCEDURE — 96375 TX/PRO/DX INJ NEW DRUG ADDON: CPT

## 2024-12-26 PROCEDURE — 36415 COLL VENOUS BLD VENIPUNCTURE: CPT | Performed by: STUDENT IN AN ORGANIZED HEALTH CARE EDUCATION/TRAINING PROGRAM

## 2024-12-26 RX ORDER — FENTANYL CITRATE 50 UG/ML
100 INJECTION, SOLUTION INTRAMUSCULAR; INTRAVENOUS
Status: DISCONTINUED | OUTPATIENT
Start: 2024-12-26 | End: 2024-12-26 | Stop reason: HOSPADM

## 2024-12-26 RX ORDER — TETRACAINE HYDROCHLORIDE 5 MG/ML
1-2 SOLUTION OPHTHALMIC ONCE
Status: COMPLETED | OUTPATIENT
Start: 2024-12-26 | End: 2024-12-26

## 2024-12-26 RX ORDER — PROPOFOL 10 MG/ML
20 INJECTION, EMULSION INTRAVENOUS ONCE
Status: COMPLETED | OUTPATIENT
Start: 2024-12-26 | End: 2024-12-26

## 2024-12-26 RX ORDER — ETOMIDATE 2 MG/ML
20 INJECTION INTRAVENOUS ONCE
Status: COMPLETED | OUTPATIENT
Start: 2024-12-26 | End: 2024-12-26

## 2024-12-26 RX ORDER — PROPOFOL 10 MG/ML
5-75 INJECTION, EMULSION INTRAVENOUS CONTINUOUS
Status: DISCONTINUED | OUTPATIENT
Start: 2024-12-26 | End: 2024-12-26 | Stop reason: HOSPADM

## 2024-12-26 RX ORDER — FENTANYL CITRATE 50 UG/ML
50 INJECTION, SOLUTION INTRAMUSCULAR; INTRAVENOUS ONCE
Status: COMPLETED | OUTPATIENT
Start: 2024-12-26 | End: 2024-12-26

## 2024-12-26 RX ADMIN — ROCURONIUM BROMIDE 100 MG: 10 INJECTION, SOLUTION INTRAVENOUS at 03:15

## 2024-12-26 RX ADMIN — ETOMIDATE 20 MG: 2 INJECTION, SOLUTION INTRAVENOUS at 03:14

## 2024-12-26 RX ADMIN — FENTANYL CITRATE 100 MCG: 50 INJECTION, SOLUTION INTRAMUSCULAR; INTRAVENOUS at 04:13

## 2024-12-26 RX ADMIN — PROPOFOL 25 MCG/KG/MIN: 10 INJECTION, EMULSION INTRAVENOUS at 03:19

## 2024-12-26 RX ADMIN — FENTANYL CITRATE 100 MCG: 50 INJECTION, SOLUTION INTRAMUSCULAR; INTRAVENOUS at 03:21

## 2024-12-26 RX ADMIN — PROPOFOL 20 MG: 10 INJECTION, EMULSION INTRAVENOUS at 03:18

## 2024-12-26 RX ADMIN — TETRACAINE HYDROCHLORIDE 2 DROP: 5 SOLUTION OPHTHALMIC at 02:31

## 2024-12-26 RX ADMIN — FENTANYL CITRATE 50 MCG: 50 INJECTION, SOLUTION INTRAMUSCULAR; INTRAVENOUS at 02:43

## 2024-12-26 ASSESSMENT — ACTIVITIES OF DAILY LIVING (ADL)
ADLS_ACUITY_SCORE: 58

## 2024-12-26 NOTE — PROGRESS NOTES
Patient was intubated for airway protection, by CARLENE.  ETT size 7.5 placed 27 at the teeth/gums intially, was then advance 1 cm post x-ray- currently secure 28 cm at teeth.  there were 1 attempts to intubated patient. BS post intubation clear. Patient placed on ventilator on the following settings:  Resp: 18, Vent Mode: CMV/AC, Resp Rate (Set): 18 breaths/min, Tidal Volume (Set, mL): 550 mL, PEEP (cm H2O): 5 cmH2O, Resp Rate (Set): 18 breaths/min, Tidal Volume (Set, mL): 550 mL, PEEP (cm H2O): 5 cmH2O    Blanca Bowser, RT

## 2024-12-26 NOTE — ED NOTES
transport called back. [facility] and [facility] unable to transport at this time. They are calling [facility] now to help with transport.

## 2024-12-26 NOTE — ED NOTES
[facility] transport [facility] and [facility] will be here around 0420.
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
## 2024-12-26 NOTE — ED NOTES
Transport called. Patient going to [facility]. EMS calling back with transport time.
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
## 2024-12-26 NOTE — ED NOTES
Trauma Activation Called. Per provider hold off on calling transport at this time. Pt currently being intubated and waiting on room number.

## 2025-01-03 ENCOUNTER — LAB (OUTPATIENT)
Dept: LAB | Facility: HOSPITAL | Age: 50
End: 2025-01-03
Payer: COMMERCIAL

## 2025-01-03 ENCOUNTER — TELEPHONE (OUTPATIENT)
Dept: TRANSPLANT | Facility: CLINIC | Age: 50
End: 2025-01-03

## 2025-01-03 DIAGNOSIS — E21.3 HYPERPARATHYROIDISM: ICD-10-CM

## 2025-01-03 DIAGNOSIS — Z94.0 HTN, KIDNEY TRANSPLANT RELATED: ICD-10-CM

## 2025-01-03 DIAGNOSIS — Z94.83 PANCREAS REPLACED BY TRANSPLANT (H): ICD-10-CM

## 2025-01-03 DIAGNOSIS — B25.9 CMV (CYTOMEGALOVIRUS INFECTION) (H): ICD-10-CM

## 2025-01-03 DIAGNOSIS — E83.52 HYPERCALCEMIA: Primary | ICD-10-CM

## 2025-01-03 DIAGNOSIS — Z94.0 KIDNEY REPLACED BY TRANSPLANT: ICD-10-CM

## 2025-01-03 DIAGNOSIS — I15.1 HTN, KIDNEY TRANSPLANT RELATED: ICD-10-CM

## 2025-01-03 LAB
AMYLASE SERPL-CCNC: 59 U/L (ref 28–100)
ANION GAP SERPL CALCULATED.3IONS-SCNC: 11 MMOL/L (ref 7–15)
BUN SERPL-MCNC: 16.9 MG/DL (ref 6–20)
CALCIUM SERPL-MCNC: 11.1 MG/DL (ref 8.8–10.4)
CHLORIDE SERPL-SCNC: 106 MMOL/L (ref 98–107)
CMV DNA SPEC NAA+PROBE-ACNC: 124 IU/ML
CMV DNA SPEC NAA+PROBE-LOG#: 2.1 {LOG_COPIES}/ML
CREAT SERPL-MCNC: 1.55 MG/DL (ref 0.67–1.17)
EGFRCR SERPLBLD CKD-EPI 2021: 55 ML/MIN/1.73M2
ERYTHROCYTE [DISTWIDTH] IN BLOOD BY AUTOMATED COUNT: 15.9 % (ref 10–15)
GLUCOSE SERPL-MCNC: 137 MG/DL (ref 70–99)
HCO3 SERPL-SCNC: 21 MMOL/L (ref 22–29)
HCT VFR BLD AUTO: 35.7 % (ref 40–53)
HGB BLD-MCNC: 11.6 G/DL (ref 13.3–17.7)
LIPASE SERPL-CCNC: 37 U/L (ref 13–60)
MAGNESIUM SERPL-MCNC: 1.7 MG/DL (ref 1.7–2.3)
MCH RBC QN AUTO: 29.7 PG (ref 26.5–33)
MCHC RBC AUTO-ENTMCNC: 32.5 G/DL (ref 31.5–36.5)
MCV RBC AUTO: 92 FL (ref 78–100)
PLATELET # BLD AUTO: 148 10E3/UL (ref 150–450)
POTASSIUM SERPL-SCNC: 4.6 MMOL/L (ref 3.4–5.3)
RBC # BLD AUTO: 3.9 10E6/UL (ref 4.4–5.9)
SODIUM SERPL-SCNC: 138 MMOL/L (ref 135–145)
SPECIMEN TYPE: ABNORMAL
TACROLIMUS BLD-MCNC: 11 UG/L (ref 5–15)
TME LAST DOSE: NORMAL H
TME LAST DOSE: NORMAL H
WBC # BLD AUTO: 3.9 10E3/UL (ref 4–11)

## 2025-01-03 PROCEDURE — 83690 ASSAY OF LIPASE: CPT

## 2025-01-03 PROCEDURE — 80197 ASSAY OF TACROLIMUS: CPT

## 2025-01-03 PROCEDURE — 85041 AUTOMATED RBC COUNT: CPT

## 2025-01-03 PROCEDURE — 36415 COLL VENOUS BLD VENIPUNCTURE: CPT

## 2025-01-03 PROCEDURE — 85018 HEMOGLOBIN: CPT

## 2025-01-03 PROCEDURE — 80048 BASIC METABOLIC PNL TOTAL CA: CPT

## 2025-01-03 PROCEDURE — 82150 ASSAY OF AMYLASE: CPT

## 2025-01-03 PROCEDURE — 83735 ASSAY OF MAGNESIUM: CPT

## 2025-01-03 RX ORDER — CINACALCET 30 MG/1
30 TABLET, FILM COATED ORAL DAILY
Qty: 90 TABLET | Refills: 3 | Status: SHIPPED | OUTPATIENT
Start: 2025-01-03

## 2025-01-03 ASSESSMENT — ENCOUNTER SYMPTOMS: NEW SYMPTOMS OF CORONARY ARTERY DISEASE: 1

## 2025-01-03 NOTE — TELEPHONE ENCOUNTER
ISSUE:  Ca 11.1     Fernando Sorensen MD Poucher, Jessica, RN  Yes, cinacalcet 30 mg daily.  Also make sure he isn't taking any calcium for heartburn symptoms or something like that.  Or vitamin D.    [employer] message sent to patient.

## 2025-01-06 ENCOUNTER — TELEPHONE (OUTPATIENT)
Dept: ONCOLOGY | Facility: CLINIC | Age: 50
End: 2025-01-06
Payer: COMMERCIAL

## 2025-01-06 ENCOUNTER — TELEPHONE (OUTPATIENT)
Dept: TRANSPLANT | Facility: CLINIC | Age: 50
End: 2025-01-06
Payer: COMMERCIAL

## 2025-01-06 DIAGNOSIS — Z48.298 AFTERCARE FOLLOWING ORGAN TRANSPLANT: Primary | ICD-10-CM

## 2025-01-06 DIAGNOSIS — Z94.0 KIDNEY REPLACED BY TRANSPLANT: ICD-10-CM

## 2025-01-06 DIAGNOSIS — I15.1 HTN, KIDNEY TRANSPLANT RELATED: ICD-10-CM

## 2025-01-06 DIAGNOSIS — Z94.0 HTN, KIDNEY TRANSPLANT RELATED: ICD-10-CM

## 2025-01-06 NOTE — TELEPHONE ENCOUNTER
ISSUE:  Low level CMV, CMV IgG+    Current ISx: tac goal 8-10, last level 11    mg bid, last level 2.6    Franchesca, MD Jerry Echevarria Jessica, RN  Yes, would just repeat it in a week and lower tacrolimus as you planned.    ISSUE:   Tacrolimus IR level 11 on 1/3, goal 8-10, dose 2 mg BID.    PLAN:   Call Patient and confirm this was an accurate 12-hour trough.   Verify Tacrolimus IR dose 2 mg BID.   Confirm no new medications or or missed doses.   Confirm no new illness / infection / diarrhea.   If accurate trough and accurate dose, decrease Tacrolimus IR dose to 1.5 mg BID     Is this more than a 50% increase or decrease in current IS dose: No  If YES, justification: N/A    Repeat labs in 1 weeks.  *If > 50% change in immunosuppression dose, repeat labs in 1 week.     OUTCOME:   Spoke with Patient, they confirm accurate trough level and current dose 2 mg BID.   Patient confirmed dose change to 1.5 mg BID.  Patient agreed to repeat labs in 1 weeks.   Orders sent to preferred pharmacy for dose change and lab for repeat labs.   Patient voiced understanding of plan.

## 2025-01-06 NOTE — TELEPHONE ENCOUNTER
Prior Authorization Approval    Medication: CINACALCET HCL 30 MG PO TABS  Authorization Effective Date: 12/7/2024  Authorization Expiration Date: 1/6/2026  Approved Dose/Quantity: 90 per 90d  Reference #: [id number]   Insurance Company:    Expected CoPay: $ 0  CoPay Card Available: No    Financial Assistance Needed:    Which Pharmacy is filling the prescription:    Pharmacy Notified:  yes  Patient Notified: no

## 2025-01-07 RX ORDER — TACROLIMUS 0.5 MG/1
0.5 CAPSULE ORAL 2 TIMES DAILY
Qty: 180 CAPSULE | Refills: 3 | Status: SHIPPED | OUTPATIENT
Start: 2025-01-07

## 2025-01-07 RX ORDER — TACROLIMUS 1 MG/1
1 CAPSULE ORAL 2 TIMES DAILY
Qty: 180 CAPSULE | Refills: 3 | Status: SHIPPED | OUTPATIENT
Start: 2025-01-07

## 2025-01-08 ENCOUNTER — TEAM CONFERENCE (OUTPATIENT)
Dept: TRANSPLANT | Facility: CLINIC | Age: 50
End: 2025-01-08
Payer: COMMERCIAL

## 2025-01-08 DIAGNOSIS — I15.1 HTN, KIDNEY TRANSPLANT RELATED: ICD-10-CM

## 2025-01-08 DIAGNOSIS — Z94.0 HTN, KIDNEY TRANSPLANT RELATED: ICD-10-CM

## 2025-01-08 DIAGNOSIS — B34.8 BK VIREMIA: Primary | ICD-10-CM

## 2025-01-08 NOTE — TELEPHONE ENCOUNTER
Post Kidney and Pancreas Transplant Team Conference  Date: 1/8/2025  Transplant Coordinator: Julianna Edwarsd     Attendees:  [x]  Dr. Sorensen [] Leonor Kang, BETSY [] Betty Landaverde LPN     [x]  Dr. Shell [x] Rosalia Romero, BETSY [x] Bijan Reese LPN    [] Dr. Wallis [x] Ximena Edwards RN    [x] Dr. Coombs [x] Mony Crabtree RN [x] Angella Liriano RN   [] Dr. Kong [x] Asha Allred, BETSY    [x] Dr. Christianson [x] Cindy Ye, BETSY Blanco   [x]  Dr. Whitehead [x] Miryam Iyer, BETSY    [] Dr. Michaud [x] Gino Mahan RN    [] Sobia Underwood NP [] Roxanna Monet RN    [x] Shelia Magallon NP [x] Alisa Cutler RN          Verbal Plan Read Back:   IgG Friday    Routed to RN Coordinator   Bijan Reese LPN

## 2025-01-09 ENCOUNTER — LAB (OUTPATIENT)
Dept: LAB | Facility: HOSPITAL | Age: 50
End: 2025-01-09
Payer: MEDICARE

## 2025-01-09 DIAGNOSIS — Z94.0 KIDNEY REPLACED BY TRANSPLANT: ICD-10-CM

## 2025-01-09 DIAGNOSIS — Z48.298 AFTERCARE FOLLOWING ORGAN TRANSPLANT: ICD-10-CM

## 2025-01-09 DIAGNOSIS — Z94.83 PANCREAS REPLACED BY TRANSPLANT (H): ICD-10-CM

## 2025-01-09 DIAGNOSIS — Z79.899 ENCOUNTER FOR LONG-TERM CURRENT USE OF MEDICATION: ICD-10-CM

## 2025-01-09 DIAGNOSIS — Z98.890 OTHER SPECIFIED POSTPROCEDURAL STATES: ICD-10-CM

## 2025-01-09 DIAGNOSIS — B34.8 BK VIREMIA: ICD-10-CM

## 2025-01-09 LAB
AMYLASE SERPL-CCNC: 93 U/L (ref 28–100)
ANION GAP SERPL CALCULATED.3IONS-SCNC: 9 MMOL/L (ref 7–15)
BK VIRUS SPECIMEN TYPE: ABNORMAL
BKV DNA # SPEC NAA+PROBE: <22 IU/ML
BKV DNA SPEC NAA+PROBE-LOG#: <1.3 {LOG_COPIES}/ML
BUN SERPL-MCNC: 26.6 MG/DL (ref 6–20)
CALCIUM SERPL-MCNC: 10.7 MG/DL (ref 8.8–10.4)
CHLORIDE SERPL-SCNC: 106 MMOL/L (ref 98–107)
CREAT SERPL-MCNC: 1.67 MG/DL (ref 0.67–1.17)
EGFRCR SERPLBLD CKD-EPI 2021: 50 ML/MIN/1.73M2
ERYTHROCYTE [DISTWIDTH] IN BLOOD BY AUTOMATED COUNT: 15.3 % (ref 10–15)
GLUCOSE SERPL-MCNC: 134 MG/DL (ref 70–99)
HCO3 SERPL-SCNC: 23 MMOL/L (ref 22–29)
HCT VFR BLD AUTO: 38.1 % (ref 40–53)
HGB BLD-MCNC: 12.4 G/DL (ref 13.3–17.7)
LIPASE SERPL-CCNC: 62 U/L (ref 13–60)
MCH RBC QN AUTO: 29.6 PG (ref 26.5–33)
MCHC RBC AUTO-ENTMCNC: 32.5 G/DL (ref 31.5–36.5)
MCV RBC AUTO: 91 FL (ref 78–100)
PLATELET # BLD AUTO: 216 10E3/UL (ref 150–450)
POTASSIUM SERPL-SCNC: 4.9 MMOL/L (ref 3.4–5.3)
RBC # BLD AUTO: 4.19 10E6/UL (ref 4.4–5.9)
SODIUM SERPL-SCNC: 138 MMOL/L (ref 135–145)
TACROLIMUS BLD-MCNC: 12.7 UG/L (ref 5–15)
TME LAST DOSE: NORMAL H
TME LAST DOSE: NORMAL H
WBC # BLD AUTO: 5.1 10E3/UL (ref 4–11)

## 2025-01-09 PROCEDURE — 82150 ASSAY OF AMYLASE: CPT

## 2025-01-09 PROCEDURE — 82784 ASSAY IGA/IGD/IGG/IGM EACH: CPT

## 2025-01-09 PROCEDURE — 80197 ASSAY OF TACROLIMUS: CPT

## 2025-01-09 PROCEDURE — 87799 DETECT AGENT NOS DNA QUANT: CPT

## 2025-01-09 PROCEDURE — 85018 HEMOGLOBIN: CPT

## 2025-01-09 PROCEDURE — 84132 ASSAY OF SERUM POTASSIUM: CPT

## 2025-01-09 PROCEDURE — 83690 ASSAY OF LIPASE: CPT

## 2025-01-09 PROCEDURE — 36415 COLL VENOUS BLD VENIPUNCTURE: CPT

## 2025-01-09 PROCEDURE — 80048 BASIC METABOLIC PNL TOTAL CA: CPT

## 2025-01-10 LAB — IGG SERPL-MCNC: 579 MG/DL (ref 610–1616)

## 2025-01-13 ENCOUNTER — OFFICE VISIT (OUTPATIENT)
Dept: TRANSPLANT | Facility: CLINIC | Age: 50
End: 2025-01-13
Attending: SURGERY
Payer: MEDICARE

## 2025-01-13 ENCOUNTER — TELEPHONE (OUTPATIENT)
Dept: TRANSPLANT | Facility: CLINIC | Age: 50
End: 2025-01-13

## 2025-01-13 VITALS
WEIGHT: 219.7 LBS | TEMPERATURE: 98.5 F | HEART RATE: 90 BPM | SYSTOLIC BLOOD PRESSURE: 108 MMHG | HEIGHT: 71 IN | DIASTOLIC BLOOD PRESSURE: 77 MMHG | BODY MASS INDEX: 30.76 KG/M2 | OXYGEN SATURATION: 97 % | RESPIRATION RATE: 22 BRPM

## 2025-01-13 DIAGNOSIS — Z94.0 KIDNEY REPLACED BY TRANSPLANT: ICD-10-CM

## 2025-01-13 DIAGNOSIS — I15.1 HTN, KIDNEY TRANSPLANT RELATED: ICD-10-CM

## 2025-01-13 DIAGNOSIS — Z94.0 HTN, KIDNEY TRANSPLANT RELATED: ICD-10-CM

## 2025-01-13 DIAGNOSIS — D80.1 HYPOGAMMAGLOBULINEMIA: Primary | ICD-10-CM

## 2025-01-13 DIAGNOSIS — Z94.83 PANCREAS REPLACED BY TRANSPLANT (H): ICD-10-CM

## 2025-01-13 PROCEDURE — G0463 HOSPITAL OUTPT CLINIC VISIT: HCPCS | Performed by: SURGERY

## 2025-01-13 PROCEDURE — 99213 OFFICE O/P EST LOW 20 MIN: CPT | Performed by: SURGERY

## 2025-01-13 RX ORDER — DORZOLAMIDE HYDROCHLORIDE AND TIMOLOL MALEATE 20; 5 MG/ML; MG/ML
1 SOLUTION/ DROPS OPHTHALMIC 2 TIMES DAILY
COMMUNITY

## 2025-01-13 ASSESSMENT — PAIN SCALES - GENERAL: PAINLEVEL_OUTOF10: MILD PAIN (3)
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
## 2025-01-13 NOTE — NURSING NOTE
"Chief Complaint   Patient presents with    Surgical Followup     S/P Kidney/Pancreas transplant 177 days     Vital signs:  Temp: 98.5  F (36.9  C) Temp src: Oral BP: 108/77 Pulse: 90   Resp: 22 SpO2: 97 %     Height: 180.3 cm (5' 10.98\") Weight: 99.7 kg (219 lb 11.2 oz)  Estimated body mass index is 30.66 kg/m  as calculated from the following:    Height as of this encounter: 1.803 m (5' 10.98\").    Weight as of this encounter: 99.7 kg (219 lb 11.2 oz).      Gabriela Rodriguez, [facility]  1/13/2025 1:40 PM    "

## 2025-01-13 NOTE — TELEPHONE ENCOUNTER
ISSUE:  IgG = 579     Per Dr. Sorensen, give IVIG 0.5 g/kg x2 doses  by 14 days.     Therapy plan updated, Siva notified. Sent to [facility] to schedule.
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
## 2025-01-14 PROBLEM — D80.1 HYPOGAMMAGLOBULINEMIA: Status: ACTIVE | Noted: 2025-01-14

## 2025-01-14 RX ORDER — ACETAMINOPHEN 325 MG/1
650 TABLET ORAL ONCE
Start: 2025-01-14

## 2025-01-14 RX ORDER — DIPHENHYDRAMINE HYDROCHLORIDE 50 MG/ML
50 INJECTION INTRAMUSCULAR; INTRAVENOUS
Start: 2025-01-14

## 2025-01-14 RX ORDER — MEPERIDINE HYDROCHLORIDE 25 MG/ML
25 INJECTION INTRAMUSCULAR; INTRAVENOUS; SUBCUTANEOUS
OUTPATIENT
Start: 2025-01-14

## 2025-01-14 RX ORDER — DIPHENHYDRAMINE HCL 25 MG
25-50 CAPSULE ORAL ONCE
Start: 2025-01-14

## 2025-01-14 RX ORDER — HEPARIN SODIUM (PORCINE) LOCK FLUSH IV SOLN 100 UNIT/ML 100 UNIT/ML
5 SOLUTION INTRAVENOUS
OUTPATIENT
Start: 2025-01-14

## 2025-01-14 RX ORDER — ALBUTEROL SULFATE 0.83 MG/ML
2.5 SOLUTION RESPIRATORY (INHALATION)
OUTPATIENT
Start: 2025-01-14

## 2025-01-14 RX ORDER — EPINEPHRINE 1 MG/ML
0.3 INJECTION, SOLUTION, CONCENTRATE INTRAVENOUS EVERY 5 MIN PRN
OUTPATIENT
Start: 2025-01-14

## 2025-01-14 RX ORDER — DIPHENHYDRAMINE HYDROCHLORIDE 50 MG/ML
25 INJECTION INTRAMUSCULAR; INTRAVENOUS
Start: 2025-01-14

## 2025-01-14 RX ORDER — HEPARIN SODIUM,PORCINE 10 UNIT/ML
5-20 VIAL (ML) INTRAVENOUS DAILY PRN
OUTPATIENT
Start: 2025-01-14

## 2025-01-14 RX ORDER — ALBUTEROL SULFATE 90 UG/1
1-2 INHALANT RESPIRATORY (INHALATION)
Start: 2025-01-14

## 2025-01-14 RX ORDER — METHYLPREDNISOLONE SODIUM SUCCINATE 40 MG/ML
40 INJECTION INTRAMUSCULAR; INTRAVENOUS
Start: 2025-01-14

## 2025-01-17 ENCOUNTER — LAB (OUTPATIENT)
Dept: LAB | Facility: HOSPITAL | Age: 50
End: 2025-01-17
Payer: MEDICARE

## 2025-01-17 DIAGNOSIS — Z79.899 ENCOUNTER FOR LONG-TERM CURRENT USE OF MEDICATION: ICD-10-CM

## 2025-01-17 DIAGNOSIS — Z94.0 KIDNEY REPLACED BY TRANSPLANT: ICD-10-CM

## 2025-01-17 DIAGNOSIS — Z98.890 OTHER SPECIFIED POSTPROCEDURAL STATES: ICD-10-CM

## 2025-01-17 DIAGNOSIS — Z94.83 PANCREAS REPLACED BY TRANSPLANT (H): ICD-10-CM

## 2025-01-17 DIAGNOSIS — Z48.298 AFTERCARE FOLLOWING ORGAN TRANSPLANT: ICD-10-CM

## 2025-01-17 LAB
AMYLASE SERPL-CCNC: 95 U/L (ref 28–100)
ANION GAP SERPL CALCULATED.3IONS-SCNC: 9 MMOL/L (ref 7–15)
BUN SERPL-MCNC: 17.8 MG/DL (ref 6–20)
CALCIUM SERPL-MCNC: 10.8 MG/DL (ref 8.8–10.4)
CHLORIDE SERPL-SCNC: 106 MMOL/L (ref 98–107)
CMV DNA SPEC NAA+PROBE-ACNC: 80 IU/ML
CMV DNA SPEC NAA+PROBE-LOG#: 1.9 {LOG_COPIES}/ML
CREAT SERPL-MCNC: 1.51 MG/DL (ref 0.67–1.17)
EGFRCR SERPLBLD CKD-EPI 2021: 56 ML/MIN/1.73M2
ERYTHROCYTE [DISTWIDTH] IN BLOOD BY AUTOMATED COUNT: 14.9 % (ref 10–15)
GLUCOSE SERPL-MCNC: 131 MG/DL (ref 70–99)
HCO3 SERPL-SCNC: 24 MMOL/L (ref 22–29)
HCT VFR BLD AUTO: 37.7 % (ref 40–53)
HGB BLD-MCNC: 12.1 G/DL (ref 13.3–17.7)
LIPASE SERPL-CCNC: 62 U/L (ref 13–60)
MAGNESIUM SERPL-MCNC: 1.6 MG/DL (ref 1.7–2.3)
MCH RBC QN AUTO: 29.1 PG (ref 26.5–33)
MCHC RBC AUTO-ENTMCNC: 32.1 G/DL (ref 31.5–36.5)
MCV RBC AUTO: 91 FL (ref 78–100)
PHOSPHATE SERPL-MCNC: 2.8 MG/DL (ref 2.5–4.5)
PLATELET # BLD AUTO: 189 10E3/UL (ref 150–450)
POTASSIUM SERPL-SCNC: 4.6 MMOL/L (ref 3.4–5.3)
RBC # BLD AUTO: 4.16 10E6/UL (ref 4.4–5.9)
SODIUM SERPL-SCNC: 139 MMOL/L (ref 135–145)
SPECIMEN TYPE: ABNORMAL
TACROLIMUS BLD-MCNC: 5.7 UG/L (ref 5–15)
TME LAST DOSE: NORMAL H
TME LAST DOSE: NORMAL H
WBC # BLD AUTO: 4.7 10E3/UL (ref 4–11)

## 2025-01-17 PROCEDURE — 82150 ASSAY OF AMYLASE: CPT

## 2025-01-17 PROCEDURE — 83690 ASSAY OF LIPASE: CPT

## 2025-01-17 PROCEDURE — 80048 BASIC METABOLIC PNL TOTAL CA: CPT

## 2025-01-17 PROCEDURE — 83735 ASSAY OF MAGNESIUM: CPT

## 2025-01-17 PROCEDURE — 85041 AUTOMATED RBC COUNT: CPT

## 2025-01-17 PROCEDURE — 80197 ASSAY OF TACROLIMUS: CPT

## 2025-01-17 PROCEDURE — 36415 COLL VENOUS BLD VENIPUNCTURE: CPT

## 2025-01-17 PROCEDURE — 80180 DRUG SCRN QUAN MYCOPHENOLATE: CPT

## 2025-01-17 PROCEDURE — 84100 ASSAY OF PHOSPHORUS: CPT

## 2025-01-17 PROCEDURE — 85018 HEMOGLOBIN: CPT

## 2025-01-20 DIAGNOSIS — I15.1 HTN, KIDNEY TRANSPLANT RELATED: ICD-10-CM

## 2025-01-20 DIAGNOSIS — Z94.0 HTN, KIDNEY TRANSPLANT RELATED: ICD-10-CM

## 2025-01-20 DIAGNOSIS — Z94.0 KIDNEY REPLACED BY TRANSPLANT: Primary | ICD-10-CM

## 2025-01-20 LAB
MYCOPHENOLATE SERPL LC/MS/MS-MCNC: 2.15 MG/L (ref 1–3.5)
MYCOPHENOLATE-G SERPL LC/MS/MS-MCNC: 60.5 MG/L (ref 30–95)
TME LAST DOSE: NORMAL H
TME LAST DOSE: NORMAL H

## 2025-01-20 RX ORDER — TACROLIMUS 1 MG/1
2 CAPSULE ORAL 2 TIMES DAILY
Qty: 360 CAPSULE | Refills: 3 | Status: SHIPPED | OUTPATIENT
Start: 2025-01-20

## 2025-01-20 RX ORDER — TACROLIMUS 0.5 MG/1
CAPSULE ORAL
Status: SHIPPED
Start: 2025-01-20
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
## 2025-01-22 ENCOUNTER — INFUSION THERAPY VISIT (OUTPATIENT)
Dept: INFUSION THERAPY | Facility: CLINIC | Age: 50
End: 2025-01-22
Attending: INTERNAL MEDICINE
Payer: MEDICARE

## 2025-01-22 VITALS
TEMPERATURE: 98.5 F | WEIGHT: 221.3 LBS | HEART RATE: 92 BPM | BODY MASS INDEX: 30.88 KG/M2 | SYSTOLIC BLOOD PRESSURE: 153 MMHG | RESPIRATION RATE: 16 BRPM | DIASTOLIC BLOOD PRESSURE: 84 MMHG | OXYGEN SATURATION: 96 %

## 2025-01-22 DIAGNOSIS — D80.1 HYPOGAMMAGLOBULINEMIA: ICD-10-CM

## 2025-01-22 DIAGNOSIS — Z94.0 KIDNEY REPLACED BY TRANSPLANT: Primary | ICD-10-CM

## 2025-01-22 PROCEDURE — 250N000011 HC RX IP 250 OP 636: Mod: JZ | Performed by: INTERNAL MEDICINE

## 2025-01-22 PROCEDURE — 96366 THER/PROPH/DIAG IV INF ADDON: CPT

## 2025-01-22 PROCEDURE — 250N000013 HC RX MED GY IP 250 OP 250 PS 637: Performed by: INTERNAL MEDICINE

## 2025-01-22 PROCEDURE — 96365 THER/PROPH/DIAG IV INF INIT: CPT

## 2025-01-22 RX ORDER — DIPHENHYDRAMINE HYDROCHLORIDE 50 MG/ML
25 INJECTION INTRAMUSCULAR; INTRAVENOUS
Start: 2025-02-05

## 2025-01-22 RX ORDER — EPINEPHRINE 1 MG/ML
0.3 INJECTION, SOLUTION INTRAMUSCULAR; SUBCUTANEOUS EVERY 5 MIN PRN
OUTPATIENT
Start: 2025-02-05

## 2025-01-22 RX ORDER — ALBUTEROL SULFATE 0.83 MG/ML
2.5 SOLUTION RESPIRATORY (INHALATION)
OUTPATIENT
Start: 2025-02-05

## 2025-01-22 RX ORDER — ONDANSETRON 2 MG/ML
4 INJECTION INTRAMUSCULAR; INTRAVENOUS EVERY 30 MIN PRN
Status: DISCONTINUED | OUTPATIENT
Start: 2025-01-22 | End: 2025-01-22 | Stop reason: HOSPADM

## 2025-01-22 RX ORDER — ALBUTEROL SULFATE 90 UG/1
1-2 INHALANT RESPIRATORY (INHALATION)
Start: 2025-02-05

## 2025-01-22 RX ORDER — ACETAMINOPHEN 325 MG/1
650 TABLET ORAL ONCE
Start: 2025-02-05

## 2025-01-22 RX ORDER — METHYLPREDNISOLONE SODIUM SUCCINATE 40 MG/ML
40 INJECTION INTRAMUSCULAR; INTRAVENOUS
Start: 2025-02-05

## 2025-01-22 RX ORDER — HEPARIN SODIUM,PORCINE 10 UNIT/ML
5-20 VIAL (ML) INTRAVENOUS DAILY PRN
OUTPATIENT
Start: 2025-02-05

## 2025-01-22 RX ORDER — MEPERIDINE HYDROCHLORIDE 25 MG/ML
25 INJECTION INTRAMUSCULAR; INTRAVENOUS; SUBCUTANEOUS
OUTPATIENT
Start: 2025-02-05

## 2025-01-22 RX ORDER — DIPHENHYDRAMINE HCL 25 MG
25-50 CAPSULE ORAL ONCE
Start: 2025-02-05

## 2025-01-22 RX ORDER — DIPHENHYDRAMINE HCL 25 MG
25-50 CAPSULE ORAL ONCE
Status: COMPLETED | OUTPATIENT
Start: 2025-01-22 | End: 2025-01-22

## 2025-01-22 RX ORDER — HEPARIN SODIUM (PORCINE) LOCK FLUSH IV SOLN 100 UNIT/ML 100 UNIT/ML
5 SOLUTION INTRAVENOUS
OUTPATIENT
Start: 2025-02-05

## 2025-01-22 RX ORDER — ONDANSETRON 2 MG/ML
4 INJECTION INTRAMUSCULAR; INTRAVENOUS EVERY 30 MIN PRN
Start: 2025-02-05

## 2025-01-22 RX ORDER — DIPHENHYDRAMINE HYDROCHLORIDE 50 MG/ML
50 INJECTION INTRAMUSCULAR; INTRAVENOUS
Start: 2025-02-05

## 2025-01-22 RX ORDER — ACETAMINOPHEN 325 MG/1
650 TABLET ORAL ONCE
Status: COMPLETED | OUTPATIENT
Start: 2025-01-22 | End: 2025-01-22

## 2025-01-22 RX ADMIN — HUMAN IMMUNOGLOBULIN G 40 G: 40 LIQUID INTRAVENOUS at 14:21

## 2025-01-22 RX ADMIN — DIPHENHYDRAMINE HYDROCHLORIDE 50 MG: 25 CAPSULE ORAL at 14:14

## 2025-01-22 RX ADMIN — ACETAMINOPHEN 650 MG: 325 TABLET ORAL at 14:14

## 2025-01-22 ASSESSMENT — PAIN SCALES - GENERAL: PAINLEVEL_OUTOF10: NO PAIN (0)

## 2025-01-22 NOTE — PROGRESS NOTES
"Infusion Nursing Note:  Siva Ramey presents today for   Chief Complaint   Patient presents with    Infusion     SOT - immune globulin (PRIVIGEN)       Patient seen by provider today: No   present during visit today: No    Note:   -Orders from Fernando Sorensen MD completed. Frequency: every 14 days x2 - this is dose 1 of 2.  -Premeds:    650mg Tylenol PO   50mg Benadryl PO  -40g IVIG IV over ~2hrs. Weight used for calculations: 100kg. Initial rate 50ml/hr (0.5ml/kg/hr) x15min, then increased by 50ml/hr every 15min to max rate of 400ml/hr.  -VS monitored every 15min x4, then every 30min x2 due to first dose.    -Patient's BP elevated within 15min of infusion starting, from 125/72 to 143/72 and has stayed in 140s-150s/70s since. Patient reports slight nausea but denies headache or any other symptoms related to hypertension or hypersensitivity reaction. Dr. Sorensen notified via thesweetlink at 1532 and received call back  with recommendation to continue monitoring; gave TORB for IV Zofran. Patient declined Zofran; said it \"doesn't work for him\". Nausea resolved by end of infusion without treatment.    Intravenous Access:  PIV [facility] in Rt forearm by [facility].    Treatment Conditions:  Patient denies recent major or local infection that has not been addressed by a provider, recent surgery or elevated temperature, signs or symptoms of clot or recent clot, recent changes in color or consistency of urination.     Post Infusion Assessment:  Patient tolerated infusion without incident.  Blood return noted pre and post infusion.  Site patent and intact, free from redness, edema or discomfort.  No evidence of extravasations.  Access discontinued per protocol.     Discharge Plan:   Discharge instructions reviewed with: Patient.  Patient and/or family verbalized understanding of discharge instructions and all questions answered.  AVS to patient via [employer].     Patient discharged in stable condition accompanied by: " self.  Departure Mode: Ambulatory.    Future Appointments   Date Time Provider Department Center   2/5/2025  1:30 PM [facility] INFUSION NURSE [facility]       Tamiko Mcmahon RN    /72   Pulse 92   Temp 98.5  F (36.9  C)   Resp 16   Wt 100.4 kg (221 lb 4.8 oz)   SpO2 96%   BMI 30.88 kg/m      Administrations This Visit       acetaminophen (TYLENOL) tablet 650 mg       Admin Date  01/22/2025 Action  $Given Dose  650 mg Route  Oral Documented By  Tamiko Mcmahon RN              diphenhydrAMINE (BENADRYL) capsule 25-50 mg       Admin Date  01/22/2025 Action  $Given Dose  50 mg Route  Oral Documented By  Tamiko Mcmahon RN              immune globulin (PRIVIGEN) sucrose free 10 % injection 40 g       Admin Date  01/22/2025 Action  $New Bag Dose  40 g Route  Intravenous Documented By  Tamiko Mcmahon, RN

## 2025-01-28 ENCOUNTER — PRE VISIT (OUTPATIENT)
Dept: UROLOGY | Facility: CLINIC | Age: 50
End: 2025-01-28
Payer: COMMERCIAL

## 2025-01-28 NOTE — TELEPHONE ENCOUNTER
Reason for visit: prostate abscess     Relevant information: referred for prostate abscess on 11/25/24 via Fernando Sorensen MD,, kidney and pancreas transplant pt with stents placed (7/19/24), stents removed on 10/10/24, hypogammaglobulinemia     Records/imaging/labs/orders: All records available    At Rooming: Virtual visit      Jono Wallace  1/28/2025  4:32 PM

## 2025-01-29 ENCOUNTER — LAB (OUTPATIENT)
Dept: LAB | Facility: HOSPITAL | Age: 50
End: 2025-01-29
Payer: COMMERCIAL

## 2025-01-29 DIAGNOSIS — Z94.83 PANCREAS REPLACED BY TRANSPLANT (H): ICD-10-CM

## 2025-01-29 DIAGNOSIS — Z48.298 AFTERCARE FOLLOWING ORGAN TRANSPLANT: ICD-10-CM

## 2025-01-29 DIAGNOSIS — Z98.890 OTHER SPECIFIED POSTPROCEDURAL STATES: ICD-10-CM

## 2025-01-29 DIAGNOSIS — Z94.0 KIDNEY REPLACED BY TRANSPLANT: ICD-10-CM

## 2025-01-29 DIAGNOSIS — Z79.899 ENCOUNTER FOR LONG-TERM CURRENT USE OF MEDICATION: ICD-10-CM

## 2025-01-29 LAB
ALBUMIN MFR UR ELPH: 40.6 MG/DL
ALBUMIN SERPL BCG-MCNC: 4.3 G/DL (ref 3.5–5.2)
ALP SERPL-CCNC: 251 U/L (ref 40–150)
ALT SERPL W P-5'-P-CCNC: 39 U/L (ref 0–70)
AMYLASE SERPL-CCNC: 87 U/L (ref 28–100)
ANION GAP SERPL CALCULATED.3IONS-SCNC: 11 MMOL/L (ref 7–15)
AST SERPL W P-5'-P-CCNC: 25 U/L (ref 0–45)
BILIRUB DIRECT SERPL-MCNC: <0.2 MG/DL (ref 0–0.3)
BILIRUB SERPL-MCNC: 0.4 MG/DL
BUN SERPL-MCNC: 21 MG/DL (ref 6–20)
CALCIUM SERPL-MCNC: 11.5 MG/DL (ref 8.8–10.4)
CD3 CELLS # BLD: 464 CELLS/UL (ref 603–2990)
CD3 CELLS NFR BLD: 81 % (ref 49–84)
CD3+CD4+ CELLS # BLD: 126 CELLS/UL (ref 441–2156)
CD3+CD4+ CELLS NFR BLD: 22 % (ref 28–63)
CD3+CD4+ CELLS/CD3+CD8+ CLL BLD: 0.38 % (ref 1.4–2.6)
CD3+CD8+ CELLS # BLD: 328 CELLS/UL (ref 125–1312)
CD3+CD8+ CELLS NFR BLD: 57 % (ref 10–40)
CHLORIDE SERPL-SCNC: 104 MMOL/L (ref 98–107)
CHOLEST SERPL-MCNC: 115 MG/DL
CREAT SERPL-MCNC: 1.69 MG/DL (ref 0.67–1.17)
CREAT UR-MCNC: 91.1 MG/DL
EGFRCR SERPLBLD CKD-EPI 2021: 49 ML/MIN/1.73M2
ERYTHROCYTE [DISTWIDTH] IN BLOOD BY AUTOMATED COUNT: 14.7 % (ref 10–15)
EST. AVERAGE GLUCOSE BLD GHB EST-MCNC: 117 MG/DL
FASTING STATUS PATIENT QL REPORTED: YES
FASTING STATUS PATIENT QL REPORTED: YES
GLUCOSE SERPL-MCNC: 145 MG/DL (ref 70–99)
HBA1C MFR BLD: 5.7 %
HCO3 SERPL-SCNC: 23 MMOL/L (ref 22–29)
HCT VFR BLD AUTO: 39.2 % (ref 40–53)
HDLC SERPL-MCNC: 25 MG/DL
HGB BLD-MCNC: 12.4 G/DL (ref 13.3–17.7)
LDLC SERPL CALC-MCNC: 51 MG/DL
LIPASE SERPL-CCNC: 49 U/L (ref 13–60)
MAGNESIUM SERPL-MCNC: 1.7 MG/DL (ref 1.7–2.3)
MCH RBC QN AUTO: 27.9 PG (ref 26.5–33)
MCHC RBC AUTO-ENTMCNC: 31.6 G/DL (ref 31.5–36.5)
MCV RBC AUTO: 88 FL (ref 78–100)
MYCOPHENOLATE SERPL LC/MS/MS-MCNC: 2.34 MG/L (ref 1–3.5)
MYCOPHENOLATE-G SERPL LC/MS/MS-MCNC: 67.2 MG/L (ref 30–95)
NONHDLC SERPL-MCNC: 90 MG/DL
PHOSPHATE SERPL-MCNC: 2.7 MG/DL (ref 2.5–4.5)
PLATELET # BLD AUTO: 185 10E3/UL (ref 150–450)
POTASSIUM SERPL-SCNC: 5 MMOL/L (ref 3.4–5.3)
PROT SERPL-MCNC: 7.7 G/DL (ref 6.4–8.3)
PROT/CREAT 24H UR: 0.45 MG/MG CR (ref 0–0.2)
RBC # BLD AUTO: 4.44 10E6/UL (ref 4.4–5.9)
SODIUM SERPL-SCNC: 138 MMOL/L (ref 135–145)
T CELL COMMENT: ABNORMAL
TACROLIMUS BLD-MCNC: 10.1 UG/L (ref 5–15)
TME LAST DOSE: NORMAL H
TRIGL SERPL-MCNC: 194 MG/DL
URATE SERPL-MCNC: 8.4 MG/DL (ref 3.4–7)
WBC # BLD AUTO: 4.6 10E3/UL (ref 4–11)

## 2025-01-29 PROCEDURE — 82947 ASSAY GLUCOSE BLOOD QUANT: CPT

## 2025-01-29 PROCEDURE — 86360 T CELL ABSOLUTE COUNT/RATIO: CPT

## 2025-01-29 PROCEDURE — 85018 HEMOGLOBIN: CPT

## 2025-01-29 PROCEDURE — 80061 LIPID PANEL: CPT

## 2025-01-29 PROCEDURE — 80180 DRUG SCRN QUAN MYCOPHENOLATE: CPT

## 2025-01-29 PROCEDURE — 82150 ASSAY OF AMYLASE: CPT

## 2025-01-29 PROCEDURE — 80197 ASSAY OF TACROLIMUS: CPT

## 2025-01-29 PROCEDURE — 86359 T CELLS TOTAL COUNT: CPT

## 2025-01-29 PROCEDURE — 84550 ASSAY OF BLOOD/URIC ACID: CPT

## 2025-01-29 PROCEDURE — 84132 ASSAY OF SERUM POTASSIUM: CPT

## 2025-01-29 PROCEDURE — 84100 ASSAY OF PHOSPHORUS: CPT

## 2025-01-29 PROCEDURE — 84075 ASSAY ALKALINE PHOSPHATASE: CPT

## 2025-01-29 PROCEDURE — 36415 COLL VENOUS BLD VENIPUNCTURE: CPT

## 2025-01-29 PROCEDURE — 84155 ASSAY OF PROTEIN SERUM: CPT

## 2025-01-29 PROCEDURE — 84156 ASSAY OF PROTEIN URINE: CPT

## 2025-01-29 PROCEDURE — 83036 HEMOGLOBIN GLYCOSYLATED A1C: CPT

## 2025-01-29 PROCEDURE — 85041 AUTOMATED RBC COUNT: CPT

## 2025-01-29 PROCEDURE — 83690 ASSAY OF LIPASE: CPT

## 2025-01-29 PROCEDURE — 83735 ASSAY OF MAGNESIUM: CPT

## 2025-01-30 ENCOUNTER — TELEPHONE (OUTPATIENT)
Dept: PHARMACY | Facility: CLINIC | Age: 50
End: 2025-01-30

## 2025-01-30 ENCOUNTER — TELEPHONE (OUTPATIENT)
Dept: TRANSPLANT | Facility: CLINIC | Age: 50
End: 2025-01-30

## 2025-01-30 ENCOUNTER — OFFICE VISIT (OUTPATIENT)
Dept: CARDIOLOGY | Facility: CLINIC | Age: 50
End: 2025-01-30
Payer: COMMERCIAL

## 2025-01-30 VITALS
HEART RATE: 90 BPM | BODY MASS INDEX: 30.8 KG/M2 | SYSTOLIC BLOOD PRESSURE: 120 MMHG | WEIGHT: 220 LBS | HEIGHT: 71 IN | RESPIRATION RATE: 16 BRPM | DIASTOLIC BLOOD PRESSURE: 60 MMHG

## 2025-01-30 DIAGNOSIS — E83.52 HYPERCALCEMIA: Primary | ICD-10-CM

## 2025-01-30 DIAGNOSIS — E83.52 HYPERCALCEMIA: ICD-10-CM

## 2025-01-30 DIAGNOSIS — I50.22 CHRONIC SYSTOLIC HEART FAILURE (H): Primary | ICD-10-CM

## 2025-01-30 DIAGNOSIS — I15.1 HTN, KIDNEY TRANSPLANT RELATED: ICD-10-CM

## 2025-01-30 DIAGNOSIS — Z94.0 HTN, KIDNEY TRANSPLANT RELATED: ICD-10-CM

## 2025-01-30 DIAGNOSIS — E21.3 HYPERPARATHYROIDISM: ICD-10-CM

## 2025-01-30 RX ORDER — EPINEPHRINE 1 MG/ML
0.3 INJECTION, SOLUTION, CONCENTRATE INTRAVENOUS EVERY 5 MIN PRN
OUTPATIENT
Start: 2025-01-30

## 2025-01-30 RX ORDER — DIPHENHYDRAMINE HYDROCHLORIDE 50 MG/ML
25 INJECTION INTRAMUSCULAR; INTRAVENOUS
Start: 2025-01-30

## 2025-01-30 RX ORDER — MEPERIDINE HYDROCHLORIDE 25 MG/ML
25 INJECTION INTRAMUSCULAR; INTRAVENOUS; SUBCUTANEOUS
OUTPATIENT
Start: 2025-01-30

## 2025-01-30 RX ORDER — DIPHENHYDRAMINE HYDROCHLORIDE 50 MG/ML
50 INJECTION INTRAMUSCULAR; INTRAVENOUS
Start: 2025-01-30

## 2025-01-30 RX ORDER — HEPARIN SODIUM,PORCINE 10 UNIT/ML
5-20 VIAL (ML) INTRAVENOUS DAILY PRN
OUTPATIENT
Start: 2025-01-30

## 2025-01-30 RX ORDER — METHYLPREDNISOLONE SODIUM SUCCINATE 40 MG/ML
40 INJECTION INTRAMUSCULAR; INTRAVENOUS
Start: 2025-01-30

## 2025-01-30 RX ORDER — ALBUTEROL SULFATE 0.83 MG/ML
2.5 SOLUTION RESPIRATORY (INHALATION)
OUTPATIENT
Start: 2025-01-30

## 2025-01-30 RX ORDER — CINACALCET 30 MG/1
60 TABLET, FILM COATED ORAL DAILY
Qty: 60 TABLET | Refills: 3 | OUTPATIENT
Start: 2025-01-30

## 2025-01-30 RX ORDER — HEPARIN SODIUM (PORCINE) LOCK FLUSH IV SOLN 100 UNIT/ML 100 UNIT/ML
5 SOLUTION INTRAVENOUS
OUTPATIENT
Start: 2025-01-30

## 2025-01-30 RX ORDER — ALBUTEROL SULFATE 90 UG/1
1-2 INHALANT RESPIRATORY (INHALATION)
Start: 2025-01-30

## 2025-01-30 NOTE — TELEPHONE ENCOUNTER
Clinical Pharmacy Consult:                                                      Transplant Specific: 6 month post transplant discharge medication review  Date of Transplant: 07/20/2024  Type of Transplant: kidney and pancreas  First Transplant: yes  History of rejection: no    Immunosuppression Regimen   Tacrolimus 2 mg qAM & 2 mg qPM and Mycophenolic Acid 720mg qAM & 720mg qPM  Patient specific goal: 8-10  Most recent level: 10.1 on 01/29/2025  Immunosuppressant Levels:  At goal  Pt adherent to lab draws: yes  Scr:   Lab Results   Component Value Date    CR 1.47 09/09/2024     Side effects:    Prophylactic Medications  PJP Prophylaxis:  Dapsone 50 mg daily  Scheduled Discontinue Date: Lifelong    Antifungal: Nystatin  Scheduled Discontinue Date: Therapy Complete    CMV: Valcyte 900mg daily   Scheduled Discontinue Date: Therapy Complete    Acid Reducer: Protonix (pantoprazole)  Scheduled Reviewed Date:  Tapered off and stopped after 10/1 visit    Thrombosis Prevention: Aspirin 81 mg PO daily  Scheduled Discontinue Date:  TBD    Blood Pressure Management  Frequency of home Blood Pressure checks: every other day  Most recent home BP: 107-126/70-82 range at home, 120/70 mmHg at today's office visit  Patient Blood pressure goal: <140/90  Patient blood pressure at goal:  yes  Hospitalizations/ER visits since last assessment: 1- not tx related, work related injury    Med rec/DUR performed: yes   Med Rec Discrepancies: no    Spoke to Siva today for med review. He was in his Cardiologist's office for a visit. He stated that he has been feeling okay, and denies any side effects to medications. He still uses a phone alarm to remember his meds and does not report missing any doses. He stated that he does have issues sometimes filling his Brilinta, which he was going to speak to his Cardiologist about.     His blood pressures have been good at home, his SBP ranges between 107-126 and DBP between 70-82.     His most recent tac  level from yesterday was at goal after his previous level was subtherapeutic and his dose changed.     No questions at this time. Follow up in 6 months.    Time Spent: 10 minutes    Betsy Suazo, PharmD  [phone]

## 2025-01-30 NOTE — TELEPHONE ENCOUNTER
ISSUE:  Calcium elevated     Grisel Coombs MD Poucher, Jessica, RN  Kevin Wilson, arrange for fluids as sensipar will take time to decrease his calcium  I can increase the dose tomorrow in clinic.    Thank you for heads up  Grisel    Therapy plan entered, sent to [facility] to schedule.

## 2025-01-30 NOTE — LETTER
1/30/2025    Trinidad Hansen PA-C, SIS  [facility] [place] [address]  [place] [address]    RE: Siva Ramey       Dear Colleague,     I had the pleasure of seeing Siva Ramey in the [facility].    HEART CARE NOTE          Assessment/Recommendations     1. Severe HFrEF   Assessment / Plan  Near euvolemia on physical exam; denies HF symptoms of orthopnea, PND, LE edema - no changes to regimen at this time  Patient is high risk for adverse cardiac events 2/2 systolic dysfunction, elevated NTproBNP  GDMT as detailed below     Current Pharmacotherapy AHA Guideline-Directed Medical Therapy   Lisinopril - held 2/2 renal dysfunction Lisinopril 20 mg twice daily   Carvedilol 3.125 mg BID Carvedilol 25 mg twice daily   Spironolactone not started Spironolactone 25 mg once daily   Hydralazine NA Hydralazine 100 mg three times daily   Isosorbide dinitrate NA Isosorbide dinitrate 40 mg three times daily   SGLT2 inhibitor:Dapagliflozin/Empagliflozin - not started  Dapagliflozin or Empagliflozin 10 mg daily      2. DM2  Assessment / Plan  Management per PMD     3. CAD  Assessment / Plan  S/p CABG in 2019 and multiple PCIs in 2019 and 2024  Denies chest pain or anginal equivalents     4. ESRD s/p renal transplant   Assessment / Plan  CKD; Follows with renal transplant team    35 minutes spent reviewing prior records (including documentation, laboratory studies, cardiac testing/imaging), history and physical exam, planning, and subsequent documentation.    The longitudinal plan of care for HFrEF was addressed during this visit. Due to the added complexity in care, I will continue to support Mr. Siva Ramey in the subsequent management of this condition(s) and with the ongoing continuity of care of this condition(s).    History of Present Illness/Subjective    Mr. Siva Ramey is a 49 year old male with a PMHx significant for (per Epic) PCI s/p IBAN 2019 and 2/2024, 2v CABG 2019, PAD, HTN,  "DM2, ESRD now s/p pancreas & renal transplant 7/20/2024. His post-op course was c/b VF arrest w/ short duration of CPR followed by ROSC and EKG showing inferior STEMI who presents to [facility] for follow-up care     Today, Mr. Ramey denies acute cardiac events or complaints; denies HF symptoms of orthopnea, PND, fluid retention or edema. Management plan as detailed above.     ECG: personally reviewed; nonspecific ST and T waves changes, sinus tachycardia.     ECHO (personally reviewed 1/30/25):   Left ventricular function is decreased. The ejection fraction is 35-40%  (moderately reduced). Akinesis of the basal-mid inferior and basal  inferoseptal segments present.  Right ventricular size and function are normal.  No significant valvular abnormalities present.  No pericardial effusion is present.     This study was compared with the study from 7/28/2024. No significant changes  noted.    Lab results: personally reviewed January 30, 2025; notable for CKD    Medical history and pertinent documents reviewed in Care Everywhere please where applicable see details above        Physical Examination Review of Systems   /60 (BP Location: Left arm, Patient Position: Sitting, Cuff Size: Adult Regular)   Pulse 90   Resp 16   Ht 1.803 m (5' 11\")   Wt 99.8 kg (220 lb)   BMI 30.68 kg/m    Body mass index is 30.68 kg/m .  Wt Readings from Last 3 Encounters:   01/30/25 99.8 kg (220 lb)   01/30/25 99.7 kg (219 lb 12.8 oz)   01/22/25 100.4 kg (221 lb 4.8 oz)     General Appearance:   no distress, normal body habitus   ENT/Mouth: membranes moist, no oral lesions or bleeding gums.      EYES:  no scleral icterus, normal conjunctivae   Neck: no carotid bruits or thyromegaly   Chest/Lungs:   lungs are clear to auscultation, no rales or wheezing, equal chest wall expansion    Cardiovascular:   Regular. Normal first and second heart sounds with no murmurs, rubs, or gallops; the carotid, radial and posterior tibial pulses are " intact, no JVD or LE edema bilaterally    Abdomen:  no organomegaly, masses, bruits, or tenderness; bowel sounds are present   Extremities: no cyanosis or clubbing   Skin: no xanthelasma, warm.    Neurologic: normal gait, normal  bilateral, no tremors     Psychiatric: alert and oriented x3, calm     A complete 10 systems ROS was reviewed  And is negative except what is listed in the HPI.          Medical History  Surgical History Family History Social History   Past Medical History:   Diagnosis Date     Acquired elevated diaphragm      Anemia in chronic kidney disease      Angina pectoris      Chronic kidney disease      Coronary artery disease      Diabetes mellitus, type II (H) 12/2001     Diabetic nephropathy (H)      MARQUEZ (dyspnea on exertion)      Dyslipidemia 12/2001     End stage renal disease (H)      History of blood transfusion 2004     Hypertension     takes medication     Ischemic cardiomyopathy      Metabolic acidosis      Myocardial infarction (H)     STEMI -Diagonal branch of the LAD     Obesity (BMI 30-39.9)      Peripheral neuropathy      Proteinuria      Retinopathy      Sleep apnea      Vitamin D deficiency     Past Surgical History:   Procedure Laterality Date     APPENDECTOMY OPEN N/A 7/19/2024    Procedure: Appendectomy open;  Surgeon: Harrison Whitehead MD;  Location:  OR     BACK SURGERY  2009    L5 disc cut     BENCH KIDNEY  7/19/2024    Procedure: Bench kidney;  Surgeon: Harrison Whitehead MD;  Location:  OR     BENCH PANCREAS N/A 7/19/2024    Procedure: Bench pancreas;  Surgeon: Harrison Whitehead MD;  Location:  OR     BYPASS GRAFT ARTERY CORONARY  06/20/2019    2 vessels     CV CENTRAL VENOUS CATHETER PLACEMENT N/A 7/20/2024    Procedure: Central Venous Catheter Placement;  Surgeon: Dominic Robertson MD;  Location:  HEART CARDIAC CATH LAB     CV CORONARY ANGIOGRAM N/A 01/13/2019    Procedure: Coronary Angiogram;   Surgeon: Cielo Che MD;  Location: [facility] Cath Lab;  Service: Cardiology     CV CORONARY ANGIOGRAM N/A 05/02/2019    Procedure: Coronary Angiogram;  Surgeon: Cielo Che MD;  Location: [facility] Cath Lab;  Service: Cardiology     CV CORONARY ANGIOGRAM N/A 04/30/2020    Procedure: Coronary Angiogram;  Surgeon: Cielo Che MD;  Location: [facility] Cath Lab;  Service: Cardiology     CV CORONARY ANGIOGRAM N/A 07/22/2021    Procedure: CV CORONARY ANGIOGRAM;  Surgeon: Adrian Crow MD;  Location: [facility] CV     CV CORONARY ANGIOGRAM N/A 02/01/2024    Procedure: Coronary Angiogram;  Surgeon: Delroy Carpio MD;  Location: [facility] CARDIAC CATH LAB     CV CORONARY ANGIOGRAM N/A 7/20/2024    Procedure: Coronary Angiogram;  Surgeon: Dominic Robertson MD;  Location: [facility] CARDIAC CATH LAB     CV CORONARY LITHOTRIPSY PCI N/A 7/20/2024    Procedure: Percutaneous Coronary Intervention - Lithotripsy;  Surgeon: Dominic Robertson MD;  Location: [facility] CARDIAC CATH LAB     CV FRACTIONAL FLOW RATIO WIRE N/A 07/22/2021    Procedure: Fractional Flow Ratio Wire;  Surgeon: Adrian Crow MD;  Location: [facility] CV     CV INTRAVASULAR ULTRASOUND N/A 7/20/2024    Procedure: Intravascular Ultrasound;  Surgeon: Dominic Robertson MD;  Location: [facility] CARDIAC CATH LAB     CV LEFT HEART CATH N/A 07/22/2021    Procedure: Left Heart Cath;  Surgeon: Adrian Crow MD;  Location: [facility] CV     CV LEFT HEART CATHETERIZATION WITH LEFT VENTRICULOGRAM N/A 01/13/2019    Procedure: Left Heart Catheterization with Left Ventriculogram;  Surgeon: Cielo Che MD;  Location: [facility] Cath Lab;  Service: Cardiology     CV LEFT HEART CATHETERIZATION WITHOUT LEFT VENTRICULOGRAM Left 04/30/2020    Procedure: Left Heart Catheterization Without Left Ventriculogram;  Surgeon: Cielo Che MD;  Location: [facility] Cath Lab;  Service:  Cardiology     CV PCI N/A 2024    Procedure: Percutaneous Coronary Intervention;  Surgeon: Delroy Carpio MD;  Location:  HEART CARDIAC CATH LAB     CV PCI N/A 2024    Procedure: Percutaneous Coronary Intervention;  Surgeon: Dominic Robertson MD;  Location: [facility] CARDIAC CATH LAB     CV PCI ASPIRATION THROMECTOMY N/A 2024    Procedure: Percutaneous Coronary Intervention Aspiration Thrombectomy;  Surgeon: Dominic Robertson MD;  Location: [facility] CARDIAC CATH LAB     CV PCI STENT DRUG ELUTING N/A 2024    Procedure: Percutaneous Coronary Intervention Stent;  Surgeon: Dominic Robertson MD;  Location: [facility] CARDIAC CATH LAB     CV RIGHT HEART CATHETERIZATION N/A 2020    Procedure: Right Heart Catheterization;  Surgeon: Cielo Che MD;  Location: [facility] Cath Lab;  Service: Cardiology     CYSTOSCOPY, REMOVE STENT(S), COMBINED N/A 10/10/2024    Procedure: CYSTOSCOPY, WITH TRANSPLANT URETERAL STENT REMOVAL;  Surgeon: Farzad Harris MD;  Location:  OR     ESOPHAGOSCOPY, GASTROSCOPY, DUODENOSCOPY (EGD), COMBINED N/A 2024    Procedure: Esophagoscopy, gastroscopy, duodenoscopy (EGD), combined;  Surgeon: Jolene Ramirez MD;  Location:  GI     EYE SURGERY       GENITOURINARY SURGERY       HERNIA REPAIR       OTHER SURGICAL HISTORY      Excise varicocele     STENT, CORONARY, DALE       TRANSPLANT PANCREAS, KIDNEY  DONOR, COMBINED N/A 2024    Procedure: Transplant pancreas, kidney  donor, with ureteral stent placement;  Surgeon: Harrison Whitehead MD;  Location:  OR     VASCULAR SURGERY      no family history of premature coronary artery disease Social History     Socioeconomic History     Marital status:      Spouse name: Not on file     Number of children: Not on file     Years of education: Not on file     Highest education level: Not on file   Occupational History      Not on file   Tobacco Use     Smoking status: Never     Passive exposure: Past     Smokeless tobacco: Never   Substance and Sexual Activity     Alcohol use: Never     Drug use: Never     Sexual activity: Yes     Partners: Female   Other Topics Concern     Parent/sibling w/ CABG, MI or angioplasty before 65F 55M? Yes   Social History Narrative     Not on file     Social Drivers of Health     Financial Resource Strain: Low Risk  (11/21/2024)    Financial Resource Strain      Within the past 12 months, have you or your family members you live with been unable to get utilities (heat, electricity) when it was really needed?: No   Food Insecurity: No Food Insecurity (12/27/2024)    Received from [facility]    Hunger Vital Sign      Worried About Running Out of Food in the Last Year: Never true      Ran Out of Food in the Last Year: Never true   Recent Concern: Food Insecurity - High Risk (11/21/2024)    Food Insecurity      Within the past 12 months, did you worry that your food would run out before you got money to buy more?: Yes      Within the past 12 months, did the food you bought just not last and you didn t have money to get more?: No   Transportation Needs: No Transportation Needs (12/27/2024)    [facility] from [facility]    PRAPARE - Transportation      In the past 12 months, has lack of transportation kept you from medical appointments or from getting medications?: No      In the past 12 months, has lack of transportation kept you from meetings, work, or from getting things needed for daily living?: No   Physical Activity: Not on file   Stress: Not on file   Social Connections: Unknown (4/10/2023)    Received from [facility], [facility]    Social Connections      Frequency of Communication with Friends and Family: Not on file   Interpersonal Safety: Not At Risk (12/27/2024)    Received from [facility]    Humiliation, Afraid, Rape, and  Kick questionnaire      Fear of Current or Ex-Partner: No      Emotionally Abused: No      Physically Abused: No      Sexually Abused: No   Housing Stability: Low Risk  (12/27/2024)    Received from [employer]    Housing Stability Vital Sign      Unable to Pay for Housing in the Last Year: No      Number of Times Moved in the Last Year: 0      Homeless in the Last Year: No           Lab Results    Chemistry/lipid CBC Cardiac Enzymes/BNP/TSH/INR   Lab Results   Component Value Date    CHOL 115 01/29/2025    HDL 25 (L) 01/29/2025    TRIG 194 (H) 01/29/2025    BUN 21.0 (H) 01/29/2025     01/29/2025    CO2 23 01/29/2025    Lab Results   Component Value Date    WBC 4.6 01/29/2025    HGB 12.4 (L) 01/29/2025    HCT 39.2 (L) 01/29/2025    MCV 88 01/29/2025     01/29/2025    Lab Results   Component Value Date    TROPONINI <0.01 07/20/2021    BNP 25 07/19/2021    TSH 1.78 10/17/2023    INR 1.39 (H) 07/20/2024     Lab Results   Component Value Date    TROPONINI <0.01 07/20/2021          Weight:    Wt Readings from Last 3 Encounters:   01/30/25 99.7 kg (219 lb 12.8 oz)   01/22/25 100.4 kg (221 lb 4.8 oz)   01/13/25 99.7 kg (219 lb 11.2 oz)       Allergies  Allergies   Allergen Reactions     Amoxicillin Hives     & generalized pain    Tolerated ceftriaxone in 2023 ([facility]) and 2024 ([facility])     Venlafaxine      lethargic         Surgical History  Past Surgical History:   Procedure Laterality Date     APPENDECTOMY OPEN N/A 7/19/2024    Procedure: Appendectomy open;  Surgeon: Harrison Whitehead MD;  Location: [facility] OR     BACK SURGERY  2009    L5 disc cut     BENCH KIDNEY  7/19/2024    Procedure: Bench kidney;  Surgeon: Harrison Whitehead MD;  Location: [facility] OR     BENCH PANCREAS N/A 7/19/2024    Procedure: Bench pancreas;  Surgeon: Harrison Whitehead MD;  Location: [facility] OR     BYPASS GRAFT ARTERY CORONARY  06/20/2019    2 vessels     CV CENTRAL VENOUS  CATHETER PLACEMENT N/A 7/20/2024    Procedure: Central Venous Catheter Placement;  Surgeon: Dominic Robertson MD;  Location: [facility] CARDIAC CATH LAB     CV CORONARY ANGIOGRAM N/A 01/13/2019    Procedure: Coronary Angiogram;  Surgeon: Cielo Che MD;  Location: [facility] Cath Lab;  Service: Cardiology     CV CORONARY ANGIOGRAM N/A 05/02/2019    Procedure: Coronary Angiogram;  Surgeon: Cielo Che MD;  Location: [facility] Cath Lab;  Service: Cardiology     CV CORONARY ANGIOGRAM N/A 04/30/2020    Procedure: Coronary Angiogram;  Surgeon: Cielo Che MD;  Location: [facility] Cath Lab;  Service: Cardiology     CV CORONARY ANGIOGRAM N/A 07/22/2021    Procedure: CV CORONARY ANGIOGRAM;  Surgeon: Adrian Crow MD;  Location: [facility] LAB CV     CV CORONARY ANGIOGRAM N/A 02/01/2024    Procedure: Coronary Angiogram;  Surgeon: Delroy Carpio MD;  Location: [facility] CARDIAC CATH LAB     CV CORONARY ANGIOGRAM N/A 7/20/2024    Procedure: Coronary Angiogram;  Surgeon: Dominic Robertson MD;  Location: [facility] CARDIAC CATH LAB     CV CORONARY LITHOTRIPSY PCI N/A 7/20/2024    Procedure: Percutaneous Coronary Intervention - Lithotripsy;  Surgeon: Dominic Robertson MD;  Location: [facility] CARDIAC CATH LAB     CV FRACTIONAL FLOW RATIO WIRE N/A 07/22/2021    Procedure: Fractional Flow Ratio Wire;  Surgeon: Adrian Crow MD;  Location: [facility] CATH LAB CV     CV INTRAVASULAR ULTRASOUND N/A 7/20/2024    Procedure: Intravascular Ultrasound;  Surgeon: Dominic Robertson MD;  Location: [facility] CARDIAC CATH LAB     CV LEFT HEART CATH N/A 07/22/2021    Procedure: Left Heart Cath;  Surgeon: Adrian Crow MD;  Location: [facility] LAB CV     CV LEFT HEART CATHETERIZATION WITH LEFT VENTRICULOGRAM N/A 01/13/2019    Procedure: Left Heart Catheterization with Left Ventriculogram;  Surgeon: Cielo Che MD;  Location: [facility];  Service:  Cardiology     CV LEFT HEART CATHETERIZATION WITHOUT LEFT VENTRICULOGRAM Left 2020    Procedure: Left Heart Catheterization Without Left Ventriculogram;  Surgeon: Cielo Che MD;  Location: [facility] Cath Lab;  Service: Cardiology     CV PCI N/A 2024    Procedure: Percutaneous Coronary Intervention;  Surgeon: Delroy Carpio MD;  Location: [facility] CARDIAC CATH LAB     CV PCI N/A 2024    Procedure: Percutaneous Coronary Intervention;  Surgeon: Dominic Robertson MD;  Location: [facility] CARDIAC CATH LAB     CV PCI ASPIRATION THROMECTOMY N/A 2024    Procedure: Percutaneous Coronary Intervention Aspiration Thrombectomy;  Surgeon: Dominic Robertson MD;  Location: [facility] CARDIAC CATH LAB     CV PCI STENT DRUG ELUTING N/A 2024    Procedure: Percutaneous Coronary Intervention Stent;  Surgeon: Dominic Robertson MD;  Location: [facility] CARDIAC CATH LAB     CV RIGHT HEART CATHETERIZATION N/A 2020    Procedure: Right Heart Catheterization;  Surgeon: Cielo Che MD;  Location: [facility] Cath Lab;  Service: Cardiology     CYSTOSCOPY, REMOVE STENT(S), COMBINED N/A 10/10/2024    Procedure: CYSTOSCOPY, WITH TRANSPLANT URETERAL STENT REMOVAL;  Surgeon: Farzad Harris MD;  Location:  OR     ESOPHAGOSCOPY, GASTROSCOPY, DUODENOSCOPY (EGD), COMBINED N/A 2024    Procedure: Esophagoscopy, gastroscopy, duodenoscopy (EGD), combined;  Surgeon: Jolene Ramirez MD;  Location:  GI     EYE SURGERY       GENITOURINARY SURGERY       HERNIA REPAIR       OTHER SURGICAL HISTORY      Excise varicocele     STENT, CORONARY, DALE       TRANSPLANT PANCREAS, KIDNEY  DONOR, COMBINED N/A 2024    Procedure: Transplant pancreas, kidney  donor, with ureteral stent placement;  Surgeon: Harrison Whitehead MD;  Location:  OR     VASCULAR SURGERY         Social History  Tobacco:   History   Smoking Status      Never   Smokeless Tobacco     Never    Alcohol:   Social History    Substance and Sexual Activity      Alcohol use: Never   Illicit Drugs:   History   Drug Use Unknown       Family History  Family History   Problem Relation Age of Onset     Diabetes Type 2  Mother      Heart Disease Father 60     CABG Father 50        triple bypass     Diabetes Type 2  Father      Depression Sister      Substance Abuse Sister      Ovarian Cancer Maternal Grandmother      Brain Cancer Maternal Grandmother      Pancreatic Cancer Maternal Aunt      Prostate Cancer Maternal Uncle           Nehemiah Mendoza MD on 1/30/2025      cc: Trinidad Hansen      Thank you for allowing me to participate in the care of your patient.      Sincerely,     Nehemiah Mendoza MD     [facility]  cc:   Nehemiah Mendoza MD  [address]  [place] [address]

## 2025-01-30 NOTE — PROGRESS NOTES
HEART CARE NOTE          Assessment/Recommendations     1. Severe HFrEF   Assessment / Plan  Near euvolemia on physical exam; denies HF symptoms of orthopnea, PND, LE edema - no changes to regimen at this time  Patient is high risk for adverse cardiac events 2/2 systolic dysfunction, elevated NTproBNP  GDMT as detailed below     Current Pharmacotherapy AHA Guideline-Directed Medical Therapy   Lisinopril - held 2/2 renal dysfunction Lisinopril 20 mg twice daily   Carvedilol 3.125 mg BID Carvedilol 25 mg twice daily   Spironolactone not started Spironolactone 25 mg once daily   Hydralazine NA Hydralazine 100 mg three times daily   Isosorbide dinitrate NA Isosorbide dinitrate 40 mg three times daily   SGLT2 inhibitor:Dapagliflozin/Empagliflozin - not started  Dapagliflozin or Empagliflozin 10 mg daily      2. DM2  Assessment / Plan  Management per PMD     3. CAD  Assessment / Plan  S/p CABG in 2019 and multiple PCIs in 2019 and 2024  Denies chest pain or anginal equivalents     4. ESRD s/p renal transplant   Assessment / Plan  CKD; Follows with renal transplant team    35 minutes spent reviewing prior records (including documentation, laboratory studies, cardiac testing/imaging), history and physical exam, planning, and subsequent documentation.    The longitudinal plan of care for HFrEF was addressed during this visit. Due to the added complexity in care, I will continue to support Mr. Siva Ramey in the subsequent management of this condition(s) and with the ongoing continuity of care of this condition(s).    History of Present Illness/Subjective    Mr. Siva Ramey is a 49 year old male with a PMHx significant for (per Epic) PCI s/p IBAN 2019 and 2/2024, 2v CABG 2019, PAD, HTN, DM2, ESRD now s/p pancreas & renal transplant 7/20/2024. His post-op course was c/b VF arrest w/ short duration of CPR followed by ROSC and EKG showing inferior STEMI who presents to [facility] for follow-up care     Today, Mr. Ramey  "denies acute cardiac events or complaints; denies HF symptoms of orthopnea, PND, fluid retention or edema. Management plan as detailed above.     ECG: personally reviewed; nonspecific ST and T waves changes, sinus tachycardia.     ECHO (personally reviewed 1/30/25):   Left ventricular function is decreased. The ejection fraction is 35-40%  (moderately reduced). Akinesis of the basal-mid inferior and basal  inferoseptal segments present.  Right ventricular size and function are normal.  No significant valvular abnormalities present.  No pericardial effusion is present.     This study was compared with the study from 7/28/2024. No significant changes  noted.    Lab results: personally reviewed January 30, 2025; notable for CKD    Medical history and pertinent documents reviewed in Care Everywhere please where applicable see details above        Physical Examination Review of Systems   /60 (BP Location: Left arm, Patient Position: Sitting, Cuff Size: Adult Regular)   Pulse 90   Resp 16   Ht 1.803 m (5' 11\")   Wt 99.8 kg (220 lb)   BMI 30.68 kg/m    Body mass index is 30.68 kg/m .  Wt Readings from Last 3 Encounters:   01/30/25 99.8 kg (220 lb)   01/30/25 99.7 kg (219 lb 12.8 oz)   01/22/25 100.4 kg (221 lb 4.8 oz)     General Appearance:   no distress, normal body habitus   ENT/Mouth: membranes moist, no oral lesions or bleeding gums.      EYES:  no scleral icterus, normal conjunctivae   Neck: no carotid bruits or thyromegaly   Chest/Lungs:   lungs are clear to auscultation, no rales or wheezing, equal chest wall expansion    Cardiovascular:   Regular. Normal first and second heart sounds with no murmurs, rubs, or gallops; the carotid, radial and posterior tibial pulses are intact, no JVD or LE edema bilaterally    Abdomen:  no organomegaly, masses, bruits, or tenderness; bowel sounds are present   Extremities: no cyanosis or clubbing   Skin: no xanthelasma, warm.    Neurologic: normal gait, normal  " bilateral, no tremors     Psychiatric: alert and oriented x3, calm     A complete 10 systems ROS was reviewed  And is negative except what is listed in the HPI.          Medical History  Surgical History Family History Social History   Past Medical History:   Diagnosis Date    Acquired elevated diaphragm     Anemia in chronic kidney disease     Angina pectoris     Chronic kidney disease     Coronary artery disease     Diabetes mellitus, type II (H) 12/2001    Diabetic nephropathy (H)     MARQUEZ (dyspnea on exertion)     Dyslipidemia 12/2001    End stage renal disease (H)     History of blood transfusion 2004    Hypertension     takes medication    Ischemic cardiomyopathy     Metabolic acidosis     Myocardial infarction (H)     STEMI -Diagonal branch of the LAD    Obesity (BMI 30-39.9)     Peripheral neuropathy     Proteinuria     Retinopathy     Sleep apnea     Vitamin D deficiency     Past Surgical History:   Procedure Laterality Date    APPENDECTOMY OPEN N/A 7/19/2024    Procedure: Appendectomy open;  Surgeon: Harrison Whitehead MD;  Location:  OR    BACK SURGERY  2009    L5 disc cut    BENCH KIDNEY  7/19/2024    Procedure: Bench kidney;  Surgeon: Harrison Whitehead MD;  Location:  OR    BENCH PANCREAS N/A 7/19/2024    Procedure: Bench pancreas;  Surgeon: Harrison Whitehead MD;  [facility]: [facility] OR    BYPASS GRAFT ARTERY CORONARY  06/20/2019    2 vessels    CV CENTRAL VENOUS CATHETER PLACEMENT N/A 7/20/2024    Procedure: Central Venous Catheter Placement;  Surgeon: Dominic Robertson MD;  Location:  HEART CARDIAC CATH LAB    CV CORONARY ANGIOGRAM N/A 01/13/2019    Procedure: Coronary Angiogram;  Surgeon: Cielo Che MD;  Location: [facility] Cath Lab;  Service: Cardiology    CV CORONARY ANGIOGRAM N/A 05/02/2019    Procedure: Coronary Angiogram;  Surgeon: Cielo Che MD;  Location: [facility] Cath Lab;  Service: Cardiology    CV CORONARY ANGIOGRAM  N/A 04/30/2020    Procedure: Coronary Angiogram;  Surgeon: Cielo Che MD;  Location: [facility] Cath Lab;  Service: Cardiology    CV CORONARY ANGIOGRAM N/A 07/22/2021    Procedure: CV CORONARY ANGIOGRAM;  Surgeon: Adrian Crow MD;  Location: [facility] LAB CV    CV CORONARY ANGIOGRAM N/A 02/01/2024    Procedure: Coronary Angiogram;  Surgeon: Delroy Carpio MD;  Location: [facility] CARDIAC CATH LAB    CV CORONARY ANGIOGRAM N/A 7/20/2024    Procedure: Coronary Angiogram;  Surgeon: Dominic Robertson MD;  Location: [facility] CARDIAC CATH LAB    CV CORONARY LITHOTRIPSY PCI N/A 7/20/2024    Procedure: Percutaneous Coronary Intervention - Lithotripsy;  Surgeon: Dominic Robertson MD;  Location: [facility] CARDIAC CATH LAB    CV FRACTIONAL FLOW RATIO WIRE N/A 07/22/2021    Procedure: Fractional Flow Ratio Wire;  Surgeon: Adrian Crow MD;  Location: [facility] LAB CV    CV INTRAVASULAR ULTRASOUND N/A 7/20/2024    Procedure: Intravascular Ultrasound;  Surgeon: Dominic Robertson MD;  Location: [facility] CARDIAC CATH LAB    CV LEFT HEART CATH N/A 07/22/2021    Procedure: Left Heart Cath;  Surgeon: Adrian Crow MD;  Location: [facility] CV    CV LEFT HEART CATHETERIZATION WITH LEFT VENTRICULOGRAM N/A 01/13/2019    Procedure: Left Heart Catheterization with Left Ventriculogram;  Surgeon: Cielo Che MD;  Location: [facility] Cath Lab;  Service: Cardiology    CV LEFT HEART CATHETERIZATION WITHOUT LEFT VENTRICULOGRAM Left 04/30/2020    Procedure: Left Heart Catheterization Without Left Ventriculogram;  Surgeon: Cielo Che MD;  Location: [facility] Cath Lab;  Service: Cardiology    CV PCI N/A 02/01/2024    Procedure: Percutaneous Coronary Intervention;  Surgeon: Delroy Carpio MD;  Location: [facility] CARDIAC CATH LAB    CV PCI N/A 7/20/2024    Procedure: Percutaneous Coronary Intervention;  Surgeon: Dominic Robertson MD;  Location:   HEART CARDIAC CATH LAB    CV PCI ASPIRATION THROMECTOMY N/A 2024    Procedure: Percutaneous Coronary Intervention Aspiration Thrombectomy;  Surgeon: Dominic Robertson MD;  Location: [facility] CARDIAC CATH LAB    CV PCI STENT DRUG ELUTING N/A 2024    Procedure: Percutaneous Coronary Intervention Stent;  Surgeon: Dominic Robertson MD;  Location: [facility] CARDIAC CATH LAB    CV RIGHT HEART CATHETERIZATION N/A 2020    Procedure: Right Heart Catheterization;  Surgeon: Cielo Che MD;  Location: [facility] Cath Lab;  Service: Cardiology    CYSTOSCOPY, REMOVE STENT(S), COMBINED N/A 10/10/2024    Procedure: CYSTOSCOPY, WITH TRANSPLANT URETERAL STENT REMOVAL;  Surgeon: Farzad Harris MD;  Location:  OR    ESOPHAGOSCOPY, GASTROSCOPY, DUODENOSCOPY (EGD), COMBINED N/A 2024    Procedure: Esophagoscopy, gastroscopy, duodenoscopy (EGD), combined;  Surgeon: Jolene Ramirez MD;  Location:  GI    EYE SURGERY      GENITOURINARY SURGERY      HERNIA REPAIR      OTHER SURGICAL HISTORY      Excise varicocele    STENT, CORONARY, DALE      TRANSPLANT PANCREAS, KIDNEY  DONOR, COMBINED N/A 2024    Procedure: Transplant pancreas, kidney  donor, with ureteral stent placement;  Surgeon: Harrison Whitehead MD;  Location:  OR    VASCULAR SURGERY      no family history of premature coronary artery disease Social History     Socioeconomic History    Marital status:      Spouse name: Not on file    Number of children: Not on file    Years of education: Not on file    Highest education level: Not on file   Occupational History    Not on file   Tobacco Use    Smoking status: Never     Passive exposure: Past    Smokeless tobacco: Never   Substance and Sexual Activity    Alcohol use: Never    Drug use: Never    Sexual activity: Yes     Partners: Female   Other Topics Concern    Parent/sibling w/ CABG, MI or angioplasty before 65F 55M?  Yes   Social History Narrative    Not on file     Social Drivers of Health     Financial Resource Strain: Low Risk  (11/21/2024)    Financial Resource Strain     Within the past 12 months, have you or your family members you live with been unable to get utilities (heat, electricity) when it was really needed?: No   Food Insecurity: No Food Insecurity (12/27/2024)    Received from [employer]    Hunger Vital Sign     Worried About Running Out of Food in the Last Year: Never true     Ran Out of Food in the Last Year: Never true   Recent Concern: Food Insecurity - High Risk (11/21/2024)    Food Insecurity     Within the past 12 months, did you worry that your food would run out before you got money to buy more?: Yes     Within the past 12 months, did the food you bought just not last and you didn t have money to get more?: No   Transportation Needs: No Transportation Needs (12/27/2024)    Received from [employer]    PRAPARE - Transportation     In the past 12 months, has lack of transportation kept you from medical appointments or from getting medications?: No     In the past 12 months, has lack of transportation kept you from meetings, work, or from getting things needed for daily living?: No   Physical Activity: Not on file   Stress: Not on file   Social Connections: Unknown (4/10/2023)    Received from [facility] [facility], [facility] [employer] [facility]    Social Connections     Frequency of Communication with Friends and Family: Not on file   Interpersonal Safety: Not At Risk (12/27/2024)    Received from [employer]    Humiliation, Afraid, Rape, and Kick questionnaire     Fear of Current or Ex-Partner: No     Emotionally Abused: No     Physically Abused: No     Sexually Abused: No   Housing Stability: Low Risk  (12/27/2024)    Received from [employer]    Housing Stability Vital Sign     Unable to Pay for Housing in the Last Year: No     Number of Times Moved in  the Last Year: 0     Homeless in the Last Year: No           Lab Results    Chemistry/lipid CBC Cardiac Enzymes/BNP/TSH/INR   Lab Results   Component Value Date    CHOL 115 01/29/2025    HDL 25 (L) 01/29/2025    TRIG 194 (H) 01/29/2025    BUN 21.0 (H) 01/29/2025     01/29/2025    CO2 23 01/29/2025    Lab Results   Component Value Date    WBC 4.6 01/29/2025    HGB 12.4 (L) 01/29/2025    HCT 39.2 (L) 01/29/2025    MCV 88 01/29/2025     01/29/2025    Lab Results   Component Value Date    TROPONINI <0.01 07/20/2021    BNP 25 07/19/2021    TSH 1.78 10/17/2023    INR 1.39 (H) 07/20/2024     Lab Results   Component Value Date    TROPONINI <0.01 07/20/2021          Weight:    Wt Readings from Last 3 Encounters:   01/30/25 99.7 kg (219 lb 12.8 oz)   01/22/25 100.4 kg (221 lb 4.8 oz)   01/13/25 99.7 kg (219 lb 11.2 oz)       Allergies  Allergies   Allergen Reactions    Amoxicillin Hives     & generalized pain    Tolerated ceftriaxone in 2023 ([facility]) and 2024 ([facility])    Venlafaxine      lethargic         Surgical History  Past Surgical History:   Procedure Laterality Date    APPENDECTOMY OPEN N/A 7/19/2024    Procedure: Appendectomy open;  Surgeon: Harrison Whitehead MD;  Location:  OR    BACK SURGERY  2009    L5 disc cut    BENCH KIDNEY  7/19/2024    Procedure: Bench kidney;  Surgeon: Harrison Whitehead MD;  Location:  OR    BENCH PANCREAS N/A 7/19/2024    Procedure: Bench pancreas;  Surgeon: Harrison Whitehead MD;  Location:  OR    BYPASS GRAFT ARTERY CORONARY  06/20/2019    2 vessels    CV CENTRAL VENOUS CATHETER PLACEMENT N/A 7/20/2024    Procedure: Central Venous Catheter Placement;  Surgeon: Dominic Robertson MD;  Location:  HEART CARDIAC CATH LAB    CV CORONARY ANGIOGRAM N/A 01/13/2019    Procedure: Coronary Angiogram;  Surgeon: Cielo Che MD;  Location: [facility] Cath Lab;  Service: Cardiology    CV CORONARY  ANGIOGRAM N/A 05/02/2019    Procedure: Coronary Angiogram;  Surgeon: Cielo Che MD;  Location: [facility] Cath Lab;  Service: Cardiology    CV CORONARY ANGIOGRAM N/A 04/30/2020    Procedure: Coronary Angiogram;  Surgeon: Cielo Che MD;  Location: [facility] Cath Lab;  Service: Cardiology    CV CORONARY ANGIOGRAM N/A 07/22/2021    Procedure: CV CORONARY ANGIOGRAM;  Surgeon: Adrian Crow MD;  Location: [facility] LAB CV    CV CORONARY ANGIOGRAM N/A 02/01/2024    Procedure: Coronary Angiogram;  Surgeon: Delroy Carpio MD;  Location: [facility] CARDIAC CATH LAB    CV CORONARY ANGIOGRAM N/A 7/20/2024    Procedure: Coronary Angiogram;  Surgeon: Dominic Robertson MD;  Location: [facility] CARDIAC CATH LAB    CV CORONARY LITHOTRIPSY PCI N/A 7/20/2024    Procedure: Percutaneous Coronary Intervention - Lithotripsy;  Surgeon: Dominic Robertson MD;  Location: [facility] CARDIAC CATH LAB    CV FRACTIONAL FLOW RATIO WIRE N/A 07/22/2021    Procedure: Fractional Flow Ratio Wire;  Surgeon: Adrian Crow MD;  Location: [facility] LAB CV    CV INTRAVASULAR ULTRASOUND N/A 7/20/2024    Procedure: Intravascular Ultrasound;  Surgeon: Dominic Robertson MD;  Location: [facility] CARDIAC CATH LAB    CV LEFT HEART CATH N/A 07/22/2021    Procedure: Left Heart Cath;  Surgeon: Adrian Crow MD;  Location: [facility] LAB CV    CV LEFT HEART CATHETERIZATION WITH LEFT VENTRICULOGRAM N/A 01/13/2019    Procedure: Left Heart Catheterization with Left Ventriculogram;  Surgeon: Cielo Che MD;  Location: [facility] Cath Lab;  Service: Cardiology    CV LEFT HEART CATHETERIZATION WITHOUT LEFT VENTRICULOGRAM Left 04/30/2020    Procedure: Left Heart Catheterization Without Left Ventriculogram;  Surgeon: Cielo Che MD;  Location: [facility] Cath Lab;  Service: Cardiology    CV PCI N/A 02/01/2024    Procedure: Percutaneous Coronary Intervention;  Surgeon: Delroy Carpio MD;   Location:  HEART CARDIAC CATH LAB    CV PCI N/A 2024    Procedure: Percutaneous Coronary Intervention;  Surgeon: Dominic Robertson MD;  Location:  HEART CARDIAC CATH LAB    CV PCI ASPIRATION THROMECTOMY N/A 2024    Procedure: Percutaneous Coronary Intervention Aspiration Thrombectomy;  Surgeon: Dominic Robertson MD;  Location:  HEART CARDIAC CATH LAB    CV PCI STENT DRUG ELUTING N/A 2024    Procedure: Percutaneous Coronary Intervention Stent;  Surgeon: Dominic Robertson MD;  Location:  HEART CARDIAC CATH LAB    CV RIGHT HEART CATHETERIZATION N/A 2020    Procedure: Right Heart Catheterization;  Surgeon: Cielo Che MD;  Location: [facility] Cath Lab;  Service: Cardiology    CYSTOSCOPY, REMOVE STENT(S), COMBINED N/A 10/10/2024    Procedure: CYSTOSCOPY, WITH TRANSPLANT URETERAL STENT REMOVAL;  Surgeon: Farzad Harris MD;  Location:  OR    ESOPHAGOSCOPY, GASTROSCOPY, DUODENOSCOPY (EGD), COMBINED N/A 2024    Procedure: Esophagoscopy, gastroscopy, duodenoscopy (EGD), combined;  Surgeon: Jolene Ramirez MD;  Location:  GI    EYE SURGERY      GENITOURINARY SURGERY      HERNIA REPAIR      OTHER SURGICAL HISTORY      Excise varicocele    STENT, CORONARY, DALE      TRANSPLANT PANCREAS, KIDNEY  DONOR, COMBINED N/A 2024    Procedure: Transplant pancreas, kidney  donor, with ureteral stent placement;  Surgeon: Harrison Whitehead MD;  Location:  OR    VASCULAR SURGERY         Social History  Tobacco:   History   Smoking Status    Never   Smokeless Tobacco    Never    Alcohol:   Social History    Substance and Sexual Activity      Alcohol use: Never   Illicit Drugs:   History   Drug Use Unknown       Family History  Family History   Problem Relation Age of Onset    Diabetes Type 2  Mother     Heart Disease Father 60    CABG Father 50        triple bypass    Diabetes Type 2  Father      Depression Sister     Substance Abuse Sister     Ovarian Cancer Maternal Grandmother     Brain Cancer Maternal Grandmother     Pancreatic Cancer Maternal Aunt     Prostate Cancer Maternal Uncle           Nehemiah Mendoza MD on 1/30/2025      cc: Trinidad Hansen

## 2025-02-04 ENCOUNTER — LAB (OUTPATIENT)
Dept: LAB | Facility: HOSPITAL | Age: 50
End: 2025-02-04
Payer: COMMERCIAL

## 2025-02-04 DIAGNOSIS — Z48.298 AFTERCARE FOLLOWING ORGAN TRANSPLANT: ICD-10-CM

## 2025-02-04 DIAGNOSIS — Z94.0 KIDNEY REPLACED BY TRANSPLANT: ICD-10-CM

## 2025-02-04 DIAGNOSIS — Z98.890 OTHER SPECIFIED POSTPROCEDURAL STATES: ICD-10-CM

## 2025-02-04 DIAGNOSIS — Z79.899 ENCOUNTER FOR LONG-TERM CURRENT USE OF MEDICATION: ICD-10-CM

## 2025-02-04 DIAGNOSIS — Z94.83 PANCREAS REPLACED BY TRANSPLANT (H): ICD-10-CM

## 2025-02-04 LAB
ALBUMIN UR-MCNC: 30 MG/DL
ANION GAP SERPL CALCULATED.3IONS-SCNC: 8 MMOL/L (ref 7–15)
APPEARANCE UR: CLEAR
BILIRUB UR QL STRIP: NEGATIVE
BUN SERPL-MCNC: 18.9 MG/DL (ref 6–20)
CALCIUM SERPL-MCNC: 10.7 MG/DL (ref 8.8–10.4)
CHLORIDE SERPL-SCNC: 108 MMOL/L (ref 98–107)
COLOR UR AUTO: ABNORMAL
CREAT SERPL-MCNC: 1.59 MG/DL (ref 0.67–1.17)
EGFRCR SERPLBLD CKD-EPI 2021: 53 ML/MIN/1.73M2
ERYTHROCYTE [DISTWIDTH] IN BLOOD BY AUTOMATED COUNT: 14.6 % (ref 10–15)
GLUCOSE SERPL-MCNC: 140 MG/DL (ref 70–99)
GLUCOSE UR STRIP-MCNC: NEGATIVE MG/DL
HCO3 SERPL-SCNC: 24 MMOL/L (ref 22–29)
HCT VFR BLD AUTO: 37.9 % (ref 40–53)
HGB BLD-MCNC: 11.7 G/DL (ref 13.3–17.7)
HGB UR QL STRIP: NEGATIVE
KETONES UR STRIP-MCNC: NEGATIVE MG/DL
LEUKOCYTE ESTERASE UR QL STRIP: ABNORMAL
MCH RBC QN AUTO: 27.5 PG (ref 26.5–33)
MCHC RBC AUTO-ENTMCNC: 30.9 G/DL (ref 31.5–36.5)
MCV RBC AUTO: 89 FL (ref 78–100)
NITRATE UR QL: NEGATIVE
PH UR STRIP: 7.5 [PH] (ref 5–7)
PLATELET # BLD AUTO: 169 10E3/UL (ref 150–450)
POTASSIUM SERPL-SCNC: 5.6 MMOL/L (ref 3.4–5.3)
RBC # BLD AUTO: 4.25 10E6/UL (ref 4.4–5.9)
RBC URINE: 2 /HPF
SODIUM SERPL-SCNC: 140 MMOL/L (ref 135–145)
SP GR UR STRIP: 1.01 (ref 1–1.03)
SQUAMOUS EPITHELIAL: 1 /HPF
TACROLIMUS BLD-MCNC: 12.8 UG/L (ref 5–15)
TME LAST DOSE: NORMAL H
TME LAST DOSE: NORMAL H
UROBILINOGEN UR STRIP-MCNC: <2 MG/DL
WBC # BLD AUTO: 3 10E3/UL (ref 4–11)
WBC URINE: 13 /HPF

## 2025-02-04 PROCEDURE — 87086 URINE CULTURE/COLONY COUNT: CPT

## 2025-02-04 PROCEDURE — 36415 COLL VENOUS BLD VENIPUNCTURE: CPT

## 2025-02-04 PROCEDURE — 81001 URINALYSIS AUTO W/SCOPE: CPT

## 2025-02-04 PROCEDURE — 85027 COMPLETE CBC AUTOMATED: CPT

## 2025-02-04 PROCEDURE — 80197 ASSAY OF TACROLIMUS: CPT

## 2025-02-04 PROCEDURE — 80048 BASIC METABOLIC PNL TOTAL CA: CPT

## 2025-02-05 ENCOUNTER — INFUSION THERAPY VISIT (OUTPATIENT)
Dept: INFUSION THERAPY | Facility: CLINIC | Age: 50
End: 2025-02-05
Attending: INTERNAL MEDICINE
Payer: MEDICARE

## 2025-02-05 VITALS
WEIGHT: 218.5 LBS | DIASTOLIC BLOOD PRESSURE: 92 MMHG | RESPIRATION RATE: 18 BRPM | BODY MASS INDEX: 30.47 KG/M2 | SYSTOLIC BLOOD PRESSURE: 186 MMHG | TEMPERATURE: 98.5 F | OXYGEN SATURATION: 98 % | HEART RATE: 79 BPM

## 2025-02-05 DIAGNOSIS — E83.52 HYPERCALCEMIA: ICD-10-CM

## 2025-02-05 DIAGNOSIS — D80.1 HYPOGAMMAGLOBULINEMIA: ICD-10-CM

## 2025-02-05 DIAGNOSIS — Z94.0 KIDNEY REPLACED BY TRANSPLANT: Primary | ICD-10-CM

## 2025-02-05 DIAGNOSIS — Z94.0 KIDNEY REPLACED BY TRANSPLANT: ICD-10-CM

## 2025-02-05 DIAGNOSIS — Z94.0 HTN, KIDNEY TRANSPLANT RELATED: ICD-10-CM

## 2025-02-05 DIAGNOSIS — I15.1 HTN, KIDNEY TRANSPLANT RELATED: ICD-10-CM

## 2025-02-05 LAB — BACTERIA UR CULT: NO GROWTH

## 2025-02-05 PROCEDURE — 250N000013 HC RX MED GY IP 250 OP 250 PS 637: Performed by: INTERNAL MEDICINE

## 2025-02-05 PROCEDURE — 96365 THER/PROPH/DIAG IV INF INIT: CPT

## 2025-02-05 PROCEDURE — 96366 THER/PROPH/DIAG IV INF ADDON: CPT

## 2025-02-05 PROCEDURE — 258N000003 HC RX IP 258 OP 636: Performed by: INTERNAL MEDICINE

## 2025-02-05 PROCEDURE — 250N000011 HC RX IP 250 OP 636: Mod: JZ | Performed by: INTERNAL MEDICINE

## 2025-02-05 RX ORDER — ALBUTEROL SULFATE 0.83 MG/ML
2.5 SOLUTION RESPIRATORY (INHALATION)
Status: CANCELLED | OUTPATIENT
Start: 2025-02-05

## 2025-02-05 RX ORDER — METHYLPREDNISOLONE SODIUM SUCCINATE 40 MG/ML
40 INJECTION INTRAMUSCULAR; INTRAVENOUS
Status: CANCELLED
Start: 2025-02-05

## 2025-02-05 RX ORDER — EPINEPHRINE 1 MG/ML
0.3 INJECTION, SOLUTION INTRAMUSCULAR; SUBCUTANEOUS EVERY 5 MIN PRN
Status: CANCELLED | OUTPATIENT
Start: 2025-02-05

## 2025-02-05 RX ORDER — TACROLIMUS 0.5 MG/1
0.5 CAPSULE ORAL 2 TIMES DAILY
Qty: 180 CAPSULE | Refills: 3 | Status: SHIPPED | OUTPATIENT
Start: 2025-02-05

## 2025-02-05 RX ORDER — ACETAMINOPHEN 325 MG/1
650 TABLET ORAL ONCE
Status: CANCELLED
Start: 2025-02-05

## 2025-02-05 RX ORDER — HEPARIN SODIUM,PORCINE 10 UNIT/ML
5-20 VIAL (ML) INTRAVENOUS DAILY PRN
Status: CANCELLED | OUTPATIENT
Start: 2025-02-05

## 2025-02-05 RX ORDER — DIPHENHYDRAMINE HCL 25 MG
25-50 CAPSULE ORAL ONCE
Status: CANCELLED
Start: 2025-02-05

## 2025-02-05 RX ORDER — HEPARIN SODIUM (PORCINE) LOCK FLUSH IV SOLN 100 UNIT/ML 100 UNIT/ML
5 SOLUTION INTRAVENOUS
Status: CANCELLED | OUTPATIENT
Start: 2025-02-05

## 2025-02-05 RX ORDER — ONDANSETRON 2 MG/ML
4 INJECTION INTRAMUSCULAR; INTRAVENOUS EVERY 30 MIN PRN
Status: CANCELLED
Start: 2025-02-05

## 2025-02-05 RX ORDER — DIPHENHYDRAMINE HCL 25 MG
25-50 CAPSULE ORAL ONCE
Status: COMPLETED | OUTPATIENT
Start: 2025-02-05 | End: 2025-02-05

## 2025-02-05 RX ORDER — MEPERIDINE HYDROCHLORIDE 25 MG/ML
25 INJECTION INTRAMUSCULAR; INTRAVENOUS; SUBCUTANEOUS
Status: CANCELLED | OUTPATIENT
Start: 2025-02-05

## 2025-02-05 RX ORDER — ACETAMINOPHEN 325 MG/1
650 TABLET ORAL ONCE
Status: COMPLETED | OUTPATIENT
Start: 2025-02-05 | End: 2025-02-05

## 2025-02-05 RX ORDER — TACROLIMUS 1 MG/1
1 CAPSULE ORAL 2 TIMES DAILY
Qty: 180 CAPSULE | Refills: 3 | Status: SHIPPED | OUTPATIENT
Start: 2025-02-05

## 2025-02-05 RX ORDER — ALBUTEROL SULFATE 90 UG/1
1-2 INHALANT RESPIRATORY (INHALATION)
Status: CANCELLED
Start: 2025-02-05

## 2025-02-05 RX ORDER — DIPHENHYDRAMINE HYDROCHLORIDE 50 MG/ML
25 INJECTION INTRAMUSCULAR; INTRAVENOUS
Status: CANCELLED
Start: 2025-02-05

## 2025-02-05 RX ORDER — DIPHENHYDRAMINE HYDROCHLORIDE 50 MG/ML
50 INJECTION INTRAMUSCULAR; INTRAVENOUS
Status: CANCELLED
Start: 2025-02-05

## 2025-02-05 RX ADMIN — HUMAN IMMUNOGLOBULIN G 40 G: 40 LIQUID INTRAVENOUS at 13:56

## 2025-02-05 RX ADMIN — ACETAMINOPHEN 650 MG: 325 TABLET ORAL at 13:24

## 2025-02-05 RX ADMIN — SODIUM CHLORIDE 1000 ML: 9 INJECTION, SOLUTION INTRAVENOUS at 13:50

## 2025-02-05 RX ADMIN — DIPHENHYDRAMINE HYDROCHLORIDE 50 MG: 25 CAPSULE ORAL at 13:24

## 2025-02-05 ASSESSMENT — PAIN SCALES - GENERAL: PAINLEVEL_OUTOF10: NO PAIN (0)

## 2025-02-05 NOTE — TELEPHONE ENCOUNTER
ISSUE:   Tacrolimus IR level 12.8 on 2/4, goal 8-10, dose 2 mg BID.    PLAN:   Call Patient and confirm this was an accurate 12-hour trough.   Verify Tacrolimus IR dose 2 mg BID.   Confirm no new medications or or missed doses.   Confirm no new illness / infection / diarrhea.   If accurate trough and accurate dose, decrease Tacrolimus IR dose to 1.5 mg BID     Is this more than a 50% increase or decrease in current IS dose: No  If YES, justification: N/A    Repeat labs in 1 weeks. (As scheduled)   *If > 50% change in immunosuppression dose, repeat labs in 1 week.     Ximena Edwards RN, BSN  Post-Kidney/Pancreas Transplant Coordinator   [phone]    OUTCOME:   Spoke with Patient, they confirm accurate trough level and current dose 2 mg BID.   Patient confirmed dose change to 1.5 mg BID.  Patient agreed to repeat labs in 1 week (as scheduled).   Orders sent to preferred pharmacy for dose change and lab for repeat labs.   Patient voiced understanding of plan.

## 2025-02-05 NOTE — PATIENT INSTRUCTIONS
Dear Siva Ramey    Thank you for choosing [facility] ([facility] for your infusion.  The following information is a summary of our appointment as well as important reminders.      Last dose of Tylenol was  1:30pm.    If you have any questions on your upcoming Specialty Infusion appointments, please call scheduling at [phone].  It was a pleasure taking care of you today.    Sincerely,    [facility]  [address] [place] [address]  [id number]  Phone:  [phone]

## 2025-02-05 NOTE — PROGRESS NOTES
Infusion Nursing Note:  Siva Ramey presents today for IVIG and IVF.    Frequency: IVIG dose 2 of 2, every 2 weeks  Patient seen by provider today: No   present during visit today: Not Applicable.    Note:   1L NS infused concurrently with IVIG.   Premeds: Tylenol, Benadryl.  IVIG started at 49.5 ml/hr, increased by 49.5 ml/hr every 15 minutes as tolerated to max rate of 396 ml/hr. Total infusion time 2 hours.    Pre- and post-infusion BP elevated higher than baseline. Post-infusion /92. Patient asymptomatic and believes he forgot to take AM meds. He was advised to take his BP at home after taking meds and to notify care team and/or seek emergency services accordingly.     Intravenous Access:  [facility] by [facility]    Treatment Conditions:  Patient denies recent major or local infection that has not been addressed by a provider, recent surgery or elevated temperature, signs or symptoms of clot or recent clot, recent changes in color or consistency of urination. YES.    Administrations This Visit       acetaminophen (TYLENOL) tablet 650 mg       Admin Date  02/05/2025 Action  $Given Dose  650 mg Route  Oral Documented By  Cady Gomez RN              diphenhydrAMINE (BENADRYL) capsule 25-50 mg       Admin Date  02/05/2025 Action  $Given Dose  50 mg Route  Oral Documented By  Cady Gomez RN              immune globulin (PRIVIGEN) sucrose free 10 % injection 40 g       Admin Date  02/05/2025 Action  $New Bag Dose  40 g Route  Intravenous Documented By  Cady Gomez RN              sodium chloride 0.9% BOLUS 1,000 mL       Admin Date  02/05/2025 Action  $New Bag Dose  1,000 mL Route  Intravenous Documented By  Cady Gomez RN                  BP (!) 155/78 (BP Location: Right arm, Patient Position: Sitting, Cuff Size: Adult Regular)   Pulse 91   Temp 98.5  F (36.9  C) (Oral)   Resp 18   Wt 99.1 kg (218 lb 8 oz)   SpO2 98%   BMI 30.47 kg/m      Post Infusion  Assessment:  Patient tolerated infusion.  Blood return noted pre and post infusion.  Site patent and intact, free from redness, edema or discomfort.  No evidence of extravasations.  Access discontinued per protocol.       Discharge Plan:   Discharge instructions reviewed with: Patient.  Patient and/or family verbalized understanding of discharge instructions and all questions answered.  AVS to patient via MYCHART.    Patient discharged in stable condition accompanied by: self.  Departure Mode: Ambulatory.      Cady Gomez RN

## 2025-02-06 ENCOUNTER — PRE VISIT (OUTPATIENT)
Dept: UROLOGY | Facility: CLINIC | Age: 50
End: 2025-02-06

## 2025-02-06 DIAGNOSIS — Z94.0 HTN, KIDNEY TRANSPLANT RELATED: ICD-10-CM

## 2025-02-06 DIAGNOSIS — I15.1 HTN, KIDNEY TRANSPLANT RELATED: ICD-10-CM

## 2025-02-06 DIAGNOSIS — E21.3 HYPERPARATHYROIDISM: ICD-10-CM

## 2025-02-06 DIAGNOSIS — E83.52 HYPERCALCEMIA: ICD-10-CM

## 2025-02-06 RX ORDER — CINACALCET 30 MG/1
60 TABLET, FILM COATED ORAL DAILY
Qty: 180 TABLET | Refills: 1 | Status: SHIPPED | OUTPATIENT
Start: 2025-02-06

## 2025-02-16 ENCOUNTER — TELEPHONE (OUTPATIENT)
Dept: TRANSPLANT | Facility: CLINIC | Age: 50
End: 2025-02-16
Payer: COMMERCIAL

## 2025-02-16 DIAGNOSIS — Z48.298 AFTERCARE FOLLOWING ORGAN TRANSPLANT: Primary | ICD-10-CM

## 2025-02-16 DIAGNOSIS — Z94.0 HTN, KIDNEY TRANSPLANT RELATED: ICD-10-CM

## 2025-02-16 DIAGNOSIS — I15.1 HTN, KIDNEY TRANSPLANT RELATED: ICD-10-CM

## 2025-02-16 DIAGNOSIS — R39.9 UTI SYMPTOMS: ICD-10-CM

## 2025-02-17 ENCOUNTER — TELEPHONE (OUTPATIENT)
Dept: TRANSPLANT | Facility: CLINIC | Age: 50
End: 2025-02-17

## 2025-02-17 ENCOUNTER — LAB (OUTPATIENT)
Dept: LAB | Facility: HOSPITAL | Age: 50
End: 2025-02-17
Payer: MEDICARE

## 2025-02-17 DIAGNOSIS — Z98.890 OTHER SPECIFIED POSTPROCEDURAL STATES: ICD-10-CM

## 2025-02-17 DIAGNOSIS — Z48.298 AFTERCARE FOLLOWING ORGAN TRANSPLANT: ICD-10-CM

## 2025-02-17 DIAGNOSIS — Z94.0 KIDNEY REPLACED BY TRANSPLANT: ICD-10-CM

## 2025-02-17 DIAGNOSIS — I15.1 HTN, KIDNEY TRANSPLANT RELATED: ICD-10-CM

## 2025-02-17 DIAGNOSIS — R39.9 UTI SYMPTOMS: ICD-10-CM

## 2025-02-17 DIAGNOSIS — Z94.0 HTN, KIDNEY TRANSPLANT RELATED: ICD-10-CM

## 2025-02-17 DIAGNOSIS — N39.0 URINARY TRACT INFECTION: Primary | ICD-10-CM

## 2025-02-17 DIAGNOSIS — Z79.899 ENCOUNTER FOR LONG-TERM CURRENT USE OF MEDICATION: ICD-10-CM

## 2025-02-17 DIAGNOSIS — Z94.83 PANCREAS REPLACED BY TRANSPLANT (H): ICD-10-CM

## 2025-02-17 LAB
ALBUMIN UR-MCNC: 30 MG/DL
AMYLASE SERPL-CCNC: 81 U/L (ref 28–100)
ANION GAP SERPL CALCULATED.3IONS-SCNC: 10 MMOL/L (ref 7–15)
APPEARANCE UR: ABNORMAL
BACTERIA #/AREA URNS HPF: ABNORMAL /HPF
BILIRUB UR QL STRIP: NEGATIVE
BUN SERPL-MCNC: 21.4 MG/DL (ref 6–20)
CALCIUM SERPL-MCNC: 10.5 MG/DL (ref 8.8–10.4)
CHLORIDE SERPL-SCNC: 103 MMOL/L (ref 98–107)
COLOR UR AUTO: ABNORMAL
CREAT SERPL-MCNC: 1.6 MG/DL (ref 0.67–1.17)
EGFRCR SERPLBLD CKD-EPI 2021: 52 ML/MIN/1.73M2
ERYTHROCYTE [DISTWIDTH] IN BLOOD BY AUTOMATED COUNT: 14.8 % (ref 10–15)
GLUCOSE SERPL-MCNC: 125 MG/DL (ref 70–99)
GLUCOSE UR STRIP-MCNC: NEGATIVE MG/DL
HCO3 SERPL-SCNC: 26 MMOL/L (ref 22–29)
HCT VFR BLD AUTO: 36.4 % (ref 40–53)
HGB BLD-MCNC: 11.2 G/DL (ref 13.3–17.7)
HGB UR QL STRIP: ABNORMAL
KETONES UR STRIP-MCNC: NEGATIVE MG/DL
LEUKOCYTE ESTERASE UR QL STRIP: ABNORMAL
LIPASE SERPL-CCNC: 53 U/L (ref 13–60)
MCH RBC QN AUTO: 26.7 PG (ref 26.5–33)
MCHC RBC AUTO-ENTMCNC: 30.8 G/DL (ref 31.5–36.5)
MCV RBC AUTO: 87 FL (ref 78–100)
NITRATE UR QL: NEGATIVE
PH UR STRIP: 7 [PH] (ref 5–7)
PLATELET # BLD AUTO: 124 10E3/UL (ref 150–450)
POTASSIUM SERPL-SCNC: 4.5 MMOL/L (ref 3.4–5.3)
RBC # BLD AUTO: 4.19 10E6/UL (ref 4.4–5.9)
RBC URINE: 2 /HPF
SODIUM SERPL-SCNC: 139 MMOL/L (ref 135–145)
SP GR UR STRIP: 1.01 (ref 1–1.03)
SQUAMOUS EPITHELIAL: <1 /HPF
TACROLIMUS BLD-MCNC: 6.2 UG/L (ref 5–15)
TME LAST DOSE: NORMAL H
TME LAST DOSE: NORMAL H
UROBILINOGEN UR STRIP-MCNC: <2 MG/DL
WBC # BLD AUTO: 3.7 10E3/UL (ref 4–11)
WBC CLUMPS #/AREA URNS HPF: PRESENT /HPF
WBC URINE: 32 /HPF

## 2025-02-17 PROCEDURE — 81001 URINALYSIS AUTO W/SCOPE: CPT

## 2025-02-17 PROCEDURE — 82565 ASSAY OF CREATININE: CPT

## 2025-02-17 PROCEDURE — 87186 SC STD MICRODIL/AGAR DIL: CPT

## 2025-02-17 PROCEDURE — 85027 COMPLETE CBC AUTOMATED: CPT

## 2025-02-17 PROCEDURE — 83690 ASSAY OF LIPASE: CPT

## 2025-02-17 PROCEDURE — 84295 ASSAY OF SERUM SODIUM: CPT

## 2025-02-17 PROCEDURE — 82150 ASSAY OF AMYLASE: CPT

## 2025-02-17 PROCEDURE — 80048 BASIC METABOLIC PNL TOTAL CA: CPT

## 2025-02-17 PROCEDURE — 36415 COLL VENOUS BLD VENIPUNCTURE: CPT

## 2025-02-17 PROCEDURE — 80197 ASSAY OF TACROLIMUS: CPT

## 2025-02-17 PROCEDURE — 80180 DRUG SCRN QUAN MYCOPHENOLATE: CPT

## 2025-02-17 RX ORDER — CIPROFLOXACIN 500 MG/1
500 TABLET, FILM COATED ORAL 2 TIMES DAILY
Qty: 14 TABLET | Refills: 0 | Status: SHIPPED | OUTPATIENT
Start: 2025-02-17 | End: 2025-02-20 | Stop reason: ALTCHOICE

## 2025-02-17 NOTE — TELEPHONE ENCOUNTER
Fernando Sorensen MD Poucher, Jessica, RN  Probable urinary tract infection and if patient is symptomatic, would recommend starting ciprofloxacin 500 mg twice daily (CrCl ~ 77 ml/min) x 7 days while awaiting urine culture.    Siva voiced understanding of plan. Is able to eat/drink normally. Transplant labs at baseline.   Is supposed to return to work on Friday. Offered to send a note if he's still feeling unwell at end of the week.

## 2025-02-17 NOTE — TELEPHONE ENCOUNTER
2/16/2025  Transplant coordinator on call received page.  Reason for call: Symptoms of UTI, would like abx  Outcome: offered urgent care today and patient decided to get UA tomorrow at lab visit. Order placed.  Coordinator: Julianna Edwards

## 2025-02-17 NOTE — TELEPHONE ENCOUNTER
Patient Call: General  Route to LPN    Reason for call: Pt called on call staff yesterday re UTI symptoms  did urinalysis and urine culture today  After he talked with staff yesterday  was spiking fever- 103-  taking tylenol and staying around 100    Call back needed? Yes    Return Call Needed  Same as documented in contacts section  When to return call?: Same day: Route High Priority

## 2025-02-18 ENCOUNTER — TELEPHONE (OUTPATIENT)
Dept: TRANSPLANT | Facility: CLINIC | Age: 50
End: 2025-02-18
Payer: COMMERCIAL

## 2025-02-18 DIAGNOSIS — N39.0 URINARY TRACT INFECTION: Primary | ICD-10-CM

## 2025-02-18 DIAGNOSIS — Z48.298 AFTERCARE FOLLOWING ORGAN TRANSPLANT: ICD-10-CM

## 2025-02-18 DIAGNOSIS — Z94.0 HTN, KIDNEY TRANSPLANT RELATED: ICD-10-CM

## 2025-02-18 DIAGNOSIS — I15.1 HTN, KIDNEY TRANSPLANT RELATED: ICD-10-CM

## 2025-02-18 LAB
MYCOPHENOLATE SERPL LC/MS/MS-MCNC: 1.72 MG/L (ref 1–3.5)
MYCOPHENOLATE-G SERPL LC/MS/MS-MCNC: 63.2 MG/L (ref 30–95)
TME LAST DOSE: NORMAL H
TME LAST DOSE: NORMAL H

## 2025-02-18 NOTE — TELEPHONE ENCOUNTER
ISSUE:  Tac 6.2 (goal 8-10)     Siva confirmed this was ~14 hour draw. Will stay on same dose for now and repeat in 1 week.     Overdue for BK and CMV checks. Orders updated to check with next lab draw.     UCx pending, stated po cipro last evening.

## 2025-02-20 ENCOUNTER — TELEPHONE (OUTPATIENT)
Dept: TRANSPLANT | Facility: CLINIC | Age: 50
End: 2025-02-20
Payer: COMMERCIAL

## 2025-02-20 DIAGNOSIS — Z48.298 AFTERCARE FOLLOWING ORGAN TRANSPLANT: ICD-10-CM

## 2025-02-20 DIAGNOSIS — N39.0 URINARY TRACT INFECTION: ICD-10-CM

## 2025-02-20 DIAGNOSIS — Z94.0 HTN, KIDNEY TRANSPLANT RELATED: ICD-10-CM

## 2025-02-20 DIAGNOSIS — I15.1 HTN, KIDNEY TRANSPLANT RELATED: ICD-10-CM

## 2025-02-20 DIAGNOSIS — N39.0 RECURRENT UTI: Primary | ICD-10-CM

## 2025-02-20 LAB
BACTERIA UR CULT: ABNORMAL
BACTERIA UR CULT: ABNORMAL

## 2025-02-20 RX ORDER — LEVOFLOXACIN 750 MG/1
750 TABLET, FILM COATED ORAL DAILY
Qty: 7 TABLET | Refills: 0 | Status: SHIPPED | OUTPATIENT
Start: 2025-02-20

## 2025-02-20 NOTE — TELEPHONE ENCOUNTER
UCx and susceptibilities reviewed with Dr. Sorensen and Dr. Easton, resistant to cipro.     Abdias Easton MD Poucher, Jessica, RN Hello Jess,    In order to try to avoid IV for a simple UTI, you may try high dose levaquin (since levaquin is intermediate not fully resistant), like 750 mg daily for 7 days. I would also ask the lab to add on fosfomycin susceptibility.  If the patient improves on high dose levaquin (I would give 3-5 days before declaring failure), that's great. If not and the fosfomycin is susceptible then you can treat with fosfomycin 3 gm x1. If high dose levaquin doesn't work and fosfomycin is resistant, then he will need IV.    Thanks,  Abdias Easton MD    Called Siva left  and sent MyC message. Rx sent. Ok to stop cipro.   Called [facility], added on fosfomycin susceptibilities.   ID referral placed.

## 2025-02-21 LAB
BACTERIA UR CULT: ABNORMAL
BACTERIA UR CULT: ABNORMAL

## 2025-02-26 DIAGNOSIS — Z94.0 KIDNEY REPLACED BY TRANSPLANT: ICD-10-CM

## 2025-02-26 RX ORDER — SODIUM BICARBONATE 650 MG/1
1950 TABLET ORAL 3 TIMES DAILY
Qty: 270 TABLET | Refills: 3 | Status: SHIPPED | OUTPATIENT
Start: 2025-02-26

## 2025-03-03 ENCOUNTER — LAB (OUTPATIENT)
Dept: LAB | Facility: HOSPITAL | Age: 50
End: 2025-03-03
Payer: MEDICARE

## 2025-03-03 DIAGNOSIS — Z48.298 AFTERCARE FOLLOWING ORGAN TRANSPLANT: ICD-10-CM

## 2025-03-03 DIAGNOSIS — Z94.0 KIDNEY REPLACED BY TRANSPLANT: ICD-10-CM

## 2025-03-03 DIAGNOSIS — Z98.890 OTHER SPECIFIED POSTPROCEDURAL STATES: ICD-10-CM

## 2025-03-03 DIAGNOSIS — Z79.899 ENCOUNTER FOR LONG-TERM CURRENT USE OF MEDICATION: ICD-10-CM

## 2025-03-03 DIAGNOSIS — Z94.83 PANCREAS REPLACED BY TRANSPLANT (H): ICD-10-CM

## 2025-03-03 LAB
AMYLASE SERPL-CCNC: 109 U/L (ref 28–100)
ANION GAP SERPL CALCULATED.3IONS-SCNC: 10 MMOL/L (ref 7–15)
BUN SERPL-MCNC: 25 MG/DL (ref 6–20)
CALCIUM SERPL-MCNC: 9.7 MG/DL (ref 8.8–10.4)
CHLORIDE SERPL-SCNC: 106 MMOL/L (ref 98–107)
CMV DNA SPEC NAA+PROBE-ACNC: NOT DETECTED IU/ML
CREAT SERPL-MCNC: 1.56 MG/DL (ref 0.67–1.17)
EGFRCR SERPLBLD CKD-EPI 2021: 54 ML/MIN/1.73M2
ERYTHROCYTE [DISTWIDTH] IN BLOOD BY AUTOMATED COUNT: 18.4 % (ref 10–15)
GLUCOSE SERPL-MCNC: 96 MG/DL (ref 70–99)
HCO3 SERPL-SCNC: 24 MMOL/L (ref 22–29)
HCT VFR BLD AUTO: 35.7 % (ref 40–53)
HGB BLD-MCNC: 10.7 G/DL (ref 13.3–17.7)
LIPASE SERPL-CCNC: 67 U/L (ref 13–60)
MAGNESIUM SERPL-MCNC: 1.5 MG/DL (ref 1.7–2.3)
MCH RBC QN AUTO: 27.6 PG (ref 26.5–33)
MCHC RBC AUTO-ENTMCNC: 30 G/DL (ref 31.5–36.5)
MCV RBC AUTO: 92 FL (ref 78–100)
PHOSPHATE SERPL-MCNC: 3 MG/DL (ref 2.5–4.5)
PLATELET # BLD AUTO: 175 10E3/UL (ref 150–450)
POTASSIUM SERPL-SCNC: 4.6 MMOL/L (ref 3.4–5.3)
RBC # BLD AUTO: 3.87 10E6/UL (ref 4.4–5.9)
SODIUM SERPL-SCNC: 140 MMOL/L (ref 135–145)
SPECIMEN TYPE: NORMAL
TACROLIMUS BLD-MCNC: 6.1 UG/L (ref 5–15)
TME LAST DOSE: NORMAL H
TME LAST DOSE: NORMAL H
WBC # BLD AUTO: 6.5 10E3/UL (ref 4–11)

## 2025-03-03 PROCEDURE — 80197 ASSAY OF TACROLIMUS: CPT

## 2025-03-03 PROCEDURE — 82150 ASSAY OF AMYLASE: CPT

## 2025-03-03 PROCEDURE — 84100 ASSAY OF PHOSPHORUS: CPT

## 2025-03-03 PROCEDURE — 85027 COMPLETE CBC AUTOMATED: CPT

## 2025-03-03 PROCEDURE — 80048 BASIC METABOLIC PNL TOTAL CA: CPT

## 2025-03-03 PROCEDURE — 80180 DRUG SCRN QUAN MYCOPHENOLATE: CPT

## 2025-03-03 PROCEDURE — 36415 COLL VENOUS BLD VENIPUNCTURE: CPT

## 2025-03-03 PROCEDURE — 87799 DETECT AGENT NOS DNA QUANT: CPT

## 2025-03-03 PROCEDURE — 83735 ASSAY OF MAGNESIUM: CPT

## 2025-03-03 PROCEDURE — 83690 ASSAY OF LIPASE: CPT

## 2025-03-04 ENCOUNTER — TELEPHONE (OUTPATIENT)
Dept: TRANSPLANT | Facility: CLINIC | Age: 50
End: 2025-03-04
Payer: COMMERCIAL

## 2025-03-04 ENCOUNTER — MYC MEDICAL ADVICE (OUTPATIENT)
Dept: TRANSPLANT | Facility: CLINIC | Age: 50
End: 2025-03-04
Payer: COMMERCIAL

## 2025-03-04 DIAGNOSIS — I15.1 HTN, KIDNEY TRANSPLANT RELATED: ICD-10-CM

## 2025-03-04 DIAGNOSIS — N39.0 URINARY TRACT INFECTION: Primary | ICD-10-CM

## 2025-03-04 DIAGNOSIS — Z94.0 HTN, KIDNEY TRANSPLANT RELATED: ICD-10-CM

## 2025-03-04 DIAGNOSIS — Z94.0 KIDNEY REPLACED BY TRANSPLANT: ICD-10-CM

## 2025-03-04 LAB
BK VIRUS SPECIMEN TYPE: ABNORMAL
BKV DNA # SPEC NAA+PROBE: <22 IU/ML
BKV DNA SPEC NAA+PROBE-LOG#: <1.3 {LOG_COPIES}/ML
MYCOPHENOLATE SERPL LC/MS/MS-MCNC: 1.71 MG/L (ref 1–3.5)
MYCOPHENOLATE-G SERPL LC/MS/MS-MCNC: 89.3 MG/L (ref 30–95)
TME LAST DOSE: NORMAL H
TME LAST DOSE: NORMAL H

## 2025-03-04 NOTE — TELEPHONE ENCOUNTER
ISSUE:   Tacrolimus IR level 6 on 3/3, goal 8-10, dose 1.5 mg BID.    PLAN:   Call Patient and confirm this was an accurate 12-hour trough.   Verify Tacrolimus IR dose 1.5 mg BID.   Confirm no new medications or or missed doses.   Confirm no new illness / infection / diarrhea.   If accurate trough and accurate dose, increase Tacrolimus IR dose to 2 mg BID     Is this more than a 50% increase or decrease in current IS dose: No  If YES, justification: N/A  Repeat labs in 1 weeks.  *If > 50% change in immunosuppression dose, repeat labs in 1 week.     OUTCOME:   Left VM and sent MyC message.       Patient confirmed receipt of Mychart message, will adjust dose to 2 mg BID

## 2025-03-05 RX ORDER — TACROLIMUS 1 MG/1
2 CAPSULE ORAL 2 TIMES DAILY
Qty: 180 CAPSULE | Refills: 3 | Status: SHIPPED | OUTPATIENT
Start: 2025-03-05

## 2025-03-05 RX ORDER — TACROLIMUS 0.5 MG/1
CAPSULE ORAL
Qty: 180 CAPSULE | Refills: 3 | Status: SHIPPED | OUTPATIENT
Start: 2025-03-05

## 2025-03-10 ENCOUNTER — ANCILLARY PROCEDURE (OUTPATIENT)
Dept: GENERAL RADIOLOGY | Facility: CLINIC | Age: 50
End: 2025-03-10
Attending: NURSE PRACTITIONER
Payer: COMMERCIAL

## 2025-03-10 ENCOUNTER — LAB (OUTPATIENT)
Dept: LAB | Facility: CLINIC | Age: 50
End: 2025-03-10
Payer: COMMERCIAL

## 2025-03-10 ENCOUNTER — OFFICE VISIT (OUTPATIENT)
Dept: TRANSPLANT | Facility: CLINIC | Age: 50
End: 2025-03-10
Attending: NURSE PRACTITIONER
Payer: MEDICARE

## 2025-03-10 ENCOUNTER — TELEPHONE (OUTPATIENT)
Dept: TRANSPLANT | Facility: CLINIC | Age: 50
End: 2025-03-10
Payer: COMMERCIAL

## 2025-03-10 VITALS
SYSTOLIC BLOOD PRESSURE: 119 MMHG | DIASTOLIC BLOOD PRESSURE: 78 MMHG | OXYGEN SATURATION: 96 % | BODY MASS INDEX: 31.86 KG/M2 | HEART RATE: 86 BPM | WEIGHT: 228.4 LBS

## 2025-03-10 DIAGNOSIS — R50.9 FEVER, UNSPECIFIED FEVER CAUSE: ICD-10-CM

## 2025-03-10 DIAGNOSIS — Z94.0 HTN, KIDNEY TRANSPLANT RELATED: ICD-10-CM

## 2025-03-10 DIAGNOSIS — Z94.0 KIDNEY REPLACED BY TRANSPLANT: ICD-10-CM

## 2025-03-10 DIAGNOSIS — Z98.890 OTHER SPECIFIED POSTPROCEDURAL STATES: ICD-10-CM

## 2025-03-10 DIAGNOSIS — I15.1 HTN, KIDNEY TRANSPLANT RELATED: ICD-10-CM

## 2025-03-10 DIAGNOSIS — Z79.899 ENCOUNTER FOR LONG-TERM CURRENT USE OF MEDICATION: ICD-10-CM

## 2025-03-10 DIAGNOSIS — R06.00 DYSPNEA, UNSPECIFIED TYPE: ICD-10-CM

## 2025-03-10 DIAGNOSIS — Z48.298 AFTERCARE FOLLOWING ORGAN TRANSPLANT: ICD-10-CM

## 2025-03-10 DIAGNOSIS — Z94.0 HTN, KIDNEY TRANSPLANT RELATED: Primary | ICD-10-CM

## 2025-03-10 DIAGNOSIS — Z94.83 PANCREAS REPLACED BY TRANSPLANT (H): ICD-10-CM

## 2025-03-10 DIAGNOSIS — N39.0 URINARY TRACT INFECTION: Primary | ICD-10-CM

## 2025-03-10 DIAGNOSIS — I15.1 HTN, KIDNEY TRANSPLANT RELATED: Primary | ICD-10-CM

## 2025-03-10 LAB
ALBUMIN UR-MCNC: 30 MG/DL
ANION GAP SERPL CALCULATED.3IONS-SCNC: 11 MMOL/L (ref 7–15)
APPEARANCE UR: ABNORMAL
ATRIAL RATE - MUSE: 85 BPM
BASOPHILS # BLD AUTO: 0 10E3/UL (ref 0–0.2)
BASOPHILS NFR BLD AUTO: 0 %
BILIRUB UR QL STRIP: NEGATIVE
BUN SERPL-MCNC: 20.8 MG/DL (ref 6–20)
C PNEUM DNA SPEC QL NAA+PROBE: NOT DETECTED
CALCIUM SERPL-MCNC: 9.8 MG/DL (ref 8.8–10.4)
CHLORIDE SERPL-SCNC: 102 MMOL/L (ref 98–107)
COLOR UR AUTO: YELLOW
CREAT SERPL-MCNC: 1.68 MG/DL (ref 0.67–1.17)
DIASTOLIC BLOOD PRESSURE - MUSE: NORMAL MMHG
EGFRCR SERPLBLD CKD-EPI 2021: 50 ML/MIN/1.73M2
EOSINOPHIL # BLD AUTO: 0.2 10E3/UL (ref 0–0.7)
EOSINOPHIL NFR BLD AUTO: 5 %
ERYTHROCYTE [DISTWIDTH] IN BLOOD BY AUTOMATED COUNT: 17.2 % (ref 10–15)
FLUAV H1 2009 PAND RNA SPEC QL NAA+PROBE: NOT DETECTED
FLUAV H1 RNA SPEC QL NAA+PROBE: NOT DETECTED
FLUAV H3 RNA SPEC QL NAA+PROBE: NOT DETECTED
FLUAV RNA SPEC QL NAA+PROBE: NOT DETECTED
FLUBV RNA SPEC QL NAA+PROBE: NOT DETECTED
GLUCOSE SERPL-MCNC: 105 MG/DL (ref 70–99)
GLUCOSE UR STRIP-MCNC: NEGATIVE MG/DL
HADV DNA SPEC QL NAA+PROBE: NOT DETECTED
HCO3 SERPL-SCNC: 24 MMOL/L (ref 22–29)
HCOV PNL SPEC NAA+PROBE: NOT DETECTED
HCT VFR BLD AUTO: 36 % (ref 40–53)
HGB BLD-MCNC: 11.1 G/DL (ref 13.3–17.7)
HGB UR QL STRIP: ABNORMAL
HMPV RNA SPEC QL NAA+PROBE: NOT DETECTED
HPIV1 RNA SPEC QL NAA+PROBE: NOT DETECTED
HPIV2 RNA SPEC QL NAA+PROBE: NOT DETECTED
HPIV3 RNA SPEC QL NAA+PROBE: NOT DETECTED
HPIV4 RNA SPEC QL NAA+PROBE: NOT DETECTED
IMM GRANULOCYTES # BLD: 0 10E3/UL
IMM GRANULOCYTES NFR BLD: 0 %
INTERPRETATION ECG - MUSE: NORMAL
KETONES UR STRIP-MCNC: NEGATIVE MG/DL
LEUKOCYTE ESTERASE UR QL STRIP: ABNORMAL
LIPASE SERPL-CCNC: 78 U/L (ref 13–60)
LYMPHOCYTES # BLD AUTO: 0.4 10E3/UL (ref 0.8–5.3)
LYMPHOCYTES NFR BLD AUTO: 12 %
M PNEUMO DNA SPEC QL NAA+PROBE: NOT DETECTED
MAGNESIUM SERPL-MCNC: 1.6 MG/DL (ref 1.7–2.3)
MCH RBC QN AUTO: 27.2 PG (ref 26.5–33)
MCHC RBC AUTO-ENTMCNC: 30.8 G/DL (ref 31.5–36.5)
MCV RBC AUTO: 88 FL (ref 78–100)
MONOCYTES # BLD AUTO: 0.3 10E3/UL (ref 0–1.3)
MONOCYTES NFR BLD AUTO: 10 %
NEUTROPHILS # BLD AUTO: 2.3 10E3/UL (ref 1.6–8.3)
NEUTROPHILS NFR BLD AUTO: 72 %
NITRATE UR QL: NEGATIVE
NRBC # BLD AUTO: 0 10E3/UL
NRBC BLD AUTO-RTO: 0 /100
P AXIS - MUSE: 32 DEGREES
PH UR STRIP: 7 [PH] (ref 5–7)
PHOSPHATE SERPL-MCNC: 3.2 MG/DL (ref 2.5–4.5)
PLATELET # BLD AUTO: 136 10E3/UL (ref 150–450)
POTASSIUM SERPL-SCNC: 4.6 MMOL/L (ref 3.4–5.3)
PR INTERVAL - MUSE: 168 MS
QRS DURATION - MUSE: 114 MS
QT - MUSE: 450 MS
QTC - MUSE: 535 MS
R AXIS - MUSE: 62 DEGREES
RBC # BLD AUTO: 4.08 10E6/UL (ref 4.4–5.9)
RBC URINE: 6 /HPF
RSV RNA SPEC QL NAA+PROBE: NOT DETECTED
RSV RNA SPEC QL NAA+PROBE: NOT DETECTED
RV+EV RNA SPEC QL NAA+PROBE: NOT DETECTED
SODIUM SERPL-SCNC: 137 MMOL/L (ref 135–145)
SP GR UR STRIP: 1.02 (ref 1–1.03)
SQUAMOUS EPITHELIAL: 1 /HPF
SYSTOLIC BLOOD PRESSURE - MUSE: NORMAL MMHG
T AXIS - MUSE: -7 DEGREES
UROBILINOGEN UR STRIP-MCNC: NORMAL MG/DL
VENTRICULAR RATE- MUSE: 85 BPM
WBC # BLD AUTO: 3.2 10E3/UL (ref 4–11)
WBC URINE: 49 /HPF

## 2025-03-10 PROCEDURE — 71046 X-RAY EXAM CHEST 2 VIEWS: CPT | Performed by: RADIOLOGY

## 2025-03-10 PROCEDURE — 87799 DETECT AGENT NOS DNA QUANT: CPT | Performed by: NURSE PRACTITIONER

## 2025-03-10 PROCEDURE — 99213 OFFICE O/P EST LOW 20 MIN: CPT | Performed by: NURSE PRACTITIONER

## 2025-03-10 PROCEDURE — 87040 BLOOD CULTURE FOR BACTERIA: CPT | Performed by: NURSE PRACTITIONER

## 2025-03-10 PROCEDURE — 81003 URINALYSIS AUTO W/O SCOPE: CPT | Performed by: NURSE PRACTITIONER

## 2025-03-10 PROCEDURE — G0463 HOSPITAL OUTPT CLINIC VISIT: HCPCS | Performed by: NURSE PRACTITIONER

## 2025-03-10 PROCEDURE — 84100 ASSAY OF PHOSPHORUS: CPT | Performed by: PATHOLOGY

## 2025-03-10 PROCEDURE — 83690 ASSAY OF LIPASE: CPT | Performed by: PATHOLOGY

## 2025-03-10 PROCEDURE — 87088 URINE BACTERIA CULTURE: CPT | Performed by: NURSE PRACTITIONER

## 2025-03-10 PROCEDURE — 87186 SC STD MICRODIL/AGAR DIL: CPT | Performed by: NURSE PRACTITIONER

## 2025-03-10 PROCEDURE — 80048 BASIC METABOLIC PNL TOTAL CA: CPT | Performed by: PATHOLOGY

## 2025-03-10 PROCEDURE — 36415 COLL VENOUS BLD VENIPUNCTURE: CPT | Performed by: PATHOLOGY

## 2025-03-10 PROCEDURE — 99000 SPECIMEN HANDLING OFFICE-LAB: CPT | Performed by: PATHOLOGY

## 2025-03-10 PROCEDURE — 83735 ASSAY OF MAGNESIUM: CPT | Performed by: PATHOLOGY

## 2025-03-10 PROCEDURE — 85025 COMPLETE CBC W/AUTO DIFF WBC: CPT | Performed by: PATHOLOGY

## 2025-03-10 PROCEDURE — 93000 ELECTROCARDIOGRAM COMPLETE: CPT | Performed by: INTERNAL MEDICINE

## 2025-03-10 PROCEDURE — 3074F SYST BP LT 130 MM HG: CPT | Performed by: NURSE PRACTITIONER

## 2025-03-10 PROCEDURE — 87486 CHLMYD PNEUM DNA AMP PROBE: CPT | Performed by: NURSE PRACTITIONER

## 2025-03-10 PROCEDURE — 3078F DIAST BP <80 MM HG: CPT | Performed by: NURSE PRACTITIONER

## 2025-03-10 PROCEDURE — 87581 M.PNEUMON DNA AMP PROBE: CPT | Performed by: NURSE PRACTITIONER

## 2025-03-10 RX ORDER — PREGABALIN 150 MG/1
150 CAPSULE ORAL EVERY 12 HOURS PRN
COMMUNITY
Start: 2025-02-19

## 2025-03-10 NOTE — TELEPHONE ENCOUNTER
Siva c/o fevers and diarrhea, does not feel well and was unable to go to work today. Recently treated for resistant UTI, has visit with ID on 3/20. Will bring back to clinic for assessment.
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
## 2025-03-11 LAB
BACTERIA UR CULT: ABNORMAL
EBV DNA SERPL NAA+PROBE-ACNC: NOT DETECTED IU/ML

## 2025-03-12 ENCOUNTER — TELEPHONE (OUTPATIENT)
Dept: TRANSPLANT | Facility: CLINIC | Age: 50
End: 2025-03-12

## 2025-03-12 ENCOUNTER — ENROLLMENT (OUTPATIENT)
Dept: HOME HEALTH SERVICES | Facility: HOME HEALTH | Age: 50
End: 2025-03-12
Payer: MEDICARE

## 2025-03-12 ENCOUNTER — TELEPHONE (OUTPATIENT)
Dept: TRANSPLANT | Facility: CLINIC | Age: 50
End: 2025-03-12
Payer: COMMERCIAL

## 2025-03-12 ENCOUNTER — HOME INFUSION (OUTPATIENT)
Dept: HOME HEALTH SERVICES | Facility: HOME HEALTH | Age: 50
End: 2025-03-12
Payer: COMMERCIAL

## 2025-03-12 ENCOUNTER — PRE VISIT (OUTPATIENT)
Dept: UROLOGY | Facility: CLINIC | Age: 50
End: 2025-03-12

## 2025-03-12 DIAGNOSIS — Z94.0 HTN, KIDNEY TRANSPLANT RELATED: ICD-10-CM

## 2025-03-12 DIAGNOSIS — N30.00 ACUTE CYSTITIS WITHOUT HEMATURIA: Primary | ICD-10-CM

## 2025-03-12 DIAGNOSIS — N39.0 URINARY TRACT INFECTION: Primary | ICD-10-CM

## 2025-03-12 DIAGNOSIS — I15.1 HTN, KIDNEY TRANSPLANT RELATED: ICD-10-CM

## 2025-03-12 DIAGNOSIS — Z94.0 KIDNEY REPLACED BY TRANSPLANT: ICD-10-CM

## 2025-03-12 LAB
ADV 40+41 DNA STL QL NAA+NON-PROBE: NEGATIVE
ASTRO TYP 1-8 RNA STL QL NAA+NON-PROBE: NEGATIVE
BK VIRUS SPECIMEN TYPE: NORMAL
BKV DNA # SPEC NAA+PROBE: NOT DETECTED IU/ML
C CAYETANENSIS DNA STL QL NAA+NON-PROBE: NEGATIVE
C DIFF TOX B STL QL: NEGATIVE
CAMPYLOBACTER DNA SPEC NAA+PROBE: NEGATIVE
CRYPTOSP DNA STL QL NAA+NON-PROBE: NEGATIVE
E COLI O157 DNA STL QL NAA+NON-PROBE: NORMAL
E HISTOLYT DNA STL QL NAA+NON-PROBE: NEGATIVE
EAEC ASTA GENE ISLT QL NAA+PROBE: NEGATIVE
EC STX1+STX2 GENES STL QL NAA+NON-PROBE: NEGATIVE
EPEC EAE GENE STL QL NAA+NON-PROBE: NEGATIVE
ETEC LTA+ST1A+ST1B TOX ST NAA+NON-PROBE: NEGATIVE
G LAMBLIA DNA STL QL NAA+NON-PROBE: NEGATIVE
NOROVIRUS GI+II RNA STL QL NAA+NON-PROBE: NEGATIVE
P SHIGELLOIDES DNA STL QL NAA+NON-PROBE: NEGATIVE
RVA RNA STL QL NAA+NON-PROBE: NEGATIVE
SALMONELLA SP RPOD STL QL NAA+PROBE: NEGATIVE
SAPO I+II+IV+V RNA STL QL NAA+NON-PROBE: NEGATIVE
SHIGELLA SP+EIEC IPAH ST NAA+NON-PROBE: NEGATIVE
V CHOLERAE DNA SPEC QL NAA+PROBE: NEGATIVE
VIBRIO DNA SPEC NAA+PROBE: NEGATIVE
Y ENTEROCOL DNA STL QL NAA+PROBE: NEGATIVE

## 2025-03-12 PROCEDURE — 87507 IADNA-DNA/RNA PROBE TQ 12-25: CPT | Performed by: NURSE PRACTITIONER

## 2025-03-12 PROCEDURE — 99000 SPECIMEN HANDLING OFFICE-LAB: CPT | Performed by: PATHOLOGY

## 2025-03-12 PROCEDURE — 87493 C DIFF AMPLIFIED PROBE: CPT | Performed by: NURSE PRACTITIONER

## 2025-03-12 RX ORDER — DIPHENHYDRAMINE HYDROCHLORIDE 50 MG/ML
25 INJECTION, SOLUTION INTRAMUSCULAR; INTRAVENOUS
Status: CANCELLED
Start: 2025-03-12

## 2025-03-12 RX ORDER — DIPHENHYDRAMINE HYDROCHLORIDE 50 MG/ML
50 INJECTION, SOLUTION INTRAMUSCULAR; INTRAVENOUS
Status: CANCELLED
Start: 2025-03-12

## 2025-03-12 RX ORDER — ALBUTEROL SULFATE 0.83 MG/ML
2.5 SOLUTION RESPIRATORY (INHALATION)
Status: CANCELLED | OUTPATIENT
Start: 2025-03-12

## 2025-03-12 RX ORDER — MEPERIDINE HYDROCHLORIDE 25 MG/ML
25 INJECTION INTRAMUSCULAR; INTRAVENOUS; SUBCUTANEOUS
Status: CANCELLED | OUTPATIENT
Start: 2025-03-12

## 2025-03-12 RX ORDER — HEPARIN SODIUM (PORCINE) LOCK FLUSH IV SOLN 100 UNIT/ML 100 UNIT/ML
5 SOLUTION INTRAVENOUS
Status: CANCELLED | OUTPATIENT
Start: 2025-03-12

## 2025-03-12 RX ORDER — HEPARIN SODIUM,PORCINE 10 UNIT/ML
5-20 VIAL (ML) INTRAVENOUS DAILY PRN
Status: CANCELLED | OUTPATIENT
Start: 2025-03-12

## 2025-03-12 RX ORDER — METHYLPREDNISOLONE SODIUM SUCCINATE 40 MG/ML
40 INJECTION INTRAMUSCULAR; INTRAVENOUS
Status: CANCELLED
Start: 2025-03-12

## 2025-03-12 RX ORDER — EPINEPHRINE 1 MG/ML
0.3 INJECTION, SOLUTION, CONCENTRATE INTRAVENOUS EVERY 5 MIN PRN
Status: CANCELLED | OUTPATIENT
Start: 2025-03-12

## 2025-03-12 RX ORDER — ALBUTEROL SULFATE 90 UG/1
1-2 INHALANT RESPIRATORY (INHALATION)
Status: CANCELLED
Start: 2025-03-12

## 2025-03-12 NOTE — TELEPHONE ENCOUNTER
Post Kidney and Pancreas Transplant Team Conference  Date: 3/12/2025  Transplant Coordinator: Ximena     Attendees:  [x]  Dr. Sorensen [x] Leonor Kang, RN [x] Betty Landaverde LPN     [x]  Dr. Shell [x] Rosalia Romero, RN [] Lelo Reese LPN    [x] Dr. Wallis [x] Ximena Edwards RN    [x] Dr. Coombs [x] Mony Crabtree, RN [x] Angella Liriano RN   [] Dr. Kong [] Asha Allred, RN    [x] Dr. Christianson [] Cindy Ye, BETSY    []  Dr. Whitehead [] Miryam Iyer RN    [] Dr. Michaud [] Gino Mahan RN    [] Sobia Underwood, ARIE [] Roxanna Monet RN    [x] Shelia Magallon NP [] Alisa Cutler RN        Verbal Plan Read Back:   1 gram IV antibiotics- 2-3 weeks  See ID- message them to see if course should be longer.      Routed to RN Coordinator   Betty Landaverde LPN

## 2025-03-12 NOTE — TELEPHONE ENCOUNTER
Spoke with Svia regarding IV abx, agreeable to plan. Therapy plan updated. PICC line requested, referral placed for [facility] who will verify coverage and call back.     Allergy to amoxicillin, interaction between amoxicillin and ertapenem reviewed with TID.     Vitor Sandoval MD Spong, Richard Steven, MD; Julianna Edwards RN Hello,    The organism appears to have intermediate susceptibility to Levofloxacin so probably explains failure    Ertapenem is ok to give if Amox allergy - especially because it says patient has tolerated Ceftriaxone before    When was the patient seen by Urology? I wasn't able to find notes. If there is concern for prostate abscess, patient should be evaluated for possibility of drainage, but antibiotics in prostatitis/abscess can sometimes be as long as 4-6 weeks    Regards  Vitor Paniagua aware he needs to be evaluated by urology and he needs to call to reschedule appointment. He will do this as soon as possible.

## 2025-03-12 NOTE — TELEPHONE ENCOUNTER
Reason for visit: post op follow up      Dx/Hx/Sx: CYSTOSCOPY, WITH TRANSPLANT URETERAL STENT REMOVAL     Records/imaging/labs/orders: in [facility] surgery 10/10    At Rooming: standard

## 2025-03-13 ENCOUNTER — INFUSION THERAPY VISIT (OUTPATIENT)
Dept: INFUSION THERAPY | Facility: CLINIC | Age: 50
End: 2025-03-13
Attending: INTERNAL MEDICINE
Payer: MEDICARE

## 2025-03-13 ENCOUNTER — HOME INFUSION BILLING (OUTPATIENT)
Dept: HOME HEALTH SERVICES | Facility: HOME HEALTH | Age: 50
End: 2025-03-13
Payer: MEDICARE

## 2025-03-13 VITALS
DIASTOLIC BLOOD PRESSURE: 67 MMHG | OXYGEN SATURATION: 87 % | TEMPERATURE: 98.4 F | SYSTOLIC BLOOD PRESSURE: 111 MMHG | HEART RATE: 90 BPM

## 2025-03-13 DIAGNOSIS — N30.00 ACUTE CYSTITIS WITHOUT HEMATURIA: Primary | ICD-10-CM

## 2025-03-13 DIAGNOSIS — Z94.0 KIDNEY REPLACED BY TRANSPLANT: ICD-10-CM

## 2025-03-13 LAB — BACTERIA BLD CULT: NORMAL

## 2025-03-13 PROCEDURE — 250N000011 HC RX IP 250 OP 636: Performed by: INTERNAL MEDICINE

## 2025-03-13 PROCEDURE — A9270 NON-COVERED ITEM OR SERVICE: HCPCS

## 2025-03-13 PROCEDURE — A4223 INFUSION SUPPLIES W/O PUMP: HCPCS

## 2025-03-13 PROCEDURE — A4452 WATERPROOF TAPE: HCPCS

## 2025-03-13 PROCEDURE — A4245 ALCOHOL WIPES PER BOX: HCPCS

## 2025-03-13 PROCEDURE — A4221 SUPP NON-INSULIN INF CATH/WK: HCPCS

## 2025-03-13 RX ORDER — DIPHENHYDRAMINE HYDROCHLORIDE 50 MG/ML
50 INJECTION, SOLUTION INTRAMUSCULAR; INTRAVENOUS
Status: CANCELLED
Start: 2025-03-14

## 2025-03-13 RX ORDER — ALBUTEROL SULFATE 0.83 MG/ML
2.5 SOLUTION RESPIRATORY (INHALATION)
Status: CANCELLED | OUTPATIENT
Start: 2025-03-14

## 2025-03-13 RX ORDER — HEPARIN SODIUM (PORCINE) LOCK FLUSH IV SOLN 100 UNIT/ML 100 UNIT/ML
5 SOLUTION INTRAVENOUS
Status: CANCELLED | OUTPATIENT
Start: 2025-03-14

## 2025-03-13 RX ORDER — EPINEPHRINE 1 MG/ML
0.3 INJECTION, SOLUTION INTRAMUSCULAR; SUBCUTANEOUS EVERY 5 MIN PRN
Status: CANCELLED | OUTPATIENT
Start: 2025-03-14

## 2025-03-13 RX ORDER — HEPARIN SODIUM,PORCINE 10 UNIT/ML
5-20 VIAL (ML) INTRAVENOUS DAILY PRN
Status: CANCELLED | OUTPATIENT
Start: 2025-03-14

## 2025-03-13 RX ORDER — MEPERIDINE HYDROCHLORIDE 25 MG/ML
25 INJECTION INTRAMUSCULAR; INTRAVENOUS; SUBCUTANEOUS
Status: CANCELLED | OUTPATIENT
Start: 2025-03-14

## 2025-03-13 RX ORDER — DIPHENHYDRAMINE HYDROCHLORIDE 50 MG/ML
25 INJECTION, SOLUTION INTRAMUSCULAR; INTRAVENOUS
Status: CANCELLED
Start: 2025-03-14

## 2025-03-13 RX ORDER — ALBUTEROL SULFATE 90 UG/1
1-2 INHALANT RESPIRATORY (INHALATION)
Status: CANCELLED
Start: 2025-03-14

## 2025-03-13 RX ORDER — METHYLPREDNISOLONE SODIUM SUCCINATE 40 MG/ML
40 INJECTION INTRAMUSCULAR; INTRAVENOUS
Status: CANCELLED
Start: 2025-03-14

## 2025-03-13 RX ADMIN — ERTAPENEM 1 G: 1 INJECTION, POWDER, LYOPHILIZED, FOR SOLUTION INTRAMUSCULAR; INTRAVENOUS at 12:48

## 2025-03-13 NOTE — PATIENT INSTRUCTIONS
Dear Siva Ramey    Thank you for choosing [facility] ([facility] for your infusion.  The following information is a summary of our appointment as well as important reminders.      If you are a transplant patient and require transplant education, please click on this link: [email or web address].    If you have any questions on your upcoming Specialty Infusion appointments, please call scheduling at [phone].  It was a pleasure taking care of you today.    Sincerely,    [facility]  [address] [place] [address]  [id number]  Phone:  [phone]    Biopsy Discharge    Discharge Instructions  - You have had a biopsy of a right pleural lesion.   - You may shower in 24 hours. No soaking or swimming until the site is completely healed.  - Keep the area covered and dry for the next 24 hours.  - Do not perform any heavy lifting for the next few days or until the site is healed.  - You may resume your normal diet.  - You may resume your normal medications however you should wait 48 hours before restarting aspirin, plavix, or blood thinners.  - It is normal to experience some pain over the site for the next few days. You may take Tylenol for that pain. Do not take more frequently than every 6 hours and do not exceed more than 3000mg of Tylenol in a 24 hour period.    - You were given conscious sedation which may make you drowsy, therefore you need someone to stay with you until the morning following the procedure.  - Do not drive, engage in heavy lifting or strenuous activity, or drink any alcoholic beverages for the next 24 hours.   - You may resume normal activity in 24 hours.    Notify your primary physician and/or Interventional Radiology IMMEDIATELY if you experience any of the following       - Fever of 101F or 38C       - Chills or Rigors/ Shakes       - Swelling and/or Redness in the area around the biopsy site       - Worsening Pain       - Blood soaked bandages or worsening bleeding       - Lightheadedness and/or dizziness upon standing       - Chest Pain/ Tightness       - Shortness of Breath       - Difficulty walking    If you have a problem that you believe requires IMMEDIATE attention, please go to your NEAREST Emergency Room. If you believe your problem can safely wait until you speak to a physician, please call Interventional Radiology for any concerns.

## 2025-03-13 NOTE — PROGRESS NOTES
[facility]  Start of Care/Resumption of Care Note    I SOC    DX: Acute cystitis without hematuria [N30.00]     Therapy: Anti-Infective, IV Ertapenem 1 gm Daily    Next Dose Due: Friday 3/14/25    Delivery plan: Deliver today 3/13/25 to patient home by end of day    In-basket sent to [facility] Scheduling, requesting visit on Friday 3/14/25    Per [facility] care plan, labs are due on Tuesday's.     Nursing plan: [facility].     Teaching Plan: Liaison Teach Done?: NA    Other Info: Called and spoke with patient over the phone. Reviewed [facility] services, benefits, orders, and plan for delivery and nurse visit. Patient understanding and agreeable to services through [facility].   Patient had 1st dose of IV Ertapenem today 3/13/25 and tolerated.   Plan for PICC placement 3/14/25 at 1330.     Alisa Arvizu, RN 03/13/25

## 2025-03-13 NOTE — PROGRESS NOTES
Infusion Nursing Note:  Siva Ramey presents today for Patient presents with:  Infusion: Ertapenum      Patient seen by provider today: No   present during visit today: No    Note: Patient identification verified by name and date of birth.  Assessment completed.  Vitals recorded in Doc Flowsheets.  Patient was provided with education regarding medication/procedure and possible side effects.  Patient verbalized understanding.    Frequency: one time, is getting a PICC placed tomorrow and then will be getting his care through home care.  Infusion Rate/Length: Ertapenem given IVP over 5 minutes.      Administrations This Visit       ertapenem (INVanz) 1 g in 10 mL NS for IVP       Admin Date  03/13/2025 Action  $Given Dose  1 g Route  Intravenous Documented By  Emelyn Bowman RN                    Intravenous Access:  Peripheral IV placed.    Treatment Conditions:  Not Applicable.      Post Infusion Assessment:  Patient tolerated infusion without incident.  Site patent and intact, free from redness, edema or discomfort.  No evidence of extravasations.  Access discontinued per protocol.       Discharge Plan:   Discharge instructions reviewed with: Patient.  AVS to patient via [employer].  Patient will return   Future Appointments   Date Time Provider Department Center   3/14/2025  1:30 PM [address]   3/20/2025  8:00 AM Vitor Sandoval MD [facility]   3/26/2025 10:15 AM Farzad Harris MD [facility]   4/17/2025  4:35 PM Fernando Sorensen MD [facility]   4/29/2025 12:30 PM Nelly Hand NP [facility]   7/1/2025  2:05 PM Fernando Sorensen MD [facility]      for next appointment.   Patient discharged in stable condition accompanied by: self.  Departure Mode: Ambulatory    /67   Pulse 90   Temp 98.4  F (36.9  C)   SpO2 (!) 87%   Emelyn Bowman RN on 3/13/2025 at 1:24 PM  .

## 2025-03-14 ENCOUNTER — HOSPITAL ENCOUNTER (OUTPATIENT)
Dept: VASCULAR ULTRASOUND | Facility: CLINIC | Age: 50
Discharge: HOME | End: 2025-03-14
Attending: INTERNAL MEDICINE | Admitting: INTERNAL MEDICINE
Payer: MEDICARE

## 2025-03-14 DIAGNOSIS — N30.00 ACUTE CYSTITIS WITHOUT HEMATURIA: ICD-10-CM

## 2025-03-14 PROCEDURE — 36569 INSJ PICC 5 YR+ W/O IMAGING: CPT

## 2025-03-14 PROCEDURE — 272N000277 HC DEVICE 4FR SECURACATH

## 2025-03-14 PROCEDURE — 250N000009 HC RX 250: Performed by: INTERNAL MEDICINE

## 2025-03-14 PROCEDURE — A4221 SUPP NON-INSULIN INF CATH/WK: HCPCS | Mod: GY

## 2025-03-14 PROCEDURE — A4223 INFUSION SUPPLIES W/O PUMP: HCPCS

## 2025-03-14 PROCEDURE — S9500 HIT ANTIBIOTIC Q24H DIEM: HCPCS | Mod: GY

## 2025-03-14 PROCEDURE — 272N000450 HC KIT 4FR POWER PICC SINGLE LUMEN

## 2025-03-14 RX ORDER — HEPARIN SODIUM,PORCINE 10 UNIT/ML
5-20 VIAL (ML) INTRAVENOUS EVERY 24 HOURS
Status: DISCONTINUED | OUTPATIENT
Start: 2025-03-14 | End: 2025-03-15 | Stop reason: HOSPADM

## 2025-03-14 RX ORDER — LIDOCAINE 40 MG/G
CREAM TOPICAL
Status: DISCONTINUED | OUTPATIENT
Start: 2025-03-14 | End: 2025-03-15 | Stop reason: HOSPADM

## 2025-03-14 RX ORDER — HEPARIN SODIUM,PORCINE 10 UNIT/ML
5-20 VIAL (ML) INTRAVENOUS
Status: DISCONTINUED | OUTPATIENT
Start: 2025-03-14 | End: 2025-03-15 | Stop reason: HOSPADM

## 2025-03-14 RX ADMIN — LIDOCAINE HYDROCHLORIDE ANHYDROUS 4 ML: 10 INJECTION, SOLUTION INFILTRATION at 14:41

## 2025-03-14 NOTE — PROCEDURES
[facility]    Single Lumen PICC Placement    Date/Time: 3/14/2025 2:23 PM    Performed by: Alin Bernabe RN  Authorized by: Fernando Sorensen MD  Indications: vascular access      UNIVERSAL PROTOCOL   Site Marked: Yes  Prior Images Obtained and Reviewed:  Yes  Required items: Required blood products, implants, devices and special equipment available    Patient identity confirmed:  Verbally with patient, arm band, provided demographic data, hospital-assigned identification number and anonymous protocol, patient vented/unresponsive  NA - No sedation, light sedation, or local anesthesia  Confirmation Checklist:  Patient's identity using two indicators, relevant allergies, procedure was appropriate and matched the consent or emergent situation and correct equipment/implants were available  Time out: Immediately prior to the procedure a time out was called (Alin)    Universal Protocol: the Joint Commission Universal Protocol was followed    Preparation: Patient was prepped and draped in usual sterile fashion       ANESTHESIA    Anesthesia:  Local infiltration  Local Anesthetic:  Lidocaine 1% without epinephrine  Anesthetic Total (mL):  4      SEDATION    Patient Sedated: No        Preparation: skin prepped with ChloraPrep  Skin prep agent: skin prep agent completely dried prior to procedure  Sterile barriers: maximum sterile barriers were used: cap, mask, sterile gown, sterile gloves, and large sterile sheet  Hand hygiene: hand hygiene performed prior to central venous catheter insertion  Type of line used: PICC  Catheter type: single lumen  Lumen type: non-valved and power PICC  Catheter size: 4 Fr  Brand: Bard  Lot number: [id number]  Placement method: venipuncture, MST, ultrasound and tip navigation system  Number of attempts: 1  Difficulty threading catheter: no  Successful placement: yes  Orientation: right  Catheter to Vein (%): 45  Location: basilic vein  "(0.65cm)  Tip Location: SVC  Arm circumference: adults 10 cm  Extremity circumference: 30  Visible catheter length: 3  Total catheter length: 46  Dressing and securement: adhesive securement device, alcohol impregnated caps, blood cleaned with CHG, chlorhexidine patch applied, blood removed, fixation device, gloves changed prior to final dressing, glue, line secured, secure lock, securement device, site cleansed and transparent securement dressing  Post procedure assessment: blood return through all ports, free fluid flow and placement verified by 3CG technology  PROCEDURE Describe Procedure: Right basilic vein 0.65cm dm. 3cm external.Placement cverified by Leandro 3CG.PICC okay to use.  This patient's catheter is secured with SecurAcath instead of a traditional suture or adhesive based securement. This is a subcutaneous anchor securement system (aka KARLENE or SecurAcath) that holds the catheter just below the skin with nitinol feet and a friction fit  around the catheter.  It can stay with the catheter until the catheter is removed.    Do not open, change or remove the SecurAcath.  SecurAcath may be disinfected during a dressing change, but do not open or remove it.  SecurAcath acts like a hinge - lift, clean 360 degrees around, let dry and apply new dressing.  SecurAcath is compatible with all dressings and antiseptic agents.  Ensure the wings of the catheter and SecurAcath are completely under the boarder of the dressing.  SecurAcath is MRI safe to 3T, see IFU for details.  A Statlock is not necessary when SecurAcath is present.   Patients can go to MRI with SecurAcath device in place  When the catheter is indicated for removal, enter \"Nursing to Consult for Vascular Access Care\" order in [facility], watch the removal video on [employer], or call for training if you have not removed before. 24 hour SecurAcath Clinical Education and Support  [phone]      Disposal: sharps and needle count correct at the end of " procedure, needles and guidewire disposed in sharps container  Patient Tolerance:  Patient tolerated the procedure well with no immediate complications

## 2025-03-15 LAB — BACTERIA BLD CULT: NO GROWTH

## 2025-03-15 PROCEDURE — S9500 HIT ANTIBIOTIC Q24H DIEM: HCPCS | Mod: GY

## 2025-03-15 PROCEDURE — A4223 INFUSION SUPPLIES W/O PUMP: HCPCS

## 2025-03-16 PROCEDURE — A4223 INFUSION SUPPLIES W/O PUMP: HCPCS

## 2025-03-16 PROCEDURE — S9500 HIT ANTIBIOTIC Q24H DIEM: HCPCS | Mod: GY

## 2025-03-17 PROCEDURE — S9500 HIT ANTIBIOTIC Q24H DIEM: HCPCS | Mod: GY

## 2025-03-17 PROCEDURE — A4223 INFUSION SUPPLIES W/O PUMP: HCPCS

## 2025-03-18 ENCOUNTER — HOME CARE VISIT (OUTPATIENT)
Dept: HOME HEALTH SERVICES | Facility: HOME HEALTH | Age: 50
End: 2025-03-18
Payer: COMMERCIAL

## 2025-03-18 ENCOUNTER — HOME INFUSION BILLING (OUTPATIENT)
Dept: HOME HEALTH SERVICES | Facility: HOME HEALTH | Age: 50
End: 2025-03-18
Payer: MEDICARE

## 2025-03-18 ENCOUNTER — LAB REQUISITION (OUTPATIENT)
Dept: LAB | Facility: HOSPITAL | Age: 50
End: 2025-03-18
Payer: COMMERCIAL

## 2025-03-18 VITALS
SYSTOLIC BLOOD PRESSURE: 146 MMHG | TEMPERATURE: 97.3 F | DIASTOLIC BLOOD PRESSURE: 124 MMHG | OXYGEN SATURATION: 99 % | RESPIRATION RATE: 16 BRPM | HEART RATE: 85 BPM

## 2025-03-18 DIAGNOSIS — N39.0 URINARY TRACT INFECTION, SITE NOT SPECIFIED: ICD-10-CM

## 2025-03-18 LAB
ALT SERPL W P-5'-P-CCNC: 35 U/L (ref 0–70)
AST SERPL W P-5'-P-CCNC: 27 U/L (ref 0–45)
BASOPHILS # BLD AUTO: 0 10E3/UL (ref 0–0.2)
BASOPHILS NFR BLD AUTO: 1 %
CREAT SERPL-MCNC: 1.6 MG/DL (ref 0.67–1.17)
EGFRCR SERPLBLD CKD-EPI 2021: 52 ML/MIN/1.73M2
EOSINOPHIL # BLD AUTO: 0.3 10E3/UL (ref 0–0.7)
EOSINOPHIL NFR BLD AUTO: 6 %
ERYTHROCYTE [DISTWIDTH] IN BLOOD BY AUTOMATED COUNT: 17.2 % (ref 10–15)
HCT VFR BLD AUTO: 33.3 % (ref 40–53)
HGB BLD-MCNC: 10.6 G/DL (ref 13.3–17.7)
IMM GRANULOCYTES # BLD: 0.1 10E3/UL
IMM GRANULOCYTES NFR BLD: 2 %
LYMPHOCYTES # BLD AUTO: 0.8 10E3/UL (ref 0.8–5.3)
LYMPHOCYTES NFR BLD AUTO: 16 %
MCH RBC QN AUTO: 28.3 PG (ref 26.5–33)
MCHC RBC AUTO-ENTMCNC: 31.8 G/DL (ref 31.5–36.5)
MCV RBC AUTO: 89 FL (ref 78–100)
MONOCYTES # BLD AUTO: 0.3 10E3/UL (ref 0–1.3)
MONOCYTES NFR BLD AUTO: 6 %
NEUTROPHILS # BLD AUTO: 3.5 10E3/UL (ref 1.6–8.3)
NEUTROPHILS NFR BLD AUTO: 69 %
NRBC # BLD AUTO: 0 10E3/UL
NRBC BLD AUTO-RTO: 0 /100
PLATELET # BLD AUTO: 261 10E3/UL (ref 150–450)
RBC # BLD AUTO: 3.75 10E6/UL (ref 4.4–5.9)
WBC # BLD AUTO: 5 10E3/UL (ref 4–11)

## 2025-03-18 PROCEDURE — 84450 TRANSFERASE (AST) (SGOT): CPT | Performed by: INTERNAL MEDICINE

## 2025-03-18 PROCEDURE — 84460 ALANINE AMINO (ALT) (SGPT): CPT | Performed by: INTERNAL MEDICINE

## 2025-03-18 PROCEDURE — 85004 AUTOMATED DIFF WBC COUNT: CPT | Performed by: INTERNAL MEDICINE

## 2025-03-18 PROCEDURE — A4223 INFUSION SUPPLIES W/O PUMP: HCPCS | Mod: GY

## 2025-03-18 PROCEDURE — 82565 ASSAY OF CREATININE: CPT | Performed by: INTERNAL MEDICINE

## 2025-03-18 PROCEDURE — A4223 INFUSION SUPPLIES W/O PUMP: HCPCS

## 2025-03-18 PROCEDURE — A9270 NON-COVERED ITEM OR SERVICE: HCPCS

## 2025-03-18 PROCEDURE — S9500 HIT ANTIBIOTIC Q24H DIEM: HCPCS | Mod: GY

## 2025-03-18 NOTE — PROGRESS NOTES
Nursing Visit Note:  Nurse visit today for CLC, labs and GA for Siva Ramey.     present during visit today: Not Applicable.    Intravenous Access:  Labs drawn without difficulty. and PICC.    Lab-Only Nursing Note    Labs obtained via PICC    Time Specimen drawn: 1318    Last dose (if applicable): No    Facility sent to: [facility] Tracking number: [id number]    Note: Pt reports tolerating his abx with out issues. Pt has some chronic back pain and reports some pain with urination still. Pt will discuss with doctor on Thursday.     Saline administered: 30 (ml)    Supply Check:   Does the patient have all the supplies they need for the next visit?  Yes    Next visit plan: Pt follows up with ID on Thursday. Pt unsure of plan after his visit, states he may get switched to a different abx. At this time [facility] will plan on a visit in 1 week for CLC, labs and GA.     Aslhey Myles RN 3/18/2025

## 2025-03-19 PROCEDURE — S9500 HIT ANTIBIOTIC Q24H DIEM: HCPCS | Mod: GY

## 2025-03-19 PROCEDURE — A4223 INFUSION SUPPLIES W/O PUMP: HCPCS
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
## 2025-03-20 ENCOUNTER — LAB (OUTPATIENT)
Dept: LAB | Facility: CLINIC | Age: 50
End: 2025-03-20
Payer: COMMERCIAL

## 2025-03-20 ENCOUNTER — OFFICE VISIT (OUTPATIENT)
Dept: INFECTIOUS DISEASES | Facility: CLINIC | Age: 50
End: 2025-03-20
Attending: INTERNAL MEDICINE
Payer: MEDICARE

## 2025-03-20 VITALS
WEIGHT: 225.06 LBS | SYSTOLIC BLOOD PRESSURE: 163 MMHG | HEART RATE: 85 BPM | OXYGEN SATURATION: 96 % | BODY MASS INDEX: 31.39 KG/M2 | TEMPERATURE: 97.9 F | DIASTOLIC BLOOD PRESSURE: 88 MMHG | RESPIRATION RATE: 18 BRPM

## 2025-03-20 DIAGNOSIS — N39.0 URINARY TRACT INFECTION: ICD-10-CM

## 2025-03-20 DIAGNOSIS — Z94.0 KIDNEY REPLACED BY TRANSPLANT: ICD-10-CM

## 2025-03-20 DIAGNOSIS — N39.0 RECURRENT UTI: Primary | ICD-10-CM

## 2025-03-20 DIAGNOSIS — A49.8 BACTERIAL INFECTION DUE TO MORGANELLA MORGANII: ICD-10-CM

## 2025-03-20 DIAGNOSIS — Z94.83 PANCREAS TRANSPLANT STATUS (H): ICD-10-CM

## 2025-03-20 DIAGNOSIS — Z48.298 AFTERCARE FOLLOWING ORGAN TRANSPLANT: ICD-10-CM

## 2025-03-20 DIAGNOSIS — N39.0 RECURRENT UTI: ICD-10-CM

## 2025-03-20 DIAGNOSIS — Z16.24 MULTIPLE DRUG RESISTANT ORGANISM (MDRO) CULTURE POSITIVE: ICD-10-CM

## 2025-03-20 LAB
ALBUMIN UR-MCNC: 20 MG/DL
APPEARANCE UR: CLEAR
BILIRUB UR QL STRIP: NEGATIVE
COLOR UR AUTO: ABNORMAL
GLUCOSE UR STRIP-MCNC: NEGATIVE MG/DL
HGB UR QL STRIP: NEGATIVE
KETONES UR STRIP-MCNC: NEGATIVE MG/DL
LEUKOCYTE ESTERASE UR QL STRIP: NEGATIVE
MUCOUS THREADS #/AREA URNS LPF: PRESENT /LPF
NITRATE UR QL: NEGATIVE
PH UR STRIP: 6.5 [PH] (ref 5–7)
RBC URINE: 2 /HPF
SP GR UR STRIP: 1.02 (ref 1–1.03)
SQUAMOUS EPITHELIAL: 3 /HPF
UROBILINOGEN UR STRIP-MCNC: NORMAL MG/DL
WBC URINE: 1 /HPF

## 2025-03-20 PROCEDURE — A4221 SUPP NON-INSULIN INF CATH/WK: HCPCS

## 2025-03-20 PROCEDURE — S9500 HIT ANTIBIOTIC Q24H DIEM: HCPCS | Mod: GY

## 2025-03-20 PROCEDURE — G0463 HOSPITAL OUTPT CLINIC VISIT: HCPCS | Performed by: STUDENT IN AN ORGANIZED HEALTH CARE EDUCATION/TRAINING PROGRAM

## 2025-03-20 PROCEDURE — A4223 INFUSION SUPPLIES W/O PUMP: HCPCS

## 2025-03-20 ASSESSMENT — PAIN SCALES - GENERAL: PAINLEVEL_OUTOF10: MODERATE PAIN (6)
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
## 2025-03-20 NOTE — NURSING NOTE
BP (!) 163/88 (BP Location: Right arm, Cuff Size: Adult Regular)   Pulse 85   Temp 97.9  F (36.6  C) (Oral)   Resp 18   Wt 102.1 kg (225 lb 1 oz)   SpO2 96%   BMI 31.39 kg/m    Cady Faye on 3/20/2025 at 8:11 AM

## 2025-03-20 NOTE — PATIENT INSTRUCTIONS
- Repeat urinalysis  - Please reach out if diarrhea recurs/worsens, will repeat C.diff testing at the time  - Plan to repeat CT scan. I have reached out to Transplant Nephrology to see if we can use contrast with imaging  - Agree with Urology follow up (patient has follow up next week - 3/26)  - Have reached out to Urology regarding possibility of traumatic mucosal injury contributing to persistent dysuria and need for cystoscopy  - Continue Ertapenem 1gm q24h, plan for a minimum of 2 weeks, however anticipate longer duration pending further workup  - Please reach out if additional questions

## 2025-03-21 ENCOUNTER — HOME INFUSION BILLING (OUTPATIENT)
Dept: HOME HEALTH SERVICES | Facility: HOME HEALTH | Age: 50
End: 2025-03-21
Payer: MEDICARE

## 2025-03-21 PROCEDURE — S9500 HIT ANTIBIOTIC Q24H DIEM: HCPCS | Mod: GY

## 2025-03-21 PROCEDURE — A4221 SUPP NON-INSULIN INF CATH/WK: HCPCS | Mod: GY

## 2025-03-21 PROCEDURE — A4223 INFUSION SUPPLIES W/O PUMP: HCPCS | Mod: GY

## 2025-03-22 PROCEDURE — A4223 INFUSION SUPPLIES W/O PUMP: HCPCS | Mod: GY

## 2025-03-22 PROCEDURE — S9500 HIT ANTIBIOTIC Q24H DIEM: HCPCS | Mod: GY

## 2025-03-23 PROCEDURE — A4223 INFUSION SUPPLIES W/O PUMP: HCPCS | Mod: GY

## 2025-03-23 PROCEDURE — S9500 HIT ANTIBIOTIC Q24H DIEM: HCPCS | Mod: GY

## 2025-03-24 ENCOUNTER — LAB (OUTPATIENT)
Dept: LAB | Facility: HOSPITAL | Age: 50
End: 2025-03-24
Payer: MEDICARE

## 2025-03-24 DIAGNOSIS — Z94.83 PANCREAS REPLACED BY TRANSPLANT (H): ICD-10-CM

## 2025-03-24 DIAGNOSIS — Z48.298 AFTERCARE FOLLOWING ORGAN TRANSPLANT: ICD-10-CM

## 2025-03-24 DIAGNOSIS — R50.9 FEVER, UNSPECIFIED FEVER CAUSE: ICD-10-CM

## 2025-03-24 DIAGNOSIS — Z94.0 KIDNEY REPLACED BY TRANSPLANT: ICD-10-CM

## 2025-03-24 DIAGNOSIS — Z79.899 ENCOUNTER FOR LONG-TERM CURRENT USE OF MEDICATION: ICD-10-CM

## 2025-03-24 DIAGNOSIS — Z98.890 OTHER SPECIFIED POSTPROCEDURAL STATES: ICD-10-CM

## 2025-03-24 LAB
AMYLASE SERPL-CCNC: 165 U/L (ref 28–100)
ANION GAP SERPL CALCULATED.3IONS-SCNC: 7 MMOL/L (ref 7–15)
BUN SERPL-MCNC: 21.3 MG/DL (ref 6–20)
CALCIUM SERPL-MCNC: 9.5 MG/DL (ref 8.8–10.4)
CHLORIDE SERPL-SCNC: 109 MMOL/L (ref 98–107)
CMV DNA SPEC NAA+PROBE-ACNC: NOT DETECTED IU/ML
CREAT SERPL-MCNC: 1.69 MG/DL (ref 0.67–1.17)
EGFRCR SERPLBLD CKD-EPI 2021: 49 ML/MIN/1.73M2
ERYTHROCYTE [DISTWIDTH] IN BLOOD BY AUTOMATED COUNT: 17.9 % (ref 10–15)
GLUCOSE SERPL-MCNC: 103 MG/DL (ref 70–99)
HCO3 SERPL-SCNC: 26 MMOL/L (ref 22–29)
HCT VFR BLD AUTO: 38.1 % (ref 40–53)
HGB BLD-MCNC: 11.4 G/DL (ref 13.3–17.7)
LIPASE SERPL-CCNC: 80 U/L (ref 13–60)
MCH RBC QN AUTO: 27.4 PG (ref 26.5–33)
MCHC RBC AUTO-ENTMCNC: 29.9 G/DL (ref 31.5–36.5)
MCV RBC AUTO: 92 FL (ref 78–100)
MYCOPHENOLATE SERPL LC/MS/MS-MCNC: 0.36 MG/L (ref 1–3.5)
MYCOPHENOLATE-G SERPL LC/MS/MS-MCNC: 23.7 MG/L (ref 30–95)
PLATELET # BLD AUTO: 206 10E3/UL (ref 150–450)
POTASSIUM SERPL-SCNC: 4.9 MMOL/L (ref 3.4–5.3)
RBC # BLD AUTO: 4.16 10E6/UL (ref 4.4–5.9)
SODIUM SERPL-SCNC: 142 MMOL/L (ref 135–145)
SPECIMEN TYPE: NORMAL
TACROLIMUS BLD-MCNC: 9.2 UG/L (ref 5–15)
TME LAST DOSE: ABNORMAL H
TME LAST DOSE: ABNORMAL H
TME LAST DOSE: NORMAL H
TME LAST DOSE: NORMAL H
WBC # BLD AUTO: 6.4 10E3/UL (ref 4–11)

## 2025-03-24 PROCEDURE — 83690 ASSAY OF LIPASE: CPT

## 2025-03-24 PROCEDURE — 36415 COLL VENOUS BLD VENIPUNCTURE: CPT

## 2025-03-24 PROCEDURE — 80197 ASSAY OF TACROLIMUS: CPT

## 2025-03-24 PROCEDURE — 85027 COMPLETE CBC AUTOMATED: CPT

## 2025-03-24 PROCEDURE — 80048 BASIC METABOLIC PNL TOTAL CA: CPT

## 2025-03-24 PROCEDURE — 80180 DRUG SCRN QUAN MYCOPHENOLATE: CPT

## 2025-03-24 PROCEDURE — 87799 DETECT AGENT NOS DNA QUANT: CPT

## 2025-03-24 PROCEDURE — 82150 ASSAY OF AMYLASE: CPT

## 2025-03-24 PROCEDURE — A4223 INFUSION SUPPLIES W/O PUMP: HCPCS | Mod: GY

## 2025-03-24 PROCEDURE — S9500 HIT ANTIBIOTIC Q24H DIEM: HCPCS | Mod: GY

## 2025-03-25 ENCOUNTER — HOME INFUSION BILLING (OUTPATIENT)
Dept: HOME HEALTH SERVICES | Facility: HOME HEALTH | Age: 50
End: 2025-03-25
Payer: MEDICARE

## 2025-03-25 ENCOUNTER — LAB REQUISITION (OUTPATIENT)
Dept: LAB | Facility: HOSPITAL | Age: 50
End: 2025-03-25
Payer: COMMERCIAL

## 2025-03-25 DIAGNOSIS — N39.0 URINARY TRACT INFECTION, SITE NOT SPECIFIED: ICD-10-CM

## 2025-03-25 LAB
ALT SERPL W P-5'-P-CCNC: 39 U/L (ref 0–70)
AST SERPL W P-5'-P-CCNC: 28 U/L (ref 0–45)
BASOPHILS # BLD AUTO: 0 10E3/UL (ref 0–0.2)
BASOPHILS NFR BLD AUTO: 1 %
BK VIRUS DNA IU/ML, INSTRUMENT (6800): 36 IU/ML
BK VIRUS SPECIMEN TYPE: ABNORMAL
BKV DNA SPEC NAA+PROBE-LOG#: 1.6 {LOG_COPIES}/ML
CREAT SERPL-MCNC: 1.49 MG/DL (ref 0.67–1.17)
EGFRCR SERPLBLD CKD-EPI 2021: 57 ML/MIN/1.73M2
EOSINOPHIL # BLD AUTO: 0.1 10E3/UL (ref 0–0.7)
EOSINOPHIL NFR BLD AUTO: 3 %
ERYTHROCYTE [DISTWIDTH] IN BLOOD BY AUTOMATED COUNT: 17.6 % (ref 10–15)
HCT VFR BLD AUTO: 35.3 % (ref 40–53)
HGB BLD-MCNC: 11.2 G/DL (ref 13.3–17.7)
IMM GRANULOCYTES # BLD: 0 10E3/UL
IMM GRANULOCYTES NFR BLD: 0 %
LYMPHOCYTES # BLD AUTO: 0.9 10E3/UL (ref 0.8–5.3)
LYMPHOCYTES NFR BLD AUTO: 20 %
MCH RBC QN AUTO: 28.4 PG (ref 26.5–33)
MCHC RBC AUTO-ENTMCNC: 31.7 G/DL (ref 31.5–36.5)
MCV RBC AUTO: 89 FL (ref 78–100)
MONOCYTES # BLD AUTO: 0.2 10E3/UL (ref 0–1.3)
MONOCYTES NFR BLD AUTO: 5 %
NEUTROPHILS # BLD AUTO: 3.2 10E3/UL (ref 1.6–8.3)
NEUTROPHILS NFR BLD AUTO: 72 %
NRBC # BLD AUTO: 0 10E3/UL
NRBC BLD AUTO-RTO: 0 /100
PLATELET # BLD AUTO: 177 10E3/UL (ref 150–450)
RBC # BLD AUTO: 3.95 10E6/UL (ref 4.4–5.9)
WBC # BLD AUTO: 4.4 10E3/UL (ref 4–11)

## 2025-03-25 PROCEDURE — S9500 HIT ANTIBIOTIC Q24H DIEM: HCPCS | Mod: GY

## 2025-03-25 PROCEDURE — A4223 INFUSION SUPPLIES W/O PUMP: HCPCS | Mod: GY

## 2025-03-25 PROCEDURE — 85014 HEMATOCRIT: CPT | Performed by: INTERNAL MEDICINE

## 2025-03-25 PROCEDURE — 84460 ALANINE AMINO (ALT) (SGPT): CPT | Performed by: INTERNAL MEDICINE

## 2025-03-25 PROCEDURE — A9270 NON-COVERED ITEM OR SERVICE: HCPCS

## 2025-03-25 PROCEDURE — 82565 ASSAY OF CREATININE: CPT | Performed by: INTERNAL MEDICINE

## 2025-03-25 PROCEDURE — A4223 INFUSION SUPPLIES W/O PUMP: HCPCS

## 2025-03-25 PROCEDURE — 84450 TRANSFERASE (AST) (SGOT): CPT | Performed by: INTERNAL MEDICINE

## 2025-03-25 PROCEDURE — 85004 AUTOMATED DIFF WBC COUNT: CPT | Performed by: INTERNAL MEDICINE

## 2025-03-26 ENCOUNTER — TELEPHONE (OUTPATIENT)
Dept: TRANSPLANT | Facility: CLINIC | Age: 50
End: 2025-03-26

## 2025-03-26 ENCOUNTER — OFFICE VISIT (OUTPATIENT)
Dept: UROLOGY | Facility: CLINIC | Age: 50
End: 2025-03-26
Payer: MEDICARE

## 2025-03-26 ENCOUNTER — ENROLLMENT (OUTPATIENT)
Dept: HOME HEALTH SERVICES | Facility: HOME HEALTH | Age: 50
End: 2025-03-26
Payer: MEDICARE

## 2025-03-26 VITALS
DIASTOLIC BLOOD PRESSURE: 119 MMHG | HEIGHT: 71 IN | WEIGHT: 225 LBS | HEART RATE: 85 BPM | OXYGEN SATURATION: 95 % | BODY MASS INDEX: 31.5 KG/M2 | SYSTOLIC BLOOD PRESSURE: 175 MMHG

## 2025-03-26 DIAGNOSIS — R74.8 SERUM LIPASE ELEVATION: Primary | ICD-10-CM

## 2025-03-26 DIAGNOSIS — N39.0 URINARY TRACT INFECTION: ICD-10-CM

## 2025-03-26 DIAGNOSIS — T86.899 COMPLICATION OF TRANSPLANTED PANCREAS: ICD-10-CM

## 2025-03-26 DIAGNOSIS — N50.89 TESTICULAR SWELLING, RIGHT: Primary | ICD-10-CM

## 2025-03-26 DIAGNOSIS — L90.0 LICHEN SCLEROSUS: ICD-10-CM

## 2025-03-26 DIAGNOSIS — I15.1 HTN, KIDNEY TRANSPLANT RELATED: ICD-10-CM

## 2025-03-26 DIAGNOSIS — Z94.0 HTN, KIDNEY TRANSPLANT RELATED: ICD-10-CM

## 2025-03-26 LAB
DONOR IDENTIFICATION: NORMAL
DSA COMMENTS: NORMAL
DSA PRESENT: NO
DSA TEST METHOD: NORMAL
FLOWPRA1 CELL: NORMAL
FLOWPRA1 COMMENTS: NORMAL
FLOWPRA1 RESULT: NORMAL
FLOWPRA1 TEST METHOD: NORMAL
FLOWPRA2 CELL: NORMAL
FLOWPRA2 COMMENTS: NORMAL
FLOWPRA2 RESULT: NORMAL
FLOWPRA2 TEST METHOD: NORMAL
ORGAN: NORMAL
SA 1  COMMENTS: NORMAL
SA 1 CELL: NORMAL
SA 1 TEST METHOD: NORMAL
SA 2 CELL: NORMAL
SA 2 COMMENTS: NORMAL
SA 2 TEST METHOD: NORMAL
SA1 HI RISK ABY: NORMAL
SA1 MOD RISK ABY: NORMAL
SA2 HI RISK ABY: NORMAL
SA2 MOD RISK ABY: NORMAL
UNACCEPTABLE ANTIGENS: NORMAL
UNOS CPRA: 11

## 2025-03-26 PROCEDURE — 1125F AMNT PAIN NOTED PAIN PRSNT: CPT | Performed by: UROLOGY

## 2025-03-26 PROCEDURE — S9500 HIT ANTIBIOTIC Q24H DIEM: HCPCS | Mod: GY

## 2025-03-26 PROCEDURE — A4223 INFUSION SUPPLIES W/O PUMP: HCPCS

## 2025-03-26 PROCEDURE — 3080F DIAST BP >= 90 MM HG: CPT | Performed by: UROLOGY

## 2025-03-26 PROCEDURE — 99214 OFFICE O/P EST MOD 30 MIN: CPT | Performed by: UROLOGY

## 2025-03-26 PROCEDURE — 3077F SYST BP >= 140 MM HG: CPT | Performed by: UROLOGY

## 2025-03-26 RX ORDER — CLOBETASOL PROPIONATE 0.5 MG/G
CREAM TOPICAL 2 TIMES DAILY
Qty: 45 G | Refills: 0 | Status: SHIPPED | OUTPATIENT
Start: 2025-03-26

## 2025-03-26 ASSESSMENT — PAIN SCALES - GENERAL: PAINLEVEL_OUTOF10: MILD PAIN (3)

## 2025-03-26 NOTE — TELEPHONE ENCOUNTER
Spoke with Siva, had appointment with urology today with a follow up plan in place. Is scheduled for testicular US tomorrow at [facility].     Due for CT scan per transplant ID with 1L NS before scan per Dr. Sorensen. Spoke with [facility] and confirmed hydration benefits, they will dispense 1L NS for Siva to give at home before CT scan scheduled on Friday.      Discussed prospera test, voiced understanding. Kit will be sent to Siva's home.

## 2025-03-26 NOTE — TELEPHONE ENCOUNTER
Post Kidney and Pancreas Transplant Team Conference  Date: 3/26/2025  Transplant Coordinator: Ximena Edwards RN     Attendees:  [x]  Dr. Sorensen [] Leonor Kang RN [] Betty Landaverde LPN     []  Dr. Shell [] Rosalia Romero, BETSY    [x] Dr. Boothe [x] Ximena Edwards RN    [x] Dr. Coombs [] Mony Crabtree, BETSY [x] Angella Liriano RN   [x] Dr. Munoz [] Asha Allred, RN    [] Dr. Christianson [] Cindy Ye RN [] Ariel Randall, PharmD   []  Dr. Whitehead [] Miryam Iyer RN    [] Dr. Michaud [x] Gino Mahan RN     [] Roxanna Monet RN    [x] Shelia Magallon, ARIE [] Alisa Cutler RN        Verbal Plan Read Back:   Ongoing elevated serum lipase in setting of recurrent UTIs, seen by Dr. Harris from urology 3/26/2025.  Low MPA level in setting of IV antibiotics.    Check Prospera.  No change to MMF dose.    Routed to RN Coordinator   Jena Liriano RN

## 2025-03-26 NOTE — NURSING NOTE
"Chief Complaint   Patient presents with    Consult     Pt states here for UTI and infusion questions, has right inguinal testicle, has some discomfort with that.     Pt states that its about a3-4 and feels like a dull pain/ acheyness he stated. When his back is not hurting that's when he can feel the pain from his right inguinal testicle, also states that he still has burning with the UTI, even with infusion on the right arm( has gotten infusion for about 2 weeks)    Blood pressure (!) 175/119, pulse 85, height 1.803 m (5' 11\"), weight 102.1 kg (225 lb), SpO2 95%. Body mass index is 31.38 kg/m .    Patient Active Problem List   Diagnosis    Type 2 diabetes mellitus with other circulatory complication, unspecified whether long term insulin use (H)    Dyslipidemia    Atherosclerosis of other coronary artery bypass graft(s) with unstable angina pectoris (H)    Anemia in chronic renal disease    HTN, kidney transplant related    S/P CABG (coronary artery bypass graft)    Balanoposthitis    History of ST elevation myocardial infarction (STEMI)    Obesity (BMI 30.0-34.9)    MARQUEZ (dyspnea on exertion)    STEMI (ST elevation myocardial infarction) (H)    Metabolic acidosis    Retinopathy    Peripheral neuropathy    Acquired elevated diaphragm    Median nerve neuropathy, left    Stage 3a chronic kidney disease (H)    Coagulation defect, unspecified    Obstructive sleep apnea treated with continuous positive airway pressure (CPAP)    Snoring    Palpitations    Gastroesophageal reflux disease without esophagitis    Diabetes mellitus, type 2 (H)    Kidney replaced by transplant    Pancreas replaced by transplant (H)    Immunosuppressed status    Cardiac arrest with ventricular fibrillation (H)    Steroid-induced hyperglycemia    History of simultaneous kidney and pancreas transplant (H)    Hypomagnesemia    Secondary renal hyperparathyroidism    Need for pneumocystis prophylaxis    Other stricture of urethra in male    Aftercare " "following organ transplant    CAD (coronary artery disease)    PAD (peripheral artery disease)    HFrEF (heart failure with reduced ejection fraction) (H)    Urinary tract infection    Hypogammaglobulinemia    Hypercalcemia       Allergies   Allergen Reactions    Amoxicillin Hives     & generalized pain    Tolerated ceftriaxone in 2023 ([facility]) and 2024 ([facility])    Venlafaxine      lethargic       Current Outpatient Medications   Medication Sig Dispense Refill    acetaminophen (TYLENOL) 500 MG tablet Take 500 mg by mouth every 4 hours as needed      aspirin (ASA) 81 MG chewable tablet Take 1 tablet (81 mg) by mouth daily Starting tomorrow. 30 tablet 3    atorvastatin (LIPITOR) 40 MG tablet Take 1 tablet (40 mg) by mouth every evening. 90 tablet 4    blood glucose (NO BRAND SPECIFIED) test strip Use to test blood sugar 4 times daily or as directed. 100 strip 5    carvedilol (COREG) 3.125 MG tablet Take 1 tablet (3.125 mg) by mouth 2 times daily. 60 tablet 11    cinacalcet (SENSIPAR) 30 MG tablet Take 2 tablets (60 mg) by mouth daily. 180 tablet 1    dapsone (ACZONE) 25 MG tablet Take 2 tablets (50 mg) by mouth daily. 180 tablet 1    dorzolamide-timolol (COSOPT) 2-0.5 % ophthalmic solution Place 1 drop into the right eye 2 times daily.      Emergency Supply Kit, Central, Patient use for emergency only. Contents: 3 sodium chloride 0.9% flushes, 1 dressing kit, 1 microclave ext set 14\", 4 nitrile gloves (med), 6 alcohol prep pads, 1 bacitracin, 1 syringe (10 cc 20 G 1\"). Call [phone] to reorder. [id number] kit 0    ertapenem (INVanz) 1 g in sodium chloride 0.9 % 50 mL via HOMEPUMP Infuse 1 g over 60 minutes into the vein every 24 hours. 115591 mL 0    levofloxacin (LEVAQUIN) 750 MG tablet Take 1 tablet (750 mg) by mouth daily. 7 tablet 0    magnesium glycinate 100 MG CAPS capsule Take 300 mg by mouth 2 times daily.      mycophenolic acid (GENERIC EQUIVALENT) 180 MG EC tablet Take 540 mg twice a day " until follow up appointment with Dr. Sorensen      pregabalin (LYRICA) 150 MG capsule Take 150 mg by mouth 2 times daily. (Patient taking differently: Take 150 mg by mouth 2 times daily as needed (Pain).)      sodium bicarbonate 650 MG tablet TAKE 3 TABLETS (1,950 MG) BY MOUTH 3 TIMES DAILY 270 tablet 3    sodium chloride, PF, 0.9% PF flush Inject 10 mLs into the vein as needed for line flush. Flush IV before and after medication administration as directed and/or at least every 24 hours. 181905 mL 0    tacrolimus (GENERIC EQUIVALENT) 0.5 MG capsule ON HOLD 180 capsule 3    tacrolimus (GENERIC EQUIVALENT) 1 MG capsule Take 2 capsules (2 mg) by mouth 2 times daily. Total dose= 2 mg twice daily 180 capsule 3    ticagrelor (BRILINTA) 90 MG tablet Take 1 tablet (90 mg) by mouth 2 times daily. 60 tablet 5       Social History     Tobacco Use    Smoking status: Never     Passive exposure: Past    Smokeless tobacco: Never   Substance Use Topics    Alcohol use: Never    Drug use: Never       Jono Wallace  3/26/2025  9:56 AM

## 2025-03-26 NOTE — PROGRESS NOTES
49 year old male.  Has fossa stricture.  Has kidney transplant.  I met patient last year when his kidney transplant stent could not be removed due to fossa navicularis stricture. Thus we took him to OR for stent removal.  He gets some recurrent UTIs.  He also has right testicular swelling.  He notes weak spraying stream, tenderness to glans, some ED.  He thinks he has a right hydrocele.  Kidney is working well. Cr 1.5    NAD  Fossa and meatal stenosis.  Some mild right testicle swelling and tenderness.  LS changes to glans  Also a skin tag on penis      A/P;  Lichen Sclerosus  Fossa stricture  Right testicular swelling  Recurrent UTI  Skin tag on penis  ED  Kidney replaced by Transplant    I recommend:  Clobetasol, testicular US  Consider urethroplasty +/ hydrocele +/- skin grafting    Farzad Harris MD   No

## 2025-03-26 NOTE — LETTER
3/26/2025       RE: Siva Ramey  [address]  [facility] [address]     Dear Colleague,    Thank you for referring your patient, Siva Ramey, to the [facility] UROLOGY [facility] [place] at [facility]. Please see a copy of my visit note below.    49 year old male.  Has fossa stricture.  Has kidney transplant.  I met patient last year when his kidney transplant stent could not be removed due to fossa navicularis stricture. Thus we took him to OR for stent removal.  He gets some recurrent UTIs.  He also has right testicular swelling.  He notes weak spraying stream, tenderness to glans, some ED.  He thinks he has a right hydrocele.  Kidney is working well. Cr 1.5    NAD  Fossa and meatal stenosis.  Some mild right testicle swelling and tenderness.  LS changes to glans  Also a skin tag on penis      A/P;  Lichen Sclerosus  Fossa stricture  Right testicular swelling  Recurrent UTI  Skin tag on penis  ED  Kidney replaced by Transplant    I recommend:  Clobetasol, testicular US  Consider urethroplasty +/ hydrocele +/- skin grafting    Farzad Harris MD      Again, thank you for allowing me to participate in the care of your patient.      Sincerely,    Farzad Harris MD

## 2025-03-26 NOTE — LETTER
PHYSICIAN ORDERS      DATE & TIME ISSUED: 2025 4:18 PM  PATIENT NAME: Siva Ramey   : 1975     [facility] MR# [if applicable]: [id number]     DIAGNOSIS:  Kidney transplant, dehydration  ICD-10 CODE: Z94.0, E86.0     Please infuse 1 liter normal saline once on Friday 3/28/2025. Needs to be infused before scheduled CT scan on 3/28/2025 at 2:50pm.     Any questions please call: [phone]    Please fax each result same day as resulted/available    Critical lab results page [phone] option 1  Fax results to:  [phone].    .

## 2025-03-27 ENCOUNTER — HOSPITAL ENCOUNTER (OUTPATIENT)
Dept: ULTRASOUND IMAGING | Facility: HOSPITAL | Age: 50
Discharge: HOME | End: 2025-03-27
Attending: UROLOGY
Payer: MEDICARE

## 2025-03-27 ENCOUNTER — HOME INFUSION (OUTPATIENT)
Dept: HOME HEALTH SERVICES | Facility: HOME HEALTH | Age: 50
End: 2025-03-27
Payer: COMMERCIAL

## 2025-03-27 DIAGNOSIS — N30.00 ACUTE CYSTITIS WITHOUT HEMATURIA: ICD-10-CM

## 2025-03-27 DIAGNOSIS — N50.89 TESTICULAR SWELLING, RIGHT: ICD-10-CM

## 2025-03-27 LAB — RADIOLOGIST FLAGS: ABNORMAL

## 2025-03-27 PROCEDURE — 93976 VASCULAR STUDY: CPT

## 2025-03-27 PROCEDURE — S9500 HIT ANTIBIOTIC Q24H DIEM: HCPCS | Mod: GY

## 2025-03-27 PROCEDURE — 76870 US EXAM SCROTUM: CPT

## 2025-03-27 PROCEDURE — A4223 INFUSION SUPPLIES W/O PUMP: HCPCS

## 2025-03-27 PROCEDURE — A4221 SUPP NON-INSULIN INF CATH/WK: HCPCS

## 2025-03-28 ENCOUNTER — HOME INFUSION BILLING (OUTPATIENT)
Dept: HOME HEALTH SERVICES | Facility: HOME HEALTH | Age: 50
End: 2025-03-28
Payer: MEDICARE

## 2025-03-28 ENCOUNTER — HOSPITAL ENCOUNTER (OUTPATIENT)
Dept: CT IMAGING | Facility: HOSPITAL | Age: 50
Discharge: HOME | End: 2025-03-28
Attending: STUDENT IN AN ORGANIZED HEALTH CARE EDUCATION/TRAINING PROGRAM | Admitting: STUDENT IN AN ORGANIZED HEALTH CARE EDUCATION/TRAINING PROGRAM
Payer: MEDICARE

## 2025-03-28 DIAGNOSIS — N39.0 RECURRENT UTI: ICD-10-CM

## 2025-03-28 DIAGNOSIS — A49.8 BACTERIAL INFECTION DUE TO MORGANELLA MORGANII: ICD-10-CM

## 2025-03-28 DIAGNOSIS — Z94.0 KIDNEY REPLACED BY TRANSPLANT: ICD-10-CM

## 2025-03-28 DIAGNOSIS — Z16.24 MULTIPLE DRUG RESISTANT ORGANISM (MDRO) CULTURE POSITIVE: ICD-10-CM

## 2025-03-28 PROCEDURE — A4221 SUPP NON-INSULIN INF CATH/WK: HCPCS | Mod: GY

## 2025-03-28 PROCEDURE — S9500 HIT ANTIBIOTIC Q24H DIEM: HCPCS | Mod: GY

## 2025-03-28 PROCEDURE — 250N000011 HC RX IP 250 OP 636: Performed by: STUDENT IN AN ORGANIZED HEALTH CARE EDUCATION/TRAINING PROGRAM

## 2025-03-28 PROCEDURE — A4223 INFUSION SUPPLIES W/O PUMP: HCPCS | Mod: GY

## 2025-03-28 PROCEDURE — A4223 INFUSION SUPPLIES W/O PUMP: HCPCS

## 2025-03-28 PROCEDURE — S9374 HIT HYDRA 1 LITER DIEM: HCPCS | Mod: SH

## 2025-03-28 PROCEDURE — 74177 CT ABD & PELVIS W/CONTRAST: CPT

## 2025-03-28 RX ORDER — IOPAMIDOL 755 MG/ML
75 INJECTION, SOLUTION INTRAVASCULAR ONCE
Status: COMPLETED | OUTPATIENT
Start: 2025-03-28 | End: 2025-03-28

## 2025-03-28 RX ADMIN — IOPAMIDOL 75 ML: 755 INJECTION, SOLUTION INTRAVENOUS at 16:01

## 2025-03-29 PROCEDURE — S9500 HIT ANTIBIOTIC Q24H DIEM: HCPCS | Mod: GY

## 2025-03-29 PROCEDURE — A4223 INFUSION SUPPLIES W/O PUMP: HCPCS

## 2025-03-30 PROCEDURE — A4223 INFUSION SUPPLIES W/O PUMP: HCPCS

## 2025-03-30 PROCEDURE — S9500 HIT ANTIBIOTIC Q24H DIEM: HCPCS | Mod: GY

## 2025-03-31 PROCEDURE — S9500 HIT ANTIBIOTIC Q24H DIEM: HCPCS | Mod: GY

## 2025-03-31 PROCEDURE — A4223 INFUSION SUPPLIES W/O PUMP: HCPCS

## 2025-04-01 ENCOUNTER — LAB REQUISITION (OUTPATIENT)
Dept: LAB | Facility: HOSPITAL | Age: 50
End: 2025-04-01
Payer: MEDICARE

## 2025-04-01 ENCOUNTER — HOME CARE VISIT (OUTPATIENT)
Dept: HOME HEALTH SERVICES | Facility: HOME HEALTH | Age: 50
End: 2025-04-01
Payer: COMMERCIAL

## 2025-04-01 ENCOUNTER — HOME INFUSION (OUTPATIENT)
Dept: HOME HEALTH SERVICES | Facility: HOME HEALTH | Age: 50
End: 2025-04-01

## 2025-04-01 ENCOUNTER — TELEPHONE (OUTPATIENT)
Dept: TRANSPLANT | Facility: CLINIC | Age: 50
End: 2025-04-01

## 2025-04-01 VITALS
HEART RATE: 79 BPM | TEMPERATURE: 97.6 F | SYSTOLIC BLOOD PRESSURE: 130 MMHG | DIASTOLIC BLOOD PRESSURE: 86 MMHG | RESPIRATION RATE: 18 BRPM | OXYGEN SATURATION: 99 %

## 2025-04-01 DIAGNOSIS — N39.0 URINARY TRACT INFECTION: ICD-10-CM

## 2025-04-01 DIAGNOSIS — N30.00 ACUTE CYSTITIS WITHOUT HEMATURIA: Primary | ICD-10-CM

## 2025-04-01 DIAGNOSIS — N39.0 URINARY TRACT INFECTION, SITE NOT SPECIFIED: ICD-10-CM

## 2025-04-01 DIAGNOSIS — Z94.0 HTN, KIDNEY TRANSPLANT RELATED: ICD-10-CM

## 2025-04-01 DIAGNOSIS — I15.1 HTN, KIDNEY TRANSPLANT RELATED: ICD-10-CM

## 2025-04-01 LAB
ALT SERPL W P-5'-P-CCNC: 60 U/L (ref 0–70)
AST SERPL W P-5'-P-CCNC: 39 U/L (ref 0–45)
BASOPHILS # BLD AUTO: 0 10E3/UL (ref 0–0.2)
BASOPHILS NFR BLD AUTO: 1 %
CREAT SERPL-MCNC: 1.5 MG/DL (ref 0.67–1.17)
EGFRCR SERPLBLD CKD-EPI 2021: 57 ML/MIN/1.73M2
EOSINOPHIL # BLD AUTO: 0.3 10E3/UL (ref 0–0.7)
EOSINOPHIL NFR BLD AUTO: 6 %
ERYTHROCYTE [DISTWIDTH] IN BLOOD BY AUTOMATED COUNT: 17.3 % (ref 10–15)
HCT VFR BLD AUTO: 35.6 % (ref 40–53)
HGB BLD-MCNC: 11.4 G/DL (ref 13.3–17.7)
IMM GRANULOCYTES # BLD: 0 10E3/UL
IMM GRANULOCYTES NFR BLD: 0 %
LYMPHOCYTES # BLD AUTO: 1 10E3/UL (ref 0.8–5.3)
LYMPHOCYTES NFR BLD AUTO: 24 %
MCH RBC QN AUTO: 28.6 PG (ref 26.5–33)
MCHC RBC AUTO-ENTMCNC: 32 G/DL (ref 31.5–36.5)
MCV RBC AUTO: 89 FL (ref 78–100)
MONOCYTES # BLD AUTO: 0.4 10E3/UL (ref 0–1.3)
MONOCYTES NFR BLD AUTO: 10 %
NEUTROPHILS # BLD AUTO: 2.3 10E3/UL (ref 1.6–8.3)
NEUTROPHILS NFR BLD AUTO: 59 %
NRBC # BLD AUTO: 0 10E3/UL
NRBC BLD AUTO-RTO: 0 /100
PLATELET # BLD AUTO: 146 10E3/UL (ref 150–450)
RBC # BLD AUTO: 3.98 10E6/UL (ref 4.4–5.9)
WBC # BLD AUTO: 4 10E3/UL (ref 4–11)

## 2025-04-01 PROCEDURE — 82565 ASSAY OF CREATININE: CPT | Performed by: INTERNAL MEDICINE

## 2025-04-01 PROCEDURE — 99601 HOME NFS VISIT <2 HRS: CPT

## 2025-04-01 PROCEDURE — S9500 HIT ANTIBIOTIC Q24H DIEM: HCPCS | Mod: GY

## 2025-04-01 PROCEDURE — 84450 TRANSFERASE (AST) (SGOT): CPT | Performed by: INTERNAL MEDICINE

## 2025-04-01 PROCEDURE — A4223 INFUSION SUPPLIES W/O PUMP: HCPCS

## 2025-04-01 PROCEDURE — 84460 ALANINE AMINO (ALT) (SGPT): CPT | Performed by: INTERNAL MEDICINE

## 2025-04-01 PROCEDURE — 85025 COMPLETE CBC W/AUTO DIFF WBC: CPT | Performed by: INTERNAL MEDICINE

## 2025-04-01 NOTE — PROGRESS NOTES
Nursing Visit Note:  Nurse visit today for CLC and lab draw for Siva Ramey.     present during visit today: Not Applicable.    Intravenous Access:  PICC.    Lab-Only Nursing Note    Labs obtained via PICC    Time Specimen drawn: 1450    Last dose (if applicable): Yes: Last dose date: n/a  Last dose start time: n/a  Last dose end time: n/a    Facility sent to: [facility] Tracking number: [id number]    Note: Pt feeling week. He is waiting for CT results and plans for end date for infusions. Mild low back pain, at baseline.     Saline administered: 20 (ml)    Supply Check:   Does the patient have all the supplies they need for the next visit?  Yes    Next visit plan: April 8th for CLC and labs    Jailene Lopez, RN 4/1/2025

## 2025-04-01 NOTE — LETTER
PHYSICIAN ORDERS      DATE & TIME ISSUED: 2025 2:32 PM  PATIENT NAME: Siva Ramey   : 1975     [facility] MR# [if applicable]: [id number]     DIAGNOSIS:  UTI  ICD-10 CODE: N39.0     After dose of IV abx on 4/3/2025, ok to stop IV antibiotic therapy and pull PICC line.     Any questions please call: [phone]    Please fax each result same day as resulted/available    Critical lab results page [phone] option 1    .

## 2025-04-01 NOTE — TELEPHONE ENCOUNTER
CT results reviewed with Dr. Sandoval from ID.     CT with no evidence of inflammation in the pelvis   No e/o prostatitis     Vitor Sandoval MD Poucher, Jessica, RN  Cc: Fernando Sorensen MD  Thank you! If Urology was planning a procedure this week, we could extend antibiotics through the procedure so as to avoid bacteremia/systemic spread in someone who presumably has bacteriuria    If they are not planning anything - I doubt there is benefit of more than 3 weeks of antibiotics    Terrence Adler    Called rick Paniagua. Sent MyC message.

## 2025-04-02 ENCOUNTER — HOME INFUSION BILLING (OUTPATIENT)
Dept: HOME HEALTH SERVICES | Facility: HOME HEALTH | Age: 50
End: 2025-04-02
Payer: MEDICARE

## 2025-04-02 PROCEDURE — S9500 HIT ANTIBIOTIC Q24H DIEM: HCPCS | Mod: GY

## 2025-04-02 PROCEDURE — A9270 NON-COVERED ITEM OR SERVICE: HCPCS

## 2025-04-02 PROCEDURE — A4223 INFUSION SUPPLIES W/O PUMP: HCPCS

## 2025-04-02 NOTE — TELEPHONE ENCOUNTER
Per Siva, no planned urology intervention, next follow up at the end of the month.     Vitor Sandoval MD Poucher, Jessica, RN  Hello,    I think so - with no prostatitis or abscess and with no planned procedures, ok to stop at 3 weeks    Vitor    Ok to stop IV abx and pull PICC line, Siva voiced understanding. Orders faxed to [facility].   Siva will have labs drawn on Monday of next week with [facility] ([facility] unable to draw kit).

## 2025-04-03 ENCOUNTER — TELEPHONE (OUTPATIENT)
Dept: HOME HEALTH SERVICES | Facility: HOME HEALTH | Age: 50
End: 2025-04-03
Payer: COMMERCIAL

## 2025-04-03 PROCEDURE — S9500 HIT ANTIBIOTIC Q24H DIEM: HCPCS | Mod: GY

## 2025-04-03 PROCEDURE — A4223 INFUSION SUPPLIES W/O PUMP: HCPCS

## 2025-04-03 NOTE — TELEPHONE ENCOUNTER
Writer called pt to confirm visit time for PICC pull today.  Pt stated he was expecting visit tomorrow, 4/4, instead as his last dose is 5pm this evening.     Visit rescheduled for Friday 4/4.

## 2025-04-04 ENCOUNTER — HOME INFUSION BILLING (OUTPATIENT)
Dept: HOME HEALTH SERVICES | Facility: HOME HEALTH | Age: 50
End: 2025-04-04
Payer: MEDICARE

## 2025-04-04 PROCEDURE — S9500 HIT ANTIBIOTIC Q24H DIEM: HCPCS | Mod: GY

## 2025-04-04 PROCEDURE — A4223 INFUSION SUPPLIES W/O PUMP: HCPCS

## 2025-04-04 PROCEDURE — A4221 SUPP NON-INSULIN INF CATH/WK: HCPCS

## 2025-04-05 PROCEDURE — A4223 INFUSION SUPPLIES W/O PUMP: HCPCS | Mod: GY

## 2025-04-05 PROCEDURE — S9500 HIT ANTIBIOTIC Q24H DIEM: HCPCS | Mod: GY

## 2025-04-06 PROCEDURE — S9500 HIT ANTIBIOTIC Q24H DIEM: HCPCS | Mod: GY

## 2025-04-06 PROCEDURE — A4223 INFUSION SUPPLIES W/O PUMP: HCPCS | Mod: GY

## 2025-04-07 PROCEDURE — S9500 HIT ANTIBIOTIC Q24H DIEM: HCPCS | Mod: GY

## 2025-04-07 PROCEDURE — A4223 INFUSION SUPPLIES W/O PUMP: HCPCS | Mod: GY

## 2025-04-08 PROCEDURE — S9500 HIT ANTIBIOTIC Q24H DIEM: HCPCS | Mod: GY

## 2025-04-08 PROCEDURE — A4223 INFUSION SUPPLIES W/O PUMP: HCPCS | Mod: GY

## 2025-04-10 ENCOUNTER — LAB (OUTPATIENT)
Dept: LAB | Facility: HOSPITAL | Age: 50
End: 2025-04-10
Payer: MEDICARE

## 2025-04-10 DIAGNOSIS — Z79.899 ENCOUNTER FOR LONG-TERM CURRENT USE OF MEDICATION: ICD-10-CM

## 2025-04-10 DIAGNOSIS — T86.899 COMPLICATION OF TRANSPLANTED PANCREAS: ICD-10-CM

## 2025-04-10 DIAGNOSIS — Z98.890 OTHER SPECIFIED POSTPROCEDURAL STATES: ICD-10-CM

## 2025-04-10 DIAGNOSIS — Z48.298 AFTERCARE FOLLOWING ORGAN TRANSPLANT: ICD-10-CM

## 2025-04-10 DIAGNOSIS — Z94.83 PANCREAS REPLACED BY TRANSPLANT (H): ICD-10-CM

## 2025-04-10 DIAGNOSIS — Z94.0 KIDNEY REPLACED BY TRANSPLANT: ICD-10-CM

## 2025-04-10 DIAGNOSIS — R74.8 SERUM LIPASE ELEVATION: ICD-10-CM

## 2025-04-10 LAB
AMYLASE SERPL-CCNC: 118 U/L (ref 28–100)
ANION GAP SERPL CALCULATED.3IONS-SCNC: 15 MMOL/L (ref 7–15)
BUN SERPL-MCNC: 34.2 MG/DL (ref 6–20)
CALCIUM SERPL-MCNC: 9.4 MG/DL (ref 8.8–10.4)
CHLORIDE SERPL-SCNC: 106 MMOL/L (ref 98–107)
CMV DNA SPEC NAA+PROBE-ACNC: NOT DETECTED IU/ML
CREAT SERPL-MCNC: 1.77 MG/DL (ref 0.67–1.17)
EGFRCR SERPLBLD CKD-EPI 2021: 47 ML/MIN/1.73M2
ERYTHROCYTE [DISTWIDTH] IN BLOOD BY AUTOMATED COUNT: 17.7 % (ref 10–15)
GLUCOSE SERPL-MCNC: 102 MG/DL (ref 70–99)
HCO3 SERPL-SCNC: 21 MMOL/L (ref 22–29)
HCT VFR BLD AUTO: 38.2 % (ref 40–53)
HGB BLD-MCNC: 12.6 G/DL (ref 13.3–17.7)
LIPASE SERPL-CCNC: 72 U/L (ref 13–60)
MAGNESIUM SERPL-MCNC: 1.4 MG/DL (ref 1.7–2.3)
MCH RBC QN AUTO: 29.8 PG (ref 26.5–33)
MCHC RBC AUTO-ENTMCNC: 33 G/DL (ref 31.5–36.5)
MCV RBC AUTO: 90 FL (ref 78–100)
MYCOPHENOLATE SERPL LC/MS/MS-MCNC: 2.16 MG/L (ref 1–3.5)
MYCOPHENOLATE-G SERPL LC/MS/MS-MCNC: 85.7 MG/L (ref 30–95)
PHOSPHATE SERPL-MCNC: 3.2 MG/DL (ref 2.5–4.5)
PLATELET # BLD AUTO: 172 10E3/UL (ref 150–450)
POTASSIUM SERPL-SCNC: 4.5 MMOL/L (ref 3.4–5.3)
RBC # BLD AUTO: 4.23 10E6/UL (ref 4.4–5.9)
SODIUM SERPL-SCNC: 142 MMOL/L (ref 135–145)
SPECIMEN TYPE: NORMAL
TACROLIMUS BLD-MCNC: 8.8 UG/L (ref 5–15)
TME LAST DOSE: NORMAL H
WBC # BLD AUTO: 4.9 10E3/UL (ref 4–11)

## 2025-04-10 PROCEDURE — 36415 COLL VENOUS BLD VENIPUNCTURE: CPT

## 2025-04-10 PROCEDURE — 87799 DETECT AGENT NOS DNA QUANT: CPT

## 2025-04-10 PROCEDURE — 80180 DRUG SCRN QUAN MYCOPHENOLATE: CPT

## 2025-04-10 PROCEDURE — 84100 ASSAY OF PHOSPHORUS: CPT

## 2025-04-10 PROCEDURE — 83735 ASSAY OF MAGNESIUM: CPT

## 2025-04-10 PROCEDURE — 80048 BASIC METABOLIC PNL TOTAL CA: CPT

## 2025-04-10 PROCEDURE — 83690 ASSAY OF LIPASE: CPT

## 2025-04-10 PROCEDURE — 85018 HEMOGLOBIN: CPT

## 2025-04-10 PROCEDURE — 82374 ASSAY BLOOD CARBON DIOXIDE: CPT

## 2025-04-10 PROCEDURE — 82150 ASSAY OF AMYLASE: CPT

## 2025-04-10 PROCEDURE — 80197 ASSAY OF TACROLIMUS: CPT

## 2025-04-10 PROCEDURE — 85041 AUTOMATED RBC COUNT: CPT

## 2025-04-11 LAB
BK VIRUS SPECIMEN TYPE: NORMAL
BKV DNA # SPEC NAA+PROBE: NOT DETECTED IU/ML

## 2025-04-15 LAB — PROSPERA TRANSPLANT MONITORING: 0.21 %

## 2025-04-17 ENCOUNTER — TELEPHONE (OUTPATIENT)
Dept: TRANSPLANT | Facility: CLINIC | Age: 50
End: 2025-04-17
Payer: COMMERCIAL

## 2025-04-17 ENCOUNTER — VIRTUAL VISIT (OUTPATIENT)
Dept: TRANSPLANT | Facility: CLINIC | Age: 50
End: 2025-04-17
Attending: INTERNAL MEDICINE
Payer: MEDICARE

## 2025-04-17 DIAGNOSIS — Z94.0 KIDNEY REPLACED BY TRANSPLANT: ICD-10-CM

## 2025-04-17 DIAGNOSIS — N18.31 ANEMIA IN STAGE 3A CHRONIC KIDNEY DISEASE (H): ICD-10-CM

## 2025-04-17 DIAGNOSIS — E87.20 METABOLIC ACIDOSIS: ICD-10-CM

## 2025-04-17 DIAGNOSIS — B25.9 CYTOMEGALOVIRUS (CMV) VIREMIA (H): ICD-10-CM

## 2025-04-17 DIAGNOSIS — I15.1 HTN, KIDNEY TRANSPLANT RELATED: ICD-10-CM

## 2025-04-17 DIAGNOSIS — N18.31 STAGE 3A CHRONIC KIDNEY DISEASE (H): ICD-10-CM

## 2025-04-17 DIAGNOSIS — D63.1 ANEMIA IN STAGE 3A CHRONIC KIDNEY DISEASE (H): ICD-10-CM

## 2025-04-17 DIAGNOSIS — Z94.83 PANCREAS REPLACED BY TRANSPLANT (H): Primary | ICD-10-CM

## 2025-04-17 DIAGNOSIS — Z94.0 HTN, KIDNEY TRANSPLANT RELATED: ICD-10-CM

## 2025-04-17 DIAGNOSIS — N39.0 URINARY TRACT INFECTION: ICD-10-CM

## 2025-04-17 DIAGNOSIS — E83.52 HYPERCALCEMIA: ICD-10-CM

## 2025-04-17 DIAGNOSIS — E83.42 HYPOMAGNESEMIA: ICD-10-CM

## 2025-04-17 DIAGNOSIS — Z48.298 AFTERCARE FOLLOWING ORGAN TRANSPLANT: ICD-10-CM

## 2025-04-17 DIAGNOSIS — D84.9 IMMUNOSUPPRESSED STATUS: ICD-10-CM

## 2025-04-17 DIAGNOSIS — Z29.89 NEED FOR PNEUMOCYSTIS PROPHYLAXIS: ICD-10-CM

## 2025-04-17 DIAGNOSIS — B34.8 BK VIREMIA: ICD-10-CM

## 2025-04-17 PROCEDURE — 98006 SYNCH AUDIO-VIDEO EST MOD 30: CPT | Performed by: INTERNAL MEDICINE

## 2025-04-17 PROCEDURE — 1126F AMNT PAIN NOTED NONE PRSNT: CPT | Mod: 95 | Performed by: INTERNAL MEDICINE

## 2025-04-17 PROCEDURE — G2211 COMPLEX E/M VISIT ADD ON: HCPCS | Mod: 95 | Performed by: INTERNAL MEDICINE

## 2025-04-17 NOTE — LETTER
4/17/2025      Siva Ramey  [address] [place] [address]      Dear Colleague,    Thank you for referring your patient, Siva Ramey, to the [facility] TRANSPL[facility]. Please see a copy of my visit note below.    Virtual Visit Details    Type of service:  Video Visit   Video Start Time:  1628  Video End Time: 1639    Originating Location (pt. Location): Home  Distant Location (provider location):  On-site  Platform used for Video Visit: [facility]      TRANSPLANT NEPHROLOGY CLINIC VISIT     Assessment & Plan  # DDKT (SPK): CKD Stage 3a - Stable   - Baseline Creatinine: ~ 1.5-1.8   - Proteinuria: Minimal (0.2-0.5 grams)   - DSA Hx: No DSA   - Last cPRA: 11%   - BK Viremia: No   - Kidney Tx Biopsy Hx: No biopsy history.    # Pancreas Tx (SPK): Patient with persistently higher blood glucose since transplant.  Would consider SGLT2 inhibitor.  Will defer to PCP to manage.   - Pancreatic Exocrine Drainage: Enteric drained     - Blood glucose: Near euglycemia      On insulin: No   - HbA1c: Stable      Latest HbA1c: 5.7%   - Pancreatic enzymes: Stable, mildly elevated   - DSA Hx: No DSA   - Pancreas Tx Biopsy Hx: No biopsy history    # Immunosuppression: Tacrolimus immediate release (goal 8-10) and Mycophenolic acid (dose 540 mg every 12 hours)   - Induction with Recent Transplant:  High Intensity Protocol   - Continue with intensive monitoring of immunosuppression for efficacy and toxicity.   - Historical Changes in Immunosuppression:  Slightly lower mycophenolic acid dose due to previous norovirus infection.   - Changes: Not at this time    # Infection Prevention:   Last CD4 Level: Not checked  - PJP: Dapsone  - CMV: None, prophylaxis completed      - CMV IgG Ab High Risk Discordance (D+/R-) at time of transplant: No  Present CMV Serostatus: Positive  - EBV IgG Ab High Risk Discordance (D+/R-) at time of transplant: No  Present EBV Serostatus: Positive    # Hypertension: Controlled;  Goal BP: <  "130/80   - Changes: No    # Anemia in Chronic Renal Disease: Hgb: Stable     KERRI: No   - Iron studies: Low iron saturation, but high ferritin    # Mineral Bone Disorder:    - Secondary renal hyperparathyroidism; PTH level: Mildly elevated (151-300 pg/ml)        On treatment: Cinacalcet  - Vitamin D; level: High        On supplement: No; Cholecalciferol held due to high level.  - Calcium; level: Normal        On supplement: No    # Electrolytes:   - Potassium; level: Normal        On supplement: No  - Magnesium; level: Stable low        On supplement: Yes  - Bicarbonate; level: Stable low        On supplement: Yes    # BK Viremia: Stable, negative to minimally elevated BK PCR negative with last check 4/2025.  IgG level is low and patient received IVIg.  Immunosuppression has been slightly reduced.   - Will continue to follow BK PCR every month.    # CMV Viremia: Stable, now negative CMV PCR with last check 4/2025.  CMV IgG Ab positive.  Presently off any antiviral medications.  IgG level is low, but did receive IVIg.   - No need to follow CMV PCR, unless clinically indicated.    # Hypogammaglobulinemia: Patient with low IgG level at ~ 579 with last check 1/2025.  He is s/p IVIg x 2 doses in Jan/Feb 2025.    # H/o Urethral Stricture: Patient has meatal stenosis and s/p ureteral stent removal in OR by Urology on 10/10/24. Followed by Urology.    # UTI, Possible Prostate Abscess: Patient with symptomatic and positive urine culture 11/13/24 for Pseudomonas aeruginosa and Enterococcus faecalis.  He was started on ciprofloxacin and linezolid.  During recent hospitalization soon after UTI diagnosis, patient had CT abd/pelvis Nov/2024 that showed \"Peripheral enhancing fluid collection measuring 18 x 10 mm along the inferior margin of the prostate.\"  This was felt to possibly be a prostate abscess.  Patient completed course of antibiotics and no further concern on CT scan.    # Obesity, Class I (BMI = 31.4): Stable " weight.   - Recommend weight loss for overall health by increasing exercise and watching caloric intake.  - Patient may benefit from SGLT2 inhibitors.  However, because of their pancreas transplant, would avoid GLP1 agonists due to increased risk of pancreatitis.    # Other Significant PMH:   - CAD, s/p CABG: Asymptomatic.  - HFrEF: Last cardiac echo (11/2024) showed moderately reduced LVEF ~ 35-40% with akinesis of the basal-mid inferior and basal inferoseptal segments.   - H/o V. Fib Arrest: Patient with V. Fib arrest and STEMI following kidney transplant and emergently brought to Cath Lab and found to have in stent thrombosis of the previous mid RCA stent, which was felt to be the culprit in inferior STEMI.  Patient underwent aspiration thrombectomy and PCI with two IBAN.  Followed by Cardiology.   - PAD: No claudication symptoms.   - GERD: Asymptomatic and now off PPI.   - Lower Back Pain with Sciatica: Patient with h/o lower back pain and sciatica years ago, now returned with pain radiating down right leg.  Has been taking cyclobenzaprine.  Started physical therapy and now s/p steroid injection with improvement in symptoms.     # Skin Cancer Risk:    - Discussed sun protection and recommend regular follow up with Dermatology.    # Transplant History:  Etiology of Kidney Failure: Diabetes mellitus type 2  Tx: SPK  Transplant: 7/20/2024 (Kidney / Pancreas)  Significant transplant-related complications:  Peritransplant STEMI and V. Fib arrest.    Transplant Office Phone Number: [phone]    Assessment and plan was discussed with the patient and he voiced his understanding and agreement.    Return visit: Return for previously scheduled visit.    Fernando Sorensen MD    The longitudinal plan of care for the diagnosis(es)/condition(s) as documented were addressed during this visit. Due to the added complexity in care, I will continue to support Siva in the subsequent management and with ongoing continuity of  care.      Chief Complaint  Mr. Ramey is a 49 year old here for kidney transplant, pancreas transplant, and immunosuppression management.     History of Present Illness   Mr. Ramey reports feeling good overall.  Since last clinic visit:   Hospitalizations: No   New Medical Issues: No; Not new, but ongoing back pain.  He received a steroid injection yesterday for that, which has made the pain better today.  It was limited his activity some.  Active/Exercise: Yes; He is active, although getting minimal exercise.  Back pain can be limiting.  Chest pain or shortness of breath: No  Lower extremity swelling: No  Weight change: No   Appetite: Good  Nausea and vomiting: No  Diarrhea: Yes; Occasional loose stools, but not very bothersome.  Heartburn symptoms: No  Fever, sweats or chills: No  Night sweats: No  Urinary complaints: No; Patient reports emptying his bladder better lately.    Home BP:  120/70s    Problem List  Patient Active Problem List   Diagnosis     Type 2 diabetes mellitus with other circulatory complication, unspecified whether long term insulin use (H)     Dyslipidemia     Atherosclerosis of other coronary artery bypass graft(s) with unstable angina pectoris (H)     Anemia in chronic renal disease     HTN, kidney transplant related     S/P CABG (coronary artery bypass graft)     Balanoposthitis     History of ST elevation myocardial infarction (STEMI)     Obesity (BMI 30.0-34.9)     MARQUEZ (dyspnea on exertion)     STEMI (ST elevation myocardial infarction) (H)     Metabolic acidosis     Retinopathy     Peripheral neuropathy     Acquired elevated diaphragm     Median nerve neuropathy, left     Stage 3a chronic kidney disease (H)     Coagulation defect, unspecified     Obstructive sleep apnea treated with continuous positive airway pressure (CPAP)     Snoring     Palpitations     Gastroesophageal reflux disease without esophagitis     Diabetes mellitus, type 2 (H)     Kidney replaced by transplant     Pancreas  replaced by transplant (H)     Immunosuppressed status     Cardiac arrest with ventricular fibrillation (H)     Steroid-induced hyperglycemia     History of simultaneous kidney and pancreas transplant (H)     Hypomagnesemia     Secondary renal hyperparathyroidism     Need for pneumocystis prophylaxis     Other stricture of urethra in male     Aftercare following organ transplant     CAD (coronary artery disease)     PAD (peripheral artery disease)     HFrEF (heart failure with reduced ejection fraction) (H)     Urinary tract infection     Hypogammaglobulinemia     Hypercalcemia     BK viremia     Cytomegalovirus (CMV) viremia (H)       Allergies  Allergies   Allergen Reactions     Amoxicillin Hives     & generalized pain    Tolerated ceftriaxone in 2023 ([facility]) and 2024 ([facility])     Venlafaxine      lethargic       Medications  Current Outpatient Medications   Medication Sig Dispense Refill     acetaminophen (TYLENOL) 500 MG tablet Take 500 mg by mouth every 4 hours as needed       aspirin (ASA) 81 MG chewable tablet Take 1 tablet (81 mg) by mouth daily Starting tomorrow. 30 tablet 3     atorvastatin (LIPITOR) 40 MG tablet Take 1 tablet (40 mg) by mouth every evening. 90 tablet 4     blood glucose (NO BRAND SPECIFIED) test strip Use to test blood sugar 4 times daily or as directed. 100 strip 5     carvedilol (COREG) 3.125 MG tablet Take 1 tablet (3.125 mg) by mouth 2 times daily. 60 tablet 11     cinacalcet (SENSIPAR) 30 MG tablet Take 2 tablets (60 mg) by mouth daily. 180 tablet 1     clobetasol propionate (TEMOVATE) 0.05 % external cream Apply topically 2 times daily. 45 g 0     dapsone (ACZONE) 25 MG tablet Take 2 tablets (50 mg) by mouth daily. 180 tablet 1     dorzolamide-timolol (COSOPT) 2-0.5 % ophthalmic solution Place 1 drop into the right eye 2 times daily.       levofloxacin (LEVAQUIN) 750 MG tablet Take 1 tablet (750 mg) by mouth daily. 7 tablet 0     magnesium glycinate 100 MG CAPS  capsule Take 300 mg by mouth 2 times daily.       mycophenolic acid (GENERIC EQUIVALENT) 180 MG EC tablet Take 540 mg twice a day until follow up appointment with Dr. Sorensen       pregabalin (LYRICA) 150 MG capsule Take 150 mg by mouth 2 times daily. (Patient not taking: Reported on 4/1/2025)       sodium bicarbonate 650 MG tablet TAKE 3 TABLETS (1,950 MG) BY MOUTH 3 TIMES DAILY 270 tablet 3     tacrolimus (GENERIC EQUIVALENT) 0.5 MG capsule ON HOLD 180 capsule 3     tacrolimus (GENERIC EQUIVALENT) 1 MG capsule Take 2 capsules (2 mg) by mouth 2 times daily. Total dose= 2 mg twice daily 180 capsule 3     ticagrelor (BRILINTA) 90 MG tablet Take 1 tablet (90 mg) by mouth 2 times daily. 60 tablet 5     No current facility-administered medications for this visit.     There are no discontinued medications.        Physical Exam  Vital Signs: There were no vitals taken for this visit.    GENERAL APPEARANCE: alert and no distress  HENT: no obvious abnormalities on appearance  RESP: breathing appears unremarkable with normal rate, no audible wheezing or cough and no apparent shortness of breath with conversation  MS: extremities normal - no gross deformities noted  SKIN: no apparent rash and normal skin tone  NEURO: speech is clear with no obvious neurological deficits  PSYCH: mentation appears normal and affect normal    Data        Latest Ref Rng & Units 4/23/2025     7:35 AM 4/10/2025     8:09 AM 4/1/2025     2:45 PM   Renal   Sodium 135 - 145 mmol/L 139  142     K 3.4 - 5.3 mmol/L 4.9  4.5     Cl 98 - 107 mmol/L 104  106     Cl (external) 98 - 107 mmol/L 104  106     CO2 22 - 29 mmol/L 21  21     Urea Nitrogen 6.0 - 20.0 mg/dL 31.2  34.2     Creatinine 0.67 - 1.17 mg/dL 1.83  1.77  1.50    Glucose 70 - 99 mg/dL 96  102     Calcium 8.8 - 10.4 mg/dL 9.9  9.4     Magnesium 1.7 - 2.3 mg/dL  1.4           Latest Ref Rng & Units 4/10/2025     8:09 AM 3/10/2025    12:55 PM 3/3/2025     8:37 AM   Bone Health   Phosphorus 2.5 - 4.5  mg/dL 3.2  3.2  3.0          Latest Ref Rng & Units 4/23/2025     7:35 AM 4/10/2025     8:09 AM 4/1/2025     2:45 PM   Heme   WBC 4.0 - 11.0 10e3/uL 7.4  4.9  4.0    Hgb 13.3 - 17.7 g/dL 13.1  12.6  11.4    Plt 150 - 450 10e3/uL 208  172  146    ABSOLUTE NEUTROPHIL 1.6 - 8.3 10e3/uL   2.3    ABSOLUTE LYMPHOCYTES 0.8 - 5.3 10e3/uL   1.0    ABSOLUTE MONOCYTES 0.0 - 1.3 10e3/uL   0.4    ABSOLUTE EOSINOPHILS 0.0 - 0.7 10e3/uL   0.3    ABSOLUTE BASOPHILS 0.0 - 0.2 10e3/uL   0.0          Latest Ref Rng & Units 4/1/2025     2:45 PM 3/25/2025     3:47 PM 3/18/2025     3:18 PM   Liver   ALT 0 - 70 U/L 60  39  35    AST 0 - 45 U/L 39  28  27          Latest Ref Rng & Units 4/23/2025     7:35 AM 4/10/2025     8:09 AM 3/24/2025     7:37 AM   Pancreas   Amylase 28 - 100 U/L 113  118  165    Lipase (Roche) 13 - 60 U/L 53  72  80          Latest Ref Rng & Units 8/5/2024     7:58 AM   Iron studies   Iron 61 - 157 ug/dL 44    Iron Sat Index 15 - 46 % 17    Ferritin 31 - 409 ng/mL 1,725          1/17/2025     9:41 AM 1/3/2025     8:11 AM 12/16/2024     7:45 AM   [facility] T[facility] Virology   LOG IU/ML OF CMVQNT 1.9  2.1  1.5      Failed to redirect to the Timeline version of the REVFS SmartLink.  Recent Labs   Lab Test 03/24/25  0737 04/10/25  0809 04/23/25  0735   DOSTAC 3/23/2025 4/9/2025 4/22/2025   TACROL 9.2 8.8 9.7     Recent Labs   Lab Test 10/18/24  0826 11/13/24  0730 03/24/25  0737 04/10/25  0809 04/23/25  0735   DOSMPA 10/17/2024   8:00 PM  --   --   --  4/22/2025   8:00 PM   MPACID 3.59*   < > 0.36* 2.16 2.17   MPAG 78.1   < > 23.7* 85.7 75.9    < > = values in this interval not displayed.         Again, thank you for allowing me to participate in the care of your patient.        Sincerely,        Fernando Sorensen MD    Electronically signed

## 2025-04-17 NOTE — TELEPHONE ENCOUNTER
Post Kidney and Pancreas Transplant Team Conference  Date: 4/17/2025  Transplant Coordinator: Ximena GARCIA     Attendees:  []  Dr. Sorensen [] Leonor Kang, BETSY [] Betty Landaverde LPN     []  Dr. Shell [x] Rosalia Romero, RN    [] Dr. Boothe [x] Ximena Edwards RN    [x] Dr. Coombs [x] Mony Crabtree, RN [] Angella Liriano RN   [] Dr. Munoz [x] Asha Allred RN    [] Dr. Christianson [] Cindy Ye RN [] Ariel Randall, PharmD   []  Dr. Whitehead [] Miryam Iyer RN    [] Dr. Michaud [] Gino Mahan RN     [x] Roxanna Monet RN    [x] Shelia Magallon NP [x] Alisa Cutler RN        Verbal Plan Read Back:   No changes at this time    Routed to RN Coordinator   Julianna Edwards RN

## 2025-04-17 NOTE — LETTER
4/17/2025      RE: Siva Ramey  [address] [place] [address]       Virtual Visit Details    Type of service:  Video Visit   Video Start Time:  1628  Video End Time: 1639    Originating Location (pt. Location): Home  Distant Location (provider location):  On-site  Platform used for Video Visit: [facility]      TRANSPLANT NEPHROLOGY CLINIC VISIT     Assessment & Plan  # DDKT (SPK): CKD Stage 3a - Stable   - Baseline Creatinine: ~ 1.5-1.8   - Proteinuria: Minimal (0.2-0.5 grams)   - DSA Hx: No DSA   - Last cPRA: 11%   - BK Viremia: No   - Kidney Tx Biopsy Hx: No biopsy history.    # Pancreas Tx (SPK): Patient with persistently higher blood glucose since transplant.  Would consider SGLT2 inhibitor.  Will defer to PCP to manage.   - Pancreatic Exocrine Drainage: Enteric drained     - Blood glucose: Near euglycemia      On insulin: No   - HbA1c: Stable      Latest HbA1c: 5.7%   - Pancreatic enzymes: Stable, mildly elevated   - DSA Hx: No DSA   - Pancreas Tx Biopsy Hx: No biopsy history    # Immunosuppression: Tacrolimus immediate release (goal 8-10) and Mycophenolic acid (dose 540 mg every 12 hours)   - Induction with Recent Transplant:  High Intensity Protocol   - Continue with intensive monitoring of immunosuppression for efficacy and toxicity.   - Historical Changes in Immunosuppression:  Slightly lower mycophenolic acid dose due to previous norovirus infection.   - Changes: Not at this time    # Infection Prevention:   Last CD4 Level: Not checked  - PJP: Dapsone  - CMV: None, prophylaxis completed      - CMV IgG Ab High Risk Discordance (D+/R-) at time of transplant: No  Present CMV Serostatus: Positive  - EBV IgG Ab High Risk Discordance (D+/R-) at time of transplant: No  Present EBV Serostatus: Positive    # Hypertension: Controlled;  Goal BP: < 130/80   - Changes: No    # Anemia in Chronic Renal Disease: Hgb: Stable     KERRI: No   - Iron studies: Low iron saturation, but high ferritin    # Mineral Bone  "Disorder:    - Secondary renal hyperparathyroidism; PTH level: Mildly elevated (151-300 pg/ml)        On treatment: Cinacalcet  - Vitamin D; level: High        On supplement: No; Cholecalciferol held due to high level.  - Calcium; level: Normal        On supplement: No    # Electrolytes:   - Potassium; level: Normal        On supplement: No  - Magnesium; level: Stable low        On supplement: Yes  - Bicarbonate; level: Stable low        On supplement: Yes    # BK Viremia: Stable, negative to minimally elevated BK PCR negative with last check 4/2025.  IgG level is low and patient received IVIg.  Immunosuppression has been slightly reduced.   - Will continue to follow BK PCR every month.    # CMV Viremia: Stable, now negative CMV PCR with last check 4/2025.  CMV IgG Ab positive.  Presently off any antiviral medications.  IgG level is low, but did receive IVIg.   - No need to follow CMV PCR, unless clinically indicated.    # Hypogammaglobulinemia: Patient with low IgG level at ~ 579 with last check 1/2025.  He is s/p IVIg x 2 doses in Jan/Feb 2025.    # H/o Urethral Stricture: Patient has meatal stenosis and s/p ureteral stent removal in OR by Urology on 10/10/24. Followed by Urology.    # UTI, Possible Prostate Abscess: Patient with symptomatic and positive urine culture 11/13/24 for Pseudomonas aeruginosa and Enterococcus faecalis.  He was started on ciprofloxacin and linezolid.  During recent hospitalization soon after UTI diagnosis, patient had CT abd/pelvis Nov/2024 that showed \"Peripheral enhancing fluid collection measuring 18 x 10 mm along the inferior margin of the prostate.\"  This was felt to possibly be a prostate abscess.  Patient completed course of antibiotics and no further concern on CT scan.    # Obesity, Class I (BMI = 31.4): Stable weight.   - Recommend weight loss for overall health by increasing exercise and watching caloric intake.  - Patient may benefit from SGLT2 inhibitors.  However, because " of their pancreas transplant, would avoid GLP1 agonists due to increased risk of pancreatitis.    # Other Significant PMH:   - CAD, s/p CABG: Asymptomatic.  - HFrEF: Last cardiac echo (11/2024) showed moderately reduced LVEF ~ 35-40% with akinesis of the basal-mid inferior and basal inferoseptal segments.   - H/o V. Fib Arrest: Patient with V. Fib arrest and STEMI following kidney transplant and emergently brought to Cath Lab and found to have in stent thrombosis of the previous mid RCA stent, which was felt to be the culprit in inferior STEMI.  Patient underwent aspiration thrombectomy and PCI with two IBAN.  Followed by Cardiology.   - PAD: No claudication symptoms.   - GERD: Asymptomatic and now off PPI.   - Lower Back Pain with Sciatica: Patient with h/o lower back pain and sciatica years ago, now returned with pain radiating down right leg.  Has been taking cyclobenzaprine.  Started physical therapy and now s/p steroid injection with improvement in symptoms.     # Skin Cancer Risk:    - Discussed sun protection and recommend regular follow up with Dermatology.    # Transplant History:  Etiology of Kidney Failure: Diabetes mellitus type 2  Tx: SPK  Transplant: 7/20/2024 (Kidney / Pancreas)  Significant transplant-related complications:  Peritransplant STEMI and V. Fib arrest.    Transplant Office Phone Number: [phone]    Assessment and plan was discussed with the patient and he voiced his understanding and agreement.    Return visit: Return for previously scheduled visit.    Fernando Sorensen MD    The longitudinal plan of care for the diagnosis(es)/condition(s) as documented were addressed during this visit. Due to the added complexity in care, I will continue to support Siva in the subsequent management and with ongoing continuity of care.      Chief Complaint  Mr. Ramey is a 49 year old here for kidney transplant, pancreas transplant, and immunosuppression management.     History of Present  Illness   Mr. Ramey reports feeling good overall.  Since last clinic visit:   Hospitalizations: No   New Medical Issues: No; Not new, but ongoing back pain.  He received a steroid injection yesterday for that, which has made the pain better today.  It was limited his activity some.  Active/Exercise: Yes; He is active, although getting minimal exercise.  Back pain can be limiting.  Chest pain or shortness of breath: No  Lower extremity swelling: No  Weight change: No   Appetite: Good  Nausea and vomiting: No  Diarrhea: Yes; Occasional loose stools, but not very bothersome.  Heartburn symptoms: No  Fever, sweats or chills: No  Night sweats: No  Urinary complaints: No; Patient reports emptying his bladder better lately.    Home BP:  120/70s    Problem List  Patient Active Problem List   Diagnosis     Type 2 diabetes mellitus with other circulatory complication, unspecified whether long term insulin use (H)     Dyslipidemia     Atherosclerosis of other coronary artery bypass graft(s) with unstable angina pectoris (H)     Anemia in chronic renal disease     HTN, kidney transplant related     S/P CABG (coronary artery bypass graft)     Balanoposthitis     History of ST elevation myocardial infarction (STEMI)     Obesity (BMI 30.0-34.9)     MARQUEZ (dyspnea on exertion)     STEMI (ST elevation myocardial infarction) (H)     Metabolic acidosis     Retinopathy     Peripheral neuropathy     Acquired elevated diaphragm     Median nerve neuropathy, left     Stage 3a chronic kidney disease (H)     Coagulation defect, unspecified     Obstructive sleep apnea treated with continuous positive airway pressure (CPAP)     Snoring     Palpitations     Gastroesophageal reflux disease without esophagitis     Diabetes mellitus, type 2 (H)     Kidney replaced by transplant     Pancreas replaced by transplant (H)     Immunosuppressed status     Cardiac arrest with ventricular fibrillation (H)     Steroid-induced hyperglycemia     History of  simultaneous kidney and pancreas transplant (H)     Hypomagnesemia     Secondary renal hyperparathyroidism     Need for pneumocystis prophylaxis     Other stricture of urethra in male     Aftercare following organ transplant     CAD (coronary artery disease)     PAD (peripheral artery disease)     HFrEF (heart failure with reduced ejection fraction) (H)     Urinary tract infection     Hypogammaglobulinemia     Hypercalcemia     BK viremia     Cytomegalovirus (CMV) viremia (H)       Allergies  Allergies   Allergen Reactions     Amoxicillin Hives     & generalized pain    Tol[facility]ri[facility] in 2023 ([facility] and 2024 ([facility])     Venlafaxine      lethargic       Medications  Current Outpatient Medications   Medication Sig Dispense Refill     acetaminophen (TYLENOL) 500 MG tablet Take 500 mg by mouth every 4 hours as needed       aspirin (ASA) 81 MG chewable tablet Take 1 tablet (81 mg) by mouth daily Starting tomorrow. 30 tablet 3     atorvastatin (LIPITOR) 40 MG tablet Take 1 tablet (40 mg) by mouth every evening. 90 tablet 4     blood glucose (NO BRAND SPECIFIED) test strip Use to test blood sugar 4 times daily or as directed. 100 strip 5     carvedilol (COREG) 3.125 MG tablet Take 1 tablet (3.125 mg) by mouth 2 times daily. 60 tablet 11     cinacalcet (SENSIPAR) 30 MG tablet Take 2 tablets (60 mg) by mouth daily. 180 tablet 1     clobetasol propionate (TEMOVATE) 0.05 % external cream Apply topically 2 times daily. 45 g 0     dapsone (ACZONE) 25 MG tablet Take 2 tablets (50 mg) by mouth daily. 180 tablet 1     dorzolamide-timolol (COSOPT) 2-0.5 % ophthalmic solution Place 1 drop into the right eye 2 times daily.       levofloxacin (LEVAQUIN) 750 MG tablet Take 1 tablet (750 mg) by mouth daily. 7 tablet 0     magnesium glycinate 100 MG CAPS capsule Take 300 mg by mouth 2 times daily.       mycophenolic acid (GENERIC EQUIVALENT) 180 MG EC tablet Take 540 mg twice a day until follow up appointment  with Dr. Sorensen       pregabalin (LYRICA) 150 MG capsule Take 150 mg by mouth 2 times daily. (Patient not taking: Reported on 4/1/2025)       sodium bicarbonate 650 MG tablet TAKE 3 TABLETS (1,950 MG) BY MOUTH 3 TIMES DAILY 270 tablet 3     tacrolimus (GENERIC EQUIVALENT) 0.5 MG capsule ON HOLD 180 capsule 3     tacrolimus (GENERIC EQUIVALENT) 1 MG capsule Take 2 capsules (2 mg) by mouth 2 times daily. Total dose= 2 mg twice daily 180 capsule 3     ticagrelor (BRILINTA) 90 MG tablet Take 1 tablet (90 mg) by mouth 2 times daily. 60 tablet 5     No current facility-administered medications for this visit.     There are no discontinued medications.        Physical Exam  Vital Signs: There were no vitals taken for this visit.    GENERAL APPEARANCE: alert and no distress  HENT: no obvious abnormalities on appearance  RESP: breathing appears unremarkable with normal rate, no audible wheezing or cough and no apparent shortness of breath with conversation  MS: extremities normal - no gross deformities noted  SKIN: no apparent rash and normal skin tone  NEURO: speech is clear with no obvious neurological deficits  PSYCH: mentation appears normal and affect normal    Data        Latest Ref Rng & Units 4/23/2025     7:35 AM 4/10/2025     8:09 AM 4/1/2025     2:45 PM   Renal   Sodium 135 - 145 mmol/L 139  142     K 3.4 - 5.3 mmol/L 4.9  4.5     Cl 98 - 107 mmol/L 104  106     Cl (external) 98 - 107 mmol/L 104  106     CO2 22 - 29 mmol/L 21  21     Urea Nitrogen 6.0 - 20.0 mg/dL 31.2  34.2     Creatinine 0.67 - 1.17 mg/dL 1.83  1.77  1.50    Glucose 70 - 99 mg/dL 96  102     Calcium 8.8 - 10.4 mg/dL 9.9  9.4     Magnesium 1.7 - 2.3 mg/dL  1.4           Latest Ref Rng & Units 4/10/2025     8:09 AM 3/10/2025    12:55 PM 3/3/2025     8:37 AM   Bone Health   Phosphorus 2.5 - 4.5 mg/dL 3.2  3.2  3.0          Latest Ref Rng & Units 4/23/2025     7:35 AM 4/10/2025     8:09 AM 4/1/2025     2:45 PM   Heme   WBC 4.0 - 11.0 10e3/uL 7.4  4.9   4.0    Hgb 13.3 - 17.7 g/dL 13.1  12.6  11.4    Plt 150 - 450 10e3/uL 208  172  146    ABSOLUTE NEUTROPHIL 1.6 - 8.3 10e3/uL   2.3    ABSOLUTE LYMPHOCYTES 0.8 - 5.3 10e3/uL   1.0    ABSOLUTE MONOCYTES 0.0 - 1.3 10e3/uL   0.4    ABSOLUTE EOSINOPHILS 0.0 - 0.7 10e3/uL   0.3    ABSOLUTE BASOPHILS 0.0 - 0.2 10e3/uL   0.0          Latest Ref Rng & Units 4/1/2025     2:45 PM 3/25/2025     3:47 PM 3/18/2025     3:18 PM   Liver   ALT 0 - 70 U/L 60  39  35    AST 0 - 45 U/L 39  28  27          Latest Ref Rng & Units 4/23/2025     7:35 AM 4/10/2025     8:09 AM 3/24/2025     7:37 AM   Pancreas   Amylase 28 - 100 U/L 113  118  165    Lipase (Roche) 13 - 60 U/L 53  72  80          Latest Ref Rng & Units 8/5/2024     7:58 AM   Iron studies   Iron 61 - 157 ug/dL 44    Iron Sat Index 15 - 46 % 17    Ferritin 31 - 409 ng/mL 1,725          1/17/2025     9:41 AM 1/3/2025     8:11 AM 12/16/2024     7:45 AM   [facility]   LOG IU/ML OF CMVQNT 1.9  2.1  1.5      Failed to redirect to the Timeline version of the REVFS SmartLink.  Recent Labs   Lab Test 03/24/25  0737 04/10/25  0809 04/23/25  0735   DOSTAC 3/23/2025 4/9/2025 4/22/2025   TACROL 9.2 8.8 9.7     Recent Labs   Lab Test 10/18/24  0826 11/13/24  0730 03/24/25  0737 04/10/25  0809 04/23/25  0735   DOSMPA 10/17/2024   8:00 PM  --   --   --  4/22/2025   8:00 PM   MPACID 3.59*   < > 0.36* 2.16 2.17   MPAG 78.1   < > 23.7* 85.7 75.9    < > = values in this interval not displayed.       Fernando Sorensen MD

## 2025-04-17 NOTE — NURSING NOTE
Current patient location: [address] [place] [address]  [place] [facility] [address]    Is the patient currently in the state of [place]? YES    Visit mode: VIDEO    If the visit is dropped, the patient can be reconnected by:VIDEO VISIT: Text to cell phone:   Telephone Information:   Mobile [phone]       Will anyone else be joining the visit? NO  (If patient encounters technical issues they should call [phone]    Are changes needed to the allergy or medication list? No    Are refills needed on medications prescribed by this physician? NO    Rooming Documentation:  Questionnaire(s) completed    Reason for visit: RECHECK    Chema ALMONTE

## 2025-04-17 NOTE — PROGRESS NOTES
"Virtual Visit Details    Type of service:  Video Visit   Video Start Time: {video visit start/end time for provider to select:795932}  Video End Time:{video visit start/end time for provider to select:835359}    Originating Location (pt. Location): {video visit patient location:372945::\"Home\"}  {PROVIDER LOCATION On-site should be selected for visits conducted from your clinic location or adjoining [facility] hospital, academic office, or other nearby [facility] building. Off-site should be selected for all other provider locations, including home:623523}  Distant Location (provider location):  {virtual location provider:213339}  Platform used for Video Visit: {Virtual Visit Platforms:256054::\"[employer]\"}  " "(151-300 pg/ml)        On treatment: Cinacalcet  - Vitamin D; level: High        On supplement: No; Cholecalciferol held due to high level.  - Calcium; level: Normal        On supplement: No    # Electrolytes:   - Potassium; level: Normal        On supplement: No  - Magnesium; level: Stable low        On supplement: Yes  - Bicarbonate; level: Stable low        On supplement: Yes    # BK Viremia: Stable, negative to minimally elevated BK PCR negative with last check 4/2025.  IgG level is low and patient received IVIg.  Immunosuppression has been slightly reduced.   - Will continue to follow BK PCR every month.    # CMV Viremia: Stable, now negative CMV PCR with last check 4/2025.  CMV IgG Ab positive.  Presently off any antiviral medications.  IgG level is low, but did receive IVIg.   - No need to follow CMV PCR, unless clinically indicated.    # Hypogammaglobulinemia: Patient with low IgG level at ~ 579 with last check 1/2025.  He is s/p IVIg x 2 doses in Jan/Feb 2025.    # H/o Urethral Stricture: Patient has meatal stenosis and s/p ureteral stent removal in OR by Urology on 10/10/24. Followed by Urology.    # UTI, Possible Prostate Abscess: Patient with symptomatic and positive urine culture 11/13/24 for Pseudomonas aeruginosa and Enterococcus faecalis.  He was started on ciprofloxacin and linezolid.  During recent hospitalization soon after UTI diagnosis, patient had CT abd/pelvis Nov/2024 that showed \"Peripheral enhancing fluid collection measuring 18 x 10 mm along the inferior margin of the prostate.\"  This was felt to possibly be a prostate abscess.  Patient completed course of antibiotics and no further concern on CT scan.    # Obesity, Class I (BMI = 31.4): Stable weight.   - Recommend weight loss for overall health by increasing exercise and watching caloric intake.  - Patient may benefit from SGLT2 inhibitors.  However, because of their pancreas transplant, would avoid GLP1 agonists due to increased risk of " pancreatitis.    # Other Significant PMH:   - CAD, s/p CABG: Asymptomatic.  - HFrEF: Last cardiac echo (11/2024) showed moderately reduced LVEF ~ 35-40% with akinesis of the basal-mid inferior and basal inferoseptal segments.   - H/o V. Fib Arrest: Patient with V. Fib arrest and STEMI following kidney transplant and emergently brought to Cath Lab and found to have in stent thrombosis of the previous mid RCA stent, which was felt to be the culprit in inferior STEMI.  Patient underwent aspiration thrombectomy and PCI with two IBAN.  Followed by Cardiology.   - PAD: No claudication symptoms.   - GERD: Asymptomatic and now off PPI.   - Lower Back Pain with Sciatica: Patient with h/o lower back pain and sciatica years ago, now returned with pain radiating down right leg.  Has been taking cyclobenzaprine.  Started physical therapy and now s/p steroid injection with improvement in symptoms.     # Skin Cancer Risk:    - Discussed sun protection and recommend regular follow up with Dermatology.    # Transplant History:  Etiology of Kidney Failure: Diabetes mellitus type 2  Tx: SPK  Transplant: 7/20/2024 (Kidney / Pancreas)  Significant transplant-related complications:  Peritransplant STEMI and V. Fib arrest.    Transplant Office Phone Number: [phone]    Assessment and plan was discussed with the patient and he voiced his understanding and agreement.    Return visit: Return for previously scheduled visit.    Fernando Sorensen MD    The longitudinal plan of care for the diagnosis(es)/condition(s) as documented were addressed during this visit. Due to the added complexity in care, I will continue to support Siva in the subsequent management and with ongoing continuity of care.      Chief Complaint   Mr. Ramey is a 49 year old here for kidney transplant, pancreas transplant, and immunosuppression management.     History of Present Illness    Mr. Ramey reports feeling good overall.  Since last clinic  visit:   Hospitalizations: No   New Medical Issues: No; Not new, but ongoing back pain.  He received a steroid injection yesterday for that, which has made the pain better today.  It was limited his activity some.  Active/Exercise: Yes; He is active, although getting minimal exercise.  Back pain can be limiting.  Chest pain or shortness of breath: No  Lower extremity swelling: No  Weight change: No   Appetite: Good  Nausea and vomiting: No  Diarrhea: Yes; Occasional loose stools, but not very bothersome.  Heartburn symptoms: No  Fever, sweats or chills: No  Night sweats: No  Urinary complaints: No; Patient reports emptying his bladder better lately.    Home BP:  120/70s    Problem List   Patient Active Problem List   Diagnosis    Type 2 diabetes mellitus with other circulatory complication, unspecified whether long term insulin use (H)    Dyslipidemia    Atherosclerosis of other coronary artery bypass graft(s) with unstable angina pectoris (H)    Anemia in chronic renal disease    HTN, kidney transplant related    S/P CABG (coronary artery bypass graft)    Balanoposthitis    History of ST elevation myocardial infarction (STEMI)    Obesity (BMI 30.0-34.9)    MARQUEZ (dyspnea on exertion)    STEMI (ST elevation myocardial infarction) (H)    Metabolic acidosis    Retinopathy    Peripheral neuropathy    Acquired elevated diaphragm    Median nerve neuropathy, left    Stage 3a chronic kidney disease (H)    Coagulation defect, unspecified    Obstructive sleep apnea treated with continuous positive airway pressure (CPAP)    Snoring    Palpitations    Gastroesophageal reflux disease without esophagitis    Diabetes mellitus, type 2 (H)    Kidney replaced by transplant    Pancreas replaced by transplant (H)    Immunosuppressed status    Cardiac arrest with ventricular fibrillation (H)    Steroid-induced hyperglycemia    History of simultaneous kidney and pancreas transplant (H)    Hypomagnesemia    Secondary renal  hyperparathyroidism    Need for pneumocystis prophylaxis    Other stricture of urethra in male    Aftercare following organ transplant    CAD (coronary artery disease)    PAD (peripheral artery disease)    HFrEF (heart failure with reduced ejection fraction) (H)    Urinary tract infection    Hypogammaglobulinemia    Hypercalcemia    BK viremia    Cytomegalovirus (CMV) viremia (H)       Allergies   Allergies   Allergen Reactions    Amoxicillin Hives     & generalized pain    Tolerated ceftriaxone in 2023 ([facility]) and 2024 ([facility])    Venlafaxine      lethargic       Medications   Current Outpatient Medications   Medication Sig Dispense Refill    acetaminophen (TYLENOL) 500 MG tablet Take 500 mg by mouth every 4 hours as needed      aspirin (ASA) 81 MG chewable tablet Take 1 tablet (81 mg) by mouth daily Starting tomorrow. 30 tablet 3    atorvastatin (LIPITOR) 40 MG tablet Take 1 tablet (40 mg) by mouth every evening. 90 tablet 4    blood glucose (NO BRAND SPECIFIED) test strip Use to test blood sugar 4 times daily or as directed. 100 strip 5    carvedilol (COREG) 3.125 MG tablet Take 1 tablet (3.125 mg) by mouth 2 times daily. 60 tablet 11    cinacalcet (SENSIPAR) 30 MG tablet Take 2 tablets (60 mg) by mouth daily. 180 tablet 1    clobetasol propionate (TEMOVATE) 0.05 % external cream Apply topically 2 times daily. 45 g 0    dapsone (ACZONE) 25 MG tablet Take 2 tablets (50 mg) by mouth daily. 180 tablet 1    dorzolamide-timolol (COSOPT) 2-0.5 % ophthalmic solution Place 1 drop into the right eye 2 times daily.      levofloxacin (LEVAQUIN) 750 MG tablet Take 1 tablet (750 mg) by mouth daily. 7 tablet 0    magnesium glycinate 100 MG CAPS capsule Take 300 mg by mouth 2 times daily.      mycophenolic acid (GENERIC EQUIVALENT) 180 MG EC tablet Take 540 mg twice a day until follow up appointment with Dr. Sorensen      pregabalin (LYRICA) 150 MG capsule Take 150 mg by mouth 2 times daily. (Patient not taking:  Reported on 4/1/2025)      sodium bicarbonate 650 MG tablet TAKE 3 TABLETS (1,950 MG) BY MOUTH 3 TIMES DAILY 270 tablet 3    tacrolimus (GENERIC EQUIVALENT) 0.5 MG capsule ON HOLD 180 capsule 3    tacrolimus (GENERIC EQUIVALENT) 1 MG capsule Take 2 capsules (2 mg) by mouth 2 times daily. Total dose= 2 mg twice daily 180 capsule 3    ticagrelor (BRILINTA) 90 MG tablet Take 1 tablet (90 mg) by mouth 2 times daily. 60 tablet 5     No current facility-administered medications for this visit.     There are no discontinued medications.        Physical Exam   Vital Signs: There were no vitals taken for this visit.    GENERAL APPEARANCE: alert and no distress  HENT: no obvious abnormalities on appearance  RESP: breathing appears unremarkable with normal rate, no audible wheezing or cough and no apparent shortness of breath with conversation  MS: extremities normal - no gross deformities noted  SKIN: no apparent rash and normal skin tone  NEURO: speech is clear with no obvious neurological deficits  PSYCH: mentation appears normal and affect normal    Data         Latest Ref Rng & Units 4/23/2025     7:35 AM 4/10/2025     8:09 AM 4/1/2025     2:45 PM   Renal   Sodium 135 - 145 mmol/L 139  142     K 3.4 - 5.3 mmol/L 4.9  4.5     Cl 98 - 107 mmol/L 104  106     Cl (external) 98 - 107 mmol/L 104  106     CO2 22 - 29 mmol/L 21  21     Urea Nitrogen 6.0 - 20.0 mg/dL 31.2  34.2     Creatinine 0.67 - 1.17 mg/dL 1.83  1.77  1.50    Glucose 70 - 99 mg/dL 96  102     Calcium 8.8 - 10.4 mg/dL 9.9  9.4     Magnesium 1.7 - 2.3 mg/dL  1.4           Latest Ref Rng & Units 4/10/2025     8:09 AM 3/10/2025    12:55 PM 3/3/2025     8:37 AM   Bone Health   Phosphorus 2.5 - 4.5 mg/dL 3.2  3.2  3.0          Latest Ref Rng & Units 4/23/2025     7:35 AM 4/10/2025     8:09 AM 4/1/2025     2:45 PM   Heme   WBC 4.0 - 11.0 10e3/uL 7.4  4.9  4.0    Hgb 13.3 - 17.7 g/dL 13.1  12.6  11.4    Plt 150 - 450 10e3/uL 208  172  146    ABSOLUTE NEUTROPHIL 1.6 -  8.3 10e3/uL   2.3    ABSOLUTE LYMPHOCYTES 0.8 - 5.3 10e3/uL   1.0    ABSOLUTE MONOCYTES 0.0 - 1.3 10e3/uL   0.4    ABSOLUTE EOSINOPHILS 0.0 - 0.7 10e3/uL   0.3    ABSOLUTE BASOPHILS 0.0 - 0.2 10e3/uL   0.0          Latest Ref Rng & Units 4/1/2025     2:45 PM 3/25/2025     3:47 PM 3/18/2025     3:18 PM   Liver   ALT 0 - 70 U/L 60  39  35    AST 0 - 45 U/L 39  28  27          Latest Ref Rng & Units 4/23/2025     7:35 AM 4/10/2025     8:09 AM 3/24/2025     7:37 AM   Pancreas   Amylase 28 - 100 U/L 113  118  165    Lipase (Roche) 13 - 60 U/L 53  72  80          Latest Ref Rng & Units 8/5/2024     7:58 AM   Iron studies   Iron 61 - 157 ug/dL 44    Iron Sat Index 15 - 46 % 17    Ferritin 31 - 409 ng/mL 1,725          1/17/2025     9:41 AM 1/3/2025     8:11 AM 12/16/2024     7:45 AM   [facility] Virology   LOG IU/ML OF CMVQNT 1.9  2.1  1.5      Failed to redirect to the Timeline version of the REVFS SmartLink.  Recent Labs   Lab Test 03/24/25  0737 04/10/25  0809 04/23/25  0735   DOSTAC 3/23/2025 4/9/2025 4/22/2025   TACROL 9.2 8.8 9.7     Recent Labs   Lab Test 10/18/24  0826 11/13/24  0730 03/24/25  0737 04/10/25  0809 04/23/25  0735   DOSMPA 10/17/2024   8:00 PM  --   --   --  4/22/2025   8:00 PM   MPACID 3.59*   < > 0.36* 2.16 2.17   MPAG 78.1   < > 23.7* 85.7 75.9    < > = values in this interval not displayed.

## 2025-04-23 ENCOUNTER — LAB (OUTPATIENT)
Dept: LAB | Facility: HOSPITAL | Age: 50
End: 2025-04-23
Payer: MEDICARE

## 2025-04-23 DIAGNOSIS — Z98.890 OTHER SPECIFIED POSTPROCEDURAL STATES: ICD-10-CM

## 2025-04-23 DIAGNOSIS — Z48.298 AFTERCARE FOLLOWING ORGAN TRANSPLANT: Primary | ICD-10-CM

## 2025-04-23 DIAGNOSIS — Z94.0 KIDNEY REPLACED BY TRANSPLANT: ICD-10-CM

## 2025-04-23 DIAGNOSIS — Z94.83 PANCREAS REPLACED BY TRANSPLANT (H): ICD-10-CM

## 2025-04-23 DIAGNOSIS — Z79.899 ENCOUNTER FOR LONG-TERM CURRENT USE OF MEDICATION: ICD-10-CM

## 2025-04-23 LAB
ALBUMIN MFR UR ELPH: 37.5 MG/DL
AMYLASE SERPL-CCNC: 113 U/L (ref 28–100)
ANION GAP SERPL CALCULATED.3IONS-SCNC: 14 MMOL/L (ref 7–15)
BK VIRUS DNA IU/ML, INSTRUMENT (6800): 96 IU/ML
BK VIRUS SPECIMEN TYPE: ABNORMAL
BKV DNA SPEC NAA+PROBE-LOG#: 2 {LOG_COPIES}/ML
BUN SERPL-MCNC: 31.2 MG/DL (ref 6–20)
CALCIUM SERPL-MCNC: 9.9 MG/DL (ref 8.8–10.4)
CHLORIDE SERPL-SCNC: 104 MMOL/L (ref 98–107)
CMV DNA SPEC NAA+PROBE-ACNC: NOT DETECTED IU/ML
CREAT SERPL-MCNC: 1.83 MG/DL (ref 0.67–1.17)
CREAT UR-MCNC: 146.5 MG/DL
EGFRCR SERPLBLD CKD-EPI 2021: 45 ML/MIN/1.73M2
ERYTHROCYTE [DISTWIDTH] IN BLOOD BY AUTOMATED COUNT: 16.2 % (ref 10–15)
GLUCOSE SERPL-MCNC: 96 MG/DL (ref 70–99)
HCO3 SERPL-SCNC: 21 MMOL/L (ref 22–29)
HCT VFR BLD AUTO: 41.8 % (ref 40–53)
HGB BLD-MCNC: 13.1 G/DL (ref 13.3–17.7)
LIPASE SERPL-CCNC: 53 U/L (ref 13–60)
MCH RBC QN AUTO: 28.4 PG (ref 26.5–33)
MCHC RBC AUTO-ENTMCNC: 31.3 G/DL (ref 31.5–36.5)
MCV RBC AUTO: 91 FL (ref 78–100)
PLATELET # BLD AUTO: 208 10E3/UL (ref 150–450)
POTASSIUM SERPL-SCNC: 4.9 MMOL/L (ref 3.4–5.3)
PROT/CREAT 24H UR: 0.26 MG/MG CR (ref 0–0.2)
RBC # BLD AUTO: 4.62 10E6/UL (ref 4.4–5.9)
SODIUM SERPL-SCNC: 139 MMOL/L (ref 135–145)
SPECIMEN TYPE: NORMAL
TACROLIMUS BLD-MCNC: 9.7 UG/L (ref 5–15)
TME LAST DOSE: NORMAL H
TME LAST DOSE: NORMAL H
WBC # BLD AUTO: 7.4 10E3/UL (ref 4–11)

## 2025-04-23 PROCEDURE — 36415 COLL VENOUS BLD VENIPUNCTURE: CPT

## 2025-04-23 PROCEDURE — 82150 ASSAY OF AMYLASE: CPT

## 2025-04-23 PROCEDURE — 87799 DETECT AGENT NOS DNA QUANT: CPT

## 2025-04-23 PROCEDURE — 80180 DRUG SCRN QUAN MYCOPHENOLATE: CPT

## 2025-04-23 PROCEDURE — 83690 ASSAY OF LIPASE: CPT

## 2025-04-23 PROCEDURE — 80197 ASSAY OF TACROLIMUS: CPT

## 2025-04-23 PROCEDURE — 85018 HEMOGLOBIN: CPT

## 2025-04-23 PROCEDURE — 84156 ASSAY OF PROTEIN URINE: CPT | Performed by: INTERNAL MEDICINE

## 2025-04-23 PROCEDURE — 80048 BASIC METABOLIC PNL TOTAL CA: CPT

## 2025-04-24 LAB
MYCOPHENOLATE SERPL LC/MS/MS-MCNC: 2.17 MG/L (ref 1–3.5)
MYCOPHENOLATE-G SERPL LC/MS/MS-MCNC: 75.9 MG/L (ref 30–95)
TME LAST DOSE: NORMAL H
TME LAST DOSE: NORMAL H

## 2025-04-26 PROBLEM — B34.8 BK VIREMIA: Status: ACTIVE | Noted: 2025-04-26

## 2025-04-26 PROBLEM — B25.9 CYTOMEGALOVIRUS (CMV) VIREMIA (H): Status: ACTIVE | Noted: 2025-04-26

## 2025-04-26 NOTE — PATIENT INSTRUCTIONS
Patient Recommendations:  - No new recommendations at this time.    Transplant Patient Information  Your Post Transplant Coordinator is: Ximena Edwards  For non urgent items, we encourage you to contact your coordinator/care team online via [employer]  You and your care team can also contact your transplant coordinator Monday - Friday, 8am - 5pm at [phone] (Option 2 to reach the coordinator or Option 4 to schedule an appointment).  After hours for urgent matters, please call [facility] at [phone].

## 2025-04-29 ENCOUNTER — TELEPHONE (OUTPATIENT)
Dept: CARDIOLOGY | Facility: CLINIC | Age: 50
End: 2025-04-29

## 2025-04-29 DIAGNOSIS — Z94.0 KIDNEY REPLACED BY TRANSPLANT: ICD-10-CM

## 2025-04-29 NOTE — TELEPHONE ENCOUNTER
Patient refill sent per request for remaining 3 months of antiplatelet therapy. Then he may discontinue as per Interventional notes. Asia MAYNARD RN BSN, CHFN, PCCN-K         Nehemiah Mendoza MD  You33 minutes ago (8:21 AM)     TB  sure      You  Nehemiah Mendoza MD51 minutes ago (8:02 AM)     SK  This patient has beenon Brilinta post PCI  The interventionalist listed in EPIC is not one I recognize from our system.  PCI was performed 7/19/24 with documentation that antiplatelet is advised for 12 months, then ASA 81mg daily.  Are you willing to renew this Rx for him?    Thanks  Asia

## 2025-05-12 ENCOUNTER — TELEPHONE (OUTPATIENT)
Dept: TRANSPLANT | Facility: CLINIC | Age: 50
End: 2025-05-12

## 2025-05-12 ENCOUNTER — RESULTS FOLLOW-UP (OUTPATIENT)
Dept: TRANSPLANT | Facility: CLINIC | Age: 50
End: 2025-05-12

## 2025-05-12 ENCOUNTER — LAB (OUTPATIENT)
Dept: LAB | Facility: HOSPITAL | Age: 50
End: 2025-05-12
Payer: MEDICARE

## 2025-05-12 DIAGNOSIS — N39.0 URINARY TRACT INFECTION: ICD-10-CM

## 2025-05-12 DIAGNOSIS — Z94.0 KIDNEY TRANSPLANTED: ICD-10-CM

## 2025-05-12 DIAGNOSIS — Z48.298 AFTERCARE FOLLOWING ORGAN TRANSPLANT: Primary | ICD-10-CM

## 2025-05-12 DIAGNOSIS — Z94.0 HTN, KIDNEY TRANSPLANT RELATED: ICD-10-CM

## 2025-05-12 DIAGNOSIS — Z79.899 ENCOUNTER FOR LONG-TERM CURRENT USE OF MEDICATION: ICD-10-CM

## 2025-05-12 DIAGNOSIS — I15.1 HTN, KIDNEY TRANSPLANT RELATED: ICD-10-CM

## 2025-05-12 DIAGNOSIS — Z94.0 KIDNEY REPLACED BY TRANSPLANT: ICD-10-CM

## 2025-05-12 DIAGNOSIS — Z94.83 PANCREAS REPLACED BY TRANSPLANT (H): ICD-10-CM

## 2025-05-12 DIAGNOSIS — Z98.890 OTHER SPECIFIED POSTPROCEDURAL STATES: ICD-10-CM

## 2025-05-12 DIAGNOSIS — Z94.83 PANCREAS REPLACED BY TRANSPLANT (H): Primary | ICD-10-CM

## 2025-05-12 LAB
AMYLASE SERPL-CCNC: 96 U/L (ref 28–100)
ANION GAP SERPL CALCULATED.3IONS-SCNC: 10 MMOL/L (ref 7–15)
BUN SERPL-MCNC: 26.8 MG/DL (ref 6–20)
CALCIUM SERPL-MCNC: 9.4 MG/DL (ref 8.8–10.4)
CHLORIDE SERPL-SCNC: 110 MMOL/L (ref 98–107)
CREAT SERPL-MCNC: 1.95 MG/DL (ref 0.67–1.17)
EGFRCR SERPLBLD CKD-EPI 2021: 41 ML/MIN/1.73M2
ERYTHROCYTE [DISTWIDTH] IN BLOOD BY AUTOMATED COUNT: 14.7 % (ref 10–15)
GLUCOSE SERPL-MCNC: 105 MG/DL (ref 70–99)
HCO3 SERPL-SCNC: 25 MMOL/L (ref 22–29)
HCT VFR BLD AUTO: 39.5 % (ref 40–53)
HGB BLD-MCNC: 12.2 G/DL (ref 13.3–17.7)
LIPASE SERPL-CCNC: 58 U/L (ref 13–60)
MAGNESIUM SERPL-MCNC: 1.7 MG/DL (ref 1.7–2.3)
MCH RBC QN AUTO: 28 PG (ref 26.5–33)
MCHC RBC AUTO-ENTMCNC: 30.9 G/DL (ref 31.5–36.5)
MCV RBC AUTO: 91 FL (ref 78–100)
MYCOPHENOLATE SERPL LC/MS/MS-MCNC: 1.94 MG/L (ref 1–3.5)
MYCOPHENOLATE-G SERPL LC/MS/MS-MCNC: 73 MG/L (ref 30–95)
PHOSPHATE SERPL-MCNC: 3.7 MG/DL (ref 2.5–4.5)
PLATELET # BLD AUTO: 179 10E3/UL (ref 150–450)
POTASSIUM SERPL-SCNC: 5 MMOL/L (ref 3.4–5.3)
RBC # BLD AUTO: 4.35 10E6/UL (ref 4.4–5.9)
SODIUM SERPL-SCNC: 145 MMOL/L (ref 135–145)
TACROLIMUS BLD-MCNC: 8.6 UG/L (ref 5–15)
TME LAST DOSE: NORMAL H
WBC # BLD AUTO: 5 10E3/UL (ref 4–11)

## 2025-05-12 PROCEDURE — 82150 ASSAY OF AMYLASE: CPT

## 2025-05-12 PROCEDURE — 80180 DRUG SCRN QUAN MYCOPHENOLATE: CPT

## 2025-05-12 PROCEDURE — 80048 BASIC METABOLIC PNL TOTAL CA: CPT

## 2025-05-12 PROCEDURE — 84100 ASSAY OF PHOSPHORUS: CPT

## 2025-05-12 PROCEDURE — 36415 COLL VENOUS BLD VENIPUNCTURE: CPT

## 2025-05-12 PROCEDURE — 83690 ASSAY OF LIPASE: CPT

## 2025-05-12 PROCEDURE — 80197 ASSAY OF TACROLIMUS: CPT

## 2025-05-12 PROCEDURE — 85041 AUTOMATED RBC COUNT: CPT

## 2025-05-12 PROCEDURE — 83735 ASSAY OF MAGNESIUM: CPT

## 2025-05-12 ASSESSMENT — ENCOUNTER SYMPTOMS: NEW SYMPTOMS OF CORONARY ARTERY DISEASE: 1

## 2025-05-12 NOTE — TELEPHONE ENCOUNTER
ISSUE:  SCr 1.9 (baseline 1.5-1.8)  Tac at goal 8-10        Left VM for Siva, sent [employer] message.     Addendum: Spoke with Siva, was on vacation last week so was eating and drinking differently than usual. /80, HR 100s. Denies UTI symptoms or pain over kidney. Afebrile, no diarrhea. Will increase hydration and repeat labs next week.

## 2025-05-20 ENCOUNTER — TELEPHONE (OUTPATIENT)
Dept: TRANSPLANT | Facility: CLINIC | Age: 50
End: 2025-05-20

## 2025-05-20 ENCOUNTER — RESULTS FOLLOW-UP (OUTPATIENT)
Dept: TRANSPLANT | Facility: CLINIC | Age: 50
End: 2025-05-20

## 2025-05-20 ENCOUNTER — LAB (OUTPATIENT)
Dept: LAB | Facility: HOSPITAL | Age: 50
End: 2025-05-20
Payer: MEDICARE

## 2025-05-20 DIAGNOSIS — I15.1 HTN, KIDNEY TRANSPLANT RELATED: ICD-10-CM

## 2025-05-20 DIAGNOSIS — Z94.83 PANCREAS REPLACED BY TRANSPLANT (H): ICD-10-CM

## 2025-05-20 DIAGNOSIS — N39.0 URINARY TRACT INFECTION: ICD-10-CM

## 2025-05-20 DIAGNOSIS — Z79.899 ENCOUNTER FOR LONG-TERM CURRENT USE OF MEDICATION: ICD-10-CM

## 2025-05-20 DIAGNOSIS — Z94.0 HTN, KIDNEY TRANSPLANT RELATED: ICD-10-CM

## 2025-05-20 DIAGNOSIS — Z94.0 KIDNEY REPLACED BY TRANSPLANT: ICD-10-CM

## 2025-05-20 DIAGNOSIS — Z98.890 OTHER SPECIFIED POSTPROCEDURAL STATES: ICD-10-CM

## 2025-05-20 DIAGNOSIS — Z94.0 KIDNEY TRANSPLANTED: ICD-10-CM

## 2025-05-20 DIAGNOSIS — Z48.298 AFTERCARE FOLLOWING ORGAN TRANSPLANT: ICD-10-CM

## 2025-05-20 LAB
AMYLASE SERPL-CCNC: 98 U/L (ref 28–100)
ANION GAP SERPL CALCULATED.3IONS-SCNC: 9 MMOL/L (ref 7–15)
BUN SERPL-MCNC: 25.7 MG/DL (ref 6–20)
CALCIUM SERPL-MCNC: 9.9 MG/DL (ref 8.8–10.4)
CHLORIDE SERPL-SCNC: 108 MMOL/L (ref 98–107)
CMV DNA SPEC NAA+PROBE-ACNC: NOT DETECTED IU/ML
CREAT SERPL-MCNC: 1.89 MG/DL (ref 0.67–1.17)
EGFRCR SERPLBLD CKD-EPI 2021: 43 ML/MIN/1.73M2
ERYTHROCYTE [DISTWIDTH] IN BLOOD BY AUTOMATED COUNT: 14.8 % (ref 10–15)
GLUCOSE SERPL-MCNC: 105 MG/DL (ref 70–99)
HCO3 SERPL-SCNC: 24 MMOL/L (ref 22–29)
HCT VFR BLD AUTO: 37.1 % (ref 40–53)
HGB BLD-MCNC: 11.5 G/DL (ref 13.3–17.7)
LIPASE SERPL-CCNC: 83 U/L (ref 13–60)
MCH RBC QN AUTO: 28.2 PG (ref 26.5–33)
MCHC RBC AUTO-ENTMCNC: 31 G/DL (ref 31.5–36.5)
MCV RBC AUTO: 91 FL (ref 78–100)
MYCOPHENOLATE SERPL LC/MS/MS-MCNC: 2.04 MG/L (ref 1–3.5)
MYCOPHENOLATE-G SERPL LC/MS/MS-MCNC: 60.3 MG/L (ref 30–95)
PLATELET # BLD AUTO: 174 10E3/UL (ref 150–450)
POTASSIUM SERPL-SCNC: 5.2 MMOL/L (ref 3.4–5.3)
RBC # BLD AUTO: 4.08 10E6/UL (ref 4.4–5.9)
SODIUM SERPL-SCNC: 141 MMOL/L (ref 135–145)
SPECIMEN TYPE: NORMAL
TME LAST DOSE: NORMAL H
TME LAST DOSE: NORMAL H
WBC # BLD AUTO: 5.3 10E3/UL (ref 4–11)

## 2025-05-20 PROCEDURE — 85027 COMPLETE CBC AUTOMATED: CPT

## 2025-05-20 PROCEDURE — 80048 BASIC METABOLIC PNL TOTAL CA: CPT

## 2025-05-20 PROCEDURE — 80180 DRUG SCRN QUAN MYCOPHENOLATE: CPT

## 2025-05-20 PROCEDURE — 82150 ASSAY OF AMYLASE: CPT

## 2025-05-20 PROCEDURE — 36415 COLL VENOUS BLD VENIPUNCTURE: CPT

## 2025-05-20 PROCEDURE — 83690 ASSAY OF LIPASE: CPT

## 2025-05-20 PROCEDURE — 87799 DETECT AGENT NOS DNA QUANT: CPT

## 2025-05-20 NOTE — TELEPHONE ENCOUNTER
ISSUE:  SCr 1.9   Lipase elevated   Tac pending     Called rick Paniagua requesting a return phone call.

## 2025-05-21 ENCOUNTER — TELEPHONE (OUTPATIENT)
Dept: TRANSPLANT | Facility: CLINIC | Age: 50
End: 2025-05-21
Payer: COMMERCIAL

## 2025-05-21 DIAGNOSIS — Z94.0 KIDNEY REPLACED BY TRANSPLANT: ICD-10-CM

## 2025-05-21 DIAGNOSIS — I15.1 HTN, KIDNEY TRANSPLANT RELATED: ICD-10-CM

## 2025-05-21 DIAGNOSIS — R79.89 ELEVATED SERUM CREATININE: Primary | ICD-10-CM

## 2025-05-21 DIAGNOSIS — Z94.0 HTN, KIDNEY TRANSPLANT RELATED: ICD-10-CM

## 2025-05-21 DIAGNOSIS — N39.0 URINARY TRACT INFECTION: ICD-10-CM

## 2025-05-21 LAB
BK VIRUS SPECIMEN TYPE: NORMAL
BKV DNA # SPEC NAA+PROBE: NOT DETECTED IU/ML

## 2025-05-21 NOTE — TELEPHONE ENCOUNTER
Post Kidney and Pancreas Transplant Team Conference  Date: 5/21/2025  Transplant Coordinator: Ximena     Attendees:  []  Dr. Sorensen [] Leonor Kang, RN [x] Betty Landaverde LPN     [x]  Dr. Shell [x] Rosalia Romero, RN    [x] Dr. Boohte [x] Ximena Edwards, RN    [x] Dr. Coombs [x] Mony Crabtree, RN [] Angella Liriano, BETSY   [x] Dr. Munoz [x] Asha Allred, RN    [] Dr. Christianson [] Cindy Ye, BETSY [] Ariel Randall, PharmD   []  Dr. Whitehead [] Miryam Iyer, RN    [] Dr. Michaud [x] Gino Mahan RN     [] Roxanna Monet, BETSY    [] Shelia Magallon, ARIE [x] Alisa Cutler, RN        Verbal Plan Read Back:   Repeat DSA, US, UA/UC.    Routed to RN Coordinator   Betty Landaverde LPN

## 2025-05-22 DIAGNOSIS — Z94.83 PANCREAS REPLACED BY TRANSPLANT (H): ICD-10-CM

## 2025-05-22 DIAGNOSIS — N39.0 URINARY TRACT INFECTION: ICD-10-CM

## 2025-05-22 DIAGNOSIS — I15.1 HTN, KIDNEY TRANSPLANT RELATED: ICD-10-CM

## 2025-05-22 DIAGNOSIS — Z94.0 KIDNEY REPLACED BY TRANSPLANT: Primary | ICD-10-CM

## 2025-05-22 DIAGNOSIS — Z94.0 HTN, KIDNEY TRANSPLANT RELATED: ICD-10-CM

## 2025-05-27 ENCOUNTER — LAB (OUTPATIENT)
Dept: LAB | Facility: HOSPITAL | Age: 50
End: 2025-05-27
Payer: MEDICARE

## 2025-05-27 DIAGNOSIS — Z94.0 KIDNEY REPLACED BY TRANSPLANT: ICD-10-CM

## 2025-05-27 DIAGNOSIS — Z98.890 OTHER SPECIFIED POSTPROCEDURAL STATES: ICD-10-CM

## 2025-05-27 DIAGNOSIS — Z94.83 PANCREAS REPLACED BY TRANSPLANT (H): ICD-10-CM

## 2025-05-27 DIAGNOSIS — Z79.899 ENCOUNTER FOR LONG-TERM CURRENT USE OF MEDICATION: ICD-10-CM

## 2025-05-27 DIAGNOSIS — Z48.298 AFTERCARE FOLLOWING ORGAN TRANSPLANT: ICD-10-CM

## 2025-05-27 LAB
AMYLASE SERPL-CCNC: 84 U/L (ref 28–100)
ANION GAP SERPL CALCULATED.3IONS-SCNC: 10 MMOL/L (ref 7–15)
BUN SERPL-MCNC: 28.1 MG/DL (ref 6–20)
CALCIUM SERPL-MCNC: 9.5 MG/DL (ref 8.8–10.4)
CHLORIDE SERPL-SCNC: 110 MMOL/L (ref 98–107)
CREAT SERPL-MCNC: 1.88 MG/DL (ref 0.67–1.17)
EGFRCR SERPLBLD CKD-EPI 2021: 43 ML/MIN/1.73M2
ERYTHROCYTE [DISTWIDTH] IN BLOOD BY AUTOMATED COUNT: 15.2 % (ref 10–15)
GLUCOSE SERPL-MCNC: 120 MG/DL (ref 70–99)
HCO3 SERPL-SCNC: 22 MMOL/L (ref 22–29)
HCT VFR BLD AUTO: 36.5 % (ref 40–53)
HGB BLD-MCNC: 11.2 G/DL (ref 13.3–17.7)
LIPASE SERPL-CCNC: 59 U/L (ref 13–60)
MCH RBC QN AUTO: 28.2 PG (ref 26.5–33)
MCHC RBC AUTO-ENTMCNC: 30.7 G/DL (ref 31.5–36.5)
MCV RBC AUTO: 92 FL (ref 78–100)
PLATELET # BLD AUTO: 172 10E3/UL (ref 150–450)
POTASSIUM SERPL-SCNC: 4.8 MMOL/L (ref 3.4–5.3)
RBC # BLD AUTO: 3.97 10E6/UL (ref 4.4–5.9)
SODIUM SERPL-SCNC: 142 MMOL/L (ref 135–145)
WBC # BLD AUTO: 5.1 10E3/UL (ref 4–11)

## 2025-05-27 PROCEDURE — 85014 HEMATOCRIT: CPT

## 2025-05-27 PROCEDURE — 84132 ASSAY OF SERUM POTASSIUM: CPT

## 2025-05-27 PROCEDURE — 36415 COLL VENOUS BLD VENIPUNCTURE: CPT

## 2025-05-27 PROCEDURE — 82150 ASSAY OF AMYLASE: CPT

## 2025-05-27 PROCEDURE — 83690 ASSAY OF LIPASE: CPT

## 2025-05-27 PROCEDURE — 80180 DRUG SCRN QUAN MYCOPHENOLATE: CPT

## 2025-05-28 ENCOUNTER — VIRTUAL VISIT (OUTPATIENT)
Dept: UROLOGY | Facility: CLINIC | Age: 50
End: 2025-05-28
Payer: MEDICARE

## 2025-05-28 DIAGNOSIS — N35.819 OTHER STRICTURE OF URETHRA IN MALE: ICD-10-CM

## 2025-05-28 DIAGNOSIS — N43.2 OTHER HYDROCELE: ICD-10-CM

## 2025-05-28 DIAGNOSIS — I86.1 VARICOCELE: Primary | ICD-10-CM

## 2025-05-28 LAB
MYCOPHENOLATE SERPL LC/MS/MS-MCNC: 1.21 MG/L (ref 1–3.5)
MYCOPHENOLATE-G SERPL LC/MS/MS-MCNC: 65.7 MG/L (ref 30–95)
TME LAST DOSE: NORMAL H
TME LAST DOSE: NORMAL H

## 2025-05-28 NOTE — NURSING NOTE
Current patient location: [address] [place] [id number]    Is the patient currently in the state of [place]? YES    Visit mode: VIDEO    If the visit is dropped, the patient can be reconnected by:VIDEO VISIT: Text to cell phone:   Telephone Information:   Mobile [phone]       Will anyone else be joining the visit? NO  (If patient encounters technical issues they should call [phone] :[phone])    Are changes needed to the allergy or medication list? Pt stated no med changes    Are refills needed on medications prescribed by this physician? NO    Rooming Documentation:  Not applicable    Reason for visit: KAYLEE ALMONTE

## 2025-05-28 NOTE — LETTER
5/28/2025       RE: Siva Ramey  [address]  [place] [facility] [facility] [address]     Dear Colleague,    Thank you for referring your patient, Siva Ramey, to the [facility] [place] [facility]. Please see a copy of my visit note below.    Virtual Visit Details    Type of service:  Video Visit   Video Start Time: 145p  Video End Time: 200p    Originating Location (pt. Location): Home    Distant Location (provider location):  Off-site  Platform used for Video Visit: Milo  145p-200p    49 year old male.  Has fossa stricture.  Has kidney transplant.  I met patient last year when his kidney transplant stent could not be removed due to fossa navicularis stricture. Thus we took him to OR for stent removal.  He gets some recurrent UTIs.  He also has right testicular swelling.  He notes weak spraying stream, tenderness to glans, some ED.  He thinks he has a right hydrocele.  Kidney is working well. Cr 1.5   we recommended testicular US which showed right varicocele, small hydrocele. No masses. Perhaps a thombosed vein.    Exam:           Constitutional - No apparent distress. Patient of stated age.               Eyes - no redness or discharge.              Respiratory - no cough. no labored breathing              Musculoskeletal - full range of motion in all extremities              Skin - no visible skin discoloration or lesions              Neurological - no tremors              Psychiatric - no anxiety, alert & oriented                    A/P;  Lichen Sclerosus  Cream has helped some. So no interventions planned.    Fossa stricture  Voiding with reasonable power currently    Right testicular swelling  Small hydrocele and varicocele noted. I discussed he could have surgery but given no worrisome findings, no interventions planned at this time    Followup YO Harris MD    Again, thank you for allowing me to participate in the care of your  patient.      Sincerely,    Farzad Harris MD

## 2025-05-28 NOTE — PROGRESS NOTES
Virtual Visit Details    Type of service:  Video Visit   Video Start Time: 145p  Video End Time: 200p    Originating Location (pt. Location): Home    Distant Location (provider location):  Off-site  Platform used for Video Visit: Milo  145p-200p    49 year old male.  Has fossa stricture.  Has kidney transplant.  I met patient last year when his kidney transplant stent could not be removed due to fossa navicularis stricture. Thus we took him to OR for stent removal.  He gets some recurrent UTIs.  He also has right testicular swelling.  He notes weak spraying stream, tenderness to glans, some ED.  He thinks he has a right hydrocele.  Kidney is working well. Cr 1.5   we recommended testicular US which showed right varicocele, small hydrocele. No masses. Perhaps a thombosed vein.    Exam:           Constitutional - No apparent distress. Patient of stated age.               Eyes - no redness or discharge.              Respiratory - no cough. no labored breathing              Musculoskeletal - full range of motion in all extremities              Skin - no visible skin discoloration or lesions              Neurological - no tremors              Psychiatric - no anxiety, alert & oriented                    A/P;  Lichen Sclerosus  Cream has helped some. So no interventions planned.    Fossa stricture  Voiding with reasonable power currently    Right testicular swelling  Small hydrocele and varicocele noted. I discussed he could have surgery but given no worrisome findings, no interventions planned at this time    Followup YO Harris MD

## 2025-05-29 ENCOUNTER — ANCILLARY PROCEDURE (OUTPATIENT)
Dept: ULTRASOUND IMAGING | Facility: CLINIC | Age: 50
End: 2025-05-29
Attending: INTERNAL MEDICINE
Payer: COMMERCIAL

## 2025-05-29 ENCOUNTER — LAB (OUTPATIENT)
Dept: LAB | Facility: CLINIC | Age: 50
End: 2025-05-29
Payer: COMMERCIAL

## 2025-05-29 ENCOUNTER — TELEPHONE (OUTPATIENT)
Dept: TRANSPLANT | Facility: CLINIC | Age: 50
End: 2025-05-29

## 2025-05-29 DIAGNOSIS — R79.89 ELEVATED SERUM CREATININE: ICD-10-CM

## 2025-05-29 DIAGNOSIS — N39.0 URINARY TRACT INFECTION: ICD-10-CM

## 2025-05-29 DIAGNOSIS — Z94.0 KIDNEY REPLACED BY TRANSPLANT: ICD-10-CM

## 2025-05-29 DIAGNOSIS — I15.1 HTN, KIDNEY TRANSPLANT RELATED: ICD-10-CM

## 2025-05-29 DIAGNOSIS — Z94.83 PANCREAS REPLACED BY TRANSPLANT (H): ICD-10-CM

## 2025-05-29 DIAGNOSIS — Z94.0 HTN, KIDNEY TRANSPLANT RELATED: ICD-10-CM

## 2025-05-29 LAB
ALBUMIN UR-MCNC: 20 MG/DL
AMYLASE SERPL-CCNC: 95 U/L (ref 28–100)
ANION GAP SERPL CALCULATED.3IONS-SCNC: 12 MMOL/L (ref 7–15)
APPEARANCE UR: CLEAR
BILIRUB UR QL STRIP: NEGATIVE
BUN SERPL-MCNC: 24 MG/DL (ref 6–20)
CALCIUM SERPL-MCNC: 9.7 MG/DL (ref 8.8–10.4)
CHLORIDE SERPL-SCNC: 107 MMOL/L (ref 98–107)
COLOR UR AUTO: ABNORMAL
CREAT SERPL-MCNC: 1.71 MG/DL (ref 0.67–1.17)
EGFRCR SERPLBLD CKD-EPI 2021: 48 ML/MIN/1.73M2
GLUCOSE SERPL-MCNC: 101 MG/DL (ref 70–99)
GLUCOSE UR STRIP-MCNC: NEGATIVE MG/DL
HCO3 SERPL-SCNC: 20 MMOL/L (ref 22–29)
HGB UR QL STRIP: ABNORMAL
HYALINE CASTS: 3 /LPF
KETONES UR STRIP-MCNC: NEGATIVE MG/DL
LEUKOCYTE ESTERASE UR QL STRIP: ABNORMAL
LIPASE SERPL-CCNC: 63 U/L (ref 13–60)
MUCOUS THREADS #/AREA URNS LPF: PRESENT /LPF
NITRATE UR QL: NEGATIVE
PH UR STRIP: 6 [PH] (ref 5–7)
POTASSIUM SERPL-SCNC: 4.8 MMOL/L (ref 3.4–5.3)
RBC URINE: 6 /HPF
SODIUM SERPL-SCNC: 139 MMOL/L (ref 135–145)
SP GR UR STRIP: 1.02 (ref 1–1.03)
SQUAMOUS EPITHELIAL: 2 /HPF
UROBILINOGEN UR STRIP-MCNC: NORMAL MG/DL
WBC URINE: 77 /HPF

## 2025-05-29 PROCEDURE — 87086 URINE CULTURE/COLONY COUNT: CPT | Performed by: INTERNAL MEDICINE

## 2025-05-29 PROCEDURE — 86828 HLA CLASS I&II ANTIBODY QUAL: CPT | Performed by: INTERNAL MEDICINE

## 2025-05-29 PROCEDURE — 99000 SPECIMEN HANDLING OFFICE-LAB: CPT | Performed by: PATHOLOGY

## 2025-05-29 PROCEDURE — 76776 US EXAM K TRANSPL W/DOPPLER: CPT | Performed by: STUDENT IN AN ORGANIZED HEALTH CARE EDUCATION/TRAINING PROGRAM

## 2025-05-31 LAB — BACTERIA UR CULT: ABNORMAL

## 2025-06-09 ENCOUNTER — TELEPHONE (OUTPATIENT)
Dept: TRANSPLANT | Facility: CLINIC | Age: 50
End: 2025-06-09

## 2025-06-09 ENCOUNTER — RESULTS FOLLOW-UP (OUTPATIENT)
Dept: TRANSPLANT | Facility: CLINIC | Age: 50
End: 2025-06-09

## 2025-06-09 ENCOUNTER — LAB (OUTPATIENT)
Dept: LAB | Facility: HOSPITAL | Age: 50
End: 2025-06-09
Payer: COMMERCIAL

## 2025-06-09 DIAGNOSIS — N39.0 URINARY TRACT INFECTION: ICD-10-CM

## 2025-06-09 DIAGNOSIS — Z79.899 ENCOUNTER FOR LONG-TERM CURRENT USE OF MEDICATION: ICD-10-CM

## 2025-06-09 DIAGNOSIS — Z94.83 PANCREAS REPLACED BY TRANSPLANT (H): ICD-10-CM

## 2025-06-09 DIAGNOSIS — Z98.890 OTHER SPECIFIED POSTPROCEDURAL STATES: ICD-10-CM

## 2025-06-09 DIAGNOSIS — Z94.0 HTN, KIDNEY TRANSPLANT RELATED: ICD-10-CM

## 2025-06-09 DIAGNOSIS — Z94.0 KIDNEY REPLACED BY TRANSPLANT: Primary | ICD-10-CM

## 2025-06-09 DIAGNOSIS — I15.1 HTN, KIDNEY TRANSPLANT RELATED: ICD-10-CM

## 2025-06-09 DIAGNOSIS — Z48.298 AFTERCARE FOLLOWING ORGAN TRANSPLANT: Primary | ICD-10-CM

## 2025-06-09 DIAGNOSIS — Z94.0 KIDNEY REPLACED BY TRANSPLANT: ICD-10-CM

## 2025-06-09 DIAGNOSIS — Z48.298 AFTERCARE FOLLOWING ORGAN TRANSPLANT: ICD-10-CM

## 2025-06-09 LAB
AMYLASE SERPL-CCNC: 146 U/L (ref 28–100)
ANION GAP SERPL CALCULATED.3IONS-SCNC: 10 MMOL/L (ref 7–15)
BUN SERPL-MCNC: 26.8 MG/DL (ref 6–20)
CALCIUM SERPL-MCNC: 9.5 MG/DL (ref 8.8–10.4)
CHLORIDE SERPL-SCNC: 108 MMOL/L (ref 98–107)
CMV DNA SPEC NAA+PROBE-ACNC: NOT DETECTED IU/ML
CREAT SERPL-MCNC: 1.73 MG/DL (ref 0.67–1.17)
EGFRCR SERPLBLD CKD-EPI 2021: 48 ML/MIN/1.73M2
ERYTHROCYTE [DISTWIDTH] IN BLOOD BY AUTOMATED COUNT: 16.6 % (ref 10–15)
GLUCOSE SERPL-MCNC: 102 MG/DL (ref 70–99)
HCO3 SERPL-SCNC: 24 MMOL/L (ref 22–29)
HCT VFR BLD AUTO: 39.2 % (ref 40–53)
HGB BLD-MCNC: 12 G/DL (ref 13.3–17.7)
LIPASE SERPL-CCNC: 78 U/L (ref 13–60)
MAGNESIUM SERPL-MCNC: 1.8 MG/DL (ref 1.7–2.3)
MCH RBC QN AUTO: 28.6 PG (ref 26.5–33)
MCHC RBC AUTO-ENTMCNC: 30.6 G/DL (ref 31.5–36.5)
MCV RBC AUTO: 94 FL (ref 78–100)
MYCOPHENOLATE SERPL LC/MS/MS-MCNC: 1.51 MG/L (ref 1–3.5)
MYCOPHENOLATE-G SERPL LC/MS/MS-MCNC: 66.6 MG/L (ref 30–95)
PHOSPHATE SERPL-MCNC: 3.6 MG/DL (ref 2.5–4.5)
PLATELET # BLD AUTO: 168 10E3/UL (ref 150–450)
POTASSIUM SERPL-SCNC: 5.1 MMOL/L (ref 3.4–5.3)
RBC # BLD AUTO: 4.19 10E6/UL (ref 4.4–5.9)
SODIUM SERPL-SCNC: 142 MMOL/L (ref 135–145)
SPECIMEN TYPE: NORMAL
TACROLIMUS BLD-MCNC: 7.4 UG/L (ref 5–15)
TME LAST DOSE: NORMAL H
WBC # BLD AUTO: 5.3 10E3/UL (ref 4–11)

## 2025-06-09 PROCEDURE — 80180 DRUG SCRN QUAN MYCOPHENOLATE: CPT

## 2025-06-09 PROCEDURE — 83690 ASSAY OF LIPASE: CPT

## 2025-06-09 PROCEDURE — 80048 BASIC METABOLIC PNL TOTAL CA: CPT

## 2025-06-09 PROCEDURE — 82150 ASSAY OF AMYLASE: CPT

## 2025-06-09 PROCEDURE — 80197 ASSAY OF TACROLIMUS: CPT

## 2025-06-09 PROCEDURE — 85041 AUTOMATED RBC COUNT: CPT

## 2025-06-09 PROCEDURE — 87799 DETECT AGENT NOS DNA QUANT: CPT

## 2025-06-09 PROCEDURE — 36415 COLL VENOUS BLD VENIPUNCTURE: CPT

## 2025-06-09 PROCEDURE — 84100 ASSAY OF PHOSPHORUS: CPT

## 2025-06-09 PROCEDURE — 83735 ASSAY OF MAGNESIUM: CPT

## 2025-06-09 NOTE — TELEPHONE ENCOUNTER
ISSUE:  Elevated lipase     Called rick Paniagua requesting a return phone call.   Sent MyC message.     ADDENDUM: spoke with Siva, said he was dealing with a stomach bug the last few days, symptoms have now resolved and he feels better. Thinks it may have been food poisoning as his wife experiences similar symptoms. Will repeat labs next week.     Would also like to book a hernia repair consultation with Dr. Whitehead at the 1 year post-transplant tayo. Appointment requested.

## 2025-06-10 LAB
BK VIRUS DNA IU/ML, INSTRUMENT (6800): 62 IU/ML (ref ?–1)
BK VIRUS SPECIMEN TYPE: ABNORMAL
BKV DNA SPEC NAA+PROBE-LOG#: 1.8 {LOG_COPIES}/ML

## 2025-06-12 DIAGNOSIS — Z94.0 HTN, KIDNEY TRANSPLANT RELATED: ICD-10-CM

## 2025-06-12 DIAGNOSIS — N39.0 URINARY TRACT INFECTION: ICD-10-CM

## 2025-06-12 DIAGNOSIS — I15.1 HTN, KIDNEY TRANSPLANT RELATED: ICD-10-CM

## 2025-06-16 ENCOUNTER — LAB (OUTPATIENT)
Dept: LAB | Facility: HOSPITAL | Age: 50
End: 2025-06-16
Payer: COMMERCIAL

## 2025-06-16 ENCOUNTER — RESULTS FOLLOW-UP (OUTPATIENT)
Dept: MULTI SPECIALTY CLINIC | Facility: CLINIC | Age: 50
End: 2025-06-16

## 2025-06-16 DIAGNOSIS — N39.0 URINARY TRACT INFECTION: ICD-10-CM

## 2025-06-16 DIAGNOSIS — Z94.83 PANCREAS REPLACED BY TRANSPLANT (H): ICD-10-CM

## 2025-06-16 DIAGNOSIS — Z94.0 KIDNEY REPLACED BY TRANSPLANT: ICD-10-CM

## 2025-06-16 LAB
ALBUMIN UR-MCNC: 20 MG/DL
AMYLASE SERPL-CCNC: 90 U/L (ref 28–100)
ANION GAP SERPL CALCULATED.3IONS-SCNC: 10 MMOL/L (ref 7–15)
APPEARANCE UR: CLEAR
BACTERIA #/AREA URNS HPF: ABNORMAL /HPF
BILIRUB UR QL STRIP: NEGATIVE
BUN SERPL-MCNC: 20 MG/DL (ref 6–20)
CALCIUM SERPL-MCNC: 9.6 MG/DL (ref 8.8–10.4)
CHLORIDE SERPL-SCNC: 109 MMOL/L (ref 98–107)
COLOR UR AUTO: ABNORMAL
CREAT SERPL-MCNC: 1.66 MG/DL (ref 0.67–1.17)
EGFRCR SERPLBLD CKD-EPI 2021: 50 ML/MIN/1.73M2
GLUCOSE SERPL-MCNC: 116 MG/DL (ref 70–99)
GLUCOSE UR STRIP-MCNC: NEGATIVE MG/DL
HCO3 SERPL-SCNC: 23 MMOL/L (ref 22–29)
HGB UR QL STRIP: NEGATIVE
KETONES UR STRIP-MCNC: NEGATIVE MG/DL
LEUKOCYTE ESTERASE UR QL STRIP: ABNORMAL
LIPASE SERPL-CCNC: 50 U/L (ref 13–60)
NITRATE UR QL: NEGATIVE
PH UR STRIP: 7 [PH] (ref 5–7)
POTASSIUM SERPL-SCNC: 4.9 MMOL/L (ref 3.4–5.3)
RBC URINE: 5 /HPF
SODIUM SERPL-SCNC: 142 MMOL/L (ref 135–145)
SP GR UR STRIP: 1.01 (ref 1–1.03)
SQUAMOUS EPITHELIAL: <1 /HPF
UROBILINOGEN UR STRIP-MCNC: NORMAL MG/DL
WBC CLUMPS #/AREA URNS HPF: PRESENT /HPF
WBC URINE: 14 /HPF

## 2025-06-16 PROCEDURE — 81001 URINALYSIS AUTO W/SCOPE: CPT

## 2025-06-16 PROCEDURE — 87186 SC STD MICRODIL/AGAR DIL: CPT

## 2025-06-16 PROCEDURE — 36415 COLL VENOUS BLD VENIPUNCTURE: CPT

## 2025-06-16 PROCEDURE — 82150 ASSAY OF AMYLASE: CPT

## 2025-06-16 PROCEDURE — 80048 BASIC METABOLIC PNL TOTAL CA: CPT

## 2025-06-16 PROCEDURE — 83690 ASSAY OF LIPASE: CPT

## 2025-06-17 ENCOUNTER — TELEPHONE (OUTPATIENT)
Dept: TRANSPLANT | Facility: CLINIC | Age: 50
End: 2025-06-17
Payer: COMMERCIAL

## 2025-06-17 DIAGNOSIS — N39.0 URINARY TRACT INFECTION: Primary | ICD-10-CM

## 2025-06-17 DIAGNOSIS — Z94.0 HTN, KIDNEY TRANSPLANT RELATED: ICD-10-CM

## 2025-06-17 DIAGNOSIS — I15.1 HTN, KIDNEY TRANSPLANT RELATED: ICD-10-CM

## 2025-06-17 RX ORDER — NITROFURANTOIN 25; 75 MG/1; MG/1
100 CAPSULE ORAL 2 TIMES DAILY
Qty: 20 CAPSULE | Refills: 0 | Status: SHIPPED | OUTPATIENT
Start: 2025-06-17

## 2025-06-17 NOTE — TELEPHONE ENCOUNTER
Fernando Sorensen MD to Vitor Sandoval MD Poucher, Jessica, RN  (Selected Message)    6/17/25  3:30 PM  Sure, if that is what you recommend.     Ximena, can you take care of this please.  Let's do 100 mg bid for 10 days.     Vitor Acosta MD to Fernando Sorensen MD     6/17/25  3:24 PM  Could we do Nitrofurantoin x 7-10 days?    Siva notified of +UTI, e faecalis. Script sent.

## 2025-06-18 ENCOUNTER — TELEPHONE (OUTPATIENT)
Dept: TRANSPLANT | Facility: CLINIC | Age: 50
End: 2025-06-18
Payer: COMMERCIAL

## 2025-06-18 DIAGNOSIS — N39.0 URINARY TRACT INFECTION: Primary | ICD-10-CM

## 2025-06-18 DIAGNOSIS — I15.1 HTN, KIDNEY TRANSPLANT RELATED: ICD-10-CM

## 2025-06-18 DIAGNOSIS — Z94.0 HTN, KIDNEY TRANSPLANT RELATED: ICD-10-CM

## 2025-06-18 DIAGNOSIS — Z94.0 KIDNEY REPLACED BY TRANSPLANT: ICD-10-CM

## 2025-06-18 DIAGNOSIS — Z48.298 AFTERCARE FOLLOWING ORGAN TRANSPLANT: ICD-10-CM

## 2025-06-18 LAB
BACTERIA UR CULT: ABNORMAL
BACTERIA UR CULT: ABNORMAL

## 2025-06-18 NOTE — TELEPHONE ENCOUNTER
Siva c/o generalized itching that started last evening, did not start po abx until this morning so is unrelated. Denies new foods or drinking, no new detergents or personal products, denies bug bites or seasonal allergies. Labs checked 2 days ago within baseline. Denies shortness of breath, swelling or throat closing.     Will take po diphenhydramine, agreeable to KAT visit if symptoms continue. First dose of po abx this AM.

## 2025-07-01 ENCOUNTER — RESULTS FOLLOW-UP (OUTPATIENT)
Dept: TRANSPLANT | Facility: CLINIC | Age: 50
End: 2025-07-01

## 2025-07-01 ENCOUNTER — LAB (OUTPATIENT)
Dept: LAB | Facility: HOSPITAL | Age: 50
End: 2025-07-01
Payer: COMMERCIAL

## 2025-07-01 DIAGNOSIS — N39.0 URINARY TRACT INFECTION: Primary | ICD-10-CM

## 2025-07-01 DIAGNOSIS — I15.1 HTN, KIDNEY TRANSPLANT RELATED: ICD-10-CM

## 2025-07-01 DIAGNOSIS — Z79.899 ENCOUNTER FOR LONG-TERM CURRENT USE OF MEDICATION: ICD-10-CM

## 2025-07-01 DIAGNOSIS — Z94.0 KIDNEY REPLACED BY TRANSPLANT: ICD-10-CM

## 2025-07-01 DIAGNOSIS — Z94.0 HTN, KIDNEY TRANSPLANT RELATED: ICD-10-CM

## 2025-07-01 DIAGNOSIS — Z48.298 AFTERCARE FOLLOWING ORGAN TRANSPLANT: ICD-10-CM

## 2025-07-01 DIAGNOSIS — Z94.83 PANCREAS REPLACED BY TRANSPLANT (H): ICD-10-CM

## 2025-07-01 DIAGNOSIS — Z98.890 OTHER SPECIFIED POSTPROCEDURAL STATES: ICD-10-CM

## 2025-07-01 LAB
ALBUMIN SERPL BCG-MCNC: 4.4 G/DL (ref 3.5–5.2)
ALP SERPL-CCNC: 219 U/L (ref 40–150)
ALT SERPL W P-5'-P-CCNC: 59 U/L (ref 0–70)
AMYLASE SERPL-CCNC: 105 U/L (ref 28–100)
ANION GAP SERPL CALCULATED.3IONS-SCNC: 9 MMOL/L (ref 7–15)
AST SERPL W P-5'-P-CCNC: 27 U/L (ref 0–45)
BILIRUB DIRECT SERPL-MCNC: 0.35 MG/DL (ref 0–0.3)
BILIRUB SERPL-MCNC: 0.9 MG/DL
BUN SERPL-MCNC: 21.9 MG/DL (ref 6–20)
CALCIUM SERPL-MCNC: 10 MG/DL (ref 8.8–10.4)
CHLORIDE SERPL-SCNC: 105 MMOL/L (ref 98–107)
CMV DNA SPEC NAA+PROBE-ACNC: NOT DETECTED IU/ML
CREAT SERPL-MCNC: 1.83 MG/DL (ref 0.67–1.17)
EGFRCR SERPLBLD CKD-EPI 2021: 45 ML/MIN/1.73M2
ERYTHROCYTE [DISTWIDTH] IN BLOOD BY AUTOMATED COUNT: 14.4 % (ref 10–15)
GLUCOSE SERPL-MCNC: 103 MG/DL (ref 70–99)
HCO3 SERPL-SCNC: 27 MMOL/L (ref 22–29)
HCT VFR BLD AUTO: 40.6 % (ref 40–53)
HGB BLD-MCNC: 12.6 G/DL (ref 13.3–17.7)
LIPASE SERPL-CCNC: 51 U/L (ref 13–60)
MCH RBC QN AUTO: 29.1 PG (ref 26.5–33)
MCHC RBC AUTO-ENTMCNC: 31 G/DL (ref 31.5–36.5)
MCV RBC AUTO: 94 FL (ref 78–100)
MYCOPHENOLATE SERPL LC/MS/MS-MCNC: 1.72 MG/L (ref 1–3.5)
MYCOPHENOLATE-G SERPL LC/MS/MS-MCNC: 63.6 MG/L (ref 30–95)
PLATELET # BLD AUTO: 176 10E3/UL (ref 150–450)
POTASSIUM SERPL-SCNC: 4.8 MMOL/L (ref 3.4–5.3)
PROT SERPL-MCNC: 7.1 G/DL (ref 6.4–8.3)
RBC # BLD AUTO: 4.33 10E6/UL (ref 4.4–5.9)
SODIUM SERPL-SCNC: 141 MMOL/L (ref 135–145)
SPECIMEN TYPE: NORMAL
TME LAST DOSE: NORMAL H
TME LAST DOSE: NORMAL H
WBC # BLD AUTO: 6.7 10E3/UL (ref 4–11)

## 2025-07-01 PROCEDURE — 85014 HEMATOCRIT: CPT

## 2025-07-01 PROCEDURE — 85018 HEMOGLOBIN: CPT

## 2025-07-01 PROCEDURE — 82150 ASSAY OF AMYLASE: CPT

## 2025-07-01 PROCEDURE — 87799 DETECT AGENT NOS DNA QUANT: CPT

## 2025-07-01 PROCEDURE — 80180 DRUG SCRN QUAN MYCOPHENOLATE: CPT

## 2025-07-01 PROCEDURE — 36415 COLL VENOUS BLD VENIPUNCTURE: CPT

## 2025-07-01 PROCEDURE — 83690 ASSAY OF LIPASE: CPT

## 2025-07-01 PROCEDURE — 80048 BASIC METABOLIC PNL TOTAL CA: CPT

## 2025-07-01 PROCEDURE — 84155 ASSAY OF PROTEIN SERUM: CPT

## 2025-07-01 PROCEDURE — 82310 ASSAY OF CALCIUM: CPT

## 2025-07-02 LAB
BK VIRUS SPECIMEN TYPE: NORMAL
BKV DNA # SPEC NAA+PROBE: NOT DETECTED IU/ML

## 2025-07-09 DIAGNOSIS — Z94.0 KIDNEY REPLACED BY TRANSPLANT: Primary | ICD-10-CM

## 2025-07-09 RX ORDER — SODIUM BICARBONATE 650 MG/1
1950 TABLET ORAL 3 TIMES DAILY
Qty: 270 TABLET | Refills: 11 | Status: SHIPPED | OUTPATIENT
Start: 2025-07-09

## 2025-07-09 RX ORDER — DAPSONE 25 MG/1
50 TABLET ORAL DAILY
Qty: 180 TABLET | Refills: 0 | Status: SHIPPED | OUTPATIENT
Start: 2025-07-09

## 2025-07-14 ENCOUNTER — OFFICE VISIT (OUTPATIENT)
Dept: TRANSPLANT | Facility: CLINIC | Age: 50
End: 2025-07-14
Attending: SURGERY
Payer: MEDICARE

## 2025-07-14 ENCOUNTER — LAB (OUTPATIENT)
Dept: LAB | Facility: HOSPITAL | Age: 50
End: 2025-07-14
Payer: MEDICARE

## 2025-07-14 VITALS
SYSTOLIC BLOOD PRESSURE: 148 MMHG | WEIGHT: 233.6 LBS | OXYGEN SATURATION: 95 % | HEART RATE: 92 BPM | BODY MASS INDEX: 32.58 KG/M2 | DIASTOLIC BLOOD PRESSURE: 82 MMHG

## 2025-07-14 DIAGNOSIS — Z79.899 ENCOUNTER FOR LONG-TERM CURRENT USE OF MEDICATION: ICD-10-CM

## 2025-07-14 DIAGNOSIS — Z94.0 KIDNEY REPLACED BY TRANSPLANT: ICD-10-CM

## 2025-07-14 DIAGNOSIS — Z98.890 OTHER SPECIFIED POSTPROCEDURAL STATES: Primary | ICD-10-CM

## 2025-07-14 DIAGNOSIS — Z94.0 HTN, KIDNEY TRANSPLANT RELATED: ICD-10-CM

## 2025-07-14 DIAGNOSIS — Z94.83 PANCREAS REPLACED BY TRANSPLANT (H): ICD-10-CM

## 2025-07-14 DIAGNOSIS — I15.1 HTN, KIDNEY TRANSPLANT RELATED: ICD-10-CM

## 2025-07-14 DIAGNOSIS — Z48.298 AFTERCARE FOLLOWING ORGAN TRANSPLANT: ICD-10-CM

## 2025-07-14 DIAGNOSIS — N39.0 URINARY TRACT INFECTION WITHOUT HEMATURIA, SITE UNSPECIFIED: ICD-10-CM

## 2025-07-14 LAB
AMYLASE SERPL-CCNC: 87 U/L (ref 28–100)
ANION GAP SERPL CALCULATED.3IONS-SCNC: 11 MMOL/L (ref 7–15)
BK VIRUS SPECIMEN TYPE: NORMAL
BKV DNA # SPEC NAA+PROBE: NOT DETECTED IU/ML
BUN SERPL-MCNC: 23.7 MG/DL (ref 6–20)
CALCIUM SERPL-MCNC: 9.6 MG/DL (ref 8.8–10.4)
CD3 CELLS # BLD: 600 CELLS/UL (ref 603–2990)
CD3 CELLS NFR BLD: 80 % (ref 49–84)
CD3+CD4+ CELLS # BLD: 231 CELLS/UL (ref 441–2156)
CD3+CD4+ CELLS NFR BLD: 31 % (ref 28–63)
CD3+CD4+ CELLS/CD3+CD8+ CLL BLD: 0.64 % (ref 1.4–2.6)
CD3+CD8+ CELLS # BLD: 362 CELLS/UL (ref 125–1312)
CD3+CD8+ CELLS NFR BLD: 48 % (ref 10–40)
CHLORIDE SERPL-SCNC: 109 MMOL/L (ref 98–107)
CMV DNA SPEC NAA+PROBE-ACNC: NOT DETECTED IU/ML
CREAT SERPL-MCNC: 1.92 MG/DL (ref 0.67–1.17)
EGFRCR SERPLBLD CKD-EPI 2021: 42 ML/MIN/1.73M2
ERYTHROCYTE [DISTWIDTH] IN BLOOD BY AUTOMATED COUNT: 13.7 % (ref 10–15)
GLUCOSE SERPL-MCNC: 114 MG/DL (ref 70–99)
HCO3 SERPL-SCNC: 22 MMOL/L (ref 22–29)
HCT VFR BLD AUTO: 38.6 % (ref 40–53)
HGB BLD-MCNC: 12.2 G/DL (ref 13.3–17.7)
LIPASE SERPL-CCNC: 46 U/L (ref 13–60)
MCH RBC QN AUTO: 28.4 PG (ref 26.5–33)
MCHC RBC AUTO-ENTMCNC: 31.6 G/DL (ref 31.5–36.5)
MCV RBC AUTO: 90 FL (ref 78–100)
PLATELET # BLD AUTO: 160 10E3/UL (ref 150–450)
POTASSIUM SERPL-SCNC: 4.7 MMOL/L (ref 3.4–5.3)
RBC # BLD AUTO: 4.3 10E6/UL (ref 4.4–5.9)
SODIUM SERPL-SCNC: 142 MMOL/L (ref 135–145)
SPECIMEN TYPE: NORMAL
T CELL COMMENT: ABNORMAL
TACROLIMUS BLD-MCNC: 9.1 UG/L (ref 5–15)
TME LAST DOSE: NORMAL H
TME LAST DOSE: NORMAL H
WBC # BLD AUTO: 4.8 10E3/UL (ref 4–11)

## 2025-07-14 PROCEDURE — 82150 ASSAY OF AMYLASE: CPT

## 2025-07-14 PROCEDURE — 80048 BASIC METABOLIC PNL TOTAL CA: CPT

## 2025-07-14 PROCEDURE — 87799 DETECT AGENT NOS DNA QUANT: CPT

## 2025-07-14 PROCEDURE — 36415 COLL VENOUS BLD VENIPUNCTURE: CPT

## 2025-07-14 PROCEDURE — G0463 HOSPITAL OUTPT CLINIC VISIT: HCPCS | Performed by: SURGERY

## 2025-07-14 PROCEDURE — 83690 ASSAY OF LIPASE: CPT

## 2025-07-14 PROCEDURE — 99214 OFFICE O/P EST MOD 30 MIN: CPT | Performed by: SURGERY

## 2025-07-14 PROCEDURE — 3079F DIAST BP 80-89 MM HG: CPT | Performed by: SURGERY

## 2025-07-14 PROCEDURE — 80197 ASSAY OF TACROLIMUS: CPT

## 2025-07-14 PROCEDURE — 85027 COMPLETE CBC AUTOMATED: CPT

## 2025-07-14 PROCEDURE — 80180 DRUG SCRN QUAN MYCOPHENOLATE: CPT

## 2025-07-14 PROCEDURE — 3077F SYST BP >= 140 MM HG: CPT | Performed by: SURGERY

## 2025-07-14 PROCEDURE — 86359 T CELLS TOTAL COUNT: CPT

## 2025-07-14 NOTE — LETTER
7/14/2025      Siva Ramey  [address] [place] [address]      Dear Colleague,    Thank you for referring your patient, Siva Ramey, to the [facility]. Please see a copy of my visit note below.    Transplant Surgery Progress Note    Transplants:  7/20/2024 (Kidney / Pancreas)  S:  49 year old male with PMH significant for DMII (on insulin pump) and resulting ESKD (on HD every MWF since 8/2021). 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 Underwent simultaneous kidney pancreas transplant on 7/20/24 complicated by VT/VF arrest in PACU. Received 2 rounds of CPR before ROSC. Taken emergently to the cath lab and found to have 100% occlusion of the RCA now s/p IBAN x 2.     Recovered well from surgery with good/stable graft function in both kidney and pancreas. Has been having issues with recurrent urinary tract infections, requiring IV or recurrent PO abx. Polymicrobial infections. Some thought that there may be a component of retention and reflux.     Has persistent incisional hernia, minimal tenderness, no obstruction. Interested in surgical repair.     Transplant Type:  DDKT (SPK)  Donor Type: Donation after Brain Death   Transplant Date:  7/20/2024 (Kidney / Pancreas)   Ureteral Stent:  Yes   Crossmatch:  negative   DSA at Tx:  No  Baseline Cr: TBD   DeNovo DSA: No    Acute Rejection Hx:  No    Present Maintenance Immunosuppression:  Tacrolimus and Mycophenolic acid    CMV IgG Ab Discordance:  No  EBV IgG Ab Discordance:  No    BK Viremia:  No  EBV Viremia:  No    Transplant Coordinator: Julianna Edwards     Transplant Office Phone Number: [phone]     Immunosuppressant Medications       Immunosuppressive Agents Disp Start End     mycophenolic acid (GENERIC EQUIVALENT) 180 MG EC tablet 240  tablet 8/9/2024 --    Sig - Route: Take 4 tablets (720 mg) by mouth 2 times daily - Oral    Class: E-Prescribe    Notes to Pharmacy: TXP DT 7/20/2024 (Kidney / Pancreas) TXP Dischg DT 7/30/2024 DX Kidney replaced by transplant Z94.0 [facility] ([place], [place])     tacrolimus (GENERIC EQUIVALENT) 1 MG capsule 300 capsule 8/8/2024 --    Sig - Route: Take 5 capsules (5 mg) by mouth 2 times daily - Oral    Class: E-Prescribe    Notes to Pharmacy: TXP DT 7/20/2024 (Kidney / Pancreas) TXP Dischg DT 7/30/2024 DX Kidney replaced by transplant Z94.0 [facility] ([place], [place])            Possible Immunosuppression-related side effects:   []             headache  []             vivid dreams  []             irritability  []             cognitive difficuties  []             fine tremor  []             nausea  []             diarrhea  []             neuropathy      []             edema  []             renal calcineurin toxicity  []             hyperkalemia  []             post-transplant diabetes  []             decreased appetite  []             increased appetite  []             other:  []             none    Prescription Medications as of 7/14/2025         Rx Number Disp Refills Start End Last Dispensed Date Next Fill Date Owning Pharmacy    acetaminophen (TYLENOL) 500 MG tablet  -- --  --       Sig: Take 500 mg by mouth every 4 hours as needed    Class: Historical    Route: Oral    aspirin (ASA) 81 MG chewable tablet  30 tablet 3 2/1/2024 --   [facility] - [place], [place] - [address] [facility]    Sig: Take 1 tablet (81 mg) by mouth daily Starting tomorrow.    Class: E-Prescribe    Route: Oral    atorvastatin (LIPITOR) 40 MG tablet  90 tablet 4 10/28/2024 --   [facility] [place], [place] - [address] [place] [address]    Sig: Take 1 tablet (40 mg) by mouth every evening.    Class: E-Prescribe     Route: Oral    blood glucose (NO BRAND SPECIFIED) test strip  100 strip 5 10/29/2024 --   [address]    Sig: Use to test blood sugar 4 times daily or as directed.    Class: E-Prescribe    Notes to Pharmacy: Pt has an Accu chek meter    carvedilol (COREG) 3.125 MG tablet  60 tablet 11 11/25/2024 --   [facility] [place], [place] - [address] [place] [address]    Sig: Take 1 tablet (3.125 mg) by mouth 2 times daily.    Class: E-Prescribe    Route: Oral    Renewals       Renewal requests to authorizing provider (Shanti Celeste APRN CNP) <b>prohibited</b>            cinacalcet (SENSIPAR) 30 MG tablet  180 tablet 1 2/6/2025 --   [address]    Sig: Take 2 tablets (60 mg) by mouth daily.    Class: E-Prescribe    Route: Oral    clobetasol propionate (TEMOVATE) 0.05 % external cream  45 g 0 3/26/2025 --   [address]    Sig: Apply topically 2 times daily.    Class: E-Prescribe    Route: Topical    dapsone (ACZONE) 25 MG tablet  180 tablet 0 7/9/2025 --   [address]    Sig: TAKE TWO TABLETS BY MOUTH ONCE DAILY    Class: E-Prescribe    Route: Oral    dorzolamide-timolol (COSOPT) 2-0.5 % ophthalmic solution  -- --  --       Sig: Place 1 drop into the right eye 2 times daily.    Class: Historical    Route: Right Eye    fosfomycin (MONUROL) 3 g Packet  3 packet 0 5/30/2025 --   [address]    Sig: Take 1 packet (3 g) by mouth every other day.    Class: E-Prescribe    Route: Oral    levofloxacin (LEVAQUIN) 750 MG tablet  7 tablet 0 2/21/2025 --   [address]    Sig: Take 1 tablet (750 mg) by mouth daily.    Class: E-Prescribe    Route: Oral    magnesium glycinate 100 MG CAPS capsule   -- --  --       Sig: Take 300 mg by mouth 2 times daily.    Class: Historical    Route: Oral    mycophenolic acid (GENERIC EQUIVALENT) 180 MG EC tablet  -- -- 11/25/2024 --       Sig: Take 540 mg twice a day until follow up appointment with Dr. Sorensen    Class: No Print Out    nitroFURantoin macrocrystal-monohydrate (MACROBID) 100 MG capsule  20 capsule 0 6/17/2025 --   [facility] [address]    Sig: Take 1 capsule (100 mg) by mouth 2 times daily.    Class: E-Prescribe    Route: Oral    sodium bicarbonate 650 MG tablet  270 tablet 11 7/9/2025 --   [address]    Sig: TAKE 3 TABLETS (1,950 MG) BY MOUTH 3 TIMES DAILY    Class: E-Prescribe    Route: Oral    tacrolimus (GENERIC EQUIVALENT) 0.5 MG capsule  180 capsule 3 3/5/2025 --   [address]    Sig: ON HOLD    Class: E-Prescribe    Notes to Pharmacy: TXP DT 7/20/2024 (Kidney / Pancreas) TXP Dischg DT 7/30/2024 DX Kidney replaced by transplant Z94.0 TX [facility] ([place], [place])    tacrolimus (GENERIC EQUIVALENT) 1 MG capsule  180 capsule 3 3/5/2025 --   [address]    Sig: Take 2 capsules (2 mg) by mouth 2 times daily. Total dose= 2 mg twice daily    Class: E-Prescribe    Notes to Pharmacy: TXP DT 7/20/2024 (Kidney / Pancreas) TXP Dischg DT 7/30/2024 DX Kidney replaced by transplant Z94.0 [facility] ([place], [place])    Route: Oral    ticagrelor (BRILINTA) 90 MG tablet  180 tablet 0 4/29/2025 --   [address]    Sig: Take 1 tablet (90 mg) by mouth 2 times daily.    Class: E-Prescribe    Route: Oral    pregabalin (LYRICA) 150 MG capsule  -- --  --       Sig: Take 150 mg by mouth 2 times daily.    Class:  Historical    Route: Oral            O:   Pulse:  [92] 92  BP: (148)/(82) 148/82  SpO2:  [95 %] 95 %  General Appearance: in no apparent distress.   Skin: Normal, no rashes or jaundice  Heart: regular rate and rhythm  Lungs: easy respirations, no audible wheezing.  Abdomen: abdomen soft, rounded, nontender. Incision healed well. Notable incisional hernia involving upper half of incision. Soft, reducible. ~5cm defect   Extremities: Tremor absent.   Edema: absent.         Latest Ref Rng & Units 7/14/2025     7:56 AM 7/1/2025     7:29 AM 6/16/2025     7:30 AM 6/9/2025     8:17 AM 5/29/2025     7:38 AM   Transplant Immunosuppression Labs   Creat 0.67 - 1.17 mg/dL 1.92  1.83  1.66  1.73  1.71    Urea Nitrogen 6.0 - 20.0 mg/dL 23.7  21.9  20.0  26.8  24.0    WBC 4.0 - 11.0 10e3/uL 4.8  6.7   5.3         Chemistries:   Recent Labs   Lab Test 07/14/25  0756   BUN 23.7*   CR 1.92*   GFRESTIMATED 42*   *     Lab Results   Component Value Date    A1C 6.4 07/20/2024    CPEPT 4.8 09/27/2021     Recent Labs   Lab Test 07/01/25  0729   ALBUMIN 4.4   BILITOTAL 0.9   ALKPHOS 219*   AST 27   ALT 59     Urine Studies:  Recent Labs   Lab Test 06/16/25  0730   COLOR Light Yellow   APPEARANCE Clear   URINEGLC Negative   URINEBILI Negative   URINEKETONE Negative   SG 1.014   UBLD Negative   URINEPH 7.0   PROTEIN 20*   NITRITE Negative   LEUKEST 75 Aliza/uL*   RBCU 5*   WBCU 14*     No lab results found.  Hematology:   Recent Labs   Lab Test 07/14/25  0756 07/01/25  0729 06/09/25  0817   HGB 12.2* 12.6* 12.0*    176 168   WBC 4.8 6.7 5.3     Coags:   Recent Labs   Lab Test 07/20/24  0821 07/20/24  0600   INR 1.39* 1.44*     HLA antibodies:   SA1 HI RISK ARIADNE   Date Value Ref Range Status   03/03/2025 Invalid. See FlowPRA results.  Final     SA1 MOD RISK ARIADNE   Date Value Ref Range Status   03/03/2025 Invalid. See FlowPRA results.  Final     SA2 HI RISK ARIADNE   Date Value Ref Range Status   03/03/2025 Invalid. See FlowPRA results.   Final     SA2 MOD RISK ARIADNE   Date Value Ref Range Status   03/03/2025 Invalid. See FlowPRA results.  Final       Assessment: Siva Ramey is doing fairly well s/p DDKT (SPK):  Issues we addressed during his visit include:    Plan:    Incisional hernia:   -plan for open repair with mesh.   -Will need cardiac clearance prior to surgery.   -given recurrent UTIs, will connect with Urology (Dr. Harris) re: plans for intervention. Would be best to control infection prior to putting in mesh. Explained that we would need to determine a plan for and treat this prior to hernia repair, which patient agreed with  -given ER warnings for hernia obstruction/incarceration    Followup: surgery once above completed    Total Time: 20 min,   Counselling Time: 15 min.    Harrison Whitehead MD    Again, thank you for allowing me to participate in the care of your patient.        Sincerely,        Harrison Whitehead MD    Electronically signed

## 2025-07-14 NOTE — PROGRESS NOTES
Transplant Surgery Progress Note    Transplants:  7/20/2024 (Kidney / Pancreas)  S:  49 year old male with PMH significant for DMII (on insulin pump) and resulting ESKD (on HD every MWF since 8/2021). 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 Underwent simultaneous kidney pancreas transplant on 7/20/24 complicated by VT/VF arrest in PACU. Received 2 rounds of CPR before ROSC. Taken emergently to the cath lab and found to have 100% occlusion of the RCA now s/p IBAN x 2.     Recovered well from surgery with good/stable graft function in both kidney and pancreas. Has been having issues with recurrent urinary tract infections, requiring IV or recurrent PO abx. Polymicrobial infections. Some thought that there may be a component of retention and reflux.     Has persistent incisional hernia, minimal tenderness, no obstruction. Interested in surgical repair.     Transplant Type:  DDKT (SPK)  Donor Type: Donation after Brain Death   Transplant Date:  7/20/2024 (Kidney / Pancreas)   Ureteral Stent:  Yes   Crossmatch:  negative   DSA at Tx:  No  Baseline Cr: TBD   DeNovo DSA: No    Acute Rejection Hx:  No    Present Maintenance Immunosuppression:  Tacrolimus and Mycophenolic acid    CMV IgG Ab Discordance:  No  EBV IgG Ab Discordance:  No    BK Viremia:  No  EBV Viremia:  No    Transplant Coordinator: Julianna Edwards     Transplant Office Phone Number: [phone]     Immunosuppressant Medications       Immunosuppressive Agents Disp Start End     mycophenolic acid (GENERIC EQUIVALENT) 180 MG EC tablet 240 tablet 8/9/2024 --    Sig - Route: Take 4 tablets (720 mg) by mouth 2 times daily - Oral    Class: E-Prescribe    Notes to Pharmacy: TXP DT 7/20/2024 (Kidney / Pancreas) TXP Dischg DT 7/30/2024 DX Kidney replaced by transplant Z94.0 [facility] ([place], [place])     tacrolimus (GENERIC EQUIVALENT) 1 MG capsule 300 capsule 8/8/2024 --    Sig - Route: Take 5 capsules (5 mg) by mouth 2 times daily - Oral    Class: E-Prescribe    Notes to Pharmacy: TXP DT 7/20/2024 (Kidney / Pancreas) TXP Dischg DT 7/30/2024 DX Kidney replaced by transplant Z94.0 [facility] ([place], [place])            Possible Immunosuppression-related side effects:   []             headache  []             vivid dreams  []             irritability  []             cognitive difficuties  []             fine tremor  []             nausea  []             diarrhea  []             neuropathy      []             edema  []             renal calcineurin toxicity  []             hyperkalemia  []             post-transplant diabetes  []             decreased appetite  []             increased appetite  []             other:  []             none    Prescription Medications as of 7/14/2025         Rx Number Disp Refills Start End Last Dispensed Date Next Fill Date Owning Pharmacy    acetaminophen (TYLENOL) 500 MG tablet  -- --  --       Sig: Take 500 mg by mouth every 4 hours as needed    Class: Historical    Route: Oral    aspirin (ASA) 81 MG chewable tablet  30 tablet 3 2/1/2024 --   [facility] - [place], [place] - [address] [place] [address]    Sig: Take 1 tablet (81 mg) by mouth daily Starting tomorrow.    Class: E-Prescribe    Route: Oral    atorvastatin (LIPITOR) 40 MG tablet  90 tablet 4 10/28/2024 --   [address]    Sig: Take 1 tablet (40 mg) by mouth every evening.    Class: E-Prescribe    Route: Oral    blood glucose (NO BRAND SPECIFIED) test strip  100 strip 5 10/29/2024 --   [address]    Sig: Use to test blood sugar 4 times daily or as directed.    Class: E-Prescribe     Notes to Pharmacy: Pt has an Accu chek meter    carvedilol (COREG) 3.125 MG tablet  60 tablet 11 11/25/2024 --   [address]    Sig: Take 1 tablet (3.125 mg) by mouth 2 times daily.    Class: E-Prescribe    Route: Oral    Renewals       Renewal requests to authorizing provider (Shanti Celeste APRN CNP) <b>prohibited</b>            cinacalcet (SENSIPAR) 30 MG tablet  180 tablet 1 2/6/2025 --   [address]    Sig: Take 2 tablets (60 mg) by mouth daily.    Class: E-Prescribe    Route: Oral    clobetasol propionate (TEMOVATE) 0.05 % external cream  45 g 0 3/26/2025 --   [address]    Sig: Apply topically 2 times daily.    Class: E-Prescribe    Route: Topical    dapsone (ACZONE) 25 MG tablet  180 tablet 0 7/9/2025 --   [address]    Sig: TAKE TWO TABLETS BY MOUTH ONCE DAILY    Class: E-Prescribe    Route: Oral    dorzolamide-timolol (COSOPT) 2-0.5 % ophthalmic solution  -- --  --       Sig: Place 1 drop into the right eye 2 times daily.    Class: Historical    Route: Right Eye    fosfomycin (MONUROL) 3 g Packet  3 packet 0 5/30/2025 --   [address]    Sig: Take 1 packet (3 g) by mouth every other day.    Class: E-Prescribe    Route: Oral    levofloxacin (LEVAQUIN) 750 MG tablet  7 tablet 0 2/21/2025 --   [address]    Sig: Take 1 tablet (750 mg) by mouth daily.    Class: E-Prescribe    Route: Oral    magnesium glycinate 100 MG CAPS capsule  -- --  --       Sig: Take 300 mg by mouth 2 times daily.    Class: Historical    Route: Oral    mycophenolic acid (GENERIC EQUIVALENT) 180 MG EC tablet  -- -- 11/25/2024 --       Sig: Take 540 mg twice a day until follow up appointment with   Spong    Class: No Print Out    nitroFURantoin macrocrystal-monohydrate (MACROBID) 100 MG capsule  20 capsule 0 6/17/2025 --   [facility] [address]    Sig: Take 1 capsule (100 mg) by mouth 2 times daily.    Class: E-Prescribe    Route: Oral    sodium bicarbonate 650 MG tablet  270 tablet 11 7/9/2025 --   [address]    Sig: TAKE 3 TABLETS (1,950 MG) BY MOUTH 3 TIMES DAILY    Class: E-Prescribe    Route: Oral    tacrolimus (GENERIC EQUIVALENT) 0.5 MG capsule  180 capsule 3 3/5/2025 --   [address]    Sig: ON HOLD    Class: E-Prescribe    Notes to Pharmacy: TXP DT 7/20/2024 (Kidney / Pancreas) TXP Dischg DT 7/30/2024 DX Kidney replaced by transplant Z94.0 [facility] ([place], [place])    tacrolimus (GENERIC EQUIVALENT) 1 MG capsule  180 capsule 3 3/5/2025 --   [address]    Sig: Take 2 capsules (2 mg) by mouth 2 times daily. Total dose= 2 mg twice daily    Class: E-Prescribe    Notes to Pharmacy: TXP DT 7/20/2024 (Kidney / Pancreas) TXP Dischg DT 7/30/2024 DX Kidney replaced by transplant Z94.0 [facility] ([place], [place])    Route: Oral    ticagrelor (BRILINTA) 90 MG tablet  180 tablet 0 4/29/2025 --   [address]    Sig: Take 1 tablet (90 mg) by mouth 2 times daily.    Class: E-Prescribe    Route: Oral    pregabalin (LYRICA) 150 MG capsule  -- --  --       Sig: Take 150 mg by mouth 2 times daily.    Class: Historical    Route: Oral            O:   Pulse:  [92] 92  BP: (148)/(82) 148/82  SpO2:  [95 %] 95 %  General Appearance: in no apparent distress.   Skin: Normal, no rashes or jaundice  Heart: regular rate and rhythm  Lungs: easy respirations, no  audible wheezing.  Abdomen: abdomen soft, rounded, nontender. Incision healed well. Notable incisional hernia involving upper half of incision. Soft, reducible. ~5cm defect   Extremities: Tremor absent.   Edema: absent.         Latest Ref Rng & Units 7/14/2025     7:56 AM 7/1/2025     7:29 AM 6/16/2025     7:30 AM 6/9/2025     8:17 AM 5/29/2025     7:38 AM   Transplant Immunosuppression Labs   Creat 0.67 - 1.17 mg/dL 1.92  1.83  1.66  1.73  1.71    Urea Nitrogen 6.0 - 20.0 mg/dL 23.7  21.9  20.0  26.8  24.0    WBC 4.0 - 11.0 10e3/uL 4.8  6.7   5.3         Chemistries:   Recent Labs   Lab Test 07/14/25  0756   BUN 23.7*   CR 1.92*   GFRESTIMATED 42*   *     Lab Results   Component Value Date    A1C 6.4 07/20/2024    CPEPT 4.8 09/27/2021     Recent Labs   Lab Test 07/01/25  0729   ALBUMIN 4.4   BILITOTAL 0.9   ALKPHOS 219*   AST 27   ALT 59     Urine Studies:  Recent Labs   Lab Test 06/16/25  0730   COLOR Light Yellow   APPEARANCE Clear   URINEGLC Negative   URINEBILI Negative   URINEKETONE Negative   SG 1.014   UBLD Negative   URINEPH 7.0   PROTEIN 20*   NITRITE Negative   LEUKEST 75 Aliza/uL*   RBCU 5*   WBCU 14*     No lab results found.  Hematology:   Recent Labs   Lab Test 07/14/25  0756 07/01/25  0729 06/09/25  0817   HGB 12.2* 12.6* 12.0*    176 168   WBC 4.8 6.7 5.3     Coags:   Recent Labs   Lab Test 07/20/24  0821 07/20/24  0600   INR 1.39* 1.44*     HLA antibodies:   SA1 HI RISK ARIADNE   Date Value Ref Range Status   03/03/2025 Invalid. See FlowPRA results.  Final     SA1 MOD RISK ARIADNE   Date Value Ref Range Status   03/03/2025 Invalid. See FlowPRA results.  Final     SA2 HI RISK ARIADNE   Date Value Ref Range Status   03/03/2025 Invalid. See FlowPRA results.  Final     SA2 MOD RISK ARIADNE   Date Value Ref Range Status   03/03/2025 Invalid. See FlowPRA results.  Final       Assessment: Siva TANIYA Ramey is doing fairly well s/p DDKT (SPK):  Issues we addressed during his visit include:    Plan:    Incisional  hernia:   -plan for open repair with mesh.   -Will need cardiac clearance prior to surgery.   -given recurrent UTIs, will connect with Urology (Dr. Harris) re: plans for intervention. Would be best to control infection prior to putting in mesh. Explained that we would need to determine a plan for and treat this prior to hernia repair, which patient agreed with  -given ER warnings for hernia obstruction/incarceration    Followup: surgery once above completed    Total Time: 20 min,   Counselling Time: 15 min.    Harrison Whitehead MD

## 2025-07-15 DIAGNOSIS — N39.0 URINARY TRACT INFECTION: ICD-10-CM

## 2025-07-15 DIAGNOSIS — I15.1 HTN, KIDNEY TRANSPLANT RELATED: ICD-10-CM

## 2025-07-15 DIAGNOSIS — Z94.0 HTN, KIDNEY TRANSPLANT RELATED: ICD-10-CM

## 2025-07-15 LAB
MYCOPHENOLATE SERPL LC/MS/MS-MCNC: 1.87 MG/L (ref 1–3.5)
MYCOPHENOLATE-G SERPL LC/MS/MS-MCNC: 55.1 MG/L (ref 30–95)
TME LAST DOSE: NORMAL H
TME LAST DOSE: NORMAL H

## 2025-07-16 ENCOUNTER — OFFICE VISIT (OUTPATIENT)
Dept: CARDIOLOGY | Facility: CLINIC | Age: 50
End: 2025-07-16
Attending: INTERNAL MEDICINE
Payer: MEDICARE

## 2025-07-16 VITALS
SYSTOLIC BLOOD PRESSURE: 152 MMHG | RESPIRATION RATE: 16 BRPM | WEIGHT: 234.8 LBS | DIASTOLIC BLOOD PRESSURE: 82 MMHG | HEIGHT: 71 IN | BODY MASS INDEX: 32.87 KG/M2 | HEART RATE: 90 BPM

## 2025-07-16 DIAGNOSIS — I50.22 CHRONIC SYSTOLIC HEART FAILURE (H): Primary | ICD-10-CM

## 2025-07-16 NOTE — PROGRESS NOTES
HEART CARE NOTE          Assessment/Recommendations     1. Severe HFrEF   Assessment / Plan  Near euvolemia on physical exam; denies HF symptoms of orthopnea, PND, LE edema - no changes at this time  Patient is high risk for adverse cardiac events 2/2 systolic dysfunction, elevated NTproBNP  GDMT as detailed below     Current Pharmacotherapy AHA Guideline-Directed Medical Therapy   Lisinopril - held 2/2 renal dysfunction Lisinopril 20 mg twice daily   Carvedilol 3.125 mg BID Carvedilol 25 mg twice daily   Spironolactone not started Spironolactone 25 mg once daily   Hydralazine NA Hydralazine 100 mg three times daily   Isosorbide dinitrate NA Isosorbide dinitrate 40 mg three times daily   SGLT2 inhibitor:Dapagliflozin/Empagliflozin - not started  Dapagliflozin or Empagliflozin 10 mg daily      2. DM2  Assessment / Plan  Management per PMD     3. CAD  Assessment / Plan  S/p CABG in 2019 and multiple PCIs in 2019 and 2024  Reports symptoms concerning for angina - will proceed with coronary angiogram +/- PCI; I discussed this with him including potential risk of stroke, myocardial infarction, or death.  We also discussed the possibility of vascular injury, bleeding requiring blood transfusion, or reaction to x-ray dye   [facility] will reach out to Transplant Nephrology team to get their recommendations re:pre-coronary angiogram orders     4. ESRD s/p renal transplant   Assessment / Plan  CKD; Follows with renal transplant team    30 minutes spent reviewing prior records (including documentation, laboratory studies, cardiac testing/imaging), history and physical exam, planning, and subsequent documentation.      The longitudinal plan of care for HFrEF was addressed during this visit. Due to the added complexity in care, I will continue to support Mr. Siva Ramey in the subsequent management of this condition(s) and with the ongoing continuity of care of this condition(s).    History of Present Illness/Subjective   "  Mr. Siva Ramey is a 49 year old male with a PMHx significant for (per Epic) PCI s/p IBAN 2019 and 2/2024, 2v CABG 2019, PAD, HTN, DM2, ESRD now s/p pancreas & renal transplant 7/20/2024. His post-op course was c/b VF arrest w/ short duration of CPR followed by ROSC and EKG showing inferior STEMI who presents to [facility] for follow-up care     Today, Mr. Ramey endorses progressive functional decline including more fatigue, intermittent chest pressure. Management plan as detailed above.     ECG: personally reviewed; nonspecific ST and T waves changes, sinus tachycardia.     ECHO (personally reviewed 1/30/25):   Left ventricular function is decreased. The ejection fraction is 35-40%  (moderately reduced). Akinesis of the basal-mid inferior and basal  inferoseptal segments present.  Right ventricular size and function are normal.  No significant valvular abnormalities present.  No pericardial effusion is present.     This study was compared with the study from 7/28/2024. No significant changes  noted.    Lab results: personally reviewed July 16, 2025; notable for CKD     Medical history and pertinent documents reviewed in Care Everywhere please where applicable see details above        Physical Examination Review of Systems   BP (!) 152/82 (BP Location: Right arm, Patient Position: Sitting, Cuff Size: Adult Regular)   Pulse 90   Resp 16   Ht 1.803 m (5' 11\")   Wt 106.5 kg (234 lb 12.8 oz)   BMI 32.75 kg/m    Body mass index is 32.75 kg/m .  Wt Readings from Last 3 Encounters:   07/16/25 106.5 kg (234 lb 12.8 oz)   07/14/25 106 kg (233 lb 9.6 oz)   03/26/25 102.1 kg (225 lb)     General Appearance:   no distress, normal body habitus   ENT/Mouth: membranes moist, no oral lesions or bleeding gums.      EYES:  no scleral icterus, normal conjunctivae   Neck: no carotid bruits or thyromegaly   Chest/Lungs:   lungs are clear to auscultation, no rales or wheezing, equal chest wall expansion    Cardiovascular:   " Regular. Normal first and second heart sounds with no murmurs, rubs, or gallops; the carotid, radial and posterior tibial pulses are intact, no JVD or LE edema bilaterally    Abdomen:  no organomegaly, masses, bruits, or tenderness; bowel sounds are present   Extremities: no cyanosis or clubbing   Skin: no xanthelasma, warm.    Neurologic: NAD     Psychiatric: alert and oriented x3, calm     A complete 10 systems ROS was reviewed  And is negative except what is listed in the HPI.          Medical History  Surgical History Family History Social History   Past Medical History:   Diagnosis Date    Acquired elevated diaphragm     Anemia in chronic kidney disease     Angina pectoris     Chronic kidney disease     Coronary artery disease     Diabetes mellitus, type II (H) 12/2001    Diabetic nephropathy (H)     MARQUEZ (dyspnea on exertion)     Dyslipidemia 12/2001    End stage renal disease (H)     History of blood transfusion 2004    Hypertension     takes medication    Ischemic cardiomyopathy     Metabolic acidosis     Myocardial infarction (H)     STEMI -Diagonal branch of the LAD    Obesity (BMI 30-39.9)     Peripheral neuropathy     Proteinuria     Retinopathy     Sleep apnea     Vitamin D deficiency     Past Surgical History:   Procedure Laterality Date    APPENDECTOMY OPEN N/A 07/19/2024    Procedure: Appendectomy open;  Surgeon: Harrison Whitehead MD;  Location:  OR    BACK SURGERY  2009    L5 disc cut    BENCH KIDNEY  07/19/2024    Procedure: Bench kidney;  Surgeon: Harrison Whitehead MD;  Location:  OR    BENCH PANCREAS N/A 07/19/2024    Procedure: Bench pancreas;  Surgeon: Harrison Whitehead MD;  Location:  OR    BYPASS GRAFT ARTERY CORONARY  06/20/2019    2 vessels    CV CENTRAL VENOUS CATHETER PLACEMENT N/A 07/20/2024    Procedure: Central Venous Catheter Placement;  Surgeon: Dominic Robertson MD;  Location:  HEART CARDIAC CATH LAB    CV CORONARY  ANGIOGRAM N/A 01/13/2019    Procedure: Coronary Angiogram;  Surgeon: Cielo Che MD;  Location: [facility] Cath Lab;  Service: Cardiology    CV CORONARY ANGIOGRAM N/A 05/02/2019    Procedure: Coronary Angiogram;  Surgeon: Cielo Che MD;  Location: [facility] Cath Lab;  Service: Cardiology    CV CORONARY ANGIOGRAM N/A 04/30/2020    Procedure: Coronary Angiogram;  Surgeon: Cielo Che MD;  Location: [facility] Cath Lab;  Service: Cardiology    CV CORONARY ANGIOGRAM N/A 07/22/2021    Procedure: CV CORONARY ANGIOGRAM;  Surgeon: Adrian Crow MD;  Location: [facility] LAB CV    CV CORONARY ANGIOGRAM N/A 02/01/2024    Procedure: Coronary Angiogram;  Surgeon: Delroy Carpio MD;  Location: [facility] CARDIAC CATH LAB    CV CORONARY ANGIOGRAM N/A 07/20/2024    Procedure: Coronary Angiogram;  Surgeon: Dominic Robertson MD;  Location: [facility] CARDIAC CATH LAB    CV CORONARY LITHOTRIPSY PCI N/A 07/20/2024    Procedure: Percutaneous Coronary Intervention - Lithotripsy;  Surgeon: Dominic Robertson MD;  Location: [facility] CARDIAC CATH LAB    CV FRACTIONAL FLOW RATIO WIRE N/A 07/22/2021    Procedure: Fractional Flow Ratio Wire;  Surgeon: Adrian Crow MD;  Location: [facility] LAB CV    CV INTRAVASULAR ULTRASOUND N/A 07/20/2024    Procedure: Intravascular Ultrasound;  Surgeon: Dominic Robertson MD;  Location: [facility] CARDIAC CATH LAB    CV LEFT HEART CATH N/A 07/22/2021    Procedure: Left Heart Cath;  Surgeon: Adrian Crow MD;  Location: [facility] LAB CV    CV LEFT HEART CATHETERIZATION WITH LEFT VENTRICULOGRAM N/A 01/13/2019    Procedure: Left Heart Catheterization with Left Ventriculogram;  Surgeon: Cielo Che MD;  Location: [facility] Cath Lab;  Service: Cardiology    CV LEFT HEART CATHETERIZATION WITHOUT LEFT VENTRICULOGRAM Left 04/30/2020    Procedure: Left Heart Catheterization Without Left Ventriculogram;  Surgeon: Cielo Che MD;   Location: [facility] Cath Lab;  Service: Cardiology    CV PCI N/A 2024    Procedure: Percutaneous Coronary Intervention;  Surgeon: Delroy Carpio MD;  Location:  HEART CARDIAC CATH LAB    CV PCI N/A 2024    Procedure: Percutaneous Coronary Intervention;  Surgeon: Dominic Robertson MD;  Location: [facility] CARDIAC CATH LAB    CV PCI ASPIRATION THROMECTOMY N/A 2024    Procedure: Percutaneous Coronary Intervention Aspiration Thrombectomy;  Surgeon: Dominic Robertson MD;  Location: [facility] CARDIAC CATH LAB    CV PCI STENT DRUG ELUTING N/A 2024    Procedure: Percutaneous Coronary Intervention Stent;  Surgeon: Dominic Robertson MD;  Location: [facility] CARDIAC CATH LAB    CV RIGHT HEART CATHETERIZATION N/A 2020    Procedure: Right Heart Catheterization;  Surgeon: Cielo Che MD;  Location: [facility] Cath Lab;  Service: Cardiology    CYSTOSCOPY, REMOVE STENT(S), COMBINED N/A 10/10/2024    Procedure: CYSTOSCOPY, WITH TRANSPLANT URETERAL STENT REMOVAL;  Surgeon: Farzad Harris MD;  Location:  OR    ESOPHAGOSCOPY, GASTROSCOPY, DUODENOSCOPY (EGD), COMBINED N/A 2024    Procedure: Esophagoscopy, gastroscopy, duodenoscopy (EGD), combined;  Surgeon: Jolene Ramirez MD;  Location:  GI    EYE SURGERY      GENITOURINARY SURGERY      HERNIA REPAIR      OTHER SURGICAL HISTORY      Excise varicocele    PICC SINGLE LUMEN PLACEMENT Right 2025    Right basilic vein 46cm total 3cm external.    STENT, CORONARY, DALE  2019    TRANSPLANT PANCREAS, KIDNEY  DONOR, COMBINED N/A 2024    Procedure: Transplant pancreas, kidney  donor, with ureteral stent placement;  Surgeon: Harrison Whitehead MD;  Location:  OR    VASCULAR SURGERY      no family history of premature coronary artery disease Social History     Socioeconomic History    Marital status:      Spouse name: Not on file    Number of  children: Not on file    Years of education: Not on file    Highest education level: Not on file   Occupational History    Not on file   Tobacco Use    Smoking status: Never     Passive exposure: Past    Smokeless tobacco: Never   Substance and Sexual Activity    Alcohol use: Never    Drug use: Never    Sexual activity: Yes     Partners: Female   Other Topics Concern    Parent/sibling w/ CABG, MI or angioplasty before 65F 55M? Yes   Social History Narrative    Not on file     Social Drivers of Health     Financial Resource Strain: Low Risk  (11/21/2024)    Financial Resource Strain     Within the past 12 months, have you or your family members you live with been unable to get utilities (heat, electricity) when it was really needed?: No   Food Insecurity: No Food Insecurity (12/27/2024)    Received from [facility]    Hunger Vital Sign     Worried About Running Out of Food in the Last Year: Never true     Ran Out of Food in the Last Year: Never true   Recent Concern: Food Insecurity - High Risk (11/21/2024)    Food Insecurity     Within the past 12 months, did you worry that your food would run out before you got money to buy more?: Yes     Within the past 12 months, did the food you bought just not last and you didn t have money to get more?: No   Transportation Needs: No Transportation Needs (12/27/2024)    Received from [facility]    PRAPARE - Transportation     Lack of Transportation (Medical): No     Lack of Transportation (Non-Medical): No   Physical Activity: Not on file   Stress: Not on file   Social Connections: Unknown (4/10/2023)    [facility]    Social Connections     Frequency of Communication with Friends and Family: Not on file   Interpersonal Safety: Not At Risk (12/27/2024)    Received from [facility]    Humiliation, Afraid, Rape, and Kick questionnaire     Fear of Current or Ex-Partner: No     Emotionally Abused: No     Physically Abused: No      Sexually Abused: No   Housing Stability: Low Risk  (12/27/2024)    Received from [facility]    Housing Stability Vital Sign     Unable to Pay for Housing in the Last Year: No     Number of Times Moved in the Last Year: 0     Homeless in the Last Year: No           Lab Results    Chemistry/lipid CBC Cardiac Enzymes/BNP/TSH/INR   Lab Results   Component Value Date    CHOL 115 01/29/2025    HDL 25 (L) 01/29/2025    TRIG 194 (H) 01/29/2025    BUN 23.7 (H) 07/14/2025     07/14/2025    CO2 22 07/14/2025    Lab Results   Component Value Date    WBC 4.8 07/14/2025    HGB 12.2 (L) 07/14/2025    HCT 38.6 (L) 07/14/2025    MCV 90 07/14/2025     07/14/2025    Lab Results   Component Value Date    TROPONINI <0.01 07/20/2021    BNP 25 07/19/2021    TSH 1.78 10/17/2023    INR 1.39 (H) 07/20/2024     Lab Results   Component Value Date    TROPONINI <0.01 07/20/2021          Weight:    Wt Readings from Last 3 Encounters:   07/16/25 106.5 kg (234 lb 12.8 oz)   07/14/25 106 kg (233 lb 9.6 oz)   03/26/25 102.1 kg (225 lb)       Allergies  Allergies   Allergen Reactions    Amoxicillin Hives     & generalized pain    Tolerated ceftriaxone in 2023 ([facility]) and 2024 ([facility])    Venlafaxine      lethargic         Surgical History  Past Surgical History:   Procedure Laterality Date    APPENDECTOMY OPEN N/A 07/19/2024    Procedure: Appendectomy open;  Surgeon: Harrison Whitehead MD;  Location: [facility] OR    BACK SURGERY  2009    L5 disc cut    BENCH KIDNEY  07/19/2024    Procedure: Bench kidney;  Surgeon: Harrison Whitehead MD;  Location: [facility] OR    BENCH PANCREAS N/A 07/19/2024    Procedure: Bench pancreas;  Surgeon: Harrison Whitehead MD;  Location: [facility] OR    BYPASS GRAFT ARTERY CORONARY  06/20/2019    2 vessels    CV CENTRAL VENOUS CATHETER PLACEMENT N/A 07/20/2024    Procedure: Central Venous Catheter Placement;  Surgeon: Dominic Robertson MD;   Location: [facility] CARDIAC CATH LAB    CV CORONARY ANGIOGRAM N/A 01/13/2019    Procedure: Coronary Angiogram;  Surgeon: Cielo Che MD;  Location: [facility] Cath Lab;  Service: Cardiology    CV CORONARY ANGIOGRAM N/A 05/02/2019    Procedure: Coronary Angiogram;  Surgeon: Cielo Che MD;  Location: [facility] Cath Lab;  Service: Cardiology    CV CORONARY ANGIOGRAM N/A 04/30/2020    Procedure: Coronary Angiogram;  Surgeon: Cielo hCe MD;  Location: [facility] Cath Lab;  Service: Cardiology    CV CORONARY ANGIOGRAM N/A 07/22/2021    Procedure: CV CORONARY ANGIOGRAM;  Surgeon: Adrian Crow MD;  Location: [facility] LAB CV    CV CORONARY ANGIOGRAM N/A 02/01/2024    Procedure: Coronary Angiogram;  Surgeon: Delroy Carpio MD;  Location: [facility] CARDIAC CATH LAB    CV CORONARY ANGIOGRAM N/A 07/20/2024    Procedure: Coronary Angiogram;  Surgeon: Dominic Robertson MD;  Location: [facility] CARDIAC CATH LAB    CV CORONARY LITHOTRIPSY PCI N/A 07/20/2024    Procedure: Percutaneous Coronary Intervention - Lithotripsy;  Surgeon: Dominic Robertson MD;  Location: [facility] CARDIAC CATH LAB    CV FRACTIONAL FLOW RATIO WIRE N/A 07/22/2021    Procedure: Fractional Flow Ratio Wire;  Surgeon: Adrian Crow MD;  Location: [facility] CV    CV INTRAVASULAR ULTRASOUND N/A 07/20/2024    Procedure: Intravascular Ultrasound;  Surgeon: Dominic Robertson MD;  Location: [facility] CARDIAC CATH LAB    CV LEFT HEART CATH N/A 07/22/2021    Procedure: Left Heart Cath;  Surgeon: Adrian Crow MD;  Location: [facility] CV    CV LEFT HEART CATHETERIZATION WITH LEFT VENTRICULOGRAM N/A 01/13/2019    Procedure: Left Heart Catheterization with Left Ventriculogram;  Surgeon: Cielo Che MD;  Location: [facility] Cath Lab;  Service: Cardiology    CV LEFT HEART CATHETERIZATION WITHOUT LEFT VENTRICULOGRAM Left 04/30/2020    Procedure: Left Heart Catheterization Without  Left Ventriculogram;  Surgeon: Cielo Che MD;  Location: [facility] Cath Lab;  Service: Cardiology    CV PCI N/A 2024    Procedure: Percutaneous Coronary Intervention;  Surgeon: Delroy Carpio MD;  Location: [facility] CARDIAC CATH LAB    CV PCI N/A 2024    Procedure: Percutaneous Coronary Intervention;  Surgeon: Dominic Robertson MD;  Location: [facility] CARDIAC CATH LAB    CV PCI ASPIRATION THROMECTOMY N/A 2024    Procedure: Percutaneous Coronary Intervention Aspiration Thrombectomy;  Surgeon: Dominic Robertson MD;  Location: [facility] CARDIAC CATH LAB    CV PCI STENT DRUG ELUTING N/A 2024    Procedure: Percutaneous Coronary Intervention Stent;  Surgeon: Dominic Robertson MD;  Location: [facility] CARDIAC CATH LAB    CV RIGHT HEART CATHETERIZATION N/A 2020    Procedure: Right Heart Catheterization;  Surgeon: Cielo Che MD;  Location: [facility] Cath Lab;  Service: Cardiology    CYSTOSCOPY, REMOVE STENT(S), COMBINED N/A 10/10/2024    Procedure: CYSTOSCOPY, WITH TRANSPLANT URETERAL STENT REMOVAL;  Surgeon: Farzad Harris MD;  Location:  OR    ESOPHAGOSCOPY, GASTROSCOPY, DUODENOSCOPY (EGD), COMBINED N/A 2024    Procedure: Esophagoscopy, gastroscopy, duodenoscopy (EGD), combined;  Surgeon: Jolene Ramirez MD;  Location:  GI    EYE SURGERY      GENITOURINARY SURGERY      HERNIA REPAIR      OTHER SURGICAL HISTORY      Excise varicocele    PICC SINGLE LUMEN PLACEMENT Right 2025    Right basilic vein 46cm total 3cm external.    STENT, CORONARY, DALE  2019    TRANSPLANT PANCREAS, KIDNEY  DONOR, COMBINED N/A 2024    Procedure: Transplant pancreas, kidney  donor, with ureteral stent placement;  Surgeon: Harrison Whitehead MD;  Location:  OR    VASCULAR SURGERY         Social History  Tobacco:   History   Smoking Status    Never   Smokeless Tobacco    Never    Alcohol:    Social History    Substance and Sexual Activity      Alcohol use: Never   Illicit Drugs:   History   Drug Use Unknown       Family History  Family History   Problem Relation Age of Onset    Diabetes Type 2  Mother     Heart Disease Father 60    CABG Father 50        triple bypass    Diabetes Type 2  Father     Depression Sister     Substance Abuse Sister     Ovarian Cancer Maternal Grandmother     Brain Cancer Maternal Grandmother     Pancreatic Cancer Maternal Aunt     Prostate Cancer Maternal Uncle           Nehemiah Mendoza MD on 7/16/2025      cc: Trinidad Hansen

## 2025-07-16 NOTE — H&P (VIEW-ONLY)
HEART CARE NOTE          Assessment/Recommendations     1. Severe HFrEF   Assessment / Plan  Near euvolemia on physical exam; denies HF symptoms of orthopnea, PND, LE edema - no changes at this time  Patient is high risk for adverse cardiac events 2/2 systolic dysfunction, elevated NTproBNP  GDMT as detailed below     Current Pharmacotherapy AHA Guideline-Directed Medical Therapy   Lisinopril - held 2/2 renal dysfunction Lisinopril 20 mg twice daily   Carvedilol 3.125 mg BID Carvedilol 25 mg twice daily   Spironolactone not started Spironolactone 25 mg once daily   Hydralazine NA Hydralazine 100 mg three times daily   Isosorbide dinitrate NA Isosorbide dinitrate 40 mg three times daily   SGLT2 inhibitor:Dapagliflozin/Empagliflozin - not started  Dapagliflozin or Empagliflozin 10 mg daily      2. DM2  Assessment / Plan  Management per PMD     3. CAD  Assessment / Plan  S/p CABG in 2019 and multiple PCIs in 2019 and 2024  Reports symptoms concerning for angina - will proceed with coronary angiogram +/- PCI; I discussed this with him including potential risk of stroke, myocardial infarction, or death.  We also discussed the possibility of vascular injury, bleeding requiring blood transfusion, or reaction to x-ray dye   [facility] clinic will reach out to Transplant Nephrology team to get their recommendations re:pre-coronary angiogram orders     4. ESRD s/p renal transplant   Assessment / Plan  CKD; Follows with renal transplant team    30 minutes spent reviewing prior records (including documentation, laboratory studies, cardiac testing/imaging), history and physical exam, planning, and subsequent documentation.      The longitudinal plan of care for HFrEF was addressed during this visit. Due to the added complexity in care, I will continue to support Mr. Siva Ramey in the subsequent management of this condition(s) and with the ongoing continuity of care of this condition(s).    History of Present Illness/Subjective   "  Mr. Siva Ramey is a 49 year old male with a PMHx significant for (per Epic) PCI s/p IBAN 2019 and 2/2024, 2v CABG 2019, PAD, HTN, DM2, ESRD now s/p pancreas & renal transplant 7/20/2024. His post-op course was c/b VF arrest w/ short duration of CPR followed by ROSC and EKG showing inferior STEMI who presents to [facility] for follow-up care     Today, Mr. Ramey endorses progressive functional decline including more fatigue, intermittent chest pressure. Management plan as detailed above.     ECG: personally reviewed; nonspecific ST and T waves changes, sinus tachycardia.     ECHO (personally reviewed 1/30/25):   Left ventricular function is decreased. The ejection fraction is 35-40%  (moderately reduced). Akinesis of the basal-mid inferior and basal  inferoseptal segments present.  Right ventricular size and function are normal.  No significant valvular abnormalities present.  No pericardial effusion is present.     This study was compared with the study from 7/28/2024. No significant changes  noted.    Lab results: personally reviewed July 16, 2025; notable for CKD     Medical history and pertinent documents reviewed in Care Everywhere please where applicable see details above        Physical Examination Review of Systems   BP (!) 152/82 (BP Location: Right arm, Patient Position: Sitting, Cuff Size: Adult Regular)   Pulse 90   Resp 16   Ht 1.803 m (5' 11\")   Wt 106.5 kg (234 lb 12.8 oz)   BMI 32.75 kg/m    Body mass index is 32.75 kg/m .  Wt Readings from Last 3 Encounters:   07/16/25 106.5 kg (234 lb 12.8 oz)   07/14/25 106 kg (233 lb 9.6 oz)   03/26/25 102.1 kg (225 lb)     General Appearance:   no distress, normal body habitus   ENT/Mouth: membranes moist, no oral lesions or bleeding gums.      EYES:  no scleral icterus, normal conjunctivae   Neck: no carotid bruits or thyromegaly   Chest/Lungs:   lungs are clear to auscultation, no rales or wheezing, equal chest wall expansion    Cardiovascular:   " Regular. Normal first and second heart sounds with no murmurs, rubs, or gallops; the carotid, radial and posterior tibial pulses are intact, no JVD or LE edema bilaterally    Abdomen:  no organomegaly, masses, bruits, or tenderness; bowel sounds are present   Extremities: no cyanosis or clubbing   Skin: no xanthelasma, warm.    Neurologic: NAD     Psychiatric: alert and oriented x3, calm     A complete 10 systems ROS was reviewed  And is negative except what is listed in the HPI.          Medical History  Surgical History Family History Social History   Past Medical History:   Diagnosis Date    Acquired elevated diaphragm     Anemia in chronic kidney disease     Angina pectoris     Chronic kidney disease     Coronary artery disease     Diabetes mellitus, type II (H) 12/2001    Diabetic nephropathy (H)     MARQUEZ (dyspnea on exertion)     Dyslipidemia 12/2001    End stage renal disease (H)     History of blood transfusion 2004    Hypertension     takes medication    Ischemic cardiomyopathy     Metabolic acidosis     Myocardial infarction (H)     STEMI -Diagonal branch of the LAD    Obesity (BMI 30-39.9)     Peripheral neuropathy     Proteinuria     Retinopathy     Sleep apnea     Vitamin D deficiency     Past Surgical History:   Procedure Laterality Date    APPENDECTOMY OPEN N/A 07/19/2024    Procedure: Appendectomy open;  Surgeon: Harrison Whitehead MD;  Location:  OR    BACK SURGERY  2009    L5 disc cut    BENCH KIDNEY  07/19/2024    Procedure: Bench kidney;  Surgeon: Harrison Whitehead MD;  Location:  OR    BENCH PANCREAS N/A 07/19/2024    Procedure: Bench pancreas;  Surgeon: Harrison Whitehead MD;  Location:  OR    BYPASS GRAFT ARTERY CORONARY  06/20/2019    2 vessels    CV CENTRAL VENOUS CATHETER PLACEMENT N/A 07/20/2024    Procedure: Central Venous Catheter Placement;  Surgeon: Dominic Robertson MD;  Location:  HEART CARDIAC CATH LAB    CV CORONARY  ANGIOGRAM N/A 01/13/2019    Procedure: Coronary Angiogram;  Surgeon: Cielo Che MD;  Location: [facility] Cath Lab;  Service: Cardiology    CV CORONARY ANGIOGRAM N/A 05/02/2019    Procedure: Coronary Angiogram;  Surgeon: Cielo hCe MD;  Location: [facility] Cath Lab;  Service: Cardiology    CV CORONARY ANGIOGRAM N/A 04/30/2020    Procedure: Coronary Angiogram;  Surgeon: Cielo Che MD;  Location: [facility] Cath Lab;  Service: Cardiology    CV CORONARY ANGIOGRAM N/A 07/22/2021    Procedure: CV CORONARY ANGIOGRAM;  Surgeon: Adrian Crow MD;  Location: [facility] LAB CV    CV CORONARY ANGIOGRAM N/A 02/01/2024    Procedure: Coronary Angiogram;  Surgeon: Delroy Carpio MD;  Location: [facility] CARDIAC CATH LAB    CV CORONARY ANGIOGRAM N/A 07/20/2024    Procedure: Coronary Angiogram;  Surgeon: Dominic Robertson MD;  Location: [facility] CARDIAC CATH LAB    CV CORONARY LITHOTRIPSY PCI N/A 07/20/2024    Procedure: Percutaneous Coronary Intervention - Lithotripsy;  Surgeon: Dominic Robertson MD;  Location: [facility] CARDIAC CATH LAB    CV FRACTIONAL FLOW RATIO WIRE N/A 07/22/2021    Procedure: Fractional Flow Ratio Wire;  Surgeon: Adrian Crow MD;  Location: [facility] LAB CV    CV INTRAVASULAR ULTRASOUND N/A 07/20/2024    Procedure: Intravascular Ultrasound;  Surgeon: Dominic Robertson MD;  Location: [facility] CARDIAC CATH LAB    CV LEFT HEART CATH N/A 07/22/2021    Procedure: Left Heart Cath;  Surgeon: Adrian Crow MD;  Location: [facility] LAB CV    CV LEFT HEART CATHETERIZATION WITH LEFT VENTRICULOGRAM N/A 01/13/2019    Procedure: Left Heart Catheterization with Left Ventriculogram;  Surgeon: Cielo Che MD;  Location: [facility] Cath Lab;  Service: Cardiology    CV LEFT HEART CATHETERIZATION WITHOUT LEFT VENTRICULOGRAM Left 04/30/2020    Procedure: Left Heart Catheterization Without Left Ventriculogram;  Surgeon: Cielo Che MD;   Location: [facility] Cath Lab;  Service: Cardiology    CV PCI N/A 2024    Procedure: Percutaneous Coronary Intervention;  Surgeon: Delroy Carpio MD;  Location:  HEART CARDIAC CATH LAB    CV PCI N/A 2024    Procedure: Percutaneous Coronary Intervention;  Surgeon: Dominic Robertson MD;  Location: [facility] CARDIAC CATH LAB    CV PCI ASPIRATION THROMECTOMY N/A 2024    Procedure: Percutaneous Coronary Intervention Aspiration Thrombectomy;  Surgeon: Dominic Robertson MD;  Location: [facility] CARDIAC CATH LAB    CV PCI STENT DRUG ELUTING N/A 2024    Procedure: Percutaneous Coronary Intervention Stent;  Surgeon: Dominic Robertson MD;  Location: [facility] CARDIAC CATH LAB    CV RIGHT HEART CATHETERIZATION N/A 2020    Procedure: Right Heart Catheterization;  Surgeon: Cielo Che MD;  Location: [facility] Cath Lab;  Service: Cardiology    CYSTOSCOPY, REMOVE STENT(S), COMBINED N/A 10/10/2024    Procedure: CYSTOSCOPY, WITH TRANSPLANT URETERAL STENT REMOVAL;  Surgeon: Farzad Harris MD;  Location:  OR    ESOPHAGOSCOPY, GASTROSCOPY, DUODENOSCOPY (EGD), COMBINED N/A 2024    Procedure: Esophagoscopy, gastroscopy, duodenoscopy (EGD), combined;  Surgeon: Jolene Ramirez MD;  Location:  GI    EYE SURGERY      GENITOURINARY SURGERY      HERNIA REPAIR      OTHER SURGICAL HISTORY      Excise varicocele    PICC SINGLE LUMEN PLACEMENT Right 2025    Right basilic vein 46cm total 3cm external.    STENT, CORONARY, DALE  2019    TRANSPLANT PANCREAS, KIDNEY  DONOR, COMBINED N/A 2024    Procedure: Transplant pancreas, kidney  donor, with ureteral stent placement;  Surgeon: Harrison Whitehead MD;  Location:  OR    VASCULAR SURGERY      no family history of premature coronary artery disease Social History     Socioeconomic History    Marital status:      Spouse name: Not on file    Number of  children: Not on file    Years of education: Not on file    Highest education level: Not on file   Occupational History    Not on file   Tobacco Use    Smoking status: Never     Passive exposure: Past    Smokeless tobacco: Never   Substance and Sexual Activity    Alcohol use: Never    Drug use: Never    Sexual activity: Yes     Partners: Female   Other Topics Concern    Parent/sibling w/ CABG, MI or angioplasty before 65F 55M? Yes   Social History Narrative    Not on file     Social Drivers of Health     Financial Resource Strain: Low Risk  (11/21/2024)    Financial Resource Strain     Within the past 12 months, have you or your family members you live with been unable to get utilities (heat, electricity) when it was really needed?: No   Food Insecurity: No Food Insecurity (12/27/2024)    Received from [employer]    Hunger Vital Sign     Worried About Running Out of Food in the Last Year: Never true     Ran Out of Food in the Last Year: Never true   Recent Concern: Food Insecurity - High Risk (11/21/2024)    Food Insecurity     Within the past 12 months, did you worry that your food would run out before you got money to buy more?: Yes     Within the past 12 months, did the food you bought just not last and you didn t have money to get more?: No   Transportation Needs: No Transportation Needs (12/27/2024)    Received from [employer]    PRAPARE - Transportation     Lack of Transportation (Medical): No     Lack of Transportation (Non-Medical): No   Physical Activity: Not on file   Stress: Not on file   Social Connections: Unknown (4/10/2023)    [facility]    Social Connections     Frequency of Communication with Friends and Family: Not on file   Interpersonal Safety: Not At Risk (12/27/2024)    Received from [employer]    Humiliation, Afraid, Rape, and Kick questionnaire     Fear of Current or Ex-Partner: No     Emotionally Abused: No     Physically Abused: No      Sexually Abused: No   Housing Stability: Low Risk  (12/27/2024)    Received from [employer]    Housing Stability Vital Sign     Unable to Pay for Housing in the Last Year: No     Number of Times Moved in the Last Year: 0     Homeless in the Last Year: No           Lab Results    Chemistry/lipid CBC Cardiac Enzymes/BNP/TSH/INR   Lab Results   Component Value Date    CHOL 115 01/29/2025    HDL 25 (L) 01/29/2025    TRIG 194 (H) 01/29/2025    BUN 23.7 (H) 07/14/2025     07/14/2025    CO2 22 07/14/2025    Lab Results   Component Value Date    WBC 4.8 07/14/2025    HGB 12.2 (L) 07/14/2025    HCT 38.6 (L) 07/14/2025    MCV 90 07/14/2025     07/14/2025    Lab Results   Component Value Date    TROPONINI <0.01 07/20/2021    BNP 25 07/19/2021    TSH 1.78 10/17/2023    INR 1.39 (H) 07/20/2024     Lab Results   Component Value Date    TROPONINI <0.01 07/20/2021          Weight:    Wt Readings from Last 3 Encounters:   07/16/25 106.5 kg (234 lb 12.8 oz)   07/14/25 106 kg (233 lb 9.6 oz)   03/26/25 102.1 kg (225 lb)       Allergies  Allergies   Allergen Reactions    Amoxicillin Hives     & generalized pain    Tolerated ceftriaxone in 2023 ([facility]) and 2024 ([facility])    Venlafaxine      lethargic         Surgical History  Past Surgical History:   Procedure Laterality Date    APPENDECTOMY OPEN N/A 07/19/2024    Procedure: Appendectomy open;  Surgeon: Harrison Whitehead MD;  Location: [facility] OR    BACK SURGERY  2009    L5 disc cut    BENCH KIDNEY  07/19/2024    Procedure: Bench kidney;  Surgeon: Harrison Whitehead MD;  Location: [facility] OR    BENCH PANCREAS N/A 07/19/2024    Procedure: Bench pancreas;  Surgeon: Harrison Whitehead MD;  Location: [facility] OR    BYPASS GRAFT ARTERY CORONARY  06/20/2019    2 vessels    CV CENTRAL VENOUS CATHETER PLACEMENT N/A 07/20/2024    Procedure: Central Venous Catheter Placement;  Surgeon: Dominic Robertson MD;   Location: [facility] CARDIAC CATH LAB    CV CORONARY ANGIOGRAM N/A 01/13/2019    Procedure: Coronary Angiogram;  Surgeon: Cielo Che MD;  Location: [facility] Cath Lab;  Service: Cardiology    CV CORONARY ANGIOGRAM N/A 05/02/2019    Procedure: Coronary Angiogram;  Surgeon: Cielo Che MD;  Location: [facility] Cath Lab;  Service: Cardiology    CV CORONARY ANGIOGRAM N/A 04/30/2020    Procedure: Coronary Angiogram;  Surgeon: Cielo Che MD;  Location: [facility] Cath Lab;  Service: Cardiology    CV CORONARY ANGIOGRAM N/A 07/22/2021    Procedure: CV CORONARY ANGIOGRAM;  Surgeon: Adrian Crow MD;  Location: [facility] LAB CV    CV CORONARY ANGIOGRAM N/A 02/01/2024    Procedure: Coronary Angiogram;  Surgeon: Delroy Carpio MD;  Location: [facility] CARDIAC CATH LAB    CV CORONARY ANGIOGRAM N/A 07/20/2024    Procedure: Coronary Angiogram;  Surgeon: Dominic Robertson MD;  Location: [facility] CARDIAC CATH LAB    CV CORONARY LITHOTRIPSY PCI N/A 07/20/2024    Procedure: Percutaneous Coronary Intervention - Lithotripsy;  Surgeon: Dominic Robertson MD;  Location: [facility] CARDIAC CATH LAB    CV FRACTIONAL FLOW RATIO WIRE N/A 07/22/2021    Procedure: Fractional Flow Ratio Wire;  Surgeon: Adrian Crow MD;  Location: [facility] CV    CV INTRAVASULAR ULTRASOUND N/A 07/20/2024    Procedure: Intravascular Ultrasound;  Surgeon: Dominic Robertson MD;  Location: [facility] CARDIAC CATH LAB    CV LEFT HEART CATH N/A 07/22/2021    Procedure: Left Heart Cath;  Surgeon: Adrian Crow MD;  Location: [facility] CV    CV LEFT HEART CATHETERIZATION WITH LEFT VENTRICULOGRAM N/A 01/13/2019    Procedure: Left Heart Catheterization with Left Ventriculogram;  Surgeon: Cielo Che MD;  Location: [facility] Cath Lab;  Service: Cardiology    CV LEFT HEART CATHETERIZATION WITHOUT LEFT VENTRICULOGRAM Left 04/30/2020    Procedure: Left Heart Catheterization Without  Left Ventriculogram;  Surgeon: Cielo Che MD;  Location: [facility] Cath Lab;  Service: Cardiology    CV PCI N/A 2024    Procedure: Percutaneous Coronary Intervention;  Surgeon: Delroy Carpio MD;  Location: [facility] CARDIAC CATH LAB    CV PCI N/A 2024    Procedure: Percutaneous Coronary Intervention;  Surgeon: Dominic Robertson MD;  Location: [facility] CARDIAC CATH LAB    CV PCI ASPIRATION THROMECTOMY N/A 2024    Procedure: Percutaneous Coronary Intervention Aspiration Thrombectomy;  Surgeon: Dominic Robertson MD;  Location: [facility] CARDIAC CATH LAB    CV PCI STENT DRUG ELUTING N/A 2024    Procedure: Percutaneous Coronary Intervention Stent;  Surgeon: Dominic Robertson MD;  Location: [facility] CARDIAC CATH LAB    CV RIGHT HEART CATHETERIZATION N/A 2020    Procedure: Right Heart Catheterization;  Surgeon: Cielo Che MD;  Location: [facility] Cath Lab;  Service: Cardiology    CYSTOSCOPY, REMOVE STENT(S), COMBINED N/A 10/10/2024    Procedure: CYSTOSCOPY, WITH TRANSPLANT URETERAL STENT REMOVAL;  Surgeon: Farzad Harris MD;  Location:  OR    ESOPHAGOSCOPY, GASTROSCOPY, DUODENOSCOPY (EGD), COMBINED N/A 2024    Procedure: Esophagoscopy, gastroscopy, duodenoscopy (EGD), combined;  Surgeon: Jolene Ramirez MD;  Location:  GI    EYE SURGERY      GENITOURINARY SURGERY      HERNIA REPAIR      OTHER SURGICAL HISTORY      Excise varicocele    PICC SINGLE LUMEN PLACEMENT Right 2025    Right basilic vein 46cm total 3cm external.    STENT, CORONARY, DALE  2019    TRANSPLANT PANCREAS, KIDNEY  DONOR, COMBINED N/A 2024    Procedure: Transplant pancreas, kidney  donor, with ureteral stent placement;  Surgeon: Harrison Whitehead MD;  Location:  OR    VASCULAR SURGERY         Social History  Tobacco:   History   Smoking Status    Never   Smokeless Tobacco    Never    Alcohol:    Social History    Substance and Sexual Activity      Alcohol use: Never   Illicit Drugs:   History   Drug Use Unknown       Family History  Family History   Problem Relation Age of Onset    Diabetes Type 2  Mother     Heart Disease Father 60    CABG Father 50        triple bypass    Diabetes Type 2  Father     Depression Sister     Substance Abuse Sister     Ovarian Cancer Maternal Grandmother     Brain Cancer Maternal Grandmother     Pancreatic Cancer Maternal Aunt     Prostate Cancer Maternal Uncle           Nehemiah Mendoza MD on 7/16/2025      cc: Trinidad Hansen

## 2025-07-16 NOTE — PROGRESS NOTES
Patient seen today in clinic to review expectations for upcoming Coronary Angiogram. Case Request is placed. Asia MAYNARD RN BSN, CHFN, PCCN-K

## 2025-07-16 NOTE — LETTER
7/16/2025    Trinidad Hansen PA-C, SIS  [facility] [place] [address]  [place] [address]    RE: Siva Ramey       Dear Colleague,     I had the pleasure of seeing Siva Ramey in the [facility].    HEART CARE NOTE          Assessment/Recommendations     1. Severe HFrEF   Assessment / Plan  Near euvolemia on physical exam; denies HF symptoms of orthopnea, PND, LE edema - no changes at this time  Patient is high risk for adverse cardiac events 2/2 systolic dysfunction, elevated NTproBNP  GDMT as detailed below     Current Pharmacotherapy AHA Guideline-Directed Medical Therapy   Lisinopril - held 2/2 renal dysfunction Lisinopril 20 mg twice daily   Carvedilol 3.125 mg BID Carvedilol 25 mg twice daily   Spironolactone not started Spironolactone 25 mg once daily   Hydralazine NA Hydralazine 100 mg three times daily   Isosorbide dinitrate NA Isosorbide dinitrate 40 mg three times daily   SGLT2 inhibitor:Dapagliflozin/Empagliflozin - not started  Dapagliflozin or Empagliflozin 10 mg daily      2. DM2  Assessment / Plan  Management per PMD     3. CAD  Assessment / Plan  S/p CABG in 2019 and multiple PCIs in 2019 and 2024  Reports symptoms concerning for angina - will proceed with coronary angiogram +/- PCI; I discussed this with him including potential risk of stroke, myocardial infarction, or death.  We also discussed the possibility of vascular injury, bleeding requiring blood transfusion, or reaction to x-ray dye   [facility] clinic will reach out to Transplant Nephrology team to get their recommendations re:pre-coronary angiogram orders     4. ESRD s/p renal transplant   Assessment / Plan  CKD; Follows with renal transplant team    30 minutes spent reviewing prior records (including documentation, laboratory studies, cardiac testing/imaging), history and physical exam, planning, and subsequent documentation.      The longitudinal plan of care for HFrEF was addressed during this visit. Due  "to the added complexity in care, I will continue to support Mr. Siva Ramey in the subsequent management of this condition(s) and with the ongoing continuity of care of this condition(s).    History of Present Illness/Subjective    Mr. Siva Ramey is a 49 year old male with a PMHx significant for (per Epic) PCI s/p IBAN 2019 and 2/2024, 2v CABG 2019, PAD, HTN, DM2, ESRD now s/p pancreas & renal transplant 7/20/2024. His post-op course was c/b VF arrest w/ short duration of CPR followed by ROSC and EKG showing inferior STEMI who presents to [facility] clinic for follow-up care     Today, Mr. Ramey endorses progressive functional decline including more fatigue, intermittent chest pressure. Management plan as detailed above.     ECG: personally reviewed; nonspecific ST and T waves changes, sinus tachycardia.     ECHO (personally reviewed 1/30/25):   Left ventricular function is decreased. The ejection fraction is 35-40%  (moderately reduced). Akinesis of the basal-mid inferior and basal  inferoseptal segments present.  Right ventricular size and function are normal.  No significant valvular abnormalities present.  No pericardial effusion is present.     This study was compared with the study from 7/28/2024. No significant changes  noted.    Lab results: personally reviewed July 16, 2025; notable for CKD     Medical history and pertinent documents reviewed in Care Everywhere please where applicable see details above        Physical Examination Review of Systems   BP (!) 152/82 (BP Location: Right arm, Patient Position: Sitting, Cuff Size: Adult Regular)   Pulse 90   Resp 16   Ht 1.803 m (5' 11\")   Wt 106.5 kg (234 lb 12.8 oz)   BMI 32.75 kg/m    Body mass index is 32.75 kg/m .  Wt Readings from Last 3 Encounters:   07/16/25 106.5 kg (234 lb 12.8 oz)   07/14/25 106 kg (233 lb 9.6 oz)   03/26/25 102.1 kg (225 lb)     General Appearance:   no distress, normal body habitus   ENT/Mouth: membranes moist, no oral lesions " or bleeding gums.      EYES:  no scleral icterus, normal conjunctivae   Neck: no carotid bruits or thyromegaly   Chest/Lungs:   lungs are clear to auscultation, no rales or wheezing, equal chest wall expansion    Cardiovascular:   Regular. Normal first and second heart sounds with no murmurs, rubs, or gallops; the carotid, radial and posterior tibial pulses are intact, no JVD or LE edema bilaterally    Abdomen:  no organomegaly, masses, bruits, or tenderness; bowel sounds are present   Extremities: no cyanosis or clubbing   Skin: no xanthelasma, warm.    Neurologic: NAD     Psychiatric: alert and oriented x3, calm     A complete 10 systems ROS was reviewed  And is negative except what is listed in the HPI.          Medical History  Surgical History Family History Social History   Past Medical History:   Diagnosis Date     Acquired elevated diaphragm      Anemia in chronic kidney disease      Angina pectoris      Chronic kidney disease      Coronary artery disease      Diabetes mellitus, type II (H) 12/2001     Diabetic nephropathy (H)      MARQUEZ (dyspnea on exertion)      Dyslipidemia 12/2001     End stage renal disease (H)      History of blood transfusion 2004     Hypertension     takes medication     Ischemic cardiomyopathy      Metabolic acidosis      Myocardial infarction (H)     STEMI -Diagonal branch of the LAD     Obesity (BMI 30-39.9)      Peripheral neuropathy      Proteinuria      Retinopathy      Sleep apnea      Vitamin D deficiency     Past Surgical History:   Procedure Laterality Date     APPENDECTOMY OPEN N/A 07/19/2024    Procedure: Appendectomy open;  Surgeon: Harrison Whitehead MD;  Location:  OR     BACK SURGERY  2009    L5 disc cut     BENCH KIDNEY  07/19/2024    Procedure: Bench kidney;  Surgeon: Harrison Whitehead MD;  Location:  OR     BENCH PANCREAS N/A 07/19/2024    Procedure: Bench pancreas;  Surgeon: Harrison Whitehead MD;  Location:  OR      BYPASS GRAFT ARTERY CORONARY  06/20/2019    2 vessels     CV CENTRAL VENOUS CATHETER PLACEMENT N/A 07/20/2024    Procedure: Central Venous Catheter Placement;  Surgeon: Dominic Robertson MD;  Location: [facility] CARDIAC CATH LAB     CV CORONARY ANGIOGRAM N/A 01/13/2019    Procedure: Coronary Angiogram;  Surgeon: Cielo Che MD;  Location: [facility] Cath Lab;  Service: Cardiology     CV CORONARY ANGIOGRAM N/A 05/02/2019    Procedure: Coronary Angiogram;  Surgeon: Cielo Che MD;  Location: [facility] Cath Lab;  Service: Cardiology     CV CORONARY ANGIOGRAM N/A 04/30/2020    Procedure: Coronary Angiogram;  Surgeon: Ceilo Che MD;  Location: [facility] Cath Lab;  Service: Cardiology     CV CORONARY ANGIOGRAM N/A 07/22/2021    Procedure: CV CORONARY ANGIOGRAM;  Surgeon: Adrian Crow MD;  Location: [facility] LAB CV     CV CORONARY ANGIOGRAM N/A 02/01/2024    Procedure: Coronary Angiogram;  Surgeon: Delroy aCrpio MD;  Location: [facility] CARDIAC CATH LAB     CV CORONARY ANGIOGRAM N/A 07/20/2024    Procedure: Coronary Angiogram;  Surgeon: Dominic Robertson MD;  Location: [facility] CARDIAC CATH LAB     CV CORONARY LITHOTRIPSY PCI N/A 07/20/2024    Procedure: Percutaneous Coronary Intervention - Lithotripsy;  Surgeon: Dominic Robertson MD;  Location: [facility] CARDIAC CATH LAB     CV FRACTIONAL FLOW RATIO WIRE N/A 07/22/2021    Procedure: Fractional Flow Ratio Wire;  Surgeon: Adrian Crow MD;  Location: [facility] LAB CV     CV INTRAVASULAR ULTRASOUND N/A 07/20/2024    Procedure: Intravascular Ultrasound;  Surgeon: Dominic Robertson MD;  Location: [facility] CARDIAC CATH LAB     CV LEFT HEART CATH N/A 07/22/2021    Procedure: Left Heart Cath;  Surgeon: Adrian Crow MD;  Location: [facility] LAB CV     CV LEFT HEART CATHETERIZATION WITH LEFT VENTRICULOGRAM N/A 01/13/2019    Procedure: Left Heart Catheterization with Left  Ventriculogram;  Surgeon: Cielo Che MD;  Location: [facility] Cath Lab;  Service: Cardiology     CV LEFT HEART CATHETERIZATION WITHOUT LEFT VENTRICULOGRAM Left 2020    Procedure: Left Heart Catheterization Without Left Ventriculogram;  Surgeon: Cielo Che MD;  Location: [facility] Cath Lab;  Service: Cardiology     CV PCI N/A 2024    Procedure: Percutaneous Coronary Intervention;  Surgeon: Delroy Carpio MD;  Location: [facility] CARDIAC CATH LAB     CV PCI N/A 2024    Procedure: Percutaneous Coronary Intervention;  Surgeon: Dominic Robertson MD;  Location: [facility] CARDIAC CATH LAB     CV PCI ASPIRATION THROMECTOMY N/A 2024    Procedure: Percutaneous Coronary Intervention Aspiration Thrombectomy;  Surgeon: Dominic Robertson MD;  Location: [facility] CARDIAC CATH LAB     CV PCI STENT DRUG ELUTING N/A 2024    Procedure: Percutaneous Coronary Intervention Stent;  Surgeon: Dominic Robertson MD;  Location: [facility] CARDIAC CATH LAB     CV RIGHT HEART CATHETERIZATION N/A 2020    Procedure: Right Heart Catheterization;  Surgeon: Cielo Che MD;  Location: [facility] Cath Lab;  Service: Cardiology     CYSTOSCOPY, REMOVE STENT(S), COMBINED N/A 10/10/2024    Procedure: CYSTOSCOPY, WITH TRANSPLANT URETERAL STENT REMOVAL;  Surgeon: Farzad Harris MD;  Location:  OR     ESOPHAGOSCOPY, GASTROSCOPY, DUODENOSCOPY (EGD), COMBINED N/A 2024    Procedure: Esophagoscopy, gastroscopy, duodenoscopy (EGD), combined;  Surgeon: Jolene Ramirez MD;  Location:  GI     EYE SURGERY       GENITOURINARY SURGERY       HERNIA REPAIR       OTHER SURGICAL HISTORY      Excise varicocele     PICC SINGLE LUMEN PLACEMENT Right 2025    Right basilic vein 46cm total 3cm external.     STENT, CORONARY, DALE       TRANSPLANT PANCREAS, KIDNEY  DONOR, COMBINED N/A 2024    Procedure: Transplant pancreas, kidney   donor, with ureteral stent placement;  Surgeon: Harrison Whitehead MD;  Location: [facility] OR     VASCULAR SURGERY      no family history of premature coronary artery disease Social History     Socioeconomic History     Marital status:      Spouse name: Not on file     Number of children: Not on file     Years of education: Not on file     Highest education level: Not on file   Occupational History     Not on file   Tobacco Use     Smoking status: Never     Passive exposure: Past     Smokeless tobacco: Never   Substance and Sexual Activity     Alcohol use: Never     Drug use: Never     Sexual activity: Yes     Partners: Female   Other Topics Concern     Parent/sibling w/ CABG, MI or angioplasty before 65F 55M? Yes   Social History Narrative     Not on file     Social Drivers of Health     Financial Resource Strain: Low Risk  (2024)    Financial Resource Strain      Within the past 12 months, have you or your family members you live with been unable to get utilities (heat, electricity) when it was really needed?: No   Food Insecurity: No Food Insecurity (2024)    Received from [facility]    Hunger Vital Sign      Worried About Running Out of Food in the Last Year: Never true      Ran Out of Food in the Last Year: Never true   Recent Concern: Food Insecurity - High Risk (2024)    Food Insecurity      Within the past 12 months, did you worry that your food would run out before you got money to buy more?: Yes      Within the past 12 months, did the food you bought just not last and you didn t have money to get more?: No   Transportation Needs: No Transportation Needs (2024)    Received from [employer]    PRAPARE - Transportation      Lack of Transportation (Medical): No      Lack of Transportation (Non-Medical): No   Physical Activity: Not on file   Stress: Not on file   Social Connections: Unknown (4/10/2023)    Received from [facility]    Social Connections      Frequency of Communication with Friends and Family: Not on file   Interpersonal Safety: Not At Risk (12/27/2024)    Received from [facility]    Humiliation, Afraid, Rape, and Kick questionnaire      Fear of Current or Ex-Partner: No      Emotionally Abused: No      Physically Abused: No      Sexually Abused: No   Housing Stability: Low Risk  (12/27/2024)    Received from [facility]    Housing Stability Vital Sign      Unable to Pay for Housing in the Last Year: No      Number of Times Moved in the Last Year: 0      Homeless in the Last Year: No           Lab Results    Chemistry/lipid CBC Cardiac Enzymes/BNP/TSH/INR   Lab Results   Component Value Date    CHOL 115 01/29/2025    HDL 25 (L) 01/29/2025    TRIG 194 (H) 01/29/2025    BUN 23.7 (H) 07/14/2025     07/14/2025    CO2 22 07/14/2025    Lab Results   Component Value Date    WBC 4.8 07/14/2025    HGB 12.2 (L) 07/14/2025    HCT 38.6 (L) 07/14/2025    MCV 90 07/14/2025     07/14/2025    Lab Results   Component Value Date    TROPONINI <0.01 07/20/2021    BNP 25 07/19/2021    TSH 1.78 10/17/2023    INR 1.39 (H) 07/20/2024     Lab Results   Component Value Date    TROPONINI <0.01 07/20/2021          Weight:    Wt Readings from Last 3 Encounters:   07/16/25 106.5 kg (234 lb 12.8 oz)   07/14/25 106 kg (233 lb 9.6 oz)   03/26/25 102.1 kg (225 lb)       Allergies  Allergies   Allergen Reactions     Amoxicillin Hives     & generalized pain    Tolerated ceftriaxone in 2023 ([facility]) and 2024 ([facility])     Venlafaxine      lethargic         Surgical History  Past Surgical History:   Procedure Laterality Date     APPENDECTOMY OPEN N/A 07/19/2024    Procedure: Appendectomy open;  Surgeon: Harrison Whitehead MD;  Location: [facility] OR     BACK SURGERY  2009    L5 disc cut     BENCH KIDNEY  07/19/2024    Procedure: Bench kidney;  Surgeon: Harrison Whitehead MD;  Location: [facility] OR      BENCH PANCREAS N/A 07/19/2024    Procedure: Bench pancreas;  Surgeon: Harrison Whitehead MD;  Location: [facility] OR     BYPASS GRAFT ARTERY CORONARY  06/20/2019    2 vessels     CV CENTRAL VENOUS CATHETER PLACEMENT N/A 07/20/2024    Procedure: Central Venous Catheter Placement;  Surgeon: Dominic Robertson MD;  Location:  HEART CARDIAC CATH LAB     CV CORONARY ANGIOGRAM N/A 01/13/2019    Procedure: Coronary Angiogram;  Surgeon: Cielo Che MD;  Location: [facility] Cath Lab;  Service: Cardiology     CV CORONARY ANGIOGRAM N/A 05/02/2019    Procedure: Coronary Angiogram;  Surgeon: Cielo Che MD;  Location: [facility] Cath Lab;  Service: Cardiology     CV CORONARY ANGIOGRAM N/A 04/30/2020    Procedure: Coronary Angiogram;  Surgeon: Cielo Che MD;  Location: [facility] Cath Lab;  Service: Cardiology     CV CORONARY ANGIOGRAM N/A 07/22/2021    Procedure: CV CORONARY ANGIOGRAM;  Surgeon: Adrian Crow MD;  Location: [facility] LAB CV     CV CORONARY ANGIOGRAM N/A 02/01/2024    Procedure: Coronary Angiogram;  Surgeon: Delroy Carpio MD;  Location: [facility] CARDIAC CATH LAB     CV CORONARY ANGIOGRAM N/A 07/20/2024    Procedure: Coronary Angiogram;  Surgeon: Dominic Robertson MD;  Location: [facility] CARDIAC CATH LAB     CV CORONARY LITHOTRIPSY PCI N/A 07/20/2024    Procedure: Percutaneous Coronary Intervention - Lithotripsy;  Surgeon: Dominic Robertson MD;  Location:  HEART CARDIAC CATH LAB     CV FRACTIONAL FLOW RATIO WIRE N/A 07/22/2021    Procedure: Fractional Flow Ratio Wire;  Surgeon: Adrian Crow MD;  Location: [facility] CV     CV INTRAVASULAR ULTRASOUND N/A 07/20/2024    Procedure: Intravascular Ultrasound;  Surgeon: Dominic Robertson MD;  Location:  HEART CARDIAC CATH LAB     CV LEFT HEART CATH N/A 07/22/2021    Procedure: Left Heart Cath;  Surgeon: Adrian Crow MD;  Location: [facility] CATH LAB CV     CV  LEFT HEART CATHETERIZATION WITH LEFT VENTRICULOGRAM N/A 01/13/2019    Procedure: Left Heart Catheterization with Left Ventriculogram;  Surgeon: Cielo Che MD;  Location: [facility] Cath Lab;  Service: Cardiology     CV LEFT HEART CATHETERIZATION WITHOUT LEFT VENTRICULOGRAM Left 04/30/2020    Procedure: Left Heart Catheterization Without Left Ventriculogram;  Surgeon: Cielo Che MD;  Location: [facility] Cath Lab;  Service: Cardiology     CV PCI N/A 02/01/2024    Procedure: Percutaneous Coronary Intervention;  Surgeon: Delroy Carpio MD;  Location: [facility] CARDIAC CATH LAB     CV PCI N/A 07/20/2024    Procedure: Percutaneous Coronary Intervention;  Surgeon: Dominic Robertson MD;  Location: [facility] CARDIAC CATH LAB     CV PCI ASPIRATION THROMECTOMY N/A 07/20/2024    Procedure: Percutaneous Coronary Intervention Aspiration Thrombectomy;  Surgeon: Dominic Robertson MD;  Location: [facility] CARDIAC CATH LAB     CV PCI STENT DRUG ELUTING N/A 07/20/2024    Procedure: Percutaneous Coronary Intervention Stent;  Surgeon: Dominic Robertson MD;  Location: [facility] CARDIAC CATH LAB     CV RIGHT HEART CATHETERIZATION N/A 04/30/2020    Procedure: Right Heart Catheterization;  Surgeon: Cielo Che MD;  Location: [facility] Lab;  Service: Cardiology     CYSTOSCOPY, REMOVE STENT(S), COMBINED N/A 10/10/2024    Procedure: CYSTOSCOPY, WITH TRANSPLANT URETERAL STENT REMOVAL;  Surgeon: Farzad Harris MD;  Location:  OR     ESOPHAGOSCOPY, GASTROSCOPY, DUODENOSCOPY (EGD), COMBINED N/A 09/01/2024    Procedure: Esophagoscopy, gastroscopy, duodenoscopy (EGD), combined;  Surgeon: Jolene Ramirez MD;  Location:  GI     EYE SURGERY       GENITOURINARY SURGERY       HERNIA REPAIR       OTHER SURGICAL HISTORY      Excise varicocele     PICC SINGLE LUMEN PLACEMENT Right 03/14/2025    Right basilic vein 46cm total 3cm external.     STENT, CORONARY, DALE  2019      TRANSPLANT PANCREAS, KIDNEY  DONOR, COMBINED N/A 2024    Procedure: Transplant pancreas, kidney  donor, with ureteral stent placement;  Surgeon: Harrison Whitehead MD;  Location: [facility] OR     VASCULAR SURGERY         Social History  Tobacco:   History   Smoking Status     Never   Smokeless Tobacco     Never    Alcohol:   Social History    Substance and Sexual Activity      Alcohol use: Never   Illicit Drugs:   History   Drug Use Unknown       Family History  Family History   Problem Relation Age of Onset     Diabetes Type 2  Mother      Heart Disease Father 60     CABG Father 50        triple bypass     Diabetes Type 2  Father      Depression Sister      Substance Abuse Sister      Ovarian Cancer Maternal Grandmother      Brain Cancer Maternal Grandmother      Pancreatic Cancer Maternal Aunt      Prostate Cancer Maternal Uncle           Nehemiah Mendoza MD on 2025      cc: Trinidad Hansen      Patient seen today in clinic to review expectations for upcoming Coronary Angiogram. Case Request is placed. Asia MAYNARD RN BSN, CHFN, PCCN-K      Thank you for allowing me to participate in the care of your patient.      Sincerely,     Nehemiah Mendoza MD     [facility]  cc:   Nehemiah Mendoza MD  [address]  [place] [address]

## 2025-07-17 ENCOUNTER — TELEPHONE (OUTPATIENT)
Dept: CARDIOLOGY | Facility: CLINIC | Age: 50
End: 2025-07-17
Payer: COMMERCIAL

## 2025-07-17 DIAGNOSIS — I50.22 CHRONIC SYSTOLIC HEART FAILURE (H): Primary | ICD-10-CM

## 2025-07-17 NOTE — TELEPHONE ENCOUNTER
Case Request placed for Cor Angio and RHC +/- Pulmonary Vasodilator Study biplane   Asia MAYNARD RN BSN, CHFN, PCCN-K

## 2025-07-17 NOTE — TELEPHONE ENCOUNTER
"----- Message from Fernando Sorensen sent at 7/17/2025  9:38 AM CDT -----  Regarding: RE: Coordination of care  Yes.  ----- Message -----  From: Asia Cruz RN  Sent: 7/17/2025   9:29 AM CDT  To: Fernando Sorensen MD  Subject: RE: Coordination of care                         Thank you for your response Dr. Sorensen,    I have confirmed with the cath lab the routine \"Renal Protocol\" includes  .9NS at 125/hr x2 hrs prior to procedure and again post procedure x2 hrs. ( Biplane imaging employed)    Are you in agreement with this plan? Or do you want to offer additional recommendations?    Thank you,  Asia MAYNARD RN BSN, CHFN, PCCN-K  ----- Message -----  From: Fernando Sorensen MD  Sent: 7/16/2025   7:23 PM CDT  To: Asia Cruz RN; Julianna Edwards RN  Subject: RE: Coordination of care                         I would defer to Cardiology protocol, but that would usually entail ~ 1 liter NS at ~ 100-125 ml/hr started several hours prior and continued after angiogram.    Fransico  ----- Message -----  From: Julianna Edwards RN  Sent: 7/16/2025   3:45 PM CDT  To: Asia Cruz RN; Fernando Sorensen MD  Subject: RE: Coordination of care                         Kevin Betancourt - including his nephrologist Dr. Sorensen.     Thanks,   Ximena Edwards RN, BSN  Post-Kidney/Pancreas Transplant Coordinator   [phone]  ----- Message -----  From: Asia Cruz RN  Sent: 7/16/2025   3:43 PM CDT  To: Julianna Edwards RN  Subject: Coordination of care                             Tuan Levine,  This patient was seen today by Dr. Oly Mendoza Advanced Heart Failure Cardiologist. She has recommended Siva have a Coronary Angiogram and Right Heart Catherization +/- pulmonary vasodilator study. This will take place at [facility].  I am looking for IV hydration recommendations for him pre/post given his renal history.     Thank you !    Asia MAYNARD RN BSN, CHFN, PCCN-K  "

## 2025-07-21 ENCOUNTER — TELEPHONE (OUTPATIENT)
Dept: CARDIOLOGY | Facility: CLINIC | Age: 50
End: 2025-07-21
Payer: COMMERCIAL

## 2025-07-21 DIAGNOSIS — I50.22 CHRONIC SYSTOLIC HEART FAILURE (H): Primary | ICD-10-CM

## 2025-07-21 NOTE — TELEPHONE ENCOUNTER
Patient is sent My Chart preparation instructions for upcoming procedure. Asia MAYNARD RN BSN, CHFN, PCCN-K

## 2025-07-22 ENCOUNTER — PREP FOR PROCEDURE (OUTPATIENT)
Dept: CARDIOLOGY | Facility: CLINIC | Age: 50
End: 2025-07-22
Payer: COMMERCIAL

## 2025-07-22 DIAGNOSIS — I50.22 CHRONIC SYSTOLIC HEART FAILURE (H): ICD-10-CM

## 2025-07-22 DIAGNOSIS — I25.810 CORONARY ARTERY DISEASE INVOLVING CORONARY BYPASS GRAFT OF NATIVE HEART WITHOUT ANGINA PECTORIS: Primary | ICD-10-CM

## 2025-07-22 LAB
ABO + RH BLD: NORMAL
BLD GP AB SCN SERPL QL: NEGATIVE
SPECIMEN EXP DATE BLD: NORMAL

## 2025-07-22 RX ORDER — FENTANYL CITRATE 50 UG/ML
25 INJECTION, SOLUTION INTRAMUSCULAR; INTRAVENOUS
Refills: 0 | Status: CANCELLED | OUTPATIENT
Start: 2025-07-24

## 2025-07-22 RX ORDER — NICOTINE POLACRILEX 4 MG
15-30 LOZENGE BUCCAL
Status: CANCELLED | OUTPATIENT
Start: 2025-07-24

## 2025-07-22 RX ORDER — DEXTROSE MONOHYDRATE 25 G/50ML
25-50 INJECTION, SOLUTION INTRAVENOUS
Status: CANCELLED | OUTPATIENT
Start: 2025-07-24

## 2025-07-22 RX ORDER — ASPIRIN 325 MG
325 TABLET ORAL ONCE
Status: CANCELLED | OUTPATIENT
Start: 2025-07-24 | End: 2025-07-22

## 2025-07-22 RX ORDER — LIDOCAINE 40 MG/G
CREAM TOPICAL
Status: CANCELLED | OUTPATIENT
Start: 2025-07-22

## 2025-07-22 RX ORDER — SODIUM CHLORIDE 9 MG/ML
INJECTION, SOLUTION INTRAVENOUS CONTINUOUS
Status: CANCELLED | OUTPATIENT
Start: 2025-07-24

## 2025-07-22 RX ORDER — ASPIRIN 81 MG/1
243 TABLET, CHEWABLE ORAL ONCE
Status: CANCELLED | OUTPATIENT
Start: 2025-07-24

## 2025-07-22 NOTE — PROGRESS NOTES
Siva Ramey  [address] [facility]  [place] [facility] [address]  [phone] (home)     PCP:  Trinidad Hansen  H&P completed by:  URSULA 7/16/25  Admit date 7/24/25 Arrival time:  0700  Anticoagulation:  NA  Previous PCI: Yes  Bypass Grafts: Yes  Renal Issues: Yes  S/P Renal and Pancreatic Transplant 2024  Diabetic?: No  Device?: No  Type:  n/a  Ambulation status: independent    Reason for Visit:  Telephone call to discuss pre-procedure education in preparation for: Right Heart Catheterization and Coronary Angiogram with Possible PCI    Procedure Prep:  EKG results obtained, dated: To be completed on day of cath lab procedure  Hemogram results obtained: Completed on 7/23/25  Basic Metabolic Panel results obtained: Completed on 7/23/25  Pertinent cardiac test results: n/a      Patient Education    Your arrival time is 0700.  Location is [address] [address] - Main Entrance of the Hospital  Please plan on being at the hospital all day.  At any time, emergencies and/or urgent cases may come up which could delay the start of your procedure.    COVID Testing Instructions  *Mandatory COVID Testing: ALL Patients will need to complete a COVID test no sooner than 4 days prior to their procedure (regardless of vaccination status).    To schedule COVID testing Please call [phone]  If you want to complete this at an outside facility please call them to find out if they will have the results within the appropriate time frame and their fax number.  You will need to provide us with that information so we can send the order.  The facility completing the test will need to fax the results to [phone]  If you are running into and issues please call us.     Pre-procedure instructions  Take your temperature in the morning prior to coming in.  If your temperature is 100 F please call  [facility] [phone] and notify them.  If you do not have access to a thermometer at home,  please come in for testing.  If you are running a temperature your procedure may be rescheduled.  Patient instructed to not Eat or Drink after midnight.  Patient instructed to shower the evening before or the morning of the procedure.  Patient instructed to arrange for transportation home following procedure from a responsible family member or friend. No driving for at least 24 hours.  Patient instructed to have a responsible adult with them for 24 hours post-procedure.  Post-procedure follow up process.  Conscious sedation discussed.      Pre-procedure medication instructions.  Continue medications as scheduled, with a small amount of water on the day of the procedure unless indicated. (NO Diabetic Medications or Blood Thinners)  Pt instructed not to consume Alcohol, Tobacco, Caffeine, or Carbonated beverages 12 hours prior to procedure.  Patient instructed to take 325 mg of Aspirin the morning of procedure: No also on Brilinta  Other medication: instructed to only take all routine morning medications a.m. of the procedure.              Diabetic Medication Instructions  DO NOT take any oral diabetic medication, short-acting diabetes medications/insulin, humalog or regular insulin the morning of your test  Take   dose of long-acting insulin (Lantus, Levemir) the day of your test  Hold Oral Diabetic on the day of the procedure and for 48 hours after IV contrast given  Remember to  bring your glucometer and insulin with you to take after your test if needed              Anticoagulation Medication Instructions     none    Patient states understanding of procedure and agrees to proceed.    *PATIENTS RECORDS AVAILABLE IN [facility] UNLESS OTHERWISE INDICATED*      Patient Active Problem List   Diagnosis    Type 2 diabetes mellitus with other circulatory complication, unspecified whether long term insulin use (H)    Dyslipidemia    Atherosclerosis of other coronary artery bypass graft(s) with unstable angina pectoris (H)     Anemia in chronic renal disease    HTN, kidney transplant related    S/P CABG (coronary artery bypass graft)    Balanoposthitis    History of ST elevation myocardial infarction (STEMI)    Obesity (BMI 30.0-34.9)    MARQUEZ (dyspnea on exertion)    STEMI (ST elevation myocardial infarction) (H)    Metabolic acidosis    Retinopathy    Peripheral neuropathy    Acquired elevated diaphragm    Median nerve neuropathy, left    Stage 3a chronic kidney disease (H)    Coagulation defect, unspecified    Obstructive sleep apnea treated with continuous positive airway pressure (CPAP)    Snoring    Palpitations    Gastroesophageal reflux disease without esophagitis    Diabetes mellitus, type 2 (H)    Kidney replaced by transplant    Pancreas replaced by transplant (H)    Immunosuppressed status    Cardiac arrest with ventricular fibrillation (H)    Steroid-induced hyperglycemia    History of simultaneous kidney and pancreas transplant (H)    Hypomagnesemia    Secondary renal hyperparathyroidism    Need for pneumocystis prophylaxis    Other stricture of urethra in male    Aftercare following organ transplant    CAD (coronary artery disease)    PAD (peripheral artery disease)    HFrEF (heart failure with reduced ejection fraction) (H)    Urinary tract infection    Hypogammaglobulinemia    Hypercalcemia    BK viremia    Cytomegalovirus (CMV) viremia (H)       Current Outpatient Medications   Medication Sig Dispense Refill    acetaminophen (TYLENOL) 500 MG tablet Take 500 mg by mouth every 4 hours as needed      aspirin (ASA) 81 MG chewable tablet Take 1 tablet (81 mg) by mouth daily Starting tomorrow. 30 tablet 3    atorvastatin (LIPITOR) 40 MG tablet Take 1 tablet (40 mg) by mouth every evening. 90 tablet 4    blood glucose (NO BRAND SPECIFIED) test strip Use to test blood sugar 4 times daily or as directed. 100 strip 5    carvedilol (COREG) 3.125 MG tablet Take 1 tablet (3.125 mg) by mouth 2 times daily. 60 tablet 11    cinacalcet  (SENSIPAR) 30 MG tablet Take 2 tablets (60 mg) by mouth daily. 180 tablet 1    clobetasol propionate (TEMOVATE) 0.05 % external cream Apply topically 2 times daily. 45 g 0    dorzolamide-timolol (COSOPT) 2-0.5 % ophthalmic solution Place 1 drop into the right eye 2 times daily. (Patient not taking: Reported on 7/16/2025)      fosfomycin (MONUROL) 3 g Packet Take 1 packet (3 g) by mouth every other day. (Patient not taking: Reported on 7/16/2025) 3 packet 0    levofloxacin (LEVAQUIN) 750 MG tablet Take 1 tablet (750 mg) by mouth daily. (Patient not taking: Reported on 7/16/2025) 7 tablet 0    magnesium glycinate 100 MG CAPS capsule Take 300 mg by mouth 2 times daily. (Patient taking differently: Take 600 mg by mouth daily.)      mycophenolic acid (GENERIC EQUIVALENT) 180 MG EC tablet Take 540 mg twice a day until follow up appointment with Dr. Sorensen      sodium bicarbonate 650 MG tablet TAKE 3 TABLETS (1,950 MG) BY MOUTH 3 TIMES DAILY 270 tablet 11    tacrolimus (GENERIC EQUIVALENT) 0.5 MG capsule ON HOLD (Patient not taking: Reported on 7/16/2025) 180 capsule 3    tacrolimus (GENERIC EQUIVALENT) 1 MG capsule Take 2 capsules (2 mg) by mouth 2 times daily. Total dose= 2 mg twice daily 180 capsule 3    ticagrelor (BRILINTA) 90 MG tablet Take 1 tablet (90 mg) by mouth 2 times daily. 180 tablet 0       Allergies   Allergen Reactions    Amoxicillin Hives     & generalized pain    Tolerated ceftriaxone in 2023 ([facility]) and 2024 ([facility])    Venlafaxine      lethargic       Asia Cruz RN on 7/22/2025 at 6:54 AM

## 2025-07-23 ENCOUNTER — RESULTS FOLLOW-UP (OUTPATIENT)
Dept: TRANSPLANT | Facility: CLINIC | Age: 50
End: 2025-07-23

## 2025-07-23 ENCOUNTER — LAB (OUTPATIENT)
Dept: CARDIOLOGY | Facility: CLINIC | Age: 50
End: 2025-07-23
Payer: MEDICARE

## 2025-07-23 DIAGNOSIS — N39.0 URINARY TRACT INFECTION: ICD-10-CM

## 2025-07-23 DIAGNOSIS — I15.1 HTN, KIDNEY TRANSPLANT RELATED: Primary | ICD-10-CM

## 2025-07-23 DIAGNOSIS — I50.22 CHRONIC SYSTOLIC HEART FAILURE (H): ICD-10-CM

## 2025-07-23 DIAGNOSIS — Z94.0 HTN, KIDNEY TRANSPLANT RELATED: Primary | ICD-10-CM

## 2025-07-23 LAB
ANION GAP SERPL CALCULATED.3IONS-SCNC: 11 MMOL/L (ref 7–15)
BLOOD BANK CHART COMMENT: NORMAL
BUN SERPL-MCNC: 27 MG/DL (ref 6–20)
CALCIUM SERPL-MCNC: 9.7 MG/DL (ref 8.8–10.4)
CHLORIDE SERPL-SCNC: 108 MMOL/L (ref 98–107)
CREAT SERPL-MCNC: 1.86 MG/DL (ref 0.67–1.17)
EGFRCR SERPLBLD CKD-EPI 2021: 44 ML/MIN/1.73M2
ERYTHROCYTE [DISTWIDTH] IN BLOOD BY AUTOMATED COUNT: 14 % (ref 10–15)
GLUCOSE SERPL-MCNC: 110 MG/DL (ref 70–99)
HCO3 SERPL-SCNC: 26 MMOL/L (ref 22–29)
HCT VFR BLD AUTO: 38.5 % (ref 40–53)
HGB BLD-MCNC: 12.4 G/DL (ref 13.3–17.7)
MCH RBC QN AUTO: 28.9 PG (ref 26.5–33)
MCHC RBC AUTO-ENTMCNC: 32.2 G/DL (ref 31.5–36.5)
MCV RBC AUTO: 90 FL (ref 78–100)
PLATELET # BLD AUTO: 178 10E3/UL (ref 150–450)
POTASSIUM SERPL-SCNC: 5.4 MMOL/L (ref 3.4–5.3)
RBC # BLD AUTO: 4.29 10E6/UL (ref 4.4–5.9)
SODIUM SERPL-SCNC: 145 MMOL/L (ref 135–145)
SPECIMEN EXP DATE BLD: NORMAL
WBC # BLD AUTO: 6.4 10E3/UL (ref 4–11)

## 2025-07-23 PROCEDURE — 86850 RBC ANTIBODY SCREEN: CPT

## 2025-07-23 PROCEDURE — 36415 COLL VENOUS BLD VENIPUNCTURE: CPT

## 2025-07-23 PROCEDURE — 85027 COMPLETE CBC AUTOMATED: CPT

## 2025-07-23 PROCEDURE — 86901 BLOOD TYPING SEROLOGIC RH(D): CPT

## 2025-07-23 PROCEDURE — 80048 BASIC METABOLIC PNL TOTAL CA: CPT

## 2025-07-23 PROCEDURE — 86900 BLOOD TYPING SEROLOGIC ABO: CPT

## 2025-07-24 ENCOUNTER — HOSPITAL ENCOUNTER (OUTPATIENT)
Facility: HOSPITAL | Age: 50
Discharge: HOME | End: 2025-07-24
Attending: STUDENT IN AN ORGANIZED HEALTH CARE EDUCATION/TRAINING PROGRAM | Admitting: STUDENT IN AN ORGANIZED HEALTH CARE EDUCATION/TRAINING PROGRAM
Payer: COMMERCIAL

## 2025-07-24 VITALS
DIASTOLIC BLOOD PRESSURE: 87 MMHG | HEART RATE: 90 BPM | BODY MASS INDEX: 32.76 KG/M2 | SYSTOLIC BLOOD PRESSURE: 166 MMHG | OXYGEN SATURATION: 97 % | TEMPERATURE: 98.7 F | WEIGHT: 234 LBS | RESPIRATION RATE: 28 BRPM | HEIGHT: 71 IN

## 2025-07-24 DIAGNOSIS — I25.810 CORONARY ARTERY DISEASE INVOLVING CORONARY BYPASS GRAFT OF NATIVE HEART WITHOUT ANGINA PECTORIS: ICD-10-CM

## 2025-07-24 DIAGNOSIS — I50.22 CHRONIC SYSTOLIC HEART FAILURE (H): ICD-10-CM

## 2025-07-24 PROBLEM — Z98.890 STATUS POST CORONARY ANGIOGRAM: Status: ACTIVE | Noted: 2025-07-24

## 2025-07-24 LAB
ANION GAP SERPL CALCULATED.3IONS-SCNC: 11 MMOL/L (ref 7–15)
ATRIAL RATE - MUSE: 85 BPM
BASE EXCESS BLDA CALC-SCNC: -5.9 MMOL/L (ref -3–3)
BASE EXCESS BLDV CALC-SCNC: -3.5 MMOL/L (ref -3–3)
BUN SERPL-MCNC: 24.2 MG/DL (ref 6–20)
CALCIUM SERPL-MCNC: 10 MG/DL (ref 8.8–10.4)
CHLORIDE SERPL-SCNC: 110 MMOL/L (ref 98–107)
CREAT SERPL-MCNC: 1.55 MG/DL (ref 0.67–1.17)
DIASTOLIC BLOOD PRESSURE - MUSE: NORMAL MMHG
EGFRCR SERPLBLD CKD-EPI 2021: 54 ML/MIN/1.73M2
GLUCOSE SERPL-MCNC: 113 MG/DL (ref 70–99)
HCO3 BLDA-SCNC: 20 MMOL/L (ref 21–28)
HCO3 BLDV-SCNC: 21 MMOL/L (ref 21–28)
HCO3 SERPL-SCNC: 26 MMOL/L (ref 22–29)
INR PPP: 1.1 (ref 0.85–1.15)
INTERPRETATION ECG - MUSE: NORMAL
OXYHGB MFR BLDA: 92 % (ref 92–100)
OXYHGB MFR BLDV: 72 % (ref 70–75)
P AXIS - MUSE: 27 DEGREES
PCO2 BLDA: 38 MM HG (ref 35–45)
PCO2 BLDV: 44 MM HG (ref 40–50)
PH BLDA: 7.32 [PH] (ref 7.35–7.45)
PH BLDV: 7.32 [PH] (ref 7.32–7.43)
PO2 BLDA: 78 MM HG (ref 80–105)
PO2 BLDV: 44 MM HG (ref 25–47)
POTASSIUM SERPL-SCNC: 5.1 MMOL/L (ref 3.4–5.3)
PR INTERVAL - MUSE: 166 MS
PROTHROMBIN TIME: 14.4 SECONDS (ref 11.8–14.8)
QRS DURATION - MUSE: 118 MS
QT - MUSE: 378 MS
QTC - MUSE: 449 MS
R AXIS - MUSE: 46 DEGREES
SAO2 % BLDA: 93 % (ref 96–97)
SAO2 % BLDV: 73 % (ref 70–75)
SODIUM SERPL-SCNC: 147 MMOL/L (ref 135–145)
SYSTOLIC BLOOD PRESSURE - MUSE: NORMAL MMHG
T AXIS - MUSE: -14 DEGREES
VENTRICULAR RATE- MUSE: 85 BPM

## 2025-07-24 PROCEDURE — C1887 CATHETER, GUIDING: HCPCS | Performed by: STUDENT IN AN ORGANIZED HEALTH CARE EDUCATION/TRAINING PROGRAM

## 2025-07-24 PROCEDURE — 250N000013 HC RX MED GY IP 250 OP 250 PS 637: Performed by: NURSE PRACTITIONER

## 2025-07-24 PROCEDURE — 93457 R HRT ART/GRFT ANGIO: CPT | Mod: 26 | Performed by: STUDENT IN AN ORGANIZED HEALTH CARE EDUCATION/TRAINING PROGRAM

## 2025-07-24 PROCEDURE — 99152 MOD SED SAME PHYS/QHP 5/>YRS: CPT | Performed by: STUDENT IN AN ORGANIZED HEALTH CARE EDUCATION/TRAINING PROGRAM

## 2025-07-24 PROCEDURE — 93010 ELECTROCARDIOGRAM REPORT: CPT | Mod: HOP | Performed by: INTERNAL MEDICINE

## 2025-07-24 PROCEDURE — 85610 PROTHROMBIN TIME: CPT | Performed by: INTERNAL MEDICINE

## 2025-07-24 PROCEDURE — 250N000011 HC RX IP 250 OP 636: Performed by: STUDENT IN AN ORGANIZED HEALTH CARE EDUCATION/TRAINING PROGRAM

## 2025-07-24 PROCEDURE — 255N000002 HC RX 255 OP 636: Performed by: STUDENT IN AN ORGANIZED HEALTH CARE EDUCATION/TRAINING PROGRAM

## 2025-07-24 PROCEDURE — 82805 BLOOD GASES W/O2 SATURATION: CPT

## 2025-07-24 PROCEDURE — 250N000009 HC RX 250: Performed by: STUDENT IN AN ORGANIZED HEALTH CARE EDUCATION/TRAINING PROGRAM

## 2025-07-24 PROCEDURE — 258N000003 HC RX IP 258 OP 636: Performed by: NURSE PRACTITIONER

## 2025-07-24 PROCEDURE — 36415 COLL VENOUS BLD VENIPUNCTURE: CPT | Performed by: INTERNAL MEDICINE

## 2025-07-24 PROCEDURE — 999N000054 HC STATISTIC EKG NON-CHARGEABLE

## 2025-07-24 PROCEDURE — 250N000013 HC RX MED GY IP 250 OP 250 PS 637: Performed by: INTERNAL MEDICINE

## 2025-07-24 PROCEDURE — 93461 R&L HRT ART/VENTRICLE ANGIO: CPT | Performed by: STUDENT IN AN ORGANIZED HEALTH CARE EDUCATION/TRAINING PROGRAM

## 2025-07-24 PROCEDURE — 93457 R HRT ART/GRFT ANGIO: CPT | Performed by: STUDENT IN AN ORGANIZED HEALTH CARE EDUCATION/TRAINING PROGRAM

## 2025-07-24 PROCEDURE — 272N000001 HC OR GENERAL SUPPLY STERILE: Performed by: STUDENT IN AN ORGANIZED HEALTH CARE EDUCATION/TRAINING PROGRAM

## 2025-07-24 PROCEDURE — 80048 BASIC METABOLIC PNL TOTAL CA: CPT | Performed by: INTERNAL MEDICINE

## 2025-07-24 PROCEDURE — 258N000003 HC RX IP 258 OP 636: Performed by: INTERNAL MEDICINE

## 2025-07-24 PROCEDURE — 93005 ELECTROCARDIOGRAM TRACING: CPT

## 2025-07-24 PROCEDURE — C1760 CLOSURE DEV, VASC: HCPCS | Performed by: STUDENT IN AN ORGANIZED HEALTH CARE EDUCATION/TRAINING PROGRAM

## 2025-07-24 PROCEDURE — C1894 INTRO/SHEATH, NON-LASER: HCPCS | Performed by: STUDENT IN AN ORGANIZED HEALTH CARE EDUCATION/TRAINING PROGRAM

## 2025-07-24 DEVICE — CLOSURE ANGIOSEAL 6FR 610130: Type: IMPLANTABLE DEVICE | Status: FUNCTIONAL

## 2025-07-24 RX ORDER — NALOXONE HYDROCHLORIDE 0.4 MG/ML
0.2 INJECTION, SOLUTION INTRAMUSCULAR; INTRAVENOUS; SUBCUTANEOUS
Status: DISCONTINUED | OUTPATIENT
Start: 2025-07-24 | End: 2025-07-24 | Stop reason: HOSPADM

## 2025-07-24 RX ORDER — NALOXONE HYDROCHLORIDE 0.4 MG/ML
0.4 INJECTION, SOLUTION INTRAMUSCULAR; INTRAVENOUS; SUBCUTANEOUS
Status: DISCONTINUED | OUTPATIENT
Start: 2025-07-24 | End: 2025-07-24 | Stop reason: HOSPADM

## 2025-07-24 RX ORDER — ASPIRIN 325 MG
325 TABLET ORAL ONCE
Status: COMPLETED | OUTPATIENT
Start: 2025-07-24 | End: 2025-07-24

## 2025-07-24 RX ORDER — CARVEDILOL 3.12 MG/1
3.12 TABLET ORAL ONCE
Status: COMPLETED | OUTPATIENT
Start: 2025-07-24 | End: 2025-07-24

## 2025-07-24 RX ORDER — FLUMAZENIL 0.1 MG/ML
0.2 INJECTION, SOLUTION INTRAVENOUS
Status: DISCONTINUED | OUTPATIENT
Start: 2025-07-24 | End: 2025-07-24 | Stop reason: HOSPADM

## 2025-07-24 RX ORDER — NICOTINE POLACRILEX 4 MG
15-30 LOZENGE BUCCAL
Status: DISCONTINUED | OUTPATIENT
Start: 2025-07-24 | End: 2025-07-24 | Stop reason: HOSPADM

## 2025-07-24 RX ORDER — ACETAMINOPHEN 325 MG/1
650 TABLET ORAL EVERY 4 HOURS PRN
Status: DISCONTINUED | OUTPATIENT
Start: 2025-07-24 | End: 2025-07-24 | Stop reason: HOSPADM

## 2025-07-24 RX ORDER — FENTANYL CITRATE 50 UG/ML
INJECTION, SOLUTION INTRAMUSCULAR; INTRAVENOUS
Status: DISCONTINUED | OUTPATIENT
Start: 2025-07-24 | End: 2025-07-24 | Stop reason: HOSPADM

## 2025-07-24 RX ORDER — HEPARIN SODIUM 200 [USP'U]/100ML
INJECTION, SOLUTION INTRAVENOUS
Status: DISCONTINUED | OUTPATIENT
Start: 2025-07-24 | End: 2025-07-24 | Stop reason: HOSPADM

## 2025-07-24 RX ORDER — OXYCODONE HYDROCHLORIDE 5 MG/1
5 TABLET ORAL EVERY 4 HOURS PRN
Status: DISCONTINUED | OUTPATIENT
Start: 2025-07-24 | End: 2025-07-24 | Stop reason: HOSPADM

## 2025-07-24 RX ORDER — FENTANYL CITRATE 50 UG/ML
25 INJECTION, SOLUTION INTRAMUSCULAR; INTRAVENOUS
Status: DISCONTINUED | OUTPATIENT
Start: 2025-07-24 | End: 2025-07-24 | Stop reason: HOSPADM

## 2025-07-24 RX ORDER — OXYCODONE HYDROCHLORIDE 5 MG/1
10 TABLET ORAL EVERY 4 HOURS PRN
Status: DISCONTINUED | OUTPATIENT
Start: 2025-07-24 | End: 2025-07-24 | Stop reason: HOSPADM

## 2025-07-24 RX ORDER — SODIUM CHLORIDE 9 MG/ML
INJECTION, SOLUTION INTRAVENOUS CONTINUOUS
Status: DISCONTINUED | OUTPATIENT
Start: 2025-07-24 | End: 2025-07-24 | Stop reason: HOSPADM

## 2025-07-24 RX ORDER — ASPIRIN 81 MG/1
243 TABLET, CHEWABLE ORAL ONCE
Status: COMPLETED | OUTPATIENT
Start: 2025-07-24 | End: 2025-07-24

## 2025-07-24 RX ORDER — DEXTROSE MONOHYDRATE 25 G/50ML
25-50 INJECTION, SOLUTION INTRAVENOUS
Status: DISCONTINUED | OUTPATIENT
Start: 2025-07-24 | End: 2025-07-24 | Stop reason: HOSPADM

## 2025-07-24 RX ORDER — LIDOCAINE 40 MG/G
CREAM TOPICAL
Status: DISCONTINUED | OUTPATIENT
Start: 2025-07-24 | End: 2025-07-24 | Stop reason: HOSPADM

## 2025-07-24 RX ORDER — IODIXANOL 320 MG/ML
INJECTION, SOLUTION INTRAVASCULAR
Status: DISCONTINUED | OUTPATIENT
Start: 2025-07-24 | End: 2025-07-24 | Stop reason: HOSPADM

## 2025-07-24 RX ORDER — DIAZEPAM 10 MG/1
10 TABLET ORAL ONCE
Status: COMPLETED | OUTPATIENT
Start: 2025-07-24 | End: 2025-07-24

## 2025-07-24 RX ADMIN — SODIUM CHLORIDE: 0.9 INJECTION, SOLUTION INTRAVENOUS at 11:07

## 2025-07-24 RX ADMIN — SODIUM CHLORIDE: 0.9 INJECTION, SOLUTION INTRAVENOUS at 08:10

## 2025-07-24 RX ADMIN — ASPIRIN 325 MG: 325 TABLET ORAL at 08:09

## 2025-07-24 RX ADMIN — CARVEDILOL 3.12 MG: 3.12 TABLET, FILM COATED ORAL at 12:35

## 2025-07-24 RX ADMIN — DIAZEPAM 10 MG: 10 TABLET ORAL at 08:55

## 2025-07-24 ASSESSMENT — ACTIVITIES OF DAILY LIVING (ADL)
ADLS_ACUITY_SCORE: 58
ADLS_ACUITY_SCORE: 59
ADLS_ACUITY_SCORE: 58

## 2025-07-24 NOTE — DISCHARGE INSTRUCTIONS
Interventional Cardiology  Coronary Angiogram/Angioplasty/Stent/Atherectomy Discharge Instructions -   Femoral Approach    The instructions below are to help you understand how to take care of yourself. There is also information about when to call the doctor or emergency services    **Do not stop your aspirin or platelet inhibitor unless directed by your Cardiologist.  These medications help to prevent platelets in your blood from sticking together and forming a clot.  Examples of these medications are:  Ticagrelor (Brilinta), Clopidigrel (Plavix), Prasugrel (Effient)          For the first 72 hours after your procedure:  No driving for 24 hours  Do not lift more than 15 pounds.  If you usually lift 50 pounds or more daily, please contact your cardiologist  Avoid any hard work or tiring activities, this includes, yard work, jogging, biking, sexual activity  During the day get up and walk around every 2 hours  You may return to work after 72 hours if you are feeling well and your job does not involve heavy lifting          Groin Site / Wound Care / Bleeding    You may take off the dressing on your groin the day after your procedure  Keep the area dry and clean  It is ok to shower with regular soap.  Pat dry, do not rub  You do not need to use a bandage  No tub/pool or hot tub for 1 week  If your groin starts to bleed or begins to swell suddenly after leaving the hospital, lie flat and apply firm pressure above and at the site for 15 minutes.  If bleeding continues, call 9-1-1  Bruising around the groin area is normal.  It may take 2-3 weeks for this to go away.  It is normal for the bruised area to turn green and/or yellow as it is healing.  A small lump may also be present and may last 2-3 months.  Your leg may be sore or stiff for a few days.  You may take Tylenol or a pain medicine recommended by your doctor  If you have a fever over 100.4, that lasts more than one day - call your cardiologist.      Our Cardiac  Rehab staff may visit briefly with you while your in the hospital.  If they miss you, someone will contact you after you are home.  You are encouraged to enroll in an Outpatient Cardiac Rehab Program     No driving for 24 hours      Your Procedural Physician was: Dr. Nelson  the phone number is: [phone]      [facility]:  [phone]  If you are calling after hours, please listen to the entire voicemail, a live  will answer at the end of the message

## 2025-07-24 NOTE — PROGRESS NOTES
Pt discharged in stable condition. Ambulated in hallway without issue. Denies pain. Right groin site clean and dry. No hematoma present. Bandaid present over puncture sites. Tolerating food. Discharge education completed with wife and patient, questions answered. Belongings sent home with pt.

## 2025-07-24 NOTE — PRE-PROCEDURE
GENERAL PRE-PROCEDURE:   Procedure:  Coronary angiogram with possible PCI, right heart catheterization, pulmonary vasodilator study  Date/Time:  7/24/2025 8:09 AM    Written consent obtained?: Yes    Risks and benefits: Risks, benefits and alternatives were discussed    Consent given by:  Patient  Patient states understanding of procedure being performed: Yes    Patient's understanding of procedure matches consent: Yes    Procedure consent matches procedure scheduled: Yes    Expected level of sedation:  Moderate  Appropriately NPO:  Yes  ASA Class:  3 (HFrEF, CAD; s/p CABG, s/p PCI, PAD, ESRD; s/p renal and pancreas transplant, Class I obesity; BMI 32.64kg/m2)  Mallampati  :  Grade 2- soft palate, base of uvula, tonsillar pillars, and portion of posterior pharyngeal wall visible  Lungs:  Lungs clear with good breath sounds bilaterally  Heart:  Normal heart sounds and rate  History & Physical reviewed:  History and physical reviewed and updates made (see comment)  H&P Comments:  Clinically Significant Risk Factors Present on Admission    Cardiovascular : HFrEF, CAD; s/p CABG, s/p PCI, PAD, Class I obesity; BMI 32.64kg/m2    Fluid & Electrolyte Disorders : Not present on admission    Gastroenterology : Not present on admission    Hematology/Oncology : anemia; stable    Nephrology : ESRD; s/p renal and pancreas transplant, will minimize contrast administration, IV hydration per protocol    Neurology : Not present on admission    Pulmonology : Not present on admission    Systemic : Class I obesity; BMI 32.64kg/m2    Statement of review:  I have reviewed the lab findings, diagnostic data, medications, and the plan for sedation

## 2025-07-24 NOTE — Clinical Note
Potential access sites were evaluated for patency using ultrasound.   The right femoral artery and vein was selected. Access was obtained under with Sonosite guidance using a micropuncture 21 gauge needle with direct visualization of needle entry.

## 2025-07-24 NOTE — INTERVAL H&P NOTE
"I have reviewed the surgical (or preoperative) H&P that is linked to this encounter, and examined the patient. There are no significant changes    Clinical Conditions Present on Arrival:  Clinically Significant Risk Factors Present on Admission        # Hyperkalemia: Highest K = 5.4 mmol/L in last 2 days, will monitor as appropriate   # Hyperchloremia: Highest Cl = 108 mmol/L in last 2 days, will monitor as appropriate             # Drug Induced Platelet Defect: home medication list includes an antiplatelet medication      # Obesity: Estimated body mass index is 32.64 kg/m  as calculated from the following:    Height as of this encounter: 1.803 m (5' 11\").    Weight as of this encounter: 106.1 kg (234 lb).       "

## 2025-07-28 ENCOUNTER — LAB (OUTPATIENT)
Dept: LAB | Facility: HOSPITAL | Age: 50
End: 2025-07-28
Payer: COMMERCIAL

## 2025-07-28 DIAGNOSIS — Z94.0 HTN, KIDNEY TRANSPLANT RELATED: ICD-10-CM

## 2025-07-28 DIAGNOSIS — Z94.0 KIDNEY REPLACED BY TRANSPLANT: ICD-10-CM

## 2025-07-28 DIAGNOSIS — Z79.899 ENCOUNTER FOR LONG-TERM CURRENT USE OF MEDICATION: ICD-10-CM

## 2025-07-28 DIAGNOSIS — I15.1 HTN, KIDNEY TRANSPLANT RELATED: ICD-10-CM

## 2025-07-28 DIAGNOSIS — Z94.83 PANCREAS REPLACED BY TRANSPLANT (H): ICD-10-CM

## 2025-07-28 DIAGNOSIS — Z98.890 OTHER SPECIFIED POSTPROCEDURAL STATES: ICD-10-CM

## 2025-07-28 DIAGNOSIS — Z48.298 AFTERCARE FOLLOWING ORGAN TRANSPLANT: ICD-10-CM

## 2025-07-28 LAB
ALBUMIN MFR UR ELPH: 40.7 MG/DL
ALBUMIN SERPL BCG-MCNC: 4.6 G/DL (ref 3.5–5.2)
ALP SERPL-CCNC: 196 U/L (ref 40–150)
ALT SERPL W P-5'-P-CCNC: 63 U/L (ref 0–70)
AMYLASE SERPL-CCNC: 97 U/L (ref 28–100)
ANION GAP SERPL CALCULATED.3IONS-SCNC: 13 MMOL/L (ref 7–15)
AST SERPL W P-5'-P-CCNC: 31 U/L (ref 0–45)
BILIRUB DIRECT SERPL-MCNC: 0.19 MG/DL (ref 0–0.3)
BILIRUB SERPL-MCNC: 0.5 MG/DL
BK VIRUS SPECIMEN TYPE: NORMAL
BKV DNA # SPEC NAA+PROBE: NOT DETECTED IU/ML
BUN SERPL-MCNC: 27.7 MG/DL (ref 6–20)
CALCIUM SERPL-MCNC: 9.4 MG/DL (ref 8.8–10.4)
CHLORIDE SERPL-SCNC: 104 MMOL/L (ref 98–107)
CHOLEST SERPL-MCNC: 92 MG/DL
CMV DNA SPEC NAA+PROBE-ACNC: NOT DETECTED IU/ML
CREAT SERPL-MCNC: 1.93 MG/DL (ref 0.67–1.17)
CREAT UR-MCNC: 182.4 MG/DL
EGFRCR SERPLBLD CKD-EPI 2021: 42 ML/MIN/1.73M2
ERYTHROCYTE [DISTWIDTH] IN BLOOD BY AUTOMATED COUNT: 14.1 % (ref 10–15)
EST. AVERAGE GLUCOSE BLD GHB EST-MCNC: 117 MG/DL
FASTING STATUS PATIENT QL REPORTED: YES
FASTING STATUS PATIENT QL REPORTED: YES
GLUCOSE SERPL-MCNC: 111 MG/DL (ref 70–99)
HBA1C MFR BLD: 5.7 %
HCO3 SERPL-SCNC: 22 MMOL/L (ref 22–29)
HCT VFR BLD AUTO: 39.5 % (ref 40–53)
HDLC SERPL-MCNC: 28 MG/DL
HGB BLD-MCNC: 12.7 G/DL (ref 13.3–17.7)
LDLC SERPL CALC-MCNC: 40 MG/DL
LIPASE SERPL-CCNC: 57 U/L (ref 13–60)
MCH RBC QN AUTO: 29.1 PG (ref 26.5–33)
MCHC RBC AUTO-ENTMCNC: 32.2 G/DL (ref 31.5–36.5)
MCV RBC AUTO: 90 FL (ref 78–100)
NONHDLC SERPL-MCNC: 64 MG/DL
PLATELET # BLD AUTO: 177 10E3/UL (ref 150–450)
POTASSIUM SERPL-SCNC: 4.9 MMOL/L (ref 3.4–5.3)
PROT SERPL-MCNC: 7 G/DL (ref 6.4–8.3)
PROT/CREAT 24H UR: 0.22 MG/MG CR (ref 0–0.2)
PTH-INTACT SERPL-MCNC: 152 PG/ML (ref 15–65)
RBC # BLD AUTO: 4.37 10E6/UL (ref 4.4–5.9)
SODIUM SERPL-SCNC: 139 MMOL/L (ref 135–145)
SPECIMEN TYPE: NORMAL
TACROLIMUS BLD-MCNC: 8.1 UG/L (ref 5–15)
TME LAST DOSE: NORMAL H
TME LAST DOSE: NORMAL H
TRIGL SERPL-MCNC: 120 MG/DL
URATE SERPL-MCNC: 7.5 MG/DL (ref 3.4–7)
VIT D+METAB SERPL-MCNC: 40 NG/ML (ref 20–50)
WBC # BLD AUTO: 5.3 10E3/UL (ref 4–11)

## 2025-07-28 PROCEDURE — 85027 COMPLETE CBC AUTOMATED: CPT

## 2025-07-28 PROCEDURE — 80180 DRUG SCRN QUAN MYCOPHENOLATE: CPT

## 2025-07-28 PROCEDURE — 84550 ASSAY OF BLOOD/URIC ACID: CPT

## 2025-07-28 PROCEDURE — 83690 ASSAY OF LIPASE: CPT

## 2025-07-28 PROCEDURE — 86828 HLA CLASS I&II ANTIBODY QUAL: CPT

## 2025-07-28 PROCEDURE — 83036 HEMOGLOBIN GLYCOSYLATED A1C: CPT

## 2025-07-28 PROCEDURE — 82306 VITAMIN D 25 HYDROXY: CPT

## 2025-07-28 PROCEDURE — 83970 ASSAY OF PARATHORMONE: CPT

## 2025-07-28 PROCEDURE — 84156 ASSAY OF PROTEIN URINE: CPT | Performed by: INTERNAL MEDICINE

## 2025-07-28 PROCEDURE — 80197 ASSAY OF TACROLIMUS: CPT

## 2025-07-28 PROCEDURE — 36415 COLL VENOUS BLD VENIPUNCTURE: CPT

## 2025-07-28 PROCEDURE — 82150 ASSAY OF AMYLASE: CPT

## 2025-07-28 PROCEDURE — 87799 DETECT AGENT NOS DNA QUANT: CPT

## 2025-07-28 PROCEDURE — 80061 LIPID PANEL: CPT

## 2025-07-28 PROCEDURE — 82248 BILIRUBIN DIRECT: CPT

## 2025-07-29 ENCOUNTER — TELEPHONE (OUTPATIENT)
Dept: TRANSPLANT | Facility: CLINIC | Age: 50
End: 2025-07-29
Payer: COMMERCIAL

## 2025-07-29 DIAGNOSIS — I15.1 HTN, KIDNEY TRANSPLANT RELATED: ICD-10-CM

## 2025-07-29 DIAGNOSIS — N39.0 URINARY TRACT INFECTION: ICD-10-CM

## 2025-07-29 DIAGNOSIS — Z94.0 HTN, KIDNEY TRANSPLANT RELATED: ICD-10-CM

## 2025-07-29 LAB
MYCOPHENOLATE SERPL LC/MS/MS-MCNC: 3.34 MG/L (ref 1–3.5)
MYCOPHENOLATE-G SERPL LC/MS/MS-MCNC: 67.5 MG/L (ref 30–95)
TME LAST DOSE: NORMAL H
TME LAST DOSE: NORMAL H

## 2025-07-29 NOTE — TELEPHONE ENCOUNTER
ISSUE:  SCr 1.9, above baseline     Siva c/o UTI symptoms. Will provide UA/UCx, has follow up with ID next week.

## 2025-07-30 ENCOUNTER — LAB (OUTPATIENT)
Dept: LAB | Facility: HOSPITAL | Age: 50
End: 2025-07-30
Payer: MEDICARE

## 2025-07-30 DIAGNOSIS — N39.0 URINARY TRACT INFECTION: ICD-10-CM

## 2025-07-30 LAB
ALBUMIN UR-MCNC: 30 MG/DL
APPEARANCE UR: ABNORMAL
BACTERIA #/AREA URNS HPF: ABNORMAL /HPF
BILIRUB UR QL STRIP: NEGATIVE
COLOR UR AUTO: YELLOW
GLUCOSE UR STRIP-MCNC: 150 MG/DL
HGB UR QL STRIP: ABNORMAL
KETONES UR STRIP-MCNC: NEGATIVE MG/DL
LEUKOCYTE ESTERASE UR QL STRIP: ABNORMAL
NITRATE UR QL: POSITIVE
PH UR STRIP: 7 [PH] (ref 5–7)
RBC URINE: 4 /HPF
SP GR UR STRIP: 1.02 (ref 1–1.03)
SQUAMOUS EPITHELIAL: 2 /HPF
UROBILINOGEN UR STRIP-MCNC: NORMAL MG/DL
WBC URINE: 25 /HPF

## 2025-07-30 PROCEDURE — 81003 URINALYSIS AUTO W/O SCOPE: CPT

## 2025-07-31 LAB
BACTERIA UR CULT: ABNORMAL
DONOR IDENTIFICATION: NORMAL
DSA COMMENTS: NORMAL
DSA PRESENT: NO
DSA TEST METHOD: NORMAL
FLOWPRA1 CELL: NORMAL
FLOWPRA1 COMMENTS: NORMAL
FLOWPRA1 RESULT: NORMAL
FLOWPRA1 TEST METHOD: NORMAL
FLOWPRA2 CELL: NORMAL
FLOWPRA2 COMMENTS: NORMAL
FLOWPRA2 RESULT: NORMAL
FLOWPRA2 TEST METHOD: NORMAL
ORGAN: NORMAL

## 2025-08-02 LAB — BACTERIA UR CULT: ABNORMAL

## 2025-08-05 ENCOUNTER — TELEPHONE (OUTPATIENT)
Dept: CARDIOLOGY | Facility: CLINIC | Age: 50
End: 2025-08-05
Payer: COMMERCIAL

## 2025-08-05 DIAGNOSIS — Z94.0 KIDNEY REPLACED BY TRANSPLANT: ICD-10-CM

## 2025-08-06 RX ORDER — TICAGRELOR 90 MG/1
90 TABLET, FILM COATED ORAL 2 TIMES DAILY
Qty: 180 TABLET | Refills: 0 | Status: SHIPPED | OUTPATIENT
Start: 2025-08-06

## 2025-08-07 ENCOUNTER — OFFICE VISIT (OUTPATIENT)
Dept: INFECTIOUS DISEASES | Facility: CLINIC | Age: 50
End: 2025-08-07
Attending: STUDENT IN AN ORGANIZED HEALTH CARE EDUCATION/TRAINING PROGRAM
Payer: MEDICARE

## 2025-08-07 VITALS
HEIGHT: 71 IN | HEART RATE: 91 BPM | BODY MASS INDEX: 32.48 KG/M2 | RESPIRATION RATE: 18 BRPM | DIASTOLIC BLOOD PRESSURE: 82 MMHG | OXYGEN SATURATION: 96 % | SYSTOLIC BLOOD PRESSURE: 134 MMHG | WEIGHT: 232 LBS

## 2025-08-07 DIAGNOSIS — N39.0 RECURRENT UTI: Primary | ICD-10-CM

## 2025-08-07 DIAGNOSIS — D84.9 IMMUNOSUPPRESSED STATUS: ICD-10-CM

## 2025-08-07 DIAGNOSIS — Z94.0 KIDNEY TRANSPLANTED: ICD-10-CM

## 2025-08-07 PROCEDURE — G0463 HOSPITAL OUTPT CLINIC VISIT: HCPCS | Performed by: STUDENT IN AN ORGANIZED HEALTH CARE EDUCATION/TRAINING PROGRAM

## 2025-08-07 ASSESSMENT — PAIN SCALES - GENERAL: PAINLEVEL_OUTOF10: NO PAIN (0)

## 2025-08-11 ENCOUNTER — LAB (OUTPATIENT)
Dept: LAB | Facility: HOSPITAL | Age: 50
End: 2025-08-11
Payer: MEDICARE

## 2025-08-11 ENCOUNTER — RESULTS FOLLOW-UP (OUTPATIENT)
Dept: TRANSPLANT | Facility: CLINIC | Age: 50
End: 2025-08-11

## 2025-08-11 DIAGNOSIS — I15.1 HTN, KIDNEY TRANSPLANT RELATED: ICD-10-CM

## 2025-08-11 DIAGNOSIS — Z94.0 KIDNEY REPLACED BY TRANSPLANT: ICD-10-CM

## 2025-08-11 DIAGNOSIS — Z94.0 HTN, KIDNEY TRANSPLANT RELATED: ICD-10-CM

## 2025-08-11 DIAGNOSIS — N39.0 URINARY TRACT INFECTION: ICD-10-CM

## 2025-08-11 DIAGNOSIS — I50.20 HFREF (HEART FAILURE WITH REDUCED EJECTION FRACTION) (H): ICD-10-CM

## 2025-08-11 LAB
ANION GAP SERPL CALCULATED.3IONS-SCNC: 11 MMOL/L (ref 7–15)
BUN SERPL-MCNC: 23.2 MG/DL (ref 6–20)
CALCIUM SERPL-MCNC: 9.3 MG/DL (ref 8.8–10.4)
CHLORIDE SERPL-SCNC: 108 MMOL/L (ref 98–107)
CREAT SERPL-MCNC: 1.66 MG/DL (ref 0.67–1.17)
EGFRCR SERPLBLD CKD-EPI 2021: 50 ML/MIN/1.73M2
GLUCOSE SERPL-MCNC: 107 MG/DL (ref 70–99)
HCO3 SERPL-SCNC: 22 MMOL/L (ref 22–29)
POTASSIUM SERPL-SCNC: 4.6 MMOL/L (ref 3.4–5.3)
SODIUM SERPL-SCNC: 141 MMOL/L (ref 135–145)

## 2025-08-11 PROCEDURE — 80051 ELECTROLYTE PANEL: CPT

## 2025-08-11 PROCEDURE — 36415 COLL VENOUS BLD VENIPUNCTURE: CPT

## 2025-08-13 ENCOUNTER — VIRTUAL VISIT (OUTPATIENT)
Dept: TRANSPLANT | Facility: CLINIC | Age: 50
End: 2025-08-13
Attending: INTERNAL MEDICINE
Payer: MEDICARE

## 2025-08-13 DIAGNOSIS — N18.31 ANEMIA IN STAGE 3A CHRONIC KIDNEY DISEASE (H): ICD-10-CM

## 2025-08-13 DIAGNOSIS — I25.10 CORONARY ARTERY DISEASE DUE TO LIPID RICH PLAQUE: ICD-10-CM

## 2025-08-13 DIAGNOSIS — Z94.0 HISTORY OF SIMULTANEOUS KIDNEY AND PANCREAS TRANSPLANT (H): ICD-10-CM

## 2025-08-13 DIAGNOSIS — E83.52 HYPERCALCEMIA: ICD-10-CM

## 2025-08-13 DIAGNOSIS — D80.1 HYPOGAMMAGLOBULINEMIA: ICD-10-CM

## 2025-08-13 DIAGNOSIS — K21.9 GASTROESOPHAGEAL REFLUX DISEASE WITHOUT ESOPHAGITIS: ICD-10-CM

## 2025-08-13 DIAGNOSIS — D63.1 ANEMIA IN STAGE 3A CHRONIC KIDNEY DISEASE (H): ICD-10-CM

## 2025-08-13 DIAGNOSIS — Z94.83 PANCREAS REPLACED BY TRANSPLANT (H): ICD-10-CM

## 2025-08-13 DIAGNOSIS — E11.69 TYPE 2 DIABETES MELLITUS WITH OTHER SPECIFIED COMPLICATION, WITH LONG-TERM CURRENT USE OF INSULIN (H): ICD-10-CM

## 2025-08-13 DIAGNOSIS — Z94.83 HISTORY OF SIMULTANEOUS KIDNEY AND PANCREAS TRANSPLANT (H): ICD-10-CM

## 2025-08-13 DIAGNOSIS — I15.1 HTN, KIDNEY TRANSPLANT RELATED: ICD-10-CM

## 2025-08-13 DIAGNOSIS — I25.790: Primary | ICD-10-CM

## 2025-08-13 DIAGNOSIS — Z79.4 TYPE 2 DIABETES MELLITUS WITH OTHER SPECIFIED COMPLICATION, WITH LONG-TERM CURRENT USE OF INSULIN (H): ICD-10-CM

## 2025-08-13 DIAGNOSIS — E66.811 OBESITY (BMI 30.0-34.9): ICD-10-CM

## 2025-08-13 DIAGNOSIS — I50.22 CHRONIC SYSTOLIC HEART FAILURE (H): ICD-10-CM

## 2025-08-13 DIAGNOSIS — B25.9 CYTOMEGALOVIRUS (CMV) VIREMIA (H): ICD-10-CM

## 2025-08-13 DIAGNOSIS — I73.9 PAD (PERIPHERAL ARTERY DISEASE): ICD-10-CM

## 2025-08-13 DIAGNOSIS — G47.33 OBSTRUCTIVE SLEEP APNEA TREATED WITH CONTINUOUS POSITIVE AIRWAY PRESSURE (CPAP): ICD-10-CM

## 2025-08-13 DIAGNOSIS — Z94.0 HTN, KIDNEY TRANSPLANT RELATED: ICD-10-CM

## 2025-08-13 DIAGNOSIS — E87.20 METABOLIC ACIDOSIS: ICD-10-CM

## 2025-08-13 DIAGNOSIS — B34.8 BK VIREMIA: ICD-10-CM

## 2025-08-13 DIAGNOSIS — Z95.1 S/P CABG (CORONARY ARTERY BYPASS GRAFT): ICD-10-CM

## 2025-08-13 DIAGNOSIS — E83.42 HYPOMAGNESEMIA: ICD-10-CM

## 2025-08-13 DIAGNOSIS — I49.01 CARDIAC ARREST WITH VENTRICULAR FIBRILLATION (H): ICD-10-CM

## 2025-08-13 DIAGNOSIS — N18.31 STAGE 3A CHRONIC KIDNEY DISEASE (H): ICD-10-CM

## 2025-08-13 DIAGNOSIS — Z48.298 AFTERCARE FOLLOWING ORGAN TRANSPLANT: ICD-10-CM

## 2025-08-13 DIAGNOSIS — I46.9 CARDIAC ARREST WITH VENTRICULAR FIBRILLATION (H): ICD-10-CM

## 2025-08-13 DIAGNOSIS — N25.81 SECONDARY RENAL HYPERPARATHYROIDISM: ICD-10-CM

## 2025-08-13 DIAGNOSIS — Z94.0 KIDNEY REPLACED BY TRANSPLANT: ICD-10-CM

## 2025-08-13 DIAGNOSIS — Z29.89 NEED FOR PNEUMOCYSTIS PROPHYLAXIS: ICD-10-CM

## 2025-08-13 DIAGNOSIS — I25.83 CORONARY ARTERY DISEASE DUE TO LIPID RICH PLAQUE: ICD-10-CM

## 2025-08-16 DIAGNOSIS — Z94.83 PANCREAS REPLACED BY TRANSPLANT (H): ICD-10-CM

## 2025-08-16 DIAGNOSIS — Z94.0 KIDNEY REPLACED BY TRANSPLANT: ICD-10-CM

## 2025-08-18 RX ORDER — MYCOPHENOLIC ACID 180 MG/1
540 TABLET, DELAYED RELEASE ORAL 2 TIMES DAILY
Qty: 180 TABLET | Refills: 11 | Status: SHIPPED | OUTPATIENT
Start: 2025-08-18

## 2025-08-25 DIAGNOSIS — B25.9 CYTOMEGALOVIRUS (CMV) VIREMIA (H): ICD-10-CM

## 2025-08-25 DIAGNOSIS — I15.1 HTN, KIDNEY TRANSPLANT RELATED: ICD-10-CM

## 2025-08-25 DIAGNOSIS — R30.0 DYSURIA: Primary | ICD-10-CM

## 2025-08-25 DIAGNOSIS — B34.8 BK VIREMIA: ICD-10-CM

## 2025-08-25 DIAGNOSIS — Z94.0 HTN, KIDNEY TRANSPLANT RELATED: ICD-10-CM

## 2025-08-25 DIAGNOSIS — N39.0 URINARY TRACT INFECTION: ICD-10-CM

## 2025-08-26 ENCOUNTER — LAB (OUTPATIENT)
Dept: LAB | Facility: HOSPITAL | Age: 50
End: 2025-08-26
Payer: MEDICARE

## 2025-08-28 ENCOUNTER — TELEPHONE (OUTPATIENT)
Dept: TRANSPLANT | Facility: CLINIC | Age: 50
End: 2025-08-28
Payer: MEDICARE

## 2025-08-28 ENCOUNTER — MYC MEDICAL ADVICE (OUTPATIENT)
Dept: UROLOGY | Facility: CLINIC | Age: 50
End: 2025-08-28
Payer: MEDICARE

## 2025-08-28 DIAGNOSIS — I15.1 HTN, KIDNEY TRANSPLANT RELATED: ICD-10-CM

## 2025-08-28 DIAGNOSIS — Z94.0 HTN, KIDNEY TRANSPLANT RELATED: ICD-10-CM

## 2025-08-28 DIAGNOSIS — B34.8 BK VIREMIA: ICD-10-CM

## 2025-08-28 DIAGNOSIS — B25.9 CYTOMEGALOVIRUS (CMV) VIREMIA (H): ICD-10-CM

## 2025-08-28 DIAGNOSIS — N39.0 URINARY TRACT INFECTION: Primary | ICD-10-CM

## 2025-08-28 RX ORDER — NITROFURANTOIN 25; 75 MG/1; MG/1
100 CAPSULE ORAL 2 TIMES DAILY
Qty: 14 CAPSULE | Refills: 0 | Status: SHIPPED | OUTPATIENT
Start: 2025-08-28

## 2025-09-02 ENCOUNTER — PRE VISIT (OUTPATIENT)
Dept: UROLOGY | Facility: CLINIC | Age: 50
End: 2025-09-02
Payer: MEDICARE

## (undated) DEVICE — SOL NACL 0.9% INJ 1000ML BAG 2B1324X

## (undated) DEVICE — INSERT FOGARTY 61MM TRACTION HYDRAJAW HYDRA61

## (undated) DEVICE — MANIFOLD KIT ANGIO AUTOMATED 014613

## (undated) DEVICE — DRSG TEGADERM 8X12" 1629

## (undated) DEVICE — KIT HAND CONTROL ACIST 014644 AR-P54

## (undated) DEVICE — CUSTOM PACK CORONARY SAN5BCRHEA

## (undated) DEVICE — SHEATH ULTIMUM 6FR 12CM 407845

## (undated) DEVICE — VALVE HEMOSTASIS .096" COPILOT MECH 1003331

## (undated) DEVICE — KIT DVC ANGIO IBASIXCOMPAK 13INX20ML 3WAY IN4430

## (undated) DEVICE — SU SILK 1 TIE 6X30" A307H

## (undated) DEVICE — CATH ANGIO JUDKINS JL4 6FRX100CM INFINITI 534620T

## (undated) DEVICE — KIT HAND CONTROL ACIST 016795

## (undated) DEVICE — ELECTRODE ADULT PACING MULTI P-211-M1

## (undated) DEVICE — DRAIN JACKSON PRATT RESERVOIR 100ML SU130-1305

## (undated) DEVICE — CLIP HORIZON LG ORANGE 004200

## (undated) DEVICE — SU ETHILON 3-0 PS-1 18" 1663H

## (undated) DEVICE — STPL LINEAR CUT 55MM TLC55

## (undated) DEVICE — CATH ANGIO INFINITI JL4 4FRX100CM 538420

## (undated) DEVICE — DRAPE IOBAN INCISE 23X17" 6650EZ

## (undated) DEVICE — CATH US OD 5FR OD SEC 2.9FR EAGLE EYE PLATINUM 0.014 85900P

## (undated) DEVICE — SHTH INTRO 0.021IN ID 6FR DIA

## (undated) DEVICE — SHEATH 4FR ULTIMUM 407840

## (undated) DEVICE — SPONGE LAP 18X18" 23250-400A

## (undated) DEVICE — FILTER HEPA FLUID TRAP NEPTUNE 0703-040-001

## (undated) DEVICE — ESU PENCIL SMOKE EVAC W/ROCKER SWITCH 0703-047-000

## (undated) DEVICE — ESU ELEC BLADE 2.75" COATED/INSULATED E1455

## (undated) DEVICE — TUBING PRESSURE 30"

## (undated) DEVICE — CATH ANGIO INFINITI IM 4FRX100CM 538460

## (undated) DEVICE — SUCTION MANIFOLD NEPTUNE 2 SYS 4 PORT 0702-020-000

## (undated) DEVICE — VALVE HEMOSTASIS GUARDIAN II OD8 FR GUIDEWIRE 8215

## (undated) DEVICE — SU PROLENE 7-0 BV-1DA 4X18" M8701

## (undated) DEVICE — CATH ANGIO SUPERTORQUE PLUS JL4 6FRX100CM 533620

## (undated) DEVICE — CUTTING BALLOON WOLVERINE 3.5X10MM

## (undated) DEVICE — SU PROLENE 4-0 SHDA 36" 8521H

## (undated) DEVICE — SU PDS II 0 TP-1 60" Z991G

## (undated) DEVICE — NDL COUNTER 40CT  31142311

## (undated) DEVICE — LINEN TOWEL PACK X6 WHITE 5487

## (undated) DEVICE — DRAIN JACKSON PRATT CHANNEL 19FR ROUND HUBLESS SIL JP-2230

## (undated) DEVICE — CATH BALLOON NC EMERGE 3.50X15MM H7493926715350

## (undated) DEVICE — INTRO SHEATH 6FRX25CM PINNACLE RSS606

## (undated) DEVICE — SYR ANGIOGRAPHY MULTIUSE KIT ACIST 014612

## (undated) DEVICE — ELECTRODE DEFIB CADENCE 22550R

## (undated) DEVICE — TUBING IRRIG CYSTO/BLADDER SET 81" LF 2C4040

## (undated) DEVICE — GW VASC .035IN DIA 260CML 7CML 3 MM RADIUS J CURVE 502455

## (undated) DEVICE — SURGICEL ABSORBABLE HEMOSTAT SNOW 2"X4" 2082

## (undated) DEVICE — CATH FOLYSIL 12FR  10ML AA6112

## (undated) DEVICE — CLIP HORIZON SM RED WIDE SLOT 001201

## (undated) DEVICE — BLADE CLIPPER SGL USE 9680

## (undated) DEVICE — SU PDS II 5-0 RB-1 27" Z303H

## (undated) DEVICE — PACK CYSTO UMMC CUSTOM

## (undated) DEVICE — SU PDS II 6-0 RB-2DA 30" Z149H

## (undated) DEVICE — SUCTION TIP YANKAUER W/O VENT K86

## (undated) DEVICE — INTRO SHEATH 6FRX10CM PINNACLE RSS602

## (undated) DEVICE — MINOR SINGLE BASIN KIT CSR WRAPX2 7QT SSK3002

## (undated) DEVICE — GLOVE BIOGEL PI MICRO INDICATOR UNDERGLOVE SZ 7.5 48975

## (undated) DEVICE — PREP CHLORAPREP 26ML TINTED HI-LITE ORANGE 930815

## (undated) DEVICE — Device

## (undated) DEVICE — DRAPE POUCH INSTRUMENT 1018

## (undated) DEVICE — DRSG MEDIPORE 3 1/2X13 3/4" 3573

## (undated) DEVICE — SYR 01ML 27GA 0.5" NDL TBC 309623

## (undated) DEVICE — CATH ANGIO INFINITI 3DRC 4FRX100CM 538476

## (undated) DEVICE — ESU LIGASURE IMPACT OPEN SEALER/DVDR CVD LG JAW LF4418

## (undated) DEVICE — DRSG PRIMAPORE 02X3" 7133

## (undated) DEVICE — GLOVE BIOGEL PI MICRO SZ 7.5 48575

## (undated) DEVICE — SU SILK 2-0 TIE 12X30" A305H

## (undated) DEVICE — SUCTION TIP POOLE K770

## (undated) DEVICE — SU PDS II 4-0 SH 27" Z315H

## (undated) DEVICE — SU PROLENE 7-0 BV-1DA 30" 8703H

## (undated) DEVICE — CATH BALLOON NC EMERGE 3.75X12MM H7493926712370

## (undated) DEVICE — GOWN IMPERVIOUS BREATHABLE SMART XLG 89045

## (undated) DEVICE — CATH ANGIO INFINITI JR4 4FRX100CM 538421

## (undated) DEVICE — LINEN TOWEL PACK X5 5464

## (undated) DEVICE — SU SILK 3-0 TIE 12X30" A304H

## (undated) DEVICE — WIPES FOLEY CARE SURESTEP PROVON DFC100

## (undated) DEVICE — CATH BALLO0N SHOCKWAVE C2+ 4.0 X12MM CORONARY C2PIVL4012

## (undated) DEVICE — LINEN TOWEL PACK X30 5481

## (undated) DEVICE — CANISTER ENGINE FOR INDIGO SYSTEM

## (undated) DEVICE — SYR 10ML LL W/O NDL 302995

## (undated) DEVICE — CATH ANGIO SUPERTORQUE IM 6FRX100CM 533660

## (undated) DEVICE — CATH GUIDING BLUE YELLOW PTFE JR4 6FRX100CM 67008200

## (undated) DEVICE — CATH LAUNCHER 6FR 55CM LA6JR40K

## (undated) DEVICE — INTRODUCER GUIDEWIRE 22290

## (undated) DEVICE — SU SILK 0 TIE 6X30" A306H

## (undated) DEVICE — SU SILK 4-0 TIE 12X30" A303H

## (undated) DEVICE — GOWN IMPERVIOUS BREATHABLE SMART LG 89015

## (undated) DEVICE — CATH BALLOON NC EMERGE 4.00X12MM H7493926712400

## (undated) DEVICE — GUIDEWIRE RUNTHROUGH IZANAI PTCA .014 X 180CM 25-5211

## (undated) DEVICE — DRAPE FLUID WARMING 52 X 60" ORS-321

## (undated) DEVICE — DEVICE CATH STABILIZATION STATLOCK FOLEY 3-WAY FOL0105

## (undated) DEVICE — PACK HEART LEFT CUSTOM

## (undated) DEVICE — SOL NACL 0.9% IRRIG 1000ML BOTTLE 2F7124

## (undated) DEVICE — DRAPE ISOLATION BAG 1003

## (undated) DEVICE — GW VASC OMNIWIRE J L185CM PRESSURE 89185J

## (undated) DEVICE — CATH BALLOON EMERGE 2.5X12MM H7493918912250

## (undated) DEVICE — DRAPE SHEET REV FOLD 3/4 9349

## (undated) DEVICE — INTRO MICRO MINI STICK 4FR STIFF NITINOL 45-753

## (undated) DEVICE — SU PROLENE 5-0 RB-1DA 36"  8556H

## (undated) DEVICE — GLOVE BIOGEL PI MICRO INDICATOR UNDERGLOVE SZ 8.0 48980

## (undated) DEVICE — STPL RELOAD LINEAR CUT 55MM VASC TVR55

## (undated) DEVICE — SOL NACL 0.9% IRRIG 3000ML BAG 2B7477

## (undated) DEVICE — SU VICRYL+ 3-0 27IN SH UND VCP416H

## (undated) DEVICE — GUIDEWIRE VASCULAR COMET II 185CML PRESSURE H74939359110

## (undated) DEVICE — CATH ANGIO WEDGE PRESSURE 5FRX110CM DL AI-07124

## (undated) DEVICE — CATH ANGIO JUDKINS R4 6FRX100CM INFINITI 534621T

## (undated) DEVICE — ESU ELEC BLADE 6" COATED E1450-6

## (undated) DEVICE — INTRO SHEATH AVANTI 4FRX23CM 504604T

## (undated) DEVICE — INSERT FOGARTY 33MM TRACTION HYDRAJAW HYDRA33

## (undated) DEVICE — JELLY LUBRICATING SURGILUBE 2OZ TUBE

## (undated) DEVICE — KIT CATHETER INDIGO RX 140CM WITH LG LUMEN ASP TUBING

## (undated) DEVICE — PACK SET-UP STD 9102

## (undated) DEVICE — CLIP HORIZON MED BLUE 002200

## (undated) DEVICE — SU PROLENE 6-0 RB-2DA 30" 8711H

## (undated) DEVICE — PAD CHUX UNDERPAD 23X24" 7136

## (undated) DEVICE — SU SILK 3-0 SH CR 8X18" C013D

## (undated) DEVICE — STPL RELOAD LINEAR CUT 55MM TCR55

## (undated) DEVICE — CATH BALLOON DILATATION  SCOREFLEXNC 3.00X10MM 630-104-1U

## (undated) RX ORDER — CEFAZOLIN SODIUM/WATER 2 G/20 ML
SYRINGE (ML) INTRAVENOUS
Status: DISPENSED
Start: 2024-10-10

## (undated) RX ORDER — FENTANYL CITRATE-0.9 % NACL/PF 10 MCG/ML
PLASTIC BAG, INJECTION (ML) INTRAVENOUS
Status: DISPENSED
Start: 2024-07-19

## (undated) RX ORDER — SODIUM CHLORIDE 9 MG/ML
INJECTION, SOLUTION INTRAVENOUS
Status: DISPENSED
Start: 2024-07-20

## (undated) RX ORDER — FENTANYL CITRATE 50 UG/ML
INJECTION, SOLUTION INTRAMUSCULAR; INTRAVENOUS
Status: DISPENSED
Start: 2025-07-24

## (undated) RX ORDER — FENTANYL CITRATE 50 UG/ML
INJECTION, SOLUTION INTRAMUSCULAR; INTRAVENOUS
Status: DISPENSED
Start: 2024-07-19

## (undated) RX ORDER — FENTANYL CITRATE 50 UG/ML
INJECTION, SOLUTION INTRAMUSCULAR; INTRAVENOUS
Status: DISPENSED
Start: 2024-07-20

## (undated) RX ORDER — FENTANYL CITRATE 50 UG/ML
INJECTION, SOLUTION INTRAMUSCULAR; INTRAVENOUS
Status: DISPENSED
Start: 2024-02-01

## (undated) RX ORDER — PROPOFOL 10 MG/ML
INJECTION, EMULSION INTRAVENOUS
Status: DISPENSED
Start: 2024-07-19

## (undated) RX ORDER — DIAZEPAM 5 MG
TABLET ORAL
Status: DISPENSED
Start: 2021-07-22

## (undated) RX ORDER — CIPROFLOXACIN 2 MG/ML
INJECTION, SOLUTION INTRAVENOUS
Status: DISPENSED
Start: 2024-07-19

## (undated) RX ORDER — FENTANYL CITRATE-0.9 % NACL/PF 10 MCG/ML
PLASTIC BAG, INJECTION (ML) INTRAVENOUS
Status: DISPENSED
Start: 2024-07-20

## (undated) RX ORDER — HEPARIN SODIUM 1000 [USP'U]/ML
INJECTION, SOLUTION INTRAVENOUS; SUBCUTANEOUS
Status: DISPENSED
Start: 2024-07-19

## (undated) RX ORDER — DIAZEPAM 10 MG/1
TABLET ORAL
Status: DISPENSED
Start: 2025-07-24

## (undated) RX ORDER — FENTANYL CITRATE 50 UG/ML
INJECTION, SOLUTION INTRAMUSCULAR; INTRAVENOUS
Status: DISPENSED
Start: 2024-09-01

## (undated) RX ORDER — ASPIRIN 325 MG
TABLET ORAL
Status: DISPENSED
Start: 2025-07-24

## (undated) RX ORDER — HYDRALAZINE HYDROCHLORIDE 20 MG/ML
INJECTION INTRAMUSCULAR; INTRAVENOUS
Status: DISPENSED
Start: 2024-02-01

## (undated) RX ORDER — NITROGLYCERIN 5 MG/ML
VIAL (ML) INTRAVENOUS
Status: DISPENSED
Start: 2024-02-01

## (undated) RX ORDER — PAPAVERINE HYDROCHLORIDE 30 MG/ML
INJECTION INTRAMUSCULAR; INTRAVENOUS
Status: DISPENSED
Start: 2024-07-19

## (undated) RX ORDER — CALCIUM CHLORIDE 100 MG/ML
INJECTION INTRAVENOUS; INTRAVENTRICULAR
Status: DISPENSED
Start: 2024-07-20

## (undated) RX ORDER — SIMETHICONE 40MG/0.6ML
SUSPENSION, DROPS(FINAL DOSAGE FORM)(ML) ORAL
Status: DISPENSED
Start: 2024-09-01

## (undated) RX ORDER — CALCIUM CHLORIDE 100 MG/ML
INJECTION INTRAVENOUS; INTRAVENTRICULAR
Status: DISPENSED
Start: 2024-07-19

## (undated) RX ORDER — FENTANYL CITRATE 50 UG/ML
INJECTION, SOLUTION INTRAMUSCULAR; INTRAVENOUS
Status: DISPENSED
Start: 2021-07-22

## (undated) RX ORDER — SODIUM CHLORIDE 450 MG/100ML
INJECTION, SOLUTION INTRAVENOUS
Status: DISPENSED
Start: 2024-07-20

## (undated) RX ORDER — HEPARIN SODIUM 1000 [USP'U]/ML
INJECTION, SOLUTION INTRAVENOUS; SUBCUTANEOUS
Status: DISPENSED
Start: 2024-07-20

## (undated) RX ORDER — ACETAMINOPHEN 325 MG/1
TABLET ORAL
Status: DISPENSED
Start: 2024-10-10

## (undated) RX ORDER — REGADENOSON 0.08 MG/ML
INJECTION, SOLUTION INTRAVENOUS
Status: DISPENSED
Start: 2023-11-21

## (undated) RX ORDER — HEPARIN SODIUM 1000 [USP'U]/ML
INJECTION, SOLUTION INTRAVENOUS; SUBCUTANEOUS
Status: DISPENSED
Start: 2024-02-01

## (undated) RX ORDER — LIDOCAINE HYDROCHLORIDE 20 MG/ML
JELLY TOPICAL
Status: DISPENSED
Start: 2024-07-19

## (undated) RX ORDER — VERAPAMIL HYDROCHLORIDE 2.5 MG/ML
INJECTION, SOLUTION INTRAVENOUS
Status: DISPENSED
Start: 2024-07-19

## (undated) RX ORDER — HYDROMORPHONE HYDROCHLORIDE 1 MG/ML
INJECTION, SOLUTION INTRAMUSCULAR; INTRAVENOUS; SUBCUTANEOUS
Status: DISPENSED
Start: 2024-07-20

## (undated) RX ORDER — NICARDIPINE HCL-0.9% SOD CHLOR 1 MG/10 ML
SYRINGE (ML) INTRAVENOUS
Status: DISPENSED
Start: 2024-02-01

## (undated) RX ORDER — HYDRALAZINE HYDROCHLORIDE 20 MG/ML
INJECTION INTRAMUSCULAR; INTRAVENOUS
Status: DISPENSED
Start: 2021-07-22

## (undated) RX ORDER — ACETAMINOPHEN 325 MG/1
TABLET ORAL
Status: DISPENSED
Start: 2024-07-19

## (undated) RX ORDER — DEXTROSE, SODIUM CHLORIDE, SODIUM LACTATE, POTASSIUM CHLORIDE, AND CALCIUM CHLORIDE 5; .6; .31; .03; .02 G/100ML; G/100ML; G/100ML; G/100ML; G/100ML
INJECTION, SOLUTION INTRAVENOUS
Status: DISPENSED
Start: 2024-07-20

## (undated) RX ORDER — PROPOFOL 10 MG/ML
INJECTION, EMULSION INTRAVENOUS
Status: DISPENSED
Start: 2024-07-20

## (undated) RX ORDER — SODIUM CHLORIDE 9 MG/ML
INJECTION, SOLUTION INTRAVENOUS
Status: DISPENSED
Start: 2024-02-01

## (undated) RX ORDER — ADENOSINE 3 MG/ML
INJECTION, SOLUTION INTRAVENOUS
Status: DISPENSED
Start: 2021-07-22